# Patient Record
Sex: FEMALE | Race: BLACK OR AFRICAN AMERICAN | Employment: OTHER | URBAN - METROPOLITAN AREA
[De-identification: names, ages, dates, MRNs, and addresses within clinical notes are randomized per-mention and may not be internally consistent; named-entity substitution may affect disease eponyms.]

---

## 2017-01-10 ENCOUNTER — APPOINTMENT (OUTPATIENT)
Dept: GENERAL RADIOLOGY | Age: 47
End: 2017-01-10
Payer: COMMERCIAL

## 2017-01-10 ENCOUNTER — HOSPITAL ENCOUNTER (OUTPATIENT)
Age: 47
Setting detail: OBSERVATION
Discharge: HOME | End: 2017-01-11
Attending: INTERNAL MEDICINE | Admitting: INTERNAL MEDICINE
Payer: COMMERCIAL

## 2017-01-10 DIAGNOSIS — R79.89 ELEVATED D-DIMER: ICD-10-CM

## 2017-01-10 DIAGNOSIS — R07.9 CHEST PAIN, UNSPECIFIED TYPE: Primary | ICD-10-CM

## 2017-01-10 PROBLEM — C64.9 RENAL CANCER (HCC): Status: ACTIVE | Noted: 2017-01-10

## 2017-01-10 LAB
ALBUMIN SERPL-MCNC: 4.2 G/DL (ref 3.9–4.9)
ALP BLD-CCNC: 128 U/L (ref 40–130)
ALT SERPL-CCNC: 14 U/L (ref 0–33)
ANION GAP SERPL CALCULATED.3IONS-SCNC: 12 MEQ/L (ref 7–13)
APTT: 27.1 SEC (ref 21.6–35.4)
AST SERPL-CCNC: 19 U/L (ref 0–35)
BASOPHILS ABSOLUTE: 0.1 K/UL (ref 0–0.2)
BASOPHILS RELATIVE PERCENT: 1 %
BILIRUB SERPL-MCNC: 0.5 MG/DL (ref 0–1.2)
BUN BLDV-MCNC: 13 MG/DL (ref 6–20)
CALCIUM SERPL-MCNC: 9.2 MG/DL (ref 8.6–10.2)
CHLORIDE BLD-SCNC: 101 MEQ/L (ref 98–107)
CK MB: 2.6 NG/ML (ref 0–3.8)
CO2: 23 MEQ/L (ref 22–29)
CREAT SERPL-MCNC: 1.23 MG/DL (ref 0.5–0.9)
CREATINE KINASE-MB INDEX: 0.5 % (ref 0–3.5)
D DIMER: 0.56 MG/L FEU (ref 0–0.5)
EOSINOPHILS ABSOLUTE: 0.4 K/UL (ref 0–0.7)
EOSINOPHILS RELATIVE PERCENT: 4.3 %
GFR AFRICAN AMERICAN: 56.9
GFR NON-AFRICAN AMERICAN: 47
GLOBULIN: 2.7 G/DL (ref 2.3–3.5)
GLUCOSE BLD-MCNC: 98 MG/DL (ref 74–109)
HCT VFR BLD CALC: 44.8 % (ref 37–47)
HEMOGLOBIN: 15.3 G/DL (ref 12–16)
INR BLD: 1
LYMPHOCYTES ABSOLUTE: 2.1 K/UL (ref 1–4.8)
LYMPHOCYTES RELATIVE PERCENT: 24.4 %
MCH RBC QN AUTO: 32.3 PG (ref 27–31.3)
MCHC RBC AUTO-ENTMCNC: 34.2 % (ref 33–37)
MCV RBC AUTO: 94.5 FL (ref 82–100)
MONOCYTES ABSOLUTE: 0.5 K/UL (ref 0.2–0.8)
MONOCYTES RELATIVE PERCENT: 6 %
NEUTROPHILS ABSOLUTE: 5.6 K/UL (ref 1.4–6.5)
NEUTROPHILS RELATIVE PERCENT: 64.3 %
PDW BLD-RTO: 13.5 % (ref 11.5–14.5)
PLATELET # BLD: 274 K/UL (ref 130–400)
POTASSIUM SERPL-SCNC: 4.4 MEQ/L (ref 3.5–5.1)
PRO-BNP: 40 PG/ML
PROTHROMBIN TIME: 10.7 SEC (ref 8.1–13.7)
RBC # BLD: 4.74 M/UL (ref 4.2–5.4)
SODIUM BLD-SCNC: 136 MEQ/L (ref 132–144)
TOTAL CK: 556 U/L (ref 0–170)
TOTAL PROTEIN: 6.9 G/DL (ref 6.4–8.1)
TROPONIN: <0.01 NG/ML (ref 0–0.01)
WBC # BLD: 8.7 K/UL (ref 4.8–10.8)

## 2017-01-10 PROCEDURE — 82553 CREATINE MB FRACTION: CPT

## 2017-01-10 PROCEDURE — G0378 HOSPITAL OBSERVATION PER HR: HCPCS

## 2017-01-10 PROCEDURE — 6360000002 HC RX W HCPCS: Performed by: PHYSICIAN ASSISTANT

## 2017-01-10 PROCEDURE — 85730 THROMBOPLASTIN TIME PARTIAL: CPT

## 2017-01-10 PROCEDURE — 80053 COMPREHEN METABOLIC PANEL: CPT

## 2017-01-10 PROCEDURE — 36415 COLL VENOUS BLD VENIPUNCTURE: CPT

## 2017-01-10 PROCEDURE — 83880 ASSAY OF NATRIURETIC PEPTIDE: CPT

## 2017-01-10 PROCEDURE — 93005 ELECTROCARDIOGRAM TRACING: CPT

## 2017-01-10 PROCEDURE — 85610 PROTHROMBIN TIME: CPT

## 2017-01-10 PROCEDURE — 71010 XR CHEST PORTABLE: CPT

## 2017-01-10 PROCEDURE — 85379 FIBRIN DEGRADATION QUANT: CPT

## 2017-01-10 PROCEDURE — 82550 ASSAY OF CK (CPK): CPT

## 2017-01-10 PROCEDURE — 84484 ASSAY OF TROPONIN QUANT: CPT

## 2017-01-10 PROCEDURE — 85025 COMPLETE CBC W/AUTO DIFF WBC: CPT

## 2017-01-10 PROCEDURE — 6370000000 HC RX 637 (ALT 250 FOR IP): Performed by: PHYSICIAN ASSISTANT

## 2017-01-10 PROCEDURE — 96372 THER/PROPH/DIAG INJ SC/IM: CPT

## 2017-01-10 PROCEDURE — 99285 EMERGENCY DEPT VISIT HI MDM: CPT

## 2017-01-10 RX ORDER — ASPIRIN 325 MG
325 TABLET ORAL ONCE
Status: COMPLETED | OUTPATIENT
Start: 2017-01-10 | End: 2017-01-10

## 2017-01-10 RX ORDER — NITROGLYCERIN 0.4 MG/1
0.4 TABLET SUBLINGUAL ONCE
Status: COMPLETED | OUTPATIENT
Start: 2017-01-10 | End: 2017-01-10

## 2017-01-10 RX ADMIN — ENOXAPARIN SODIUM 90 MG: 100 INJECTION, SOLUTION INTRAVENOUS; SUBCUTANEOUS at 23:36

## 2017-01-10 RX ADMIN — ASPIRIN 325 MG ORAL TABLET 325 MG: 325 PILL ORAL at 20:59

## 2017-01-10 RX ADMIN — NITROGLYCERIN 0.4 MG: 0.4 TABLET SUBLINGUAL at 21:00

## 2017-01-10 ASSESSMENT — PAIN SCALES - GENERAL
PAINLEVEL_OUTOF10: 6
PAINLEVEL_OUTOF10: 5
PAINLEVEL_OUTOF10: 6
PAINLEVEL_OUTOF10: 9

## 2017-01-10 ASSESSMENT — PAIN DESCRIPTION - FREQUENCY: FREQUENCY: CONTINUOUS

## 2017-01-10 ASSESSMENT — PAIN DESCRIPTION - LOCATION
LOCATION: CHEST

## 2017-01-10 ASSESSMENT — PAIN DESCRIPTION - DESCRIPTORS
DESCRIPTORS: SHARP
DESCRIPTORS: SHARP
DESCRIPTORS: SHARP;SHOOTING
DESCRIPTORS: SHARP
DESCRIPTORS: SHARP

## 2017-01-10 ASSESSMENT — PAIN DESCRIPTION - PAIN TYPE
TYPE: ACUTE PAIN
TYPE: CHRONIC PAIN
TYPE: ACUTE PAIN
TYPE: ACUTE PAIN

## 2017-01-10 ASSESSMENT — ENCOUNTER SYMPTOMS
ANAL BLEEDING: 0
VOICE CHANGE: 0
BACK PAIN: 1
APNEA: 0
ABDOMINAL DISTENTION: 0
EYE DISCHARGE: 0
SHORTNESS OF BREATH: 0
COUGH: 0
PHOTOPHOBIA: 0

## 2017-01-10 ASSESSMENT — PAIN SCALES - PAIN ASSESSMENT IN ADVANCED DEMENTIA (PAINAD): BREATHING: 0

## 2017-01-10 ASSESSMENT — PAIN - FUNCTIONAL ASSESSMENT: PAIN_FUNCTIONAL_ASSESSMENT: ADVANCED DEMENTIA

## 2017-01-11 ENCOUNTER — APPOINTMENT (OUTPATIENT)
Dept: NUCLEAR MEDICINE | Age: 47
End: 2017-01-11
Payer: COMMERCIAL

## 2017-01-11 ENCOUNTER — PATIENT MESSAGE (OUTPATIENT)
Dept: FAMILY MEDICINE CLINIC | Age: 47
End: 2017-01-11

## 2017-01-11 VITALS
SYSTOLIC BLOOD PRESSURE: 124 MMHG | HEIGHT: 62 IN | TEMPERATURE: 98.3 F | HEART RATE: 67 BPM | RESPIRATION RATE: 20 BRPM | OXYGEN SATURATION: 98 % | BODY MASS INDEX: 36.8 KG/M2 | WEIGHT: 200 LBS | DIASTOLIC BLOOD PRESSURE: 74 MMHG

## 2017-01-11 LAB
CK MB: 1.6 NG/ML (ref 0–3.8)
CK MB: 1.8 NG/ML (ref 0–3.8)
CREATINE KINASE-MB INDEX: 0.4 % (ref 0–3.5)
CREATINE KINASE-MB INDEX: 0.4 % (ref 0–3.5)
MAGNESIUM: 2.5 MG/DL (ref 1.7–2.3)
TOTAL CK: 382 U/L (ref 0–170)
TOTAL CK: 420 U/L (ref 0–170)
TROPONIN: <0.01 NG/ML (ref 0–0.01)
TROPONIN: <0.01 NG/ML (ref 0–0.01)

## 2017-01-11 PROCEDURE — 94640 AIRWAY INHALATION TREATMENT: CPT

## 2017-01-11 PROCEDURE — 78582 LUNG VENTILAT&PERFUS IMAGING: CPT

## 2017-01-11 PROCEDURE — 83735 ASSAY OF MAGNESIUM: CPT

## 2017-01-11 PROCEDURE — 82553 CREATINE MB FRACTION: CPT

## 2017-01-11 PROCEDURE — 96372 THER/PROPH/DIAG INJ SC/IM: CPT

## 2017-01-11 PROCEDURE — 6370000000 HC RX 637 (ALT 250 FOR IP): Performed by: INTERNAL MEDICINE

## 2017-01-11 PROCEDURE — 2580000003 HC RX 258: Performed by: PHYSICIAN ASSISTANT

## 2017-01-11 PROCEDURE — 94664 DEMO&/EVAL PT USE INHALER: CPT

## 2017-01-11 PROCEDURE — 94760 N-INVAS EAR/PLS OXIMETRY 1: CPT

## 2017-01-11 PROCEDURE — 36415 COLL VENOUS BLD VENIPUNCTURE: CPT

## 2017-01-11 PROCEDURE — 93005 ELECTROCARDIOGRAM TRACING: CPT

## 2017-01-11 PROCEDURE — G0378 HOSPITAL OBSERVATION PER HR: HCPCS

## 2017-01-11 PROCEDURE — 3430000000 HC RX DIAGNOSTIC RADIOPHARMACEUTICAL: Performed by: PHYSICIAN ASSISTANT

## 2017-01-11 PROCEDURE — 2500000003 HC RX 250 WO HCPCS: Performed by: PHYSICIAN ASSISTANT

## 2017-01-11 PROCEDURE — 84484 ASSAY OF TROPONIN QUANT: CPT

## 2017-01-11 PROCEDURE — 6360000002 HC RX W HCPCS: Performed by: PHYSICIAN ASSISTANT

## 2017-01-11 PROCEDURE — 6370000000 HC RX 637 (ALT 250 FOR IP): Performed by: PHYSICIAN ASSISTANT

## 2017-01-11 PROCEDURE — S0028 INJECTION, FAMOTIDINE, 20 MG: HCPCS | Performed by: PHYSICIAN ASSISTANT

## 2017-01-11 PROCEDURE — A9539 TC99M PENTETATE: HCPCS | Performed by: PHYSICIAN ASSISTANT

## 2017-01-11 PROCEDURE — 6360000002 HC RX W HCPCS: Performed by: INTERNAL MEDICINE

## 2017-01-11 PROCEDURE — 99219 PR INITIAL OBSERVATION CARE/DAY 50 MINUTES: CPT | Performed by: INTERNAL MEDICINE

## 2017-01-11 PROCEDURE — 96374 THER/PROPH/DIAG INJ IV PUSH: CPT

## 2017-01-11 PROCEDURE — A9540 TC99M MAA: HCPCS | Performed by: PHYSICIAN ASSISTANT

## 2017-01-11 PROCEDURE — 82550 ASSAY OF CK (CPK): CPT

## 2017-01-11 RX ORDER — SODIUM CHLORIDE 0.9 % (FLUSH) 0.9 %
10 SYRINGE (ML) INJECTION EVERY 12 HOURS SCHEDULED
Status: DISCONTINUED | OUTPATIENT
Start: 2017-01-11 | End: 2017-01-11 | Stop reason: HOSPADM

## 2017-01-11 RX ORDER — ONDANSETRON 2 MG/ML
4 INJECTION INTRAMUSCULAR; INTRAVENOUS EVERY 6 HOURS PRN
Status: DISCONTINUED | OUTPATIENT
Start: 2017-01-11 | End: 2017-01-11 | Stop reason: HOSPADM

## 2017-01-11 RX ORDER — ATORVASTATIN CALCIUM 20 MG/1
20 TABLET, FILM COATED ORAL DAILY
Status: DISCONTINUED | OUTPATIENT
Start: 2017-01-11 | End: 2017-01-11 | Stop reason: HOSPADM

## 2017-01-11 RX ORDER — ACETAMINOPHEN 325 MG/1
650 TABLET ORAL EVERY 6 HOURS PRN
Status: DISCONTINUED | OUTPATIENT
Start: 2017-01-11 | End: 2017-01-11 | Stop reason: HOSPADM

## 2017-01-11 RX ORDER — KIT FOR THE PREPARATION OF TECHNETIUM TC 99M PENTETATE 20 MG/1
1 INJECTION, POWDER, LYOPHILIZED, FOR SOLUTION INTRAVENOUS; RESPIRATORY (INHALATION)
Status: COMPLETED | OUTPATIENT
Start: 2017-01-11 | End: 2017-01-11

## 2017-01-11 RX ORDER — SODIUM CHLORIDE 0.9 % (FLUSH) 0.9 %
10 SYRINGE (ML) INJECTION PRN
Status: DISCONTINUED | OUTPATIENT
Start: 2017-01-11 | End: 2017-01-11 | Stop reason: HOSPADM

## 2017-01-11 RX ORDER — ALBUTEROL SULFATE 2.5 MG/3ML
2.5 SOLUTION RESPIRATORY (INHALATION) 3 TIMES DAILY
Status: DISCONTINUED | OUTPATIENT
Start: 2017-01-11 | End: 2017-01-11 | Stop reason: HOSPADM

## 2017-01-11 RX ORDER — NITROGLYCERIN 0.4 MG/1
0.4 TABLET SUBLINGUAL EVERY 5 MIN PRN
Status: DISCONTINUED | OUTPATIENT
Start: 2017-01-11 | End: 2017-01-11 | Stop reason: HOSPADM

## 2017-01-11 RX ORDER — ALBUTEROL SULFATE 2.5 MG/3ML
2.5 SOLUTION RESPIRATORY (INHALATION) EVERY 6 HOURS PRN
Status: DISCONTINUED | OUTPATIENT
Start: 2017-01-11 | End: 2017-01-11 | Stop reason: HOSPADM

## 2017-01-11 RX ORDER — OXYCODONE HYDROCHLORIDE AND ACETAMINOPHEN 5; 325 MG/1; MG/1
1 TABLET ORAL 2 TIMES DAILY
Status: DISCONTINUED | OUTPATIENT
Start: 2017-01-11 | End: 2017-01-11 | Stop reason: HOSPADM

## 2017-01-11 RX ORDER — ASPIRIN 81 MG/1
81 TABLET, CHEWABLE ORAL DAILY
Status: DISCONTINUED | OUTPATIENT
Start: 2017-01-11 | End: 2017-01-11 | Stop reason: HOSPADM

## 2017-01-11 RX ORDER — SODIUM CHLORIDE 0.9 % (FLUSH) 0.9 %
10 SYRINGE (ML) INJECTION 2 TIMES DAILY
Status: DISCONTINUED | OUTPATIENT
Start: 2017-01-11 | End: 2017-01-11 | Stop reason: HOSPADM

## 2017-01-11 RX ORDER — ALBUTEROL SULFATE 90 UG/1
2 AEROSOL, METERED RESPIRATORY (INHALATION) EVERY 4 HOURS PRN
Status: DISCONTINUED | OUTPATIENT
Start: 2017-01-11 | End: 2017-01-11 | Stop reason: HOSPADM

## 2017-01-11 RX ORDER — DOCUSATE SODIUM 100 MG/1
100 CAPSULE, LIQUID FILLED ORAL 2 TIMES DAILY
Status: DISCONTINUED | OUTPATIENT
Start: 2017-01-11 | End: 2017-01-11 | Stop reason: HOSPADM

## 2017-01-11 RX ORDER — ALBUTEROL SULFATE 2.5 MG/3ML
2.5 SOLUTION RESPIRATORY (INHALATION)
Status: DISCONTINUED | OUTPATIENT
Start: 2017-01-11 | End: 2017-01-11 | Stop reason: HOSPADM

## 2017-01-11 RX ORDER — ALBUTEROL SULFATE 2.5 MG/3ML
2.5 SOLUTION RESPIRATORY (INHALATION) EVERY 6 HOURS PRN
Status: DISCONTINUED | OUTPATIENT
Start: 2017-01-11 | End: 2017-01-11

## 2017-01-11 RX ADMIN — METOPROLOL TARTRATE 12.5 MG: 25 TABLET, FILM COATED ORAL at 11:02

## 2017-01-11 RX ADMIN — FAMOTIDINE 20 MG: 10 INJECTION INTRAVENOUS at 10:47

## 2017-01-11 RX ADMIN — ACETAMINOPHEN 650 MG: 325 TABLET ORAL at 01:42

## 2017-01-11 RX ADMIN — OXYCODONE HYDROCHLORIDE AND ACETAMINOPHEN 1 TABLET: 5; 325 TABLET ORAL at 09:24

## 2017-01-11 RX ADMIN — ALBUTEROL SULFATE 2.5 MG: 2.5 SOLUTION RESPIRATORY (INHALATION) at 08:26

## 2017-01-11 RX ADMIN — ENOXAPARIN SODIUM 90 MG: 100 INJECTION, SOLUTION INTRAVENOUS; SUBCUTANEOUS at 09:07

## 2017-01-11 RX ADMIN — KIT FOR THE PREPARATION OF TECHNETIUM TC 99M PENTETATE 1 MILLICURIE: 20 INJECTION, POWDER, LYOPHILIZED, FOR SOLUTION INTRAVENOUS; RESPIRATORY (INHALATION) at 11:45

## 2017-01-11 RX ADMIN — ASPIRIN 81 MG: 81 TABLET, CHEWABLE ORAL at 09:08

## 2017-01-11 RX ADMIN — Medication 10 ML: at 12:10

## 2017-01-11 RX ADMIN — ATORVASTATIN CALCIUM 20 MG: 20 TABLET, FILM COATED ORAL at 11:02

## 2017-01-11 RX ADMIN — ALBUTEROL SULFATE 2.5 MG: 2.5 SOLUTION RESPIRATORY (INHALATION) at 14:09

## 2017-01-11 RX ADMIN — ALBUTEROL SULFATE 2.5 MG: 2.5 SOLUTION RESPIRATORY (INHALATION) at 00:32

## 2017-01-11 RX ADMIN — Medication 5.9 MILLICURIE: at 12:10

## 2017-01-11 RX ADMIN — SODIUM CHLORIDE, PRESERVATIVE FREE 10 ML: 5 INJECTION INTRAVENOUS at 09:08

## 2017-01-11 RX ADMIN — Medication 10 ML: at 13:04

## 2017-01-11 RX ADMIN — SODIUM CHLORIDE, PRESERVATIVE FREE 10 ML: 5 INJECTION INTRAVENOUS at 13:02

## 2017-01-11 ASSESSMENT — ENCOUNTER SYMPTOMS
GASTROINTESTINAL NEGATIVE: 1
EYES NEGATIVE: 1
COUGH: 0
CHEST TIGHTNESS: 0
ALLERGIC/IMMUNOLOGIC NEGATIVE: 1
WHEEZING: 0
STRIDOR: 0
APNEA: 0
SHORTNESS OF BREATH: 1

## 2017-01-11 ASSESSMENT — PAIN SCALES - GENERAL
PAINLEVEL_OUTOF10: 4
PAINLEVEL_OUTOF10: 2

## 2017-01-13 LAB
EKG ATRIAL RATE: 66 BPM
EKG ATRIAL RATE: 72 BPM
EKG P AXIS: 34 DEGREES
EKG P AXIS: 36 DEGREES
EKG P-R INTERVAL: 150 MS
EKG P-R INTERVAL: 154 MS
EKG Q-T INTERVAL: 404 MS
EKG Q-T INTERVAL: 430 MS
EKG QRS DURATION: 78 MS
EKG QRS DURATION: 92 MS
EKG QTC CALCULATION (BAZETT): 442 MS
EKG QTC CALCULATION (BAZETT): 450 MS
EKG R AXIS: 14 DEGREES
EKG R AXIS: 23 DEGREES
EKG T AXIS: 11 DEGREES
EKG T AXIS: 2 DEGREES
EKG VENTRICULAR RATE: 66 BPM
EKG VENTRICULAR RATE: 72 BPM

## 2017-02-04 ENCOUNTER — HOSPITAL ENCOUNTER (EMERGENCY)
Age: 47
Discharge: HOME | End: 2017-02-04
Payer: COMMERCIAL

## 2017-02-04 ENCOUNTER — APPOINTMENT (OUTPATIENT)
Dept: GENERAL RADIOLOGY | Age: 47
End: 2017-02-04
Payer: COMMERCIAL

## 2017-02-04 VITALS
HEIGHT: 63 IN | RESPIRATION RATE: 20 BRPM | DIASTOLIC BLOOD PRESSURE: 73 MMHG | TEMPERATURE: 98.1 F | HEART RATE: 80 BPM | BODY MASS INDEX: 35.44 KG/M2 | WEIGHT: 200 LBS | OXYGEN SATURATION: 98 % | SYSTOLIC BLOOD PRESSURE: 125 MMHG

## 2017-02-04 DIAGNOSIS — R07.9 CHEST PAIN, UNSPECIFIED TYPE: Primary | ICD-10-CM

## 2017-02-04 LAB
ALBUMIN SERPL-MCNC: 3.9 G/DL (ref 3.9–4.9)
ALP BLD-CCNC: 102 U/L (ref 40–130)
ALT SERPL-CCNC: 12 U/L (ref 0–33)
ANION GAP SERPL CALCULATED.3IONS-SCNC: 8 MEQ/L (ref 7–13)
AST SERPL-CCNC: 15 U/L (ref 0–35)
BASOPHILS ABSOLUTE: 0.1 K/UL (ref 0–0.2)
BASOPHILS RELATIVE PERCENT: 1.4 %
BILIRUB SERPL-MCNC: 0.2 MG/DL (ref 0–1.2)
BUN BLDV-MCNC: 17 MG/DL (ref 6–20)
CALCIUM SERPL-MCNC: 9.2 MG/DL (ref 8.6–10.2)
CHLORIDE BLD-SCNC: 105 MEQ/L (ref 98–107)
CK MB: 1.6 NG/ML (ref 0–3.8)
CO2: 25 MEQ/L (ref 22–29)
CREAT SERPL-MCNC: 1.4 MG/DL (ref 0.5–0.9)
CREATINE KINASE-MB INDEX: 0.5 % (ref 0–3.5)
EOSINOPHILS ABSOLUTE: 0.2 K/UL (ref 0–0.7)
EOSINOPHILS RELATIVE PERCENT: 3.3 %
GFR AFRICAN AMERICAN: 49
GFR NON-AFRICAN AMERICAN: 40.5
GLOBULIN: 2.8 G/DL (ref 2.3–3.5)
GLUCOSE BLD-MCNC: 107 MG/DL (ref 74–109)
HCT VFR BLD CALC: 44 % (ref 37–47)
HEMOGLOBIN: 14.8 G/DL (ref 12–16)
LYMPHOCYTES ABSOLUTE: 1.7 K/UL (ref 1–4.8)
LYMPHOCYTES RELATIVE PERCENT: 22.4 %
MCH RBC QN AUTO: 32.1 PG (ref 27–31.3)
MCHC RBC AUTO-ENTMCNC: 33.6 % (ref 33–37)
MCV RBC AUTO: 95.4 FL (ref 82–100)
MONOCYTES ABSOLUTE: 0.4 K/UL (ref 0.2–0.8)
MONOCYTES RELATIVE PERCENT: 4.8 %
NEUTROPHILS ABSOLUTE: 5.1 K/UL (ref 1.4–6.5)
NEUTROPHILS RELATIVE PERCENT: 68.1 %
PDW BLD-RTO: 13.7 % (ref 11.5–14.5)
PLATELET # BLD: 279 K/UL (ref 130–400)
POTASSIUM SERPL-SCNC: 4.2 MEQ/L (ref 3.5–5.1)
RBC # BLD: 4.61 M/UL (ref 4.2–5.4)
SODIUM BLD-SCNC: 138 MEQ/L (ref 132–144)
TOTAL CK: 322 U/L (ref 0–170)
TOTAL PROTEIN: 6.7 G/DL (ref 6.4–8.1)
TROPONIN: <0.01 NG/ML (ref 0–0.01)
WBC # BLD: 7.5 K/UL (ref 4.8–10.8)

## 2017-02-04 PROCEDURE — 84484 ASSAY OF TROPONIN QUANT: CPT

## 2017-02-04 PROCEDURE — 80053 COMPREHEN METABOLIC PANEL: CPT

## 2017-02-04 PROCEDURE — 6370000000 HC RX 637 (ALT 250 FOR IP): Performed by: PHYSICIAN ASSISTANT

## 2017-02-04 PROCEDURE — 82553 CREATINE MB FRACTION: CPT

## 2017-02-04 PROCEDURE — 71020 XR CHEST STANDARD TWO VW: CPT

## 2017-02-04 PROCEDURE — 93005 ELECTROCARDIOGRAM TRACING: CPT

## 2017-02-04 PROCEDURE — 82550 ASSAY OF CK (CPK): CPT

## 2017-02-04 PROCEDURE — 85025 COMPLETE CBC W/AUTO DIFF WBC: CPT

## 2017-02-04 PROCEDURE — 99285 EMERGENCY DEPT VISIT HI MDM: CPT

## 2017-02-04 PROCEDURE — 36415 COLL VENOUS BLD VENIPUNCTURE: CPT

## 2017-02-04 RX ORDER — NITROGLYCERIN 0.4 MG/1
0.4 TABLET SUBLINGUAL EVERY 5 MIN PRN
Status: DISCONTINUED | OUTPATIENT
Start: 2017-02-04 | End: 2017-02-04 | Stop reason: HOSPADM

## 2017-02-04 RX ORDER — CYCLOBENZAPRINE HCL 10 MG
10 TABLET ORAL 3 TIMES DAILY PRN
Qty: 30 TABLET | Refills: 0 | Status: SHIPPED | OUTPATIENT
Start: 2017-02-04 | End: 2017-02-14

## 2017-02-04 RX ADMIN — NITROGLYCERIN 0.4 MG: 0.4 TABLET SUBLINGUAL at 16:39

## 2017-02-04 ASSESSMENT — ENCOUNTER SYMPTOMS
NAUSEA: 0
COLOR CHANGE: 0
WHEEZING: 0
SORE THROAT: 0
CHEST TIGHTNESS: 0
CONSTIPATION: 0
RHINORRHEA: 0
SHORTNESS OF BREATH: 0
COUGH: 0
STRIDOR: 0
ABDOMINAL PAIN: 0
CHOKING: 0
ABDOMINAL DISTENTION: 0
VOMITING: 0
DIARRHEA: 0
EYE DISCHARGE: 0

## 2017-02-04 ASSESSMENT — PAIN DESCRIPTION - DESCRIPTORS: DESCRIPTORS: PRESSURE

## 2017-02-04 ASSESSMENT — PAIN SCALES - GENERAL: PAINLEVEL_OUTOF10: 10

## 2017-02-04 ASSESSMENT — PAIN DESCRIPTION - LOCATION: LOCATION: CHEST

## 2017-02-04 ASSESSMENT — PAIN DESCRIPTION - ORIENTATION: ORIENTATION: LEFT

## 2017-02-09 LAB
EKG ATRIAL RATE: 75 BPM
EKG P AXIS: 36 DEGREES
EKG P-R INTERVAL: 154 MS
EKG Q-T INTERVAL: 370 MS
EKG QRS DURATION: 78 MS
EKG QTC CALCULATION (BAZETT): 413 MS
EKG R AXIS: 23 DEGREES
EKG T AXIS: 7 DEGREES
EKG VENTRICULAR RATE: 75 BPM

## 2017-02-21 ENCOUNTER — OFFICE VISIT (OUTPATIENT)
Dept: UROLOGY | Age: 47
End: 2017-02-21

## 2017-02-21 VITALS
HEART RATE: 89 BPM | DIASTOLIC BLOOD PRESSURE: 74 MMHG | HEIGHT: 63 IN | SYSTOLIC BLOOD PRESSURE: 100 MMHG | WEIGHT: 200 LBS | BODY MASS INDEX: 35.44 KG/M2

## 2017-02-21 DIAGNOSIS — C64.1 RENAL CELL CARCINOMA, RIGHT (HCC): Primary | ICD-10-CM

## 2017-02-21 PROCEDURE — 99214 OFFICE O/P EST MOD 30 MIN: CPT | Performed by: UROLOGY

## 2017-02-21 RX ORDER — TRAMADOL HYDROCHLORIDE 50 MG/1
TABLET ORAL
Refills: 0 | COMMUNITY
Start: 2017-02-16 | End: 2017-02-28 | Stop reason: ALTCHOICE

## 2017-02-28 ENCOUNTER — OFFICE VISIT (OUTPATIENT)
Dept: FAMILY MEDICINE CLINIC | Age: 47
End: 2017-02-28

## 2017-02-28 VITALS
DIASTOLIC BLOOD PRESSURE: 80 MMHG | HEIGHT: 63 IN | SYSTOLIC BLOOD PRESSURE: 126 MMHG | RESPIRATION RATE: 18 BRPM | BODY MASS INDEX: 45.71 KG/M2 | TEMPERATURE: 98.4 F | HEART RATE: 84 BPM | WEIGHT: 258 LBS

## 2017-02-28 DIAGNOSIS — J45.30 MILD PERSISTENT ASTHMA WITHOUT COMPLICATION: ICD-10-CM

## 2017-02-28 DIAGNOSIS — J18.9 PNEUMONIA DUE TO ORGANISM: Primary | ICD-10-CM

## 2017-02-28 DIAGNOSIS — R05.9 COUGH: ICD-10-CM

## 2017-02-28 PROCEDURE — 99214 OFFICE O/P EST MOD 30 MIN: CPT | Performed by: FAMILY MEDICINE

## 2017-02-28 RX ORDER — PREDNISONE 20 MG/1
TABLET ORAL
Qty: 18 TABLET | Refills: 0 | Status: SHIPPED | OUTPATIENT
Start: 2017-02-28 | End: 2017-11-16 | Stop reason: SDUPTHER

## 2017-02-28 RX ORDER — AMOXICILLIN AND CLAVULANATE POTASSIUM 875; 125 MG/1; MG/1
1 TABLET, FILM COATED ORAL EVERY 12 HOURS
Qty: 20 TABLET | Refills: 0 | Status: SHIPPED | OUTPATIENT
Start: 2017-02-28 | End: 2017-03-10

## 2017-03-10 ENCOUNTER — HOSPITAL ENCOUNTER (EMERGENCY)
Age: 47
Discharge: HOME | End: 2017-03-10
Payer: COMMERCIAL

## 2017-03-10 VITALS
OXYGEN SATURATION: 96 % | DIASTOLIC BLOOD PRESSURE: 78 MMHG | RESPIRATION RATE: 18 BRPM | TEMPERATURE: 98.2 F | HEART RATE: 102 BPM | SYSTOLIC BLOOD PRESSURE: 144 MMHG

## 2017-03-10 DIAGNOSIS — M62.838 SPASM OF MUSCLE: ICD-10-CM

## 2017-03-10 DIAGNOSIS — G43.809 OTHER MIGRAINE WITHOUT STATUS MIGRAINOSUS, NOT INTRACTABLE: Primary | ICD-10-CM

## 2017-03-10 PROCEDURE — 99282 EMERGENCY DEPT VISIT SF MDM: CPT

## 2017-03-10 PROCEDURE — 6370000000 HC RX 637 (ALT 250 FOR IP): Performed by: PHYSICIAN ASSISTANT

## 2017-03-10 RX ORDER — CYCLOBENZAPRINE HCL 10 MG
10 TABLET ORAL ONCE
Status: COMPLETED | OUTPATIENT
Start: 2017-03-10 | End: 2017-03-10

## 2017-03-10 RX ORDER — CYCLOBENZAPRINE HCL 10 MG
10 TABLET ORAL 3 TIMES DAILY PRN
Qty: 12 TABLET | Refills: 0 | Status: SHIPPED | OUTPATIENT
Start: 2017-03-10 | End: 2017-03-20

## 2017-03-10 RX ORDER — LORAZEPAM 1 MG/1
2 TABLET ORAL ONCE
Status: COMPLETED | OUTPATIENT
Start: 2017-03-10 | End: 2017-03-10

## 2017-03-10 RX ORDER — OXYCODONE HYDROCHLORIDE AND ACETAMINOPHEN 5; 325 MG/1; MG/1
1 TABLET ORAL ONCE
Status: COMPLETED | OUTPATIENT
Start: 2017-03-10 | End: 2017-03-10

## 2017-03-10 RX ADMIN — CYCLOBENZAPRINE HYDROCHLORIDE 10 MG: 10 TABLET, FILM COATED ORAL at 01:29

## 2017-03-10 RX ADMIN — OXYCODONE HYDROCHLORIDE AND ACETAMINOPHEN 1 TABLET: 5; 325 TABLET ORAL at 01:28

## 2017-03-10 RX ADMIN — LORAZEPAM 2 MG: 1 TABLET ORAL at 01:29

## 2017-03-10 ASSESSMENT — ENCOUNTER SYMPTOMS
ABDOMINAL PAIN: 0
ANAL BLEEDING: 0
ABDOMINAL DISTENTION: 0
SHORTNESS OF BREATH: 0
VOICE CHANGE: 0
APNEA: 0
VOMITING: 0
EYE DISCHARGE: 0
EYE PAIN: 0
BACK PAIN: 1
NAUSEA: 0
PHOTOPHOBIA: 1

## 2017-03-10 ASSESSMENT — PAIN SCALES - GENERAL: PAINLEVEL_OUTOF10: 9

## 2017-03-10 ASSESSMENT — PAIN DESCRIPTION - PAIN TYPE: TYPE: ACUTE PAIN

## 2017-03-10 ASSESSMENT — PAIN DESCRIPTION - DESCRIPTORS: DESCRIPTORS: SHARP;STABBING

## 2017-03-10 ASSESSMENT — PAIN DESCRIPTION - LOCATION: LOCATION: HEAD

## 2017-03-20 DIAGNOSIS — B37.31 YEAST VAGINITIS: Primary | ICD-10-CM

## 2017-03-20 RX ORDER — FLUCONAZOLE 100 MG/1
100 TABLET ORAL DAILY
Qty: 3 TABLET | Refills: 1 | Status: SHIPPED | OUTPATIENT
Start: 2017-03-20 | End: 2017-03-23

## 2017-04-06 DIAGNOSIS — M47.816 SPONDYLOSIS OF LUMBAR REGION WITHOUT MYELOPATHY OR RADICULOPATHY: Primary | ICD-10-CM

## 2017-04-06 DIAGNOSIS — M47.812 CERVICAL SPONDYLOSIS WITHOUT MYELOPATHY: ICD-10-CM

## 2017-04-10 ENCOUNTER — TELEPHONE (OUTPATIENT)
Dept: FAMILY MEDICINE CLINIC | Age: 47
End: 2017-04-10

## 2017-04-19 DIAGNOSIS — J30.1 SEASONAL ALLERGIC RHINITIS DUE TO POLLEN: Primary | ICD-10-CM

## 2017-04-19 RX ORDER — CETIRIZINE HYDROCHLORIDE 10 MG/1
TABLET ORAL
Qty: 30 TABLET | Refills: 3 | Status: SHIPPED | OUTPATIENT
Start: 2017-04-19 | End: 2017-07-13 | Stop reason: SDUPTHER

## 2017-04-19 RX ORDER — METHYLPREDNISOLONE 4 MG/1
TABLET ORAL
Qty: 21 TABLET | Refills: 1 | Status: SHIPPED | OUTPATIENT
Start: 2017-04-19 | End: 2017-07-20 | Stop reason: ALTCHOICE

## 2017-05-18 ENCOUNTER — APPOINTMENT (OUTPATIENT)
Dept: GENERAL RADIOLOGY | Age: 47
End: 2017-05-18
Payer: COMMERCIAL

## 2017-05-18 ENCOUNTER — HOSPITAL ENCOUNTER (OUTPATIENT)
Age: 47
Setting detail: OBSERVATION
Discharge: HOME | End: 2017-05-19
Attending: INTERNAL MEDICINE | Admitting: INTERNAL MEDICINE
Payer: COMMERCIAL

## 2017-05-18 ENCOUNTER — APPOINTMENT (OUTPATIENT)
Dept: CT IMAGING | Age: 47
End: 2017-05-18
Payer: COMMERCIAL

## 2017-05-18 DIAGNOSIS — R07.9 CHEST PAIN, UNSPECIFIED TYPE: ICD-10-CM

## 2017-05-18 DIAGNOSIS — G45.9 TRANSIENT CEREBRAL ISCHEMIA, UNSPECIFIED TYPE: Primary | ICD-10-CM

## 2017-05-18 LAB
ALBUMIN SERPL-MCNC: 4 G/DL (ref 3.9–4.9)
ALP BLD-CCNC: 113 U/L (ref 40–130)
ALT SERPL-CCNC: 12 U/L (ref 0–33)
ANION GAP SERPL CALCULATED.3IONS-SCNC: 11 MEQ/L (ref 7–13)
APTT: 27.3 SEC (ref 21.6–35.4)
AST SERPL-CCNC: 15 U/L (ref 0–35)
BASOPHILS ABSOLUTE: 0 K/UL (ref 0–0.2)
BASOPHILS RELATIVE PERCENT: 0.4 %
BILIRUB SERPL-MCNC: 0.1 MG/DL (ref 0–1.2)
BUN BLDV-MCNC: 15 MG/DL (ref 6–20)
CALCIUM SERPL-MCNC: 8.8 MG/DL (ref 8.6–10.2)
CHLORIDE BLD-SCNC: 103 MEQ/L (ref 98–107)
CK MB: 2.3 NG/ML (ref 0–3.8)
CO2: 19 MEQ/L (ref 22–29)
CREAT SERPL-MCNC: 1.38 MG/DL (ref 0.5–0.9)
CREATINE KINASE-MB INDEX: 0.5 % (ref 0–3.5)
EOSINOPHILS ABSOLUTE: 0.4 K/UL (ref 0–0.7)
EOSINOPHILS RELATIVE PERCENT: 3.5 %
GFR AFRICAN AMERICAN: 49.8
GFR NON-AFRICAN AMERICAN: 41.1
GLOBULIN: 3 G/DL (ref 2.3–3.5)
GLUCOSE BLD-MCNC: 206 MG/DL (ref 74–109)
HCT VFR BLD CALC: 46.6 % (ref 37–47)
HEMOGLOBIN: 15.6 G/DL (ref 12–16)
INR BLD: 1
LYMPHOCYTES ABSOLUTE: 2.3 K/UL (ref 1–4.8)
LYMPHOCYTES RELATIVE PERCENT: 22.2 %
MCH RBC QN AUTO: 31.7 PG (ref 27–31.3)
MCHC RBC AUTO-ENTMCNC: 33.4 % (ref 33–37)
MCV RBC AUTO: 94.8 FL (ref 82–100)
MONOCYTES ABSOLUTE: 0.5 K/UL (ref 0.2–0.8)
MONOCYTES RELATIVE PERCENT: 5.1 %
NEUTROPHILS ABSOLUTE: 7 K/UL (ref 1.4–6.5)
NEUTROPHILS RELATIVE PERCENT: 68.8 %
PDW BLD-RTO: 13.9 % (ref 11.5–14.5)
PLATELET # BLD: 277 K/UL (ref 130–400)
POTASSIUM SERPL-SCNC: 4.7 MEQ/L (ref 3.5–5.1)
PROTHROMBIN TIME: 10.2 SEC (ref 8.1–13.7)
RBC # BLD: 4.92 M/UL (ref 4.2–5.4)
SODIUM BLD-SCNC: 133 MEQ/L (ref 132–144)
TOTAL CK: 483 U/L (ref 0–170)
TOTAL PROTEIN: 7 G/DL (ref 6.4–8.1)
TROPONIN: <0.01 NG/ML (ref 0–0.01)
WBC # BLD: 10.2 K/UL (ref 4.8–10.8)

## 2017-05-18 PROCEDURE — 84484 ASSAY OF TROPONIN QUANT: CPT

## 2017-05-18 PROCEDURE — 85025 COMPLETE CBC W/AUTO DIFF WBC: CPT

## 2017-05-18 PROCEDURE — 82550 ASSAY OF CK (CPK): CPT

## 2017-05-18 PROCEDURE — 6360000002 HC RX W HCPCS: Performed by: NURSE PRACTITIONER

## 2017-05-18 PROCEDURE — 71010 XR CHEST PORTABLE: CPT

## 2017-05-18 PROCEDURE — 96375 TX/PRO/DX INJ NEW DRUG ADDON: CPT

## 2017-05-18 PROCEDURE — G0378 HOSPITAL OBSERVATION PER HR: HCPCS

## 2017-05-18 PROCEDURE — 93005 ELECTROCARDIOGRAM TRACING: CPT

## 2017-05-18 PROCEDURE — 2580000003 HC RX 258: Performed by: NURSE PRACTITIONER

## 2017-05-18 PROCEDURE — 70450 CT HEAD/BRAIN W/O DYE: CPT

## 2017-05-18 PROCEDURE — 80053 COMPREHEN METABOLIC PANEL: CPT

## 2017-05-18 PROCEDURE — 96374 THER/PROPH/DIAG INJ IV PUSH: CPT

## 2017-05-18 PROCEDURE — 82553 CREATINE MB FRACTION: CPT

## 2017-05-18 PROCEDURE — 6370000000 HC RX 637 (ALT 250 FOR IP): Performed by: NURSE PRACTITIONER

## 2017-05-18 PROCEDURE — 6360000002 HC RX W HCPCS: Performed by: PHYSICIAN ASSISTANT

## 2017-05-18 PROCEDURE — 96376 TX/PRO/DX INJ SAME DRUG ADON: CPT

## 2017-05-18 PROCEDURE — 85730 THROMBOPLASTIN TIME PARTIAL: CPT

## 2017-05-18 PROCEDURE — 36415 COLL VENOUS BLD VENIPUNCTURE: CPT

## 2017-05-18 PROCEDURE — 99285 EMERGENCY DEPT VISIT HI MDM: CPT

## 2017-05-18 PROCEDURE — 85610 PROTHROMBIN TIME: CPT

## 2017-05-18 RX ORDER — OXYCODONE HYDROCHLORIDE 5 MG/1
5 TABLET ORAL EVERY 4 HOURS PRN
Status: DISCONTINUED | OUTPATIENT
Start: 2017-05-18 | End: 2017-05-19 | Stop reason: HOSPADM

## 2017-05-18 RX ORDER — ATORVASTATIN CALCIUM 20 MG/1
20 TABLET, FILM COATED ORAL DAILY
Status: DISCONTINUED | OUTPATIENT
Start: 2017-05-19 | End: 2017-05-19

## 2017-05-18 RX ORDER — OXYCODONE HYDROCHLORIDE 5 MG/1
10 TABLET ORAL EVERY 4 HOURS PRN
Status: DISCONTINUED | OUTPATIENT
Start: 2017-05-18 | End: 2017-05-19 | Stop reason: HOSPADM

## 2017-05-18 RX ORDER — MORPHINE SULFATE 4 MG/ML
4 INJECTION, SOLUTION INTRAMUSCULAR; INTRAVENOUS
Status: DISCONTINUED | OUTPATIENT
Start: 2017-05-18 | End: 2017-05-19 | Stop reason: HOSPADM

## 2017-05-18 RX ORDER — SODIUM CHLORIDE 0.9 % (FLUSH) 0.9 %
10 SYRINGE (ML) INJECTION EVERY 12 HOURS SCHEDULED
Status: DISCONTINUED | OUTPATIENT
Start: 2017-05-18 | End: 2017-05-19 | Stop reason: HOSPADM

## 2017-05-18 RX ORDER — CLOPIDOGREL BISULFATE 75 MG/1
75 TABLET ORAL DAILY
Status: DISCONTINUED | OUTPATIENT
Start: 2017-05-19 | End: 2017-05-19 | Stop reason: HOSPADM

## 2017-05-18 RX ORDER — SODIUM CHLORIDE 0.9 % (FLUSH) 0.9 %
10 SYRINGE (ML) INJECTION PRN
Status: DISCONTINUED | OUTPATIENT
Start: 2017-05-18 | End: 2017-05-19 | Stop reason: HOSPADM

## 2017-05-18 RX ORDER — MORPHINE SULFATE 4 MG/ML
4 INJECTION, SOLUTION INTRAMUSCULAR; INTRAVENOUS ONCE
Status: COMPLETED | OUTPATIENT
Start: 2017-05-18 | End: 2017-05-18

## 2017-05-18 RX ORDER — ONDANSETRON 2 MG/ML
4 INJECTION INTRAMUSCULAR; INTRAVENOUS EVERY 6 HOURS PRN
Status: DISCONTINUED | OUTPATIENT
Start: 2017-05-18 | End: 2017-05-19 | Stop reason: HOSPADM

## 2017-05-18 RX ORDER — LABETALOL HYDROCHLORIDE 5 MG/ML
10 INJECTION, SOLUTION INTRAVENOUS EVERY 10 MIN PRN
Status: DISCONTINUED | OUTPATIENT
Start: 2017-05-18 | End: 2017-05-19 | Stop reason: HOSPADM

## 2017-05-18 RX ORDER — ALBUTEROL SULFATE 90 UG/1
2 AEROSOL, METERED RESPIRATORY (INHALATION) EVERY 4 HOURS PRN
Status: DISCONTINUED | OUTPATIENT
Start: 2017-05-18 | End: 2017-05-19 | Stop reason: HOSPADM

## 2017-05-18 RX ORDER — ONDANSETRON 2 MG/ML
4 INJECTION INTRAMUSCULAR; INTRAVENOUS ONCE
Status: COMPLETED | OUTPATIENT
Start: 2017-05-18 | End: 2017-05-18

## 2017-05-18 RX ORDER — MORPHINE SULFATE 2 MG/ML
2 INJECTION, SOLUTION INTRAMUSCULAR; INTRAVENOUS
Status: DISCONTINUED | OUTPATIENT
Start: 2017-05-18 | End: 2017-05-19 | Stop reason: HOSPADM

## 2017-05-18 RX ORDER — SODIUM CHLORIDE 9 MG/ML
INJECTION, SOLUTION INTRAVENOUS CONTINUOUS
Status: DISCONTINUED | OUTPATIENT
Start: 2017-05-18 | End: 2017-05-19 | Stop reason: HOSPADM

## 2017-05-18 RX ADMIN — MORPHINE SULFATE 4 MG: 4 INJECTION, SOLUTION INTRAMUSCULAR; INTRAVENOUS at 23:47

## 2017-05-18 RX ADMIN — METOPROLOL TARTRATE 25 MG: 25 TABLET, FILM COATED ORAL at 23:48

## 2017-05-18 RX ADMIN — MORPHINE SULFATE 4 MG: 4 INJECTION, SOLUTION INTRAMUSCULAR; INTRAVENOUS at 20:12

## 2017-05-18 RX ADMIN — SODIUM CHLORIDE: 9 INJECTION, SOLUTION INTRAVENOUS at 23:47

## 2017-05-18 RX ADMIN — ONDANSETRON HYDROCHLORIDE 4 MG: 2 INJECTION, SOLUTION INTRAMUSCULAR; INTRAVENOUS at 20:12

## 2017-05-18 ASSESSMENT — ENCOUNTER SYMPTOMS
ALLERGIC/IMMUNOLOGIC NEGATIVE: 1
SHORTNESS OF BREATH: 0
ABDOMINAL PAIN: 0
EYE PAIN: 0
TROUBLE SWALLOWING: 0
COLOR CHANGE: 0
APNEA: 0

## 2017-05-18 ASSESSMENT — PAIN DESCRIPTION - ORIENTATION: ORIENTATION: LEFT

## 2017-05-18 ASSESSMENT — PAIN SCALES - GENERAL
PAINLEVEL_OUTOF10: 8

## 2017-05-18 ASSESSMENT — PAIN DESCRIPTION - LOCATION: LOCATION: CHEST;ARM

## 2017-05-19 ENCOUNTER — APPOINTMENT (OUTPATIENT)
Dept: ULTRASOUND IMAGING | Age: 47
End: 2017-05-19
Payer: COMMERCIAL

## 2017-05-19 ENCOUNTER — APPOINTMENT (OUTPATIENT)
Dept: GENERAL RADIOLOGY | Age: 47
End: 2017-05-19
Payer: COMMERCIAL

## 2017-05-19 VITALS
WEIGHT: 262.79 LBS | RESPIRATION RATE: 16 BRPM | BODY MASS INDEX: 46.56 KG/M2 | HEIGHT: 63 IN | TEMPERATURE: 98.1 F | SYSTOLIC BLOOD PRESSURE: 121 MMHG | DIASTOLIC BLOOD PRESSURE: 30 MMHG | OXYGEN SATURATION: 99 % | HEART RATE: 66 BPM

## 2017-05-19 PROBLEM — E66.01 MORBID OBESITY (HCC): Status: ACTIVE | Noted: 2017-05-19

## 2017-05-19 PROBLEM — E78.1 PURE HYPERGLYCERIDEMIA: Status: ACTIVE | Noted: 2017-05-19

## 2017-05-19 LAB
ANION GAP SERPL CALCULATED.3IONS-SCNC: 12 MEQ/L (ref 7–13)
BUN BLDV-MCNC: 13 MG/DL (ref 6–20)
CALCIUM SERPL-MCNC: 8.2 MG/DL (ref 8.6–10.2)
CHLORIDE BLD-SCNC: 104 MEQ/L (ref 98–107)
CHOLESTEROL, TOTAL: 217 MG/DL (ref 0–199)
CHOLESTEROL, TOTAL: 226 MG/DL (ref 0–199)
CK MB: 1.9 NG/ML (ref 0–3.8)
CK MB: 2 NG/ML (ref 0–3.8)
CO2: 20 MEQ/L (ref 22–29)
CREAT SERPL-MCNC: 1.32 MG/DL (ref 0.5–0.9)
CREATINE KINASE-MB INDEX: 0.5 % (ref 0–3.5)
CREATINE KINASE-MB INDEX: 0.5 % (ref 0–3.5)
GFR AFRICAN AMERICAN: 52.4
GFR NON-AFRICAN AMERICAN: 43.3
GLUCOSE BLD-MCNC: 110 MG/DL (ref 74–109)
HBA1C MFR BLD: 5.5 % (ref 4.8–5.9)
HCT VFR BLD CALC: 45.1 % (ref 37–47)
HDLC SERPL-MCNC: 34 MG/DL (ref 40–59)
HDLC SERPL-MCNC: 37 MG/DL (ref 40–59)
HEMOGLOBIN: 14.5 G/DL (ref 12–16)
LDL CHOLESTEROL CALCULATED: 125 MG/DL (ref 0–129)
LDL CHOLESTEROL CALCULATED: 135 MG/DL (ref 0–129)
LV EF: 60 %
LVEF MODALITY: NORMAL
MCH RBC QN AUTO: 31.3 PG (ref 27–31.3)
MCHC RBC AUTO-ENTMCNC: 32.1 % (ref 33–37)
MCV RBC AUTO: 97.5 FL (ref 82–100)
PDW BLD-RTO: 14.1 % (ref 11.5–14.5)
PLATELET # BLD: 274 K/UL (ref 130–400)
POTASSIUM SERPL-SCNC: 4.3 MEQ/L (ref 3.5–5.1)
RBC # BLD: 4.62 M/UL (ref 4.2–5.4)
SODIUM BLD-SCNC: 136 MEQ/L (ref 132–144)
T3 FREE: 2.7 PG/ML (ref 2–4.4)
T4 FREE: 0.85 NG/DL (ref 0.93–1.7)
TOTAL CK: 402 U/L (ref 0–170)
TOTAL CK: 415 U/L (ref 0–170)
TRIGL SERPL-MCNC: 227 MG/DL (ref 0–200)
TRIGL SERPL-MCNC: 335 MG/DL (ref 0–200)
TROPONIN: <0.01 NG/ML (ref 0–0.01)
TROPONIN: <0.01 NG/ML (ref 0–0.01)
TSH SERPL DL<=0.05 MIU/L-ACNC: 5 UIU/ML (ref 0.27–4.2)
WBC # BLD: 10 K/UL (ref 4.8–10.8)

## 2017-05-19 PROCEDURE — 83036 HEMOGLOBIN GLYCOSYLATED A1C: CPT

## 2017-05-19 PROCEDURE — 80061 LIPID PANEL: CPT

## 2017-05-19 PROCEDURE — G8988 SELF CARE GOAL STATUS: HCPCS

## 2017-05-19 PROCEDURE — 93306 TTE W/DOPPLER COMPLETE: CPT

## 2017-05-19 PROCEDURE — 93880 EXTRACRANIAL BILAT STUDY: CPT

## 2017-05-19 PROCEDURE — 97161 PT EVAL LOW COMPLEX 20 MIN: CPT

## 2017-05-19 PROCEDURE — 84484 ASSAY OF TROPONIN QUANT: CPT

## 2017-05-19 PROCEDURE — G8980 MOBILITY D/C STATUS: HCPCS

## 2017-05-19 PROCEDURE — 82553 CREATINE MB FRACTION: CPT

## 2017-05-19 PROCEDURE — 97165 OT EVAL LOW COMPLEX 30 MIN: CPT

## 2017-05-19 PROCEDURE — 85027 COMPLETE CBC AUTOMATED: CPT

## 2017-05-19 PROCEDURE — 84481 FREE ASSAY (FT-3): CPT

## 2017-05-19 PROCEDURE — G8989 SELF CARE D/C STATUS: HCPCS

## 2017-05-19 PROCEDURE — 6360000002 HC RX W HCPCS: Performed by: NURSE PRACTITIONER

## 2017-05-19 PROCEDURE — 99253 IP/OBS CNSLTJ NEW/EST LOW 45: CPT | Performed by: PHYSICIAN ASSISTANT

## 2017-05-19 PROCEDURE — 96376 TX/PRO/DX INJ SAME DRUG ADON: CPT

## 2017-05-19 PROCEDURE — 82550 ASSAY OF CK (CPK): CPT

## 2017-05-19 PROCEDURE — 84443 ASSAY THYROID STIM HORMONE: CPT

## 2017-05-19 PROCEDURE — 6370000000 HC RX 637 (ALT 250 FOR IP): Performed by: NURSE PRACTITIONER

## 2017-05-19 PROCEDURE — 71020 XR CHEST STANDARD TWO VW: CPT

## 2017-05-19 PROCEDURE — G0378 HOSPITAL OBSERVATION PER HR: HCPCS

## 2017-05-19 PROCEDURE — G8987 SELF CARE CURRENT STATUS: HCPCS

## 2017-05-19 PROCEDURE — 36415 COLL VENOUS BLD VENIPUNCTURE: CPT

## 2017-05-19 PROCEDURE — G8979 MOBILITY GOAL STATUS: HCPCS

## 2017-05-19 PROCEDURE — 2580000003 HC RX 258: Performed by: NURSE PRACTITIONER

## 2017-05-19 PROCEDURE — 80048 BASIC METABOLIC PNL TOTAL CA: CPT

## 2017-05-19 PROCEDURE — 84439 ASSAY OF FREE THYROXINE: CPT

## 2017-05-19 PROCEDURE — G8978 MOBILITY CURRENT STATUS: HCPCS

## 2017-05-19 RX ORDER — ATORVASTATIN CALCIUM 40 MG/1
40 TABLET, FILM COATED ORAL NIGHTLY
Status: DISCONTINUED | OUTPATIENT
Start: 2017-05-19 | End: 2017-05-19 | Stop reason: HOSPADM

## 2017-05-19 RX ADMIN — MORPHINE SULFATE 4 MG: 4 INJECTION, SOLUTION INTRAMUSCULAR; INTRAVENOUS at 03:43

## 2017-05-19 RX ADMIN — Medication 10 ML: at 02:38

## 2017-05-19 RX ADMIN — CLOPIDOGREL BISULFATE 75 MG: 75 TABLET ORAL at 10:12

## 2017-05-19 RX ADMIN — ONDANSETRON HYDROCHLORIDE 4 MG: 2 INJECTION, SOLUTION INTRAMUSCULAR; INTRAVENOUS at 03:43

## 2017-05-19 ASSESSMENT — PAIN SCALES - GENERAL
PAINLEVEL_OUTOF10: 9
PAINLEVEL_OUTOF10: 0

## 2017-05-19 ASSESSMENT — ENCOUNTER SYMPTOMS
ABDOMINAL DISTENTION: 0
VOMITING: 0
CHEST TIGHTNESS: 0
SHORTNESS OF BREATH: 0
NAUSEA: 0
DIARRHEA: 0
ABDOMINAL PAIN: 0
WHEEZING: 0
COUGH: 0
CONSTIPATION: 0
APNEA: 0

## 2017-05-22 ENCOUNTER — TELEPHONE (OUTPATIENT)
Dept: INTERNAL MEDICINE | Age: 47
End: 2017-05-22

## 2017-05-22 LAB
EKG ATRIAL RATE: 93 BPM
EKG P AXIS: 43 DEGREES
EKG P-R INTERVAL: 144 MS
EKG Q-T INTERVAL: 352 MS
EKG QRS DURATION: 76 MS
EKG QTC CALCULATION (BAZETT): 437 MS
EKG R AXIS: 25 DEGREES
EKG T AXIS: 8 DEGREES
EKG VENTRICULAR RATE: 93 BPM

## 2017-06-01 ENCOUNTER — APPOINTMENT (OUTPATIENT)
Dept: CT IMAGING | Age: 47
End: 2017-06-01
Payer: COMMERCIAL

## 2017-06-01 ENCOUNTER — HOSPITAL ENCOUNTER (EMERGENCY)
Age: 47
Discharge: HOME | End: 2017-06-01
Attending: STUDENT IN AN ORGANIZED HEALTH CARE EDUCATION/TRAINING PROGRAM
Payer: COMMERCIAL

## 2017-06-01 VITALS
WEIGHT: 200 LBS | TEMPERATURE: 98.5 F | BODY MASS INDEX: 35.44 KG/M2 | DIASTOLIC BLOOD PRESSURE: 79 MMHG | HEIGHT: 63 IN | SYSTOLIC BLOOD PRESSURE: 128 MMHG | HEART RATE: 65 BPM | RESPIRATION RATE: 16 BRPM | OXYGEN SATURATION: 100 %

## 2017-06-01 DIAGNOSIS — G89.29 OTHER CHRONIC PAIN: ICD-10-CM

## 2017-06-01 DIAGNOSIS — E86.0 MILD DEHYDRATION: ICD-10-CM

## 2017-06-01 DIAGNOSIS — R11.2 CANNABINOID HYPEREMESIS SYNDROME: Primary | ICD-10-CM

## 2017-06-01 DIAGNOSIS — R10.13 EPIGASTRIC ABDOMINAL PAIN: ICD-10-CM

## 2017-06-01 DIAGNOSIS — G43.A0 CYCLICAL VOMITING WITH NAUSEA, INTRACTABILITY OF VOMITING NOT SPECIFIED: ICD-10-CM

## 2017-06-01 DIAGNOSIS — F12.90 CANNABINOID HYPEREMESIS SYNDROME: Primary | ICD-10-CM

## 2017-06-01 LAB
ALBUMIN SERPL-MCNC: 3.9 G/DL (ref 3.9–4.9)
ALP BLD-CCNC: 108 U/L (ref 40–130)
ALT SERPL-CCNC: 14 U/L (ref 0–33)
AMPHETAMINE SCREEN, URINE: ABNORMAL
ANION GAP SERPL CALCULATED.3IONS-SCNC: 10 MEQ/L (ref 7–13)
APTT: 28.7 SEC (ref 21.6–35.4)
AST SERPL-CCNC: 28 U/L (ref 0–35)
BACTERIA: ABNORMAL /HPF
BARBITURATE SCREEN URINE: ABNORMAL
BASOPHILS ABSOLUTE: 0 K/UL (ref 0–0.2)
BASOPHILS RELATIVE PERCENT: 0.7 %
BENZODIAZEPINE SCREEN, URINE: ABNORMAL
BILIRUB SERPL-MCNC: 0.3 MG/DL (ref 0–1.2)
BILIRUBIN URINE: NEGATIVE
BLOOD, URINE: ABNORMAL
BUN BLDV-MCNC: 9 MG/DL (ref 6–20)
CALCIUM SERPL-MCNC: 8.9 MG/DL (ref 8.6–10.2)
CANNABINOID SCREEN URINE: POSITIVE
CHLORIDE BLD-SCNC: 104 MEQ/L (ref 98–107)
CLARITY: ABNORMAL
CO2: 21 MEQ/L (ref 22–29)
COCAINE METABOLITE SCREEN URINE: ABNORMAL
COLOR: YELLOW
CREAT SERPL-MCNC: 1.37 MG/DL (ref 0.5–0.9)
EOSINOPHILS ABSOLUTE: 0.3 K/UL (ref 0–0.7)
EOSINOPHILS RELATIVE PERCENT: 4.2 %
EPITHELIAL CELLS, UA: ABNORMAL /HPF
GFR AFRICAN AMERICAN: 50.2
GFR NON-AFRICAN AMERICAN: 41.5
GLOBULIN: 3.2 G/DL (ref 2.3–3.5)
GLUCOSE BLD-MCNC: 97 MG/DL (ref 74–109)
GLUCOSE URINE: NEGATIVE MG/DL
HCT VFR BLD CALC: 46.2 % (ref 37–47)
HEMOGLOBIN: 15.4 G/DL (ref 12–16)
INR BLD: 1
KETONES, URINE: NEGATIVE MG/DL
LACTIC ACID: 1.1 MMOL/L (ref 0.5–2.2)
LEUKOCYTE ESTERASE, URINE: ABNORMAL
LIPASE: 28 U/L (ref 13–60)
LYMPHOCYTES ABSOLUTE: 2.2 K/UL (ref 1–4.8)
LYMPHOCYTES RELATIVE PERCENT: 32.4 %
Lab: ABNORMAL
MCH RBC QN AUTO: 31.6 PG (ref 27–31.3)
MCHC RBC AUTO-ENTMCNC: 33.2 % (ref 33–37)
MCV RBC AUTO: 94.9 FL (ref 82–100)
MONOCYTES ABSOLUTE: 0.4 K/UL (ref 0.2–0.8)
MONOCYTES RELATIVE PERCENT: 5.3 %
NEUTROPHILS ABSOLUTE: 3.8 K/UL (ref 1.4–6.5)
NEUTROPHILS RELATIVE PERCENT: 57.4 %
NITRITE, URINE: NEGATIVE
OPIATE SCREEN URINE: ABNORMAL
PDW BLD-RTO: 13.2 % (ref 11.5–14.5)
PH UA: 5.5 (ref 5–9)
PHENCYCLIDINE SCREEN URINE: ABNORMAL
PLATELET # BLD: 271 K/UL (ref 130–400)
POTASSIUM SERPL-SCNC: 5.5 MEQ/L (ref 3.5–5.1)
PREGNANCY TEST URINE, POC: NEGATIVE
PROTEIN UA: NEGATIVE MG/DL
PROTHROMBIN TIME: 10.7 SEC (ref 8.1–13.7)
RBC # BLD: 4.87 M/UL (ref 4.2–5.4)
RBC UA: ABNORMAL /HPF (ref 0–2)
SODIUM BLD-SCNC: 135 MEQ/L (ref 132–144)
SPECIFIC GRAVITY UA: 1.02 (ref 1–1.03)
TOTAL PROTEIN: 7.1 G/DL (ref 6.4–8.1)
URINE REFLEX TO CULTURE: YES
UROBILINOGEN, URINE: 0.2 E.U./DL
WBC # BLD: 6.6 K/UL (ref 4.8–10.8)
WBC UA: ABNORMAL /HPF (ref 0–5)

## 2017-06-01 PROCEDURE — 80053 COMPREHEN METABOLIC PANEL: CPT

## 2017-06-01 PROCEDURE — 96374 THER/PROPH/DIAG INJ IV PUSH: CPT

## 2017-06-01 PROCEDURE — C9113 INJ PANTOPRAZOLE SODIUM, VIA: HCPCS | Performed by: STUDENT IN AN ORGANIZED HEALTH CARE EDUCATION/TRAINING PROGRAM

## 2017-06-01 PROCEDURE — 96375 TX/PRO/DX INJ NEW DRUG ADDON: CPT

## 2017-06-01 PROCEDURE — 85610 PROTHROMBIN TIME: CPT

## 2017-06-01 PROCEDURE — 6370000000 HC RX 637 (ALT 250 FOR IP): Performed by: STUDENT IN AN ORGANIZED HEALTH CARE EDUCATION/TRAINING PROGRAM

## 2017-06-01 PROCEDURE — 85025 COMPLETE CBC W/AUTO DIFF WBC: CPT

## 2017-06-01 PROCEDURE — 2580000003 HC RX 258: Performed by: STUDENT IN AN ORGANIZED HEALTH CARE EDUCATION/TRAINING PROGRAM

## 2017-06-01 PROCEDURE — 2500000003 HC RX 250 WO HCPCS: Performed by: STUDENT IN AN ORGANIZED HEALTH CARE EDUCATION/TRAINING PROGRAM

## 2017-06-01 PROCEDURE — 99284 EMERGENCY DEPT VISIT MOD MDM: CPT

## 2017-06-01 PROCEDURE — S0028 INJECTION, FAMOTIDINE, 20 MG: HCPCS | Performed by: STUDENT IN AN ORGANIZED HEALTH CARE EDUCATION/TRAINING PROGRAM

## 2017-06-01 PROCEDURE — 81001 URINALYSIS AUTO W/SCOPE: CPT

## 2017-06-01 PROCEDURE — 85730 THROMBOPLASTIN TIME PARTIAL: CPT

## 2017-06-01 PROCEDURE — 83605 ASSAY OF LACTIC ACID: CPT

## 2017-06-01 PROCEDURE — 36415 COLL VENOUS BLD VENIPUNCTURE: CPT

## 2017-06-01 PROCEDURE — 80307 DRUG TEST PRSMV CHEM ANLYZR: CPT

## 2017-06-01 PROCEDURE — 87086 URINE CULTURE/COLONY COUNT: CPT

## 2017-06-01 PROCEDURE — 74176 CT ABD & PELVIS W/O CONTRAST: CPT

## 2017-06-01 PROCEDURE — 6360000002 HC RX W HCPCS: Performed by: STUDENT IN AN ORGANIZED HEALTH CARE EDUCATION/TRAINING PROGRAM

## 2017-06-01 PROCEDURE — 83690 ASSAY OF LIPASE: CPT

## 2017-06-01 RX ORDER — PROMETHAZINE HYDROCHLORIDE 25 MG/1
25 TABLET ORAL EVERY 8 HOURS PRN
Qty: 12 TABLET | Refills: 0 | Status: SHIPPED | OUTPATIENT
Start: 2017-06-01 | End: 2017-06-08

## 2017-06-01 RX ORDER — 0.9 % SODIUM CHLORIDE 0.9 %
10 VIAL (ML) INJECTION DAILY
Status: DISCONTINUED | OUTPATIENT
Start: 2017-06-01 | End: 2017-06-01 | Stop reason: HOSPADM

## 2017-06-01 RX ORDER — DICYCLOMINE HYDROCHLORIDE 10 MG/1
10 CAPSULE ORAL EVERY 6 HOURS PRN
Qty: 20 CAPSULE | Refills: 0 | Status: SHIPPED | OUTPATIENT
Start: 2017-06-01 | End: 2017-09-27 | Stop reason: ALTCHOICE

## 2017-06-01 RX ORDER — SULFAMETHOXAZOLE AND TRIMETHOPRIM 800; 160 MG/1; MG/1
1 TABLET ORAL 2 TIMES DAILY
Qty: 14 TABLET | Refills: 0 | Status: SHIPPED | OUTPATIENT
Start: 2017-06-01 | End: 2017-06-08

## 2017-06-01 RX ORDER — FAMOTIDINE 20 MG/1
20 TABLET, FILM COATED ORAL 2 TIMES DAILY
Qty: 20 TABLET | Refills: 0 | Status: SHIPPED | OUTPATIENT
Start: 2017-06-01 | End: 2017-09-27 | Stop reason: ALTCHOICE

## 2017-06-01 RX ORDER — ONDANSETRON 2 MG/ML
4 INJECTION INTRAMUSCULAR; INTRAVENOUS ONCE
Status: COMPLETED | OUTPATIENT
Start: 2017-06-01 | End: 2017-06-01

## 2017-06-01 RX ORDER — PANTOPRAZOLE SODIUM 40 MG/10ML
40 INJECTION, POWDER, LYOPHILIZED, FOR SOLUTION INTRAVENOUS DAILY
Status: DISCONTINUED | OUTPATIENT
Start: 2017-06-01 | End: 2017-06-01 | Stop reason: HOSPADM

## 2017-06-01 RX ORDER — SULFAMETHOXAZOLE AND TRIMETHOPRIM 800; 160 MG/1; MG/1
1 TABLET ORAL ONCE
Status: DISCONTINUED | OUTPATIENT
Start: 2017-06-01 | End: 2017-06-01 | Stop reason: HOSPADM

## 2017-06-01 RX ORDER — 0.9 % SODIUM CHLORIDE 0.9 %
1000 INTRAVENOUS SOLUTION INTRAVENOUS ONCE
Status: COMPLETED | OUTPATIENT
Start: 2017-06-01 | End: 2017-06-01

## 2017-06-01 RX ADMIN — Medication 30 ML: at 16:09

## 2017-06-01 RX ADMIN — ONDANSETRON 4 MG: 2 INJECTION INTRAMUSCULAR; INTRAVENOUS at 16:04

## 2017-06-01 RX ADMIN — PANTOPRAZOLE SODIUM 40 MG: 40 INJECTION, POWDER, FOR SOLUTION INTRAVENOUS at 16:05

## 2017-06-01 RX ADMIN — SODIUM CHLORIDE 1000 ML: 9 INJECTION, SOLUTION INTRAVENOUS at 16:06

## 2017-06-01 RX ADMIN — FAMOTIDINE 20 MG: 10 INJECTION, SOLUTION INTRAVENOUS at 16:05

## 2017-06-01 ASSESSMENT — PAIN DESCRIPTION - LOCATION
LOCATION: ABDOMEN
LOCATION: ABDOMEN

## 2017-06-01 ASSESSMENT — PAIN DESCRIPTION - FREQUENCY
FREQUENCY: INTERMITTENT
FREQUENCY: CONTINUOUS

## 2017-06-01 ASSESSMENT — PAIN DESCRIPTION - ONSET: ONSET: PROGRESSIVE

## 2017-06-01 ASSESSMENT — PAIN DESCRIPTION - PAIN TYPE
TYPE: ACUTE PAIN
TYPE: ACUTE PAIN

## 2017-06-01 ASSESSMENT — PAIN DESCRIPTION - ORIENTATION: ORIENTATION: MID;UPPER

## 2017-06-01 ASSESSMENT — PAIN DESCRIPTION - PROGRESSION: CLINICAL_PROGRESSION: GRADUALLY WORSENING

## 2017-06-01 ASSESSMENT — PAIN DESCRIPTION - DESCRIPTORS
DESCRIPTORS: ACHING
DESCRIPTORS: BURNING

## 2017-06-01 ASSESSMENT — PAIN SCALES - GENERAL
PAINLEVEL_OUTOF10: 10
PAINLEVEL_OUTOF10: 8

## 2017-06-03 LAB — URINE CULTURE, ROUTINE: NORMAL

## 2017-06-18 ENCOUNTER — PATIENT MESSAGE (OUTPATIENT)
Dept: FAMILY MEDICINE CLINIC | Age: 47
End: 2017-06-18

## 2017-07-06 ASSESSMENT — ENCOUNTER SYMPTOMS
COUGH: 0
VOMITING: 1
NAUSEA: 1
DIARRHEA: 0
SINUS PRESSURE: 0
CHEST TIGHTNESS: 0
BACK PAIN: 0
SHORTNESS OF BREATH: 0
TROUBLE SWALLOWING: 0
ABDOMINAL PAIN: 1

## 2017-07-11 PROBLEM — Z91.199 PATIENT NON-COMPLIANT, REFUSED SERVICE: Status: ACTIVE | Noted: 2017-07-11

## 2017-07-11 PROBLEM — M54.42 CHRONIC BILATERAL LOW BACK PAIN WITH SCIATICA: Status: ACTIVE | Noted: 2017-07-11

## 2017-07-11 PROBLEM — M54.2 NECK PAIN: Status: ACTIVE | Noted: 2017-07-11

## 2017-07-11 PROBLEM — M54.40 CHRONIC BILATERAL LOW BACK PAIN WITH SCIATICA: Status: ACTIVE | Noted: 2017-07-11

## 2017-07-11 PROBLEM — M54.41 CHRONIC BILATERAL LOW BACK PAIN WITH SCIATICA: Status: ACTIVE | Noted: 2017-07-11

## 2017-07-11 PROBLEM — G89.29 CHRONIC BILATERAL LOW BACK PAIN WITH SCIATICA: Status: ACTIVE | Noted: 2017-07-11

## 2017-07-11 PROBLEM — F12.90 MARIJUANA USE: Status: ACTIVE | Noted: 2017-07-11

## 2017-07-13 DIAGNOSIS — J30.1 SEASONAL ALLERGIC RHINITIS DUE TO POLLEN: ICD-10-CM

## 2017-07-13 DIAGNOSIS — J45.31 MILD PERSISTENT ASTHMA WITH ACUTE EXACERBATION: Primary | ICD-10-CM

## 2017-07-13 RX ORDER — METHYLPREDNISOLONE 4 MG/1
TABLET ORAL
Qty: 21 TABLET | Refills: 1 | Status: SHIPPED | OUTPATIENT
Start: 2017-07-13 | End: 2017-09-27 | Stop reason: ALTCHOICE

## 2017-07-13 RX ORDER — CETIRIZINE HYDROCHLORIDE 10 MG/1
TABLET ORAL
Qty: 30 TABLET | Refills: 3 | Status: SHIPPED | OUTPATIENT
Start: 2017-07-13 | End: 2017-11-18 | Stop reason: ALTCHOICE

## 2017-07-20 ENCOUNTER — OFFICE VISIT (OUTPATIENT)
Dept: FAMILY MEDICINE CLINIC | Age: 47
End: 2017-07-20

## 2017-07-20 VITALS
HEART RATE: 84 BPM | SYSTOLIC BLOOD PRESSURE: 98 MMHG | TEMPERATURE: 98.2 F | RESPIRATION RATE: 12 BRPM | DIASTOLIC BLOOD PRESSURE: 62 MMHG

## 2017-07-20 DIAGNOSIS — F32.1 MODERATE SINGLE CURRENT EPISODE OF MAJOR DEPRESSIVE DISORDER (HCC): Primary | ICD-10-CM

## 2017-07-20 PROCEDURE — 99213 OFFICE O/P EST LOW 20 MIN: CPT | Performed by: FAMILY MEDICINE

## 2017-07-20 RX ORDER — DULOXETIN HYDROCHLORIDE 60 MG/1
60 CAPSULE, DELAYED RELEASE ORAL DAILY
Qty: 30 CAPSULE | Refills: 3 | Status: SHIPPED | OUTPATIENT
Start: 2017-07-20 | End: 2017-09-12 | Stop reason: ALTCHOICE

## 2017-08-03 ENCOUNTER — HOSPITAL ENCOUNTER (EMERGENCY)
Age: 47
Discharge: HOME | End: 2017-08-03
Payer: COMMERCIAL

## 2017-08-03 VITALS
TEMPERATURE: 97.7 F | OXYGEN SATURATION: 100 % | BODY MASS INDEX: 35.44 KG/M2 | DIASTOLIC BLOOD PRESSURE: 65 MMHG | SYSTOLIC BLOOD PRESSURE: 125 MMHG | HEIGHT: 63 IN | RESPIRATION RATE: 18 BRPM | HEART RATE: 71 BPM | WEIGHT: 200 LBS

## 2017-08-03 DIAGNOSIS — R51.9 ACUTE NONINTRACTABLE HEADACHE, UNSPECIFIED HEADACHE TYPE: Primary | ICD-10-CM

## 2017-08-03 PROCEDURE — 96375 TX/PRO/DX INJ NEW DRUG ADDON: CPT

## 2017-08-03 PROCEDURE — 96366 THER/PROPH/DIAG IV INF ADDON: CPT

## 2017-08-03 PROCEDURE — 2580000003 HC RX 258: Performed by: PERSONAL EMERGENCY RESPONSE ATTENDANT

## 2017-08-03 PROCEDURE — 99283 EMERGENCY DEPT VISIT LOW MDM: CPT

## 2017-08-03 PROCEDURE — 96365 THER/PROPH/DIAG IV INF INIT: CPT

## 2017-08-03 PROCEDURE — 6360000002 HC RX W HCPCS: Performed by: PERSONAL EMERGENCY RESPONSE ATTENDANT

## 2017-08-03 RX ORDER — DIPHENHYDRAMINE HYDROCHLORIDE 50 MG/ML
25 INJECTION INTRAMUSCULAR; INTRAVENOUS ONCE
Status: COMPLETED | OUTPATIENT
Start: 2017-08-03 | End: 2017-08-03

## 2017-08-03 RX ORDER — 0.9 % SODIUM CHLORIDE 0.9 %
1000 INTRAVENOUS SOLUTION INTRAVENOUS ONCE
Status: COMPLETED | OUTPATIENT
Start: 2017-08-03 | End: 2017-08-03

## 2017-08-03 RX ORDER — KETOROLAC TROMETHAMINE 30 MG/ML
30 INJECTION, SOLUTION INTRAMUSCULAR; INTRAVENOUS ONCE
Status: COMPLETED | OUTPATIENT
Start: 2017-08-03 | End: 2017-08-03

## 2017-08-03 RX ORDER — METOCLOPRAMIDE HYDROCHLORIDE 5 MG/ML
10 INJECTION INTRAMUSCULAR; INTRAVENOUS ONCE
Status: COMPLETED | OUTPATIENT
Start: 2017-08-03 | End: 2017-08-03

## 2017-08-03 RX ORDER — ONDANSETRON 4 MG/1
4 TABLET, ORALLY DISINTEGRATING ORAL EVERY 8 HOURS PRN
Qty: 20 TABLET | Refills: 0 | Status: SHIPPED | OUTPATIENT
Start: 2017-08-03 | End: 2017-09-27 | Stop reason: ALTCHOICE

## 2017-08-03 RX ORDER — MAGNESIUM SULFATE IN WATER 40 MG/ML
2 INJECTION, SOLUTION INTRAVENOUS ONCE
Status: COMPLETED | OUTPATIENT
Start: 2017-08-03 | End: 2017-08-03

## 2017-08-03 RX ADMIN — MAGNESIUM SULFATE HEPTAHYDRATE 2 G: 40 INJECTION, SOLUTION INTRAVENOUS at 00:43

## 2017-08-03 RX ADMIN — DIPHENHYDRAMINE HYDROCHLORIDE 25 MG: 50 INJECTION, SOLUTION INTRAMUSCULAR; INTRAVENOUS at 00:43

## 2017-08-03 RX ADMIN — METOCLOPRAMIDE 10 MG: 5 INJECTION, SOLUTION INTRAMUSCULAR; INTRAVENOUS at 00:43

## 2017-08-03 RX ADMIN — SODIUM CHLORIDE 1000 ML: 9 INJECTION, SOLUTION INTRAVENOUS at 00:42

## 2017-08-03 RX ADMIN — KETOROLAC TROMETHAMINE 30 MG: 30 INJECTION, SOLUTION INTRAMUSCULAR at 00:43

## 2017-08-03 ASSESSMENT — PAIN DESCRIPTION - LOCATION: LOCATION: HEAD

## 2017-08-03 ASSESSMENT — ENCOUNTER SYMPTOMS
NAUSEA: 1
SHORTNESS OF BREATH: 0
RHINORRHEA: 0
COUGH: 0
ABDOMINAL PAIN: 0
COLOR CHANGE: 0
VOMITING: 1
SORE THROAT: 0
BLOOD IN STOOL: 0
DIARRHEA: 0

## 2017-08-03 ASSESSMENT — PAIN DESCRIPTION - FREQUENCY: FREQUENCY: CONTINUOUS

## 2017-08-03 ASSESSMENT — PAIN SCALES - GENERAL
PAINLEVEL_OUTOF10: 10
PAINLEVEL_OUTOF10: 10

## 2017-08-03 ASSESSMENT — PAIN DESCRIPTION - DESCRIPTORS: DESCRIPTORS: THROBBING;SHARP

## 2017-08-23 DIAGNOSIS — J45.21 MILD INTERMITTENT ASTHMA WITH ACUTE EXACERBATION: Primary | ICD-10-CM

## 2017-08-23 RX ORDER — METHYLPREDNISOLONE 4 MG/1
TABLET ORAL
Qty: 21 TABLET | Refills: 1 | Status: SHIPPED | OUTPATIENT
Start: 2017-08-23 | End: 2017-09-27 | Stop reason: ALTCHOICE

## 2017-08-23 RX ORDER — AMOXICILLIN 500 MG/1
CAPSULE ORAL
Qty: 40 CAPSULE | Refills: 0 | Status: SHIPPED | OUTPATIENT
Start: 2017-08-23 | End: 2017-09-27 | Stop reason: ALTCHOICE

## 2017-09-12 ENCOUNTER — OFFICE VISIT (OUTPATIENT)
Dept: FAMILY MEDICINE CLINIC | Age: 47
End: 2017-09-12

## 2017-09-12 VITALS
HEART RATE: 78 BPM | DIASTOLIC BLOOD PRESSURE: 68 MMHG | RESPIRATION RATE: 18 BRPM | SYSTOLIC BLOOD PRESSURE: 106 MMHG | TEMPERATURE: 98.8 F

## 2017-09-12 DIAGNOSIS — I10 ESSENTIAL HYPERTENSION: ICD-10-CM

## 2017-09-12 DIAGNOSIS — F41.9 ANXIETY: Primary | ICD-10-CM

## 2017-09-12 PROCEDURE — 99214 OFFICE O/P EST MOD 30 MIN: CPT | Performed by: FAMILY MEDICINE

## 2017-09-12 RX ORDER — ESCITALOPRAM OXALATE 20 MG/1
20 TABLET ORAL DAILY
Qty: 30 TABLET | Refills: 3 | Status: SHIPPED | OUTPATIENT
Start: 2017-09-12 | End: 2017-09-27 | Stop reason: ALTCHOICE

## 2017-09-13 ENCOUNTER — TELEPHONE (OUTPATIENT)
Dept: FAMILY MEDICINE CLINIC | Age: 47
End: 2017-09-13

## 2017-09-13 NOTE — TELEPHONE ENCOUNTER
Patient was seen yesterday with Myla Alvarez and was given Lexapro and she states this medication made her very sick. She is having bad stomach cramps, the runs, nauseous, dizziness. Patient only took the medication yesterday. What should she do? Can another medication be sent to pharmacy? She uses [facility] [address] on [address] patient Myla Alvarez comes in later today and patient did send him an e-mail this morning. Please advise.

## 2017-09-20 PROBLEM — M54.2 NECK PAIN: Status: RESOLVED | Noted: 2017-07-11 | Resolved: 2017-09-20

## 2017-09-20 PROBLEM — M54.41 CHRONIC BILATERAL LOW BACK PAIN WITH SCIATICA: Status: RESOLVED | Noted: 2017-07-11 | Resolved: 2017-09-20

## 2017-09-20 PROBLEM — M54.42 CHRONIC BILATERAL LOW BACK PAIN WITH SCIATICA: Status: RESOLVED | Noted: 2017-07-11 | Resolved: 2017-09-20

## 2017-09-20 PROBLEM — M54.40 CHRONIC BILATERAL LOW BACK PAIN WITH SCIATICA: Status: RESOLVED | Noted: 2017-07-11 | Resolved: 2017-09-20

## 2017-09-20 PROBLEM — Z91.199 PATIENT NON-COMPLIANT, REFUSED SERVICE: Status: RESOLVED | Noted: 2017-07-11 | Resolved: 2017-09-20

## 2017-09-20 PROBLEM — G89.29 CHRONIC BILATERAL LOW BACK PAIN WITH SCIATICA: Status: RESOLVED | Noted: 2017-07-11 | Resolved: 2017-09-20

## 2017-09-27 ENCOUNTER — OFFICE VISIT (OUTPATIENT)
Dept: FAMILY MEDICINE CLINIC | Age: 47
End: 2017-09-27

## 2017-09-27 VITALS
BODY MASS INDEX: 44.83 KG/M2 | HEART RATE: 74 BPM | WEIGHT: 253 LBS | RESPIRATION RATE: 14 BRPM | TEMPERATURE: 98.1 F | SYSTOLIC BLOOD PRESSURE: 102 MMHG | HEIGHT: 63 IN | DIASTOLIC BLOOD PRESSURE: 72 MMHG

## 2017-09-27 DIAGNOSIS — J20.9 BRONCHOSPASM WITH BRONCHITIS, ACUTE: ICD-10-CM

## 2017-09-27 DIAGNOSIS — F43.23 ADJUSTMENT REACTION WITH ANXIETY AND DEPRESSION: ICD-10-CM

## 2017-09-27 DIAGNOSIS — R07.81 RIB PAIN ON LEFT SIDE: Primary | ICD-10-CM

## 2017-09-27 PROCEDURE — 99214 OFFICE O/P EST MOD 30 MIN: CPT | Performed by: FAMILY MEDICINE

## 2017-09-27 RX ORDER — ORPHENADRINE CITRATE 100 MG/1
TABLET, EXTENDED RELEASE ORAL
Qty: 30 TABLET | Refills: 2 | Status: SHIPPED | OUTPATIENT
Start: 2017-09-27 | End: 2017-11-18

## 2017-09-27 RX ORDER — ALBUTEROL SULFATE 90 UG/1
2 AEROSOL, METERED RESPIRATORY (INHALATION) EVERY 4 HOURS PRN
Qty: 1 INHALER | Refills: 5 | Status: SHIPPED | OUTPATIENT
Start: 2017-09-27 | End: 2017-11-15 | Stop reason: SDUPTHER

## 2017-09-27 RX ORDER — AZITHROMYCIN 250 MG/1
250 TABLET, FILM COATED ORAL DAILY
Qty: 10 TABLET | Refills: 0 | Status: SHIPPED | OUTPATIENT
Start: 2017-09-27 | End: 2017-10-07

## 2017-09-27 RX ORDER — BUSPIRONE HYDROCHLORIDE 10 MG/1
TABLET ORAL
Qty: 60 TABLET | Refills: 2 | Status: SHIPPED | OUTPATIENT
Start: 2017-09-27 | End: 2019-07-16 | Stop reason: SDUPTHER

## 2017-09-27 ASSESSMENT — PATIENT HEALTH QUESTIONNAIRE - PHQ9
1. LITTLE INTEREST OR PLEASURE IN DOING THINGS: 0
2. FEELING DOWN, DEPRESSED OR HOPELESS: 0
SUM OF ALL RESPONSES TO PHQ QUESTIONS 1-9: 0
SUM OF ALL RESPONSES TO PHQ9 QUESTIONS 1 & 2: 0

## 2017-09-27 NOTE — PROGRESS NOTES
Subjective  Kevin Anand, 56 y.o. female presents today with:  Chief Complaint   Patient presents with    Follow-Up from Hospital     f/u from [facility] ER for flank pain       HPI  Patient presents today for a follow up from [facility] ER for flank pain. Seen x 2 in ER for left flank pain w/ neg cxr/ct chest and abd--nodule on recent ct-chest was smaller and stable--no infiltrate/pe/mass--all rev w/ pt today; f/u RLL nodule, but ct= no abd masses and no left kidney masses-stones. .f/u Dariusz Rooney for right renal ca/nephrectomy. ..... No new gi-gu sxs after long review and left lateral chest pain stabbing nd comes/goes  No cp/palp, but more coryza and cough w/ hx of mild asthma  Hx of intolerance to SSRI--disc rx-options and agrees for recent anxiety, to start buspar  ;See above orders, as use and side effects reviewed   Review of Systems   Constitutional: Positive for fatigue. Negative for fever. HENT: Positive for congestion, rhinorrhea and sinus pressure. Respiratory: Positive for cough and wheezing. Negative for shortness of breath. Cardiovascular: Positive for chest pain. Negative for palpitations and leg swelling. Gastrointestinal: Negative for abdominal distention, abdominal pain, blood in stool, constipation, diarrhea, nausea and vomiting. Genitourinary: Positive for flank pain. Negative for difficulty urinating, dysuria, frequency, genital sores, hematuria, pelvic pain and urgency. Neurological: Positive for headaches. Negative for weakness, light-headedness and numbness. Psychiatric/Behavioral: Positive for dysphoric mood. The patient is nervous/anxious.         Allergies   Allergen Reactions    Shellfish-Derived Products     Ibuprofen     Propoxyphene N-Acetaminophen      Other reaction(s): Hives       Past Medical History:   Diagnosis Date    Anxiety     Asthma     Cancer ([facility] [address]     Chronic kidney disease     Depression     Hypertension     Mixed headache     Pure hyperglyceridemia 5/19/2017    Thyroid goiter      Past Surgical History:   Procedure Laterality Date    KIDNEY REMOVAL Right 08/2016    THYROID LOBECTOMY Right 6/13/14    URETER STENT PLACEMENT Left 08/2016     Social History     Social History    Marital status:      Spouse name: N/A    Number of children: N/A    Years of education: N/A     Occupational History    Not on file. Social History Main Topics    Smoking status: Former Smoker     Packs/day: 0.50     Years: 15.00     Types: Cigarettes     Start date: 8/20/2014    Smokeless tobacco: Former User     Quit date: 3/23/2016      Comment: tried to quit plenty times, but seem to struggle, would love help quitting    Alcohol use No    Drug use: No    Sexual activity: Not on file     Other Topics Concern    Not on file     Social History Narrative     Family History   Problem Relation Age of Onset    Cancer Father     Diabetes Father     High Blood Pressure Mother     Diabetes Mother           Current Outpatient Prescriptions on File Prior to Visit   Medication Sig Dispense Refill    cetirizine (ZYRTEC) 10 MG tablet Take 1 qhs for allergies 30 tablet 3    albuterol (PROVENTIL) (2.5 MG/3ML) 0.083% nebulizer solution Take 3 mLs by nebulization every 6 hours as needed for Wheezing 120 each 6     No current facility-administered medications on file prior to visit. Objective    Vitals:    09/27/17 0801   BP: 102/72   Pulse: 74   Resp: 14   Temp: 98.1 °F (36.7 °C)   TempSrc: Temporal   Weight: 253 lb (114.8 kg)   Height: 5' 3\" (1.6 m)     Physical Exam   Constitutional: She is oriented to person, place, and time and well-developed, well-nourished, and in no distress. No distress. HENT:   Right Ear: Tympanic membrane is retracted. Tympanic membrane is not injected. No decreased hearing is noted. Left Ear: Tympanic membrane is retracted. Tympanic membrane is not injected. No decreased hearing is noted. Nose: Mucosal edema and rhinorrhea present. Mouth/Throat: No oral lesions. Oropharyngeal exudate and posterior oropharyngeal erythema present. Eyes: No scleral icterus. Neck: Normal range of motion. Neck supple. Carotid bruit is not present. No rigidity. No thyroid mass and no thyromegaly present. Cardiovascular: Normal rate, regular rhythm, S1 normal, S2 normal and normal heart sounds. No extrasystoles are present. No murmur heard. Pulmonary/Chest: Effort normal. No respiratory distress. She has wheezes in the right upper field. She has rhonchi in the right upper field and the left upper field. She has no rales. Original Pain easily reproduced w/ palp of left lateral 11/12th ribs;no rashes and no flank pain  No lower lumbar pain and no vertebral tx/palp--all disc w/ her. .. Abdominal: Soft. Normal appearance and bowel sounds are normal. She exhibits no abdominal bruit and no mass. There is no hepatosplenomegaly. There is no tenderness. There is no rigidity, no rebound, no guarding and no CVA tenderness. Musculoskeletal: She exhibits no edema. Neurological: She is alert and oriented to person, place, and time. She has normal sensation, normal strength and intact cranial nerves. She has a normal Straight Leg Raise Test. Gait normal.   Skin: She is not diaphoretic. Psychiatric: Memory, affect and judgment normal. Her mood appears anxious. ;  Assessment & Plan   1. Rib pain on left side  orphenadrine (NORFLEX) 100 MG extended release tablet   2. Bronchospasm with bronchitis, acute  albuterol sulfate  (90 Base) MCG/ACT inhaler    azithromycin (ZITHROMAX) 250 MG tablet   3. Adjustment reaction with anxiety and depression  busPIRone (BUSPAR) 10 MG tablet   long disc re: xrays and f/u ct-chest--3mos and f/u Uro; add buspar/samuel and rx s-bronchitis]25 min]  No orders of the defined types were placed in this encounter.   ;See above orders, as use and side effects reviewed ;Continue same meds, as common side effects and use

## 2017-09-27 NOTE — MR AVS SNAPSHOT
After Visit Summary             Yogesh Sampson   2017 8:00 AM   Office Visit    Description:  Female : 1970   Provider:  Hector Kahn DO   Department:  [facility]              Your Follow-Up and Future Appointments         Below is a list of your follow-up and future appointments. This may not be a complete list as you may have made appointments directly with providers that we are not aware of or your providers may have made some for you. Please call your providers to confirm appointments. It is important to keep your appointments. Please bring your current insurance card, photo ID, co-pay, and all medication bottles to your appointment. If self-pay, payment is expected at the time of service. Your To-Do List     Future Appointments Provider Department Dept Phone    10/26/2017 1:45 PM DO [facility] [phone]    Please arrive 15 minutes prior to appointment, bring photo ID and insurance card. 2017 3:45 PM MD [facility] Montez [phone]    Please arrive 15 minutes prior to appointment, bring photo ID and insurance card. Follow-Up    Return in about 4 weeks (around 10/25/2017). Information from Your Visit        Department     Name Address Phone Fax    [facility] [address] [facility]. [place] [address] [phone]      You Were Seen for:         Comments    Rib pain on left side   [[id number]]         Vital Signs     Blood Pressure Pulse Temperature Respirations Height Weight    102/72 74 98.1 °F (36.7 °C) (Temporal) 14 5' 3\" (1.6 m) 253 lb (114.8 kg)    Body Mass Index Smoking Status                44.82 kg/m2 Former Smoker          Additional Information about your Body Mass Index (BMI)           Your BMI as listed above is considered obese (30 or more). BMI is an estimate of body fat, calculated from your height and weight.   The higher your BMI, the greater your risk of heart disease, high blood pressure, type 2 diabetes, stroke, gallstones, arthritis, sleep apnea, and certain cancers. BMI is not perfect. It may overestimate body fat in athletes and people who are more muscular. Even a small weight loss (between 5 and 10 percent of your current weight) by decreasing your calorie intake and becoming more physically active will help lower your risk of developing or worsening diseases associated with obesity. Learn more at: [employer]             Today's Medication Changes          These changes are accurate as of: 9/27/17  8:51 AM.  If you have any questions, ask your nurse or doctor. START taking these medications           azithromycin 250 MG tablet   Commonly known as:  ZITHROMAX   Instructions: Take 1 tablet by mouth daily for 10 days   Quantity:  10 tablet   Refills:  0   Started by:  Damian Mccauley DO       busPIRone 10 MG tablet   Commonly known as:  BUSPAR   Instructions: Take 1/2 tab bid wf x 1 week, then 1 tab bid   Quantity:  60 tablet   Refills:  2   Started by:  Damian Mccauley DO       orphenadrine 100 MG extended release tablet   Commonly known as:  NORFLEX   Instructions:   Take 1 bid if needed for side pain-tightness   Quantity:  30 tablet   Refills:  2   Started by:  Damian Mccauley DO         STOP taking these medications           amoxicillin 500 MG capsule   Commonly known as:  AMOXIL   Stopped by:  Damian Mccauley DO       atorvastatin 20 MG tablet   Commonly known as:  LIPITOR   Stopped by:  Damian Mccauley DO       dicyclomine 10 MG capsule   Commonly known as:  BENTYL   Stopped by:  Damian Mccauley DO       escitalopram 20 MG tablet   Commonly known as:  LEXAPRO   Stopped by:  Damian Mccauley DO       famotidine 20 MG tablet   Commonly known as:  PEPCID   Stopped by:  Damian Mccauley DO       methylPREDNISolone 4 MG tablet   Commonly known as:  Tori Coleman Stopped by:  Willie Monroy DO       metoprolol tartrate 25 MG tablet   Commonly known as:  LOPRESSOR   Stopped by:  Willie Monroy DO       ondansetron 4 MG disintegrating tablet   Commonly known as:  ZOFRAN ODT   Stopped by:  Willie Monroy DO            Where to Get Your Medications      These medications were sent to [address] [place] [address], [address] [phone]  [address] [place] [phone]    Hours:  24-hours Phone:  [phone]     albuterol sulfate  (90 Base) MCG/ACT inhaler    azithromycin 250 MG tablet    busPIRone 10 MG tablet    orphenadrine 100 MG extended release tablet               Your Current Medications Are              albuterol sulfate  (90 Base) MCG/ACT inhaler Inhale 2 puffs into the lungs every 4 hours as needed for Wheezing    busPIRone (BUSPAR) 10 MG tablet Take 1/2 tab bid wf x 1 week, then 1 tab bid    orphenadrine (NORFLEX) 100 MG extended release tablet Take 1 bid if needed for side pain-tightness    azithromycin (ZITHROMAX) 250 MG tablet Take 1 tablet by mouth daily for 10 days    cetirizine (ZYRTEC) 10 MG tablet Take 1 qhs for allergies    albuterol (PROVENTIL) (2.5 MG/3ML) 0.083% nebulizer solution Take 3 mLs by nebulization every 6 hours as needed for Wheezing      Allergies              Shellfish-derived Products     Ibuprofen     Propoxyphene N-acetaminophen     Other reaction(s): Hives         Additional Information        Basic Information     Date Of Birth Sex Race Ethnicity Preferred Language    1970 Female Black Non-/Non  English      Problem List as of 9/27/2017  Date Reviewed: 9/20/2017                Spondylosis of lumbar region without myelopathy or radiculopathy--OARRS PM&R 5/24/17    Cervical spondylosis without myelopathy    Pure hyperglyceridemia    Morbid obesity ([address]    Transient cerebral ischemia    Renal cancer (HCC)    Lumbar spondylosis    Thyroid goiter    Anxiety    Asthma

## 2017-10-05 ASSESSMENT — ENCOUNTER SYMPTOMS
BLOOD IN STOOL: 0
RHINORRHEA: 1
COUGH: 1
NAUSEA: 0
VOMITING: 0
WHEEZING: 1
DIARRHEA: 0
CONSTIPATION: 0
ABDOMINAL DISTENTION: 0
SHORTNESS OF BREATH: 0
ABDOMINAL PAIN: 0
SINUS PRESSURE: 1

## 2017-10-27 ENCOUNTER — PATIENT MESSAGE (OUTPATIENT)
Dept: FAMILY MEDICINE CLINIC | Age: 47
End: 2017-10-27

## 2017-11-15 ENCOUNTER — PATIENT MESSAGE (OUTPATIENT)
Dept: FAMILY MEDICINE CLINIC | Age: 47
End: 2017-11-15

## 2017-11-15 DIAGNOSIS — J20.9 BRONCHOSPASM WITH BRONCHITIS, ACUTE: ICD-10-CM

## 2017-11-15 RX ORDER — ALBUTEROL SULFATE 90 UG/1
2 AEROSOL, METERED RESPIRATORY (INHALATION) EVERY 4 HOURS PRN
Qty: 2 INHALER | Refills: 5 | Status: SHIPPED | OUTPATIENT
Start: 2017-11-15 | End: 2018-05-25 | Stop reason: SDUPTHER

## 2017-11-15 NOTE — TELEPHONE ENCOUNTER
PATIENT REQUESTING A REFILL. PATIENT LAST SEEN 9/27/17  LAST REFILL 9/27/17  PLEASE APPROVE OR DENY.        Future Appointments  Date Time Provider Valerie Cosby   11/18/2017 10:00 AM DO PAO Gatica PCP [facility] [place]   12/12/2017 3:45 PM Tal [address]

## 2017-11-15 NOTE — TELEPHONE ENCOUNTER
From: Cherry Xiong  Sent: 11/15/2017 12:49 PM EST  Subject: Medication Renewal Request    Cherry Xiong would like a refill of the following medications:  albuterol sulfate  (90 Base) MCG/ACT inhaler Romana Rakes, DO]    Preferred pharmacy: [address] [phone] - F [phone]    Comment:

## 2017-11-16 ENCOUNTER — TELEPHONE (OUTPATIENT)
Dept: FAMILY MEDICINE CLINIC | Age: 47
End: 2017-11-16

## 2017-11-16 DIAGNOSIS — J45.21 MILD INTERMITTENT ASTHMA WITH ACUTE EXACERBATION: ICD-10-CM

## 2017-11-16 DIAGNOSIS — R05.9 COUGH: ICD-10-CM

## 2017-11-16 RX ORDER — PREDNISONE 20 MG/1
TABLET ORAL
Qty: 18 TABLET | Refills: 0 | Status: SHIPPED | OUTPATIENT
Start: 2017-11-16 | End: 2017-11-26

## 2017-11-18 ENCOUNTER — OFFICE VISIT (OUTPATIENT)
Dept: FAMILY MEDICINE CLINIC | Age: 47
End: 2017-11-18

## 2017-11-18 VITALS
DIASTOLIC BLOOD PRESSURE: 74 MMHG | HEIGHT: 63 IN | HEART RATE: 74 BPM | SYSTOLIC BLOOD PRESSURE: 102 MMHG | BODY MASS INDEX: 44.65 KG/M2 | TEMPERATURE: 98.2 F | RESPIRATION RATE: 14 BRPM | WEIGHT: 252 LBS

## 2017-11-18 DIAGNOSIS — J40 SINOBRONCHITIS: Primary | ICD-10-CM

## 2017-11-18 DIAGNOSIS — F43.23 ADJUSTMENT REACTION WITH ANXIETY AND DEPRESSION: ICD-10-CM

## 2017-11-18 DIAGNOSIS — J45.21 MILD INTERMITTENT REACTIVE AIRWAY DISEASE WITH ACUTE EXACERBATION: ICD-10-CM

## 2017-11-18 DIAGNOSIS — J32.9 SINOBRONCHITIS: Primary | ICD-10-CM

## 2017-11-18 PROCEDURE — 99213 OFFICE O/P EST LOW 20 MIN: CPT | Performed by: FAMILY MEDICINE

## 2017-11-18 PROCEDURE — G8484 FLU IMMUNIZE NO ADMIN: HCPCS | Performed by: FAMILY MEDICINE

## 2017-11-18 PROCEDURE — G8427 DOCREV CUR MEDS BY ELIG CLIN: HCPCS | Performed by: FAMILY MEDICINE

## 2017-11-18 PROCEDURE — G8417 CALC BMI ABV UP PARAM F/U: HCPCS | Performed by: FAMILY MEDICINE

## 2017-11-18 PROCEDURE — 1036F TOBACCO NON-USER: CPT | Performed by: FAMILY MEDICINE

## 2017-11-18 RX ORDER — CYCLOBENZAPRINE HCL 10 MG
TABLET ORAL
Refills: 1 | COMMUNITY
Start: 2017-10-24 | End: 2018-05-25 | Stop reason: ALTCHOICE

## 2017-11-18 RX ORDER — TRAMADOL HYDROCHLORIDE 50 MG/1
TABLET ORAL
Refills: 0 | Status: ON HOLD | COMMUNITY
Start: 2017-10-24 | End: 2018-04-11 | Stop reason: ALTCHOICE

## 2017-11-18 RX ORDER — AZITHROMYCIN 250 MG/1
TABLET, FILM COATED ORAL
Qty: 1 PACKET | Refills: 0 | Status: SHIPPED | OUTPATIENT
Start: 2017-11-18 | End: 2017-11-28

## 2017-11-18 NOTE — PROGRESS NOTES
Subjective  Senaida Saint, 56 y.o. female presents today with:  Chief Complaint   Patient presents with    Anxiety     4 wk f/u     Depression    Back Pain       HPI  Patient presents today for a four week follow up for anxiety, depression, and back pain. F/u for lbp/HA w/ Gunnar--see note[on flexeril-ultram]  Rev/rxs; No abuse nor side effects on meds newer buspar -no s-e  Newer tightness and freq tight cough and occ prod w/ wheezing  Started 2 d ago w/ tapering steroids  No palp/solo/cp. .. Review of Systems   Constitutional: Positive for fatigue. Negative for fever. HENT: Positive for congestion, sinus pain and sore throat. Negative for tinnitus, trouble swallowing and voice change. Eyes: Negative for visual disturbance. Respiratory: Positive for cough and wheezing. Cardiovascular: Negative for chest pain, palpitations and leg swelling. Gastrointestinal: Negative for abdominal pain and blood in stool. Genitourinary: Positive for frequency. Negative for dysuria and flank pain. Musculoskeletal: Positive for arthralgias, back pain and myalgias. Neurological: Positive for headaches. Negative for seizures, syncope, weakness, light-headedness and numbness. Psychiatric/Behavioral: Positive for dysphoric mood.        Allergies   Allergen Reactions    Shellfish-Derived Products     Ibuprofen     Propoxyphene N-Acetaminophen      Other reaction(s): Hives       Past Medical History:   Diagnosis Date    Anxiety     Asthma     [facility] ([facility] [address]     Chronic kidney disease     Depression     Hypertension     Mixed headache     Pure hyperglyceridemia 5/19/2017    Thyroid goiter      Past Surgical History:   Procedure Laterality Date    KIDNEY REMOVAL Right 08/2016    THYROID LOBECTOMY Right 6/13/14    URETER STENT PLACEMENT Left 08/2016     Social History     Social History    Marital status:      Spouse name: N/A    Number of children: N/A    Years of education: N/A     Occupational History    Not on file. Social History Main Topics    Smoking status: Former Smoker     Packs/day: 0.50     Years: 15.00     Types: Cigarettes     Start date: 8/20/2014    Smokeless tobacco: Former User     Quit date: 3/23/2016      Comment: tried to quit plenty times, but seem to struggle, would love help quitting    Alcohol use No    Drug use: No    Sexual activity: Not on file     Other Topics Concern    Not on file     Social History Narrative    No narrative on file     Family History   Problem Relation Age of Onset    Cancer Father     Diabetes Father     High Blood Pressure Mother     Diabetes Mother           Current Outpatient Prescriptions on File Prior to Visit   Medication Sig Dispense Refill    predniSONE (DELTASONE) 20 MG tablet One po bid for 5 days then, One po daily for 5 days then, One half po daily for 6 days 18 tablet 0    albuterol sulfate  (90 Base) MCG/ACT inhaler Inhale 2 puffs into the lungs every 4 hours as needed for Wheezing 2 Inhaler 5    busPIRone (BUSPAR) 10 MG tablet Take 1/2 tab bid wf x 1 week, then 1 tab bid 60 tablet 2    albuterol (PROVENTIL) (2.5 MG/3ML) 0.083% nebulizer solution Take 3 mLs by nebulization every 6 hours as needed for Wheezing 120 each 6     No current facility-administered medications on file prior to visit. Objective    Vitals:    11/18/17 0954   BP: 102/74   Pulse: 74   Resp: 14   Temp: 98.2 °F (36.8 °C)   TempSrc: Temporal   Weight: 252 lb (114.3 kg)   Height: 5' 3\" (1.6 m)     Physical Exam   Constitutional: She is oriented to person, place, and time and well-developed, well-nourished, and in no distress. No distress. HENT:   Right Ear: Ear canal normal. No mastoid tenderness. Tympanic membrane is retracted. Tympanic membrane is not injected. No decreased hearing is noted. Left Ear: Ear canal normal. No mastoid tenderness. Tympanic membrane is retracted. Tympanic membrane is not injected. No decreased hearing is noted. Nose: Mucosal edema, rhinorrhea and septal deviation present. Right sinus exhibits maxillary sinus tenderness. Mouth/Throat: No oral lesions. No uvula swelling. Posterior oropharyngeal erythema present. No oropharyngeal exudate. Eyes: No scleral icterus. Neck: Normal range of motion. Neck supple. Carotid bruit is not present. No neck rigidity. No thyroid mass and no thyromegaly present. Cardiovascular: Normal rate, regular rhythm, S1 normal, S2 normal and normal heart sounds. No extrasystoles are present. No murmur heard. Pulmonary/Chest: Effort normal. No respiratory distress. She has wheezes in the right upper field and the left upper field. She has rhonchi in the right upper field and the left upper field. She has no rales. Musculoskeletal: She exhibits no edema. Lumbar back: She exhibits tenderness. She exhibits no bony tenderness. Back:    Neurological: She is alert and oriented to person, place, and time. She has normal sensation, normal strength and intact cranial nerves. Gait normal.   Skin: She is not diaphoretic. Psychiatric: Mood and affect normal.        ;  Assessment & Plan   1. Sinobronchitis  azithromycin (ZITHROMAX) 250 MG tablet   2. Mild intermittent reactive airway disease with acute exacerbation  azithromycin (ZITHROMAX) 250 MG tablet   3. Adjustment reaction with anxiety and depression     add zpk to tapering steroid;Continue same meds, as common side effects and use reviewed-call if ineffective or problems   ;See above orders, as use and side effects reviewed   ; Reviewed use of rescue inhalants [if applicable] -if persistent, significant SOB/weakness, to ER. Call/recheck appt. If fails to improve or worsening symptoms. ;;The patient is asked to make an attempt to improve diet and exercise patterns to aid in medical management of this problem.    Outpatient Encounter Prescriptions as of 11/18/2017   Medication Sig Dispense Refill    traMADol (ULTRAM) 50 MG

## 2017-11-21 DIAGNOSIS — G43.001 MIGRAINE WITHOUT AURA AND WITH STATUS MIGRAINOSUS, NOT INTRACTABLE: Primary | ICD-10-CM

## 2017-11-21 RX ORDER — RIZATRIPTAN BENZOATE 5 MG/1
5 TABLET ORAL
Qty: 6 TABLET | Refills: 5 | Status: SHIPPED | OUTPATIENT
Start: 2017-11-21 | End: 2018-01-15

## 2017-11-26 ENCOUNTER — HOSPITAL ENCOUNTER (EMERGENCY)
Age: 47
Discharge: LEFT BEFORE DISCHARGE | End: 2017-11-26
Attending: FAMILY MEDICINE
Payer: COMMERCIAL

## 2017-11-26 VITALS
OXYGEN SATURATION: 98 % | DIASTOLIC BLOOD PRESSURE: 78 MMHG | SYSTOLIC BLOOD PRESSURE: 123 MMHG | BODY MASS INDEX: 38.97 KG/M2 | HEART RATE: 104 BPM | RESPIRATION RATE: 20 BRPM | TEMPERATURE: 98.3 F | WEIGHT: 220 LBS

## 2017-11-26 DIAGNOSIS — M54.32 SCIATICA OF LEFT SIDE: Primary | ICD-10-CM

## 2017-11-26 PROCEDURE — 99283 EMERGENCY DEPT VISIT LOW MDM: CPT

## 2017-11-26 RX ORDER — TRIAMCINOLONE ACETONIDE 40 MG/ML
40 INJECTION, SUSPENSION INTRA-ARTICULAR; INTRAMUSCULAR ONCE
Status: DISCONTINUED | OUTPATIENT
Start: 2017-11-26 | End: 2017-11-26 | Stop reason: HOSPADM

## 2017-11-26 RX ORDER — ORPHENADRINE CITRATE 30 MG/ML
60 INJECTION INTRAMUSCULAR; INTRAVENOUS ONCE
Status: DISCONTINUED | OUTPATIENT
Start: 2017-11-26 | End: 2017-11-26 | Stop reason: HOSPADM

## 2017-11-26 RX ORDER — MORPHINE SULFATE 4 MG/ML
4 INJECTION, SOLUTION INTRAMUSCULAR; INTRAVENOUS ONCE
Status: DISCONTINUED | OUTPATIENT
Start: 2017-11-26 | End: 2017-11-26 | Stop reason: HOSPADM

## 2017-11-26 ASSESSMENT — ENCOUNTER SYMPTOMS
BACK PAIN: 1
BOWEL INCONTINENCE: 0
ABDOMINAL SWELLING: 0
VOICE CHANGE: 0
COUGH: 1
WHEEZING: 1
SORE THROAT: 1
SINUS PAIN: 1
ABDOMINAL PAIN: 0
TROUBLE SWALLOWING: 0
BLOOD IN STOOL: 0
BACK PAIN: 1
ABDOMINAL PAIN: 0

## 2017-11-26 ASSESSMENT — PAIN DESCRIPTION - ORIENTATION: ORIENTATION: LEFT

## 2017-11-26 ASSESSMENT — PAIN DESCRIPTION - FREQUENCY: FREQUENCY: INTERMITTENT

## 2017-11-26 ASSESSMENT — PAIN SCALES - GENERAL: PAINLEVEL_OUTOF10: 10

## 2017-11-26 ASSESSMENT — PAIN DESCRIPTION - LOCATION: LOCATION: BACK;HIP;LEG

## 2017-11-26 ASSESSMENT — PAIN DESCRIPTION - DESCRIPTORS: DESCRIPTORS: SHARP

## 2017-11-26 NOTE — ED PROVIDER NOTES
[address]  eMERGENCY dEPARTMENT eNCOUnter      Pt Name: Christian Kim  MRN: [id number]  [place] 1970  Date of evaluation: 11/26/2017  Provider: Mario Potter MD    [address] [place] [address]       Chief Complaint   Patient presents with    Back Pain     started at 0300-denies injury-radiates down left leg         HISTORY OF PRESENT ILLNESS   (Location/Symptom, Timing/Onset, Context/Setting, Quality, Duration, Modifying Factors, Severity)  Note limiting factors. Christian Kim is a 56 y.o. female who presents to the emergency department      The history is provided by the patient. Back Pain   Location:  Lumbar spine  Quality:  Aching  Radiates to:  L thigh  Pain severity:  Moderate  Onset quality:  Gradual  Duration:  2 hours  Timing:  Constant  Chronicity:  New  Context: not emotional stress, not falling, not jumping from heights, not lifting heavy objects, not MCA, not MVA, not occupational injury, not pedestrian accident, not physical stress, not recent illness, not recent injury and not twisting    Relieved by:  Nothing  Worsened by:  Nothing  Ineffective treatments:  None tried  Associated symptoms: leg pain    Associated symptoms: no abdominal pain, no abdominal swelling, no bladder incontinence, no bowel incontinence, no chest pain, no dysuria, no fever, no headaches, no numbness, no tingling, no weakness and no weight loss    Risk factors comment:  Hx OF CKD      Nursing Notes were reviewed. REVIEW OF SYSTEMS    (2-9 systems for level 4, 10 or more for level 5)     Review of Systems   Constitutional: Negative for fever and weight loss. Cardiovascular: Negative for chest pain. Gastrointestinal: Negative for abdominal pain and bowel incontinence. Genitourinary: Negative for bladder incontinence and dysuria. Musculoskeletal: Positive for back pain. Neurological: Negative for tingling, weakness, numbness and headaches.        Except as noted above the remainder of the review of Start date: 8/20/2014    Smokeless tobacco: Former User     Quit date: 3/23/2016      Comment: tried to quit plenty times, but seem to struggle, would love help quitting    Alcohol use No    Drug use: No    Sexual activity: Not on file     Other Topics Concern    Not on file     Social History Narrative    No narrative on file       SCREENINGS             PHYSICAL EXAM    (up to 7 for level 4, 8 or more for level 5)     ED Triage Vitals [11/26/17 1514]   BP Temp Temp src Pulse Resp SpO2 Height Weight   123/78 98.3 °F (36.8 °C) -- 104 20 98 % -- 220 lb (99.8 kg)       Physical Exam   Constitutional: She is oriented to person, place, and time. She appears well-developed and well-nourished. /78   Pulse 104   Temp 98.3 °F (36.8 °C)   Resp 20   Wt 220 lb (99.8 kg)   SpO2 98%   BMI 38.97 kg/m²      HENT:   Head: Normocephalic and atraumatic. Right Ear: External ear normal.   Left Ear: External ear normal.   Nose: Nose normal.   Mouth/Throat: Oropharynx is clear and moist.   Cardiovascular: Normal rate, regular rhythm, normal heart sounds and intact distal pulses. Pulmonary/Chest: Effort normal and breath sounds normal.   Abdominal: Soft. Bowel sounds are normal.   Musculoskeletal: She exhibits no edema, tenderness or deformity. Legs:  Pain in the lower back and left sciatica Distribution. No CVA tenderness on exam   Neurological: She is alert and oriented to person, place, and time. She has normal reflexes. Skin: Skin is warm and dry. Psychiatric: She has a normal mood and affect.  Her behavior is normal. Judgment and thought content normal.       DIAGNOSTIC RESULTS     EKG: All EKG's are interpreted by the Emergency Department Physician who either signs or Co-signs this chart in the absence of a cardiologist.         RADIOLOGY:   Non-plain film images such as CT, Ultrasound and MRI are read by the radiologist. Plain radiographic images are visualized and preliminarily interpreted by

## 2017-12-11 DIAGNOSIS — J01.90 ACUTE BACTERIAL SINUSITIS: Primary | ICD-10-CM

## 2017-12-11 DIAGNOSIS — B96.89 ACUTE BACTERIAL SINUSITIS: Primary | ICD-10-CM

## 2017-12-11 RX ORDER — AMOXICILLIN AND CLAVULANATE POTASSIUM 875; 125 MG/1; MG/1
1 TABLET, FILM COATED ORAL 2 TIMES DAILY
Qty: 20 TABLET | Refills: 0 | Status: SHIPPED | OUTPATIENT
Start: 2017-12-11 | End: 2017-12-21

## 2018-01-02 DIAGNOSIS — J30.1 ACUTE SEASONAL ALLERGIC RHINITIS DUE TO POLLEN: ICD-10-CM

## 2018-01-02 RX ORDER — CETIRIZINE HYDROCHLORIDE 10 MG/1
TABLET ORAL
Qty: 30 TABLET | Refills: 3 | Status: SHIPPED | OUTPATIENT
Start: 2018-01-02 | End: 2018-02-28

## 2018-01-05 DIAGNOSIS — J45.31 MILD PERSISTENT ASTHMA WITH ACUTE EXACERBATION: Primary | ICD-10-CM

## 2018-01-05 RX ORDER — PREDNISONE 20 MG/1
TABLET ORAL
Qty: 18 TABLET | Refills: 0 | Status: SHIPPED | OUTPATIENT
Start: 2018-01-05 | End: 2018-02-13 | Stop reason: ALTCHOICE

## 2018-01-15 ENCOUNTER — OFFICE VISIT (OUTPATIENT)
Dept: UROLOGY | Age: 48
End: 2018-01-15

## 2018-01-15 ENCOUNTER — TELEPHONE (OUTPATIENT)
Dept: UROLOGY | Age: 48
End: 2018-01-15

## 2018-01-15 VITALS
WEIGHT: 215 LBS | HEART RATE: 74 BPM | SYSTOLIC BLOOD PRESSURE: 108 MMHG | HEIGHT: 63 IN | BODY MASS INDEX: 38.09 KG/M2 | DIASTOLIC BLOOD PRESSURE: 60 MMHG

## 2018-01-15 DIAGNOSIS — C64.1 RENAL CELL CARCINOMA, RIGHT (HCC): Primary | ICD-10-CM

## 2018-01-15 PROCEDURE — 99213 OFFICE O/P EST LOW 20 MIN: CPT | Performed by: UROLOGY

## 2018-01-15 PROCEDURE — G8427 DOCREV CUR MEDS BY ELIG CLIN: HCPCS | Performed by: UROLOGY

## 2018-01-15 PROCEDURE — G8417 CALC BMI ABV UP PARAM F/U: HCPCS | Performed by: UROLOGY

## 2018-01-15 PROCEDURE — 1036F TOBACCO NON-USER: CPT | Performed by: UROLOGY

## 2018-01-15 PROCEDURE — G8484 FLU IMMUNIZE NO ADMIN: HCPCS | Performed by: UROLOGY

## 2018-01-15 NOTE — TELEPHONE ENCOUNTER
Pt called to ask if the \"clamps\" that were put in when she had her kidney removed are supposed to stay in forever or do they have to be removed at some point?  Please advise

## 2018-01-15 NOTE — PROGRESS NOTES
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 with negative margin  CT scan performed September 2017 shows no lymphadenopathy or recurrent disease  Patient's creatinine is 1.5  Continues have mild microhematuria and proteinuria most likely secondary to hyper filtration      Urologic Review of Systems/Symptoms  Denies hematuria  Denies dysuria  Denies incontinence  Denies flank pain  Other Urologic: Currently no flank pain on the right    Review of Systems  Head and neck: No issues/reviewed  Cardiac: No recent issues/reviewed  Pulmonary: No issues/reviewed  Gastrointestinal: No issues/reviewed  Neurologic: No recent issues/reviewed  Extremities: No issues/reviewed  Lymphatics: No lymphadenopathy no change  Genitourinary: See above  Skin: No issues/reviewed  Hospitalization: None recently  Medications reviewed  All 14 categories of Review of Systems otherwise reviewed no other findings reported.     Past Medical History:   Diagnosis Date    Anxiety     Asthma     Cancer ([facility] [address]     Chronic kidney disease     Depression     Hypertension     Mixed headache     Pure hyperglyceridemia 5/19/2017    Thyroid goiter      Past Surgical History:   Procedure Laterality Date    KIDNEY REMOVAL Right 08/2016    THYROID LOBECTOMY Right 6/13/14    URETER STENT PLACEMENT Left 08/2016     Social History     Social History    Marital status:      Spouse name: N/A    Number of children: N/A    Years of education: N/A     Social History Main Topics    Smoking status: Cardiovascular: Normal rate, BP reviewed. sinus rhythm  Pulmonary/Chest: Normal respiratory effort  no shortness of breath  Abdominal: Not distended. No hernias  Urologic Exam  CT reviewed from 8/30/2017, 2/21/2017, along with chest x-ray 8/30/17, 5/19/17, 2/24/17 no evidence of recurrent disease. Urinalysis shows mild microhematuria. Exam otherwise normal .  Musculoskeletal: Normal range of motion. Patient is ambulatory. Extremities: No edema   Neurological: Cranial nerves intact   Skin: Skin is warm and dry. No lesions. No rashes or ulcers   Psychiatric:  Alert and oriented ×3. Assessment  Status post right nephrectomy for renal cell carcinoma  No evidence of recurrent disease and final up CT ×2  Plan  Follow-up one year to arrange for scattered scan  Greater than 50% of 20 minutes spent consulting patient face-to-face  No orders of the defined types were placed in this encounter. No orders of the defined types were placed in this encounter. Mar Randolph MD       Please note this report has been partially produced using speech recognition software  And may cause contain errors related to that system including grammar, punctuation and spelling as well as words and phrases that may seem inappropriate. If there are questions or concerns please feel free to contact me to clarify.

## 2018-01-23 ENCOUNTER — TELEPHONE (OUTPATIENT)
Dept: UROLOGY | Age: 48
End: 2018-01-23

## 2018-01-23 ENCOUNTER — TELEPHONE (OUTPATIENT)
Dept: FAMILY MEDICINE CLINIC | Age: 48
End: 2018-01-23

## 2018-01-23 NOTE — TELEPHONE ENCOUNTER
Blanca Unger from the scheduling department phoned to request placing the patient in a same day slot on Friday to be seen for a HFU for the patient. The patient was seen at [facility] for chest pain and SOB on Sat 1/20/18 and released the same day with instruction to follow up with physician. The patient is still having discomfort due to chest pain. I gave Blanca Unger permission to schedule the patient for Friday in the same day slot and to advise the patient to return to the ER for current chest pain. If the doctor choose to double book for this patient today please advise. [phone] (home)   Thank you!

## 2018-01-24 ENCOUNTER — APPOINTMENT (OUTPATIENT)
Dept: GENERAL RADIOLOGY | Age: 48
End: 2018-01-24
Payer: MEDICAID

## 2018-01-24 ENCOUNTER — HOSPITAL ENCOUNTER (OUTPATIENT)
Age: 48
Setting detail: OBSERVATION
Discharge: HOME | End: 2018-01-24
Attending: INTERNAL MEDICINE | Admitting: INTERNAL MEDICINE
Payer: MEDICAID

## 2018-01-24 VITALS
DIASTOLIC BLOOD PRESSURE: 47 MMHG | SYSTOLIC BLOOD PRESSURE: 108 MMHG | BODY MASS INDEX: 37.21 KG/M2 | WEIGHT: 210 LBS | TEMPERATURE: 98.2 F | OXYGEN SATURATION: 97 % | RESPIRATION RATE: 18 BRPM | HEART RATE: 76 BPM | HEIGHT: 63 IN

## 2018-01-24 DIAGNOSIS — R07.9 CHEST PAIN, UNSPECIFIED TYPE: Primary | ICD-10-CM

## 2018-01-24 LAB
ALBUMIN SERPL-MCNC: 3.6 G/DL (ref 3.9–4.9)
ALP BLD-CCNC: 121 U/L (ref 40–130)
ALT SERPL-CCNC: 11 U/L (ref 0–33)
AMPHETAMINE SCREEN, URINE: ABNORMAL
ANION GAP SERPL CALCULATED.3IONS-SCNC: 13 MEQ/L (ref 7–13)
AST SERPL-CCNC: 13 U/L (ref 0–35)
BARBITURATE SCREEN URINE: ABNORMAL
BASOPHILS ABSOLUTE: 0.1 K/UL (ref 0–0.2)
BASOPHILS RELATIVE PERCENT: 0.6 %
BENZODIAZEPINE SCREEN, URINE: ABNORMAL
BILIRUB SERPL-MCNC: 0.2 MG/DL (ref 0–1.2)
BILIRUBIN URINE: NEGATIVE
BLOOD, URINE: NEGATIVE
BUN BLDV-MCNC: 18 MG/DL (ref 6–20)
CALCIUM SERPL-MCNC: 9 MG/DL (ref 8.6–10.2)
CANNABINOID SCREEN URINE: POSITIVE
CHLORIDE BLD-SCNC: 101 MEQ/L (ref 98–107)
CLARITY: CLEAR
CO2: 22 MEQ/L (ref 22–29)
COCAINE METABOLITE SCREEN URINE: ABNORMAL
COLOR: YELLOW
CREAT SERPL-MCNC: 1.25 MG/DL (ref 0.5–0.9)
D DIMER: <0.19 MG/L FEU (ref 0–0.5)
EKG ATRIAL RATE: 87 BPM
EKG P AXIS: 33 DEGREES
EKG P-R INTERVAL: 146 MS
EKG Q-T INTERVAL: 368 MS
EKG QRS DURATION: 78 MS
EKG QTC CALCULATION (BAZETT): 442 MS
EKG R AXIS: 13 DEGREES
EKG T AXIS: 1 DEGREES
EKG VENTRICULAR RATE: 87 BPM
EOSINOPHILS ABSOLUTE: 0.3 K/UL (ref 0–0.7)
EOSINOPHILS RELATIVE PERCENT: 2.7 %
GFR AFRICAN AMERICAN: 55.6
GFR NON-AFRICAN AMERICAN: 46
GLOBULIN: 2.7 G/DL (ref 2.3–3.5)
GLUCOSE BLD-MCNC: 199 MG/DL (ref 74–109)
GLUCOSE URINE: NEGATIVE MG/DL
HCT VFR BLD CALC: 43.1 % (ref 37–47)
HEMOGLOBIN: 14.4 G/DL (ref 12–16)
KETONES, URINE: NEGATIVE MG/DL
LEUKOCYTE ESTERASE, URINE: NEGATIVE
LIPASE: 56 U/L (ref 13–60)
LYMPHOCYTES ABSOLUTE: 2.5 K/UL (ref 1–4.8)
LYMPHOCYTES RELATIVE PERCENT: 22.1 %
Lab: ABNORMAL
MAGNESIUM: 2.2 MG/DL (ref 1.7–2.3)
MCH RBC QN AUTO: 32 PG (ref 27–31.3)
MCHC RBC AUTO-ENTMCNC: 33.4 % (ref 33–37)
MCV RBC AUTO: 95.6 FL (ref 82–100)
MONOCYTES ABSOLUTE: 0.4 K/UL (ref 0.2–0.8)
MONOCYTES RELATIVE PERCENT: 3.7 %
NEUTROPHILS ABSOLUTE: 7.9 K/UL (ref 1.4–6.5)
NEUTROPHILS RELATIVE PERCENT: 70.9 %
NITRITE, URINE: NEGATIVE
OPIATE SCREEN URINE: ABNORMAL
PDW BLD-RTO: 13.8 % (ref 11.5–14.5)
PH UA: 6 (ref 5–9)
PHENCYCLIDINE SCREEN URINE: ABNORMAL
PLATELET # BLD: 276 K/UL (ref 130–400)
POTASSIUM SERPL-SCNC: 4 MEQ/L (ref 3.5–5.1)
PRO-BNP: <5 PG/ML
PROTEIN UA: NEGATIVE MG/DL
RBC # BLD: 4.51 M/UL (ref 4.2–5.4)
SODIUM BLD-SCNC: 136 MEQ/L (ref 132–144)
SPECIFIC GRAVITY UA: 1.02 (ref 1–1.03)
TOTAL PROTEIN: 6.3 G/DL (ref 6.4–8.1)
TROPONIN: <0.01 NG/ML (ref 0–0.01)
TROPONIN: <0.01 NG/ML (ref 0–0.01)
URINE REFLEX TO CULTURE: NORMAL
UROBILINOGEN, URINE: 0.2 E.U./DL
WBC # BLD: 11.2 K/UL (ref 4.8–10.8)

## 2018-01-24 PROCEDURE — 6370000000 HC RX 637 (ALT 250 FOR IP): Performed by: INTERNAL MEDICINE

## 2018-01-24 PROCEDURE — 81003 URINALYSIS AUTO W/O SCOPE: CPT

## 2018-01-24 PROCEDURE — 94664 DEMO&/EVAL PT USE INHALER: CPT

## 2018-01-24 PROCEDURE — 96374 THER/PROPH/DIAG INJ IV PUSH: CPT

## 2018-01-24 PROCEDURE — 6360000002 HC RX W HCPCS: Performed by: INTERNAL MEDICINE

## 2018-01-24 PROCEDURE — 85025 COMPLETE CBC W/AUTO DIFF WBC: CPT

## 2018-01-24 PROCEDURE — 94760 N-INVAS EAR/PLS OXIMETRY 1: CPT

## 2018-01-24 PROCEDURE — 99219 PR INITIAL OBSERVATION CARE/DAY 50 MINUTES: CPT | Performed by: INTERNAL MEDICINE

## 2018-01-24 PROCEDURE — 85379 FIBRIN DEGRADATION QUANT: CPT

## 2018-01-24 PROCEDURE — 80053 COMPREHEN METABOLIC PANEL: CPT

## 2018-01-24 PROCEDURE — 84484 ASSAY OF TROPONIN QUANT: CPT

## 2018-01-24 PROCEDURE — 99285 EMERGENCY DEPT VISIT HI MDM: CPT

## 2018-01-24 PROCEDURE — 93005 ELECTROCARDIOGRAM TRACING: CPT

## 2018-01-24 PROCEDURE — 6370000000 HC RX 637 (ALT 250 FOR IP): Performed by: PHYSICIAN ASSISTANT

## 2018-01-24 PROCEDURE — 36415 COLL VENOUS BLD VENIPUNCTURE: CPT

## 2018-01-24 PROCEDURE — 83880 ASSAY OF NATRIURETIC PEPTIDE: CPT

## 2018-01-24 PROCEDURE — G0378 HOSPITAL OBSERVATION PER HR: HCPCS

## 2018-01-24 PROCEDURE — 71046 X-RAY EXAM CHEST 2 VIEWS: CPT

## 2018-01-24 PROCEDURE — 83735 ASSAY OF MAGNESIUM: CPT

## 2018-01-24 PROCEDURE — 2580000003 HC RX 258: Performed by: INTERNAL MEDICINE

## 2018-01-24 PROCEDURE — 83690 ASSAY OF LIPASE: CPT

## 2018-01-24 PROCEDURE — 93010 ELECTROCARDIOGRAM REPORT: CPT | Performed by: INTERNAL MEDICINE

## 2018-01-24 PROCEDURE — 80307 DRUG TEST PRSMV CHEM ANLYZR: CPT

## 2018-01-24 RX ORDER — NITROGLYCERIN 0.4 MG/1
0.4 TABLET SUBLINGUAL ONCE
Status: COMPLETED | OUTPATIENT
Start: 2018-01-24 | End: 2018-01-24

## 2018-01-24 RX ORDER — MORPHINE SULFATE 2 MG/ML
2 INJECTION, SOLUTION INTRAMUSCULAR; INTRAVENOUS
Status: DISCONTINUED | OUTPATIENT
Start: 2018-01-24 | End: 2018-01-24 | Stop reason: HOSPADM

## 2018-01-24 RX ORDER — ATORVASTATIN CALCIUM 40 MG/1
20 TABLET, FILM COATED ORAL NIGHTLY
Status: DISCONTINUED | OUTPATIENT
Start: 2018-01-24 | End: 2018-01-24 | Stop reason: HOSPADM

## 2018-01-24 RX ORDER — MORPHINE SULFATE 4 MG/ML
4 INJECTION, SOLUTION INTRAMUSCULAR; INTRAVENOUS
Status: DISCONTINUED | OUTPATIENT
Start: 2018-01-24 | End: 2018-01-24 | Stop reason: HOSPADM

## 2018-01-24 RX ORDER — ONDANSETRON 2 MG/ML
4 INJECTION INTRAMUSCULAR; INTRAVENOUS EVERY 6 HOURS PRN
Status: DISCONTINUED | OUTPATIENT
Start: 2018-01-24 | End: 2018-01-24 | Stop reason: HOSPADM

## 2018-01-24 RX ORDER — CYCLOBENZAPRINE HCL 10 MG
10 TABLET ORAL NIGHTLY
Status: DISCONTINUED | OUTPATIENT
Start: 2018-01-24 | End: 2018-01-24 | Stop reason: HOSPADM

## 2018-01-24 RX ORDER — ACETAMINOPHEN 500 MG
1000 TABLET ORAL ONCE
Status: COMPLETED | OUTPATIENT
Start: 2018-01-24 | End: 2018-01-24

## 2018-01-24 RX ORDER — SODIUM CHLORIDE 0.9 % (FLUSH) 0.9 %
10 SYRINGE (ML) INJECTION PRN
Status: DISCONTINUED | OUTPATIENT
Start: 2018-01-24 | End: 2018-01-24 | Stop reason: HOSPADM

## 2018-01-24 RX ORDER — SODIUM CHLORIDE 0.9 % (FLUSH) 0.9 %
10 SYRINGE (ML) INJECTION EVERY 12 HOURS SCHEDULED
Status: DISCONTINUED | OUTPATIENT
Start: 2018-01-24 | End: 2018-01-24 | Stop reason: HOSPADM

## 2018-01-24 RX ORDER — ALBUTEROL SULFATE 90 UG/1
2 AEROSOL, METERED RESPIRATORY (INHALATION) EVERY 4 HOURS PRN
Status: DISCONTINUED | OUTPATIENT
Start: 2018-01-24 | End: 2018-01-24 | Stop reason: HOSPADM

## 2018-01-24 RX ORDER — ASPIRIN 81 MG/1
81 TABLET, CHEWABLE ORAL DAILY
Status: DISCONTINUED | OUTPATIENT
Start: 2018-01-25 | End: 2018-01-24 | Stop reason: HOSPADM

## 2018-01-24 RX ORDER — ALBUTEROL SULFATE 2.5 MG/3ML
2.5 SOLUTION RESPIRATORY (INHALATION) EVERY 6 HOURS PRN
Status: DISCONTINUED | OUTPATIENT
Start: 2018-01-24 | End: 2018-01-24 | Stop reason: HOSPADM

## 2018-01-24 RX ORDER — TRAMADOL HYDROCHLORIDE 50 MG/1
50 TABLET ORAL ONCE
Status: COMPLETED | OUTPATIENT
Start: 2018-01-24 | End: 2018-01-24

## 2018-01-24 RX ORDER — NITROGLYCERIN 0.4 MG/1
0.4 TABLET SUBLINGUAL EVERY 5 MIN PRN
Status: DISCONTINUED | OUTPATIENT
Start: 2018-01-24 | End: 2018-01-24 | Stop reason: HOSPADM

## 2018-01-24 RX ORDER — CETIRIZINE HYDROCHLORIDE 10 MG/1
10 TABLET ORAL DAILY
Status: DISCONTINUED | OUTPATIENT
Start: 2018-01-24 | End: 2018-01-24 | Stop reason: HOSPADM

## 2018-01-24 RX ORDER — BUSPIRONE HYDROCHLORIDE 10 MG/1
10 TABLET ORAL 2 TIMES DAILY
Status: DISCONTINUED | OUTPATIENT
Start: 2018-01-24 | End: 2018-01-24 | Stop reason: HOSPADM

## 2018-01-24 RX ORDER — ACETAMINOPHEN 325 MG/1
650 TABLET ORAL EVERY 4 HOURS PRN
Status: DISCONTINUED | OUTPATIENT
Start: 2018-01-24 | End: 2018-01-24 | Stop reason: HOSPADM

## 2018-01-24 RX ORDER — METOPROLOL TARTRATE 50 MG/1
50 TABLET, FILM COATED ORAL ONCE
Status: COMPLETED | OUTPATIENT
Start: 2018-01-24 | End: 2018-01-24

## 2018-01-24 RX ADMIN — SODIUM CHLORIDE, PRESERVATIVE FREE 10 ML: 5 INJECTION INTRAVENOUS at 08:57

## 2018-01-24 RX ADMIN — BUSPIRONE HYDROCHLORIDE 10 MG: 10 TABLET ORAL at 08:57

## 2018-01-24 RX ADMIN — CETIRIZINE HYDROCHLORIDE 10 MG: 10 TABLET, FILM COATED ORAL at 08:57

## 2018-01-24 RX ADMIN — MORPHINE SULFATE 2 MG: 2 INJECTION, SOLUTION INTRAMUSCULAR; INTRAVENOUS at 03:53

## 2018-01-24 RX ADMIN — NITROGLYCERIN 0.4 MG: 0.4 TABLET SUBLINGUAL at 00:56

## 2018-01-24 RX ADMIN — NITROGLYCERIN 0.5 INCH: 20 OINTMENT TOPICAL at 03:10

## 2018-01-24 RX ADMIN — ACETAMINOPHEN 1000 MG: 500 TABLET, FILM COATED ORAL at 02:52

## 2018-01-24 RX ADMIN — NITROGLYCERIN 0.4 MG: 0.4 TABLET SUBLINGUAL at 02:44

## 2018-01-24 RX ADMIN — METOPROLOL TARTRATE 50 MG: 50 TABLET ORAL at 02:44

## 2018-01-24 RX ADMIN — TRAMADOL HYDROCHLORIDE 50 MG: 50 TABLET, FILM COATED ORAL at 08:57

## 2018-01-24 ASSESSMENT — HEART SCORE: ECG: 0

## 2018-01-24 ASSESSMENT — ENCOUNTER SYMPTOMS
WHEEZING: 0
BLOOD IN STOOL: 0
CHEST TIGHTNESS: 0
STRIDOR: 0
NAUSEA: 0
ABDOMINAL DISTENTION: 0
EYE DISCHARGE: 0
GASTROINTESTINAL NEGATIVE: 1
VOMITING: 0
PHOTOPHOBIA: 0
EYES NEGATIVE: 1
ANAL BLEEDING: 0
SHORTNESS OF BREATH: 1
VOICE CHANGE: 0
APNEA: 0
EYE PAIN: 0
BACK PAIN: 1
COUGH: 0

## 2018-01-24 ASSESSMENT — PAIN SCALES - GENERAL
PAINLEVEL_OUTOF10: 2
PAINLEVEL_OUTOF10: 2
PAINLEVEL_OUTOF10: 7
PAINLEVEL_OUTOF10: 1
PAINLEVEL_OUTOF10: 4
PAINLEVEL_OUTOF10: 4
PAINLEVEL_OUTOF10: 2
PAINLEVEL_OUTOF10: 9

## 2018-01-24 ASSESSMENT — PAIN DESCRIPTION - FREQUENCY
FREQUENCY: INTERMITTENT
FREQUENCY: INTERMITTENT

## 2018-01-24 ASSESSMENT — PAIN DESCRIPTION - ORIENTATION
ORIENTATION: MID
ORIENTATION: MID
ORIENTATION: LEFT
ORIENTATION_2: ANTERIOR

## 2018-01-24 ASSESSMENT — PAIN DESCRIPTION - PAIN TYPE
TYPE_2: ACUTE PAIN
TYPE: ACUTE PAIN

## 2018-01-24 ASSESSMENT — PAIN DESCRIPTION - LOCATION
LOCATION: CHEST
LOCATION_2: HEAD

## 2018-01-24 ASSESSMENT — PAIN DESCRIPTION - INTENSITY: RATING_2: 6

## 2018-01-24 ASSESSMENT — PAIN DESCRIPTION - DESCRIPTORS
DESCRIPTORS: SHARP
DESCRIPTORS: SHARP;HEAVINESS

## 2018-01-24 NOTE — ED NOTES
Report called to [facility] on [facility]      Lisa Rojas RN  01/24/18 0173

## 2018-01-24 NOTE — H&P
History and Physical  Patient: Sidney Walsh  Unit/Bed: S047/C654-85  YOB: 1970  MRN: [id number]  Acct: [de-identified]   Admitting Diagnosis: Chest pain [R07.9]  Admit Date:  1/24/2018  Hospital Day: 0      Chief Complaint: CP      History of Present Illness: CP for 4 months. Intermittent. At times when lying sitting or walking. No rhyme or reason. Last up to 10 min. 10/10 pain scale. Very sharp. Radiate to  Neck and shoulder sometimes. Sometimes associated with sob. Observed at [facility] last Friday for same and discharged. Initial ER trop again negative    Nonsmoker  occ etoh  No drugs  No FH    EKG:SR  PMHx:  Past Medical History:   Diagnosis Date    Anxiety     Asthma     Cancer ([facility] [address]     Chronic kidney disease     Depression     Hypertension     Mixed headache     Pure hyperglyceridemia 5/19/2017    Thyroid goiter        PSHx:  Past Surgical History:   Procedure Laterality Date    KIDNEY REMOVAL Right 08/2016    THYROID LOBECTOMY Right 6/13/14    URETER STENT PLACEMENT Left 08/2016       Social Hx:  Social History     Social History    Marital status:      Spouse name: N/A    Number of children: N/A    Years of education: N/A     Social History Main Topics    Smoking status: Former Smoker     Packs/day: 0.50     Years: 15.00     Types: Cigarettes     Start date: 8/20/2014     Quit date: 1/24/2015    Smokeless tobacco: Former User     Quit date: 3/23/2016    Alcohol use No    Drug use: No    Sexual activity: Not on file     Other Topics Concern    Not on file     Social History Narrative    No narrative on file       Family Hx:  Family History   Problem Relation Age of Onset   [address] [place] [address] Luis Cancer Father     Diabetes Father     High Blood Pressure Mother     Diabetes Mother        Review of Systems:   Review of Systems   Constitutional: Negative. Negative for diaphoresis and fatigue. HENT: Negative. Eyes: Negative. Respiratory: Positive for shortness of breath.  Negative for cough, chest tightness, wheezing and stridor. Cardiovascular: Positive for chest pain. Negative for palpitations and leg swelling. Gastrointestinal: Negative. Negative for blood in stool and nausea. Genitourinary: Negative. Musculoskeletal: Negative. Skin: Negative. Neurological: Negative. Negative for dizziness, syncope, weakness and light-headedness. Hematological: Negative. Psychiatric/Behavioral: Negative. Physical Examination:    BP (!) 108/47   Pulse 60   Temp 98.2 °F (36.8 °C) (Oral)   Resp 18   Ht 5' 3\" (1.6 m)   Wt 210 lb (95.3 kg)   LMP 01/01/2018   SpO2 96%   BMI 37.20 kg/m²    Physical Exam   Constitutional: She appears healthy. No distress. HENT:   Normal cephalic and Atraumatic   Eyes: Pupils are equal, round, and reactive to light. Neck: Normal range of motion and thyroid normal. Neck supple. No JVD present. No neck adenopathy. No thyromegaly present. Cardiovascular: Normal rate, regular rhythm, normal heart sounds, intact distal pulses and normal pulses. Pulmonary/Chest: Effort normal and breath sounds normal. She has no wheezes. She has no rales. She exhibits no tenderness. Abdominal: Soft. Bowel sounds are normal. There is no tenderness. Musculoskeletal: Normal range of motion. She exhibits no edema or tenderness. Neurological: She is alert and oriented to person, place, and time. Skin: Skin is warm. No cyanosis. Nails show no clubbing.          LABS:  CBC:   Lab Results   Component Value Date    WBC 11.2 01/24/2018    RBC 4.51 01/24/2018    HGB 14.4 01/24/2018    HCT 43.1 01/24/2018    MCV 95.6 01/24/2018    MCH 32.0 01/24/2018    MCHC 33.4 01/24/2018    RDW 13.8 01/24/2018     01/24/2018     CBC with Differential:    Lab Results   Component Value Date    WBC 11.2 01/24/2018    RBC 4.51 01/24/2018    HGB 14.4 01/24/2018    HCT 43.1 01/24/2018     01/24/2018    MCV 95.6 01/24/2018    MCH 32.0 01/24/2018    MCHC 33.4

## 2018-01-24 NOTE — DISCHARGE INSTR - DIET

## 2018-01-24 NOTE — ED PROVIDER NOTES
tenderness or deformity. Neurological: She is alert and oriented to person, place, and time. Skin: Skin is warm. No erythema. Psychiatric: She has a normal mood and affect. Nursing note and vitals reviewed. DIAGNOSTIC RESULTS     EKG: All EKG's are interpreted by the Emergency Department Physician who either signs or Co-signs this chart in the absence of a cardiologist.     EKG normal sinus rate 87 negative ST segment elevation. RADIOLOGY:   Non-plain film images such as CT, Ultrasound and MRI are read by the radiologist. Plain radiographic images are visualized and preliminarily interpreted by the emergency physician with the below findings:    See final radiology report    Interpretation per the Radiologist below, if available at the time of this note:    XR CHEST STANDARD (2 VW)    (Results Pending)         ED BEDSIDE ULTRASOUND:   Performed by ED Physician - none    LABS:  Labs Reviewed   COMPREHENSIVE METABOLIC PANEL - Abnormal; Notable for the following:        Result Value    Glucose 199 (*)     CREATININE 1.25 (*)     GFR Non- 46.0 (*)     GFR  55.6 (*)     Total Protein 6.3 (*)     Alb 3.6 (*)     All other components within normal limits   URINE DRUG SCREEN - Abnormal; Notable for the following:     Cannabinoid Scrn, Ur POSITIVE (*)     All other components within normal limits   CBC WITH AUTO DIFFERENTIAL - Abnormal; Notable for the following:     WBC 11.2 (*)     MCH 32.0 (*)     Neutrophils # 7.9 (*)     All other components within normal limits   LIPASE   MAGNESIUM   TROPONIN   URINE RT REFLEX TO CULTURE   D-DIMER, QUANTITATIVE   TROPONIN   TROPONIN   BRAIN NATRIURETIC PEPTIDE       All other labs were within normal range or not returned as of this dictation.     EMERGENCY DEPARTMENT COURSE and DIFFERENTIAL DIAGNOSIS/MDM:   Vitals:    Vitals:    01/24/18 0139 01/24/18 0235 01/24/18 0253 01/24/18 0306   BP: 111/65 132/87 109/69 111/73   Pulse: 93 90 97 88 Resp: 18 18 18 18   Temp:       TempSrc:       SpO2: 97% 99% 96% 97%   Weight:       Height:                MDM  Number of Diagnoses or Management Options  Chest pain, unspecified type:   Diagnosis management comments: Discovered patient was seen at [facility] by reviewing notes from family doctor. Patient said having these pains for a few days and  she signs a records release for  to send [facility] including negative troponins and  negative d-dimer. Patient has been evaluated twice for the same complaints at [facility] in past 4 days. Since pain improved with single nitroglycerin tablet drop to 4-10. Heart score elevated and patient has seen cardiology at [facility] in past. Pt took aspirin today prior to arrival ( 325 mg)  D/w dr Gatito Oswald ok admit. Amount and/or Complexity of Data Reviewed  Clinical lab tests: ordered and reviewed  Tests in the radiology section of CPT®: ordered and reviewed  Tests in the medicine section of CPT®: ordered and reviewed  Decide to obtain previous medical records or to obtain history from someone other than the patient: yes  Review and summarize past medical records: yes  Discuss the patient with other providers: yes            CONSULTS:  IP CONSULT TO CARDIOLOGY    PROCEDURES:  Unless otherwise noted below, none     Procedures    FINAL IMPRESSION      1.  Chest pain, unspecified type          DISPOSITION/PLAN   DISPOSITION Admitted 01/24/2018 02:45:54 AM      PATIENT REFERRED TO:  Charley Navarrete DO  [address] [place] [address]  [phone]            DISCHARGE MEDICATIONS:  New Prescriptions    No medications on file          (Please note that portions of this note were completed with a voice recognition program.  Efforts were made to edit the dictations but occasionally words are mis-transcribed.)    Dagoberto Escalera PA-C (electronically signed)  Attending Emergency Physician         Dagoberto Escalera PA-C  01/24/18 6551

## 2018-01-24 NOTE — PLAN OF CARE
Problem: PAIN  Goal: Patient's pain/discomfort is manageable  Outcome: Ongoing      Problem: KNOWLEDGE DEFICIT  Goal: Patient/S.O. demonstrates understanding of disease process, treatment plan, medications, and discharge instructions.   Outcome: Ongoing      Problem: DISCHARGE BARRIERS  Goal: Patient's continuum of care needs are met  Outcome: Ongoing

## 2018-01-25 ENCOUNTER — TELEPHONE (OUTPATIENT)
Dept: FAMILY MEDICINE CLINIC | Age: 48
End: 2018-01-25

## 2018-01-25 NOTE — TELEPHONE ENCOUNTER
ADL's or IADL's? No  Is the patient able to move around as expected? Yes  Needs Equipment?   No    Link to services in community?:  N/A   Which services:

## 2018-02-13 ENCOUNTER — OFFICE VISIT (OUTPATIENT)
Dept: FAMILY MEDICINE CLINIC | Age: 48
End: 2018-02-13
Payer: MEDICAID

## 2018-02-13 VITALS
WEIGHT: 213 LBS | DIASTOLIC BLOOD PRESSURE: 78 MMHG | BODY MASS INDEX: 37.74 KG/M2 | RESPIRATION RATE: 14 BRPM | TEMPERATURE: 98.5 F | HEART RATE: 84 BPM | SYSTOLIC BLOOD PRESSURE: 122 MMHG | HEIGHT: 63 IN

## 2018-02-13 DIAGNOSIS — M79.7 FIBROMYALGIA: Primary | ICD-10-CM

## 2018-02-13 PROCEDURE — G8427 DOCREV CUR MEDS BY ELIG CLIN: HCPCS | Performed by: NURSE PRACTITIONER

## 2018-02-13 PROCEDURE — G8417 CALC BMI ABV UP PARAM F/U: HCPCS | Performed by: NURSE PRACTITIONER

## 2018-02-13 PROCEDURE — 99213 OFFICE O/P EST LOW 20 MIN: CPT | Performed by: NURSE PRACTITIONER

## 2018-02-13 PROCEDURE — 1036F TOBACCO NON-USER: CPT | Performed by: NURSE PRACTITIONER

## 2018-02-13 PROCEDURE — G8484 FLU IMMUNIZE NO ADMIN: HCPCS | Performed by: NURSE PRACTITIONER

## 2018-02-13 RX ORDER — DULOXETIN HYDROCHLORIDE 30 MG/1
CAPSULE, DELAYED RELEASE ORAL
Qty: 60 CAPSULE | Refills: 0 | Status: SHIPPED | OUTPATIENT
Start: 2018-02-13 | End: 2018-02-28

## 2018-02-13 RX ORDER — PREGABALIN 75 MG/1
75 CAPSULE ORAL 2 TIMES DAILY
Qty: 60 CAPSULE | Refills: 0 | Status: SHIPPED | OUTPATIENT
Start: 2018-02-13 | End: 2018-02-28

## 2018-02-13 NOTE — PROGRESS NOTES
Fibromyalgia           Plan:      No orders of the defined types were placed in this encounter. Orders Placed This Encounter   Medications    pregabalin (LYRICA) 75 MG capsule     Sig: Take 1 capsule by mouth 2 times daily for 30 days. Dispense:  60 capsule     Refill:  0    DULoxetine (CYMBALTA) 30 MG extended release capsule     Sig: Take 1 capsule by mouth once daily for 1 week, then increase to 2 tablets once daily. Dispense:  60 capsule     Refill:  0     Discussed that with her symptoms and tenderness in certain areas, likely fibromyalgia. Discussed what this means and general treatment guidelines. Patient agreeable to this. Patient understands possibility of nausea and fatigue in first 2 weeks of treatment with cymbalta as well as potential suicial thoughts, etc. Patient advised that Priti Riling is a controlled substance and therefore subject to same rules as the tramadol, regular appts, drug screening, etc. Made aware this medication  her drowsy, especially with the tramadol, thus we are starting her on a pretty low dose, with the idea that there is room to increase the dose going forward. Patient to f/u in 4 weeks with Dr Dolly Osorio to see how she is doing on new treatment plan. Patient is agreeable to all this. Side effects, adverse effects of the medication prescribed today, as well as treatment plan and result expectations have been discussed with the patient who expresses understanding and desires to proceed.     Close follow up to evaluate treatment results and for coordination of care. I have reviewed the patient's medical history in detail and updated the computerized patient record. Return in about 4 weeks (around 3/13/2018) for f/u fibromyalgia treatment with Dr Dolly Osorio please.     Evelio Rush NP

## 2018-02-14 ENCOUNTER — TELEPHONE (OUTPATIENT)
Dept: FAMILY MEDICINE CLINIC | Age: 48
End: 2018-02-14

## 2018-02-14 DIAGNOSIS — M79.7 FIBROMYALGIA AFFECTING MULTIPLE SITES: Primary | ICD-10-CM

## 2018-02-14 DIAGNOSIS — M79.7 FIBROMYALGIA AFFECTING MULTIPLE SITES: ICD-10-CM

## 2018-02-14 RX ORDER — GABAPENTIN 300 MG/1
CAPSULE ORAL
Qty: 90 CAPSULE | Refills: 1 | Status: SHIPPED | OUTPATIENT
Start: 2018-02-14 | End: 2018-03-29

## 2018-02-14 ASSESSMENT — ENCOUNTER SYMPTOMS
EYES NEGATIVE: 1
RESPIRATORY NEGATIVE: 1
BACK PAIN: 1

## 2018-02-16 RX ORDER — GABAPENTIN 300 MG/1
600 CAPSULE ORAL 3 TIMES DAILY
Qty: 180 CAPSULE | Refills: 0 | Status: CANCELLED | OUTPATIENT
Start: 2018-02-16 | End: 2018-03-18

## 2018-02-21 DIAGNOSIS — J45.41 MODERATE PERSISTENT ASTHMA WITH ACUTE EXACERBATION: Primary | ICD-10-CM

## 2018-02-21 RX ORDER — PREDNISONE 20 MG/1
TABLET ORAL
Qty: 18 TABLET | Refills: 0 | Status: SHIPPED | OUTPATIENT
Start: 2018-02-21 | End: 2018-02-28

## 2018-02-28 ENCOUNTER — HOSPITAL ENCOUNTER (EMERGENCY)
Age: 48
Discharge: HOME | End: 2018-02-28
Payer: MEDICAID

## 2018-02-28 ENCOUNTER — APPOINTMENT (OUTPATIENT)
Dept: GENERAL RADIOLOGY | Age: 48
End: 2018-02-28
Payer: MEDICAID

## 2018-02-28 VITALS
SYSTOLIC BLOOD PRESSURE: 115 MMHG | RESPIRATION RATE: 16 BRPM | DIASTOLIC BLOOD PRESSURE: 64 MMHG | WEIGHT: 200 LBS | BODY MASS INDEX: 35.44 KG/M2 | HEIGHT: 63 IN | TEMPERATURE: 98.1 F | HEART RATE: 90 BPM | OXYGEN SATURATION: 98 %

## 2018-02-28 DIAGNOSIS — M17.11 OSTEOARTHRITIS OF RIGHT KNEE, UNSPECIFIED OSTEOARTHRITIS TYPE: ICD-10-CM

## 2018-02-28 DIAGNOSIS — M70.51 BURSITIS OF RIGHT KNEE, UNSPECIFIED BURSA: Primary | ICD-10-CM

## 2018-02-28 LAB
CHP ED QC CHECK: YES
PREGNANCY TEST URINE, POC: NEGATIVE

## 2018-02-28 PROCEDURE — 99283 EMERGENCY DEPT VISIT LOW MDM: CPT

## 2018-02-28 PROCEDURE — 73562 X-RAY EXAM OF KNEE 3: CPT

## 2018-02-28 RX ORDER — METHYLPREDNISOLONE 4 MG/1
TABLET ORAL
Qty: 1 KIT | Refills: 0 | Status: SHIPPED | OUTPATIENT
Start: 2018-02-28 | End: 2018-03-06

## 2018-02-28 RX ORDER — DULOXETIN HYDROCHLORIDE 60 MG/1
60 CAPSULE, DELAYED RELEASE ORAL DAILY
COMMUNITY
End: 2018-03-29

## 2018-02-28 ASSESSMENT — ENCOUNTER SYMPTOMS
SHORTNESS OF BREATH: 0
VOMITING: 0
NAUSEA: 0
ANAL BLEEDING: 0
BACK PAIN: 0
VOICE CHANGE: 0
PHOTOPHOBIA: 0
STRIDOR: 0
APNEA: 0
COLOR CHANGE: 0
EYE DISCHARGE: 0
ABDOMINAL DISTENTION: 0

## 2018-02-28 ASSESSMENT — PAIN SCALES - GENERAL: PAINLEVEL_OUTOF10: 9

## 2018-02-28 ASSESSMENT — PAIN DESCRIPTION - ORIENTATION: ORIENTATION: RIGHT

## 2018-02-28 ASSESSMENT — PAIN DESCRIPTION - FREQUENCY: FREQUENCY: CONTINUOUS

## 2018-02-28 ASSESSMENT — PAIN DESCRIPTION - DESCRIPTORS: DESCRIPTORS: ACHING;CONSTANT

## 2018-02-28 ASSESSMENT — PAIN DESCRIPTION - LOCATION: LOCATION: KNEE

## 2018-03-01 NOTE — ED PROVIDER NOTES
reviewed and are negative. Except as noted above the remainder of the review of systems was reviewed and negative. PAST MEDICAL HISTORY     Past Medical History:   Diagnosis Date    Anxiety     Asthma     Cancer ([facility] [address]     Chronic kidney disease     Depression     Hypertension     Mixed headache     Pure hyperglyceridemia 5/19/2017    Thyroid goiter          SURGICAL HISTORY       Past Surgical History:   Procedure Laterality Date    KIDNEY REMOVAL Right 08/2016    THYROID LOBECTOMY Right 6/13/14    URETER STENT PLACEMENT Left 08/2016         CURRENT MEDICATIONS       Previous Medications    ALBUTEROL (PROVENTIL) (2.5 MG/3ML) 0.083% NEBULIZER SOLUTION    Take 3 mLs by nebulization every 6 hours as needed for Wheezing    ALBUTEROL SULFATE  (90 BASE) MCG/ACT INHALER    Inhale 2 puffs into the lungs every 4 hours as needed for Wheezing    BUSPIRONE (BUSPAR) 10 MG TABLET    Take 1/2 tab bid wf x 1 week, then 1 tab bid    CYCLOBENZAPRINE (FLEXERIL) 10 MG TABLET    TK 1 T PO TID AS TOLERATED    DULOXETINE (CYMBALTA) 60 MG EXTENDED RELEASE CAPSULE    Take 60 mg by mouth daily    GABAPENTIN (NEURONTIN) 300 MG CAPSULE    Take 1 daily x 5 days, then 1 bid x 5 days, then 1 tid. TRAMADOL (ULTRAM) 50 MG TABLET    TK 1 T PO QID PRN       ALLERGIES     Shellfish-derived products;  Ibuprofen; and Propoxyphene n-acetaminophen    FAMILY HISTORY       Family History   Problem Relation Age of Onset    Cancer Father     Diabetes Father     High Blood Pressure Mother     Diabetes Mother           SOCIAL HISTORY       Social History     Social History    Marital status:      Spouse name: N/A    Number of children: N/A    Years of education: N/A     Social History Main Topics    Smoking status: Former Smoker     Packs/day: 0.50     Years: 15.00     Types: Cigarettes     Start date: 8/20/2014     Quit date: 1/24/2015    Smokeless tobacco: Former User     Quit date: 3/23/2016    Alcohol use No  Drug use: No    Sexual activity: Not Asked     Other Topics Concern    None     Social History Narrative    None       SCREENINGS             PHYSICAL EXAM    (up to 7 for level 4, 8 or more for level 5)     ED Triage Vitals [02/28/18 2024]   BP Temp Temp Source Pulse Resp SpO2 Height Weight   115/64 98.1 °F (36.7 °C) Oral 90 16 98 % 5' 3\" (1.6 m) 200 lb (90.7 kg)       Physical Exam    DIAGNOSTIC RESULTS     EKG: All EKG's are interpreted by the Emergency Department Physician who either signs or Co-signs this chart in the absence of a cardiologist.         RADIOLOGY:   Non-plain film images such as CT, Ultrasound and MRI are read by the radiologist. Plain radiographic images are visualized and preliminarily interpreted by the emergency physician with the below findings:    DJD negative fracture    Interpretation per the Radiologist below, if available at the time of this note:    XR KNEE RIGHT (3 VIEWS)    (Results Pending)         ED BEDSIDE ULTRASOUND:   Performed by ED Physician - none    LABS:  Labs Reviewed   POC PREGNANCY UR-QUAL - Normal       All other labs were within normal range or not returned as of this dictation. EMERGENCY DEPARTMENT COURSE and DIFFERENTIAL DIAGNOSIS/MDM:   Vitals:    Vitals:    02/28/18 2024   BP: 115/64   Pulse: 90   Resp: 16   Temp: 98.1 °F (36.7 °C)   TempSrc: Oral   SpO2: 98%   Weight: 200 lb (90.7 kg)   Height: 5' 3\" (1.6 m)            MDM  Number of Diagnoses or Management Options  Bursitis of right knee, unspecified bursa:   Osteoarthritis of right knee, unspecified osteoarthritis type:   Diagnosis management comments: aorrs reviewed recently given tramadol by Dr. Jackie Coronel. CONSULTS:  None    PROCEDURES:  Unless otherwise noted below, none     Procedures    FINAL IMPRESSION      1. Bursitis of right knee, unspecified bursa    2.  Osteoarthritis of right knee, unspecified osteoarthritis type          DISPOSITION/PLAN   DISPOSITION Decision To Discharge

## 2018-03-29 ENCOUNTER — OFFICE VISIT (OUTPATIENT)
Dept: FAMILY MEDICINE CLINIC | Age: 48
End: 2018-03-29
Payer: MEDICAID

## 2018-03-29 VITALS
HEART RATE: 84 BPM | WEIGHT: 261 LBS | RESPIRATION RATE: 12 BRPM | BODY MASS INDEX: 46.23 KG/M2 | SYSTOLIC BLOOD PRESSURE: 108 MMHG | DIASTOLIC BLOOD PRESSURE: 72 MMHG | TEMPERATURE: 97.9 F

## 2018-03-29 DIAGNOSIS — M79.7 FIBROMYALGIA AFFECTING MULTIPLE SITES: Primary | ICD-10-CM

## 2018-03-29 DIAGNOSIS — J45.20 MILD INTERMITTENT ASTHMA WITHOUT COMPLICATION: ICD-10-CM

## 2018-03-29 DIAGNOSIS — J30.1 ACUTE SEASONAL ALLERGIC RHINITIS DUE TO POLLEN: ICD-10-CM

## 2018-03-29 PROCEDURE — 99213 OFFICE O/P EST LOW 20 MIN: CPT | Performed by: NURSE PRACTITIONER

## 2018-03-29 PROCEDURE — G8427 DOCREV CUR MEDS BY ELIG CLIN: HCPCS | Performed by: NURSE PRACTITIONER

## 2018-03-29 PROCEDURE — 1036F TOBACCO NON-USER: CPT | Performed by: NURSE PRACTITIONER

## 2018-03-29 PROCEDURE — G8484 FLU IMMUNIZE NO ADMIN: HCPCS | Performed by: NURSE PRACTITIONER

## 2018-03-29 PROCEDURE — G8417 CALC BMI ABV UP PARAM F/U: HCPCS | Performed by: NURSE PRACTITIONER

## 2018-03-29 RX ORDER — GABAPENTIN 400 MG/1
400 CAPSULE ORAL 3 TIMES DAILY
Qty: 90 CAPSULE | Refills: 2 | Status: SHIPPED | OUTPATIENT
Start: 2018-03-29 | End: 2018-09-12 | Stop reason: SDUPTHER

## 2018-03-29 RX ORDER — CETIRIZINE HYDROCHLORIDE 10 MG/1
TABLET ORAL
Qty: 30 TABLET | Refills: 2 | Status: SHIPPED | OUTPATIENT
Start: 2018-03-29 | End: 2018-06-29 | Stop reason: SDUPTHER

## 2018-03-29 RX ORDER — DULOXETIN HYDROCHLORIDE 60 MG/1
60 CAPSULE, DELAYED RELEASE ORAL DAILY
Qty: 30 CAPSULE | Refills: 0
Start: 2018-03-29 | End: 2018-06-12 | Stop reason: SDUPTHER

## 2018-03-29 RX ORDER — GABAPENTIN 300 MG/1
CAPSULE ORAL
Qty: 90 CAPSULE | Refills: 1 | Status: CANCELLED | OUTPATIENT
Start: 2018-03-29 | End: 2018-04-26

## 2018-03-29 RX ORDER — DULOXETIN HYDROCHLORIDE 60 MG/1
60 CAPSULE, DELAYED RELEASE ORAL DAILY
Qty: 30 CAPSULE | Status: CANCELLED | OUTPATIENT
Start: 2018-03-29

## 2018-03-29 ASSESSMENT — ENCOUNTER SYMPTOMS
RESPIRATORY NEGATIVE: 1
GASTROINTESTINAL NEGATIVE: 1
EYES NEGATIVE: 1

## 2018-03-29 NOTE — PROGRESS NOTES
(FLEXERIL) 10 MG tablet TK 1 T PO TID AS TOLERATED  1    albuterol sulfate  (90 Base) MCG/ACT inhaler Inhale 2 puffs into the lungs every 4 hours as needed for Wheezing 2 Inhaler 5    busPIRone (BUSPAR) 10 MG tablet Take 1/2 tab bid wf x 1 week, then 1 tab bid 60 tablet 2    albuterol (PROVENTIL) (2.5 MG/3ML) 0.083% nebulizer solution Take 3 mLs by nebulization every 6 hours as needed for Wheezing 120 each 6     No current facility-administered medications on file prior to visit. Allergies:  Shellfish-derived products; Ibuprofen; and Propoxyphene n-acetaminophen    Review of Systems   Constitutional: Negative. HENT: Negative. Eyes: Negative. Respiratory: Negative. Cardiovascular: Negative. Gastrointestinal: Negative. Musculoskeletal: Positive for arthralgias and myalgias. Neurological: Negative. Objective:   /72   Pulse 84   Temp 97.9 °F (36.6 °C) (Temporal)   Resp 12   Wt 261 lb (118.4 kg)   LMP 02/10/2018 (Approximate)   BMI 46.23 kg/m²     Physical Exam   Constitutional: She is oriented to person, place, and time. She appears well-developed and well-nourished. HENT:   Right Ear: Tympanic membrane normal.   Left Ear: Tympanic membrane normal.   Nose: Mucosal edema present. Mouth/Throat: Uvula is midline, oropharynx is clear and moist and mucous membranes are normal.   Eyes: Conjunctivae are normal.   Cardiovascular: Normal rate, regular rhythm and normal heart sounds. Pulmonary/Chest: Effort normal and breath sounds normal. She has no wheezes. She has no rales. Lymphadenopathy:     She has no cervical adenopathy. Neurological: She is alert and oriented to person, place, and time. Skin: Skin is warm and dry. Assessment:     1. Fibromyalgia affecting multiple sites     2. Mild intermittent asthma without complication     3.  Acute seasonal allergic rhinitis due to pollen  cetirizine (ZYRTEC) 10 MG tablet         Plan:      No orders of the

## 2018-04-09 ENCOUNTER — APPOINTMENT (OUTPATIENT)
Dept: GENERAL RADIOLOGY | Age: 48
DRG: 723 | End: 2018-04-09
Payer: MEDICAID

## 2018-04-09 ENCOUNTER — HOSPITAL ENCOUNTER (EMERGENCY)
Age: 48
Discharge: HOME | DRG: 723 | End: 2018-04-09
Payer: MEDICAID

## 2018-04-09 VITALS
HEIGHT: 63 IN | WEIGHT: 261 LBS | SYSTOLIC BLOOD PRESSURE: 142 MMHG | TEMPERATURE: 98.9 F | RESPIRATION RATE: 15 BRPM | DIASTOLIC BLOOD PRESSURE: 88 MMHG | HEART RATE: 98 BPM | OXYGEN SATURATION: 96 % | BODY MASS INDEX: 46.25 KG/M2

## 2018-04-09 DIAGNOSIS — G44.209 TENSION HEADACHE: Primary | ICD-10-CM

## 2018-04-09 LAB
ANION GAP SERPL CALCULATED.3IONS-SCNC: 14 MEQ/L (ref 7–13)
BACTERIA: NORMAL /HPF
BANDED NEUTROPHILS RELATIVE PERCENT: 1 % (ref 5–11)
BASOPHILS ABSOLUTE: 0 K/UL (ref 0–0.2)
BASOPHILS ABSOLUTE: 0.1 K/UL (ref 0–0.2)
BASOPHILS RELATIVE PERCENT: 0.4 %
BASOPHILS RELATIVE PERCENT: 0.5 %
BILIRUBIN URINE: NEGATIVE
BLOOD, URINE: NEGATIVE
BUN BLDV-MCNC: 17 MG/DL (ref 6–20)
CALCIUM SERPL-MCNC: 9.6 MG/DL (ref 8.6–10.2)
CHLORIDE BLD-SCNC: 98 MEQ/L (ref 98–107)
CHP ED QC CHECK: YES
CLARITY: ABNORMAL
CO2: 23 MEQ/L (ref 22–29)
COLOR: YELLOW
CREAT SERPL-MCNC: 1.26 MG/DL (ref 0.5–0.9)
EOSINOPHILS ABSOLUTE: 0 K/UL (ref 0–0.7)
EOSINOPHILS ABSOLUTE: 0 K/UL (ref 0–0.7)
EOSINOPHILS RELATIVE PERCENT: 0 %
EOSINOPHILS RELATIVE PERCENT: 0 %
EPITHELIAL CELLS, UA: NORMAL /HPF
GFR AFRICAN AMERICAN: 55.1
GFR NON-AFRICAN AMERICAN: 45.5
GLUCOSE BLD-MCNC: 206 MG/DL (ref 74–109)
GLUCOSE URINE: 500 MG/DL
HCT VFR BLD CALC: 43.5 % (ref 37–47)
HCT VFR BLD CALC: 46.1 % (ref 37–47)
HEMOGLOBIN: 14.6 G/DL (ref 12–16)
HEMOGLOBIN: 15.4 G/DL (ref 12–16)
KETONES, URINE: NEGATIVE MG/DL
LEUKOCYTE ESTERASE, URINE: ABNORMAL
LYMPHOCYTES ABSOLUTE: 1.4 K/UL (ref 1–4.8)
LYMPHOCYTES ABSOLUTE: 1.8 K/UL (ref 1–4.8)
LYMPHOCYTES RELATIVE PERCENT: 6 %
LYMPHOCYTES RELATIVE PERCENT: 7.9 %
MCH RBC QN AUTO: 32.1 PG (ref 27–31.3)
MCH RBC QN AUTO: 32.3 PG (ref 27–31.3)
MCHC RBC AUTO-ENTMCNC: 33.5 % (ref 33–37)
MCHC RBC AUTO-ENTMCNC: 33.5 % (ref 33–37)
MCV RBC AUTO: 96 FL (ref 82–100)
MCV RBC AUTO: 96.4 FL (ref 82–100)
MONOCYTES ABSOLUTE: 1.2 K/UL (ref 0.2–0.8)
MONOCYTES ABSOLUTE: 1.2 K/UL (ref 0.2–0.8)
MONOCYTES RELATIVE PERCENT: 4.5 %
MONOCYTES RELATIVE PERCENT: 5.4 %
NEUTROPHILS ABSOLUTE: 19.3 K/UL (ref 1.4–6.5)
NEUTROPHILS ABSOLUTE: 21.1 K/UL (ref 1.4–6.5)
NEUTROPHILS RELATIVE PERCENT: 86.2 %
NEUTROPHILS RELATIVE PERCENT: 89 %
NITRITE, URINE: NEGATIVE
PDW BLD-RTO: 13.7 % (ref 11.5–14.5)
PDW BLD-RTO: 13.9 % (ref 11.5–14.5)
PH UA: 6 (ref 5–9)
PLATELET # BLD: 279 K/UL (ref 130–400)
PLATELET # BLD: 296 K/UL (ref 130–400)
PLATELET SLIDE REVIEW: ADEQUATE
POTASSIUM SERPL-SCNC: 4.7 MEQ/L (ref 3.5–5.1)
PREGNANCY TEST URINE, POC: NEGATIVE
PROTEIN UA: NEGATIVE MG/DL
RAPID INFLUENZA  B AGN: NEGATIVE
RAPID INFLUENZA A AGN: NEGATIVE
RBC # BLD: 4.53 M/UL (ref 4.2–5.4)
RBC # BLD: 4.78 M/UL (ref 4.2–5.4)
RBC # BLD: NORMAL 10*6/UL
RBC UA: NORMAL /HPF (ref 0–2)
SMUDGE CELLS: 0.9
SODIUM BLD-SCNC: 135 MEQ/L (ref 132–144)
SPECIFIC GRAVITY UA: 1.03 (ref 1–1.03)
URINE REFLEX TO CULTURE: YES
UROBILINOGEN, URINE: 0.2 E.U./DL
VACUOLATED NEUTROPHILS: PRESENT
WBC # BLD: 22.4 K/UL (ref 4.8–10.8)
WBC # BLD: 23.4 K/UL (ref 4.8–10.8)
WBC UA: NORMAL /HPF (ref 0–5)

## 2018-04-09 PROCEDURE — 86403 PARTICLE AGGLUT ANTBDY SCRN: CPT

## 2018-04-09 PROCEDURE — 96365 THER/PROPH/DIAG IV INF INIT: CPT

## 2018-04-09 PROCEDURE — 36415 COLL VENOUS BLD VENIPUNCTURE: CPT

## 2018-04-09 PROCEDURE — 71045 X-RAY EXAM CHEST 1 VIEW: CPT

## 2018-04-09 PROCEDURE — 2580000003 HC RX 258: Performed by: PERSONAL EMERGENCY RESPONSE ATTENDANT

## 2018-04-09 PROCEDURE — 87086 URINE CULTURE/COLONY COUNT: CPT

## 2018-04-09 PROCEDURE — 85025 COMPLETE CBC W/AUTO DIFF WBC: CPT

## 2018-04-09 PROCEDURE — 6360000002 HC RX W HCPCS: Performed by: PERSONAL EMERGENCY RESPONSE ATTENDANT

## 2018-04-09 PROCEDURE — 99284 EMERGENCY DEPT VISIT MOD MDM: CPT

## 2018-04-09 PROCEDURE — 81001 URINALYSIS AUTO W/SCOPE: CPT

## 2018-04-09 PROCEDURE — 80048 BASIC METABOLIC PNL TOTAL CA: CPT

## 2018-04-09 PROCEDURE — 96375 TX/PRO/DX INJ NEW DRUG ADDON: CPT

## 2018-04-09 RX ORDER — MAGNESIUM SULFATE IN WATER 40 MG/ML
2 INJECTION, SOLUTION INTRAVENOUS ONCE
Status: COMPLETED | OUTPATIENT
Start: 2018-04-09 | End: 2018-04-09

## 2018-04-09 RX ORDER — DEXAMETHASONE SODIUM PHOSPHATE 10 MG/ML
10 INJECTION INTRAMUSCULAR; INTRAVENOUS ONCE
Status: COMPLETED | OUTPATIENT
Start: 2018-04-09 | End: 2018-04-09

## 2018-04-09 RX ORDER — 0.9 % SODIUM CHLORIDE 0.9 %
500 INTRAVENOUS SOLUTION INTRAVENOUS ONCE
Status: COMPLETED | OUTPATIENT
Start: 2018-04-09 | End: 2018-04-09

## 2018-04-09 RX ORDER — ONDANSETRON 2 MG/ML
4 INJECTION INTRAMUSCULAR; INTRAVENOUS ONCE
Status: COMPLETED | OUTPATIENT
Start: 2018-04-09 | End: 2018-04-09

## 2018-04-09 RX ORDER — MORPHINE SULFATE 4 MG/ML
4 INJECTION, SOLUTION INTRAMUSCULAR; INTRAVENOUS ONCE
Status: COMPLETED | OUTPATIENT
Start: 2018-04-09 | End: 2018-04-09

## 2018-04-09 RX ADMIN — MAGNESIUM SULFATE HEPTAHYDRATE 2 G: 40 INJECTION, SOLUTION INTRAVENOUS at 19:05

## 2018-04-09 RX ADMIN — SODIUM CHLORIDE 500 ML: 9 INJECTION, SOLUTION INTRAVENOUS at 19:15

## 2018-04-09 RX ADMIN — Medication 4 MG: at 19:04

## 2018-04-09 RX ADMIN — ONDANSETRON 4 MG: 2 INJECTION INTRAMUSCULAR; INTRAVENOUS at 19:04

## 2018-04-09 RX ADMIN — DEXAMETHASONE SODIUM PHOSPHATE 10 MG: 10 INJECTION INTRAMUSCULAR; INTRAVENOUS at 19:04

## 2018-04-09 ASSESSMENT — PAIN SCALES - GENERAL
PAINLEVEL_OUTOF10: 10
PAINLEVEL_OUTOF10: 4
PAINLEVEL_OUTOF10: 7

## 2018-04-09 ASSESSMENT — PAIN DESCRIPTION - DESCRIPTORS
DESCRIPTORS: THROBBING
DESCRIPTORS: THROBBING

## 2018-04-09 ASSESSMENT — PAIN DESCRIPTION - LOCATION
LOCATION: HEAD;NECK
LOCATION: HEAD;NECK

## 2018-04-09 ASSESSMENT — PAIN DESCRIPTION - PAIN TYPE
TYPE: ACUTE PAIN
TYPE: ACUTE PAIN

## 2018-04-09 ASSESSMENT — ENCOUNTER SYMPTOMS
SHORTNESS OF BREATH: 0
NAUSEA: 1
DIARRHEA: 0
COUGH: 0
BLOOD IN STOOL: 0
COLOR CHANGE: 0
VOMITING: 1
RHINORRHEA: 0
ABDOMINAL PAIN: 0

## 2018-04-09 ASSESSMENT — PAIN DESCRIPTION - FREQUENCY
FREQUENCY: CONTINUOUS
FREQUENCY: CONTINUOUS

## 2018-04-11 ENCOUNTER — APPOINTMENT (OUTPATIENT)
Dept: MRI IMAGING | Age: 48
DRG: 723 | End: 2018-04-11
Payer: MEDICAID

## 2018-04-11 ENCOUNTER — APPOINTMENT (OUTPATIENT)
Dept: GENERAL RADIOLOGY | Age: 48
DRG: 723 | End: 2018-04-11
Payer: MEDICAID

## 2018-04-11 ENCOUNTER — HOSPITAL ENCOUNTER (INPATIENT)
Age: 48
LOS: 3 days | Discharge: HOME | DRG: 723 | End: 2018-04-14
Attending: EMERGENCY MEDICINE | Admitting: INTERNAL MEDICINE
Payer: MEDICAID

## 2018-04-11 ENCOUNTER — APPOINTMENT (OUTPATIENT)
Dept: CT IMAGING | Age: 48
DRG: 723 | End: 2018-04-11
Payer: MEDICAID

## 2018-04-11 DIAGNOSIS — G03.9 MENINGITIS: ICD-10-CM

## 2018-04-11 DIAGNOSIS — R05.9 COUGH: ICD-10-CM

## 2018-04-11 DIAGNOSIS — M47.812 CERVICAL SPONDYLOSIS WITHOUT MYELOPATHY: ICD-10-CM

## 2018-04-11 DIAGNOSIS — M47.816 SPONDYLOSIS OF LUMBAR REGION WITHOUT MYELOPATHY OR RADICULOPATHY: ICD-10-CM

## 2018-04-11 DIAGNOSIS — R51.9 INTRACTABLE HEADACHE, UNSPECIFIED CHRONICITY PATTERN, UNSPECIFIED HEADACHE TYPE: Primary | ICD-10-CM

## 2018-04-11 LAB
ALBUMIN SERPL-MCNC: 4.1 G/DL (ref 3.9–4.9)
ALP BLD-CCNC: 117 U/L (ref 40–130)
ALT SERPL-CCNC: 11 U/L (ref 0–33)
ANION GAP SERPL CALCULATED.3IONS-SCNC: 15 MEQ/L (ref 7–13)
AST SERPL-CCNC: 12 U/L (ref 0–35)
BACTERIA: ABNORMAL /HPF
BASOPHILS ABSOLUTE: 0.1 K/UL (ref 0–0.2)
BASOPHILS RELATIVE PERCENT: 0.6 %
BILIRUB SERPL-MCNC: 0.3 MG/DL (ref 0–1.2)
BILIRUBIN URINE: NEGATIVE
BLOOD, URINE: NEGATIVE
BUN BLDV-MCNC: 17 MG/DL (ref 6–20)
C-REACTIVE PROTEIN, HIGH SENSITIVITY: 19.4 MG/L (ref 0–5)
CALCIUM SERPL-MCNC: 8.8 MG/DL (ref 8.6–10.2)
CHLORIDE BLD-SCNC: 99 MEQ/L (ref 98–107)
CLARITY: ABNORMAL
CO2: 24 MEQ/L (ref 22–29)
COLOR: YELLOW
CREAT SERPL-MCNC: 1.28 MG/DL (ref 0.5–0.9)
EKG ATRIAL RATE: 104 BPM
EKG P AXIS: 34 DEGREES
EKG P-R INTERVAL: 136 MS
EKG Q-T INTERVAL: 312 MS
EKG QRS DURATION: 76 MS
EKG QTC CALCULATION (BAZETT): 410 MS
EKG R AXIS: 7 DEGREES
EKG T AXIS: -5 DEGREES
EKG VENTRICULAR RATE: 104 BPM
EOSINOPHILS ABSOLUTE: 0.1 K/UL (ref 0–0.7)
EOSINOPHILS RELATIVE PERCENT: 1 %
EPITHELIAL CELLS, UA: ABNORMAL /HPF
GFR AFRICAN AMERICAN: 54.1
GFR NON-AFRICAN AMERICAN: 44.7
GLOBULIN: 2.8 G/DL (ref 2.3–3.5)
GLUCOSE BLD-MCNC: 112 MG/DL (ref 74–109)
GLUCOSE URINE: NEGATIVE MG/DL
HCT VFR BLD CALC: 45.6 % (ref 37–47)
HEMOGLOBIN: 15.4 G/DL (ref 12–16)
KETONES, URINE: NEGATIVE MG/DL
LACTIC ACID, SEPSIS: 2.8 MMOL/L (ref 0.5–1.9)
LACTIC ACID: 2 MMOL/L (ref 0.5–2.2)
LEUKOCYTE ESTERASE, URINE: ABNORMAL
LYMPHOCYTES ABSOLUTE: 1 K/UL (ref 1–4.8)
LYMPHOCYTES RELATIVE PERCENT: 8.6 %
MCH RBC QN AUTO: 32.4 PG (ref 27–31.3)
MCHC RBC AUTO-ENTMCNC: 33.7 % (ref 33–37)
MCV RBC AUTO: 95.9 FL (ref 82–100)
MONOCYTES ABSOLUTE: 0.8 K/UL (ref 0.2–0.8)
MONOCYTES RELATIVE PERCENT: 6.8 %
MUCUS: PRESENT
NEUTROPHILS ABSOLUTE: 9.4 K/UL (ref 1.4–6.5)
NEUTROPHILS RELATIVE PERCENT: 83 %
NITRITE, URINE: NEGATIVE
PDW BLD-RTO: 13.8 % (ref 11.5–14.5)
PH UA: 5.5 (ref 5–9)
PLATELET # BLD: 249 K/UL (ref 130–400)
POTASSIUM SERPL-SCNC: 4.2 MEQ/L (ref 3.5–5.1)
PROTEIN UA: NEGATIVE MG/DL
RBC # BLD: 4.75 M/UL (ref 4.2–5.4)
RBC UA: ABNORMAL /HPF (ref 0–2)
SEDIMENTATION RATE, ERYTHROCYTE: 9 MM (ref 0–20)
SODIUM BLD-SCNC: 138 MEQ/L (ref 132–144)
SPECIFIC GRAVITY UA: 1.02 (ref 1–1.03)
TOTAL CK: 152 U/L (ref 0–170)
TOTAL PROTEIN: 6.9 G/DL (ref 6.4–8.1)
TROPONIN: <0.01 NG/ML (ref 0–0.01)
URINE CULTURE, ROUTINE: NORMAL
URINE REFLEX TO CULTURE: YES
UROBILINOGEN, URINE: 0.2 E.U./DL
WBC # BLD: 11.3 K/UL (ref 4.8–10.8)
WBC UA: ABNORMAL /HPF (ref 0–5)

## 2018-04-11 PROCEDURE — 87086 URINE CULTURE/COLONY COUNT: CPT

## 2018-04-11 PROCEDURE — 80053 COMPREHEN METABOLIC PANEL: CPT

## 2018-04-11 PROCEDURE — 36415 COLL VENOUS BLD VENIPUNCTURE: CPT

## 2018-04-11 PROCEDURE — 72141 MRI NECK SPINE W/O DYE: CPT

## 2018-04-11 PROCEDURE — 1210000000 HC MED SURG R&B

## 2018-04-11 PROCEDURE — 6370000000 HC RX 637 (ALT 250 FOR IP): Performed by: INTERNAL MEDICINE

## 2018-04-11 PROCEDURE — 81001 URINALYSIS AUTO W/SCOPE: CPT

## 2018-04-11 PROCEDURE — 2580000003 HC RX 258: Performed by: PHYSICIAN ASSISTANT

## 2018-04-11 PROCEDURE — 85652 RBC SED RATE AUTOMATED: CPT

## 2018-04-11 PROCEDURE — 99285 EMERGENCY DEPT VISIT HI MDM: CPT

## 2018-04-11 PROCEDURE — 70450 CT HEAD/BRAIN W/O DYE: CPT

## 2018-04-11 PROCEDURE — 96375 TX/PRO/DX INJ NEW DRUG ADDON: CPT

## 2018-04-11 PROCEDURE — 93005 ELECTROCARDIOGRAM TRACING: CPT

## 2018-04-11 PROCEDURE — 84145 PROCALCITONIN (PCT): CPT

## 2018-04-11 PROCEDURE — 82550 ASSAY OF CK (CPK): CPT

## 2018-04-11 PROCEDURE — 2580000003 HC RX 258: Performed by: EMERGENCY MEDICINE

## 2018-04-11 PROCEDURE — 6370000000 HC RX 637 (ALT 250 FOR IP): Performed by: PHYSICIAN ASSISTANT

## 2018-04-11 PROCEDURE — 71045 X-RAY EXAM CHEST 1 VIEW: CPT

## 2018-04-11 PROCEDURE — 87040 BLOOD CULTURE FOR BACTERIA: CPT

## 2018-04-11 PROCEDURE — 84484 ASSAY OF TROPONIN QUANT: CPT

## 2018-04-11 PROCEDURE — 86141 C-REACTIVE PROTEIN HS: CPT

## 2018-04-11 PROCEDURE — 6360000002 HC RX W HCPCS: Performed by: INTERNAL MEDICINE

## 2018-04-11 PROCEDURE — 83605 ASSAY OF LACTIC ACID: CPT

## 2018-04-11 PROCEDURE — 6370000000 HC RX 637 (ALT 250 FOR IP): Performed by: EMERGENCY MEDICINE

## 2018-04-11 PROCEDURE — 96365 THER/PROPH/DIAG IV INF INIT: CPT

## 2018-04-11 PROCEDURE — 85025 COMPLETE CBC W/AUTO DIFF WBC: CPT

## 2018-04-11 PROCEDURE — 70551 MRI BRAIN STEM W/O DYE: CPT

## 2018-04-11 PROCEDURE — 6360000002 HC RX W HCPCS: Performed by: PHYSICIAN ASSISTANT

## 2018-04-11 PROCEDURE — 6360000002 HC RX W HCPCS: Performed by: EMERGENCY MEDICINE

## 2018-04-11 PROCEDURE — 96367 TX/PROPH/DG ADDL SEQ IV INF: CPT

## 2018-04-11 RX ORDER — VANCOMYCIN HYDROCHLORIDE 1 G/200ML
1000 INJECTION, SOLUTION INTRAVENOUS ONCE
Status: COMPLETED | OUTPATIENT
Start: 2018-04-11 | End: 2018-04-11

## 2018-04-11 RX ORDER — SODIUM CHLORIDE 0.9 % (FLUSH) 0.9 %
10 SYRINGE (ML) INJECTION PRN
Status: DISCONTINUED | OUTPATIENT
Start: 2018-04-11 | End: 2018-04-12 | Stop reason: SDUPTHER

## 2018-04-11 RX ORDER — ONDANSETRON 2 MG/ML
4 INJECTION INTRAMUSCULAR; INTRAVENOUS EVERY 6 HOURS PRN
Status: DISCONTINUED | OUTPATIENT
Start: 2018-04-11 | End: 2018-04-14 | Stop reason: HOSPADM

## 2018-04-11 RX ORDER — DEXAMETHASONE SODIUM PHOSPHATE 10 MG/ML
10 INJECTION INTRAMUSCULAR; INTRAVENOUS ONCE
Status: COMPLETED | OUTPATIENT
Start: 2018-04-11 | End: 2018-04-11

## 2018-04-11 RX ORDER — ACETAMINOPHEN 325 MG/1
650 TABLET ORAL EVERY 4 HOURS PRN
Status: DISCONTINUED | OUTPATIENT
Start: 2018-04-11 | End: 2018-04-14 | Stop reason: HOSPADM

## 2018-04-11 RX ORDER — LORAZEPAM 2 MG/ML
0.5 INJECTION INTRAMUSCULAR PRN
Status: COMPLETED | OUTPATIENT
Start: 2018-04-11 | End: 2018-04-11

## 2018-04-11 RX ORDER — FAMOTIDINE 20 MG/1
20 TABLET, FILM COATED ORAL 2 TIMES DAILY
Status: DISCONTINUED | OUTPATIENT
Start: 2018-04-11 | End: 2018-04-14 | Stop reason: HOSPADM

## 2018-04-11 RX ORDER — 0.9 % SODIUM CHLORIDE 0.9 %
30 INTRAVENOUS SOLUTION INTRAVENOUS ONCE
Status: COMPLETED | OUTPATIENT
Start: 2018-04-11 | End: 2018-04-11

## 2018-04-11 RX ORDER — ACETAMINOPHEN 500 MG
1000 TABLET ORAL ONCE
Status: COMPLETED | OUTPATIENT
Start: 2018-04-11 | End: 2018-04-11

## 2018-04-11 RX ORDER — ONDANSETRON 2 MG/ML
4 INJECTION INTRAMUSCULAR; INTRAVENOUS ONCE
Status: COMPLETED | OUTPATIENT
Start: 2018-04-11 | End: 2018-04-11

## 2018-04-11 RX ORDER — SODIUM CHLORIDE 9 MG/ML
INJECTION, SOLUTION INTRAVENOUS
Status: DISPENSED
Start: 2018-04-11 | End: 2018-04-12

## 2018-04-11 RX ORDER — ACETAMINOPHEN 80 MG
TABLET,CHEWABLE ORAL ONCE
Status: DISCONTINUED | OUTPATIENT
Start: 2018-04-11 | End: 2018-04-14 | Stop reason: HOSPADM

## 2018-04-11 RX ORDER — OXYCODONE HYDROCHLORIDE AND ACETAMINOPHEN 5; 325 MG/1; MG/1
1 TABLET ORAL EVERY 4 HOURS PRN
Status: DISCONTINUED | OUTPATIENT
Start: 2018-04-11 | End: 2018-04-14 | Stop reason: HOSPADM

## 2018-04-11 RX ORDER — SODIUM CHLORIDE 0.9 % (FLUSH) 0.9 %
10 SYRINGE (ML) INJECTION EVERY 12 HOURS SCHEDULED
Status: DISCONTINUED | OUTPATIENT
Start: 2018-04-11 | End: 2018-04-12 | Stop reason: SDUPTHER

## 2018-04-11 RX ORDER — METAXALONE 800 MG/1
400 TABLET ORAL 3 TIMES DAILY PRN
Status: DISCONTINUED | OUTPATIENT
Start: 2018-04-11 | End: 2018-04-12

## 2018-04-11 RX ADMIN — ACYCLOVIR SODIUM 1000 MG: 50 INJECTION, SOLUTION INTRAVENOUS at 23:13

## 2018-04-11 RX ADMIN — ACETAMINOPHEN 1000 MG: 500 TABLET, FILM COATED ORAL at 12:55

## 2018-04-11 RX ADMIN — SODIUM CHLORIDE 1000 ML: 9 INJECTION, SOLUTION INTRAVENOUS at 12:55

## 2018-04-11 RX ADMIN — CEFTRIAXONE SODIUM 2 G: 2 INJECTION, POWDER, FOR SOLUTION INTRAMUSCULAR; INTRAVENOUS at 15:30

## 2018-04-11 RX ADMIN — ONDANSETRON 4 MG: 2 INJECTION INTRAMUSCULAR; INTRAVENOUS at 12:55

## 2018-04-11 RX ADMIN — OXYCODONE HYDROCHLORIDE AND ACETAMINOPHEN 1 TABLET: 5; 325 TABLET ORAL at 17:41

## 2018-04-11 RX ADMIN — DEXAMETHASONE SODIUM PHOSPHATE 10 MG: 10 INJECTION INTRAMUSCULAR; INTRAVENOUS at 13:48

## 2018-04-11 RX ADMIN — FAMOTIDINE 20 MG: 20 TABLET ORAL at 22:38

## 2018-04-11 RX ADMIN — OXYCODONE HYDROCHLORIDE AND ACETAMINOPHEN 1 TABLET: 5; 325 TABLET ORAL at 23:49

## 2018-04-11 RX ADMIN — Medication 1 MG: at 12:55

## 2018-04-11 RX ADMIN — VANCOMYCIN HYDROCHLORIDE 1000 MG: 1 INJECTION, SOLUTION INTRAVENOUS at 13:48

## 2018-04-11 RX ADMIN — ENOXAPARIN SODIUM 40 MG: 40 INJECTION SUBCUTANEOUS at 22:38

## 2018-04-11 RX ADMIN — SODIUM CHLORIDE 3267 ML: 9 INJECTION, SOLUTION INTRAVENOUS at 18:41

## 2018-04-11 RX ADMIN — ACYCLOVIR SODIUM 1090 MG: 50 INJECTION, SOLUTION INTRAVENOUS at 14:22

## 2018-04-11 RX ADMIN — LORAZEPAM 0.5 MG: 2 INJECTION INTRAMUSCULAR; INTRAVENOUS at 20:20

## 2018-04-11 ASSESSMENT — PAIN SCALES - GENERAL
PAINLEVEL_OUTOF10: 7
PAINLEVEL_OUTOF10: 10
PAINLEVEL_OUTOF10: 4
PAINLEVEL_OUTOF10: 6
PAINLEVEL_OUTOF10: 8

## 2018-04-11 ASSESSMENT — PAIN DESCRIPTION - PAIN TYPE
TYPE: ACUTE PAIN

## 2018-04-11 ASSESSMENT — PAIN DESCRIPTION - ONSET
ONSET: ON-GOING

## 2018-04-11 ASSESSMENT — PAIN DESCRIPTION - LOCATION
LOCATION: HEAD;NECK
LOCATION: HEAD
LOCATION: GENERALIZED

## 2018-04-11 ASSESSMENT — PAIN DESCRIPTION - DESCRIPTORS
DESCRIPTORS: ACHING
DESCRIPTORS: POUNDING
DESCRIPTORS: ACHING

## 2018-04-11 ASSESSMENT — ENCOUNTER SYMPTOMS
ALLERGIC/IMMUNOLOGIC NEGATIVE: 1
SHORTNESS OF BREATH: 0
ABDOMINAL PAIN: 0
TROUBLE SWALLOWING: 0
RHINORRHEA: 0
NAUSEA: 0
WHEEZING: 0
EYES NEGATIVE: 1
VOMITING: 0

## 2018-04-11 ASSESSMENT — PAIN DESCRIPTION - PROGRESSION: CLINICAL_PROGRESSION: GRADUALLY WORSENING

## 2018-04-11 ASSESSMENT — PAIN DESCRIPTION - FREQUENCY
FREQUENCY: INTERMITTENT
FREQUENCY: INTERMITTENT
FREQUENCY: CONTINUOUS

## 2018-04-12 LAB
ANION GAP SERPL CALCULATED.3IONS-SCNC: 15 MEQ/L (ref 7–13)
APTT: 27.8 SEC (ref 21.6–35.4)
BASOPHILS ABSOLUTE: 0.1 K/UL (ref 0–0.2)
BASOPHILS RELATIVE PERCENT: 0.6 %
BUN BLDV-MCNC: 17 MG/DL (ref 6–20)
C-REACTIVE PROTEIN, HIGH SENSITIVITY: 34.9 MG/L (ref 0–5)
CALCIUM SERPL-MCNC: 7.9 MG/DL (ref 8.6–10.2)
CHLORIDE BLD-SCNC: 101 MEQ/L (ref 98–107)
CO2: 22 MEQ/L (ref 22–29)
CREAT SERPL-MCNC: 1.11 MG/DL (ref 0.5–0.9)
EOSINOPHILS ABSOLUTE: 0 K/UL (ref 0–0.7)
EOSINOPHILS RELATIVE PERCENT: 0 %
GFR AFRICAN AMERICAN: >60
GFR NON-AFRICAN AMERICAN: 52.7
GLUCOSE BLD-MCNC: 138 MG/DL (ref 74–109)
HCT VFR BLD CALC: 40.5 % (ref 37–47)
HEMOGLOBIN: 13.6 G/DL (ref 12–16)
INR BLD: 1.1
LACTIC ACID, SEPSIS: 1.4 MMOL/L (ref 0.5–1.9)
LACTIC ACID, SEPSIS: 2 MMOL/L (ref 0.5–1.9)
LYMPHOCYTES ABSOLUTE: 0.5 K/UL (ref 1–4.8)
LYMPHOCYTES RELATIVE PERCENT: 4.9 %
MCH RBC QN AUTO: 32.7 PG (ref 27–31.3)
MCHC RBC AUTO-ENTMCNC: 33.6 % (ref 33–37)
MCV RBC AUTO: 97.3 FL (ref 82–100)
MONOCYTES ABSOLUTE: 0.6 K/UL (ref 0.2–0.8)
MONOCYTES RELATIVE PERCENT: 5.9 %
NEUTROPHILS ABSOLUTE: 8.3 K/UL (ref 1.4–6.5)
NEUTROPHILS RELATIVE PERCENT: 88.6 %
PDW BLD-RTO: 13.5 % (ref 11.5–14.5)
PLATELET # BLD: 242 K/UL (ref 130–400)
POTASSIUM REFLEX MAGNESIUM: 4.1 MEQ/L (ref 3.5–5.1)
PROTHROMBIN TIME: 11.4 SEC (ref 9.6–12.3)
RBC # BLD: 4.17 M/UL (ref 4.2–5.4)
SODIUM BLD-SCNC: 138 MEQ/L (ref 132–144)
WBC # BLD: 9.3 K/UL (ref 4.8–10.8)

## 2018-04-12 PROCEDURE — 80048 BASIC METABOLIC PNL TOTAL CA: CPT

## 2018-04-12 PROCEDURE — 87529 HSV DNA AMP PROBE: CPT

## 2018-04-12 PROCEDURE — 6360000002 HC RX W HCPCS: Performed by: INTERNAL MEDICINE

## 2018-04-12 PROCEDURE — 6360000002 HC RX W HCPCS: Performed by: PHYSICIAN ASSISTANT

## 2018-04-12 PROCEDURE — 83605 ASSAY OF LACTIC ACID: CPT

## 2018-04-12 PROCEDURE — 36415 COLL VENOUS BLD VENIPUNCTURE: CPT

## 2018-04-12 PROCEDURE — 99254 IP/OBS CNSLTJ NEW/EST MOD 60: CPT | Performed by: INTERNAL MEDICINE

## 2018-04-12 PROCEDURE — 85610 PROTHROMBIN TIME: CPT

## 2018-04-12 PROCEDURE — 85652 RBC SED RATE AUTOMATED: CPT

## 2018-04-12 PROCEDURE — 1210000000 HC MED SURG R&B

## 2018-04-12 PROCEDURE — 87040 BLOOD CULTURE FOR BACTERIA: CPT

## 2018-04-12 PROCEDURE — 2580000003 HC RX 258: Performed by: PSYCHIATRY & NEUROLOGY

## 2018-04-12 PROCEDURE — 2580000003 HC RX 258: Performed by: PHYSICIAN ASSISTANT

## 2018-04-12 PROCEDURE — 86141 C-REACTIVE PROTEIN HS: CPT

## 2018-04-12 PROCEDURE — 6370000000 HC RX 637 (ALT 250 FOR IP): Performed by: INTERNAL MEDICINE

## 2018-04-12 PROCEDURE — 6370000000 HC RX 637 (ALT 250 FOR IP): Performed by: PHYSICIAN ASSISTANT

## 2018-04-12 PROCEDURE — 94760 N-INVAS EAR/PLS OXIMETRY 1: CPT

## 2018-04-12 PROCEDURE — 2580000003 HC RX 258: Performed by: INTERNAL MEDICINE

## 2018-04-12 PROCEDURE — 94664 DEMO&/EVAL PT USE INHALER: CPT

## 2018-04-12 PROCEDURE — 009U3ZX DRAINAGE OF SPINAL CANAL, PERCUTANEOUS APPROACH, DIAGNOSTIC: ICD-10-PCS | Performed by: PSYCHIATRY & NEUROLOGY

## 2018-04-12 PROCEDURE — 94640 AIRWAY INHALATION TREATMENT: CPT

## 2018-04-12 PROCEDURE — 85025 COMPLETE CBC W/AUTO DIFF WBC: CPT

## 2018-04-12 PROCEDURE — 85730 THROMBOPLASTIN TIME PARTIAL: CPT

## 2018-04-12 RX ORDER — SODIUM CHLORIDE 0.9 % (FLUSH) 0.9 %
10 SYRINGE (ML) INJECTION PRN
Status: DISCONTINUED | OUTPATIENT
Start: 2018-04-12 | End: 2018-04-14 | Stop reason: HOSPADM

## 2018-04-12 RX ORDER — GABAPENTIN 400 MG/1
400 CAPSULE ORAL 3 TIMES DAILY
Status: DISCONTINUED | OUTPATIENT
Start: 2018-04-12 | End: 2018-04-14 | Stop reason: HOSPADM

## 2018-04-12 RX ORDER — MORPHINE SULFATE 2 MG/ML
2 INJECTION, SOLUTION INTRAMUSCULAR; INTRAVENOUS
Status: DISCONTINUED | OUTPATIENT
Start: 2018-04-12 | End: 2018-04-14 | Stop reason: HOSPADM

## 2018-04-12 RX ORDER — IPRATROPIUM BROMIDE AND ALBUTEROL SULFATE 2.5; .5 MG/3ML; MG/3ML
1 SOLUTION RESPIRATORY (INHALATION) 4 TIMES DAILY
Status: DISCONTINUED | OUTPATIENT
Start: 2018-04-12 | End: 2018-04-12

## 2018-04-12 RX ORDER — 0.9 % SODIUM CHLORIDE 0.9 %
30 INTRAVENOUS SOLUTION INTRAVENOUS ONCE
Status: DISCONTINUED | OUTPATIENT
Start: 2018-04-12 | End: 2018-04-12 | Stop reason: ALTCHOICE

## 2018-04-12 RX ORDER — VANCOMYCIN HYDROCHLORIDE 1 G/200ML
1000 INJECTION, SOLUTION INTRAVENOUS EVERY 12 HOURS
Status: DISCONTINUED | OUTPATIENT
Start: 2018-04-12 | End: 2018-04-13

## 2018-04-12 RX ORDER — ALBUTEROL SULFATE 2.5 MG/3ML
2.5 SOLUTION RESPIRATORY (INHALATION) EVERY 6 HOURS PRN
Status: DISCONTINUED | OUTPATIENT
Start: 2018-04-12 | End: 2018-04-12

## 2018-04-12 RX ORDER — BENZONATATE 100 MG/1
100 CAPSULE ORAL 3 TIMES DAILY PRN
Status: DISCONTINUED | OUTPATIENT
Start: 2018-04-12 | End: 2018-04-14 | Stop reason: HOSPADM

## 2018-04-12 RX ORDER — BUSPIRONE HYDROCHLORIDE 10 MG/1
10 TABLET ORAL 2 TIMES DAILY
Status: DISCONTINUED | OUTPATIENT
Start: 2018-04-12 | End: 2018-04-14 | Stop reason: HOSPADM

## 2018-04-12 RX ORDER — GUAIFENESIN 600 MG/1
600 TABLET, EXTENDED RELEASE ORAL 2 TIMES DAILY
Status: DISCONTINUED | OUTPATIENT
Start: 2018-04-12 | End: 2018-04-14 | Stop reason: HOSPADM

## 2018-04-12 RX ORDER — SODIUM CHLORIDE 0.9 % (FLUSH) 0.9 %
10 SYRINGE (ML) INJECTION EVERY 12 HOURS SCHEDULED
Status: DISCONTINUED | OUTPATIENT
Start: 2018-04-12 | End: 2018-04-14 | Stop reason: HOSPADM

## 2018-04-12 RX ORDER — ALBUTEROL SULFATE 90 UG/1
2 AEROSOL, METERED RESPIRATORY (INHALATION) EVERY 4 HOURS PRN
Status: DISCONTINUED | OUTPATIENT
Start: 2018-04-12 | End: 2018-04-14 | Stop reason: HOSPADM

## 2018-04-12 RX ORDER — IPRATROPIUM BROMIDE AND ALBUTEROL SULFATE 2.5; .5 MG/3ML; MG/3ML
1 SOLUTION RESPIRATORY (INHALATION) 3 TIMES DAILY
Status: DISCONTINUED | OUTPATIENT
Start: 2018-04-12 | End: 2018-04-14 | Stop reason: HOSPADM

## 2018-04-12 RX ORDER — MORPHINE SULFATE 4 MG/ML
4 INJECTION, SOLUTION INTRAMUSCULAR; INTRAVENOUS
Status: DISCONTINUED | OUTPATIENT
Start: 2018-04-12 | End: 2018-04-14 | Stop reason: HOSPADM

## 2018-04-12 RX ORDER — DEXAMETHASONE SODIUM PHOSPHATE 4 MG/ML
3 INJECTION, SOLUTION INTRA-ARTICULAR; INTRALESIONAL; INTRAMUSCULAR; INTRAVENOUS; SOFT TISSUE EVERY 8 HOURS
Status: DISCONTINUED | OUTPATIENT
Start: 2018-04-12 | End: 2018-04-14 | Stop reason: HOSPADM

## 2018-04-12 RX ORDER — ALBUTEROL SULFATE 2.5 MG/3ML
2.5 SOLUTION RESPIRATORY (INHALATION)
Status: DISCONTINUED | OUTPATIENT
Start: 2018-04-12 | End: 2018-04-14 | Stop reason: HOSPADM

## 2018-04-12 RX ORDER — IPRATROPIUM BROMIDE AND ALBUTEROL SULFATE 2.5; .5 MG/3ML; MG/3ML
1 SOLUTION RESPIRATORY (INHALATION) 3 TIMES DAILY
Status: DISCONTINUED | OUTPATIENT
Start: 2018-04-13 | End: 2018-04-12

## 2018-04-12 RX ORDER — METOCLOPRAMIDE HYDROCHLORIDE 5 MG/ML
10 INJECTION INTRAMUSCULAR; INTRAVENOUS 4 TIMES DAILY PRN
Status: DISCONTINUED | OUTPATIENT
Start: 2018-04-12 | End: 2018-04-14 | Stop reason: HOSPADM

## 2018-04-12 RX ORDER — CETIRIZINE HYDROCHLORIDE 10 MG/1
10 TABLET ORAL NIGHTLY
Status: DISCONTINUED | OUTPATIENT
Start: 2018-04-12 | End: 2018-04-14 | Stop reason: HOSPADM

## 2018-04-12 RX ORDER — CYCLOBENZAPRINE HCL 10 MG
10 TABLET ORAL 3 TIMES DAILY PRN
Status: DISCONTINUED | OUTPATIENT
Start: 2018-04-12 | End: 2018-04-14 | Stop reason: HOSPADM

## 2018-04-12 RX ORDER — DULOXETIN HYDROCHLORIDE 30 MG/1
60 CAPSULE, DELAYED RELEASE ORAL DAILY
Status: DISCONTINUED | OUTPATIENT
Start: 2018-04-12 | End: 2018-04-14 | Stop reason: HOSPADM

## 2018-04-12 RX ORDER — ACETAMINOPHEN 325 MG/1
650 TABLET ORAL EVERY 4 HOURS PRN
Status: DISCONTINUED | OUTPATIENT
Start: 2018-04-12 | End: 2018-04-12 | Stop reason: SDUPTHER

## 2018-04-12 RX ADMIN — ACYCLOVIR SODIUM 1000 MG: 50 INJECTION, SOLUTION INTRAVENOUS at 06:26

## 2018-04-12 RX ADMIN — ACETAMINOPHEN 650 MG: 325 TABLET ORAL at 15:35

## 2018-04-12 RX ADMIN — DEXAMETHASONE SODIUM PHOSPHATE 3 MG: 4 INJECTION, SOLUTION INTRAMUSCULAR; INTRAVENOUS at 18:11

## 2018-04-12 RX ADMIN — DULOXETINE HYDROCHLORIDE 60 MG: 30 CAPSULE, DELAYED RELEASE ORAL at 16:24

## 2018-04-12 RX ADMIN — VANCOMYCIN HYDROCHLORIDE 1000 MG: 1 INJECTION, SOLUTION INTRAVENOUS at 18:30

## 2018-04-12 RX ADMIN — GUAIFENESIN 600 MG: 600 TABLET, EXTENDED RELEASE ORAL at 20:44

## 2018-04-12 RX ADMIN — FAMOTIDINE 20 MG: 20 TABLET ORAL at 08:48

## 2018-04-12 RX ADMIN — BUSPIRONE HYDROCHLORIDE 10 MG: 10 TABLET ORAL at 20:44

## 2018-04-12 RX ADMIN — GABAPENTIN 400 MG: 400 CAPSULE ORAL at 16:24

## 2018-04-12 RX ADMIN — IPRATROPIUM BROMIDE AND ALBUTEROL SULFATE 1 AMPULE: 2.5; .5 SOLUTION RESPIRATORY (INHALATION) at 18:14

## 2018-04-12 RX ADMIN — Medication 0.5 MG: at 04:13

## 2018-04-12 RX ADMIN — Medication 10 ML: at 08:49

## 2018-04-12 RX ADMIN — Medication 10 ML: at 20:47

## 2018-04-12 RX ADMIN — FAMOTIDINE 20 MG: 20 TABLET ORAL at 20:44

## 2018-04-12 RX ADMIN — Medication 0.5 MG: at 18:48

## 2018-04-12 RX ADMIN — ACETAMINOPHEN 650 MG: 325 TABLET ORAL at 21:08

## 2018-04-12 RX ADMIN — CEFTRIAXONE SODIUM 2 G: 2 INJECTION, POWDER, FOR SOLUTION INTRAMUSCULAR; INTRAVENOUS at 20:44

## 2018-04-12 RX ADMIN — ENOXAPARIN SODIUM 40 MG: 40 INJECTION SUBCUTANEOUS at 08:48

## 2018-04-12 RX ADMIN — GABAPENTIN 400 MG: 400 CAPSULE ORAL at 20:44

## 2018-04-12 RX ADMIN — CETIRIZINE HYDROCHLORIDE 10 MG: 10 TABLET, FILM COATED ORAL at 20:44

## 2018-04-12 RX ADMIN — Medication 0.5 MG: at 09:01

## 2018-04-12 RX ADMIN — Medication 0.5 MG: at 23:21

## 2018-04-12 RX ADMIN — BENZOCAINE AND MENTHOL 1 LOZENGE: 15; 3.6 LOZENGE ORAL at 05:05

## 2018-04-12 RX ADMIN — AZITHROMYCIN MONOHYDRATE 500 MG: 500 INJECTION, POWDER, LYOPHILIZED, FOR SOLUTION INTRAVENOUS at 08:48

## 2018-04-12 RX ADMIN — ACYCLOVIR SODIUM 1000 MG: 50 INJECTION, SOLUTION INTRAVENOUS at 16:24

## 2018-04-12 RX ADMIN — Medication 0.5 MG: at 14:18

## 2018-04-12 ASSESSMENT — PAIN DESCRIPTION - DESCRIPTORS
DESCRIPTORS: THROBBING
DESCRIPTORS: THROBBING

## 2018-04-12 ASSESSMENT — PAIN DESCRIPTION - ONSET
ONSET: ON-GOING
ONSET: ON-GOING

## 2018-04-12 ASSESSMENT — PAIN DESCRIPTION - FREQUENCY
FREQUENCY: CONTINUOUS
FREQUENCY: CONTINUOUS

## 2018-04-12 ASSESSMENT — PAIN SCALES - GENERAL
PAINLEVEL_OUTOF10: 2
PAINLEVEL_OUTOF10: 10
PAINLEVEL_OUTOF10: 9
PAINLEVEL_OUTOF10: 10
PAINLEVEL_OUTOF10: 9
PAINLEVEL_OUTOF10: 8
PAINLEVEL_OUTOF10: 7
PAINLEVEL_OUTOF10: 3
PAINLEVEL_OUTOF10: 3

## 2018-04-12 ASSESSMENT — PAIN DESCRIPTION - LOCATION
LOCATION: HEAD
LOCATION: HEAD

## 2018-04-12 ASSESSMENT — PAIN DESCRIPTION - PAIN TYPE
TYPE: ACUTE PAIN
TYPE: ACUTE PAIN

## 2018-04-13 ENCOUNTER — APPOINTMENT (OUTPATIENT)
Dept: INTERVENTIONAL RADIOLOGY/VASCULAR | Age: 48
DRG: 723 | End: 2018-04-13
Payer: MEDICAID

## 2018-04-13 LAB
APPEARANCE CSF: CLEAR
APPEARANCE CSF: CLEAR
CLOT EVALUATION CSF: NORMAL
CLOT EVALUATION CSF: NORMAL
COLOR CSF: COLORLESS
COLOR CSF: NORMAL
GLUCOSE, CSF: 82 MG/DL (ref 50–70)
GRAM STAIN RESULT: NORMAL
NO DIFFERENTIAL CSF: NORMAL
PROTEIN CSF: 34 MG/DL (ref 15–45)
RBC CSF: 1438 K/UL
RBC CSF: 258 K/UL
SEDIMENTATION RATE, ERYTHROCYTE: 9 MM (ref 0–20)
TUBE NUMBER CSF: NORMAL
TUBE NUMBER CSF: NORMAL
URINE CULTURE, ROUTINE: NORMAL
VOLUME CSF: 9 ML
WBC CSF: 2 K/UL (ref 0–8)

## 2018-04-13 PROCEDURE — 6360000002 HC RX W HCPCS: Performed by: PHYSICIAN ASSISTANT

## 2018-04-13 PROCEDURE — 62270 DX LMBR SPI PNXR: CPT | Performed by: RADIOLOGY

## 2018-04-13 PROCEDURE — 87070 CULTURE OTHR SPECIMN AEROBIC: CPT

## 2018-04-13 PROCEDURE — 87205 SMEAR GRAM STAIN: CPT

## 2018-04-13 PROCEDURE — 6360000002 HC RX W HCPCS: Performed by: INTERNAL MEDICINE

## 2018-04-13 PROCEDURE — 77003 FLUOROGUIDE FOR SPINE INJECT: CPT | Performed by: RADIOLOGY

## 2018-04-13 PROCEDURE — 84157 ASSAY OF PROTEIN OTHER: CPT

## 2018-04-13 PROCEDURE — 82945 GLUCOSE OTHER FLUID: CPT

## 2018-04-13 PROCEDURE — 2580000003 HC RX 258: Performed by: INTERNAL MEDICINE

## 2018-04-13 PROCEDURE — 89050 BODY FLUID CELL COUNT: CPT

## 2018-04-13 PROCEDURE — 2580000003 HC RX 258: Performed by: PHYSICIAN ASSISTANT

## 2018-04-13 PROCEDURE — 6370000000 HC RX 637 (ALT 250 FOR IP): Performed by: PHYSICIAN ASSISTANT

## 2018-04-13 PROCEDURE — 99232 SBSQ HOSP IP/OBS MODERATE 35: CPT | Performed by: INTERNAL MEDICINE

## 2018-04-13 PROCEDURE — 94640 AIRWAY INHALATION TREATMENT: CPT

## 2018-04-13 PROCEDURE — 6370000000 HC RX 637 (ALT 250 FOR IP): Performed by: INTERNAL MEDICINE

## 2018-04-13 PROCEDURE — 2500000003 HC RX 250 WO HCPCS: Performed by: NURSE PRACTITIONER

## 2018-04-13 PROCEDURE — 1210000000 HC MED SURG R&B

## 2018-04-13 PROCEDURE — 99254 IP/OBS CNSLTJ NEW/EST MOD 60: CPT | Performed by: RADIOLOGY

## 2018-04-13 RX ORDER — SODIUM CHLORIDE 9 MG/ML
INJECTION, SOLUTION INTRAVENOUS CONTINUOUS
Status: ACTIVE | OUTPATIENT
Start: 2018-04-13 | End: 2018-04-13

## 2018-04-13 RX ORDER — BUTALBITAL, ACETAMINOPHEN AND CAFFEINE 300; 40; 50 MG/1; MG/1; MG/1
1 CAPSULE ORAL EVERY 4 HOURS PRN
Status: DISCONTINUED | OUTPATIENT
Start: 2018-04-13 | End: 2018-04-14 | Stop reason: HOSPADM

## 2018-04-13 RX ORDER — LIDOCAINE HYDROCHLORIDE 20 MG/ML
5 INJECTION, SOLUTION INFILTRATION; PERINEURAL ONCE
Status: COMPLETED | OUTPATIENT
Start: 2018-04-13 | End: 2018-04-13

## 2018-04-13 RX ORDER — LEVOFLOXACIN 500 MG/1
500 TABLET, FILM COATED ORAL DAILY
Status: DISCONTINUED | OUTPATIENT
Start: 2018-04-13 | End: 2018-04-14 | Stop reason: HOSPADM

## 2018-04-13 RX ADMIN — IPRATROPIUM BROMIDE AND ALBUTEROL SULFATE 1 AMPULE: .5; 3 SOLUTION RESPIRATORY (INHALATION) at 19:42

## 2018-04-13 RX ADMIN — FAMOTIDINE 20 MG: 20 TABLET ORAL at 21:40

## 2018-04-13 RX ADMIN — GABAPENTIN 400 MG: 400 CAPSULE ORAL at 21:40

## 2018-04-13 RX ADMIN — ACYCLOVIR SODIUM 1000 MG: 50 INJECTION, SOLUTION INTRAVENOUS at 16:32

## 2018-04-13 RX ADMIN — OXYCODONE HYDROCHLORIDE AND ACETAMINOPHEN 1 TABLET: 5; 325 TABLET ORAL at 15:20

## 2018-04-13 RX ADMIN — VANCOMYCIN HYDROCHLORIDE 1000 MG: 1 INJECTION, SOLUTION INTRAVENOUS at 04:30

## 2018-04-13 RX ADMIN — SODIUM CHLORIDE: 9 INJECTION, SOLUTION INTRAVENOUS at 16:36

## 2018-04-13 RX ADMIN — CEFTRIAXONE SODIUM 2 G: 2 INJECTION, POWDER, FOR SOLUTION INTRAMUSCULAR; INTRAVENOUS at 20:09

## 2018-04-13 RX ADMIN — DEXAMETHASONE SODIUM PHOSPHATE 3 MG: 4 INJECTION, SOLUTION INTRAMUSCULAR; INTRAVENOUS at 19:29

## 2018-04-13 RX ADMIN — CETIRIZINE HYDROCHLORIDE 10 MG: 10 TABLET, FILM COATED ORAL at 21:40

## 2018-04-13 RX ADMIN — Medication 0.5 MG: at 11:04

## 2018-04-13 RX ADMIN — ACYCLOVIR SODIUM 1000 MG: 50 INJECTION, SOLUTION INTRAVENOUS at 00:56

## 2018-04-13 RX ADMIN — Medication 10 ML: at 00:57

## 2018-04-13 RX ADMIN — DEXAMETHASONE SODIUM PHOSPHATE 3 MG: 4 INJECTION, SOLUTION INTRAMUSCULAR; INTRAVENOUS at 02:30

## 2018-04-13 RX ADMIN — IPRATROPIUM BROMIDE AND ALBUTEROL SULFATE 1 AMPULE: .5; 3 SOLUTION RESPIRATORY (INHALATION) at 07:14

## 2018-04-13 RX ADMIN — BUSPIRONE HYDROCHLORIDE 10 MG: 10 TABLET ORAL at 13:28

## 2018-04-13 RX ADMIN — MORPHINE SULFATE 2 MG: 2 INJECTION, SOLUTION INTRAMUSCULAR; INTRAVENOUS at 21:40

## 2018-04-13 RX ADMIN — DEXAMETHASONE SODIUM PHOSPHATE 3 MG: 4 INJECTION, SOLUTION INTRAMUSCULAR; INTRAVENOUS at 11:05

## 2018-04-13 RX ADMIN — BUSPIRONE HYDROCHLORIDE 10 MG: 10 TABLET ORAL at 21:43

## 2018-04-13 RX ADMIN — DULOXETINE HYDROCHLORIDE 60 MG: 30 CAPSULE, DELAYED RELEASE ORAL at 13:28

## 2018-04-13 RX ADMIN — LIDOCAINE HYDROCHLORIDE 5 ML: 20 INJECTION, SOLUTION INFILTRATION; PERINEURAL at 10:37

## 2018-04-13 RX ADMIN — GUAIFENESIN 600 MG: 600 TABLET, EXTENDED RELEASE ORAL at 21:40

## 2018-04-13 RX ADMIN — GUAIFENESIN 600 MG: 600 TABLET, EXTENDED RELEASE ORAL at 13:28

## 2018-04-13 RX ADMIN — LEVOFLOXACIN 500 MG: 500 TABLET, FILM COATED ORAL at 21:40

## 2018-04-13 RX ADMIN — GABAPENTIN 400 MG: 400 CAPSULE ORAL at 13:28

## 2018-04-13 RX ADMIN — IPRATROPIUM BROMIDE AND ALBUTEROL SULFATE 1 AMPULE: .5; 3 SOLUTION RESPIRATORY (INHALATION) at 12:44

## 2018-04-13 ASSESSMENT — ENCOUNTER SYMPTOMS
COUGH: 0
CONSTIPATION: 0
VOMITING: 0
PHOTOPHOBIA: 0
BACK PAIN: 0
DOUBLE VISION: 0
SPUTUM PRODUCTION: 0
HEARTBURN: 0
EYES NEGATIVE: 1
ABDOMINAL PAIN: 0
GASTROINTESTINAL NEGATIVE: 1
DIARRHEA: 0
WHEEZING: 0
HEMOPTYSIS: 0
BLURRED VISION: 0
RESPIRATORY NEGATIVE: 1
SHORTNESS OF BREATH: 0
NAUSEA: 0

## 2018-04-13 ASSESSMENT — PAIN SCALES - GENERAL
PAINLEVEL_OUTOF10: 0
PAINLEVEL_OUTOF10: 9
PAINLEVEL_OUTOF10: 9
PAINLEVEL_OUTOF10: 6

## 2018-04-14 VITALS
OXYGEN SATURATION: 97 % | BODY MASS INDEX: 40.97 KG/M2 | DIASTOLIC BLOOD PRESSURE: 58 MMHG | WEIGHT: 240 LBS | RESPIRATION RATE: 20 BRPM | SYSTOLIC BLOOD PRESSURE: 125 MMHG | HEART RATE: 71 BPM | TEMPERATURE: 98.1 F | HEIGHT: 64 IN

## 2018-04-14 LAB — PROCALCITONIN: 0.07 NG/ML

## 2018-04-14 PROCEDURE — 6360000002 HC RX W HCPCS: Performed by: INTERNAL MEDICINE

## 2018-04-14 PROCEDURE — 94760 N-INVAS EAR/PLS OXIMETRY 1: CPT

## 2018-04-14 PROCEDURE — 6370000000 HC RX 637 (ALT 250 FOR IP): Performed by: INTERNAL MEDICINE

## 2018-04-14 PROCEDURE — 2580000003 HC RX 258: Performed by: PSYCHIATRY & NEUROLOGY

## 2018-04-14 PROCEDURE — 94640 AIRWAY INHALATION TREATMENT: CPT

## 2018-04-14 PROCEDURE — 6370000000 HC RX 637 (ALT 250 FOR IP): Performed by: PHYSICIAN ASSISTANT

## 2018-04-14 RX ORDER — LEVOFLOXACIN 500 MG/1
500 TABLET, FILM COATED ORAL DAILY
Qty: 7 TABLET | Refills: 0 | Status: SHIPPED | OUTPATIENT
Start: 2018-04-15 | End: 2018-04-22

## 2018-04-14 RX ORDER — ALBUTEROL SULFATE 2.5 MG/3ML
2.5 SOLUTION RESPIRATORY (INHALATION)
Qty: 120 EACH | Refills: 3 | Status: SHIPPED | OUTPATIENT
Start: 2018-04-14 | End: 2019-11-05 | Stop reason: SDUPTHER

## 2018-04-14 RX ORDER — PREDNISONE 10 MG/1
TABLET ORAL
Qty: 30 TABLET | Refills: 0 | Status: SHIPPED | OUTPATIENT
Start: 2018-04-14 | End: 2018-05-25 | Stop reason: ALTCHOICE

## 2018-04-14 RX ORDER — OXYCODONE HYDROCHLORIDE AND ACETAMINOPHEN 5; 325 MG/1; MG/1
1 TABLET ORAL 2 TIMES DAILY PRN
Qty: 14 TABLET | Refills: 0 | Status: SHIPPED | OUTPATIENT
Start: 2018-04-14 | End: 2018-04-21

## 2018-04-14 RX ORDER — ALBUTEROL SULFATE 90 UG/1
2 AEROSOL, METERED RESPIRATORY (INHALATION) EVERY 4 HOURS PRN
Qty: 1 INHALER | Refills: 3 | Status: SHIPPED | OUTPATIENT
Start: 2018-04-14 | End: 2019-11-05 | Stop reason: DRUGHIGH

## 2018-04-14 RX ADMIN — BENZONATATE 100 MG: 100 CAPSULE ORAL at 10:37

## 2018-04-14 RX ADMIN — DEXAMETHASONE SODIUM PHOSPHATE 3 MG: 4 INJECTION, SOLUTION INTRAMUSCULAR; INTRAVENOUS at 10:37

## 2018-04-14 RX ADMIN — DEXAMETHASONE SODIUM PHOSPHATE 3 MG: 4 INJECTION, SOLUTION INTRAMUSCULAR; INTRAVENOUS at 02:18

## 2018-04-14 RX ADMIN — BUSPIRONE HYDROCHLORIDE 10 MG: 10 TABLET ORAL at 10:38

## 2018-04-14 RX ADMIN — GABAPENTIN 400 MG: 400 CAPSULE ORAL at 10:38

## 2018-04-14 RX ADMIN — Medication 10 ML: at 10:39

## 2018-04-14 RX ADMIN — IPRATROPIUM BROMIDE AND ALBUTEROL SULFATE 1 AMPULE: .5; 3 SOLUTION RESPIRATORY (INHALATION) at 07:00

## 2018-04-14 RX ADMIN — OXYCODONE HYDROCHLORIDE AND ACETAMINOPHEN 1 TABLET: 5; 325 TABLET ORAL at 10:38

## 2018-04-14 RX ADMIN — GUAIFENESIN 600 MG: 600 TABLET, EXTENDED RELEASE ORAL at 10:37

## 2018-04-14 RX ADMIN — FAMOTIDINE 20 MG: 20 TABLET ORAL at 10:38

## 2018-04-14 RX ADMIN — Medication 10 ML: at 10:37

## 2018-04-14 RX ADMIN — DULOXETINE HYDROCHLORIDE 60 MG: 30 CAPSULE, DELAYED RELEASE ORAL at 10:38

## 2018-04-14 RX ADMIN — LEVOFLOXACIN 500 MG: 500 TABLET, FILM COATED ORAL at 10:38

## 2018-04-14 RX ADMIN — OXYCODONE HYDROCHLORIDE AND ACETAMINOPHEN 1 TABLET: 5; 325 TABLET ORAL at 05:06

## 2018-04-14 ASSESSMENT — PAIN SCALES - GENERAL
PAINLEVEL_OUTOF10: 0
PAINLEVEL_OUTOF10: 6
PAINLEVEL_OUTOF10: 9

## 2018-04-15 ENCOUNTER — HOSPITAL ENCOUNTER (EMERGENCY)
Age: 48
Discharge: HOME | End: 2018-04-15
Payer: MEDICAID

## 2018-04-15 ENCOUNTER — APPOINTMENT (OUTPATIENT)
Dept: CT IMAGING | Age: 48
End: 2018-04-15
Payer: MEDICAID

## 2018-04-15 VITALS
BODY MASS INDEX: 34.15 KG/M2 | RESPIRATION RATE: 22 BRPM | HEIGHT: 64 IN | TEMPERATURE: 101 F | OXYGEN SATURATION: 95 % | HEART RATE: 120 BPM | DIASTOLIC BLOOD PRESSURE: 62 MMHG | WEIGHT: 200 LBS | SYSTOLIC BLOOD PRESSURE: 117 MMHG

## 2018-04-15 DIAGNOSIS — J18.9 PNEUMONIA DUE TO ORGANISM: Primary | ICD-10-CM

## 2018-04-15 LAB
ALBUMIN SERPL-MCNC: 3.6 G/DL (ref 3.9–4.9)
ALP BLD-CCNC: 113 U/L (ref 40–130)
ALT SERPL-CCNC: 11 U/L (ref 0–33)
ANION GAP SERPL CALCULATED.3IONS-SCNC: 16 MEQ/L (ref 7–13)
AST SERPL-CCNC: 11 U/L (ref 0–35)
BASOPHILS ABSOLUTE: 0 K/UL (ref 0–0.2)
BASOPHILS RELATIVE PERCENT: 0.4 %
BILIRUB SERPL-MCNC: 0.5 MG/DL (ref 0–1.2)
BUN BLDV-MCNC: 14 MG/DL (ref 6–20)
CALCIUM SERPL-MCNC: 7.9 MG/DL (ref 8.6–10.2)
CHLORIDE BLD-SCNC: 98 MEQ/L (ref 98–107)
CO2: 20 MEQ/L (ref 22–29)
CREAT SERPL-MCNC: 1.32 MG/DL (ref 0.5–0.9)
EOSINOPHILS ABSOLUTE: 0 K/UL (ref 0–0.7)
EOSINOPHILS RELATIVE PERCENT: 0.2 %
GFR AFRICAN AMERICAN: 52.2
GFR NON-AFRICAN AMERICAN: 43.1
GLOBULIN: 2.8 G/DL (ref 2.3–3.5)
GLUCOSE BLD-MCNC: 134 MG/DL (ref 74–109)
HCT VFR BLD CALC: 44.8 % (ref 37–47)
HEMOGLOBIN: 15.1 G/DL (ref 12–16)
LYMPHOCYTES ABSOLUTE: 0.9 K/UL (ref 1–4.8)
LYMPHOCYTES RELATIVE PERCENT: 7.4 %
MCH RBC QN AUTO: 32.5 PG (ref 27–31.3)
MCHC RBC AUTO-ENTMCNC: 33.7 % (ref 33–37)
MCV RBC AUTO: 96.4 FL (ref 82–100)
MONOCYTES ABSOLUTE: 0.6 K/UL (ref 0.2–0.8)
MONOCYTES RELATIVE PERCENT: 5.2 %
NEUTROPHILS ABSOLUTE: 10 K/UL (ref 1.4–6.5)
NEUTROPHILS RELATIVE PERCENT: 86.8 %
PDW BLD-RTO: 13.8 % (ref 11.5–14.5)
PLATELET # BLD: 243 K/UL (ref 130–400)
POTASSIUM SERPL-SCNC: 3.7 MEQ/L (ref 3.5–5.1)
RAPID INFLUENZA  B AGN: NEGATIVE
RAPID INFLUENZA A AGN: NEGATIVE
RBC # BLD: 4.64 M/UL (ref 4.2–5.4)
SODIUM BLD-SCNC: 134 MEQ/L (ref 132–144)
TOTAL PROTEIN: 6.4 G/DL (ref 6.4–8.1)
WBC # BLD: 11.5 K/UL (ref 4.8–10.8)

## 2018-04-15 PROCEDURE — 6370000000 HC RX 637 (ALT 250 FOR IP): Performed by: NURSE PRACTITIONER

## 2018-04-15 PROCEDURE — 36415 COLL VENOUS BLD VENIPUNCTURE: CPT

## 2018-04-15 PROCEDURE — 94760 N-INVAS EAR/PLS OXIMETRY 1: CPT

## 2018-04-15 PROCEDURE — 80053 COMPREHEN METABOLIC PANEL: CPT

## 2018-04-15 PROCEDURE — 99284 EMERGENCY DEPT VISIT MOD MDM: CPT

## 2018-04-15 PROCEDURE — 86403 PARTICLE AGGLUT ANTBDY SCRN: CPT

## 2018-04-15 PROCEDURE — 94640 AIRWAY INHALATION TREATMENT: CPT

## 2018-04-15 PROCEDURE — 74176 CT ABD & PELVIS W/O CONTRAST: CPT

## 2018-04-15 PROCEDURE — 85025 COMPLETE CBC W/AUTO DIFF WBC: CPT

## 2018-04-15 PROCEDURE — 87040 BLOOD CULTURE FOR BACTERIA: CPT

## 2018-04-15 RX ORDER — ACETAMINOPHEN 500 MG
1000 TABLET ORAL ONCE
Status: COMPLETED | OUTPATIENT
Start: 2018-04-15 | End: 2018-04-15

## 2018-04-15 RX ORDER — 0.9 % SODIUM CHLORIDE 0.9 %
1000 INTRAVENOUS SOLUTION INTRAVENOUS ONCE
Status: DISCONTINUED | OUTPATIENT
Start: 2018-04-15 | End: 2018-04-15 | Stop reason: HOSPADM

## 2018-04-15 RX ORDER — ALBUTEROL SULFATE 2.5 MG/3ML
2.5 SOLUTION RESPIRATORY (INHALATION) EVERY 10 MIN PRN
Status: DISCONTINUED | OUTPATIENT
Start: 2018-04-15 | End: 2018-04-15 | Stop reason: HOSPADM

## 2018-04-15 RX ORDER — IPRATROPIUM BROMIDE AND ALBUTEROL SULFATE 2.5; .5 MG/3ML; MG/3ML
1 SOLUTION RESPIRATORY (INHALATION) ONCE
Status: COMPLETED | OUTPATIENT
Start: 2018-04-15 | End: 2018-04-15

## 2018-04-15 RX ADMIN — IPRATROPIUM BROMIDE AND ALBUTEROL SULFATE 1 AMPULE: .5; 3 SOLUTION RESPIRATORY (INHALATION) at 12:42

## 2018-04-15 RX ADMIN — ACETAMINOPHEN 1000 MG: 500 TABLET, FILM COATED ORAL at 12:31

## 2018-04-15 ASSESSMENT — PAIN SCALES - GENERAL
PAINLEVEL_OUTOF10: 10
PAINLEVEL_OUTOF10: 10
PAINLEVEL_OUTOF10: 9

## 2018-04-15 ASSESSMENT — ENCOUNTER SYMPTOMS
VOMITING: 0
COUGH: 1
ABDOMINAL PAIN: 0
NAUSEA: 0
DIARRHEA: 0
SHORTNESS OF BREATH: 0

## 2018-04-15 ASSESSMENT — PAIN DESCRIPTION - PAIN TYPE: TYPE: ACUTE PAIN

## 2018-04-15 ASSESSMENT — PAIN DESCRIPTION - LOCATION
LOCATION: BACK
LOCATION: GENERALIZED;FLANK

## 2018-04-15 ASSESSMENT — PAIN DESCRIPTION - ONSET: ONSET: ON-GOING

## 2018-04-15 ASSESSMENT — PAIN DESCRIPTION - DESCRIPTORS: DESCRIPTORS: ACHING

## 2018-04-15 ASSESSMENT — PAIN DESCRIPTION - FREQUENCY: FREQUENCY: CONTINUOUS

## 2018-04-15 ASSESSMENT — PAIN DESCRIPTION - ORIENTATION
ORIENTATION: LEFT
ORIENTATION: LOWER;MID;UPPER

## 2018-04-15 ASSESSMENT — PAIN DESCRIPTION - PROGRESSION: CLINICAL_PROGRESSION: NOT CHANGED

## 2018-04-16 LAB
BLOOD CULTURE, ROUTINE: NORMAL
BLOOD CULTURE, ROUTINE: NORMAL
CSF CULTURE: NORMAL
CULTURE, BLOOD 2: NORMAL
CULTURE, BLOOD 2: NORMAL
HERPES SIMPLEX VIRUS BY PCR: NOT DETECTED
HSV SOURCE: NORMAL

## 2018-04-17 LAB
BLOOD CULTURE, ROUTINE: NORMAL
CULTURE, BLOOD 2: NORMAL

## 2018-04-21 LAB — BLOOD CULTURE, ROUTINE: NORMAL

## 2018-04-23 ENCOUNTER — APPOINTMENT (OUTPATIENT)
Dept: ULTRASOUND IMAGING | Age: 48
End: 2018-04-23
Payer: MEDICAID

## 2018-04-23 ENCOUNTER — HOSPITAL ENCOUNTER (EMERGENCY)
Age: 48
Discharge: HOME | End: 2018-04-23
Attending: INTERNAL MEDICINE
Payer: MEDICAID

## 2018-04-23 VITALS
DIASTOLIC BLOOD PRESSURE: 62 MMHG | HEIGHT: 63 IN | TEMPERATURE: 99.2 F | OXYGEN SATURATION: 96 % | WEIGHT: 200 LBS | BODY MASS INDEX: 35.44 KG/M2 | HEART RATE: 90 BPM | RESPIRATION RATE: 18 BRPM | SYSTOLIC BLOOD PRESSURE: 122 MMHG

## 2018-04-23 DIAGNOSIS — I82.612 ACUTE BASILIC VEIN THROMBOSIS, LEFT: Primary | ICD-10-CM

## 2018-04-23 PROCEDURE — 99283 EMERGENCY DEPT VISIT LOW MDM: CPT

## 2018-04-23 PROCEDURE — 6370000000 HC RX 637 (ALT 250 FOR IP): Performed by: INTERNAL MEDICINE

## 2018-04-23 PROCEDURE — 93971 EXTREMITY STUDY: CPT

## 2018-04-23 RX ORDER — ACETAMINOPHEN 325 MG/1
650 TABLET ORAL ONCE
Status: COMPLETED | OUTPATIENT
Start: 2018-04-23 | End: 2018-04-23

## 2018-04-23 RX ADMIN — ACETAMINOPHEN 650 MG: 325 TABLET, FILM COATED ORAL at 21:15

## 2018-04-23 ASSESSMENT — PAIN SCALES - GENERAL
PAINLEVEL_OUTOF10: 5
PAINLEVEL_OUTOF10: 6
PAINLEVEL_OUTOF10: 0

## 2018-04-23 ASSESSMENT — PAIN DESCRIPTION - FREQUENCY: FREQUENCY: CONTINUOUS

## 2018-04-23 ASSESSMENT — PAIN DESCRIPTION - PAIN TYPE: TYPE: ACUTE PAIN

## 2018-04-23 ASSESSMENT — PAIN DESCRIPTION - ORIENTATION: ORIENTATION: LEFT

## 2018-04-23 ASSESSMENT — PAIN DESCRIPTION - DESCRIPTORS: DESCRIPTORS: ACHING

## 2018-04-23 ASSESSMENT — PAIN DESCRIPTION - LOCATION: LOCATION: ARM

## 2018-04-25 ENCOUNTER — OFFICE VISIT (OUTPATIENT)
Dept: FAMILY MEDICINE CLINIC | Age: 48
End: 2018-04-25
Payer: MEDICAID

## 2018-04-25 VITALS
BODY MASS INDEX: 44.89 KG/M2 | HEART RATE: 92 BPM | SYSTOLIC BLOOD PRESSURE: 126 MMHG | RESPIRATION RATE: 18 BRPM | TEMPERATURE: 98.4 F | WEIGHT: 253.4 LBS | DIASTOLIC BLOOD PRESSURE: 72 MMHG

## 2018-04-25 DIAGNOSIS — R20.2 NUMBNESS AND TINGLING OF RIGHT LEG: ICD-10-CM

## 2018-04-25 DIAGNOSIS — M54.31 SCIATIC NERVE PAIN, RIGHT: ICD-10-CM

## 2018-04-25 DIAGNOSIS — I82.612 ACUTE THROMBOSIS OF LEFT BASILIC VEIN: Primary | ICD-10-CM

## 2018-04-25 DIAGNOSIS — R20.0 NUMBNESS AND TINGLING OF RIGHT LEG: ICD-10-CM

## 2018-04-25 PROCEDURE — G8417 CALC BMI ABV UP PARAM F/U: HCPCS | Performed by: NURSE PRACTITIONER

## 2018-04-25 PROCEDURE — 99213 OFFICE O/P EST LOW 20 MIN: CPT | Performed by: NURSE PRACTITIONER

## 2018-04-25 PROCEDURE — 1111F DSCHRG MED/CURRENT MED MERGE: CPT | Performed by: NURSE PRACTITIONER

## 2018-04-25 PROCEDURE — 1036F TOBACCO NON-USER: CPT | Performed by: NURSE PRACTITIONER

## 2018-04-25 PROCEDURE — G8427 DOCREV CUR MEDS BY ELIG CLIN: HCPCS | Performed by: NURSE PRACTITIONER

## 2018-04-25 ASSESSMENT — ENCOUNTER SYMPTOMS
BACK PAIN: 0
GASTROINTESTINAL NEGATIVE: 1
COUGH: 1
EYES NEGATIVE: 1

## 2018-05-15 ENCOUNTER — HOSPITAL ENCOUNTER (EMERGENCY)
Age: 48
Discharge: HOME | End: 2018-05-15
Attending: EMERGENCY MEDICINE
Payer: MEDICAID

## 2018-05-15 VITALS
HEIGHT: 63 IN | DIASTOLIC BLOOD PRESSURE: 99 MMHG | HEART RATE: 95 BPM | SYSTOLIC BLOOD PRESSURE: 172 MMHG | RESPIRATION RATE: 18 BRPM | BODY MASS INDEX: 38.98 KG/M2 | OXYGEN SATURATION: 96 % | WEIGHT: 220 LBS | TEMPERATURE: 99 F

## 2018-05-15 DIAGNOSIS — K08.89 PAIN, DENTAL: Primary | ICD-10-CM

## 2018-05-15 PROCEDURE — 96372 THER/PROPH/DIAG INJ SC/IM: CPT

## 2018-05-15 PROCEDURE — 6360000002 HC RX W HCPCS: Performed by: EMERGENCY MEDICINE

## 2018-05-15 PROCEDURE — 99283 EMERGENCY DEPT VISIT LOW MDM: CPT

## 2018-05-15 PROCEDURE — 6370000000 HC RX 637 (ALT 250 FOR IP): Performed by: EMERGENCY MEDICINE

## 2018-05-15 RX ORDER — PENICILLIN V POTASSIUM 250 MG/1
500 TABLET ORAL ONCE
Status: COMPLETED | OUTPATIENT
Start: 2018-05-15 | End: 2018-05-15

## 2018-05-15 RX ORDER — TRAMADOL HYDROCHLORIDE 50 MG/1
50 TABLET ORAL EVERY 6 HOURS PRN
Qty: 8 TABLET | Refills: 0 | Status: SHIPPED | OUTPATIENT
Start: 2018-05-15 | End: 2018-05-18

## 2018-05-15 RX ORDER — PENICILLIN V POTASSIUM 500 MG/1
500 TABLET ORAL 4 TIMES DAILY
Qty: 28 TABLET | Refills: 0 | Status: SHIPPED | OUTPATIENT
Start: 2018-05-15 | End: 2018-05-22

## 2018-05-15 RX ORDER — OXYCODONE HYDROCHLORIDE AND ACETAMINOPHEN 5; 325 MG/1; MG/1
1 TABLET ORAL ONCE
Status: DISCONTINUED | OUTPATIENT
Start: 2018-05-15 | End: 2018-05-15

## 2018-05-15 RX ORDER — ONDANSETRON 2 MG/ML
4 INJECTION INTRAMUSCULAR; INTRAVENOUS ONCE
Status: COMPLETED | OUTPATIENT
Start: 2018-05-15 | End: 2018-05-15

## 2018-05-15 RX ADMIN — PENICILLIN V POTASSIUM 500 MG: 250 TABLET, FILM COATED ORAL at 02:08

## 2018-05-15 RX ADMIN — ONDANSETRON 4 MG: 2 INJECTION INTRAMUSCULAR; INTRAVENOUS at 02:08

## 2018-05-15 RX ADMIN — Medication 1 MG: at 02:09

## 2018-05-15 ASSESSMENT — ENCOUNTER SYMPTOMS
COUGH: 0
VOMITING: 0
ABDOMINAL PAIN: 0
PHOTOPHOBIA: 0
WHEEZING: 0
SHORTNESS OF BREATH: 0
EYE DISCHARGE: 0
SORE THROAT: 0
ABDOMINAL DISTENTION: 0
CHEST TIGHTNESS: 0
NAUSEA: 1

## 2018-05-15 ASSESSMENT — PAIN DESCRIPTION - LOCATION: LOCATION: TEETH;HEAD

## 2018-05-15 ASSESSMENT — PAIN DESCRIPTION - ORIENTATION: ORIENTATION: LEFT

## 2018-05-15 ASSESSMENT — PAIN DESCRIPTION - PAIN TYPE: TYPE: ACUTE PAIN

## 2018-05-15 ASSESSMENT — PAIN SCALES - GENERAL: PAINLEVEL_OUTOF10: 10

## 2018-05-25 ENCOUNTER — OFFICE VISIT (OUTPATIENT)
Dept: FAMILY MEDICINE CLINIC | Age: 48
End: 2018-05-25
Payer: MEDICAID

## 2018-05-25 VITALS
BODY MASS INDEX: 46.6 KG/M2 | TEMPERATURE: 97.7 F | SYSTOLIC BLOOD PRESSURE: 114 MMHG | DIASTOLIC BLOOD PRESSURE: 82 MMHG | HEIGHT: 63 IN | HEART RATE: 71 BPM | WEIGHT: 263 LBS | RESPIRATION RATE: 14 BRPM

## 2018-05-25 DIAGNOSIS — M47.812 CERVICAL SPONDYLOSIS WITHOUT MYELOPATHY: ICD-10-CM

## 2018-05-25 DIAGNOSIS — M79.7 FIBROMYALGIA AFFECTING MULTIPLE SITES: ICD-10-CM

## 2018-05-25 DIAGNOSIS — E04.9 THYROID GOITER: ICD-10-CM

## 2018-05-25 DIAGNOSIS — R53.82 CHRONIC FATIGUE: ICD-10-CM

## 2018-05-25 DIAGNOSIS — J30.1 CHRONIC SEASONAL ALLERGIC RHINITIS DUE TO POLLEN: ICD-10-CM

## 2018-05-25 DIAGNOSIS — G43.011 INTRACTABLE MIGRAINE WITHOUT AURA AND WITH STATUS MIGRAINOSUS: ICD-10-CM

## 2018-05-25 DIAGNOSIS — M79.7 FIBROMYALGIA AFFECTING MULTIPLE SITES: Primary | ICD-10-CM

## 2018-05-25 DIAGNOSIS — H65.01 RIGHT ACUTE SEROUS OTITIS MEDIA, RECURRENCE NOT SPECIFIED: ICD-10-CM

## 2018-05-25 LAB
T4 FREE: 0.98 NG/DL (ref 0.93–1.7)
TSH SERPL DL<=0.05 MIU/L-ACNC: 2.57 UIU/ML (ref 0.27–4.2)
VITAMIN B-12: 547 PG/ML (ref 232–1245)
VITAMIN D 25-HYDROXY: 8.6 NG/ML (ref 30–100)

## 2018-05-25 PROCEDURE — G8427 DOCREV CUR MEDS BY ELIG CLIN: HCPCS | Performed by: FAMILY MEDICINE

## 2018-05-25 PROCEDURE — 1036F TOBACCO NON-USER: CPT | Performed by: FAMILY MEDICINE

## 2018-05-25 PROCEDURE — G8417 CALC BMI ABV UP PARAM F/U: HCPCS | Performed by: FAMILY MEDICINE

## 2018-05-25 PROCEDURE — 99213 OFFICE O/P EST LOW 20 MIN: CPT | Performed by: FAMILY MEDICINE

## 2018-05-25 RX ORDER — METHYLPREDNISOLONE 4 MG/1
TABLET ORAL
Qty: 21 TABLET | Refills: 1 | Status: ON HOLD | OUTPATIENT
Start: 2018-05-25 | End: 2018-06-23

## 2018-06-10 ASSESSMENT — ENCOUNTER SYMPTOMS
COUGH: 1
SINUS PAIN: 1
ABDOMINAL PAIN: 0
SINUS PRESSURE: 1
WHEEZING: 0
NAUSEA: 0

## 2018-06-12 RX ORDER — DULOXETIN HYDROCHLORIDE 60 MG/1
60 CAPSULE, DELAYED RELEASE ORAL DAILY
Qty: 30 CAPSULE | Refills: 2 | Status: SHIPPED | OUTPATIENT
Start: 2018-06-12 | End: 2018-09-30 | Stop reason: SDUPTHER

## 2018-06-13 ENCOUNTER — TELEPHONE (OUTPATIENT)
Dept: FAMILY MEDICINE CLINIC | Age: 48
End: 2018-06-13

## 2018-06-20 DIAGNOSIS — J45.41 MODERATE PERSISTENT ASTHMA WITH ACUTE EXACERBATION: ICD-10-CM

## 2018-06-20 DIAGNOSIS — J30.1 CHRONIC SEASONAL ALLERGIC RHINITIS DUE TO POLLEN: Primary | ICD-10-CM

## 2018-06-20 RX ORDER — MONTELUKAST SODIUM 10 MG/1
TABLET ORAL
Qty: 30 TABLET | Refills: 5 | Status: SHIPPED | OUTPATIENT
Start: 2018-06-20 | End: 2018-06-28 | Stop reason: SDUPTHER

## 2018-06-23 ENCOUNTER — APPOINTMENT (OUTPATIENT)
Dept: CT IMAGING | Age: 48
End: 2018-06-23
Payer: MEDICAID

## 2018-06-23 ENCOUNTER — APPOINTMENT (OUTPATIENT)
Dept: GENERAL RADIOLOGY | Age: 48
End: 2018-06-23
Payer: MEDICAID

## 2018-06-23 ENCOUNTER — HOSPITAL ENCOUNTER (OUTPATIENT)
Age: 48
Setting detail: OBSERVATION
Discharge: HOME | End: 2018-06-24
Attending: STUDENT IN AN ORGANIZED HEALTH CARE EDUCATION/TRAINING PROGRAM | Admitting: INTERNAL MEDICINE
Payer: MEDICAID

## 2018-06-23 DIAGNOSIS — F17.200 TOBACCO DEPENDENCE: ICD-10-CM

## 2018-06-23 DIAGNOSIS — G45.9 TRANSIENT CEREBRAL ISCHEMIA, UNSPECIFIED TYPE: Primary | ICD-10-CM

## 2018-06-23 DIAGNOSIS — R29.898 RIGHT LEG WEAKNESS: ICD-10-CM

## 2018-06-23 DIAGNOSIS — F12.10 MARIJUANA ABUSE: ICD-10-CM

## 2018-06-23 PROBLEM — R51.9 HEADACHE: Status: ACTIVE | Noted: 2018-06-23

## 2018-06-23 LAB
ABO/RH: NORMAL
ALBUMIN SERPL-MCNC: 3.8 G/DL (ref 3.9–4.9)
ALP BLD-CCNC: 122 U/L (ref 40–130)
ALT SERPL-CCNC: 14 U/L (ref 0–33)
AMPHETAMINE SCREEN, URINE: ABNORMAL
ANION GAP SERPL CALCULATED.3IONS-SCNC: 12 MEQ/L (ref 7–13)
ANTIBODY SCREEN: NORMAL
APTT: 26.8 SEC (ref 21.6–35.4)
AST SERPL-CCNC: 19 U/L (ref 0–35)
BARBITURATE SCREEN URINE: ABNORMAL
BASOPHILS ABSOLUTE: 0 K/UL (ref 0–0.2)
BASOPHILS RELATIVE PERCENT: 0.7 %
BENZODIAZEPINE SCREEN, URINE: ABNORMAL
BILIRUB SERPL-MCNC: <0.2 MG/DL (ref 0–1.2)
BILIRUBIN URINE: NEGATIVE
BLOOD, URINE: NEGATIVE
BUN BLDV-MCNC: 12 MG/DL (ref 6–20)
CALCIUM SERPL-MCNC: 9.1 MG/DL (ref 8.6–10.2)
CANNABINOID SCREEN URINE: POSITIVE
CHLORIDE BLD-SCNC: 104 MEQ/L (ref 98–107)
CHP ED QC CHECK: NORMAL
CK MB: 2.2 NG/ML (ref 0–3.8)
CLARITY: CLEAR
CO2: 22 MEQ/L (ref 22–29)
COCAINE METABOLITE SCREEN URINE: ABNORMAL
COLOR: YELLOW
CREAT SERPL-MCNC: 1.05 MG/DL (ref 0.5–0.9)
CREATINE KINASE-MB INDEX: 0.5 % (ref 0–3.5)
EKG ATRIAL RATE: 71 BPM
EKG P AXIS: 29 DEGREES
EKG P-R INTERVAL: 138 MS
EKG Q-T INTERVAL: 384 MS
EKG QRS DURATION: 78 MS
EKG QTC CALCULATION (BAZETT): 417 MS
EKG R AXIS: 14 DEGREES
EKG T AXIS: 1 DEGREES
EKG VENTRICULAR RATE: 71 BPM
EOSINOPHILS ABSOLUTE: 0.2 K/UL (ref 0–0.7)
EOSINOPHILS RELATIVE PERCENT: 3.8 %
ETHANOL PERCENT: NORMAL G/DL
ETHANOL: <10 MG/DL (ref 0–0.08)
GFR AFRICAN AMERICAN: >60
GFR NON-AFRICAN AMERICAN: 56.1
GLOBULIN: 3.3 G/DL (ref 2.3–3.5)
GLUCOSE BLD-MCNC: 132 MG/DL
GLUCOSE BLD-MCNC: 132 MG/DL (ref 60–115)
GLUCOSE BLD-MCNC: 139 MG/DL (ref 74–109)
GLUCOSE URINE: 100 MG/DL
HCT VFR BLD CALC: 44 % (ref 37–47)
HEMOGLOBIN: 15 G/DL (ref 12–16)
INR BLD: 1
KETONES, URINE: NEGATIVE MG/DL
LEUKOCYTE ESTERASE, URINE: NEGATIVE
LYMPHOCYTES ABSOLUTE: 1.6 K/UL (ref 1–4.8)
LYMPHOCYTES RELATIVE PERCENT: 25.9 %
Lab: ABNORMAL
MCH RBC QN AUTO: 32.2 PG (ref 27–31.3)
MCHC RBC AUTO-ENTMCNC: 34.1 % (ref 33–37)
MCV RBC AUTO: 94.4 FL (ref 82–100)
MONOCYTES ABSOLUTE: 0.4 K/UL (ref 0.2–0.8)
MONOCYTES RELATIVE PERCENT: 6.4 %
NEUTROPHILS ABSOLUTE: 3.9 K/UL (ref 1.4–6.5)
NEUTROPHILS RELATIVE PERCENT: 63.2 %
NITRITE, URINE: NEGATIVE
OPIATE SCREEN URINE: ABNORMAL
PDW BLD-RTO: 13.2 % (ref 11.5–14.5)
PERFORMED ON: ABNORMAL
PH UA: 6 (ref 5–9)
PHENCYCLIDINE SCREEN URINE: ABNORMAL
PLATELET # BLD: 267 K/UL (ref 130–400)
POTASSIUM SERPL-SCNC: 4.6 MEQ/L (ref 3.5–5.1)
PROTEIN UA: NEGATIVE MG/DL
PROTHROMBIN TIME: 10.6 SEC (ref 9.6–12.3)
RBC # BLD: 4.67 M/UL (ref 4.2–5.4)
SODIUM BLD-SCNC: 138 MEQ/L (ref 132–144)
SPECIFIC GRAVITY UA: 1.03 (ref 1–1.03)
TOTAL CK: 424 U/L (ref 0–170)
TOTAL PROTEIN: 7.1 G/DL (ref 6.4–8.1)
TROPONIN: <0.01 NG/ML (ref 0–0.01)
URINE REFLEX TO CULTURE: ABNORMAL
UROBILINOGEN, URINE: 0.2 E.U./DL
WBC # BLD: 6.2 K/UL (ref 4.8–10.8)

## 2018-06-23 PROCEDURE — 86900 BLOOD TYPING SEROLOGIC ABO: CPT

## 2018-06-23 PROCEDURE — 85025 COMPLETE CBC W/AUTO DIFF WBC: CPT

## 2018-06-23 PROCEDURE — 81003 URINALYSIS AUTO W/O SCOPE: CPT

## 2018-06-23 PROCEDURE — 36415 COLL VENOUS BLD VENIPUNCTURE: CPT

## 2018-06-23 PROCEDURE — 80307 DRUG TEST PRSMV CHEM ANLYZR: CPT

## 2018-06-23 PROCEDURE — 86901 BLOOD TYPING SEROLOGIC RH(D): CPT

## 2018-06-23 PROCEDURE — G0378 HOSPITAL OBSERVATION PER HR: HCPCS

## 2018-06-23 PROCEDURE — 82550 ASSAY OF CK (CPK): CPT

## 2018-06-23 PROCEDURE — 85610 PROTHROMBIN TIME: CPT

## 2018-06-23 PROCEDURE — 70450 CT HEAD/BRAIN W/O DYE: CPT

## 2018-06-23 PROCEDURE — 6360000002 HC RX W HCPCS: Performed by: INTERNAL MEDICINE

## 2018-06-23 PROCEDURE — 2500000003 HC RX 250 WO HCPCS: Performed by: PHYSICIAN ASSISTANT

## 2018-06-23 PROCEDURE — 94664 DEMO&/EVAL PT USE INHALER: CPT

## 2018-06-23 PROCEDURE — 6370000000 HC RX 637 (ALT 250 FOR IP): Performed by: INTERNAL MEDICINE

## 2018-06-23 PROCEDURE — 96374 THER/PROPH/DIAG INJ IV PUSH: CPT

## 2018-06-23 PROCEDURE — 6370000000 HC RX 637 (ALT 250 FOR IP): Performed by: PHYSICIAN ASSISTANT

## 2018-06-23 PROCEDURE — 82553 CREATINE MB FRACTION: CPT

## 2018-06-23 PROCEDURE — 71045 X-RAY EXAM CHEST 1 VIEW: CPT

## 2018-06-23 PROCEDURE — 80053 COMPREHEN METABOLIC PANEL: CPT

## 2018-06-23 PROCEDURE — 85730 THROMBOPLASTIN TIME PARTIAL: CPT

## 2018-06-23 PROCEDURE — 99285 EMERGENCY DEPT VISIT HI MDM: CPT

## 2018-06-23 PROCEDURE — 84484 ASSAY OF TROPONIN QUANT: CPT

## 2018-06-23 PROCEDURE — 96372 THER/PROPH/DIAG INJ SC/IM: CPT

## 2018-06-23 PROCEDURE — 93005 ELECTROCARDIOGRAM TRACING: CPT

## 2018-06-23 PROCEDURE — 6360000002 HC RX W HCPCS: Performed by: PHYSICIAN ASSISTANT

## 2018-06-23 PROCEDURE — 96375 TX/PRO/DX INJ NEW DRUG ADDON: CPT

## 2018-06-23 PROCEDURE — G0480 DRUG TEST DEF 1-7 CLASSES: HCPCS

## 2018-06-23 PROCEDURE — 86850 RBC ANTIBODY SCREEN: CPT

## 2018-06-23 RX ORDER — GABAPENTIN 400 MG/1
400 CAPSULE ORAL 3 TIMES DAILY
Status: DISCONTINUED | OUTPATIENT
Start: 2018-06-23 | End: 2018-06-24 | Stop reason: HOSPADM

## 2018-06-23 RX ORDER — SODIUM CHLORIDE 0.9 % (FLUSH) 0.9 %
10 SYRINGE (ML) INJECTION PRN
Status: DISCONTINUED | OUTPATIENT
Start: 2018-06-23 | End: 2018-06-24 | Stop reason: HOSPADM

## 2018-06-23 RX ORDER — SODIUM CHLORIDE 0.9 % (FLUSH) 0.9 %
10 SYRINGE (ML) INJECTION EVERY 12 HOURS SCHEDULED
Status: DISCONTINUED | OUTPATIENT
Start: 2018-06-23 | End: 2018-06-24 | Stop reason: HOSPADM

## 2018-06-23 RX ORDER — ATORVASTATIN CALCIUM 40 MG/1
40 TABLET, FILM COATED ORAL NIGHTLY
Status: DISCONTINUED | OUTPATIENT
Start: 2018-06-23 | End: 2018-06-24 | Stop reason: HOSPADM

## 2018-06-23 RX ORDER — MONTELUKAST SODIUM 10 MG/1
10 TABLET ORAL NIGHTLY
Status: DISCONTINUED | OUTPATIENT
Start: 2018-06-23 | End: 2018-06-24 | Stop reason: HOSPADM

## 2018-06-23 RX ORDER — TOPIRAMATE 25 MG/1
25 TABLET ORAL 2 TIMES DAILY
Status: DISCONTINUED | OUTPATIENT
Start: 2018-06-23 | End: 2018-06-24 | Stop reason: HOSPADM

## 2018-06-23 RX ORDER — CLOPIDOGREL BISULFATE 75 MG/1
75 TABLET ORAL ONCE
Status: COMPLETED | OUTPATIENT
Start: 2018-06-23 | End: 2018-06-23

## 2018-06-23 RX ORDER — ALBUTEROL SULFATE 2.5 MG/3ML
2.5 SOLUTION RESPIRATORY (INHALATION)
Status: DISCONTINUED | OUTPATIENT
Start: 2018-06-23 | End: 2018-06-24 | Stop reason: HOSPADM

## 2018-06-23 RX ORDER — KETAMINE HYDROCHLORIDE 50 MG/ML
10 INJECTION, SOLUTION, CONCENTRATE INTRAMUSCULAR; INTRAVENOUS ONCE
Status: COMPLETED | OUTPATIENT
Start: 2018-06-23 | End: 2018-06-23

## 2018-06-23 RX ORDER — DULOXETIN HYDROCHLORIDE 60 MG/1
60 CAPSULE, DELAYED RELEASE ORAL DAILY
Status: DISCONTINUED | OUTPATIENT
Start: 2018-06-23 | End: 2018-06-24 | Stop reason: HOSPADM

## 2018-06-23 RX ORDER — KETOROLAC TROMETHAMINE 30 MG/ML
30 INJECTION, SOLUTION INTRAMUSCULAR; INTRAVENOUS EVERY 6 HOURS PRN
Status: DISCONTINUED | OUTPATIENT
Start: 2018-06-23 | End: 2018-06-24 | Stop reason: HOSPADM

## 2018-06-23 RX ORDER — LORAZEPAM 2 MG/ML
1 INJECTION INTRAMUSCULAR ONCE
Status: COMPLETED | OUTPATIENT
Start: 2018-06-23 | End: 2018-06-24

## 2018-06-23 RX ORDER — ACETAMINOPHEN 500 MG
1000 TABLET ORAL EVERY 6 HOURS PRN
Status: DISCONTINUED | OUTPATIENT
Start: 2018-06-23 | End: 2018-06-24 | Stop reason: HOSPADM

## 2018-06-23 RX ORDER — CETIRIZINE HYDROCHLORIDE 10 MG/1
10 TABLET ORAL DAILY PRN
Status: DISCONTINUED | OUTPATIENT
Start: 2018-06-23 | End: 2018-06-24 | Stop reason: HOSPADM

## 2018-06-23 RX ORDER — ASPIRIN 81 MG/1
81 TABLET ORAL DAILY
Status: DISCONTINUED | OUTPATIENT
Start: 2018-06-24 | End: 2018-06-24 | Stop reason: HOSPADM

## 2018-06-23 RX ORDER — BUSPIRONE HYDROCHLORIDE 10 MG/1
10 TABLET ORAL 2 TIMES DAILY
Status: DISCONTINUED | OUTPATIENT
Start: 2018-06-23 | End: 2018-06-24 | Stop reason: HOSPADM

## 2018-06-23 RX ORDER — METOCLOPRAMIDE HYDROCHLORIDE 5 MG/ML
10 INJECTION INTRAMUSCULAR; INTRAVENOUS ONCE
Status: COMPLETED | OUTPATIENT
Start: 2018-06-23 | End: 2018-06-23

## 2018-06-23 RX ORDER — ONDANSETRON 2 MG/ML
4 INJECTION INTRAMUSCULAR; INTRAVENOUS EVERY 6 HOURS PRN
Status: DISCONTINUED | OUTPATIENT
Start: 2018-06-23 | End: 2018-06-24 | Stop reason: HOSPADM

## 2018-06-23 RX ORDER — ALBUTEROL SULFATE 90 UG/1
2 AEROSOL, METERED RESPIRATORY (INHALATION) EVERY 4 HOURS PRN
Status: DISCONTINUED | OUTPATIENT
Start: 2018-06-23 | End: 2018-06-24 | Stop reason: HOSPADM

## 2018-06-23 RX ADMIN — KETAMINE HYDROCHLORIDE 10 MG: 50 INJECTION, SOLUTION INTRAMUSCULAR; INTRAVENOUS at 13:55

## 2018-06-23 RX ADMIN — MONTELUKAST SODIUM 10 MG: 10 TABLET, FILM COATED ORAL at 20:20

## 2018-06-23 RX ADMIN — ACETAMINOPHEN 1000 MG: 500 TABLET, FILM COATED ORAL at 20:20

## 2018-06-23 RX ADMIN — ENOXAPARIN SODIUM 40 MG: 40 INJECTION SUBCUTANEOUS at 20:20

## 2018-06-23 RX ADMIN — KETOROLAC TROMETHAMINE 30 MG: 30 INJECTION, SOLUTION INTRAMUSCULAR at 18:04

## 2018-06-23 RX ADMIN — DULOXETINE HYDROCHLORIDE 60 MG: 60 CAPSULE, DELAYED RELEASE ORAL at 20:20

## 2018-06-23 RX ADMIN — CLOPIDOGREL BISULFATE 75 MG: 75 TABLET ORAL at 15:27

## 2018-06-23 RX ADMIN — ATORVASTATIN CALCIUM 40 MG: 40 TABLET, FILM COATED ORAL at 20:20

## 2018-06-23 RX ADMIN — GABAPENTIN 400 MG: 400 CAPSULE ORAL at 20:20

## 2018-06-23 RX ADMIN — METOCLOPRAMIDE 10 MG: 5 INJECTION, SOLUTION INTRAMUSCULAR; INTRAVENOUS at 13:54

## 2018-06-23 RX ADMIN — BUSPIRONE HYDROCHLORIDE 10 MG: 10 TABLET ORAL at 20:20

## 2018-06-23 ASSESSMENT — PAIN DESCRIPTION - ORIENTATION: ORIENTATION: RIGHT

## 2018-06-23 ASSESSMENT — PAIN DESCRIPTION - PAIN TYPE: TYPE: ACUTE PAIN

## 2018-06-23 ASSESSMENT — ENCOUNTER SYMPTOMS
ABDOMINAL DISTENTION: 0
CONSTIPATION: 0
SHORTNESS OF BREATH: 0
EYE DISCHARGE: 0
SORE THROAT: 0
RHINORRHEA: 0
ABDOMINAL PAIN: 0
COLOR CHANGE: 0

## 2018-06-23 ASSESSMENT — PAIN SCALES - GENERAL
PAINLEVEL_OUTOF10: 10
PAINLEVEL_OUTOF10: 8
PAINLEVEL_OUTOF10: 10
PAINLEVEL_OUTOF10: 7

## 2018-06-23 ASSESSMENT — PAIN DESCRIPTION - LOCATION: LOCATION: EYE

## 2018-06-23 ASSESSMENT — PAIN DESCRIPTION - DESCRIPTORS: DESCRIPTORS: SHARP

## 2018-06-24 ENCOUNTER — APPOINTMENT (OUTPATIENT)
Dept: MRI IMAGING | Age: 48
End: 2018-06-24
Payer: MEDICAID

## 2018-06-24 VITALS
HEIGHT: 63 IN | TEMPERATURE: 97.9 F | RESPIRATION RATE: 16 BRPM | BODY MASS INDEX: 38.98 KG/M2 | WEIGHT: 220 LBS | HEART RATE: 87 BPM | DIASTOLIC BLOOD PRESSURE: 78 MMHG | OXYGEN SATURATION: 98 % | SYSTOLIC BLOOD PRESSURE: 144 MMHG

## 2018-06-24 LAB
ANION GAP SERPL CALCULATED.3IONS-SCNC: 15 MEQ/L (ref 7–13)
BASOPHILS ABSOLUTE: 0 K/UL (ref 0–0.2)
BASOPHILS RELATIVE PERCENT: 0.9 %
BUN BLDV-MCNC: 14 MG/DL (ref 6–20)
CALCIUM SERPL-MCNC: 8.7 MG/DL (ref 8.6–10.2)
CHLORIDE BLD-SCNC: 104 MEQ/L (ref 98–107)
CHOLESTEROL, TOTAL: 230 MG/DL (ref 0–199)
CO2: 22 MEQ/L (ref 22–29)
CREAT SERPL-MCNC: 1.23 MG/DL (ref 0.5–0.9)
EOSINOPHILS ABSOLUTE: 0.3 K/UL (ref 0–0.7)
EOSINOPHILS RELATIVE PERCENT: 5 %
FOLATE: 5.9 NG/ML (ref 7.3–26.1)
GFR AFRICAN AMERICAN: 56.6
GFR NON-AFRICAN AMERICAN: 46.7
GLUCOSE BLD-MCNC: 200 MG/DL (ref 74–109)
HCT VFR BLD CALC: 41.7 % (ref 37–47)
HDLC SERPL-MCNC: 34 MG/DL (ref 40–59)
HEMOGLOBIN: 14.1 G/DL (ref 12–16)
LDL CHOLESTEROL CALCULATED: 154 MG/DL (ref 0–129)
LYMPHOCYTES ABSOLUTE: 1.8 K/UL (ref 1–4.8)
LYMPHOCYTES RELATIVE PERCENT: 33.2 %
MCH RBC QN AUTO: 32.7 PG (ref 27–31.3)
MCHC RBC AUTO-ENTMCNC: 33.9 % (ref 33–37)
MCV RBC AUTO: 96.5 FL (ref 82–100)
MONOCYTES ABSOLUTE: 0.3 K/UL (ref 0.2–0.8)
MONOCYTES RELATIVE PERCENT: 5.5 %
NEUTROPHILS ABSOLUTE: 3 K/UL (ref 1.4–6.5)
NEUTROPHILS RELATIVE PERCENT: 55.4 %
PDW BLD-RTO: 13.4 % (ref 11.5–14.5)
PLATELET # BLD: 251 K/UL (ref 130–400)
POTASSIUM REFLEX MAGNESIUM: 4.2 MEQ/L (ref 3.5–5.1)
RBC # BLD: 4.32 M/UL (ref 4.2–5.4)
SEDIMENTATION RATE, ERYTHROCYTE: 11 MM (ref 0–20)
SODIUM BLD-SCNC: 141 MEQ/L (ref 132–144)
TRIGL SERPL-MCNC: 212 MG/DL (ref 0–200)
TSH SERPL DL<=0.05 MIU/L-ACNC: 1.94 UIU/ML (ref 0.27–4.2)
VITAMIN D 25-HYDROXY: 7.2 NG/ML (ref 30–100)
WBC # BLD: 5.4 K/UL (ref 4.8–10.8)

## 2018-06-24 PROCEDURE — 6360000002 HC RX W HCPCS: Performed by: NURSE PRACTITIONER

## 2018-06-24 PROCEDURE — 6370000000 HC RX 637 (ALT 250 FOR IP): Performed by: INTERNAL MEDICINE

## 2018-06-24 PROCEDURE — 36415 COLL VENOUS BLD VENIPUNCTURE: CPT

## 2018-06-24 PROCEDURE — 6360000002 HC RX W HCPCS: Performed by: INTERNAL MEDICINE

## 2018-06-24 PROCEDURE — G0378 HOSPITAL OBSERVATION PER HR: HCPCS

## 2018-06-24 PROCEDURE — 96375 TX/PRO/DX INJ NEW DRUG ADDON: CPT

## 2018-06-24 PROCEDURE — 84443 ASSAY THYROID STIM HORMONE: CPT

## 2018-06-24 PROCEDURE — 70551 MRI BRAIN STEM W/O DYE: CPT

## 2018-06-24 PROCEDURE — 82746 ASSAY OF FOLIC ACID SERUM: CPT

## 2018-06-24 PROCEDURE — 80048 BASIC METABOLIC PNL TOTAL CA: CPT

## 2018-06-24 PROCEDURE — 96376 TX/PRO/DX INJ SAME DRUG ADON: CPT

## 2018-06-24 PROCEDURE — 85652 RBC SED RATE AUTOMATED: CPT

## 2018-06-24 PROCEDURE — 2580000003 HC RX 258: Performed by: INTERNAL MEDICINE

## 2018-06-24 PROCEDURE — 85025 COMPLETE CBC W/AUTO DIFF WBC: CPT

## 2018-06-24 PROCEDURE — 82306 VITAMIN D 25 HYDROXY: CPT

## 2018-06-24 PROCEDURE — 70544 MR ANGIOGRAPHY HEAD W/O DYE: CPT

## 2018-06-24 PROCEDURE — 80061 LIPID PANEL: CPT

## 2018-06-24 RX ORDER — FOLIC ACID 1 MG/1
5 TABLET ORAL DAILY
Status: DISCONTINUED | OUTPATIENT
Start: 2018-06-24 | End: 2018-06-24 | Stop reason: HOSPADM

## 2018-06-24 RX ORDER — FOLIC ACID 1 MG/1
1 TABLET ORAL DAILY
Qty: 30 TABLET | Refills: 3 | Status: SHIPPED | OUTPATIENT
Start: 2018-06-25 | End: 2019-08-22

## 2018-06-24 RX ORDER — ERGOCALCIFEROL (VITAMIN D2) 1250 MCG
50000 CAPSULE ORAL WEEKLY
Qty: 8 CAPSULE | Refills: 0 | Status: SHIPPED | OUTPATIENT
Start: 2018-07-01 | End: 2018-11-13 | Stop reason: ALTCHOICE

## 2018-06-24 RX ORDER — ERGOCALCIFEROL 1.25 MG/1
50000 CAPSULE ORAL WEEKLY
Status: DISCONTINUED | OUTPATIENT
Start: 2018-06-24 | End: 2018-06-24 | Stop reason: HOSPADM

## 2018-06-24 RX ADMIN — KETOROLAC TROMETHAMINE 30 MG: 30 INJECTION, SOLUTION INTRAMUSCULAR at 11:00

## 2018-06-24 RX ADMIN — ACETAMINOPHEN 1000 MG: 500 TABLET, FILM COATED ORAL at 15:24

## 2018-06-24 RX ADMIN — Medication 10 ML: at 09:08

## 2018-06-24 RX ADMIN — GABAPENTIN 400 MG: 400 CAPSULE ORAL at 13:36

## 2018-06-24 RX ADMIN — GABAPENTIN 400 MG: 400 CAPSULE ORAL at 09:08

## 2018-06-24 RX ADMIN — DULOXETINE HYDROCHLORIDE 60 MG: 60 CAPSULE, DELAYED RELEASE ORAL at 09:08

## 2018-06-24 RX ADMIN — ERGOCALCIFEROL 50000 UNITS: 1.25 CAPSULE ORAL at 17:01

## 2018-06-24 RX ADMIN — KETOROLAC TROMETHAMINE 30 MG: 30 INJECTION, SOLUTION INTRAMUSCULAR at 00:04

## 2018-06-24 RX ADMIN — LORAZEPAM 1 MG: 2 INJECTION INTRAMUSCULAR; INTRAVENOUS at 09:47

## 2018-06-24 RX ADMIN — BUSPIRONE HYDROCHLORIDE 10 MG: 10 TABLET ORAL at 09:08

## 2018-06-24 RX ADMIN — FOLIC ACID 5 MG: 1 TABLET ORAL at 17:01

## 2018-06-24 RX ADMIN — ASPIRIN 81 MG: 81 TABLET, COATED ORAL at 09:08

## 2018-06-24 ASSESSMENT — PAIN SCALES - GENERAL
PAINLEVEL_OUTOF10: 5
PAINLEVEL_OUTOF10: 7
PAINLEVEL_OUTOF10: 7

## 2018-06-25 ENCOUNTER — TELEPHONE (OUTPATIENT)
Dept: PRIMARY CARE CLINIC | Age: 48
End: 2018-06-25

## 2018-06-25 ENCOUNTER — CARE COORDINATION (OUTPATIENT)
Dept: CASE MANAGEMENT | Age: 48
End: 2018-06-25

## 2018-06-25 DIAGNOSIS — R51.9 NONINTRACTABLE HEADACHE, UNSPECIFIED CHRONICITY PATTERN, UNSPECIFIED HEADACHE TYPE: Primary | ICD-10-CM

## 2018-06-25 PROCEDURE — 1111F DSCHRG MED/CURRENT MED MERGE: CPT | Performed by: FAMILY MEDICINE

## 2018-06-25 PROCEDURE — 93010 ELECTROCARDIOGRAM REPORT: CPT | Performed by: INTERNAL MEDICINE

## 2018-06-28 ENCOUNTER — OFFICE VISIT (OUTPATIENT)
Dept: FAMILY MEDICINE CLINIC | Age: 48
End: 2018-06-28
Payer: MEDICAID

## 2018-06-28 VITALS
HEART RATE: 80 BPM | WEIGHT: 262 LBS | TEMPERATURE: 96.9 F | SYSTOLIC BLOOD PRESSURE: 112 MMHG | BODY MASS INDEX: 46.42 KG/M2 | RESPIRATION RATE: 14 BRPM | DIASTOLIC BLOOD PRESSURE: 74 MMHG | HEIGHT: 63 IN

## 2018-06-28 DIAGNOSIS — G43.101 MIGRAINE WITH AURA AND WITH STATUS MIGRAINOSUS, NOT INTRACTABLE: Primary | ICD-10-CM

## 2018-06-28 DIAGNOSIS — J30.1 CHRONIC SEASONAL ALLERGIC RHINITIS DUE TO POLLEN: ICD-10-CM

## 2018-06-28 DIAGNOSIS — J45.41 MODERATE PERSISTENT ASTHMA WITH ACUTE EXACERBATION: ICD-10-CM

## 2018-06-28 PROCEDURE — 99495 TRANSJ CARE MGMT MOD F2F 14D: CPT | Performed by: FAMILY MEDICINE

## 2018-06-28 PROCEDURE — 1111F DSCHRG MED/CURRENT MED MERGE: CPT | Performed by: FAMILY MEDICINE

## 2018-06-28 RX ORDER — BUTALBITAL, ACETAMINOPHEN AND CAFFEINE 50; 325; 40 MG/1; MG/1; MG/1
1 TABLET ORAL 3 TIMES DAILY PRN
Qty: 21 TABLET | Refills: 1 | Status: SHIPPED | OUTPATIENT
Start: 2018-06-28 | End: 2018-12-31 | Stop reason: SDUPTHER

## 2018-06-28 RX ORDER — MONTELUKAST SODIUM 10 MG/1
TABLET ORAL
Qty: 30 TABLET | Refills: 5 | Status: SHIPPED | OUTPATIENT
Start: 2018-06-28 | End: 2021-02-09 | Stop reason: SDUPTHER

## 2018-06-28 NOTE — PROGRESS NOTES
Subjective  Aletha Sample, 53 y.o. female presents today with:  Chief Complaint   Patient presents with    Follow-Up from [facility]     f/u [facility] discharged on 06/24/2018 for headaches       HPI  Patient presents today for a follow up from [facility]. Patient was seen for headaches and was discharged on 06/24/2018. Adm w/ aura and no deficyts,except visual distortion--no arm-leg weakness  Ct/ekg/cxr in er--neg  F/u mri/mra next day was neg for cva  D/c rxs rev and has post ha now without visual change-no neuro-red flags now  ; No cardio-pulmonary probs;compliant with diet/rxs;no new gi-gu complaints   Rev/rxs; No abuse nor side effects on meds   Rev/hosp stay    Review of Systems   Constitutional: Positive for fatigue. HENT: Positive for congestion. Negative for rhinorrhea and sinus pain. Eyes: Positive for photophobia (resolved) and visual disturbance (resolved w/ migraine). Negative for pain, redness and itching. Respiratory: Negative for cough and shortness of breath. Cardiovascular: Negative for chest pain, palpitations and leg swelling. Gastrointestinal: Negative for abdominal pain and diarrhea. Genitourinary: Negative for enuresis and flank pain. Musculoskeletal: Positive for arthralgias, back pain and myalgias. Skin: Negative for rash. Neurological: Positive for headaches. Negative for tremors, seizures, weakness, light-headedness and numbness. Psychiatric/Behavioral: Positive for dysphoric mood.        Allergies   Allergen Reactions    Shellfish-Derived Products     Ibuprofen     Propoxyphene N-Acetaminophen      Other reaction(s): Hives       Past Medical History:   Diagnosis Date    Anxiety     Arthritis     Asthma     Bronchopneumonia     Cancer ([facility] [address]     Cerebral artery occlusion with cerebral infarction (HCC)     Chronic kidney disease     Depression     Hypertension     Mixed headache     Pure hyperglyceridemia 5/19/2017    Thyroid goiter      Past Surgical History:   Procedure Laterality Date    KIDNEY REMOVAL Right 08/2016    THYROID LOBECTOMY Right 6/13/14    URETER STENT PLACEMENT Left 08/2016     Social History     Social History    Marital status: Legally      Spouse name: N/A    Number of children: N/A    Years of education: N/A     Occupational History    Not on file.      Social History Main Topics    Smoking status: Former Smoker     Packs/day: 0.50     Years: 15.00     Types: Cigarettes     Start date: 8/20/2014     Quit date: 1/24/2015    Smokeless tobacco: Former User     Quit date: 3/23/2016    Alcohol use 0.0 oz/week      Comment: occasionally    Drug use: Yes     Types: Marijuana      Comment: one month ago    Sexual activity: Not on file     Other Topics Concern    Not on file     Social History Narrative    No narrative on file     Family History   Problem Relation Age of Onset    Cancer Father     Diabetes Father     High Blood Pressure Mother     Diabetes Mother           Current Outpatient Prescriptions on File Prior to Visit   Medication Sig Dispense Refill    folic acid (FOLVITE) 1 MG tablet Take 1 tablet by mouth daily 30 tablet 3    vitamin D (ERGOCALCIFEROL) 77428 units capsule Take 1 capsule by mouth once a week 8 capsule 0    DULoxetine (CYMBALTA) 60 MG extended release capsule Take 1 capsule by mouth daily (Patient taking differently: Take 60 mg by mouth 2 times daily ) 30 capsule 2    albuterol (PROVENTIL) (2.5 MG/3ML) 0.083% nebulizer solution Take 3 mLs by nebulization every 2 hours as needed for Wheezing 120 each 3    albuterol sulfate  (90 Base) MCG/ACT inhaler Inhale 2 puffs into the lungs every 4 hours as needed for Wheezing 1 Inhaler 3    busPIRone (BUSPAR) 10 MG tablet Take 1/2 tab bid wf x 1 week, then 1 tab bid (Patient taking differently: Take 10 mg by mouth 2 times daily Take 1/2 tab bid wf x 1 week, then 1 tab bid) 60 tablet 2    gabapentin (NEURONTIN) 400 MG capsule Take 1 placed in this encounter. Orders Placed This Encounter   Medications    montelukast (SINGULAIR) 10 MG tablet     Sig: Take 1 tab nightly at supper for breathing/allergies     Dispense:  30 tablet     Refill:  5    butalbital-acetaminophen-caffeine (FIORICET, ESGIC) -40 MG per tablet     Sig: Take 1 tablet by mouth 3 times daily as needed for Headaches     Dispense:  21 tablet     Refill:  1     Medications Discontinued During This Encounter   Medication Reason    montelukast (SINGULAIR) 10 MG tablet REORDER   to er if worse/agres   lower salt diet  Disc migraine diet    Return in about 2 months (around 8/28/2018) for ha/mood. Call or return to clinic prn if these symptoms worsen or fail to improve as anticipated.       Yessi Morales,          Post-Discharge Transitional Care Management Services      Jayant Ranjanraghavendra   YOB: 1970    Date of Office Visit:  6/28/2018  Date of Hospital Admission: 6/23/18  Date of Hospital Discharge: 6/24/18  [facility] Risk Score [risk of hospital readmission >=10  medium risk (chance of readmission ~ 12%) >14  high risk (chance of readmission ~18%)]:Readmission Risk Score: 24    Care management risk score Rising risk (score 2-5) and Complex Care (Scores >=6): 9       Patient Active Problem List   Diagnosis    Anxiety    Asthma    HTN (hypertension)    Thyroid goiter    Lumbar spondylosis    Cervical spondylosis without myelopathy    Spondylosis of lumbar region without myelopathy or radiculopathy--OARRS PM&R 5/24/17    Renal cancer ([address]    Pure hyperglyceridemia    Morbid obesity ([address]    Transient cerebral ischemia    Marijuana use    Chest pain    Meningitis    SOB (shortness of breath)    Exposure to potential infection    Bronchopneumonia    Headache       Allergies   Allergen Reactions    Shellfish-Derived Products     Ibuprofen     Propoxyphene N-Acetaminophen      Other reaction(s): Hives       Medications listed as ordered at the

## 2018-06-29 DIAGNOSIS — J30.1 ACUTE SEASONAL ALLERGIC RHINITIS DUE TO POLLEN: ICD-10-CM

## 2018-06-29 RX ORDER — CETIRIZINE HYDROCHLORIDE 10 MG/1
TABLET ORAL
Qty: 30 TABLET | Refills: 3 | Status: SHIPPED | OUTPATIENT
Start: 2018-06-29 | End: 2018-11-27 | Stop reason: SDUPTHER

## 2018-06-29 NOTE — TELEPHONE ENCOUNTER
PATIENT LAST SEEN 6/28/18  PLEASE APPROVE OR DENY.      Future Appointments  Date Time Provider Valerie Cosby   8/27/2018 11:00 AM Felecia Canada DO [address]

## 2018-07-11 ASSESSMENT — ENCOUNTER SYMPTOMS
EYE ITCHING: 0
ABDOMINAL PAIN: 0
RHINORRHEA: 0
EYE PAIN: 0
DIARRHEA: 0
SHORTNESS OF BREATH: 0
EYE REDNESS: 0
BACK PAIN: 1
COUGH: 0
PHOTOPHOBIA: 1
SINUS PAIN: 0

## 2018-07-18 ENCOUNTER — OFFICE VISIT (OUTPATIENT)
Dept: FAMILY MEDICINE CLINIC | Age: 48
End: 2018-07-18
Payer: MEDICAID

## 2018-07-18 VITALS
HEART RATE: 80 BPM | HEIGHT: 63 IN | SYSTOLIC BLOOD PRESSURE: 116 MMHG | TEMPERATURE: 99.3 F | WEIGHT: 265.6 LBS | DIASTOLIC BLOOD PRESSURE: 72 MMHG | RESPIRATION RATE: 18 BRPM | BODY MASS INDEX: 47.06 KG/M2

## 2018-07-18 DIAGNOSIS — L98.9 SKIN LESION OF LEFT ARM: Primary | ICD-10-CM

## 2018-07-18 DIAGNOSIS — R21 RASH AND NONSPECIFIC SKIN ERUPTION: ICD-10-CM

## 2018-07-18 PROCEDURE — 1036F TOBACCO NON-USER: CPT | Performed by: NURSE PRACTITIONER

## 2018-07-18 PROCEDURE — G8427 DOCREV CUR MEDS BY ELIG CLIN: HCPCS | Performed by: NURSE PRACTITIONER

## 2018-07-18 PROCEDURE — G8417 CALC BMI ABV UP PARAM F/U: HCPCS | Performed by: NURSE PRACTITIONER

## 2018-07-18 PROCEDURE — 99213 OFFICE O/P EST LOW 20 MIN: CPT | Performed by: NURSE PRACTITIONER

## 2018-07-18 RX ORDER — TRIAMCINOLONE ACETONIDE 0.25 MG/G
CREAM TOPICAL
Qty: 30 G | Refills: 0 | Status: SHIPPED | OUTPATIENT
Start: 2018-07-18 | End: 2018-08-20

## 2018-07-18 NOTE — PROGRESS NOTES
rear shoulder area. Outer part of lesion is flat, maroon in color, central area is longer than wide, slightly raised and dark brown. Erythematous, scaly rash to mid upper chest area. Skin: Skin is warm and dry. Assessment:      Diagnosis Orders   1. Skin lesion of left arm  Amb External Referral To Dermatology   2. Rash and nonspecific skin eruption           Plan:      Orders Placed This Encounter   Procedures    Amb External Referral To Dermatology     Referral Priority:   Routine     Referral Type:   Consult for Advice and Opinion     Referred to Provider:   Vargas Bro MD     Requested Specialty:   Dermatology     Number of Visits Requested:   1     Orders Placed This Encounter   Medications    triamcinolone (KENALOG) 0.025 % cream     Sig: Apply topically 2 times daily. Do not apply for more than 10 days at a time. Dispense:  30 g     Refill:  0     Refer to derm for shoulder lesion for further eval and possible removal. Kenalog crem for chest rash. F/u if not improving or worsening. Side effects, adverse effects of the medication prescribed today, as well as treatment plan and result expectations have been discussed with the patient who expresses understanding and desires to proceed.     Close follow up to evaluate treatment results and for coordination of care. I have reviewed the patient's medical history in detail and updated the computerized patient record. Return if symptoms worsen or fail to improve.     Fatoumata Becker, SUDEEP - CNP

## 2018-08-01 LAB
GLUCOSE BLD-MCNC: 153 MG/DL (ref 70–100)
GLUCOSE BLD-MCNC: 201 MG/DL (ref 70–100)
GLUCOSE BLD-MCNC: 214 MG/DL (ref 70–100)
GLUCOSE BLD-MCNC: 219 MG/DL (ref 70–100)

## 2018-08-02 LAB
GLUCOSE BLD-MCNC: 126 MG/DL (ref 70–100)
GLUCOSE BLD-MCNC: 129 MG/DL (ref 70–100)
GLUCOSE BLD-MCNC: 132 MG/DL (ref 70–100)
GLUCOSE BLD-MCNC: 146 MG/DL (ref 70–100)

## 2018-08-03 ENCOUNTER — TELEPHONE (OUTPATIENT)
Dept: FAMILY MEDICINE CLINIC | Age: 48
End: 2018-08-03

## 2018-08-03 LAB
GLUCOSE BLD-MCNC: 117 MG/DL (ref 70–100)
GLUCOSE BLD-MCNC: 120 MG/DL (ref 70–100)

## 2018-08-06 ENCOUNTER — TELEPHONE (OUTPATIENT)
Dept: FAMILY MEDICINE CLINIC | Age: 48
End: 2018-08-06

## 2018-08-06 ENCOUNTER — CARE COORDINATION (OUTPATIENT)
Dept: CASE MANAGEMENT | Age: 48
End: 2018-08-06

## 2018-08-06 ENCOUNTER — CARE COORDINATION (OUTPATIENT)
Dept: CARE COORDINATION | Age: 48
End: 2018-08-06

## 2018-08-06 DIAGNOSIS — E11.9 TYPE 2 DIABETES MELLITUS WITHOUT COMPLICATION, UNSPECIFIED WHETHER LONG TERM INSULIN USE (HCC): Primary | ICD-10-CM

## 2018-08-06 PROCEDURE — 1111F DSCHRG MED/CURRENT MED MERGE: CPT | Performed by: FAMILY MEDICINE

## 2018-08-06 NOTE — TELEPHONE ENCOUNTER
[address] ON THE 08/03/18-AND WAS PRESCRIBED NOVALOG, PT HAS NEVER BEEN ON THIS BEFORE, AND IT IS NOT COVERED BY THE INSURANCE-- TRIED TO CONTACT HOSPITALIST WAS TOLD TO REFER WITH PT'S PCP -- NOVALOG NEEDS TO BE  SWITCHED -- COVERED MED IS ADMELOG COMES IN PEN       PLEASE ADVISE       PHARMA [facility]: [phone]

## 2018-08-06 NOTE — LETTER
Modesta Botello  [address]  [place] [address]          Dear Dena Worrell,    I hope this letter finds you doing well! I am sending you a few patient education materials that I feel would be useful to you. Please look them over and let me know if you have any questions and/or would like to go over the materials over the phone. You can contact me at any of the numbers provided below.        Sincerely,    Adelaide Yost, [address] [facility]  O: [phone]  C: [phone]

## 2018-08-06 NOTE — TELEPHONE ENCOUNTER
[address] ON THE 08/03/18-AND WAS PRESCRIBED NOVALOG, PT HAS NEVER BEEN ON THIS BEFORE, AND IT IS NOT COVERED BY THE INSURANCE-- TRIED TO CONTACT HOSPITALIST WAS TOLD TO REFER WITH PT'S PCP -- NOVALOG NEEDS TO BE  SWITCHED -- COVERED MED IS ADMELOG COMES IN PEN      PLEASE ADVISE      PHARMA [address]:

## 2018-08-13 ENCOUNTER — PATIENT MESSAGE (OUTPATIENT)
Dept: FAMILY MEDICINE CLINIC | Age: 48
End: 2018-08-13

## 2018-08-13 NOTE — TELEPHONE ENCOUNTER
From: Tello Blakely  To: Katina Zambrano DO  Sent: 8/13/2018 6:58 AM EDT  Subject: Prescription Question    I need a prescription for test strips. .I'm new to this so I'm not to sure what you need to know. Darron Bean I'll just add what kind and the numbers on the box. .. Accu- Chek . ..  Lot number [id number]  [facility] [id number]  Cat No. [id number]  If you need anything else please call thanks in advance

## 2018-08-14 ENCOUNTER — CARE COORDINATION (OUTPATIENT)
Dept: CARE COORDINATION | Age: 48
End: 2018-08-14

## 2018-08-14 ENCOUNTER — OFFICE VISIT (OUTPATIENT)
Dept: FAMILY MEDICINE CLINIC | Age: 48
End: 2018-08-14
Payer: MEDICAID

## 2018-08-14 VITALS
SYSTOLIC BLOOD PRESSURE: 114 MMHG | RESPIRATION RATE: 14 BRPM | DIASTOLIC BLOOD PRESSURE: 68 MMHG | WEIGHT: 261 LBS | BODY MASS INDEX: 46.25 KG/M2 | HEART RATE: 67 BPM | HEIGHT: 63 IN | TEMPERATURE: 98.5 F

## 2018-08-14 DIAGNOSIS — I10 ESSENTIAL HYPERTENSION: ICD-10-CM

## 2018-08-14 DIAGNOSIS — N30.90 CYSTITIS: ICD-10-CM

## 2018-08-14 DIAGNOSIS — K52.9 ACUTE GASTROENTERITIS: Primary | ICD-10-CM

## 2018-08-14 DIAGNOSIS — Z23 NEED FOR PROPHYLACTIC VACCINATION AGAINST STREPTOCOCCUS PNEUMONIAE (PNEUMOCOCCUS): ICD-10-CM

## 2018-08-14 DIAGNOSIS — F41.9 ANXIETY: ICD-10-CM

## 2018-08-14 PROCEDURE — 1111F DSCHRG MED/CURRENT MED MERGE: CPT | Performed by: FAMILY MEDICINE

## 2018-08-14 PROCEDURE — 90732 PPSV23 VACC 2 YRS+ SUBQ/IM: CPT | Performed by: FAMILY MEDICINE

## 2018-08-14 PROCEDURE — 99495 TRANSJ CARE MGMT MOD F2F 14D: CPT | Performed by: FAMILY MEDICINE

## 2018-08-14 PROCEDURE — 90471 IMMUNIZATION ADMIN: CPT | Performed by: FAMILY MEDICINE

## 2018-08-14 ASSESSMENT — PATIENT HEALTH QUESTIONNAIRE - PHQ9
SUM OF ALL RESPONSES TO PHQ QUESTIONS 1-9: 1
SUM OF ALL RESPONSES TO PHQ QUESTIONS 1-9: 1

## 2018-08-14 NOTE — PROGRESS NOTES
Post-Discharge Transitional Care Management Services or Hospital Follow Up      Estrada Arambula   YOB: 1970    Date of Office Visit:  8/14/2018  Date of Hospital Admission: 8/3/18  Date of Hospital Discharge: 8/5/18    Care management risk score Rising risk (score 2-5) and Complex Care (Scores >=6): 10     Non face to face  following discharge, date last encounter closed (first attempt may have been earlier): *No documented post hospital discharge outreach found in the last 14 days     Call initiated 2 business days of discharge: *No response recorded in the last 14 days    Patient Active Problem List   Diagnosis    Anxiety    Asthma    HTN (hypertension)    Thyroid goiter    Lumbar spondylosis    Cervical spondylosis without myelopathy    Spondylosis of lumbar region without myelopathy or radiculopathy--OARRS PM&R 5/24/17    Renal cancer ([facility] [address]    Pure hyperglyceridemia    Morbid obesity ([facility] [address]    Transient cerebral ischemia    Marijuana use    Chest pain    Meningitis    SOB (shortness of breath)    Exposure to potential infection    Bronchopneumonia    Headache    Insulin dependent type 2 diabetes mellitus, uncontrolled (HCC)       Allergies   Allergen Reactions    Shellfish-Derived Products     Ibuprofen     Propoxyphene N-Acetaminophen      Other reaction(s): Hives       Medications listed as ordered at the time of discharge from hospital  All rev/pt    Medications marked \"taking\" at this time  Outpatient Prescriptions Marked as Taking for the 8/14/18 encounter (Office Visit) with Ryan Rosado DO   Medication Sig Dispense Refill    blood glucose test strips (EXACTECH TEST) strip 1 each by In Vitro route 3 times daily (Accu-check test strips) As needed.  DX:E11.65 300 strip 5    insulin glargine (LANTUS SOLOSTAR) 100 UNIT/ML injection pen Inject 25 Units into the skin nightly      insulin aspart (NOVOLOG PENFILL) 100 UNIT/ML injection cartridge Inject into the skin 3 times daily (before meals)      Insulin Pen Needle (B-D ULTRAFINE III SHORT PEN) 31G X 8 MM MISC Inject 1 each into the skin daily 30 each 1    [DISCONTINUED] triamcinolone (KENALOG) 0.025 % cream Apply topically 2 times daily. Do not apply for more than 10 days at a time. 30 g 0    cetirizine (ZYRTEC) 10 MG tablet Take 1 qhs for allergies 30 tablet 3    montelukast (SINGULAIR) 10 MG tablet Take 1 tab nightly at supper for breathing/allergies 30 tablet 5    butalbital-acetaminophen-caffeine (FIORICET, ESGIC) -40 MG per tablet Take 1 tablet by mouth 3 times daily as needed for Headaches 21 tablet 1    folic acid (FOLVITE) 1 MG tablet Take 1 tablet by mouth daily 30 tablet 3    vitamin D (ERGOCALCIFEROL) 88356 units capsule Take 1 capsule by mouth once a week 8 capsule 0    DULoxetine (CYMBALTA) 60 MG extended release capsule Take 1 capsule by mouth daily (Patient taking differently: Take 60 mg by mouth 2 times daily ) 30 capsule 2    albuterol (PROVENTIL) (2.5 MG/3ML) 0.083% nebulizer solution Take 3 mLs by nebulization every 2 hours as needed for Wheezing 120 each 3    albuterol sulfate  (90 Base) MCG/ACT inhaler Inhale 2 puffs into the lungs every 4 hours as needed for Wheezing 1 Inhaler 3    gabapentin (NEURONTIN) 400 MG capsule Take 1 capsule by mouth 3 times daily for 90 days. (Patient taking differently: Take 400 mg by mouth 2 times daily. Sotero Inch ) 90 capsule 2    busPIRone (BUSPAR) 10 MG tablet Take 1/2 tab bid wf x 1 week, then 1 tab bid (Patient taking differently: Take 10 mg by mouth 2 times daily Take 1/2 tab bid wf x 1 week, then 1 tab bid) 60 tablet 2        Medications patient taking as of now reconciled against medications ordered at time of hospital discharge: Yes    Chief Complaint   Patient presents with    Follow-Up from [address] [place] [address] in [address] from 07/31/2018-08/03/2018 for new onset diabetes.         HPI    Inpatient course: Discharge summary reviewed- see chart.;as ua pos and ct neg  All cardiac labs were ok  Had dehydration from poorly controlled dm  Started sl scale insulin/novolg w/ qhs lantus  Lesser n/v now  ; No cardio-pulmonary probs;compliant with diet/rxs;no new gi-gu complaints   Rev/rxs; No abuse nor side effects on meds   Rev sl scale/ada diet in detail    Interval history/Current status: see above, as am fg[110-189]    Vitals:    08/14/18 1108   BP: 114/68   Site: Right Arm   Position: Sitting   Cuff Size: Large Adult   Pulse: 67   Resp: 14   Temp: 98.5 °F (36.9 °C)   TempSrc: Temporal   Weight: 261 lb (118.4 kg)   Height: 5' 3\" (1.6 m)     Body mass index is 46.23 kg/m². Wt Readings from Last 3 Encounters:   08/20/18 270 lb (122.5 kg)   08/14/18 261 lb (118.4 kg)   07/18/18 265 lb 9.6 oz (120.5 kg)     BP Readings from Last 3 Encounters:   08/21/18 108/62   08/14/18 114/68   07/18/18 116/72       Physical Exam   Constitutional: She is oriented to person, place, and time. She appears well-developed and well-nourished. No distress. HENT:   Head: Normocephalic. Eyes: Pupils are equal, round, and reactive to light. Conjunctivae and EOM are normal.   Neck: Normal range of motion and full passive range of motion without pain. Neck supple. Carotid bruit is not present. No neck rigidity. No thyroid mass and no thyromegaly present. Cardiovascular: Normal rate, regular rhythm, normal heart sounds and intact distal pulses. No extrasystoles are present. Exam reveals no gallop. No murmur heard. Pulmonary/Chest: Breath sounds normal. No respiratory distress. Abdominal: Soft. Normal appearance. She exhibits no abdominal bruit and no mass. There is no hepatosplenomegaly. There is tenderness (tr/4 tx) in the epigastric area. There is no rigidity, no rebound, no guarding, no CVA tenderness, no tenderness at McBurney's point and negative Yo's sign. Musculoskeletal: She exhibits no edema. Neurological: She is alert and oriented to person, place, and time.  She has normal strength. No cranial nerve deficit or sensory deficit. Psychiatric: She has a normal mood and affect. Cognition and memory are normal.   ;Normal sensory/vascular foot exam and no lesions bilaterally   ; Assessment & Plan    Diagnosis Orders   1. Acute gastroenteritis,resolved     2. Need for prophylactic vaccination against Streptococcus pneumoniae (pneumococcus)  Pneumococcal polysaccharide vaccine 23-valent PPSV23   3. Insulin dependent type 2 diabetes mellitus, uncontrolled (HCC)  Microalbumin / creatinine urine ratio    Basic Metabolic Panel    NM DISCHARGE MEDS RECONCILED W/ CURRENT OUTPATIENT MED LIST   4. Anxiety     5. Essential hypertension  Basic Metabolic Panel    NM DISCHARGE MEDS RECONCILED W/ CURRENT OUTPATIENT MED LIST   6. Cystitis,resolved     long disc re: diet/sl scale  F/u labs  Call if fg >200  ; See above orders, as use and side effects reviewed   ;;  Quality & Risk Score Accuracy    Visit Dx:  E11.65, Z79.4 - Insulin dependent type 2 diabetes mellitus, uncontrolled (HCC)  Assessment and plan:  Improving based upon symptoms and exam. Continue current treatment plan and follow up at least yearly. Improved w/ insulin/diet. .. Last edited 08/26/18 16:45 EDT by Katina Zambrano DO             ; The patient is asked to make an attempt to improve diet and exercise patterns to aid in medical management of this problem. Orders Placed This Encounter   Procedures    Pneumococcal polysaccharide vaccine 23-valent PPSV23    Microalbumin / creatinine urine ratio     Standing Status:   Future     Standing Expiration Date:   8/14/2019    Basic Metabolic Panel     Standing Status:   Future     Standing Expiration Date:   8/14/2019    NM DISCHARGE MEDS RECONCILED W/ CURRENT OUTPATIENT MED LIST     No orders of the defined types were placed in this encounter. There are no discontinued medications. Return in 2 months (on 10/14/2018) for dm.     Call or return to clinic prn if these symptoms worsen or fail (CYMBALTA) 60 MG extended release capsule  Take 1 capsule by mouth daily             folic acid (FOLVITE) 1 MG tablet  Take 1 tablet by mouth daily             gabapentin (NEURONTIN) 400 MG capsule  Take 1 capsule by mouth 3 times daily for 90 days. insulin aspart (NOVOLOG PENFILL) 100 UNIT/ML injection cartridge  Inject into the skin 3 times daily (before meals)             insulin glargine (LANTUS SOLOSTAR) 100 UNIT/ML injection pen  Inject 25 Units into the skin nightly             Insulin Pen Needle (B-D ULTRAFINE III SHORT PEN) 31G X 8 MM MISC  Inject 1 each into the skin daily             montelukast (SINGULAIR) 10 MG tablet  Take 1 tab nightly at supper for breathing/allergies             ONE TOUCH ULTRASOFT LANCETS MISC  TEST 3 TIMES DAILY AS NEEDED             vitamin D (ERGOCALCIFEROL) 15639 units capsule  Take 1 capsule by mouth once a week                   Medications marked \"taking\" at this time  Outpatient Prescriptions Marked as Taking for the 8/14/18 encounter (Office Visit) with Moriah Mckeon, DO   Medication Sig Dispense Refill    blood glucose test strips (EXACTECH TEST) strip 1 each by In Vitro route 3 times daily (Accu-check test strips) As needed. DX:E11.65 300 strip 5    insulin glargine (LANTUS SOLOSTAR) 100 UNIT/ML injection pen Inject 25 Units into the skin nightly      insulin aspart (NOVOLOG PENFILL) 100 UNIT/ML injection cartridge Inject into the skin 3 times daily (before meals)      Insulin Pen Needle (B-D ULTRAFINE III SHORT PEN) 31G X 8 MM MISC Inject 1 each into the skin daily 30 each 1    [DISCONTINUED] triamcinolone (KENALOG) 0.025 % cream Apply topically 2 times daily. Do not apply for more than 10 days at a time.  30 g 0    cetirizine (ZYRTEC) 10 MG tablet Take 1 qhs for allergies 30 tablet 3    montelukast (SINGULAIR) 10 MG tablet Take 1 tab nightly at supper for breathing/allergies 30 tablet 5    butalbital-acetaminophen-caffeine (FIORICET, ESGIC) -66 Making: moderate complexity

## 2018-08-15 ENCOUNTER — TELEPHONE (OUTPATIENT)
Dept: FAMILY MEDICINE CLINIC | Age: 48
End: 2018-08-15

## 2018-08-15 NOTE — CARE COORDINATION
night?:  No  Do you use rails/bars?:  No  Do you have a non-slip tub mat?:  Yes     Are you experiencing loss of meaning?:  No  Are you experiencing loss of hope and peace?:  No     Thinking about your patient's physical health needs, are there any symptoms or problems (risk indicators) you are unsure about that require further investigation?:  Moderate to severe symptoms or problems that impact on daily life   Are the patients physical health problems impacting on their mental well-being?:  Mild impact on mental well-being e.g. \"\"feeling fed-up\"\", \"\"reduced enjoyment\"\"   Are there any problems with your patients lifestyle behaviors (alcohol, drugs, diet, exercise) that are impacting on physical or mental well-being?:  Some mild concern of potential negative impact on well-being   Do you have any other concerns about your patients mental well-being? How would you rate their severity and impact on the patient?:  Mild problems - don't interfere with function   How would you rate their home environment in terms of safety and stability (including domestic violence, insecure housing, neighbor harassment)?:  Consistently safe, supportive, stable, no identified problems   How do daily activities impact on the patient's well-being? (include current or anticipated unemployment, work, caregiving, access to transportation or other):   Contributes to low mood or stress at times   How would you rate their social network (family, work, friends)?:  Good participation with social networks   How would you rate their financial resources (including ability to afford all required medical care)?:  Financially secure, some resource challenges   How wells does the patient now understand their health and well-being (symptoms, signs or risk factors) and what they need to do to manage their health?:  Little understanding which impacts on their ability to undertake better management   How well do you think your patient can engage in as needed for Headaches 6/28/18   Marcelina Every, DO   folic acid (FOLVITE) 1 MG tablet Take 1 tablet by mouth daily 6/25/18   Agustin Diop MD   vitamin D (ERGOCALCIFEROL) 77222 units capsule Take 1 capsule by mouth once a week 7/1/18   Agustin Diop MD   DULoxetine (CYMBALTA) 60 MG extended release capsule Take 1 capsule by mouth daily  Patient taking differently: Take 60 mg by mouth 2 times daily  6/12/18   Marcelina Every, DO   albuterol (PROVENTIL) (2.5 MG/3ML) 0.083% nebulizer solution Take 3 mLs by nebulization every 2 hours as needed for Wheezing 4/14/18   Kailey Collier MD   albuterol sulfate  (90 Base) MCG/ACT inhaler Inhale 2 puffs into the lungs every 4 hours as needed for Wheezing 4/14/18   Kailey Collier MD   gabapentin (NEURONTIN) 400 MG capsule Take 1 capsule by mouth 3 times daily for 90 days. Patient taking differently: Take 400 mg by mouth 2 times daily.  . 3/29/18   Fatoumata Becker APRN - CNP   busPIRone (BUSPAR) 10 MG tablet Take 1/2 tab bid wf x 1 week, then 1 tab bid  Patient taking differently: Take 10 mg by mouth 2 times daily Take 1/2 tab bid wf x 1 week, then 1 tab bid 9/27/17   Marcelina Every, DO       Future Appointments  Date Time Provider Valerie Cosby   8/27/2018 11:00 AM Marcelina Every, DO [address]   9/14/2018 1:30 PM Marcelina Every, DO [facility]

## 2018-08-16 RX ORDER — BLOOD-GLUCOSE METER
EACH MISCELLANEOUS
Qty: 1 KIT | Refills: 0 | Status: SHIPPED | OUTPATIENT
Start: 2018-08-16

## 2018-08-16 RX ORDER — LANCETS
EACH MISCELLANEOUS
Qty: 300 EACH | Refills: 3 | Status: SHIPPED | OUTPATIENT
Start: 2018-08-16 | End: 2019-12-02 | Stop reason: SDUPTHER

## 2018-08-20 ENCOUNTER — APPOINTMENT (OUTPATIENT)
Dept: CT IMAGING | Age: 48
End: 2018-08-20
Payer: MEDICAID

## 2018-08-20 ENCOUNTER — HOSPITAL ENCOUNTER (EMERGENCY)
Age: 48
Discharge: HOME | End: 2018-08-21
Attending: STUDENT IN AN ORGANIZED HEALTH CARE EDUCATION/TRAINING PROGRAM
Payer: MEDICAID

## 2018-08-20 DIAGNOSIS — R10.9 ABDOMINAL PAIN, UNSPECIFIED ABDOMINAL LOCATION: Primary | ICD-10-CM

## 2018-08-20 LAB
ALBUMIN SERPL-MCNC: 4.1 G/DL (ref 3.9–4.9)
ALP BLD-CCNC: 109 U/L (ref 40–130)
ALT SERPL-CCNC: 31 U/L (ref 0–33)
AMYLASE: 63 U/L (ref 28–100)
ANION GAP SERPL CALCULATED.3IONS-SCNC: 11 MEQ/L (ref 7–13)
AST SERPL-CCNC: 35 U/L (ref 0–35)
BASOPHILS ABSOLUTE: 0.1 K/UL (ref 0–0.2)
BASOPHILS RELATIVE PERCENT: 1.1 %
BILIRUB SERPL-MCNC: <0.2 MG/DL (ref 0–1.2)
BILIRUBIN URINE: NEGATIVE
BLOOD, URINE: ABNORMAL
BUN BLDV-MCNC: 11 MG/DL (ref 6–20)
CALCIUM SERPL-MCNC: 9.3 MG/DL (ref 8.6–10.2)
CHLORIDE BLD-SCNC: 104 MEQ/L (ref 98–107)
CHP ED QC CHECK: NORMAL
CHP ED QC CHECK: YES
CLARITY: CLEAR
CO2: 25 MEQ/L (ref 22–29)
COLOR: YELLOW
CREAT SERPL-MCNC: 1.43 MG/DL (ref 0.5–0.9)
EOSINOPHILS ABSOLUTE: 0.3 K/UL (ref 0–0.7)
EOSINOPHILS RELATIVE PERCENT: 7 %
EPITHELIAL CELLS, UA: NORMAL /HPF
GFR AFRICAN AMERICAN: 47.5
GFR NON-AFRICAN AMERICAN: 39.3
GLOBULIN: 3.2 G/DL (ref 2.3–3.5)
GLUCOSE BLD-MCNC: 117 MG/DL (ref 74–109)
GLUCOSE URINE: NEGATIVE MG/DL
HCT VFR BLD CALC: 45.2 % (ref 37–47)
HEMOGLOBIN: 15.3 G/DL (ref 12–16)
KETONES, URINE: NEGATIVE MG/DL
LEUKOCYTE ESTERASE, URINE: NEGATIVE
LIPASE: 35 U/L (ref 13–60)
LYMPHOCYTES ABSOLUTE: 1.4 K/UL (ref 1–4.8)
LYMPHOCYTES RELATIVE PERCENT: 28.9 %
MCH RBC QN AUTO: 32.4 PG (ref 27–31.3)
MCHC RBC AUTO-ENTMCNC: 33.7 % (ref 33–37)
MCV RBC AUTO: 96.2 FL (ref 82–100)
MONOCYTES ABSOLUTE: 0.4 K/UL (ref 0.2–0.8)
MONOCYTES RELATIVE PERCENT: 7.8 %
NEUTROPHILS ABSOLUTE: 2.7 K/UL (ref 1.4–6.5)
NEUTROPHILS RELATIVE PERCENT: 55.2 %
NITRITE, URINE: NEGATIVE
PDW BLD-RTO: 13.6 % (ref 11.5–14.5)
PH UA: 5.5 (ref 5–9)
PLATELET # BLD: 282 K/UL (ref 130–400)
POTASSIUM SERPL-SCNC: 4.1 MEQ/L (ref 3.5–5.1)
PREGNANCY TEST URINE, POC: NEGATIVE
PREGNANCY TEST URINE, POC: NORMAL
PROTEIN UA: NEGATIVE MG/DL
RBC # BLD: 4.7 M/UL (ref 4.2–5.4)
RBC UA: NORMAL /HPF (ref 0–2)
SODIUM BLD-SCNC: 140 MEQ/L (ref 132–144)
SPECIFIC GRAVITY UA: 1.02 (ref 1–1.03)
TOTAL PROTEIN: 7.3 G/DL (ref 6.4–8.1)
URINE REFLEX TO CULTURE: YES
UROBILINOGEN, URINE: 1 E.U./DL
WBC # BLD: 4.8 K/UL (ref 4.8–10.8)
WBC UA: NORMAL /HPF (ref 0–5)

## 2018-08-20 PROCEDURE — 74176 CT ABD & PELVIS W/O CONTRAST: CPT

## 2018-08-20 PROCEDURE — 96372 THER/PROPH/DIAG INJ SC/IM: CPT

## 2018-08-20 PROCEDURE — 96374 THER/PROPH/DIAG INJ IV PUSH: CPT

## 2018-08-20 PROCEDURE — 87086 URINE CULTURE/COLONY COUNT: CPT

## 2018-08-20 PROCEDURE — 80053 COMPREHEN METABOLIC PANEL: CPT

## 2018-08-20 PROCEDURE — 99284 EMERGENCY DEPT VISIT MOD MDM: CPT

## 2018-08-20 PROCEDURE — 85025 COMPLETE CBC W/AUTO DIFF WBC: CPT

## 2018-08-20 PROCEDURE — 81001 URINALYSIS AUTO W/SCOPE: CPT

## 2018-08-20 PROCEDURE — 83690 ASSAY OF LIPASE: CPT

## 2018-08-20 PROCEDURE — 82150 ASSAY OF AMYLASE: CPT

## 2018-08-20 PROCEDURE — 6360000002 HC RX W HCPCS: Performed by: STUDENT IN AN ORGANIZED HEALTH CARE EDUCATION/TRAINING PROGRAM

## 2018-08-20 PROCEDURE — 36415 COLL VENOUS BLD VENIPUNCTURE: CPT

## 2018-08-20 RX ORDER — KETOROLAC TROMETHAMINE 30 MG/ML
30 INJECTION, SOLUTION INTRAMUSCULAR; INTRAVENOUS ONCE
Status: COMPLETED | OUTPATIENT
Start: 2018-08-20 | End: 2018-08-20

## 2018-08-20 RX ORDER — DICYCLOMINE HYDROCHLORIDE 10 MG/ML
20 INJECTION INTRAMUSCULAR ONCE
Status: COMPLETED | OUTPATIENT
Start: 2018-08-20 | End: 2018-08-20

## 2018-08-20 RX ADMIN — KETOROLAC TROMETHAMINE 30 MG: 30 INJECTION, SOLUTION INTRAMUSCULAR at 22:06

## 2018-08-20 RX ADMIN — DICYCLOMINE HYDROCHLORIDE 20 MG: 20 INJECTION, SOLUTION INTRAMUSCULAR at 22:06

## 2018-08-20 ASSESSMENT — ENCOUNTER SYMPTOMS
BACK PAIN: 0
ABDOMINAL PAIN: 1
VOMITING: 0
DIARRHEA: 0
NAUSEA: 0
SINUS PRESSURE: 0
COUGH: 0
SHORTNESS OF BREATH: 0
CHEST TIGHTNESS: 0
CONSTIPATION: 0
TROUBLE SWALLOWING: 0
BLOOD IN STOOL: 0

## 2018-08-20 ASSESSMENT — PAIN SCALES - GENERAL
PAINLEVEL_OUTOF10: 6
PAINLEVEL_OUTOF10: 6

## 2018-08-20 ASSESSMENT — PAIN DESCRIPTION - DESCRIPTORS: DESCRIPTORS: ACHING;SPASM

## 2018-08-20 ASSESSMENT — PAIN DESCRIPTION - FREQUENCY: FREQUENCY: INTERMITTENT

## 2018-08-20 ASSESSMENT — PAIN DESCRIPTION - PAIN TYPE: TYPE: ACUTE PAIN

## 2018-08-20 ASSESSMENT — PAIN DESCRIPTION - ONSET: ONSET: ON-GOING

## 2018-08-20 ASSESSMENT — PAIN DESCRIPTION - LOCATION: LOCATION: ABDOMEN

## 2018-08-20 ASSESSMENT — PAIN DESCRIPTION - PROGRESSION: CLINICAL_PROGRESSION: GRADUALLY WORSENING

## 2018-08-20 ASSESSMENT — PAIN DESCRIPTION - ORIENTATION: ORIENTATION: RIGHT;LEFT

## 2018-08-21 VITALS
HEIGHT: 63 IN | WEIGHT: 270 LBS | HEART RATE: 60 BPM | DIASTOLIC BLOOD PRESSURE: 62 MMHG | RESPIRATION RATE: 18 BRPM | OXYGEN SATURATION: 98 % | BODY MASS INDEX: 47.84 KG/M2 | SYSTOLIC BLOOD PRESSURE: 108 MMHG | TEMPERATURE: 98.4 F

## 2018-08-21 PROCEDURE — 6370000000 HC RX 637 (ALT 250 FOR IP): Performed by: NURSE PRACTITIONER

## 2018-08-21 RX ORDER — TRAMADOL HYDROCHLORIDE 50 MG/1
50 TABLET ORAL ONCE
Status: COMPLETED | OUTPATIENT
Start: 2018-08-21 | End: 2018-08-21

## 2018-08-21 RX ORDER — DICYCLOMINE HYDROCHLORIDE 10 MG/1
20 CAPSULE ORAL EVERY 6 HOURS PRN
Qty: 20 CAPSULE | Refills: 0 | Status: ON HOLD | OUTPATIENT
Start: 2018-08-21 | End: 2019-04-11

## 2018-08-21 RX ADMIN — TRAMADOL HYDROCHLORIDE 50 MG: 50 TABLET, FILM COATED ORAL at 00:05

## 2018-08-21 ASSESSMENT — PAIN SCALES - GENERAL: PAINLEVEL_OUTOF10: 7

## 2018-08-21 NOTE — ED NOTES
Pt medicated per orders, jay. Well, 0 c/o, 0 sob, 0 distress, calm, resting in bed, msp's intact, will monitor.      Tracey Alvarez RN  08/21/18 0005

## 2018-08-21 NOTE — ED PROVIDER NOTES
Starting Sat 8/4/2018, Until Sun 8/4/2019, 365 doses, Disp-30 each, R-1, Normal      cetirizine (ZYRTEC) 10 MG tablet Take 1 qhs for allergies, Disp-30 tablet, R-3Normal      montelukast (SINGULAIR) 10 MG tablet Take 1 tab nightly at supper for breathing/allergies, Disp-30 tablet, R-5Normal      butalbital-acetaminophen-caffeine (FIORICET, ESGIC) -40 MG per tablet Take 1 tablet by mouth 3 times daily as needed for Headaches, Disp-21 tablet, [id number]      folic acid (FOLVITE) 1 MG tablet Take 1 tablet by mouth daily, Disp-30 tablet, R-3Normal      vitamin D (ERGOCALCIFEROL) 26465 units capsule Take 1 capsule by mouth once a week, Disp-8 capsule, R-0Normal      DULoxetine (CYMBALTA) 60 MG extended release capsule Take 1 capsule by mouth daily, Disp-30 capsule, R-2Normal      albuterol (PROVENTIL) (2.5 MG/3ML) 0.083% nebulizer solution Take 3 mLs by nebulization every 2 hours as needed for Wheezing, Disp-120 each, R-3Print      albuterol sulfate  (90 Base) MCG/ACT inhaler Inhale 2 puffs into the lungs every 4 hours as needed for Wheezing, Disp-1 Inhaler, R-3Print      gabapentin (NEURONTIN) 400 MG capsule Take 1 capsule by mouth 3 times daily for 90 days. , Disp-90 capsule, R-2Normal      busPIRone (BUSPAR) 10 MG tablet Take 1/2 tab bid wf x 1 week, then 1 tab bid, Disp-60 tablet, R-2Normal       !! - Potential duplicate medications found. Please discuss with provider. ALLERGIES     Shellfish-derived products;  Ibuprofen; and Propoxyphene n-acetaminophen    FAMILY HISTORY       Family History   Problem Relation Age of Onset    Cancer Father     Diabetes Father     High Blood Pressure Mother     Diabetes Mother           SOCIAL HISTORY       Social History     Social History    Marital status: Legally      Spouse name: N/A    Number of children: N/A    Years of education: N/A     Social History Main Topics    Smoking status: Former Smoker     Packs/day: 0.50     Years: 15.00 hepatosplenomegaly. There is tenderness in the right lower quadrant, suprapubic area and left lower quadrant. There is no rigidity, no rebound, no guarding, no CVA tenderness, no tenderness at McBurney's point and negative Yo's sign. Musculoskeletal: Normal range of motion. She exhibits no edema or tenderness. Lymphadenopathy:        Head (right side): No submental adenopathy present. Head (left side): No submental adenopathy present. She has no cervical adenopathy. Neurological: She is alert and oriented to person, place, and time. She has normal reflexes. No cranial nerve deficit. She exhibits normal muscle tone. Coordination normal.   Skin: Skin is warm and dry. No rash noted. She is not diaphoretic. No erythema. No pallor. Psychiatric: She has a normal mood and affect. Her behavior is normal. Judgment and thought content normal.   Nursing note and vitals reviewed. DIAGNOSTIC RESULTS     EKG: All EKG's are interpreted by the Emergency Department Physician who either signs or Co-signs this chart in the absence of a cardiologist.        RADIOLOGY:   Non-plain film images such as CT, Ultrasound and MRI are read by the radiologist. Plain radiographic images are visualized and preliminarily interpreted by the emergency physician with the below findings:      Interpretation per the Radiologist below, if available at the time of this note:    CT ABDOMEN PELVIS WO CONTRAST Additional Contrast? None   Final Result   UNREMARKABLE CT SCAN OF THE ABDOMEN AND PELVIS AS DESCRIBED ABOVE         All CT scans at this facility use dose modulation, iterative reconstruction, and/or weight based dosing when appropriate to reduce radiation dose to as low as reasonably achievable.             ED BEDSIDE ULTRASOUND:   Performed by ED Physician - none    LABS:  Labs Reviewed   URINE RT REFLEX TO CULTURE - Abnormal; Notable for the following:        Result Value    Blood, Urine MODERATE (*)     All other intervention. CONSULTS:  None    PROCEDURES:  Unless otherwise noted below, none     Procedures    FINAL IMPRESSION      1.  Abdominal pain, unspecified abdominal location          DISPOSITION/PLAN   DISPOSITION Decision To Discharge 08/21/2018 12:16:38 AM      PATIENT REFERRED TO:  Isabella Nj   [address] [address] [phone]    Schedule an appointment as soon as possible for a visit in 1 day      Aletha Neil CamillaBlayne Luisito Palomares [facility]  [phone] [facility]  [phone]    Schedule an appointment as soon as possible for a visit in 1 day        DISCHARGE MEDICATIONS:  Discharge Medication List as of 8/21/2018 12:17 AM      START taking these medications    Details   dicyclomine (BENTYL) 10 MG capsule Take 2 capsules by mouth every 6 hours as needed (cramps), Disp-20 capsule, R-0Print                (Please note that portions of this note were completed with a voice recognition program.  Efforts were made to edit the dictations but occasionally words are mis-transcribed.)    SUDEEP Bucknre CNP (electronically signed)  Attending Emergency Physician          SUDEEP Buckner CNP  08/21/18 0155

## 2018-08-21 NOTE — ED NOTES
Pt brought to room 21. Care assumed. Pt c/o lower abd pain x 3 days. Denies n/v/d. Urine sample already collected, results in computer. Pt states pain feels like a spasm and is intermittent. Pt states she was recently started on insulin 3 weeks ago but her blood sugars have been good at home. States blood sugars have been around 100-130 and often she does not even need to use sliding scale insulin bc sugar is within normal range.   Pt states she is vigilant about monitoring her blood sugar and they have not been high     Matt Christy RN  08/20/18 8050

## 2018-08-21 NOTE — ED NOTES
Patient sent back out to lobby with urine cup for urine sample.       Devika Mukherjee RN  08/20/18 2001

## 2018-08-22 LAB — URINE CULTURE, ROUTINE: NORMAL

## 2018-08-23 ENCOUNTER — CARE COORDINATION (OUTPATIENT)
Dept: CARE COORDINATION | Age: 48
End: 2018-08-23

## 2018-08-23 NOTE — CARE COORDINATION
Ambulatory Care Coordination ED Follow up Call       Reason for ED Visit:  Abdominal Pain  Care Management Risk Score: CMRS 10  How are you feeling? :     Improved:   Patient Reports the following:  Pt c/o of intermittent bilateral lower abdominal pain/cramping. Pt reports pain is worse at HS and first thing in the am. Pt states pain is relieved by PRN Bentyl. Pt denies n/v/d, fever, chills. Contact [facility] regarding any worsening symptoms from above. Did you call your PCP prior to going to the ED? Yes          Post Discharge Status:  What health concerns since you left the Emergency Room? None    Do you have wounds that you are caring for at home? No    Do you have a follow up appt scheduled? yes    Review of Instructions:                                 Do you have any questions regarding your discharge instructions?:  No  Medications:    What questions do you have about your medications? N/A  Are you taking your medications as directed? If not - why? Yes   Can you afford your medications? yes  ADLS:  Do you need assistance of any kind at home? No   What assistance is needed? FU appts/Provider:    Future Appointments  Date Time Provider Valerie Cosby   8/27/2018 11:00 AM Yessi Morales DO [facility]   9/14/2018 1:30 PM Yessi Morales DO [facility] Maintenance Due   Topic Date Due    Diabetic foot exam  03/22/1981    Diabetic microalbuminuria test  03/22/1989     Patient advised to contact PCP office to have HM items/records faxed to PCP Office directly?   yes

## 2018-08-26 ASSESSMENT — ENCOUNTER SYMPTOMS
ABDOMINAL PAIN: 1
SHORTNESS OF BREATH: 0
VOMITING: 0
NAUSEA: 1

## 2018-09-12 DIAGNOSIS — M47.816 SPONDYLOSIS OF LUMBAR REGION WITHOUT MYELOPATHY OR RADICULOPATHY: Primary | ICD-10-CM

## 2018-09-12 RX ORDER — GABAPENTIN 400 MG/1
400 CAPSULE ORAL 3 TIMES DAILY
Qty: 90 CAPSULE | Refills: 2 | Status: ON HOLD | OUTPATIENT
Start: 2018-09-12 | End: 2019-05-19

## 2018-09-12 NOTE — TELEPHONE ENCOUNTER
From: Jon Kehr  Sent: 9/12/2018 3:20 PM EDT  Subject: Medication Renewal Request    Jon Kehr would like a refill of the following medications:     gabapentin (NEURONTIN) 400 MG capsule [Beatrice Olden Fleischer, SUDEEP - CNP]    Preferred pharmacy: [facility] [address] [place] [id number] - F [phone]    Comment:

## 2018-09-12 NOTE — TELEPHONE ENCOUNTER
Pt requests refill on medication.  Please approve or deny this request.    LOV 8/14/2018    Last refill 3/29/18    Future Appointments  Date Time Provider Valerie Cosby   9/14/2018 1:30 PM [address]

## 2018-09-17 DIAGNOSIS — E01.0 THYROMEGALY: Primary | ICD-10-CM

## 2018-09-17 DIAGNOSIS — R13.10 DYSPHAGIA, UNSPECIFIED TYPE: ICD-10-CM

## 2018-10-04 ENCOUNTER — TELEPHONE (OUTPATIENT)
Dept: UROLOGY | Age: 48
End: 2018-10-04

## 2018-10-05 ENCOUNTER — CARE COORDINATION (OUTPATIENT)
Dept: CARE COORDINATION | Age: 48
End: 2018-10-05

## 2018-10-10 DIAGNOSIS — F43.22 ADJUSTMENT REACTION WITH ANXIETY: Primary | ICD-10-CM

## 2018-10-10 RX ORDER — BUSPIRONE HYDROCHLORIDE 5 MG/1
TABLET ORAL
Qty: 30 TABLET | Refills: 2 | Status: SHIPPED | OUTPATIENT
Start: 2018-10-10 | End: 2018-10-19 | Stop reason: DRUGHIGH

## 2018-10-11 ENCOUNTER — TELEPHONE (OUTPATIENT)
Dept: FAMILY MEDICINE CLINIC | Age: 48
End: 2018-10-11

## 2018-10-12 ENCOUNTER — OFFICE VISIT (OUTPATIENT)
Dept: UROLOGY | Age: 48
End: 2018-10-12
Payer: MEDICAID

## 2018-10-12 VITALS
BODY MASS INDEX: 38.98 KG/M2 | WEIGHT: 220 LBS | SYSTOLIC BLOOD PRESSURE: 102 MMHG | HEIGHT: 63 IN | DIASTOLIC BLOOD PRESSURE: 68 MMHG | HEART RATE: 76 BPM

## 2018-10-12 DIAGNOSIS — N23 KIDNEY PAIN: Primary | ICD-10-CM

## 2018-10-12 LAB
BILIRUBIN, POC: NORMAL
BLOOD URINE, POC: NORMAL
CLARITY, POC: CLEAR
COLOR, POC: YELLOW
GLUCOSE URINE, POC: NORMAL
KETONES, POC: NORMAL
LEUKOCYTE EST, POC: NORMAL
NITRITE, POC: NORMAL
PH, POC: 5.5
POST VOID RESIDUAL (PVR): NORMAL ML
PROTEIN, POC: NORMAL
SPECIFIC GRAVITY, POC: 1.02
UROBILINOGEN, POC: 0.2

## 2018-10-12 PROCEDURE — G8427 DOCREV CUR MEDS BY ELIG CLIN: HCPCS | Performed by: UROLOGY

## 2018-10-12 PROCEDURE — 99213 OFFICE O/P EST LOW 20 MIN: CPT | Performed by: UROLOGY

## 2018-10-12 PROCEDURE — 51798 US URINE CAPACITY MEASURE: CPT | Performed by: UROLOGY

## 2018-10-12 PROCEDURE — 1036F TOBACCO NON-USER: CPT | Performed by: UROLOGY

## 2018-10-12 PROCEDURE — G8484 FLU IMMUNIZE NO ADMIN: HCPCS | Performed by: UROLOGY

## 2018-10-12 PROCEDURE — 81003 URINALYSIS AUTO W/O SCOPE: CPT | Performed by: UROLOGY

## 2018-10-12 PROCEDURE — G8417 CALC BMI ABV UP PARAM F/U: HCPCS | Performed by: UROLOGY

## 2018-10-19 ENCOUNTER — CARE COORDINATION (OUTPATIENT)
Dept: CARE COORDINATION | Age: 48
End: 2018-10-19

## 2018-10-19 NOTE — CARE COORDINATION
8 MM MISC Inject 1 each into the skin daily 8/4/18 8/4/19 Yes Alexis Albright MD   cetirizine (ZYRTEC) 10 MG tablet Take 1 qhs for allergies 6/29/18  Yes Armaan Lauren DO   montelukast (SINGULAIR) 10 MG tablet Take 1 tab nightly at supper for breathing/allergies 6/28/18  Yes Armaan Lauren DO   butalbital-acetaminophen-caffeine (FIORICET, ESGIC) -46 MG per tablet Take 1 tablet by mouth 3 times daily as needed for Headaches 6/28/18  Yes Armaan Lauren DO   folic acid (FOLVITE) 1 MG tablet Take 1 tablet by mouth daily 6/25/18  Yes Deepa Flores MD   albuterol (PROVENTIL) (2.5 MG/3ML) 0.083% nebulizer solution Take 3 mLs by nebulization every 2 hours as needed for Wheezing 4/14/18  Yes Cleopatra Story MD   albuterol sulfate  (90 Base) MCG/ACT inhaler Inhale 2 puffs into the lungs every 4 hours as needed for Wheezing 4/14/18  Yes Cleopatra Story MD   busPIRone (BUSPAR) 10 MG tablet Take 1/2 tab bid wf x 1 week, then 1 tab bid  Patient taking differently: Take 10 mg by mouth 2 times daily Take 1/2 tab bid wf x 1 week, then 1 tab bid 9/27/17  Yes Armaan Lauren DO   insulin aspart (NOVOLOG PENFILL) 100 UNIT/ML injection cartridge Inject into the skin 3 times daily (before meals)    Historical Provider, MD   vitamin D (ERGOCALCIFEROL) 89514 units capsule Take 1 capsule by mouth once a week 7/1/18   Deepa Flores MD       Future Appointments  Date Time Provider Valerie Cosby   11/13/2018 11:30 AM Armaan Lauren DO [address]

## 2018-10-30 ENCOUNTER — CARE COORDINATION (OUTPATIENT)
Dept: CARE COORDINATION | Age: 48
End: 2018-10-30

## 2018-11-12 ENCOUNTER — APPOINTMENT (OUTPATIENT)
Dept: GENERAL RADIOLOGY | Age: 48
End: 2018-11-12
Payer: MEDICAID

## 2018-11-12 ENCOUNTER — HOSPITAL ENCOUNTER (EMERGENCY)
Age: 48
Discharge: HOME | End: 2018-11-13
Attending: EMERGENCY MEDICINE
Payer: MEDICAID

## 2018-11-12 ENCOUNTER — APPOINTMENT (OUTPATIENT)
Dept: NUCLEAR MEDICINE | Age: 48
End: 2018-11-12
Payer: MEDICAID

## 2018-11-12 DIAGNOSIS — R09.1 PLEURISY: Primary | ICD-10-CM

## 2018-11-12 LAB
ALBUMIN SERPL-MCNC: 3.5 G/DL (ref 3.9–4.9)
ALP BLD-CCNC: 139 U/L (ref 40–130)
ALT SERPL-CCNC: 26 U/L (ref 0–33)
ANION GAP SERPL CALCULATED.3IONS-SCNC: 11 MEQ/L (ref 7–13)
AST SERPL-CCNC: 28 U/L (ref 0–35)
BASOPHILS ABSOLUTE: 0.1 K/UL (ref 0–0.2)
BASOPHILS RELATIVE PERCENT: 1.3 %
BILIRUB SERPL-MCNC: <0.2 MG/DL (ref 0–1.2)
BUN BLDV-MCNC: 14 MG/DL (ref 6–20)
CALCIUM SERPL-MCNC: 9.1 MG/DL (ref 8.6–10.2)
CHLORIDE BLD-SCNC: 104 MEQ/L (ref 98–107)
CO2: 24 MEQ/L (ref 22–29)
CREAT SERPL-MCNC: 1.39 MG/DL (ref 0.5–0.9)
D DIMER: 2.24 MG/L FEU (ref 0–0.5)
EOSINOPHILS ABSOLUTE: 0.4 K/UL (ref 0–0.7)
EOSINOPHILS RELATIVE PERCENT: 8.8 %
GFR AFRICAN AMERICAN: 49
GFR NON-AFRICAN AMERICAN: 40.5
GLOBULIN: 3.8 G/DL (ref 2.3–3.5)
GLUCOSE BLD-MCNC: 144 MG/DL (ref 74–109)
HCT VFR BLD CALC: 40.2 % (ref 37–47)
HEMOGLOBIN: 13.8 G/DL (ref 12–16)
LYMPHOCYTES ABSOLUTE: 1.3 K/UL (ref 1–4.8)
LYMPHOCYTES RELATIVE PERCENT: 26.4 %
MCH RBC QN AUTO: 31.6 PG (ref 27–31.3)
MCHC RBC AUTO-ENTMCNC: 34.2 % (ref 33–37)
MCV RBC AUTO: 92.3 FL (ref 82–100)
MONOCYTES ABSOLUTE: 0.4 K/UL (ref 0.2–0.8)
MONOCYTES RELATIVE PERCENT: 8.5 %
NEUTROPHILS ABSOLUTE: 2.7 K/UL (ref 1.4–6.5)
NEUTROPHILS RELATIVE PERCENT: 55 %
PDW BLD-RTO: 12.9 % (ref 11.5–14.5)
PLATELET # BLD: 277 K/UL (ref 130–400)
POTASSIUM SERPL-SCNC: 4.3 MEQ/L (ref 3.5–5.1)
RBC # BLD: 4.35 M/UL (ref 4.2–5.4)
SODIUM BLD-SCNC: 139 MEQ/L (ref 132–144)
TOTAL PROTEIN: 7.3 G/DL (ref 6.4–8.1)
TROPONIN: <0.01 NG/ML (ref 0–0.01)
WBC # BLD: 4.9 K/UL (ref 4.8–10.8)

## 2018-11-12 PROCEDURE — 99284 EMERGENCY DEPT VISIT MOD MDM: CPT

## 2018-11-12 PROCEDURE — 71046 X-RAY EXAM CHEST 2 VIEWS: CPT

## 2018-11-12 PROCEDURE — 96374 THER/PROPH/DIAG INJ IV PUSH: CPT

## 2018-11-12 PROCEDURE — 96375 TX/PRO/DX INJ NEW DRUG ADDON: CPT

## 2018-11-12 PROCEDURE — 78582 LUNG VENTILAT&PERFUS IMAGING: CPT

## 2018-11-12 PROCEDURE — 85379 FIBRIN DEGRADATION QUANT: CPT

## 2018-11-12 PROCEDURE — 2580000003 HC RX 258: Performed by: EMERGENCY MEDICINE

## 2018-11-12 PROCEDURE — 36415 COLL VENOUS BLD VENIPUNCTURE: CPT

## 2018-11-12 PROCEDURE — 96376 TX/PRO/DX INJ SAME DRUG ADON: CPT

## 2018-11-12 PROCEDURE — 6360000002 HC RX W HCPCS: Performed by: EMERGENCY MEDICINE

## 2018-11-12 PROCEDURE — 85025 COMPLETE CBC W/AUTO DIFF WBC: CPT

## 2018-11-12 PROCEDURE — 84484 ASSAY OF TROPONIN QUANT: CPT

## 2018-11-12 PROCEDURE — 80053 COMPREHEN METABOLIC PANEL: CPT

## 2018-11-12 RX ORDER — SODIUM CHLORIDE 0.9 % (FLUSH) 0.9 %
10 SYRINGE (ML) INJECTION PRN
Status: DISCONTINUED | OUTPATIENT
Start: 2018-11-12 | End: 2018-11-13 | Stop reason: HOSPADM

## 2018-11-12 RX ORDER — ONDANSETRON 2 MG/ML
4 INJECTION INTRAMUSCULAR; INTRAVENOUS ONCE
Status: COMPLETED | OUTPATIENT
Start: 2018-11-12 | End: 2018-11-12

## 2018-11-12 RX ORDER — 0.9 % SODIUM CHLORIDE 0.9 %
1000 INTRAVENOUS SOLUTION INTRAVENOUS ONCE
Status: COMPLETED | OUTPATIENT
Start: 2018-11-12 | End: 2018-11-12

## 2018-11-12 RX ORDER — KIT FOR THE PREPARATION OF TECHNETIUM TC 99M PENTETATE 20 MG/1
1 INJECTION, POWDER, LYOPHILIZED, FOR SOLUTION INTRAVENOUS; RESPIRATORY (INHALATION)
Status: COMPLETED | OUTPATIENT
Start: 2018-11-12 | End: 2018-11-13

## 2018-11-12 RX ORDER — METHYLPREDNISOLONE SODIUM SUCCINATE 125 MG/2ML
125 INJECTION, POWDER, LYOPHILIZED, FOR SOLUTION INTRAMUSCULAR; INTRAVENOUS ONCE
Status: COMPLETED | OUTPATIENT
Start: 2018-11-12 | End: 2018-11-12

## 2018-11-12 RX ADMIN — ONDANSETRON 4 MG: 2 INJECTION INTRAMUSCULAR; INTRAVENOUS at 20:45

## 2018-11-12 RX ADMIN — HYDROMORPHONE HYDROCHLORIDE 1 MG: 1 INJECTION, SOLUTION INTRAMUSCULAR; INTRAVENOUS; SUBCUTANEOUS at 19:42

## 2018-11-12 RX ADMIN — METHYLPREDNISOLONE SODIUM SUCCINATE 125 MG: 125 INJECTION, POWDER, FOR SOLUTION INTRAMUSCULAR; INTRAVENOUS at 20:36

## 2018-11-12 RX ADMIN — ONDANSETRON 4 MG: 2 INJECTION INTRAMUSCULAR; INTRAVENOUS at 19:42

## 2018-11-12 RX ADMIN — SODIUM CHLORIDE 1000 ML: 9 INJECTION, SOLUTION INTRAVENOUS at 20:45

## 2018-11-12 RX ADMIN — HYDROMORPHONE HYDROCHLORIDE 1 MG: 1 INJECTION, SOLUTION INTRAMUSCULAR; INTRAVENOUS; SUBCUTANEOUS at 22:15

## 2018-11-12 ASSESSMENT — ENCOUNTER SYMPTOMS
ABDOMINAL PAIN: 0
SHORTNESS OF BREATH: 0
WHEEZING: 0
ABDOMINAL DISTENTION: 0
CHEST TIGHTNESS: 0
PHOTOPHOBIA: 0
SORE THROAT: 0
COUGH: 0
VOMITING: 0
EYE DISCHARGE: 0

## 2018-11-12 ASSESSMENT — PAIN DESCRIPTION - LOCATION
LOCATION: CHEST;RIB CAGE

## 2018-11-12 ASSESSMENT — PAIN DESCRIPTION - ONSET: ONSET: ON-GOING

## 2018-11-12 ASSESSMENT — PAIN DESCRIPTION - PAIN TYPE
TYPE: ACUTE PAIN

## 2018-11-12 ASSESSMENT — PAIN DESCRIPTION - FREQUENCY: FREQUENCY: INTERMITTENT

## 2018-11-12 ASSESSMENT — PAIN SCALES - GENERAL
PAINLEVEL_OUTOF10: 10
PAINLEVEL_OUTOF10: 0
PAINLEVEL_OUTOF10: 3
PAINLEVEL_OUTOF10: 10

## 2018-11-12 ASSESSMENT — PAIN DESCRIPTION - DESCRIPTORS
DESCRIPTORS: ACHING
DESCRIPTORS: ACHING

## 2018-11-12 ASSESSMENT — PAIN DESCRIPTION - ORIENTATION
ORIENTATION: RIGHT
ORIENTATION: RIGHT

## 2018-11-12 ASSESSMENT — PAIN DESCRIPTION - PROGRESSION: CLINICAL_PROGRESSION: GRADUALLY WORSENING

## 2018-11-13 ENCOUNTER — TELEPHONE (OUTPATIENT)
Dept: FAMILY MEDICINE CLINIC | Age: 48
End: 2018-11-13

## 2018-11-13 ENCOUNTER — OFFICE VISIT (OUTPATIENT)
Dept: FAMILY MEDICINE CLINIC | Age: 48
End: 2018-11-13
Payer: MEDICAID

## 2018-11-13 VITALS
RESPIRATION RATE: 18 BRPM | TEMPERATURE: 98 F | WEIGHT: 200 LBS | BODY MASS INDEX: 35.44 KG/M2 | HEART RATE: 74 BPM | OXYGEN SATURATION: 98 % | DIASTOLIC BLOOD PRESSURE: 68 MMHG | HEIGHT: 63 IN | SYSTOLIC BLOOD PRESSURE: 136 MMHG

## 2018-11-13 VITALS
WEIGHT: 236 LBS | TEMPERATURE: 97.1 F | HEART RATE: 92 BPM | BODY MASS INDEX: 41.82 KG/M2 | SYSTOLIC BLOOD PRESSURE: 126 MMHG | RESPIRATION RATE: 14 BRPM | DIASTOLIC BLOOD PRESSURE: 78 MMHG | HEIGHT: 63 IN

## 2018-11-13 DIAGNOSIS — J45.31 MILD PERSISTENT ASTHMA WITH ACUTE EXACERBATION: ICD-10-CM

## 2018-11-13 DIAGNOSIS — D86.2 SARCOIDOSIS OF LUNG WITH SARCOIDOSIS OF LYMPH NODES (HCC): ICD-10-CM

## 2018-11-13 DIAGNOSIS — R09.1 PLEURISY: Primary | ICD-10-CM

## 2018-11-13 PROCEDURE — 2580000003 HC RX 258: Performed by: EMERGENCY MEDICINE

## 2018-11-13 PROCEDURE — 99213 OFFICE O/P EST LOW 20 MIN: CPT | Performed by: FAMILY MEDICINE

## 2018-11-13 PROCEDURE — G8484 FLU IMMUNIZE NO ADMIN: HCPCS | Performed by: FAMILY MEDICINE

## 2018-11-13 PROCEDURE — G8417 CALC BMI ABV UP PARAM F/U: HCPCS | Performed by: FAMILY MEDICINE

## 2018-11-13 PROCEDURE — 1036F TOBACCO NON-USER: CPT | Performed by: FAMILY MEDICINE

## 2018-11-13 PROCEDURE — A9539 TC99M PENTETATE: HCPCS | Performed by: EMERGENCY MEDICINE

## 2018-11-13 PROCEDURE — 3044F HG A1C LEVEL LT 7.0%: CPT | Performed by: FAMILY MEDICINE

## 2018-11-13 PROCEDURE — A9540 TC99M MAA: HCPCS | Performed by: EMERGENCY MEDICINE

## 2018-11-13 PROCEDURE — 2022F DILAT RTA XM EVC RTNOPTHY: CPT | Performed by: FAMILY MEDICINE

## 2018-11-13 PROCEDURE — 3430000000 HC RX DIAGNOSTIC RADIOPHARMACEUTICAL: Performed by: EMERGENCY MEDICINE

## 2018-11-13 PROCEDURE — G8427 DOCREV CUR MEDS BY ELIG CLIN: HCPCS | Performed by: FAMILY MEDICINE

## 2018-11-13 RX ORDER — TRAMADOL HYDROCHLORIDE 50 MG/1
50 TABLET ORAL EVERY 6 HOURS PRN
Qty: 20 TABLET | Refills: 0 | Status: SHIPPED | OUTPATIENT
Start: 2018-11-13 | End: 2018-11-23

## 2018-11-13 RX ORDER — PREDNISONE 10 MG/1
TABLET ORAL
Qty: 30 TABLET | Refills: 0 | Status: SHIPPED | OUTPATIENT
Start: 2018-11-13 | End: 2018-11-23

## 2018-11-13 RX ADMIN — KIT FOR THE PREPARATION OF TECHNETIUM TC 99M PENTETATE 1 MILLICURIE: 20 INJECTION, POWDER, LYOPHILIZED, FOR SOLUTION INTRAVENOUS; RESPIRATORY (INHALATION) at 00:01

## 2018-11-13 RX ADMIN — Medication 10 ML: at 00:18

## 2018-11-13 RX ADMIN — Medication 5.8 MILLICURIE: at 00:17

## 2018-11-13 ASSESSMENT — PAIN SCALES - GENERAL: PAINLEVEL_OUTOF10: 0

## 2018-11-13 NOTE — ED NOTES
Patient verbalized \"feeling nauseated and wanting something to drink\"  Asked Dr Carter Sheikh received orders for medications and yes she can have some water     Roberth Silverman RN  11/12/18 2042

## 2018-11-13 NOTE — ED NOTES
Patient resting on ER cot at this time, states \"feeling a little better\"  Call light in reach,  at bedside, will continue to monitor     Amy Glover RN  11/12/18 2112

## 2018-11-13 NOTE — PROGRESS NOTES
10/26/2018    DR. STEARNS    KIDNEY REMOVAL Right 08/2016    THYROID LOBECTOMY Right 6/13/14    URETER STENT PLACEMENT Left 08/2016     Social History     Social History    Marital status: Legally      Spouse name: N/A    Number of children: N/A    Years of education: N/A     Occupational History    Not on file. Social History Main Topics    Smoking status: Former Smoker     Packs/day: 0.50     Years: 15.00     Types: Cigarettes     Start date: 8/20/2014     Quit date: 1/24/2015    Smokeless tobacco: Former User     Quit date: 3/23/2016    Alcohol use 0.0 oz/week      Comment: occasionally    Drug use: Yes     Types: Marijuana      Comment: one month ago    Sexual activity: Not on file     Other Topics Concern    Not on file     Social History Narrative    No narrative on file     Family History   Problem Relation Age of Onset    Cancer Father     Diabetes Father     High Blood Pressure Mother     Diabetes Mother           Current Outpatient Prescriptions on File Prior to Visit   Medication Sig Dispense Refill    traMADol (ULTRAM) 50 MG tablet Take 1 tablet by mouth every 6 hours as needed for Pain for up to 10 days. . 20 tablet 0    DULoxetine (CYMBALTA) 60 MG extended release capsule TAKE 1 CAPSULE BY MOUTH DAILY 30 capsule 2    gabapentin (NEURONTIN) 400 MG capsule Take 1 capsule by mouth 3 times daily for 90 days. . 90 capsule 2    dicyclomine (BENTYL) 10 MG capsule Take 2 capsules by mouth every 6 hours as needed (cramps) 20 capsule 0    blood glucose test strips (ONETOUCH VERIO) strip TEST 3 TIMES DAILY AS NEEDED 300 each 3    Blood Glucose Monitoring Suppl (ONETOUCH VERIO) w/Device KIT TEST 3 TIMES DAILY AS NEEDED 1 kit 0    ONE TOUCH ULTRASOFT LANCETS MISC TEST 3 TIMES DAILY AS NEEDED 300 each 3    blood glucose test strips (EXACTECH TEST) strip 1 each by In Vitro route 3 times daily (Accu-check test strips) As needed.  DX:E11.65 300 strip 5    insulin glargine (LANTUS

## 2018-11-13 NOTE — ED PROVIDER NOTES
DICYCLOMINE (BENTYL) 10 MG CAPSULE    Take 2 capsules by mouth every 6 hours as needed (cramps)    DULOXETINE (CYMBALTA) 60 MG EXTENDED RELEASE CAPSULE    TAKE 1 CAPSULE BY MOUTH DAILY    FOLIC ACID (FOLVITE) 1 MG TABLET    Take 1 tablet by mouth daily    GABAPENTIN (NEURONTIN) 400 MG CAPSULE    Take 1 capsule by mouth 3 times daily for 90 days. .    INSULIN ASPART (NOVOLOG PENFILL) 100 UNIT/ML INJECTION CARTRIDGE    Inject into the skin 3 times daily (before meals)    INSULIN GLARGINE (LANTUS SOLOSTAR) 100 UNIT/ML INJECTION PEN    Inject 25 Units into the skin nightly    INSULIN PEN NEEDLE (B-D ULTRAFINE III SHORT PEN) 31G X 8 MM MISC    Inject 1 each into the skin daily    MONTELUKAST (SINGULAIR) 10 MG TABLET    Take 1 tab nightly at supper for breathing/allergies    ONE TOUCH ULTRASOFT LANCETS MISC    TEST 3 TIMES DAILY AS NEEDED    VITAMIN D (ERGOCALCIFEROL) 17986 UNITS CAPSULE    Take 1 capsule by mouth once a week       ALLERGIES     Shellfish-derived products;  Ibuprofen; and Propoxyphene n-acetaminophen    FAMILY HISTORY       Family History   Problem Relation Age of Onset    Cancer Father     Diabetes Father     High Blood Pressure Mother     Diabetes Mother           SOCIAL HISTORY       Social History     Social History    Marital status: Legally      Spouse name: N/A    Number of children: N/A    Years of education: N/A     Social History Main Topics    Smoking status: Former Smoker     Packs/day: 0.50     Years: 15.00     Types: Cigarettes     Start date: 8/20/2014     Quit date: 1/24/2015    Smokeless tobacco: Former User     Quit date: 3/23/2016    Alcohol use 0.0 oz/week      Comment: occasionally    Drug use: Yes     Types: Marijuana      Comment: one month ago    Sexual activity: Not Asked     Other Topics Concern    None     Social History Narrative    None       SCREENINGS      @FLOW([id number])@      PHYSICAL EXAM    (up to 7 for level 4, 8 or more for level 5)     ED Triage Phosphatase 139 (*)     Globulin 3.8 (*)     All other components within normal limits   CBC WITH AUTO DIFFERENTIAL - Abnormal; Notable for the following:     MCH 31.6 (*)     All other components within normal limits   D-DIMER, QUANTITATIVE - Abnormal; Notable for the following:     D-Dimer, Quant 2.24 (*)     All other components within normal limits    Narrative:     CALL  [place]  [facility] tel. [phone],  D-dimer called to and read back by Dr Barrington Hernández, 11/12/2018 20:26, by  Celestina PRUITT       All other labs were within normal range or not returned as of this dictation. EMERGENCY DEPARTMENT COURSE and DIFFERENTIAL DIAGNOSIS/MDM:   Vitals:    Vitals:    11/12/18 1910 11/12/18 2039 11/12/18 2130 11/12/18 2215   BP: 98/71 128/75 (!) 122/2 129/74   Pulse: 85 78 70 72   Resp: 20 20 18 18   Temp: 98 °F (36.7 °C)      TempSrc: Oral      SpO2: 97% 99% 99% 98%   Weight: 200 lb (90.7 kg)      Height: 5' 3\" (1.6 m)               MDM patient had a VQ scan of the chest because of having one kidney. This was low probability. Her symptoms appear to be consistent with ongoing pleurisy from her chest tube. I wouldn't put her on a steroid taper along with limited pain medication with the idea that she should return for worsening symptoms. Return for any fever. OARRS report was reviewed    CONSULTS:  None    PROCEDURES:  Unless otherwise noted below, none     Procedures    FINAL IMPRESSION      1. Pleurisy          DISPOSITION/PLAN   DISPOSITION        PATIENT REFERRED TO:  Robert Joyce DO  [facility]  [facility] [address]  [phone]    In 2 days        DISCHARGE MEDICATIONS:  New Prescriptions    PREDNISONE (DELTASONE) 10 MG TABLET    5 tabs po qam for 2 days then 4,3,2,1 tabs qam for 2 days each total of 10 days    TRAMADOL (ULTRAM) 50 MG TABLET    Take 1 tablet by mouth every 6 hours as needed for Pain for up to 10 days. .          (Please note that portions of this note were completed with a voice recognition

## 2018-11-15 ENCOUNTER — TELEPHONE (OUTPATIENT)
Dept: FAMILY MEDICINE CLINIC | Age: 48
End: 2018-11-15

## 2018-11-18 PROBLEM — D86.2 SARCOIDOSIS OF LUNG WITH SARCOIDOSIS OF LYMPH NODES (HCC): Status: ACTIVE | Noted: 2018-11-18

## 2018-11-18 ASSESSMENT — ENCOUNTER SYMPTOMS
COUGH: 1
SHORTNESS OF BREATH: 1
CHEST TIGHTNESS: 1
SINUS PRESSURE: 1
ABDOMINAL PAIN: 0
RHINORRHEA: 1

## 2018-11-27 DIAGNOSIS — J30.1 ACUTE SEASONAL ALLERGIC RHINITIS DUE TO POLLEN: ICD-10-CM

## 2018-11-27 RX ORDER — CETIRIZINE HYDROCHLORIDE 10 MG/1
TABLET ORAL
Qty: 30 TABLET | Refills: 3 | Status: SHIPPED | OUTPATIENT
Start: 2018-11-27 | End: 2019-10-28 | Stop reason: SDUPTHER

## 2018-11-27 NOTE — TELEPHONE ENCOUNTER
From: Leopold Reno  Sent: 11/26/2018 8:30 PM EST  Subject: Medication Renewal Request    Lucy Humphrey would like a refill of the following medications:     cetirizine (ZYRTEC) 10 MG tablet MAYRA Kumar    Preferred pharmacy: [address] [phone] - F [phone]    Comment:

## 2018-12-04 ENCOUNTER — TELEPHONE (OUTPATIENT)
Dept: FAMILY MEDICINE CLINIC | Age: 48
End: 2018-12-04

## 2018-12-04 DIAGNOSIS — D86.2 SARCOIDOSIS OF LUNG WITH SARCOIDOSIS OF LYMPH NODES (HCC): Primary | ICD-10-CM

## 2018-12-04 DIAGNOSIS — J45.31 MILD PERSISTENT ASTHMA WITH ACUTE EXACERBATION: ICD-10-CM

## 2018-12-25 LAB
GLUCOSE BLD-MCNC: 133 MG/DL (ref 70–100)
GLUCOSE BLD-MCNC: 184 MG/DL (ref 70–100)
GLUCOSE BLD-MCNC: 188 MG/DL (ref 70–100)

## 2018-12-27 ENCOUNTER — CARE COORDINATION (OUTPATIENT)
Dept: CARE COORDINATION | Age: 48
End: 2018-12-27

## 2018-12-31 DIAGNOSIS — G43.101 MIGRAINE WITH AURA AND WITH STATUS MIGRAINOSUS, NOT INTRACTABLE: ICD-10-CM

## 2018-12-31 RX ORDER — BUTALBITAL, ACETAMINOPHEN AND CAFFEINE 50; 325; 40 MG/1; MG/1; MG/1
1 TABLET ORAL 3 TIMES DAILY PRN
Qty: 21 TABLET | Refills: 0 | Status: SHIPPED | OUTPATIENT
Start: 2018-12-31 | End: 2019-03-07 | Stop reason: SDUPTHER

## 2019-01-08 ENCOUNTER — TELEPHONE (OUTPATIENT)
Dept: FAMILY MEDICINE CLINIC | Age: 49
End: 2019-01-08

## 2019-01-14 DIAGNOSIS — G89.29 CHRONIC RIGHT-SIDED THORACIC BACK PAIN: Primary | ICD-10-CM

## 2019-01-14 DIAGNOSIS — M54.6 CHRONIC RIGHT-SIDED THORACIC BACK PAIN: Primary | ICD-10-CM

## 2019-03-01 ENCOUNTER — TELEPHONE (OUTPATIENT)
Dept: FAMILY MEDICINE CLINIC | Age: 49
End: 2019-03-01

## 2019-03-07 DIAGNOSIS — G43.101 MIGRAINE WITH AURA AND WITH STATUS MIGRAINOSUS, NOT INTRACTABLE: ICD-10-CM

## 2019-03-08 RX ORDER — BUTALBITAL, ACETAMINOPHEN AND CAFFEINE 50; 325; 40 MG/1; MG/1; MG/1
TABLET ORAL
Qty: 21 TABLET | Refills: 0 | Status: SHIPPED | OUTPATIENT
Start: 2019-03-08 | End: 2020-01-27

## 2019-03-19 ENCOUNTER — APPOINTMENT (OUTPATIENT)
Dept: GENERAL RADIOLOGY | Age: 49
End: 2019-03-19
Payer: MEDICAID

## 2019-03-19 ENCOUNTER — HOSPITAL ENCOUNTER (EMERGENCY)
Age: 49
Discharge: HOME | End: 2019-03-20
Attending: EMERGENCY MEDICINE
Payer: MEDICAID

## 2019-03-19 DIAGNOSIS — R07.9 CHEST PAIN, UNSPECIFIED TYPE: Primary | ICD-10-CM

## 2019-03-19 LAB
ALBUMIN SERPL-MCNC: 3.6 G/DL (ref 3.5–4.6)
ALP BLD-CCNC: 158 U/L (ref 40–130)
ALT SERPL-CCNC: 59 U/L (ref 0–33)
ANION GAP SERPL CALCULATED.3IONS-SCNC: 15 MEQ/L (ref 9–15)
AST SERPL-CCNC: 67 U/L (ref 0–35)
BASOPHILS ABSOLUTE: 0.1 K/UL (ref 0–0.2)
BASOPHILS RELATIVE PERCENT: 1 %
BILIRUB SERPL-MCNC: 0.3 MG/DL (ref 0.2–0.7)
BUN BLDV-MCNC: 15 MG/DL (ref 6–20)
CALCIUM SERPL-MCNC: 8.7 MG/DL (ref 8.5–9.9)
CHLORIDE BLD-SCNC: 99 MEQ/L (ref 95–107)
CO2: 20 MEQ/L (ref 20–31)
CREAT SERPL-MCNC: 1.68 MG/DL (ref 0.5–0.9)
EKG ATRIAL RATE: 95 BPM
EKG P AXIS: 55 DEGREES
EKG P-R INTERVAL: 154 MS
EKG Q-T INTERVAL: 342 MS
EKG QRS DURATION: 72 MS
EKG QTC CALCULATION (BAZETT): 429 MS
EKG R AXIS: 19 DEGREES
EKG T AXIS: 19 DEGREES
EKG VENTRICULAR RATE: 95 BPM
EOSINOPHILS ABSOLUTE: 0.3 K/UL (ref 0–0.7)
EOSINOPHILS RELATIVE PERCENT: 3.2 %
GFR AFRICAN AMERICAN: 39.3
GFR NON-AFRICAN AMERICAN: 32.5
GLOBULIN: 3.8 G/DL (ref 2.3–3.5)
GLUCOSE BLD-MCNC: 174 MG/DL (ref 70–99)
HCT VFR BLD CALC: 43.9 % (ref 37–47)
HEMOGLOBIN: 14.6 G/DL (ref 12–16)
INR BLD: 1
LYMPHOCYTES ABSOLUTE: 1.2 K/UL (ref 1–4.8)
LYMPHOCYTES RELATIVE PERCENT: 12.1 %
MCH RBC QN AUTO: 31.6 PG (ref 27–31.3)
MCHC RBC AUTO-ENTMCNC: 33.3 % (ref 33–37)
MCV RBC AUTO: 95 FL (ref 82–100)
MONOCYTES ABSOLUTE: 0.7 K/UL (ref 0.2–0.8)
MONOCYTES RELATIVE PERCENT: 6.7 %
NEUTROPHILS ABSOLUTE: 7.8 K/UL (ref 1.4–6.5)
NEUTROPHILS RELATIVE PERCENT: 77 %
PDW BLD-RTO: 14.6 % (ref 11.5–14.5)
PLATELET # BLD: 248 K/UL (ref 130–400)
POTASSIUM SERPL-SCNC: 5.8 MEQ/L (ref 3.4–4.9)
PROTHROMBIN TIME: 10.5 SEC (ref 9–11.5)
RBC # BLD: 4.62 M/UL (ref 4.2–5.4)
SODIUM BLD-SCNC: 134 MEQ/L (ref 135–144)
TOTAL PROTEIN: 7.4 G/DL (ref 6.3–8)
TROPONIN: <0.01 NG/ML (ref 0–0.01)
TROPONIN: <0.01 NG/ML (ref 0–0.01)
WBC # BLD: 10.2 K/UL (ref 4.8–10.8)

## 2019-03-19 PROCEDURE — 71046 X-RAY EXAM CHEST 2 VIEWS: CPT

## 2019-03-19 PROCEDURE — 96374 THER/PROPH/DIAG INJ IV PUSH: CPT

## 2019-03-19 PROCEDURE — 85025 COMPLETE CBC W/AUTO DIFF WBC: CPT

## 2019-03-19 PROCEDURE — 80053 COMPREHEN METABOLIC PANEL: CPT

## 2019-03-19 PROCEDURE — 36415 COLL VENOUS BLD VENIPUNCTURE: CPT

## 2019-03-19 PROCEDURE — 84484 ASSAY OF TROPONIN QUANT: CPT

## 2019-03-19 PROCEDURE — 96375 TX/PRO/DX INJ NEW DRUG ADDON: CPT

## 2019-03-19 PROCEDURE — 99285 EMERGENCY DEPT VISIT HI MDM: CPT

## 2019-03-19 PROCEDURE — 6360000002 HC RX W HCPCS: Performed by: EMERGENCY MEDICINE

## 2019-03-19 PROCEDURE — 93005 ELECTROCARDIOGRAM TRACING: CPT

## 2019-03-19 PROCEDURE — 85610 PROTHROMBIN TIME: CPT

## 2019-03-19 RX ORDER — ONDANSETRON 2 MG/ML
4 INJECTION INTRAMUSCULAR; INTRAVENOUS ONCE
Status: COMPLETED | OUTPATIENT
Start: 2019-03-19 | End: 2019-03-19

## 2019-03-19 RX ORDER — MORPHINE SULFATE 2 MG/ML
4 INJECTION, SOLUTION INTRAMUSCULAR; INTRAVENOUS ONCE
Status: COMPLETED | OUTPATIENT
Start: 2019-03-19 | End: 2019-03-19

## 2019-03-19 RX ADMIN — HYDROMORPHONE HYDROCHLORIDE 1 MG: 1 INJECTION, SOLUTION INTRAMUSCULAR; INTRAVENOUS; SUBCUTANEOUS at 22:29

## 2019-03-19 RX ADMIN — ONDANSETRON 4 MG: 2 INJECTION INTRAMUSCULAR; INTRAVENOUS at 21:22

## 2019-03-19 RX ADMIN — MORPHINE SULFATE 4 MG: 2 INJECTION, SOLUTION INTRAMUSCULAR; INTRAVENOUS at 21:22

## 2019-03-19 ASSESSMENT — ENCOUNTER SYMPTOMS
CHEST TIGHTNESS: 0
ABDOMINAL DISTENTION: 0
SORE THROAT: 0
PHOTOPHOBIA: 0
WHEEZING: 0
ABDOMINAL PAIN: 0
COUGH: 0
SHORTNESS OF BREATH: 0
EYE DISCHARGE: 0
VOMITING: 0

## 2019-03-19 ASSESSMENT — PAIN DESCRIPTION - FREQUENCY: FREQUENCY: CONTINUOUS

## 2019-03-19 ASSESSMENT — PAIN SCALES - GENERAL
PAINLEVEL_OUTOF10: 7
PAINLEVEL_OUTOF10: 10

## 2019-03-19 ASSESSMENT — PAIN DESCRIPTION - ORIENTATION: ORIENTATION: MID;LEFT

## 2019-03-19 ASSESSMENT — PAIN DESCRIPTION - PAIN TYPE: TYPE: ACUTE PAIN

## 2019-03-19 ASSESSMENT — PAIN DESCRIPTION - DESCRIPTORS: DESCRIPTORS: SHARP;PRESSURE;CONSTANT

## 2019-03-19 ASSESSMENT — PAIN DESCRIPTION - LOCATION: LOCATION: CHEST

## 2019-03-20 VITALS
WEIGHT: 205 LBS | RESPIRATION RATE: 18 BRPM | SYSTOLIC BLOOD PRESSURE: 109 MMHG | DIASTOLIC BLOOD PRESSURE: 52 MMHG | BODY MASS INDEX: 36.31 KG/M2 | OXYGEN SATURATION: 99 % | HEART RATE: 82 BPM | TEMPERATURE: 98.4 F

## 2019-03-20 PROCEDURE — 93010 ELECTROCARDIOGRAM REPORT: CPT | Performed by: INTERNAL MEDICINE

## 2019-03-20 RX ORDER — PREDNISONE 10 MG/1
TABLET ORAL
Qty: 30 TABLET | Refills: 0 | Status: SHIPPED | OUTPATIENT
Start: 2019-03-20 | End: 2019-03-20

## 2019-03-20 RX ORDER — HYDROCODONE BITARTRATE AND ACETAMINOPHEN 5; 325 MG/1; MG/1
1 TABLET ORAL EVERY 6 HOURS PRN
Qty: 16 TABLET | Refills: 0 | Status: SHIPPED | OUTPATIENT
Start: 2019-03-20 | End: 2019-03-23

## 2019-03-20 RX ORDER — PREDNISONE 10 MG/1
TABLET ORAL
Qty: 30 TABLET | Refills: 0 | Status: SHIPPED | OUTPATIENT
Start: 2019-03-20 | End: 2019-03-30

## 2019-03-30 ENCOUNTER — HOSPITAL ENCOUNTER (EMERGENCY)
Age: 49
Discharge: HOME | End: 2019-03-30
Attending: EMERGENCY MEDICINE
Payer: MEDICAID

## 2019-03-30 ENCOUNTER — APPOINTMENT (OUTPATIENT)
Dept: GENERAL RADIOLOGY | Age: 49
End: 2019-03-30
Payer: MEDICAID

## 2019-03-30 VITALS
TEMPERATURE: 98.6 F | HEART RATE: 94 BPM | BODY MASS INDEX: 36.32 KG/M2 | RESPIRATION RATE: 19 BRPM | SYSTOLIC BLOOD PRESSURE: 124 MMHG | OXYGEN SATURATION: 98 % | DIASTOLIC BLOOD PRESSURE: 87 MMHG | HEIGHT: 63 IN | WEIGHT: 205 LBS

## 2019-03-30 DIAGNOSIS — D86.9 SARCOIDOSIS: ICD-10-CM

## 2019-03-30 DIAGNOSIS — E11.65 TYPE 2 DIABETES MELLITUS WITH HYPERGLYCEMIA, UNSPECIFIED WHETHER LONG TERM INSULIN USE (HCC): ICD-10-CM

## 2019-03-30 DIAGNOSIS — R07.9 CHEST PAIN, UNSPECIFIED TYPE: Primary | ICD-10-CM

## 2019-03-30 LAB
ALBUMIN SERPL-MCNC: 3.6 G/DL (ref 3.5–4.6)
ALP BLD-CCNC: 174 U/L (ref 40–130)
ALT SERPL-CCNC: 36 U/L (ref 0–33)
AMPHETAMINE SCREEN, URINE: ABNORMAL
ANION GAP SERPL CALCULATED.3IONS-SCNC: 10 MEQ/L (ref 9–15)
AST SERPL-CCNC: 31 U/L (ref 0–35)
BACTERIA: ABNORMAL /HPF
BARBITURATE SCREEN URINE: POSITIVE
BASOPHILS ABSOLUTE: 0.1 K/UL (ref 0–0.2)
BASOPHILS RELATIVE PERCENT: 0.7 %
BENZODIAZEPINE SCREEN, URINE: ABNORMAL
BILIRUB SERPL-MCNC: <0.2 MG/DL (ref 0.2–0.7)
BILIRUBIN URINE: NEGATIVE
BLOOD, URINE: NEGATIVE
BUN BLDV-MCNC: 14 MG/DL (ref 6–20)
CALCIUM SERPL-MCNC: 9.1 MG/DL (ref 8.5–9.9)
CANNABINOID SCREEN URINE: POSITIVE
CHLORIDE BLD-SCNC: 100 MEQ/L (ref 95–107)
CLARITY: CLEAR
CO2: 22 MEQ/L (ref 20–31)
COCAINE METABOLITE SCREEN URINE: ABNORMAL
COLOR: YELLOW
CREAT SERPL-MCNC: 1.25 MG/DL (ref 0.5–0.9)
EKG ATRIAL RATE: 98 BPM
EKG P AXIS: 53 DEGREES
EKG P-R INTERVAL: 150 MS
EKG Q-T INTERVAL: 354 MS
EKG QRS DURATION: 78 MS
EKG QTC CALCULATION (BAZETT): 451 MS
EKG R AXIS: 12 DEGREES
EKG T AXIS: 27 DEGREES
EKG VENTRICULAR RATE: 98 BPM
EOSINOPHILS ABSOLUTE: 0 K/UL (ref 0–0.7)
EOSINOPHILS RELATIVE PERCENT: 0.2 %
EPITHELIAL CELLS, UA: ABNORMAL /HPF (ref 0–5)
GFR AFRICAN AMERICAN: 55.3
GFR NON-AFRICAN AMERICAN: 45.7
GLOBULIN: 3.9 G/DL (ref 2.3–3.5)
GLUCOSE BLD-MCNC: 245 MG/DL (ref 70–99)
GLUCOSE URINE: >=1000 MG/DL
HCT VFR BLD CALC: 45.3 % (ref 37–47)
HEMOGLOBIN: 15.5 G/DL (ref 12–16)
HYALINE CASTS: ABNORMAL /HPF (ref 0–5)
KETONES, URINE: ABNORMAL MG/DL
LEUKOCYTE ESTERASE, URINE: ABNORMAL
LYMPHOCYTES ABSOLUTE: 0.5 K/UL (ref 1–4.8)
LYMPHOCYTES RELATIVE PERCENT: 6.7 %
Lab: ABNORMAL
MAGNESIUM: 1.9 MG/DL (ref 1.7–2.4)
MCH RBC QN AUTO: 32.5 PG (ref 27–31.3)
MCHC RBC AUTO-ENTMCNC: 34.1 % (ref 33–37)
MCV RBC AUTO: 95.3 FL (ref 82–100)
MONOCYTES ABSOLUTE: 0.1 K/UL (ref 0.2–0.8)
MONOCYTES RELATIVE PERCENT: 1.8 %
NEUTROPHILS ABSOLUTE: 7 K/UL (ref 1.4–6.5)
NEUTROPHILS RELATIVE PERCENT: 90.6 %
NITRITE, URINE: NEGATIVE
OPIATE SCREEN URINE: ABNORMAL
PDW BLD-RTO: 14.9 % (ref 11.5–14.5)
PH UA: 5 (ref 5–9)
PHENCYCLIDINE SCREEN URINE: ABNORMAL
PLATELET # BLD: 250 K/UL (ref 130–400)
POTASSIUM SERPL-SCNC: 4.8 MEQ/L (ref 3.4–4.9)
PROTEIN UA: NEGATIVE MG/DL
RBC # BLD: 4.76 M/UL (ref 4.2–5.4)
RBC UA: ABNORMAL /HPF (ref 0–5)
SODIUM BLD-SCNC: 132 MEQ/L (ref 135–144)
SPECIFIC GRAVITY UA: 1.02 (ref 1–1.03)
TOTAL CK: 170 U/L (ref 0–170)
TOTAL PROTEIN: 7.5 G/DL (ref 6.3–8)
TROPONIN: <0.01 NG/ML (ref 0–0.01)
TSH SERPL DL<=0.05 MIU/L-ACNC: 11.91 UIU/ML (ref 0.44–3.86)
URINE REFLEX TO CULTURE: YES
UROBILINOGEN, URINE: 0.2 E.U./DL
WBC # BLD: 7.7 K/UL (ref 4.8–10.8)
WBC UA: ABNORMAL /HPF (ref 0–5)

## 2019-03-30 PROCEDURE — 87086 URINE CULTURE/COLONY COUNT: CPT

## 2019-03-30 PROCEDURE — 81001 URINALYSIS AUTO W/SCOPE: CPT

## 2019-03-30 PROCEDURE — 84443 ASSAY THYROID STIM HORMONE: CPT

## 2019-03-30 PROCEDURE — 96375 TX/PRO/DX INJ NEW DRUG ADDON: CPT

## 2019-03-30 PROCEDURE — 6360000002 HC RX W HCPCS: Performed by: PHYSICIAN ASSISTANT

## 2019-03-30 PROCEDURE — 96374 THER/PROPH/DIAG INJ IV PUSH: CPT

## 2019-03-30 PROCEDURE — 83735 ASSAY OF MAGNESIUM: CPT

## 2019-03-30 PROCEDURE — 71046 X-RAY EXAM CHEST 2 VIEWS: CPT

## 2019-03-30 PROCEDURE — 80307 DRUG TEST PRSMV CHEM ANLYZR: CPT

## 2019-03-30 PROCEDURE — 96376 TX/PRO/DX INJ SAME DRUG ADON: CPT

## 2019-03-30 PROCEDURE — 99285 EMERGENCY DEPT VISIT HI MDM: CPT

## 2019-03-30 PROCEDURE — 80053 COMPREHEN METABOLIC PANEL: CPT

## 2019-03-30 PROCEDURE — 36415 COLL VENOUS BLD VENIPUNCTURE: CPT

## 2019-03-30 PROCEDURE — 93005 ELECTROCARDIOGRAM TRACING: CPT

## 2019-03-30 PROCEDURE — 82550 ASSAY OF CK (CPK): CPT

## 2019-03-30 PROCEDURE — 84484 ASSAY OF TROPONIN QUANT: CPT

## 2019-03-30 PROCEDURE — 85025 COMPLETE CBC W/AUTO DIFF WBC: CPT

## 2019-03-30 RX ORDER — OXYCODONE HYDROCHLORIDE AND ACETAMINOPHEN 5; 325 MG/1; MG/1
1 TABLET ORAL ONCE
Status: DISCONTINUED | OUTPATIENT
Start: 2019-03-30 | End: 2019-03-31 | Stop reason: HOSPADM

## 2019-03-30 RX ORDER — MORPHINE SULFATE 2 MG/ML
4 INJECTION, SOLUTION INTRAMUSCULAR; INTRAVENOUS ONCE
Status: COMPLETED | OUTPATIENT
Start: 2019-03-30 | End: 2019-03-30

## 2019-03-30 RX ORDER — METHYLPREDNISOLONE SODIUM SUCCINATE 125 MG/2ML
125 INJECTION, POWDER, LYOPHILIZED, FOR SOLUTION INTRAMUSCULAR; INTRAVENOUS ONCE
Status: COMPLETED | OUTPATIENT
Start: 2019-03-30 | End: 2019-03-30

## 2019-03-30 RX ORDER — ONDANSETRON 2 MG/ML
4 INJECTION INTRAMUSCULAR; INTRAVENOUS ONCE
Status: COMPLETED | OUTPATIENT
Start: 2019-03-30 | End: 2019-03-30

## 2019-03-30 RX ORDER — SODIUM CHLORIDE 0.9 % (FLUSH) 0.9 %
3 SYRINGE (ML) INJECTION EVERY 8 HOURS
Status: DISCONTINUED | OUTPATIENT
Start: 2019-03-30 | End: 2019-03-31 | Stop reason: HOSPADM

## 2019-03-30 RX ORDER — METHOCARBAMOL 750 MG/1
750 TABLET, FILM COATED ORAL 4 TIMES DAILY
Qty: 40 TABLET | Refills: 0 | Status: SHIPPED | OUTPATIENT
Start: 2019-03-30 | End: 2019-04-09

## 2019-03-30 RX ORDER — METHOCARBAMOL 750 MG/1
750 TABLET, FILM COATED ORAL 4 TIMES DAILY
Qty: 40 TABLET | Refills: 0 | Status: SHIPPED | OUTPATIENT
Start: 2019-03-30 | End: 2019-03-30 | Stop reason: SDUPTHER

## 2019-03-30 RX ADMIN — MORPHINE SULFATE 4 MG: 2 INJECTION, SOLUTION INTRAMUSCULAR; INTRAVENOUS at 19:43

## 2019-03-30 RX ADMIN — MORPHINE SULFATE 4 MG: 2 INJECTION, SOLUTION INTRAMUSCULAR; INTRAVENOUS at 21:24

## 2019-03-30 RX ADMIN — ONDANSETRON 4 MG: 2 INJECTION INTRAMUSCULAR; INTRAVENOUS at 19:43

## 2019-03-30 RX ADMIN — METHYLPREDNISOLONE SODIUM SUCCINATE 125 MG: 125 INJECTION, POWDER, FOR SOLUTION INTRAMUSCULAR; INTRAVENOUS at 21:24

## 2019-03-30 ASSESSMENT — ENCOUNTER SYMPTOMS
SHORTNESS OF BREATH: 0
VOICE CHANGE: 0
COUGH: 1
PHOTOPHOBIA: 0
EYE DISCHARGE: 0
ABDOMINAL DISTENTION: 0
ANAL BLEEDING: 0
VOMITING: 0
APNEA: 0
NAUSEA: 0

## 2019-03-30 ASSESSMENT — PAIN SCALES - GENERAL
PAINLEVEL_OUTOF10: 10

## 2019-03-30 ASSESSMENT — PAIN DESCRIPTION - ORIENTATION: ORIENTATION: MID

## 2019-03-30 ASSESSMENT — PAIN DESCRIPTION - LOCATION: LOCATION: CHEST

## 2019-03-31 NOTE — ED PROVIDER NOTES
[facility] ED  eMERGENCY dEPARTMENT eNCOUnter      Pt Name: Chandana Moseley  MRN: [id number]  [place] 1970  Date of evaluation: 3/30/2019  Provider: Robel Scott [facility]la       Chief Complaint   Patient presents with    Chest Pain     started around noon today         HISTORY OF PRESENT ILLNESS   (Location/Symptom, Timing/Onset,Context/Setting, Quality, Duration, Modifying Factors, Severity)  Note limiting factors. Chandana Moseley is a 51 y.o. female who presents to the emergency department patient presents with chest pain which began at 12:00 today. She notes she's been seen for this before. She has sarcoidosis and states she has tumors in her chest.  She has appointment with her sarcoid specialist on 8 April. She denies nausea vomiting fever chills. She does have slight cough. She had recent lab work CT scan in past 2 weeks. She denies any cardiac history. Symptoms are moderate in severity nothing improves or worsens symptoms. Discussed with Dr. Titus Patel at length. HPI    NursingNotes were reviewed. REVIEW OF SYSTEMS    (2-9 systems for level 4, 10 or more for level 5)     Review of Systems   Constitutional: Negative for activity change, appetite change, fever and unexpected weight change. HENT: Negative for ear discharge, nosebleeds and voice change. Eyes: Negative for photophobia and discharge. Respiratory: Positive for cough. Negative for apnea and shortness of breath. Cardiovascular: Positive for chest pain. Gastrointestinal: Negative for abdominal distention, anal bleeding, nausea and vomiting. Endocrine: Negative for cold intolerance, heat intolerance and polyphagia. Genitourinary: Negative for genital sores. Musculoskeletal: Negative for joint swelling. Skin: Negative for pallor. Allergic/Immunologic: Negative for immunocompromised state. Neurological: Negative for seizures and facial asymmetry.    Hematological: Does not Packs/day: 0.50     Years: 15.00     Pack years: 7.50     Types: Cigarettes     Start date: 2014     Last attempt to quit: 2015     Years since quittin.1    Smokeless tobacco: Former User     Quit date: 3/23/2016   Substance and Sexual Activity    Alcohol use: Yes     Alcohol/week: 0.0 oz     Comment: occasionally    Drug use: Yes     Types: Marijuana     Comment: one month ago    Sexual activity: Not on file   Lifestyle    Physical activity:     Days per week: Not on file     Minutes per session: Not on file    Stress: Not on file   Relationships    Social connections:     Talks on phone: Not on file     Gets together: Not on file     Attends [employer] service: Not on file     Active member of club or organization: Not on file     Attends meetings of clubs or organizations: Not on file     Relationship status: Not on file    Intimate partner violence:     Fear of current or ex partner: Not on file     Emotionally abused: Not on file     Physically abused: Not on file     Forced sexual activity: Not on file   Other Topics Concern    Not on file   Social History Narrative    Not on file       SCREENINGS      @[id number])@      PHYSICAL EXAM    (up to 7 for level 4, 8 or more for level 5)     ED Triage Vitals [19 1842]   BP Temp Temp Source Pulse Resp SpO2 Height Weight   111/74 98.6 °F (37 °C) Oral 99 20 95 % 5' 3\" (1.6 m) 205 lb (93 kg)       Physical Exam   Constitutional: She is oriented to person, place, and time. She appears well-developed and well-nourished. No distress. HENT:   Head: Normocephalic and atraumatic. Mouth/Throat: No oropharyngeal exudate. Eyes: Pupils are equal, round, and reactive to light. Conjunctivae and EOM are normal. Right eye exhibits no discharge. Left eye exhibits no discharge. Neck: Normal range of motion. Neck supple. Cardiovascular: Normal rate, regular rhythm, normal heart sounds and intact distal pulses.    Pulmonary/Chest: Effort normal and breath sounds normal. No stridor. No respiratory distress. She has no wheezes. She has no rales. Abdominal: Soft. Bowel sounds are normal. She exhibits no distension. There is no tenderness. Musculoskeletal: Normal range of motion. Neurological: She is alert and oriented to person, place, and time. She exhibits normal muscle tone. Skin: Skin is warm. No erythema. Psychiatric: She has a normal mood and affect. Nursing note and vitals reviewed. DIAGNOSTIC RESULTS     EKG: All EKG's are interpreted by the Emergency Department Physician who either signs or Co-signsthis chart in the absence of a cardiologist.    Sinus rhythm rate 98 negative ST segment elevation.     RADIOLOGY:   Non-plain filmimages such as CT, Ultrasound and MRI are read by the radiologist. Plain radiographic images are visualized and preliminarily interpreted by the emergency physician with the below findings:    See rad repot    Interpretation per the Radiologist below, if available at the time ofthis note:    XR CHEST STANDARD (2 VW)    (Results Pending)         ED BEDSIDE ULTRASOUND:   Performed by ED Physician - none    LABS:  Labs Reviewed   COMPREHENSIVE METABOLIC PANEL - Abnormal; Notable for the following components:       Result Value    Sodium 132 (*)     Glucose 245 (*)     CREATININE 1.25 (*)     GFR Non- 45.7 (*)     GFR  55.3 (*)     Alkaline Phosphatase 174 (*)     ALT 36 (*)     Globulin 3.9 (*)     All other components within normal limits   URINE DRUG SCREEN - Abnormal; Notable for the following components:    Barbiturate Screen, Ur POSITIVE (*)     Cannabinoid Scrn, Ur POSITIVE (*)     All other components within normal limits   CBC WITH AUTO DIFFERENTIAL - Abnormal; Notable for the following components:    MCH 32.5 (*)     RDW 14.9 (*)     Neutrophils # 7.0 (*)     Lymphocytes # 0.5 (*)     Monocytes # 0.1 (*)     All other components within normal limits   URINE RT REFLEX TO CULTURE - Abnormal; Notable for the following components:    Glucose, Ur >=1000 (*)     Ketones, Urine TRACE (*)     Leukocyte Esterase, Urine TRACE (*)     All other components within normal limits   TSH WITHOUT REFLEX - Abnormal; Notable for the following components:    TSH 11.910 (*)     All other components within normal limits   MICROSCOPIC URINALYSIS - Abnormal; Notable for the following components:    Bacteria, UA FEW (*)     WBC, UA 10-20 (*)     RBC, UA 3-5 (*)     All other components within normal limits   URINE CULTURE   MAGNESIUM   TROPONIN   CK       All other labs were within normal range or not returned as of this dictation. EMERGENCY DEPARTMENT COURSE and DIFFERENTIAL DIAGNOSIS/MDM:   Vitals:    Vitals:    03/30/19 1945 03/30/19 2000 03/30/19 2045 03/30/19 2124   BP:  124/68 111/67 124/87   Pulse: 88 84 88 94   Resp: 19      Temp:       TempSrc:       SpO2:   96% 98%   Weight:       Height:                MDM  Number of Diagnoses or Management Options  Chest pain, unspecified type:   Sarcoidosis:   Diagnosis management comments: Initially feeling better with medication she is on 20 mg of prednisone portion of her taper discussed with Dr. Melisa Das 1125 a slight Medrol and Robaxin for home patient to follow-up with pain management as well        Amount and/or Complexity of Data Reviewed  Clinical lab tests: reviewed and ordered  Tests in the radiology section of CPT®: reviewed and ordered  Discuss the patient with other providers: yes        CRITICAL CARE TIME       CONSULTS:  None    PROCEDURES:  Unless otherwise noted below, none     Procedures    FINAL IMPRESSION      1. Chest pain, unspecified type    2. Sarcoidosis    3.  Type 2 diabetes mellitus with hyperglycemia, unspecified whether long term insulin use [facility])          DISPOSITION/PLAN   DISPOSITION Decision To Discharge 03/30/2019 09:17:43 PM      PATIENT REFERRED TO:  Ruth Iverson DO  [address] [place] [id number]  [phone]    Schedule an appointment as soon as possible for a visit in 2 days      [facility]) ED  [address] [facility]  [facility] [phone]  Schedule an appointment as soon as possible for a visit in 2 days      Your Sarcoidosis Specialist    Call in 2 days      Pain Managment/ [address] [facility]    Call in 2 days      Cardiology    Schedule an appointment as soon as possible for a visit in 2 days        DISCHARGE MEDICATIONS:  New Prescriptions    METHOCARBAMOL (ROBAXIN-750) 750 MG TABLET    Take 1 tablet by mouth 4 times daily for 10 days Use as needed for muscle pain or soreness. Attestation: The Prescription Monitoring Report for this patient was reviewed today.  Yina Morel PA-C)    (Please note that portions of this note were completed with a voice recognition program.  Efforts were made to edit the dictations but occasionally words are mis-transcribed.)    Ynia Morel PA-C (electronically signed)  Attending Emergency Physician         Yina Morel PA-C  03/30/19 [id number]

## 2019-04-01 LAB — URINE CULTURE, ROUTINE: NORMAL

## 2019-04-01 PROCEDURE — 93010 ELECTROCARDIOGRAM REPORT: CPT | Performed by: INTERNAL MEDICINE

## 2019-04-11 ENCOUNTER — HOSPITAL ENCOUNTER (OUTPATIENT)
Age: 49
Setting detail: OBSERVATION
Discharge: HOME | End: 2019-04-14
Attending: EMERGENCY MEDICINE | Admitting: INTERNAL MEDICINE
Payer: MEDICAID

## 2019-04-11 ENCOUNTER — APPOINTMENT (OUTPATIENT)
Dept: GENERAL RADIOLOGY | Age: 49
End: 2019-04-11
Payer: MEDICAID

## 2019-04-11 DIAGNOSIS — R07.9 CHEST PAIN, UNSPECIFIED TYPE: Primary | ICD-10-CM

## 2019-04-11 LAB
ALBUMIN SERPL-MCNC: 3.7 G/DL (ref 3.5–4.6)
ALP BLD-CCNC: 160 U/L (ref 40–130)
ALT SERPL-CCNC: 35 U/L (ref 0–33)
ANION GAP SERPL CALCULATED.3IONS-SCNC: 12 MEQ/L (ref 9–15)
AST SERPL-CCNC: 26 U/L (ref 0–35)
BASOPHILS ABSOLUTE: 0 K/UL (ref 0–0.2)
BASOPHILS RELATIVE PERCENT: 0.5 %
BILIRUB SERPL-MCNC: <0.2 MG/DL (ref 0.2–0.7)
BUN BLDV-MCNC: 11 MG/DL (ref 6–20)
CALCIUM SERPL-MCNC: 8.9 MG/DL (ref 8.5–9.9)
CHLORIDE BLD-SCNC: 100 MEQ/L (ref 95–107)
CO2: 23 MEQ/L (ref 20–31)
CREAT SERPL-MCNC: 1.34 MG/DL (ref 0.5–0.9)
EOSINOPHILS ABSOLUTE: 0.1 K/UL (ref 0–0.7)
EOSINOPHILS RELATIVE PERCENT: 0.9 %
GFR AFRICAN AMERICAN: 51.1
GFR NON-AFRICAN AMERICAN: 42.2
GLOBULIN: 3.3 G/DL (ref 2.3–3.5)
GLUCOSE BLD-MCNC: 187 MG/DL (ref 60–115)
GLUCOSE BLD-MCNC: 241 MG/DL (ref 70–99)
HCT VFR BLD CALC: 43 % (ref 37–47)
HEMOGLOBIN: 14.3 G/DL (ref 12–16)
LYMPHOCYTES ABSOLUTE: 0.5 K/UL (ref 1–4.8)
LYMPHOCYTES RELATIVE PERCENT: 7.7 %
MCH RBC QN AUTO: 32.5 PG (ref 27–31.3)
MCHC RBC AUTO-ENTMCNC: 33.4 % (ref 33–37)
MCV RBC AUTO: 97.5 FL (ref 82–100)
MONOCYTES ABSOLUTE: 0.1 K/UL (ref 0.2–0.8)
MONOCYTES RELATIVE PERCENT: 1.5 %
NEUTROPHILS ABSOLUTE: 5.4 K/UL (ref 1.4–6.5)
NEUTROPHILS RELATIVE PERCENT: 89.4 %
PDW BLD-RTO: 15.1 % (ref 11.5–14.5)
PERFORMED ON: ABNORMAL
PLATELET # BLD: 268 K/UL (ref 130–400)
POTASSIUM SERPL-SCNC: 4.6 MEQ/L (ref 3.4–4.9)
RBC # BLD: 4.41 M/UL (ref 4.2–5.4)
SODIUM BLD-SCNC: 135 MEQ/L (ref 135–144)
TOTAL PROTEIN: 7 G/DL (ref 6.3–8)
TROPONIN: <0.01 NG/ML (ref 0–0.01)
WBC # BLD: 6 K/UL (ref 4.8–10.8)

## 2019-04-11 PROCEDURE — 94640 AIRWAY INHALATION TREATMENT: CPT

## 2019-04-11 PROCEDURE — 99285 EMERGENCY DEPT VISIT HI MDM: CPT

## 2019-04-11 PROCEDURE — 36415 COLL VENOUS BLD VENIPUNCTURE: CPT

## 2019-04-11 PROCEDURE — 80053 COMPREHEN METABOLIC PANEL: CPT

## 2019-04-11 PROCEDURE — G0378 HOSPITAL OBSERVATION PER HR: HCPCS

## 2019-04-11 PROCEDURE — 71046 X-RAY EXAM CHEST 2 VIEWS: CPT

## 2019-04-11 PROCEDURE — 85025 COMPLETE CBC W/AUTO DIFF WBC: CPT

## 2019-04-11 PROCEDURE — 93005 ELECTROCARDIOGRAM TRACING: CPT

## 2019-04-11 PROCEDURE — 6360000002 HC RX W HCPCS: Performed by: EMERGENCY MEDICINE

## 2019-04-11 PROCEDURE — 6370000000 HC RX 637 (ALT 250 FOR IP): Performed by: EMERGENCY MEDICINE

## 2019-04-11 PROCEDURE — 84484 ASSAY OF TROPONIN QUANT: CPT

## 2019-04-11 PROCEDURE — 96375 TX/PRO/DX INJ NEW DRUG ADDON: CPT

## 2019-04-11 PROCEDURE — 96374 THER/PROPH/DIAG INJ IV PUSH: CPT

## 2019-04-11 RX ORDER — OXYCODONE HYDROCHLORIDE AND ACETAMINOPHEN 5; 325 MG/1; MG/1
1 TABLET ORAL DAILY PRN
COMMUNITY
End: 2019-04-19 | Stop reason: ALTCHOICE

## 2019-04-11 RX ORDER — PREDNISONE 10 MG/1
15 TABLET ORAL DAILY
Status: ON HOLD | COMMUNITY
End: 2019-04-14 | Stop reason: HOSPADM

## 2019-04-11 RX ORDER — MORPHINE SULFATE 2 MG/ML
4 INJECTION, SOLUTION INTRAMUSCULAR; INTRAVENOUS ONCE
Status: COMPLETED | OUTPATIENT
Start: 2019-04-11 | End: 2019-04-11

## 2019-04-11 RX ORDER — ONDANSETRON 2 MG/ML
4 INJECTION INTRAMUSCULAR; INTRAVENOUS ONCE
Status: COMPLETED | OUTPATIENT
Start: 2019-04-11 | End: 2019-04-11

## 2019-04-11 RX ORDER — LORAZEPAM 2 MG/ML
1 INJECTION INTRAMUSCULAR ONCE
Status: COMPLETED | OUTPATIENT
Start: 2019-04-11 | End: 2019-04-11

## 2019-04-11 RX ORDER — IPRATROPIUM BROMIDE AND ALBUTEROL SULFATE 2.5; .5 MG/3ML; MG/3ML
SOLUTION RESPIRATORY (INHALATION)
Status: DISPENSED
Start: 2019-04-11 | End: 2019-04-12

## 2019-04-11 RX ORDER — IPRATROPIUM BROMIDE AND ALBUTEROL SULFATE 2.5; .5 MG/3ML; MG/3ML
1 SOLUTION RESPIRATORY (INHALATION)
Status: DISCONTINUED | OUTPATIENT
Start: 2019-04-12 | End: 2019-04-12

## 2019-04-11 RX ADMIN — IPRATROPIUM BROMIDE AND ALBUTEROL SULFATE 1 AMPULE: .5; 3 SOLUTION RESPIRATORY (INHALATION) at 21:15

## 2019-04-11 RX ADMIN — MORPHINE SULFATE 4 MG: 2 INJECTION, SOLUTION INTRAMUSCULAR; INTRAVENOUS at 21:50

## 2019-04-11 RX ADMIN — LORAZEPAM 1 MG: 2 INJECTION, SOLUTION INTRAMUSCULAR; INTRAVENOUS at 21:50

## 2019-04-11 RX ADMIN — ONDANSETRON 4 MG: 2 INJECTION INTRAMUSCULAR; INTRAVENOUS at 21:50

## 2019-04-11 ASSESSMENT — ENCOUNTER SYMPTOMS
ABDOMINAL DISTENTION: 0
COUGH: 0
NAUSEA: 0
PHOTOPHOBIA: 0
VOMITING: 0
EYE DISCHARGE: 0
ABDOMINAL PAIN: 0
WHEEZING: 0
SHORTNESS OF BREATH: 1
CHEST TIGHTNESS: 0
SORE THROAT: 0

## 2019-04-11 ASSESSMENT — PAIN DESCRIPTION - LOCATION: LOCATION: CHEST

## 2019-04-11 ASSESSMENT — PAIN SCALES - GENERAL
PAINLEVEL_OUTOF10: 7
PAINLEVEL_OUTOF10: 7
PAINLEVEL_OUTOF10: 8

## 2019-04-11 ASSESSMENT — PAIN DESCRIPTION - ORIENTATION
ORIENTATION: MID
ORIENTATION: MID

## 2019-04-11 ASSESSMENT — PAIN DESCRIPTION - ONSET
ONSET: ON-GOING
ONSET: ON-GOING

## 2019-04-11 ASSESSMENT — PAIN DESCRIPTION - PAIN TYPE
TYPE: ACUTE PAIN
TYPE: ACUTE PAIN

## 2019-04-11 ASSESSMENT — PAIN DESCRIPTION - PROGRESSION
CLINICAL_PROGRESSION: NOT CHANGED
CLINICAL_PROGRESSION: NOT CHANGED

## 2019-04-11 ASSESSMENT — PAIN DESCRIPTION - DESCRIPTORS
DESCRIPTORS: CRUSHING;ACHING
DESCRIPTORS: TIGHTNESS

## 2019-04-11 ASSESSMENT — PAIN DESCRIPTION - FREQUENCY
FREQUENCY: INTERMITTENT
FREQUENCY: CONTINUOUS

## 2019-04-11 ASSESSMENT — PULMONARY FUNCTION TESTS: PEFR_L/MIN: 300

## 2019-04-12 LAB
AMPHETAMINE SCREEN, URINE: ABNORMAL
ANION GAP SERPL CALCULATED.3IONS-SCNC: 12 MEQ/L (ref 9–15)
BARBITURATE SCREEN URINE: POSITIVE
BENZODIAZEPINE SCREEN, URINE: ABNORMAL
BUN BLDV-MCNC: 13 MG/DL (ref 6–20)
CALCIUM SERPL-MCNC: 9 MG/DL (ref 8.5–9.9)
CANNABINOID SCREEN URINE: POSITIVE
CHLORIDE BLD-SCNC: 100 MEQ/L (ref 95–107)
CO2: 23 MEQ/L (ref 20–31)
COCAINE METABOLITE SCREEN URINE: ABNORMAL
CREAT SERPL-MCNC: 1.17 MG/DL (ref 0.5–0.9)
EKG ATRIAL RATE: 75 BPM
EKG ATRIAL RATE: 94 BPM
EKG P AXIS: 34 DEGREES
EKG P AXIS: 38 DEGREES
EKG P-R INTERVAL: 138 MS
EKG P-R INTERVAL: 144 MS
EKG Q-T INTERVAL: 354 MS
EKG Q-T INTERVAL: 362 MS
EKG QRS DURATION: 74 MS
EKG QRS DURATION: 80 MS
EKG QTC CALCULATION (BAZETT): 404 MS
EKG QTC CALCULATION (BAZETT): 442 MS
EKG R AXIS: 15 DEGREES
EKG R AXIS: 5 DEGREES
EKG T AXIS: -15 DEGREES
EKG T AXIS: 24 DEGREES
EKG VENTRICULAR RATE: 75 BPM
EKG VENTRICULAR RATE: 94 BPM
GFR AFRICAN AMERICAN: 59.7
GFR NON-AFRICAN AMERICAN: 49.4
GLUCOSE BLD-MCNC: 182 MG/DL (ref 70–99)
GLUCOSE BLD-MCNC: 193 MG/DL (ref 60–115)
GLUCOSE BLD-MCNC: 206 MG/DL (ref 60–115)
GLUCOSE BLD-MCNC: 230 MG/DL (ref 60–115)
GLUCOSE BLD-MCNC: 247 MG/DL (ref 60–115)
HCT VFR BLD CALC: 44.2 % (ref 37–47)
HEMOGLOBIN: 14.4 G/DL (ref 12–16)
LV EF: 65 %
LVEF MODALITY: NORMAL
Lab: ABNORMAL
MCH RBC QN AUTO: 31.2 PG (ref 27–31.3)
MCHC RBC AUTO-ENTMCNC: 32.5 % (ref 33–37)
MCV RBC AUTO: 96 FL (ref 82–100)
OPIATE SCREEN URINE: POSITIVE
PDW BLD-RTO: 15 % (ref 11.5–14.5)
PERFORMED ON: ABNORMAL
PHENCYCLIDINE SCREEN URINE: ABNORMAL
PLATELET # BLD: 293 K/UL (ref 130–400)
POTASSIUM REFLEX MAGNESIUM: 4.8 MEQ/L (ref 3.4–4.9)
RBC # BLD: 4.6 M/UL (ref 4.2–5.4)
SODIUM BLD-SCNC: 135 MEQ/L (ref 135–144)
TROPONIN: <0.01 NG/ML (ref 0–0.01)
WBC # BLD: 7.2 K/UL (ref 4.8–10.8)

## 2019-04-12 PROCEDURE — 6370000000 HC RX 637 (ALT 250 FOR IP): Performed by: INTERNAL MEDICINE

## 2019-04-12 PROCEDURE — 6370000000 HC RX 637 (ALT 250 FOR IP): Performed by: HOSPITALIST

## 2019-04-12 PROCEDURE — G0378 HOSPITAL OBSERVATION PER HR: HCPCS

## 2019-04-12 PROCEDURE — 96372 THER/PROPH/DIAG INJ SC/IM: CPT

## 2019-04-12 PROCEDURE — 2700000000 HC OXYGEN THERAPY PER DAY

## 2019-04-12 PROCEDURE — 93010 ELECTROCARDIOGRAM REPORT: CPT | Performed by: INTERNAL MEDICINE

## 2019-04-12 PROCEDURE — 6360000002 HC RX W HCPCS: Performed by: INTERNAL MEDICINE

## 2019-04-12 PROCEDURE — 94640 AIRWAY INHALATION TREATMENT: CPT

## 2019-04-12 PROCEDURE — 80048 BASIC METABOLIC PNL TOTAL CA: CPT

## 2019-04-12 PROCEDURE — 85027 COMPLETE CBC AUTOMATED: CPT

## 2019-04-12 PROCEDURE — 93005 ELECTROCARDIOGRAM TRACING: CPT

## 2019-04-12 PROCEDURE — 2580000003 HC RX 258: Performed by: HOSPITALIST

## 2019-04-12 PROCEDURE — 80307 DRUG TEST PRSMV CHEM ANLYZR: CPT

## 2019-04-12 PROCEDURE — 99222 1ST HOSP IP/OBS MODERATE 55: CPT | Performed by: INTERNAL MEDICINE

## 2019-04-12 PROCEDURE — 6360000002 HC RX W HCPCS: Performed by: HOSPITALIST

## 2019-04-12 PROCEDURE — 36415 COLL VENOUS BLD VENIPUNCTURE: CPT

## 2019-04-12 PROCEDURE — 93306 TTE W/DOPPLER COMPLETE: CPT

## 2019-04-12 PROCEDURE — 96376 TX/PRO/DX INJ SAME DRUG ADON: CPT

## 2019-04-12 PROCEDURE — 94664 DEMO&/EVAL PT USE INHALER: CPT

## 2019-04-12 PROCEDURE — 84484 ASSAY OF TROPONIN QUANT: CPT

## 2019-04-12 RX ORDER — OXYCODONE HYDROCHLORIDE AND ACETAMINOPHEN 5; 325 MG/1; MG/1
1 TABLET ORAL DAILY PRN
Status: DISCONTINUED | OUTPATIENT
Start: 2019-04-12 | End: 2019-04-14 | Stop reason: HOSPADM

## 2019-04-12 RX ORDER — ACETAMINOPHEN 325 MG/1
650 TABLET ORAL EVERY 4 HOURS PRN
Status: DISCONTINUED | OUTPATIENT
Start: 2019-04-12 | End: 2019-04-14 | Stop reason: HOSPADM

## 2019-04-12 RX ORDER — IPRATROPIUM BROMIDE AND ALBUTEROL SULFATE 2.5; .5 MG/3ML; MG/3ML
1 SOLUTION RESPIRATORY (INHALATION) 4 TIMES DAILY
Status: DISPENSED | OUTPATIENT
Start: 2019-04-12 | End: 2019-04-14

## 2019-04-12 RX ORDER — DEXTROSE MONOHYDRATE 50 MG/ML
100 INJECTION, SOLUTION INTRAVENOUS PRN
Status: DISCONTINUED | OUTPATIENT
Start: 2019-04-12 | End: 2019-04-14 | Stop reason: HOSPADM

## 2019-04-12 RX ORDER — METHYLPREDNISOLONE SODIUM SUCCINATE 40 MG/ML
40 INJECTION, POWDER, LYOPHILIZED, FOR SOLUTION INTRAMUSCULAR; INTRAVENOUS ONCE
Status: COMPLETED | OUTPATIENT
Start: 2019-04-12 | End: 2019-04-12

## 2019-04-12 RX ORDER — DULOXETIN HYDROCHLORIDE 60 MG/1
60 CAPSULE, DELAYED RELEASE ORAL DAILY
Status: DISCONTINUED | OUTPATIENT
Start: 2019-04-12 | End: 2019-04-14 | Stop reason: HOSPADM

## 2019-04-12 RX ORDER — DEXTROSE MONOHYDRATE 25 G/50ML
12.5 INJECTION, SOLUTION INTRAVENOUS PRN
Status: DISCONTINUED | OUTPATIENT
Start: 2019-04-12 | End: 2019-04-14 | Stop reason: HOSPADM

## 2019-04-12 RX ORDER — METHYLPREDNISOLONE SODIUM SUCCINATE 40 MG/ML
40 INJECTION, POWDER, LYOPHILIZED, FOR SOLUTION INTRAMUSCULAR; INTRAVENOUS EVERY 12 HOURS
Status: DISCONTINUED | OUTPATIENT
Start: 2019-04-12 | End: 2019-04-13

## 2019-04-12 RX ORDER — ONDANSETRON 2 MG/ML
4 INJECTION INTRAMUSCULAR; INTRAVENOUS EVERY 6 HOURS PRN
Status: DISCONTINUED | OUTPATIENT
Start: 2019-04-11 | End: 2019-04-14 | Stop reason: HOSPADM

## 2019-04-12 RX ORDER — FAMOTIDINE 20 MG/1
20 TABLET, FILM COATED ORAL 2 TIMES DAILY
Status: DISCONTINUED | OUTPATIENT
Start: 2019-04-12 | End: 2019-04-14 | Stop reason: HOSPADM

## 2019-04-12 RX ORDER — INSULIN GLARGINE 100 [IU]/ML
25 INJECTION, SOLUTION SUBCUTANEOUS NIGHTLY
Status: DISCONTINUED | OUTPATIENT
Start: 2019-04-12 | End: 2019-04-14 | Stop reason: HOSPADM

## 2019-04-12 RX ORDER — ALBUTEROL SULFATE 2.5 MG/3ML
2.5 SOLUTION RESPIRATORY (INHALATION)
Status: DISCONTINUED | OUTPATIENT
Start: 2019-04-12 | End: 2019-04-14 | Stop reason: HOSPADM

## 2019-04-12 RX ORDER — MONTELUKAST SODIUM 10 MG/1
10 TABLET ORAL NIGHTLY
Status: DISCONTINUED | OUTPATIENT
Start: 2019-04-12 | End: 2019-04-14 | Stop reason: HOSPADM

## 2019-04-12 RX ORDER — FOLIC ACID 1 MG/1
1 TABLET ORAL DAILY
Status: DISCONTINUED | OUTPATIENT
Start: 2019-04-12 | End: 2019-04-14 | Stop reason: HOSPADM

## 2019-04-12 RX ORDER — BUSPIRONE HYDROCHLORIDE 5 MG/1
10 TABLET ORAL DAILY
Status: DISCONTINUED | OUTPATIENT
Start: 2019-04-12 | End: 2019-04-14 | Stop reason: HOSPADM

## 2019-04-12 RX ORDER — IPRATROPIUM BROMIDE AND ALBUTEROL SULFATE 2.5; .5 MG/3ML; MG/3ML
1 SOLUTION RESPIRATORY (INHALATION) EVERY 4 HOURS PRN
Status: DISCONTINUED | OUTPATIENT
Start: 2019-04-12 | End: 2019-04-14 | Stop reason: HOSPADM

## 2019-04-12 RX ORDER — CETIRIZINE HYDROCHLORIDE 10 MG/1
10 TABLET ORAL DAILY
Status: DISCONTINUED | OUTPATIENT
Start: 2019-04-12 | End: 2019-04-14 | Stop reason: HOSPADM

## 2019-04-12 RX ORDER — SODIUM CHLORIDE 0.9 % (FLUSH) 0.9 %
10 SYRINGE (ML) INJECTION PRN
Status: DISCONTINUED | OUTPATIENT
Start: 2019-04-11 | End: 2019-04-14 | Stop reason: HOSPADM

## 2019-04-12 RX ORDER — LIDOCAINE 4 G/G
2 PATCH TOPICAL DAILY
Status: DISCONTINUED | OUTPATIENT
Start: 2019-04-12 | End: 2019-04-14 | Stop reason: HOSPADM

## 2019-04-12 RX ORDER — NICOTINE POLACRILEX 4 MG
15 LOZENGE BUCCAL PRN
Status: DISCONTINUED | OUTPATIENT
Start: 2019-04-12 | End: 2019-04-14 | Stop reason: HOSPADM

## 2019-04-12 RX ORDER — SODIUM CHLORIDE 0.9 % (FLUSH) 0.9 %
10 SYRINGE (ML) INJECTION EVERY 12 HOURS SCHEDULED
Status: DISCONTINUED | OUTPATIENT
Start: 2019-04-12 | End: 2019-04-14 | Stop reason: HOSPADM

## 2019-04-12 RX ADMIN — ENOXAPARIN SODIUM 40 MG: 40 INJECTION SUBCUTANEOUS at 08:57

## 2019-04-12 RX ADMIN — DULOXETINE HYDROCHLORIDE 60 MG: 60 CAPSULE, DELAYED RELEASE ORAL at 08:56

## 2019-04-12 RX ADMIN — CETIRIZINE HYDROCHLORIDE 10 MG: 10 TABLET, FILM COATED ORAL at 08:56

## 2019-04-12 RX ADMIN — INSULIN LISPRO 4 UNITS: 100 INJECTION, SOLUTION INTRAVENOUS; SUBCUTANEOUS at 12:19

## 2019-04-12 RX ADMIN — IPRATROPIUM BROMIDE AND ALBUTEROL SULFATE 1 AMPULE: .5; 3 SOLUTION RESPIRATORY (INHALATION) at 20:55

## 2019-04-12 RX ADMIN — INSULIN LISPRO 4 UNITS: 100 INJECTION, SOLUTION INTRAVENOUS; SUBCUTANEOUS at 16:26

## 2019-04-12 RX ADMIN — INSULIN GLARGINE 25 UNITS: 100 INJECTION, SOLUTION SUBCUTANEOUS at 21:27

## 2019-04-12 RX ADMIN — IPRATROPIUM BROMIDE AND ALBUTEROL SULFATE 1 AMPULE: .5; 3 SOLUTION RESPIRATORY (INHALATION) at 16:19

## 2019-04-12 RX ADMIN — FAMOTIDINE 20 MG: 20 TABLET ORAL at 21:28

## 2019-04-12 RX ADMIN — PREDNISONE 15 MG: 2.5 TABLET ORAL at 08:57

## 2019-04-12 RX ADMIN — FAMOTIDINE 20 MG: 20 TABLET ORAL at 08:57

## 2019-04-12 RX ADMIN — METHYLPREDNISOLONE SODIUM SUCCINATE 40 MG: 40 INJECTION, POWDER, FOR SOLUTION INTRAMUSCULAR; INTRAVENOUS at 13:35

## 2019-04-12 RX ADMIN — MONTELUKAST 10 MG: 10 TABLET, FILM COATED ORAL at 21:28

## 2019-04-12 RX ADMIN — FOLIC ACID 1 MG: 1 TABLET ORAL at 08:57

## 2019-04-12 RX ADMIN — OXYCODONE AND ACETAMINOPHEN 1 TABLET: 5; 325 TABLET ORAL at 02:45

## 2019-04-12 RX ADMIN — METHYLPREDNISOLONE SODIUM SUCCINATE 40 MG: 40 INJECTION, POWDER, FOR SOLUTION INTRAMUSCULAR; INTRAVENOUS at 02:45

## 2019-04-12 RX ADMIN — Medication 10 ML: at 08:58

## 2019-04-12 RX ADMIN — ACETAMINOPHEN 650 MG: 325 TABLET ORAL at 16:50

## 2019-04-12 RX ADMIN — ACETAMINOPHEN 650 MG: 325 TABLET ORAL at 08:56

## 2019-04-12 RX ADMIN — IPRATROPIUM BROMIDE AND ALBUTEROL SULFATE 1 AMPULE: .5; 3 SOLUTION RESPIRATORY (INHALATION) at 09:22

## 2019-04-12 RX ADMIN — BUSPIRONE HYDROCHLORIDE 10 MG: 5 TABLET ORAL at 08:56

## 2019-04-12 ASSESSMENT — PAIN SCALES - GENERAL
PAINLEVEL_OUTOF10: 7
PAINLEVEL_OUTOF10: 8
PAINLEVEL_OUTOF10: 0
PAINLEVEL_OUTOF10: 8
PAINLEVEL_OUTOF10: 8

## 2019-04-12 ASSESSMENT — PAIN DESCRIPTION - LOCATION
LOCATION: CHEST
LOCATION: CHEST

## 2019-04-12 ASSESSMENT — PAIN DESCRIPTION - ORIENTATION: ORIENTATION: MID

## 2019-04-12 ASSESSMENT — PAIN DESCRIPTION - PAIN TYPE
TYPE: ACUTE PAIN
TYPE: ACUTE PAIN

## 2019-04-12 NOTE — PROGRESS NOTES
[facility] EMERGENCY [facility] AT [place] Respiratory Therapy Evaluation   Current Order:  Duoneb Q4 PRN     Home Regimen: PRN       Ordering Physician: Isaiah  Re-evaluation Date:  ---     Diagnosis: Chest Pain/SOB      Patient Status: Stable / Unstable + Physician notified    The following MDI Criteria must be met in order to convert aerosol to MDI with spacer. If unable to meet, MDI will be converted to aerosol:  []  Patient able to demonstrate the ability to use MDI effectively  []  Patient alert and cooperative  []  Patient able to take deep breath with 5-10 second hold  []  Medication(s) available in this delivery method   []  Peak flow greater than or equal to 200 ml/min            Current Order Substituted To  (same drug, same frequency)   Aerosol to MDI [] Albuterol Sulfate 0.083% unit dose by aerosol Albuterol Sulfate MDI 2 puffs by inhalation with spacer    [] Levalbuterol 1.25 mg unit dose by aerosol Levalbuterol MDI 2 puffs by inhalation with spacer    [] Levalbuterol 0.63 mg unit dose by aerosol Levalbuterol MDI 2 puffs by inhalation with spacer    [] Ipratropium Bromide 0.02% unit dose by aerosol Ipratropium Bromide MDI 2 puffs by inhalation with spacer    [] Duoneb (Ipratropium + Albuterol) unit dose by aerosol Ipratropium MDI + Albuterol MDI 2 puffs by inhalation w/spacer   MDI to Aerosol [] Albuterol Sulfate MDI Albuterol Sulfate 0.083% unit dose by aerosol    [] Levalbuterol MDI 2 puffs by inhalation Levalbuterol 1.25 mg unit dose by aerosol    [] Ipratropium Bromide MDI by inhalation Ipratropium Bromide 0.02% unit dose by aerosol    [] Combivent (Ipratropium + Albuterol) MDI by inhalation Duoneb (Ipratropium + Albuterol) unit dose by aerosol   Treatment Assessment [Frequency/Schedule]:  Change frequency to: ____________No Changes______________________________________per Protocol, P&T, MEC      Points 0 1 2 3 4   Pulmonary Status  Non-Smoker  []   Smoking history   < 20 pack years  []   Smoking history  ?  20 pack years  [] Pulmonary Disorder  (acute or chronic)  [x]   Severe or Chronic w/ Exacerbation  []     Surgical Status No [x]   Surgeries     General []   Surgery Lower []   Abdominal Thoracic or []   Upper Abdominal Thoracic with  PulmonaryDisorder  []     Chest X-ray Clear/Not  Ordered     [x]  Chronic Changes  Results Pending  []  Infiltrates, atelectasis, pleural effusion, or edema  []  Infiltrates in more than one lobe []  Infiltrate + Atelectasis, &/or pleural effusion  []    Respiratory Pattern Regular,  RR = 12-20 [x]  Increased,  RR = 21-25 []  HINES, irregular,  or RR = 26-30 []  Decreased FEV1  or RR = 31-35 []  Severe SOB, use  of accessory muscles, or RR ? 35  []    Mental Status Alert, oriented,  Cooperative [x]  Confused but Follows commands []  Lethargic or unable to follow commands []  Obtunded  []  Comatose  []    Breath Sounds Clear to  auscultation  [x]  Decreased unilaterally or  in bases only []  Decreased  bilaterally  []  Crackles or intermittent wheezes []  Wheezes []    Cough Strong, Spontan., & nonproductive [x]  Strong,  spontaneous, &  productive []  Weak,  Nonproductive []  Weak, productive or  with wheezes []  No spontaneous  cough or may require suctioning []    Level of Activity Ambulatory [x]  Ambulatory w/ Assist  []  Non-ambulatory []  Paraplegic []  Quadriplegic []    Total    Score:___3____     Triage Score:____5____      Tri       Triage:     1. (>20) Freq: Q3    2. (16-20) Freq: Q4   3. (11-15) Freq: QID & Albuterol Q2 PRN    4. (6-10) Freq: TID & Albuterol Q2 PRN    5. (0-5) Freq Q4prn

## 2019-04-12 NOTE — ED PROVIDER NOTES
[address] [facility]  eMERGENCY dEPARTMENT eNCOUnter      Pt Name: Jasmyn Bobo  MRN: [id number]  [place] 1970  Date of evaluation: 4/11/2019  Provider: Katelin Mares MD    CHIEF COMPLAINT       Chief Complaint   Patient presents with    Chest Pain    Shortness of Breath         HISTORY OF PRESENT ILLNESS   (Location/Symptom, Timing/Onset,Context/Setting, Quality, Duration, Modifying Factors, Severity)  Note limiting factors. Jasmyn Bobo is a 51 y.o. female who presents to the emergency department for evaluation of chest pain and shortness of breath. Patient's primarily complaining of shortness of breath that began last night and worsened today. His pain is more of a constriction-like feeling. She feels like she cannot take a deep breath. She has a history of sarcoidosis and saw her sarcoid doctor earlier in the week who put her on her on a new inhaler like powdered version inhaler. She's not sure she's reacting poorly to that but feels more short of breath in the last 36 hours. No related fever or chills. No related abdominal pain. He has had no significant cough. There has been no leg edema or leg trauma. HPI    NursingNotes were reviewed. REVIEW OF SYSTEMS    (2-9 systems for level 4, 10 or more for level 5)     Review of Systems   Constitutional: Negative for chills and diaphoresis. HENT: Negative for congestion, ear pain, mouth sores and sore throat. Eyes: Negative for photophobia and discharge. Respiratory: Positive for shortness of breath. Negative for cough, chest tightness and wheezing. Cardiovascular: Positive for chest pain. Negative for palpitations. Gastrointestinal: Negative for abdominal distention, abdominal pain, nausea and vomiting. Endocrine: Negative for cold intolerance. Genitourinary: Negative for difficulty urinating, dysuria and frequency. Musculoskeletal: Negative for arthralgias and joint swelling. Skin: Negative for pallor and rash. Allergic/Immunologic: Negative for immunocompromised state. Neurological: Negative for dizziness and syncope. Hematological: Negative for adenopathy. Psychiatric/Behavioral: Negative for agitation and hallucinations. All other systems reviewed and are negative. Except as noted above the remainder of the review of systems was reviewed and negative. PAST MEDICAL HISTORY     Past Medical History:   Diagnosis Date    Anxiety     Arthritis     Asthma     Bronchopneumonia     [address] [address]     renal    Cerebral artery occlusion with cerebral infarction ([facility] [address]     Chronic bilateral low back pain with sciatica     Chronic kidney disease     Depression     Hypertension     Insulin dependent type 2 diabetes mellitus, uncontrolled ([facility] [address] 8/3/2018    Mixed headache     Pure hyperglyceridemia 5/19/2017    Sleep apnea     does not wear cpap    Thyroid goiter          SURGICALHISTORY       Past Surgical History:   Procedure Laterality Date    BRONCHOSCOPY  10/26/2018    DR. STEARNS    KIDNEY REMOVAL Right 08/2016    THYROID LOBECTOMY Right 6/13/14    THYROIDECTOMY  02/21/2019    DR. MAGDALENO    URETER STENT PLACEMENT Left 08/2016         CURRENT MEDICATIONS       Discharge Medication List as of 4/14/2019  4:12 PM      CONTINUE these medications which have NOT CHANGED    Details   oxyCODONE-acetaminophen (PERCOCET) 5-325 MG per tablet Take 1 tablet by mouth daily as needed for Pain. Historical Med      butalbital-acetaminophen-caffeine (FIORICET, ESGIC) -40 MG per tablet TAKE 1 TABLET BY MOUTH THREE TIMES DAILY AS NEEDED FOR HEADACHES, Disp-21 tablet, R-0Normal      cetirizine (ZYRTEC) 10 MG tablet Take 1 qhs for allergies, Disp-30 tablet, R-3Normal      insulin lispro (ADMELOG) 100 UNIT/ML injection vial Inject 4 Units into the skin 3 times daily as needed for High Blood Sugar, Disp-1 vial, [id number];replaces novologNormal      DULoxetine (CYMBALTA) 60 MG extended release capsule TAKE 1 CAPSULE BY MOUTH DAILY, Disp-30 capsule, R-2Normal      gabapentin (NEURONTIN) 400 MG capsule Take 1 capsule by mouth 3 times daily for 90 days. ., Disp-90 capsule, R-2Normal      insulin glargine (LANTUS SOLOSTAR) 100 UNIT/ML injection pen Inject 25 Units into the skin nightlyHistorical Med      montelukast (SINGULAIR) 10 MG tablet Take 1 tab nightly at supper for breathing/allergies, Disp-30 tablet, [id number]      folic acid (FOLVITE) 1 MG tablet Take 1 tablet by mouth daily, Disp-30 tablet, R-3Normal      albuterol (PROVENTIL) (2.5 MG/3ML) 0.083% nebulizer solution Take 3 mLs by nebulization every 2 hours as needed for Wheezing, Disp-120 each, R-3Print      albuterol sulfate  (90 Base) MCG/ACT inhaler Inhale 2 puffs into the lungs every 4 hours as needed for Wheezing, Disp-1 Inhaler, R-3Print      busPIRone (BUSPAR) 10 MG tablet Take 1/2 tab bid wf x 1 week, then 1 tab bid, Disp-60 tablet, R-2Normal      levothyroxine (SYNTHROID) 75 MCG tablet Take 75 mcg by mouth DailyHistorical Med      Insulin Pen Needle (B-D ULTRAFINE III SHORT PEN) 31G X 8 MM MISC 3 TIMES DAILY Starting Sat 1/5/2019, Until Mon 2/4/2019, For 30 days, Disp-100 each, R-5, Normal      Insulin Syringe-Needle U-100 30G X 1/2\" 1 ML MISC DAILY Starting Fri 11/16/2018, Disp-100 each, R-3, Normal      !! blood glucose test strips (ONETOUCH VERIO) strip Disp-300 each, R-3, NormalTEST 3 TIMES DAILY AS NEEDED      Blood Glucose Monitoring Suppl (ONETOUCH VERIO) w/Device KIT TEST 3 TIMES DAILY AS NEEDED, Disp-1 kit, R-0Normal      ONE TOUCH ULTRASOFT LANCETS MISC Disp-300 each, R-3, NormalTEST 3 TIMES DAILY AS NEEDED      !! blood glucose test strips (EXACTECH TEST) strip 3 TIMES DAILY Starting Mon 8/13/2018, Disp-300 strip, R-5, Normal(Accu-check test strips) As needed. DX:E11.65       !! - Potential duplicate medications found. Please discuss with provider. ALLERGIES     Shellfish-derived products;  Ibuprofen; and Propoxyphene n-acetaminophen    FAMILY HISTORY       Family History   Problem Relation Age of Onset    Cancer Father     Diabetes Father     High Blood Pressure Mother     Diabetes Mother           SOCIAL HISTORY       Social History     Socioeconomic History    Marital status: Legally      Spouse name: None    Number of children: None    Years of education: None    Highest education level: None   Occupational History    None   Social Needs    Financial resource strain: None    Food insecurity:     Worry: None     Inability: None    Transportation needs:     Medical: None     Non-medical: None   Tobacco Use    Smoking status: Former Smoker     Packs/day: 0.50     Years: 15.00     Pack years: 7.50     Types: Cigarettes     Start date: 2014     Last attempt to quit: 2015     Years since quittin.2    Smokeless tobacco: Former User     Quit date: 3/23/2016   Substance and Sexual Activity    Alcohol use:  Yes     Alcohol/week: 0.0 oz     Comment: occasionally    Drug use: Yes     Types: Marijuana     Comment: one month ago    Sexual activity: None   Lifestyle    Physical activity:     Days per week: None     Minutes per session: None    Stress: None   Relationships    Social connections:     Talks on phone: None     Gets together: None     Attends Restoration service: None     Active member of club or organization: None     Attends meetings of clubs or organizations: None     Relationship status: None    Intimate partner violence:     Fear of current or ex partner: None     Emotionally abused: None     Physically abused: None     Forced sexual activity: None   Other Topics Concern    None   Social History Narrative    None       SCREENINGS    [place] Coma Scale  Eye Opening: Spontaneous  Best Verbal Response: Oriented  Best Motor Response: Obeys commands  [place]ia Coma Scale Score: 15 @FLOW([id number])@      PHYSICAL EXAM    (up to 7 for level 4, 8 or more for level 5)     ED Triage Vitals   BP Temp Temp src Pulse Resp SpO2 Height Weight   -- -- -- -- -- -- -- --       Physical Exam   Constitutional: She is oriented to person, place, and time. She appears well-developed. HENT:   Head: Normocephalic. Nose: Nose normal.   Eyes: Pupils are equal, round, and reactive to light. Conjunctivae are normal.   Neck: Normal range of motion. Neck supple. Cardiovascular: Normal rate, regular rhythm, normal heart sounds and intact distal pulses. Pulmonary/Chest: Breath sounds normal. No stridor. She is in respiratory distress. She has no wheezes. She has no rales. Abdominal: Soft. Bowel sounds are normal. There is no tenderness. There is no guarding. Musculoskeletal: Normal range of motion. Neurological: She is alert and oriented to person, place, and time. Skin: Skin is warm and dry. Capillary refill takes less than 2 seconds. Psychiatric: She has a normal mood and affect. Thought content normal.   Nursing note and vitals reviewed. DIAGNOSTIC RESULTS     EKG: All EKG's are interpreted by the Emergency Department Physician who either signs or Co-signsthis chart in the absence of a cardiologist.    EKG shows a normal sinus rhythm rate of 94. No acute ST or T-wave changes. Normal axis. Normal EKG. RADIOLOGY:   Non-plain filmimages such as CT, Ultrasound and MRI are read by the radiologist. Plain radiographic images are visualized and preliminarily interpreted by the emergency physician with the below findings:        Interpretation per the Radiologist below, if available at the time ofthis note:    XR CHEST STANDARD (2 VW)   Final Result   NEGATIVE CHEST RADIOGRAPHS        Chest x-ray shows chronic changes. Lateral.  No acute disease pattern.     ED BEDSIDE ULTRASOUND:   Performed by ED Physician - none    LABS:  Labs Reviewed   COMPREHENSIVE METABOLIC PANEL - Abnormal; Notable for the following components:       Result Value    Glucose 241 (*)     CREATININE 1.34 (*)     GFR Non- American 42.2 (*)     GFR  51.1 (*)     Alkaline Phosphatase 160 (*)     ALT 35 (*)     All other components within normal limits   CBC WITH AUTO DIFFERENTIAL - Abnormal; Notable for the following components:    MCH 32.5 (*)     RDW 15.1 (*)     Lymphocytes # 0.5 (*)     Monocytes # 0.1 (*)     All other components within normal limits   URINE DRUG SCREEN - Abnormal; Notable for the following components:    Barbiturate Screen, Ur POSITIVE (*)     Cannabinoid Scrn, Ur POSITIVE (*)     Opiate Scrn, Ur POSITIVE (*)     All other components within normal limits   BASIC METABOLIC PANEL W/ REFLEX TO MG FOR LOW K - Abnormal; Notable for the following components:    Glucose 182 (*)     CREATININE 1.17 (*)     GFR Non- 49.4 (*)     GFR  59.7 (*)     All other components within normal limits   CBC - Abnormal; Notable for the following components:    MCHC 32.5 (*)     RDW 15.0 (*)     All other components within normal limits   BASIC METABOLIC PANEL W/ REFLEX TO MG FOR LOW K - Abnormal; Notable for the following components:    Sodium 134 (*)     CO2 19 (*)     Glucose 220 (*)     CREATININE 1.22 (*)     GFR Non- 47.0 (*)     GFR  56.9 (*)     All other components within normal limits   CBC - Abnormal; Notable for the following components:    WBC 16.4 (*)     MCH 31.9 (*)     RDW 15.0 (*)     All other components within normal limits   BASIC METABOLIC PANEL W/ REFLEX TO MG FOR LOW K - Abnormal; Notable for the following components:    Glucose 108 (*)     CREATININE 1.19 (*)     GFR Non- 48.4 (*)     GFR  58.6 (*)     All other components within normal limits   CBC - Abnormal; Notable for the following components:    WBC 14.0 (*)     MCH 32.3 (*)     RDW 14.9 (*)     All other components within normal limits   POCT GLUCOSE - Abnormal; Notable for the following components:    POC Glucose 187 (*)     All other components within normal limits   POCT GLUCOSE - Abnormal; Notable for the following components:    POC Glucose 193 (*)     All other components within normal limits   POCT GLUCOSE - Abnormal; Notable for the following components:    POC Glucose 247 (*)     All other components within normal limits   POCT GLUCOSE - Abnormal; Notable for the following components:    POC Glucose 206 (*)     All other components within normal limits   POCT GLUCOSE - Abnormal; Notable for the following components:    POC Glucose 230 (*)     All other components within normal limits   POCT GLUCOSE - Abnormal; Notable for the following components:    POC Glucose 179 (*)     All other components within normal limits   POCT GLUCOSE - Abnormal; Notable for the following components:    POC Glucose 199 (*)     All other components within normal limits   POCT GLUCOSE - Abnormal; Notable for the following components:    POC Glucose 229 (*)     All other components within normal limits   POCT GLUCOSE - Abnormal; Notable for the following components:    POC Glucose 148 (*)     All other components within normal limits   POCT GLUCOSE - Abnormal; Notable for the following components:    POC Glucose 212 (*)     All other components within normal limits   POCT GLUCOSE - Abnormal; Notable for the following components:    POC Glucose 202 (*)     All other components within normal limits   TROPONIN   TROPONIN   POCT GLUCOSE   POCT GLUCOSE   POCT GLUCOSE   POCT GLUCOSE   POCT GLUCOSE   POCT GLUCOSE   POCT GLUCOSE   POCT GLUCOSE   POCT GLUCOSE   POCT GLUCOSE   POCT GLUCOSE   POCT GLUCOSE       All other labs were within normal range or not returned as of this dictation.     EMERGENCY DEPARTMENT COURSE and DIFFERENTIAL DIAGNOSIS/MDM:   Vitals:    Vitals:    04/14/19 0733 04/14/19 0800 04/14/19 0829 04/14/19 1432   BP: 131/63  131/63 (!) 147/59   Pulse: 63 56 60 62   Resp:   18 18   Temp: 97.9 °F (36.6 °C)  97.8 °F (36.6 °C) 98.1 °F (36.7 °C)   TempSrc:    Oral   SpO2: 100%  99% 99%   Weight:       Height:                MDM patient continues to feel short of breath. She had no relief from the aerosol treatment. Her vital signs and chest x-ray are unremarkable. I'm concerned about her recurring chest pain along with short of breath feeling. This may be progression of her sarcoid. I discussed the case with her cardiologist to recommend she be admitted for further treatment and cardiac evaluation. However based on her underlying medical problems including sarcoidosis with active disease they're recommending hospitalist admission with consultation to cardiology. CONSULTS:  IP CONSULT TO SPIRITUAL SERVICES  IP CONSULT TO PULMONOLOGY    PROCEDURES:  Unless otherwise noted below, none     Procedures    FINAL IMPRESSION      1.  Chest pain, unspecified type          DISPOSITION/PLAN   DISPOSITION Admitted 04/11/2019 10:44:42 PM      PATIENT REFERRED TO:  Darcia Nageotte, DO  [address] [facility] [address]  Arlin Rodriguez 171, DO            DISCHARGE MEDICATIONS:  Discharge Medication List as of 4/14/2019  4:12 PM             (Please note that portions of this note were completed with a voice recognition program.  Efforts were made to edit the dictations but occasionally words are mis-transcribed.)    Keary Goldberg, MD (electronically signed)  Attending Emergency Physician          Keary Goldberg, MD  04/11/19 MD Gabbi  04/18/19 100 Sonu Arriola MD  04/18/19 32 Arlin Ibanez MD  04/18/19 [id number]

## 2019-04-12 NOTE — ED TRIAGE NOTES
Patient brought in by private car. Patient been having chest pain and SOB since a few hours ago. Patient unsure if it is new inhaler which she started.

## 2019-04-12 NOTE — CARE COORDINATION
MET WITH PATIENT, FROM HOME WITH SPOUSE, INDEPENDENT. PT IS UNABLE TO DRIVE D/T TO HER VISION. WHEN HER SPOUSE IS UNABLE TO TAKE HER TO APPTS, HER MOTHER WILL OR SHE CALLS HER INSURANCE. PT USES [employer]. PT CURRENTLY HAS INHALERS AND NEBULIZERS. PT DENIES ANY HOME NEEDS AT THIS TIME. PLAN HOME AT DISCHARGE.

## 2019-04-12 NOTE — FLOWSHEET NOTE
Pt to room 184 at approx 2320. Vitals stable. Pt reports SOB. 98% on RA. 2L NC applied for comfort. Alert and oriented.  at bedside. Reports 8 of 10 chest pain. Midsternal. \"Described it as tight/pressure. \" Reports that it has been occurring on and off since November. Team to complete admission.  Electronically signed by Flor Odom RN on 4/11/2019 at 11:32 PM

## 2019-04-12 NOTE — PROGRESS NOTES
Hospitalist Progress Note      Date of Admission: 4/11/2019  Chief Complaint:    Chief Complaint   Patient presents with    Chest Pain    Shortness of Breath     Subjective:  No new complaints. No nausea, vomiting, chest pain, or headache      Medications:    Infusion Medications    dextrose       Scheduled Medications    sodium chloride flush  10 mL Intravenous 2 times per day    enoxaparin  40 mg Subcutaneous Daily    famotidine  20 mg Oral BID    busPIRone  10 mg Oral Daily    cetirizine  10 mg Oral Daily    DULoxetine  60 mg Oral Daily    folic acid  1 mg Oral Daily    insulin glargine  25 Units Subcutaneous Nightly    montelukast  10 mg Oral Nightly    [Held by provider] predniSONE  15 mg Oral Daily    lidocaine  2 patch Transdermal Daily    methylPREDNISolone  40 mg Intravenous Q12H    ipratropium-albuterol  1 ampule Inhalation 4x daily    insulin lispro  0-12 Units Subcutaneous TID WC    insulin lispro  0-6 Units Subcutaneous Nightly     PRN Meds: sodium chloride flush, magnesium hydroxide, ondansetron, acetaminophen, glucose, dextrose, glucagon (rDNA), dextrose, oxyCODONE-acetaminophen, insulin lispro, ipratropium-albuterol, albuterol  No intake or output data in the 24 hours ending 04/12/19 1756  Exam:  /85   Pulse 75   Temp 97.9 °F (36.6 °C) (Oral)   Resp 18   Ht 5' 3\" (1.6 m)   Wt 265 lb 6.9 oz (120.4 kg)   LMP 02/19/2019   SpO2 96%   BMI 47.02 kg/m²   Head: Normocephalic, atraumatic  Sclera clear  Neck supple, nontender  Lungs: scattered crackles    Labs:   Recent Labs     04/11/19 2134 04/12/19  0553   WBC 6.0 7.2   HGB 14.3 14.4   HCT 43.0 44.2    293     Recent Labs     04/11/19 2134 04/12/19  0553    135   K 4.6 4.8    100   CO2 23 23   BUN 11 13   CREATININE 1.34* 1.17*   CALCIUM 8.9 9.0   AST 26  --    ALT 35*  --    BILITOT <0.2  --    ALKPHOS 160*  --      No results for input(s): INR in the last 72 hours.   Recent Labs     04/11/19 2134 04/12/19  0553   TROPONINI <0.010 <0.010     Radiology:  XR CHEST STANDARD (2 VW)   Final Result   NEGATIVE CHEST RADIOGRAPHS        Assessment/Plan:    Sarcoidosis flare: Following with pulm    Chest pain: ?pleuritic. cw above flare.      35 minutes total care time, >1/2 in unit/floor time and care coordination     Electronically signed by Kyle Toscano MD on 4/12/2019 at 5:56 PM

## 2019-04-12 NOTE — CONSULTS
Consult to Pulmonology  Consult performed by: Desirae Gamboa MD  Consult ordered by: SUDEEP Vargas - CNP        Pulmonary Medicine  Consult Note      Reason for consultation: Sarcoidosis and chest pain          HISTORY OF PRESENT ILLNESS:    This is a 71-year-old -American female with known diagnosis of pulmonary sarcoidosis since October of last year who was on prednisone for 6 months and just recently was started on methotrexate with folic acid. She sees a pulmonologist at the [facility]. Was just evaluated a few days ago for follow-up and referred to her chronic pain specialist for complaints of chest pain which were felt to be related to sarcoidosis. Patient developed worsening pain and dyspnea for which she came to the emergency room and admitted. Patient has history of significant hilar and mediastinal adenopathy as well as mild interstitial lung disease. Recent spirometry showed moderate obstructive pattern with significant reversibility. She remains on prednisone 15 mg daily, Advair twice daily, in addition to methotrexate which was just started          Past Medical History:        Diagnosis Date    Anxiety     Arthritis     Asthma     Bronchopneumonia     Cancer ([address]     renal    Cerebral artery occlusion with cerebral infarction ([address]     Chronic bilateral low back pain with sciatica     Chronic kidney disease     Depression     Hypertension     Insulin dependent type 2 diabetes mellitus, uncontrolled ([address] 8/3/2018    Mixed headache     Pure hyperglyceridemia 5/19/2017    Sleep apnea     does not wear cpap    Thyroid goiter        Past Surgical History:        Procedure Laterality Date    BRONCHOSCOPY  10/26/2018    DR. STEARNS    KIDNEY REMOVAL Right 08/2016    THYROID LOBECTOMY Right 6/13/14    THYROIDECTOMY  02/21/2019    DR. MAGDALENO    URETER STENT PLACEMENT Left 08/2016       Social History:     reports that she quit smoking about 4 years ago.  Her smoking discharge   ENT: No nasal  discharge. Pharynx clear. Neck:  Trachea midline. No thyromegaly, no JVD, No cervical adenopathy. Chest : Adequate spontaneous effort, symmetric bilateral excursions  Lung : Diminished breath sounds bilaterally, clear otherwise  Heart[de-identified] Regular rhythm and rate. No mumur ,  Rub or gallop  ABD: Benign. Non-tender. Non-distended. No masses or organmegaly. Normal bowel sounds. EXT: 1+ Pitting edema both lower extremities , No Cyanosis No clubbing  Neuro: no focal weakness  Skin: Warm and dry. No erythema or rash on exposed extremities. .      Data Review  Recent Labs     04/11/19 2134 04/12/19  0553   WBC 6.0 7.2   HGB 14.3 14.4   HCT 43.0 44.2    293      Recent Labs     04/11/19 2134 04/12/19  0553    135   K 4.6 4.8    100   CO2 23 23   BUN 11 13   CREATININE 1.34* 1.17*   GLUCOSE 241* 182*       MV Settings: ABGs: No results for input(s): PHART, GAY5RAF, PO2ART, HRU1TNY, BEART, A9LMMDLF, AZP7TQY in the last 72 hours. O2 Device: Nasal cannula  O2 Flow Rate (L/min): 2 L/min  Lab Results   Component Value Date    LACTA 1.30 04/04/2019    LACTA 0.50 12/24/2018    LACTA 0.90 11/04/2018       Radiology  Xr Chest Standard (2 Vw)    Result Date: 4/12/2019  EXAMINATION: XR CHEST (2 VW) CLINICAL HISTORY:  SOB COMPARISONS: 3/30/2019 FINDINGS: Cardiac size and pulmonary vascularity are normal. The lungs are clear. There is no evidence of adenopathy. The bones are unremarkable. NEGATIVE CHEST RADIOGRAPHS    Xr Chest Standard (2 Vw)    Result Date: 3/31/2019  EXAMINATION: XR CHEST (2 VW) CLINICAL HISTORY: CHEST PAIN COMPARISONS: MARCH 19, 2019 FINDINGS: Osseous structures intact. Cardiopericardial silhouette normal. Pulmonary vasculature normal. Lungs clear. NO ACUTE CARDIOPULMONARY DISEASE.     Xr Chest Standard (2 Vw)    Result Date: 3/20/2019  EXAMINATION: XR CHEST (2 VW)  CLINICAL HISTORY: Midsternal chest pain COMPARISONS: November 12, 2016  FINDINGS: Two visualized chest wall soft tissues are unremarkable. CONCLUSION:   IMPRESSION: 1. No evidence of pulmonary emboli. No consolidation or pleural effusion. 2. Redemonstration of mediastinal and hilar adenopathy, may be secondary to sarcoidosis. If there is clinical concern for malignancy, PET/CT can be obtained for further evaluation. Xr Chest Pa Or Ap    Result Date: 4/4/2019  DATE OF EXAM:      Apr 4 2019  9:51PM CLINICAL HISTORY/   MRN: [id number] Patient Name: [facility]: CHEST SINGLE VIEW PORT PA OR AP; 4/4/2019 9:51 pm INDICATION: Chest Pain. COMPARISON: 02/17/2019 ACCESSION NUMBER(S): [id number] ORDERING CLINICIAN: Ryan LOVE FINDINGS: CARDIOMEDIASTINAL SILHOUETTE: Cardiomediastinal silhouette is normal in size and configuration. LUNGS: Lungs are clear. There is no confluent airspace disease or effusion. ABDOMEN: No remarkable upper abdominal findings. BONES: No acute osseous changes. CONCLUSION:   IMPRESSION: 1. No evidence of acute cardiopulmonary process. Assessment, plan:   1. Pulmonary sarcoidosis with acute exacerbation or active symptoms of worsening dyspnea and chest pains  2. Atypical chest pain, frequently encountered with active sarcoidosis  3. Reversible obstructive lung disease likely associated with airway sarcoidosis but associated asthma cannot be excluded. Patient is adequately treated with inhaled as well as systemic steroids and bronchodilators  Would initiate high-dose systemic steroids for now then gradually weaned down to her baseline dose of 15 mg prednisone. Adequate bronchodilator therapy was added as well.   We'll continue to follow        Electronically signed by Oscar Gutierrez MD, FCCP on 4/12/2019 at 1:02 PM

## 2019-04-12 NOTE — PROGRESS NOTES
Nutrition Notes:  Pt states that she does not have any chewing or swallowing her food. Reports stable weight and good po intake. No other malnutrition triggers identified. Will follow per protocol.      Electronically signed by Asher Barkley RD, LD on 4/12/2019 at 3:05 PM

## 2019-04-12 NOTE — H&P
[place] [address] MEDICINE    HISTORY AND PHYSICAL EXAM    PATIENT NAME:  Kj Sanchez    MRN:  [id number]  SERVICE DATE:  4/12/2019   SERVICE TIME:  12:25 AM    Primary Care Physician: Ruth Iverson DO         SUBJECTIVE  CHIEF COMPLAINT:  Chest pain, SOB    HPI:  This is a 51 y.o. female who presents with significant PMH of sarcoidosis diagnosed in October 2018, CKD status post right nephrectomy due to renal carcinoma, diabetes, asthma, anxiety, recent goiter excision; patient presented to the ER tonight with complaints of chest pain and shortness of breath. Patient has had recurrent admissions to the ER with chest pain that is not cardiac related. Patient describes the chest pain as tight, pressure, nonradiating, rating it 7 out of 10. Patient states the shortness of breath and chest pain started roughly last night and has worsened today. Patient states she recently started taking Advair about 2 or 3 days ago that was prescribed by her physician managing her sarcoidosis. Patient denies cough and wheezing. Patient states she is also been having a choking feeling roughly 2 or 3 times a day and it also wakes her at night, patient recently had a goiter removed roughly 3 weeks ago and patient states it's around the time that it started and it has increased in frequency. Patient states she also has sleep apnea but does not use a CPAP because she does not have one because she has not had time to follow up with anybody about it. Patient is on chronic steroid use for the sarcoidosis just diagnosed in October 2018. Patient denies recent infections, fever, chills, palpitations, abdominal pain, nausea, vomiting, diarrhea, dysuria, abnormal swelling, numbness or tingling, vision changes.     PAST MEDICAL HISTORY:    Past Medical History:   Diagnosis Date    Anxiety     Arthritis     Asthma     Bronchopneumonia     Cancer ([facility] [address]     renal    Cerebral artery occlusion with cerebral infarction ([facility] [address]     Attends Bahai service: Not on file     Active member of club or organization: Not on file     Attends meetings of clubs or organizations: Not on file     Relationship status: Not on file    Intimate partner violence:     Fear of current or ex partner: Not on file     Emotionally abused: Not on file     Physically abused: Not on file     Forced sexual activity: Not on file   Other Topics Concern    Not on file   Social History Narrative    Not on file     MEDICATIONS:   Prior to Admission medications    Medication Sig Start Date End Date Taking? Authorizing Provider   oxyCODONE-acetaminophen (PERCOCET) 5-325 MG per tablet Take 1 tablet by mouth daily as needed for Pain. Yes Historical Provider, MD   predniSONE (DELTASONE) 10 MG tablet Take 15 mg by mouth daily   Yes Historical Provider, MD   butalbital-acetaminophen-caffeine (FIORICET, ESGIC) -40 MG per tablet TAKE 1 TABLET BY MOUTH THREE TIMES DAILY AS NEEDED FOR HEADACHES 3/8/19  Yes Shekhar Garcia DO   cetirizine (ZYRTEC) 10 MG tablet Take 1 qhs for allergies 11/27/18  Yes Shekhar Garcia DO   insulin lispro (ADMELOG) 100 UNIT/ML injection vial Inject 4 Units into the skin 3 times daily as needed for High Blood Sugar 11/13/18  Yes Shekhar Garcia DO   DULoxetine (CYMBALTA) 60 MG extended release capsule TAKE 1 CAPSULE BY MOUTH DAILY 9/30/18  Yes Shekhar Garcia DO   gabapentin (NEURONTIN) 400 MG capsule Take 1 capsule by mouth 3 times daily for 90 days. .  Patient taking differently: Take 400 mg by mouth nightly.   9/12/18 4/11/19 Yes Shekhar Garcia DO   insulin glargine (LANTUS SOLOSTAR) 100 UNIT/ML injection pen Inject 25 Units into the skin nightly   Yes Historical Provider, MD   montelukast (SINGULAIR) 10 MG tablet Take 1 tab nightly at supper for breathing/allergies 6/28/18  Yes Shekhar Garcia DO   folic acid (FOLVITE) 1 MG tablet Take 1 tablet by mouth daily 6/25/18  Yes Phoenix Padron MD   albuterol (PROVENTIL) (2.5 MG/3ML) 0.083% nebulizer solution Take 3 mLs by nebulization every 2 hours as needed for Wheezing 4/14/18  Yes Gary Hansen MD   albuterol sulfate  (90 Base) MCG/ACT inhaler Inhale 2 puffs into the lungs every 4 hours as needed for Wheezing 4/14/18  Yes Gary Hansen MD   busPIRone (BUSPAR) 10 MG tablet Take 1/2 tab bid wf x 1 week, then 1 tab bid  Patient taking differently: Take 10 mg by mouth daily Take 1/2 tab bid wf x 1 week, then 1 tab bid 9/27/17  Yes Jeanine Pinna, DO   Insulin Pen Needle (B-D ULTRAFINE III SHORT PEN) 31G X 8 MM MISC Inject 1 each into the skin 3 times daily 1/5/19 2/4/19  Jeanine Pinna, DO   Insulin Syringe-Needle U-100 30G X 1/2\" 1 ML MISC 1 each by Does not apply route daily 11/16/18   Jeanine Pinna, DO   blood glucose test strips (ONETOUCH VERIO) strip TEST 3 TIMES DAILY AS NEEDED 8/16/18   Jeanine Pinna, DO   Blood Glucose Monitoring Suppl (ONETOUCH VERIO) w/Device KIT TEST 3 TIMES DAILY AS NEEDED 8/16/18   Jeanine Pinna, DO   ONE TOUCH ULTRASOFT LANCETS MISC TEST 3 TIMES DAILY AS NEEDED 8/16/18   Jeanine Pinna, DO   blood glucose test strips (EXACTECH TEST) strip 1 each by In Vitro route 3 times daily (Accu-check test strips) As needed. DX:E11.65 8/13/18   Jeanine Pinna, DO       ALLERGIES: Shellfish-derived products; Ibuprofen; and Propoxyphene n-acetaminophen    REVIEW OF SYSTEM:   ROS as noted in HPI, 12 point ROS reviewed and otherwise negative.     OBJECTIVE  PHYSICAL EXAM: BP (!) 140/75   Pulse 90   Temp 98.1 °F (36.7 °C) (Oral)   Resp 22   Ht 5' 3\" (1.6 m)   Wt 265 lb 6.9 oz (120.4 kg)   LMP 02/19/2019   SpO2 98%   BMI 47.02 kg/m²     CONSTITUTIONAL:  awake, alert, cooperative and morbidly obese  ENT:  good dentition, oral pharynx with moist mucus membranes  LUNGS:  no increased work of breathing, good air exchange, clear to auscultation, exaggerated inspiratory effort, no wheezing or sob noted  CARDIOVASCULAR:  normal apical pulses, regular rate and rhythm, normal S1 and S2 and no edema, chest wall pain reproducible  ABDOMEN:  No scars, normal bowel sounds, soft, non-distended, non-tender  MUSCULOSKELETAL:  there is no redness, warmth, or swelling of the joints  motor strength is 5 out of 5 all extremities bilaterally  tone is normal  NEUROLOGIC:  Awake, alert, oriented to name, place and time. Cranial nerves II-XII are grossly intact. SKIN:  no rashes and no lesions    DATA:     Diagnostic tests reviewed for today's visit:    Most recent labs and imaging results reviewed.      LABS:    Recent Results (from the past 24 hour(s))   EKG 12 Lead    Collection Time: 04/11/19  8:51 PM   Result Value Ref Range    Ventricular Rate 94 BPM    Atrial Rate 94 BPM    P-R Interval 144 ms    QRS Duration 74 ms    Q-T Interval 354 ms    QTc Calculation (Bazett) 442 ms    P Axis 34 degrees    R Axis 5 degrees    T Axis 24 degrees   Comprehensive Metabolic Panel    Collection Time: 04/11/19  9:34 PM   Result Value Ref Range    Sodium 135 135 - 144 mEq/L    Potassium 4.6 3.4 - 4.9 mEq/L    Chloride 100 95 - 107 mEq/L    CO2 23 20 - 31 mEq/L    Anion Gap 12 9 - 15 mEq/L    Glucose 241 (H) 70 - 99 mg/dL    BUN 11 6 - 20 mg/dL    CREATININE 1.34 (H) 0.50 - 0.90 mg/dL    GFR Non-African American 42.2 (L) >60    GFR  51.1 (L) >60    Calcium 8.9 8.5 - 9.9 mg/dL    Total Protein 7.0 6.3 - 8.0 g/dL    Alb 3.7 3.5 - 4.6 g/dL    Total Bilirubin <0.2 0.2 - 0.7 mg/dL    Alkaline Phosphatase 160 (H) 40 - 130 U/L    ALT 35 (H) 0 - 33 U/L    AST 26 0 - 35 U/L    Globulin 3.3 2.3 - 3.5 g/dL   CBC Auto Differential    Collection Time: 04/11/19  9:34 PM   Result Value Ref Range    WBC 6.0 4.8 - 10.8 K/uL    RBC 4.41 4.20 - 5.40 M/uL    Hemoglobin 14.3 12.0 - 16.0 g/dL    Hematocrit 43.0 37.0 - 47.0 %    MCV 97.5 82.0 - 100.0 fL    MCH 32.5 (H) 27.0 - 31.3 pg    MCHC 33.4 33.0 - 37.0 %    RDW 15.1 (H) 11.5 - 14.5 %    Platelets 048 175 - 639 K/uL    Neutrophils % 89.4 %    Lymphocytes % 7.7 %    Monocytes % 1.5 %    Eosinophils % 0.9 renal carcinoma, creatinine 1.34 which is at baseline     Plan:   Monitor renal function  Avoid nephrotoxic drugs           Plan of care discussed with: patient    SIGNATURE: SUDEEP Simeon CNP  DATE: April 12, 2019  TIME: 12:25 AM     This patient was seen and examined on 4/11 at 11:45 PM    Electronically signed by SUDEEP Simeon CNP on 4/12/2019 at 12:44 AM     Dr. Klaudia Morfin MD - supervising physician    Attending's Note:  Pt was seen and evaluated and discussed care with NP. I agree with the above assessment and plan. Patient presented to the ED with chest pain. She has history of sarcoidosis and she was recently started on Advair. Her pain started after. It is associated with taking deep breaths. She mentioned that the pain is usually happen with her sarcoidosis flare up. She also mentioned worsening of her SOB with exertion. Pain seems pleuritic and likely not cardiac. Will give a dose of solumedrol and continue if pain and SOB improves. Pulm consult. Unlikely cardiac involvement from presenting symptoms but will do echo and check for restrictive cardiomyopathy related to sarcoidosis.      Gale Holder, [address] [facility]

## 2019-04-13 LAB
ANION GAP SERPL CALCULATED.3IONS-SCNC: 13 MEQ/L (ref 9–15)
BUN BLDV-MCNC: 17 MG/DL (ref 6–20)
CALCIUM SERPL-MCNC: 9 MG/DL (ref 8.5–9.9)
CHLORIDE BLD-SCNC: 102 MEQ/L (ref 95–107)
CO2: 19 MEQ/L (ref 20–31)
CREAT SERPL-MCNC: 1.22 MG/DL (ref 0.5–0.9)
GFR AFRICAN AMERICAN: 56.9
GFR NON-AFRICAN AMERICAN: 47
GLUCOSE BLD-MCNC: 148 MG/DL (ref 60–115)
GLUCOSE BLD-MCNC: 179 MG/DL (ref 60–115)
GLUCOSE BLD-MCNC: 199 MG/DL (ref 60–115)
GLUCOSE BLD-MCNC: 212 MG/DL (ref 60–115)
GLUCOSE BLD-MCNC: 220 MG/DL (ref 70–99)
GLUCOSE BLD-MCNC: 229 MG/DL (ref 60–115)
HCT VFR BLD CALC: 42.9 % (ref 37–47)
HEMOGLOBIN: 14.2 G/DL (ref 12–16)
MCH RBC QN AUTO: 31.9 PG (ref 27–31.3)
MCHC RBC AUTO-ENTMCNC: 33.2 % (ref 33–37)
MCV RBC AUTO: 96.1 FL (ref 82–100)
PDW BLD-RTO: 15 % (ref 11.5–14.5)
PERFORMED ON: ABNORMAL
PLATELET # BLD: 303 K/UL (ref 130–400)
POTASSIUM REFLEX MAGNESIUM: 4.6 MEQ/L (ref 3.4–4.9)
RBC # BLD: 4.46 M/UL (ref 4.2–5.4)
SODIUM BLD-SCNC: 134 MEQ/L (ref 135–144)
WBC # BLD: 16.4 K/UL (ref 4.8–10.8)

## 2019-04-13 PROCEDURE — 6370000000 HC RX 637 (ALT 250 FOR IP): Performed by: INTERNAL MEDICINE

## 2019-04-13 PROCEDURE — 80048 BASIC METABOLIC PNL TOTAL CA: CPT

## 2019-04-13 PROCEDURE — 85027 COMPLETE CBC AUTOMATED: CPT

## 2019-04-13 PROCEDURE — 99232 SBSQ HOSP IP/OBS MODERATE 35: CPT | Performed by: INTERNAL MEDICINE

## 2019-04-13 PROCEDURE — 96372 THER/PROPH/DIAG INJ SC/IM: CPT

## 2019-04-13 PROCEDURE — 2580000003 HC RX 258: Performed by: HOSPITALIST

## 2019-04-13 PROCEDURE — 6370000000 HC RX 637 (ALT 250 FOR IP): Performed by: PHYSICIAN ASSISTANT

## 2019-04-13 PROCEDURE — 6360000002 HC RX W HCPCS: Performed by: HOSPITALIST

## 2019-04-13 PROCEDURE — 6360000002 HC RX W HCPCS: Performed by: INTERNAL MEDICINE

## 2019-04-13 PROCEDURE — 96376 TX/PRO/DX INJ SAME DRUG ADON: CPT

## 2019-04-13 PROCEDURE — 6370000000 HC RX 637 (ALT 250 FOR IP): Performed by: HOSPITALIST

## 2019-04-13 PROCEDURE — G0378 HOSPITAL OBSERVATION PER HR: HCPCS

## 2019-04-13 PROCEDURE — 94640 AIRWAY INHALATION TREATMENT: CPT

## 2019-04-13 PROCEDURE — 36415 COLL VENOUS BLD VENIPUNCTURE: CPT

## 2019-04-13 RX ORDER — BUTALBITAL, ACETAMINOPHEN AND CAFFEINE 300; 40; 50 MG/1; MG/1; MG/1
1 CAPSULE ORAL EVERY 4 HOURS PRN
Status: DISCONTINUED | OUTPATIENT
Start: 2019-04-13 | End: 2019-04-13

## 2019-04-13 RX ORDER — OXYCODONE HYDROCHLORIDE AND ACETAMINOPHEN 5; 325 MG/1; MG/1
1 TABLET ORAL ONCE
Status: COMPLETED | OUTPATIENT
Start: 2019-04-13 | End: 2019-04-13

## 2019-04-13 RX ORDER — LEVOTHYROXINE SODIUM 0.07 MG/1
75 TABLET ORAL DAILY
Status: DISCONTINUED | OUTPATIENT
Start: 2019-04-13 | End: 2019-04-14 | Stop reason: HOSPADM

## 2019-04-13 RX ORDER — LEVOTHYROXINE SODIUM 0.07 MG/1
75 TABLET ORAL DAILY
COMMUNITY
End: 2019-10-17 | Stop reason: DRUGHIGH

## 2019-04-13 RX ORDER — BUTALBITAL, ACETAMINOPHEN AND CAFFEINE 300; 40; 50 MG/1; MG/1; MG/1
1 CAPSULE ORAL ONCE
Status: COMPLETED | OUTPATIENT
Start: 2019-04-13 | End: 2019-04-13

## 2019-04-13 RX ORDER — METHYLPREDNISOLONE SODIUM SUCCINATE 40 MG/ML
40 INJECTION, POWDER, LYOPHILIZED, FOR SOLUTION INTRAMUSCULAR; INTRAVENOUS DAILY
Status: DISCONTINUED | OUTPATIENT
Start: 2019-04-14 | End: 2019-04-14 | Stop reason: HOSPADM

## 2019-04-13 RX ADMIN — IPRATROPIUM BROMIDE AND ALBUTEROL SULFATE 1 AMPULE: .5; 3 SOLUTION RESPIRATORY (INHALATION) at 07:38

## 2019-04-13 RX ADMIN — MONTELUKAST 10 MG: 10 TABLET, FILM COATED ORAL at 21:02

## 2019-04-13 RX ADMIN — LEVOTHYROXINE SODIUM 75 MCG: 75 TABLET ORAL at 11:06

## 2019-04-13 RX ADMIN — ACETAMINOPHEN 650 MG: 325 TABLET ORAL at 21:02

## 2019-04-13 RX ADMIN — INSULIN LISPRO 4 UNITS: 100 INJECTION, SOLUTION INTRAVENOUS; SUBCUTANEOUS at 08:31

## 2019-04-13 RX ADMIN — FOLIC ACID 1 MG: 1 TABLET ORAL at 08:30

## 2019-04-13 RX ADMIN — INSULIN LISPRO 4 UNITS: 100 INJECTION, SOLUTION INTRAVENOUS; SUBCUTANEOUS at 11:45

## 2019-04-13 RX ADMIN — FAMOTIDINE 20 MG: 20 TABLET ORAL at 21:02

## 2019-04-13 RX ADMIN — Medication 10 ML: at 21:02

## 2019-04-13 RX ADMIN — BUSPIRONE HYDROCHLORIDE 10 MG: 5 TABLET ORAL at 08:31

## 2019-04-13 RX ADMIN — OXYCODONE AND ACETAMINOPHEN 1 TABLET: 5; 325 TABLET ORAL at 08:43

## 2019-04-13 RX ADMIN — BUTALBITAL, ACETAMINOPHEN AND CAFFEINE 1 CAPSULE: 300; 40; 50 CAPSULE ORAL at 11:11

## 2019-04-13 RX ADMIN — INSULIN LISPRO 2 UNITS: 100 INJECTION, SOLUTION INTRAVENOUS; SUBCUTANEOUS at 16:34

## 2019-04-13 RX ADMIN — ENOXAPARIN SODIUM 40 MG: 40 INJECTION SUBCUTANEOUS at 08:31

## 2019-04-13 RX ADMIN — IPRATROPIUM BROMIDE AND ALBUTEROL SULFATE 1 AMPULE: .5; 3 SOLUTION RESPIRATORY (INHALATION) at 10:35

## 2019-04-13 RX ADMIN — INSULIN GLARGINE 25 UNITS: 100 INJECTION, SOLUTION SUBCUTANEOUS at 21:04

## 2019-04-13 RX ADMIN — OXYCODONE HYDROCHLORIDE AND ACETAMINOPHEN 1 TABLET: 5; 325 TABLET ORAL at 23:43

## 2019-04-13 RX ADMIN — FAMOTIDINE 20 MG: 20 TABLET ORAL at 08:30

## 2019-04-13 RX ADMIN — METHYLPREDNISOLONE SODIUM SUCCINATE 40 MG: 40 INJECTION, POWDER, FOR SOLUTION INTRAMUSCULAR; INTRAVENOUS at 13:23

## 2019-04-13 RX ADMIN — OXYCODONE AND ACETAMINOPHEN 1 TABLET: 5; 325 TABLET ORAL at 04:17

## 2019-04-13 RX ADMIN — METHYLPREDNISOLONE SODIUM SUCCINATE 40 MG: 40 INJECTION, POWDER, FOR SOLUTION INTRAMUSCULAR; INTRAVENOUS at 01:24

## 2019-04-13 RX ADMIN — CETIRIZINE HYDROCHLORIDE 10 MG: 10 TABLET, FILM COATED ORAL at 08:31

## 2019-04-13 RX ADMIN — Medication 10 ML: at 11:06

## 2019-04-13 RX ADMIN — BUTALBITAL, ACETAMINOPHEN, AND CAFFEINE 1 CAPSULE: 50; 300; 40 CAPSULE ORAL at 11:06

## 2019-04-13 RX ADMIN — DULOXETINE HYDROCHLORIDE 60 MG: 60 CAPSULE, DELAYED RELEASE ORAL at 08:30

## 2019-04-13 ASSESSMENT — PAIN DESCRIPTION - DESCRIPTORS
DESCRIPTORS: HEADACHE
DESCRIPTORS: ACHING

## 2019-04-13 ASSESSMENT — PAIN DESCRIPTION - LOCATION
LOCATION: HEAD;CHEST
LOCATION: CHEST;HEAD
LOCATION: HEAD
LOCATION: CHEST;HEAD
LOCATION: HEAD

## 2019-04-13 ASSESSMENT — PAIN DESCRIPTION - PROGRESSION
CLINICAL_PROGRESSION: NOT CHANGED
CLINICAL_PROGRESSION: GRADUALLY IMPROVING
CLINICAL_PROGRESSION: GRADUALLY WORSENING

## 2019-04-13 ASSESSMENT — PAIN SCALES - GENERAL
PAINLEVEL_OUTOF10: 7
PAINLEVEL_OUTOF10: 0
PAINLEVEL_OUTOF10: 3
PAINLEVEL_OUTOF10: 10
PAINLEVEL_OUTOF10: 0
PAINLEVEL_OUTOF10: 5
PAINLEVEL_OUTOF10: 7
PAINLEVEL_OUTOF10: 9
PAINLEVEL_OUTOF10: 7
PAINLEVEL_OUTOF10: 0
PAINLEVEL_OUTOF10: 0

## 2019-04-13 ASSESSMENT — PAIN DESCRIPTION - ONSET: ONSET: AWAKENED FROM SLEEP

## 2019-04-13 ASSESSMENT — PAIN DESCRIPTION - FREQUENCY
FREQUENCY: INTERMITTENT
FREQUENCY: INTERMITTENT

## 2019-04-13 ASSESSMENT — PAIN DESCRIPTION - PAIN TYPE: TYPE: ACUTE PAIN

## 2019-04-13 ASSESSMENT — PAIN DESCRIPTION - ORIENTATION: ORIENTATION: MID;POSTERIOR

## 2019-04-13 NOTE — FLOWSHEET NOTE
Patient complain of a headache rated 10/10  But she expressed that the tylenol does not do anything for her headache. I have returned the two tylenol and gave her percocet. she denies nausea or dizziness,her respirations were effortless and no chest pains.

## 2019-04-13 NOTE — FLOWSHEET NOTE
Took over care of patient.  at bedside. C/O headache, medication given, patient lying down and resting.

## 2019-04-13 NOTE — PROGRESS NOTES
Hospitalist Progress Note      Date of Admission: 4/11/2019  Chief Complaint:    Chief Complaint   Patient presents with    Chest Pain    Shortness of Breath     Subjective:  No new complaints.   No nausea, vomiting, chest pain, or headache      Medications:    Infusion Medications    dextrose       Scheduled Medications    levothyroxine  75 mcg Oral Daily    [START ON 4/14/2019] methylPREDNISolone  40 mg Intravenous Daily    sodium chloride flush  10 mL Intravenous 2 times per day    enoxaparin  40 mg Subcutaneous Daily    famotidine  20 mg Oral BID    busPIRone  10 mg Oral Daily    cetirizine  10 mg Oral Daily    DULoxetine  60 mg Oral Daily    folic acid  1 mg Oral Daily    insulin glargine  25 Units Subcutaneous Nightly    montelukast  10 mg Oral Nightly    [Held by provider] predniSONE  15 mg Oral Daily    lidocaine  2 patch Transdermal Daily    ipratropium-albuterol  1 ampule Inhalation 4x daily    insulin lispro  0-12 Units Subcutaneous TID WC    insulin lispro  0-6 Units Subcutaneous Nightly     PRN Meds: sodium chloride flush, magnesium hydroxide, ondansetron, acetaminophen, glucose, dextrose, glucagon (rDNA), dextrose, oxyCODONE-acetaminophen, insulin lispro, ipratropium-albuterol, albuterol    Intake/Output Summary (Last 24 hours) at 4/13/2019 1717  Last data filed at 4/13/2019 0166  Gross per 24 hour   Intake 250 ml   Output --   Net 250 ml     Exam:  BP (!) 141/63   Pulse 75   Temp 97.9 °F (36.6 °C) (Oral)   Resp 16   Ht 5' 3\" (1.6 m)   Wt 265 lb 6.9 oz (120.4 kg)   LMP 02/19/2019   SpO2 100%   BMI 47.02 kg/m²   Head: Normocephalic, atraumatic  Sclera clear  Neck supple, nontender  Lungs: scattered crackles    Labs:   Recent Labs     04/11/19 2134 04/12/19  0553 04/13/19  0621   WBC 6.0 7.2 16.4*   HGB 14.3 14.4 14.2   HCT 43.0 44.2 42.9    293 303     Recent Labs     04/11/19 2134 04/12/19  0553 04/13/19  0621    135 134*   K 4.6 4.8 4.6    100 102   CO2 23 23 19*   BUN 11 13 17   CREATININE 1.34* 1.17* 1.22*   CALCIUM 8.9 9.0 9.0   AST 26  --   --    ALT 35*  --   --    BILITOT <0.2  --   --    ALKPHOS 160*  --   --      No results for input(s): INR in the last 72 hours. Recent Labs     04/11/19  2134 04/12/19  0553   TROPONINI <0.010 <0.010     Radiology:  XR CHEST STANDARD (2 VW)   Final Result   NEGATIVE CHEST RADIOGRAPHS        Assessment/Plan:    Sarcoidosis flare: Following with pulm    Chest pain: ?pleuritic. cw above flare.      35 minutes total care time, >1/2 in unit/floor time and care coordination     Electronically signed by Tenzin Bliss MD on 4/13/2019 at 5:17 PM

## 2019-04-13 NOTE — PROGRESS NOTES
INPATIENT PROGRESS NOTES    PATIENT NAME: Jasmyn Bobo  MRN: [id number]  SERVICE DATE:  April 13, 2019   SERVICE TIME:  2:34 PM      PRIMARY SERVICE: Pulmonary Disease    CHIEF COMPLAINTS: Chest pain and shortness of breath    INTERVAL HPI: Patient seen and examined at bedside, Interval Notes, orders reviewed. Nursing notes noted  Patient reports feeling much better since yesterday. Her breathing is improved, cough is better, chest pain is also improving. Blood sugars increased significantly with high-dose steroids otherwise she is doing well      OBJECTIVE    Body mass index is 47.02 kg/m². PHYSICAL EXAM:  Vitals:  BP (!) 141/63   Pulse 75   Temp 97.9 °F (36.6 °C) (Oral)   Resp 16   Ht 5' 3\" (1.6 m)   Wt 265 lb 6.9 oz (120.4 kg)   LMP 02/19/2019   SpO2 100%   BMI 47.02 kg/m²   General: Patient is  Alert, awake . comfortable in bed, No distress. Head: Atraumatic , Normocephalic   Eyes: PERRL. No icteric sclera. No conjunctival injection. No discharge   ENT: No nasal  discharge. Pharynx clear. Neck:  Trachea midline. No thyromegaly, no JVD, No cervical adenopathy. Chest : Adequate spontaneous breathing effort, symmetric bilateral excursions  Lung : Mildly diminished breath sounds bilaterally, clear otherwise  Heart[de-identified] Regular rhythm and rate. No mumur ,  Rub or gallop  ABD: Benign. Non-tender. Non-distended. No masses or organmegaly. Normal bowel sounds. EXT: Mild Pitting edema both lower extremities , No Cyanosis No clubbing  Neuro: no focal weakness  Skin: Warm and dry. No erythema or rash on exposed extremities.       DATA:   Recent Labs     04/12/19  0553 04/13/19  0621   WBC 7.2 16.4*   HGB 14.4 14.2   HCT 44.2 42.9   MCV 96.0 96.1    303     Recent Labs     04/11/19  2134 04/12/19  0553 04/13/19  0621    135 134*   K 4.6 4.8 4.6    100 102   CO2 23 23 19*   BUN 11 13 17   CREATININE 1.34* 1.17* 1.22*   GLUCOSE 241* 182* 220*   CALCIUM 8.9 9.0 9.0   PROT 7.0  --   -- LABALBU 3.7  --   --    BILITOT <0.2  --   --    ALKPHOS 160*  --   --    AST 26  --   --    ALT 35*  --   --    LABGLOM 42.2* 49.4* 47.0*   GFRAA 51.1* 59.7* 56.9*   GLOB 3.3  --   --        No results for input(s): PHART, TID9JDU, PO2ART, BUN6NNH, BEART, Q5SOEJYM in the last 72 hours. O2 Device: None (Room air)  O2 Flow Rate (L/min): 2 L/min    DIET CARB CONTROL; Carb Control: 4 carb choices (60 gms)/meal     MEDICATIONS during current hospitalization:    Continuous Infusions:   dextrose         Scheduled Meds:   levothyroxine  75 mcg Oral Daily    [START ON 4/14/2019] methylPREDNISolone  40 mg Intravenous Daily    sodium chloride flush  10 mL Intravenous 2 times per day    enoxaparin  40 mg Subcutaneous Daily    famotidine  20 mg Oral BID    busPIRone  10 mg Oral Daily    cetirizine  10 mg Oral Daily    DULoxetine  60 mg Oral Daily    folic acid  1 mg Oral Daily    insulin glargine  25 Units Subcutaneous Nightly    montelukast  10 mg Oral Nightly    [Held by provider] predniSONE  15 mg Oral Daily    lidocaine  2 patch Transdermal Daily    ipratropium-albuterol  1 ampule Inhalation 4x daily    insulin lispro  0-12 Units Subcutaneous TID WC    insulin lispro  0-6 Units Subcutaneous Nightly       PRN Meds:sodium chloride flush, magnesium hydroxide, ondansetron, acetaminophen, glucose, dextrose, glucagon (rDNA), dextrose, oxyCODONE-acetaminophen, insulin lispro, ipratropium-albuterol, albuterol    Radiology  Xr Chest Standard (2 Vw)    Result Date: 4/12/2019  EXAMINATION: XR CHEST (2 VW) CLINICAL HISTORY:  SOB COMPARISONS: 3/30/2019 FINDINGS: Cardiac size and pulmonary vascularity are normal. The lungs are clear. There is no evidence of adenopathy. The bones are unremarkable. NEGATIVE CHEST RADIOGRAPHS    Xr Chest Standard (2 Vw)    Result Date: 3/31/2019  EXAMINATION: XR CHEST (2 VW) CLINICAL HISTORY: CHEST PAIN COMPARISONS: MARCH 19, 2019 FINDINGS: Osseous structures intact. Cardiopericardial silhouette normal. Pulmonary vasculature normal. Lungs clear. NO ACUTE CARDIOPULMONARY DISEASE. Xr Chest Standard (2 Vw)    Result Date: 3/20/2019  EXAMINATION: XR CHEST (2 VW)  CLINICAL HISTORY: Midsternal chest pain COMPARISONS: November 12, 2016  FINDINGS: Two views of the chest are submitted. The cardiac silhouette is of normal size configuration. The mediastinum is unremarkable. Pulmonary vascular unremarkable. Right sided trachea. No focal infiltrates. No effusions. No Pneumothoraces. NO ACUTE ACTIVE CARDIOPULMONARY PROCESS    Ct Chest For Pe With Runoff    Result Date: 3/15/2019  DATE OF EXAM:      Mar 15 2019  2:17AM CLINICAL HISTORY/   MRN: [id number] Patient Name: Zac Nims: CTA PE CHEST; 3/15/2019 2:17 am INDICATION: Mid sternal chest pain x1 hour. Elevated D-dimer. History of sarcoidosis and renal cancer status post nephrectomy. COMPARISON: CT angiogram chest 12/24/2018. ACCESSION NUMBER(S): [id number] ORDERING CLINICIAN: BHASKAR REYES TECHNIQUE: Helical data acquisition of the chest was obtained following the administration of 80 cc Isovue 370 intravenous contrast. Images were reformatted in axial, coronal, and sagittal planes. MIP images were created and reviewed. FINDINGS: POTENTIAL LIMITATIONS OF THE STUDY:Motion artifact. HEART AND VESSELS: No discrete filling defects within the main pulmonary artery or its branches. No thoracic aortic aneurysm. The heart is upper normal limit in size. No evidence of pericardial effusion. MEDIASTINUM AND RO, LOWER NECK AND AXILLA: The thyroid gland is not well visualized on this exam. Enlarged lymph nodes are seen at the mediastinum and bilateral hilar regions. Subcarinal lymph node is measuring 2.5 cm in short axis. Right hilar lymph node is measuring 2.1 cm in short axis. Left hilar lymph node is measuring 1.4 cm in short axis.  LUNGS AND

## 2019-04-14 VITALS
TEMPERATURE: 98.1 F | HEART RATE: 62 BPM | WEIGHT: 265.43 LBS | HEIGHT: 63 IN | RESPIRATION RATE: 18 BRPM | OXYGEN SATURATION: 99 % | DIASTOLIC BLOOD PRESSURE: 59 MMHG | BODY MASS INDEX: 47.03 KG/M2 | SYSTOLIC BLOOD PRESSURE: 147 MMHG

## 2019-04-14 LAB
ANION GAP SERPL CALCULATED.3IONS-SCNC: 12 MEQ/L (ref 9–15)
BUN BLDV-MCNC: 19 MG/DL (ref 6–20)
CALCIUM SERPL-MCNC: 8.8 MG/DL (ref 8.5–9.9)
CHLORIDE BLD-SCNC: 101 MEQ/L (ref 95–107)
CO2: 23 MEQ/L (ref 20–31)
CREAT SERPL-MCNC: 1.19 MG/DL (ref 0.5–0.9)
GFR AFRICAN AMERICAN: 58.6
GFR NON-AFRICAN AMERICAN: 48.4
GLUCOSE BLD-MCNC: 104 MG/DL (ref 60–115)
GLUCOSE BLD-MCNC: 108 MG/DL (ref 70–99)
GLUCOSE BLD-MCNC: 202 MG/DL (ref 60–115)
HCT VFR BLD CALC: 43.3 % (ref 37–47)
HEMOGLOBIN: 14.4 G/DL (ref 12–16)
MCH RBC QN AUTO: 32.3 PG (ref 27–31.3)
MCHC RBC AUTO-ENTMCNC: 33.2 % (ref 33–37)
MCV RBC AUTO: 97.2 FL (ref 82–100)
PDW BLD-RTO: 14.9 % (ref 11.5–14.5)
PERFORMED ON: ABNORMAL
PERFORMED ON: NORMAL
PLATELET # BLD: 301 K/UL (ref 130–400)
POTASSIUM REFLEX MAGNESIUM: 4.2 MEQ/L (ref 3.4–4.9)
RBC # BLD: 4.45 M/UL (ref 4.2–5.4)
SODIUM BLD-SCNC: 136 MEQ/L (ref 135–144)
WBC # BLD: 14 K/UL (ref 4.8–10.8)

## 2019-04-14 PROCEDURE — 80048 BASIC METABOLIC PNL TOTAL CA: CPT

## 2019-04-14 PROCEDURE — 85027 COMPLETE CBC AUTOMATED: CPT

## 2019-04-14 PROCEDURE — 2580000003 HC RX 258: Performed by: HOSPITALIST

## 2019-04-14 PROCEDURE — 6370000000 HC RX 637 (ALT 250 FOR IP): Performed by: PHYSICIAN ASSISTANT

## 2019-04-14 PROCEDURE — G0378 HOSPITAL OBSERVATION PER HR: HCPCS

## 2019-04-14 PROCEDURE — 6370000000 HC RX 637 (ALT 250 FOR IP): Performed by: HOSPITALIST

## 2019-04-14 PROCEDURE — 6360000002 HC RX W HCPCS: Performed by: INTERNAL MEDICINE

## 2019-04-14 PROCEDURE — 6360000002 HC RX W HCPCS: Performed by: HOSPITALIST

## 2019-04-14 PROCEDURE — 96376 TX/PRO/DX INJ SAME DRUG ADON: CPT

## 2019-04-14 PROCEDURE — 36415 COLL VENOUS BLD VENIPUNCTURE: CPT

## 2019-04-14 RX ORDER — PREDNISONE 10 MG/1
TABLET ORAL
Qty: 50 TABLET | Refills: 0 | Status: ON HOLD | OUTPATIENT
Start: 2019-04-14 | End: 2019-05-20 | Stop reason: HOSPADM

## 2019-04-14 RX ADMIN — ONDANSETRON 4 MG: 2 INJECTION INTRAMUSCULAR; INTRAVENOUS at 11:34

## 2019-04-14 RX ADMIN — LEVOTHYROXINE SODIUM 75 MCG: 75 TABLET ORAL at 06:27

## 2019-04-14 RX ADMIN — BUSPIRONE HYDROCHLORIDE 10 MG: 5 TABLET ORAL at 09:27

## 2019-04-14 RX ADMIN — DULOXETINE HYDROCHLORIDE 60 MG: 60 CAPSULE, DELAYED RELEASE ORAL at 09:27

## 2019-04-14 RX ADMIN — Medication 10 ML: at 09:34

## 2019-04-14 RX ADMIN — METHYLPREDNISOLONE SODIUM SUCCINATE 40 MG: 40 INJECTION, POWDER, FOR SOLUTION INTRAMUSCULAR; INTRAVENOUS at 09:27

## 2019-04-14 RX ADMIN — FAMOTIDINE 20 MG: 20 TABLET ORAL at 09:27

## 2019-04-14 RX ADMIN — FOLIC ACID 1 MG: 1 TABLET ORAL at 09:27

## 2019-04-14 RX ADMIN — OXYCODONE AND ACETAMINOPHEN 1 TABLET: 5; 325 TABLET ORAL at 09:26

## 2019-04-14 RX ADMIN — CETIRIZINE HYDROCHLORIDE 10 MG: 10 TABLET, FILM COATED ORAL at 09:27

## 2019-04-14 ASSESSMENT — PAIN SCALES - GENERAL
PAINLEVEL_OUTOF10: 0
PAINLEVEL_OUTOF10: 6
PAINLEVEL_OUTOF10: 0

## 2019-04-14 NOTE — FLOWSHEET NOTE
Got permission from Dr. Lucy Murphy that the patient can be off from the telemonitor for a few minutes so she can take a shower. 22:20 :patient had her shower but her Iv is non functional, inserted a 22 gauge angiocatheter into her left wrist but unable to advance the iv catheter even it has an excellent blood return,so the iv catheter was withdrawn but a few minutes pass by ,the patient was complaining of left side neck pain,she described as a shooting sharp pain that originated from the iv site. rating the pain 10/10,Karla Harper NP came to check on the patient and Slava Jade nurse supervisor was in the room The patient is advised to apply ice or cold packs intermittently as needed to relieve pain. icePack applied to her left neck side with little relief. 22:30: the one time order of percocet that was ordered by [facility] nurse practitioner will be given soon,she is resting in bed and she had given me permission to go ahead and put an intravenous line. located into her right accessory vein ,the no. 20 gauge agiocatheter has excellent blood return,flushed easily,and she tolerated the procedure well,she said\"that did not hurt. \"        23:45  :patient is in her right side,her respirations were effortless,her pain is rated 7/10 from the pain scale whereby ten is the worst  Pain,her vital signs were stable,percocet was given. 0200:she and her  are both asleep,patient breathing is unlabored.

## 2019-04-14 NOTE — DISCHARGE SUMMARY
Cardiac size and pulmonary vascularity are normal. The lungs are clear. There is no evidence of adenopathy. The bones are unremarkable. NEGATIVE CHEST RADIOGRAPHS      Discharge Medications:       Hector Lyn   Home Medication Instructions XENIA:[id number]    Printed on:04/14/19 4039   Medication Information                      albuterol (PROVENTIL) (2.5 MG/3ML) 0.083% nebulizer solution  Take 3 mLs by nebulization every 2 hours as needed for Wheezing             albuterol sulfate  (90 Base) MCG/ACT inhaler  Inhale 2 puffs into the lungs every 4 hours as needed for Wheezing             Blood Glucose Monitoring Suppl (ONETOUCH VERIO) w/Device KIT  TEST 3 TIMES DAILY AS NEEDED             blood glucose test strips (EXACTECH TEST) strip  1 each by In Vitro route 3 times daily (Accu-check test strips) As needed. DX:E11.65             blood glucose test strips (ONETOUCH VERIO) strip  TEST 3 TIMES DAILY AS NEEDED             busPIRone (BUSPAR) 10 MG tablet  Take 1/2 tab bid wf x 1 week, then 1 tab bid             butalbital-acetaminophen-caffeine (FIORICET, ESGIC) -40 MG per tablet  TAKE 1 TABLET BY MOUTH THREE TIMES DAILY AS NEEDED FOR HEADACHES             cetirizine (ZYRTEC) 10 MG tablet  Take 1 qhs for allergies             DULoxetine (CYMBALTA) 60 MG extended release capsule  TAKE 1 CAPSULE BY MOUTH DAILY             folic acid (FOLVITE) 1 MG tablet  Take 1 tablet by mouth daily             gabapentin (NEURONTIN) 400 MG capsule  Take 1 capsule by mouth 3 times daily for 90 days. .             insulin glargine (LANTUS SOLOSTAR) 100 UNIT/ML injection pen  Inject 25 Units into the skin nightly             insulin lispro (ADMELOG) 100 UNIT/ML injection vial  Inject 4 Units into the skin 3 times daily as needed for High Blood Sugar             Insulin Pen Needle (B-D ULTRAFINE III SHORT PEN) 31G X 8 MM MISC  Inject 1 each into the skin 3 times daily             Insulin Syringe-Needle U-100 30G X

## 2019-04-16 PROCEDURE — 93010 ELECTROCARDIOGRAM REPORT: CPT | Performed by: INTERNAL MEDICINE

## 2019-04-18 ENCOUNTER — OFFICE VISIT (OUTPATIENT)
Dept: FAMILY MEDICINE CLINIC | Age: 49
End: 2019-04-18
Payer: MEDICAID

## 2019-04-18 VITALS
TEMPERATURE: 97.8 F | BODY MASS INDEX: 45.89 KG/M2 | RESPIRATION RATE: 14 BRPM | SYSTOLIC BLOOD PRESSURE: 118 MMHG | HEART RATE: 65 BPM | DIASTOLIC BLOOD PRESSURE: 62 MMHG | OXYGEN SATURATION: 95 % | WEIGHT: 259 LBS | HEIGHT: 63 IN

## 2019-04-18 DIAGNOSIS — M25.50 ARTHRALGIA, UNSPECIFIED JOINT: ICD-10-CM

## 2019-04-18 DIAGNOSIS — M72.2 PLANTAR FASCIITIS: ICD-10-CM

## 2019-04-18 DIAGNOSIS — Z79.4 TYPE 2 DIABETES MELLITUS WITHOUT COMPLICATION, WITH LONG-TERM CURRENT USE OF INSULIN (HCC): Primary | ICD-10-CM

## 2019-04-18 DIAGNOSIS — M25.562 ACUTE PAIN OF BOTH KNEES: ICD-10-CM

## 2019-04-18 DIAGNOSIS — E11.9 TYPE 2 DIABETES MELLITUS WITHOUT COMPLICATION, WITH LONG-TERM CURRENT USE OF INSULIN (HCC): Primary | ICD-10-CM

## 2019-04-18 DIAGNOSIS — D86.2 SARCOIDOSIS OF LUNG WITH SARCOIDOSIS OF LYMPH NODES (HCC): ICD-10-CM

## 2019-04-18 DIAGNOSIS — M25.561 ACUTE PAIN OF BOTH KNEES: ICD-10-CM

## 2019-04-18 DIAGNOSIS — R07.89 OTHER CHEST PAIN: ICD-10-CM

## 2019-04-18 DIAGNOSIS — J45.31 MILD PERSISTENT ASTHMA WITH ACUTE EXACERBATION: ICD-10-CM

## 2019-04-18 LAB
HBA1C MFR BLD: 5.5 %
RHEUMATOID FACTOR: <10 IU/ML (ref 0–14)
SEDIMENTATION RATE, ERYTHROCYTE: 21 MM (ref 0–20)

## 2019-04-18 PROCEDURE — 1036F TOBACCO NON-USER: CPT | Performed by: NURSE PRACTITIONER

## 2019-04-18 PROCEDURE — 3044F HG A1C LEVEL LT 7.0%: CPT | Performed by: NURSE PRACTITIONER

## 2019-04-18 PROCEDURE — 83036 HEMOGLOBIN GLYCOSYLATED A1C: CPT | Performed by: NURSE PRACTITIONER

## 2019-04-18 PROCEDURE — G8427 DOCREV CUR MEDS BY ELIG CLIN: HCPCS | Performed by: NURSE PRACTITIONER

## 2019-04-18 PROCEDURE — 96160 PT-FOCUSED HLTH RISK ASSMT: CPT | Performed by: NURSE PRACTITIONER

## 2019-04-18 PROCEDURE — 99214 OFFICE O/P EST MOD 30 MIN: CPT | Performed by: NURSE PRACTITIONER

## 2019-04-18 PROCEDURE — 2022F DILAT RTA XM EVC RTNOPTHY: CPT | Performed by: NURSE PRACTITIONER

## 2019-04-18 PROCEDURE — G8417 CALC BMI ABV UP PARAM F/U: HCPCS | Performed by: NURSE PRACTITIONER

## 2019-04-18 ASSESSMENT — ENCOUNTER SYMPTOMS
SWOLLEN GLANDS: 0
SORE THROAT: 0
SINUS PAIN: 0
SHORTNESS OF BREATH: 0
VISUAL CHANGE: 1
BLURRED VISION: 0
COLOR CHANGE: 0
RHINORRHEA: 0
NAUSEA: 0
WHEEZING: 0
SINUS PRESSURE: 0
COUGH: 0
CHANGE IN BOWEL HABIT: 0

## 2019-04-18 ASSESSMENT — PATIENT HEALTH QUESTIONNAIRE - PHQ9
1. LITTLE INTEREST OR PLEASURE IN DOING THINGS: 3
SUM OF ALL RESPONSES TO PHQ9 QUESTIONS 1 & 2: 6
5. POOR APPETITE OR OVEREATING: 2
2. FEELING DOWN, DEPRESSED OR HOPELESS: 3
7. TROUBLE CONCENTRATING ON THINGS, SUCH AS READING THE NEWSPAPER OR WATCHING TELEVISION: 3
4. FEELING TIRED OR HAVING LITTLE ENERGY: 3
3. TROUBLE FALLING OR STAYING ASLEEP: 3
8. MOVING OR SPEAKING SO SLOWLY THAT OTHER PEOPLE COULD HAVE NOTICED. OR THE OPPOSITE, BEING SO FIGETY OR RESTLESS THAT YOU HAVE BEEN MOVING AROUND A LOT MORE THAN USUAL: 3
6. FEELING BAD ABOUT YOURSELF - OR THAT YOU ARE A FAILURE OR HAVE LET YOURSELF OR YOUR FAMILY DOWN: 3
SUM OF ALL RESPONSES TO PHQ QUESTIONS 1-9: 24
9. THOUGHTS THAT YOU WOULD BE BETTER OFF DEAD, OR OF HURTING YOURSELF: 1
10. IF YOU CHECKED OFF ANY PROBLEMS, HOW DIFFICULT HAVE THESE PROBLEMS MADE IT FOR YOU TO DO YOUR WORK, TAKE CARE OF THINGS AT HOME, OR GET ALONG WITH OTHER PEOPLE: 2
SUM OF ALL RESPONSES TO PHQ QUESTIONS 1-9: 24

## 2019-04-18 NOTE — PATIENT INSTRUCTIONS
Patient Education        Plantar Fasciitis: Exercises  Your Care Instructions  Here are some examples of typical rehabilitation exercises for your condition. Start each exercise slowly. Ease off the exercise if you start to have pain. Your doctor or physical therapist will tell you when you can start these exercises and which ones will work best for you. How to do the exercises  Towel stretch    1. Sit with your legs extended and knees straight. 2. Place a towel around your foot just under the toes. 3. Hold each end of the towel in each hand, with your hands above your knees. 4. Pull back with the towel so that your foot stretches toward you. 5. Hold the position for at least 15 to 30 seconds. 6. Repeat 2 to 4 times a session, up to 5 sessions a day. Calf stretch    1. Stand facing a wall with your hands on the wall at about eye level. Put the leg you want to stretch about a step behind your other leg. 2. Keeping your back heel on the floor, bend your front knee until you feel a stretch in the back leg. 3. Hold the stretch for 15 to 30 seconds. Repeat 2 to 4 times. Plantar fascia and calf stretch    1. Stand on a step as shown above. Be sure to hold on to the banister. 2. Slowly let your heels down over the edge of the step as you relax your calf muscles. You should feel a gentle stretch across the bottom of your foot and up the back of your leg to your knee. 3. Hold the stretch about 15 to 30 seconds, and then tighten your calf muscle a little to bring your heel back up to the level of the step. Repeat 2 to 4 times. Towel curls    1. While sitting, place your foot on a towel on the floor and scrunch the towel toward you with your toes. 2. Then, also using your toes, push the towel away from you. [place] pickups    1. Put marbles on the floor next to a cup.  2. Using your toes, try to lift the marbles up from the floor and put them in the cup.     Follow-up care is a key part of your

## 2019-04-18 NOTE — PROGRESS NOTES
Subjective:      Patient ID: Estephania Buck is a 51 y.o. female who presents today for:     Chief Complaint   Patient presents with   [facility] pt to establish care. Pt is a type 2 diabetic. See positive depression screening    Follow-Up from Hospital     follow up from [facility] for chest pain sarcoidosis.  Foot Pain     Bottom foot pain bilateral x2 months. Right foot is worse     Joint Pain     joint pain x3 months. Knees, elbows and wrist pain getting worse. Foot Pain   This is a new problem. The current episode started more than 1 month ago. The problem occurs intermittently. The problem has been unchanged. Associated symptoms include arthralgias, chest pain and a visual change. Pertinent negatives include no change in bowel habit, chills, congestion, coughing, fatigue, headaches, joint swelling, myalgias, nausea, neck pain, rash, sore throat, swollen glands, urinary symptoms or weakness. She has tried nothing for the symptoms. Diabetes   She presents for her follow-up diabetic visit. She has type 2 diabetes mellitus. Her disease course has been fluctuating. Hypoglycemia symptoms include dizziness. Pertinent negatives for hypoglycemia include no confusion, headaches, hunger, mood changes, nervousness/anxiousness, pallor, seizures, sleepiness, speech difficulty, sweats or tremors. Associated symptoms include chest pain, foot paresthesias, polydipsia, polyuria and visual change. Pertinent negatives for diabetes include no blurred vision, no fatigue, no foot ulcerations, no polyphagia, no weakness and no weight loss. There are no hypoglycemic complications. Symptoms are stable. Risk factors for coronary artery disease include diabetes mellitus and obesity. Current diabetic treatment includes insulin injections. She is compliant with treatment all of the time. She does not see a podiatrist.Eye exam is not current (missed appt while she was in the hospital).     Pt in today to follow up. She was at [facility] in [facility] last week with CP from her Sarcoidosis. This is a new diagnosis since November. She sees a specialist in [facility] [employer] who follows it. She also sees a pain management doctor for her sarcodisis chest pain. She reports that her plantar surface of her feet. It hurts when she walks only. It hurts worst in the morning, but does hurt all day. All her joints (wrist, knees, elbows, etc.) have been painful as well. It is intermittent. It is very achey. Her knees feel the worst. 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 Recent thyroidectomy with Dr. Jennifer Murcia. Has had TSH checked recently. Hx right nephrectomy 5 years ago due to cancer. She follows with Dr. Sidra Peraza. Past Medical History:   Diagnosis Date    Anxiety     Arthritis     Asthma     Bronchopneumonia     Cancer ([address]     renal    Cerebral artery occlusion with cerebral infarction (HCC)     Chronic bilateral low back pain with sciatica     Chronic kidney disease     Depression     Hypertension     Insulin dependent type 2 diabetes mellitus, uncontrolled ([address] 8/3/2018    Mixed headache     Pure hyperglyceridemia 5/19/2017    Sleep apnea     does not wear cpap    Thyroid goiter      Past Surgical History:   Procedure Laterality Date    BRONCHOSCOPY  10/26/2018    DR. STEARNS    KIDNEY REMOVAL Right 08/2016    THYROID LOBECTOMY Right 6/13/14    THYROIDECTOMY  02/21/2019    DR. MAGDALENO    URETER STENT PLACEMENT Left 08/2016     Family History   Problem Relation Age of Onset    Cancer Father     Diabetes Father     High Blood Pressure Mother     Diabetes Mother      Social History     Socioeconomic History    Marital status: Legally      Spouse name: Not on file    Number of children: Not on file    Years of education: Not on file    Highest education level: Not on file   Occupational History    Not on file   Social Needs    Financial resource strain: Not on file    Food insecurity:     Worry: Not on file     Inability: Not on file    Transportation needs:     Medical: Not on file     Non-medical: Not on file   Tobacco Use    Smoking status: Former Smoker     Packs/day: 0.50     Years: 15.00     Pack years: 7.50     Types: Cigarettes     Start date: 2014     Last attempt to quit: 2015     Years since quittin.2    Smokeless tobacco: Former User     Quit date: 3/23/2016   Substance and Sexual Activity    Alcohol use: Yes     Alcohol/week: 0.0 oz     Comment: occasionally    Drug use: Yes     Types: Marijuana     Comment: one month ago    Sexual activity: Not on file   Lifestyle    Physical activity:     Days per week: Not on file     Minutes per session: Not on file    Stress: Not on file   Relationships    Social connections:     Talks on phone: Not on file     Gets together: Not on file     Attends Mosque service: Not on file     Active member of club or organization: Not on file     Attends meetings of clubs or organizations: Not on file     Relationship status: Not on file    Intimate partner violence:     Fear of current or ex partner: Not on file     Emotionally abused: Not on file     Physically abused: Not on file     Forced sexual activity: Not on file   Other Topics Concern    Not on file   Social History Narrative    Not on file     Current Outpatient Medications on File Prior to Visit   Medication Sig Dispense Refill    predniSONE (DELTASONE) 10 MG tablet 4 daily for 4 days, 3 daily for 4 days, 2 daily for 4 days, then one and a half (15 mg) daily until follow-up 50 tablet 0    levothyroxine (SYNTHROID) 75 MCG tablet Take 75 mcg by mouth Daily      oxyCODONE-acetaminophen (PERCOCET) 5-325 MG per tablet Take 1 tablet by mouth daily as needed for Pain.       butalbital-acetaminophen-caffeine (FIORICET, ESGIC) -40 MG per tablet TAKE 1 TABLET BY MOUTH THREE TIMES DAILY AS NEEDED FOR HEADACHES 21 tablet 0    cetirizine (ZYRTEC) 10 MG tablet Take 1 qhs for allergies 30 tablet 3    Insulin Syringe-Needle U-100 30G X 1/2\" 1 ML MISC 1 each by Does not apply route daily 100 each 3    insulin lispro (ADMELOG) 100 UNIT/ML injection vial Inject 4 Units into the skin 3 times daily as needed for High Blood Sugar 1 vial 5    DULoxetine (CYMBALTA) 60 MG extended release capsule TAKE 1 CAPSULE BY MOUTH DAILY 30 capsule 2    gabapentin (NEURONTIN) 400 MG capsule Take 1 capsule by mouth 3 times daily for 90 days. . (Patient taking differently: Take 400 mg by mouth nightly. ) 90 capsule 2    blood glucose test strips (ONETOUCH VERIO) strip TEST 3 TIMES DAILY AS NEEDED 300 each 3    Blood Glucose Monitoring Suppl (ONETOUCH VERIO) w/Device KIT TEST 3 TIMES DAILY AS NEEDED 1 kit 0    ONE TOUCH ULTRASOFT LANCETS MISC TEST 3 TIMES DAILY AS NEEDED 300 each 3    blood glucose test strips (EXACTECH TEST) strip 1 each by In Vitro route 3 times daily (Accu-check test strips) As needed.  DX:E11.65 300 strip 5    insulin glargine (LANTUS SOLOSTAR) 100 UNIT/ML injection pen Inject 25 Units into the skin nightly      montelukast (SINGULAIR) 10 MG tablet Take 1 tab nightly at supper for breathing/allergies 30 tablet 5    folic acid (FOLVITE) 1 MG tablet Take 1 tablet by mouth daily 30 tablet 3    albuterol (PROVENTIL) (2.5 MG/3ML) 0.083% nebulizer solution Take 3 mLs by nebulization every 2 hours as needed for Wheezing 120 each 3    albuterol sulfate  (90 Base) MCG/ACT inhaler Inhale 2 puffs into the lungs every 4 hours as needed for Wheezing 1 Inhaler 3    busPIRone (BUSPAR) 10 MG tablet Take 1/2 tab bid wf x 1 week, then 1 tab bid (Patient taking differently: Take 10 mg by mouth daily Take 1/2 tab bid wf x 1 week, then 1 tab bid) 60 tablet 2    Insulin Pen Needle (B-D ULTRAFINE III SHORT PEN) 31G X 8 MM MISC Inject 1 each into the skin 3 times daily 100 each 5     No current facility-administered medications on file prior to visit. Allergies:  Shellfish-derived products; Ibuprofen; and Propoxyphene n-acetaminophen    Review of Systems   Constitutional: Negative for chills, fatigue and weight loss. HENT: Negative for congestion, ear pain, rhinorrhea, sinus pressure, sinus pain and sore throat. Eyes: Negative for blurred vision. Respiratory: Negative for cough, shortness of breath and wheezing. Cardiovascular: Positive for chest pain. Negative for palpitations and leg swelling. Gastrointestinal: Negative for change in bowel habit and nausea. Endocrine: Positive for polydipsia and polyuria. Negative for polyphagia. Musculoskeletal: Positive for arthralgias, back pain and gait problem. Negative for joint swelling, myalgias, neck pain and neck stiffness. Skin: Negative for color change, pallor, rash and wound. Neurological: Positive for dizziness. Negative for tremors, seizures, speech difficulty, weakness and headaches. Psychiatric/Behavioral: Negative for confusion. The patient is not nervous/anxious. Objective:   /62 (Site: Right Upper Arm, Position: Sitting, Cuff Size: Large Adult)   Pulse 65   Temp 97.8 °F (36.6 °C)   Resp 14   Ht 5' 3\" (1.6 m)   Wt 259 lb (117.5 kg)   LMP 02/19/2019   SpO2 95%   BMI 45.88 kg/m²     Physical Exam   Constitutional: She is oriented to person, place, and time. She appears well-developed and well-nourished. HENT:   Head: Normocephalic. Right Ear: Tympanic membrane, external ear and ear canal normal.   Left Ear: Tympanic membrane, external ear and ear canal normal.   Nose: Nose normal.   Mouth/Throat: Uvula is midline, oropharynx is clear and moist and mucous membranes are normal.   Eyes: Conjunctivae are normal. Right eye exhibits no discharge. Left eye exhibits no discharge. Neck: Normal range of motion. Cardiovascular: Normal rate, regular rhythm and normal heart sounds.    Pulmonary/Chest: Effort normal and breath sounds normal. No

## 2019-04-19 ENCOUNTER — HOSPITAL ENCOUNTER (EMERGENCY)
Age: 49
Discharge: HOME | End: 2019-04-19
Payer: MEDICAID

## 2019-04-19 ENCOUNTER — APPOINTMENT (OUTPATIENT)
Dept: GENERAL RADIOLOGY | Age: 49
End: 2019-04-19
Payer: MEDICAID

## 2019-04-19 VITALS
TEMPERATURE: 98.9 F | DIASTOLIC BLOOD PRESSURE: 60 MMHG | OXYGEN SATURATION: 95 % | HEIGHT: 63 IN | RESPIRATION RATE: 19 BRPM | BODY MASS INDEX: 35.44 KG/M2 | SYSTOLIC BLOOD PRESSURE: 107 MMHG | HEART RATE: 73 BPM | WEIGHT: 200 LBS

## 2019-04-19 DIAGNOSIS — R07.9 CHEST PAIN, UNSPECIFIED TYPE: Primary | ICD-10-CM

## 2019-04-19 DIAGNOSIS — N18.9 CHRONIC KIDNEY DISEASE, UNSPECIFIED CKD STAGE: ICD-10-CM

## 2019-04-19 DIAGNOSIS — D86.9 SARCOIDOSIS: ICD-10-CM

## 2019-04-19 DIAGNOSIS — E11.9 TYPE 2 DIABETES MELLITUS WITHOUT COMPLICATION, UNSPECIFIED WHETHER LONG TERM INSULIN USE (HCC): ICD-10-CM

## 2019-04-19 LAB
ALBUMIN SERPL-MCNC: 3.3 G/DL (ref 3.5–4.6)
ALP BLD-CCNC: 115 U/L (ref 40–130)
ALT SERPL-CCNC: 18 U/L (ref 0–33)
ANION GAP SERPL CALCULATED.3IONS-SCNC: 12 MEQ/L (ref 9–15)
AST SERPL-CCNC: 27 U/L (ref 0–35)
BASOPHILS ABSOLUTE: 0.1 K/UL (ref 0–0.2)
BASOPHILS RELATIVE PERCENT: 1.1 %
BILIRUB SERPL-MCNC: <0.2 MG/DL (ref 0.2–0.7)
BUN BLDV-MCNC: 13 MG/DL (ref 6–20)
CALCIUM SERPL-MCNC: 8.6 MG/DL (ref 8.5–9.9)
CHLORIDE BLD-SCNC: 102 MEQ/L (ref 95–107)
CO2: 24 MEQ/L (ref 20–31)
CREAT SERPL-MCNC: 1.18 MG/DL (ref 0.5–0.9)
EKG ATRIAL RATE: 72 BPM
EKG P AXIS: 32 DEGREES
EKG P-R INTERVAL: 142 MS
EKG Q-T INTERVAL: 384 MS
EKG QRS DURATION: 80 MS
EKG QTC CALCULATION (BAZETT): 420 MS
EKG R AXIS: 11 DEGREES
EKG T AXIS: 18 DEGREES
EKG VENTRICULAR RATE: 72 BPM
EOSINOPHILS ABSOLUTE: 0.4 K/UL (ref 0–0.7)
EOSINOPHILS RELATIVE PERCENT: 5.3 %
GFR AFRICAN AMERICAN: 59.1
GFR NON-AFRICAN AMERICAN: 48.9
GLOBULIN: 3.5 G/DL (ref 2.3–3.5)
GLUCOSE BLD-MCNC: 94 MG/DL (ref 70–99)
HCG, URINE, POC: NEGATIVE
HCT VFR BLD CALC: 42.1 % (ref 37–47)
HEMOGLOBIN: 14.3 G/DL (ref 12–16)
LYMPHOCYTES ABSOLUTE: 1.3 K/UL (ref 1–4.8)
LYMPHOCYTES RELATIVE PERCENT: 19.4 %
Lab: NORMAL
MCH RBC QN AUTO: 32.7 PG (ref 27–31.3)
MCHC RBC AUTO-ENTMCNC: 34 % (ref 33–37)
MCV RBC AUTO: 96 FL (ref 82–100)
MONOCYTES ABSOLUTE: 0.4 K/UL (ref 0.2–0.8)
MONOCYTES RELATIVE PERCENT: 5.9 %
NEGATIVE QC PASS/FAIL: NORMAL
NEUTROPHILS ABSOLUTE: 4.6 K/UL (ref 1.4–6.5)
NEUTROPHILS RELATIVE PERCENT: 68.3 %
PDW BLD-RTO: 14.9 % (ref 11.5–14.5)
PLATELET # BLD: 268 K/UL (ref 130–400)
POSITIVE QC PASS/FAIL: NORMAL
POTASSIUM SERPL-SCNC: 5.2 MEQ/L (ref 3.4–4.9)
RBC # BLD: 4.38 M/UL (ref 4.2–5.4)
SODIUM BLD-SCNC: 138 MEQ/L (ref 135–144)
TOTAL CK: 146 U/L (ref 0–170)
TOTAL PROTEIN: 6.8 G/DL (ref 6.3–8)
TROPONIN: <0.01 NG/ML (ref 0–0.01)
WBC # BLD: 6.7 K/UL (ref 4.8–10.8)

## 2019-04-19 PROCEDURE — 6360000002 HC RX W HCPCS: Performed by: PHYSICIAN ASSISTANT

## 2019-04-19 PROCEDURE — 82550 ASSAY OF CK (CPK): CPT

## 2019-04-19 PROCEDURE — 96374 THER/PROPH/DIAG INJ IV PUSH: CPT

## 2019-04-19 PROCEDURE — 96375 TX/PRO/DX INJ NEW DRUG ADDON: CPT

## 2019-04-19 PROCEDURE — 71046 X-RAY EXAM CHEST 2 VIEWS: CPT

## 2019-04-19 PROCEDURE — 84484 ASSAY OF TROPONIN QUANT: CPT

## 2019-04-19 PROCEDURE — 93005 ELECTROCARDIOGRAM TRACING: CPT

## 2019-04-19 PROCEDURE — 85025 COMPLETE CBC W/AUTO DIFF WBC: CPT

## 2019-04-19 PROCEDURE — 36415 COLL VENOUS BLD VENIPUNCTURE: CPT

## 2019-04-19 PROCEDURE — 99285 EMERGENCY DEPT VISIT HI MDM: CPT

## 2019-04-19 PROCEDURE — 80053 COMPREHEN METABOLIC PANEL: CPT

## 2019-04-19 RX ORDER — METHYLPREDNISOLONE SODIUM SUCCINATE 125 MG/2ML
80 INJECTION, POWDER, LYOPHILIZED, FOR SOLUTION INTRAMUSCULAR; INTRAVENOUS ONCE
Status: COMPLETED | OUTPATIENT
Start: 2019-04-19 | End: 2019-04-19

## 2019-04-19 RX ORDER — FENTANYL CITRATE 50 UG/ML
100 INJECTION, SOLUTION INTRAMUSCULAR; INTRAVENOUS ONCE
Status: COMPLETED | OUTPATIENT
Start: 2019-04-19 | End: 2019-04-19

## 2019-04-19 RX ORDER — SODIUM CHLORIDE 0.9 % (FLUSH) 0.9 %
3 SYRINGE (ML) INJECTION EVERY 8 HOURS
Status: DISCONTINUED | OUTPATIENT
Start: 2019-04-19 | End: 2019-04-19 | Stop reason: HOSPADM

## 2019-04-19 RX ORDER — OXYCODONE HYDROCHLORIDE AND ACETAMINOPHEN 5; 325 MG/1; MG/1
1 TABLET ORAL EVERY 8 HOURS PRN
Qty: 9 TABLET | Refills: 0 | Status: SHIPPED | OUTPATIENT
Start: 2019-04-19 | End: 2019-04-22

## 2019-04-19 RX ADMIN — HYDROMORPHONE HYDROCHLORIDE 1 MG: 1 INJECTION, SOLUTION INTRAMUSCULAR; INTRAVENOUS; SUBCUTANEOUS at 20:19

## 2019-04-19 RX ADMIN — FENTANYL CITRATE 100 MCG: 50 INJECTION, SOLUTION INTRAMUSCULAR; INTRAVENOUS at 19:00

## 2019-04-19 RX ADMIN — METHYLPREDNISOLONE SODIUM SUCCINATE 80 MG: 125 INJECTION, POWDER, FOR SOLUTION INTRAMUSCULAR; INTRAVENOUS at 19:00

## 2019-04-19 ASSESSMENT — PAIN DESCRIPTION - FREQUENCY: FREQUENCY: CONTINUOUS

## 2019-04-19 ASSESSMENT — PAIN SCALES - GENERAL
PAINLEVEL_OUTOF10: 8
PAINLEVEL_OUTOF10: 9
PAINLEVEL_OUTOF10: 9

## 2019-04-19 ASSESSMENT — ENCOUNTER SYMPTOMS
BACK PAIN: 1
VOICE CHANGE: 0
SHORTNESS OF BREATH: 0
ABDOMINAL DISTENTION: 0
VOMITING: 0
NAUSEA: 0
EYE DISCHARGE: 0
BACK PAIN: 1
ANAL BLEEDING: 0
PHOTOPHOBIA: 0
APNEA: 0

## 2019-04-19 ASSESSMENT — PAIN DESCRIPTION - ORIENTATION: ORIENTATION: MID

## 2019-04-19 ASSESSMENT — PAIN DESCRIPTION - DESCRIPTORS: DESCRIPTORS: SHARP

## 2019-04-19 ASSESSMENT — PAIN DESCRIPTION - LOCATION: LOCATION: CHEST

## 2019-04-19 ASSESSMENT — PAIN DESCRIPTION - ONSET: ONSET: ON-GOING

## 2019-04-19 ASSESSMENT — PAIN DESCRIPTION - PAIN TYPE: TYPE: ACUTE PAIN

## 2019-04-19 NOTE — ED TRIAGE NOTES
Patient came to the ED for chest pain x1 day. Patient states she was admitted last week for the same problem and they found that it was due to her sarcoidosis. Pt states the pain has flaired up and will not go away. Denies HA, diplopia, blurry vision, SOB, ABD pain, fevers, chills, nausea, vomiting, diarrhea, dysuria. A&Ox4 Respirations even and unlabored.

## 2019-04-20 NOTE — ED PROVIDER NOTES
[address] [facility] ED  eMERGENCY dEPARTMENT eNCOUnter      Pt Name: Tammy Sánchez  MRN: [id number]  [place] 1970  Date of evaluation: 4/19/2019  Provider: Michael Roy Dr       Chief Complaint   Patient presents with    Chest Pain     that began again last night. Pt states she was just discharged last week for her sarcoidsis         HISTORY OF PRESENT ILLNESS   (Location/Symptom, Timing/Onset,Context/Setting, Quality, Duration, Modifying Factors, Severity)  Note limiting factors. Tammy Sánchez is a 51 y.o. female who presents to the emergency department p[t presents with chest pain and generalized aches and pains. She notes this is continuing and worsened yesterday. She has sarcoidosis and has had flares recently. She was recently discharged and follow up with her pulmonary specialist and he adjusted medications and added MTX and pt sts it is not helping yet. She has an appointment Monday with Dr Marsh [place] her pain management. She denies n,v,d,sob. Symptoms moderate in severity nothing improves symptoms. HPI    NursingNotes were reviewed. REVIEW OF SYSTEMS    (2-9 systems for level 4, 10 or more for level 5)     Review of Systems   Constitutional: Negative for activity change, appetite change, fever and unexpected weight change. HENT: Negative for ear discharge, nosebleeds and voice change. Eyes: Negative for photophobia and discharge. Respiratory: Negative for apnea and shortness of breath. Cardiovascular: Positive for chest pain. Gastrointestinal: Negative for abdominal distention, anal bleeding, nausea and vomiting. Endocrine: Negative for cold intolerance, heat intolerance and polyphagia. Genitourinary: Negative for genital sores. Musculoskeletal: Positive for back pain. Negative for joint swelling. Skin: Negative for pallor. Allergic/Immunologic: Negative for immunocompromised state.    Neurological: Negative for seizures and facial asymmetry. Hematological: Does not bruise/bleed easily. Psychiatric/Behavioral: Negative for behavioral problems, self-injury and sleep disturbance. All other systems reviewed and are negative. Except as noted above the remainder of the review of systems was reviewed and negative. PAST MEDICAL HISTORY     Past Medical History:   Diagnosis Date    Anxiety     Arthritis     Asthma     Bronchopneumonia     Cancer [address]     renal    Cerebral artery occlusion with cerebral infarction ([facility]     Chronic bilateral low back pain with sciatica     Chronic kidney disease     Depression     Hypertension     Insulin dependent type 2 diabetes mellitus, uncontrolled ([facility] 8/3/2018    Mixed headache     Pure hyperglyceridemia 5/19/2017    Sarcoidosis     Sleep apnea     does not wear cpap    Thyroid goiter          SURGICALHISTORY       Past Surgical History:   Procedure Laterality Date    BRONCHOSCOPY  10/26/2018    DR. STEARNS    KIDNEY REMOVAL Right 08/2016    THYROID LOBECTOMY Right 6/13/14    THYROIDECTOMY  02/21/2019    DR. MAGDALENO    URETER STENT PLACEMENT Left 08/2016         CURRENT MEDICATIONS       Previous Medications    ALBUTEROL (PROVENTIL) (2.5 MG/3ML) 0.083% NEBULIZER SOLUTION    Take 3 mLs by nebulization every 2 hours as needed for Wheezing    ALBUTEROL SULFATE  (90 BASE) MCG/ACT INHALER    Inhale 2 puffs into the lungs every 4 hours as needed for Wheezing    BLOOD GLUCOSE MONITORING SUPPL (ONETOUCH VERIO) W/DEVICE KIT    TEST 3 TIMES DAILY AS NEEDED    BLOOD GLUCOSE TEST STRIPS (EXACTECH TEST) STRIP    1 each by In Vitro route 3 times daily (Accu-check test strips) As needed.  DX:E11.65    BLOOD GLUCOSE TEST STRIPS (ONETOUCH VERIO) STRIP    TEST 3 TIMES DAILY AS NEEDED    BUSPIRONE (BUSPAR) 10 MG TABLET    Take 1/2 tab bid wf x 1 week, then 1 tab bid    BUTALBITAL-ACETAMINOPHEN-CAFFEINE (FIORICET, ESGIC) -40 MG PER TABLET    TAKE 1 TABLET BY MOUTH THREE TIMES DAILY AS NEEDED FOR HEADACHES    CETIRIZINE (ZYRTEC) 10 MG TABLET    Take 1 qhs for allergies    DULOXETINE (CYMBALTA) 60 MG EXTENDED RELEASE CAPSULE    TAKE 1 CAPSULE BY MOUTH DAILY    FOLIC ACID (FOLVITE) 1 MG TABLET    Take 1 tablet by mouth daily    GABAPENTIN (NEURONTIN) 400 MG CAPSULE    Take 1 capsule by mouth 3 times daily for 90 days. .    INSULIN GLARGINE (LANTUS SOLOSTAR) 100 UNIT/ML INJECTION PEN    Inject 25 Units into the skin nightly    INSULIN LISPRO (ADMELOG) 100 UNIT/ML INJECTION VIAL    Inject 4 Units into the skin 3 times daily as needed for High Blood Sugar    INSULIN PEN NEEDLE (B-D ULTRAFINE III SHORT PEN) 31G X 8 MM MISC    Inject 1 each into the skin 3 times daily    INSULIN SYRINGE-NEEDLE U-100 30G X 1/2\" 1 ML MISC    1 each by Does not apply route daily    LEVOTHYROXINE (SYNTHROID) 75 MCG TABLET    Take 75 mcg by mouth Daily    MONTELUKAST (SINGULAIR) 10 MG TABLET    Take 1 tab nightly at supper for breathing/allergies    ONE TOUCH ULTRASOFT LANCETS MISC    TEST 3 TIMES DAILY AS NEEDED    PREDNISONE (DELTASONE) 10 MG TABLET    4 daily for 4 days, 3 daily for 4 days, 2 daily for 4 days, then one and a half (15 mg) daily until follow-up       ALLERGIES     Shellfish-derived products;  Ibuprofen; and Propoxyphene n-acetaminophen    FAMILY HISTORY       Family History   Problem Relation Age of Onset    Cancer Father     Diabetes Father     High Blood Pressure Mother     Diabetes Mother           SOCIAL HISTORY       Social History     Socioeconomic History    Marital status: Legally      Spouse name: Not on file    Number of children: Not on file    Years of education: Not on file    Highest education level: Not on file   Occupational History    Not on file   Social Needs    Financial resource strain: Not on file    Food insecurity:     Worry: Not on file     Inability: Not on file    Transportation needs:     Medical: Not on file     Non-medical: Not on file   Tobacco Use  Smoking status: Former Smoker     Packs/day: 0.50     Years: 15.00     Pack years: 7.50     Types: Cigarettes     Start date: 2014     Last attempt to quit: 2015     Years since quittin.2    Smokeless tobacco: Former User     Quit date: 3/23/2016   Substance and Sexual Activity    Alcohol use: Yes     Alcohol/week: 0.0 oz     Comment: occasionally    Drug use: Yes     Types: Marijuana     Comment: one month ago    Sexual activity: Not on file   Lifestyle    Physical activity:     Days per week: Not on file     Minutes per session: Not on file    Stress: Not on file   Relationships    Social connections:     Talks on phone: Not on file     Gets together: Not on file     Attends Alevism service: Not on file     Active member of club or organization: Not on file     Attends meetings of clubs or organizations: Not on file     Relationship status: Not on file    Intimate partner violence:     Fear of current or ex partner: Not on file     Emotionally abused: Not on file     Physically abused: Not on file     Forced sexual activity: Not on file   Other Topics Concern    Not on file   Social History Narrative    Not on file       SCREENINGS    [place] Coma Scale  Eye Opening: Spontaneous  Best Verbal Response: Oriented  Best Motor Response: Obeys commands  [place] Coma Scale Score: 15 @FLOW([id number])@      PHYSICAL EXAM    (up to 7 for level 4, 8 or more for level 5)     ED Triage Vitals [19 1755]   BP Temp Temp Source Pulse Resp SpO2 Height Weight   (!) 122/91 98.9 °F (37.2 °C) Oral 74 22 98 % 5' 3\" (1.6 m) 200 lb (90.7 kg)       Physical Exam   Constitutional: She is oriented to person, place, and time. She appears well-developed and well-nourished. No distress. HENT:   Head: Normocephalic and atraumatic. Eyes: Pupils are equal, round, and reactive to light. Right eye exhibits no discharge. Left eye exhibits no discharge. Neck: Normal range of motion. Neck supple. Cardiovascular: Normal rate, regular rhythm, normal heart sounds and intact distal pulses. Pulmonary/Chest: Effort normal and breath sounds normal. No stridor. No respiratory distress. She exhibits tenderness. Abdominal: Soft. Bowel sounds are normal. She exhibits no distension. Musculoskeletal: Normal range of motion. She exhibits tenderness. Diffuse chest wall tenderness   Neurological: She is alert and oriented to person, place, and time. She exhibits normal muscle tone. Skin: Skin is warm. No erythema. Psychiatric: She has a normal mood and affect. Nursing note and vitals reviewed. DIAGNOSTIC RESULTS     EKG: All EKG's are interpreted by the Emergency Department Physician who either signs or Co-signsthis chart in the absence of a cardiologist.    ekg nsr rate 72 neg st change. RADIOLOGY:   Non-plain filmimages such as CT, Ultrasound and MRI are read by the radiologist. Plain radiographic images are visualized and preliminarily interpreted by the emergency physician with the below findings:    nad    Interpretation per the Radiologist below, if available at the time ofthis note:    XR CHEST STANDARD (2 VW)    (Results Pending)         ED BEDSIDE ULTRASOUND:   Performed by ED Physician - none    LABS:  Labs Reviewed   COMPREHENSIVE METABOLIC PANEL - Abnormal; Notable for the following components:       Result Value    Potassium 5.2 (*)     CREATININE 1.18 (*)     GFR Non- 48.9 (*)     GFR  59.1 (*)     Alb 3.3 (*)     All other components within normal limits   CBC WITH AUTO DIFFERENTIAL - Abnormal; Notable for the following components:    MCH 32.7 (*)     RDW 14.9 (*)     All other components within normal limits   TROPONIN   CK   POC PREGNANCY UR-QUAL       All other labs were within normal range or not returned as of this dictation.     EMERGENCY DEPARTMENT COURSE and DIFFERENTIAL DIAGNOSIS/MDM:   Vitals:    Vitals:    04/19/19 1755 04/19/19 1942 04/19/19 Prescriptions    OXYCODONE-ACETAMINOPHEN (PERCOCET) 5-325 MG PER TABLET    Take 1 tablet by mouth every 8 hours as needed for Pain for up to 3 days. WARNING:  May cause drowsiness. May impair ability to operate vehicles or machinery. Do not use in combination with alcohol. Attestation: The Prescription Monitoring Report for this patient was reviewed today.  Jo Cotton PA-C)    (Please note that portions of this note were completed with a voice recognition program.  Efforts were made to edit the dictations but occasionally words are mis-transcribed.)    Jo Cotton PA-C (electronically signed)  Attending Emergency Physician       Jo Cotton PA-C  04/19/19 9854

## 2019-04-20 NOTE — DISCHARGE INSTR - COC
120/86   Pulse 71   Temp 98.9 °F (37.2 °C) (Oral)   Resp 20   Ht 5' 3\" (1.6 m)   Wt 200 lb (90.7 kg)   LMP 2019   SpO2 95%   BMI 35.43 kg/m²     Last documented pain score (0-10 scale): Pain Level: 8  Last Weight:   Wt Readings from Last 1 Encounters:   19 200 lb (90.7 kg)     Mental Status:  {IP PT MENTAL STATUS:00709}    IV Access:  { SALOMÓN IV ACCESS:197290077}    Nursing Mobility/ADLs:  Walking   {CHP DME OGCO:785385285}  Transfer  {CHP DME HUQQ:525498575}  Bathing  {CHP DME QAQT:917483035}  Dressing  {CHP DME WCEK:511061891}  Toileting  {CHP DME LDQA:326986669}  Feeding  {CHP DME TPCT:721074529}  Med Admin  {P DME LMIX:929133225}  Med Delivery   { SALOMÓN MED Delivery:229709284}    Wound Care Documentation and Therapy:        Elimination:  Continence:   · Bowel: {YES / GT:98190}  · Bladder: {YES / HH:62212}  Urinary Catheter: {Urinary Catheter:268793172}   Colostomy/Ileostomy/Ileal Conduit: {YES / PT:02383}       Date of Last BM: ***  No intake or output data in the 24 hours ending 19 2114  No intake/output data recorded.     Safety Concerns:     [address] Safety Concerns:242130339}    Impairments/Disabilities:      [address] Impairments/Disabilities:953662500}    Nutrition Therapy:  Current Nutrition Therapy:   [address] Diet List:842741684}    Routes of Feeding: {CHP DME Other Feedings:043888216}  Liquids: {Slp liquid thickness:97142}  Daily Fluid Restriction: {CHP DME Yes amt example:693768207}  Last Modified Barium Swallow with Video (Video Swallowing Test): {Done Not Done MBMY:473304200}    Treatments at the Time of Hospital Discharge:   Respiratory Treatments: ***  Oxygen Therapy:  {Therapy; copd oxygen:17633}  Ventilator:    { CC Vent LRZT:348038766}    Rehab Therapies: {THERAPEUTIC INTERVENTION:7251843250}  Weight Bearing Status/Restrictions: [address] ROBERTSON Weight Bearin}  Other Medical Equipment (for information only, NOT a DME order):  {EQUIPMENT:109603195}  Other Treatments: ***    Patient's personal belongings (please select all that are sent with patient):  {CHP DME Belongings:414982127}    RN SIGNATURE:  {Esignature:974914037}    CASE MANAGEMENT/SOCIAL WORK SECTION    Inpatient Status Date: ***    Readmission Risk Assessment Score:  Readmission Risk              Risk of Unplanned Readmission:        0           Discharging to Facility/ Agency   · Name:   · Address:  · Phone:  · Fax:    Dialysis Facility (if applicable)   · Name:  · Address:  · Dialysis Schedule:  · Phone:  · Fax:    / signature: {Esignature:919661631}    PHYSICIAN SECTION    Prognosis: {Prognosis:6770130181}    Condition at Discharge: [address] [place] [address] Patient Condition:366351878}    Rehab Potential (if transferring to Rehab): {Prognosis:0614507574}    Recommended Labs or Other Treatments After Discharge: ***    Physician Certification: I certify the above information and transfer of Yoandy Cardinal  is necessary for the continuing treatment of the diagnosis listed and that she requires {Admit to Appropriate Level of Care:75071} for {GREATER/LESS:510514100} 30 days.      Update Admission H&P: {CHP DME Changes in DELJ:750374529}    PHYSICIAN SIGNATURE:  {Esignature:845990259}

## 2019-04-21 LAB
ANA PATTERN: ABNORMAL
ANA TITER: ABNORMAL
ANTINUCLEAR AB INTERPRETIVE COMMENT: ABNORMAL
ANTINUCLEAR ANTIBODY, HEP-2, IGG: DETECTED

## 2019-04-23 DIAGNOSIS — R76.8 ELEVATED ANTINUCLEAR ANTIBODY (ANA) LEVEL: Primary | ICD-10-CM

## 2019-04-23 PROCEDURE — 93010 ELECTROCARDIOGRAM REPORT: CPT | Performed by: INTERNAL MEDICINE

## 2019-05-06 DIAGNOSIS — R76.8 ELEVATED ANTINUCLEAR ANTIBODY (ANA) LEVEL: Primary | ICD-10-CM

## 2019-05-07 ENCOUNTER — APPOINTMENT (OUTPATIENT)
Dept: ULTRASOUND IMAGING | Age: 49
End: 2019-05-07
Payer: MEDICAID

## 2019-05-07 ENCOUNTER — APPOINTMENT (OUTPATIENT)
Dept: GENERAL RADIOLOGY | Age: 49
End: 2019-05-07
Payer: MEDICAID

## 2019-05-07 ENCOUNTER — HOSPITAL ENCOUNTER (EMERGENCY)
Age: 49
Discharge: HOME | End: 2019-05-07
Payer: MEDICAID

## 2019-05-07 VITALS
RESPIRATION RATE: 18 BRPM | HEART RATE: 88 BPM | BODY MASS INDEX: 37.21 KG/M2 | HEIGHT: 63 IN | WEIGHT: 210 LBS | OXYGEN SATURATION: 98 % | DIASTOLIC BLOOD PRESSURE: 57 MMHG | SYSTOLIC BLOOD PRESSURE: 119 MMHG | TEMPERATURE: 98.3 F

## 2019-05-07 DIAGNOSIS — R07.9 CHEST PAIN, UNSPECIFIED TYPE: Primary | ICD-10-CM

## 2019-05-07 DIAGNOSIS — Z86.2 HX OF SARCOIDOSIS: ICD-10-CM

## 2019-05-07 DIAGNOSIS — G89.29 OTHER CHRONIC PAIN: ICD-10-CM

## 2019-05-07 LAB
ALBUMIN SERPL-MCNC: 3.5 G/DL (ref 3.5–4.6)
ALP BLD-CCNC: 99 U/L (ref 40–130)
ALT SERPL-CCNC: 15 U/L (ref 0–33)
AMPHETAMINE SCREEN, URINE: ABNORMAL
ANION GAP SERPL CALCULATED.3IONS-SCNC: 12 MEQ/L (ref 9–15)
AST SERPL-CCNC: 17 U/L (ref 0–35)
BACTERIA: ABNORMAL /HPF
BARBITURATE SCREEN URINE: ABNORMAL
BASOPHILS ABSOLUTE: 0.1 K/UL (ref 0–0.2)
BASOPHILS RELATIVE PERCENT: 0.9 %
BENZODIAZEPINE SCREEN, URINE: ABNORMAL
BILIRUB SERPL-MCNC: <0.2 MG/DL (ref 0.2–0.7)
BILIRUBIN URINE: NEGATIVE
BLOOD, URINE: NEGATIVE
BUN BLDV-MCNC: 11 MG/DL (ref 6–20)
CALCIUM SERPL-MCNC: 8.5 MG/DL (ref 8.5–9.9)
CANNABINOID SCREEN URINE: POSITIVE
CHLORIDE BLD-SCNC: 105 MEQ/L (ref 95–107)
CHP ED QC CHECK: YES
CK MB: 1.6 NG/ML (ref 0–3.8)
CLARITY: CLEAR
CO2: 23 MEQ/L (ref 20–31)
COCAINE METABOLITE SCREEN URINE: ABNORMAL
COLOR: YELLOW
CREAT SERPL-MCNC: 1.35 MG/DL (ref 0.5–0.9)
CREATINE KINASE-MB INDEX: 0.8 % (ref 0–3.5)
EKG ATRIAL RATE: 75 BPM
EKG P AXIS: 35 DEGREES
EKG P-R INTERVAL: 146 MS
EKG Q-T INTERVAL: 390 MS
EKG QRS DURATION: 78 MS
EKG QTC CALCULATION (BAZETT): 435 MS
EKG R AXIS: 11 DEGREES
EKG T AXIS: 4 DEGREES
EKG VENTRICULAR RATE: 75 BPM
EOSINOPHILS ABSOLUTE: 0.3 K/UL (ref 0–0.7)
EOSINOPHILS RELATIVE PERCENT: 4.2 %
EPITHELIAL CELLS, UA: ABNORMAL /HPF (ref 0–5)
GFR AFRICAN AMERICAN: 50.6
GFR NON-AFRICAN AMERICAN: 41.8
GLOBULIN: 3.2 G/DL (ref 2.3–3.5)
GLUCOSE BLD-MCNC: 94 MG/DL (ref 70–99)
GLUCOSE URINE: NEGATIVE MG/DL
HCT VFR BLD CALC: 40.6 % (ref 37–47)
HEMOGLOBIN: 14 G/DL (ref 12–16)
HYALINE CASTS: ABNORMAL /HPF (ref 0–5)
KETONES, URINE: NEGATIVE MG/DL
LEUKOCYTE ESTERASE, URINE: ABNORMAL
LYMPHOCYTES ABSOLUTE: 1.3 K/UL (ref 1–4.8)
LYMPHOCYTES RELATIVE PERCENT: 21.4 %
Lab: ABNORMAL
MAGNESIUM: 2 MG/DL (ref 1.7–2.4)
MCH RBC QN AUTO: 33.3 PG (ref 27–31.3)
MCHC RBC AUTO-ENTMCNC: 34.3 % (ref 33–37)
MCV RBC AUTO: 96.9 FL (ref 82–100)
MONOCYTES ABSOLUTE: 0.5 K/UL (ref 0.2–0.8)
MONOCYTES RELATIVE PERCENT: 8 %
NEUTROPHILS ABSOLUTE: 4.1 K/UL (ref 1.4–6.5)
NEUTROPHILS RELATIVE PERCENT: 65.5 %
NITRITE, URINE: NEGATIVE
OPIATE SCREEN URINE: ABNORMAL
PDW BLD-RTO: 14.6 % (ref 11.5–14.5)
PH UA: 5.5 (ref 5–9)
PHENCYCLIDINE SCREEN URINE: ABNORMAL
PLATELET # BLD: 210 K/UL (ref 130–400)
POTASSIUM SERPL-SCNC: 4.2 MEQ/L (ref 3.4–4.9)
PREGNANCY TEST URINE, POC: NEGATIVE
PROTEIN UA: NEGATIVE MG/DL
RBC # BLD: 4.19 M/UL (ref 4.2–5.4)
RBC UA: ABNORMAL /HPF (ref 0–5)
SODIUM BLD-SCNC: 140 MEQ/L (ref 135–144)
SPECIFIC GRAVITY UA: 1.02 (ref 1–1.03)
TOTAL CK: 211 U/L (ref 0–170)
TOTAL PROTEIN: 6.7 G/DL (ref 6.3–8)
TROPONIN: <0.01 NG/ML (ref 0–0.01)
URINE REFLEX TO CULTURE: YES
UROBILINOGEN, URINE: 0.2 E.U./DL
WBC # BLD: 6.3 K/UL (ref 4.8–10.8)
WBC UA: ABNORMAL /HPF (ref 0–5)

## 2019-05-07 PROCEDURE — 81001 URINALYSIS AUTO W/SCOPE: CPT

## 2019-05-07 PROCEDURE — 6370000000 HC RX 637 (ALT 250 FOR IP): Performed by: PHYSICIAN ASSISTANT

## 2019-05-07 PROCEDURE — 83735 ASSAY OF MAGNESIUM: CPT

## 2019-05-07 PROCEDURE — 36415 COLL VENOUS BLD VENIPUNCTURE: CPT

## 2019-05-07 PROCEDURE — 6360000002 HC RX W HCPCS: Performed by: PHYSICIAN ASSISTANT

## 2019-05-07 PROCEDURE — 96375 TX/PRO/DX INJ NEW DRUG ADDON: CPT

## 2019-05-07 PROCEDURE — 94640 AIRWAY INHALATION TREATMENT: CPT

## 2019-05-07 PROCEDURE — 80053 COMPREHEN METABOLIC PANEL: CPT

## 2019-05-07 PROCEDURE — 87086 URINE CULTURE/COLONY COUNT: CPT

## 2019-05-07 PROCEDURE — 85025 COMPLETE CBC W/AUTO DIFF WBC: CPT

## 2019-05-07 PROCEDURE — 93005 ELECTROCARDIOGRAM TRACING: CPT

## 2019-05-07 PROCEDURE — 99285 EMERGENCY DEPT VISIT HI MDM: CPT

## 2019-05-07 PROCEDURE — 82553 CREATINE MB FRACTION: CPT

## 2019-05-07 PROCEDURE — 73562 X-RAY EXAM OF KNEE 3: CPT

## 2019-05-07 PROCEDURE — 93971 EXTREMITY STUDY: CPT

## 2019-05-07 PROCEDURE — 82550 ASSAY OF CK (CPK): CPT

## 2019-05-07 PROCEDURE — 80307 DRUG TEST PRSMV CHEM ANLYZR: CPT

## 2019-05-07 PROCEDURE — 71046 X-RAY EXAM CHEST 2 VIEWS: CPT

## 2019-05-07 PROCEDURE — 84484 ASSAY OF TROPONIN QUANT: CPT

## 2019-05-07 PROCEDURE — 96374 THER/PROPH/DIAG INJ IV PUSH: CPT

## 2019-05-07 RX ORDER — METHOTREXATE SODIUM 1 G/1
25 INJECTION, POWDER, LYOPHILIZED, FOR SOLUTION INTRA-ARTERIAL; INTRAMUSCULAR; INTRATHECAL; INTRAVENOUS ONCE
COMMUNITY
End: 2019-08-22

## 2019-05-07 RX ORDER — METHOCARBAMOL 750 MG/1
750 TABLET, FILM COATED ORAL 4 TIMES DAILY
COMMUNITY
End: 2020-09-28 | Stop reason: ALTCHOICE

## 2019-05-07 RX ORDER — SODIUM CHLORIDE 0.9 % (FLUSH) 0.9 %
3 SYRINGE (ML) INJECTION EVERY 8 HOURS
Status: DISCONTINUED | OUTPATIENT
Start: 2019-05-07 | End: 2019-05-07 | Stop reason: HOSPADM

## 2019-05-07 RX ORDER — ORPHENADRINE CITRATE 30 MG/ML
60 INJECTION INTRAMUSCULAR; INTRAVENOUS ONCE
Status: DISCONTINUED | OUTPATIENT
Start: 2019-05-07 | End: 2019-05-07

## 2019-05-07 RX ORDER — CYCLOBENZAPRINE HCL 10 MG
10 TABLET ORAL ONCE
Status: COMPLETED | OUTPATIENT
Start: 2019-05-07 | End: 2019-05-07

## 2019-05-07 RX ORDER — OXYCODONE HYDROCHLORIDE AND ACETAMINOPHEN 5; 325 MG/1; MG/1
1 TABLET ORAL EVERY 4 HOURS PRN
Status: ON HOLD | COMMUNITY
End: 2019-05-19

## 2019-05-07 RX ORDER — MORPHINE SULFATE 2 MG/ML
4 INJECTION, SOLUTION INTRAMUSCULAR; INTRAVENOUS ONCE
Status: COMPLETED | OUTPATIENT
Start: 2019-05-07 | End: 2019-05-07

## 2019-05-07 RX ORDER — FENTANYL CITRATE 50 UG/ML
100 INJECTION, SOLUTION INTRAMUSCULAR; INTRAVENOUS ONCE
Status: COMPLETED | OUTPATIENT
Start: 2019-05-07 | End: 2019-05-07

## 2019-05-07 RX ORDER — METHYLPREDNISOLONE SODIUM SUCCINATE 125 MG/2ML
125 INJECTION, POWDER, LYOPHILIZED, FOR SOLUTION INTRAMUSCULAR; INTRAVENOUS ONCE
Status: COMPLETED | OUTPATIENT
Start: 2019-05-07 | End: 2019-05-07

## 2019-05-07 RX ORDER — CYCLOBENZAPRINE HCL 10 MG
10 TABLET ORAL 3 TIMES DAILY PRN
Qty: 30 TABLET | Refills: 0 | Status: SHIPPED | OUTPATIENT
Start: 2019-05-07 | End: 2019-05-17

## 2019-05-07 RX ORDER — IPRATROPIUM BROMIDE AND ALBUTEROL SULFATE 2.5; .5 MG/3ML; MG/3ML
1 SOLUTION RESPIRATORY (INHALATION) CONTINUOUS PRN
Status: DISCONTINUED | OUTPATIENT
Start: 2019-05-07 | End: 2019-05-07 | Stop reason: HOSPADM

## 2019-05-07 RX ADMIN — MORPHINE SULFATE 4 MG: 2 INJECTION, SOLUTION INTRAMUSCULAR; INTRAVENOUS at 06:50

## 2019-05-07 RX ADMIN — IPRATROPIUM BROMIDE AND ALBUTEROL SULFATE 1 AMPULE: .5; 3 SOLUTION RESPIRATORY (INHALATION) at 05:33

## 2019-05-07 RX ADMIN — METHYLPREDNISOLONE SODIUM SUCCINATE 125 MG: 125 INJECTION, POWDER, FOR SOLUTION INTRAMUSCULAR; INTRAVENOUS at 05:57

## 2019-05-07 RX ADMIN — FENTANYL CITRATE 100 MCG: 50 INJECTION, SOLUTION INTRAMUSCULAR; INTRAVENOUS at 05:57

## 2019-05-07 RX ADMIN — CYCLOBENZAPRINE HYDROCHLORIDE 10 MG: 10 TABLET, FILM COATED ORAL at 08:46

## 2019-05-07 ASSESSMENT — ENCOUNTER SYMPTOMS
ABDOMINAL DISTENTION: 0
EYE DISCHARGE: 0
VOICE CHANGE: 0
VOMITING: 0
SHORTNESS OF BREATH: 1
ANAL BLEEDING: 0
APNEA: 0
PHOTOPHOBIA: 0
COUGH: 1
NAUSEA: 0

## 2019-05-07 ASSESSMENT — PAIN DESCRIPTION - ORIENTATION: ORIENTATION: MID

## 2019-05-07 ASSESSMENT — PAIN SCALES - GENERAL
PAINLEVEL_OUTOF10: 9
PAINLEVEL_OUTOF10: 8
PAINLEVEL_OUTOF10: 6
PAINLEVEL_OUTOF10: 10

## 2019-05-07 ASSESSMENT — PAIN DESCRIPTION - PAIN TYPE: TYPE: ACUTE PAIN

## 2019-05-07 ASSESSMENT — PAIN DESCRIPTION - LOCATION: LOCATION: CHEST

## 2019-05-07 NOTE — ED PROVIDER NOTES
[address] [facility] ED  eMERGENCY dEPARTMENT eNCOUnter      Pt Name: Val Burgos  MRN: [id number]  [place] 1970  Date of evaluation: 5/7/2019  Provider: Vidal Puente PA-C    CHIEF COMPLAINT       Chief Complaint   Patient presents with    Chest Pain     x 30 minutes         HISTORY OF PRESENT ILLNESS   (Location/Symptom, Timing/Onset,Context/Setting, Quality, Duration, Modifying Factors, Severity)  Note limiting factors. Val Burgos is a 51 y.o. female who presents to the emergency department   She presents with chest pain shortness of breath on inspiration. Pt states this is the same pain that she has all the time related to her sarcoidosis, just not resolving. She also complained of Rt leg pain and swelling, x 2 days with no acute injury. Pt sts she has been doing well till this morning. She has sarcoidosis. She denies fever/chills/n/v/d/rectal bleeding/ history of blood clot/history of pe/ history of stent. Symptoms moderate in severity, nothing improves her symptoms inspiration and deep breath worsen symptoms. HPI    NursingNotes were reviewed. REVIEW OF SYSTEMS    (2-9 systems for level 4, 10 or more for level 5)     Review of Systems   Constitutional: Negative for activity change, appetite change, fever and unexpected weight change. HENT: Negative for ear discharge, nosebleeds and voice change. Eyes: Negative for photophobia and discharge. Respiratory: Positive for cough and shortness of breath. Negative for apnea. Cardiovascular: Positive for chest pain and leg swelling. Gastrointestinal: Negative for abdominal distention, anal bleeding, nausea and vomiting. Endocrine: Negative for cold intolerance, heat intolerance and polyphagia. Genitourinary: Negative for hematuria. Musculoskeletal: Negative for joint swelling. Skin: Negative for pallor. Allergic/Immunologic: Negative for immunocompromised state.    Neurological: Negative for seizures and facial asymmetry. Hematological: Does not bruise/bleed easily. Psychiatric/Behavioral: Negative for behavioral problems, self-injury and sleep disturbance. All other systems reviewed and are negative. Except as noted above the remainder of the review of systems was reviewed and negative. PAST MEDICAL HISTORY     Past Medical History:   Diagnosis Date    Anxiety     Arthritis     Asthma     Bronchopneumonia     Cancer ([address]     renal    Cerebral artery occlusion with cerebral infarction ([address]     Chronic bilateral low back pain with sciatica     Chronic kidney disease     Depression     Hypertension     Insulin dependent type 2 diabetes mellitus, uncontrolled ([address] 8/3/2018    Mixed headache     Pure hyperglyceridemia 5/19/2017    Sarcoidosis     Sleep apnea     does not wear cpap    Thyroid goiter          SURGICALHISTORY       Past Surgical History:   Procedure Laterality Date    BRONCHOSCOPY  10/26/2018    DR. STEARNS    KIDNEY REMOVAL Right 08/2016    THYROID LOBECTOMY Right 6/13/14    THYROIDECTOMY  02/21/2019    DR. MAGDALENO    URETER STENT PLACEMENT Left 08/2016         CURRENT MEDICATIONS       Discharge Medication List as of 5/7/2019  8:33 AM      CONTINUE these medications which have NOT CHANGED    Details   oxyCODONE-acetaminophen (PERCOCET) 5-325 MG per tablet Take 1 tablet by mouth every 4 hours as needed for Pain. Historical Med      fluticasone-salmeterol (ADVAIR) 250-50 MCG/DOSE AEPB Inhale 1 puff into the lungs 2 times dailyHistorical Med      methocarbamol (ROBAXIN) 750 MG tablet Take 750 mg by mouth 4 times dailyHistorical Med      methotrexate (RHEUMATREX) 1 g chemo injection Infuse 25 mg intravenously once Indications: 0.4 ml SQ every MondayHistorical Med      predniSONE (DELTASONE) 10 MG tablet 4 daily for 4 days, 3 daily for 4 days, 2 daily for 4 days, then one and a half (15 mg) daily until follow-up, Disp-50 tablet, R-0Normal      cetirizine (ZYRTEC) 10 MG tablet Take 1 qhs for allergies, Disp-30 tablet, R-3Normal      DULoxetine (CYMBALTA) 60 MG extended release capsule TAKE 1 CAPSULE BY MOUTH DAILY, Disp-30 capsule, R-2Normal      insulin glargine (LANTUS SOLOSTAR) 100 UNIT/ML injection pen Inject 25 Units into the skin nightlyHistorical Med      folic acid (FOLVITE) 1 MG tablet Take 1 tablet by mouth daily, Disp-30 tablet, R-3Normal      busPIRone (BUSPAR) 10 MG tablet Take 1/2 tab bid wf x 1 week, then 1 tab bid, Disp-60 tablet, R-2Normal      levothyroxine (SYNTHROID) 75 MCG tablet Take 75 mcg by mouth DailyHistorical Med      butalbital-acetaminophen-caffeine (FIORICET, ESGIC) -40 MG per tablet TAKE 1 TABLET BY MOUTH THREE TIMES DAILY AS NEEDED FOR HEADACHES, Disp-21 tablet, R-0Normal      Insulin Pen Needle (B-D ULTRAFINE III SHORT PEN) 31G X 8 MM MISC 3 TIMES DAILY Starting Sat 1/5/2019, Until Mon 2/4/2019, For 30 days, Disp-100 each, R-5, Normal      Insulin Syringe-Needle U-100 30G X 1/2\" 1 ML MISC DAILY Starting Fri 11/16/2018, Disp-100 each, R-3, Normal      insulin lispro (ADMELOG) 100 UNIT/ML injection vial Inject 4 Units into the skin 3 times daily as needed for High Blood Sugar, Disp-1 vial, [id number];replaces novologNormal      gabapentin (NEURONTIN) 400 MG capsule Take 1 capsule by mouth 3 times daily for 90 days. ., Disp-90 capsule, R-2Normal      !! blood glucose test strips (ONETOUCH VERIO) strip Disp-300 each, R-3, NormalTEST 3 TIMES DAILY AS NEEDED      Blood Glucose Monitoring Suppl (ONETOUCH VERIO) w/Device KIT TEST 3 TIMES DAILY AS NEEDED, Disp-1 kit, R-0Normal      ONE TOUCH ULTRASOFT LANCETS MISC Disp-300 each, R-3, NormalTEST 3 TIMES DAILY AS NEEDED      !! blood glucose test strips (EXACTECH TEST) strip 3 TIMES DAILY Starting Mon 8/13/2018, Disp-300 strip, R-5, Normal(Accu-check test strips) As needed.  DX:E11.65      montelukast (SINGULAIR) 10 MG tablet Take 1 tab nightly at supper for breathing/allergies, Disp-30 tablet, violence:     Fear of current or ex partner: None     Emotionally abused: None     Physically abused: None     Forced sexual activity: None   Other Topics Concern    None   Social History Narrative    None       SCREENINGS      @FLOW([id number])@      PHYSICAL EXAM    (up to 7 for level 4, 8 or more for level 5)     ED Triage Vitals   BP Temp Temp Source Pulse Resp SpO2 Height Weight   05/07/19 0434 05/07/19 0432 05/07/19 0432 05/07/19 0432 05/07/19 0432 05/07/19 0432 05/07/19 0432 05/07/19 0432   132/60 98.3 °F (36.8 °C) Oral 76 18 97 % 5' 3\" (1.6 m) 210 lb (95.3 kg)       Physical Exam   Constitutional: She is oriented to person, place, and time. She appears well-developed and well-nourished. No distress. HENT:   Head: Normocephalic and atraumatic. Mouth/Throat: No oropharyngeal exudate. Eyes: Pupils are equal, round, and reactive to light. Conjunctivae and EOM are normal. Right eye exhibits no discharge. Left eye exhibits no discharge. Neck: Normal range of motion. Neck supple. Cardiovascular: Normal rate, regular rhythm, normal heart sounds and intact distal pulses. Pulmonary/Chest: Effort normal. No stridor. She exhibits tenderness. dminished breath sounds bilaterally  + anterior chest wall tender       Abdominal: Soft. Bowel sounds are normal. She exhibits no distension. There is no tenderness. Musculoskeletal: Normal range of motion. She exhibits tenderness. Rt posterior calf tender   Neurological: She is alert and oriented to person, place, and time. Skin: Skin is warm. No erythema. Psychiatric: She has a normal mood and affect. Nursing note and vitals reviewed.       DIAGNOSTIC RESULTS     EKG: All EKG's are interpreted by the Emergency Department Physician who either signs or Co-signsthis chart in the absence of a cardiologist.    ekg nsr rate 75 negative st elevation    RADIOLOGY:   Non-plain filmimages such as CT, Ultrasound and MRI are read by the radiologist. Plain radiographic images are visualized and preliminarily interpreted by the emergency physician with the below findings:    cxr nad    X-ray of the right knee shows chronic degenerative changes but no acute fracture or subluxation    Ultrasound of right lower extremity is negative for DVT. Interpretation per the Radiologist below, if available at the time ofthis note:    RADIOLOGY REPORT   Final Result      US DUP LOWER EXTREMITY RIGHT CESAR   Final Result      No sonographic evidence deep vein thrombosis right lower extremity, common femoral vein through popliteal vein. XR KNEE RIGHT (3 VIEWS)   Final Result      NO ACUTE RADIOGRAPHIC ABNORMALITY OF THE RIGHT KNEE.         XR CHEST STANDARD (2 VW)   Final Result   NO ACTIVE LUNG DISEASE. ED BEDSIDE ULTRASOUND:   Performed by ED Physician - none    LABS:  Labs Reviewed   COMPREHENSIVE METABOLIC PANEL - Abnormal; Notable for the following components:       Result Value    CREATININE 1.35 (*)     GFR Non- 41.8 (*)     GFR  50.6 (*)     All other components within normal limits   CBC WITH AUTO DIFFERENTIAL - Abnormal; Notable for the following components:    RBC 4.19 (*)     MCH 33.3 (*)     RDW 14.6 (*)     All other components within normal limits   URINE DRUG SCREEN - Abnormal; Notable for the following components:    Cannabinoid Scrn, Ur POSITIVE (*)     All other components within normal limits   URINE RT REFLEX TO CULTURE - Abnormal; Notable for the following components:    Leukocyte Esterase, Urine MODERATE (*)     All other components within normal limits   CK - Abnormal; Notable for the following components:     Total  (*)     All other components within normal limits   MICROSCOPIC URINALYSIS - Abnormal; Notable for the following components:    Bacteria, UA RARE (*)     All other components within normal limits   POCT URINE PREGNANCY - Normal   URINE CULTURE   TROPONIN   MAGNESIUM   CKMB & RELATIVE PERCENT All other labs were within normal range or not returned as of this dictation. EMERGENCY DEPARTMENT COURSE and DIFFERENTIAL DIAGNOSIS/MDM:   Vitals:    Vitals:    05/07/19 0535 05/07/19 0652 05/07/19 0817 05/07/19 0840   BP:  (!) 151/78 (!) 142/76 (!) 119/57   Pulse:  78 74 88   Resp: 20 17 16 18   Temp:       TempSrc:       SpO2: 98% 96% 96% 98%   Weight:       Height:                MDM  Number of Diagnoses or Management Options  Chest pain, unspecified type:   Hx of sarcoidosis:   Other chronic pain:   Diagnosis management comments: US/LAB pending. Pt sts she can have iv contrast Provided her renal function is adequate. Pt has history of nephrectomy. Will endorse care to Donal at 0600. At 6:40 AM I did reevaluate the patient she states that she had midsternal chest pain which she states she chronically has secondary to her sarcoidosis she states this is the same pain that she always has there is no change other than she cannot control at home. No cough no shortness of breath no nausea vomiting or dizziness. Patient also complained of right knee pain which she states started approximately 2 days ago with no acute injury. She did have an ultrasound ordered prior to my arrival and after evaluation of patient will and on x-ray as well. sHe states at this time she is currently active in pain management with Dr. Diana Murray, she does use Percocet for her pain control at home. Amount and/or Complexity of Data Reviewed  Clinical lab tests: reviewed and ordered  Tests in the radiology section of CPT®: ordered  Discuss the patient with other providers: yes        CRITICAL CARE TIME   Total Critical Care time was 0 minutes, excluding separately reportableprocedures. There was a high probability of clinicallysignificant/life threatening deterioration in the patient's condition which required my urgent intervention.      CONSULTS:  None    PROCEDURES:  Unless otherwise noted below, none

## 2019-05-07 NOTE — ED NOTES
Patient advised that urine sample was needed  Patient stated understanding      Cruz Malave RN  05/07/19 [id number]

## 2019-05-07 NOTE — ED TRIAGE NOTES
Pt states woken by chest pain with SOB and dizzinesd dizziness aprox 30 minutes PTA. Pt states no pain medication taken PTA.

## 2019-05-07 NOTE — ED NOTES
Pt states she does not feel her pain medication working. Pt states her saline lock was \"leaking\". Pt states \"I should feel that medicine working by now, you need to tell the doctor\".   Rodney ALVAREZ notified     Rain Jack RN  05/07/19 6521

## 2019-05-07 NOTE — ED NOTES
Pt advised that urine sample is needed. Pt and significant other at bedside very rude to staff, stating \"i'm in so much pain. What's up with my pain meds? Where's my pain meds at?\".   Pt to bathroom via wheelchair with significant other     Rene Wolf RN  05/07/19 [id number]

## 2019-05-07 NOTE — ED NOTES
Patient back from radiology update on plan of care family at bedside.       Liseth Price RN  05/07/19 2217

## 2019-05-08 LAB — URINE CULTURE, ROUTINE: NORMAL

## 2019-05-09 PROCEDURE — 93010 ELECTROCARDIOGRAM REPORT: CPT | Performed by: INTERNAL MEDICINE

## 2019-05-14 DIAGNOSIS — R13.10 DYSPHAGIA, UNSPECIFIED TYPE: Primary | ICD-10-CM

## 2019-05-15 LAB — DIABETIC RETINOPATHY: NEGATIVE

## 2019-05-18 ENCOUNTER — HOSPITAL ENCOUNTER (OUTPATIENT)
Age: 49
Setting detail: OBSERVATION
Discharge: HOME | End: 2019-05-20
Attending: EMERGENCY MEDICINE | Admitting: INTERNAL MEDICINE
Payer: MEDICAID

## 2019-05-18 DIAGNOSIS — G58.8 INTERCOSTAL NEURALGIA: ICD-10-CM

## 2019-05-18 DIAGNOSIS — N18.9 CHRONIC KIDNEY DISEASE, UNSPECIFIED CKD STAGE: ICD-10-CM

## 2019-05-18 DIAGNOSIS — R07.89 CHEST PAIN, ATYPICAL: ICD-10-CM

## 2019-05-18 DIAGNOSIS — M94.0 COSTOCHONDRITIS: ICD-10-CM

## 2019-05-18 DIAGNOSIS — D86.9 SARCOIDOSIS: ICD-10-CM

## 2019-05-18 DIAGNOSIS — R07.9 CHEST PAIN, UNSPECIFIED TYPE: Primary | ICD-10-CM

## 2019-05-18 DIAGNOSIS — D86.2 SARCOIDOSIS OF LUNG WITH SARCOIDOSIS OF LYMPH NODES (HCC): ICD-10-CM

## 2019-05-18 LAB
BASOPHILS ABSOLUTE: 0.1 K/UL (ref 0–0.2)
BASOPHILS RELATIVE PERCENT: 1.1 %
EKG ATRIAL RATE: 86 BPM
EKG P AXIS: 45 DEGREES
EKG P-R INTERVAL: 158 MS
EKG Q-T INTERVAL: 358 MS
EKG QRS DURATION: 76 MS
EKG QTC CALCULATION (BAZETT): 428 MS
EKG R AXIS: 12 DEGREES
EKG T AXIS: 6 DEGREES
EKG VENTRICULAR RATE: 86 BPM
EOSINOPHILS ABSOLUTE: 0.2 K/UL (ref 0–0.7)
EOSINOPHILS RELATIVE PERCENT: 2.1 %
HCT VFR BLD CALC: 41.9 % (ref 37–47)
HEMOGLOBIN: 14.3 G/DL (ref 12–16)
LYMPHOCYTES ABSOLUTE: 1.7 K/UL (ref 1–4.8)
LYMPHOCYTES RELATIVE PERCENT: 20 %
MCH RBC QN AUTO: 34 PG (ref 27–31.3)
MCHC RBC AUTO-ENTMCNC: 34.2 % (ref 33–37)
MCV RBC AUTO: 99.6 FL (ref 82–100)
MONOCYTES ABSOLUTE: 0.5 K/UL (ref 0.2–0.8)
MONOCYTES RELATIVE PERCENT: 6 %
NEUTROPHILS ABSOLUTE: 6.1 K/UL (ref 1.4–6.5)
NEUTROPHILS RELATIVE PERCENT: 70.8 %
PDW BLD-RTO: 14.4 % (ref 11.5–14.5)
PLATELET # BLD: 274 K/UL (ref 130–400)
RBC # BLD: 4.21 M/UL (ref 4.2–5.4)
WBC # BLD: 8.7 K/UL (ref 4.8–10.8)

## 2019-05-18 PROCEDURE — 85025 COMPLETE CBC W/AUTO DIFF WBC: CPT

## 2019-05-18 PROCEDURE — 6360000002 HC RX W HCPCS: Performed by: EMERGENCY MEDICINE

## 2019-05-18 PROCEDURE — 93005 ELECTROCARDIOGRAM TRACING: CPT

## 2019-05-18 PROCEDURE — 83880 ASSAY OF NATRIURETIC PEPTIDE: CPT

## 2019-05-18 PROCEDURE — 99285 EMERGENCY DEPT VISIT HI MDM: CPT

## 2019-05-18 PROCEDURE — 80053 COMPREHEN METABOLIC PANEL: CPT

## 2019-05-18 PROCEDURE — 84484 ASSAY OF TROPONIN QUANT: CPT

## 2019-05-18 PROCEDURE — 6370000000 HC RX 637 (ALT 250 FOR IP): Performed by: EMERGENCY MEDICINE

## 2019-05-18 PROCEDURE — 36415 COLL VENOUS BLD VENIPUNCTURE: CPT

## 2019-05-18 PROCEDURE — 96374 THER/PROPH/DIAG INJ IV PUSH: CPT

## 2019-05-18 PROCEDURE — 85379 FIBRIN DEGRADATION QUANT: CPT

## 2019-05-18 RX ORDER — ASPIRIN 81 MG/1
324 TABLET, CHEWABLE ORAL ONCE
Status: COMPLETED | OUTPATIENT
Start: 2019-05-18 | End: 2019-05-18

## 2019-05-18 RX ORDER — MORPHINE SULFATE 2 MG/ML
2 INJECTION, SOLUTION INTRAMUSCULAR; INTRAVENOUS ONCE
Status: COMPLETED | OUTPATIENT
Start: 2019-05-18 | End: 2019-05-18

## 2019-05-18 RX ADMIN — ASPIRIN 81 MG 324 MG: 81 TABLET ORAL at 23:47

## 2019-05-18 RX ADMIN — MORPHINE SULFATE 2 MG: 2 INJECTION, SOLUTION INTRAMUSCULAR; INTRAVENOUS at 23:47

## 2019-05-18 ASSESSMENT — ENCOUNTER SYMPTOMS
ABDOMINAL PAIN: 0
SHORTNESS OF BREATH: 1
COUGH: 1
BACK PAIN: 0

## 2019-05-18 ASSESSMENT — PAIN DESCRIPTION - LOCATION: LOCATION: CHEST

## 2019-05-18 ASSESSMENT — PAIN SCALES - GENERAL
PAINLEVEL_OUTOF10: 9
PAINLEVEL_OUTOF10: 9

## 2019-05-18 ASSESSMENT — PAIN DESCRIPTION - DESCRIPTORS: DESCRIPTORS: PRESSURE

## 2019-05-18 ASSESSMENT — PAIN DESCRIPTION - PAIN TYPE: TYPE: ACUTE PAIN

## 2019-05-18 ASSESSMENT — PAIN DESCRIPTION - ORIENTATION: ORIENTATION: MID

## 2019-05-19 ENCOUNTER — APPOINTMENT (OUTPATIENT)
Dept: GENERAL RADIOLOGY | Age: 49
End: 2019-05-19
Payer: MEDICAID

## 2019-05-19 PROBLEM — M94.0 COSTOCHONDRITIS: Status: ACTIVE | Noted: 2019-05-19

## 2019-05-19 PROBLEM — R07.89 CHEST PAIN, ATYPICAL: Status: ACTIVE | Noted: 2019-05-19

## 2019-05-19 PROBLEM — G58.8 INTERCOSTAL NEURALGIA: Status: ACTIVE | Noted: 2019-05-19

## 2019-05-19 PROBLEM — Z85.528 HISTORY OF RENAL CARCINOMA: Status: ACTIVE | Noted: 2019-05-19

## 2019-05-19 LAB
ALBUMIN SERPL-MCNC: 3.7 G/DL (ref 3.5–4.6)
ALP BLD-CCNC: 104 U/L (ref 40–130)
ALT SERPL-CCNC: 14 U/L (ref 0–33)
ANION GAP SERPL CALCULATED.3IONS-SCNC: 12 MEQ/L (ref 9–15)
ANION GAP SERPL CALCULATED.3IONS-SCNC: 9 MEQ/L (ref 9–15)
AST SERPL-CCNC: 14 U/L (ref 0–35)
BASOPHILS ABSOLUTE: 0.1 K/UL (ref 0–0.2)
BASOPHILS RELATIVE PERCENT: 1.5 %
BILIRUB SERPL-MCNC: <0.2 MG/DL (ref 0.2–0.7)
BUN BLDV-MCNC: 13 MG/DL (ref 6–20)
BUN BLDV-MCNC: 13 MG/DL (ref 6–20)
CALCIUM SERPL-MCNC: 8.5 MG/DL (ref 8.5–9.9)
CALCIUM SERPL-MCNC: 8.9 MG/DL (ref 8.5–9.9)
CHLORIDE BLD-SCNC: 104 MEQ/L (ref 95–107)
CHLORIDE BLD-SCNC: 108 MEQ/L (ref 95–107)
CK MB: 1.7 NG/ML (ref 0–3.8)
CK MB: 2.3 NG/ML (ref 0–3.8)
CO2: 22 MEQ/L (ref 20–31)
CO2: 24 MEQ/L (ref 20–31)
CREAT SERPL-MCNC: 1.35 MG/DL (ref 0.5–0.9)
CREAT SERPL-MCNC: 1.45 MG/DL (ref 0.5–0.9)
CREATINE KINASE-MB INDEX: 0.7 % (ref 0–3.5)
CREATINE KINASE-MB INDEX: 0.9 % (ref 0–3.5)
D DIMER: 0.52 MG/L FEU (ref 0–0.5)
EOSINOPHILS ABSOLUTE: 0.2 K/UL (ref 0–0.7)
EOSINOPHILS RELATIVE PERCENT: 2.7 %
GFR AFRICAN AMERICAN: 46.6
GFR AFRICAN AMERICAN: 50.6
GFR NON-AFRICAN AMERICAN: 38.5
GFR NON-AFRICAN AMERICAN: 41.8
GLOBULIN: 3 G/DL (ref 2.3–3.5)
GLUCOSE BLD-MCNC: 137 MG/DL (ref 60–115)
GLUCOSE BLD-MCNC: 137 MG/DL (ref 70–99)
GLUCOSE BLD-MCNC: 138 MG/DL (ref 70–99)
GLUCOSE BLD-MCNC: 158 MG/DL (ref 60–115)
GLUCOSE BLD-MCNC: 161 MG/DL (ref 60–115)
GLUCOSE BLD-MCNC: 183 MG/DL (ref 60–115)
GLUCOSE BLD-MCNC: 189 MG/DL (ref 60–115)
HBA1C MFR BLD: 6.3 % (ref 4.8–5.9)
HCT VFR BLD CALC: 40.5 % (ref 37–47)
HEMOGLOBIN: 13.6 G/DL (ref 12–16)
LYMPHOCYTES ABSOLUTE: 1.8 K/UL (ref 1–4.8)
LYMPHOCYTES RELATIVE PERCENT: 21 %
MCH RBC QN AUTO: 33.4 PG (ref 27–31.3)
MCHC RBC AUTO-ENTMCNC: 33.6 % (ref 33–37)
MCV RBC AUTO: 99.6 FL (ref 82–100)
MONOCYTES ABSOLUTE: 0.7 K/UL (ref 0.2–0.8)
MONOCYTES RELATIVE PERCENT: 7.6 %
NEUTROPHILS ABSOLUTE: 5.7 K/UL (ref 1.4–6.5)
NEUTROPHILS RELATIVE PERCENT: 67.2 %
PDW BLD-RTO: 14.7 % (ref 11.5–14.5)
PERFORMED ON: ABNORMAL
PLATELET # BLD: 290 K/UL (ref 130–400)
POTASSIUM REFLEX MAGNESIUM: 3.6 MEQ/L (ref 3.4–4.9)
POTASSIUM SERPL-SCNC: 3.7 MEQ/L (ref 3.4–4.9)
PRO-BNP: 9 PG/ML
RBC # BLD: 4.07 M/UL (ref 4.2–5.4)
SODIUM BLD-SCNC: 139 MEQ/L (ref 135–144)
SODIUM BLD-SCNC: 140 MEQ/L (ref 135–144)
TOTAL CK: 232 U/L (ref 0–170)
TOTAL CK: 266 U/L (ref 0–170)
TOTAL PROTEIN: 6.7 G/DL (ref 6.3–8)
TROPONIN: <0.01 NG/ML (ref 0–0.01)
WBC # BLD: 8.5 K/UL (ref 4.8–10.8)

## 2019-05-19 PROCEDURE — 94760 N-INVAS EAR/PLS OXIMETRY 1: CPT

## 2019-05-19 PROCEDURE — 6370000000 HC RX 637 (ALT 250 FOR IP): Performed by: INTERNAL MEDICINE

## 2019-05-19 PROCEDURE — 83036 HEMOGLOBIN GLYCOSYLATED A1C: CPT

## 2019-05-19 PROCEDURE — 6370000000 HC RX 637 (ALT 250 FOR IP): Performed by: ANESTHESIOLOGY

## 2019-05-19 PROCEDURE — 94667 MNPJ CHEST WALL 1ST: CPT

## 2019-05-19 PROCEDURE — 71046 X-RAY EXAM CHEST 2 VIEWS: CPT

## 2019-05-19 PROCEDURE — 82550 ASSAY OF CK (CPK): CPT

## 2019-05-19 PROCEDURE — 96372 THER/PROPH/DIAG INJ SC/IM: CPT

## 2019-05-19 PROCEDURE — 94150 VITAL CAPACITY TEST: CPT

## 2019-05-19 PROCEDURE — 99223 1ST HOSP IP/OBS HIGH 75: CPT | Performed by: INTERNAL MEDICINE

## 2019-05-19 PROCEDURE — 80048 BASIC METABOLIC PNL TOTAL CA: CPT

## 2019-05-19 PROCEDURE — 82553 CREATINE MB FRACTION: CPT

## 2019-05-19 PROCEDURE — 85025 COMPLETE CBC W/AUTO DIFF WBC: CPT

## 2019-05-19 PROCEDURE — G0378 HOSPITAL OBSERVATION PER HR: HCPCS

## 2019-05-19 PROCEDURE — 84484 ASSAY OF TROPONIN QUANT: CPT

## 2019-05-19 PROCEDURE — 6360000002 HC RX W HCPCS: Performed by: INTERNAL MEDICINE

## 2019-05-19 PROCEDURE — 94640 AIRWAY INHALATION TREATMENT: CPT

## 2019-05-19 PROCEDURE — 94664 DEMO&/EVAL PT USE INHALER: CPT

## 2019-05-19 PROCEDURE — 99219 PR INITIAL OBSERVATION CARE/DAY 50 MINUTES: CPT | Performed by: INTERNAL MEDICINE

## 2019-05-19 PROCEDURE — 36415 COLL VENOUS BLD VENIPUNCTURE: CPT

## 2019-05-19 RX ORDER — LIDOCAINE 4 G/G
3 PATCH TOPICAL DAILY
Status: DISCONTINUED | OUTPATIENT
Start: 2019-05-19 | End: 2019-05-20 | Stop reason: HOSPADM

## 2019-05-19 RX ORDER — NICOTINE POLACRILEX 4 MG
15 LOZENGE BUCCAL PRN
Status: DISCONTINUED | OUTPATIENT
Start: 2019-05-19 | End: 2019-05-20 | Stop reason: HOSPADM

## 2019-05-19 RX ORDER — NITROGLYCERIN 0.4 MG/1
0.4 TABLET SUBLINGUAL EVERY 5 MIN PRN
Status: DISCONTINUED | OUTPATIENT
Start: 2019-05-19 | End: 2019-05-20 | Stop reason: HOSPADM

## 2019-05-19 RX ORDER — MONTELUKAST SODIUM 10 MG/1
10 TABLET ORAL NIGHTLY
Status: DISCONTINUED | OUTPATIENT
Start: 2019-05-19 | End: 2019-05-20 | Stop reason: HOSPADM

## 2019-05-19 RX ORDER — SULFAMETHOXAZOLE AND TRIMETHOPRIM 800; 160 MG/1; MG/1
1 TABLET ORAL EVERY 12 HOURS SCHEDULED
Status: DISCONTINUED | OUTPATIENT
Start: 2019-05-19 | End: 2019-05-20 | Stop reason: HOSPADM

## 2019-05-19 RX ORDER — BUTALBITAL, ACETAMINOPHEN AND CAFFEINE 300; 40; 50 MG/1; MG/1; MG/1
1 CAPSULE ORAL EVERY 6 HOURS PRN
Status: DISCONTINUED | OUTPATIENT
Start: 2019-05-19 | End: 2019-05-20 | Stop reason: HOSPADM

## 2019-05-19 RX ORDER — DEXTROSE MONOHYDRATE 50 MG/ML
100 INJECTION, SOLUTION INTRAVENOUS PRN
Status: DISCONTINUED | OUTPATIENT
Start: 2019-05-19 | End: 2019-05-20 | Stop reason: HOSPADM

## 2019-05-19 RX ORDER — GUAIFENESIN 600 MG/1
600 TABLET, EXTENDED RELEASE ORAL 2 TIMES DAILY
Status: DISCONTINUED | OUTPATIENT
Start: 2019-05-19 | End: 2019-05-20 | Stop reason: HOSPADM

## 2019-05-19 RX ORDER — DULOXETIN HYDROCHLORIDE 30 MG/1
30 CAPSULE, DELAYED RELEASE ORAL DAILY
Status: DISCONTINUED | OUTPATIENT
Start: 2019-05-19 | End: 2019-05-20 | Stop reason: HOSPADM

## 2019-05-19 RX ORDER — BUSPIRONE HYDROCHLORIDE 10 MG/1
10 TABLET ORAL DAILY
Status: DISCONTINUED | OUTPATIENT
Start: 2019-05-19 | End: 2019-05-20 | Stop reason: HOSPADM

## 2019-05-19 RX ORDER — LIDOCAINE 4 G/G
3 PATCH TOPICAL DAILY
Qty: 90 PATCH | Refills: 5 | Status: SHIPPED | OUTPATIENT
Start: 2019-05-19 | End: 2019-07-26

## 2019-05-19 RX ORDER — OXYCODONE HYDROCHLORIDE AND ACETAMINOPHEN 5; 325 MG/1; MG/1
1 TABLET ORAL EVERY 8 HOURS PRN
Status: DISCONTINUED | OUTPATIENT
Start: 2019-05-19 | End: 2019-05-19

## 2019-05-19 RX ORDER — PREDNISONE 20 MG/1
40 TABLET ORAL DAILY
Status: DISCONTINUED | OUTPATIENT
Start: 2019-05-19 | End: 2019-05-20 | Stop reason: HOSPADM

## 2019-05-19 RX ORDER — IPRATROPIUM BROMIDE AND ALBUTEROL SULFATE 2.5; .5 MG/3ML; MG/3ML
1 SOLUTION RESPIRATORY (INHALATION) EVERY 8 HOURS
Status: DISCONTINUED | OUTPATIENT
Start: 2019-05-19 | End: 2019-05-19

## 2019-05-19 RX ORDER — IPRATROPIUM BROMIDE AND ALBUTEROL SULFATE 2.5; .5 MG/3ML; MG/3ML
1 SOLUTION RESPIRATORY (INHALATION) EVERY 4 HOURS PRN
Status: DISCONTINUED | OUTPATIENT
Start: 2019-05-19 | End: 2019-05-20 | Stop reason: HOSPADM

## 2019-05-19 RX ORDER — FOLIC ACID 1 MG/1
1 TABLET ORAL DAILY
Status: DISCONTINUED | OUTPATIENT
Start: 2019-05-19 | End: 2019-05-20 | Stop reason: HOSPADM

## 2019-05-19 RX ORDER — CYCLOBENZAPRINE HCL 10 MG
10 TABLET ORAL 3 TIMES DAILY PRN
Status: DISCONTINUED | OUTPATIENT
Start: 2019-05-19 | End: 2019-05-20 | Stop reason: HOSPADM

## 2019-05-19 RX ORDER — ACETAMINOPHEN 325 MG/1
650 TABLET ORAL EVERY 6 HOURS PRN
Status: DISCONTINUED | OUTPATIENT
Start: 2019-05-19 | End: 2019-05-20 | Stop reason: HOSPADM

## 2019-05-19 RX ORDER — DEXTROSE MONOHYDRATE 25 G/50ML
12.5 INJECTION, SOLUTION INTRAVENOUS PRN
Status: DISCONTINUED | OUTPATIENT
Start: 2019-05-19 | End: 2019-05-20 | Stop reason: HOSPADM

## 2019-05-19 RX ORDER — BUDESONIDE 0.5 MG/2ML
0.5 INHALANT ORAL 2 TIMES DAILY
Status: DISCONTINUED | OUTPATIENT
Start: 2019-05-19 | End: 2019-05-20 | Stop reason: HOSPADM

## 2019-05-19 RX ORDER — TRAMADOL HYDROCHLORIDE 50 MG/1
50 TABLET ORAL EVERY 6 HOURS PRN
Qty: 20 TABLET | Refills: 0 | Status: SHIPPED | OUTPATIENT
Start: 2019-05-19 | End: 2019-05-24

## 2019-05-19 RX ORDER — INSULIN GLARGINE 100 [IU]/ML
25 INJECTION, SOLUTION SUBCUTANEOUS NIGHTLY
Status: DISCONTINUED | OUTPATIENT
Start: 2019-05-19 | End: 2019-05-20 | Stop reason: HOSPADM

## 2019-05-19 RX ORDER — ACETAMINOPHEN 325 MG/1
650 TABLET ORAL EVERY 6 HOURS PRN
Qty: 120 TABLET | Refills: 3 | Status: SHIPPED | OUTPATIENT
Start: 2019-05-19 | End: 2020-02-08

## 2019-05-19 RX ORDER — LEVOTHYROXINE SODIUM 0.07 MG/1
75 TABLET ORAL DAILY
Status: DISCONTINUED | OUTPATIENT
Start: 2019-05-19 | End: 2019-05-20 | Stop reason: HOSPADM

## 2019-05-19 RX ORDER — KETOROLAC TROMETHAMINE 15 MG/ML
15 INJECTION, SOLUTION INTRAMUSCULAR; INTRAVENOUS ONCE
Status: DISCONTINUED | OUTPATIENT
Start: 2019-05-19 | End: 2019-05-19

## 2019-05-19 RX ORDER — OXYCODONE HYDROCHLORIDE AND ACETAMINOPHEN 5; 325 MG/1; MG/1
1 TABLET ORAL EVERY 6 HOURS PRN
Status: DISCONTINUED | OUTPATIENT
Start: 2019-05-19 | End: 2019-05-20 | Stop reason: HOSPADM

## 2019-05-19 RX ORDER — METHOCARBAMOL 750 MG/1
750 TABLET, FILM COATED ORAL 4 TIMES DAILY
Status: DISCONTINUED | OUTPATIENT
Start: 2019-05-19 | End: 2019-05-20 | Stop reason: HOSPADM

## 2019-05-19 RX ADMIN — CYCLOBENZAPRINE HYDROCHLORIDE 10 MG: 10 TABLET, FILM COATED ORAL at 17:59

## 2019-05-19 RX ADMIN — LEVOTHYROXINE SODIUM 75 MCG: 75 TABLET ORAL at 06:19

## 2019-05-19 RX ADMIN — MONTELUKAST 10 MG: 10 TABLET, FILM COATED ORAL at 20:25

## 2019-05-19 RX ADMIN — ACETAMINOPHEN 650 MG: 325 TABLET ORAL at 08:44

## 2019-05-19 RX ADMIN — BUTALBITAL, ACETAMINOPHEN AND CAFFEINE 1 CAPSULE: 300; 40; 50 CAPSULE ORAL at 20:55

## 2019-05-19 RX ADMIN — OXYCODONE HYDROCHLORIDE AND ACETAMINOPHEN 1 TABLET: 5; 325 TABLET ORAL at 17:59

## 2019-05-19 RX ADMIN — METHOCARBAMOL TABLETS 750 MG: 750 TABLET, COATED ORAL at 20:25

## 2019-05-19 RX ADMIN — METHOCARBAMOL TABLETS 750 MG: 750 TABLET, COATED ORAL at 12:14

## 2019-05-19 RX ADMIN — ACETAMINOPHEN 650 MG: 325 TABLET ORAL at 02:42

## 2019-05-19 RX ADMIN — PREDNISONE 40 MG: 20 TABLET ORAL at 08:44

## 2019-05-19 RX ADMIN — BUDESONIDE 500 MCG: 0.5 SUSPENSION RESPIRATORY (INHALATION) at 19:22

## 2019-05-19 RX ADMIN — ACETAMINOPHEN 650 MG: 325 TABLET ORAL at 14:20

## 2019-05-19 RX ADMIN — FOLIC ACID 1 MG: 1 TABLET ORAL at 08:44

## 2019-05-19 RX ADMIN — BUDESONIDE 500 MCG: 0.5 SUSPENSION RESPIRATORY (INHALATION) at 08:01

## 2019-05-19 RX ADMIN — INSULIN GLARGINE 25 UNITS: 100 INJECTION, SOLUTION SUBCUTANEOUS at 20:27

## 2019-05-19 RX ADMIN — DULOXETINE HYDROCHLORIDE 30 MG: 30 CAPSULE, DELAYED RELEASE ORAL at 08:44

## 2019-05-19 RX ADMIN — METHOCARBAMOL TABLETS 750 MG: 750 TABLET, COATED ORAL at 17:24

## 2019-05-19 RX ADMIN — OXYCODONE HYDROCHLORIDE AND ACETAMINOPHEN 1 TABLET: 5; 325 TABLET ORAL at 09:45

## 2019-05-19 RX ADMIN — ENOXAPARIN SODIUM 40 MG: 40 INJECTION SUBCUTANEOUS at 08:45

## 2019-05-19 RX ADMIN — INSULIN LISPRO 1 UNITS: 100 INJECTION, SOLUTION INTRAVENOUS; SUBCUTANEOUS at 20:28

## 2019-05-19 RX ADMIN — SULFAMETHOXAZOLE AND TRIMETHOPRIM 1 TABLET: 800; 160 TABLET ORAL at 20:31

## 2019-05-19 RX ADMIN — BUSPIRONE HYDROCHLORIDE 10 MG: 10 TABLET ORAL at 08:44

## 2019-05-19 RX ADMIN — IPRATROPIUM BROMIDE AND ALBUTEROL SULFATE 1 AMPULE: .5; 3 SOLUTION RESPIRATORY (INHALATION) at 08:01

## 2019-05-19 RX ADMIN — SULFAMETHOXAZOLE AND TRIMETHOPRIM 1 TABLET: 800; 160 TABLET ORAL at 08:44

## 2019-05-19 ASSESSMENT — PAIN SCALES - GENERAL
PAINLEVEL_OUTOF10: 5
PAINLEVEL_OUTOF10: 5
PAINLEVEL_OUTOF10: 8
PAINLEVEL_OUTOF10: 5
PAINLEVEL_OUTOF10: 8
PAINLEVEL_OUTOF10: 5
PAINLEVEL_OUTOF10: 6

## 2019-05-19 ASSESSMENT — PAIN DESCRIPTION - PROGRESSION: CLINICAL_PROGRESSION: GRADUALLY WORSENING

## 2019-05-19 ASSESSMENT — PAIN DESCRIPTION - DESCRIPTORS
DESCRIPTORS: HEAVINESS
DESCRIPTORS: DULL

## 2019-05-19 ASSESSMENT — PAIN DESCRIPTION - ORIENTATION: ORIENTATION: MID;UPPER

## 2019-05-19 ASSESSMENT — ENCOUNTER SYMPTOMS
NAUSEA: 0
WHEEZING: 1
BACK PAIN: 1
ALLERGIC/IMMUNOLOGIC NEGATIVE: 1
VOMITING: 0
EYES NEGATIVE: 1
ABDOMINAL PAIN: 0
GASTROINTESTINAL NEGATIVE: 1
COUGH: 1
CHOKING: 0
APNEA: 0
WHEEZING: 0
STRIDOR: 0
SHORTNESS OF BREATH: 1
CHEST TIGHTNESS: 1

## 2019-05-19 ASSESSMENT — PAIN DESCRIPTION - LOCATION
LOCATION: CHEST
LOCATION: CHEST

## 2019-05-19 ASSESSMENT — PAIN DESCRIPTION - ONSET: ONSET: ON-GOING

## 2019-05-19 ASSESSMENT — PAIN - FUNCTIONAL ASSESSMENT: PAIN_FUNCTIONAL_ASSESSMENT: ACTIVITIES ARE NOT PREVENTED

## 2019-05-19 ASSESSMENT — PAIN DESCRIPTION - FREQUENCY: FREQUENCY: CONTINUOUS

## 2019-05-19 ASSESSMENT — PAIN DESCRIPTION - PAIN TYPE
TYPE: ACUTE PAIN
TYPE: ACUTE PAIN

## 2019-05-19 NOTE — ED NOTES
Lab called this RN w/ critical D Dimer value of 0.52; Dr Hamilton Mukherjee was notified.      Salina Marin RN  05/19/19 0030

## 2019-05-19 NOTE — FLOWSHEET NOTE
Medicated with 1 tab PO percocet 5/325mg for continued complaint of the mid-sternal, non-radiating, non-reproducible chest pain that worsens with a deep breath in. Rated 5/10. No signs of any acute distress noted. Call light remains in reach.

## 2019-05-19 NOTE — CONSULTS
Chief Complaint   Patient presents with    Chest Pain     shortness of breath        Patient is a 51 y.o. female who presents with a chief complaint of CP. Patient is followed on a regular basis by Dr. Batsheva Hill, APRN - CNP. Has atypical CP that started yesterday at rest. Has had recurrent admission for atypical CP. Has hx of sarcoidosis. States her CP is worse with deep inspiration. Trops are negative. EKG is benign. No hx of MI, CHF or arrhythmia. No hx of LHC. Pt has stage III CKD. Pt is s/p right nephrectomy 2/2 renal carcinoma  Has hx of pain seeking behavior and OARS check is very abnormal.       Past Medical History:   Diagnosis Date    Anxiety     Arthritis     Asthma     Bronchopneumonia     Cancer ([address]     renal    Cerebral artery occlusion with cerebral infarction ([address]     Chronic bilateral low back pain with sciatica     Chronic kidney disease     Depression     Hypertension     Insulin dependent type 2 diabetes mellitus, uncontrolled ([address] 8/3/2018    Mixed headache     Pure hyperglyceridemia 5/19/2017    Sarcoidosis     Sleep apnea     does not wear cpap    Thyroid goiter       Patient Active Problem List   Diagnosis    Anxiety    Asthma    HTN (hypertension)    Thyroid goiter    Lumbar spondylosis    Cervical spondylosis without myelopathy    Spondylosis of lumbar region without myelopathy or radiculopathy--OARRS PM&R 5/24/17    Renal cancer ([address]    Pure hyperglyceridemia    Morbid obesity ([address]    Transient cerebral ischemia    Marijuana use    Chest pain    Meningitis    SOB (shortness of breath)    Exposure to potential infection    Bronchopneumonia    Headache    Insulin dependent type 2 diabetes mellitus, uncontrolled (HCC)    Sarcoidosis of lung with sarcoidosis of lymph nodes ([address]    Chronic kidney disease       Past Surgical History:   Procedure Laterality Date    BRONCHOSCOPY  10/26/2018    DR. STEARNS    KIDNEY REMOVAL Right 08/2016    THYROID LOBECTOMY Right 14    THYROIDECTOMY  2019    DR. MAGDALENO    URETER STENT PLACEMENT Left 2016       Social History     Socioeconomic History    Marital status: Legally      Spouse name: None    Number of children: None    Years of education: None    Highest education level: None   Occupational History    None   Social Needs    Financial resource strain: None    Food insecurity:     Worry: None     Inability: None    Transportation needs:     Medical: None     Non-medical: None   Tobacco Use    Smoking status: Former Smoker     Packs/day: 0.50     Years: 15.00     Pack years: 7.50     Types: Cigarettes     Start date: 2014     Last attempt to quit: 2015     Years since quittin.3    Smokeless tobacco: Former User     Quit date: 3/23/2016   Substance and Sexual Activity    Alcohol use:  Yes     Alcohol/week: 0.0 oz     Comment: occasionally    Drug use: Yes     Types: Marijuana     Comment: one month ago    Sexual activity: None   Lifestyle    Physical activity:     Days per week: None     Minutes per session: None    Stress: None   Relationships    Social connections:     Talks on phone: None     Gets together: None     Attends Anabaptist service: None     Active member of club or organization: None     Attends meetings of clubs or organizations: None     Relationship status: None    Intimate partner violence:     Fear of current or ex partner: None     Emotionally abused: None     Physically abused: None     Forced sexual activity: None   Other Topics Concern    None   Social History Narrative    None       Family History   Problem Relation Age of Onset    Cancer Father     Diabetes Father     High Blood Pressure Mother     Diabetes Mother        Current Facility-Administered Medications   Medication Dose Route Frequency Provider Last Rate Last Dose    ketorolac (TORADOL) injection 15 mg  15 mg Intravenous Once Dwana Osler, MD   Stopped at 05/19/19 0218    enoxaparin (LOVENOX) injection 40 mg  40 mg Subcutaneous Daily [facility]   40 mg at 05/19/19 0845    sulfamethoxazole-trimethoprim (BACTRIM DS;SEPTRA DS) 800-160 MG per tablet 1 tablet  1 tablet Oral 2 times per day Luz Pettibone Sedar, DO   1 tablet at 05/19/19 0844    predniSONE (DELTASONE) tablet 40 mg  40 mg Oral Daily [facility] Sedar, DO   40 mg at 05/19/19 0844    guaiFENesin (MUCINEX) extended release tablet 600 mg  600 mg Oral BID Luz Pettibone Sedar, DO   Stopped at 05/19/19 0219    budesonide (PULMICORT) nebulizer suspension 500 mcg  0.5 mg Nebulization BID Luz Pettibone Sedar, DO   500 mcg at 05/19/19 0801    nitroGLYCERIN (NITROSTAT) SL tablet 0.4 mg  0.4 mg Sublingual Q5 Min PRN Luz Pettibone Sedar, DO        busPIRone (BUSPAR) tablet 10 mg  10 mg Oral Daily [facility] Sedar, DO   10 mg at 05/19/19 0844    butalbital-APAP-caffeine -40 MG per capsule 1 capsule  1 capsule Oral Q6H PRN Luz Pettibone Sedar, DO        DULoxetine (CYMBALTA) extended release capsule 30 mg  30 mg Oral Daily [facility] Sedar, DO   30 mg at 10/42/03 5313    folic acid (FOLVITE) tablet 1 mg  1 mg Oral Daily [facility]   1 mg at 05/19/19 0844    insulin glargine (LANTUS) injection vial 25 Units  25 Units Subcutaneous Nightly Luz Pettibone Sedar, DO        insulin lispro (HUMALOG) injection vial 4 Units  4 Units Subcutaneous TID PRN Luz Pettibone Sedar, DO        levothyroxine (SYNTHROID) tablet 75 mcg  75 mcg Oral Daily Luz Pettibone Sedar, DO   75 mcg at 05/19/19 2307    montelukast (SINGULAIR) tablet 10 mg  10 mg Oral Nightly Luz Pettibone Sedar, DO        acetaminophen (TYLENOL) tablet 650 mg  650 mg Oral Q6H PRN Luz Pettibone Sedar, DO   650 mg at 05/19/19 0844    glucose (GLUTOSE) 40 % oral gel 15 g  15 g Oral PRN Luz Pettibone Sedar, DO        dextrose 50 % solution 12.5 g  12.5 g Intravenous PRN Lzu Pettibone Sedar, DO        glucagon (rDNA) injection 1 mg  1 mg Intramuscular PRN Luz Pettibone Sedar, DO        dextrose 5 % solution  100 mL/hr Intravenous PRN Luz Pettibone Sedar, DO        insulin lispro (HUMALOG) injection vial 0-6 Units  0-6 Units Subcutaneous TID WC Antoine D Sedar, DO   1 Units at 05/19/19 0850    insulin lispro (HUMALOG) injection vial 0-3 Units  0-3 Units Subcutaneous Nightly Ivar Franklin Sedar, DO        ipratropium-albuterol (DUONEB) nebulizer solution 1 ampule  1 ampule Inhalation Q4H PRN Flori Ken MD   1 ampule at 05/19/19 0801    methocarbamol (ROBAXIN) tablet 750 mg  750 mg Oral 4x Daily Jamilah Myrick MD   750 mg at 05/19/19 1214    oxyCODONE-acetaminophen (PERCOCET) 5-325 MG per tablet 1 tablet  1 tablet Oral Q8H PRN Jamilah Myrick MD   1 tablet at 05/19/19 0945       ALLERGIES: Shellfish-derived products; Ibuprofen; Propoxyphene n-acetaminophen; and Toradol [ketorolac tromethamine]    Review of Systems   Constitutional: Negative. Negative for chills and fever. HENT: Negative. Eyes: Negative. Respiratory: Positive for shortness of breath. Negative for wheezing. Cardiovascular: Positive for chest pain. Negative for palpitations and leg swelling. Gastrointestinal: Negative. Negative for abdominal pain, nausea and vomiting. Endocrine: Negative. Genitourinary: Negative. Musculoskeletal: Negative. Skin: Negative. Negative for rash. Allergic/Immunologic: Negative. Neurological: Negative for dizziness, weakness and headaches. Hematological: Negative. VITALS:  Blood pressure 123/65, pulse 80, temperature 97.3 °F (36.3 °C), temperature source Oral, resp. rate 18, height 5' 3\" (1.6 m), weight (!) 302 lb 8 oz (137.2 kg), last menstrual period 05/04/2019, SpO2 95 %, not currently breastfeeding. Body mass index is 53.59 kg/m². Physical Exam   Constitutional: She is oriented to person, place, and time. She appears well-developed and well-nourished. HENT:   Head: Normocephalic and atraumatic. Eyes: Pupils are equal, round, and reactive to light. Neck: Normal range of motion. Neck supple. No JVD present.  No tracheal deviation present. No thyromegaly present. Cardiovascular: Normal rate, regular rhythm, normal heart sounds and intact distal pulses. PMI is not displaced. Exam reveals no gallop, no S3, no distant heart sounds and no friction rub. No murmur heard. Pulmonary/Chest: No respiratory distress. She has no wheezes. She has no rales. She exhibits no tenderness. Abdominal: Soft. Bowel sounds are normal. She exhibits no distension and no mass. There is no tenderness. There is no rebound and no guarding. Musculoskeletal: She exhibits no edema. Neurological: She is alert and oriented to person, place, and time. No cranial nerve deficit. Skin: Skin is warm and dry. No rash noted. She is not diaphoretic. No erythema. No pallor. Psychiatric: She has a normal mood and affect.  Her behavior is normal. Judgment and thought content normal.       LABS:  Recent Results (from the past 24 hour(s))   EKG 12 Lead    Collection Time: 05/18/19 11:03 PM   Result Value Ref Range    Ventricular Rate 86 BPM    Atrial Rate 86 BPM    P-R Interval 158 ms    QRS Duration 76 ms    Q-T Interval 358 ms    QTc Calculation (Bazett) 428 ms    P Axis 45 degrees    R Axis 12 degrees    T Axis 6 degrees   Comprehensive Metabolic Panel    Collection Time: 05/18/19 11:30 PM   Result Value Ref Range    Sodium 140 135 - 144 mEq/L    Potassium 3.7 3.4 - 4.9 mEq/L    Chloride 104 95 - 107 mEq/L    CO2 24 20 - 31 mEq/L    Anion Gap 12 9 - 15 mEq/L    Glucose 137 (H) 70 - 99 mg/dL    BUN 13 6 - 20 mg/dL    CREATININE 1.45 (H) 0.50 - 0.90 mg/dL    GFR Non-African American 38.5 (L) >60    GFR  46.6 (L) >60    Calcium 8.9 8.5 - 9.9 mg/dL    Total Protein 6.7 6.3 - 8.0 g/dL    Alb 3.7 3.5 - 4.6 g/dL    Total Bilirubin <0.2 0.2 - 0.7 mg/dL    Alkaline Phosphatase 104 40 - 130 U/L    ALT 14 0 - 33 U/L    AST 14 0 - 35 U/L    Globulin 3.0 2.3 - 3.5 g/dL   CBC Auto Differential    Collection Time: 05/18/19 11:30 PM   Result Value Ref Range WBC 8.7 4.8 - 10.8 K/uL    RBC 4.21 4.20 - 5.40 M/uL    Hemoglobin 14.3 12.0 - 16.0 g/dL    Hematocrit 41.9 37.0 - 47.0 %    MCV 99.6 82.0 - 100.0 fL    MCH 34.0 (H) 27.0 - 31.3 pg    MCHC 34.2 33.0 - 37.0 %    RDW 14.4 11.5 - 14.5 %    Platelets 290 482 - 947 K/uL    Neutrophils % 70.8 %    Lymphocytes % 20.0 %    Monocytes % 6.0 %    Eosinophils % 2.1 %    Basophils % 1.1 %    Neutrophils # 6.1 1.4 - 6.5 K/uL    Lymphocytes # 1.7 1.0 - 4.8 K/uL    Monocytes # 0.5 0.2 - 0.8 K/uL    Eosinophils # 0.2 0.0 - 0.7 K/uL    Basophils # 0.1 0.0 - 0.2 K/uL   Troponin    Collection Time: 05/18/19 11:30 PM   Result Value Ref Range    Troponin <0.010 0.000 - 0.010 ng/mL   D-Dimer, Quantitative    Collection Time: 05/18/19 11:30 PM   Result Value Ref Range    D-Dimer, Quant 0.52 (HH) 0.00 - 0.50 mg/L FEU   Brain Natriuretic Peptide    Collection Time: 05/18/19 11:30 PM   Result Value Ref Range    Pro-BNP 9 pg/mL   POCT Glucose    Collection Time: 05/19/19  1:32 AM   Result Value Ref Range    POC Glucose 137 (H) 60 - 115 mg/dl    Performed on ACCU-CHEK    POCT Glucose    Collection Time: 05/19/19  6:12 AM   Result Value Ref Range    POC Glucose 161 (H) 60 - 115 mg/dl    Performed on ACCU-CHEK    CBC auto differential    Collection Time: 05/19/19  6:55 AM   Result Value Ref Range    WBC 8.5 4.8 - 10.8 K/uL    RBC 4.07 (L) 4.20 - 5.40 M/uL    Hemoglobin 13.6 12.0 - 16.0 g/dL    Hematocrit 40.5 37.0 - 47.0 %    MCV 99.6 82.0 - 100.0 fL    MCH 33.4 (H) 27.0 - 31.3 pg    MCHC 33.6 33.0 - 37.0 %    RDW 14.7 (H) 11.5 - 14.5 %    Platelets 635 132 - 333 K/uL    Neutrophils % 67.2 %    Lymphocytes % 21.0 %    Monocytes % 7.6 %    Eosinophils % 2.7 %    Basophils % 1.5 %    Neutrophils # 5.7 1.4 - 6.5 K/uL    Lymphocytes # 1.8 1.0 - 4.8 K/uL    Monocytes # 0.7 0.2 - 0.8 K/uL    Eosinophils # 0.2 0.0 - 0.7 K/uL    Basophils # 0.1 0.0 - 0.2 K/uL   Troponin    Collection Time: 05/19/19  6:55 AM   Result Value Ref Range    Troponin <0.010

## 2019-05-19 NOTE — PROGRESS NOTES
52 yo female with history of DM, sarcoidosis, asthma, ELEN noncompliant with cpap, prior CVA, renal carcinoma s/p nephrectomy, anxiety who presented with chest pain and dyspnea. She still has chest pain but improving. She comes frequently to ED with similar symptoms. CXR was clear. D-dimers was slightly elevated. She had LE doppler 2 weeks ago which was negative. She had 3 CTA chest and 3 V/Q scans since November 2018 which were all negative for PE. Continue steroids.  Pulmonary and cardiology following    Lonell Schlatter

## 2019-05-19 NOTE — CONSULTS
Pulmonary and Critical Care Medicine  Consult Note  Encounter Date: 2019 11:59 AM    Ms. Candi hCavez is a 51 y.o. female  : 1970  Requesting Provider: Thompson Castrejon MD    Reason for request: Sarcoidosis             HISTORY OF PRESENT ILLNESS:    Patient is 51 y.o. presents with SOB started yesterday late evening, mild cough, no phlegm, chest is tight, had temp 101 the day before but none yesterday, skin rash on her back, and joints stiff and hurting but this is chronic, no lower ext swelling, no heart racing,   Chest pain feels as something heavy , stabbing, now faint, lasted until she received treatment in ED, this is similar to her prior attacks of sarcoidosis           Past Medical History:        Diagnosis Date    Anxiety     Arthritis     Asthma     Bronchopneumonia     Cancer ([address]     renal    Cerebral artery occlusion with cerebral infarction ([address]     Chronic bilateral low back pain with sciatica     Chronic kidney disease     Depression     Hypertension     Insulin dependent type 2 diabetes mellitus, uncontrolled ([address] 8/3/2018    Mixed headache     Pure hyperglyceridemia 2017    Sarcoidosis     Sleep apnea     does not wear cpap    Thyroid goiter        Past Surgical History:        Procedure Laterality Date    BRONCHOSCOPY  10/26/2018    DR. STEARNS    KIDNEY REMOVAL Right 2016    THYROID LOBECTOMY Right 14    THYROIDECTOMY  2019    DR. MAGDALENO    URETER STENT PLACEMENT Left 2016       Social History:     reports that she quit smoking about 4 years ago. Her smoking use included cigarettes. She started smoking about 4 years ago. She has a 7.50 pack-year smoking history. She quit smokeless tobacco use about 3 years ago. She reports that she drinks alcohol. She reports that she has current or past drug history. Drug: Marijuana.   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[place] [address] History:       Problem Relation Age of Onset    Cancer Father     Diabetes Father     High Blood Pressure Mother     Diabetes Mother        Allergies:  Shellfish-derived products; Ibuprofen; Propoxyphene n-acetaminophen; and Toradol [ketorolac tromethamine]        MEDICATIONS during current hospitalization:    Continuous Infusions:   dextrose         Scheduled Meds:   ketorolac  15 mg Intravenous Once    enoxaparin  40 mg Subcutaneous Daily    sulfamethoxazole-trimethoprim  1 tablet Oral 2 times per day    predniSONE  40 mg Oral Daily    guaiFENesin  600 mg Oral BID    budesonide  0.5 mg Nebulization BID    busPIRone  10 mg Oral Daily    DULoxetine  30 mg Oral Daily    folic acid  1 mg Oral Daily    insulin glargine  25 Units Subcutaneous Nightly    levothyroxine  75 mcg Oral Daily    montelukast  10 mg Oral Nightly    insulin lispro  0-6 Units Subcutaneous TID WC    insulin lispro  0-3 Units Subcutaneous Nightly    methocarbamol  750 mg Oral 4x Daily       PRN Meds:nitroGLYCERIN, butalbital-APAP-caffeine, insulin lispro, acetaminophen, glucose, dextrose, glucagon (rDNA), dextrose, ipratropium-albuterol, oxyCODONE-acetaminophen        REVIEW OF SYSTEMS:  ROS: 10 organs review of system is done including general, psychological, ENT, hematological, endocrine, respiratory, cardiovascular, gastrointestinal, musculoskeletal, neurological,  allergy and Immunology is done and is otherwise negative. PHYSICAL EXAM:    Vitals:  /65   Pulse 80   Temp 97.3 °F (36.3 °C) (Oral)   Resp 18   Ht 5' 3\" (1.6 m)   Wt (!) 302 lb 8 oz (137.2 kg)   LMP 05/04/2019   SpO2 95%   BMI 53.59 kg/m²     General: alert, cooperative, no distress  Head: normocephalic, atraumatic  Eyes:No gross abnormalities. , PERRL and Sclera nonicteric  ENT:  MMM no lesions  Neck:  supple and no masses  Chest : clear to auscultation bilaterally- no wheezes, rales or rhonchi, normal air movement, no respiratory distress  Heart[de-identified] Heart sounds are normal.  Regular rate and rhythm without murmur, gallop or rub. ABD:  symmetric, liver span normal to percussion, soft, non-tender, spleen non-palpable  Musculoskeletal : no cyanosis, no clubbing and no edema  Neuro:  Grossly normal  Skin: No rashes or nodules noted. Lymph node:  no cervical nodes  Urology: No Villagran   Psychiatric: appropriate        Data Review  Recent Labs     05/18/19  2330 05/19/19  0655   WBC 8.7 8.5   HGB 14.3 13.6   HCT 41.9 40.5    290      Recent Labs     05/18/19  2330 05/19/19  0655    139   K 3.7 3.6    108*   CO2 24 22   BUN 13 13   CREATININE 1.45* 1.35*   GLUCOSE 137* 138*       MV Settings: ABGs: No results for input(s): PHART, FGA0BMA, PO2ART, WZN5ZQR, BEART, J6FUITGH, FSY3MVW in the last 72 hours. O2 Device: None (Room air)  Lab Results   Component Value Date    LACTA 1.30 04/04/2019    LACTA 0.50 12/24/2018    LACTA 0.90 11/04/2018       Radiology  I personally reviewed imaging studies and is clear         Assessment, plan:   Patient is at risk due to    · Chest pain, atypical, could be related to costochondritis versus underlying sarcoidosis, chest x-ray does not show any signs of interstitial lung disease and examined consistent with obstructive airway disease exacerbation.   · History of sarcoidosis on methotrexate and prednisone  · History of obstructive sleep apnea she does not use CPAP and declined using it during this admission  · Morbid obesity  · History of asthma at this point clinically no signs of exacerbation    Recommendations  · Continue prednisone 40 daily  · Continue budesonide nebs  · DuoNeb every 6 hours while awake  · Patient declined CPAP  · Pain control per primary team           Thank you for consultation    Electronically signed by Queenie Castro MD, [facility],  on 5/19/2019 at 11:59 AM

## 2019-05-19 NOTE — CONSULTS
week.    Patient cannot receive any anti-inflammatory due to her kidney condition, she has had biopsy from the lung diagnosed by sarcoidosis, she containing having a lot of pain on the right chest wall, she has a nerve block which seemed to be intercostal nerve block done by  , however did not provide her any pain relief, she's been managed with Percocet as an outpatient, she was tried on lidocaine patches however insurance did not approve it, she also tried tramadol in the past which was not managing her pain appropriately. Patient stated she has to come to hospitalist pain because severe at that time, she has multiple CT and chest wall imaging done especially on admission as well as on previous admission    Patient seen by cardiology and cleared for any cardiac event at this time, also been seen by pulmonology who adjusted the dose of steroid for her sarcoidosis as well as started other treatment for sarcoidosis    PAIN  ASSESSMENT:    constant, excruciating    aching, sharp, shooting, stabbing, throbbing and tight band    pain is perceived as severe (6-8 pain scale), pain is excruciating , incapacitating and unbearable (9-10 pain scale), increase with any deep breathing especially on the right chest wall as well as behind the sternum    PAST MEDICAL HISTORY:    Past Medical History:   Diagnosis Date    Anxiety     Arthritis     Asthma     Bronchopneumonia     Cancer ([address]     renal    Cerebral artery occlusion with cerebral infarction ([address]     Chronic bilateral low back pain with sciatica     Chronic kidney disease     Depression     Hypertension     Insulin dependent type 2 diabetes mellitus, uncontrolled ([address] 8/3/2018    Mixed headache     Pure hyperglyceridemia 5/19/2017    Sarcoidosis     Sleep apnea     does not wear cpap    Thyroid goiter      PAST SURGICAL HISTORY:    Past Surgical History:   Procedure Laterality Date    BRONCHOSCOPY  10/26/2018    DR. STEARNS    KIDNEY REMOVAL Right 2016    THYROID LOBECTOMY Right 14    THYROIDECTOMY  2019    DR. MAGDALENO    URETER STENT PLACEMENT Left 2016     FAMILY HISTORY:    Family History   Problem Relation Age of Onset    Cancer Father     Diabetes Father     High Blood Pressure Mother     Diabetes Mother      SOCIALHISTORY:    Social History     Socioeconomic History    Marital status: Legally      Spouse name: Not on file    Number of children: Not on file    Years of education: Not on file    Highest education level: Not on file   Occupational History    Not on file   Social Needs    Financial resource strain: Not on file    Food insecurity:     Worry: Not on file     Inability: Not on file    Transportation needs:     Medical: Not on file     Non-medical: Not on file   Tobacco Use    Smoking status: Former Smoker     Packs/day: 0.50     Years: 15.00     Pack years: 7.50     Types: Cigarettes     Start date: 2014     Last attempt to quit: 2015     Years since quittin.3    Smokeless tobacco: Former User     Quit date: 3/23/2016   Substance and Sexual Activity    Alcohol use:  Yes     Alcohol/week: 0.0 oz     Comment: occasionally    Drug use: Yes     Types: Marijuana     Comment: one month ago    Sexual activity: Not on file   Lifestyle    Physical activity:     Days per week: Not on file     Minutes per session: Not on file    Stress: Not on file   Relationships    Social connections:     Talks on phone: Not on file     Gets together: Not on file     Attends [employer] service: Not on file     Active member of club or organization: Not on file     Attends meetings of clubs or organizations: Not on file     Relationship status: Not on file    Intimate partner violence:     Fear of current or ex partner: Not on file     Emotionally abused: Not on file     Physically abused: Not on file     Forced sexual activity: Not on file   Other Topics Concern    Not on file   Social History Narrative    Not on file     PSYCHOLOGICAL HISTORY: Generalized depression, being on Cymbalta and BuSpar  Patient denies any history of alcohol abuse drug abuse or any prescription drug abuse    MEDICATIONS:  Medications Prior to Admission: fluticasone-salmeterol (ADVAIR) 250-50 MCG/DOSE AEPB, Inhale 1 puff into the lungs 2 times daily  methocarbamol (ROBAXIN) 750 MG tablet, Take 750 mg by mouth 4 times daily  methotrexate (RHEUMATREX) 1 g chemo injection, Infuse 25 mg intravenously once Indications: 0.4 ml SQ every Monday  predniSONE (DELTASONE) 10 MG tablet, 4 daily for 4 days, 3 daily for 4 days, 2 daily for 4 days, then one and a half (15 mg) daily until follow-up  levothyroxine (SYNTHROID) 75 MCG tablet, Take 75 mcg by mouth Daily  butalbital-acetaminophen-caffeine (FIORICET, ESGIC) -40 MG per tablet, TAKE 1 TABLET BY MOUTH THREE TIMES DAILY AS NEEDED FOR HEADACHES  cetirizine (ZYRTEC) 10 MG tablet, Take 1 qhs for allergies  insulin lispro (ADMELOG) 100 UNIT/ML injection vial, Inject 4 Units into the skin 3 times daily as needed for High Blood Sugar  DULoxetine (CYMBALTA) 60 MG extended release capsule, TAKE 1 CAPSULE BY MOUTH DAILY (Patient taking differently: Take 30 mg by mouth daily )  insulin glargine (LANTUS SOLOSTAR) 100 UNIT/ML injection pen, Inject 25 Units into the skin nightly  montelukast (SINGULAIR) 10 MG tablet, Take 1 tab nightly at supper for breathing/allergies  folic acid (FOLVITE) 1 MG tablet, Take 1 tablet by mouth daily  albuterol (PROVENTIL) (2.5 MG/3ML) 0.083% nebulizer solution, Take 3 mLs by nebulization every 2 hours as needed for Wheezing  albuterol sulfate  (90 Base) MCG/ACT inhaler, Inhale 2 puffs into the lungs every 4 hours as needed for Wheezing  busPIRone (BUSPAR) 10 MG tablet, Take 1/2 tab bid wf x 1 week, then 1 tab bid (Patient taking differently: Take 10 mg by mouth daily Take 1/2 tab bid wf x 1 week, then 1 tab bid)  [DISCONTINUED] oxyCODONE-acetaminophen (PERCOCET) 5-325 MG per tablet, Take 1 tablet by mouth every 4 hours as needed for Pain. Insulin Pen Needle (B-D ULTRAFINE III SHORT PEN) 31G X 8 MM MISC, Inject 1 each into the skin 3 times daily  Insulin Syringe-Needle U-100 30G X 1/2\" 1 ML MISC, 1 each by Does not apply route daily  [DISCONTINUED] gabapentin (NEURONTIN) 400 MG capsule, Take 1 capsule by mouth 3 times daily for 90 days. .  blood glucose test strips (ONETOUCH VERIO) strip, TEST 3 TIMES DAILY AS NEEDED  Blood Glucose Monitoring Suppl (ONETOUCH VERIO) w/Device KIT, TEST 3 TIMES DAILY AS NEEDED  ONE TOUCH ULTRASOFT LANCETS MISC, TEST 3 TIMES DAILY AS NEEDED  blood glucose test strips (EXACTECH TEST) strip, 1 each by In Vitro route 3 times daily (Accu-check test strips) As needed. DX:E11.65  [unfilled]    ALLERGIES:  Shellfish-derived products; Ibuprofen; Propoxyphene n-acetaminophen; and Toradol [ketorolac tromethamine]    COMPLETE REVIEW OF SYSTEMS:  As noted in HPI, 12 point ROS reviewed and otherwise negative. Review of Systems   Constitutional: Positive for activity change, appetite change and fatigue. Negative for chills, diaphoresis, fever and unexpected weight change. HENT: Negative. Eyes: Negative. Respiratory: Positive for cough, chest tightness, shortness of breath and wheezing. Negative for apnea, choking and stridor. Cardiovascular: Positive for chest pain and leg swelling. Negative for palpitations. Gastrointestinal: Negative. Endocrine: Negative. Genitourinary: Negative. Patient has one kidney due to prior history of renal cell cancer    Musculoskeletal: Positive for back pain, myalgias and neck stiffness. Negative for arthralgias, gait problem, joint swelling and neck pain. Skin: Negative. Allergic/Immunologic: Negative. Neurological: Negative. Hematological: Negative. Psychiatric/Behavioral: Positive for sleep disturbance.  Negative for agitation, behavioral problems, confusion, decreased concentration, dysphoric mood, hallucinations, self-injury and suicidal ideas. The patient is nervous/anxious. The patient is not hyperactive. OBJECTIVE  PHYSICAL EXAM:  BP (!) 162/88   Pulse 76   Temp 97.6 °F (36.4 °C) (Oral)   Resp 18   Ht 5' 3\" (1.6 m)   Wt (!) 302 lb 8 oz (137.2 kg)   LMP 05/04/2019   SpO2 96%   BMI 53.59 kg/m²   Body mass index is 53.59 kg/m².   CONSTITUTIONAL:  Morbidly obese, awake, alert, cooperative, appears to be mild to moderate distress due to severe difficulty was deep breathing and coughing and clearing her chest  EYES:  vision intact  ENT:  Mild tenderness on the upper thoracic and lower cervical region was range of motion, no spinous process tenderness, otherwise is normocepalic, without obvious abnormality, atraumatic  NECK:  supple, symmetrical, trachea midline, skin normal and no stridor  BACK:  Limited thoracolumbar range of motion due to morbid obesity and body habitus, symmetric and no curvature  LUNGS:  tachypneic, Moderate respiratory distress, moderate air exchange, Moderate retraction and dullness to percussion diffuse  CARDIOVASCULAR:  tachycardic with regular rhythm  ABDOMEN:  Soft nontender nondistended  MUSCULOSKELETAL: \She has positive tenderness on palpation of the sternal area, positive tenderness across the costochondral junction bilateral anteriorly, tenderness on the right lateral chest wall, healed scar of previous possible bronchoscopy on the site   Generalized diffuse muscular tenderness  there is no redness, warmth, or swelling of the joints  full range of motion noted  motor strength is 5 out of 5 all extremities bilaterally  tone is normal  NEUROLOGIC:  Mental Status Exam:  Level of Alertness:   awake  Orientation:   person, place, time  Memory:   normal  Fund of Knowledge:  normal  Cranial Nerves:  cranial nerves II-XII are grossly intact  Motor Exam:  Motor exam is symmetrical 5 out of 5 all extremities bilaterally  Sensory:  Sensory trachea. Areas atelectasis both bases No focal infiltrates. No effusions. No Pneumothoraces. NO ACUTE ACTIVE CARDIOPULMONARY PROCESS    Xr Knee Right (3 Views)    Result Date: 5/7/2019  EXAMINATION: FOUR VIEWS OF THE RIGHT KNEE: DATE AND TIME: 5/7/2019 at 6:45 AM. CLINICAL HISTORY: PAIN WITH NO ACUTE INJURY. COMPARISON: February 28, 2018. FINDINGS: No acute fracture or dislocation. Spurlike projection off the inferior patella again seen, possibly related to old trauma. Mild irregular ossification at the insertion of the patellar tendon. This is unchanged. Mild tricompartmental degenerative changes. No joint effusion. NO ACUTE RADIOGRAPHIC ABNORMALITY OF THE RIGHT KNEE. Us Dup Lower Extremity Right Bill    Result Date: 5/7/2019  Color flow Doppler sonography right lower extremity. HISTORY: Pain and swelling. FINDINGS: Compression, augmentation, and color flow right identified at the level the right common femoral vein, proximal, mid, and distal superficial femoral vein, popliteal vein. Color flow the infrapopliteal venous vasculature identified. No sonographic evidence deep vein thrombosis right lower extremity, common femoral vein through popliteal vein. Xr Chest Pa Or Ap    Result Date: 5/14/2019  MRN: [id number] Patient Name: Ana Oswald: CHEST 2 VIEW PA AND LAT; 5/14/2019 10:29 pm  INDICATION: Chest pain and /or palpitations. COMPARISON: 4/4/2019  ACCESSION NUMBER(S): [id number]  ORDERING CLINICIAN: IRA CHAU  FINDINGS: The cardiac silhouette is normal in size. There is perihilar peribronchial thickening. No focal airspace consolidation or pleural effusion. No pneumothorax. IMPRESSION: Perihilar peribronchial thickening which may be secondary to bronchitis. No focal airspace consolidation.     Electronically signed by: Scotty Cota, [address] [place] [address]

## 2019-05-19 NOTE — FLOWSHEET NOTE
AM assessment completed. Patient resting in bed at this time with family at bedside. Complaints of persistent mid-sternal, non-radiating, non-reproducible chest pressure that worsens with a deep breath in. No signs of any acute distress noted. Remains NSR on the monitor. No edema noted. Pedal pulses palpable. Complains of shortness of breath with exertion. Lungs diminished bilaterally. SATS 95% on RA. She is A/Ox3 and up independently in the room. Denies any pain with elimination. Voids clear yellow urine. Skin remains warm, dry and intact. #22g SL to right AC, flushed and patent. Vital signs stable and AM medications provided. Also medicated with 650mg PO tylenol for the above complaint of chest pain. Stated that I would message Dr Milton Solis to see if he would reorder her home pain medications. Call light remains in reach.

## 2019-05-19 NOTE — ED PROVIDER NOTES
[address] [facility]  eMERGENCY dEPARTMENT eNCOUnter    Pt Name: Jasmyn Bobo  MRN: [id number]  [place] 1970  Date of evaluation: 5/18/19  CHIEF COMPLAINT       Chief Complaint   Patient presents with    Chest Pain     shortness of breath     HISTORY OF PRESENT ILLNESS   HPI  51 y.o. female with history of sarcoidosis, renal cancer, CKD, hypertension, diabetes, presents with shortness of breath and chest pain. Patient states he developed shortness of breath a few hours ago while at home sitting, without exertion. She also endorses pain in her chest that is sharp, constant, bilateral, with some radiation to her back, nonexertional.  She does endorse a pleuritic component to her chest pain. She states she's had similar symptoms in the past and at times has been attributed to her sarcoidosis. She denies any recent fevers, cough, congestion, leg pain or swelling. REVIEW OF SYSTEMS     Review of Systems   Constitutional: Negative for fever. HENT: Negative for congestion. Respiratory: Positive for cough and shortness of breath. Cardiovascular: Positive for chest pain. Negative for leg swelling. Gastrointestinal: Negative for abdominal pain. Musculoskeletal: Negative for back pain. Skin: Negative for rash. Allergic/Immunologic: Positive for immunocompromised state. Neurological: Negative for light-headedness. Psychiatric/Behavioral: Negative for confusion.      PASTMEDICAL HISTORY     Past Medical History:   Diagnosis Date    Anxiety     Arthritis     Asthma     Bronchopneumonia     Cancer ([address]     renal    Cerebral artery occlusion with cerebral infarction (HCC)     Chronic bilateral low back pain with sciatica     Chronic kidney disease     Depression     Hypertension     Insulin dependent type 2 diabetes mellitus, uncontrolled ([address] 8/3/2018    Mixed headache     Pure hyperglyceridemia 5/19/2017    Sarcoidosis     Sleep apnea     does not wear cpap    Thyroid goiter supper for breathing/allergies  Qty: 30 tablet, Refills: 5    Associated Diagnoses: Chronic seasonal allergic rhinitis due to pollen; Moderate persistent asthma with acute exacerbation      folic acid (FOLVITE) 1 MG tablet Take 1 tablet by mouth daily  Qty: 30 tablet, Refills: 3      albuterol (PROVENTIL) (2.5 MG/3ML) 0.083% nebulizer solution Take 3 mLs by nebulization every 2 hours as needed for Wheezing  Qty: 120 each, Refills: 3      albuterol sulfate  (90 Base) MCG/ACT inhaler Inhale 2 puffs into the lungs every 4 hours as needed for Wheezing  Qty: 1 Inhaler, Refills: 3      busPIRone (BUSPAR) 10 MG tablet Take 1/2 tab bid wf x 1 week, then 1 tab bid  Qty: 60 tablet, Refills: 2    Associated Diagnoses: Adjustment reaction with anxiety and depression      Insulin Pen Needle (B-D ULTRAFINE III SHORT PEN) 31G X 8 MM MISC Inject 1 each into the skin 3 times daily  Qty: 100 each, Refills: 5      Insulin Syringe-Needle U-100 30G X 1/2\" 1 ML MISC 1 each by Does not apply route daily  Qty: 100 each, Refills: 3    Associated Diagnoses: Insulin dependent type 2 diabetes mellitus, uncontrolled ([address]      ! ! blood glucose test strips (ONETOUCH VERIO) strip TEST 3 TIMES DAILY AS NEEDED  Qty: 300 each, Refills: 3    Associated Diagnoses: Insulin dependent type 2 diabetes mellitus, uncontrolled (HCC)      Blood Glucose Monitoring Suppl (ONETOUCH VERIO) w/Device KIT TEST 3 TIMES DAILY AS NEEDED  Qty: 1 kit, Refills: 0    Associated Diagnoses: Insulin dependent type 2 diabetes mellitus, uncontrolled (HCC)      ONE TOUCH ULTRASOFT LANCETS MISC TEST 3 TIMES DAILY AS NEEDED  Qty: 300 each, Refills: 3    Associated Diagnoses: Insulin dependent type 2 diabetes mellitus, uncontrolled ([address]      ! ! blood glucose test strips (EXACTECH TEST) strip 1 each by In Vitro route 3 times daily (Accu-check test strips) As needed. DX:E11.65  Qty: 300 strip, Refills: 5       !! - Potential duplicate medications found.  Please discuss with provider. ALLERGIES     is allergic to shellfish-derived products; ibuprofen; propoxyphene n-acetaminophen; and toradol [ketorolac tromethamine]. FAMILY HISTORY     indicated that the status of her mother is unknown. She indicated that her father is . SOCIAL HISTORY       Social History     Tobacco Use    Smoking status: Former Smoker     Packs/day: 0.50     Years: 15.00     Pack years: 7.50     Types: Cigarettes     Start date: 2014     Last attempt to quit: 2015     Years since quittin.3    Smokeless tobacco: Former User     Quit date: 3/23/2016   Substance Use Topics    Alcohol use: Yes     Alcohol/week: 0.0 oz     Comment: occasionally    Drug use: Yes     Types: Marijuana     Comment: one month ago     PHYSICAL EXAM     INITIAL VITALS: /66   Pulse 87   Temp 98 °F (36.7 °C) (Oral)   Resp 18   Ht 5' 3\" (1.6 m)   Wt (!) 302 lb 8 oz (137.2 kg)   LMP 2019   SpO2 96%   BMI 53.59 kg/m²    Physical Exam   Constitutional: She appears well-developed and well-nourished. No distress. HENT:   Head: Normocephalic and atraumatic. Neck: Normal range of motion. Neck supple. Cardiovascular: Normal rate. Pulmonary/Chest: Effort normal. No respiratory distress. Poor effort, decreased breath sounds bilaterally, otherwise no acute pulmonary findings on auscultation   Abdominal: She exhibits no distension. Musculoskeletal: She exhibits no edema or tenderness. Neurological: She is alert. Skin: Skin is warm and dry. She is not diaphoretic. Nursing note and vitals reviewed. MEDICAL DECISION MAKING:     Patient with multiple comorbidities presents for evaluation of pleuritic chest pain and dyspnea. On exam she is in no distress, breathing comfortably, with unremarkable vital signs. She will get ruled out for ACS, pneumonia, PE, fluid overload.   While there may indeed be a component of sarcoidosis contributing to this if her workup is negative, she is high risk for coronary disease, so she will likely warrant an overnight admission for chest pain rule out, pain control. Workup was unremarkable ED including negative troponin, unremarkable labs, creatinine within patient's previous baseline. D-dimer minimally elevated, but looking back to previous studies, her d-dimer has been extremely elevated and she's been evaluated for PE at the time and it was negative. My suspicion for PE right now is very low. I discussed the findings with the hospitalist, who agreed to admit the patient given persistent chest pain, for ACS rule out, symptom control. Procedures    DIAGNOSTIC RESULTS   EKG:All EKG's are interpreted by the Emergency Department Physician who either signs or Co-signs this chart in the absence of a cardiologist.  Normal sinus rhythm at 86 bpm, normal axis, normal intervals, no acute ST segment abnormalities    RADIOLOGY:All plain film, CT, MRI, and formal ultrasound images (except ED bedside ultrasound) are read by the radiologist, see reports below, unless otherwisenoted in MDM or here. XR CHEST STANDARD (2 VW)    (Results Pending)     LABS: All lab results were reviewed by myself, and all abnormals are listed below.   Labs Reviewed   COMPREHENSIVE METABOLIC PANEL - Abnormal; Notable for the following components:       Result Value    Glucose 137 (*)     CREATININE 1.45 (*)     GFR Non- 38.5 (*)     GFR  46.6 (*)     All other components within normal limits   CBC WITH AUTO DIFFERENTIAL - Abnormal; Notable for the following components:    MCH 34.0 (*)     All other components within normal limits   D-DIMER, QUANTITATIVE - Abnormal; Notable for the following components:    D-Dimer, Quant 0.52 (*)     All other components within normal limits    Narrative:     Tapan Renner tel. [phone],  Dimer results called to and read back by Millicent Jorge RN, 05/19/2019 00:30,  by Jonny Johnson   POCT GLUCOSE - Abnormal; Notable for the following components:    POC Glucose 137 (*)     All other components within normal limits   TROPONIN   BRAIN NATRIURETIC PEPTIDE   CBC WITH AUTO DIFFERENTIAL   TROPONIN   TROPONIN   CK-MB INDEX   CK-MB INDEX   BASIC METABOLIC PANEL W/ REFLEX TO MG FOR LOW K   HEMOGLOBIN A1C   POCT GLUCOSE     EMERGENCY DEPARTMENTCOURSE:   Vitals:    Vitals:    05/18/19 2345 05/19/19 0000 05/19/19 0015 05/19/19 0137   BP:  130/73  129/66   Pulse: 84 82 85 87   Resp: 25 21 15 18   Temp:    98 °F (36.7 °C)   TempSrc:    Oral   SpO2: 95% 95% 97% 96%   Weight:    (!) 302 lb 8 oz (137.2 kg)   Height:    5' 3\" (1.6 m)       The patient was given the following medications while in the emergency department:  Orders Placed This Encounter   Medications    aspirin chewable tablet 324 mg    morphine (PF) injection 2 mg    ketorolac (TORADOL) injection 15 mg    enoxaparin (LOVENOX) injection 40 mg    sulfamethoxazole-trimethoprim (BACTRIM DS;SEPTRA DS) 800-160 MG per tablet 1 tablet    predniSONE (DELTASONE) tablet 40 mg    guaiFENesin (MUCINEX) extended release tablet 600 mg    budesonide (PULMICORT) nebulizer suspension 500 mcg    DISCONTD: ipratropium-albuterol (DUONEB) nebulizer solution 1 ampule    nitroGLYCERIN (NITROSTAT) SL tablet 0.4 mg    busPIRone (BUSPAR) tablet 10 mg    butalbital-APAP-caffeine -40 MG per capsule 1 capsule    DULoxetine (CYMBALTA) extended release capsule 30 mg    folic acid (FOLVITE) tablet 1 mg    insulin glargine (LANTUS) injection vial 25 Units    insulin lispro (HUMALOG) injection vial 4 Units    levothyroxine (SYNTHROID) tablet 75 mcg    montelukast (SINGULAIR) tablet 10 mg    acetaminophen (TYLENOL) tablet 650 mg    glucose (GLUTOSE) 40 % oral gel 15 g    dextrose 50 % solution 12.5 g    glucagon (rDNA) injection 1 mg    dextrose 5 % solution    insulin lispro (HUMALOG) injection vial 0-6 Units    insulin lispro (HUMALOG) injection vial 0-3 Units   

## 2019-05-19 NOTE — PROGRESS NOTES
[facility] EMERGENCY [facility] AT [place] Respiratory Therapy Evaluation   Current Order:  DUONEB Q6 CHEST VEST DAILY   Home Regimen: PRN       Ordering Physician: EUSEBIA   Re-evaluation Date:       Diagnosis: CHEST PAIN       Patient Status: Stable / Unstable + Physician notified    The following MDI Criteria must be met in order to convert aerosol to MDI with spacer.  If unable to meet, MDI will be converted to aerosol:  []  Patient able to demonstrate the ability to use MDI effectively  []  Patient alert and cooperative  []  Patient able to take deep breath with 5-10 second hold  []  Medication(s) available in this delivery method   []  Peak flow greater than or equal to 200 ml/min            Current Order Substituted To  (same drug, same frequency)   Aerosol to MDI [] Albuterol Sulfate 0.083% unit dose by aerosol Albuterol Sulfate MDI 2 puffs by inhalation with spacer    [] Levalbuterol 1.25 mg unit dose by aerosol Levalbuterol MDI 2 puffs by inhalation with spacer    [] Levalbuterol 0.63 mg unit dose by aerosol Levalbuterol MDI 2 puffs by inhalation with spacer    [] Ipratropium Bromide 0.02% unit dose by aerosol Ipratropium Bromide MDI 2 puffs by inhalation with spacer    [] Duoneb (Ipratropium + Albuterol) unit dose by aerosol Ipratropium MDI + Albuterol MDI 2 puffs by inhalation w/spacer   MDI to Aerosol [] Albuterol Sulfate MDI Albuterol Sulfate 0.083% unit dose by aerosol    [] Levalbuterol MDI 2 puffs by inhalation Levalbuterol 1.25 mg unit dose by aerosol    [] Ipratropium Bromide MDI by inhalation Ipratropium Bromide 0.02% unit dose by aerosol    [] Combivent (Ipratropium + Albuterol) MDI by inhalation Duoneb (Ipratropium + Albuterol) unit dose by aerosol   Treatment Assessment [Frequency/Schedule]:  Change frequency to: _______DUONEB Q4 PRN CHEST VEST PRN  ___________________________________________per Protocol, P&T, MEC      Points 0 1 2 3 4   Pulmonary Status  Non-Smoker  []   Smoking history   < 20 pack years  []   Smoking

## 2019-05-19 NOTE — FLOWSHEET NOTE
Medicated with 10mg PO flexeril and 1 PO tab Percocet 5/325mg for complaints of continued chest pain. Dr Marichuy Suazo was in to see patient and medication changes made. Call light remains in reach.

## 2019-05-20 VITALS
DIASTOLIC BLOOD PRESSURE: 91 MMHG | HEART RATE: 72 BPM | WEIGHT: 293 LBS | SYSTOLIC BLOOD PRESSURE: 146 MMHG | BODY MASS INDEX: 51.91 KG/M2 | TEMPERATURE: 98.5 F | HEIGHT: 63 IN | OXYGEN SATURATION: 97 % | RESPIRATION RATE: 18 BRPM

## 2019-05-20 LAB
ANION GAP SERPL CALCULATED.3IONS-SCNC: 12 MEQ/L (ref 9–15)
BASOPHILS ABSOLUTE: 0.1 K/UL (ref 0–0.2)
BASOPHILS RELATIVE PERCENT: 0.9 %
BUN BLDV-MCNC: 11 MG/DL (ref 6–20)
CALCIUM SERPL-MCNC: 8.8 MG/DL (ref 8.5–9.9)
CHLORIDE BLD-SCNC: 106 MEQ/L (ref 95–107)
CO2: 20 MEQ/L (ref 20–31)
CREAT SERPL-MCNC: 1.23 MG/DL (ref 0.5–0.9)
EOSINOPHILS ABSOLUTE: 0.1 K/UL (ref 0–0.7)
EOSINOPHILS RELATIVE PERCENT: 0.9 %
GFR AFRICAN AMERICAN: 56.3
GFR NON-AFRICAN AMERICAN: 46.6
GLUCOSE BLD-MCNC: 102 MG/DL (ref 60–115)
GLUCOSE BLD-MCNC: 109 MG/DL (ref 70–99)
GLUCOSE BLD-MCNC: 186 MG/DL (ref 60–115)
GLUCOSE BLD-MCNC: 99 MG/DL (ref 60–115)
HCT VFR BLD CALC: 42.9 % (ref 37–47)
HEMOGLOBIN: 14 G/DL (ref 12–16)
LYMPHOCYTES ABSOLUTE: 2.1 K/UL (ref 1–4.8)
LYMPHOCYTES RELATIVE PERCENT: 16.8 %
MCH RBC QN AUTO: 32.7 PG (ref 27–31.3)
MCHC RBC AUTO-ENTMCNC: 32.7 % (ref 33–37)
MCV RBC AUTO: 99.9 FL (ref 82–100)
MONOCYTES ABSOLUTE: 0.7 K/UL (ref 0.2–0.8)
MONOCYTES RELATIVE PERCENT: 5.5 %
NEUTROPHILS ABSOLUTE: 9.3 K/UL (ref 1.4–6.5)
NEUTROPHILS RELATIVE PERCENT: 75.9 %
PDW BLD-RTO: 14.4 % (ref 11.5–14.5)
PERFORMED ON: ABNORMAL
PERFORMED ON: NORMAL
PERFORMED ON: NORMAL
PLATELET # BLD: 309 K/UL (ref 130–400)
POTASSIUM REFLEX MAGNESIUM: 3.9 MEQ/L (ref 3.4–4.9)
RBC # BLD: 4.29 M/UL (ref 4.2–5.4)
SODIUM BLD-SCNC: 138 MEQ/L (ref 135–144)
WBC # BLD: 12.3 K/UL (ref 4.8–10.8)

## 2019-05-20 PROCEDURE — 6370000000 HC RX 637 (ALT 250 FOR IP): Performed by: ANESTHESIOLOGY

## 2019-05-20 PROCEDURE — 36415 COLL VENOUS BLD VENIPUNCTURE: CPT

## 2019-05-20 PROCEDURE — 99232 SBSQ HOSP IP/OBS MODERATE 35: CPT | Performed by: INTERNAL MEDICINE

## 2019-05-20 PROCEDURE — 80048 BASIC METABOLIC PNL TOTAL CA: CPT

## 2019-05-20 PROCEDURE — APPNB15 APP NON BILLABLE TIME 0-15 MINS: Performed by: NURSE PRACTITIONER

## 2019-05-20 PROCEDURE — G0378 HOSPITAL OBSERVATION PER HR: HCPCS

## 2019-05-20 PROCEDURE — 6370000000 HC RX 637 (ALT 250 FOR IP): Performed by: INTERNAL MEDICINE

## 2019-05-20 PROCEDURE — 85025 COMPLETE CBC W/AUTO DIFF WBC: CPT

## 2019-05-20 RX ORDER — PREDNISONE 10 MG/1
TABLET ORAL
Qty: 35 TABLET | Refills: 0 | Status: SHIPPED | OUTPATIENT
Start: 2019-05-20 | End: 2019-06-29

## 2019-05-20 RX ORDER — CALCIUM CARBONATE 200(500)MG
1000 TABLET,CHEWABLE ORAL 3 TIMES DAILY PRN
Status: DISCONTINUED | OUTPATIENT
Start: 2019-05-20 | End: 2019-05-20 | Stop reason: HOSPADM

## 2019-05-20 RX ADMIN — SULFAMETHOXAZOLE AND TRIMETHOPRIM 1 TABLET: 800; 160 TABLET ORAL at 10:36

## 2019-05-20 RX ADMIN — PREDNISONE 40 MG: 20 TABLET ORAL at 10:36

## 2019-05-20 RX ADMIN — DULOXETINE HYDROCHLORIDE 30 MG: 30 CAPSULE, DELAYED RELEASE ORAL at 10:36

## 2019-05-20 RX ADMIN — LEVOTHYROXINE SODIUM 75 MCG: 75 TABLET ORAL at 06:05

## 2019-05-20 RX ADMIN — FOLIC ACID 1 MG: 1 TABLET ORAL at 10:36

## 2019-05-20 RX ADMIN — ANTACID TABLETS 1000 MG: 500 TABLET, CHEWABLE ORAL at 06:05

## 2019-05-20 RX ADMIN — BUSPIRONE HYDROCHLORIDE 10 MG: 10 TABLET ORAL at 10:36

## 2019-05-20 ASSESSMENT — ENCOUNTER SYMPTOMS
WHEEZING: 0
CHEST TIGHTNESS: 0
COUGH: 0
APNEA: 0
EYES NEGATIVE: 1
STRIDOR: 0
ALLERGIC/IMMUNOLOGIC NEGATIVE: 1
SHORTNESS OF BREATH: 0
GASTROINTESTINAL NEGATIVE: 1

## 2019-05-20 ASSESSMENT — PAIN SCALES - GENERAL: PAINLEVEL_OUTOF10: 0

## 2019-05-20 NOTE — PROGRESS NOTES
INPATIENT PROGRESS NOTES    PATIENT NAME: Bhakti Haro  MRN: [id number]  SERVICE DATE:  May 20, 2019   SERVICE TIME:  12:23 PM      PRIMARY SERVICE: Pulmonary Disease    CHIEF COMPLAINTS: Chest pain    INTERVAL HPI: Patient seen and examined at bedside, Interval Notes, orders reviewed. Nursing notes noted  Patient reports feeling much better today. Her chest pain is significantly improved, denies significant cough or shortness breath. Both seem to improve since admission. She is afebrile. OBJECTIVE    Body mass index is 53.67 kg/m². PHYSICAL EXAM:  Vitals:  BP (!) 157/62   Pulse 68   Temp 97.5 °F (36.4 °C) (Oral)   Resp 18   Ht 5' 3\" (1.6 m)   Wt (!) 303 lb (137.4 kg)   LMP 05/04/2019   SpO2 96%   BMI 53.67 kg/m²   General: Patient is  Alert, awake . comfortable in bed, No distress. Head: Atraumatic , Normocephalic   Eyes: PERRL. No icteric sclera. No conjunctival injection. No discharge   ENT: No nasal  discharge. Pharynx clear. Neck:  Trachea midline. No thyromegaly, no JVD, No cervical adenopathy. Chest : Adequate spontaneous breathing effort, symmetric bilateral excursions  Lung : Adequate breath sounds bilaterally, clear otherwise  Heart[de-identified] Regular rhythm and rate. No mumur ,  Rub or gallop  ABD: Benign. Non-tender. Non-distended. No masses or organmegaly. Normal bowel sounds. EXT: Mild Pitting edema both lower extremities , No Cyanosis No clubbing  Neuro: no focal weakness  Skin: Warm and dry. No erythema or rash on exposed extremities.       DATA:   Recent Labs     05/19/19  0655 05/20/19  0556   WBC 8.5 12.3*   HGB 13.6 14.0   HCT 40.5 42.9   MCV 99.6 99.9    309     Recent Labs     05/18/19  2330 05/19/19  0655 05/20/19  0556    139 138   K 3.7 3.6 3.9    108* 106   CO2 24 22 20   BUN 13 13 11   CREATININE 1.45* 1.35* 1.23*   GLUCOSE 137* 138* 109*   CALCIUM 8.9 8.5 8.8   PROT 6.7  --   --    LABALBU 3.7  --   --    BILITOT <0.2  --   --    ALKPHOS 104  --   -- AST 14  --   --    ALT 14  --   --    LABGLOM 38.5* 41.8* 46.6*   GFRAA 46.6* 50.6* 56.3*   GLOB 3.0  --   --        No results for input(s): PHART, BVH2FJC, PO2ART, WQQ6SMN, BEART, M2DNSXMT in the last 72 hours. O2 Device: None (Room air)    DIET GENERAL; Carb Control: 4 carb choices (60 gms)/meal; No Added Salt (3-4 GM)     MEDICATIONS during current hospitalization:    Continuous Infusions:   dextrose         Scheduled Meds:   enoxaparin  40 mg Subcutaneous Daily    sulfamethoxazole-trimethoprim  1 tablet Oral 2 times per day    predniSONE  40 mg Oral Daily    guaiFENesin  600 mg Oral BID    budesonide  0.5 mg Nebulization BID    busPIRone  10 mg Oral Daily    DULoxetine  30 mg Oral Daily    folic acid  1 mg Oral Daily    insulin glargine  25 Units Subcutaneous Nightly    levothyroxine  75 mcg Oral Daily    montelukast  10 mg Oral Nightly    insulin lispro  0-6 Units Subcutaneous TID WC    insulin lispro  0-3 Units Subcutaneous Nightly    methocarbamol  750 mg Oral 4x Daily    lidocaine  3 patch Transdermal Daily       PRN Meds:calcium carbonate, nitroGLYCERIN, butalbital-APAP-caffeine, insulin lispro, acetaminophen, glucose, dextrose, glucagon (rDNA), dextrose, ipratropium-albuterol, oxyCODONE-acetaminophen, cyclobenzaprine    Radiology  Xr Chest Standard (2 Vw)    Result Date: 5/19/2019  EXAMINATION:  CHEST RADIOGRAPH (2 VIEW FRONTAL & LATERAL) Exam Date/Time:  5/18/2019 11:30 PM Clinical History:   sob, cp M:  XC2_3 Comparison:  5/7/2019. RESULT: Lines, tubes, and devices:  None. Lungs and pleura:  No consolidation. No pneumothorax. No pleural effusion. Cardiomediastinal silhouette:  Normal. Other:  No acute osseous findings. No acute radiographic abnormality. Xr Chest Standard (2 Vw)    Result Date: 5/7/2019  EXAMINATION: XR CHEST (2 VIEWS): DATE AND TIME: 5/7/2019 at 5:00 AM. CLINICAL HISTORY: SHORTNESS OF BREATH. CHEST PAIN. COMPARISONS: April 19, 2019.  FINDINGS: The heart, mediastinum and pulmonary vasculature are within normal limits. Visualized lung fields are clear. Bones unremarkable. NO ACTIVE LUNG DISEASE. Xr Knee Right (3 Views)    Result Date: 5/7/2019  EXAMINATION: FOUR VIEWS OF THE RIGHT KNEE: DATE AND TIME: 5/7/2019 at 6:45 AM. CLINICAL HISTORY: PAIN WITH NO ACUTE INJURY. COMPARISON: February 28, 2018. FINDINGS: No acute fracture or dislocation. Spurlike projection off the inferior patella again seen, possibly related to old trauma. Mild irregular ossification at the insertion of the patellar tendon. This is unchanged. Mild tricompartmental degenerative changes. No joint effusion. NO ACUTE RADIOGRAPHIC ABNORMALITY OF THE RIGHT KNEE. Us Dup Lower Extremity Right Bill    Result Date: 5/7/2019  Color flow Doppler sonography right lower extremity. HISTORY: Pain and swelling. FINDINGS: Compression, augmentation, and color flow right identified at the level the right common femoral vein, proximal, mid, and distal superficial femoral vein, popliteal vein. Color flow the infrapopliteal venous vasculature identified. No sonographic evidence deep vein thrombosis right lower extremity, common femoral vein through popliteal vein. Xr Chest Pa Or Ap    Result Date: 5/14/2019  MRN: [id number] Patient Name: Stu Mcclaining: CHEST 2 VIEW PA AND LAT; 5/14/2019 10:29 pm  INDICATION: Chest pain and /or palpitations. COMPARISON: 4/4/2019  ACCESSION NUMBER(S): [id number]  ORDERING CLINICIAN: IRA CHAU  FINDINGS: The cardiac silhouette is normal in size. There is perihilar peribronchial thickening. No focal airspace consolidation or pleural effusion. No pneumothorax. IMPRESSION: Perihilar peribronchial thickening which may be secondary to bronchitis. No focal airspace consolidation. Electronically signed by: Abiel Cerda MD            IMPRESSION AND SUGGESTION:  1. Chest pain likely related to pulmonary sarcoidosis flareup  2.  Pulmonary sarcoidosis, she is followed at the sarcoidosis clinic at the [facility], methotrexate was recently added to systemic steroids  Patient is improving, she can be discharged on tapering doses of prednisone down to her baseline dose of 20 mg a day and she will follow-up at the sarcoidosis clinic          Electronically signed by Gregg Simental MD, FCCP on 5/20/2019 at 12:23 PM

## 2019-05-20 NOTE — PROGRESS NOTES
Progress Note  Patient: Lana Hannon  Unit/Bed:  G012/I900-90  YOB: 1970  MRN: [id number]  Acct: [de-identified]   Admitting Diagnosis: Chest pain [R07.9]  Admit Date:  5/18/2019  Hospital Day: 0    Chief Complaint:  Chest pain    Subjective  Female presented complaining of chest pain very atypical nature. We will consult pain management chest pain again is very atypical in nature at times can be reproduced. Enzyme negative EKG is normal sinus pain the patient is asymptomatic cardiac testing    Review of Systems:   Review of Systems   Constitutional: Negative for appetite change, chills, diaphoresis, fatigue and fever. HENT: Negative. Eyes: Negative. Respiratory: Negative for apnea, cough, chest tightness, shortness of breath, wheezing and stridor. Cardiovascular: Positive for chest pain. Negative for palpitations and leg swelling. Gastrointestinal: Negative. Endocrine: Negative. Genitourinary: Negative. Musculoskeletal: Negative. Allergic/Immunologic: Negative. Neurological: Negative. Hematological: Negative. Psychiatric/Behavioral: Negative. Physical Examination:    BP (!) 157/62   Pulse 68   Temp 97.5 °F (36.4 °C) (Oral)   Resp 18   Ht 5' 3\" (1.6 m)   Wt (!) 303 lb (137.4 kg)   LMP 05/04/2019   SpO2 96%   BMI 53.67 kg/m²    Physical Exam   Constitutional: She is oriented to person, place, and time. She appears well-developed and well-nourished. HENT:   Head: Normocephalic. Eyes: Pupils are equal, round, and reactive to light. Neck: No JVD present. No tracheal deviation present. No thyromegaly present. Cardiovascular: Normal rate, regular rhythm, normal heart sounds and intact distal pulses. Exam reveals no gallop and no friction rub. No murmur heard. Pulmonary/Chest: Effort normal. No stridor. No respiratory distress. She has no wheezes. She has no rales. She exhibits tenderness. Abdominal: Soft.  Bowel sounds are normal. Musculoskeletal: Normal range of motion. She exhibits no edema or tenderness. Lymphadenopathy:     She has no cervical adenopathy. Neurological: She is alert and oriented to person, place, and time. Skin: Skin is warm and dry. Psychiatric: She has a normal mood and affect.        LABS:  CBC:   Lab Results   Component Value Date    WBC 12.3 05/20/2019    RBC 4.29 05/20/2019    RBC 4.61 04/04/2019    HGB 14.0 05/20/2019    HCT 42.9 05/20/2019    MCV 99.9 05/20/2019    MCH 32.7 05/20/2019    MCHC 32.7 05/20/2019    RDW 14.4 05/20/2019     05/20/2019    MPV 10.8 04/04/2019     CBC with Differential:   Lab Results   Component Value Date    WBC 12.3 05/20/2019    RBC 4.29 05/20/2019    RBC 4.61 04/04/2019    HGB 14.0 05/20/2019    HCT 42.9 05/20/2019     05/20/2019    MCV 99.9 05/20/2019    MCH 32.7 05/20/2019    MCHC 32.7 05/20/2019    RDW 14.4 05/20/2019    NRBC 0.0 04/04/2019    BANDSPCT 1 04/09/2018    LYMPHOPCT 16.8 05/20/2019    MONOPCT 5.5 05/20/2019    BASOPCT 0.9 05/20/2019    MONOSABS 0.7 05/20/2019    LYMPHSABS 2.1 05/20/2019    EOSABS 0.1 05/20/2019    BASOSABS 0.1 05/20/2019     CMP:    Lab Results   Component Value Date     05/20/2019    K 3.9 05/20/2019     05/20/2019    CO2 20 05/20/2019    BUN 11 05/20/2019    CREATININE 1.23 05/20/2019    GFRAA 56.3 05/20/2019    AGRATIO 1.0 04/04/2019    LABGLOM 46.6 05/20/2019    GLUCOSE 109 05/20/2019    GLUCOSE 153 05/14/2019    PROT 6.7 05/18/2019    LABALBU 3.7 05/18/2019    LABALBU 3.5 05/14/2019    CALCIUM 8.8 05/20/2019    BILITOT <0.2 05/18/2019    ALKPHOS 104 05/18/2019    AST 14 05/18/2019    ALT 14 05/18/2019     BMP:    Lab Results   Component Value Date     05/20/2019    K 3.9 05/20/2019     05/20/2019    CO2 20 05/20/2019    BUN 11 05/20/2019    LABALBU 3.7 05/18/2019    LABALBU 3.5 05/14/2019    CREATININE 1.23 05/20/2019    CALCIUM 8.8 05/20/2019    GFRAA 56.3 05/20/2019    LABGLOM 46.6 05/20/2019 GLUCOSE 109 05/20/2019    GLUCOSE 153 05/14/2019     Magnesium:    Lab Results   Component Value Date    MG 2.0 05/07/2019     Troponin:    Lab Results   Component Value Date    TROPONINI <0.010 05/19/2019       EKG: normal EKG, normal sinus rhythm      Assessment:    Active Hospital Problems    Diagnosis Date Noted    Spondylosis of lumbar region without myelopathy or radiculopathy--OARRS PM&R 5/24/17 [M47.816] 11/30/2015     Priority: High    Cervical spondylosis without myelopathy [M47.812] 11/30/2015     Priority: Medium    Costochondritis [M94.0] 05/19/2019    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 Okay to discharge home from cardiology standpoint  2.  Will follow as outpatient  Electronically signed by SUDEEP Claire CNP on 5/20/2019 at 8:08 AM

## 2019-05-20 NOTE — DISCHARGE INSTR - DIET

## 2019-05-20 NOTE — DISCHARGE SUMMARY
Physician Discharge Summary     Patient ID:  Kj Sanchez  [id number]  89 y.o.  1970    Admit date: 5/18/2019    Discharge date and time: 5/20/19 3:22pm    Admitting Physician: Re Romero DO     Discharge Physician: Nilay Guzman MD    Admission Diagnoses: Chest pain [R07.9]    Discharge Diagnoses: Acute sarcodiosis exacerbation     Admission Condition: fair    Discharged Condition: good    Hospital Course:   Acute sarcoidosis exac - increased steroid and taper at dc, resp status improved. Taper to home dose of prednisone 20mg daily. Cont mtx. Consults: cardiology and pulmonary/intensive care    Significant Diagnostic Studies: negative troponins    Treatments: steroids: prednisone    Discharge Exam:  BP (!) 146/91   Pulse 72   Temp 98.5 °F (36.9 °C) (Oral)   Resp 18   Ht 5' 3\" (1.6 m)   Wt (!) 303 lb (137.4 kg)   LMP 05/04/2019   SpO2 97%   BMI 53.67 kg/m²   General appearance: alert, appears stated age and cooperative  Lungs: clear to auscultation bilaterally  Heart: regular rate and rhythm, S1, S2 normal, no murmur, click, rub or gallop  Abdomen: soft, non-tender; bowel sounds normal; no masses,  no organomegaly  Extremities: extremities normal, atraumatic, no cyanosis or edema  Skin: Skin color, texture, turgor normal. No rashes or lesions    Labs:   Recent Labs     05/18/19 2330 05/19/19  0655 05/20/19  0556   WBC 8.7 8.5 12.3*   HGB 14.3 13.6 14.0   HCT 41.9 40.5 42.9    290 309     Recent Labs     05/18/19 2330 05/19/19  0655 05/20/19  0556    139 138   K 3.7 3.6 3.9    108* 106   CO2 24 22 20   BUN 13 13 11   CREATININE 1.45* 1.35* 1.23*   CALCIUM 8.9 8.5 8.8     Recent Labs     05/18/19 2330   AST 14   ALT 14   BILITOT <0.2   ALKPHOS 104     No results for input(s): INR in the last 72 hours.   Recent Labs     05/18/19 2330 05/19/19  0655 05/19/19  1429   CKTOTAL  --  232* 266*   TROPONINI <0.010 <0.010 <0.010       Urinalysis:   Lab Results   Component Value Date    NITRU Negative 05/07/2019    WBCUA 3-5 05/07/2019    WBCUA 3 12/24/2018    BACTERIA RARE 05/07/2019    RBCUA 0-2 05/07/2019    RBCUA 4 12/24/2018    BLOODU Negative 05/07/2019    SPECGRAV 1.021 05/07/2019    GLUCOSEU Negative 05/07/2019       Radiology:   Most recent    Chest CT      WITH CONTRAST:No results found for this or any previous visit. WITHOUT CONTRAST:   Results for orders placed in visit on 10/03/18   CT Chest WO Contrast    Narrative DATE OF EXAM:      Nov 5 2018  4:19AM    CLINICAL HISTORY/   MRN: [id number]  Patient Name: Tasia Moore:  CT THORAX WITHOUT CONTRAST; 11/5/2018 4:19 am    INDICATION:  Trauma. COMPARISON:  10/12/2018. ACCESSION NUMBER(S):  [id number]    ORDERING CLINICIAN:  CJ PEREIRA    TECHNIQUE:  Helical data acquisition of the chest was obtained without IV contrast   material.  Images were reformatted in axial, coronal, and sagittal planes. FINDINGS:  Limited evaluation due to lack of IV contrast for lymphadenopathy. LUNGS AND AIRWAYS:  The trachea and central airways are patent. No endobronchial lesion. No focal consolidation. Mild septal thickening. Ovoid is    MEDIASTINUM AND RO, LOWER NECK AND AXILLA:  Redemonstration of enlarged mediastinal lymph node, d the left periaortic   lymph node, measuring 2.3 x 1.8 cm (previously it was 1.8 x 1.4 cm. Similarly a right paratracheal lymph node are also enlarged in the   interval, on today's exam measuring 1.7 cm, previously it was 1.5 cm   additionally bilateral hilar lymph node enlargement. The left hilar   lobulated appearance of the lymphadenopathy is also interval increase in   size and it is hard to measure. Status post surgical removal of the right thyroid lobe. The left thyroid   gland is enlarged and there is contour abnormality, suggestive underlying   nodule measures approximately 2.2 cm. There is retro sternal extension. Esophagus appears within normal limits as seen.     HEART Impression NO ACUTE ACTIVE CARDIOPULMONARY PROCESS       Echo No results found for this or any previous visit. Disposition: home    In process/preliminary results:  Outstanding Order Results     Date and Time Order Name Status Description    5/18/2019 2303 EKG 12 Lead Preliminary     4/19/2019 0000 POC PREGNANCY UR-QUAL In process           Patient Instructions:   Current Discharge Medication List      START taking these medications    Details   acetaminophen (TYLENOL) 325 MG tablet Take 2 tablets by mouth every 6 hours as needed for Pain  Qty: 120 tablet, Refills: 3    Associated Diagnoses: Chest pain, unspecified type      lidocaine 4 % external patch Place 3 patches onto the skin daily  Qty: 90 patch, Refills: 5      traMADol (ULTRAM) 50 MG tablet Take 1 tablet by mouth every 6 hours as needed for Pain for up to 5 days. Intended supply: 5 days. Take with Tylenol 650 mg for severe pain  Take lowest dose possible to manage pain  Qty: 20 tablet, Refills: 0    Associated Diagnoses: Chest pain, unspecified type; Sarcoidosis of lung with sarcoidosis of lymph nodes ([facility] [address] Intercostal neuralgia; Costochondritis; Chest pain, atypical         CONTINUE these medications which have CHANGED    Details   predniSONE (DELTASONE) 10 MG tablet Take 4 tablets by mouth daily for 5 days, THEN 3 tablets daily for 5 days, THEN 2 tablets daily.   Qty: 35 tablet, Refills: 0         CONTINUE these medications which have NOT CHANGED    Details   fluticasone-salmeterol (ADVAIR) 250-50 MCG/DOSE AEPB Inhale 1 puff into the lungs 2 times daily      methocarbamol (ROBAXIN) 750 MG tablet Take 750 mg by mouth 4 times daily      methotrexate (RHEUMATREX) 1 g chemo injection Infuse 25 mg intravenously once Indications: 0.4 ml SQ every Monday      levothyroxine (SYNTHROID) 75 MCG tablet Take 75 mcg by mouth Daily      butalbital-acetaminophen-caffeine (FIORICET, ESGIC) -40 MG per tablet TAKE 1 TABLET BY MOUTH THREE TIMES DAILY AS NEEDED FOR HEADACHES  Qty: 21 tablet, Refills: 0    Associated Diagnoses: Migraine with aura and with status migrainosus, not intractable      cetirizine (ZYRTEC) 10 MG tablet Take 1 qhs for allergies  Qty: 30 tablet, Refills: 3    Associated Diagnoses: Acute seasonal allergic rhinitis due to pollen      insulin lispro (ADMELOG) 100 UNIT/ML injection vial Inject 4 Units into the skin 3 times daily as needed for High Blood Sugar  Qty: 1 vial, Refills: 5    Comments: Generic-ok;replaces novolog  Associated Diagnoses: Insulin dependent type 2 diabetes mellitus, uncontrolled (HCC)      DULoxetine (CYMBALTA) 60 MG extended release capsule TAKE 1 CAPSULE BY MOUTH DAILY  Qty: 30 capsule, Refills: 2      insulin glargine (LANTUS SOLOSTAR) 100 UNIT/ML injection pen Inject 25 Units into the skin nightly      montelukast (SINGULAIR) 10 MG tablet Take 1 tab nightly at supper for breathing/allergies  Qty: 30 tablet, Refills: 5    Associated Diagnoses: Chronic seasonal allergic rhinitis due to pollen;  Moderate persistent asthma with acute exacerbation      folic acid (FOLVITE) 1 MG tablet Take 1 tablet by mouth daily  Qty: 30 tablet, Refills: 3      albuterol (PROVENTIL) (2.5 MG/3ML) 0.083% nebulizer solution Take 3 mLs by nebulization every 2 hours as needed for Wheezing  Qty: 120 each, Refills: 3      albuterol sulfate  (90 Base) MCG/ACT inhaler Inhale 2 puffs into the lungs every 4 hours as needed for Wheezing  Qty: 1 Inhaler, Refills: 3      busPIRone (BUSPAR) 10 MG tablet Take 1/2 tab bid wf x 1 week, then 1 tab bid  Qty: 60 tablet, Refills: 2    Associated Diagnoses: Adjustment reaction with anxiety and depression      Insulin Pen Needle (B-D ULTRAFINE III SHORT PEN) 31G X 8 MM MISC Inject 1 each into the skin 3 times daily  Qty: 100 each, Refills: 5      Insulin Syringe-Needle U-100 30G X 1/2\" 1 ML MISC 1 each by Does not apply route daily  Qty: 100 each, Refills: 3    Associated

## 2019-05-24 PROCEDURE — 93010 ELECTROCARDIOGRAM REPORT: CPT | Performed by: INTERNAL MEDICINE

## 2019-05-25 ENCOUNTER — HOSPITAL ENCOUNTER (EMERGENCY)
Age: 49
Discharge: HOME | End: 2019-05-25
Payer: MEDICAID

## 2019-05-25 ENCOUNTER — APPOINTMENT (OUTPATIENT)
Dept: GENERAL RADIOLOGY | Age: 49
End: 2019-05-25
Payer: MEDICAID

## 2019-05-25 VITALS
HEART RATE: 87 BPM | DIASTOLIC BLOOD PRESSURE: 83 MMHG | SYSTOLIC BLOOD PRESSURE: 133 MMHG | WEIGHT: 200 LBS | RESPIRATION RATE: 18 BRPM | BODY MASS INDEX: 35.44 KG/M2 | TEMPERATURE: 98 F | HEIGHT: 63 IN | OXYGEN SATURATION: 98 %

## 2019-05-25 DIAGNOSIS — S93.402A SPRAIN OF LEFT ANKLE, UNSPECIFIED LIGAMENT, INITIAL ENCOUNTER: Primary | ICD-10-CM

## 2019-05-25 DIAGNOSIS — S86.012A STRAIN OF LEFT ACHILLES TENDON, INITIAL ENCOUNTER: ICD-10-CM

## 2019-05-25 PROCEDURE — 73610 X-RAY EXAM OF ANKLE: CPT

## 2019-05-25 PROCEDURE — 99283 EMERGENCY DEPT VISIT LOW MDM: CPT

## 2019-05-25 PROCEDURE — 6370000000 HC RX 637 (ALT 250 FOR IP): Performed by: PHYSICIAN ASSISTANT

## 2019-05-25 RX ORDER — HYDROCODONE BITARTRATE AND ACETAMINOPHEN 5; 325 MG/1; MG/1
1 TABLET ORAL ONCE
Status: COMPLETED | OUTPATIENT
Start: 2019-05-25 | End: 2019-05-25

## 2019-05-25 RX ADMIN — HYDROCODONE BITARTRATE AND ACETAMINOPHEN 1 TABLET: 5; 325 TABLET ORAL at 20:08

## 2019-05-25 ASSESSMENT — PAIN DESCRIPTION - LOCATION: LOCATION: ANKLE

## 2019-05-25 ASSESSMENT — PAIN DESCRIPTION - ORIENTATION: ORIENTATION: LEFT

## 2019-05-25 ASSESSMENT — PAIN SCALES - GENERAL
PAINLEVEL_OUTOF10: 4
PAINLEVEL_OUTOF10: 7

## 2019-05-25 NOTE — ED PROVIDER NOTES
every 4 hours as needed for Wheezing    BLOOD GLUCOSE MONITORING SUPPL (ONETOUCH VERIO) W/DEVICE KIT    TEST 3 TIMES DAILY AS NEEDED    BLOOD GLUCOSE TEST STRIPS (EXACTECH TEST) STRIP    1 each by In Vitro route 3 times daily (Accu-check test strips) As needed.  DX:E11.65    BLOOD GLUCOSE TEST STRIPS (ONETOUCH VERIO) STRIP    TEST 3 TIMES DAILY AS NEEDED    BUSPIRONE (BUSPAR) 10 MG TABLET    Take 1/2 tab bid wf x 1 week, then 1 tab bid    BUTALBITAL-ACETAMINOPHEN-CAFFEINE (FIORICET, ESGIC) -40 MG PER TABLET    TAKE 1 TABLET BY MOUTH THREE TIMES DAILY AS NEEDED FOR HEADACHES    CETIRIZINE (ZYRTEC) 10 MG TABLET    Take 1 qhs for allergies    DULOXETINE (CYMBALTA) 60 MG EXTENDED RELEASE CAPSULE    TAKE 1 CAPSULE BY MOUTH DAILY    FLUTICASONE-SALMETEROL (ADVAIR) 250-50 MCG/DOSE AEPB    Inhale 1 puff into the lungs 2 times daily    FOLIC ACID (FOLVITE) 1 MG TABLET    Take 1 tablet by mouth daily    INSULIN GLARGINE (LANTUS SOLOSTAR) 100 UNIT/ML INJECTION PEN    Inject 25 Units into the skin nightly    INSULIN LISPRO (ADMELOG) 100 UNIT/ML INJECTION VIAL    Inject 4 Units into the skin 3 times daily as needed for High Blood Sugar    INSULIN PEN NEEDLE (B-D ULTRAFINE III SHORT PEN) 31G X 8 MM MISC    Inject 1 each into the skin 3 times daily    INSULIN SYRINGE-NEEDLE U-100 30G X 1/2\" 1 ML MISC    1 each by Does not apply route daily    LEVOTHYROXINE (SYNTHROID) 75 MCG TABLET    Take 75 mcg by mouth Daily    LIDOCAINE 4 % EXTERNAL PATCH    Place 3 patches onto the skin daily    METHOCARBAMOL (ROBAXIN) 750 MG TABLET    Take 750 mg by mouth 4 times daily    METHOTREXATE (RHEUMATREX) 1 G CHEMO INJECTION    Infuse 25 mg intravenously once Indications: 0.4 ml SQ every Monday    MONTELUKAST (SINGULAIR) 10 MG TABLET    Take 1 tab nightly at supper for breathing/allergies    ONE TOUCH ULTRASOFT LANCETS MISC    TEST 3 TIMES DAILY AS NEEDED    PREDNISONE (DELTASONE) 10 MG TABLET    Take 4 tablets by mouth daily for 5 days, THEN 3 [05/25/19 1916]   BP Temp Temp Source Pulse Resp SpO2 Height Weight   133/83 98 °F (36.7 °C) Oral 87 18 98 % 5' 3\" (1.6 m) 200 lb (90.7 kg)       Physical Exam   Constitutional: She is oriented to person, place, and time. She appears well-developed and well-nourished. She is active. No distress. HENT:   Head: Normocephalic and atraumatic. Mouth/Throat: Mucous membranes are normal.   Eyes: Conjunctivae and lids are normal.   Neck: Normal range of motion. Neck supple. Cardiovascular: Normal rate, regular rhythm, normal heart sounds, intact distal pulses and normal pulses. Pulmonary/Chest: Effort normal and breath sounds normal.   Abdominal: Soft. Normal appearance and bowel sounds are normal. There is no tenderness. Musculoskeletal:        Left ankle: She exhibits normal range of motion, no swelling, no ecchymosis, no deformity, no laceration and normal pulse. Tenderness. Medial malleolus tenderness found. No proximal fibula tenderness found. Achilles tendon exhibits pain. Achilles tendon exhibits no defect. Feet:    N/v intact. Lymphadenopathy:     She has no cervical adenopathy. Neurological: She is alert and oriented to person, place, and time. She has normal strength. Skin: Skin is warm, dry and intact. Capillary refill takes less than 2 seconds. No rash noted. She is not diaphoretic. Psychiatric: Judgment and thought content normal.   Nursing note and vitals reviewed. All other labs were within normal range or not returned as of this dictation. EMERGENCY DEPARTMENT COURSE and DIFFERENTIALDIAGNOSIS/MDM:   Vitals:    Vitals:    05/25/19 1916   BP: 133/83   Pulse: 87   Resp: 18   Temp: 98 °F (36.7 °C)   TempSrc: Oral   SpO2: 98%   Weight: 200 lb (90.7 kg)   Height: 5' 3\" (1.6 m)            Xray negative. Medicated in ed for pain. Will treat as sprain with aso brace. F/u with pcp in 2 days and return to ed for new or worsening symptoms.  Offered crutches and declined bc she has some at home. Advised to follow RICE. Return to ed for new or worsening symptoms. Stable and ready fo9r d/c       PROCEDURES:  Unless otherwise noted below, none     Procedures      FINAL IMPRESSION      1. Sprain of left ankle, unspecified ligament, initial encounter    2.  Strain of left Achilles tendon, initial encounter          DISPOSITION/PLAN   DISPOSITION Decision To Discharge 05/25/2019 08:03:05 PM          Cristian Myrick PA-C (electronically signed)  Attending Emergency Physician       Cristian Myrick PA-C  05/25/19 2003

## 2019-05-25 NOTE — ED TRIAGE NOTES
Pt to ER with c/o left ankle pain x couple days, pt states she stepped down into the garage and rolled her ankle, pt states pain is not getting any better, pain 7/10, pt states she is able to bear weight on it, no deformity noted, pt a&ox4

## 2019-05-28 ENCOUNTER — OFFICE VISIT (OUTPATIENT)
Dept: FAMILY MEDICINE CLINIC | Age: 49
End: 2019-05-28
Payer: MEDICAID

## 2019-05-28 VITALS
SYSTOLIC BLOOD PRESSURE: 138 MMHG | OXYGEN SATURATION: 95 % | TEMPERATURE: 98.3 F | BODY MASS INDEX: 47.52 KG/M2 | DIASTOLIC BLOOD PRESSURE: 76 MMHG | HEART RATE: 85 BPM | WEIGHT: 268.2 LBS | HEIGHT: 63 IN

## 2019-05-28 DIAGNOSIS — D86.2 SARCOIDOSIS OF LUNG WITH SARCOIDOSIS OF LYMPH NODES (HCC): ICD-10-CM

## 2019-05-28 DIAGNOSIS — D86.9 SARCOIDOSIS: Primary | ICD-10-CM

## 2019-05-28 DIAGNOSIS — R07.89 CHEST PAIN, ATYPICAL: ICD-10-CM

## 2019-05-28 DIAGNOSIS — K29.00 ACUTE GASTRITIS WITHOUT HEMORRHAGE, UNSPECIFIED GASTRITIS TYPE: ICD-10-CM

## 2019-05-28 DIAGNOSIS — M47.816 LUMBAR SPONDYLOSIS: ICD-10-CM

## 2019-05-28 PROCEDURE — G8427 DOCREV CUR MEDS BY ELIG CLIN: HCPCS | Performed by: NURSE PRACTITIONER

## 2019-05-28 PROCEDURE — 1111F DSCHRG MED/CURRENT MED MERGE: CPT | Performed by: NURSE PRACTITIONER

## 2019-05-28 PROCEDURE — G8417 CALC BMI ABV UP PARAM F/U: HCPCS | Performed by: NURSE PRACTITIONER

## 2019-05-28 PROCEDURE — 99214 OFFICE O/P EST MOD 30 MIN: CPT | Performed by: NURSE PRACTITIONER

## 2019-05-28 PROCEDURE — 1036F TOBACCO NON-USER: CPT | Performed by: NURSE PRACTITIONER

## 2019-05-28 RX ORDER — TRAMADOL HYDROCHLORIDE 50 MG/1
50 TABLET ORAL EVERY 6 HOURS PRN
COMMUNITY
End: 2021-05-17

## 2019-05-28 RX ORDER — OMEPRAZOLE 40 MG/1
40 CAPSULE, DELAYED RELEASE ORAL DAILY
Qty: 30 CAPSULE | Refills: 3 | Status: SHIPPED | OUTPATIENT
Start: 2019-05-28 | End: 2019-08-22

## 2019-05-28 ASSESSMENT — ENCOUNTER SYMPTOMS
SHORTNESS OF BREATH: 0
COUGH: 0
WHEEZING: 0

## 2019-05-28 NOTE — PATIENT INSTRUCTIONS
drink. Your doctor or speech therapist will tell you what kind of thickener to use and how thick to make the liquids. ? Nectar-thick liquids leave a thin coating when they are poured from a spoon. ? Honey-thick liquids drip slowly when they are poured from a spoon. ? Pudding-thick liquids do not pour from a spoon. Your speech therapist will help you learn exercises to train your muscles to work together so you can swallow. You may also need to learn how to position your body or how to put food in your mouth to be able to swallow better. Some people have severe trouble swallowing and can't get enough food and liquids. In those cases, the doctor can insert a feeding tube so the person gets enough nutrients. Follow-up care is a key part of your treatment and safety. Be sure to make and go to all appointments, and call your doctor if you are having problems. It's also a good idea to know your test results and keep a list of the medicines you take. Where can you learn more? Go to [email or web address] org and sign in to your [employer] account. Enter N539 in the [employer] box to learn more about \"Learning About the Diet for Swallowing Problems. \"     If you do not have an account, please click on the \"Sign Up Now\" link. Current as of: September 23, 2018  Content Version: 12.0  © 7153-1934 Healthwise, Incorporated. Care instructions adapted under license by [facility] [place] ([facility]). If you have questions about a medical condition or this instruction, always ask your healthcare professional. [place] [address] any warranty or liability for your use of this information.

## 2019-05-28 NOTE — PROGRESS NOTES
Post-Discharge Transitional Care Management Services or Hospital Follow Up      Maribell Max   YOB: 1970    Date of Office Visit:  5/28/2019  Date of Hospital Admission: 5/25/19  Date of Hospital Discharge: 5/25/19  Readmission Risk Score(high >=14%. Medium >=10%):Readmission Risk Score: 0      Care management risk score Rising risk (score 2-5) and Complex Care (Scores >=6): 10     Non face to face  following discharge, date last encounter closed (first attempt may have been earlier): *No documented post hospital discharge outreach found in the last 14 days *No documented post hospital discharge outreach found in the last 14 days    Call initiated 2 business days of discharge: *No response recorded in the last 14 days     Patient Active Problem List   Diagnosis    Anxiety    Asthma    HTN (hypertension)    Thyroid goiter    Lumbar spondylosis    Cervical spondylosis without myelopathy    Spondylosis of lumbar region without myelopathy or radiculopathy--OARRS PM&R 5/24/17    Renal cancer ([address]    Pure hyperglyceridemia    Morbid obesity ([address]    Transient cerebral ischemia    Marijuana use    Chest pain    Meningitis    SOB (shortness of breath)    Exposure to potential infection    Bronchopneumonia    Headache    Insulin dependent type 2 diabetes mellitus, uncontrolled ([address]    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        Medications listed as ordered at the time of discharge from hospital   [address], [address] [facility] Medication Instructions HILDA:    Printed on:05/28/19 1214   Medication Information                      acetaminophen (TYLENOL) 325 MG tablet  Take 2 tablets by mouth every 6 hours as needed for Pain             albuterol (PROVENTIL) (2.5 MG/3ML) 0.083% nebulizer solution  Take 3 mLs by nebulization every 2 hours as needed for Wheezing             albuterol sulfate  (90 Base) MCG/ACT inhaler  Inhale 2 puffs into the lungs every 4 hours as needed for Wheezing             Blood Glucose Monitoring Suppl (ONETOUCH VERIO) w/Device KIT  TEST 3 TIMES DAILY AS NEEDED             blood glucose test strips (EXACTECH TEST) strip  1 each by In Vitro route 3 times daily (Accu-check test strips) As needed.  DX:E11.65             blood glucose test strips (ONETOUCH VERIO) strip  TEST 3 TIMES DAILY AS NEEDED             busPIRone (BUSPAR) 10 MG tablet  Take 1/2 tab bid wf x 1 week, then 1 tab bid             butalbital-acetaminophen-caffeine (FIORICET, ESGIC) -40 MG per tablet  TAKE 1 TABLET BY MOUTH THREE TIMES DAILY AS NEEDED FOR HEADACHES             cetirizine (ZYRTEC) 10 MG tablet  Take 1 qhs for allergies             DULoxetine (CYMBALTA) 60 MG extended release capsule  TAKE 1 CAPSULE BY MOUTH DAILY             fluticasone-salmeterol (ADVAIR) 250-50 MCG/DOSE AEPB  Inhale 1 puff into the lungs 2 times daily             folic acid (FOLVITE) 1 MG tablet  Take 1 tablet by mouth daily             insulin glargine (LANTUS SOLOSTAR) 100 UNIT/ML injection pen  Inject 25 Units into the skin nightly             insulin lispro (ADMELOG) 100 UNIT/ML injection vial  Inject 4 Units into the skin 3 times daily as needed for High Blood Sugar             Insulin Pen Needle (B-D ULTRAFINE III SHORT PEN) 31G X 8 MM MISC  Inject 1 each into the skin 3 times daily             Insulin Syringe-Needle U-100 30G X 1/2\" 1 ML MISC  1 each by Does not apply route daily             levothyroxine (SYNTHROID) 75 MCG tablet  Take 75 mcg by mouth Daily             lidocaine 4 % external patch  Place 3 patches onto the skin daily             methocarbamol (ROBAXIN) 750 MG tablet  Take 750 mg by mouth 4 times daily             methotrexate (RHEUMATREX) 1 g chemo injection  Infuse 25 mg intravenously once Indications: 0.4 ml SQ every Monday             montelukast (SINGULAIR) 10 MG tablet  Take 1 tab nightly at supper for breathing/allergies             omeprazole (PRILOSEC) 40 MG delayed release capsule  Take 1 capsule by mouth daily             ONE TOUCH ULTRASOFT LANCETS MISC  TEST 3 TIMES DAILY AS NEEDED             predniSONE (DELTASONE) 10 MG tablet  Take 4 tablets by mouth daily for 5 days, THEN 3 tablets daily for 5 days, THEN 2 tablets daily. traMADol (ULTRAM) 50 MG tablet  Take 50 mg by mouth every 6 hours as needed for Pain. Medications marked \"taking\" at this time  Outpatient Medications Marked as Taking for the 5/28/19 encounter (Office Visit) with SUDEEP Holbrook - CNP   Medication Sig Dispense Refill    traMADol (ULTRAM) 50 MG tablet Take 50 mg by mouth every 6 hours as needed for Pain.  omeprazole (PRILOSEC) 40 MG delayed release capsule Take 1 capsule by mouth daily 30 capsule 3    predniSONE (DELTASONE) 10 MG tablet Take 4 tablets by mouth daily for 5 days, THEN 3 tablets daily for 5 days, THEN 2 tablets daily.  35 tablet 0    fluticasone-salmeterol (ADVAIR) 250-50 MCG/DOSE AEPB Inhale 1 puff into the lungs 2 times daily      methocarbamol (ROBAXIN) 750 MG tablet Take 750 mg by mouth 4 times daily      methotrexate (RHEUMATREX) 1 g chemo injection Infuse 25 mg intravenously once Indications: 0.4 ml SQ every Monday      levothyroxine (SYNTHROID) 75 MCG tablet Take 75 mcg by mouth Daily      butalbital-acetaminophen-caffeine (FIORICET, ESGIC) -40 MG per tablet TAKE 1 TABLET BY MOUTH THREE TIMES DAILY AS NEEDED FOR HEADACHES 21 tablet 0    cetirizine (ZYRTEC) 10 MG tablet Take 1 qhs for allergies 30 tablet 3    Insulin Syringe-Needle U-100 30G X 1/2\" 1 ML MISC 1 each by Does not apply route daily 100 each 3    insulin lispro (ADMELOG) 100 UNIT/ML injection vial Inject 4 Units into the skin 3 times daily as needed for High Blood Sugar 1 vial 5    DULoxetine (CYMBALTA) 60 MG extended release capsule TAKE 1 CAPSULE BY MOUTH DAILY (Patient taking differently: Take 30 mg by mouth daily ) 30 capsule 2    blood glucose test strips (ONETOUCH VERIO) strip TEST 3 TIMES DAILY AS NEEDED 300 each 3    Blood Glucose Monitoring Suppl (ONETOUCH VERIO) w/Device KIT TEST 3 TIMES DAILY AS NEEDED 1 kit 0    ONE TOUCH ULTRASOFT LANCETS MISC TEST 3 TIMES DAILY AS NEEDED 300 each 3    blood glucose test strips (EXACTECH TEST) strip 1 each by In Vitro route 3 times daily (Accu-check test strips) As needed. DX:E11.65 300 strip 5    insulin glargine (LANTUS SOLOSTAR) 100 UNIT/ML injection pen Inject 25 Units into the skin nightly      montelukast (SINGULAIR) 10 MG tablet Take 1 tab nightly at supper for breathing/allergies 30 tablet 5    folic acid (FOLVITE) 1 MG tablet Take 1 tablet by mouth daily 30 tablet 3    albuterol (PROVENTIL) (2.5 MG/3ML) 0.083% nebulizer solution Take 3 mLs by nebulization every 2 hours as needed for Wheezing 120 each 3    albuterol sulfate  (90 Base) MCG/ACT inhaler Inhale 2 puffs into the lungs every 4 hours as needed for Wheezing 1 Inhaler 3    busPIRone (BUSPAR) 10 MG tablet Take 1/2 tab bid wf x 1 week, then 1 tab bid (Patient taking differently: Take 10 mg by mouth daily Take 1/2 tab bid wf x 1 week, then 1 tab bid) 60 tablet 2        Medications patient taking as of now reconciled against medications ordered at time of hospital discharge: Yes    Chief Complaint   Patient presents with    Follow-Up from Hospital     Pt presents today to follow up from hospital. Pt was in [facility] 05/18-05/20/2019. Pt was admitted for spondylosis of the lumbar region. HPI Pt sees Dr. Poon pulmonology.  She messaged him about recent admission and hasn't heard back yet as far as new appointment. Next appt June 6th. Sees pain management a week ago, but was unable to get in. She has another appt scheduled for this Friday the 31st.     Pt also sprained ankle a couple of days ago. She stepped out in to the garage and stepped down on ankle. She went to ER and had an XR that showed no fracture. They gave her a walking boot. Pt reports that she also has fibromyalgia. She reports that she has taken gabapentin for it and duloxetine. She reports she stopped the gabapentin wasn't working. It was only making her tired. Pt reports that she will be trying to eat food and feels the food gets stuck for about the last 7 months since she had a throat surgery. She denies any acid reflux, nausea, or vomiting. Inpatient course: Discharge summary reviewed- see chart. Interval history/Current status: Stable    Review of Systems   Constitutional: Negative for chills, fatigue and fever. Respiratory: Negative for cough, shortness of breath and wheezing. Cardiovascular: Positive for chest pain. Negative for palpitations. Vitals:    05/28/19 1134   BP: 138/76   Site: Right Upper Arm   Position: Sitting   Cuff Size: Large Adult   Pulse: 85   Temp: 98.3 °F (36.8 °C)   TempSrc: Temporal   SpO2: 95%   Weight: 268 lb 3.2 oz (121.7 kg)   Height: 5' 3\" (1.6 m)     Body mass index is 47.51 kg/m². Wt Readings from Last 3 Encounters:   05/28/19 268 lb 3.2 oz (121.7 kg)   05/25/19 200 lb (90.7 kg)   05/20/19 (!) 303 lb (137.4 kg)     BP Readings from Last 3 Encounters:   05/28/19 138/76   05/25/19 133/83   05/20/19 (!) 146/91       Physical Exam   Constitutional: She is oriented to person, place, and time. She appears well-developed and well-nourished. HENT:   Head: Normocephalic.    Right Ear: Tympanic membrane, external ear and ear canal normal.   Left Ear: Tympanic membrane, external ear and ear canal normal. Nose: Nose normal.   Mouth/Throat: Uvula is midline, oropharynx is clear and moist and mucous membranes are normal.   Eyes: Conjunctivae are normal. Right eye exhibits no discharge. Left eye exhibits no discharge. Neck: Normal range of motion. Cardiovascular: Normal rate, regular rhythm and normal heart sounds. Pulmonary/Chest: Effort normal and breath sounds normal. No respiratory distress. Lymphadenopathy:     She has no cervical adenopathy. Neurological: She is alert and oriented to person, place, and time. Skin: Skin is warm and dry. Psychiatric: She has a normal mood and affect. Her behavior is normal.           Assessment/Plan:  1. Sarcoidosis  Continue prednisone. Keep f/u with pulmonology and pain management. - PA DISCHARGE MEDS RECONCILED W/ CURRENT OUTPATIENT MED LIST    2. 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 Keep f/u with pulm and pain management. - PA DISCHARGE MEDS RECONCILED W/ CURRENT OUTPATIENT MED LIST    3. Lumbar spondylosis  F/u with pain management. - PA DISCHARGE MEDS RECONCILED W/ CURRENT OUTPATIENT MED LIST    4. Chest pain, atypical  Continue prednisone. F/u with pain management. - PA DISCHARGE MEDS RECONCILED W/ CURRENT OUTPATIENT MED LIST    5. Acute gastritis without hemorrhage, unspecified gastritis type  Medication as prescribed. - omeprazole (PRILOSEC) 40 MG delayed release capsule; Take 1 capsule by mouth daily  Dispense: 30 capsule;  Refill: 3        Medical Decision Making: moderate complexity

## 2019-05-29 ENCOUNTER — HOSPITAL ENCOUNTER (EMERGENCY)
Age: 49
Discharge: HOME | End: 2019-05-29
Payer: MEDICAID

## 2019-05-29 ENCOUNTER — APPOINTMENT (OUTPATIENT)
Dept: GENERAL RADIOLOGY | Age: 49
End: 2019-05-29
Payer: MEDICAID

## 2019-05-29 VITALS
RESPIRATION RATE: 17 BRPM | DIASTOLIC BLOOD PRESSURE: 88 MMHG | SYSTOLIC BLOOD PRESSURE: 147 MMHG | BODY MASS INDEX: 47.47 KG/M2 | WEIGHT: 268 LBS | TEMPERATURE: 97.3 F | OXYGEN SATURATION: 94 % | HEART RATE: 69 BPM

## 2019-05-29 DIAGNOSIS — R07.9 RECURRENT CHEST PAIN: ICD-10-CM

## 2019-05-29 DIAGNOSIS — D86.9 SARCOIDOSIS: Primary | ICD-10-CM

## 2019-05-29 LAB
ALBUMIN SERPL-MCNC: 3.6 G/DL (ref 3.5–4.6)
ALP BLD-CCNC: 94 U/L (ref 40–130)
ALT SERPL-CCNC: 12 U/L (ref 0–33)
ANION GAP SERPL CALCULATED.3IONS-SCNC: 10 MEQ/L (ref 9–15)
AST SERPL-CCNC: 15 U/L (ref 0–35)
BASOPHILS ABSOLUTE: 0.1 K/UL (ref 0–0.2)
BASOPHILS RELATIVE PERCENT: 0.8 %
BILIRUB SERPL-MCNC: <0.2 MG/DL (ref 0.2–0.7)
BUN BLDV-MCNC: 9 MG/DL (ref 6–20)
CALCIUM SERPL-MCNC: 8.5 MG/DL (ref 8.5–9.9)
CHLORIDE BLD-SCNC: 108 MEQ/L (ref 95–107)
CO2: 22 MEQ/L (ref 20–31)
CREAT SERPL-MCNC: 1.22 MG/DL (ref 0.5–0.9)
EKG ATRIAL RATE: 74 BPM
EKG P AXIS: 42 DEGREES
EKG P-R INTERVAL: 146 MS
EKG Q-T INTERVAL: 374 MS
EKG QRS DURATION: 76 MS
EKG QTC CALCULATION (BAZETT): 415 MS
EKG R AXIS: 14 DEGREES
EKG T AXIS: 5 DEGREES
EKG VENTRICULAR RATE: 74 BPM
EOSINOPHILS ABSOLUTE: 0.3 K/UL (ref 0–0.7)
EOSINOPHILS RELATIVE PERCENT: 2.8 %
GFR AFRICAN AMERICAN: 56.9
GFR NON-AFRICAN AMERICAN: 47
GLOBULIN: 3.3 G/DL (ref 2.3–3.5)
GLUCOSE BLD-MCNC: 91 MG/DL (ref 70–99)
HCT VFR BLD CALC: 44.4 % (ref 37–47)
HEMOGLOBIN: 15.4 G/DL (ref 12–16)
LYMPHOCYTES ABSOLUTE: 1.8 K/UL (ref 1–4.8)
LYMPHOCYTES RELATIVE PERCENT: 19.2 %
MAGNESIUM: 2.2 MG/DL (ref 1.7–2.4)
MCH RBC QN AUTO: 33.9 PG (ref 27–31.3)
MCHC RBC AUTO-ENTMCNC: 34.7 % (ref 33–37)
MCV RBC AUTO: 97.7 FL (ref 82–100)
MONOCYTES ABSOLUTE: 0.6 K/UL (ref 0.2–0.8)
MONOCYTES RELATIVE PERCENT: 6.3 %
NEUTROPHILS ABSOLUTE: 6.5 K/UL (ref 1.4–6.5)
NEUTROPHILS RELATIVE PERCENT: 70.9 %
PDW BLD-RTO: 14.3 % (ref 11.5–14.5)
PLATELET # BLD: 235 K/UL (ref 130–400)
POTASSIUM SERPL-SCNC: 4.2 MEQ/L (ref 3.4–4.9)
RBC # BLD: 4.54 M/UL (ref 4.2–5.4)
REASON FOR REJECTION: NORMAL
REASON FOR REJECTION: NORMAL
REJECTED TEST: NORMAL
REJECTED TEST: NORMAL
SODIUM BLD-SCNC: 140 MEQ/L (ref 135–144)
TOTAL CK: 157 U/L (ref 0–170)
TOTAL PROTEIN: 6.9 G/DL (ref 6.3–8)
TROPONIN: <0.01 NG/ML (ref 0–0.01)
TROPONIN: <0.01 NG/ML (ref 0–0.01)
WBC # BLD: 9.2 K/UL (ref 4.8–10.8)

## 2019-05-29 PROCEDURE — 96376 TX/PRO/DX INJ SAME DRUG ADON: CPT

## 2019-05-29 PROCEDURE — 96375 TX/PRO/DX INJ NEW DRUG ADDON: CPT

## 2019-05-29 PROCEDURE — 93010 ELECTROCARDIOGRAM REPORT: CPT | Performed by: INTERNAL MEDICINE

## 2019-05-29 PROCEDURE — 71045 X-RAY EXAM CHEST 1 VIEW: CPT

## 2019-05-29 PROCEDURE — 82550 ASSAY OF CK (CPK): CPT

## 2019-05-29 PROCEDURE — 6360000002 HC RX W HCPCS: Performed by: NURSE PRACTITIONER

## 2019-05-29 PROCEDURE — 96374 THER/PROPH/DIAG INJ IV PUSH: CPT

## 2019-05-29 PROCEDURE — 83735 ASSAY OF MAGNESIUM: CPT

## 2019-05-29 PROCEDURE — 85025 COMPLETE CBC W/AUTO DIFF WBC: CPT

## 2019-05-29 PROCEDURE — 80053 COMPREHEN METABOLIC PANEL: CPT

## 2019-05-29 PROCEDURE — 84484 ASSAY OF TROPONIN QUANT: CPT

## 2019-05-29 PROCEDURE — 93005 ELECTROCARDIOGRAM TRACING: CPT | Performed by: EMERGENCY MEDICINE

## 2019-05-29 PROCEDURE — 36415 COLL VENOUS BLD VENIPUNCTURE: CPT

## 2019-05-29 PROCEDURE — 99285 EMERGENCY DEPT VISIT HI MDM: CPT

## 2019-05-29 RX ORDER — ONDANSETRON 2 MG/ML
4 INJECTION INTRAMUSCULAR; INTRAVENOUS EVERY 10 MIN PRN
Status: DISCONTINUED | OUTPATIENT
Start: 2019-05-29 | End: 2019-05-29 | Stop reason: HOSPADM

## 2019-05-29 RX ORDER — MORPHINE SULFATE 2 MG/ML
4 INJECTION, SOLUTION INTRAMUSCULAR; INTRAVENOUS EVERY 30 MIN PRN
Status: DISCONTINUED | OUTPATIENT
Start: 2019-05-29 | End: 2019-05-29 | Stop reason: HOSPADM

## 2019-05-29 RX ADMIN — MORPHINE SULFATE 4 MG: 2 INJECTION, SOLUTION INTRAMUSCULAR; INTRAVENOUS at 13:42

## 2019-05-29 RX ADMIN — ONDANSETRON 4 MG: 2 INJECTION INTRAMUSCULAR; INTRAVENOUS at 13:42

## 2019-05-29 RX ADMIN — MORPHINE SULFATE 4 MG: 2 INJECTION, SOLUTION INTRAMUSCULAR; INTRAVENOUS at 15:32

## 2019-05-29 ASSESSMENT — PAIN SCALES - GENERAL
PAINLEVEL_OUTOF10: 5
PAINLEVEL_OUTOF10: 8

## 2019-05-29 ASSESSMENT — PAIN DESCRIPTION - LOCATION
LOCATION: CHEST
LOCATION: CHEST

## 2019-05-29 ASSESSMENT — PAIN DESCRIPTION - DESCRIPTORS: DESCRIPTORS: PRESSURE

## 2019-05-29 ASSESSMENT — HEART SCORE: ECG: 0

## 2019-05-29 NOTE — ED PROVIDER NOTES
[address] [facility] ED  eMERGENCY dEPARTMENT eNCOUnter   Independent Attestation     Pt Name: Arias Hernandez  MRN: [id number]  [place] 1970  Date of evaluation: 5/28/19     Arias Hernandez is a 51 y.o. female with CC: Ankle Pain (left , pt thinks she sprained it)      Based on the medical record the care appears appropriate. I was personally available for consultation in the Emergency Department.     Ovidio Hendricks MD  Attending Emergency Physician                    Ovidio Hendricks MD  05/28/19 3141

## 2019-05-29 NOTE — ED PROVIDER NOTES
14    THYROIDECTOMY  2019    DR. MAGDALENO    URETER STENT PLACEMENT Left 2016     Social History     Socioeconomic History    Marital status: Legally      Spouse name: None    Number of children: None    Years of education: None    Highest education level: None   Occupational History    None   Social Needs    Financial resource strain: None    Food insecurity:     Worry: None     Inability: None    Transportation needs:     Medical: None     Non-medical: None   Tobacco Use    Smoking status: Former Smoker     Packs/day: 0.50     Years: 15.00     Pack years: 7.50     Types: Cigarettes     Start date: 2014     Last attempt to quit: 2015     Years since quittin.3    Smokeless tobacco: Former User     Quit date: 3/23/2016   Substance and Sexual Activity    Alcohol use: Yes     Alcohol/week: 0.0 oz     Comment: occasionally    Drug use: Yes     Types: Marijuana     Comment: one month ago    Sexual activity: None   Lifestyle    Physical activity:     Days per week: None     Minutes per session: None    Stress: None   Relationships    Social connections:     Talks on phone: None     Gets together: None     Attends [employer]ian service: None     Active member of club or organization: None     Attends meetings of clubs or organizations: None     Relationship status: None    Intimate partner violence:     Fear of current or ex partner: None     Emotionally abused: None     Physically abused: None     Forced sexual activity: None   Other Topics Concern    None   Social History Narrative    None       SCREENINGS      @FLOW([id number])@      PHYSICAL EXAM    (up to 7 for level 4, 8 or more for level 5)     ED Triage Vitals [19 1212]   BP Temp Temp Source Pulse Resp SpO2 Height Weight   (!) 147/76 97.3 °F (36.3 °C) Temporal 83 18 96 % -- 268 lb (121.6 kg)       Physical Exam   Constitutional: She is oriented to person, place, and time.  She appears well-developed and well-nourished. She is active. No distress. HENT:   Head: Normocephalic and atraumatic. Mouth/Throat: Mucous membranes are normal.   Eyes: Conjunctivae and lids are normal.   Neck: Normal range of motion. Neck supple. Cardiovascular: Normal rate, regular rhythm, normal heart sounds, intact distal pulses and normal pulses. Exam reveals no gallop and no friction rub. No murmur heard. Pulmonary/Chest: Effort normal and breath sounds normal.   No respiratory distress. No conversational dyspnea. No stridor or grunting or accessory muscle usage. Lungs sounds are clear with good air exchange. No rhonchi rales or wheezes. Abdominal: Soft. Normal appearance and bowel sounds are normal. There is no tenderness. Lymphadenopathy:     She has no cervical adenopathy. Neurological: She is alert and oriented to person, place, and time. She has normal strength. Skin: Skin is warm, dry and intact. Capillary refill takes less than 2 seconds. No rash noted. She is not diaphoretic. Psychiatric: Judgment and thought content normal.   Nursing note and vitals reviewed. DIAGNOSTIC RESULTS     EKG: All EKG's are interpreted by the Emergency Department Physician who either signs or Co-signsthis chart in the absence of a cardiologist.    nsr at 74, no acute st segment change    RADIOLOGY:   Non-plain filmimages such as CT, Ultrasound and MRI are read by the radiologist. Plain radiographic images are visualized and preliminarily interpreted by the emergency physician with the below findings:    Chest X-Ray demonstrates no acute focal infiltrate, effusion or pneumothorax. Interpretation per the Radiologist below, if available at the time ofthis note:    XR CHEST PORTABLE   Final Result   NO EVIDENCE OF ACTIVE DISEASE IN THE CHEST.                      ED BEDSIDE ULTRASOUND:   Performed by ED Physician - none    LABS:  Labs Reviewed   CBC WITH AUTO DIFFERENTIAL - Abnormal; Notable for the following components: Result Value    MCH 33.9 (*)     All other components within normal limits   COMPREHENSIVE METABOLIC PANEL - Abnormal; Notable for the following components:    Chloride 108 (*)     CREATININE 1.22 (*)     GFR Non- 47.0 (*)     GFR  56.9 (*)     All other components within normal limits   TROPONIN   SPECIMEN REJECTION   CK   MAGNESIUM   SPECIMEN REJECTION   TROPONIN       All other labs were within normal range or not returned as of this dictation. EMERGENCY DEPARTMENT COURSE and DIFFERENTIAL DIAGNOSIS/MDM:   Vitals:    Vitals:    05/29/19 1212 05/29/19 1339   BP: (!) 147/76 (!) 153/93   Pulse: 83 68   Resp: 18 18   Temp: 97.3 °F (36.3 °C)    TempSrc: Temporal    SpO2: 96% 97%   Weight: 268 lb (121.6 kg)             MDM on exam she is nontoxic and in no distress. She is able to converse easily without conversational dyspnea. W/u in ed, negative. Heart score 3. I do not believe this to be cardiac pain. This is a recurrent pain and stated pain from sarcoidosis. Her troponin was again negative with onset of pain last night. No changes in EKG. This is likely the recurent pain from sarcoidosis. She improved with morphine in ED. Extended dc instructions provided including s/sx and red flags to return. She expressed good understanding. Standard anticipatory guidance given to patient upon discharge. Have given them a specific time frame in which to follow-up and who to follow-up with. I have also advised them that they should return to the emergency department if they get worse, or not getting better or develop any new or concerning symptoms. Patient demonstrates understanding and all questions were answered. CRITICAL CARE TIME       CONSULTS:  None    PROCEDURES:  Unless otherwise noted below, none     Procedures    FINAL IMPRESSION      1. Sarcoidosis    2.  Recurrent chest pain          DISPOSITION/PLAN   DISPOSITION Decision To Discharge 05/29/2019 03:51:23 PM      PATIENT REFERRED TO:  SUDEEP Hayes CNP  [address] [facility]  [place] [phone]    In 1 day  For continued evaluation and management    Leon Blue MD  [address] [place] [address] [phone]    Call in 1 day  For continued evaluation and management      DISCHARGE MEDICATIONS:  New Prescriptions    No medications on file     Attestation: The Prescription Monitoring Report for this patient was reviewed today.  SUDEEP Valenzuela CNP)    (Please notethat portions of this note were completed with a voice recognition program.  Efforts were made to edit the dictations but occasionally words are mis-transcribed.)    SUDEEP Valenzuela CNP (electronically signed)  Attending Emergency Physician          SUDEEP Valenzuela CNP  05/29/19 [id number]

## 2019-05-29 NOTE — ED TRIAGE NOTES
Pt a/o x 3 skin pink w/d resp non labored. Pt report cp mid sternal heaviness non radiating wth sbo. Lungs clear but very diminished. Pt states chest hurts to take a deep breath and that it burns in chest.neg edema noted.

## 2019-05-29 NOTE — ED NOTES
Labs obtained rt upper forearm but unable to obtain iv access. Xray at bedside. Hamilton Ortega Pacer  05/29/19 9903

## 2019-06-04 DIAGNOSIS — Z85.528 HISTORY OF RENAL CARCINOMA: ICD-10-CM

## 2019-06-04 DIAGNOSIS — N18.9 CHRONIC KIDNEY DISEASE, UNSPECIFIED CKD STAGE: Chronic | ICD-10-CM

## 2019-06-04 DIAGNOSIS — J30.9 ALLERGIC RHINITIS, UNSPECIFIED SEASONALITY, UNSPECIFIED TRIGGER: ICD-10-CM

## 2019-06-04 DIAGNOSIS — D86.2 SARCOIDOSIS OF LUNG WITH SARCOIDOSIS OF LYMPH NODES (HCC): Primary | ICD-10-CM

## 2019-06-04 RX ORDER — FEXOFENADINE HCL 180 MG/1
180 TABLET ORAL DAILY
Qty: 30 TABLET | Refills: 3 | Status: SHIPPED | OUTPATIENT
Start: 2019-06-04 | End: 2019-07-04

## 2019-06-04 RX ORDER — FLUTICASONE PROPIONATE 50 MCG
1 SPRAY, SUSPENSION (ML) NASAL DAILY
Qty: 1 BOTTLE | Refills: 3 | Status: SHIPPED | OUTPATIENT
Start: 2019-06-04 | End: 2021-04-12 | Stop reason: SDUPTHER

## 2019-06-11 ENCOUNTER — HOSPITAL ENCOUNTER (EMERGENCY)
Age: 49
Discharge: HOME | End: 2019-06-11
Attending: EMERGENCY MEDICINE
Payer: MEDICAID

## 2019-06-11 VITALS
HEIGHT: 63 IN | HEART RATE: 76 BPM | RESPIRATION RATE: 18 BRPM | OXYGEN SATURATION: 97 % | SYSTOLIC BLOOD PRESSURE: 134 MMHG | WEIGHT: 260 LBS | BODY MASS INDEX: 46.07 KG/M2 | TEMPERATURE: 98.1 F | DIASTOLIC BLOOD PRESSURE: 83 MMHG

## 2019-06-11 DIAGNOSIS — H10.30 ACUTE BACTERIAL CONJUNCTIVITIS, UNSPECIFIED LATERALITY: Primary | ICD-10-CM

## 2019-06-11 PROCEDURE — 99282 EMERGENCY DEPT VISIT SF MDM: CPT

## 2019-06-11 RX ORDER — FEXOFENADINE HCL 180 MG/1
180 TABLET ORAL DAILY
Qty: 10 TABLET | Refills: 0 | Status: SHIPPED | OUTPATIENT
Start: 2019-06-11 | End: 2019-10-28

## 2019-06-11 RX ORDER — TOBRAMYCIN 3 MG/ML
1 SOLUTION/ DROPS OPHTHALMIC EVERY 4 HOURS
Qty: 5 ML | Refills: 0 | Status: SHIPPED | OUTPATIENT
Start: 2019-06-11 | End: 2019-06-21

## 2019-06-11 ASSESSMENT — PAIN DESCRIPTION - LOCATION: LOCATION: EYE

## 2019-06-11 ASSESSMENT — PAIN DESCRIPTION - ORIENTATION: ORIENTATION: LEFT;RIGHT

## 2019-06-11 ASSESSMENT — PAIN SCALES - GENERAL: PAINLEVEL_OUTOF10: 6

## 2019-06-11 ASSESSMENT — PAIN DESCRIPTION - FREQUENCY: FREQUENCY: CONTINUOUS

## 2019-06-11 ASSESSMENT — PAIN DESCRIPTION - DESCRIPTORS: DESCRIPTORS: ITCHING;CONSTANT

## 2019-06-11 ASSESSMENT — PAIN DESCRIPTION - PAIN TYPE: TYPE: ACUTE PAIN

## 2019-06-11 NOTE — ED PROVIDER NOTES
[address] [facility] ED  eMERGENCY dEPARTMENT eNCOUnter      Pt Name: Candi Chavez  MRN: [id number]  [place] 1970  Date of evaluation: 6/11/2019  Provider: Celestino Steiner MD    [address] [place] [address]       Chief Complaint   Patient presents with    Eye Problem     pt c/o bilat eye irritation and drainage for the past 2 days         HISTORY OF PRESENT ILLNESS   (Location/Symptom, Timing/Onset,Context/Setting, Quality, Duration, Modifying Factors, Severity)  Note limiting factors. Candi hCavez is a 51 y.o. female who presents to the emergency department bilateral itchy eyes. This is been a 2-day duration. Vision is not affected. There is no trauma or foreign body. She is not diabetic and does not smoke    HPI    NursingNotes were reviewed. REVIEW OF SYSTEMS    (2-9 systems for level 4, 10 or more for level 5)     Review of Systems  Constitutional symptoms:  no Fatigue, no fever, no chills. Skin symptoms:  Negative except as documented in HPI. ENMT symptoms:  Negative except as documented in HPI. Itchy eyesas above though with discharge  Respiratory symptoms:  Negative except as documented in HPI. Cardiovascular symptoms:  Negative except as documented in HPI. Gastrointestinal symptoms: Negative except for documented as above in the HPI   Genitourinary symptoms:  Negative except as documented in HPI. Musculoskeletal symptoms:  Negative except as documented in HPI. Neurologic symptoms:  Negative except as documented in HPI. Remainder of 10 systems, all negative except for mentioned above    Except as noted above the remainder of the review of systems was reviewed and negative.        PAST MEDICAL HISTORY     Past Medical History:   Diagnosis Date    Anxiety     Arthritis     Asthma     Bronchopneumonia     Cancer ([address]     renal    Cerebral artery occlusion with cerebral infarction ([address]     Chronic bilateral low back pain with sciatica     Chronic kidney disease     Depression     Hypertension     Insulin dependent type 2 diabetes mellitus, uncontrolled ([facility] 8/3/2018    Mixed headache     Pure hyperglyceridemia 5/19/2017    Sarcoidosis     Sleep apnea     does not wear cpap    Thyroid goiter          SURGICALHISTORY       Past Surgical History:   Procedure Laterality Date    BRONCHOSCOPY  10/26/2018    DR. STEARNS    KIDNEY REMOVAL Right 08/2016    THYROID LOBECTOMY Right 6/13/14    THYROIDECTOMY  02/21/2019    DR. MAGDALENO    URETER STENT PLACEMENT Left 08/2016         CURRENT MEDICATIONS       Previous Medications    ACETAMINOPHEN (TYLENOL) 325 MG TABLET    Take 2 tablets by mouth every 6 hours as needed for Pain    ALBUTEROL (PROVENTIL) (2.5 MG/3ML) 0.083% NEBULIZER SOLUTION    Take 3 mLs by nebulization every 2 hours as needed for Wheezing    ALBUTEROL SULFATE  (90 BASE) MCG/ACT INHALER    Inhale 2 puffs into the lungs every 4 hours as needed for Wheezing    BLOOD GLUCOSE MONITORING SUPPL (ONETOUCH VERIO) W/DEVICE KIT    TEST 3 TIMES DAILY AS NEEDED    BLOOD GLUCOSE TEST STRIPS (EXACTECH TEST) STRIP    1 each by In Vitro route 3 times daily (Accu-check test strips) As needed.  DX:E11.65    BLOOD GLUCOSE TEST STRIPS (ONETOUCH VERIO) STRIP    TEST 3 TIMES DAILY AS NEEDED    BUSPIRONE (BUSPAR) 10 MG TABLET    Take 1/2 tab bid wf x 1 week, then 1 tab bid    BUTALBITAL-ACETAMINOPHEN-CAFFEINE (FIORICET, ESGIC) -40 MG PER TABLET    TAKE 1 TABLET BY MOUTH THREE TIMES DAILY AS NEEDED FOR HEADACHES    CETIRIZINE (ZYRTEC) 10 MG TABLET    Take 1 qhs for allergies    DULOXETINE (CYMBALTA) 60 MG EXTENDED RELEASE CAPSULE    TAKE 1 CAPSULE BY MOUTH DAILY    FEXOFENADINE (ALLEGRA) 180 MG TABLET    Take 1 tablet by mouth daily    FLUTICASONE (FLONASE) 50 MCG/ACT NASAL SPRAY    1 spray by Nasal route daily    FLUTICASONE-SALMETEROL (ADVAIR) 250-50 MCG/DOSE AEPB    Inhale 1 puff into the lungs 2 times daily    FOLIC ACID (FOLVITE) 1 MG TABLET    Take 1 tablet by mouth needs: Medical: None     Non-medical: None   Tobacco Use    Smoking status: Former Smoker     Packs/day: 0.50     Years: 15.00     Pack years: 7.50     Types: Cigarettes     Start date: 2014     Last attempt to quit: 2015     Years since quittin.3    Smokeless tobacco: Former User     Quit date: 3/23/2016   Substance and Sexual Activity    Alcohol use: Yes     Alcohol/week: 0.0 oz     Comment: occasionally    Drug use: Yes     Types: Marijuana     Comment: one month ago    Sexual activity: None   Lifestyle    Physical activity:     Days per week: None     Minutes per session: None    Stress: None   Relationships    Social connections:     Talks on phone: None     Gets together: None     Attends Orthodox service: None     Active member of club or organization: None     Attends meetings of clubs or organizations: None     Relationship status: None    Intimate partner violence:     Fear of current or ex partner: None     Emotionally abused: None     Physically abused: None     Forced sexual activity: None   Other Topics Concern    None   Social History Narrative    None       SCREENINGS      @FLOW([id number])@      PHYSICAL EXAM    (up to 7 for level 4, 8 or more for level 5)     ED Triage Vitals [19 1902]   BP Temp Temp Source Pulse Resp SpO2 Height Weight   134/83 98.1 °F (36.7 °C) Oral 76 18 97 % 5' 3\" (1.6 m) 260 lb (117.9 kg)       Physical Exam     CONST: -Well-developed well-nourished ;                -In no acute distress. -Vitals reviewed. EYES: -EOM intact, ANA:              -Sclera normal and conjunctiva: clear bilaterally. Bilateral discharge from the eyes, bilateral conjunctival hyperemia  ENT: - Normal pharynx pink and moist.    NECK: -Supple (chin-to-chest).     CARD: -Rate and rhythm: Regular              -Murmurs: No  RESP: -Respiratory effort and chest excursion with respirations: Normal             -Breath sounds equal bilaterally: Clear urgent intervention. CONSULTS:  None    PROCEDURES:  Unless otherwise noted below, none     Procedures    FINAL IMPRESSION      1.  Acute bacterial conjunctivitis, unspecified laterality          DISPOSITION/PLAN   DISPOSITION Decision To Discharge 06/11/2019 07:27:39 PM      PATIENT REFERRED TO:  [facility]  [address]  In 1 week  As needed      DISCHARGE MEDICATIONS:  New Prescriptions    FEXOFENADINE (ALLEGRA ALLERGY) 180 MG TABLET    Take 1 tablet by mouth daily    TOBRAMYCIN (TOBREX) 0.3 % OPHTHALMIC SOLUTION    Place 1 drop into both eyes every 4 hours for 10 days          (Please note that portions of this note were completed with a voice recognition program.  Efforts were made to edit the dictations but occasionally words are mis-transcribed.)    Chyna Srinivasan MD (electronically signed)  Attending Emergency Physician         Chyna Srinivasan MD  06/11/19 5917

## 2019-06-11 NOTE — ED TRIAGE NOTES
Pt c/o bilat eye irritation and drainage for the past 2 days, no redness or drainage noted at this time, Pt is A&OX3, calm, ambulatory, afebrile, breathes are equal and unlabored.

## 2019-06-12 ENCOUNTER — HOSPITAL ENCOUNTER (EMERGENCY)
Age: 49
Discharge: HOME | End: 2019-06-13
Payer: MEDICAID

## 2019-06-12 ENCOUNTER — APPOINTMENT (OUTPATIENT)
Dept: CT IMAGING | Age: 49
End: 2019-06-12
Payer: MEDICAID

## 2019-06-12 DIAGNOSIS — R51.9 ACUTE NONINTRACTABLE HEADACHE, UNSPECIFIED HEADACHE TYPE: Primary | ICD-10-CM

## 2019-06-12 DIAGNOSIS — J01.90 ACUTE NON-RECURRENT SINUSITIS, UNSPECIFIED LOCATION: ICD-10-CM

## 2019-06-12 LAB
ALBUMIN SERPL-MCNC: 4.3 G/DL (ref 3.5–4.6)
ALP BLD-CCNC: 120 U/L (ref 40–130)
ALT SERPL-CCNC: 17 U/L (ref 0–33)
ANION GAP SERPL CALCULATED.3IONS-SCNC: 14 MEQ/L (ref 9–15)
AST SERPL-CCNC: 19 U/L (ref 0–35)
BASOPHILS ABSOLUTE: 0.1 K/UL (ref 0–0.2)
BASOPHILS RELATIVE PERCENT: 1 %
BILIRUB SERPL-MCNC: <0.2 MG/DL (ref 0.2–0.7)
BUN BLDV-MCNC: 9 MG/DL (ref 6–20)
CALCIUM SERPL-MCNC: 9.2 MG/DL (ref 8.5–9.9)
CHLORIDE BLD-SCNC: 102 MEQ/L (ref 95–107)
CO2: 24 MEQ/L (ref 20–31)
CREAT SERPL-MCNC: 1.25 MG/DL (ref 0.5–0.9)
EOSINOPHILS ABSOLUTE: 0.2 K/UL (ref 0–0.7)
EOSINOPHILS RELATIVE PERCENT: 3.6 %
GFR AFRICAN AMERICAN: 55.3
GFR NON-AFRICAN AMERICAN: 45.7
GLOBULIN: 3.7 G/DL (ref 2.3–3.5)
GLUCOSE BLD-MCNC: 129 MG/DL (ref 70–99)
HCT VFR BLD CALC: 44.3 % (ref 37–47)
HEMOGLOBIN: 15.5 G/DL (ref 12–16)
LYMPHOCYTES ABSOLUTE: 1.6 K/UL (ref 1–4.8)
LYMPHOCYTES RELATIVE PERCENT: 23.8 %
MCH RBC QN AUTO: 34.9 PG (ref 27–31.3)
MCHC RBC AUTO-ENTMCNC: 35 % (ref 33–37)
MCV RBC AUTO: 99.6 FL (ref 82–100)
MONOCYTES ABSOLUTE: 0.4 K/UL (ref 0.2–0.8)
MONOCYTES RELATIVE PERCENT: 5.8 %
NEUTROPHILS ABSOLUTE: 4.4 K/UL (ref 1.4–6.5)
NEUTROPHILS RELATIVE PERCENT: 65.8 %
PDW BLD-RTO: 14.4 % (ref 11.5–14.5)
PLATELET # BLD: 261 K/UL (ref 130–400)
POTASSIUM SERPL-SCNC: 3.9 MEQ/L (ref 3.4–4.9)
RBC # BLD: 4.45 M/UL (ref 4.2–5.4)
SODIUM BLD-SCNC: 140 MEQ/L (ref 135–144)
TOTAL PROTEIN: 8 G/DL (ref 6.3–8)
WBC # BLD: 6.6 K/UL (ref 4.8–10.8)

## 2019-06-12 PROCEDURE — 2580000003 HC RX 258: Performed by: PHYSICIAN ASSISTANT

## 2019-06-12 PROCEDURE — 85025 COMPLETE CBC W/AUTO DIFF WBC: CPT

## 2019-06-12 PROCEDURE — 96372 THER/PROPH/DIAG INJ SC/IM: CPT

## 2019-06-12 PROCEDURE — 99283 EMERGENCY DEPT VISIT LOW MDM: CPT

## 2019-06-12 PROCEDURE — 80053 COMPREHEN METABOLIC PANEL: CPT

## 2019-06-12 PROCEDURE — 6370000000 HC RX 637 (ALT 250 FOR IP): Performed by: PHYSICIAN ASSISTANT

## 2019-06-12 PROCEDURE — 6360000002 HC RX W HCPCS: Performed by: PHYSICIAN ASSISTANT

## 2019-06-12 PROCEDURE — 70450 CT HEAD/BRAIN W/O DYE: CPT

## 2019-06-12 PROCEDURE — 36415 COLL VENOUS BLD VENIPUNCTURE: CPT

## 2019-06-12 RX ORDER — AMOXICILLIN AND CLAVULANATE POTASSIUM 875; 125 MG/1; MG/1
1 TABLET, FILM COATED ORAL ONCE
Status: COMPLETED | OUTPATIENT
Start: 2019-06-12 | End: 2019-06-13

## 2019-06-12 RX ORDER — PROCHLORPERAZINE EDISYLATE 5 MG/ML
10 INJECTION INTRAMUSCULAR; INTRAVENOUS ONCE
Status: COMPLETED | OUTPATIENT
Start: 2019-06-12 | End: 2019-06-12

## 2019-06-12 RX ORDER — MORPHINE SULFATE 2 MG/ML
4 INJECTION, SOLUTION INTRAMUSCULAR; INTRAVENOUS ONCE
Status: COMPLETED | OUTPATIENT
Start: 2019-06-12 | End: 2019-06-12

## 2019-06-12 RX ORDER — DIPHENHYDRAMINE HYDROCHLORIDE 50 MG/ML
50 INJECTION INTRAMUSCULAR; INTRAVENOUS ONCE
Status: COMPLETED | OUTPATIENT
Start: 2019-06-12 | End: 2019-06-12

## 2019-06-12 RX ORDER — METOCLOPRAMIDE HYDROCHLORIDE 5 MG/ML
10 INJECTION INTRAMUSCULAR; INTRAVENOUS ONCE
Status: DISCONTINUED | OUTPATIENT
Start: 2019-06-12 | End: 2019-06-13 | Stop reason: HOSPADM

## 2019-06-12 RX ORDER — AMOXICILLIN AND CLAVULANATE POTASSIUM 875; 125 MG/1; MG/1
1 TABLET, FILM COATED ORAL 2 TIMES DAILY
Qty: 20 TABLET | Refills: 0 | Status: SHIPPED | OUTPATIENT
Start: 2019-06-12 | End: 2019-06-22

## 2019-06-12 RX ORDER — METOCLOPRAMIDE 10 MG/1
10 TABLET ORAL 4 TIMES DAILY
Qty: 20 TABLET | Refills: 0 | Status: SHIPPED | OUTPATIENT
Start: 2019-06-12 | End: 2019-10-27 | Stop reason: ALTCHOICE

## 2019-06-12 RX ORDER — DIPHENHYDRAMINE HYDROCHLORIDE 50 MG/ML
50 INJECTION INTRAMUSCULAR; INTRAVENOUS ONCE
Status: DISCONTINUED | OUTPATIENT
Start: 2019-06-12 | End: 2019-06-13 | Stop reason: HOSPADM

## 2019-06-12 RX ORDER — MORPHINE SULFATE 2 MG/ML
2 INJECTION, SOLUTION INTRAMUSCULAR; INTRAVENOUS ONCE
Status: DISCONTINUED | OUTPATIENT
Start: 2019-06-12 | End: 2019-06-13 | Stop reason: HOSPADM

## 2019-06-12 RX ORDER — ONDANSETRON 4 MG/1
4 TABLET, ORALLY DISINTEGRATING ORAL ONCE
Status: COMPLETED | OUTPATIENT
Start: 2019-06-12 | End: 2019-06-12

## 2019-06-12 RX ORDER — 0.9 % SODIUM CHLORIDE 0.9 %
1000 INTRAVENOUS SOLUTION INTRAVENOUS ONCE
Status: DISCONTINUED | OUTPATIENT
Start: 2019-06-12 | End: 2019-06-13 | Stop reason: HOSPADM

## 2019-06-12 RX ADMIN — MORPHINE SULFATE 4 MG: 2 INJECTION, SOLUTION INTRAMUSCULAR; INTRAVENOUS at 22:37

## 2019-06-12 RX ADMIN — DIPHENHYDRAMINE HYDROCHLORIDE 50 MG: 50 INJECTION, SOLUTION INTRAMUSCULAR; INTRAVENOUS at 22:37

## 2019-06-12 RX ADMIN — MORPHINE SULFATE 4 MG: 2 INJECTION, SOLUTION INTRAMUSCULAR; INTRAVENOUS at 23:18

## 2019-06-12 RX ADMIN — PROCHLORPERAZINE EDISYLATE 10 MG: 5 INJECTION INTRAMUSCULAR; INTRAVENOUS at 22:44

## 2019-06-12 RX ADMIN — ONDANSETRON 4 MG: 4 TABLET, ORALLY DISINTEGRATING ORAL at 22:37

## 2019-06-12 ASSESSMENT — PAIN SCALES - GENERAL
PAINLEVEL_OUTOF10: 10
PAINLEVEL_OUTOF10: 9

## 2019-06-12 ASSESSMENT — PAIN DESCRIPTION - DIRECTION: RADIATING_TOWARDS: NECK

## 2019-06-12 ASSESSMENT — PAIN DESCRIPTION - LOCATION: LOCATION: HEAD

## 2019-06-13 VITALS
HEIGHT: 63 IN | WEIGHT: 205 LBS | DIASTOLIC BLOOD PRESSURE: 82 MMHG | RESPIRATION RATE: 16 BRPM | HEART RATE: 79 BPM | SYSTOLIC BLOOD PRESSURE: 140 MMHG | OXYGEN SATURATION: 95 % | BODY MASS INDEX: 36.32 KG/M2 | TEMPERATURE: 98.5 F

## 2019-06-13 PROCEDURE — 6370000000 HC RX 637 (ALT 250 FOR IP): Performed by: PHYSICIAN ASSISTANT

## 2019-06-13 RX ADMIN — AMOXICILLIN AND CLAVULANATE POTASSIUM 1 TABLET: 875; 125 TABLET, FILM COATED ORAL at 00:01

## 2019-06-13 ASSESSMENT — ENCOUNTER SYMPTOMS
COLOR CHANGE: 0
TROUBLE SWALLOWING: 0
ALLERGIC/IMMUNOLOGIC NEGATIVE: 1
APNEA: 0
EYE PAIN: 0
SHORTNESS OF BREATH: 0
ABDOMINAL PAIN: 0

## 2019-06-13 NOTE — ED PROVIDER NOTES
[address] [facility] ED  eMERGENCYdEPARTMENT eNCOUnter      Pt Name: Livier Monroe  MRN: [id number]  [place] 1970  Date of evaluation: 6/12/2019  Provider:Efrain Ortiz PA-C    CHIEF COMPLAINT       Chief Complaint   Patient presents with    Migraine         HISTORY OF PRESENT ILLNESS  (Location/Symptom, Timing/Onset, Context/Setting, Quality, Duration, Modifying Factors, Severity.)   Livier Monroe is a 51 y.o. female who presents to the emergency department with complaints of migraine headache, ongoing all day today. Patient states that it is a global headache and is throbbing and severe but denies it being the worst headache of her life. Patient also states that she has had photophobia, phonophobia and nausea and vomiting. Patient was seen in the emergency department recently and started on eyedrops for pinkeye and states that these headaches began after starting the drops. No neck pain or meningismus. Patient is very tearful and anxious    HPI    Nursing Notes were reviewed and I agree. REVIEW OF SYSTEMS    (2-9 systems for level 4, 10 or more for level 5)     Review of Systems   Constitutional: Negative for diaphoresis and fever. HENT: Negative for hearing loss and trouble swallowing. Eyes: Negative for pain. Respiratory: Negative for apnea and shortness of breath. Cardiovascular: Negative for chest pain. Gastrointestinal: Negative for abdominal pain. Endocrine: Negative. Genitourinary: Negative for hematuria. Musculoskeletal: Negative for neck pain and neck stiffness. Skin: Negative for color change. Allergic/Immunologic: Negative. Neurological: Negative for dizziness and numbness. Hematological: Negative. Psychiatric/Behavioral: The patient is nervous/anxious. All other systems reviewed and are negative. Except as noted above the remainder of the review of systems was reviewed and negative.        PAST MEDICAL HISTORY     Past Medical History: Transdermal, DAILY Starting Sun 5/19/2019, Disp-90 patch, R-5, Normal      fluticasone-salmeterol (ADVAIR) 250-50 MCG/DOSE AEPB Inhale 1 puff into the lungs 2 times dailyHistorical Med      methocarbamol (ROBAXIN) 750 MG tablet Take 750 mg by mouth 4 times dailyHistorical Med      methotrexate (RHEUMATREX) 1 g chemo injection Infuse 25 mg intravenously once Indications: 0.4 ml SQ every MondayHistorical Med      levothyroxine (SYNTHROID) 75 MCG tablet Take 75 mcg by mouth DailyHistorical Med      butalbital-acetaminophen-caffeine (FIORICET, ESGIC) -40 MG per tablet TAKE 1 TABLET BY MOUTH THREE TIMES DAILY AS NEEDED FOR HEADACHES, Disp-21 tablet, R-0Normal      Insulin Pen Needle (B-D ULTRAFINE III SHORT PEN) 31G X 8 MM MISC 3 TIMES DAILY Starting Sat 1/5/2019, Until Mon 2/4/2019, For 30 days, Disp-100 each, R-5, Normal      cetirizine (ZYRTEC) 10 MG tablet Take 1 qhs for allergies, Disp-30 tablet, R-3Normal      Insulin Syringe-Needle U-100 30G X 1/2\" 1 ML MISC DAILY Starting Fri 11/16/2018, Disp-100 each, R-3, Normal      insulin lispro (ADMELOG) 100 UNIT/ML injection vial Inject 4 Units into the skin 3 times daily as needed for High Blood Sugar, Disp-1 vial, [id number];replaces novologNormal      DULoxetine (CYMBALTA) 60 MG extended release capsule TAKE 1 CAPSULE BY MOUTH DAILY, Disp-30 capsule, R-2Normal      !! blood glucose test strips (ONETOUCH VERIO) strip Disp-300 each, R-3, NormalTEST 3 TIMES DAILY AS NEEDED      Blood Glucose Monitoring Suppl (ONETOUCH VERIO) w/Device KIT TEST 3 TIMES DAILY AS NEEDED, Disp-1 kit, R-0Normal      ONE TOUCH ULTRASOFT LANCETS MISC Disp-300 each, R-3, NormalTEST 3 TIMES DAILY AS NEEDED      !! blood glucose test strips (EXACTECH TEST) strip 3 TIMES DAILY Starting Mon 8/13/2018, Disp-300 strip, R-5, Normal(Accu-check test strips) As needed.  DX:E11.65      insulin glargine (LANTUS SOLOSTAR) 100 UNIT/ML injection pen Inject 25 Units into the skin nightlyHistorical Med montelukast (SINGULAIR) 10 MG tablet Take 1 tab nightly at supper for breathing/allergies, Disp-30 tablet, [id number]      folic acid (FOLVITE) 1 MG tablet Take 1 tablet by mouth daily, Disp-30 tablet, R-3Normal      albuterol (PROVENTIL) (2.5 MG/3ML) 0.083% nebulizer solution Take 3 mLs by nebulization every 2 hours as needed for Wheezing, Disp-120 each, R-3Print      albuterol sulfate  (90 Base) MCG/ACT inhaler Inhale 2 puffs into the lungs every 4 hours as needed for Wheezing, Disp-1 Inhaler, R-3Print      busPIRone (BUSPAR) 10 MG tablet Take 1/2 tab bid wf x 1 week, then 1 tab bid, Disp-60 tablet, R-2Normal       !! - Potential duplicate medications found. Please discuss with provider. ALLERGIES     Shellfish-derived products; Ibuprofen; Propoxyphene n-acetaminophen; and Toradol [ketorolac tromethamine]    FAMILY HISTORY       Family History   Problem Relation Age of Onset    Cancer Father     Diabetes Father     High Blood Pressure Mother     Diabetes Mother           SOCIAL HISTORY       Social History     Socioeconomic History    Marital status: Legally      Spouse name: None    Number of children: None    Years of education: None    Highest education level: None   Occupational History    None   Social Needs    Financial resource strain: None    Food insecurity:     Worry: None     Inability: None    Transportation needs:     Medical: None     Non-medical: None   Tobacco Use    Smoking status: Former Smoker     Packs/day: 0.50     Years: 15.00     Pack years: 7.50     Types: Cigarettes     Start date: 2014     Last attempt to quit: 2015     Years since quittin.3    Smokeless tobacco: Former User     Quit date: 3/23/2016   Substance and Sexual Activity    Alcohol use:  Yes     Alcohol/week: 0.0 oz     Comment: occasionally    Drug use: Yes     Types: Marijuana     Comment: one month ago    Sexual activity: None   Lifestyle    Physical activity:     Days per week: None     Minutes per session: None    Stress: None   Relationships    Social connections:     Talks on phone: None     Gets together: None     Attends Sabianist service: None     Active member of club or organization: None     Attends meetings of clubs or organizations: None     Relationship status: None    Intimate partner violence:     Fear of current or ex partner: None     Emotionally abused: None     Physically abused: None     Forced sexual activity: None   Other Topics Concern    None   Social History Narrative    None       SCREENINGS           PHYSICAL EXAM    (up to 7 forlevel 4, 8 or more for level 5)     ED Triage Vitals [06/12/19 2003]   BP Temp Temp src Pulse Resp SpO2 Height Weight   (!) 152/88 98.5 °F (36.9 °C) -- 88 20 99 % 5' 3\" (1.6 m) 205 lb (93 kg)       Physical Exam   Constitutional: She is oriented to person, place, and time. She appears well-developed and well-nourished. No distress. HENT:   Head: Normocephalic and atraumatic. Mouth/Throat: No oropharyngeal exudate. Eyes: Pupils are equal, round, and reactive to light. Conjunctivae and EOM are normal. No scleral icterus. Neck: Normal range of motion. Neck supple. No tracheal deviation present. Cardiovascular: Normal rate, normal heart sounds and intact distal pulses. Pulmonary/Chest: Effort normal and breath sounds normal. No respiratory distress. Abdominal: Soft. Bowel sounds are normal. She exhibits no distension. Musculoskeletal: Normal range of motion. Neurological: She is alert and oriented to person, place, and time. No cranial nerve deficit. She exhibits normal muscle tone. Skin: Skin is warm and dry. No rash noted. She is not diaphoretic. No erythema. Psychiatric: She has a normal mood and affect. Her behavior is normal. Judgment and thought content normal.   Nursing note and vitals reviewed.         DIAGNOSTIC RESULTS     RADIOLOGY:   Non-plain film images such as CT, Ultrasound and MRI are read by the radiologist. Salvatore Hernandez radiographic images are visualized and preliminarilyinterpreted by Jose Juan Castellanos PA-C with the below findings:      Interpretation per the Radiologist below, if available at the time of this note:    CT HEAD WO CONTRAST    (Results Pending)       LABS:  Labs Reviewed   COMPREHENSIVE METABOLIC PANEL - Abnormal; Notable for the following components:       Result Value    Glucose 129 (*)     CREATININE 1.25 (*)     GFR Non- 45.7 (*)     GFR  55.3 (*)     Globulin 3.7 (*)     All other components within normal limits   CBC WITH AUTO DIFFERENTIAL - Abnormal; Notable for the following components:    MCH 34.9 (*)     All other components within normal limits   URINE RT REFLEX TO CULTURE       All other labs were within normal range or not returnedas of this dictation. EMERGENCYDEPARTMENT COURSE and DIFFERENTIAL DIAGNOSIS/MDM:   Vitals:    Vitals:    06/12/19 2003 06/12/19 2248 06/13/19 0001   BP: (!) 152/88 137/89 (!) 140/82   Pulse: 88 84 79   Resp: 20 18 16   Temp: 98.5 °F (36.9 °C)     SpO2: 99% 96% 95%   Weight: 205 lb (93 kg)     Height: 5' 3\" (1.6 m)         REASSESSMENT        Patient had improving symptoms after receiving IM pain and nausea meds. Patient clinically appears to have a sinusitis and will be treated with oral antibiotics and referred to PCP for follow-up    MDM    PROCEDURES:    Procedures      FINAL IMPRESSION      1. Acute nonintractable headache, unspecified headache type    2.  Acute non-recurrent sinusitis, unspecified location          DISPOSITION/PLAN   DISPOSITION Decision To Discharge 06/12/2019 11:43:29 PM      PATIENT REFERRED TO:  Shae Stephenson[address] [address]  [phone]    In 2 days        DISCHARGE MEDICATIONS:  Discharge Medication List as of 6/12/2019 11:49 PM      START taking these medications    Details   amoxicillin-clavulanate (AUGMENTIN) 875-125 MG per tablet Take 1 tablet by mouth 2 times daily for 10 days, Disp-20 tablet, R-0Print      metoclopramide (REGLAN) 10 MG tablet Take 1 tablet by mouth 4 times daily, Disp-20 tablet, R-0Print             (Please note that portions of this note were completed with a voice recognition program.  Efforts were made to edit the dictations but occasionally words are mis-transcribed.)    ASTON Morales PA-C  06/13/19 0206

## 2019-06-13 NOTE — ED TRIAGE NOTES
Patient was seen in ED yesterday and prescribed eye drops, c/o migraine starting today.   Patient states she has not had a migraine in a long time and thinks this may be related to eye drops

## 2019-06-23 ENCOUNTER — HOSPITAL ENCOUNTER (EMERGENCY)
Age: 49
Discharge: HOME | End: 2019-06-23
Attending: EMERGENCY MEDICINE
Payer: MEDICAID

## 2019-06-23 ENCOUNTER — APPOINTMENT (OUTPATIENT)
Dept: GENERAL RADIOLOGY | Age: 49
End: 2019-06-23
Payer: MEDICAID

## 2019-06-23 VITALS
HEIGHT: 63 IN | SYSTOLIC BLOOD PRESSURE: 122 MMHG | WEIGHT: 200 LBS | DIASTOLIC BLOOD PRESSURE: 72 MMHG | HEART RATE: 78 BPM | OXYGEN SATURATION: 99 % | RESPIRATION RATE: 20 BRPM | BODY MASS INDEX: 35.44 KG/M2 | TEMPERATURE: 98.5 F

## 2019-06-23 DIAGNOSIS — R07.89 CHEST WALL PAIN: ICD-10-CM

## 2019-06-23 DIAGNOSIS — D86.0 PULMONARY SARCOIDOSIS (HCC): Primary | ICD-10-CM

## 2019-06-23 LAB
ALBUMIN SERPL-MCNC: 3.5 G/DL (ref 3.5–4.6)
ALP BLD-CCNC: 107 U/L (ref 40–130)
ALT SERPL-CCNC: 17 U/L (ref 0–33)
ANION GAP SERPL CALCULATED.3IONS-SCNC: 11 MEQ/L (ref 9–15)
AST SERPL-CCNC: 21 U/L (ref 0–35)
BASOPHILS ABSOLUTE: 0.1 K/UL (ref 0–0.2)
BASOPHILS RELATIVE PERCENT: 1.4 %
BILIRUB SERPL-MCNC: <0.2 MG/DL (ref 0.2–0.7)
BUN BLDV-MCNC: 9 MG/DL (ref 6–20)
CALCIUM SERPL-MCNC: 9.2 MG/DL (ref 8.5–9.9)
CHLORIDE BLD-SCNC: 105 MEQ/L (ref 95–107)
CO2: 20 MEQ/L (ref 20–31)
CREAT SERPL-MCNC: 1.23 MG/DL (ref 0.5–0.9)
EKG ATRIAL RATE: 88 BPM
EKG P AXIS: 44 DEGREES
EKG P-R INTERVAL: 144 MS
EKG Q-T INTERVAL: 362 MS
EKG QRS DURATION: 76 MS
EKG QTC CALCULATION (BAZETT): 438 MS
EKG R AXIS: 14 DEGREES
EKG T AXIS: 16 DEGREES
EKG VENTRICULAR RATE: 88 BPM
EOSINOPHILS ABSOLUTE: 0.4 K/UL (ref 0–0.7)
EOSINOPHILS RELATIVE PERCENT: 5.5 %
GFR AFRICAN AMERICAN: 56.3
GFR NON-AFRICAN AMERICAN: 46.6
GLOBULIN: 2.9 G/DL (ref 2.3–3.5)
GLUCOSE BLD-MCNC: 173 MG/DL (ref 70–99)
HCT VFR BLD CALC: 41.6 % (ref 37–47)
HEMOGLOBIN: 14.3 G/DL (ref 12–16)
LYMPHOCYTES ABSOLUTE: 1.6 K/UL (ref 1–4.8)
LYMPHOCYTES RELATIVE PERCENT: 21.7 %
MCH RBC QN AUTO: 33.8 PG (ref 27–31.3)
MCHC RBC AUTO-ENTMCNC: 34.3 % (ref 33–37)
MCV RBC AUTO: 98.5 FL (ref 82–100)
MONOCYTES ABSOLUTE: 0.4 K/UL (ref 0.2–0.8)
MONOCYTES RELATIVE PERCENT: 5.8 %
NEUTROPHILS ABSOLUTE: 4.9 K/UL (ref 1.4–6.5)
NEUTROPHILS RELATIVE PERCENT: 65.6 %
PDW BLD-RTO: 13.9 % (ref 11.5–14.5)
PLATELET # BLD: 288 K/UL (ref 130–400)
POTASSIUM SERPL-SCNC: 3.9 MEQ/L (ref 3.4–4.9)
RBC # BLD: 4.22 M/UL (ref 4.2–5.4)
SODIUM BLD-SCNC: 136 MEQ/L (ref 135–144)
TOTAL PROTEIN: 6.4 G/DL (ref 6.3–8)
TROPONIN: <0.01 NG/ML (ref 0–0.01)
WBC # BLD: 7.5 K/UL (ref 4.8–10.8)

## 2019-06-23 PROCEDURE — 6360000002 HC RX W HCPCS: Performed by: EMERGENCY MEDICINE

## 2019-06-23 PROCEDURE — 85025 COMPLETE CBC W/AUTO DIFF WBC: CPT

## 2019-06-23 PROCEDURE — 94760 N-INVAS EAR/PLS OXIMETRY 1: CPT

## 2019-06-23 PROCEDURE — 80053 COMPREHEN METABOLIC PANEL: CPT

## 2019-06-23 PROCEDURE — 71045 X-RAY EXAM CHEST 1 VIEW: CPT

## 2019-06-23 PROCEDURE — 96375 TX/PRO/DX INJ NEW DRUG ADDON: CPT

## 2019-06-23 PROCEDURE — 84484 ASSAY OF TROPONIN QUANT: CPT

## 2019-06-23 PROCEDURE — 96374 THER/PROPH/DIAG INJ IV PUSH: CPT

## 2019-06-23 PROCEDURE — 96376 TX/PRO/DX INJ SAME DRUG ADON: CPT

## 2019-06-23 PROCEDURE — 99285 EMERGENCY DEPT VISIT HI MDM: CPT

## 2019-06-23 PROCEDURE — 93005 ELECTROCARDIOGRAM TRACING: CPT | Performed by: EMERGENCY MEDICINE

## 2019-06-23 PROCEDURE — 36415 COLL VENOUS BLD VENIPUNCTURE: CPT

## 2019-06-23 PROCEDURE — 94640 AIRWAY INHALATION TREATMENT: CPT

## 2019-06-23 RX ORDER — METHYLPREDNISOLONE 4 MG/1
TABLET ORAL
Qty: 1 KIT | Refills: 0 | Status: SHIPPED | OUTPATIENT
Start: 2019-06-23 | End: 2019-07-26

## 2019-06-23 RX ORDER — MORPHINE SULFATE 2 MG/ML
4 INJECTION, SOLUTION INTRAMUSCULAR; INTRAVENOUS ONCE
Status: COMPLETED | OUTPATIENT
Start: 2019-06-23 | End: 2019-06-23

## 2019-06-23 RX ORDER — SODIUM CHLORIDE 0.9 % (FLUSH) 0.9 %
3 SYRINGE (ML) INJECTION EVERY 8 HOURS
Status: DISCONTINUED | OUTPATIENT
Start: 2019-06-23 | End: 2019-06-23 | Stop reason: HOSPADM

## 2019-06-23 RX ORDER — HYDROCODONE BITARTRATE AND ACETAMINOPHEN 5; 325 MG/1; MG/1
1 TABLET ORAL EVERY 6 HOURS PRN
Qty: 8 TABLET | Refills: 0 | Status: SHIPPED | OUTPATIENT
Start: 2019-06-23 | End: 2019-06-26

## 2019-06-23 RX ORDER — METHYLPREDNISOLONE SODIUM SUCCINATE 125 MG/2ML
125 INJECTION, POWDER, LYOPHILIZED, FOR SOLUTION INTRAMUSCULAR; INTRAVENOUS ONCE
Status: COMPLETED | OUTPATIENT
Start: 2019-06-23 | End: 2019-06-23

## 2019-06-23 RX ORDER — AZITHROMYCIN 250 MG/1
TABLET, FILM COATED ORAL
Qty: 1 PACKET | Refills: 0 | Status: SHIPPED | OUTPATIENT
Start: 2019-06-23 | End: 2019-07-03

## 2019-06-23 RX ADMIN — ALBUTEROL SULFATE 5 MG: 2.5 SOLUTION RESPIRATORY (INHALATION) at 21:14

## 2019-06-23 RX ADMIN — MORPHINE SULFATE 4 MG: 2 INJECTION, SOLUTION INTRAMUSCULAR; INTRAVENOUS at 19:37

## 2019-06-23 RX ADMIN — METHYLPREDNISOLONE SODIUM SUCCINATE 125 MG: 125 INJECTION, POWDER, FOR SOLUTION INTRAMUSCULAR; INTRAVENOUS at 19:37

## 2019-06-23 RX ADMIN — MORPHINE SULFATE 4 MG: 2 INJECTION, SOLUTION INTRAMUSCULAR; INTRAVENOUS at 20:59

## 2019-06-23 ASSESSMENT — PAIN DESCRIPTION - FREQUENCY
FREQUENCY: INTERMITTENT
FREQUENCY: INTERMITTENT

## 2019-06-23 ASSESSMENT — PAIN DESCRIPTION - DESCRIPTORS
DESCRIPTORS: SHARP;STABBING
DESCRIPTORS: STABBING;SHARP

## 2019-06-23 ASSESSMENT — PAIN DESCRIPTION - PAIN TYPE
TYPE: ACUTE PAIN
TYPE: ACUTE PAIN

## 2019-06-23 ASSESSMENT — PAIN SCALES - GENERAL: PAINLEVEL_OUTOF10: 9

## 2019-06-23 ASSESSMENT — PAIN DESCRIPTION - ORIENTATION
ORIENTATION: MID;LEFT
ORIENTATION: MID;LEFT

## 2019-06-23 ASSESSMENT — PAIN DESCRIPTION - ONSET
ONSET: ON-GOING
ONSET: ON-GOING

## 2019-06-23 ASSESSMENT — PAIN DESCRIPTION - DIRECTION
RADIATING_TOWARDS: BACK
RADIATING_TOWARDS: BACK

## 2019-06-23 ASSESSMENT — PAIN - FUNCTIONAL ASSESSMENT: PAIN_FUNCTIONAL_ASSESSMENT: 0-10

## 2019-06-23 ASSESSMENT — PAIN DESCRIPTION - LOCATION
LOCATION: CHEST
LOCATION: CHEST

## 2019-06-23 NOTE — ED PROVIDER NOTES
[facility] ED  eMERGENCY dEPARTMENT eNCOUnter      Pt Name: Sergio South  MRN: [id number]  [place] 1970  Date of evaluation: 6/23/2019  Provider: Naren Leary MD    CHIEF COMPLAINT       Chief Complaint   Patient presents with    Chest Pain     for  2 days mid to left chest and stabbing and jolting         HISTORY OF PRESENT ILLNESS   (Location/Symptom, Timing/Onset,Context/Setting, Quality, Duration, Modifying Factors, Severity)  Note limiting factors. Sergio South is a 51 y.o. female who presents to the emergency department with 2-day history of chest pain which is substernally located. She has sarcoidosis of the lung which is the primary cause of her chest pain over the years. For that, she takes increased steroids and increased pain medications. She is never had a myocardial infarction nor stents. She has multiple other medical problems including asthma which has flared up recently to some degree. Her sugars are running fairly well about 200, she is a diabetic. She is had a transient ischemic attack in the past but no long-lasting neurologic symptoms. Fever, cough are not present. The asthma flareup was yesterday. She currently does not smoke. HPI    NursingNotes were reviewed. REVIEW OF SYSTEMS    (2-9 systems for level 4, 10 or more for level 5)     Review of Systems     Constitutional symptoms:  no Fatigue, no fever, no chills. Skin symptoms:  Negative except as documented in HPI. ENMT symptoms:  Negative except as documented in HPI. Respiratory symptoms:  Negative except as documented in HPI. Asthma flareup  Cardiovascular symptoms:  Negative except as documented in HPI. Chest pain as above  Gastrointestinal symptoms: Negative except for documented as above in the HPI   Genitourinary symptoms:  Negative except as documented in HPI. Musculoskeletal symptoms:  Negative except as documented in HPI.    Neurologic symptoms:  Negative except as documented in HPI. Remainder of 10 systems, all negative except for mentioned above      Except as noted above the remainder of the review of systems was reviewed and negative. PAST MEDICAL HISTORY     Past Medical History:   Diagnosis Date    Anxiety     Arthritis     Asthma     Bronchopneumonia     Cancer ([address]     renal    Cerebral artery occlusion with cerebral infarction ([address]     Chronic bilateral low back pain with sciatica     Chronic kidney disease     Depression     Hypertension     Insulin dependent type 2 diabetes mellitus, uncontrolled ([address] 8/3/2018    Mixed headache     Pure hyperglyceridemia 5/19/2017    Sarcoidosis     Sleep apnea     does not wear cpap    Thyroid goiter          SURGICALHISTORY       Past Surgical History:   Procedure Laterality Date    BRONCHOSCOPY  10/26/2018    DR. STEARNS    KIDNEY REMOVAL Right 08/2016    THYROID LOBECTOMY Right 6/13/14    THYROIDECTOMY  02/21/2019    DR. MAGDALENO    URETER STENT PLACEMENT Left 08/2016         CURRENT MEDICATIONS       Previous Medications    ACETAMINOPHEN (TYLENOL) 325 MG TABLET    Take 2 tablets by mouth every 6 hours as needed for Pain    ALBUTEROL (PROVENTIL) (2.5 MG/3ML) 0.083% NEBULIZER SOLUTION    Take 3 mLs by nebulization every 2 hours as needed for Wheezing    ALBUTEROL SULFATE  (90 BASE) MCG/ACT INHALER    Inhale 2 puffs into the lungs every 4 hours as needed for Wheezing    BLOOD GLUCOSE MONITORING SUPPL (ONETOUCH VERIO) W/DEVICE KIT    TEST 3 TIMES DAILY AS NEEDED    BLOOD GLUCOSE TEST STRIPS (EXACTECH TEST) STRIP    1 each by In Vitro route 3 times daily (Accu-check test strips) As needed.  DX:E11.65    BLOOD GLUCOSE TEST STRIPS (ONETOUCH VERIO) STRIP    TEST 3 TIMES DAILY AS NEEDED    BUSPIRONE (BUSPAR) 10 MG TABLET    Take 1/2 tab bid wf x 1 week, then 1 tab bid    BUTALBITAL-ACETAMINOPHEN-CAFFEINE (FIORICET, ESGIC) -40 MG PER TABLET    TAKE 1 TABLET BY MOUTH THREE TIMES DAILY AS NEEDED FOR HEADACHES CETIRIZINE (ZYRTEC) 10 MG TABLET    Take 1 qhs for allergies    DULOXETINE (CYMBALTA) 60 MG EXTENDED RELEASE CAPSULE    TAKE 1 CAPSULE BY MOUTH DAILY    FEXOFENADINE (ALLEGRA ALLERGY) 180 MG TABLET    Take 1 tablet by mouth daily    FEXOFENADINE (ALLEGRA) 180 MG TABLET    Take 1 tablet by mouth daily    FLUTICASONE (FLONASE) 50 MCG/ACT NASAL SPRAY    1 spray by Nasal route daily    FLUTICASONE-SALMETEROL (ADVAIR) 250-50 MCG/DOSE AEPB    Inhale 1 puff into the lungs 2 times daily    FOLIC ACID (FOLVITE) 1 MG TABLET    Take 1 tablet by mouth daily    INSULIN GLARGINE (LANTUS SOLOSTAR) 100 UNIT/ML INJECTION PEN    Inject 25 Units into the skin nightly    INSULIN LISPRO (ADMELOG) 100 UNIT/ML INJECTION VIAL    Inject 4 Units into the skin 3 times daily as needed for High Blood Sugar    INSULIN PEN NEEDLE (B-D ULTRAFINE III SHORT PEN) 31G X 8 MM MISC    Inject 1 each into the skin 3 times daily    INSULIN SYRINGE-NEEDLE U-100 30G X 1/2\" 1 ML MISC    1 each by Does not apply route daily    LEVOTHYROXINE (SYNTHROID) 75 MCG TABLET    Take 75 mcg by mouth Daily    LIDOCAINE 4 % EXTERNAL PATCH    Place 3 patches onto the skin daily    METHOCARBAMOL (ROBAXIN) 750 MG TABLET    Take 750 mg by mouth 4 times daily    METHOTREXATE (RHEUMATREX) 1 G CHEMO INJECTION    Infuse 25 mg intravenously once Indications: 0.4 ml SQ every Monday    METOCLOPRAMIDE (REGLAN) 10 MG TABLET    Take 1 tablet by mouth 4 times daily    MONTELUKAST (SINGULAIR) 10 MG TABLET    Take 1 tab nightly at supper for breathing/allergies    OMEPRAZOLE (PRILOSEC) 40 MG DELAYED RELEASE CAPSULE    Take 1 capsule by mouth daily    ONE TOUCH ULTRASOFT LANCETS MISC    TEST 3 TIMES DAILY AS NEEDED    PREDNISONE (DELTASONE) 10 MG TABLET    Take 4 tablets by mouth daily for 5 days, THEN 3 tablets daily for 5 days, THEN 2 tablets daily. TRAMADOL (ULTRAM) 50 MG TABLET    Take 50 mg by mouth every 6 hours as needed for Pain.        ALLERGIES     Shellfish-derived products; Ibuprofen; Propoxyphene n-acetaminophen; and Toradol [ketorolac tromethamine]    FAMILY HISTORY       Family History   Problem Relation Age of Onset    Cancer Father     Diabetes Father     High Blood Pressure Mother     Diabetes Mother           SOCIAL HISTORY       Social History     Socioeconomic History    Marital status: Legally      Spouse name: Not on file    Number of children: Not on file    Years of education: Not on file    Highest education level: Not on file   Occupational History    Not on file   Social Needs    Financial resource strain: Not on file    Food insecurity:     Worry: Not on file     Inability: Not on file    Transportation needs:     Medical: Not on file     Non-medical: Not on file   Tobacco Use    Smoking status: Former Smoker     Packs/day: 0.50     Years: 15.00     Pack years: 7.50     Types: Cigarettes     Start date: 2014     Last attempt to quit: 2015     Years since quittin.4    Smokeless tobacco: Former User     Quit date: 3/23/2016   Substance and Sexual Activity    Alcohol use:  Yes     Alcohol/week: 0.0 oz     Comment: occasionally    Drug use: Yes     Types: Marijuana     Comment: one month ago    Sexual activity: Not on file   Lifestyle    Physical activity:     Days per week: Not on file     Minutes per session: Not on file    Stress: Not on file   Relationships    Social connections:     Talks on phone: Not on file     Gets together: Not on file     Attends Religion service: Not on file     Active member of club or organization: Not on file     Attends meetings of clubs or organizations: Not on file     Relationship status: Not on file    Intimate partner violence:     Fear of current or ex partner: Not on file     Emotionally abused: Not on file     Physically abused: Not on file     Forced sexual activity: Not on file   Other Topics Concern    Not on file   Social History Narrative    Not on file       SCREENINGS emergency physician with the below findings:      Patient feels better. We will augment pain relief at this juncture, add steroids on top of her baseline steroids and see pulmonologist soon as possible, she will return for worsening or weakening, she does not need a work excuse at this juncture. She will return for any type of shortness of breath. Shortness of breath is much diminished. We discussed the fact that she is no longer smoking, we discussed weight loss and dietary habits, exercise habits to be acquired. This was over approximately 5 minutes  Interpretation per the Radiologist below, if available at the time ofthis note:    XR CHEST PORTABLE    (Results Pending)         ED BEDSIDE ULTRASOUND:   Performed by ED Physician - none    LABS:  Labs Reviewed   COMPREHENSIVE METABOLIC PANEL - Abnormal; Notable for the following components:       Result Value    Glucose 173 (*)     CREATININE 1.23 (*)     GFR Non- 46.6 (*)     GFR  56.3 (*)     All other components within normal limits   CBC WITH AUTO DIFFERENTIAL - Abnormal; Notable for the following components:    MCH 33.8 (*)     All other components within normal limits   TROPONIN       All other labs were within normal range or not returned as of this dictation. EMERGENCY DEPARTMENT COURSE and DIFFERENTIAL DIAGNOSIS/MDM:   Vitals:    Vitals:    06/23/19 1916 06/23/19 1930   BP: 127/85 139/78   Pulse: 82 86   Resp: 16 16   Temp: 98.5 °F (36.9 °C)    TempSrc: Oral    SpO2: 97% 98%   Weight: 200 lb (90.7 kg)    Height: 5' 3\" (1.6 m)            MDM    CRITICAL CARE TIME   Total Critical Care time was  minutes, excluding separately reportableprocedures. There was a high probability of clinicallysignificant/life threatening deterioration in the patient's condition which required my urgent intervention. CONSULTS:  None    PROCEDURES:  Unless otherwise noted below, none     Procedures    FINAL IMPRESSION      1.  Pulmonary sarcoidosis ([facility]    2. Chest wall pain          DISPOSITION/PLAN   DISPOSITION Decision To Discharge 06/23/2019 08:45:14 PM      PATIENT REFERRED TO:  No follow-up provider specified. DISCHARGE MEDICATIONS:  New Prescriptions    AZITHROMYCIN (ZITHROMAX) 250 MG TABLET    Take 2 tablets (500 mg) on Day 1, followed by 1 tablet (250 mg) once daily on Days 2 through 5. HYDROCODONE-ACETAMINOPHEN (NORCO) 5-325 MG PER TABLET    Take 1 tablet by mouth every 6 hours as needed for Pain for up to 3 days.  You may take only one and only with association of 0 or 1 tramadol tabs every 4 hours    METHYLPREDNISOLONE (MEDROL, MANUEL,) 4 MG TABLET    Take by mouth in addition to the steroids that you are already on          (Please note that portions of this note were completed with a voice recognition program.  Efforts were made to edit the dictations but occasionally words are mis-transcribed.)    Chyna Srinivasan MD (electronically signed)  Attending Emergency Physician          Chyna Srinivasan MD  06/23/19 9529

## 2019-06-23 NOTE — ED NOTES
Monitor placed NSR. Dr Lorin Montesinos in to see pt. EKG to Dr Lorin Montesinos.   Lab here to draw blood     Radha Boss RN  06/23/19 6410

## 2019-06-25 PROCEDURE — 93010 ELECTROCARDIOGRAM REPORT: CPT | Performed by: INTERNAL MEDICINE

## 2019-07-04 ENCOUNTER — APPOINTMENT (OUTPATIENT)
Dept: GENERAL RADIOLOGY | Age: 49
End: 2019-07-04
Payer: MEDICAID

## 2019-07-04 ENCOUNTER — HOSPITAL ENCOUNTER (EMERGENCY)
Age: 49
Discharge: HOME | End: 2019-07-04
Attending: EMERGENCY MEDICINE
Payer: MEDICAID

## 2019-07-04 VITALS
WEIGHT: 200 LBS | HEART RATE: 75 BPM | TEMPERATURE: 98.6 F | HEIGHT: 63 IN | SYSTOLIC BLOOD PRESSURE: 139 MMHG | OXYGEN SATURATION: 98 % | DIASTOLIC BLOOD PRESSURE: 70 MMHG | BODY MASS INDEX: 35.44 KG/M2 | RESPIRATION RATE: 18 BRPM

## 2019-07-04 DIAGNOSIS — R07.89 CHEST WALL PAIN: Primary | ICD-10-CM

## 2019-07-04 LAB
EKG ATRIAL RATE: 80 BPM
EKG P AXIS: 29 DEGREES
EKG P-R INTERVAL: 138 MS
EKG Q-T INTERVAL: 376 MS
EKG QRS DURATION: 78 MS
EKG QTC CALCULATION (BAZETT): 433 MS
EKG R AXIS: 9 DEGREES
EKG T AXIS: 5 DEGREES
EKG VENTRICULAR RATE: 80 BPM

## 2019-07-04 PROCEDURE — 71046 X-RAY EXAM CHEST 2 VIEWS: CPT

## 2019-07-04 PROCEDURE — 93005 ELECTROCARDIOGRAM TRACING: CPT | Performed by: EMERGENCY MEDICINE

## 2019-07-04 PROCEDURE — 99285 EMERGENCY DEPT VISIT HI MDM: CPT

## 2019-07-04 PROCEDURE — 6370000000 HC RX 637 (ALT 250 FOR IP): Performed by: EMERGENCY MEDICINE

## 2019-07-04 RX ORDER — TRAMADOL HYDROCHLORIDE 50 MG/1
50 TABLET ORAL ONCE
Status: COMPLETED | OUTPATIENT
Start: 2019-07-04 | End: 2019-07-04

## 2019-07-04 RX ORDER — ONDANSETRON 4 MG/1
4 TABLET, ORALLY DISINTEGRATING ORAL ONCE
Status: COMPLETED | OUTPATIENT
Start: 2019-07-04 | End: 2019-07-04

## 2019-07-04 RX ADMIN — ONDANSETRON 4 MG: 4 TABLET, ORALLY DISINTEGRATING ORAL at 15:31

## 2019-07-04 RX ADMIN — TRAMADOL HYDROCHLORIDE 50 MG: 50 TABLET, FILM COATED ORAL at 15:31

## 2019-07-04 ASSESSMENT — ENCOUNTER SYMPTOMS
ABDOMINAL PAIN: 0
NAUSEA: 0
CHEST TIGHTNESS: 0
VOMITING: 0
COUGH: 1
SHORTNESS OF BREATH: 0
EYE PAIN: 0
SORE THROAT: 0

## 2019-07-04 ASSESSMENT — PAIN DESCRIPTION - FREQUENCY: FREQUENCY: CONTINUOUS

## 2019-07-04 ASSESSMENT — PAIN DESCRIPTION - LOCATION: LOCATION: CHEST

## 2019-07-04 ASSESSMENT — PAIN DESCRIPTION - DESCRIPTORS: DESCRIPTORS: SHARP;STABBING

## 2019-07-04 ASSESSMENT — PAIN SCALES - GENERAL
PAINLEVEL_OUTOF10: 8
PAINLEVEL_OUTOF10: 8

## 2019-07-04 ASSESSMENT — PAIN DESCRIPTION - PAIN TYPE: TYPE: ACUTE PAIN;CHRONIC PAIN

## 2019-07-04 ASSESSMENT — PAIN DESCRIPTION - ORIENTATION: ORIENTATION: RIGHT

## 2019-07-04 NOTE — ED NOTES
Pt up to bathroom to void with nurse stand by. Gait steady. Pt c/o dizziness. States nausea is resolved but pain remains the same. Dr. Norberta Oppenheim updated.      Comfort Reid RN  07/04/19 1104

## 2019-07-04 NOTE — ED PROVIDER NOTES
[address] [facility] ED  eMERGENCY dEPARTMENTeNCOUnter      Pt Name: Monik Gonzalez  MRN: [id number]  [place] 1970  Date ofevaluation: 7/4/2019  Provider: Raymond Church DO    CHIEF COMPLAINT       Chief Complaint   Patient presents with    Chest Pain     right side chest pain increases with deep respiration pt states she has previous nerve damage on same side from lung biopsy         HISTORY OF PRESENT ILLNESS   (Location/Symptom, Timing/Onset,Context/Setting, Quality, Duration, Modifying Factors, Severity)  Note limiting factors. Monik Gonzalez is a 51 y.o. female who presents to the emergency department . She comes in with midsternal chest pain and pain on the right side of her chest.  She has pain since October when she had a lung biopsy. She was diagnosed with sarcoidosis. She states that sometimes her sarcoidosis is her midsternal chest pain. Patient states that she did not take her tramadol today because she was very nauseated. Occasional dry cough. No shortness of breath. No fevers or chills    HPI    NursingNotes were reviewed. REVIEW OF SYSTEMS    (2-9 systems for level 4, 10 or more for level 5)     Review of Systems   Constitutional: Negative for activity change, appetite change, fatigue and fever. HENT: Negative for congestion and sore throat. Eyes: Negative for pain and visual disturbance. Respiratory: Positive for cough. Negative for chest tightness and shortness of breath. Cardiovascular: Positive for chest pain. Gastrointestinal: Negative for abdominal pain, nausea and vomiting. Endocrine: Negative for polydipsia. Genitourinary: Negative for flank pain and urgency. Musculoskeletal: Negative for gait problem and neck stiffness. Skin: Negative for rash. Neurological: Negative for weakness, light-headedness and headaches. Psychiatric/Behavioral: Negative for confusion and sleep disturbance.        Except as noted above the remainder of the review of  Cancer Father     Diabetes Father     High Blood Pressure Mother     Diabetes Mother           SOCIAL HISTORY       Social History     Socioeconomic History    Marital status: Legally      Spouse name: None    Number of children: None    Years of education: None    Highest education level: None   Occupational History    None   Social Needs    Financial resource strain: None    Food insecurity:     Worry: None     Inability: None    Transportation needs:     Medical: None     Non-medical: None   Tobacco Use    Smoking status: Former Smoker     Packs/day: 0.50     Years: 15.00     Pack years: 7.50     Types: Cigarettes     Start date: 2014     Last attempt to quit: 2015     Years since quittin.4    Smokeless tobacco: Former User     Quit date: 3/23/2016   Substance and Sexual Activity    Alcohol use: Yes     Alcohol/week: 0.0 oz     Comment: occasionally    Drug use: Yes     Types: Marijuana     Comment: one month ago    Sexual activity: None   Lifestyle    Physical activity:     Days per week: None     Minutes per session: None    Stress: None   Relationships    Social connections:     Talks on phone: None     Gets together: None     Attends Faith service: None     Active member of club or organization: None     Attends meetings of clubs or organizations: None     Relationship status: None    Intimate partner violence:     Fear of current or ex partner: None     Emotionally abused: None     Physically abused: None     Forced sexual activity: None   Other Topics Concern    None   Social History Narrative    None       SCREENINGS              PHYSICAL EXAM    (up to 7 for level 4, 8 or more for level 5)     ED Triage Vitals [19 1436]   BP Temp Temp Source Pulse Resp SpO2 Height Weight   121/66 98.6 °F (37 °C) Oral 85 18 98 % 5' 3\" (1.6 m) 200 lb (90.7 kg)       Physical Exam   Constitutional: She is oriented to person, place, and time.  She appears

## 2019-07-05 PROCEDURE — 93010 ELECTROCARDIOGRAM REPORT: CPT | Performed by: INTERNAL MEDICINE

## 2019-07-14 ENCOUNTER — APPOINTMENT (OUTPATIENT)
Dept: GENERAL RADIOLOGY | Age: 49
End: 2019-07-14
Payer: MEDICAID

## 2019-07-14 ENCOUNTER — HOSPITAL ENCOUNTER (EMERGENCY)
Age: 49
Discharge: HOME | End: 2019-07-14
Attending: EMERGENCY MEDICINE
Payer: MEDICAID

## 2019-07-14 VITALS
HEIGHT: 63 IN | BODY MASS INDEX: 35.44 KG/M2 | TEMPERATURE: 97.9 F | DIASTOLIC BLOOD PRESSURE: 72 MMHG | SYSTOLIC BLOOD PRESSURE: 122 MMHG | WEIGHT: 200 LBS | OXYGEN SATURATION: 98 % | RESPIRATION RATE: 20 BRPM | HEART RATE: 78 BPM

## 2019-07-14 DIAGNOSIS — R07.89 OTHER CHEST PAIN: Primary | ICD-10-CM

## 2019-07-14 DIAGNOSIS — D86.9 SARCOIDOSIS: ICD-10-CM

## 2019-07-14 LAB
ALBUMIN SERPL-MCNC: 3.8 G/DL (ref 3.5–4.6)
ALP BLD-CCNC: 113 U/L (ref 40–130)
ALT SERPL-CCNC: 12 U/L (ref 0–33)
ANION GAP SERPL CALCULATED.3IONS-SCNC: 8 MEQ/L (ref 9–15)
AST SERPL-CCNC: 15 U/L (ref 0–35)
BILIRUB SERPL-MCNC: <0.2 MG/DL (ref 0.2–0.7)
BUN BLDV-MCNC: 13 MG/DL (ref 6–20)
CALCIUM SERPL-MCNC: 8.9 MG/DL (ref 8.5–9.9)
CHLORIDE BLD-SCNC: 105 MEQ/L (ref 95–107)
CO2: 23 MEQ/L (ref 20–31)
CREAT SERPL-MCNC: 1.31 MG/DL (ref 0.5–0.9)
D DIMER: 0.56 MG/L FEU (ref 0–0.5)
EKG ATRIAL RATE: 84 BPM
EKG P AXIS: 47 DEGREES
EKG P-R INTERVAL: 170 MS
EKG Q-T INTERVAL: 358 MS
EKG QRS DURATION: 76 MS
EKG QTC CALCULATION (BAZETT): 423 MS
EKG R AXIS: 16 DEGREES
EKG T AXIS: 5 DEGREES
EKG VENTRICULAR RATE: 84 BPM
GFR AFRICAN AMERICAN: 52.4
GFR NON-AFRICAN AMERICAN: 43.3
GLOBULIN: 3.4 G/DL (ref 2.3–3.5)
GLUCOSE BLD-MCNC: 199 MG/DL (ref 70–99)
HCT VFR BLD CALC: 40.1 % (ref 37–47)
HEMOGLOBIN: 14.2 G/DL (ref 12–16)
MCH RBC QN AUTO: 34.3 PG (ref 27–31.3)
MCHC RBC AUTO-ENTMCNC: 35.4 % (ref 33–37)
MCV RBC AUTO: 96.9 FL (ref 82–100)
PDW BLD-RTO: 13.5 % (ref 11.5–14.5)
PLATELET # BLD: 233 K/UL (ref 130–400)
POTASSIUM SERPL-SCNC: 3.5 MEQ/L (ref 3.4–4.9)
RBC # BLD: 4.14 M/UL (ref 4.2–5.4)
SODIUM BLD-SCNC: 136 MEQ/L (ref 135–144)
TOTAL PROTEIN: 7.2 G/DL (ref 6.3–8)
TROPONIN: <0.01 NG/ML (ref 0–0.01)
WBC # BLD: 6.9 K/UL (ref 4.8–10.8)

## 2019-07-14 PROCEDURE — 71046 X-RAY EXAM CHEST 2 VIEWS: CPT

## 2019-07-14 PROCEDURE — 6360000002 HC RX W HCPCS: Performed by: EMERGENCY MEDICINE

## 2019-07-14 PROCEDURE — 84484 ASSAY OF TROPONIN QUANT: CPT

## 2019-07-14 PROCEDURE — 99285 EMERGENCY DEPT VISIT HI MDM: CPT

## 2019-07-14 PROCEDURE — 6370000000 HC RX 637 (ALT 250 FOR IP): Performed by: EMERGENCY MEDICINE

## 2019-07-14 PROCEDURE — 85379 FIBRIN DEGRADATION QUANT: CPT

## 2019-07-14 PROCEDURE — 36415 COLL VENOUS BLD VENIPUNCTURE: CPT

## 2019-07-14 PROCEDURE — 85027 COMPLETE CBC AUTOMATED: CPT

## 2019-07-14 PROCEDURE — 93005 ELECTROCARDIOGRAM TRACING: CPT | Performed by: EMERGENCY MEDICINE

## 2019-07-14 PROCEDURE — 96374 THER/PROPH/DIAG INJ IV PUSH: CPT

## 2019-07-14 PROCEDURE — 80053 COMPREHEN METABOLIC PANEL: CPT

## 2019-07-14 RX ORDER — ASPIRIN 81 MG/1
324 TABLET, CHEWABLE ORAL ONCE
Status: COMPLETED | OUTPATIENT
Start: 2019-07-14 | End: 2019-07-14

## 2019-07-14 RX ORDER — PREDNISONE 20 MG/1
40 TABLET ORAL ONCE
Status: COMPLETED | OUTPATIENT
Start: 2019-07-14 | End: 2019-07-14

## 2019-07-14 RX ORDER — PREDNISONE 20 MG/1
40 TABLET ORAL DAILY
Qty: 8 TABLET | Refills: 0 | Status: SHIPPED | OUTPATIENT
Start: 2019-07-15 | End: 2019-07-19

## 2019-07-14 RX ORDER — MORPHINE SULFATE 2 MG/ML
4 INJECTION, SOLUTION INTRAMUSCULAR; INTRAVENOUS ONCE
Status: COMPLETED | OUTPATIENT
Start: 2019-07-14 | End: 2019-07-14

## 2019-07-14 RX ADMIN — ASPIRIN 81 MG 324 MG: 81 TABLET ORAL at 06:10

## 2019-07-14 RX ADMIN — PREDNISONE 40 MG: 20 TABLET ORAL at 07:10

## 2019-07-14 RX ADMIN — MORPHINE SULFATE 4 MG: 2 INJECTION, SOLUTION INTRAMUSCULAR; INTRAVENOUS at 06:10

## 2019-07-14 ASSESSMENT — PAIN DESCRIPTION - ORIENTATION
ORIENTATION: MID
ORIENTATION: MID

## 2019-07-14 ASSESSMENT — PAIN DESCRIPTION - PAIN TYPE
TYPE: ACUTE PAIN
TYPE: ACUTE PAIN

## 2019-07-14 ASSESSMENT — ENCOUNTER SYMPTOMS
ABDOMINAL PAIN: 0
CHEST TIGHTNESS: 1
SHORTNESS OF BREATH: 1
BACK PAIN: 0

## 2019-07-14 ASSESSMENT — PAIN DESCRIPTION - LOCATION
LOCATION: CHEST
LOCATION: CHEST

## 2019-07-14 ASSESSMENT — PAIN DESCRIPTION - DESCRIPTORS: DESCRIPTORS: SHARP

## 2019-07-14 ASSESSMENT — PAIN SCALES - GENERAL
PAINLEVEL_OUTOF10: 10
PAINLEVEL_OUTOF10: 6

## 2019-07-14 ASSESSMENT — PAIN DESCRIPTION - FREQUENCY: FREQUENCY: CONTINUOUS

## 2019-07-16 ENCOUNTER — OFFICE VISIT (OUTPATIENT)
Dept: FAMILY MEDICINE CLINIC | Age: 49
End: 2019-07-16
Payer: MEDICAID

## 2019-07-16 VITALS
DIASTOLIC BLOOD PRESSURE: 72 MMHG | OXYGEN SATURATION: 97 % | BODY MASS INDEX: 47.84 KG/M2 | HEIGHT: 63 IN | TEMPERATURE: 97.4 F | WEIGHT: 270 LBS | SYSTOLIC BLOOD PRESSURE: 130 MMHG | HEART RATE: 65 BPM

## 2019-07-16 DIAGNOSIS — F43.23 ADJUSTMENT REACTION WITH ANXIETY AND DEPRESSION: ICD-10-CM

## 2019-07-16 DIAGNOSIS — R07.89 OTHER CHEST PAIN: ICD-10-CM

## 2019-07-16 DIAGNOSIS — D86.2 SARCOIDOSIS OF LUNG WITH SARCOIDOSIS OF LYMPH NODES (HCC): Primary | ICD-10-CM

## 2019-07-16 DIAGNOSIS — J45.31 MILD PERSISTENT ASTHMA WITH ACUTE EXACERBATION: ICD-10-CM

## 2019-07-16 DIAGNOSIS — G89.29 OTHER CHRONIC PAIN: ICD-10-CM

## 2019-07-16 PROCEDURE — 1036F TOBACCO NON-USER: CPT | Performed by: NURSE PRACTITIONER

## 2019-07-16 PROCEDURE — G8427 DOCREV CUR MEDS BY ELIG CLIN: HCPCS | Performed by: NURSE PRACTITIONER

## 2019-07-16 PROCEDURE — 93010 ELECTROCARDIOGRAM REPORT: CPT | Performed by: INTERNAL MEDICINE

## 2019-07-16 PROCEDURE — 99214 OFFICE O/P EST MOD 30 MIN: CPT | Performed by: NURSE PRACTITIONER

## 2019-07-16 PROCEDURE — 2022F DILAT RTA XM EVC RTNOPTHY: CPT | Performed by: NURSE PRACTITIONER

## 2019-07-16 PROCEDURE — 3044F HG A1C LEVEL LT 7.0%: CPT | Performed by: NURSE PRACTITIONER

## 2019-07-16 PROCEDURE — G8417 CALC BMI ABV UP PARAM F/U: HCPCS | Performed by: NURSE PRACTITIONER

## 2019-07-16 RX ORDER — AMITRIPTYLINE HYDROCHLORIDE 25 MG/1
25 TABLET, FILM COATED ORAL NIGHTLY
Qty: 30 TABLET | Refills: 3 | Status: SHIPPED | OUTPATIENT
Start: 2019-07-16 | End: 2019-12-07 | Stop reason: SDUPTHER

## 2019-07-16 RX ORDER — BUSPIRONE HYDROCHLORIDE 10 MG/1
10 TABLET ORAL 2 TIMES DAILY PRN
Qty: 60 TABLET | Refills: 2 | Status: SHIPPED | OUTPATIENT
Start: 2019-07-16 | End: 2019-09-04 | Stop reason: SDUPTHER

## 2019-07-16 RX ORDER — PREGABALIN 75 MG/1
75 CAPSULE ORAL 2 TIMES DAILY
Qty: 60 CAPSULE | Refills: 0 | Status: ON HOLD | OUTPATIENT
Start: 2019-07-16 | End: 2019-09-11

## 2019-07-16 ASSESSMENT — ENCOUNTER SYMPTOMS
COUGH: 0
BACK PAIN: 1
WHEEZING: 0
SHORTNESS OF BREATH: 0
COLOR CHANGE: 0

## 2019-07-16 NOTE — PATIENT INSTRUCTIONS
Patient Education        Sarcoidosis: Care Instructions  Your Care Instructions    Sarcoidosis (say \"mnn-abd-SYD-harmony\") is a rare disease that causes tiny lumps of cells throughout the body called granulomas. These lumps are too small to see or feel. They can form anywhere on the inside or outside of the body and can cause permanent scar tissue. They often form in the lungs, lymph nodes, liver, skin, or eyes. Sarcoidosis may affect how an organ works. For instance, if it is in the lungs, you may be short of breath. For most people, sarcoidosis is a long-term disease that lasts several years or a lifetime. But some cases go away in a few months. Experts have no way of knowing how it will affect you. For some people, the disease may cause no symptoms at all. For others, symptoms may include fever, body aches, swollen lymph glands, shortness of breath, painful joints, and numbness. It may lead to lung or heart problems. Sometimes sarcoidosis can cause high calcium levels in the blood. Sarcoidosis occurs most often in young and middle-aged adults. Although the cause is not known, the disease does not spread from person to person. Different types of sarcoidosis have different treatments. Sarcoidosis may require long-term treatment (lasting months to years) with corticosteroids and other medicines, especially if it causes symptoms. You may also need regular tests. Follow-up care is a key part of your treatment and safety. Be sure to make and go to all appointments, and call your doctor if you are having problems. It's also a good idea to know your test results and keep a list of the medicines you take. How can you care for yourself at home? · Take your medicines exactly as prescribed. Call your doctor if you think you are having a problem with your medicine. · Do not smoke. Smoking can make sarcoidosis worse. If you need help quitting, talk to your doctor about stop-smoking programs and medicines.  These can

## 2019-07-16 NOTE — PROGRESS NOTES
Subjective:      Patient ID: Chino Modi is a 51 y.o. female who presents today for:     Chief Complaint   Patient presents with    Follow-Up from Hospital     Pt presents today for follow from [facility] [place] ED on 07/14/2019. Pt was seen for chest pain. Pt states the the pain is currently 6/10. Pt states she has Sarcoidosis. HPI Pt was in ER multiple times for sarcoidosis flare up. She is on steroid daily to help with pain. Pt does see pain management. She has another appointment with them on August the 2nd. She was not able to meet up with palliative care yet as she was moving. Pain is currently a 6 out of 10. She does feel that part of it is her fibromyalgia flaring up as well as she feels it in her shoulders, back and hips a lot. She reports she has been taking the cymbalta for that, but does not feel it is effective anymore. Pt also reports she typically takes buspar for anxiety. She was previously given it by her psych doctor. She had some extra so she has been taking it without a prescription, but she just ran out and does not a refill. She denies any adverse side effects of medication. Past Medical History:   Diagnosis Date    Anxiety     Arthritis     Asthma     Bronchopneumonia     Cancer ([address]     renal    Cerebral artery occlusion with cerebral infarction (HCC)     Chronic bilateral low back pain with sciatica     Chronic kidney disease     Depression     Hypertension     Insulin dependent type 2 diabetes mellitus, uncontrolled ([address] 8/3/2018    Mixed headache     Pure hyperglyceridemia 5/19/2017    Sarcoidosis     Sleep apnea     does not wear cpap    Thyroid goiter      Past Surgical History:   Procedure Laterality Date    BRONCHOSCOPY  10/26/2018    DR. STEARNS    KIDNEY REMOVAL Right 08/2016    THYROID LOBECTOMY Right 6/13/14    THYROIDECTOMY  02/21/2019    DR. MAGDALENO    URETER STENT PLACEMENT Left 08/2016     Family History   Problem Relation Age of Onset    Cancer Father     Diabetes Father     High Blood Pressure Mother     Diabetes Mother      Social History     Socioeconomic History    Marital status: Legally      Spouse name: Not on file    Number of children: Not on file    Years of education: Not on file    Highest education level: Not on file   Occupational History    Not on file   Social Needs    Financial resource strain: Not on file    Food insecurity:     Worry: Not on file     Inability: Not on file    Transportation needs:     Medical: Not on file     Non-medical: Not on file   Tobacco Use    Smoking status: Former Smoker     Packs/day: 0.50     Years: 15.00     Pack years: 7.50     Types: Cigarettes     Start date: 2014     Last attempt to quit: 2015     Years since quittin.4    Smokeless tobacco: Former User     Quit date: 3/23/2016   Substance and Sexual Activity    Alcohol use:  Yes     Alcohol/week: 0.0 standard drinks     Comment: occasionally    Drug use: Yes     Types: Marijuana     Comment: one month ago    Sexual activity: Not on file   Lifestyle    Physical activity:     Days per week: Not on file     Minutes per session: Not on file    Stress: Not on file   Relationships    Social connections:     Talks on phone: Not on file     Gets together: Not on file     Attends Cheondoism service: Not on file     Active member of club or organization: Not on file     Attends meetings of clubs or organizations: Not on file     Relationship status: Not on file    Intimate partner violence:     Fear of current or ex partner: Not on file     Emotionally abused: Not on file     Physically abused: Not on file     Forced sexual activity: Not on file   Other Topics Concern    Not on file   Social History Narrative    Not on file     Current Outpatient Medications on File Prior to Visit   Medication Sig Dispense Refill    methylPREDNISolone (MEDROL, MANUEL,) 4 MG tablet Take by mouth in addition to the steroids that you are breathing/allergies 30 tablet 5    folic acid (FOLVITE) 1 MG tablet Take 1 tablet by mouth daily 30 tablet 3    albuterol (PROVENTIL) (2.5 MG/3ML) 0.083% nebulizer solution Take 3 mLs by nebulization every 2 hours as needed for Wheezing 120 each 3    albuterol sulfate  (90 Base) MCG/ACT inhaler Inhale 2 puffs into the lungs every 4 hours as needed for Wheezing 1 Inhaler 3    predniSONE (DELTASONE) 20 MG tablet Take 2 tablets by mouth daily for 4 days (Patient not taking: Reported on 7/16/2019) 8 tablet 0    omeprazole (PRILOSEC) 40 MG delayed release capsule Take 1 capsule by mouth daily (Patient not taking: Reported on 7/16/2019) 30 capsule 3    lidocaine 4 % external patch Place 3 patches onto the skin daily (Patient not taking: Reported on 7/16/2019) 90 patch 5    Insulin Pen Needle (B-D ULTRAFINE III SHORT PEN) 31G X 8 MM MISC Inject 1 each into the skin 3 times daily 100 each 5     No current facility-administered medications on file prior to visit. Allergies:  Shellfish-derived products; Ibuprofen; Propoxyphene n-acetaminophen; and Toradol [ketorolac tromethamine]    Review of Systems   Constitutional: Positive for fatigue. Negative for chills and fever. Respiratory: Negative for cough, shortness of breath and wheezing. Cardiovascular: Positive for chest pain. Negative for palpitations and leg swelling. Musculoskeletal: Positive for arthralgias, back pain, gait problem and myalgias. Negative for joint swelling, neck pain and neck stiffness. Skin: Negative for color change, pallor, rash and wound. Psychiatric/Behavioral: Negative for self-injury and suicidal ideas. The patient is nervous/anxious. Objective:   /72 (Site: Right Upper Arm, Position: Sitting, Cuff Size: Large Adult)   Pulse 65   Temp 97.4 °F (36.3 °C) (Temporal)   Ht 5' 3\" (1.6 m)   Wt 270 lb (122.5 kg)   LMP 07/09/2019   SpO2 97%   Breastfeeding?  No   BMI 47.83 kg/m²     Physical Exam Sarcoidosis- Continue current treatment with steroid and f/u with pulmonology and pain management. An appointment was made with palliative care as well prior to patient leaving. Reviewed with the patient: current clinicalstatus, medications, activities and diet. Side effects, adverse effects of the medication prescribedtoday, as well as treatment plan/ rationale and result expectations have been discussedwith the patient who expresses understanding and desires to proceed. Close follow upto evaluate treatment results and for coordination of care. I have reviewedthe patient's medical history in detail and updated the computerized patient record.     Tony Gardiner, APRN - CNP

## 2019-07-19 ENCOUNTER — HOSPITAL ENCOUNTER (EMERGENCY)
Age: 49
Discharge: HOME | End: 2019-07-19
Payer: MEDICAID

## 2019-07-19 ENCOUNTER — APPOINTMENT (OUTPATIENT)
Dept: GENERAL RADIOLOGY | Age: 49
End: 2019-07-19
Payer: MEDICAID

## 2019-07-19 VITALS
WEIGHT: 269 LBS | TEMPERATURE: 98 F | BODY MASS INDEX: 47.66 KG/M2 | SYSTOLIC BLOOD PRESSURE: 129 MMHG | HEART RATE: 99 BPM | HEIGHT: 63 IN | OXYGEN SATURATION: 99 % | RESPIRATION RATE: 18 BRPM | DIASTOLIC BLOOD PRESSURE: 71 MMHG

## 2019-07-19 DIAGNOSIS — R07.9 CHEST PAIN, UNSPECIFIED TYPE: Primary | ICD-10-CM

## 2019-07-19 DIAGNOSIS — G89.29 OTHER CHRONIC PAIN: ICD-10-CM

## 2019-07-19 LAB
EKG ATRIAL RATE: 77 BPM
EKG P AXIS: 25 DEGREES
EKG P-R INTERVAL: 144 MS
EKG Q-T INTERVAL: 374 MS
EKG QRS DURATION: 82 MS
EKG QTC CALCULATION (BAZETT): 423 MS
EKG R AXIS: 4 DEGREES
EKG T AXIS: -1 DEGREES
EKG VENTRICULAR RATE: 77 BPM

## 2019-07-19 PROCEDURE — 6370000000 HC RX 637 (ALT 250 FOR IP): Performed by: PHYSICIAN ASSISTANT

## 2019-07-19 PROCEDURE — 96374 THER/PROPH/DIAG INJ IV PUSH: CPT

## 2019-07-19 PROCEDURE — 94761 N-INVAS EAR/PLS OXIMETRY MLT: CPT

## 2019-07-19 PROCEDURE — 99284 EMERGENCY DEPT VISIT MOD MDM: CPT

## 2019-07-19 PROCEDURE — 93005 ELECTROCARDIOGRAM TRACING: CPT | Performed by: PHYSICIAN ASSISTANT

## 2019-07-19 PROCEDURE — 6360000002 HC RX W HCPCS: Performed by: PHYSICIAN ASSISTANT

## 2019-07-19 PROCEDURE — 71046 X-RAY EXAM CHEST 2 VIEWS: CPT

## 2019-07-19 PROCEDURE — 94640 AIRWAY INHALATION TREATMENT: CPT

## 2019-07-19 RX ORDER — IPRATROPIUM BROMIDE AND ALBUTEROL SULFATE 2.5; .5 MG/3ML; MG/3ML
1 SOLUTION RESPIRATORY (INHALATION)
Status: DISCONTINUED | OUTPATIENT
Start: 2019-07-19 | End: 2019-07-19 | Stop reason: HOSPADM

## 2019-07-19 RX ORDER — ACETAMINOPHEN 500 MG
1000 TABLET ORAL ONCE
Status: COMPLETED | OUTPATIENT
Start: 2019-07-19 | End: 2019-07-19

## 2019-07-19 RX ORDER — METHYLPREDNISOLONE SODIUM SUCCINATE 125 MG/2ML
125 INJECTION, POWDER, LYOPHILIZED, FOR SOLUTION INTRAMUSCULAR; INTRAVENOUS ONCE
Status: COMPLETED | OUTPATIENT
Start: 2019-07-19 | End: 2019-07-19

## 2019-07-19 RX ADMIN — METHYLPREDNISOLONE SODIUM SUCCINATE 125 MG: 125 INJECTION, POWDER, FOR SOLUTION INTRAMUSCULAR; INTRAVENOUS at 15:45

## 2019-07-19 RX ADMIN — ACETAMINOPHEN 1000 MG: 500 TABLET ORAL at 16:42

## 2019-07-19 RX ADMIN — IPRATROPIUM BROMIDE AND ALBUTEROL SULFATE 1 AMPULE: .5; 3 SOLUTION RESPIRATORY (INHALATION) at 15:24

## 2019-07-19 ASSESSMENT — PAIN DESCRIPTION - FREQUENCY: FREQUENCY: CONTINUOUS

## 2019-07-19 ASSESSMENT — PAIN - FUNCTIONAL ASSESSMENT: PAIN_FUNCTIONAL_ASSESSMENT: 0-10

## 2019-07-19 ASSESSMENT — ENCOUNTER SYMPTOMS
VOMITING: 0
BACK PAIN: 0
NAUSEA: 0
CONSTIPATION: 0
RHINORRHEA: 0
ABDOMINAL DISTENTION: 0
COLOR CHANGE: 0
SORE THROAT: 0
DIARRHEA: 0
SHORTNESS OF BREATH: 0
ABDOMINAL PAIN: 0
EYE DISCHARGE: 0

## 2019-07-19 ASSESSMENT — PAIN DESCRIPTION - LOCATION: LOCATION: CHEST

## 2019-07-19 ASSESSMENT — PAIN SCALES - GENERAL
PAINLEVEL_OUTOF10: 9
PAINLEVEL_OUTOF10: 9
PAINLEVEL_OUTOF10: 8

## 2019-07-19 ASSESSMENT — PAIN DESCRIPTION - DESCRIPTORS: DESCRIPTORS: SHARP

## 2019-07-19 ASSESSMENT — PAIN DESCRIPTION - PAIN TYPE
TYPE: ACUTE PAIN
TYPE: CHRONIC PAIN

## 2019-07-19 ASSESSMENT — PAIN DESCRIPTION - ONSET: ONSET: ON-GOING

## 2019-07-19 ASSESSMENT — PAIN DESCRIPTION - ORIENTATION: ORIENTATION: MID;UPPER

## 2019-07-19 ASSESSMENT — PULMONARY FUNCTION TESTS: PEFR_L/MIN: 260

## 2019-07-19 NOTE — ED PROVIDER NOTES
[address] [facility] ED  eMERGENCY dEPARTMENT eNCOUnter      Pt Name: Edwina Blair  MRN: [id number]  [place] 1970  Date of evaluation: 7/19/2019  Provider: Poncho Tran PA-C    CHIEF COMPLAINT       Chief Complaint   Patient presents with    Chest Pain     started earlier today. mid upper chest.  constant pain. sharp         HISTORY OF PRESENT ILLNESS   (Location/Symptom, Timing/Onset,Context/Setting, Quality, Duration, Modifying Factors, Severity)  Note limiting factors. Edwina Blair is a 51 y.o. female who presents to the emergency department plaint of chest pain which patient states started earlier today. She was recently diagnosed with sarcoidosis she states she had multiple visits to the emerge department for the same chest pain and has been evaluated multiple times, with association to her sarcoidosis. She is currently involved in pain management and takes Ultram and prednisone at home for her pain. States she has taken this today without any significant relief. This is the same pain that she has all the time related to her sarcoidosis there is no difference other than the fact is not resolving. There is no nausea vomiting no dizziness no fevers no cough. States she had contacted her family doctor who is in [facility] [employer] and they advised her to come to the emergency department. HPI    NursingNotes were reviewed. REVIEW OF SYSTEMS    (2-9 systems for level 4, 10 or more for level 5)     Review of Systems   Constitutional: Negative for activity change and appetite change. HENT: Negative for congestion, ear discharge, ear pain, nosebleeds, rhinorrhea and sore throat. Eyes: Negative for discharge. Respiratory: Negative for shortness of breath. Cardiovascular: Positive for chest pain. Negative for palpitations and leg swelling. Gastrointestinal: Negative for abdominal distention, abdominal pain, constipation, diarrhea, nausea and vomiting.    Genitourinary: Negative metoclopramide (REGLAN) 10 MG tablet Take 1 tablet by mouth 4 times daily, Disp-20 tablet, R-0Print      fexofenadine (ALLEGRA ALLERGY) 180 MG tablet Take 1 tablet by mouth daily, Disp-10 tablet, R-0Print      fluticasone (FLONASE) 50 MCG/ACT nasal spray 1 spray by Nasal route daily, Disp-1 Bottle, R-3Normal      traMADol (ULTRAM) 50 MG tablet Take 50 mg by mouth every 6 hours as needed for Pain. Historical Med      omeprazole (PRILOSEC) 40 MG delayed release capsule Take 1 capsule by mouth daily, Disp-30 capsule, R-3Normal      acetaminophen (TYLENOL) 325 MG tablet Take 2 tablets by mouth every 6 hours as needed for Pain, Disp-120 tablet, R-3Normal      lidocaine 4 % external patch Place 3 patches onto the skin daily, Transdermal, DAILY Starting Sun 5/19/2019, Disp-90 patch, R-5, Normal      fluticasone-salmeterol (ADVAIR) 250-50 MCG/DOSE AEPB Inhale 1 puff into the lungs 2 times dailyHistorical Med      methocarbamol (ROBAXIN) 750 MG tablet Take 750 mg by mouth 4 times dailyHistorical Med      methotrexate (RHEUMATREX) 1 g chemo injection Infuse 25 mg intravenously once Indications: 0.4 ml SQ every MondayHistorical Med      levothyroxine (SYNTHROID) 75 MCG tablet Take 75 mcg by mouth DailyHistorical Med      butalbital-acetaminophen-caffeine (FIORICET, ESGIC) -40 MG per tablet TAKE 1 TABLET BY MOUTH THREE TIMES DAILY AS NEEDED FOR HEADACHES, Disp-21 tablet, R-0Normal      Insulin Pen Needle (B-D ULTRAFINE III SHORT PEN) 31G X 8 MM MISC 3 TIMES DAILY Starting Sat 1/5/2019, Until Mon 2/4/2019, For 30 days, Disp-100 each, R-5, Normal      cetirizine (ZYRTEC) 10 MG tablet Take 1 qhs for allergies, Disp-30 tablet, R-3Normal      Insulin Syringe-Needle U-100 30G X 1/2\" 1 ML MISC DAILY Starting Fri 11/16/2018, Disp-100 each, R-3, Normal      insulin lispro (ADMELOG) 100 UNIT/ML injection vial Inject 4 Units into the skin 3 times daily as needed for High Blood Sugar, Disp-1 vial, [id number];replaces Nel Giang (electronically signed)  Attending Emergency Physician         Jojo Omalley PA-C  07/23/19 [id number]

## 2019-07-22 PROCEDURE — 93010 ELECTROCARDIOGRAM REPORT: CPT | Performed by: INTERNAL MEDICINE

## 2019-07-26 ENCOUNTER — OFFICE VISIT (OUTPATIENT)
Dept: PALLATIVE CARE | Age: 49
End: 2019-07-26
Payer: MEDICAID

## 2019-07-26 ENCOUNTER — TELEPHONE (OUTPATIENT)
Dept: FAMILY MEDICINE CLINIC | Age: 49
End: 2019-07-26

## 2019-07-26 VITALS
RESPIRATION RATE: 18 BRPM | OXYGEN SATURATION: 99 % | TEMPERATURE: 97.6 F | HEART RATE: 71 BPM | DIASTOLIC BLOOD PRESSURE: 78 MMHG | HEIGHT: 63 IN | BODY MASS INDEX: 48.2 KG/M2 | SYSTOLIC BLOOD PRESSURE: 106 MMHG | WEIGHT: 272 LBS

## 2019-07-26 DIAGNOSIS — R52 PAIN: ICD-10-CM

## 2019-07-26 DIAGNOSIS — Z71.89 OTHER SPECIFIED COUNSELING: ICD-10-CM

## 2019-07-26 DIAGNOSIS — M79.7 FIBROMYALGIA: ICD-10-CM

## 2019-07-26 DIAGNOSIS — H10.9 CONJUNCTIVITIS OF BOTH EYES, UNSPECIFIED CONJUNCTIVITIS TYPE: Primary | ICD-10-CM

## 2019-07-26 DIAGNOSIS — D86.9 SARCOIDOSIS: ICD-10-CM

## 2019-07-26 DIAGNOSIS — R06.02 SOB (SHORTNESS OF BREATH): ICD-10-CM

## 2019-07-26 PROBLEM — J44.9 CHRONIC OBSTRUCTIVE LUNG DISEASE (HCC): Status: ACTIVE | Noted: 2019-07-26

## 2019-07-26 PROBLEM — Z72.0 TOBACCO USER: Status: ACTIVE | Noted: 2019-07-26

## 2019-07-26 PROCEDURE — 1036F TOBACCO NON-USER: CPT | Performed by: NURSE PRACTITIONER

## 2019-07-26 PROCEDURE — G8427 DOCREV CUR MEDS BY ELIG CLIN: HCPCS | Performed by: NURSE PRACTITIONER

## 2019-07-26 PROCEDURE — G8417 CALC BMI ABV UP PARAM F/U: HCPCS | Performed by: NURSE PRACTITIONER

## 2019-07-26 PROCEDURE — 99203 OFFICE O/P NEW LOW 30 MIN: CPT | Performed by: NURSE PRACTITIONER

## 2019-07-26 RX ORDER — PREDNISONE 10 MG/1
TABLET ORAL
Qty: 30 TABLET | Refills: 0 | Status: SHIPPED | OUTPATIENT
Start: 2019-07-26 | End: 2019-10-17

## 2019-07-26 RX ORDER — OLOPATADINE HYDROCHLORIDE 1 MG/ML
1 SOLUTION/ DROPS OPHTHALMIC 2 TIMES DAILY
Qty: 1 BOTTLE | Refills: 3 | Status: SHIPPED | OUTPATIENT
Start: 2019-07-26 | End: 2019-08-22

## 2019-07-26 RX ORDER — DOXYCYCLINE HYCLATE 100 MG/1
100 CAPSULE ORAL 2 TIMES DAILY
Qty: 20 CAPSULE | Refills: 0 | Status: SHIPPED | OUTPATIENT
Start: 2019-07-26 | End: 2019-08-05

## 2019-07-26 ASSESSMENT — ENCOUNTER SYMPTOMS
COLOR CHANGE: 0
APNEA: 0
CONSTIPATION: 0
ABDOMINAL DISTENTION: 0
EYE DISCHARGE: 0
CHOKING: 0
COUGH: 0
RECTAL PAIN: 0
SORE THROAT: 0
CHEST TIGHTNESS: 0
ANAL BLEEDING: 0
VOICE CHANGE: 0
TROUBLE SWALLOWING: 0
STRIDOR: 0
SHORTNESS OF BREATH: 1
WHEEZING: 0
DIARRHEA: 0
NAUSEA: 0
ABDOMINAL PAIN: 0
BACK PAIN: 0
VOMITING: 0
BLOOD IN STOOL: 0

## 2019-07-26 NOTE — PROGRESS NOTES
Subjective:      Patient Id:  Stevo Rizvi is a 51 y.o. female who presents today with   Chief Complaint   Patient presents with    Shortness of Breath    Chest Pain          HPI Seen at [address] [place] [address] for symptom management follow up rt COPD and Sarcoidodsis demeanor calm and relaxed, NAD, no sedation. Positive  bilateral eye running of clear fluid, congestion, for a month. She is taking prednisone, flonase, allegra, and multiple OTC meds without relief. Dyspnea at rest, worse with exertion, she has albuterol nebulized, uses a few time a month. Not much relief with current dose of Advair. Positive pain in rib and chest area, she has had multiple cardiac workups all WNL. Working with pain management. Negative headaches, dizziness, or vision changes. No dysphagia, dysgeusia, or mouth sores; weight stable, Appetite is good. No GI or  concerns. No chintan blood in stool or urine. SOB and edema at baseline. Negative excessive fatigue, fever, chills, myalgias, increased sputum production or cough, nausea, vomiting, chest pain, or orthopnea. Negative significant Sleep disturbance, depression, anxiety, or agitation episodes; denies suicidal or homicidal ideation or signs suggesting existential grief or spiritual pain. Past Medical History:   Diagnosis Date    Anxiety     Arthritis     Asthma     Bronchopneumonia     Cancer ([address]     renal    Cerebral artery occlusion with cerebral infarction (HCC)     Chronic bilateral low back pain with sciatica     Chronic kidney disease     Chronic obstructive lung disease ([address] 7/26/2019    Depression     Hypertension     Insulin dependent type 2 diabetes mellitus, uncontrolled ([address] 8/3/2018    Mixed headache     Pure hyperglyceridemia 5/19/2017    Sarcoidosis     Sleep apnea     does not wear cpap    Thyroid goiter      Past Surgical History:   Procedure Laterality Date    BRONCHOSCOPY  10/26/2018    DR. STEARNS    KIDNEY REMOVAL Right 08/2016    KIDNEY REMOVAL Right 2016    LUNG BIOPSY Right 10/2018    THYROID LOBECTOMY Right 14    THYROIDECTOMY  2019    DR. MAGDALENO    THYROIDECTOMY  2018    URETER STENT PLACEMENT Left 2016     Social History     Socioeconomic History    Marital status: Legally      Spouse name: Not on file    Number of children: Not on file    Years of education: Not on file    Highest education level: Not on file   Occupational History    Not on file   Social Needs    Financial resource strain: Not on file    Food insecurity:     Worry: Not on file     Inability: Not on file    Transportation needs:     Medical: Not on file     Non-medical: Not on file   Tobacco Use    Smoking status: Former Smoker     Packs/day: 0.50     Years: 15.00     Pack years: 7.50     Types: Cigarettes     Start date: 2014     Last attempt to quit: 2015     Years since quittin.5    Smokeless tobacco: Former User     Quit date: 3/23/2016   Substance and Sexual Activity    Alcohol use:  Yes     Alcohol/week: 0.0 standard drinks     Comment: occasionally    Drug use: Yes     Types: Marijuana     Comment: one month ago    Sexual activity: Not on file   Lifestyle    Physical activity:     Days per week: Not on file     Minutes per session: Not on file    Stress: Not on file   Relationships    Social connections:     Talks on phone: Not on file     Gets together: Not on file     Attends Synagogue service: Not on file     Active member of club or organization: Not on file     Attends meetings of clubs or organizations: Not on file     Relationship status: Not on file    Intimate partner violence:     Fear of current or ex partner: Not on file     Emotionally abused: Not on file     Physically abused: Not on file     Forced sexual activity: Not on file   Other Topics Concern    Not on file   Social History Narrative    Not on file     Family History   Problem Relation Age of Onset    Cancer Father     Diabetes Father

## 2019-07-29 ENCOUNTER — TELEPHONE (OUTPATIENT)
Dept: FAMILY MEDICINE CLINIC | Age: 49
End: 2019-07-29

## 2019-07-29 DIAGNOSIS — Z51.5 PALLIATIVE CARE ENCOUNTER: ICD-10-CM

## 2019-07-29 DIAGNOSIS — R06.2 WHEEZING: ICD-10-CM

## 2019-07-29 DIAGNOSIS — D86.0 SARCOIDOSIS OF LUNG (HCC): ICD-10-CM

## 2019-07-29 DIAGNOSIS — J44.9 CHRONIC OBSTRUCTIVE PULMONARY DISEASE, UNSPECIFIED COPD TYPE (HCC): Primary | ICD-10-CM

## 2019-07-29 DIAGNOSIS — R06.02 SOB (SHORTNESS OF BREATH): ICD-10-CM

## 2019-07-29 RX ORDER — IPRATROPIUM BROMIDE AND ALBUTEROL SULFATE 2.5; .5 MG/3ML; MG/3ML
1 SOLUTION RESPIRATORY (INHALATION) EVERY 6 HOURS PRN
Qty: 540 ML | Refills: 5 | Status: SHIPPED | OUTPATIENT
Start: 2019-07-29 | End: 2020-06-30

## 2019-08-10 ENCOUNTER — APPOINTMENT (OUTPATIENT)
Dept: GENERAL RADIOLOGY | Age: 49
End: 2019-08-10
Payer: MEDICAID

## 2019-08-10 ENCOUNTER — HOSPITAL ENCOUNTER (EMERGENCY)
Age: 49
Discharge: HOME | End: 2019-08-10
Attending: EMERGENCY MEDICINE
Payer: MEDICAID

## 2019-08-10 VITALS
SYSTOLIC BLOOD PRESSURE: 140 MMHG | HEIGHT: 63 IN | DIASTOLIC BLOOD PRESSURE: 76 MMHG | RESPIRATION RATE: 18 BRPM | HEART RATE: 81 BPM | BODY MASS INDEX: 48.2 KG/M2 | WEIGHT: 272 LBS | OXYGEN SATURATION: 98 %

## 2019-08-10 DIAGNOSIS — D86.9 SARCOIDOSIS: ICD-10-CM

## 2019-08-10 DIAGNOSIS — R07.9 CHEST PAIN, UNSPECIFIED TYPE: Primary | ICD-10-CM

## 2019-08-10 LAB
ANION GAP SERPL CALCULATED.3IONS-SCNC: 9 MEQ/L (ref 9–15)
BASOPHILS ABSOLUTE: 0.1 K/UL (ref 0–0.2)
BASOPHILS RELATIVE PERCENT: 1.2 %
BUN BLDV-MCNC: 10 MG/DL (ref 6–20)
CALCIUM SERPL-MCNC: 9.1 MG/DL (ref 8.5–9.9)
CHLORIDE BLD-SCNC: 103 MEQ/L (ref 95–107)
CO2: 25 MEQ/L (ref 20–31)
CREAT SERPL-MCNC: 1.22 MG/DL (ref 0.5–0.9)
EKG ATRIAL RATE: 75 BPM
EKG P AXIS: 32 DEGREES
EKG P-R INTERVAL: 150 MS
EKG Q-T INTERVAL: 392 MS
EKG QRS DURATION: 80 MS
EKG QTC CALCULATION (BAZETT): 437 MS
EKG R AXIS: 8 DEGREES
EKG T AXIS: 6 DEGREES
EKG VENTRICULAR RATE: 75 BPM
EOSINOPHILS ABSOLUTE: 0.3 K/UL (ref 0–0.7)
EOSINOPHILS RELATIVE PERCENT: 6.2 %
GFR AFRICAN AMERICAN: 56.8
GFR NON-AFRICAN AMERICAN: 47
GLUCOSE BLD-MCNC: 139 MG/DL (ref 70–99)
HCT VFR BLD CALC: 41.2 % (ref 37–47)
HEMOGLOBIN: 14.4 G/DL (ref 12–16)
LYMPHOCYTES ABSOLUTE: 1.3 K/UL (ref 1–4.8)
LYMPHOCYTES RELATIVE PERCENT: 24.6 %
MCH RBC QN AUTO: 33.9 PG (ref 27–31.3)
MCHC RBC AUTO-ENTMCNC: 34.9 % (ref 33–37)
MCV RBC AUTO: 97.1 FL (ref 82–100)
MONOCYTES ABSOLUTE: 0.3 K/UL (ref 0.2–0.8)
MONOCYTES RELATIVE PERCENT: 6 %
NEUTROPHILS ABSOLUTE: 3.3 K/UL (ref 1.4–6.5)
NEUTROPHILS RELATIVE PERCENT: 62 %
PDW BLD-RTO: 13.1 % (ref 11.5–14.5)
PLATELET # BLD: 235 K/UL (ref 130–400)
POTASSIUM SERPL-SCNC: 4 MEQ/L (ref 3.4–4.9)
RBC # BLD: 4.25 M/UL (ref 4.2–5.4)
SODIUM BLD-SCNC: 137 MEQ/L (ref 135–144)
TROPONIN: <0.01 NG/ML (ref 0–0.01)
WBC # BLD: 5.4 K/UL (ref 4.8–10.8)

## 2019-08-10 PROCEDURE — 71045 X-RAY EXAM CHEST 1 VIEW: CPT

## 2019-08-10 PROCEDURE — 36415 COLL VENOUS BLD VENIPUNCTURE: CPT

## 2019-08-10 PROCEDURE — 96376 TX/PRO/DX INJ SAME DRUG ADON: CPT

## 2019-08-10 PROCEDURE — 84484 ASSAY OF TROPONIN QUANT: CPT

## 2019-08-10 PROCEDURE — 6360000002 HC RX W HCPCS: Performed by: EMERGENCY MEDICINE

## 2019-08-10 PROCEDURE — 80048 BASIC METABOLIC PNL TOTAL CA: CPT

## 2019-08-10 PROCEDURE — 99285 EMERGENCY DEPT VISIT HI MDM: CPT

## 2019-08-10 PROCEDURE — 96374 THER/PROPH/DIAG INJ IV PUSH: CPT

## 2019-08-10 PROCEDURE — 85025 COMPLETE CBC W/AUTO DIFF WBC: CPT

## 2019-08-10 PROCEDURE — 93005 ELECTROCARDIOGRAM TRACING: CPT | Performed by: EMERGENCY MEDICINE

## 2019-08-10 PROCEDURE — 6370000000 HC RX 637 (ALT 250 FOR IP): Performed by: EMERGENCY MEDICINE

## 2019-08-10 RX ORDER — ONDANSETRON 2 MG/ML
4 INJECTION INTRAMUSCULAR; INTRAVENOUS ONCE
Status: COMPLETED | OUTPATIENT
Start: 2019-08-10 | End: 2019-08-10

## 2019-08-10 RX ORDER — MORPHINE SULFATE 2 MG/ML
4 INJECTION, SOLUTION INTRAMUSCULAR; INTRAVENOUS
Status: DISCONTINUED | OUTPATIENT
Start: 2019-08-10 | End: 2019-08-10 | Stop reason: HOSPADM

## 2019-08-10 RX ORDER — HYDROCODONE BITARTRATE AND ACETAMINOPHEN 5; 325 MG/1; MG/1
1 TABLET ORAL ONCE
Status: COMPLETED | OUTPATIENT
Start: 2019-08-10 | End: 2019-08-10

## 2019-08-10 RX ORDER — OXYCODONE HYDROCHLORIDE AND ACETAMINOPHEN 5; 325 MG/1; MG/1
1 TABLET ORAL EVERY 6 HOURS PRN
Qty: 12 TABLET | Refills: 0 | Status: SHIPPED | OUTPATIENT
Start: 2019-08-10 | End: 2019-08-13

## 2019-08-10 RX ADMIN — ONDANSETRON 4 MG: 2 INJECTION INTRAMUSCULAR; INTRAVENOUS at 07:38

## 2019-08-10 RX ADMIN — HYDROMORPHONE HYDROCHLORIDE 1 MG: 1 INJECTION, SOLUTION INTRAMUSCULAR; INTRAVENOUS; SUBCUTANEOUS at 08:42

## 2019-08-10 RX ADMIN — MORPHINE SULFATE 4 MG: 2 INJECTION, SOLUTION INTRAMUSCULAR; INTRAVENOUS at 07:38

## 2019-08-10 RX ADMIN — HYDROCODONE BITARTRATE AND ACETAMINOPHEN 1 TABLET: 5; 325 TABLET ORAL at 06:52

## 2019-08-10 RX ADMIN — ONDANSETRON 4 MG: 2 INJECTION INTRAMUSCULAR; INTRAVENOUS at 08:42

## 2019-08-10 ASSESSMENT — PAIN DESCRIPTION - LOCATION
LOCATION: CHEST
LOCATION: CHEST

## 2019-08-10 ASSESSMENT — PAIN DESCRIPTION - ORIENTATION: ORIENTATION: MID

## 2019-08-10 ASSESSMENT — ENCOUNTER SYMPTOMS
COUGH: 0
EYE DISCHARGE: 0
CHEST TIGHTNESS: 1
VOMITING: 0
BACK PAIN: 0
ABDOMINAL PAIN: 0
SHORTNESS OF BREATH: 1
EYE PAIN: 0
BLOOD IN STOOL: 0
ABDOMINAL DISTENTION: 0
SORE THROAT: 0
DIARRHEA: 0
EYES NEGATIVE: 1
NAUSEA: 0
WHEEZING: 1
GASTROINTESTINAL NEGATIVE: 1
SINUS PAIN: 0

## 2019-08-10 ASSESSMENT — PAIN DESCRIPTION - PAIN TYPE
TYPE: ACUTE PAIN
TYPE: ACUTE PAIN

## 2019-08-10 ASSESSMENT — PAIN DESCRIPTION - FREQUENCY: FREQUENCY: CONTINUOUS

## 2019-08-10 ASSESSMENT — PAIN SCALES - GENERAL
PAINLEVEL_OUTOF10: 9
PAINLEVEL_OUTOF10: 5
PAINLEVEL_OUTOF10: 9
PAINLEVEL_OUTOF10: 5
PAINLEVEL_OUTOF10: 9

## 2019-08-10 ASSESSMENT — PAIN DESCRIPTION - DIRECTION: RADIATING_TOWARDS: INTO BACK

## 2019-08-10 NOTE — ED PROVIDER NOTES
lungs every 12 hours, Disp-60 each, R-3Normal      busPIRone (BUSPAR) 10 MG tablet Take 1 tablet by mouth 2 times daily as needed (anxiety), Disp-60 tablet, R-2Normal      amitriptyline (ELAVIL) 25 MG tablet Take 1 tablet by mouth nightly, Disp-30 tablet, R-3Normal      pregabalin (LYRICA) 75 MG capsule Take 1 capsule by mouth 2 times daily for 30 days. , Disp-60 capsule, R-0Normal      metoclopramide (REGLAN) 10 MG tablet Take 1 tablet by mouth 4 times daily, Disp-20 tablet, R-0Print      fexofenadine (ALLEGRA ALLERGY) 180 MG tablet Take 1 tablet by mouth daily, Disp-10 tablet, R-0Print      fluticasone (FLONASE) 50 MCG/ACT nasal spray 1 spray by Nasal route daily, Disp-1 Bottle, R-3Normal      traMADol (ULTRAM) 50 MG tablet Take 50 mg by mouth every 6 hours as needed for Pain. Historical Med      omeprazole (PRILOSEC) 40 MG delayed release capsule Take 1 capsule by mouth daily, Disp-30 capsule, R-3Normal      acetaminophen (TYLENOL) 325 MG tablet Take 2 tablets by mouth every 6 hours as needed for Pain, Disp-120 tablet, R-3Normal      methocarbamol (ROBAXIN) 750 MG tablet Take 750 mg by mouth 4 times dailyHistorical Med      methotrexate (RHEUMATREX) 1 g chemo injection Infuse 25 mg intravenously once Indications: 0.4 ml SQ every MondayHistorical Med      levothyroxine (SYNTHROID) 75 MCG tablet Take 75 mcg by mouth DailyHistorical Med      butalbital-acetaminophen-caffeine (FIORICET, ESGIC) -40 MG per tablet TAKE 1 TABLET BY MOUTH THREE TIMES DAILY AS NEEDED FOR HEADACHES, Disp-21 tablet, R-0Normal      Insulin Pen Needle (B-D ULTRAFINE III SHORT PEN) 31G X 8 MM MISC 3 TIMES DAILY Starting Sat 1/5/2019, Until Mon 2/4/2019, For 30 days, Disp-100 each, R-5, Normal      cetirizine (ZYRTEC) 10 MG tablet Take 1 qhs for allergies, Disp-30 tablet, R-3Normal      Insulin Syringe-Needle U-100 30G X 1/2\" 1 ML MISC DAILY Starting Fri 11/16/2018, Disp-100 each, R-3, Normal      insulin lispro (ADMELOG) 100 UNIT/ML  Food insecurity:     Worry: None     Inability: None    Transportation needs:     Medical: None     Non-medical: None   Tobacco Use    Smoking status: Former Smoker     Packs/day: 0.50     Years: 15.00     Pack years: 7.50     Types: Cigarettes     Start date: 2014     Last attempt to quit: 2015     Years since quittin.5    Smokeless tobacco: Former User     Quit date: 3/23/2016   Substance and Sexual Activity    Alcohol use: Yes     Alcohol/week: 0.0 standard drinks     Comment: occasionally    Drug use: Yes     Types: Marijuana     Comment: one month ago    Sexual activity: None   Lifestyle    Physical activity:     Days per week: None     Minutes per session: None    Stress: None   Relationships    Social connections:     Talks on phone: None     Gets together: None     Attends [employer] service: None     Active member of club or organization: None     Attends meetings of clubs or organizations: None     Relationship status: None    Intimate partner violence:     Fear of current or ex partner: None     Emotionally abused: None     Physically abused: None     Forced sexual activity: None   Other Topics Concern    None   Social History Narrative    None       SCREENINGS      @FLOW([id number])@      PHYSICAL EXAM    (up to 7 for level 4, 8 or more for level 5)     ED Triage Vitals [08/10/19 0538]   BP Temp Temp Source Pulse Resp SpO2 Height Weight   120/68 -- Oral 73 30 98 % 5' 3\" (1.6 m) 272 lb (123.4 kg)       Physical Exam   Constitutional: She is oriented to person, place, and time. She appears well-developed and well-nourished. HENT:   Head: Normocephalic and atraumatic. Right Ear: External ear normal.   Left Ear: External ear normal.   Eyes: Pupils are equal, round, and reactive to light. Conjunctivae and EOM are normal. Right eye exhibits no discharge. Left eye exhibits no discharge. No scleral icterus. Neck: Normal range of motion. Neck supple. No JVD present.  No tracheal

## 2019-08-13 PROCEDURE — 93010 ELECTROCARDIOGRAM REPORT: CPT | Performed by: INTERNAL MEDICINE

## 2019-08-22 ENCOUNTER — OFFICE VISIT (OUTPATIENT)
Dept: FAMILY MEDICINE CLINIC | Age: 49
End: 2019-08-22
Payer: MEDICAID

## 2019-08-22 VITALS
SYSTOLIC BLOOD PRESSURE: 138 MMHG | OXYGEN SATURATION: 98 % | HEART RATE: 66 BPM | HEIGHT: 63 IN | TEMPERATURE: 98.1 F | BODY MASS INDEX: 48.27 KG/M2 | DIASTOLIC BLOOD PRESSURE: 78 MMHG | WEIGHT: 272.4 LBS | RESPIRATION RATE: 16 BRPM

## 2019-08-22 DIAGNOSIS — R10.9 RIGHT FLANK PAIN: ICD-10-CM

## 2019-08-22 DIAGNOSIS — I10 ESSENTIAL HYPERTENSION: ICD-10-CM

## 2019-08-22 DIAGNOSIS — E78.49 OTHER HYPERLIPIDEMIA: ICD-10-CM

## 2019-08-22 DIAGNOSIS — D86.9 SARCOIDOSIS: ICD-10-CM

## 2019-08-22 DIAGNOSIS — M79.7 FIBROMYALGIA: Primary | ICD-10-CM

## 2019-08-22 PROCEDURE — 99213 OFFICE O/P EST LOW 20 MIN: CPT | Performed by: FAMILY MEDICINE

## 2019-08-22 RX ORDER — DULOXETIN HYDROCHLORIDE 60 MG/1
CAPSULE, DELAYED RELEASE ORAL
Refills: 4 | COMMUNITY
Start: 2019-07-30 | End: 2021-01-05 | Stop reason: SDUPTHER

## 2019-08-22 RX ORDER — PREDNISONE 10 MG/1
TABLET ORAL
COMMUNITY
Start: 2019-08-20 | End: 2019-08-22

## 2019-08-22 ASSESSMENT — ENCOUNTER SYMPTOMS
CONSTIPATION: 0
NAUSEA: 0
ABDOMINAL PAIN: 0
CHEST TIGHTNESS: 0
SHORTNESS OF BREATH: 0
BACK PAIN: 1
VOMITING: 0
TROUBLE SWALLOWING: 0
DIARRHEA: 0

## 2019-08-22 NOTE — PROGRESS NOTES
History     Socioeconomic History    Marital status: Legally      Spouse name: Not on file    Number of children: Not on file    Years of education: Not on file    Highest education level: Not on file   Occupational History    Not on file   Social Needs    Financial resource strain: Not on file    Food insecurity:     Worry: Not on file     Inability: Not on file    Transportation needs:     Medical: Not on file     Non-medical: Not on file   Tobacco Use    Smoking status: Former Smoker     Packs/day: 0.50     Years: 15.00     Pack years: 7.50     Types: Cigarettes     Start date: 2014     Last attempt to quit: 2015     Years since quittin.5    Smokeless tobacco: Former User     Quit date: 3/23/2016   Substance and Sexual Activity    Alcohol use: Not Currently     Alcohol/week: 0.0 standard drinks     Comment: occasionally    Drug use: Yes     Frequency: 3.0 times per week     Types: Marijuana    Sexual activity: Yes     Partners: Male   Lifestyle    Physical activity:     Days per week: Not on file     Minutes per session: Not on file    Stress: Not on file   Relationships    Social connections:     Talks on phone: Not on file     Gets together: Not on file     Attends Restoration service: Not on file     Active member of club or organization: Not on file     Attends meetings of clubs or organizations: Not on file     Relationship status: Not on file    Intimate partner violence:     Fear of current or ex partner: Not on file     Emotionally abused: Not on file     Physically abused: Not on file     Forced sexual activity: Not on file   Other Topics Concern    Not on file   Social History Narrative    Not on file     Family History   Problem Relation Age of Onset    Cancer Father     Diabetes Father     Allergy (Severe) Father     Heart Attack Father     Prostate Cancer Father     High Blood Pressure Mother     Diabetes Mother     Arthritis Mother     High facility-administered medications on file prior to visit. Review of Systems   Constitutional: Negative for chills, fever and unexpected weight change. HENT: Negative for tinnitus and trouble swallowing. Eyes: Negative for visual disturbance. Respiratory: Negative for chest tightness and shortness of breath. Cardiovascular: Negative for chest pain. Gastrointestinal: Negative for abdominal pain, constipation, diarrhea, nausea and vomiting. Musculoskeletal: Positive for arthralgias, back pain, joint swelling and myalgias. Negative for gait problem, neck pain and neck stiffness. Skin: Negative for rash. Neurological: Negative for weakness and headaches. Psychiatric/Behavioral: Negative for suicidal ideas. Objective:   /78 (Site: Right Upper Arm, Position: Sitting, Cuff Size: Large Adult)   Pulse 66   Temp 98.1 °F (36.7 °C) (Temporal)   Resp 16   Ht 5' 3\" (1.6 m)   Wt 272 lb 6.4 oz (123.6 kg)   LMP 08/10/2019   SpO2 98%   BMI 48.25 kg/m²     Physical Exam   Constitutional: She is oriented to person, place, and time. She appears well-developed and well-nourished. HENT:   Head: Normocephalic and atraumatic. Eyes: Pupils are equal, round, and reactive to light. EOM are normal.   Neck: Normal range of motion. Neck supple. Cardiovascular: Normal rate, regular rhythm and normal heart sounds. Pulmonary/Chest: Effort normal and breath sounds normal.   Abdominal: Soft. Bowel sounds are normal.   Musculoskeletal: She exhibits tenderness (with light touch over right flank area). Neurological: She is alert and oriented to person, place, and time. Skin: Skin is warm and dry. Psychiatric: She has a normal mood and affect. Her behavior is normal. Judgment and thought content normal.   Nursing note and vitals reviewed. No results found for this visit on 08/22/19. Assessment:       Diagnosis Orders   1. Fibromyalgia  Amb External Referral To Rheumatology   2.  Right flank pain  XR LUMBAR SPINE (2-3 VIEWS)   3. Insulin dependent type 2 diabetes mellitus, uncontrolled (HCC)  Hemoglobin A1C    Comprehensive Metabolic Panel   4. Essential hypertension     5. Other hyperlipidemia  Lipid Panel   6. Sarcoidosis           Plan:      Orders Placed This Encounter   Procedures    XR LUMBAR SPINE (2-3 VIEWS)     Standing Status:   Future     Standing Expiration Date:   8/22/2020     Order Specific Question:   Reason for exam:     Answer:   pain    Hemoglobin A1C     Standing Status:   Future     Standing Expiration Date:   8/21/2020    Lipid Panel     Standing Status:   Future     Standing Expiration Date:   8/21/2020     Order Specific Question:   Is Patient Fasting?/# of Hours     Answer:   10    Comprehensive Metabolic Panel     Standing Status:   Future     Standing Expiration Date:   8/22/2020    Amb External Referral To Rheumatology     Referral Priority:   Routine     Referral Type:   Eval and Treat     Referral Reason:   Specialty Services Required     Referred to Provider:   Sara Moore MD     Requested Specialty:   Rheumatology     Number of Visits Requested:   1     No orders of the defined types were placed in this encounter. Return in about 3 months (around 11/22/2019). UpToDate was referenced for current practice guidelines and the current standard of care pertaining specifically to this patient.     Maria M Ni DO

## 2019-09-01 ENCOUNTER — APPOINTMENT (OUTPATIENT)
Dept: GENERAL RADIOLOGY | Age: 49
End: 2019-09-01
Payer: MEDICAID

## 2019-09-01 ENCOUNTER — HOSPITAL ENCOUNTER (EMERGENCY)
Age: 49
Discharge: HOME | End: 2019-09-02
Payer: MEDICAID

## 2019-09-01 ENCOUNTER — HOSPITAL ENCOUNTER (EMERGENCY)
Age: 49
Discharge: HOME | End: 2019-09-01
Payer: MEDICAID

## 2019-09-01 VITALS
RESPIRATION RATE: 19 BRPM | HEIGHT: 63 IN | SYSTOLIC BLOOD PRESSURE: 134 MMHG | DIASTOLIC BLOOD PRESSURE: 67 MMHG | WEIGHT: 240 LBS | BODY MASS INDEX: 42.52 KG/M2 | TEMPERATURE: 98.3 F | HEART RATE: 80 BPM | OXYGEN SATURATION: 98 %

## 2019-09-01 DIAGNOSIS — D86.9 SARCOIDOSIS: ICD-10-CM

## 2019-09-01 DIAGNOSIS — R73.9 STEROID-INDUCED HYPERGLYCEMIA: ICD-10-CM

## 2019-09-01 DIAGNOSIS — E86.0 MILD DEHYDRATION: ICD-10-CM

## 2019-09-01 DIAGNOSIS — G89.29 OTHER CHRONIC PAIN: Primary | ICD-10-CM

## 2019-09-01 DIAGNOSIS — E11.65 TYPE 2 DIABETES MELLITUS WITH HYPERGLYCEMIA, WITH LONG-TERM CURRENT USE OF INSULIN (HCC): Primary | ICD-10-CM

## 2019-09-01 DIAGNOSIS — T38.0X5A STEROID-INDUCED HYPERGLYCEMIA: ICD-10-CM

## 2019-09-01 DIAGNOSIS — Z79.4 TYPE 2 DIABETES MELLITUS WITH HYPERGLYCEMIA, WITH LONG-TERM CURRENT USE OF INSULIN (HCC): Primary | ICD-10-CM

## 2019-09-01 DIAGNOSIS — R42 DIZZINESS: ICD-10-CM

## 2019-09-01 LAB
CHP ED QC CHECK: YES
EKG ATRIAL RATE: 74 BPM
EKG P AXIS: 28 DEGREES
EKG P-R INTERVAL: 148 MS
EKG Q-T INTERVAL: 378 MS
EKG QRS DURATION: 86 MS
EKG QTC CALCULATION (BAZETT): 419 MS
EKG R AXIS: 12 DEGREES
EKG T AXIS: 8 DEGREES
EKG VENTRICULAR RATE: 74 BPM
GLUCOSE BLD-MCNC: 305 MG/DL

## 2019-09-01 PROCEDURE — 93005 ELECTROCARDIOGRAM TRACING: CPT | Performed by: EMERGENCY MEDICINE

## 2019-09-01 PROCEDURE — 99284 EMERGENCY DEPT VISIT MOD MDM: CPT

## 2019-09-01 PROCEDURE — 96372 THER/PROPH/DIAG INJ SC/IM: CPT

## 2019-09-01 PROCEDURE — 71046 X-RAY EXAM CHEST 2 VIEWS: CPT

## 2019-09-01 PROCEDURE — 6370000000 HC RX 637 (ALT 250 FOR IP): Performed by: PHYSICIAN ASSISTANT

## 2019-09-01 PROCEDURE — 6360000002 HC RX W HCPCS: Performed by: PHYSICIAN ASSISTANT

## 2019-09-01 RX ORDER — OXYCODONE HYDROCHLORIDE AND ACETAMINOPHEN 5; 325 MG/1; MG/1
1 TABLET ORAL ONCE
Status: COMPLETED | OUTPATIENT
Start: 2019-09-01 | End: 2019-09-01

## 2019-09-01 RX ORDER — METHYLPREDNISOLONE SODIUM SUCCINATE 125 MG/2ML
125 INJECTION, POWDER, LYOPHILIZED, FOR SOLUTION INTRAMUSCULAR; INTRAVENOUS ONCE
Status: COMPLETED | OUTPATIENT
Start: 2019-09-01 | End: 2019-09-01

## 2019-09-01 RX ADMIN — METHYLPREDNISOLONE SODIUM SUCCINATE 125 MG: 125 INJECTION, POWDER, FOR SOLUTION INTRAMUSCULAR; INTRAVENOUS at 12:48

## 2019-09-01 RX ADMIN — OXYCODONE HYDROCHLORIDE AND ACETAMINOPHEN 1 TABLET: 5; 325 TABLET ORAL at 12:48

## 2019-09-01 ASSESSMENT — PAIN DESCRIPTION - PAIN TYPE
TYPE: ACUTE PAIN
TYPE: ACUTE PAIN

## 2019-09-01 ASSESSMENT — PAIN SCALES - GENERAL
PAINLEVEL_OUTOF10: 6
PAINLEVEL_OUTOF10: 8
PAINLEVEL_OUTOF10: 9
PAINLEVEL_OUTOF10: 8

## 2019-09-01 ASSESSMENT — ENCOUNTER SYMPTOMS
EYE DISCHARGE: 0
SORE THROAT: 0
ABDOMINAL PAIN: 0
RHINORRHEA: 0
CONSTIPATION: 0
ABDOMINAL DISTENTION: 0
COLOR CHANGE: 0
COUGH: 1
SHORTNESS OF BREATH: 1

## 2019-09-01 ASSESSMENT — PAIN DESCRIPTION - DESCRIPTORS: DESCRIPTORS: THROBBING;PRESSURE;SHARP

## 2019-09-01 ASSESSMENT — PAIN DESCRIPTION - ORIENTATION: ORIENTATION: ANTERIOR

## 2019-09-01 ASSESSMENT — PAIN DESCRIPTION - LOCATION
LOCATION: CHEST
LOCATION: HEAD

## 2019-09-01 NOTE — ED PROVIDER NOTES
[address] [facility] ED  eMERGENCY dEPARTMENT eNCOUnter      Pt Name: Cindy Evans  MRN: [id number]  [place] 1970  Date of evaluation: 9/1/2019  Provider: Jojo Omalley PA-C    CHIEF COMPLAINT       Chief Complaint   Patient presents with    Chest Pain     since last night, with SOB, hx sarcoidosis         HISTORY OF PRESENT ILLNESS   (Location/Symptom, Timing/Onset,Context/Setting, Quality, Duration, Modifying Factors, Severity)  Note limiting factors. Cindy Evans is a 51 y.o. female who presents to the emergency department with a complaint of chronic pain secondary to her sarcoidosis as well as mild shortness of breath. She states that she does have a past history of sarcoidosis which causes her shortness of breath she states she has a cough which is nonproductive no fever she states her shortness of breath is no worse than what it generally is, she states that she has chronic pain secondary to her sarcoidosis and that the medication Ultram which is given by her pain management doctor Roseline Harley @ [facility]) is not working. She states she has taken this this morning. I advised her that without contact of her pain management specialist I cannot give her additional prescriptions for pain medications at home. She is asking for a stronger pain medication than the Ultram she is currently taking. HPI    NursingNotes were reviewed. REVIEW OF SYSTEMS    (2-9 systems for level 4, 10 or more for level 5)     Review of Systems   Constitutional: Negative for activity change and appetite change. HENT: Negative for congestion, ear discharge, ear pain, nosebleeds, rhinorrhea and sore throat. Eyes: Negative for discharge. Respiratory: Positive for cough and shortness of breath. Cardiovascular: Positive for chest pain. Negative for palpitations and leg swelling. Gastrointestinal: Negative for abdominal distention, abdominal pain and constipation.    Genitourinary: Negative for

## 2019-09-02 VITALS
HEART RATE: 97 BPM | SYSTOLIC BLOOD PRESSURE: 133 MMHG | OXYGEN SATURATION: 97 % | WEIGHT: 230 LBS | RESPIRATION RATE: 23 BRPM | HEIGHT: 63 IN | TEMPERATURE: 98.7 F | BODY MASS INDEX: 40.75 KG/M2 | DIASTOLIC BLOOD PRESSURE: 53 MMHG

## 2019-09-02 LAB
ANION GAP SERPL CALCULATED.3IONS-SCNC: 13 MEQ/L (ref 9–15)
BACTERIA: NEGATIVE /HPF
BASOPHILS ABSOLUTE: 0 K/UL (ref 0–0.2)
BASOPHILS RELATIVE PERCENT: 0.6 %
BILIRUBIN URINE: NEGATIVE
BLOOD, URINE: ABNORMAL
BUN BLDV-MCNC: 12 MG/DL (ref 6–20)
CALCIUM SERPL-MCNC: 9.6 MG/DL (ref 8.5–9.9)
CHLORIDE BLD-SCNC: 101 MEQ/L (ref 95–107)
CHP ED QC CHECK: NORMAL
CHP ED QC CHECK: NORMAL
CLARITY: CLEAR
CO2: 21 MEQ/L (ref 20–31)
COLOR: YELLOW
CREAT SERPL-MCNC: 1.36 MG/DL (ref 0.5–0.9)
EOSINOPHILS ABSOLUTE: 0 K/UL (ref 0–0.7)
EOSINOPHILS RELATIVE PERCENT: 0.1 %
EPITHELIAL CELLS, UA: NORMAL /HPF (ref 0–5)
GFR AFRICAN AMERICAN: 50.1
GFR NON-AFRICAN AMERICAN: 41.4
GLUCOSE BLD-MCNC: 241 MG/DL
GLUCOSE BLD-MCNC: 241 MG/DL (ref 60–115)
GLUCOSE BLD-MCNC: 280 MG/DL
GLUCOSE BLD-MCNC: 280 MG/DL (ref 60–115)
GLUCOSE BLD-MCNC: 305 MG/DL (ref 60–115)
GLUCOSE BLD-MCNC: 361 MG/DL (ref 70–99)
GLUCOSE URINE: >=1000 MG/DL
HCT VFR BLD CALC: 46.6 % (ref 37–47)
HEMOGLOBIN: 15.4 G/DL (ref 12–16)
HYALINE CASTS: NORMAL /HPF (ref 0–5)
KETONES, URINE: ABNORMAL MG/DL
LEUKOCYTE ESTERASE, URINE: NEGATIVE
LYMPHOCYTES ABSOLUTE: 0.5 K/UL (ref 1–4.8)
LYMPHOCYTES RELATIVE PERCENT: 5.9 %
MCH RBC QN AUTO: 32.2 PG (ref 27–31.3)
MCHC RBC AUTO-ENTMCNC: 32.9 % (ref 33–37)
MCV RBC AUTO: 97.7 FL (ref 82–100)
MONOCYTES ABSOLUTE: 0.1 K/UL (ref 0.2–0.8)
MONOCYTES RELATIVE PERCENT: 0.7 %
NEUTROPHILS ABSOLUTE: 7.2 K/UL (ref 1.4–6.5)
NEUTROPHILS RELATIVE PERCENT: 92.7 %
NITRITE, URINE: NEGATIVE
PDW BLD-RTO: 13.4 % (ref 11.5–14.5)
PERFORMED ON: ABNORMAL
PH UA: 7 (ref 5–9)
PLATELET # BLD: 302 K/UL (ref 130–400)
POTASSIUM SERPL-SCNC: 4.6 MEQ/L (ref 3.4–4.9)
PROTEIN UA: NEGATIVE MG/DL
RBC # BLD: 4.77 M/UL (ref 4.2–5.4)
RBC UA: NORMAL /HPF (ref 0–5)
SODIUM BLD-SCNC: 135 MEQ/L (ref 135–144)
SPECIFIC GRAVITY UA: 1.02 (ref 1–1.03)
URINE REFLEX TO CULTURE: YES
UROBILINOGEN, URINE: 0.2 E.U./DL
WBC # BLD: 7.8 K/UL (ref 4.8–10.8)
WBC UA: NORMAL /HPF (ref 0–5)

## 2019-09-02 PROCEDURE — 87086 URINE CULTURE/COLONY COUNT: CPT

## 2019-09-02 PROCEDURE — 36415 COLL VENOUS BLD VENIPUNCTURE: CPT

## 2019-09-02 PROCEDURE — 81001 URINALYSIS AUTO W/SCOPE: CPT

## 2019-09-02 PROCEDURE — 96360 HYDRATION IV INFUSION INIT: CPT

## 2019-09-02 PROCEDURE — 80048 BASIC METABOLIC PNL TOTAL CA: CPT

## 2019-09-02 PROCEDURE — 2580000003 HC RX 258: Performed by: NURSE PRACTITIONER

## 2019-09-02 PROCEDURE — 85025 COMPLETE CBC W/AUTO DIFF WBC: CPT

## 2019-09-02 RX ORDER — 0.9 % SODIUM CHLORIDE 0.9 %
1000 INTRAVENOUS SOLUTION INTRAVENOUS ONCE
Status: COMPLETED | OUTPATIENT
Start: 2019-09-02 | End: 2019-09-02

## 2019-09-02 RX ADMIN — SODIUM CHLORIDE 1000 ML: 9 INJECTION, SOLUTION INTRAVENOUS at 00:20

## 2019-09-02 RX ADMIN — SODIUM CHLORIDE 1000 ML: 9 INJECTION, SOLUTION INTRAVENOUS at 01:35

## 2019-09-02 ASSESSMENT — ENCOUNTER SYMPTOMS
CONSTIPATION: 0
NAUSEA: 0
COUGH: 0
ABDOMINAL DISTENTION: 0
BACK PAIN: 0
RHINORRHEA: 0
SHORTNESS OF BREATH: 0
CHEST TIGHTNESS: 0
VOMITING: 0
SORE THROAT: 0
ABDOMINAL PAIN: 0
WHEEZING: 0
COLOR CHANGE: 0
TROUBLE SWALLOWING: 0
DIARRHEA: 0

## 2019-09-02 NOTE — ED NOTES
PA & this RN at bedside to eval/assess. Pt reporting s/sx of Hyperglycemia, reports current prednisone Tx so PA thinks this is causing elevated BG. Pt recently took extra Lantus PTA to try and help bring BG down but states not successful yet.  Pt given gown and instructed to change and I would be back in a few mins to get Tx orders started      Damaris Downey RN  09/02/19 0005

## 2019-09-02 NOTE — ED NOTES
This RN to bedside, reassessed BG and it's under 250 & Pt reporting improvement of symptoms; [facility]. Pt educated on hyperglycemia r/t steroid use and dehydration.  Pt verbalized instructions and no further questions     Martell Guthrie RN  09/02/19 3800

## 2019-09-03 ENCOUNTER — PATIENT MESSAGE (OUTPATIENT)
Dept: FAMILY MEDICINE CLINIC | Age: 49
End: 2019-09-03

## 2019-09-03 DIAGNOSIS — F43.23 ADJUSTMENT REACTION WITH ANXIETY AND DEPRESSION: ICD-10-CM

## 2019-09-03 LAB — URINE CULTURE, ROUTINE: NORMAL

## 2019-09-03 PROCEDURE — 93010 ELECTROCARDIOGRAM REPORT: CPT | Performed by: INTERNAL MEDICINE

## 2019-09-04 ENCOUNTER — TELEPHONE (OUTPATIENT)
Dept: FAMILY MEDICINE CLINIC | Age: 49
End: 2019-09-04

## 2019-09-04 RX ORDER — BUSPIRONE HYDROCHLORIDE 10 MG/1
10 TABLET ORAL 2 TIMES DAILY PRN
Qty: 60 TABLET | Refills: 2 | Status: SHIPPED | OUTPATIENT
Start: 2019-09-04 | End: 2020-01-10 | Stop reason: DRUGHIGH

## 2019-09-04 NOTE — TELEPHONE ENCOUNTER
Prior Authorization: Approved     Prior authorization approved Case ID: [id number]      Payer:  [employer] Payer    [phone]    Request Reference Number: [id number]. INSULIN LISP /ML is approved through 09/04/2020. For further questions, call [employer] at [phone].    Electronic appeal:  Not supported   View History   Medication Being Authorized      insulin lispro (ADMELOG) 100 UNIT/ML injection vial     Inject 4 Units into the skin 3 times daily as needed for High Blood Sugar     Dispense: 1 vial Refills: 5     Start: 9/4/2019      Class: Normal Diagnoses: Insulin dependent type 2 diabetes mellitus, uncontrolled ([facility]

## 2019-09-10 ENCOUNTER — APPOINTMENT (OUTPATIENT)
Dept: GENERAL RADIOLOGY | Age: 49
End: 2019-09-10
Payer: MEDICAID

## 2019-09-10 ENCOUNTER — HOSPITAL ENCOUNTER (OUTPATIENT)
Age: 49
Setting detail: OBSERVATION
Discharge: HOME | End: 2019-09-12
Attending: INTERNAL MEDICINE | Admitting: INTERNAL MEDICINE
Payer: MEDICAID

## 2019-09-10 DIAGNOSIS — D86.9 SARCOIDOSIS: ICD-10-CM

## 2019-09-10 DIAGNOSIS — N18.9 CHRONIC KIDNEY DISEASE, UNSPECIFIED CKD STAGE: Chronic | ICD-10-CM

## 2019-09-10 DIAGNOSIS — R07.89 ATYPICAL CHEST PAIN: Primary | ICD-10-CM

## 2019-09-10 DIAGNOSIS — R06.02 SHORTNESS OF BREATH: ICD-10-CM

## 2019-09-10 LAB
ALBUMIN SERPL-MCNC: 3.7 G/DL (ref 3.5–4.6)
ALP BLD-CCNC: 118 U/L (ref 40–130)
ALT SERPL-CCNC: 12 U/L (ref 0–33)
ANION GAP SERPL CALCULATED.3IONS-SCNC: 10 MEQ/L (ref 9–15)
AST SERPL-CCNC: 16 U/L (ref 0–35)
BASOPHILS ABSOLUTE: 0.1 K/UL (ref 0–0.2)
BASOPHILS RELATIVE PERCENT: 0.9 %
BETA-HYDROXYBUTYRATE: 0.9 MG/DL (ref 0.2–2.8)
BILIRUB SERPL-MCNC: <0.2 MG/DL (ref 0.2–0.7)
BUN BLDV-MCNC: 14 MG/DL (ref 6–20)
CALCIUM SERPL-MCNC: 9.1 MG/DL (ref 8.5–9.9)
CHLORIDE BLD-SCNC: 102 MEQ/L (ref 95–107)
CK MB: 2.3 NG/ML (ref 0–3.8)
CO2: 24 MEQ/L (ref 20–31)
CREAT SERPL-MCNC: 1.28 MG/DL (ref 0.5–0.9)
CREATINE KINASE-MB INDEX: 0.6 % (ref 0–3.5)
EOSINOPHILS ABSOLUTE: 0.4 K/UL (ref 0–0.7)
EOSINOPHILS RELATIVE PERCENT: 4.5 %
GFR AFRICAN AMERICAN: 53.7
GFR NON-AFRICAN AMERICAN: 44.4
GLOBULIN: 3.6 G/DL (ref 2.3–3.5)
GLUCOSE BLD-MCNC: 204 MG/DL (ref 70–99)
HCT VFR BLD CALC: 43.7 % (ref 37–47)
HEMOGLOBIN: 14.5 G/DL (ref 12–16)
INR BLD: 0.9
LYMPHOCYTES ABSOLUTE: 1.8 K/UL (ref 1–4.8)
LYMPHOCYTES RELATIVE PERCENT: 21.4 %
MAGNESIUM: 1.9 MG/DL (ref 1.7–2.4)
MCH RBC QN AUTO: 32.4 PG (ref 27–31.3)
MCHC RBC AUTO-ENTMCNC: 33.2 % (ref 33–37)
MCV RBC AUTO: 97.4 FL (ref 82–100)
MONOCYTES ABSOLUTE: 0.5 K/UL (ref 0.2–0.8)
MONOCYTES RELATIVE PERCENT: 5.8 %
NEUTROPHILS ABSOLUTE: 5.7 K/UL (ref 1.4–6.5)
NEUTROPHILS RELATIVE PERCENT: 67.4 %
PDW BLD-RTO: 13.4 % (ref 11.5–14.5)
PLATELET # BLD: 269 K/UL (ref 130–400)
POTASSIUM SERPL-SCNC: 4 MEQ/L (ref 3.4–4.9)
PROTHROMBIN TIME: 12.8 SEC (ref 12.3–14.9)
RBC # BLD: 4.49 M/UL (ref 4.2–5.4)
SODIUM BLD-SCNC: 136 MEQ/L (ref 135–144)
TOTAL CK: 389 U/L (ref 0–170)
TOTAL PROTEIN: 7.3 G/DL (ref 6.3–8)
TROPONIN: <0.01 NG/ML (ref 0–0.01)
WBC # BLD: 8.4 K/UL (ref 4.8–10.8)

## 2019-09-10 PROCEDURE — 85025 COMPLETE CBC W/AUTO DIFF WBC: CPT

## 2019-09-10 PROCEDURE — 94640 AIRWAY INHALATION TREATMENT: CPT

## 2019-09-10 PROCEDURE — 6370000000 HC RX 637 (ALT 250 FOR IP): Performed by: NURSE PRACTITIONER

## 2019-09-10 PROCEDURE — 36415 COLL VENOUS BLD VENIPUNCTURE: CPT

## 2019-09-10 PROCEDURE — 2580000003 HC RX 258: Performed by: NURSE PRACTITIONER

## 2019-09-10 PROCEDURE — 82553 CREATINE MB FRACTION: CPT

## 2019-09-10 PROCEDURE — 93005 ELECTROCARDIOGRAM TRACING: CPT | Performed by: EMERGENCY MEDICINE

## 2019-09-10 PROCEDURE — 82550 ASSAY OF CK (CPK): CPT

## 2019-09-10 PROCEDURE — 99285 EMERGENCY DEPT VISIT HI MDM: CPT

## 2019-09-10 PROCEDURE — 96376 TX/PRO/DX INJ SAME DRUG ADON: CPT

## 2019-09-10 PROCEDURE — 96375 TX/PRO/DX INJ NEW DRUG ADDON: CPT

## 2019-09-10 PROCEDURE — 83735 ASSAY OF MAGNESIUM: CPT

## 2019-09-10 PROCEDURE — 96374 THER/PROPH/DIAG INJ IV PUSH: CPT

## 2019-09-10 PROCEDURE — 6360000002 HC RX W HCPCS: Performed by: NURSE PRACTITIONER

## 2019-09-10 PROCEDURE — 71046 X-RAY EXAM CHEST 2 VIEWS: CPT

## 2019-09-10 PROCEDURE — 84484 ASSAY OF TROPONIN QUANT: CPT

## 2019-09-10 PROCEDURE — 82010 KETONE BODYS QUAN: CPT

## 2019-09-10 PROCEDURE — 85610 PROTHROMBIN TIME: CPT

## 2019-09-10 PROCEDURE — 80053 COMPREHEN METABOLIC PANEL: CPT

## 2019-09-10 RX ORDER — 0.9 % SODIUM CHLORIDE 0.9 %
1000 INTRAVENOUS SOLUTION INTRAVENOUS ONCE
Status: COMPLETED | OUTPATIENT
Start: 2019-09-10 | End: 2019-09-10

## 2019-09-10 RX ORDER — MORPHINE SULFATE 2 MG/ML
4 INJECTION, SOLUTION INTRAMUSCULAR; INTRAVENOUS
Status: COMPLETED | OUTPATIENT
Start: 2019-09-10 | End: 2019-09-10

## 2019-09-10 RX ORDER — IPRATROPIUM BROMIDE AND ALBUTEROL SULFATE 2.5; .5 MG/3ML; MG/3ML
1 SOLUTION RESPIRATORY (INHALATION) EVERY 10 MIN PRN
Status: DISCONTINUED | OUTPATIENT
Start: 2019-09-10 | End: 2019-09-11 | Stop reason: HOSPADM

## 2019-09-10 RX ORDER — ONDANSETRON 2 MG/ML
4 INJECTION INTRAMUSCULAR; INTRAVENOUS ONCE
Status: COMPLETED | OUTPATIENT
Start: 2019-09-10 | End: 2019-09-10

## 2019-09-10 RX ORDER — METHYLPREDNISOLONE SODIUM SUCCINATE 125 MG/2ML
125 INJECTION, POWDER, LYOPHILIZED, FOR SOLUTION INTRAMUSCULAR; INTRAVENOUS ONCE
Status: COMPLETED | OUTPATIENT
Start: 2019-09-10 | End: 2019-09-10

## 2019-09-10 RX ADMIN — MORPHINE SULFATE 4 MG: 2 INJECTION, SOLUTION INTRAMUSCULAR; INTRAVENOUS at 20:52

## 2019-09-10 RX ADMIN — MORPHINE SULFATE 4 MG: 2 INJECTION, SOLUTION INTRAMUSCULAR; INTRAVENOUS at 22:12

## 2019-09-10 RX ADMIN — METHYLPREDNISOLONE SODIUM SUCCINATE 125 MG: 125 INJECTION, POWDER, FOR SOLUTION INTRAMUSCULAR; INTRAVENOUS at 20:52

## 2019-09-10 RX ADMIN — ONDANSETRON 4 MG: 2 INJECTION INTRAMUSCULAR; INTRAVENOUS at 20:52

## 2019-09-10 RX ADMIN — IPRATROPIUM BROMIDE AND ALBUTEROL SULFATE 1 AMPULE: .5; 3 SOLUTION RESPIRATORY (INHALATION) at 22:22

## 2019-09-10 RX ADMIN — SODIUM CHLORIDE 1000 ML: 9 INJECTION, SOLUTION INTRAVENOUS at 20:52

## 2019-09-10 RX ADMIN — NITROGLYCERIN 0.5 INCH: 20 OINTMENT TOPICAL at 22:53

## 2019-09-10 ASSESSMENT — PAIN DESCRIPTION - PAIN TYPE
TYPE: ACUTE PAIN
TYPE: ACUTE PAIN

## 2019-09-10 ASSESSMENT — PAIN DESCRIPTION - FREQUENCY
FREQUENCY: CONTINUOUS
FREQUENCY: INTERMITTENT

## 2019-09-10 ASSESSMENT — ENCOUNTER SYMPTOMS
VOMITING: 0
ABDOMINAL PAIN: 0
RHINORRHEA: 0
CHEST TIGHTNESS: 1
SHORTNESS OF BREATH: 1
BACK PAIN: 0
COLOR CHANGE: 0
ABDOMINAL DISTENTION: 0
WHEEZING: 0
TROUBLE SWALLOWING: 0
SORE THROAT: 0
NAUSEA: 0
DIARRHEA: 0
CONSTIPATION: 0
COUGH: 0

## 2019-09-10 ASSESSMENT — PAIN DESCRIPTION - ORIENTATION
ORIENTATION: MID;LEFT
ORIENTATION: MID;LEFT

## 2019-09-10 ASSESSMENT — PAIN SCALES - GENERAL
PAINLEVEL_OUTOF10: 7
PAINLEVEL_OUTOF10: 9

## 2019-09-10 ASSESSMENT — PAIN DESCRIPTION - DESCRIPTORS
DESCRIPTORS: SHARP;PRESSURE
DESCRIPTORS: PRESSURE;SHARP

## 2019-09-10 ASSESSMENT — PAIN DESCRIPTION - LOCATION
LOCATION: CHEST
LOCATION: CHEST

## 2019-09-10 ASSESSMENT — PULMONARY FUNCTION TESTS: PEFR_L/MIN: 150

## 2019-09-11 LAB
CHOLESTEROL, TOTAL: 244 MG/DL (ref 0–199)
GLUCOSE BLD-MCNC: 195 MG/DL (ref 60–115)
GLUCOSE BLD-MCNC: 211 MG/DL (ref 60–115)
GLUCOSE BLD-MCNC: 222 MG/DL (ref 60–115)
GLUCOSE BLD-MCNC: 234 MG/DL (ref 60–115)
GLUCOSE BLD-MCNC: 253 MG/DL (ref 60–115)
HDLC SERPL-MCNC: 41 MG/DL (ref 40–59)
LDL CHOLESTEROL CALCULATED: 166 MG/DL (ref 0–129)
PERFORMED ON: ABNORMAL
TRIGL SERPL-MCNC: 184 MG/DL (ref 0–150)
TROPONIN: <0.01 NG/ML (ref 0–0.01)
TROPONIN: <0.01 NG/ML (ref 0–0.01)

## 2019-09-11 PROCEDURE — APPNB45 APP NON BILLABLE 31-45 MINUTES: Performed by: PHYSICIAN ASSISTANT

## 2019-09-11 PROCEDURE — 6370000000 HC RX 637 (ALT 250 FOR IP): Performed by: INTERNAL MEDICINE

## 2019-09-11 PROCEDURE — 6360000002 HC RX W HCPCS: Performed by: INTERNAL MEDICINE

## 2019-09-11 PROCEDURE — 36415 COLL VENOUS BLD VENIPUNCTURE: CPT

## 2019-09-11 PROCEDURE — 2580000003 HC RX 258: Performed by: INTERNAL MEDICINE

## 2019-09-11 PROCEDURE — 84484 ASSAY OF TROPONIN QUANT: CPT

## 2019-09-11 PROCEDURE — 93010 ELECTROCARDIOGRAM REPORT: CPT | Performed by: INTERNAL MEDICINE

## 2019-09-11 PROCEDURE — 80061 LIPID PANEL: CPT

## 2019-09-11 PROCEDURE — G0378 HOSPITAL OBSERVATION PER HR: HCPCS

## 2019-09-11 PROCEDURE — 94640 AIRWAY INHALATION TREATMENT: CPT

## 2019-09-11 PROCEDURE — 94664 DEMO&/EVAL PT USE INHALER: CPT

## 2019-09-11 RX ORDER — SODIUM CHLORIDE 0.9 % (FLUSH) 0.9 %
10 SYRINGE (ML) INJECTION PRN
Status: DISCONTINUED | OUTPATIENT
Start: 2019-09-11 | End: 2019-09-12 | Stop reason: HOSPADM

## 2019-09-11 RX ORDER — DEXTROSE MONOHYDRATE 50 MG/ML
100 INJECTION, SOLUTION INTRAVENOUS PRN
Status: DISCONTINUED | OUTPATIENT
Start: 2019-09-11 | End: 2019-09-12 | Stop reason: HOSPADM

## 2019-09-11 RX ORDER — ONDANSETRON 2 MG/ML
4 INJECTION INTRAMUSCULAR; INTRAVENOUS EVERY 6 HOURS PRN
Status: DISCONTINUED | OUTPATIENT
Start: 2019-09-11 | End: 2019-09-12 | Stop reason: HOSPADM

## 2019-09-11 RX ORDER — ALBUTEROL SULFATE 2.5 MG/3ML
2.5 SOLUTION RESPIRATORY (INHALATION) EVERY 6 HOURS PRN
Status: DISCONTINUED | OUTPATIENT
Start: 2019-09-11 | End: 2019-09-12 | Stop reason: HOSPADM

## 2019-09-11 RX ORDER — ASPIRIN 81 MG/1
81 TABLET, CHEWABLE ORAL DAILY
Qty: 30 TABLET | Refills: 3 | Status: SHIPPED | OUTPATIENT
Start: 2019-09-12 | End: 2019-11-04 | Stop reason: ALTCHOICE

## 2019-09-11 RX ORDER — DEXTROSE MONOHYDRATE 25 G/50ML
12.5 INJECTION, SOLUTION INTRAVENOUS PRN
Status: DISCONTINUED | OUTPATIENT
Start: 2019-09-11 | End: 2019-09-12 | Stop reason: HOSPADM

## 2019-09-11 RX ORDER — METHOCARBAMOL 750 MG/1
750 TABLET, FILM COATED ORAL 4 TIMES DAILY
Status: DISCONTINUED | OUTPATIENT
Start: 2019-09-11 | End: 2019-09-12 | Stop reason: HOSPADM

## 2019-09-11 RX ORDER — PANTOPRAZOLE SODIUM 40 MG/1
40 TABLET, DELAYED RELEASE ORAL
Qty: 30 TABLET | Refills: 3 | Status: SHIPPED | OUTPATIENT
Start: 2019-09-12 | End: 2019-10-17

## 2019-09-11 RX ORDER — BUSPIRONE HYDROCHLORIDE 10 MG/1
10 TABLET ORAL 2 TIMES DAILY PRN
Status: DISCONTINUED | OUTPATIENT
Start: 2019-09-11 | End: 2019-09-12 | Stop reason: HOSPADM

## 2019-09-11 RX ORDER — MORPHINE SULFATE 2 MG/ML
1 INJECTION, SOLUTION INTRAMUSCULAR; INTRAVENOUS EVERY 4 HOURS PRN
Status: DISCONTINUED | OUTPATIENT
Start: 2019-09-11 | End: 2019-09-12 | Stop reason: HOSPADM

## 2019-09-11 RX ORDER — SODIUM CHLORIDE 0.9 % (FLUSH) 0.9 %
10 SYRINGE (ML) INJECTION EVERY 12 HOURS SCHEDULED
Status: DISCONTINUED | OUTPATIENT
Start: 2019-09-11 | End: 2019-09-12 | Stop reason: HOSPADM

## 2019-09-11 RX ORDER — ATORVASTATIN CALCIUM 40 MG/1
40 TABLET, FILM COATED ORAL NIGHTLY
Status: DISCONTINUED | OUTPATIENT
Start: 2019-09-11 | End: 2019-09-12 | Stop reason: HOSPADM

## 2019-09-11 RX ORDER — PANTOPRAZOLE SODIUM 40 MG/1
40 TABLET, DELAYED RELEASE ORAL
Status: DISCONTINUED | OUTPATIENT
Start: 2019-09-11 | End: 2019-09-12 | Stop reason: HOSPADM

## 2019-09-11 RX ORDER — ASPIRIN 81 MG/1
81 TABLET, CHEWABLE ORAL DAILY
Status: DISCONTINUED | OUTPATIENT
Start: 2019-09-12 | End: 2019-09-12 | Stop reason: HOSPADM

## 2019-09-11 RX ORDER — DULOXETIN HYDROCHLORIDE 60 MG/1
60 CAPSULE, DELAYED RELEASE ORAL DAILY
Status: DISCONTINUED | OUTPATIENT
Start: 2019-09-11 | End: 2019-09-12 | Stop reason: HOSPADM

## 2019-09-11 RX ORDER — TRAMADOL HYDROCHLORIDE 50 MG/1
50 TABLET ORAL EVERY 6 HOURS PRN
Status: DISCONTINUED | OUTPATIENT
Start: 2019-09-11 | End: 2019-09-12 | Stop reason: HOSPADM

## 2019-09-11 RX ORDER — INSULIN GLARGINE 100 [IU]/ML
12 INJECTION, SOLUTION SUBCUTANEOUS NIGHTLY
Status: DISCONTINUED | OUTPATIENT
Start: 2019-09-11 | End: 2019-09-12 | Stop reason: HOSPADM

## 2019-09-11 RX ORDER — HEPARIN SODIUM 5000 [USP'U]/ML
5000 INJECTION, SOLUTION INTRAVENOUS; SUBCUTANEOUS EVERY 8 HOURS SCHEDULED
Status: DISCONTINUED | OUTPATIENT
Start: 2019-09-11 | End: 2019-09-12 | Stop reason: HOSPADM

## 2019-09-11 RX ORDER — ATORVASTATIN CALCIUM 40 MG/1
40 TABLET, FILM COATED ORAL NIGHTLY
Qty: 30 TABLET | Refills: 3 | Status: SHIPPED | OUTPATIENT
Start: 2019-09-11

## 2019-09-11 RX ORDER — MONTELUKAST SODIUM 10 MG/1
10 TABLET ORAL NIGHTLY
Status: DISCONTINUED | OUTPATIENT
Start: 2019-09-11 | End: 2019-09-12 | Stop reason: HOSPADM

## 2019-09-11 RX ORDER — PREDNISONE 20 MG/1
40 TABLET ORAL DAILY
Status: DISCONTINUED | OUTPATIENT
Start: 2019-09-11 | End: 2019-09-12 | Stop reason: HOSPADM

## 2019-09-11 RX ORDER — NICOTINE POLACRILEX 4 MG
15 LOZENGE BUCCAL PRN
Status: DISCONTINUED | OUTPATIENT
Start: 2019-09-11 | End: 2019-09-12 | Stop reason: HOSPADM

## 2019-09-11 RX ORDER — AMITRIPTYLINE HYDROCHLORIDE 25 MG/1
25 TABLET, FILM COATED ORAL NIGHTLY
Status: DISCONTINUED | OUTPATIENT
Start: 2019-09-11 | End: 2019-09-12 | Stop reason: HOSPADM

## 2019-09-11 RX ORDER — LEVOTHYROXINE SODIUM 0.07 MG/1
75 TABLET ORAL DAILY
Status: DISCONTINUED | OUTPATIENT
Start: 2019-09-11 | End: 2019-09-12 | Stop reason: HOSPADM

## 2019-09-11 RX ADMIN — ATORVASTATIN CALCIUM 40 MG: 40 TABLET, FILM COATED ORAL at 02:09

## 2019-09-11 RX ADMIN — Medication 10 ML: at 20:53

## 2019-09-11 RX ADMIN — INSULIN LISPRO 6 UNITS: 100 INJECTION, SOLUTION INTRAVENOUS; SUBCUTANEOUS at 12:25

## 2019-09-11 RX ADMIN — MONTELUKAST 10 MG: 10 TABLET, FILM COATED ORAL at 20:46

## 2019-09-11 RX ADMIN — MORPHINE SULFATE 1 MG: 2 INJECTION, SOLUTION INTRAMUSCULAR; INTRAVENOUS at 07:37

## 2019-09-11 RX ADMIN — MORPHINE SULFATE 1 MG: 2 INJECTION, SOLUTION INTRAMUSCULAR; INTRAVENOUS at 13:42

## 2019-09-11 RX ADMIN — ATORVASTATIN CALCIUM 40 MG: 40 TABLET, FILM COATED ORAL at 20:46

## 2019-09-11 RX ADMIN — DULOXETINE HYDROCHLORIDE 60 MG: 60 CAPSULE, DELAYED RELEASE ORAL at 17:25

## 2019-09-11 RX ADMIN — AMITRIPTYLINE HYDROCHLORIDE 25 MG: 25 TABLET, FILM COATED ORAL at 20:46

## 2019-09-11 RX ADMIN — PANTOPRAZOLE SODIUM 40 MG: 40 TABLET, DELAYED RELEASE ORAL at 12:30

## 2019-09-11 RX ADMIN — INSULIN LISPRO 4 UNITS: 100 INJECTION, SOLUTION INTRAVENOUS; SUBCUTANEOUS at 17:28

## 2019-09-11 RX ADMIN — MORPHINE SULFATE 1 MG: 2 INJECTION, SOLUTION INTRAMUSCULAR; INTRAVENOUS at 02:08

## 2019-09-11 RX ADMIN — TRAMADOL HYDROCHLORIDE 50 MG: 50 TABLET, FILM COATED ORAL at 20:46

## 2019-09-11 RX ADMIN — INSULIN GLARGINE 12 UNITS: 100 INJECTION, SOLUTION SUBCUTANEOUS at 02:09

## 2019-09-11 RX ADMIN — INSULIN GLARGINE 12 UNITS: 100 INJECTION, SOLUTION SUBCUTANEOUS at 20:45

## 2019-09-11 RX ADMIN — METHOCARBAMOL TABLETS 750 MG: 750 TABLET, COATED ORAL at 20:46

## 2019-09-11 RX ADMIN — INSULIN LISPRO 4 UNITS: 100 INJECTION, SOLUTION INTRAVENOUS; SUBCUTANEOUS at 09:48

## 2019-09-11 RX ADMIN — HEPARIN SODIUM 5000 UNITS: 5000 INJECTION INTRAVENOUS; SUBCUTANEOUS at 06:37

## 2019-09-11 RX ADMIN — Medication 10 ML: at 09:45

## 2019-09-11 RX ADMIN — METHOCARBAMOL TABLETS 750 MG: 750 TABLET, COATED ORAL at 17:25

## 2019-09-11 RX ADMIN — ALBUTEROL SULFATE 2.5 MG: 2.5 SOLUTION RESPIRATORY (INHALATION) at 15:19

## 2019-09-11 RX ADMIN — BUSPIRONE HYDROCHLORIDE 10 MG: 10 TABLET ORAL at 17:25

## 2019-09-11 ASSESSMENT — PAIN SCALES - GENERAL
PAINLEVEL_OUTOF10: 8
PAINLEVEL_OUTOF10: 7
PAINLEVEL_OUTOF10: 8
PAINLEVEL_OUTOF10: 5
PAINLEVEL_OUTOF10: 7

## 2019-09-11 ASSESSMENT — ENCOUNTER SYMPTOMS
DIARRHEA: 0
BLOOD IN STOOL: 0
APNEA: 0
COUGH: 0
NAUSEA: 0
COLOR CHANGE: 0
CONSTIPATION: 0
VOMITING: 0
ALLERGIC/IMMUNOLOGIC NEGATIVE: 1
FACIAL SWELLING: 0
RHINORRHEA: 0
WHEEZING: 0
CHEST TIGHTNESS: 0
EYE REDNESS: 0
EYE DISCHARGE: 0
SHORTNESS OF BREATH: 1
ABDOMINAL PAIN: 0
SHORTNESS OF BREATH: 0
SINUS PAIN: 0
CHEST TIGHTNESS: 1

## 2019-09-11 NOTE — CONSULTS
ADDITION H&P/CONSULT    Chief Complaint:  Chest pain    History of Present Illness:  Admitted with chest pain. Has a history of sarcoidosis which was proven by biopsy by Melo Small. Supposed to see a pulmonologist at the [address] [place] [address]. Chest pain appears to be atypical and more reproducible by palpation    Review of Systems:   Review of Systems   Constitutional: Negative for appetite change, diaphoresis and fatigue. Respiratory: Negative for apnea, chest tightness and shortness of breath. Cardiovascular: Negative for chest pain, palpitations and leg swelling. Skin: Negative for color change, pallor, rash and wound. Neurological: Negative for dizziness, syncope, weakness, light-headedness and headaches. Psychiatric/Behavioral: Negative for agitation, behavioral problems and confusion. The patient is not nervous/anxious and is not hyperactive. Physical Examination:     BP (!) 150/71   Pulse 98   Temp 98.2 °F (36.8 °C) (Oral)   Resp 18   Ht 5' 3\" (1.6 m)   Wt 273 lb 1.6 oz (123.9 kg)   LMP 08/25/2019   SpO2 98%   BMI 48.38 kg/m²    Physical Exam   Constitutional: She is oriented to person, place, and time. She appears well-developed and well-nourished. Cardiovascular: Normal rate, regular rhythm, normal heart sounds and intact distal pulses. Pulmonary/Chest: Effort normal and breath sounds normal.   Musculoskeletal: Normal range of motion. Neurological: She is alert and oriented to person, place, and time. She has normal reflexes. Skin: Skin is warm and dry. Psychiatric: She has a normal mood and affect. Her behavior is normal. Judgment and thought content normal.       Problem List:  Active Hospital Problems    Diagnosis Date Noted    Chest pain [R07.9] 01/24/2018       Plan:  Discharge home on low-dose beta-blocker statin PPI.   Will see me in the office and was set up an appointment for her to see Radu Montemayor

## 2019-09-11 NOTE — CARE COORDINATION
SPOKE WITH PATIENT. FROM HOME WITH SPOUSE. PT USES CANE , IS INDEPENDENT AND DRIVES. HAS NEBS AND STATES SHE MAY NEED A NEW MACHINE. INSTRUCTED PT THAT IF IT IS OLDER THAN 5 YEARS SHE SHOULD BE ABLE TO GET A NEW ONE. SHE WILL F/U WITH HER PULMONOLOGIST. PT DENIES NEEDS FOR DISCHARGE.

## 2019-09-11 NOTE — FLOWSHEET NOTE
NP notified that patients home meds have been verified and are ready for reorder. NP also notified that pt still complains of 8/10 CP and is requesting pain medication.  Electronically signed by Mark Hill RN on 9/11/2019 at 1:11 AM

## 2019-09-11 NOTE — CONSULTS
Consult Note  Patient: Dioni Galeana  Unit/Bed: F697/K310-78  YOB: 1970  MRN: [id number]  Acct: [de-identified]   Admitting Diagnosis: Chest pain [R07.9]  Date:  9/10/2019  Hospital Day: 0      Chief Complaint:  Chest pain    History of Present Illness: This is a very pleasant 52-year-old -American female with past medical history significant for chronic pain for which she follows with pain management through [facility], sarcoidosis, fibromyalgia, diabetes, chronic kidney disease, history of renal cell carcinoma status post right nephrectomy, hypothyroidism, morbid obesity and multiple medical problems who presented to the emergency room yesterday with complaints of chest pain. For the past week or so, patient has been experiencing constant midsternal chest pain which she describes as occasionally sharp stabbing pain but also pressure and heaviness. She rates his pain on a 10 out of 10 on a 1-10 pain scale at its most severe. She denies any specific alleviating factors but states that the pain is exacerbated with deep breath. Additionally, she complains of shortness of breath and dyspnea on exertion. She has been taking nebulizer treatments and inhalers at home without any improvement in her symptoms. She states that this pain is similar to the pain she has had in the past secondary to her sarcoidosis however it is persisting for longer. Additionally, she complains of palpitations and racing heartbeat. She had mild dizziness yesterday. She denies any nausea, vomiting, orthopnea, paroxysmal nocturnal dyspnea, syncope, fever, chills or recent upper respiratory infection. On presentation to the emergency room, blood pressure 141/87, heart rate 95, respiratory rate 20, pulse ox 97%, temperature 36.8 °C.  Initial troponin negative at less than 0.010. Sodium 136, potassium 4.0, chloride 102, total CO2 of 24, BUN 14, creatinine 1.28, GFR 53.7, glucose 204. Potassium 1.9.   WBC 8.4, hemoglobin 14.5, hematocrit 43.7, platelets 618. EKG revealed sinus rhythm at 89 bpm with no acute ischemic changes. Due to her persistent pain unrelieved by morphine she was admitted for observation. At time of evaluation today, patient is seen on 1 W. resting comfortably and in no acute distress. She complains of persistent midsternal chest pain which is somewhat reproducible with palpation. She is also complaining of intermittent left-sided flank pain this morning. Serial troponins have been negative x3 at less than 0.010. On telemetry she is sinus rhythm to sinus tachycardia with heart rates 80s to 100s but was noted earlier this morning to be sinus tachycardia with heart rate as high as 120s. She has no prior cardiac history including no history of myocardial infarction, congestive heart failure or arrhythmia. She denies any history of CVA but reports a history of TIA. Her last stress test was in 2016 and negative at that time. She has had no prior cardiac catheterization. Echocardiogram in April 2019 revealed normal LV systolic function with ejection fraction of 65% and no significant valvular abnormalities. Allergies   Allergen Reactions    Shellfish-Derived Products     Ibuprofen     Propoxyphene N-Acetaminophen      Other reaction(s): Hives    Toradol [Ketorolac Tromethamine]      Pt states she cannot take Toradol because she has only 1 kidney.        Current Facility-Administered Medications   Medication Dose Route Frequency Provider Last Rate Last Dose    sodium chloride flush 0.9 % injection 10 mL  10 mL Intravenous 2 times per day Mayo Counter, DO   10 mL at 09/11/19 0945    sodium chloride flush 0.9 % injection 10 mL  10 mL Intravenous PRN Mayo Counter, DO        magnesium hydroxide (MILK OF MAGNESIA) 400 MG/5ML suspension 30 mL  30 mL Oral Daily PRN Mayo Counter, DO        ondansetron [facility] injection 4 mg  4 mg Intravenous Q6H PRN Mayo Counter, DO        [START ON 9/12/2019] aspirin chewable tablet 81 mg  81 mg Oral Daily Alexander Carrie, DO        atorvastatin (LIPITOR) tablet 40 mg  40 mg Oral Nightly Alexander Carrie, DO   40 mg at 09/11/19 0209    morphine (PF) injection 1 mg  1 mg Intravenous Q4H PRN Alexander Carrie, DO   1 mg at 09/11/19 0737    heparin (porcine) injection 5,000 Units  5,000 Units Subcutaneous 3 times per day Alexander Carrie, DO   5,000 Units at 09/11/19 0637    predniSONE (DELTASONE) tablet 40 mg  40 mg Oral Daily Alexander Carrie, DO        glucose (GLUTOSE) 40 % oral gel 15 g  15 g Oral PRN Alexander Carrie, DO        dextrose 50 % IV solution  12.5 g Intravenous PRN Alexander Carrie, DO        glucagon (rDNA) injection 1 mg  1 mg Intramuscular PRN Alexander Carrie, DO        dextrose 5 % solution  100 mL/hr Intravenous PRN Alexander Carrie, DO        insulin lispro (HUMALOG) injection vial 0-12 Units  0-12 Units Subcutaneous TID WC Alexander Carrie, DO   4 Units at 09/11/19 0948    insulin lispro (HUMALOG) injection vial 0-6 Units  0-6 Units Subcutaneous Nightly Alexander Carrie, DO   2 Units at 09/11/19 0209    insulin glargine (LANTUS) injection vial 12 Units  12 Units Subcutaneous Nightly Alexander Carrie, DO   12 Units at 09/11/19 0209       PMHx:  Past Medical History:   Diagnosis Date    Anxiety     Arthritis     Asthma     Bronchopneumonia     Cancer ([address]     renal    Cerebral artery occlusion with cerebral infarction ([address]     Chronic bilateral low back pain with sciatica     Chronic kidney disease     Chronic obstructive lung disease ([address] 7/26/2019    Depression     Fibromyalgia     Hypertension     Insulin dependent type 2 diabetes mellitus, uncontrolled ([address] 8/3/2018    Mixed headache     Pure hyperglyceridemia 5/19/2017    Sarcoidosis     Sleep apnea     does not wear cpap    Thyroid goiter        PSHx:  Past Surgical History:   Procedure Laterality Date    BRONCHOSCOPY  10/26/2018    DR. STEARNS    KIDNEY REMOVAL Right 08/2016    KIDNEY REMOVAL Right 2016    Arthritis Mother     High Cholesterol Mother     Vision Loss Mother     Alcohol Abuse Neg Hx     Anemia Neg Hx     Arrhythmia Neg Hx     Asthma Neg Hx     Atrial Fibrillation Neg Hx     Birth Defects Neg Hx     Breast Cancer Neg Hx     Coronary Art Dis Neg Hx     Colon Cancer Neg Hx     Depression Neg Hx     Early Death Neg Hx     Hearing Loss Neg Hx     Heart Disease Neg Hx     Learning Disabilities Neg Hx     Kidney Disease Neg Hx     Mental Illness Neg Hx     Mental Retardation Neg Hx     Miscarriages / Stillbirths Neg Hx     Obesity Neg Hx     Osteoporosis Neg Hx     Stroke Neg Hx     Substance Abuse Neg Hx        Review of Systems:   Review of Systems   Constitutional: Negative for activity change, appetite change, chills, diaphoresis, fatigue and fever. HENT: Negative for congestion. Respiratory: Positive for chest tightness and shortness of breath. Negative for cough and wheezing. Cardiovascular: Positive for chest pain and palpitations. Negative for leg swelling. Gastrointestinal: Negative for abdominal pain, nausea and vomiting. Genitourinary: Negative for dysuria. Musculoskeletal: Positive for myalgias. Negative for arthralgias. Skin: Negative for color change, pallor and rash. Neurological: Positive for dizziness (mild dizziness yesterday). Negative for syncope and light-headedness. Psychiatric/Behavioral: Negative for agitation and behavioral problems. Physical Examination:    BP (!) 150/71   Pulse 87   Temp 98.2 °F (36.8 °C) (Oral)   Resp 18   Ht 5' 3\" (1.6 m)   Wt 273 lb 1.6 oz (123.9 kg)   LMP 08/25/2019   SpO2 98%   BMI 48.38 kg/m²    Physical Exam   Constitutional: She is oriented to person, place, and time. She appears well-developed. Morbidly obese   HENT:   Head: Normocephalic and atraumatic. Neck: Normal range of motion. Neck supple. No JVD present. Carotid bruit is not present. Cardiovascular: Normal rate and regular rhythm.    No

## 2019-09-11 NOTE — FLOWSHEET NOTE
Spoke to NP about pt suicide precaution score. NP evaluated patient personally, nursing communication order that states no need for 1:1 received. Electronically signed by Chung Gar RN on 9/11/2019 at 2:06 AM    Home medications have yet to have been reordered, perfect serve to hospitalist to notify.  Electronically signed by Chung Gar RN on 9/11/2019 at 7:05 AM

## 2019-09-11 NOTE — ED PROVIDER NOTES
History    Marital status: Legally      Spouse name: None    Number of children: None    Years of education: None    Highest education level: None   Occupational History    None   Social Needs    Financial resource strain: None    Food insecurity:     Worry: None     Inability: None    Transportation needs:     Medical: None     Non-medical: None   Tobacco Use    Smoking status: Former Smoker     Packs/day: 0.50     Years: 15.00     Pack years: 7.50     Types: Cigarettes     Start date: 2014     Last attempt to quit: 2015     Years since quittin.6    Smokeless tobacco: Former User     Quit date: 3/23/2016   Substance and Sexual Activity    Alcohol use: Not Currently     Alcohol/week: 0.0 standard drinks     Comment: occasionally    Drug use: Yes     Frequency: 3.0 times per week     Types: Marijuana    Sexual activity: Yes     Partners: Male   Lifestyle    Physical activity:     Days per week: None     Minutes per session: None    Stress: None   Relationships    Social connections:     Talks on phone: None     Gets together: None     Attends Orthodoxy service: None     Active member of club or organization: None     Attends meetings of clubs or organizations: None     Relationship status: None    Intimate partner violence:     Fear of current or ex partner: None     Emotionally abused: None     Physically abused: None     Forced sexual activity: None   Other Topics Concern    None   Social History Narrative    None       SCREENINGS             PHYSICAL EXAM    (up to 7 for level 4, 8 or more for level 5)     ED Triage Vitals [09/10/19 2019]   BP Temp Temp Source Pulse Resp SpO2 Height Weight   (!) 141/87 98.2 °F (36.8 °C) Oral 95 20 97 % 5' 3\" (1.6 m) 225 lb (102.1 kg)       Physical Exam   Constitutional: She is oriented to person, place, and time. She appears well-developed and well-nourished. No distress. HENT:   Head: Normocephalic and atraumatic.    Right Ear: External ear normal.   Left Ear: External ear normal.   Nose: Nose normal.   Mouth/Throat: Oropharynx is clear and moist. No oropharyngeal exudate. Eyes: Conjunctivae are normal. Right eye exhibits no discharge. Left eye exhibits no discharge. Neck: Normal range of motion. Neck supple. Cardiovascular: Normal rate, regular rhythm, normal heart sounds and intact distal pulses. Pulmonary/Chest: Effort normal. She has decreased breath sounds in the right middle field, the right lower field, the left middle field and the left lower field. She has no wheezes. She has no rhonchi. She has no rales. Abdominal: Soft. She exhibits no distension. There is no tenderness. Musculoskeletal: Normal range of motion. She exhibits no tenderness. Neurological: She is alert and oriented to person, place, and time. Skin: Skin is warm and dry. Capillary refill takes less than 2 seconds. She is not diaphoretic. RESULTS     EKG: All EKG's are interpreted by the Emergency Department Physician who either signs or Co-signsthis chart in the absence of a cardiologist.    Sinus rhythm 89 bpm no acute ST elevation or deviation no ectopy good R wave progression  ms.   Waveform appearance is similar to that of 9/1/2019 no acute change    RADIOLOGY:   Non-plain filmimages such as CT, Ultrasound and MRI are read by the radiologist. Plain radiographic images are visualized and preliminarily interpreted by the emergency physician with the below findings:    2 view chest x-ray is negative for acute process no focal infiltrates or effusions    Interpretation per the Radiologist below, if available at the time ofthis note:    XR CHEST STANDARD (2 VW)    (Results Pending)         ED BEDSIDE ULTRASOUND:   Performed by ED Physician - none    LABS:  Labs Reviewed   CBC WITH AUTO DIFFERENTIAL - Abnormal; Notable for the following components:       Result Value    MCH 32.4 (*)     All other components within normal limits   COMPREHENSIVE METABOLIC PANEL - Abnormal; Notable for the following components:    Glucose 204 (*)     CREATININE 1.28 (*)     GFR Non- 44.4 (*)     GFR  53.7 (*)     Globulin 3.6 (*)     All other components within normal limits   CK - Abnormal; Notable for the following components: Total  (*)     All other components within normal limits   TROPONIN   PROTIME-INR   MAGNESIUM   BETA-HYDROXYBUTYRATE   CKMB & RELATIVE PERCENT       All other labs were within normal range or not returned as of this dictation. EMERGENCY DEPARTMENT COURSE and DIFFERENTIAL DIAGNOSIS/MDM:   Vitals:    Vitals:    09/10/19 2019 09/10/19 2129 09/10/19 2213 09/10/19 2222   BP: (!) 141/87 135/86 124/76    Pulse: 95 68 89    Resp: 20 18  16   Temp: 98.2 °F (36.8 °C)      TempSrc: Oral      SpO2: 97% 98% 98% 97%   Weight: 225 lb (102.1 kg)      Height: 5' 3\" (1.6 m)               MDM patient presents is afebrile nontoxic appearance no acute distress hemodynamically stable. Patient is explained that she has a midsternal chest pain appears to be different and worse than her typical flareups of shortness of breath and sarcoidosis reaction. Patient was given her typical previous treatment as she has requested of IV Solu-Medrol breathing treatment and IV morphine for the pain and discomfort. She states that she cannot take aspirin and was concerned about the use of nitro causing a potential complication with her blood pressure due to her history of kidney disease. Patient typically gets significant relief from this but states that she has had no relief and continues to feel very uncomfortable. She sitting in upright position and holding her chest complaining that she continues to have a midsternal chest pain that was only minor relief from the IV morphine. Patient's lab work shows no apparent infectious process EKG shows no acute changes troponin is negative chest x-ray is negative for acute changes.   Remainder

## 2019-09-11 NOTE — FLOWSHEET NOTE
Patient medicated with 1mg IV morphine for left aching chest pain with occasional stabbing pain. Call light remains in reach.  Electronically signed by Maynor Corrales RN on 9/11/2019 at 2:41 AM

## 2019-09-12 VITALS
OXYGEN SATURATION: 97 % | TEMPERATURE: 97.7 F | HEIGHT: 63 IN | WEIGHT: 270.1 LBS | BODY MASS INDEX: 47.86 KG/M2 | RESPIRATION RATE: 20 BRPM | HEART RATE: 62 BPM | DIASTOLIC BLOOD PRESSURE: 55 MMHG | SYSTOLIC BLOOD PRESSURE: 115 MMHG

## 2019-09-12 LAB
EKG ATRIAL RATE: 79 BPM
EKG ATRIAL RATE: 89 BPM
EKG P AXIS: 37 DEGREES
EKG P AXIS: 42 DEGREES
EKG P-R INTERVAL: 150 MS
EKG P-R INTERVAL: 154 MS
EKG Q-T INTERVAL: 370 MS
EKG Q-T INTERVAL: 390 MS
EKG QRS DURATION: 74 MS
EKG QRS DURATION: 84 MS
EKG QTC CALCULATION (BAZETT): 447 MS
EKG QTC CALCULATION (BAZETT): 450 MS
EKG R AXIS: 14 DEGREES
EKG R AXIS: 18 DEGREES
EKG T AXIS: 1 DEGREES
EKG T AXIS: 16 DEGREES
EKG VENTRICULAR RATE: 79 BPM
EKG VENTRICULAR RATE: 89 BPM
GLUCOSE BLD-MCNC: 134 MG/DL (ref 60–115)
GLUCOSE BLD-MCNC: 141 MG/DL (ref 60–115)
HCT VFR BLD CALC: 42.2 % (ref 37–47)
HEMOGLOBIN: 13.8 G/DL (ref 12–16)
MCH RBC QN AUTO: 32.5 PG (ref 27–31.3)
MCHC RBC AUTO-ENTMCNC: 32.8 % (ref 33–37)
MCV RBC AUTO: 99.1 FL (ref 82–100)
PDW BLD-RTO: 13.4 % (ref 11.5–14.5)
PERFORMED ON: ABNORMAL
PERFORMED ON: ABNORMAL
PLATELET # BLD: 263 K/UL (ref 130–400)
RBC # BLD: 4.26 M/UL (ref 4.2–5.4)
SLIDE REVIEW: ABNORMAL
WBC # BLD: 14.1 K/UL (ref 4.8–10.8)

## 2019-09-12 PROCEDURE — 6370000000 HC RX 637 (ALT 250 FOR IP): Performed by: INTERNAL MEDICINE

## 2019-09-12 PROCEDURE — 85027 COMPLETE CBC AUTOMATED: CPT

## 2019-09-12 PROCEDURE — G0378 HOSPITAL OBSERVATION PER HR: HCPCS

## 2019-09-12 PROCEDURE — 99204 OFFICE O/P NEW MOD 45 MIN: CPT | Performed by: PSYCHIATRY & NEUROLOGY

## 2019-09-12 PROCEDURE — 36415 COLL VENOUS BLD VENIPUNCTURE: CPT

## 2019-09-12 RX ADMIN — PREDNISONE 40 MG: 20 TABLET ORAL at 09:20

## 2019-09-12 RX ADMIN — LEVOTHYROXINE SODIUM 75 MCG: 75 TABLET ORAL at 05:45

## 2019-09-12 RX ADMIN — ASPIRIN 81 MG 81 MG: 81 TABLET ORAL at 09:20

## 2019-09-12 RX ADMIN — PANTOPRAZOLE SODIUM 40 MG: 40 TABLET, DELAYED RELEASE ORAL at 09:25

## 2019-09-12 RX ADMIN — METHOCARBAMOL TABLETS 750 MG: 750 TABLET, COATED ORAL at 09:20

## 2019-09-12 RX ADMIN — DULOXETINE HYDROCHLORIDE 60 MG: 60 CAPSULE, DELAYED RELEASE ORAL at 09:20

## 2019-09-12 NOTE — CONSULTS
[]Palpitations []Edema  []PND  [] Other:  GI:  []Abdominal pain   []Nausea  []Vomiting  []Diarrhea  [] Other:  :  [] Dysuria   []Frequency  []Hematuria  []Discharge  [] Other:  Possible Pregnancy: []Yes   []No   LMP:   Musculoskeletal:  []Back pain  []Neck pain  []Recent Injury   Skin:  []Rash  [] Itching  [] Other:  Neurologic:  [] Headache  [] Focal weakness  [] Sensory changes []Other:  Endocrine:  [] Polyuria  [] Polydipsia  [] Hair Loss  [] Other:  Lymphatic:   [] Swollen glands   Psychiatric:  As per HPI      All other systems negative except as marked or mentioned/indicated in the HPI. Eloy Serrano      PHYSICAL EXAM:  Vitals:  BP (!) 115/55   Pulse 62   Temp 97.7 °F (36.5 °C) (Axillary)   Resp 20   Ht 5' 3\" (1.6 m)   Wt 270 lb 1.6 oz (122.5 kg)   LMP 08/25/2019   SpO2 97%   BMI 47.85 kg/m²      Neuro Exam:   Muscle Strength & Tone: full ROM  Gait: normal gait   Involuntary Movements: No    Mental Status Examination:    Level of consciousness:  within normal limits   Appearance:  well-appearing  Behavior/Motor:  no abnormalities noted  Attitude toward examiner:  cooperative  Speech:  normal rate   Mood: anxious  Affect:  mood congruent  Thought processes:  goal directed   Thought content:  Suicidal Ideation:  denies suicidal ideation  Cognition:  oriented to person, place, and time   Concentration intact  Memory intact  Mini Mental Status 30/30  Insight fair   Judgement fair   Fund of Knowledge adequate      DIAGNOSIS:     H/O MDD - stable   LIBORIO      RISK ASSESSMENT:        LABS: REVIEWED TODAY:  Recent Labs     09/10/19  2045 09/12/19  0615   WBC 8.4 14.1*   HGB 14.5 13.8    263     Recent Labs     09/10/19  2045      K 4.0      CO2 24   BUN 14   CREATININE 1.28*   GLUCOSE 204*     Recent Labs     09/10/19  2045   BILITOT <0.2   ALKPHOS 118   AST 16   ALT 12     Lab Results   Component Value Date    LABAMPH Neg 05/07/2019    BARBSCNU Neg 05/07/2019    LABBENZ Neg 05/07/2019

## 2019-09-12 NOTE — FLOWSHEET NOTE
Patient resting in bed encouraged to eat breakfast. Patient calm and cooperative with care except that the patient refused to answer Suicidal ideation scale questions. \"Patient stated,\"I'm sorry but that is what caused me problems before was answering those questions so I am just going to wait for the doctor to come in to see me so that I can go home. \"

## 2019-09-12 NOTE — DISCHARGE INSTR - COC
Continuity of Care Form    Patient Name: Tayo Gama   :  1970  MRN:  [id number]    Admit date:  9/10/2019  Discharge date:  ***    Code Status Order: Full Code   Advance Directives:   Advance Care Flowsheet Documentation     Date/Time Healthcare Directive Type of Healthcare Directive Copy in [address] Agent's Name Healthcare Agent's Phone Number    [phone]  Yes, patient has an advance directive for healthcare treatment  Other (Comment) patient has one at home but has \"never turned the papers torri  Other (Comment)  --  --  --          Admitting Physician:  Yara Smalls DO  PCP: Anshul Bobo APRN - CNP    Discharging Nurse: [facility] Unit/Room#: Y132/M646-60  Discharging Unit Phone Number: ***    Emergency Contact:   Extended Emergency Contact Information  Primary Emergency Contact: Lizet [address] Phone: [phone]  Relation: Spouse    Past Surgical History:  Past Surgical History:   Procedure Laterality Date    BRONCHOSCOPY  10/26/2018    DR. STEARNS    KIDNEY REMOVAL Right 2016    KIDNEY REMOVAL Right 2016    LUNG BIOPSY Right 10/2018    THYROID LOBECTOMY Right 14    THYROIDECTOMY  2019    DR. MAGDALENO    THYROIDECTOMY  2018    URETER STENT PLACEMENT Left 2016       Immunization History:   Immunization History   Administered Date(s) Administered    Pneumococcal Polysaccharide ([id number]) 2018       Active Problems:  Patient Active Problem List   Diagnosis Code    Anxiety F41.9    Asthma J45.909    HTN (hypertension) I10    Thyroid goiter E04.9    Lumbar spondylosis M47.816    Cervical spondylosis without myelopathy M47.812    Spondylosis of lumbar region without myelopathy or radiculopathy--OARRS PM&R 17 M47.816    Renal cancer ([facility] C64.9    Pure hyperglyceridemia E78.1    Morbid obesity ([facility] E66.01    Transient cerebral ischemia G45.9    Marijuana use F12.90   

## 2019-09-12 NOTE — FLOWSHEET NOTE
Pt medicated with ultram earlier for sarcodosis related pain in her L chest. Pain was 8/10, pain now down to 5/10 per pt.  Electronically signed by Lea Collazo RN on 9/11/2019 at 10:17 PM

## 2019-09-16 ENCOUNTER — HOSPITAL ENCOUNTER (EMERGENCY)
Age: 49
Discharge: HOME | End: 2019-09-16
Attending: EMERGENCY MEDICINE
Payer: MEDICAID

## 2019-09-16 VITALS
BODY MASS INDEX: 40.75 KG/M2 | RESPIRATION RATE: 19 BRPM | OXYGEN SATURATION: 100 % | WEIGHT: 230 LBS | DIASTOLIC BLOOD PRESSURE: 74 MMHG | HEIGHT: 63 IN | TEMPERATURE: 98.7 F | SYSTOLIC BLOOD PRESSURE: 159 MMHG | HEART RATE: 90 BPM

## 2019-09-16 DIAGNOSIS — D86.9 SARCOIDOSIS: Primary | ICD-10-CM

## 2019-09-16 DIAGNOSIS — R07.9 CHEST PAIN, UNSPECIFIED TYPE: ICD-10-CM

## 2019-09-16 LAB
EKG ATRIAL RATE: 89 BPM
EKG P AXIS: 46 DEGREES
EKG P-R INTERVAL: 142 MS
EKG Q-T INTERVAL: 352 MS
EKG QRS DURATION: 74 MS
EKG QTC CALCULATION (BAZETT): 428 MS
EKG R AXIS: 20 DEGREES
EKG T AXIS: 29 DEGREES
EKG VENTRICULAR RATE: 89 BPM

## 2019-09-16 PROCEDURE — 6370000000 HC RX 637 (ALT 250 FOR IP): Performed by: EMERGENCY MEDICINE

## 2019-09-16 PROCEDURE — 93005 ELECTROCARDIOGRAM TRACING: CPT | Performed by: EMERGENCY MEDICINE

## 2019-09-16 PROCEDURE — 99284 EMERGENCY DEPT VISIT MOD MDM: CPT

## 2019-09-16 RX ORDER — OXYCODONE HYDROCHLORIDE AND ACETAMINOPHEN 5; 325 MG/1; MG/1
1 TABLET ORAL ONCE
Status: COMPLETED | OUTPATIENT
Start: 2019-09-16 | End: 2019-09-16

## 2019-09-16 RX ADMIN — OXYCODONE HYDROCHLORIDE AND ACETAMINOPHEN 1 TABLET: 5; 325 TABLET ORAL at 21:55

## 2019-09-16 ASSESSMENT — PAIN DESCRIPTION - FREQUENCY: FREQUENCY: CONTINUOUS

## 2019-09-16 ASSESSMENT — PAIN SCALES - GENERAL
PAINLEVEL_OUTOF10: 8
PAINLEVEL_OUTOF10: 8

## 2019-09-16 ASSESSMENT — PAIN DESCRIPTION - PAIN TYPE: TYPE: ACUTE PAIN

## 2019-09-16 ASSESSMENT — PAIN DESCRIPTION - LOCATION: LOCATION: CHEST

## 2019-09-16 ASSESSMENT — PAIN DESCRIPTION - ORIENTATION: ORIENTATION: LEFT

## 2019-09-16 ASSESSMENT — PAIN DESCRIPTION - DESCRIPTORS: DESCRIPTORS: SHARP;HEAVINESS

## 2019-09-17 NOTE — ED PROVIDER NOTES
4;10+ for level 5)   Review of Systems   ALL other systems reviewed as pertinent and otherwise acutely negative except as in the [address] PAST MEDICAL HISTORY     Past Medical History:   Diagnosis Date    Anxiety     Arthritis     Asthma     Bronchopneumonia     Cancer ([facility]     renal    Cerebral artery occlusion with cerebral infarction (HCC)     Chronic bilateral low back pain with sciatica     Chronic kidney disease     Chronic obstructive lung disease ([facility] 7/26/2019    Depression     Fibromyalgia     Hypertension     Insulin dependent type 2 diabetes mellitus, uncontrolled ([facility] 8/3/2018    Mixed headache     Pure hyperglyceridemia 5/19/2017    Sarcoidosis     Sleep apnea     does not wear cpap    Thyroid goiter        SURGICAL HISTORY       Past Surgical History:   Procedure Laterality Date    BRONCHOSCOPY  10/26/2018    DR. STEARNS    KIDNEY REMOVAL Right 08/2016    KIDNEY REMOVAL Right 2016    LUNG BIOPSY Right 10/2018    THYROID LOBECTOMY Right 6/13/14    THYROIDECTOMY  02/21/2019    DR. MAGDALENO    THYROIDECTOMY  2018    URETER STENT PLACEMENT Left 08/2016       CURRENT MEDICATIONS       Previous Medications    ACETAMINOPHEN (TYLENOL) 325 MG TABLET    Take 2 tablets by mouth every 6 hours as needed for Pain    ALBUTEROL (PROVENTIL) (2.5 MG/3ML) 0.083% NEBULIZER SOLUTION    Take 3 mLs by nebulization every 2 hours as needed for Wheezing    ALBUTEROL SULFATE  (90 BASE) MCG/ACT INHALER    Inhale 2 puffs into the lungs every 4 hours as needed for Wheezing    AMITRIPTYLINE (ELAVIL) 25 MG TABLET    Take 1 tablet by mouth nightly    ASPIRIN 81 MG CHEWABLE TABLET    Take 1 tablet by mouth daily    ATORVASTATIN (LIPITOR) 40 MG TABLET    Take 1 tablet by mouth nightly    BLOOD GLUCOSE MONITORING SUPPL (ONETOUCH VERIO) W/DEVICE KIT    TEST 3 TIMES DAILY AS NEEDED    BLOOD GLUCOSE TEST STRIPS (EXACTECH TEST) STRIP    1 each by In Vitro route 3 times daily (Accu-check test strips) As necessary within 72 hours of   today. The patient has voiced understanding of this information. REVAL:     Patient was counseled to follow up with primary care doctor in one to two days or return to emergency Department if patient has new or worsening symptoms or other concerns. I was able to address the patient's questions, pain and other concerns to their satisfaction. The patient and/or family   -had the results of all tests and diagnosis explained to them   -were given both verbal and written discharge instructions   -were instructed of the importance of close follow-up   -were told that an ED diagnosis is often a preliminary diagnosis   -that definitive care is often not able to be given in the ED   -were told that close follow-up is essential for good health and good outcomes   -Additional diagnostic exploration / testing and therapeutic intervention options were discussed at length with the patient and/or their representatives and were declined . Capacity to make this decision was determined to be present    CRITICAL CARE TIME       CONSULTS:  None    PROCEDURES:  Unless otherwise noted below, none     Procedures    ATTESTATIONS  Vital signs and nursing notes reviewed. If included as part of this work-up, I interpreted the ECG andreviewed all diagnostic imaging as well as provided preliminary interpretation of plain film imaging as noted. As warranted and when indicated,  I reviewed the PDMP as  applicable. FINAL IMPRESSION      1. Sarcoidosis    2.  Chest pain, unspecified type          DISPOSITION/PLAN   DISPOSITION        PATIENT REFERREDTO:  SUDEEP Valadez - VOLODYMYR  [address] [address] 70 [phone] [id number]    Call in 1 day  For reevaluation of today's complaint      DISCHARGE MEDICATIONS:  New Prescriptions    No medications on file                Summation      Patient Course: See HPI and MDM       ED Medications administered this visit:    Medications oxyCODONE-acetaminophen (PERCOCET) 5-325 MG per tablet 1 tablet (has no administration in time range)       New Prescriptions from this visit:  New Prescriptions    No medications on file       Follow-up:  Vinita Weeks, [address]  [phone]    Call in 1 day  For reevaluation of today's complaint        Final Impression:   1. Sarcoidosis    2. Chest pain, unspecified type                 (Please note:  Portions of this note were completed with a voice recognition program.  Efforts were made to edit the dictations but occasionally words and phrases are mis-transcribed.)  Form v2016. [id number]    DO Sandro Fuentes (electronically signed)  Emergency Medicine Provider          Graciela Cartagena DO  09/16/19 7718

## 2019-09-18 PROCEDURE — 93010 ELECTROCARDIOGRAM REPORT: CPT | Performed by: INTERNAL MEDICINE

## 2019-10-13 ENCOUNTER — APPOINTMENT (OUTPATIENT)
Dept: GENERAL RADIOLOGY | Age: 49
End: 2019-10-13
Payer: MEDICAID

## 2019-10-13 ENCOUNTER — HOSPITAL ENCOUNTER (EMERGENCY)
Age: 49
Discharge: HOME | End: 2019-10-13
Attending: EMERGENCY MEDICINE
Payer: MEDICAID

## 2019-10-13 VITALS
SYSTOLIC BLOOD PRESSURE: 116 MMHG | DIASTOLIC BLOOD PRESSURE: 66 MMHG | RESPIRATION RATE: 18 BRPM | HEIGHT: 63 IN | HEART RATE: 81 BPM | OXYGEN SATURATION: 99 % | BODY MASS INDEX: 40.75 KG/M2 | WEIGHT: 230 LBS | TEMPERATURE: 98.4 F

## 2019-10-13 DIAGNOSIS — R07.9 CHEST PAIN, UNSPECIFIED TYPE: Primary | ICD-10-CM

## 2019-10-13 LAB
ALBUMIN SERPL-MCNC: 3.6 G/DL (ref 3.5–4.6)
ALP BLD-CCNC: 117 U/L (ref 40–130)
ALT SERPL-CCNC: 18 U/L (ref 0–33)
ANION GAP SERPL CALCULATED.3IONS-SCNC: 14 MEQ/L (ref 9–15)
AST SERPL-CCNC: 24 U/L (ref 0–35)
BASOPHILS ABSOLUTE: 0.1 K/UL (ref 0–0.2)
BASOPHILS RELATIVE PERCENT: 1 %
BILIRUB SERPL-MCNC: <0.2 MG/DL (ref 0.2–0.7)
BUN BLDV-MCNC: 12 MG/DL (ref 6–20)
CALCIUM SERPL-MCNC: 8.1 MG/DL (ref 8.5–9.9)
CHLORIDE BLD-SCNC: 104 MEQ/L (ref 95–107)
CO2: 20 MEQ/L (ref 20–31)
CREAT SERPL-MCNC: 1.49 MG/DL (ref 0.5–0.9)
EKG ATRIAL RATE: 86 BPM
EKG P AXIS: 47 DEGREES
EKG P-R INTERVAL: 162 MS
EKG Q-T INTERVAL: 362 MS
EKG QRS DURATION: 80 MS
EKG QTC CALCULATION (BAZETT): 433 MS
EKG R AXIS: 20 DEGREES
EKG T AXIS: 11 DEGREES
EKG VENTRICULAR RATE: 86 BPM
EOSINOPHILS ABSOLUTE: 0.3 K/UL (ref 0–0.7)
EOSINOPHILS RELATIVE PERCENT: 3.5 %
GFR AFRICAN AMERICAN: 45.1
GFR NON-AFRICAN AMERICAN: 37.3
GLOBULIN: 3.1 G/DL (ref 2.3–3.5)
GLUCOSE BLD-MCNC: 205 MG/DL (ref 70–99)
HCG QUALITATIVE: NEGATIVE
HCT VFR BLD CALC: 42.7 % (ref 37–47)
HEMOGLOBIN: 14.1 G/DL (ref 12–16)
LIPASE: 41 U/L (ref 12–95)
LYMPHOCYTES ABSOLUTE: 1.9 K/UL (ref 1–4.8)
LYMPHOCYTES RELATIVE PERCENT: 20.6 %
MCH RBC QN AUTO: 32 PG (ref 27–31.3)
MCHC RBC AUTO-ENTMCNC: 33 % (ref 33–37)
MCV RBC AUTO: 97.2 FL (ref 82–100)
MONOCYTES ABSOLUTE: 0.6 K/UL (ref 0.2–0.8)
MONOCYTES RELATIVE PERCENT: 6.4 %
NEUTROPHILS ABSOLUTE: 6.3 K/UL (ref 1.4–6.5)
NEUTROPHILS RELATIVE PERCENT: 68.5 %
PDW BLD-RTO: 13.4 % (ref 11.5–14.5)
PLATELET # BLD: 286 K/UL (ref 130–400)
POTASSIUM SERPL-SCNC: 4.1 MEQ/L (ref 3.4–4.9)
RBC # BLD: 4.4 M/UL (ref 4.2–5.4)
SODIUM BLD-SCNC: 138 MEQ/L (ref 135–144)
TOTAL PROTEIN: 6.7 G/DL (ref 6.3–8)
TROPONIN: <0.01 NG/ML (ref 0–0.01)
WBC # BLD: 9.2 K/UL (ref 4.8–10.8)

## 2019-10-13 PROCEDURE — 36415 COLL VENOUS BLD VENIPUNCTURE: CPT

## 2019-10-13 PROCEDURE — 84484 ASSAY OF TROPONIN QUANT: CPT

## 2019-10-13 PROCEDURE — 83690 ASSAY OF LIPASE: CPT

## 2019-10-13 PROCEDURE — 71045 X-RAY EXAM CHEST 1 VIEW: CPT

## 2019-10-13 PROCEDURE — 99285 EMERGENCY DEPT VISIT HI MDM: CPT

## 2019-10-13 PROCEDURE — 80053 COMPREHEN METABOLIC PANEL: CPT

## 2019-10-13 PROCEDURE — 93005 ELECTROCARDIOGRAM TRACING: CPT | Performed by: EMERGENCY MEDICINE

## 2019-10-13 PROCEDURE — 84703 CHORIONIC GONADOTROPIN ASSAY: CPT

## 2019-10-13 PROCEDURE — 85025 COMPLETE CBC W/AUTO DIFF WBC: CPT

## 2019-10-13 PROCEDURE — 6370000000 HC RX 637 (ALT 250 FOR IP): Performed by: PERSONAL EMERGENCY RESPONSE ATTENDANT

## 2019-10-13 RX ORDER — ACETAMINOPHEN 500 MG
1000 TABLET ORAL ONCE
Status: COMPLETED | OUTPATIENT
Start: 2019-10-13 | End: 2019-10-13

## 2019-10-13 RX ORDER — MORPHINE SULFATE 2 MG/ML
4 INJECTION, SOLUTION INTRAMUSCULAR; INTRAVENOUS ONCE
Status: DISCONTINUED | OUTPATIENT
Start: 2019-10-13 | End: 2019-10-13

## 2019-10-13 RX ORDER — ONDANSETRON 2 MG/ML
4 INJECTION INTRAMUSCULAR; INTRAVENOUS ONCE
Status: DISCONTINUED | OUTPATIENT
Start: 2019-10-13 | End: 2019-10-13

## 2019-10-13 RX ADMIN — ACETAMINOPHEN 1000 MG: 500 TABLET ORAL at 02:43

## 2019-10-13 ASSESSMENT — PAIN SCALES - GENERAL
PAINLEVEL_OUTOF10: 10
PAINLEVEL_OUTOF10: 10
PAINLEVEL_OUTOF10: 8

## 2019-10-13 ASSESSMENT — PAIN DESCRIPTION - PAIN TYPE
TYPE: ACUTE PAIN

## 2019-10-13 ASSESSMENT — ENCOUNTER SYMPTOMS
NAUSEA: 0
RHINORRHEA: 0
ABDOMINAL PAIN: 0
BLOOD IN STOOL: 0
VOMITING: 0
COLOR CHANGE: 0
DIARRHEA: 0
SHORTNESS OF BREATH: 1
COUGH: 0
SORE THROAT: 0

## 2019-10-13 ASSESSMENT — PAIN DESCRIPTION - LOCATION
LOCATION: CHEST;BACK
LOCATION: CHEST
LOCATION: CHEST

## 2019-10-13 ASSESSMENT — PAIN DESCRIPTION - ORIENTATION: ORIENTATION: UPPER;MID

## 2019-10-14 PROCEDURE — 93010 ELECTROCARDIOGRAM REPORT: CPT | Performed by: INTERNAL MEDICINE

## 2019-10-17 ENCOUNTER — OFFICE VISIT (OUTPATIENT)
Dept: FAMILY MEDICINE CLINIC | Age: 49
End: 2019-10-17
Payer: MEDICAID

## 2019-10-17 VITALS
HEART RATE: 74 BPM | SYSTOLIC BLOOD PRESSURE: 124 MMHG | BODY MASS INDEX: 48.12 KG/M2 | OXYGEN SATURATION: 98 % | HEIGHT: 63 IN | RESPIRATION RATE: 18 BRPM | DIASTOLIC BLOOD PRESSURE: 70 MMHG | TEMPERATURE: 98.4 F | WEIGHT: 271.6 LBS

## 2019-10-17 DIAGNOSIS — Z79.4 TYPE 2 DIABETES MELLITUS WITH HYPERGLYCEMIA, WITH LONG-TERM CURRENT USE OF INSULIN (HCC): ICD-10-CM

## 2019-10-17 DIAGNOSIS — E03.9 HYPOTHYROIDISM, UNSPECIFIED TYPE: Primary | ICD-10-CM

## 2019-10-17 DIAGNOSIS — G89.4 CHRONIC PAIN SYNDROME: ICD-10-CM

## 2019-10-17 DIAGNOSIS — E11.65 TYPE 2 DIABETES MELLITUS WITH HYPERGLYCEMIA, WITH LONG-TERM CURRENT USE OF INSULIN (HCC): ICD-10-CM

## 2019-10-17 PROBLEM — Z90.5 ACQUIRED ABSENCE OF KIDNEY: Status: ACTIVE | Noted: 2019-03-15

## 2019-10-17 PROBLEM — N28.9 DISORDER OF KIDNEY AND URETER, UNSPECIFIED: Status: ACTIVE | Noted: 2018-12-25

## 2019-10-17 PROBLEM — Z85.850 PERSONAL HISTORY OF MALIGNANT NEOPLASM OF THYROID: Status: ACTIVE | Noted: 2018-10-26

## 2019-10-17 PROBLEM — E89.0 POSTPROCEDURAL HYPOTHYROIDISM: Status: ACTIVE | Noted: 2018-12-25

## 2019-10-17 PROBLEM — R09.89 OTHER SPECIFIED SYMPTOMS AND SIGNS INVOLVING THE CIRCULATORY AND RESPIRATORY SYSTEMS: Status: ACTIVE | Noted: 2019-01-14

## 2019-10-17 PROBLEM — Z86.39 PERSONAL HISTORY OF OTHER ENDOCRINE, NUTRITIONAL AND METABOLIC DISEASE: Status: ACTIVE | Noted: 2018-04-08

## 2019-10-17 PROBLEM — D72.829 ELEVATED WHITE BLOOD CELL COUNT, UNSPECIFIED: Status: ACTIVE | Noted: 2018-07-31

## 2019-10-17 PROBLEM — M62.838 OTHER MUSCLE SPASM: Status: ACTIVE | Noted: 2018-04-08

## 2019-10-17 PROBLEM — Z87.891 PERSONAL HISTORY OF NICOTINE DEPENDENCE: Status: ACTIVE | Noted: 2018-10-26

## 2019-10-17 PROBLEM — E89.89: Status: ACTIVE | Noted: 2018-07-31

## 2019-10-17 PROBLEM — Z79.899 OTHER LONG TERM (CURRENT) DRUG THERAPY: Status: ACTIVE | Noted: 2019-03-15

## 2019-10-17 PROBLEM — Z86.73 PRSNL HX OF TIA (TIA), AND CEREB INFRC W/O RESID DEFICITS: Status: ACTIVE | Noted: 2018-12-17

## 2019-10-17 PROBLEM — Z82.49 FAMILY HISTORY OF ISCHEMIC HEART DISEASE AND OTHER DISEASES OF THE CIRCULATORY SYSTEM: Status: ACTIVE | Noted: 2019-03-15

## 2019-10-17 PROBLEM — Z87.442 PERSONAL HISTORY OF URINARY CALCULI: Status: ACTIVE | Noted: 2018-11-05

## 2019-10-17 PROBLEM — Z01.818 ENCOUNTER FOR OTHER PREPROCEDURAL EXAMINATION: Status: ACTIVE | Noted: 2018-10-23

## 2019-10-17 PROBLEM — C79.89 SECONDARY MALIGNANT NEOPLASM OF OTHER SPECIFIED SITES (HCC): Status: ACTIVE | Noted: 2018-09-25

## 2019-10-17 PROBLEM — K08.409 PARTIAL LOSS OF TEETH, UNSPECIFIED CAUSE, UNSPECIFIED CLASS: Status: ACTIVE | Noted: 2018-05-16

## 2019-10-17 PROBLEM — R59.0 LOCALIZED ENLARGED LYMPH NODES: Status: ACTIVE | Noted: 2018-10-26

## 2019-10-17 PROBLEM — G47.33 OBSTRUCTIVE SLEEP APNEA SYNDROME: Status: ACTIVE | Noted: 2018-10-26

## 2019-10-17 PROBLEM — H92.02 OTALGIA, LEFT EAR: Status: ACTIVE | Noted: 2018-09-25

## 2019-10-17 PROBLEM — C85.99: Status: ACTIVE | Noted: 2018-09-25

## 2019-10-17 PROBLEM — R13.10 DYSPHAGIA, UNSPECIFIED: Status: ACTIVE | Noted: 2018-09-17

## 2019-10-17 PROBLEM — H53.149 VISUAL DISCOMFORT, UNSPECIFIED: Status: ACTIVE | Noted: 2018-05-16

## 2019-10-17 PROBLEM — Z88.6 ALLERGY STATUS TO ANALGESIC AGENT STATUS: Status: ACTIVE | Noted: 2018-09-25

## 2019-10-17 PROBLEM — R94.4 ABNORMAL RESULTS OF KIDNEY FUNCTION STUDIES: Status: ACTIVE | Noted: 2018-09-17

## 2019-10-17 PROBLEM — F32.9 MAJOR DEPRESSIVE DISORDER, SINGLE EPISODE, UNSPECIFIED: Status: ACTIVE | Noted: 2018-10-26

## 2019-10-17 PROBLEM — N17.9 ACUTE KIDNEY FAILURE (HCC): Status: ACTIVE | Noted: 2018-09-25

## 2019-10-17 PROBLEM — Z90.89 ACQUIRED ABSENCE OF OTHER ORGANS: Status: ACTIVE | Noted: 2018-11-21

## 2019-10-17 PROBLEM — F19.90 SUBSTANCE USE DISORDER: Status: ACTIVE | Noted: 2017-07-11

## 2019-10-17 PROBLEM — R53.81 OTHER MALAISE: Status: ACTIVE | Noted: 2019-02-26

## 2019-10-17 PROBLEM — G89.18 OTHER ACUTE POSTPROCEDURAL PAIN: Status: ACTIVE | Noted: 2018-11-05

## 2019-10-17 PROCEDURE — G8427 DOCREV CUR MEDS BY ELIG CLIN: HCPCS | Performed by: FAMILY MEDICINE

## 2019-10-17 PROCEDURE — G8484 FLU IMMUNIZE NO ADMIN: HCPCS | Performed by: FAMILY MEDICINE

## 2019-10-17 PROCEDURE — 1036F TOBACCO NON-USER: CPT | Performed by: FAMILY MEDICINE

## 2019-10-17 PROCEDURE — G8417 CALC BMI ABV UP PARAM F/U: HCPCS | Performed by: FAMILY MEDICINE

## 2019-10-17 PROCEDURE — 3044F HG A1C LEVEL LT 7.0%: CPT | Performed by: FAMILY MEDICINE

## 2019-10-17 PROCEDURE — 2022F DILAT RTA XM EVC RTNOPTHY: CPT | Performed by: FAMILY MEDICINE

## 2019-10-17 PROCEDURE — 99213 OFFICE O/P EST LOW 20 MIN: CPT | Performed by: FAMILY MEDICINE

## 2019-10-17 RX ORDER — OMEPRAZOLE 40 MG/1
CAPSULE, DELAYED RELEASE ORAL
Status: ON HOLD | COMMUNITY
Start: 2019-05-28 | End: 2020-09-21

## 2019-10-17 RX ORDER — CELECOXIB 100 MG/1
100 CAPSULE ORAL
COMMUNITY
Start: 2019-09-25 | End: 2019-11-04 | Stop reason: ALTCHOICE

## 2019-10-17 RX ORDER — LEVOTHYROXINE SODIUM 88 UG/1
88 TABLET ORAL DAILY
Qty: 30 TABLET | Refills: 1 | Status: SHIPPED | OUTPATIENT
Start: 2019-10-17 | End: 2019-12-20

## 2019-10-18 PROBLEM — R11.0 NAUSEA: Status: ACTIVE | Noted: 2018-12-02

## 2019-10-18 PROBLEM — E86.0 DEHYDRATION: Status: ACTIVE | Noted: 2018-07-31

## 2019-10-18 PROBLEM — M94.0 COSTAL CHONDRITIS: Status: ACTIVE | Noted: 2018-11-21

## 2019-10-18 PROBLEM — Z79.4 TYPE 2 DIABETES MELLITUS TREATED WITH INSULIN (HCC): Status: ACTIVE | Noted: 2018-08-03

## 2019-10-18 PROBLEM — J44.9 CHRONIC OBSTRUCTIVE PULMONARY DISEASE, UNSPECIFIED (HCC): Status: ACTIVE | Noted: 2018-12-25

## 2019-10-18 PROBLEM — E11.9 TYPE 2 DIABETES MELLITUS TREATED WITH INSULIN (HCC): Status: ACTIVE | Noted: 2018-08-03

## 2019-10-18 PROBLEM — R53.1 ASTHENIA: Status: ACTIVE | Noted: 2018-04-16

## 2019-10-18 PROBLEM — G89.29 CHRONIC PAIN: Status: ACTIVE | Noted: 2018-12-17

## 2019-10-18 PROBLEM — R22.1 LOCALIZED SWELLING, MASS AND LUMP, NECK: Status: ACTIVE | Noted: 2017-07-11

## 2019-10-18 PROBLEM — R42 DIZZINESS AND GIDDINESS: Status: ACTIVE | Noted: 2018-10-03

## 2019-10-18 PROBLEM — M79.7 PRIMARY FIBROMYALGIA SYNDROME: Status: ACTIVE | Noted: 2019-03-15

## 2019-10-18 PROBLEM — Z85.528 PERSONAL HISTORY OF OTHER MALIGNANT NEOPLASM OF KIDNEY: Status: ACTIVE | Noted: 2017-01-10

## 2019-10-18 PROBLEM — E04.9 NONTOXIC GOITER, UNSPECIFIED: Status: ACTIVE | Noted: 2019-02-17

## 2019-10-18 PROBLEM — N18.9 CHRONIC KIDNEY DISEASE, UNSPECIFIED: Status: ACTIVE | Noted: 2018-10-26

## 2019-10-18 PROBLEM — R06.02 SHORTNESS OF BREATH: Status: ACTIVE | Noted: 2019-02-17

## 2019-10-21 ASSESSMENT — ENCOUNTER SYMPTOMS
VOMITING: 0
ABDOMINAL PAIN: 0
NAUSEA: 0
SHORTNESS OF BREATH: 0
TROUBLE SWALLOWING: 0
DIARRHEA: 0
CHEST TIGHTNESS: 0
CONSTIPATION: 0

## 2019-10-24 DIAGNOSIS — G89.4 CHRONIC PAIN SYNDROME: Primary | ICD-10-CM

## 2019-10-26 ENCOUNTER — PATIENT MESSAGE (OUTPATIENT)
Dept: FAMILY MEDICINE CLINIC | Age: 49
End: 2019-10-26

## 2019-10-26 DIAGNOSIS — G43.101 MIGRAINE WITH AURA AND WITH STATUS MIGRAINOSUS, NOT INTRACTABLE: ICD-10-CM

## 2019-10-26 DIAGNOSIS — J30.1 ACUTE SEASONAL ALLERGIC RHINITIS DUE TO POLLEN: ICD-10-CM

## 2019-10-27 ENCOUNTER — HOSPITAL ENCOUNTER (EMERGENCY)
Age: 49
Discharge: HOME | End: 2019-10-27
Payer: MEDICAID

## 2019-10-27 VITALS
WEIGHT: 230 LBS | DIASTOLIC BLOOD PRESSURE: 57 MMHG | TEMPERATURE: 98 F | HEART RATE: 75 BPM | OXYGEN SATURATION: 96 % | HEIGHT: 63 IN | RESPIRATION RATE: 16 BRPM | BODY MASS INDEX: 40.75 KG/M2 | SYSTOLIC BLOOD PRESSURE: 131 MMHG

## 2019-10-27 DIAGNOSIS — R51.9 ACUTE NONINTRACTABLE HEADACHE, UNSPECIFIED HEADACHE TYPE: Primary | ICD-10-CM

## 2019-10-27 PROCEDURE — 2580000003 HC RX 258: Performed by: NURSE PRACTITIONER

## 2019-10-27 PROCEDURE — 6360000002 HC RX W HCPCS: Performed by: NURSE PRACTITIONER

## 2019-10-27 PROCEDURE — 96374 THER/PROPH/DIAG INJ IV PUSH: CPT

## 2019-10-27 PROCEDURE — 99283 EMERGENCY DEPT VISIT LOW MDM: CPT

## 2019-10-27 PROCEDURE — 96375 TX/PRO/DX INJ NEW DRUG ADDON: CPT

## 2019-10-27 RX ORDER — DIPHENHYDRAMINE HYDROCHLORIDE 50 MG/ML
50 INJECTION INTRAMUSCULAR; INTRAVENOUS ONCE
Status: COMPLETED | OUTPATIENT
Start: 2019-10-27 | End: 2019-10-27

## 2019-10-27 RX ORDER — 0.9 % SODIUM CHLORIDE 0.9 %
1000 INTRAVENOUS SOLUTION INTRAVENOUS ONCE
Status: COMPLETED | OUTPATIENT
Start: 2019-10-27 | End: 2019-10-27

## 2019-10-27 RX ORDER — ONDANSETRON 2 MG/ML
4 INJECTION INTRAMUSCULAR; INTRAVENOUS ONCE
Status: COMPLETED | OUTPATIENT
Start: 2019-10-27 | End: 2019-10-27

## 2019-10-27 RX ORDER — DIPHENHYDRAMINE HYDROCHLORIDE 50 MG/ML
25 INJECTION INTRAMUSCULAR; INTRAVENOUS ONCE
Status: DISCONTINUED | OUTPATIENT
Start: 2019-10-27 | End: 2019-10-27

## 2019-10-27 RX ORDER — METOCLOPRAMIDE 10 MG/1
10 TABLET ORAL 4 TIMES DAILY
Qty: 120 TABLET | Refills: 0 | Status: SHIPPED | OUTPATIENT
Start: 2019-10-27 | End: 2021-03-21

## 2019-10-27 RX ORDER — METHYLPREDNISOLONE ACETATE 80 MG/ML
80 INJECTION, SUSPENSION INTRA-ARTICULAR; INTRALESIONAL; INTRAMUSCULAR; SOFT TISSUE ONCE
Status: DISCONTINUED | OUTPATIENT
Start: 2019-10-27 | End: 2019-10-27

## 2019-10-27 RX ORDER — PREDNISONE 20 MG/1
40 TABLET ORAL DAILY
Qty: 20 TABLET | Refills: 0 | Status: SHIPPED | OUTPATIENT
Start: 2019-10-27 | End: 2019-10-27 | Stop reason: SINTOL

## 2019-10-27 RX ORDER — METOCLOPRAMIDE HYDROCHLORIDE 5 MG/ML
10 INJECTION INTRAMUSCULAR; INTRAVENOUS ONCE
Status: COMPLETED | OUTPATIENT
Start: 2019-10-27 | End: 2019-10-27

## 2019-10-27 RX ADMIN — ONDANSETRON 4 MG: 2 INJECTION INTRAMUSCULAR; INTRAVENOUS at 04:18

## 2019-10-27 RX ADMIN — DIPHENHYDRAMINE HYDROCHLORIDE 50 MG: 50 INJECTION, SOLUTION INTRAMUSCULAR; INTRAVENOUS at 04:18

## 2019-10-27 RX ADMIN — METOCLOPRAMIDE 10 MG: 5 INJECTION, SOLUTION INTRAMUSCULAR; INTRAVENOUS at 04:17

## 2019-10-27 RX ADMIN — SODIUM CHLORIDE 1000 ML: 9 INJECTION, SOLUTION INTRAVENOUS at 03:51

## 2019-10-27 ASSESSMENT — ENCOUNTER SYMPTOMS
SHORTNESS OF BREATH: 0
SORE THROAT: 0
BLOOD IN STOOL: 0
EYE REDNESS: 0
NAUSEA: 1
COUGH: 0
WHEEZING: 0
CONSTIPATION: 0
RHINORRHEA: 0
DIARRHEA: 0
VOMITING: 1
COLOR CHANGE: 0
BACK PAIN: 0
EYE PAIN: 0
TROUBLE SWALLOWING: 0
ABDOMINAL PAIN: 0
EYE DISCHARGE: 0

## 2019-10-27 ASSESSMENT — PAIN DESCRIPTION - PAIN TYPE: TYPE: ACUTE PAIN

## 2019-10-27 ASSESSMENT — PAIN SCALES - GENERAL: PAINLEVEL_OUTOF10: 10

## 2019-10-27 ASSESSMENT — PAIN DESCRIPTION - DESCRIPTORS: DESCRIPTORS: THROBBING

## 2019-10-27 ASSESSMENT — PAIN DESCRIPTION - LOCATION: LOCATION: HEAD

## 2019-10-27 ASSESSMENT — PAIN DESCRIPTION - ORIENTATION: ORIENTATION: RIGHT;LEFT;ANTERIOR;POSTERIOR

## 2019-10-28 DIAGNOSIS — G43.909 MIGRAINE WITHOUT STATUS MIGRAINOSUS, NOT INTRACTABLE, UNSPECIFIED MIGRAINE TYPE: Primary | ICD-10-CM

## 2019-10-28 RX ORDER — BUTALBITAL, ACETAMINOPHEN AND CAFFEINE 50; 325; 40 MG/1; MG/1; MG/1
TABLET ORAL
Qty: 21 TABLET | Refills: 0 | OUTPATIENT
Start: 2019-10-28

## 2019-10-28 RX ORDER — CETIRIZINE HYDROCHLORIDE 10 MG/1
TABLET ORAL
Qty: 30 TABLET | Refills: 5 | Status: SHIPPED | OUTPATIENT
Start: 2019-10-28 | End: 2019-12-15 | Stop reason: SDUPTHER

## 2019-10-28 RX ORDER — SUMATRIPTAN 25 MG/1
25 TABLET, FILM COATED ORAL
Qty: 9 TABLET | Refills: 0 | Status: SHIPPED | OUTPATIENT
Start: 2019-10-28 | End: 2019-11-15

## 2019-10-30 ENCOUNTER — TELEPHONE (OUTPATIENT)
Dept: FAMILY MEDICINE CLINIC | Age: 49
End: 2019-10-30

## 2019-10-31 DIAGNOSIS — Z79.4 TYPE 2 DIABETES MELLITUS WITH HYPERGLYCEMIA, WITH LONG-TERM CURRENT USE OF INSULIN (HCC): ICD-10-CM

## 2019-10-31 DIAGNOSIS — E11.65 TYPE 2 DIABETES MELLITUS WITH HYPERGLYCEMIA, WITH LONG-TERM CURRENT USE OF INSULIN (HCC): ICD-10-CM

## 2019-11-04 ENCOUNTER — OFFICE VISIT (OUTPATIENT)
Dept: UROLOGY | Age: 49
End: 2019-11-04
Payer: MEDICAID

## 2019-11-04 VITALS
DIASTOLIC BLOOD PRESSURE: 80 MMHG | BODY MASS INDEX: 44.3 KG/M2 | HEIGHT: 63 IN | SYSTOLIC BLOOD PRESSURE: 132 MMHG | HEART RATE: 80 BPM | WEIGHT: 250 LBS

## 2019-11-04 DIAGNOSIS — N20.0 KIDNEY STONE: Primary | ICD-10-CM

## 2019-11-04 LAB
BILIRUBIN, POC: NORMAL
BLOOD URINE, POC: NORMAL
CLARITY, POC: CLEAR
COLOR, POC: YELLOW
GLUCOSE URINE, POC: NORMAL
KETONES, POC: NORMAL
LEUKOCYTE EST, POC: NORMAL
NITRITE, POC: NORMAL
PH, POC: 6
PROTEIN, POC: NORMAL
SPECIFIC GRAVITY, POC: 1.02
UROBILINOGEN, POC: 0.2

## 2019-11-04 PROCEDURE — G8417 CALC BMI ABV UP PARAM F/U: HCPCS | Performed by: UROLOGY

## 2019-11-04 PROCEDURE — 99213 OFFICE O/P EST LOW 20 MIN: CPT | Performed by: UROLOGY

## 2019-11-04 PROCEDURE — 81003 URINALYSIS AUTO W/O SCOPE: CPT | Performed by: UROLOGY

## 2019-11-04 PROCEDURE — G8427 DOCREV CUR MEDS BY ELIG CLIN: HCPCS | Performed by: UROLOGY

## 2019-11-04 PROCEDURE — 1036F TOBACCO NON-USER: CPT | Performed by: UROLOGY

## 2019-11-04 PROCEDURE — G8484 FLU IMMUNIZE NO ADMIN: HCPCS | Performed by: UROLOGY

## 2019-11-05 RX ORDER — ALBUTEROL SULFATE 90 UG/1
2 AEROSOL, METERED RESPIRATORY (INHALATION) EVERY 4 HOURS PRN
Qty: 1 INHALER | Refills: 3 | Status: SHIPPED | OUTPATIENT
Start: 2019-11-05 | End: 2020-03-08 | Stop reason: SDUPTHER

## 2019-11-05 RX ORDER — ALBUTEROL SULFATE 2.5 MG/3ML
2.5 SOLUTION RESPIRATORY (INHALATION) EVERY 4 HOURS PRN
Qty: 120 EACH | Refills: 3 | Status: SHIPPED | OUTPATIENT
Start: 2019-11-05

## 2019-11-07 DIAGNOSIS — E11.65 TYPE 2 DIABETES MELLITUS WITH HYPERGLYCEMIA, WITH LONG-TERM CURRENT USE OF INSULIN (HCC): ICD-10-CM

## 2019-11-07 DIAGNOSIS — Z79.4 TYPE 2 DIABETES MELLITUS WITH HYPERGLYCEMIA, WITH LONG-TERM CURRENT USE OF INSULIN (HCC): ICD-10-CM

## 2019-11-09 ENCOUNTER — HOSPITAL ENCOUNTER (EMERGENCY)
Age: 49
Discharge: HOME | End: 2019-11-10
Payer: MEDICAID

## 2019-11-09 ENCOUNTER — APPOINTMENT (OUTPATIENT)
Dept: GENERAL RADIOLOGY | Age: 49
End: 2019-11-09
Payer: MEDICAID

## 2019-11-09 DIAGNOSIS — R07.9 RECURRENT CHEST PAIN: Primary | ICD-10-CM

## 2019-11-09 DIAGNOSIS — D86.9 SARCOIDOSIS: ICD-10-CM

## 2019-11-09 LAB
ALBUMIN SERPL-MCNC: 3.8 G/DL (ref 3.5–4.6)
ALP BLD-CCNC: 123 U/L (ref 40–130)
ALT SERPL-CCNC: 14 U/L (ref 0–33)
AMPHETAMINE SCREEN, URINE: ABNORMAL
ANION GAP SERPL CALCULATED.3IONS-SCNC: 13 MEQ/L (ref 9–15)
AST SERPL-CCNC: 17 U/L (ref 0–35)
BARBITURATE SCREEN URINE: POSITIVE
BASOPHILS ABSOLUTE: 0.1 K/UL (ref 0–0.2)
BASOPHILS RELATIVE PERCENT: 0.7 %
BENZODIAZEPINE SCREEN, URINE: ABNORMAL
BILIRUB SERPL-MCNC: <0.2 MG/DL (ref 0.2–0.7)
BUN BLDV-MCNC: 13 MG/DL (ref 6–20)
CALCIUM SERPL-MCNC: 9.3 MG/DL (ref 8.5–9.9)
CANNABINOID SCREEN URINE: POSITIVE
CHLORIDE BLD-SCNC: 104 MEQ/L (ref 95–107)
CO2: 25 MEQ/L (ref 20–31)
COCAINE METABOLITE SCREEN URINE: ABNORMAL
CREAT SERPL-MCNC: 1.05 MG/DL (ref 0.5–0.9)
EKG ATRIAL RATE: 89 BPM
EKG P AXIS: 52 DEGREES
EKG P-R INTERVAL: 154 MS
EKG Q-T INTERVAL: 358 MS
EKG QRS DURATION: 76 MS
EKG QTC CALCULATION (BAZETT): 435 MS
EKG R AXIS: 5 DEGREES
EKG T AXIS: 52 DEGREES
EKG VENTRICULAR RATE: 89 BPM
EOSINOPHILS ABSOLUTE: 0.4 K/UL (ref 0–0.7)
EOSINOPHILS RELATIVE PERCENT: 4.5 %
GFR AFRICAN AMERICAN: >60
GFR NON-AFRICAN AMERICAN: 55.8
GLOBULIN: 3.3 G/DL (ref 2.3–3.5)
GLUCOSE BLD-MCNC: 93 MG/DL (ref 70–99)
HCT VFR BLD CALC: 42.4 % (ref 37–47)
HEMOGLOBIN: 14.4 G/DL (ref 12–16)
LYMPHOCYTES ABSOLUTE: 2.1 K/UL (ref 1–4.8)
LYMPHOCYTES RELATIVE PERCENT: 24.5 %
Lab: ABNORMAL
MCH RBC QN AUTO: 33 PG (ref 27–31.3)
MCHC RBC AUTO-ENTMCNC: 33.9 % (ref 33–37)
MCV RBC AUTO: 97.5 FL (ref 82–100)
METHADONE SCREEN, URINE: ABNORMAL
MONOCYTES ABSOLUTE: 0.6 K/UL (ref 0.2–0.8)
MONOCYTES RELATIVE PERCENT: 6.6 %
NEUTROPHILS ABSOLUTE: 5.6 K/UL (ref 1.4–6.5)
NEUTROPHILS RELATIVE PERCENT: 63.7 %
OPIATE SCREEN URINE: ABNORMAL
OXYCODONE URINE: ABNORMAL
PDW BLD-RTO: 13.1 % (ref 11.5–14.5)
PHENCYCLIDINE SCREEN URINE: ABNORMAL
PLATELET # BLD: 281 K/UL (ref 130–400)
POTASSIUM SERPL-SCNC: 4.1 MEQ/L (ref 3.4–4.9)
PROPOXYPHENE SCREEN: ABNORMAL
RBC # BLD: 4.35 M/UL (ref 4.2–5.4)
SODIUM BLD-SCNC: 142 MEQ/L (ref 135–144)
TOTAL CK: 462 U/L (ref 0–170)
TOTAL PROTEIN: 7.1 G/DL (ref 6.3–8)
TROPONIN: <0.01 NG/ML (ref 0–0.01)
WBC # BLD: 8.8 K/UL (ref 4.8–10.8)

## 2019-11-09 PROCEDURE — 93005 ELECTROCARDIOGRAM TRACING: CPT | Performed by: PHYSICIAN ASSISTANT

## 2019-11-09 PROCEDURE — 84484 ASSAY OF TROPONIN QUANT: CPT

## 2019-11-09 PROCEDURE — 82553 CREATINE MB FRACTION: CPT

## 2019-11-09 PROCEDURE — 85025 COMPLETE CBC W/AUTO DIFF WBC: CPT

## 2019-11-09 PROCEDURE — 80053 COMPREHEN METABOLIC PANEL: CPT

## 2019-11-09 PROCEDURE — 99285 EMERGENCY DEPT VISIT HI MDM: CPT

## 2019-11-09 PROCEDURE — 71045 X-RAY EXAM CHEST 1 VIEW: CPT

## 2019-11-09 PROCEDURE — 82550 ASSAY OF CK (CPK): CPT

## 2019-11-09 PROCEDURE — 6370000000 HC RX 637 (ALT 250 FOR IP): Performed by: PHYSICIAN ASSISTANT

## 2019-11-09 PROCEDURE — 36415 COLL VENOUS BLD VENIPUNCTURE: CPT

## 2019-11-09 PROCEDURE — 80307 DRUG TEST PRSMV CHEM ANLYZR: CPT

## 2019-11-09 RX ORDER — OXYCODONE HYDROCHLORIDE AND ACETAMINOPHEN 5; 325 MG/1; MG/1
1 TABLET ORAL ONCE
Status: COMPLETED | OUTPATIENT
Start: 2019-11-09 | End: 2019-11-09

## 2019-11-09 RX ADMIN — OXYCODONE HYDROCHLORIDE AND ACETAMINOPHEN 1 TABLET: 5; 325 TABLET ORAL at 22:51

## 2019-11-09 RX ADMIN — OXYCODONE HYDROCHLORIDE AND ACETAMINOPHEN 1 TABLET: 5; 325 TABLET ORAL at 23:46

## 2019-11-09 ASSESSMENT — PAIN SCALES - GENERAL
PAINLEVEL_OUTOF10: 7
PAINLEVEL_OUTOF10: 10
PAINLEVEL_OUTOF10: 10

## 2019-11-09 ASSESSMENT — PAIN DESCRIPTION - LOCATION: LOCATION: CHEST

## 2019-11-09 ASSESSMENT — PAIN DESCRIPTION - PAIN TYPE: TYPE: ACUTE PAIN

## 2019-11-10 VITALS
HEART RATE: 81 BPM | DIASTOLIC BLOOD PRESSURE: 80 MMHG | BODY MASS INDEX: 40.75 KG/M2 | OXYGEN SATURATION: 96 % | SYSTOLIC BLOOD PRESSURE: 147 MMHG | WEIGHT: 230 LBS | TEMPERATURE: 97.8 F | HEIGHT: 63 IN | RESPIRATION RATE: 21 BRPM

## 2019-11-10 LAB
CK MB: 2.5 NG/ML (ref 0–3.8)
CREATINE KINASE-MB INDEX: 0.5 % (ref 0–3.5)

## 2019-11-10 ASSESSMENT — ENCOUNTER SYMPTOMS
ALLERGIC/IMMUNOLOGIC NEGATIVE: 1
COLOR CHANGE: 0
APNEA: 0
ABDOMINAL PAIN: 0
EYE PAIN: 0
SHORTNESS OF BREATH: 1
TROUBLE SWALLOWING: 0

## 2019-11-10 ASSESSMENT — HEART SCORE: ECG: 0

## 2019-11-12 PROCEDURE — 93010 ELECTROCARDIOGRAM REPORT: CPT | Performed by: INTERNAL MEDICINE

## 2019-11-15 ENCOUNTER — OFFICE VISIT (OUTPATIENT)
Dept: PALLATIVE CARE | Age: 49
End: 2019-11-15
Payer: MEDICAID

## 2019-11-15 VITALS
OXYGEN SATURATION: 97 % | SYSTOLIC BLOOD PRESSURE: 148 MMHG | RESPIRATION RATE: 20 BRPM | DIASTOLIC BLOOD PRESSURE: 69 MMHG | BODY MASS INDEX: 48.25 KG/M2 | TEMPERATURE: 97.5 F | HEART RATE: 66 BPM | WEIGHT: 272.4 LBS

## 2019-11-15 DIAGNOSIS — R06.02 SOB (SHORTNESS OF BREATH): ICD-10-CM

## 2019-11-15 DIAGNOSIS — G89.4 CHRONIC PAIN SYNDROME: ICD-10-CM

## 2019-11-15 DIAGNOSIS — D86.9 SARCOIDOSIS: Primary | ICD-10-CM

## 2019-11-15 DIAGNOSIS — Z51.5 PALLIATIVE CARE PATIENT: ICD-10-CM

## 2019-11-15 PROCEDURE — 99214 OFFICE O/P EST MOD 30 MIN: CPT | Performed by: NURSE PRACTITIONER

## 2019-11-15 PROCEDURE — G8427 DOCREV CUR MEDS BY ELIG CLIN: HCPCS | Performed by: NURSE PRACTITIONER

## 2019-11-15 PROCEDURE — G8417 CALC BMI ABV UP PARAM F/U: HCPCS | Performed by: NURSE PRACTITIONER

## 2019-11-15 PROCEDURE — G8484 FLU IMMUNIZE NO ADMIN: HCPCS | Performed by: NURSE PRACTITIONER

## 2019-11-15 PROCEDURE — 1036F TOBACCO NON-USER: CPT | Performed by: NURSE PRACTITIONER

## 2019-11-15 RX ORDER — DOXYCYCLINE HYCLATE 100 MG
100 TABLET ORAL 2 TIMES DAILY
Qty: 20 TABLET | Refills: 3 | Status: SHIPPED | OUTPATIENT
Start: 2019-11-15 | End: 2019-12-20

## 2019-11-15 ASSESSMENT — ENCOUNTER SYMPTOMS
SHORTNESS OF BREATH: 1
ABDOMINAL PAIN: 0
VOMITING: 0
NAUSEA: 0
DIARRHEA: 0
BLOOD IN STOOL: 0
CONSTIPATION: 0
APNEA: 0
TROUBLE SWALLOWING: 0
COUGH: 0
ABDOMINAL DISTENTION: 0
BACK PAIN: 1
WHEEZING: 0
EYE DISCHARGE: 0
COLOR CHANGE: 0
CHOKING: 0
CHEST TIGHTNESS: 0
VOICE CHANGE: 0
ANAL BLEEDING: 0
RECTAL PAIN: 0
SORE THROAT: 0
STRIDOR: 0

## 2019-11-16 PROBLEM — Z01.818 ENCOUNTER FOR OTHER PREPROCEDURAL EXAMINATION: Status: RESOLVED | Noted: 2018-10-23 | Resolved: 2019-11-16

## 2019-11-17 PROBLEM — E86.0 DEHYDRATION: Status: RESOLVED | Noted: 2018-07-31 | Resolved: 2019-11-17

## 2019-11-20 ENCOUNTER — OFFICE VISIT (OUTPATIENT)
Dept: FAMILY MEDICINE CLINIC | Age: 49
End: 2019-11-20
Payer: MEDICAID

## 2019-11-20 VITALS
TEMPERATURE: 97.1 F | WEIGHT: 272.4 LBS | OXYGEN SATURATION: 97 % | HEIGHT: 63 IN | DIASTOLIC BLOOD PRESSURE: 62 MMHG | BODY MASS INDEX: 48.27 KG/M2 | SYSTOLIC BLOOD PRESSURE: 124 MMHG | HEART RATE: 81 BPM

## 2019-11-20 DIAGNOSIS — Z79.4 TYPE 2 DIABETES MELLITUS TREATED WITH INSULIN (HCC): ICD-10-CM

## 2019-11-20 DIAGNOSIS — M47.816 SPONDYLOSIS OF LUMBAR REGION WITHOUT MYELOPATHY OR RADICULOPATHY: ICD-10-CM

## 2019-11-20 DIAGNOSIS — M79.7 FIBROMYALGIA: Primary | ICD-10-CM

## 2019-11-20 DIAGNOSIS — J44.9 CHRONIC OBSTRUCTIVE PULMONARY DISEASE, UNSPECIFIED COPD TYPE (HCC): ICD-10-CM

## 2019-11-20 DIAGNOSIS — E11.9 TYPE 2 DIABETES MELLITUS TREATED WITH INSULIN (HCC): ICD-10-CM

## 2019-11-20 DIAGNOSIS — D86.2 SARCOIDOSIS OF LUNG WITH SARCOIDOSIS OF LYMPH NODES (HCC): ICD-10-CM

## 2019-11-20 PROCEDURE — G8484 FLU IMMUNIZE NO ADMIN: HCPCS | Performed by: NURSE PRACTITIONER

## 2019-11-20 PROCEDURE — 3044F HG A1C LEVEL LT 7.0%: CPT | Performed by: NURSE PRACTITIONER

## 2019-11-20 PROCEDURE — 99213 OFFICE O/P EST LOW 20 MIN: CPT | Performed by: NURSE PRACTITIONER

## 2019-11-20 PROCEDURE — 1036F TOBACCO NON-USER: CPT | Performed by: NURSE PRACTITIONER

## 2019-11-20 PROCEDURE — 3023F SPIROM DOC REV: CPT | Performed by: NURSE PRACTITIONER

## 2019-11-20 PROCEDURE — G8427 DOCREV CUR MEDS BY ELIG CLIN: HCPCS | Performed by: NURSE PRACTITIONER

## 2019-11-20 PROCEDURE — 2022F DILAT RTA XM EVC RTNOPTHY: CPT | Performed by: NURSE PRACTITIONER

## 2019-11-20 PROCEDURE — G8926 SPIRO NO PERF OR DOC: HCPCS | Performed by: NURSE PRACTITIONER

## 2019-11-20 PROCEDURE — G8417 CALC BMI ABV UP PARAM F/U: HCPCS | Performed by: NURSE PRACTITIONER

## 2019-11-20 RX ORDER — GABAPENTIN 300 MG/1
CAPSULE ORAL
Qty: 90 CAPSULE | Refills: 1 | Status: CANCELLED | OUTPATIENT
Start: 2019-11-20 | End: 2019-12-19

## 2019-11-20 RX ORDER — PREGABALIN 75 MG/1
75 CAPSULE ORAL 3 TIMES DAILY
Qty: 90 CAPSULE | Refills: 2 | Status: SHIPPED | OUTPATIENT
Start: 2019-11-20 | End: 2020-06-30

## 2019-11-20 ASSESSMENT — ENCOUNTER SYMPTOMS
COUGH: 0
BACK PAIN: 1
SHORTNESS OF BREATH: 0
WHEEZING: 0

## 2019-12-01 ENCOUNTER — PATIENT MESSAGE (OUTPATIENT)
Dept: FAMILY MEDICINE CLINIC | Age: 49
End: 2019-12-01

## 2019-12-01 DIAGNOSIS — M79.7 FIBROMYALGIA: Primary | ICD-10-CM

## 2019-12-02 RX ORDER — LANCETS
EACH MISCELLANEOUS
Qty: 300 EACH | Refills: 3 | Status: SHIPPED | OUTPATIENT
Start: 2019-12-02

## 2019-12-05 ENCOUNTER — PATIENT MESSAGE (OUTPATIENT)
Dept: FAMILY MEDICINE CLINIC | Age: 49
End: 2019-12-05

## 2019-12-05 DIAGNOSIS — M79.7 FIBROMYALGIA: Primary | ICD-10-CM

## 2019-12-07 DIAGNOSIS — G89.29 OTHER CHRONIC PAIN: ICD-10-CM

## 2019-12-07 RX ORDER — AMITRIPTYLINE HYDROCHLORIDE 25 MG/1
TABLET, FILM COATED ORAL
Qty: 30 TABLET | Refills: 0 | Status: SHIPPED | OUTPATIENT
Start: 2019-12-07 | End: 2020-01-07

## 2019-12-15 DIAGNOSIS — J30.1 ACUTE SEASONAL ALLERGIC RHINITIS DUE TO POLLEN: ICD-10-CM

## 2019-12-16 RX ORDER — CETIRIZINE HYDROCHLORIDE 10 MG/1
TABLET ORAL
Qty: 30 TABLET | Refills: 5 | Status: SHIPPED | OUTPATIENT
Start: 2019-12-16 | End: 2020-03-08 | Stop reason: SDUPTHER

## 2019-12-18 ENCOUNTER — APPOINTMENT (OUTPATIENT)
Dept: GENERAL RADIOLOGY | Age: 49
End: 2019-12-18
Payer: MEDICAID

## 2019-12-18 ENCOUNTER — HOSPITAL ENCOUNTER (EMERGENCY)
Age: 49
Discharge: HOME | End: 2019-12-18
Attending: EMERGENCY MEDICINE
Payer: MEDICAID

## 2019-12-18 VITALS
WEIGHT: 243 LBS | TEMPERATURE: 98.3 F | SYSTOLIC BLOOD PRESSURE: 124 MMHG | BODY MASS INDEX: 43.05 KG/M2 | OXYGEN SATURATION: 96 % | DIASTOLIC BLOOD PRESSURE: 58 MMHG | RESPIRATION RATE: 18 BRPM | HEART RATE: 63 BPM | HEIGHT: 63 IN

## 2019-12-18 DIAGNOSIS — R07.9 CHEST PAIN, UNSPECIFIED TYPE: Primary | ICD-10-CM

## 2019-12-18 LAB
ALBUMIN SERPL-MCNC: 3.8 G/DL (ref 3.5–4.6)
ALP BLD-CCNC: 146 U/L (ref 40–130)
ALT SERPL-CCNC: 13 U/L (ref 0–33)
ANION GAP SERPL CALCULATED.3IONS-SCNC: 10 MEQ/L (ref 9–15)
APTT: 28.5 SEC (ref 24.4–36.8)
AST SERPL-CCNC: 16 U/L (ref 0–35)
BASOPHILS ABSOLUTE: 0.1 K/UL (ref 0–0.2)
BASOPHILS RELATIVE PERCENT: 1.2 %
BILIRUB SERPL-MCNC: 0.3 MG/DL (ref 0.2–0.7)
BUN BLDV-MCNC: 10 MG/DL (ref 6–20)
CALCIUM SERPL-MCNC: 8.7 MG/DL (ref 8.5–9.9)
CHLORIDE BLD-SCNC: 102 MEQ/L (ref 95–107)
CK MB: 2.5 NG/ML (ref 0–3.8)
CO2: 24 MEQ/L (ref 20–31)
CREAT SERPL-MCNC: 1.28 MG/DL (ref 0.5–0.9)
CREATINE KINASE-MB INDEX: 0.6 % (ref 0–3.5)
EKG ATRIAL RATE: 80 BPM
EKG P AXIS: 43 DEGREES
EKG P-R INTERVAL: 158 MS
EKG Q-T INTERVAL: 392 MS
EKG QRS DURATION: 82 MS
EKG QTC CALCULATION (BAZETT): 452 MS
EKG R AXIS: 12 DEGREES
EKG T AXIS: 3 DEGREES
EKG VENTRICULAR RATE: 80 BPM
EOSINOPHILS ABSOLUTE: 0.3 K/UL (ref 0–0.7)
EOSINOPHILS RELATIVE PERCENT: 4.2 %
GFR AFRICAN AMERICAN: 53.7
GFR NON-AFRICAN AMERICAN: 44.4
GLOBULIN: 3.5 G/DL (ref 2.3–3.5)
GLUCOSE BLD-MCNC: 136 MG/DL (ref 70–99)
HCT VFR BLD CALC: 42.9 % (ref 37–47)
HEMOGLOBIN: 14.7 G/DL (ref 12–16)
INR BLD: 1
LYMPHOCYTES ABSOLUTE: 1.7 K/UL (ref 1–4.8)
LYMPHOCYTES RELATIVE PERCENT: 23.8 %
MCH RBC QN AUTO: 32.9 PG (ref 27–31.3)
MCHC RBC AUTO-ENTMCNC: 34.2 % (ref 33–37)
MCV RBC AUTO: 96.1 FL (ref 82–100)
MONOCYTES ABSOLUTE: 0.4 K/UL (ref 0.2–0.8)
MONOCYTES RELATIVE PERCENT: 6.2 %
NEUTROPHILS ABSOLUTE: 4.6 K/UL (ref 1.4–6.5)
NEUTROPHILS RELATIVE PERCENT: 64.6 %
PDW BLD-RTO: 13.2 % (ref 11.5–14.5)
PLATELET # BLD: 270 K/UL (ref 130–400)
POTASSIUM SERPL-SCNC: 4.2 MEQ/L (ref 3.4–4.9)
PROTHROMBIN TIME: 13.4 SEC (ref 12.3–14.9)
RBC # BLD: 4.46 M/UL (ref 4.2–5.4)
SODIUM BLD-SCNC: 136 MEQ/L (ref 135–144)
TOTAL CK: 443 U/L (ref 0–170)
TOTAL PROTEIN: 7.3 G/DL (ref 6.3–8)
TROPONIN: <0.01 NG/ML (ref 0–0.01)
TROPONIN: <0.01 NG/ML (ref 0–0.01)
WBC # BLD: 7 K/UL (ref 4.8–10.8)

## 2019-12-18 PROCEDURE — 80053 COMPREHEN METABOLIC PANEL: CPT

## 2019-12-18 PROCEDURE — 71046 X-RAY EXAM CHEST 2 VIEWS: CPT

## 2019-12-18 PROCEDURE — 85025 COMPLETE CBC W/AUTO DIFF WBC: CPT

## 2019-12-18 PROCEDURE — 96375 TX/PRO/DX INJ NEW DRUG ADDON: CPT

## 2019-12-18 PROCEDURE — 96374 THER/PROPH/DIAG INJ IV PUSH: CPT

## 2019-12-18 PROCEDURE — 36415 COLL VENOUS BLD VENIPUNCTURE: CPT

## 2019-12-18 PROCEDURE — 82550 ASSAY OF CK (CPK): CPT

## 2019-12-18 PROCEDURE — 93005 ELECTROCARDIOGRAM TRACING: CPT | Performed by: EMERGENCY MEDICINE

## 2019-12-18 PROCEDURE — 6360000002 HC RX W HCPCS: Performed by: EMERGENCY MEDICINE

## 2019-12-18 PROCEDURE — 99285 EMERGENCY DEPT VISIT HI MDM: CPT

## 2019-12-18 PROCEDURE — 85610 PROTHROMBIN TIME: CPT

## 2019-12-18 PROCEDURE — 84484 ASSAY OF TROPONIN QUANT: CPT

## 2019-12-18 PROCEDURE — 82553 CREATINE MB FRACTION: CPT

## 2019-12-18 PROCEDURE — 6370000000 HC RX 637 (ALT 250 FOR IP): Performed by: EMERGENCY MEDICINE

## 2019-12-18 PROCEDURE — 85730 THROMBOPLASTIN TIME PARTIAL: CPT

## 2019-12-18 RX ORDER — ONDANSETRON 2 MG/ML
4 INJECTION INTRAMUSCULAR; INTRAVENOUS ONCE
Status: COMPLETED | OUTPATIENT
Start: 2019-12-18 | End: 2019-12-18

## 2019-12-18 RX ORDER — OXYCODONE HYDROCHLORIDE AND ACETAMINOPHEN 5; 325 MG/1; MG/1
1 TABLET ORAL ONCE
Status: COMPLETED | OUTPATIENT
Start: 2019-12-18 | End: 2019-12-18

## 2019-12-18 RX ORDER — MORPHINE SULFATE 2 MG/ML
4 INJECTION, SOLUTION INTRAMUSCULAR; INTRAVENOUS ONCE
Status: COMPLETED | OUTPATIENT
Start: 2019-12-18 | End: 2019-12-18

## 2019-12-18 RX ADMIN — OXYCODONE HYDROCHLORIDE AND ACETAMINOPHEN 1 TABLET: 5; 325 TABLET ORAL at 22:23

## 2019-12-18 RX ADMIN — MORPHINE SULFATE 4 MG: 2 INJECTION, SOLUTION INTRAMUSCULAR; INTRAVENOUS at 20:08

## 2019-12-18 RX ADMIN — ONDANSETRON 4 MG: 2 INJECTION INTRAMUSCULAR; INTRAVENOUS at 20:08

## 2019-12-18 ASSESSMENT — PAIN DESCRIPTION - ONSET: ONSET: SUDDEN

## 2019-12-18 ASSESSMENT — PAIN DESCRIPTION - LOCATION: LOCATION: CHEST

## 2019-12-18 ASSESSMENT — ENCOUNTER SYMPTOMS
SORE THROAT: 0
ABDOMINAL PAIN: 0
ABDOMINAL DISTENTION: 0
PHOTOPHOBIA: 0
COUGH: 0
SHORTNESS OF BREATH: 0
VOMITING: 0
WHEEZING: 0
CHEST TIGHTNESS: 0
EYE DISCHARGE: 0

## 2019-12-18 ASSESSMENT — PAIN SCALES - GENERAL
PAINLEVEL_OUTOF10: 9
PAINLEVEL_OUTOF10: 8
PAINLEVEL_OUTOF10: 8
PAINLEVEL_OUTOF10: 9

## 2019-12-18 ASSESSMENT — PAIN DESCRIPTION - FREQUENCY: FREQUENCY: CONTINUOUS

## 2019-12-18 ASSESSMENT — PAIN DESCRIPTION - PAIN TYPE: TYPE: ACUTE PAIN

## 2019-12-18 ASSESSMENT — PAIN DESCRIPTION - DESCRIPTORS: DESCRIPTORS: ACHING

## 2019-12-20 ENCOUNTER — OFFICE VISIT (OUTPATIENT)
Dept: PALLATIVE CARE | Age: 49
End: 2019-12-20
Payer: MEDICAID

## 2019-12-20 VITALS
WEIGHT: 269.8 LBS | TEMPERATURE: 97.7 F | RESPIRATION RATE: 18 BRPM | BODY MASS INDEX: 47.79 KG/M2 | DIASTOLIC BLOOD PRESSURE: 66 MMHG | SYSTOLIC BLOOD PRESSURE: 172 MMHG | OXYGEN SATURATION: 96 % | HEART RATE: 76 BPM

## 2019-12-20 DIAGNOSIS — Z71.89 OTHER SPECIFIED COUNSELING: ICD-10-CM

## 2019-12-20 DIAGNOSIS — R52 PAIN: Primary | ICD-10-CM

## 2019-12-20 DIAGNOSIS — Z51.5 PALLIATIVE CARE ENCOUNTER: ICD-10-CM

## 2019-12-20 PROCEDURE — 1036F TOBACCO NON-USER: CPT | Performed by: NURSE PRACTITIONER

## 2019-12-20 PROCEDURE — 93010 ELECTROCARDIOGRAM REPORT: CPT | Performed by: INTERNAL MEDICINE

## 2019-12-20 PROCEDURE — 99213 OFFICE O/P EST LOW 20 MIN: CPT | Performed by: NURSE PRACTITIONER

## 2019-12-20 PROCEDURE — G8484 FLU IMMUNIZE NO ADMIN: HCPCS | Performed by: NURSE PRACTITIONER

## 2019-12-20 PROCEDURE — G8427 DOCREV CUR MEDS BY ELIG CLIN: HCPCS | Performed by: NURSE PRACTITIONER

## 2019-12-20 PROCEDURE — G8417 CALC BMI ABV UP PARAM F/U: HCPCS | Performed by: NURSE PRACTITIONER

## 2019-12-20 ASSESSMENT — ENCOUNTER SYMPTOMS
CHOKING: 0
ANAL BLEEDING: 0
COLOR CHANGE: 0
RECTAL PAIN: 0
CONSTIPATION: 0
VOICE CHANGE: 0
DIARRHEA: 0
WHEEZING: 0
ABDOMINAL PAIN: 0
SORE THROAT: 0
NAUSEA: 0
STRIDOR: 0
APNEA: 0
BLOOD IN STOOL: 0
EYE DISCHARGE: 0
CHEST TIGHTNESS: 0
BACK PAIN: 0
SHORTNESS OF BREATH: 0
TROUBLE SWALLOWING: 0
COUGH: 0
ABDOMINAL DISTENTION: 0
VOMITING: 0

## 2019-12-27 ENCOUNTER — PATIENT MESSAGE (OUTPATIENT)
Dept: FAMILY MEDICINE CLINIC | Age: 49
End: 2019-12-27

## 2019-12-27 ENCOUNTER — HOSPITAL ENCOUNTER (EMERGENCY)
Age: 49
Discharge: HOME | End: 2019-12-27
Attending: EMERGENCY MEDICINE
Payer: MEDICAID

## 2019-12-27 ENCOUNTER — APPOINTMENT (OUTPATIENT)
Dept: GENERAL RADIOLOGY | Age: 49
End: 2019-12-27
Payer: MEDICAID

## 2019-12-27 VITALS
OXYGEN SATURATION: 96 % | TEMPERATURE: 98.2 F | HEART RATE: 80 BPM | DIASTOLIC BLOOD PRESSURE: 68 MMHG | RESPIRATION RATE: 18 BRPM | SYSTOLIC BLOOD PRESSURE: 118 MMHG

## 2019-12-27 DIAGNOSIS — R07.9 CHEST PAIN, UNSPECIFIED TYPE: Primary | ICD-10-CM

## 2019-12-27 LAB
ALBUMIN SERPL-MCNC: 3.9 G/DL (ref 3.5–4.6)
ALP BLD-CCNC: 149 U/L (ref 40–130)
ALT SERPL-CCNC: 13 U/L (ref 0–33)
ANION GAP SERPL CALCULATED.3IONS-SCNC: 13 MEQ/L (ref 9–15)
AST SERPL-CCNC: 19 U/L (ref 0–35)
BASOPHILS ABSOLUTE: 0.1 K/UL (ref 0–0.2)
BASOPHILS RELATIVE PERCENT: 1 %
BILIRUB SERPL-MCNC: <0.2 MG/DL (ref 0.2–0.7)
BUN BLDV-MCNC: 11 MG/DL (ref 6–20)
CALCIUM SERPL-MCNC: 8.9 MG/DL (ref 8.5–9.9)
CHLORIDE BLD-SCNC: 99 MEQ/L (ref 95–107)
CO2: 22 MEQ/L (ref 20–31)
CREAT SERPL-MCNC: 1.3 MG/DL (ref 0.5–0.9)
EKG ATRIAL RATE: 76 BPM
EKG P AXIS: 35 DEGREES
EKG P-R INTERVAL: 158 MS
EKG Q-T INTERVAL: 390 MS
EKG QRS DURATION: 82 MS
EKG QTC CALCULATION (BAZETT): 438 MS
EKG R AXIS: 9 DEGREES
EKG T AXIS: 20 DEGREES
EKG VENTRICULAR RATE: 76 BPM
EOSINOPHILS ABSOLUTE: 0.3 K/UL (ref 0–0.7)
EOSINOPHILS RELATIVE PERCENT: 3.2 %
GFR AFRICAN AMERICAN: 52.7
GFR NON-AFRICAN AMERICAN: 43.6
GLOBULIN: 3.3 G/DL (ref 2.3–3.5)
GLUCOSE BLD-MCNC: 170 MG/DL (ref 70–99)
HCT VFR BLD CALC: 42.8 % (ref 37–47)
HEMOGLOBIN: 14.3 G/DL (ref 12–16)
LYMPHOCYTES ABSOLUTE: 2.3 K/UL (ref 1–4.8)
LYMPHOCYTES RELATIVE PERCENT: 24.6 %
MCH RBC QN AUTO: 32.2 PG (ref 27–31.3)
MCHC RBC AUTO-ENTMCNC: 33.5 % (ref 33–37)
MCV RBC AUTO: 96.3 FL (ref 82–100)
MONOCYTES ABSOLUTE: 0.5 K/UL (ref 0.2–0.8)
MONOCYTES RELATIVE PERCENT: 5.7 %
NEUTROPHILS ABSOLUTE: 6.1 K/UL (ref 1.4–6.5)
NEUTROPHILS RELATIVE PERCENT: 65.5 %
PDW BLD-RTO: 13.1 % (ref 11.5–14.5)
PLATELET # BLD: 285 K/UL (ref 130–400)
POTASSIUM SERPL-SCNC: 4 MEQ/L (ref 3.4–4.9)
RBC # BLD: 4.44 M/UL (ref 4.2–5.4)
SODIUM BLD-SCNC: 134 MEQ/L (ref 135–144)
TOTAL PROTEIN: 7.2 G/DL (ref 6.3–8)
TROPONIN: <0.01 NG/ML (ref 0–0.01)
WBC # BLD: 9.3 K/UL (ref 4.8–10.8)

## 2019-12-27 PROCEDURE — 80053 COMPREHEN METABOLIC PANEL: CPT

## 2019-12-27 PROCEDURE — 6360000002 HC RX W HCPCS: Performed by: EMERGENCY MEDICINE

## 2019-12-27 PROCEDURE — 71045 X-RAY EXAM CHEST 1 VIEW: CPT

## 2019-12-27 PROCEDURE — 99284 EMERGENCY DEPT VISIT MOD MDM: CPT

## 2019-12-27 PROCEDURE — 84484 ASSAY OF TROPONIN QUANT: CPT

## 2019-12-27 PROCEDURE — 85025 COMPLETE CBC W/AUTO DIFF WBC: CPT

## 2019-12-27 PROCEDURE — 93005 ELECTROCARDIOGRAM TRACING: CPT

## 2019-12-27 PROCEDURE — 36415 COLL VENOUS BLD VENIPUNCTURE: CPT

## 2019-12-27 PROCEDURE — 96374 THER/PROPH/DIAG INJ IV PUSH: CPT

## 2019-12-27 PROCEDURE — 96375 TX/PRO/DX INJ NEW DRUG ADDON: CPT

## 2019-12-27 RX ORDER — MORPHINE SULFATE 2 MG/ML
4 INJECTION, SOLUTION INTRAMUSCULAR; INTRAVENOUS ONCE
Status: COMPLETED | OUTPATIENT
Start: 2019-12-27 | End: 2019-12-27

## 2019-12-27 RX ORDER — ONDANSETRON 2 MG/ML
4 INJECTION INTRAMUSCULAR; INTRAVENOUS ONCE
Status: COMPLETED | OUTPATIENT
Start: 2019-12-27 | End: 2019-12-27

## 2019-12-27 RX ORDER — LORAZEPAM 2 MG/ML
2 INJECTION INTRAMUSCULAR ONCE
Status: COMPLETED | OUTPATIENT
Start: 2019-12-27 | End: 2019-12-27

## 2019-12-27 RX ADMIN — LORAZEPAM 2 MG: 2 INJECTION INTRAMUSCULAR; INTRAVENOUS at 01:53

## 2019-12-27 RX ADMIN — ONDANSETRON 4 MG: 2 INJECTION INTRAMUSCULAR; INTRAVENOUS at 01:53

## 2019-12-27 RX ADMIN — MORPHINE SULFATE 4 MG: 2 INJECTION, SOLUTION INTRAMUSCULAR; INTRAVENOUS at 01:53

## 2019-12-27 ASSESSMENT — ENCOUNTER SYMPTOMS
ABDOMINAL PAIN: 0
EYE DISCHARGE: 0
DIARRHEA: 0
BACK PAIN: 0
COUGH: 0
PHOTOPHOBIA: 0
NAUSEA: 0
WHEEZING: 0
SORE THROAT: 0
CHEST TIGHTNESS: 0
SHORTNESS OF BREATH: 0
BLOOD IN STOOL: 0
ABDOMINAL DISTENTION: 0
VOMITING: 0

## 2019-12-27 ASSESSMENT — PAIN SCALES - GENERAL
PAINLEVEL_OUTOF10: 2
PAINLEVEL_OUTOF10: 9

## 2020-01-01 ENCOUNTER — HOSPITAL ENCOUNTER (EMERGENCY)
Age: 50
Discharge: HOME | End: 2020-01-01
Payer: MEDICAID

## 2020-01-01 ENCOUNTER — APPOINTMENT (OUTPATIENT)
Dept: GENERAL RADIOLOGY | Age: 50
End: 2020-01-01
Payer: MEDICAID

## 2020-01-01 VITALS
WEIGHT: 250 LBS | BODY MASS INDEX: 44.3 KG/M2 | HEIGHT: 63 IN | RESPIRATION RATE: 16 BRPM | DIASTOLIC BLOOD PRESSURE: 69 MMHG | TEMPERATURE: 98 F | OXYGEN SATURATION: 93 % | SYSTOLIC BLOOD PRESSURE: 99 MMHG | HEART RATE: 87 BPM

## 2020-01-01 LAB
ALBUMIN SERPL-MCNC: 4.1 G/DL (ref 3.5–4.6)
ALP BLD-CCNC: 150 U/L (ref 40–130)
ALT SERPL-CCNC: 15 U/L (ref 0–33)
ANION GAP SERPL CALCULATED.3IONS-SCNC: 11 MEQ/L (ref 9–15)
AST SERPL-CCNC: 30 U/L (ref 0–35)
BASOPHILS ABSOLUTE: 0.1 K/UL (ref 0–0.2)
BASOPHILS RELATIVE PERCENT: 1.2 %
BILIRUB SERPL-MCNC: <0.2 MG/DL (ref 0.2–0.7)
BUN BLDV-MCNC: 11 MG/DL (ref 6–20)
CALCIUM SERPL-MCNC: 9.5 MG/DL (ref 8.5–9.9)
CHLORIDE BLD-SCNC: 101 MEQ/L (ref 95–107)
CK MB: 2.7 NG/ML (ref 0–3.8)
CO2: 23 MEQ/L (ref 20–31)
CREAT SERPL-MCNC: 1.29 MG/DL (ref 0.5–0.9)
CREATINE KINASE-MB INDEX: 0.4 % (ref 0–3.5)
EKG ATRIAL RATE: 75 BPM
EKG P AXIS: 31 DEGREES
EKG P-R INTERVAL: 160 MS
EKG Q-T INTERVAL: 370 MS
EKG QRS DURATION: 74 MS
EKG QTC CALCULATION (BAZETT): 413 MS
EKG R AXIS: 8 DEGREES
EKG T AXIS: -8 DEGREES
EKG VENTRICULAR RATE: 75 BPM
EOSINOPHILS ABSOLUTE: 0.4 K/UL (ref 0–0.7)
EOSINOPHILS RELATIVE PERCENT: 4.1 %
GFR AFRICAN AMERICAN: 53.2
GFR NON-AFRICAN AMERICAN: 44
GLOBULIN: 3.9 G/DL (ref 2.3–3.5)
GLUCOSE BLD-MCNC: 127 MG/DL (ref 70–99)
HCT VFR BLD CALC: 45.8 % (ref 37–47)
HEMOGLOBIN: 15.4 G/DL (ref 12–16)
LYMPHOCYTES ABSOLUTE: 2 K/UL (ref 1–4.8)
LYMPHOCYTES RELATIVE PERCENT: 22.7 %
MCH RBC QN AUTO: 32.3 PG (ref 27–31.3)
MCHC RBC AUTO-ENTMCNC: 33.7 % (ref 33–37)
MCV RBC AUTO: 95.8 FL (ref 82–100)
MONOCYTES ABSOLUTE: 0.4 K/UL (ref 0.2–0.8)
MONOCYTES RELATIVE PERCENT: 5 %
NEUTROPHILS ABSOLUTE: 5.8 K/UL (ref 1.4–6.5)
NEUTROPHILS RELATIVE PERCENT: 67 %
PDW BLD-RTO: 13.1 % (ref 11.5–14.5)
PLATELET # BLD: 297 K/UL (ref 130–400)
POTASSIUM SERPL-SCNC: 5.4 MEQ/L (ref 3.4–4.9)
RBC # BLD: 4.77 M/UL (ref 4.2–5.4)
SODIUM BLD-SCNC: 135 MEQ/L (ref 135–144)
TOTAL CK: 697 U/L (ref 0–170)
TOTAL PROTEIN: 8 G/DL (ref 6.3–8)
TROPONIN: <0.01 NG/ML (ref 0–0.01)
WBC # BLD: 8.6 K/UL (ref 4.8–10.8)

## 2020-01-01 PROCEDURE — 99285 EMERGENCY DEPT VISIT HI MDM: CPT

## 2020-01-01 PROCEDURE — 6360000002 HC RX W HCPCS: Performed by: NURSE PRACTITIONER

## 2020-01-01 PROCEDURE — 2580000003 HC RX 258: Performed by: PHYSICIAN ASSISTANT

## 2020-01-01 PROCEDURE — 80053 COMPREHEN METABOLIC PANEL: CPT

## 2020-01-01 PROCEDURE — 93005 ELECTROCARDIOGRAM TRACING: CPT | Performed by: NEUROMUSCULOSKELETAL MEDICINE, SPORTS MEDICINE

## 2020-01-01 PROCEDURE — 6370000000 HC RX 637 (ALT 250 FOR IP): Performed by: PHYSICIAN ASSISTANT

## 2020-01-01 PROCEDURE — 82553 CREATINE MB FRACTION: CPT

## 2020-01-01 PROCEDURE — 96375 TX/PRO/DX INJ NEW DRUG ADDON: CPT

## 2020-01-01 PROCEDURE — 71045 X-RAY EXAM CHEST 1 VIEW: CPT

## 2020-01-01 PROCEDURE — 84484 ASSAY OF TROPONIN QUANT: CPT

## 2020-01-01 PROCEDURE — 96374 THER/PROPH/DIAG INJ IV PUSH: CPT

## 2020-01-01 PROCEDURE — 82550 ASSAY OF CK (CPK): CPT

## 2020-01-01 PROCEDURE — 85025 COMPLETE CBC W/AUTO DIFF WBC: CPT

## 2020-01-01 PROCEDURE — 96361 HYDRATE IV INFUSION ADD-ON: CPT

## 2020-01-01 PROCEDURE — 96376 TX/PRO/DX INJ SAME DRUG ADON: CPT

## 2020-01-01 PROCEDURE — 6360000002 HC RX W HCPCS: Performed by: PHYSICIAN ASSISTANT

## 2020-01-01 PROCEDURE — 36415 COLL VENOUS BLD VENIPUNCTURE: CPT

## 2020-01-01 RX ORDER — 0.9 % SODIUM CHLORIDE 0.9 %
1000 INTRAVENOUS SOLUTION INTRAVENOUS ONCE
Status: COMPLETED | OUTPATIENT
Start: 2020-01-01 | End: 2020-01-01

## 2020-01-01 RX ORDER — MORPHINE SULFATE 2 MG/ML
4 INJECTION, SOLUTION INTRAMUSCULAR; INTRAVENOUS ONCE
Status: COMPLETED | OUTPATIENT
Start: 2020-01-01 | End: 2020-01-01

## 2020-01-01 RX ORDER — FENTANYL CITRATE 50 UG/ML
50 INJECTION, SOLUTION INTRAMUSCULAR; INTRAVENOUS ONCE
Status: COMPLETED | OUTPATIENT
Start: 2020-01-01 | End: 2020-01-01

## 2020-01-01 RX ORDER — OXYCODONE HYDROCHLORIDE AND ACETAMINOPHEN 5; 325 MG/1; MG/1
1 TABLET ORAL EVERY 6 HOURS PRN
Qty: 8 TABLET | Refills: 0 | Status: SHIPPED | OUTPATIENT
Start: 2020-01-01 | End: 2020-01-03

## 2020-01-01 RX ORDER — ONDANSETRON 2 MG/ML
4 INJECTION INTRAMUSCULAR; INTRAVENOUS ONCE
Status: COMPLETED | OUTPATIENT
Start: 2020-01-01 | End: 2020-01-01

## 2020-01-01 RX ORDER — SODIUM POLYSTYRENE SULFONATE 15 G/60ML
15 SUSPENSION ORAL; RECTAL ONCE
Status: COMPLETED | OUTPATIENT
Start: 2020-01-01 | End: 2020-01-01

## 2020-01-01 RX ADMIN — SODIUM CHLORIDE 1000 ML: 9 INJECTION, SOLUTION INTRAVENOUS at 01:00

## 2020-01-01 RX ADMIN — MORPHINE SULFATE 4 MG: 2 INJECTION, SOLUTION INTRAMUSCULAR; INTRAVENOUS at 01:00

## 2020-01-01 RX ADMIN — FENTANYL CITRATE 50 MCG: 50 INJECTION, SOLUTION INTRAMUSCULAR; INTRAVENOUS at 03:32

## 2020-01-01 RX ADMIN — SODIUM POLYSTYRENE SULFONATE 15 G: 15 SUSPENSION ORAL; RECTAL at 03:45

## 2020-01-01 RX ADMIN — ONDANSETRON 4 MG: 2 INJECTION INTRAMUSCULAR; INTRAVENOUS at 01:00

## 2020-01-01 RX ADMIN — MORPHINE SULFATE 4 MG: 2 INJECTION, SOLUTION INTRAMUSCULAR; INTRAVENOUS at 02:17

## 2020-01-01 ASSESSMENT — PAIN SCALES - GENERAL
PAINLEVEL_OUTOF10: 8
PAINLEVEL_OUTOF10: 9

## 2020-01-01 ASSESSMENT — ENCOUNTER SYMPTOMS
SHORTNESS OF BREATH: 0
EYE PAIN: 0
TROUBLE SWALLOWING: 0
COLOR CHANGE: 0
ALLERGIC/IMMUNOLOGIC NEGATIVE: 1
ABDOMINAL PAIN: 0
APNEA: 0

## 2020-01-01 ASSESSMENT — PAIN DESCRIPTION - LOCATION: LOCATION: CHEST

## 2020-01-01 ASSESSMENT — PAIN DESCRIPTION - PAIN TYPE: TYPE: ACUTE PAIN

## 2020-01-01 NOTE — ED NOTES
RN to bedside. Pt is resting peacefully. Resps are even and unlabored, skin p/w/d, and no acute distress at this time. Will continue to monitor. Pt still reporting pain at 6/10. JOHN Child notified.        Darrin Ellis RN  01/01/20 0723

## 2020-01-01 NOTE — ED NOTES
Portable xray at bedside     Mile Rodriguez, [facility]  01/01/20 6314

## 2020-01-01 NOTE — ED PROVIDER NOTES
reviewed and negative. PAST MEDICAL HISTORY     Past Medical History:   Diagnosis Date    Anxiety     Arthritis     Asthma     Bronchopneumonia     Cancer ([facility] [address]     renal    Cerebral artery occlusion with cerebral infarction (HCC)     Chronic bilateral low back pain with sciatica     Chronic kidney disease     Chronic obstructive lung disease ([facility] [address] 7/26/2019    Depression     Fibromyalgia     Hypertension     Insulin dependent type 2 diabetes mellitus, uncontrolled ([facility] [address] 8/3/2018    Localized enlarged lymph nodes 10/26/2018    Mixed headache     Pure hyperglyceridemia 5/19/2017    Sarcoidosis     Sleep apnea     does not wear cpap    Thyroid goiter          SURGICALHISTORY       Past Surgical History:   Procedure Laterality Date    BRONCHOSCOPY  10/26/2018    DR. STEARNS    KIDNEY REMOVAL Right 08/2016    KIDNEY REMOVAL Right 2016    LUNG BIOPSY Right 10/2018    THYROID LOBECTOMY Right 6/13/14    THYROIDECTOMY  02/21/2019    DR. MAGDALENO    THYROIDECTOMY  2018    URETER STENT PLACEMENT Left 08/2016         CURRENT MEDICATIONS       Previous Medications    ACETAMINOPHEN (TYLENOL) 325 MG TABLET    Take 2 tablets by mouth every 6 hours as needed for Pain    ALBUTEROL (PROVENTIL) (2.5 MG/3ML) 0.083% NEBULIZER SOLUTION    Take 3 mLs by nebulization every 4 hours as needed for Wheezing    ALBUTEROL SULFATE  (90 BASE) MCG/ACT INHALER    Inhale 2 puffs into the lungs every 4 hours as needed for Wheezing    AMITRIPTYLINE (ELAVIL) 25 MG TABLET    TAKE 1 TABLET BY MOUTH EVERY NIGHT    ATORVASTATIN (LIPITOR) 40 MG TABLET    Take 1 tablet by mouth nightly    BLOOD GLUCOSE MONITORING SUPPL (ONETOUCH VERIO) W/DEVICE KIT    TEST 3 TIMES DAILY AS NEEDED    BLOOD GLUCOSE TEST STRIPS (EXACTECH TEST) STRIP    1 each by In Vitro route 3 times daily (Accu-check test strips) As needed.  DX:E11.65    BLOOD GLUCOSE TEST STRIPS (ONETOUCH VERIO) STRIP    TEST 3 TIMES DAILY AS NEEDED    BUSPIRONE (BUSPAR) 10 History   Problem Relation Age of Onset    Cancer Father     Diabetes Father     Allergy (Severe) Father     Heart Attack Father     Prostate Cancer Father     High Blood Pressure Mother     Diabetes Mother    Tyrese Macario Arthritis Mother     High Cholesterol Mother     Vision Loss Mother     Alcohol Abuse Neg Hx     Anemia Neg Hx     Arrhythmia Neg Hx     Asthma Neg Hx     Atrial Fibrillation Neg Hx     Birth Defects Neg Hx     Breast Cancer Neg Hx     Coronary Art Dis Neg Hx     Colon Cancer Neg Hx     Depression Neg Hx     Early Death Neg Hx     Hearing Loss Neg Hx     Heart Disease Neg Hx     Learning Disabilities Neg Hx     Kidney Disease Neg Hx     Mental Illness Neg Hx     Mental Retardation Neg Hx     Miscarriages / Stillbirths Neg Hx     Obesity Neg Hx     Osteoporosis Neg Hx     Stroke Neg Hx     Substance Abuse Neg Hx           SOCIAL HISTORY       Social History     Socioeconomic History    Marital status: Legally      Spouse name: None    Number of children: None    Years of education: None    Highest education level: None   Occupational History    None   Social Needs    Financial resource strain: None    Food insecurity:     Worry: None     Inability: None    Transportation needs:     Medical: None     Non-medical: None   Tobacco Use    Smoking status: Former Smoker     Packs/day: 0.50     Years: 15.00     Pack years: 7.50     Types: Cigarettes     Start date: 2014     Last attempt to quit: 2015     Years since quittin.9    Smokeless tobacco: Former User     Quit date: 3/23/2016   Substance and Sexual Activity    Alcohol use: Not Currently     Alcohol/week: 0.0 standard drinks     Comment: occasionally    Drug use: Yes     Frequency: 3.0 times per week     Types: Marijuana    Sexual activity: Yes     Partners: Male   Lifestyle    Physical activity:     Days per week: None     Minutes per session: None    Stress: None   Relationships    Social connections:     Talks on phone: None     Gets together: None     Attends Protestant service: None     Active member of club or organization: None     Attends meetings of clubs or organizations: None     Relationship status: None    Intimate partner violence:     Fear of current or ex partner: None     Emotionally abused: None     Physically abused: None     Forced sexual activity: None   Other Topics Concern    None   Social History Narrative    None       SCREENINGS              PHYSICAL EXAM    (up to 7 for level 4, 8 or more for level 5)     ED Triage Vitals [01/01/20 0026]   BP Temp Temp Source Pulse Resp SpO2 Height Weight   127/77 98 °F (36.7 °C) Oral 83 18 94 % 5' 3\" (1.6 m) 250 lb (113.4 kg)       Physical Exam  Vitals signs and nursing note reviewed. Constitutional:       General: She is not in acute distress. Appearance: She is well-developed. She is not diaphoretic. HENT:      Head: Normocephalic and atraumatic. Mouth/Throat:      Pharynx: No oropharyngeal exudate. Eyes:      General: No scleral icterus. Conjunctiva/sclera: Conjunctivae normal.      Pupils: Pupils are equal, round, and reactive to light. Neck:      Musculoskeletal: Normal range of motion and neck supple. Trachea: No tracheal deviation. Cardiovascular:      Rate and Rhythm: Normal rate. Heart sounds: Normal heart sounds. Pulmonary:      Effort: Pulmonary effort is normal. No respiratory distress. Breath sounds: Normal breath sounds. Abdominal:      General: Bowel sounds are normal. There is no distension. Palpations: Abdomen is soft. Musculoskeletal: Normal range of motion. Skin:     General: Skin is warm and dry. Findings: No erythema or rash. Neurological:      Mental Status: She is alert and oriented to person, place, and time. Cranial Nerves: No cranial nerve deficit. Motor: No abnormal muscle tone. Psychiatric:         Mood and Affect: Mood is anxious. Behavior: Behavior normal.         Thought Content: Thought content normal.         Judgment: Judgment normal.         DIAGNOSTIC RESULTS     EKG: All EKG's are interpreted by the Emergency Department Physician who either signs or Co-signsthis chart in the absence of a cardiologist.    NSR @ 75,No acute ST elevation or ischemic changes    RADIOLOGY:   Non-plain filmimages such as CT, Ultrasound and MRI are read by the radiologist. Plain radiographic images are visualized and preliminarily interpreted by the emergency physician with the below findings:    NAD    Interpretation per the Radiologist below, if available at the time ofthis note:    XR CHEST PORTABLE    (Results Pending)         ED BEDSIDE ULTRASOUND:   Performed by ED Physician - none    LABS:  Labs Reviewed   COMPREHENSIVE METABOLIC PANEL - Abnormal; Notable for the following components:       Result Value    Potassium 5.4 (*)     Glucose 127 (*)     CREATININE 1.29 (*)     GFR Non- 44.0 (*)     GFR  53.2 (*)     Alkaline Phosphatase 150 (*)     Globulin 3.9 (*)     All other components within normal limits   CBC WITH AUTO DIFFERENTIAL - Abnormal; Notable for the following components:    MCH 32.3 (*)     All other components within normal limits   CK - Abnormal; Notable for the following components: Total  (*)     All other components within normal limits   TROPONIN   CKMB & RELATIVE PERCENT       All other labs were within normal range or not returned as of this dictation. EMERGENCY DEPARTMENT COURSE and DIFFERENTIAL DIAGNOSIS/MDM:   Vitals:    Vitals:    01/01/20 0026 01/01/20 0130   BP: 127/77 (!) 128/57   Pulse: 83 81   Resp: 18 21   Temp: 98 °F (36.7 °C)    TempSrc: Oral    SpO2: 94%    Weight: 250 lb (113.4 kg)    Height: 5' 3\" (1.6 m)            MDM      REASSESSMENT      Patient presented the emergency department with recurrent chest pain second to her sarcoidosis.   Patient has multiple emergency department visits for the same complaints. She has a normal EKG and cardiac enzymes. Patient did have minimally elevated potassium and was given IV fluids and Kayexalate for this. Patient had no EKG changes correlating with her hyperkalemia. Patient will be discharged home and advised to follow-up with her PCP tomorrow    CONSULTS:  None    PROCEDURES:  Unless otherwise noted below, none     Procedures    FINAL IMPRESSION      1. Recurrent chest pain    2. Hyperkalemia          DISPOSITION/PLAN   DISPOSITION Discharge - Pending Orders Complete 01/01/2020 01:51:47 AM      PATIENT REFERREDTO:  SUDEEP Godinez - CNP  [address] [facility] [address] [facility]  [phone]    Call in 1 day        DISCHARGEMEDICATIONS:  New Prescriptions    No medications on file     Controlled Substances Monitoring:     RX Monitoring 11/20/2019   Attestation -   Periodic Controlled Substance Monitoring No signs of potential drug abuse or diversion identified. ;Possible medication side effects, risk of tolerance/dependence & alternative treatments discussed.    Chronic Pain > 80 MEDD -       (Please note that portions of this note were completed with a voice recognition program.  Efforts were made to edit the dictations but occasionally words are mis-transcribed.)    Yaquelin Mendez PA-C (electronically signed)  Attending Emergency Physician          Yaquelin Mendez PA-C  01/01/20 0153

## 2020-01-03 ENCOUNTER — TELEPHONE (OUTPATIENT)
Dept: UROLOGY | Age: 50
End: 2020-01-03

## 2020-01-03 PROCEDURE — 93010 ELECTROCARDIOGRAM REPORT: CPT | Performed by: INTERNAL MEDICINE

## 2020-01-03 NOTE — TELEPHONE ENCOUNTER
Contact: [phone]  Pt seen at [facility] ER for chest pain. She was given potassium and states she is now having severe flank pain, pain with urination and it takes a while to empty her bladder. Her pharmacy is [address]  Please advise

## 2020-01-07 ENCOUNTER — HOSPITAL ENCOUNTER (EMERGENCY)
Age: 50
Discharge: HOME | End: 2020-01-07
Attending: NEUROMUSCULOSKELETAL MEDICINE, SPORTS MEDICINE
Payer: MEDICAID

## 2020-01-07 VITALS
DIASTOLIC BLOOD PRESSURE: 81 MMHG | BODY MASS INDEX: 42.52 KG/M2 | OXYGEN SATURATION: 97 % | SYSTOLIC BLOOD PRESSURE: 135 MMHG | RESPIRATION RATE: 20 BRPM | TEMPERATURE: 98.5 F | HEIGHT: 63 IN | WEIGHT: 240 LBS | HEART RATE: 84 BPM

## 2020-01-07 LAB
EKG ATRIAL RATE: 74 BPM
EKG P AXIS: 35 DEGREES
EKG P-R INTERVAL: 158 MS
EKG Q-T INTERVAL: 376 MS
EKG QRS DURATION: 78 MS
EKG QTC CALCULATION (BAZETT): 417 MS
EKG R AXIS: 9 DEGREES
EKG T AXIS: 5 DEGREES
EKG VENTRICULAR RATE: 74 BPM

## 2020-01-07 PROCEDURE — 99283 EMERGENCY DEPT VISIT LOW MDM: CPT

## 2020-01-07 PROCEDURE — 6370000000 HC RX 637 (ALT 250 FOR IP): Performed by: NEUROMUSCULOSKELETAL MEDICINE, SPORTS MEDICINE

## 2020-01-07 PROCEDURE — 93005 ELECTROCARDIOGRAM TRACING: CPT | Performed by: NEUROMUSCULOSKELETAL MEDICINE, SPORTS MEDICINE

## 2020-01-07 RX ORDER — HYDROCODONE BITARTRATE AND ACETAMINOPHEN 5; 325 MG/1; MG/1
1 TABLET ORAL ONCE
Status: DISCONTINUED | OUTPATIENT
Start: 2020-01-07 | End: 2020-01-08 | Stop reason: HOSPADM

## 2020-01-07 RX ORDER — ACETAMINOPHEN 500 MG
1000 TABLET ORAL ONCE
Status: COMPLETED | OUTPATIENT
Start: 2020-01-07 | End: 2020-01-07

## 2020-01-07 RX ADMIN — ACETAMINOPHEN 1000 MG: 500 TABLET ORAL at 21:31

## 2020-01-07 ASSESSMENT — PAIN SCALES - GENERAL
PAINLEVEL_OUTOF10: 8
PAINLEVEL_OUTOF10: 8

## 2020-01-07 ASSESSMENT — PAIN DESCRIPTION - PAIN TYPE: TYPE: ACUTE PAIN

## 2020-01-07 ASSESSMENT — PAIN DESCRIPTION - FREQUENCY: FREQUENCY: CONTINUOUS

## 2020-01-07 ASSESSMENT — PAIN DESCRIPTION - DESCRIPTORS: DESCRIPTORS: HEADACHE

## 2020-01-07 ASSESSMENT — PAIN DESCRIPTION - LOCATION: LOCATION: HEAD

## 2020-01-08 ENCOUNTER — HOSPITAL ENCOUNTER (EMERGENCY)
Age: 50
Discharge: HOME | End: 2020-01-08
Attending: NEUROMUSCULOSKELETAL MEDICINE, SPORTS MEDICINE
Payer: MEDICAID

## 2020-01-08 VITALS
OXYGEN SATURATION: 97 % | RESPIRATION RATE: 18 BRPM | WEIGHT: 240 LBS | HEIGHT: 63 IN | BODY MASS INDEX: 42.52 KG/M2 | HEART RATE: 87 BPM | SYSTOLIC BLOOD PRESSURE: 139 MMHG | TEMPERATURE: 98.3 F | DIASTOLIC BLOOD PRESSURE: 66 MMHG

## 2020-01-08 PROCEDURE — 6360000002 HC RX W HCPCS: Performed by: NEUROMUSCULOSKELETAL MEDICINE, SPORTS MEDICINE

## 2020-01-08 PROCEDURE — 99284 EMERGENCY DEPT VISIT MOD MDM: CPT

## 2020-01-08 PROCEDURE — 96372 THER/PROPH/DIAG INJ SC/IM: CPT

## 2020-01-08 RX ORDER — PROMETHAZINE HYDROCHLORIDE 25 MG/ML
25 INJECTION, SOLUTION INTRAMUSCULAR; INTRAVENOUS ONCE
Status: COMPLETED | OUTPATIENT
Start: 2020-01-08 | End: 2020-01-08

## 2020-01-08 RX ADMIN — PROMETHAZINE HYDROCHLORIDE 25 MG: 25 INJECTION, SOLUTION INTRAMUSCULAR; INTRAVENOUS at 01:51

## 2020-01-08 RX ADMIN — HYDROMORPHONE HYDROCHLORIDE 1 MG: 1 INJECTION, SOLUTION INTRAMUSCULAR; INTRAVENOUS; SUBCUTANEOUS at 01:50

## 2020-01-08 ASSESSMENT — PAIN DESCRIPTION - LOCATION: LOCATION: CHEST

## 2020-01-08 ASSESSMENT — PAIN SCALES - GENERAL
PAINLEVEL_OUTOF10: 9
PAINLEVEL_OUTOF10: 9

## 2020-01-08 ASSESSMENT — PAIN DESCRIPTION - PAIN TYPE: TYPE: ACUTE PAIN

## 2020-01-08 NOTE — ED NOTES
Patient stated he is complaining of chest pain   Dr Emanuel Bear made aware      Milton Torrez, RN  01/07/20 5758

## 2020-01-08 NOTE — ED PROVIDER NOTES
[address] [facility] ED  eMERGENCYdEPARTMENT eNCOUnter      Pt Name: Marc Burch  MRN: [id number]  [place] 1970  Date of evaluation: 1/7/2020  Provider:JAEL BARROW MD    CHIEF COMPLAINT           HPI  Marc Burch is a 51 y.o. female per chart review has a h/o has a stove at home may not have been vented properly and for that reason developed a headache and increased levels of carbon oxide today. No one else in the home was present. She complains of a bit of a headache. She was given Tylenol for headache and oxygen nonrebreather for about an hour and her symptoms improved. ROS  Review of Systems    Head:  No headache, injury or eye pain  Eyes; No altered vision, discharge, photophobia, itching, swelling or pain  Nose:  No discharge, epistaxis, allergy, or trauma  Throat:  No sore throat, dysphagia, or hoarseness  Neck:  No pain, stiffness, or swelling  Neurological:  No syncope, dizziness, vertigo, weakness, numbness, dysarthria, somnolence, or tremors  Cardiac:  No chest pain, diaphoresis, dyspnea on exertion, orthopnea, edema or palpitations  Respiratory:  No cough, dyspnea, hoarseness, wheezes, hemoptysis, shortness of breath, or stridor  Gastrointestinal:  No abdominal pain, melena, hematochezia, hematemesis, diarrhea, appetite disturbance, nausea or vomiting. Musculoskeletal:  No significant back pain, joint pain, joint swelling, stiffness or limited range of motion  Except as noted above the remainder of the review of systems was reviewed and negative.        PAST MEDICAL HISTORY     Past Medical History:   Diagnosis Date    Anxiety     Arthritis     Asthma     Bronchopneumonia     Cancer ([address]     renal    Cerebral artery occlusion with cerebral infarction ([address]     Chronic bilateral low back pain with sciatica     Chronic kidney disease     Chronic obstructive lung disease ([address] 7/26/2019    Depression     Fibromyalgia     Hypertension     Insulin dependent type 2 diabetes mellitus, uncontrolled ([facility] [address] 8/3/2018    Localized enlarged lymph nodes 10/26/2018    Mixed headache     Pure hyperglyceridemia 5/19/2017    Sarcoidosis     Sleep apnea     does not wear cpap    Thyroid goiter          SURGICAL HISTORY       Past Surgical History:   Procedure Laterality Date    BRONCHOSCOPY  10/26/2018    DR. STEARNS    KIDNEY REMOVAL Right 08/2016    KIDNEY REMOVAL Right 2016    LUNG BIOPSY Right 10/2018    THYROID LOBECTOMY Right 6/13/14    THYROIDECTOMY  02/21/2019    DR. MAGDALENO    THYROIDECTOMY  2018    URETER STENT PLACEMENT Left 08/2016         CURRENTMEDICATIONS       Previous Medications    ACETAMINOPHEN (TYLENOL) 325 MG TABLET    Take 2 tablets by mouth every 6 hours as needed for Pain    ALBUTEROL (PROVENTIL) (2.5 MG/3ML) 0.083% NEBULIZER SOLUTION    Take 3 mLs by nebulization every 4 hours as needed for Wheezing    ALBUTEROL SULFATE  (90 BASE) MCG/ACT INHALER    Inhale 2 puffs into the lungs every 4 hours as needed for Wheezing    AMITRIPTYLINE (ELAVIL) 25 MG TABLET    TAKE 1 TABLET BY MOUTH EVERY NIGHT    ATORVASTATIN (LIPITOR) 40 MG TABLET    Take 1 tablet by mouth nightly    BLOOD GLUCOSE MONITORING SUPPL (ONETOUCH VERIO) W/DEVICE KIT    TEST 3 TIMES DAILY AS NEEDED    BLOOD GLUCOSE TEST STRIPS (EXACTECH TEST) STRIP    1 each by In Vitro route 3 times daily (Accu-check test strips) As needed.  DX:E11.65    BLOOD GLUCOSE TEST STRIPS (ONETOUCH VERIO) STRIP    TEST 3 TIMES DAILY AS NEEDED    BUSPIRONE (BUSPAR) 10 MG TABLET    Take 1 tablet by mouth 2 times daily as needed (anxiety)    BUTALBITAL-ACETAMINOPHEN-CAFFEINE (FIORICET, ESGIC) -40 MG PER TABLET    TAKE 1 TABLET BY MOUTH THREE TIMES DAILY AS NEEDED FOR HEADACHES    CETIRIZINE (ZYRTEC) 10 MG TABLET    Take 1 tab po qhs prn allergies    DULOXETINE (CYMBALTA) 60 MG EXTENDED RELEASE CAPSULE    TK 1 C PO QD    FLUTICASONE (FLONASE) 50 MCG/ACT NASAL SPRAY    1 spray by Nasal route daily    INSULIN DEGLUDEC (TRESIBA FLEXTOUCH) 100 UNIT/ML SOPN    Inject 30 Units into the skin nightly    INSULIN LISPRO (ADMELOG) 100 UNIT/ML INJECTION VIAL    Inject 4 Units into the skin 3 times daily as needed for High Blood Sugar    INSULIN PEN NEEDLE (B-D ULTRAFINE III SHORT PEN) 31G X 8 MM MISC    Inject 1 each into the skin 3 times daily    INSULIN SYRINGE-NEEDLE U-100 30G X 1/2\" 1 ML MISC    1 each by Does not apply route daily    IPRATROPIUM-ALBUTEROL (DUONEB) 0.5-2.5 (3) MG/3ML SOLN NEBULIZER SOLUTION    Inhale 3 mLs into the lungs every 6 hours as needed for Shortness of Breath    LEVOTHYROXINE (SYNTHROID) 88 MCG TABLET    TAKE 1 TABLET BY MOUTH DAILY    METHOCARBAMOL (ROBAXIN) 750 MG TABLET    Take 750 mg by mouth 4 times daily    METOCLOPRAMIDE (REGLAN) 10 MG TABLET    Take 1 tablet by mouth 4 times daily    METOPROLOL TARTRATE (LOPRESSOR) 25 MG TABLET    Take 1 tablet by mouth 2 times daily    MONTELUKAST (SINGULAIR) 10 MG TABLET    Take 1 tab nightly at supper for breathing/allergies    OMEPRAZOLE (PRILOSEC) 40 MG DELAYED RELEASE CAPSULE    Take by mouth    ONE TOUCH ULTRASOFT LANCETS MISC    TEST 3 TIMES DAILY AS NEEDED    PREGABALIN (LYRICA) 75 MG CAPSULE    Take 1 capsule by mouth 3 times daily for 90 days. TRAMADOL (ULTRAM) 50 MG TABLET    Take 50 mg by mouth every 6 hours as needed for Pain. ALLERGIES     Shellfish-derived products; Ibuprofen; Ketorolac;  Other; Propoxyphene n-acetaminophen; and Toradol [ketorolac tromethamine]    FAMILY HISTORY       Family History   Problem Relation Age of Onset    Cancer Father     Diabetes Father     Allergy (Severe) Father     Heart Attack Father     Prostate Cancer Father     High Blood Pressure Mother     Diabetes Mother     Arthritis Mother     High Cholesterol Mother     Vision Loss Mother     Alcohol Abuse Neg Hx     Anemia Neg Hx     Arrhythmia Neg Hx     Asthma Neg Hx     Atrial Fibrillation Neg Hx     Birth Defects Neg Hx     Breast Cancer Neg Hx     Coronary Art Dis Neg Hx     Colon Cancer Neg Hx     Depression Neg Hx     Early Death Neg Hx     Hearing Loss Neg Hx     Heart Disease Neg Hx     Learning Disabilities Neg Hx     Kidney Disease Neg Hx     Mental Illness Neg Hx     Mental Retardation Neg Hx     Miscarriages / Stillbirths Neg Hx     Obesity Neg Hx     Osteoporosis Neg Hx     Stroke Neg Hx     Substance Abuse Neg Hx           SOCIAL HISTORY       Social History     Socioeconomic History    Marital status: Legally      Spouse name: None    Number of children: None    Years of education: None    Highest education level: None   Occupational History    None   Social Needs    Financial resource strain: None    Food insecurity:     Worry: None     Inability: None    Transportation needs:     Medical: None     Non-medical: None   Tobacco Use    Smoking status: Former Smoker     Packs/day: 0.50     Years: 15.00     Pack years: 7.50     Types: Cigarettes     Start date: 2014     Last attempt to quit: 2015     Years since quittin.9    Smokeless tobacco: Former User     Quit date: 3/23/2016   Substance and Sexual Activity    Alcohol use: Not Currently     Alcohol/week: 0.0 standard drinks     Comment: occasionally    Drug use: Yes     Frequency: 3.0 times per week     Types: Marijuana    Sexual activity: Yes     Partners: Male   Lifestyle    Physical activity:     Days per week: None     Minutes per session: None    Stress: None   Relationships    Social connections:     Talks on phone: None     Gets together: None     Attends Worship service: None     Active member of club or organization: None     Attends meetings of clubs or organizations: None     Relationship status: None    Intimate partner violence:     Fear of current or ex partner: None     Emotionally abused: None     Physically abused: None     Forced sexual activity: None   Other Topics Concern    None   Social History Narrative    None         PHYSICAL EXAM       ED Triage Vitals [01/07/20 2114]   BP Temp Temp Source Pulse Resp SpO2 Height Weight   135/81 98.5 °F (36.9 °C) Oral 84 20 97 % 5' 3\" (1.6 m) 240 lb (108.9 kg)       Physical Exam    Vitals signs and nursing note reviewed. Constitutional:       Appearance: Normal appearance. He is well-developed and normal weight. HENT:      Head: Normocephalic and atraumatic. Right Ear: Tympanic membrane, ear canal and external ear normal.      Left Ear: Tympanic membrane, ear canal and external ear normal.      Nose: Nose normal.      Mouth/Throat:      Mouth: Mucous membranes are moist.      Pharynx: Oropharynx is clear. Eyes:      Extraocular Movements: Extraocular movements intact. Conjunctiva/sclera: Conjunctivae normal.      Pupils: Pupils are equal, round, and reactive to light. Neck:      Musculoskeletal: Normal range of motion and neck supple. Cardiovascular:      Rate and Rhythm: Normal rate and regular rhythm. Pulses: Normal pulses. Heart sounds: Normal heart sounds. Pulmonary:      Effort: Pulmonary effort is normal.      Breath sounds: Normal breath sounds. Abdominal:      General: Abdomen is flat. Bowel sounds are normal.      Palpations: Abdomen is soft. Musculoskeletal: Normal range of motion. Skin:     General: Skin is warm and dry. Capillary Refill: Capillary refill takes less than 2 seconds. Neurological:      General: No focal deficit present. Mental Status: He is alert and oriented for age. Psychiatric:         Mood and Affect: Mood normal.         Behavior: Behavior normal.         Thought Content: Thought content normal.         Judgment: Judgment normal.         MDM  Tylenol 1000 mg p.o. Non Rebreather oxygen for 1 hour          FINAL IMPRESSION      1.  Carbon monoxide exposure          DISPOSITION/PLAN   DISPOSITION          DISCHARGE MEDICATIONS:  Fadi Kingsley MD(electronically signed)  Attending Emergency Physician            Carole Jay MD  01/08/20 4200

## 2020-01-08 NOTE — ED NOTES
Patient sitting up on cart. \" Aint't no body been in here to see me yet. Just need to know when someone is coming to see me. \"  This RN informed patient that Dr Jaz Velazco will be in [address] [place] [address]  01/08/20 2095

## 2020-01-08 NOTE — ED PROVIDER NOTES
cerebral infarction [facility])     Chronic bilateral low back pain with sciatica     Chronic kidney disease     Chronic obstructive lung disease ([facility] [address] 7/26/2019    Depression     Fibromyalgia     Hypertension     Insulin dependent type 2 diabetes mellitus, uncontrolled ([facility] [address] 8/3/2018    Localized enlarged lymph nodes 10/26/2018    Mixed headache     Pure hyperglyceridemia 5/19/2017    Sarcoidosis     Sleep apnea     does not wear cpap    Thyroid goiter          SURGICAL HISTORY       Past Surgical History:   Procedure Laterality Date    BRONCHOSCOPY  10/26/2018    DR. STEARNS    KIDNEY REMOVAL Right 08/2016    KIDNEY REMOVAL Right 2016    LUNG BIOPSY Right 10/2018    THYROID LOBECTOMY Right 6/13/14    THYROIDECTOMY  02/21/2019    DR. MAGDALENO    THYROIDECTOMY  2018    URETER STENT PLACEMENT Left 08/2016         CURRENTMEDICATIONS       Previous Medications    ACETAMINOPHEN (TYLENOL) 325 MG TABLET    Take 2 tablets by mouth every 6 hours as needed for Pain    ALBUTEROL (PROVENTIL) (2.5 MG/3ML) 0.083% NEBULIZER SOLUTION    Take 3 mLs by nebulization every 4 hours as needed for Wheezing    ALBUTEROL SULFATE  (90 BASE) MCG/ACT INHALER    Inhale 2 puffs into the lungs every 4 hours as needed for Wheezing    AMITRIPTYLINE (ELAVIL) 25 MG TABLET    TAKE 1 TABLET BY MOUTH EVERY NIGHT    ATORVASTATIN (LIPITOR) 40 MG TABLET    Take 1 tablet by mouth nightly    BLOOD GLUCOSE MONITORING SUPPL (ONETOUCH VERIO) W/DEVICE KIT    TEST 3 TIMES DAILY AS NEEDED    BLOOD GLUCOSE TEST STRIPS (EXACTECH TEST) STRIP    1 each by In Vitro route 3 times daily (Accu-check test strips) As needed.  DX:E11.65    BLOOD GLUCOSE TEST STRIPS (ONETOUCH VERIO) STRIP    TEST 3 TIMES DAILY AS NEEDED    BUSPIRONE (BUSPAR) 10 MG TABLET    Take 1 tablet by mouth 2 times daily as needed (anxiety)    BUTALBITAL-ACETAMINOPHEN-CAFFEINE (FIORICET, ESGIC) -40 MG PER TABLET    TAKE 1 TABLET BY MOUTH THREE TIMES DAILY AS NEEDED FOR HEADACHES Arthritis Mother     High Cholesterol Mother     Vision Loss Mother     Alcohol Abuse Neg Hx     Anemia Neg Hx     Arrhythmia Neg Hx     Asthma Neg Hx     Atrial Fibrillation Neg Hx     Birth Defects Neg Hx     Breast Cancer Neg Hx     Coronary Art Dis Neg Hx     Colon Cancer Neg Hx     Depression Neg Hx     Early Death Neg Hx     Hearing Loss Neg Hx     Heart Disease Neg Hx     Learning Disabilities Neg Hx     Kidney Disease Neg Hx     Mental Illness Neg Hx     Mental Retardation Neg Hx     Miscarriages / Stillbirths Neg Hx     Obesity Neg Hx     Osteoporosis Neg Hx     Stroke Neg Hx     Substance Abuse Neg Hx           SOCIAL HISTORY       Social History     Socioeconomic History    Marital status: Legally      Spouse name: None    Number of children: None    Years of education: None    Highest education level: None   Occupational History    None   Social Needs    Financial resource strain: None    Food insecurity:     Worry: None     Inability: None    Transportation needs:     Medical: None     Non-medical: None   Tobacco Use    Smoking status: Former Smoker     Packs/day: 0.50     Years: 15.00     Pack years: 7.50     Types: Cigarettes     Start date: 2014     Last attempt to quit: 2015     Years since quittin.9    Smokeless tobacco: Former User     Quit date: 3/23/2016   Substance and Sexual Activity    Alcohol use: Not Currently     Alcohol/week: 0.0 standard drinks     Comment: occasionally    Drug use: Yes     Frequency: 3.0 times per week     Types: Marijuana    Sexual activity: Yes     Partners: Male   Lifestyle    Physical activity:     Days per week: None     Minutes per session: None    Stress: None   Relationships    Social connections:     Talks on phone: None     Gets together: None     Attends [employer]ology service: None     Active member of club or organization: None     Attends meetings of clubs or organizations: None     Relationship

## 2020-01-08 NOTE — ED TRIAGE NOTES
Patient was D/C about 10 minutes ago. She is rechecking back in because she states that she still has SOB and chest pain. She is alert and orientated x 4. Pink, warm and dry. Abc's are intact. She is in a wheel chair. VSS.

## 2020-01-10 ENCOUNTER — TELEPHONE (OUTPATIENT)
Dept: FAMILY MEDICINE CLINIC | Age: 50
End: 2020-01-10

## 2020-01-10 ENCOUNTER — OFFICE VISIT (OUTPATIENT)
Dept: FAMILY MEDICINE CLINIC | Age: 50
End: 2020-01-10
Payer: MEDICAID

## 2020-01-10 VITALS
BODY MASS INDEX: 48.2 KG/M2 | DIASTOLIC BLOOD PRESSURE: 58 MMHG | WEIGHT: 272 LBS | HEIGHT: 63 IN | OXYGEN SATURATION: 97 % | TEMPERATURE: 97.3 F | HEART RATE: 74 BPM | SYSTOLIC BLOOD PRESSURE: 124 MMHG

## 2020-01-10 DIAGNOSIS — E11.9 TYPE 2 DIABETES MELLITUS TREATED WITH INSULIN (HCC): ICD-10-CM

## 2020-01-10 DIAGNOSIS — E87.5 HYPERKALEMIA: ICD-10-CM

## 2020-01-10 DIAGNOSIS — E03.9 HYPOTHYROIDISM, UNSPECIFIED TYPE: ICD-10-CM

## 2020-01-10 DIAGNOSIS — Z79.4 TYPE 2 DIABETES MELLITUS TREATED WITH INSULIN (HCC): ICD-10-CM

## 2020-01-10 LAB
ALBUMIN SERPL-MCNC: 4.3 G/DL (ref 3.5–4.6)
ALP BLD-CCNC: 134 U/L (ref 40–130)
ALT SERPL-CCNC: 13 U/L (ref 0–33)
ANION GAP SERPL CALCULATED.3IONS-SCNC: 14 MEQ/L (ref 9–15)
AST SERPL-CCNC: 25 U/L (ref 0–35)
BILIRUB SERPL-MCNC: <0.2 MG/DL (ref 0.2–0.7)
BUN BLDV-MCNC: 12 MG/DL (ref 6–20)
CALCIUM SERPL-MCNC: 9.4 MG/DL (ref 8.5–9.9)
CHLORIDE BLD-SCNC: 103 MEQ/L (ref 95–107)
CO2: 22 MEQ/L (ref 20–31)
CREAT SERPL-MCNC: 1.27 MG/DL (ref 0.5–0.9)
GFR AFRICAN AMERICAN: 54.2
GFR NON-AFRICAN AMERICAN: 44.8
GLOBULIN: 3.6 G/DL (ref 2.3–3.5)
GLUCOSE BLD-MCNC: 114 MG/DL (ref 70–99)
HBA1C MFR BLD: 6.4 % (ref 4.8–5.9)
POTASSIUM SERPL-SCNC: 5 MEQ/L (ref 3.4–4.9)
SODIUM BLD-SCNC: 139 MEQ/L (ref 135–144)
TOTAL PROTEIN: 7.9 G/DL (ref 6.3–8)
TSH SERPL DL<=0.05 MIU/L-ACNC: 20.11 UIU/ML (ref 0.44–3.86)

## 2020-01-10 PROCEDURE — G8484 FLU IMMUNIZE NO ADMIN: HCPCS | Performed by: NURSE PRACTITIONER

## 2020-01-10 PROCEDURE — 1036F TOBACCO NON-USER: CPT | Performed by: NURSE PRACTITIONER

## 2020-01-10 PROCEDURE — G8427 DOCREV CUR MEDS BY ELIG CLIN: HCPCS | Performed by: NURSE PRACTITIONER

## 2020-01-10 PROCEDURE — 2022F DILAT RTA XM EVC RTNOPTHY: CPT | Performed by: NURSE PRACTITIONER

## 2020-01-10 PROCEDURE — 3046F HEMOGLOBIN A1C LEVEL >9.0%: CPT | Performed by: NURSE PRACTITIONER

## 2020-01-10 PROCEDURE — 93010 ELECTROCARDIOGRAM REPORT: CPT | Performed by: INTERNAL MEDICINE

## 2020-01-10 PROCEDURE — G8417 CALC BMI ABV UP PARAM F/U: HCPCS | Performed by: NURSE PRACTITIONER

## 2020-01-10 PROCEDURE — 99214 OFFICE O/P EST MOD 30 MIN: CPT | Performed by: NURSE PRACTITIONER

## 2020-01-10 RX ORDER — BUSPIRONE HYDROCHLORIDE 15 MG/1
15 TABLET ORAL 3 TIMES DAILY
Qty: 90 TABLET | Refills: 2 | Status: SHIPPED | OUTPATIENT
Start: 2020-01-10 | End: 2020-04-01

## 2020-01-10 RX ORDER — AMITRIPTYLINE HYDROCHLORIDE 50 MG/1
50 TABLET, FILM COATED ORAL NIGHTLY
Qty: 30 TABLET | Refills: 3 | Status: SHIPPED | OUTPATIENT
Start: 2020-01-10 | End: 2020-03-23

## 2020-01-10 ASSESSMENT — ENCOUNTER SYMPTOMS
WHEEZING: 0
COUGH: 0
SHORTNESS OF BREATH: 1

## 2020-01-10 NOTE — PROGRESS NOTES
Subjective:      Patient ID: Regi Clayton is a 51 y.o. female who presents today for:     Chief Complaint   Patient presents with    Follow-Up from Hospital     Patient presents today to follow up on from [facility] ED for sarcoidosis. Patient states she is feeling better.  Anxiety     Patient c/o anxiety. HPI  Pt in today for f/u on ER visit to sarcoidosis. It has been flaring with the fluctuation in weather. It is good today. She is mildly sob. She will be following with pulmonology and has some more testing to do. She will be seeing a rheumatologist when she gets released from pain management. She has had increase in anxiety and depression especially with all the recent flares of sarcoidosis pain. She reports that she takes the buspar 10mg twice daily and it does not help at all. She is taking the amitriptyline with minimal relief. She would like to talk to somebody along with change of medication. She also reports that she hasn't been able to sleep partially due to the pain and then she is just tired all day. She can fall asleep but within an hour or two she is awake. She is interested in trying something for sleep. Hypothyroidism: Recent symptoms: none. She denies weight loss, cold intolerance, heat intolerance, dry skin, constipation and diarrhea. Patient is  taking her medication consistently on an empty stomach.     No results found for: [facility]  Lab Results   Component Value Date    TSH 11.910 (H) 03/30/2019    TSH 0.97 01/14/2019    TSH 4.61 (H) 09/24/2018       Past Medical History:   Diagnosis Date    Anxiety     Arthritis     Asthma     Bronchopneumonia     Cancer ([address]     renal    Cerebral artery occlusion with cerebral infarction ([address]     Chronic bilateral low back pain with sciatica     Chronic kidney disease     Chronic obstructive lung disease ([address] 7/26/2019    Depression     Fibromyalgia     Hypertension     Insulin dependent type 2 diabetes mellitus, uncontrolled ([facility] 8/3/2018    Localized enlarged lymph nodes 10/26/2018    Mixed headache     Pure hyperglyceridemia 5/19/2017    Sarcoidosis     Sleep apnea     does not wear cpap    Thyroid goiter      Past Surgical History:   Procedure Laterality Date    BRONCHOSCOPY  10/26/2018    DR. STEARNS    KIDNEY REMOVAL Right 08/2016    KIDNEY REMOVAL Right 2016    LUNG BIOPSY Right 10/2018    THYROID LOBECTOMY Right 6/13/14    THYROIDECTOMY  02/21/2019    DR. MAGDALENO    THYROIDECTOMY  2018    URETER STENT PLACEMENT Left 08/2016     Family History   Problem Relation Age of Onset    Cancer Father     Diabetes Father     Allergy (Severe) Father     Heart Attack Father     Prostate Cancer Father     High Blood Pressure Mother     Diabetes Mother     Arthritis Mother     High Cholesterol Mother     Vision Loss Mother     Alcohol Abuse Neg Hx     Anemia Neg Hx     Arrhythmia Neg Hx     Asthma Neg Hx     Atrial Fibrillation Neg Hx     Birth Defects Neg Hx     Breast Cancer Neg Hx     Coronary Art Dis Neg Hx     Colon Cancer Neg Hx     Depression Neg Hx     Early Death Neg Hx     Hearing Loss Neg Hx     Heart Disease Neg Hx     Learning Disabilities Neg Hx     Kidney Disease Neg Hx     Mental Illness Neg Hx     Mental Retardation Neg Hx     Miscarriages / Stillbirths Neg Hx     Obesity Neg Hx     Osteoporosis Neg Hx     Stroke Neg Hx     Substance Abuse Neg Hx      Social History     Socioeconomic History    Marital status: Legally      Spouse name: Not on file    Number of children: Not on file    Years of education: Not on file    Highest education level: Not on file   Occupational History    Not on file   Social Needs    Financial resource strain: Not on file    Food insecurity:     Worry: Not on file     Inability: Not on file    Transportation needs:     Medical: Not on file     Non-medical: Not on file   Tobacco Use    Smoking status: Former Smoker Packs/day: 0.50     Years: 15.00     Pack years: 7.50     Types: Cigarettes     Start date: 2014     Last attempt to quit: 2015     Years since quittin.9    Smokeless tobacco: Former User     Quit date: 3/23/2016   Substance and Sexual Activity    Alcohol use: Not Currently     Alcohol/week: 0.0 standard drinks     Comment: occasionally    Drug use: Yes     Frequency: 3.0 times per week     Types: Marijuana    Sexual activity: Yes     Partners: Male   Lifestyle    Physical activity:     Days per week: Not on file     Minutes per session: Not on file    Stress: Not on file   Relationships    Social connections:     Talks on phone: Not on file     Gets together: Not on file     Attends [employer] service: Not on file     Active member of club or organization: Not on file     Attends meetings of clubs or organizations: Not on file     Relationship status: Not on file    Intimate partner violence:     Fear of current or ex partner: Not on file     Emotionally abused: Not on file     Physically abused: Not on file     Forced sexual activity: Not on file   Other Topics Concern    Not on file   Social History Narrative    Not on file     Current Outpatient Medications on File Prior to Visit   Medication Sig Dispense Refill    levothyroxine (SYNTHROID) 88 MCG tablet TAKE 1 TABLET BY MOUTH DAILY 30 tablet 0    cetirizine (ZYRTEC) 10 MG tablet Take 1 tab po qhs prn allergies 30 tablet 5    blood glucose test strips (EXACTECH TEST) strip 1 each by In Vitro route 3 times daily (Accu-check test strips) As needed. DX:E11.65 300 strip 5    ONE TOUCH ULTRASOFT LANCETS MISC TEST 3 TIMES DAILY AS NEEDED 300 each 3    pregabalin (LYRICA) 75 MG capsule Take 1 capsule by mouth 3 times daily for 90 days.  90 capsule 2    Insulin Degludec (TRESIBA FLEXTOUCH) 100 UNIT/ML SOPN Inject 30 Units into the skin nightly 5 pen 1    albuterol sulfate  (90 Base) MCG/ACT inhaler Inhale 2 puffs into the lungs every 4 hours as needed for Wheezing 1 Inhaler 3    albuterol (PROVENTIL) (2.5 MG/3ML) 0.083% nebulizer solution Take 3 mLs by nebulization every 4 hours as needed for Wheezing 120 each 3    insulin lispro (ADMELOG) 100 UNIT/ML injection vial Inject 4 Units into the skin 3 times daily as needed for High Blood Sugar 1 vial 1    metoclopramide (REGLAN) 10 MG tablet Take 1 tablet by mouth 4 times daily 120 tablet 0    atorvastatin (LIPITOR) 40 MG tablet Take 1 tablet by mouth nightly 30 tablet 3    metoprolol tartrate (LOPRESSOR) 25 MG tablet Take 1 tablet by mouth 2 times daily 60 tablet 0    DULoxetine (CYMBALTA) 60 MG extended release capsule TK 1 C PO QD  4    fluticasone (FLONASE) 50 MCG/ACT nasal spray 1 spray by Nasal route daily 1 Bottle 3    traMADol (ULTRAM) 50 MG tablet Take 50 mg by mouth every 6 hours as needed for Pain.       acetaminophen (TYLENOL) 325 MG tablet Take 2 tablets by mouth every 6 hours as needed for Pain 120 tablet 3    methocarbamol (ROBAXIN) 750 MG tablet Take 750 mg by mouth 4 times daily      butalbital-acetaminophen-caffeine (FIORICET, ESGIC) -40 MG per tablet TAKE 1 TABLET BY MOUTH THREE TIMES DAILY AS NEEDED FOR HEADACHES 21 tablet 0    Insulin Syringe-Needle U-100 30G X 1/2\" 1 ML MISC 1 each by Does not apply route daily 100 each 3    blood glucose test strips (ONETOUCH VERIO) strip TEST 3 TIMES DAILY AS NEEDED 300 each 3    Blood Glucose Monitoring Suppl (ONETOUCH VERIO) w/Device KIT TEST 3 TIMES DAILY AS NEEDED 1 kit 0    montelukast (SINGULAIR) 10 MG tablet Take 1 tab nightly at supper for breathing/allergies 30 tablet 5    omeprazole (PRILOSEC) 40 MG delayed release capsule Take by mouth      ipratropium-albuterol (DUONEB) 0.5-2.5 (3) MG/3ML SOLN nebulizer solution Inhale 3 mLs into the lungs every 6 hours as needed for Shortness of Breath 540 mL 5    Insulin Pen Needle (B-D ULTRAFINE III SHORT PEN) 31G X 8 MM MISC Inject 1 each into the skin 3 times daily 100 each 5     No current facility-administered medications on file prior to visit. Allergies:  Shellfish-derived products; Ibuprofen; Ketorolac; Other; Propoxyphene n-acetaminophen; and Toradol [ketorolac tromethamine]    Review of Systems   Constitutional: Negative for chills, fatigue and fever. HENT: Negative for congestion and ear pain. Respiratory: Positive for shortness of breath (chronic mild). Negative for cough and wheezing. Cardiovascular: Positive for chest pain (with sarcoidosis). Negative for palpitations and leg swelling. Musculoskeletal: Positive for myalgias. Psychiatric/Behavioral: Positive for dysphoric mood and sleep disturbance. Negative for self-injury and suicidal ideas. The patient is nervous/anxious. Objective:   BP (!) 124/58 (Site: Left Upper Arm, Position: Sitting, Cuff Size: Large Adult)   Pulse 74   Temp 97.3 °F (36.3 °C) (Temporal)   Ht 5' 3\" (1.6 m)   Wt 272 lb (123.4 kg)   LMP 12/14/2019   SpO2 97%   Breastfeeding? No   BMI 48.18 kg/m²     Physical Exam  Constitutional:       Appearance: She is well-developed. HENT:      Head: Normocephalic. Right Ear: Tympanic membrane, ear canal and external ear normal.      Left Ear: Tympanic membrane, ear canal and external ear normal.      Nose: Nose normal.      Mouth/Throat:      Mouth: Mucous membranes are moist.      Pharynx: Oropharynx is clear. Uvula midline. Eyes:      General:         Right eye: No discharge. Left eye: No discharge. Conjunctiva/sclera: Conjunctivae normal.   Neck:      Musculoskeletal: Normal range of motion. Cardiovascular:      Rate and Rhythm: Normal rate and regular rhythm. Heart sounds: Normal heart sounds. Pulmonary:      Effort: Pulmonary effort is normal. No respiratory distress. Breath sounds: Normal breath sounds. Musculoskeletal: Normal range of motion. Lymphadenopathy:      Cervical: No cervical adenopathy.    Skin:     General: Skin is warm and dry. Neurological:      Mental Status: She is alert and oriented to person, place, and time. Psychiatric:         Mood and Affect: Mood is anxious and depressed. Behavior: Behavior normal.         Assessment:          Diagnosis Orders   1. Insomnia, unspecified type  Suvorexant (BELSOMRA) 10 MG TABS   2. Anxiety  busPIRone (BUSPAR) 15 MG tablet    amitriptyline (ELAVIL) 50 MG tablet    Ambulatory referral to Psychology   3. Depression, unspecified depression type  amitriptyline (ELAVIL) 50 MG tablet    Ambulatory referral to Psychology   4. Hyperkalemia  Comprehensive Metabolic Panel   5. Hypothyroidism, unspecified type  TSH without Reflex   6. Type 2 diabetes mellitus treated with insulin (HCC)  Hemoglobin A1C   7. Sarcoidosis         Plan:      Orders Placed This Encounter   Procedures    TSH without Reflex     Standing Status:   Future     Number of Occurrences:   1     Standing Expiration Date:   1/9/2021    Comprehensive Metabolic Panel     Standing Status:   Future     Number of Occurrences:   1     Standing Expiration Date:   1/9/2021    Hemoglobin A1C     Standing Status:   Future     Number of Occurrences:   1     Standing Expiration Date:   1/9/2021    Ambulatory referral to Psychology     Referral Priority:   Routine     Referral Type:   Psychiatric     Referral Reason:   Specialty Services Required     Referred to Provider:   Gwyn Du PSYD     Requested Specialty:   Psychology     Number of Visits Requested:   1          Orders Placed This Encounter   Medications    busPIRone (BUSPAR) 15 MG tablet     Sig: Take 15 mg by mouth 3 times daily     Dispense:  90 tablet     Refill:  2    Suvorexant (BELSOMRA) 10 MG TABS     Sig: Take 10 mg by mouth nightly as needed (sleep) for up to 30 days.      Dispense:  30 tablet     Refill:  0    amitriptyline (ELAVIL) 50 MG tablet     Sig: Take 1 tablet by mouth nightly     Dispense:  30 tablet     Refill:  3       Return in about 1 month

## 2020-01-10 NOTE — PATIENT INSTRUCTIONS
12.3  © 2252-5280 [employer]. Care instructions adapted under license by [address] If you have questions about a medical condition or this instruction, always ask your healthcare professional. [address] any warranty or liability for your use of this information.

## 2020-01-11 RX ORDER — LEVOTHYROXINE SODIUM 112 UG/1
112 TABLET ORAL DAILY
Qty: 30 TABLET | Refills: 3 | Status: SHIPPED | OUTPATIENT
Start: 2020-01-11 | End: 2020-08-24 | Stop reason: ALTCHOICE

## 2020-01-13 ENCOUNTER — TELEPHONE (OUTPATIENT)
Dept: FAMILY MEDICINE CLINIC | Age: 50
End: 2020-01-13

## 2020-01-15 ENCOUNTER — TELEPHONE (OUTPATIENT)
Dept: FAMILY MEDICINE CLINIC | Age: 50
End: 2020-01-15

## 2020-01-16 ENCOUNTER — TELEPHONE (OUTPATIENT)
Dept: FAMILY MEDICINE CLINIC | Age: 50
End: 2020-01-16

## 2020-01-18 ENCOUNTER — HOSPITAL ENCOUNTER (EMERGENCY)
Age: 50
Discharge: HOME | End: 2020-01-18
Payer: MEDICAID

## 2020-01-18 VITALS
OXYGEN SATURATION: 96 % | HEART RATE: 70 BPM | DIASTOLIC BLOOD PRESSURE: 65 MMHG | HEIGHT: 63 IN | WEIGHT: 250 LBS | TEMPERATURE: 98.1 F | BODY MASS INDEX: 44.3 KG/M2 | RESPIRATION RATE: 16 BRPM | SYSTOLIC BLOOD PRESSURE: 128 MMHG

## 2020-01-18 PROCEDURE — 99283 EMERGENCY DEPT VISIT LOW MDM: CPT

## 2020-01-18 PROCEDURE — 2580000003 HC RX 258: Performed by: PHYSICIAN ASSISTANT

## 2020-01-18 PROCEDURE — 96374 THER/PROPH/DIAG INJ IV PUSH: CPT

## 2020-01-18 PROCEDURE — 6360000002 HC RX W HCPCS: Performed by: PHYSICIAN ASSISTANT

## 2020-01-18 PROCEDURE — 96375 TX/PRO/DX INJ NEW DRUG ADDON: CPT

## 2020-01-18 RX ORDER — PROMETHAZINE HYDROCHLORIDE 25 MG/1
TABLET ORAL
Qty: 20 TABLET | Refills: 0 | Status: SHIPPED | OUTPATIENT
Start: 2020-01-18 | End: 2021-05-17

## 2020-01-18 RX ORDER — DIPHENHYDRAMINE HYDROCHLORIDE 50 MG/ML
25 INJECTION INTRAMUSCULAR; INTRAVENOUS ONCE
Status: COMPLETED | OUTPATIENT
Start: 2020-01-18 | End: 2020-01-18

## 2020-01-18 RX ORDER — METHYLPREDNISOLONE SODIUM SUCCINATE 125 MG/2ML
125 INJECTION, POWDER, LYOPHILIZED, FOR SOLUTION INTRAMUSCULAR; INTRAVENOUS ONCE
Status: COMPLETED | OUTPATIENT
Start: 2020-01-18 | End: 2020-01-18

## 2020-01-18 RX ORDER — METOCLOPRAMIDE HYDROCHLORIDE 5 MG/ML
10 INJECTION INTRAMUSCULAR; INTRAVENOUS ONCE
Status: COMPLETED | OUTPATIENT
Start: 2020-01-18 | End: 2020-01-18

## 2020-01-18 RX ORDER — 0.9 % SODIUM CHLORIDE 0.9 %
1000 INTRAVENOUS SOLUTION INTRAVENOUS ONCE
Status: COMPLETED | OUTPATIENT
Start: 2020-01-18 | End: 2020-01-18

## 2020-01-18 RX ADMIN — SODIUM CHLORIDE 1000 ML: 9 INJECTION, SOLUTION INTRAVENOUS at 06:28

## 2020-01-18 RX ADMIN — DIPHENHYDRAMINE HYDROCHLORIDE 25 MG: 50 INJECTION, SOLUTION INTRAMUSCULAR; INTRAVENOUS at 06:28

## 2020-01-18 RX ADMIN — METHYLPREDNISOLONE SODIUM SUCCINATE 125 MG: 125 INJECTION, POWDER, FOR SOLUTION INTRAMUSCULAR; INTRAVENOUS at 06:28

## 2020-01-18 RX ADMIN — METOCLOPRAMIDE 10 MG: 5 INJECTION, SOLUTION INTRAMUSCULAR; INTRAVENOUS at 06:28

## 2020-01-18 ASSESSMENT — PAIN SCALES - GENERAL
PAINLEVEL_OUTOF10: 9

## 2020-01-18 ASSESSMENT — ENCOUNTER SYMPTOMS
ABDOMINAL PAIN: 0
COLOR CHANGE: 0
NAUSEA: 0
CONSTIPATION: 0
SHORTNESS OF BREATH: 0
RHINORRHEA: 0
VOMITING: 0
EYE DISCHARGE: 0
ABDOMINAL DISTENTION: 0
DIARRHEA: 0
SORE THROAT: 0

## 2020-01-18 ASSESSMENT — PAIN DESCRIPTION - FREQUENCY
FREQUENCY: CONTINUOUS
FREQUENCY: INTERMITTENT

## 2020-01-18 ASSESSMENT — PAIN DESCRIPTION - DESCRIPTORS
DESCRIPTORS: SHARP
DESCRIPTORS: THROBBING;SHOOTING

## 2020-01-18 ASSESSMENT — PAIN DESCRIPTION - LOCATION
LOCATION: HEAD

## 2020-01-18 ASSESSMENT — PAIN DESCRIPTION - PAIN TYPE: TYPE: ACUTE PAIN

## 2020-01-18 ASSESSMENT — PAIN DESCRIPTION - ONSET
ONSET: PROGRESSIVE
ONSET: AWAKENED FROM SLEEP

## 2020-01-18 ASSESSMENT — PAIN DESCRIPTION - ORIENTATION
ORIENTATION: RIGHT
ORIENTATION: RIGHT

## 2020-01-18 NOTE — ED NOTES
Pt sleeping, sitting up on the cot.  sleeping in the chair at bedside. Fluids still running.      Jamar Sanches RN  01/18/20 1173

## 2020-01-18 NOTE — ED PROVIDER NOTES
[facility] ED  eMERGENCY dEPARTMENT eNCOUnter      Pt Name: Regi Clayton  MRN: [id number]  [place] 1970  Date of evaluation: 1/18/2020  Provider: Emilia Moise PA-C    CHIEF COMPLAINT       Chief Complaint   Patient presents with    Headache         HISTORY OF PRESENT ILLNESS   (Location/Symptom, Timing/Onset,Context/Setting, Quality, Duration, Modifying Factors, Severity)  Note limiting factors. Regi Clayton is a 51 y.o. female who presents to the emergency department with a complaint of right-sided headache which patient states started about midnight last night. She denies any dizziness no blurred vision no photophobia no phonophobia no weakness no ataxia no numbness or tingling. States she took one Ultram last evening which did nothing to alleviate her pain, she rates her current pain at this time as a 9.5 she states she does have a history of chronic headaches, but has not seen a neurologist in the last 2 to 3 years, she states her most recent CT scan of her head was approximately 6 months ago. States this is typical headache for her, other than is not resolving. HPI    NursingNotes were reviewed. REVIEW OF SYSTEMS    (2-9 systems for level 4, 10 or more for level 5)     Review of Systems   Constitutional: Negative for activity change, appetite change, fatigue and fever. HENT: Negative for congestion, ear discharge, ear pain, nosebleeds, rhinorrhea and sore throat. Eyes: Negative for discharge. Respiratory: Negative for shortness of breath. Cardiovascular: Negative for chest pain, palpitations and leg swelling. Gastrointestinal: Negative for abdominal distention, abdominal pain, constipation, diarrhea, nausea and vomiting. Genitourinary: Negative for difficulty urinating and dysuria. Musculoskeletal: Negative for arthralgias. Skin: Negative for color change, pallor, rash and wound. Neurological: Positive for headaches.  Negative for dizziness, methocarbamol (ROBAXIN) 750 MG tablet Take 750 mg by mouth 4 times dailyHistorical Med      butalbital-acetaminophen-caffeine (FIORICET, ESGIC) -40 MG per tablet TAKE 1 TABLET BY MOUTH THREE TIMES DAILY AS NEEDED FOR HEADACHES, Disp-21 tablet, R-0Normal      Insulin Pen Needle (B-D ULTRAFINE III SHORT PEN) 31G X 8 MM MISC 3 TIMES DAILY Starting Sat 1/5/2019, Until Mon 2/4/2019, For 30 days, Disp-100 each, R-5, Normal      Insulin Syringe-Needle U-100 30G X 1/2\" 1 ML MISC DAILY Starting Fri 11/16/2018, Disp-100 each, R-3, Normal      !! blood glucose test strips (ONETOUCH VERIO) strip Disp-300 each, R-3, NormalTEST 3 TIMES DAILY AS NEEDED      Blood Glucose Monitoring Suppl (ONETOUCH VERIO) w/Device KIT TEST 3 TIMES DAILY AS NEEDED, Disp-1 kit, R-0Normal      montelukast (SINGULAIR) 10 MG tablet Take 1 tab nightly at supper for breathing/allergies, Disp-30 tablet, R-5Normal       !! - Potential duplicate medications found. Please discuss with provider. ALLERGIES     Shellfish-derived products; Ibuprofen; Ketorolac;  Other; Propoxyphene n-acetaminophen; and Toradol [ketorolac tromethamine]    FAMILY HISTORY       Family History   Problem Relation Age of Onset    Cancer Father     Diabetes Father     Allergy (Severe) Father     Heart Attack Father     Prostate Cancer Father     High Blood Pressure Mother     Diabetes Mother     Arthritis Mother     High Cholesterol Mother     Vision Loss Mother     Alcohol Abuse Neg Hx     Anemia Neg Hx     Arrhythmia Neg Hx     Asthma Neg Hx     Atrial Fibrillation Neg Hx     Birth Defects Neg Hx     Breast Cancer Neg Hx     Coronary Art Dis Neg Hx     Colon Cancer Neg Hx     Depression Neg Hx     Early Death Neg Hx     Hearing Loss Neg Hx     Heart Disease Neg Hx     Learning Disabilities Neg Hx     Kidney Disease Neg Hx     Mental Illness Neg Hx     Mental Retardation Neg Hx     Miscarriages / Stillbirths Neg Hx     Obesity Neg Hx     Osteoporosis Neg Hx     Stroke Neg Hx     Substance Abuse Neg Hx           SOCIAL HISTORY       Social History     Socioeconomic History    Marital status: Legally      Spouse name: None    Number of children: None    Years of education: None    Highest education level: None   Occupational History    None   Social Needs    Financial resource strain: None    Food insecurity:     Worry: None     Inability: None    Transportation needs:     Medical: None     Non-medical: None   Tobacco Use    Smoking status: Former Smoker     Packs/day: 0.50     Years: 15.00     Pack years: 7.50     Types: Cigarettes     Start date: 2014     Last attempt to quit: 2015     Years since quittin.0    Smokeless tobacco: Former User     Quit date: 3/23/2016   Substance and Sexual Activity    Alcohol use: Not Currently     Alcohol/week: 0.0 standard drinks     Comment: occasionally    Drug use: Yes     Frequency: 3.0 times per week     Types: Marijuana    Sexual activity: Yes     Partners: Male   Lifestyle    Physical activity:     Days per week: None     Minutes per session: None    Stress: None   Relationships    Social connections:     Talks on phone: None     Gets together: None     Attends Druze service: None     Active member of club or organization: None     Attends meetings of clubs or organizations: None     Relationship status: None    Intimate partner violence:     Fear of current or ex partner: None     Emotionally abused: None     Physically abused: None     Forced sexual activity: None   Other Topics Concern    None   Social History Narrative    None       SCREENINGS    [place] Coma Scale  Eye Opening: Spontaneous  Best Verbal Response: Oriented  Best Motor Response: Obeys commands  [address] Coma Scale Score: 15 @FLOW([id number])@      PHYSICAL EXAM    (up to 7 for level 4, 8 or more for level 5)     ED Triage Vitals [20 0530]   BP Temp Temp Source Pulse Resp SpO2 Height Weight (!) 152/84 98.1 °F (36.7 °C) Oral 85 18 98 % 5' 3\" (1.6 m) 250 lb (113.4 kg)       Physical Exam  Constitutional:       General: She is not in acute distress. Appearance: She is well-developed. She is not ill-appearing, toxic-appearing or diaphoretic. HENT:      Head: Normocephalic and atraumatic. Right Ear: Tympanic membrane normal. There is no impacted cerumen. Left Ear: Tympanic membrane normal. There is no impacted cerumen. Nose: Nose normal.      Mouth/Throat:      Mouth: Mucous membranes are moist.   Eyes:      Extraocular Movements: Extraocular movements intact. Pupils: Pupils are equal, round, and reactive to light. Neck:      Musculoskeletal: Neck supple. Vascular: No JVD. Trachea: No tracheal deviation. Cardiovascular:      Rate and Rhythm: Normal rate. Pulmonary:      Effort: Pulmonary effort is normal. No respiratory distress. Breath sounds: No wheezing or rales. Chest:      Chest wall: No tenderness. Abdominal:      General: There is no distension. Palpations: There is no mass. Tenderness: There is no tenderness. There is no right CVA tenderness, left CVA tenderness, guarding or rebound. Musculoskeletal: Normal range of motion. General: No deformity. Neurological:      Mental Status: She is oriented to person, place, and time.       Coordination: Coordination normal.   Psychiatric:         Mood and Affect: Mood normal.         DIAGNOSTIC RESULTS     EKG: All EKG's are interpreted by the Emergency Department Physician who either signs or Co-signsthis chart in the absence of a cardiologist.        RADIOLOGY:   Jayashree Rubin such as CT, Ultrasound and MRI are read by the radiologist. Plain radiographic images are visualized and preliminarily interpreted by the emergency physician with the below findings:        Interpretation per the Radiologist below, if available at the time ofthis note:    No orders to display         ED BEDSIDE ULTRASOUND:   Performed by ED Physician - none    LABS:  Labs Reviewed - No data to display    All other labs were within normal range or not returned as of this dictation. EMERGENCY DEPARTMENT COURSE and DIFFERENTIAL DIAGNOSIS/MDM:   Vitals:    Vitals:    01/18/20 0530 01/18/20 0547 01/18/20 0639   BP: (!) 152/84 (!) 143/82 128/65   Pulse: 85 72 70   Resp: 18 20 16   Temp: 98.1 °F (36.7 °C)     TempSrc: Oral     SpO2: 98% 97% 96%   Weight: 250 lb (113.4 kg)     Height: 5' 3\" (1.6 m)            MDM  Number of Diagnoses or Management Options  Acute nonintractable headache, unspecified headache type:   Diagnosis management comments: Presents to the emerge part with complaint of headache which started last evening for her, she states she did take an Ultram last evening but did not resolve her headache. She was given Reglan Benadryl Solu-Medrol and IV fluids while in the ER, she states that this almost totally resolved her headache, she was discharged home with a prescription for Phenergan, which she was advised to take in conjunction with her Ultram if she should have recurrence of her headache. Was advised to follow-up with her regular family physician, she was also given referral to Dr. Alicia Murray of neurology should she have any recurrence or ongoing headaches. He was advised if she any worsening or changes symptoms return to the emergency department for reevaluation. CRITICAL CARE TIME   Total Critical Care time was 0 minutes, excluding separately reportableprocedures. There was a high probability of clinicallysignificant/life threatening deterioration in the patient's condition which required my urgent intervention. CONSULTS:  None    PROCEDURES:  Unless otherwise noted below, none     Procedures    FINAL IMPRESSION      1.  Acute nonintractable headache, unspecified headache type          DISPOSITION/PLAN   DISPOSITION Decision To Discharge 01/18/2020 07:14:17 AM      PATIENT REFERRED

## 2020-01-18 NOTE — ED NOTES
Pt states that she has a pain in the right side of her that feels sharp and is a 9/10 pain. She states that it doesn't feel like any headache she has ever had before. Respirations are even and unlabored. Pupils are equal and reactive. She is A&Ox4. Pt has  at bedside. Pt was placed into gown and placed on monitor.       Tray Cummins RN  01/18/20 [id number]

## 2020-01-26 ENCOUNTER — HOSPITAL ENCOUNTER (EMERGENCY)
Age: 50
Discharge: HOME | End: 2020-01-27
Payer: MEDICAID

## 2020-01-26 LAB
EKG ATRIAL RATE: 88 BPM
EKG P AXIS: 39 DEGREES
EKG P-R INTERVAL: 164 MS
EKG Q-T INTERVAL: 356 MS
EKG QRS DURATION: 86 MS
EKG QTC CALCULATION (BAZETT): 430 MS
EKG R AXIS: 5 DEGREES
EKG T AXIS: 29 DEGREES
EKG VENTRICULAR RATE: 88 BPM

## 2020-01-26 PROCEDURE — 93005 ELECTROCARDIOGRAM TRACING: CPT | Performed by: PHYSICIAN ASSISTANT

## 2020-01-26 PROCEDURE — 99284 EMERGENCY DEPT VISIT MOD MDM: CPT

## 2020-01-26 PROCEDURE — 80053 COMPREHEN METABOLIC PANEL: CPT

## 2020-01-26 PROCEDURE — 87086 URINE CULTURE/COLONY COUNT: CPT

## 2020-01-26 PROCEDURE — 36415 COLL VENOUS BLD VENIPUNCTURE: CPT

## 2020-01-26 PROCEDURE — 83735 ASSAY OF MAGNESIUM: CPT

## 2020-01-26 PROCEDURE — 81001 URINALYSIS AUTO W/SCOPE: CPT

## 2020-01-26 PROCEDURE — 85025 COMPLETE CBC W/AUTO DIFF WBC: CPT

## 2020-01-26 RX ORDER — LEVOTHYROXINE SODIUM 88 UG/1
88 TABLET ORAL DAILY
Qty: 30 TABLET | Refills: 0 | OUTPATIENT
Start: 2020-01-26

## 2020-01-26 ASSESSMENT — PAIN DESCRIPTION - PAIN TYPE: TYPE: ACUTE PAIN

## 2020-01-26 ASSESSMENT — PAIN DESCRIPTION - LOCATION: LOCATION: HEAD

## 2020-01-26 ASSESSMENT — PAIN DESCRIPTION - ORIENTATION: ORIENTATION: RIGHT;ANTERIOR

## 2020-01-26 ASSESSMENT — PAIN SCALES - GENERAL: PAINLEVEL_OUTOF10: 9

## 2020-01-27 ENCOUNTER — APPOINTMENT (OUTPATIENT)
Dept: CT IMAGING | Age: 50
End: 2020-01-27
Payer: MEDICAID

## 2020-01-27 ENCOUNTER — CARE COORDINATION (OUTPATIENT)
Dept: CARE COORDINATION | Age: 50
End: 2020-01-27

## 2020-01-27 VITALS
WEIGHT: 250 LBS | TEMPERATURE: 97.9 F | OXYGEN SATURATION: 99 % | HEART RATE: 76 BPM | BODY MASS INDEX: 44.3 KG/M2 | SYSTOLIC BLOOD PRESSURE: 137 MMHG | HEIGHT: 63 IN | DIASTOLIC BLOOD PRESSURE: 76 MMHG | RESPIRATION RATE: 18 BRPM

## 2020-01-27 LAB
ALBUMIN SERPL-MCNC: 3.7 G/DL (ref 3.5–4.6)
ALP BLD-CCNC: 142 U/L (ref 40–130)
ALT SERPL-CCNC: 9 U/L (ref 0–33)
ANION GAP SERPL CALCULATED.3IONS-SCNC: 11 MEQ/L (ref 9–15)
AST SERPL-CCNC: 15 U/L (ref 0–35)
BACTERIA: ABNORMAL /HPF
BASOPHILS ABSOLUTE: 0.1 K/UL (ref 0–0.2)
BASOPHILS RELATIVE PERCENT: 1.3 %
BILIRUB SERPL-MCNC: 0.3 MG/DL (ref 0.2–0.7)
BILIRUBIN URINE: NEGATIVE
BLOOD, URINE: ABNORMAL
BUN BLDV-MCNC: 11 MG/DL (ref 6–20)
CALCIUM SERPL-MCNC: 9.3 MG/DL (ref 8.5–9.9)
CHLORIDE BLD-SCNC: 101 MEQ/L (ref 95–107)
CLARITY: ABNORMAL
CO2: 23 MEQ/L (ref 20–31)
COLOR: YELLOW
CREAT SERPL-MCNC: 1.25 MG/DL (ref 0.5–0.9)
EOSINOPHILS ABSOLUTE: 0.2 K/UL (ref 0–0.7)
EOSINOPHILS RELATIVE PERCENT: 3.1 %
EPITHELIAL CELLS, UA: ABNORMAL /HPF (ref 0–5)
GFR AFRICAN AMERICAN: 55.2
GFR NON-AFRICAN AMERICAN: 45.6
GLOBULIN: 3.3 G/DL (ref 2.3–3.5)
GLUCOSE BLD-MCNC: 200 MG/DL (ref 70–99)
GLUCOSE URINE: NEGATIVE MG/DL
HCT VFR BLD CALC: 43.2 % (ref 37–47)
HEMOGLOBIN: 14.4 G/DL (ref 12–16)
HYALINE CASTS: ABNORMAL /HPF (ref 0–5)
KETONES, URINE: NEGATIVE MG/DL
LEUKOCYTE ESTERASE, URINE: ABNORMAL
LYMPHOCYTES ABSOLUTE: 2 K/UL (ref 1–4.8)
LYMPHOCYTES RELATIVE PERCENT: 24.2 %
MAGNESIUM: 2.1 MG/DL (ref 1.7–2.4)
MCH RBC QN AUTO: 32.4 PG (ref 27–31.3)
MCHC RBC AUTO-ENTMCNC: 33.4 % (ref 33–37)
MCV RBC AUTO: 97 FL (ref 82–100)
MONOCYTES ABSOLUTE: 0.4 K/UL (ref 0.2–0.8)
MONOCYTES RELATIVE PERCENT: 5.2 %
NEUTROPHILS ABSOLUTE: 5.4 K/UL (ref 1.4–6.5)
NEUTROPHILS RELATIVE PERCENT: 66.2 %
NITRITE, URINE: NEGATIVE
PDW BLD-RTO: 13.1 % (ref 11.5–14.5)
PH UA: 6 (ref 5–9)
PLATELET # BLD: 304 K/UL (ref 130–400)
POTASSIUM SERPL-SCNC: 3.9 MEQ/L (ref 3.4–4.9)
PROTEIN UA: NEGATIVE MG/DL
RBC # BLD: 4.45 M/UL (ref 4.2–5.4)
RBC UA: ABNORMAL /HPF (ref 0–5)
SODIUM BLD-SCNC: 135 MEQ/L (ref 135–144)
SPECIFIC GRAVITY UA: 1.02 (ref 1–1.03)
TOTAL PROTEIN: 7 G/DL (ref 6.3–8)
URINE REFLEX TO CULTURE: YES
UROBILINOGEN, URINE: 0.2 E.U./DL
WBC # BLD: 8.1 K/UL (ref 4.8–10.8)
WBC UA: ABNORMAL /HPF (ref 0–5)

## 2020-01-27 PROCEDURE — 96375 TX/PRO/DX INJ NEW DRUG ADDON: CPT

## 2020-01-27 PROCEDURE — 6360000002 HC RX W HCPCS: Performed by: PHYSICIAN ASSISTANT

## 2020-01-27 PROCEDURE — 2580000003 HC RX 258: Performed by: PHYSICIAN ASSISTANT

## 2020-01-27 PROCEDURE — 96374 THER/PROPH/DIAG INJ IV PUSH: CPT

## 2020-01-27 PROCEDURE — 70450 CT HEAD/BRAIN W/O DYE: CPT

## 2020-01-27 RX ORDER — 0.9 % SODIUM CHLORIDE 0.9 %
500 INTRAVENOUS SOLUTION INTRAVENOUS ONCE
Status: COMPLETED | OUTPATIENT
Start: 2020-01-27 | End: 2020-01-27

## 2020-01-27 RX ORDER — DEXAMETHASONE SODIUM PHOSPHATE 4 MG/ML
4 INJECTION, SOLUTION INTRA-ARTICULAR; INTRALESIONAL; INTRAMUSCULAR; INTRAVENOUS; SOFT TISSUE ONCE
Status: COMPLETED | OUTPATIENT
Start: 2020-01-27 | End: 2020-01-27

## 2020-01-27 RX ORDER — MORPHINE SULFATE 2 MG/ML
4 INJECTION, SOLUTION INTRAMUSCULAR; INTRAVENOUS ONCE
Status: COMPLETED | OUTPATIENT
Start: 2020-01-27 | End: 2020-01-27

## 2020-01-27 RX ORDER — DIPHENHYDRAMINE HYDROCHLORIDE 50 MG/ML
25 INJECTION INTRAMUSCULAR; INTRAVENOUS ONCE
Status: COMPLETED | OUTPATIENT
Start: 2020-01-27 | End: 2020-01-27

## 2020-01-27 RX ORDER — BUTALBITAL, ACETAMINOPHEN AND CAFFEINE 50; 325; 40 MG/1; MG/1; MG/1
1 TABLET ORAL EVERY 6 HOURS PRN
Qty: 30 TABLET | Refills: 0 | Status: SHIPPED | OUTPATIENT
Start: 2020-01-27 | End: 2021-01-04

## 2020-01-27 RX ORDER — NITROFURANTOIN 25; 75 MG/1; MG/1
100 CAPSULE ORAL 2 TIMES DAILY
Qty: 20 CAPSULE | Refills: 0 | Status: SHIPPED | OUTPATIENT
Start: 2020-01-27 | End: 2020-02-06

## 2020-01-27 RX ORDER — METOCLOPRAMIDE HYDROCHLORIDE 5 MG/ML
10 INJECTION INTRAMUSCULAR; INTRAVENOUS ONCE
Status: COMPLETED | OUTPATIENT
Start: 2020-01-27 | End: 2020-01-27

## 2020-01-27 RX ADMIN — MORPHINE SULFATE 4 MG: 2 INJECTION, SOLUTION INTRAMUSCULAR; INTRAVENOUS at 01:12

## 2020-01-27 RX ADMIN — SODIUM CHLORIDE 500 ML: 9 INJECTION, SOLUTION INTRAVENOUS at 00:26

## 2020-01-27 RX ADMIN — DEXAMETHASONE SODIUM PHOSPHATE 4 MG: 4 INJECTION, SOLUTION INTRAMUSCULAR; INTRAVENOUS at 00:26

## 2020-01-27 RX ADMIN — METOCLOPRAMIDE 10 MG: 5 INJECTION, SOLUTION INTRAMUSCULAR; INTRAVENOUS at 00:26

## 2020-01-27 RX ADMIN — DIPHENHYDRAMINE HYDROCHLORIDE 25 MG: 50 INJECTION, SOLUTION INTRAMUSCULAR; INTRAVENOUS at 00:26

## 2020-01-27 ASSESSMENT — ENCOUNTER SYMPTOMS
SHORTNESS OF BREATH: 0
EYE PAIN: 0
ABDOMINAL PAIN: 0
PHOTOPHOBIA: 1
TROUBLE SWALLOWING: 0
COLOR CHANGE: 0
ALLERGIC/IMMUNOLOGIC NEGATIVE: 1
APNEA: 0

## 2020-01-27 ASSESSMENT — PAIN SCALES - GENERAL: PAINLEVEL_OUTOF10: 9

## 2020-01-27 NOTE — ED NOTES
Pt states she is having a headache and chest pain. Chitra ALVAREZ notified.       Lefty Arguelles RN  01/26/20 2042

## 2020-01-27 NOTE — ED NOTES
Yudith Plan at bedside drawing second set of blood work     [facility]  01/27/20 [facility]  01/27/20 [id number]

## 2020-01-27 NOTE — ED TRIAGE NOTES
Patient c/o headache and neck pain since yesterday, she last took tramadol 8 hours ago, rates it 9/10, denies any head trauma/injury.

## 2020-01-27 NOTE — ED NOTES
To bathroom via wheelchair with the assist of her       Susannah Ohs, [address] [facility]  01/27/20 6319

## 2020-01-27 NOTE — ED PROVIDER NOTES
User     Quit date: 3/23/2016   Substance and Sexual Activity    Alcohol use: Not Currently     Alcohol/week: 0.0 standard drinks     Comment: occasionally    Drug use: Yes     Frequency: 3.0 times per week     Types: Marijuana    Sexual activity: Yes     Partners: Male   Lifestyle    Physical activity:     Days per week: None     Minutes per session: None    Stress: None   Relationships    Social connections:     Talks on phone: None     Gets together: None     Attends Restorationism service: None     Active member of club or organization: None     Attends meetings of clubs or organizations: None     Relationship status: None    Intimate partner violence:     Fear of current or ex partner: None     Emotionally abused: None     Physically abused: None     Forced sexual activity: None   Other Topics Concern    None   Social History Narrative    None       SCREENINGS              PHYSICAL EXAM    (up to 7 for level 4, 8 or more for level 5)     ED Triage Vitals [01/26/20 2209]   BP Temp Temp Source Pulse Resp SpO2 Height Weight   (!) 150/81 97.9 °F (36.6 °C) Oral 89 18 98 % 5' 3\" (1.6 m) 250 lb (113.4 kg)       Physical Exam  Vitals signs and nursing note reviewed. Constitutional:       General: She is not in acute distress. Appearance: She is well-developed. She is not diaphoretic. HENT:      Head: Normocephalic and atraumatic. Mouth/Throat:      Pharynx: No oropharyngeal exudate. Eyes:      General: No scleral icterus. Conjunctiva/sclera: Conjunctivae normal.      Pupils: Pupils are equal, round, and reactive to light. Neck:      Musculoskeletal: Normal range of motion and neck supple. Trachea: No tracheal deviation. Cardiovascular:      Rate and Rhythm: Normal rate. Heart sounds: Normal heart sounds. Pulmonary:      Effort: Pulmonary effort is normal. No respiratory distress. Breath sounds: Normal breath sounds.    Abdominal:      General: Bowel sounds are normal. There is (*)     All other components within normal limits   URINE CULTURE   MAGNESIUM       All other labs were within normal range or not returned as of this dictation. EMERGENCY DEPARTMENT COURSE and DIFFERENTIAL DIAGNOSIS/MDM:   Vitals:    Vitals:    01/26/20 2209 01/27/20 0104   BP: (!) 150/81 137/76   Pulse: 89 76   Resp: 18 18   Temp: 97.9 °F (36.6 °C)    TempSrc: Oral    SpO2: 98% 99%   Weight: 250 lb (113.4 kg)    Height: 5' 3\" (1.6 m)          MDM      REASSESSMENT      Patient presented emergency department with a 9 out of 10 right-sided headache that is seemingly recurrent in nature with a similar episode 1 week ago. Given the fact that the patient states that she had a syncopal episode this time a CT scan of the head was performed that is negative. EKG shows no acute change from her prior EKGs, and this chest pain is a chronic condition secondary to sarcoidosis. Patient was medicated for pain in the emergency department with improved symptoms. She will be discharged home with PCP follow-up    CONSULTS:  None    PROCEDURES:  Unless otherwise noted below, none     Procedures    FINAL IMPRESSION      1. Acute nonintractable headache, unspecified headache type    2. Acute cystitis without hematuria          DISPOSITION/PLAN   DISPOSITION Decision To Discharge 01/27/2020 01:33:32 AM      PATIENT REFERREDTO:  SUDEEP Simon - [address]  [phone]    Call in 2 days      Yanni Ang MD  [address]    Call in 2 days        DISCHARGEMEDICATIONS:  New Prescriptions    BUTALBITAL-ACETAMINOPHEN-CAFFEINE (FIORICET, ESGIC) -40 MG PER TABLET    Take 1 tablet by mouth every 6 hours as needed for Headaches    NITROFURANTOIN, MACROCRYSTAL-MONOHYDRATE, (MACROBID) 100 MG CAPSULE    Take 1 capsule by mouth 2 times daily for 10 days     Controlled Substances Monitoring:     RX Monitoring 11/20/2019   Attestation -   Periodic Controlled Substance Monitoring No signs of potential drug abuse or diversion identified. ;Possible medication side effects, risk of tolerance/dependence & alternative treatments discussed.    Chronic Pain > 80 MEDD -       (Please note that portions of this note were completed with a voice recognition program.  Efforts were made to edit the dictations but occasionally words are mis-transcribed.)    Natalie Dyson PA-C (electronically signed)  Attending Emergency Physician          Natalie Dyson PA-C  01/27/20 2631

## 2020-01-27 NOTE — ED NOTES
IV attempts x 2 unsuccessful.  is going to collect blood work.       Stevo Ferguson RN  01/27/20 0003

## 2020-01-27 NOTE — LETTER
1/27/2020    [address] [facility] [address] [facility]      Dear Zaria Nails,    My name is Clare Atwood and I am a registered nurse who partners with SUDEEP Mcintyre CNP to improve patients' health. SUDEEP Mcintyre CNP believes you would benefit from working with me. As a member of your health care team, I would work with other providers involved in your care, offer education for your specific health conditions, and connect you with additional resources as needed. I will collaborate with SUDEEP Mcintyre CNP to support you in following your treatment plan. The additional support I provide is no additional cost to you. My primary focus is to help you achieve specific goals and improve your health. We are committed to walk with you on this journey and look forward to working with you. Please call me to further discuss your healthcare needs. I am available by phone or for appointments at the office. You can reach me at [phone].     In good health,     Clare Atwood RN

## 2020-01-28 LAB — URINE CULTURE, ROUTINE: NORMAL

## 2020-01-28 PROCEDURE — 93010 ELECTROCARDIOGRAM REPORT: CPT | Performed by: INTERNAL MEDICINE

## 2020-02-08 ENCOUNTER — APPOINTMENT (OUTPATIENT)
Dept: ULTRASOUND IMAGING | Age: 50
End: 2020-02-08
Payer: MEDICAID

## 2020-02-08 ENCOUNTER — APPOINTMENT (OUTPATIENT)
Dept: GENERAL RADIOLOGY | Age: 50
End: 2020-02-08
Payer: MEDICAID

## 2020-02-08 ENCOUNTER — HOSPITAL ENCOUNTER (EMERGENCY)
Age: 50
Discharge: HOME | End: 2020-02-08
Payer: MEDICAID

## 2020-02-08 VITALS
HEART RATE: 99 BPM | HEIGHT: 63 IN | DIASTOLIC BLOOD PRESSURE: 69 MMHG | TEMPERATURE: 98.3 F | RESPIRATION RATE: 17 BRPM | WEIGHT: 240 LBS | SYSTOLIC BLOOD PRESSURE: 116 MMHG | OXYGEN SATURATION: 97 % | BODY MASS INDEX: 42.52 KG/M2

## 2020-02-08 PROCEDURE — 73080 X-RAY EXAM OF ELBOW: CPT

## 2020-02-08 PROCEDURE — 93971 EXTREMITY STUDY: CPT

## 2020-02-08 PROCEDURE — 99283 EMERGENCY DEPT VISIT LOW MDM: CPT

## 2020-02-08 RX ORDER — ACETAMINOPHEN 325 MG/1
650 TABLET ORAL EVERY 6 HOURS PRN
Qty: 20 TABLET | Refills: 0 | Status: SHIPPED | OUTPATIENT
Start: 2020-02-08 | End: 2020-08-24

## 2020-02-08 ASSESSMENT — PAIN SCALES - GENERAL
PAINLEVEL_OUTOF10: 6
PAINLEVEL_OUTOF10: 0

## 2020-02-08 ASSESSMENT — ENCOUNTER SYMPTOMS
RESPIRATORY NEGATIVE: 1
EYES NEGATIVE: 1
GASTROINTESTINAL NEGATIVE: 1

## 2020-02-08 ASSESSMENT — PAIN DESCRIPTION - LOCATION
LOCATION: ELBOW
LOCATION: ELBOW

## 2020-02-08 ASSESSMENT — PAIN DESCRIPTION - PAIN TYPE
TYPE: ACUTE PAIN
TYPE: ACUTE PAIN

## 2020-02-08 ASSESSMENT — PAIN DESCRIPTION - ORIENTATION
ORIENTATION: LEFT
ORIENTATION: LEFT

## 2020-02-08 NOTE — ED PROVIDER NOTES
[facility] ED  eMERGENCY dEPARTMENT eNCOUnter      Pt Name: Caterina Watson  MRN: [id number]  [place] 1970  Date of evaluation: 2/8/2020  Provider: Adolfo Ramirez PA-C      HISTORY OF PRESENT ILLNESS    Caterina Watson is a 51 y.o. female who presents to the Emergency Department with chief complaint of left arm pain. Patient states she had an IV placed a few weeks ago and has had pain in her left arm since. She admits to pain with moving elbow. She denies smoking or history of blood clots. Patient has not been taking any medication for pain and has no other concerns at this time. REVIEW OF SYSTEMS       Review of Systems   Constitutional: Negative. HENT: Negative. Eyes: Negative. Respiratory: Negative. Cardiovascular: Negative. Gastrointestinal: Negative. Endocrine: Negative. Genitourinary: Negative. Musculoskeletal: Positive for arthralgias and myalgias. Left arm    Left elbow     Skin: Negative. Neurological: Negative. Psychiatric/Behavioral: Negative. PAST MEDICAL HISTORY     Past Medical History:   Diagnosis Date    Anxiety     Arthritis     Asthma     Bronchopneumonia     Cancer ([address]     renal    Cerebral artery occlusion with cerebral infarction (HCC)     Chronic bilateral low back pain with sciatica     Chronic kidney disease     Chronic obstructive lung disease ([address] 7/26/2019    Depression     Fibromyalgia     Hypertension     Insulin dependent type 2 diabetes mellitus, uncontrolled ([address] 8/3/2018    Localized enlarged lymph nodes 10/26/2018    Mixed headache     Pure hyperglyceridemia 5/19/2017    Sarcoidosis     Sleep apnea     does not wear cpap    Thyroid goiter          SURGICAL HISTORY       Past Surgical History:   Procedure Laterality Date    BRONCHOSCOPY  10/26/2018    DR. STEARNS    KIDNEY REMOVAL Right 08/2016    KIDNEY REMOVAL Right 2016    LUNG BIOPSY Right 10/2018    THYROID LOBECTOMY Right Minutes per session: None    Stress: None   Relationships    Social connections:     Talks on phone: None     Gets together: None     Attends Yazdanism service: None     Active member of club or organization: None     Attends meetings of clubs or organizations: None     Relationship status: None    Intimate partner violence:     Fear of current or ex partner: None     Emotionally abused: None     Physically abused: None     Forced sexual activity: None   Other Topics Concern    None   Social History Narrative    None       SCREENINGS      @FLOW([id number])@      PHYSICAL EXAM    (up to 7 for level 4, 8 or more for level 5)     ED Triage Vitals [02/08/20 1729]   BP Temp Temp Source Pulse Resp SpO2 Height Weight   116/69 98.3 °F (36.8 °C) Oral 99 17 97 % 5' 3\" (1.6 m) 240 lb (108.9 kg)       Physical Exam  Constitutional:       General: She is not in acute distress. Appearance: She is well-developed. HENT:      Head: Normocephalic and atraumatic. Eyes:      Conjunctiva/sclera: Conjunctivae normal.      Pupils: Pupils are equal, round, and reactive to light. Neck:      Musculoskeletal: Normal range of motion and neck supple. Cardiovascular:      Rate and Rhythm: Normal rate and regular rhythm. Heart sounds: No murmur. Pulmonary:      Effort: No respiratory distress. Breath sounds: Normal breath sounds. No wheezing or rales. Abdominal:      General: There is no distension. Palpations: Abdomen is soft. Tenderness: There is no abdominal tenderness. Musculoskeletal: Normal range of motion. Left elbow: Tenderness found. Radial head and medial epicondyle tenderness noted. Arms:    Skin:     General: Skin is warm and dry. Findings: No erythema or rash. Neurological:      Mental Status: She is alert and oriented to person, place, and time. Cranial Nerves: No cranial nerve deficit.    Psychiatric:         Judgment: Judgment normal.           All other labs were within normal range or not returned as of this dictation. EMERGENCY DEPARTMENT COURSE and DIFFERENTIALDIAGNOSIS/MDM:   Vitals:    Vitals:    02/08/20 1729   BP: 116/69   Pulse: 99   Resp: 17   Temp: 98.3 °F (36.8 °C)   TempSrc: Oral   SpO2: 97%   Weight: 240 lb (108.9 kg)   Height: 5' 3\" (1.6 m)          Ultrasound of left upper extremity is negative for acute abnormality. X-ray of left elbow is also negative for acute abnormality. Patient placed on Tylenol for pain. Ace wrap applied for stabilization and comfort. Capillary refill less than 3 seconds after wrap application. Follow-up with orthopedics for re-evaluation and treatment. Limited medication options due to patient's allergy to NSAIDs and also being a diabetic and unable to tolerate steroids. Return here if symptoms worsen or if new concerning symptoms arise. Patient verbalized understanding of plan at discharge is no further questions. PROCEDURES:  Unless otherwise noted below, none     Procedures      FINAL IMPRESSION      1. Left elbow tendonitis    2.  Left arm pain          DISPOSITION/PLAN   DISPOSITION            Justin Lopez PA-C (electronically signed)  Attending Emergency Physician  [facility]  02/08/20 9638

## 2020-02-11 ENCOUNTER — CARE COORDINATION (OUTPATIENT)
Dept: CARE COORDINATION | Age: 50
End: 2020-02-11

## 2020-02-16 NOTE — TELEPHONE ENCOUNTER
pharmacy requesting medication refill.  Please approve or deny this request.    Rx requested:  Requested Prescriptions     Pending Prescriptions Disp Refills    ADMELOG 100 UNIT/ML injection vial [Pharmacy Med Name: ADMELOG 100U/ML INJ, 10ML] 10 mL      Sig: INJECT 4 UNITS INTO THE SKIN THREE TIMES DAILY AS NEEDED FOR HIGH BLOOD SUGAR         Last Office Visit:   1/10/2020      Next Visit Date:  Future Appointments   Date Time Provider [facility]   2/20/2020 10:30 AM Holly Sharma, [facility] [address]   11/5/2020 10:00 AM Nell Castle MD [facility]

## 2020-02-18 ENCOUNTER — PATIENT MESSAGE (OUTPATIENT)
Dept: FAMILY MEDICINE CLINIC | Age: 50
End: 2020-02-18

## 2020-02-19 ENCOUNTER — PATIENT MESSAGE (OUTPATIENT)
Dept: FAMILY MEDICINE CLINIC | Age: 50
End: 2020-02-19

## 2020-02-24 ENCOUNTER — CARE COORDINATION (OUTPATIENT)
Dept: CARE COORDINATION | Age: 50
End: 2020-02-24

## 2020-02-24 ENCOUNTER — HOSPITAL ENCOUNTER (EMERGENCY)
Age: 50
Discharge: HOME | End: 2020-02-25
Payer: MEDICAID

## 2020-02-24 ENCOUNTER — OFFICE VISIT (OUTPATIENT)
Dept: FAMILY MEDICINE CLINIC | Age: 50
End: 2020-02-24
Payer: MEDICAID

## 2020-02-24 VITALS
DIASTOLIC BLOOD PRESSURE: 76 MMHG | HEART RATE: 87 BPM | HEIGHT: 63 IN | BODY MASS INDEX: 49.19 KG/M2 | TEMPERATURE: 98 F | WEIGHT: 277.6 LBS | SYSTOLIC BLOOD PRESSURE: 128 MMHG | OXYGEN SATURATION: 97 %

## 2020-02-24 LAB
EKG ATRIAL RATE: 81 BPM
EKG P AXIS: 42 DEGREES
EKG P-R INTERVAL: 152 MS
EKG Q-T INTERVAL: 368 MS
EKG QRS DURATION: 78 MS
EKG QTC CALCULATION (BAZETT): 427 MS
EKG R AXIS: 12 DEGREES
EKG T AXIS: 17 DEGREES
EKG VENTRICULAR RATE: 81 BPM

## 2020-02-24 PROCEDURE — G8427 DOCREV CUR MEDS BY ELIG CLIN: HCPCS | Performed by: NURSE PRACTITIONER

## 2020-02-24 PROCEDURE — 1036F TOBACCO NON-USER: CPT | Performed by: NURSE PRACTITIONER

## 2020-02-24 PROCEDURE — 93005 ELECTROCARDIOGRAM TRACING: CPT | Performed by: PERSONAL EMERGENCY RESPONSE ATTENDANT

## 2020-02-24 PROCEDURE — G8484 FLU IMMUNIZE NO ADMIN: HCPCS | Performed by: NURSE PRACTITIONER

## 2020-02-24 PROCEDURE — 99284 EMERGENCY DEPT VISIT MOD MDM: CPT

## 2020-02-24 PROCEDURE — G8417 CALC BMI ABV UP PARAM F/U: HCPCS | Performed by: NURSE PRACTITIONER

## 2020-02-24 PROCEDURE — 99213 OFFICE O/P EST LOW 20 MIN: CPT | Performed by: NURSE PRACTITIONER

## 2020-02-24 ASSESSMENT — PAIN DESCRIPTION - PROGRESSION: CLINICAL_PROGRESSION: GRADUALLY WORSENING

## 2020-02-24 ASSESSMENT — PAIN DESCRIPTION - ONSET: ONSET: ON-GOING

## 2020-02-24 ASSESSMENT — PAIN DESCRIPTION - FREQUENCY: FREQUENCY: CONTINUOUS

## 2020-02-24 ASSESSMENT — PAIN DESCRIPTION - LOCATION: LOCATION: CHEST;STERNUM

## 2020-02-24 ASSESSMENT — PAIN DESCRIPTION - ORIENTATION: ORIENTATION: ANTERIOR;MID

## 2020-02-24 ASSESSMENT — PAIN DESCRIPTION - PAIN TYPE: TYPE: ACUTE PAIN

## 2020-02-24 ASSESSMENT — PAIN SCALES - GENERAL: PAINLEVEL_OUTOF10: 10

## 2020-02-24 NOTE — LETTER
2/24/2020    [address] [facility]    Dear Alexi Shaikh,    I enjoyed speaking with you and wanted to send some additional information. SUDEEP Lord CNP and I will work together to ensure your needs are met and help you achieve your health goals. We are committed to walk with you on this journey and look forward to working with you. Please feel free to contact me with any questions or concerns. I am available by phone or for appointments at the office. You can reach me at [phone].       In good health,     José Antonio Kaur RN

## 2020-02-24 NOTE — PATIENT INSTRUCTIONS
with your legs slightly spread and your left hand on your left thigh. 2. Place your right elbow on your right thigh, and hold the weight with your forearm horizontal.  3. Slowly curl the weight up and toward your chest.  4. Repeat this motion 8 to 12 times. 5. Switch arms, and do steps 1 through 4. Follow-up care is a key part of your treatment and safety. Be sure to make and go to all appointments, and call your doctor if you are having problems. It's also a good idea to know your test results and keep a list of the medicines you take. Where can you learn more? Go to https://e(ye)BRAINpepiceweb.[employer]. org and sign in to your [email or web address] account. Enter M279 in the [employer] box to learn more about \"Elbow: Exercises. \"     If you do not have an account, please click on the \"Sign Up Now\" link. Current as of: June 26, 2019  Content Version: 12.3  © 2664-8470 [employer]. Care instructions adapted under license by [facility] ([facility]). If you have questions about a medical condition or this instruction, always ask your healthcare professional. [place] [address] any warranty or liability for your use of this information. Patient Education        Triceps Tendinitis: Exercises  Introduction  Here are some examples of exercises for you to try. The exercises may be suggested for a condition or for rehabilitation. Start each exercise slowly. Ease off the exercises if you start to have pain. You will be told when to start these exercises and which ones will work best for you. How to do the exercises  Elbow flexion and extension   1. Stand with your arms relaxed at your sides. 2. With your affected arm, gently bend your elbow up toward you as far as possible. Your palm should face up. 3. Then straighten your arm as much as you can. 4. Repeat 2 to 4 times. Triceps stretch   1. Stand with your back straight and your feet shoulder-width apart.  You can do this stretch times.    Neck stretches   6. Look straight ahead, and tip your right ear to your right shoulder. Do not let your left shoulder rise as you tip your head to the right. 7. Hold for 15 to 30 seconds. 8. Tilt your head to the left. Do not let your right shoulder rise as you tip your head to the left. 9. Hold for 15 to 30 seconds. 10. Repeat 2 to 4 times to each side. Elbow flexion and extension   7. Stand with your arms relaxed at your sides. 8. With your affected arm, gently bend your elbow up toward you as far as possible. 9. Then straighten your arm as much as you can. 10. Repeat 2 to 4 times. Wrist flexor stretch   7. Extend your affected arm in front of you with your palm facing away from your body. 8. Bend back your wrist on your affected arm, pointing your hand up toward the ceiling. 9. With your other hand, gently bend your wrist farther until you feel a mild to moderate stretch in your forearm. 10. Hold for at least 15 to 30 seconds. 11. Repeat 2 to 4 times. 12. Repeat steps 1 through 5, but this time extend your affected arm in front of you with your palm facing up. Then bend back your wrist, pointing your hand toward the floor. Wrist flexion and extension   1. Place your forearm on a table, with your affected hand and wrist extended beyond the table, palm down. 2. Slowly bend your wrist to move your hand upward and allow your hand to close into a fist. Hold for about 6 seconds. 3. Then lower your hand and allow your fingers to relax. Hold this position for about 6 seconds. You should feel a gentle stretch. 4. Repeat 8 to 12 times. Follow-up care is a key part of your treatment and safety. Be sure to make and go to all appointments, and call your doctor if you are having problems. It's also a good idea to know your test results and keep a list of the medicines you take. Where can you learn more? Go to https://emily.[employer]. org and sign in to your flatev account.

## 2020-02-24 NOTE — CARE COORDINATION
Ambulatory Care Coordination Note  2/25/2020  CM Risk Score: 11  Charlson 10 Year Mortality Risk Score: 100%     ACC: Darlene Mcginnis, LISHA    Summary Note:  Met with patient in coordination with scheduled office visit with SUDEEP Hand CNP. Medications reconciled with provider visit. Introduced myself and the Care Coordination program. Patient enrolled. Discussed availability of same day, next day, and Walk-in Care. Discussed referral to Clinical Rx. Patient verbalizes agreement. Discussed CC follow-up by phone in 2-3 days to complete initial assessment. Patient verbalizes agreement. Provided patient with a Care Coordination folder with my contact information and educational materials. Diabetes educational handouts: Diabetes Zones, Type 2 Diabetes Changes: How and Why,  Building a Balance Meal, Reading a Nutrition Facts label, Checking Your Blood Sugar, Low Blood Sugar, High Blood Sugar,  Know Your Numbers, and Planning Healthy Meals. COPD patient handouts provided: COPD Zones, How to Recognize COPD Flare-ups, My COPD Goals, Breathing Easier with COPD, Pursed-Lip Breathing, and Calm Your Stress: Relaxation Techniques. Patient requesting education information on Sarcoidosis. Discussed providing education materials by mail. A Care Coordination Welcome letter mailed with patient education handouts Provided Sarcoidosis educational handouts: Sarcoidosis: Care Instructions,  [address] Understanding Your Lung Health: Sarcoidosis, and Chest Foundation: Sarcoidosis. Lab Results   Component Value Date    LABA1C 6.4 (H) 01/10/2020    LABA1C 6.3 (H) 05/19/2019    LABA1C 5.5 04/18/2019       Diabetes Assessment    Medic Alert ID:  No  Meal Planning:  None   How often do you test your blood sugar?:  Daily, Bedtime   Do you have barriers with adherence to non-pharmacologic self-management interventions?  (Nutrition/Exercise/Self-Monitoring):  Yes   Have you ever had to go to the ED for symptoms of low blood sugar?:  No Zone Management Tools:  Completed                  Prior to Admission medications    Medication Sig Start Date End Date Taking? Authorizing Provider   diclofenac sodium (VOLTAREN) 1 % GEL Apply 2 g topically 4 times daily as needed for Pain 2/24/20 Maya Rolando, APRN - CNP   diclofenac (PENNSAID) 2 % SOLN Place 1 applicator onto the skin 2 times daily as needed (pain) Lot [id number] EXP 11/20 2/24/20   SUDEEP Wylie CNP   Insulin Pen Needle (B-D ULTRAFINE III SHORT PEN) 31G X 8 MM MISC Inject 1 each into the skin 3 times daily 2/19/20 3/20/20  Tata Rolando, SUDEEP Tristan CNP   ADMELOG 100 UNIT/ML injection vial INJECT 4 UNITS INTO THE SKIN THREE TIMES DAILY AS NEEDED FOR HIGH BLOOD SUGAR 2/16/20   SUDEEP Wylie CNP   acetaminophen (AMINOFEN) 325 MG tablet Take 2 tablets by mouth every 6 hours as needed for Pain  Patient not taking: Reported on 2/24/2020 2/8/20   Ben Leone PA-C   butalbital-acetaminophen-caffeine (FIORICET, ESGIC) -95 MG per tablet Take 1 tablet by mouth every 6 hours as needed for Headaches 1/27/20   Viviana Kaur PA-C   promethazine (PHENERGAN) 25 MG tablet WARNING:  May cause drowsiness. May impair ability to operate vehicles or machinery. Do not use in combination with alcohol.     ! Tablet every 6 hours as needed in conjunction with Ultram for headaches  Patient not taking: Reported on 2/24/2020 1/18/20   Osman Lu PA-C   levothyroxine (SYNTHROID) 112 MCG tablet Take 1 tablet by mouth Daily 1/11/20 Maya Poster, APRN - CNP   busPIRone (BUSPAR) 15 MG tablet Take 15 mg by mouth 3 times daily 1/10/20   Tata Poster, SUDEEP Tristan CNP   amitriptyline (ELAVIL) 50 MG tablet Take 1 tablet by mouth nightly 1/10/20   Tata Rolando, APRN - CNP   cetirizine (ZYRTEC) 10 MG tablet Take 1 tab po qhs prn allergies 12/16/19   SUDEEP Wylie CNP   blood glucose test strips (EXACTECH TEST) strip 1 each by In Vitro route 3 times daily

## 2020-02-24 NOTE — PROGRESS NOTES
file    Stress: Not on file   Relationships    Social connections:     Talks on phone: Not on file     Gets together: Not on file     Attends Anabaptist service: Not on file     Active member of club or organization: Not on file     Attends meetings of clubs or organizations: Not on file     Relationship status: Not on file    Intimate partner violence:     Fear of current or ex partner: Not on file     Emotionally abused: Not on file     Physically abused: Not on file     Forced sexual activity: Not on file   Other Topics Concern    Not on file   Social History Narrative    Not on file     Current Outpatient Medications on File Prior to Visit   Medication Sig Dispense Refill    Insulin Pen Needle (B-D ULTRAFINE III SHORT PEN) 31G X 8 MM MISC Inject 1 each into the skin 3 times daily 100 each 5    ADMELOG 100 UNIT/ML injection vial INJECT 4 UNITS INTO THE SKIN THREE TIMES DAILY AS NEEDED FOR HIGH BLOOD SUGAR 10 mL 2    butalbital-acetaminophen-caffeine (FIORICET, ESGIC) -40 MG per tablet Take 1 tablet by mouth every 6 hours as needed for Headaches 30 tablet 0    levothyroxine (SYNTHROID) 112 MCG tablet Take 1 tablet by mouth Daily 30 tablet 3    busPIRone (BUSPAR) 15 MG tablet Take 15 mg by mouth 3 times daily 90 tablet 2    amitriptyline (ELAVIL) 50 MG tablet Take 1 tablet by mouth nightly 30 tablet 3    cetirizine (ZYRTEC) 10 MG tablet Take 1 tab po qhs prn allergies 30 tablet 5    blood glucose test strips (EXACTECH TEST) strip 1 each by In Vitro route 3 times daily (Accu-check test strips) As needed.  DX:E11.65 300 strip 5    ONE TOUCH ULTRASOFT LANCETS MISC TEST 3 TIMES DAILY AS NEEDED 300 each 3    albuterol sulfate  (90 Base) MCG/ACT inhaler Inhale 2 puffs into the lungs every 4 hours as needed for Wheezing 1 Inhaler 3    albuterol (PROVENTIL) (2.5 MG/3ML) 0.083% nebulizer solution Take 3 mLs by nebulization every 4 hours as needed for Wheezing 120 each 3    metoclopramide (REGLAN) 10 MG tablet Take 1 tablet by mouth 4 times daily 120 tablet 0    atorvastatin (LIPITOR) 40 MG tablet Take 1 tablet by mouth nightly 30 tablet 3    metoprolol tartrate (LOPRESSOR) 25 MG tablet Take 1 tablet by mouth 2 times daily 60 tablet 0    DULoxetine (CYMBALTA) 60 MG extended release capsule TK 1 C PO QD  4    fluticasone (FLONASE) 50 MCG/ACT nasal spray 1 spray by Nasal route daily 1 Bottle 3    traMADol (ULTRAM) 50 MG tablet Take 50 mg by mouth every 6 hours as needed for Pain.  methocarbamol (ROBAXIN) 750 MG tablet Take 750 mg by mouth 4 times daily      Insulin Syringe-Needle U-100 30G X 1/2\" 1 ML MISC 1 each by Does not apply route daily 100 each 3    blood glucose test strips (ONETOUCH VERIO) strip TEST 3 TIMES DAILY AS NEEDED 300 each 3    Blood Glucose Monitoring Suppl (ONETOUCH VERIO) w/Device KIT TEST 3 TIMES DAILY AS NEEDED 1 kit 0    montelukast (SINGULAIR) 10 MG tablet Take 1 tab nightly at supper for breathing/allergies 30 tablet 5    acetaminophen (AMINOFEN) 325 MG tablet Take 2 tablets by mouth every 6 hours as needed for Pain (Patient not taking: Reported on 2/24/2020) 20 tablet 0    promethazine (PHENERGAN) 25 MG tablet WARNING:  May cause drowsiness. May impair ability to operate vehicles or machinery. Do not use in combination with alcohol. ! Tablet every 6 hours as needed in conjunction with Ultram for headaches (Patient not taking: Reported on 2/24/2020) 20 tablet 0    pregabalin (LYRICA) 75 MG capsule Take 1 capsule by mouth 3 times daily for 90 days. 90 capsule 2    omeprazole (PRILOSEC) 40 MG delayed release capsule Take by mouth      ipratropium-albuterol (DUONEB) 0.5-2.5 (3) MG/3ML SOLN nebulizer solution Inhale 3 mLs into the lungs every 6 hours as needed for Shortness of Breath 540 mL 5     No current facility-administered medications on file prior to visit. Allergies:  Shellfish-derived products; Ibuprofen; Ketorolac;  Other; Propoxyphene GEL    [facility]    EMG    diclofenac (PENNSAID) 2 % SOLN   2. Tendonitis  diclofenac sodium (VOLTAREN) 1 % GEL    [facility]    diclofenac (PENNSAID) 2 % SOLN   3. Numbness and tingling in left hand  [facility]    EMG       Plan:      Orders Placed This Encounter   Procedures   [facility]     Referral Priority:   Routine     Referral Type:   Eval and Treat     Referral Reason:   Specialty Services Required     Requested Specialty:   Physical Therapy     Number of Visits Requested:   1    EMG     Standing Status:   Future     Standing Expiration Date:   4/24/2020     Order Specific Question:   Which body part? Answer:   left upper extremity          Orders Placed This Encounter   Medications    diclofenac sodium (VOLTAREN) 1 % GEL     Sig: Apply 2 g topically 4 times daily as needed for Pain     Dispense:  1 Tube     Refill:  3    diclofenac (PENNSAID) 2 % SOLN     Sig: Place 1 applicator onto the skin 2 times daily as needed (pain) Lot [id number] EXP 11/20     Dispense:  1 g     Refill:  0       Return in about 1 month (around 3/24/2020), or if symptoms worsen or fail to improve. Pt is unable to take oral NSAIDs due to having one kidney and decreased function. Pt not to take steroids currently per pulmonology due to needing further testing that she can't take it. Okay for topical voltaren to help decrease inflammation and pain. Okay for regular pain medication (tylenol and tramadol) encouraged PT. EMG to evaluate for possible nerve entrapment. Consider ortho referral next. Reviewed with the patient: current clinicalstatus, medications, activities and diet. Side effects, adverse effects of the medication prescribedtoday, as well as treatment plan/ rationale and result expectations have been discussedwith the patient who expresses understanding and desires to proceed.     Close follow upto evaluate treatment

## 2020-02-25 ENCOUNTER — TELEPHONE (OUTPATIENT)
Dept: PHARMACY | Facility: CLINIC | Age: 50
End: 2020-02-25

## 2020-02-25 VITALS
SYSTOLIC BLOOD PRESSURE: 123 MMHG | OXYGEN SATURATION: 92 % | HEART RATE: 77 BPM | HEIGHT: 63 IN | WEIGHT: 250 LBS | TEMPERATURE: 98.1 F | RESPIRATION RATE: 20 BRPM | BODY MASS INDEX: 44.3 KG/M2 | DIASTOLIC BLOOD PRESSURE: 74 MMHG

## 2020-02-25 LAB — TROPONIN: <0.01 NG/ML (ref 0–0.01)

## 2020-02-25 PROCEDURE — 6360000002 HC RX W HCPCS: Performed by: PERSONAL EMERGENCY RESPONSE ATTENDANT

## 2020-02-25 PROCEDURE — 96374 THER/PROPH/DIAG INJ IV PUSH: CPT

## 2020-02-25 PROCEDURE — 36415 COLL VENOUS BLD VENIPUNCTURE: CPT

## 2020-02-25 PROCEDURE — 84484 ASSAY OF TROPONIN QUANT: CPT

## 2020-02-25 RX ADMIN — HYDROMORPHONE HYDROCHLORIDE 1 MG: 1 INJECTION, SOLUTION INTRAMUSCULAR; INTRAVENOUS; SUBCUTANEOUS at 00:25

## 2020-02-25 ASSESSMENT — ENCOUNTER SYMPTOMS
SORE THROAT: 0
VOMITING: 0
NAUSEA: 0
DIARRHEA: 0
SHORTNESS OF BREATH: 1
ABDOMINAL PAIN: 0
RHINORRHEA: 0
COUGH: 0
BLOOD IN STOOL: 0
COLOR CHANGE: 0

## 2020-02-25 ASSESSMENT — PAIN DESCRIPTION - PAIN TYPE: TYPE: ACUTE PAIN

## 2020-02-25 ASSESSMENT — PAIN SCALES - GENERAL
PAINLEVEL_OUTOF10: 10
PAINLEVEL_OUTOF10: 4

## 2020-02-25 ASSESSMENT — PAIN DESCRIPTION - LOCATION: LOCATION: CHEST

## 2020-02-25 NOTE — ED PROVIDER NOTES
[facility] ED  eMERGENCY dEPARTMENT eNCOUnter      Pt Name: Keena Ryan  MRN: [id number]  [place] 1970  Date of evaluation: 2/24/2020  Provider: ANTONIO Palmer      HISTORY OF PRESENT ILLNESS    Keena Ryan is a 51 y.o. female with PMHx of COPD, DM2, anxiety, arthritis, asthma, cancer, CVA, CKD, depression, fibromyalgia, hypertension, sarcoidosis, thyroid goiter with thyroidectomy presents to the emergency department with chest pain. Pt states this is similar to her previous sarcoidosis chest pain. She states it started 2 hours ago, occurs randomly, not worse with deep breath, or exertion. Minimal shortness of breath. She denies fevers or coughing. She took tramadol at home with minimal relief. No ill contacts. HPI    Nursing Notes were reviewed. REVIEW OF SYSTEMS       Review of Systems   Constitutional: Negative for appetite change, chills and fever. HENT: Negative for congestion, rhinorrhea and sore throat. Respiratory: Positive for shortness of breath. Negative for cough. Cardiovascular: Positive for chest pain. Gastrointestinal: Negative for abdominal pain, blood in stool, diarrhea, nausea and vomiting. Genitourinary: Negative for difficulty urinating. Musculoskeletal: Negative for neck stiffness. Skin: Negative for color change and rash. Neurological: Negative for dizziness, syncope, weakness, light-headedness, numbness and headaches. All other systems reviewed and are negative.             PAST MEDICAL HISTORY     Past Medical History:   Diagnosis Date    Anxiety     Arthritis     Asthma     Bronchopneumonia     Cancer ([address]     renal    Cerebral artery occlusion with cerebral infarction ([address]     Chronic bilateral low back pain with sciatica     Chronic kidney disease     Chronic obstructive lung disease ([address] 7/26/2019    Depression     Fibromyalgia     Hypertension     Insulin dependent type 2 diabetes mellitus, uncontrolled ([address] 11/20    DICLOFENAC SODIUM (VOLTAREN) 1 % GEL    Apply 2 g topically 4 times daily as needed for Pain    DULOXETINE (CYMBALTA) 60 MG EXTENDED RELEASE CAPSULE    TK 1 C PO QD    FLUTICASONE (FLONASE) 50 MCG/ACT NASAL SPRAY    1 spray by Nasal route daily    INSULIN PEN NEEDLE (B-D ULTRAFINE III SHORT PEN) 31G X 8 MM MISC    Inject 1 each into the skin 3 times daily    INSULIN SYRINGE-NEEDLE U-100 30G X 1/2\" 1 ML MISC    1 each by Does not apply route daily    IPRATROPIUM-ALBUTEROL (DUONEB) 0.5-2.5 (3) MG/3ML SOLN NEBULIZER SOLUTION    Inhale 3 mLs into the lungs every 6 hours as needed for Shortness of Breath    LEVOTHYROXINE (SYNTHROID) 112 MCG TABLET    Take 1 tablet by mouth Daily    METHOCARBAMOL (ROBAXIN) 750 MG TABLET    Take 750 mg by mouth 4 times daily    METOCLOPRAMIDE (REGLAN) 10 MG TABLET    Take 1 tablet by mouth 4 times daily    METOPROLOL TARTRATE (LOPRESSOR) 25 MG TABLET    Take 1 tablet by mouth 2 times daily    MONTELUKAST (SINGULAIR) 10 MG TABLET    Take 1 tab nightly at supper for breathing/allergies    OMEPRAZOLE (PRILOSEC) 40 MG DELAYED RELEASE CAPSULE    Take by mouth    ONE TOUCH ULTRASOFT LANCETS MISC    TEST 3 TIMES DAILY AS NEEDED    PREGABALIN (LYRICA) 75 MG CAPSULE    Take 1 capsule by mouth 3 times daily for 90 days. PROMETHAZINE (PHENERGAN) 25 MG TABLET    WARNING:  May cause drowsiness. May impair ability to operate vehicles or machinery. Do not use in combination with alcohol. ! Tablet every 6 hours as needed in conjunction with Ultram for headaches    TRAMADOL (ULTRAM) 50 MG TABLET    Take 50 mg by mouth every 6 hours as needed for Pain. ALLERGIES     Shellfish-derived products; Ibuprofen; Ketorolac;  Other; Propoxyphene n-acetaminophen; and Toradol [ketorolac tromethamine]    FAMILY HISTORY       Family History   Problem Relation Age of Onset    Cancer Father     Diabetes Father     Allergy (Severe) Father     Heart Attack Father     Prostate Cancer Father    Elizabet Fofana High Blood Pressure Mother     Diabetes Mother    on August Arthritis Mother     High Cholesterol Mother     Vision Loss Mother     Alcohol Abuse Neg Hx     Anemia Neg Hx     Arrhythmia Neg Hx     Asthma Neg Hx     Atrial Fibrillation Neg Hx     Birth Defects Neg Hx     Breast Cancer Neg Hx     Coronary Art Dis Neg Hx     Colon Cancer Neg Hx     Depression Neg Hx     Early Death Neg Hx     Hearing Loss Neg Hx     Heart Disease Neg Hx     Learning Disabilities Neg Hx     Kidney Disease Neg Hx     Mental Illness Neg Hx     Mental Retardation Neg Hx     Miscarriages / Stillbirths Neg Hx     Obesity Neg Hx     Osteoporosis Neg Hx     Stroke Neg Hx     Substance Abuse Neg Hx           SOCIAL HISTORY       Social History     Socioeconomic History    Marital status: Legally      Spouse name: None    Number of children: None    Years of education: None    Highest education level: None   Occupational History    None   Social Needs    Financial resource strain: None    Food insecurity:     Worry: None     Inability: None    Transportation needs:     Medical: None     Non-medical: None   Tobacco Use    Smoking status: Former Smoker     Packs/day: 0.50     Years: 15.00     Pack years: 7.50     Types: Cigarettes     Start date: 2014     Last attempt to quit: 2015     Years since quittin.0    Smokeless tobacco: Former User     Quit date: 3/23/2016   Substance and Sexual Activity    Alcohol use: Not Currently     Alcohol/week: 0.0 standard drinks     Comment: occasionally    Drug use: Yes     Frequency: 3.0 times per week     Types: Marijuana    Sexual activity: Yes     Partners: Male   Lifestyle    Physical activity:     Days per week: None     Minutes per session: None    Stress: None   Relationships    Social connections:     Talks on phone: None     Gets together: None     Attends Buddhist service: None     Active member of club or organization: None     Attends meetings of clubs or organizations: None     Relationship status: None    Intimate partner violence:     Fear of current or ex partner: None     Emotionally abused: None     Physically abused: None     Forced sexual activity: None   Other Topics Concern    None   Social History Narrative    None         PHYSICAL EXAM         ED Triage Vitals [02/24/20 2354]   BP Temp Temp src Pulse Resp SpO2 Height Weight   138/71 -- -- 77 20 97 % 5' 3\" (1.6 m) 250 lb (113.4 kg)       Physical Exam  Constitutional:       Appearance: She is well-developed. HENT:      Head: Normocephalic and atraumatic. Eyes:      Conjunctiva/sclera: Conjunctivae normal.      Pupils: Pupils are equal, round, and reactive to light. Neck:      Musculoskeletal: Normal range of motion and neck supple. Trachea: No tracheal deviation. Cardiovascular:      Heart sounds: Normal heart sounds. Pulmonary:      Effort: Pulmonary effort is normal. No respiratory distress. Breath sounds: Normal breath sounds. No stridor. No wheezing. Chest:      Chest wall: No tenderness. Abdominal:      General: Bowel sounds are normal. There is no distension. Palpations: Abdomen is soft. There is no mass. Tenderness: There is no abdominal tenderness. There is no guarding or rebound. Musculoskeletal: Normal range of motion. Skin:     General: Skin is warm and dry. Capillary Refill: Capillary refill takes less than 2 seconds. Findings: No rash. Neurological:      Mental Status: She is alert and oriented to person, place, and time. Deep Tendon Reflexes: Reflexes are normal and symmetric. Psychiatric:         Behavior: Behavior normal.         Thought Content:  Thought content normal.         Judgment: Judgment normal.         DIAGNOSTIC RESULTS     EKG:All EKG's are interpreted by the Emergency Department Physician who either signs or Co-signs this chart in the absence of a cardiologist.    EKG shows normal sinus rhythm, rate 81,

## 2020-02-25 NOTE — ED TRIAGE NOTES
Pt to ED from home with c/o CP and SOB. She reports the pain began 1 hour ago and has gradually worsened. PT locates pain mid sternal and radiating into the back. Denies N/V, denies radiating pain to neck, arms or jaw. PT is A/O x 4, resps even and unlabored. Pt reports taking tramadol for pain at approx 2230. Pt rates pain a 10/10.  Pt reports she was lying in bed at onset of pain

## 2020-02-25 NOTE — ED NOTES
Pt medicated per order for pain. Pt reports pain remains a 10/10 prior to administration. Pt VS updated, pt given warm blanket and call light within reach. Pt with no other questions or concerns at this time.       Berna Fulton RN  02/25/20 9438

## 2020-02-26 ENCOUNTER — SCHEDULED TELEPHONE ENCOUNTER (OUTPATIENT)
Dept: PHARMACY | Facility: CLINIC | Age: 50
End: 2020-02-26

## 2020-02-26 ENCOUNTER — TELEPHONE (OUTPATIENT)
Dept: FAMILY MEDICINE CLINIC | Age: 50
End: 2020-02-26

## 2020-02-26 NOTE — TELEPHONE ENCOUNTER
Pt was in ER 2/24 and 2/25 for chest pain from pulmonary sarcoidosis, headache, and high BP. Pt still not feeling better. Pt wanted a call back with advice on what her next steps should be. Told pt I could try to get her in today for OV, and she said she didn't have a ride. Told pt she should go to the ER again if her symptoms are still not getting better/are getting worse.     Please call pt to advise:  [phone]  Or   [phone]    TY

## 2020-02-27 ENCOUNTER — CARE COORDINATION (OUTPATIENT)
Dept: CARE COORDINATION | Age: 50
End: 2020-02-27

## 2020-02-27 PROCEDURE — 93010 ELECTROCARDIOGRAM REPORT: CPT | Performed by: INTERNAL MEDICINE

## 2020-02-27 ASSESSMENT — ENCOUNTER SYMPTOMS
BACK PAIN: 0
COLOR CHANGE: 0

## 2020-03-07 ENCOUNTER — PATIENT MESSAGE (OUTPATIENT)
Dept: FAMILY MEDICINE CLINIC | Age: 50
End: 2020-03-07

## 2020-03-09 RX ORDER — ALBUTEROL SULFATE 90 UG/1
2 AEROSOL, METERED RESPIRATORY (INHALATION) EVERY 4 HOURS PRN
Qty: 1 INHALER | Refills: 3 | Status: ON HOLD | OUTPATIENT
Start: 2020-03-09 | End: 2020-09-21

## 2020-03-09 RX ORDER — INSULIN DEGLUDEC INJECTION 100 U/ML
30 INJECTION, SOLUTION SUBCUTANEOUS NIGHTLY
Qty: 5 PEN | Refills: 1 | Status: SHIPPED | OUTPATIENT
Start: 2020-03-09 | End: 2020-06-01

## 2020-03-09 NOTE — TELEPHONE ENCOUNTER
Patient requesting medication refill.  Please approve or deny this request.    Rx requested:  Requested Prescriptions     Pending Prescriptions Disp Refills    albuterol sulfate  (90 Base) MCG/ACT inhaler 1 Inhaler 3     Sig: Inhale 2 puffs into the lungs every 4 hours as needed for Wheezing         Last Office Visit:   10/17/2019      Next Visit Date:  Future Appointments   Date Time Provider Valerie Vizcarrai   3/11/2020  3:30 PM Prashant Cuenca MD [facility] [address] [place] [address]   3/23/2020 10:30 AM SUDEEP Dickinson - CNP [facility]   5/4/2020 11:00 AM Theresa Reed DO [address] [place] [address]   11/5/2020 10:00 AM Merlin Reins, MD [facility]

## 2020-03-10 ENCOUNTER — PATIENT MESSAGE (OUTPATIENT)
Dept: FAMILY MEDICINE CLINIC | Age: 50
End: 2020-03-10

## 2020-03-10 RX ORDER — CETIRIZINE HYDROCHLORIDE 10 MG/1
TABLET ORAL
Qty: 30 TABLET | Refills: 5 | Status: SHIPPED | OUTPATIENT
Start: 2020-03-10 | End: 2020-08-27

## 2020-03-10 NOTE — TELEPHONE ENCOUNTER
Pharmacy  requesting medication refill.  Please approve or deny this request.    Rx requested:  Requested Prescriptions     Pending Prescriptions Disp Refills    cetirizine (ZYRTEC) 10 MG tablet 30 tablet 5     Sig: Take 1 tab po qhs prn allergies         Last Office Visit:   2/24/2020      Next Visit Date:  Future Appointments   Date Time Provider Valerie Vizcarrai   3/23/2020 10:30 AM Alexey Vale APRN - CNP [facility]   4/1/2020  4:30 PM Chloé Aldana MD [facility]   5/4/2020 11:00 AM Mik Tavares DO [facility]   11/5/2020 10:00 AM Max Pennington MD [facility]

## 2020-03-11 RX ORDER — ALBUTEROL SULFATE 90 MCG
2 HFA AEROSOL WITH ADAPTER (GRAM) INHALATION EVERY 6 HOURS PRN
Qty: 1 INHALER | Refills: 3 | Status: SHIPPED | OUTPATIENT
Start: 2020-03-11 | End: 2020-09-08 | Stop reason: SDUPTHER

## 2020-03-11 NOTE — TELEPHONE ENCOUNTER
From: Lawerence Janneth  To: Emily Ashley APRN - CNP  Sent: 3/10/2020 2:48 PM EDT  Subject: Prescription Question    Emily Ashlye, I spoke to my pharmacy, and they told me the switch was through my insurance, they requested me to use the generic form. But I can't use it because of how bad it made me feel, the pharmacist told me the only way I can get it switched to the brand I use to take would have to come from my doctor (you) doing paperwork stating why I can't take it. Could you please see what you can do.  Thank you

## 2020-03-20 ENCOUNTER — CARE COORDINATION (OUTPATIENT)
Dept: CARE COORDINATION | Age: 50
End: 2020-03-20

## 2020-03-20 ASSESSMENT — ENCOUNTER SYMPTOMS: DYSPNEA ASSOCIATED WITH: MINIMAL EXERTION

## 2020-03-20 NOTE — CARE COORDINATION
Ambulatory Care Coordination Note  3/20/2020  CM Risk Score: 11  Charlson 10 Year Mortality Risk Score: 100%     ACC: Yann Montgomery RN    Summary Note: Patient reports increase SOB and increased fatigue. Patient denies cough, phlegm, fever, chills, body aches, nausea, vomiting, or diarrhea. Discussed contacting provider office by phone or my chart to report symptoms. Reviewed S/S of COPD exacerbation. Instructed patient on S/S of flu and Covid-19 to report to provider. Informed patient that telephone/MyChart visits are available to avoid increase risk of exposure to Covid-19 virus. Discussed Covid-19 prevention precautions including handwashing, cough/sneeze technique, cleaning frequently touched/used surfaces, and social distancing. Discussed availability of [facility] Clinic and provided [facility] number [phone]. Patient verbalizes understanding. Lab Results   Component Value Date    LABA1C 6.4 (H) 01/10/2020    LABA1C 6.3 (H) 05/19/2019    LABA1C 5.5 04/18/2019       Diabetes Assessment    Medic Alert ID:  No  Meal Planning:  None   How often do you test your blood sugar?:  Daily, Bedtime   Do you have barriers with adherence to non-pharmacologic self-management interventions?  (Nutrition/Exercise/Self-Monitoring):  Yes   Have you ever had to go to the ED for symptoms of low blood sugar?:  No       No patient-reported symptoms   Do you have hyperglycemia symptoms?:  No   Do you have hypoglycemia symptoms?:  No   Last Blood Sugar Value:  160   Blood Sugar Monitoring Regimen:  Once a Day   Blood Sugar Trends:  No Change       and   COPD Assessment       Shortness of breath (worse than baseline)         Symptoms:   COPD associated increased fatigue: Pos      Symptom course:  worsening  Breathlessness:  minimal exertion (Comment: Increase SOB)  Change in chronic cough?:  No/At Baseline  Change in sputum?:  No/At Baseline  Sputum characteristics:   (Comment: Denies)  Self Monitoring - SaO2:  No  Have you (ULTRAM) 50 MG tablet Take 50 mg by mouth every 6 hours as needed for Pain.     Historical Provider, MD   methocarbamol (ROBAXIN) 750 MG tablet Take 750 mg by mouth 4 times daily    Historical Provider, MD   Insulin Syringe-Needle U-100 30G X 1/2\" 1 ML MISC 1 each by Does not apply route daily 11/16/18   Arik Kohli, DO   blood glucose test strips (ONETOUCH VERIO) strip TEST 3 TIMES DAILY AS NEEDED 8/16/18   Arik Kohli, DO   Blood Glucose Monitoring Suppl (ONETOUCH VERIO) w/Device KIT TEST 3 TIMES DAILY AS NEEDED 8/16/18   Arik Kohli, DO   montelukast (SINGULAIR) 10 MG tablet Take 1 tab nightly at supper for breathing/allergies 6/28/18   Arik Kohli, DO       Future Appointments   Date Time Provider Valerie Cosby   4/1/2020  4:30 PM Peri Alves MD [address]   11/5/2020 10:00 AM Ines Farah MD [facility]

## 2020-03-23 ENCOUNTER — VIRTUAL VISIT (OUTPATIENT)
Dept: FAMILY MEDICINE CLINIC | Age: 50
End: 2020-03-23
Payer: MEDICAID

## 2020-03-23 PROCEDURE — 99442 PR PHYS/QHP TELEPHONE EVALUATION 11-20 MIN: CPT | Performed by: NURSE PRACTITIONER

## 2020-03-23 RX ORDER — ALPRAZOLAM 0.25 MG/1
0.25 TABLET ORAL 3 TIMES DAILY PRN
Qty: 42 TABLET | Refills: 0 | Status: SHIPPED | OUTPATIENT
Start: 2020-03-23 | End: 2020-04-29 | Stop reason: SDUPTHER

## 2020-03-23 NOTE — PROGRESS NOTES
Subjective:      Patient ID: Paulino Estevez is a 53 y.o. female who presents today for:     Chief Complaint   Patient presents with    Anxiety       HPI Pt reports that her breathing has been bad. She reports that she has been using breathing tx and albuterol which hasn't seemed to help. She reports that she has also noticed some palpitations which makes her think that it may be be related to her anxiety. She reports some of her usual Cp is there, but no more severe than normal.    Takes cymbalta twice daily and buspar 3x daily. She reports she has taken seroquel in the past and got sick with it. Had xanax for an MRI a couple of weeks ago which worked well. Past Medical History:   Diagnosis Date    Anxiety     Arthritis     Asthma     Bronchopneumonia     Cancer ([address]     renal    Cerebral artery occlusion with cerebral infarction (HCC)     Chronic bilateral low back pain with sciatica     Chronic kidney disease     Chronic obstructive lung disease ([address] 7/26/2019    Depression     Fibromyalgia     Hypertension     Insulin dependent type 2 diabetes mellitus, uncontrolled ([address] 8/3/2018    Localized enlarged lymph nodes 10/26/2018    Mixed headache     Pure hyperglyceridemia 5/19/2017    Sarcoidosis     Sleep apnea     does not wear cpap    Thyroid goiter      Past Surgical History:   Procedure Laterality Date    BRONCHOSCOPY  10/26/2018    DR. STEARNS    KIDNEY REMOVAL Right 08/2016    KIDNEY REMOVAL Right 2016    LUNG BIOPSY Right 10/2018    THYROID LOBECTOMY Right 6/13/14    THYROIDECTOMY  02/21/2019    DR. MAGDALENO    THYROIDECTOMY  2018    URETER STENT PLACEMENT Left 08/2016     Family History   Problem Relation Age of Onset    Cancer Father     Diabetes Father     Allergy (Severe) Father     Heart Attack Father     Prostate Cancer Father     High Blood Pressure Mother     Diabetes Mother     Arthritis Mother     High Cholesterol Mother     Vision Loss Mother     Alcohol Abuse Neg Hx     Anemia Neg Hx     Arrhythmia Neg Hx     Asthma Neg Hx     Atrial Fibrillation Neg Hx     Birth Defects Neg Hx     Breast Cancer Neg Hx     Coronary Art Dis Neg Hx     Colon Cancer Neg Hx     Depression Neg Hx     Early Death Neg Hx     Hearing Loss Neg Hx     Heart Disease Neg Hx     Learning Disabilities Neg Hx     Kidney Disease Neg Hx     Mental Illness Neg Hx     Mental Retardation Neg Hx     Miscarriages / Stillbirths Neg Hx     Obesity Neg Hx     Osteoporosis Neg Hx     Stroke Neg Hx     Substance Abuse Neg Hx      Social History     Socioeconomic History    Marital status: Legally      Spouse name: Not on file    Number of children: Not on file    Years of education: Not on file    Highest education level: Not on file   Occupational History    Not on file   Social Needs    Financial resource strain: Not on file    Food insecurity     Worry: Not on file     Inability: Not on file    Transportation needs     Medical: Not on file     Non-medical: Not on file   Tobacco Use    Smoking status: Former Smoker     Packs/day: 0.50     Years: 15.00     Pack years: 7.50     Types: Cigarettes     Start date: 2014     Last attempt to quit: 2015     Years since quittin.1    Smokeless tobacco: Former User     Quit date: 3/23/2016   Substance and Sexual Activity    Alcohol use: Not Currently     Alcohol/week: 0.0 standard drinks     Comment: occasionally    Drug use: Yes     Frequency: 3.0 times per week     Types: Marijuana    Sexual activity: Yes     Partners: Male   Lifestyle    Physical activity     Days per week: Not on file     Minutes per session: Not on file    Stress: Not on file   Relationships    Social connections     Talks on phone: Not on file     Gets together: Not on file     Attends Gnosticism service: Not on file     Active member of club or organization: Not on file     Attends meetings of clubs or organizations: Not on file Relationship status: Not on file    Intimate partner violence     Fear of current or ex partner: Not on file     Emotionally abused: Not on file     Physically abused: Not on file     Forced sexual activity: Not on file   Other Topics Concern    Not on file   Social History Narrative    Not on file     Current Outpatient Medications on File Prior to Visit   Medication Sig Dispense Refill    PROVENTIL  (90 Base) MCG/ACT inhaler Inhale 2 puffs into the lungs every 6 hours as needed for Wheezing 1 Inhaler 3    cetirizine (ZYRTEC) 10 MG tablet Take 1 tab po qhs prn allergies 30 tablet 5    albuterol sulfate  (90 Base) MCG/ACT inhaler Inhale 2 puffs into the lungs every 4 hours as needed for Wheezing 1 Inhaler 3    Insulin Degludec (TRESIBA FLEXTOUCH) 100 UNIT/ML SOPN Inject 30 Units into the skin nightly 5 pen 1    diclofenac sodium (VOLTAREN) 1 % GEL Apply 2 g topically 4 times daily as needed for Pain 1 Tube 3    diclofenac (PENNSAID) 2 % SOLN Place 1 applicator onto the skin 2 times daily as needed (pain) Lot [id number] EXP 11/20 1 g 0    Insulin Pen Needle (B-D ULTRAFINE III SHORT PEN) 31G X 8 MM MISC Inject 1 each into the skin 3 times daily 100 each 5    ADMELOG 100 UNIT/ML injection vial INJECT 4 UNITS INTO THE SKIN THREE TIMES DAILY AS NEEDED FOR HIGH BLOOD SUGAR 10 mL 2    acetaminophen (AMINOFEN) 325 MG tablet Take 2 tablets by mouth every 6 hours as needed for Pain (Patient not taking: Reported on 2/24/2020) 20 tablet 0    butalbital-acetaminophen-caffeine (FIORICET, ESGIC) -40 MG per tablet Take 1 tablet by mouth every 6 hours as needed for Headaches 30 tablet 0    promethazine (PHENERGAN) 25 MG tablet WARNING:  May cause drowsiness. May impair ability to operate vehicles or machinery. Do not use in combination with alcohol.     ! Tablet every 6 hours as needed in conjunction with Ultram for headaches (Patient not taking: Reported on 2/24/2020) 20 tablet 0   

## 2020-03-26 ENCOUNTER — CARE COORDINATION (OUTPATIENT)
Dept: CASE MANAGEMENT | Age: 50
End: 2020-03-26

## 2020-03-26 NOTE — CARE COORDINATION
Gloriaraheelsarah Ryan  March 26, 2020    Initial Referral Reason: Diabetes     Patient Care Team:  SUDEEP Mcdonald CNP as PCP - General (Nurse Practitioner)  SUDEEP Mcdonald CNP as PCP - [facility]  Beth Clark MD (Urology)  Vidal Dangelo, LISHA as Ambulatory Care Manager  Noe Martinez, MS, RD, LD as Dietitian    Past Medical History:    Current Outpatient Medications   Medication Sig Dispense Refill    ALPRAZolam (XANAX) 0.25 MG tablet Take 1 tablet by mouth 3 times daily as needed for Anxiety for up to 14 days. 42 tablet 0    PROVENTIL  (90 Base) MCG/ACT inhaler Inhale 2 puffs into the lungs every 6 hours as needed for Wheezing 1 Inhaler 3    cetirizine (ZYRTEC) 10 MG tablet Take 1 tab po qhs prn allergies 30 tablet 5    albuterol sulfate  (90 Base) MCG/ACT inhaler Inhale 2 puffs into the lungs every 4 hours as needed for Wheezing 1 Inhaler 3    Insulin Degludec (TRESIBA FLEXTOUCH) 100 UNIT/ML SOPN Inject 30 Units into the skin nightly 5 pen 1    diclofenac sodium (VOLTAREN) 1 % GEL Apply 2 g topically 4 times daily as needed for Pain 1 Tube 3    diclofenac (PENNSAID) 2 % SOLN Place 1 applicator onto the skin 2 times daily as needed (pain) Lot [id number] EXP 11/20 1 g 0    Insulin Pen Needle (B-D ULTRAFINE III SHORT PEN) 31G X 8 MM MISC Inject 1 each into the skin 3 times daily 100 each 5    ADMELOG 100 UNIT/ML injection vial INJECT 4 UNITS INTO THE SKIN THREE TIMES DAILY AS NEEDED FOR HIGH BLOOD SUGAR 10 mL 2    acetaminophen (AMINOFEN) 325 MG tablet Take 2 tablets by mouth every 6 hours as needed for Pain (Patient not taking: Reported on 2/24/2020) 20 tablet 0    butalbital-acetaminophen-caffeine (FIORICET, ESGIC) -40 MG per tablet Take 1 tablet by mouth every 6 hours as needed for Headaches 30 tablet 0    promethazine (PHENERGAN) 25 MG tablet WARNING:  May cause drowsiness. May impair ability to operate vehicles or machinery.   Do not use in combination with alcohol. ! Tablet every 6 hours as needed in conjunction with Ultram for headaches (Patient not taking: Reported on 2/24/2020) 20 tablet 0    levothyroxine (SYNTHROID) 112 MCG tablet Take 1 tablet by mouth Daily 30 tablet 3    busPIRone (BUSPAR) 15 MG tablet Take 15 mg by mouth 3 times daily 90 tablet 2    blood glucose test strips (EXACTECH TEST) strip 1 each by In Vitro route 3 times daily (Accu-check test strips) As needed. DX:E11.65 300 strip 5    ONE TOUCH ULTRASOFT LANCETS MISC TEST 3 TIMES DAILY AS NEEDED 300 each 3    pregabalin (LYRICA) 75 MG capsule Take 1 capsule by mouth 3 times daily for 90 days. 90 capsule 2    albuterol (PROVENTIL) (2.5 MG/3ML) 0.083% nebulizer solution Take 3 mLs by nebulization every 4 hours as needed for Wheezing 120 each 3    metoclopramide (REGLAN) 10 MG tablet Take 1 tablet by mouth 4 times daily 120 tablet 0    omeprazole (PRILOSEC) 40 MG delayed release capsule Take by mouth      atorvastatin (LIPITOR) 40 MG tablet Take 1 tablet by mouth nightly 30 tablet 3    metoprolol tartrate (LOPRESSOR) 25 MG tablet Take 1 tablet by mouth 2 times daily 60 tablet 0    DULoxetine (CYMBALTA) 60 MG extended release capsule TK 1 C PO QD  4    ipratropium-albuterol (DUONEB) 0.5-2.5 (3) MG/3ML SOLN nebulizer solution Inhale 3 mLs into the lungs every 6 hours as needed for Shortness of Breath 540 mL 5    fluticasone (FLONASE) 50 MCG/ACT nasal spray 1 spray by Nasal route daily 1 Bottle 3    traMADol (ULTRAM) 50 MG tablet Take 50 mg by mouth every 6 hours as needed for Pain.       methocarbamol (ROBAXIN) 750 MG tablet Take 750 mg by mouth 4 times daily      Insulin Syringe-Needle U-100 30G X 1/2\" 1 ML MISC 1 each by Does not apply route daily 100 each 3    blood glucose test strips (ONETOUCH VERIO) strip TEST 3 TIMES DAILY AS NEEDED 300 each 3    Blood Glucose Monitoring Suppl (ONETOUCH VERIO) w/Device KIT TEST 3 TIMES DAILY AS NEEDED 1 kit 0    montelukast (SINGULAIR) 10 MG tablet Take 1 tab nightly at supper for breathing/allergies 30 tablet 5     No current facility-administered medications for this visit. Biochemical Data, Medical Tests and Procedures:    Lab Results   Component Value Date    LABA1C 6.4 (H) 01/10/2020       Anthropometric Measurements:    Height: 63 inches (160 cm)  Weight: 250 lbs (113.64 kg)  BMI: 44.28 (obese class III)  IBW: 115 lbs (52.27 kg)  %IBW: 217.39%  AdBW: 169 lbs (76.8 kg)     Physical Exam Findings:  Deferred    Nutrition Interview: Pt states her appetite has been fair. She typically eats 2 meals/day with little to no snacks throughout the day (see food recall below). Pt states she tries to snack when not having a meal but finds it hard. Reviewed the importance of having a consistent carbohydrate intake throughout the day to help keep blood sugars steady. Explained a balanced meal should consist of a protein, healthy fat and fiber (carbohydrate). Went over which foods contain carbohydrates and which foods do not. Reviewed some simple snack ideas that the pt could incorporate throughout her day (peanut butter and crackers, cheese stick and apple). Discussed with patient importance of eating more regular meals and explained benefit of eating something small when waking up and how this can help her energy levels throughout the day. Will mail pt a list of healthy snack choices. Noted RN provided pt with Diabetes and COPD hand outs on 2/25/2020. Pt states she does not have any nutrition related questions regarding those hand outs at this time. 24-Hour Diet Recall  Breakfast  Consumed: Pt does not eat breakfast     Lunch  Consumed: Salad     Dinner  Consumed: [place] ([facility]) with potatoes and cabbage    Beverages: Water    Frequency of meals away from home: Not often     Exercise: No because per pt she has no energy. Discussed eating more balanced meals and snacks throughout the day will help with her energy levels. Blood sugar checks:  AM when pt

## 2020-03-27 ASSESSMENT — ENCOUNTER SYMPTOMS
CHEST TIGHTNESS: 1
SHORTNESS OF BREATH: 1
RHINORRHEA: 0
SORE THROAT: 0
WHEEZING: 0

## 2020-04-01 RX ORDER — SUMATRIPTAN 25 MG/1
TABLET, FILM COATED ORAL
Qty: 9 TABLET | Refills: 0 | Status: SHIPPED | OUTPATIENT
Start: 2020-04-01 | End: 2020-05-01

## 2020-04-01 RX ORDER — BUSPIRONE HYDROCHLORIDE 15 MG/1
TABLET ORAL
Qty: 90 TABLET | Refills: 2 | Status: SHIPPED | OUTPATIENT
Start: 2020-04-01 | End: 2020-07-01

## 2020-04-09 ENCOUNTER — CARE COORDINATION (OUTPATIENT)
Dept: CASE MANAGEMENT | Age: 50
End: 2020-04-09

## 2020-04-16 ENCOUNTER — CARE COORDINATION (OUTPATIENT)
Dept: CASE MANAGEMENT | Age: 50
End: 2020-04-16

## 2020-04-16 NOTE — CARE COORDINATION
Contacted Tapan Lewis and left voicemail regarding Dietitian follow up. JOSE ANGEL completed initial nutrition assessment on 3/26 and outreached for follow up on 4/9 and today 4/16. JOSE ANGEL left call back number and will continue to follow/assist with patient return call.      [address] [facility], [address] [place] [address]

## 2020-04-20 ENCOUNTER — E-VISIT (OUTPATIENT)
Dept: FAMILY MEDICINE CLINIC | Age: 50
End: 2020-04-20
Payer: MEDICAID

## 2020-04-20 ENCOUNTER — PATIENT MESSAGE (OUTPATIENT)
Dept: FAMILY MEDICINE CLINIC | Age: 50
End: 2020-04-20

## 2020-04-20 PROCEDURE — 99421 OL DIG E/M SVC 5-10 MIN: CPT | Performed by: NURSE PRACTITIONER

## 2020-04-20 RX ORDER — TOBRAMYCIN 3 MG/ML
1 SOLUTION/ DROPS OPHTHALMIC EVERY 4 HOURS
Qty: 1 BOTTLE | Refills: 0 | Status: SHIPPED | OUTPATIENT
Start: 2020-04-20 | End: 2020-06-30

## 2020-04-28 ENCOUNTER — E-VISIT (OUTPATIENT)
Dept: FAMILY MEDICINE CLINIC | Age: 50
End: 2020-04-28

## 2020-04-29 ENCOUNTER — TELEPHONE (OUTPATIENT)
Dept: FAMILY MEDICINE CLINIC | Age: 50
End: 2020-04-29

## 2020-04-29 ENCOUNTER — TELEMEDICINE (OUTPATIENT)
Dept: FAMILY MEDICINE CLINIC | Age: 50
End: 2020-04-29
Payer: MEDICAID

## 2020-04-29 PROBLEM — F12.90 CANNABINOID HYPEREMESIS SYNDROME: Status: ACTIVE | Noted: 2020-04-29

## 2020-04-29 PROBLEM — F32.1 MODERATE SINGLE CURRENT EPISODE OF MAJOR DEPRESSIVE DISORDER (HCC): Status: ACTIVE | Noted: 2018-10-26

## 2020-04-29 PROBLEM — R11.2 CANNABINOID HYPEREMESIS SYNDROME: Status: ACTIVE | Noted: 2020-04-29

## 2020-04-29 PROBLEM — C85.99: Status: RESOLVED | Noted: 2018-09-25 | Resolved: 2020-04-29

## 2020-04-29 PROCEDURE — 99214 OFFICE O/P EST MOD 30 MIN: CPT | Performed by: NURSE PRACTITIONER

## 2020-04-29 PROCEDURE — G8926 SPIRO NO PERF OR DOC: HCPCS | Performed by: NURSE PRACTITIONER

## 2020-04-29 PROCEDURE — 2022F DILAT RTA XM EVC RTNOPTHY: CPT | Performed by: NURSE PRACTITIONER

## 2020-04-29 PROCEDURE — G8427 DOCREV CUR MEDS BY ELIG CLIN: HCPCS | Performed by: NURSE PRACTITIONER

## 2020-04-29 PROCEDURE — 1036F TOBACCO NON-USER: CPT | Performed by: NURSE PRACTITIONER

## 2020-04-29 PROCEDURE — G8417 CALC BMI ABV UP PARAM F/U: HCPCS | Performed by: NURSE PRACTITIONER

## 2020-04-29 PROCEDURE — 3023F SPIROM DOC REV: CPT | Performed by: NURSE PRACTITIONER

## 2020-04-29 PROCEDURE — 3044F HG A1C LEVEL LT 7.0%: CPT | Performed by: NURSE PRACTITIONER

## 2020-04-29 RX ORDER — LIDOCAINE HYDROCHLORIDE 20 MG/ML
5 SOLUTION OROPHARYNGEAL PRN
Qty: 100 ML | Refills: 0 | Status: SHIPPED | OUTPATIENT
Start: 2020-04-29 | End: 2020-06-30

## 2020-04-29 RX ORDER — ALPRAZOLAM 0.25 MG/1
0.25 TABLET ORAL 3 TIMES DAILY PRN
Qty: 42 TABLET | Refills: 0 | Status: SHIPPED | OUTPATIENT
Start: 2020-04-29 | End: 2020-05-13

## 2020-04-29 RX ORDER — PREGABALIN 150 MG/1
150 CAPSULE ORAL 2 TIMES DAILY
Qty: 60 CAPSULE | Refills: 2 | Status: SHIPPED | OUTPATIENT
Start: 2020-04-29 | End: 2020-06-30 | Stop reason: DRUGHIGH

## 2020-04-29 RX ORDER — AMOXICILLIN 875 MG/1
875 TABLET, COATED ORAL 2 TIMES DAILY
Qty: 20 TABLET | Refills: 0 | Status: SHIPPED | OUTPATIENT
Start: 2020-04-29 | End: 2020-05-09

## 2020-04-29 ASSESSMENT — ENCOUNTER SYMPTOMS
TROUBLE SWALLOWING: 0
WHEEZING: 0
COLOR CHANGE: 0
SORE THROAT: 0
SHORTNESS OF BREATH: 0
SINUS PRESSURE: 0
BACK PAIN: 1
COUGH: 0
RHINORRHEA: 0

## 2020-04-29 NOTE — PROGRESS NOTES
current use of insulin ([facility]     11. Morbid obesity ([facility]     12. Mild persistent asthma with acute exacerbation         Plan:      No orders of the defined types were placed in this encounter. Orders Placed This Encounter   Medications    pregabalin (LYRICA) 150 MG capsule     Sig: Take 1 capsule by mouth 2 times daily for 90 days. Dispense:  60 capsule     Refill:  2    lidocaine viscous hcl (XYLOCAINE) 2 % SOLN solution     Sig: Take 5 mLs by mouth as needed for Dental Pain Up to once per hours     Dispense:  100 mL     Refill:  0    amoxicillin (AMOXIL) 875 MG tablet     Sig: Take 1 tablet by mouth 2 times daily for 10 days     Dispense:  20 tablet     Refill:  0    ALPRAZolam (XANAX) 0.25 MG tablet     Sig: Take 1 tablet by mouth 3 times daily as needed for Anxiety for up to 14 days. Dispense:  42 tablet     Refill:  0       Return in about 2 months (around 6/29/2020), or if symptoms worsen or fail to improve. Fibromyalgia- increase lyrica. Discussed that this could increase side effect and cause her to swell again. If it was minimal and not bothersome okay to continue as it did this previously and resolved. Anxiety/panic attacks- okay for another short dose of xanax, but will need to look in to adjustment of controller medications if not improving. Also discussed talk therapy. Dental pain/infection- Start atb. Okay for lidocaine prn. Encouraged putting it on cotton ball and placing directly on affected area. Take out to eat and drink. Encouraged calling dentist to see if any are opening back up. Also discussed that from my understanding Dr. Amara Parmar is doing emergency visits if deemed necessary and [facility] should try to call there if her regular dentist is not opening. All other conditions chronic, stable.  Pt should continue regular follow up with specialist. Will need blood work when able to come back in the office as some of the more in depth labs are needed such as lipid profile,

## 2020-05-20 ENCOUNTER — HOSPITAL ENCOUNTER (EMERGENCY)
Age: 50
Discharge: HOME | End: 2020-05-20
Attending: EMERGENCY MEDICINE
Payer: MEDICAID

## 2020-05-20 VITALS
BODY MASS INDEX: 44.29 KG/M2 | RESPIRATION RATE: 20 BRPM | HEART RATE: 102 BPM | DIASTOLIC BLOOD PRESSURE: 81 MMHG | WEIGHT: 250 LBS | OXYGEN SATURATION: 96 % | TEMPERATURE: 98.1 F | SYSTOLIC BLOOD PRESSURE: 157 MMHG

## 2020-05-20 PROCEDURE — 99283 EMERGENCY DEPT VISIT LOW MDM: CPT

## 2020-05-20 PROCEDURE — 6370000000 HC RX 637 (ALT 250 FOR IP): Performed by: EMERGENCY MEDICINE

## 2020-05-20 RX ORDER — CLINDAMYCIN HYDROCHLORIDE 300 MG/1
300 CAPSULE ORAL 4 TIMES DAILY
Qty: 40 CAPSULE | Refills: 0 | Status: SHIPPED | OUTPATIENT
Start: 2020-05-20 | End: 2020-05-30

## 2020-05-20 RX ORDER — HYDROCODONE BITARTRATE AND ACETAMINOPHEN 5; 325 MG/1; MG/1
2 TABLET ORAL ONCE
Status: COMPLETED | OUTPATIENT
Start: 2020-05-20 | End: 2020-05-20

## 2020-05-20 RX ORDER — CLINDAMYCIN HYDROCHLORIDE 150 MG/1
300 CAPSULE ORAL ONCE
Status: COMPLETED | OUTPATIENT
Start: 2020-05-20 | End: 2020-05-20

## 2020-05-20 RX ADMIN — CLINDAMYCIN HYDROCHLORIDE 300 MG: 150 CAPSULE ORAL at 19:51

## 2020-05-20 RX ADMIN — HYDROCODONE BITARTRATE AND ACETAMINOPHEN 2 TABLET: 5; 325 TABLET ORAL at 19:51

## 2020-05-20 ASSESSMENT — ENCOUNTER SYMPTOMS
SHORTNESS OF BREATH: 0
EYE DISCHARGE: 0
WHEEZING: 0
VOMITING: 0
CHEST TIGHTNESS: 0
PHOTOPHOBIA: 0
ABDOMINAL PAIN: 0
ABDOMINAL DISTENTION: 0
COUGH: 0
SORE THROAT: 0

## 2020-05-20 ASSESSMENT — PAIN DESCRIPTION - DESCRIPTORS: DESCRIPTORS: ACHING;THROBBING;SHARP;SHOOTING

## 2020-05-20 ASSESSMENT — PAIN DESCRIPTION - ORIENTATION: ORIENTATION: LEFT;LOWER

## 2020-05-20 ASSESSMENT — PAIN DESCRIPTION - LOCATION: LOCATION: MOUTH

## 2020-05-20 ASSESSMENT — PAIN DESCRIPTION - PAIN TYPE: TYPE: ACUTE PAIN;CHRONIC PAIN

## 2020-05-20 ASSESSMENT — PAIN SCALES - GENERAL
PAINLEVEL_OUTOF10: 9
PAINLEVEL_OUTOF10: 9

## 2020-05-20 ASSESSMENT — PAIN DESCRIPTION - FREQUENCY: FREQUENCY: CONTINUOUS

## 2020-05-20 NOTE — ED PROVIDER NOTES
review of systems was reviewed and negative. PAST MEDICAL HISTORY     Past Medical History:   Diagnosis Date    Anxiety     Arthritis     Asthma     Bronchopneumonia     Cancer ([facility]     renal    Cerebral artery occlusion with cerebral infarction (HCC)     Chronic bilateral low back pain with sciatica     Chronic kidney disease     Chronic obstructive lung disease ([facility] 7/26/2019    Depression     Fibromyalgia     Hypertension     Insulin dependent type 2 diabetes mellitus, uncontrolled ([facility] 8/3/2018    Localized enlarged lymph nodes 10/26/2018    Mixed headache     Pure hyperglyceridemia 5/19/2017    Sarcoidosis     Sleep apnea     does not wear cpap    Thyroid goiter          SURGICALHISTORY       Past Surgical History:   Procedure Laterality Date    BRONCHOSCOPY  10/26/2018    DR. STEARNS    KIDNEY REMOVAL Right 08/2016    KIDNEY REMOVAL Right 2016    LUNG BIOPSY Right 10/2018    THYROID LOBECTOMY Right 6/13/14    THYROIDECTOMY  02/21/2019    DR. MAGDALENO    THYROIDECTOMY  2018    URETER STENT PLACEMENT Left 08/2016         CURRENT MEDICATIONS       Previous Medications    ACETAMINOPHEN (AMINOFEN) 325 MG TABLET    Take 2 tablets by mouth every 6 hours as needed for Pain    ADMELOG 100 UNIT/ML INJECTION VIAL    INJECT 4 UNITS INTO THE SKIN THREE TIMES DAILY AS NEEDED FOR HIGH BLOOD SUGAR    ALBUTEROL (PROVENTIL) (2.5 MG/3ML) 0.083% NEBULIZER SOLUTION    Take 3 mLs by nebulization every 4 hours as needed for Wheezing    ALBUTEROL SULFATE  (90 BASE) MCG/ACT INHALER    Inhale 2 puffs into the lungs every 4 hours as needed for Wheezing    ATORVASTATIN (LIPITOR) 40 MG TABLET    Take 1 tablet by mouth nightly    BLOOD GLUCOSE MONITORING SUPPL (ONETOUCH VERIO) W/DEVICE KIT    TEST 3 TIMES DAILY AS NEEDED    BLOOD GLUCOSE TEST STRIPS (EXACTECH TEST) STRIP    1 each by In Vitro route 3 times daily (Accu-check test strips) As needed.  DX:E11.65    BLOOD GLUCOSE TEST STRIPS ([facility] moist.      Comments: Patient has decay of the third molar left lower jaw with no associated abscess. The tooth is extremely tender. There is no drainage. Eyes:      Conjunctiva/sclera: Conjunctivae normal.      Pupils: Pupils are equal, round, and reactive to light. Neck:      Musculoskeletal: Normal range of motion and neck supple. Cardiovascular:      Rate and Rhythm: Normal rate and regular rhythm. Heart sounds: Normal heart sounds. Pulmonary:      Effort: Pulmonary effort is normal.      Breath sounds: Normal breath sounds. Abdominal:      General: Bowel sounds are normal.      Palpations: Abdomen is soft. Tenderness: There is no abdominal tenderness. There is no guarding. Musculoskeletal: Normal range of motion. Skin:     General: Skin is warm and dry. Capillary Refill: Capillary refill takes less than 2 seconds. Neurological:      Mental Status: She is alert and oriented to person, place, and time. Psychiatric:         Mood and Affect: Mood normal.         DIAGNOSTIC RESULTS     EKG: All EKG's are interpreted by the Emergency Department Physician who either signs or Co-signsthis chart in the absence of a cardiologist.      RADIOLOGY:   Veola Mins such as CT, Ultrasound and MRI are read by the radiologist. Plain radiographic images are visualized and preliminarily interpreted by the emergency physician with the below findings:      Interpretation per the Radiologist below, if available at the time ofthis note:    No orders to display         ED BEDSIDE ULTRASOUND:   Performed by ED Physician - none    LABS:  Labs Reviewed - No data to display    All other labs were within normal range or not returned as of this dictation.     EMERGENCY DEPARTMENT COURSE and DIFFERENTIAL DIAGNOSIS/MDM:   Vitals:    Vitals:    05/20/20 1939 05/20/20 1941   BP:  (!) 157/81   Pulse:  102   Resp:  20   Temp:  98.1 °F (36.7 °C)   TempSrc:  Oral   SpO2:  96%   Weight: 250 lb (113.4 kg) MDM patient with dental pain but no apparent abscess. She received prescription for narcotics monthly. She is given a prescription today for for antibiotics only. CONSULTS:  None    PROCEDURES:  Unless otherwise noted below, none     Procedures    FINAL IMPRESSION      1.  Pain, dental          DISPOSITION/PLAN   DISPOSITION Decision To Discharge 05/20/2020 08:03:58 PM      PATIENT REFERRED TO:  dentist    Schedule an appointment as soon as possible for a visit         DISCHARGE MEDICATIONS:  New Prescriptions    CLINDAMYCIN (CLEOCIN) 300 MG CAPSULE    Take 1 capsule by mouth 4 times daily for 10 days          (Please note that portions of this note were completed with a voice recognition program.  Efforts were made to edit the dictations but occasionally words are mis-transcribed.)    Fallon Martin MD (electronically signed)  Attending Emergency Physician         Fallon Martin MD  05/20/20 2005

## 2020-05-21 ENCOUNTER — CARE COORDINATION (OUTPATIENT)
Dept: CARE COORDINATION | Age: 50
End: 2020-05-21

## 2020-05-21 ENCOUNTER — TELEMEDICINE (OUTPATIENT)
Dept: FAMILY MEDICINE CLINIC | Age: 50
End: 2020-05-21
Payer: MEDICAID

## 2020-05-21 PROCEDURE — 99213 OFFICE O/P EST LOW 20 MIN: CPT | Performed by: INTERNAL MEDICINE

## 2020-05-21 PROCEDURE — G8428 CUR MEDS NOT DOCUMENT: HCPCS | Performed by: INTERNAL MEDICINE

## 2020-05-21 ASSESSMENT — ENCOUNTER SYMPTOMS
EYE ITCHING: 0
SINUS PAIN: 0
BLOOD IN STOOL: 0
VOICE CHANGE: 0
NAUSEA: 0
EYE PAIN: 0
BACK PAIN: 0
EYE REDNESS: 0
CONSTIPATION: 0
CHEST TIGHTNESS: 0
WHEEZING: 0
COUGH: 0
SINUS PRESSURE: 0
ABDOMINAL DISTENTION: 0
FACIAL SWELLING: 0
SORE THROAT: 0
RHINORRHEA: 0
ABDOMINAL PAIN: 0
SHORTNESS OF BREATH: 0
DIARRHEA: 0
TROUBLE SWALLOWING: 0
VOMITING: 0
PHOTOPHOBIA: 0
APNEA: 0
COLOR CHANGE: 0
RECTAL PAIN: 0
EYE DISCHARGE: 0

## 2020-05-21 NOTE — ED NOTES
Patient given discharge instructions and prescription and verbalized understanding. Vital signs stable. Resp even and unlabored. Skin warm, dry and intact. Patient is alert and oriented. Patient doesn't have any questions at this time.         Monica Funk RN  05/20/20 2018

## 2020-05-21 NOTE — PROGRESS NOTES
2020    Francia Cyr (:  1970) is a 53 y.o. female, here for evaluation of the following medical concerns:    HPI      50-year-old female recently diagnosed with dental pain presents with tooth pain localized to approximately the left third molar. She completed a course of Augmentin with some improvement in symptoms however symptoms worsened prompting her to report to the emergency department yesterday. She was placed on clindamycin and subsequently began experiencing difficulty voiding. She questions as to whether her symptoms are tolerable to initiation of clindamycin. In addition she reports that the swelling at the site of pain. She has an appointment scheduled with the dentist.  She denies fevers chills night sweats. She denies difficulty breathing or controlling her saliva. At present he denies polyuria,  Polydipsia, constitutional, sinus, visual, cardiopulmonary, urologic, gastrointestinal, immunologic/hematologic, additional musculoskeletal, neurologic,dermatologic, or psychiatric complaints. Review of Systems   Constitutional: Negative for chills, diaphoresis, fatigue and fever. HENT: Positive for dental problem. Negative for congestion, drooling, ear discharge, ear pain, facial swelling, hearing loss, mouth sores, nosebleeds, postnasal drip, rhinorrhea, sinus pressure, sinus pain, sneezing, sore throat, tinnitus, trouble swallowing and voice change. Left-sided tooth pain. Eyes: Negative for photophobia, pain, discharge, redness, itching and visual disturbance. Respiratory: Negative for apnea, cough, chest tightness, shortness of breath and wheezing. Cardiovascular: Negative for chest pain, palpitations and leg swelling. Gastrointestinal: Negative for abdominal distention, abdominal pain, blood in stool, constipation, diarrhea, nausea, rectal pain and vomiting.    Endocrine: Negative for cold intolerance, heat intolerance, polydipsia, polyphagia and nebulizer solution Take 3 mLs by nebulization every 4 hours as needed for Wheezing  Maria M Servetas, DO   metoclopramide (REGLAN) 10 MG tablet Take 1 tablet by mouth 4 times daily  SUDEEP Villanueva CNP   omeprazole (PRILOSEC) 40 MG delayed release capsule Take by mouth  Historical Provider, MD   atorvastatin (LIPITOR) 40 MG tablet Take 1 tablet by mouth nightly  [facility] DO   metoprolol tartrate (LOPRESSOR) 25 MG tablet Take 1 tablet by mouth 2 times daily  [facility] DO   DULoxetine (CYMBALTA) 60 MG extended release capsule TK 1 C PO QD  Historical Provider, MD   ipratropium-albuterol (DUONEB) 0.5-2.5 (3) MG/3ML SOLN nebulizer solution Inhale 3 mLs into the lungs every 6 hours as needed for Shortness of Breath  SUDEEP Melendez CNP   fluticasone (FLONASE) 50 MCG/ACT nasal spray 1 spray by Nasal route daily  SUDEEP Frankel CNP   traMADol (ULTRAM) 50 MG tablet Take 50 mg by mouth every 6 hours as needed for Pain. Historical Provider, MD   methocarbamol (ROBAXIN) 750 MG tablet Take 750 mg by mouth 4 times daily  Historical Provider, MD   Insulin Syringe-Needle U-100 30G X 1/2\" 1 ML MISC 1 each by Does not apply route daily  Amy Carey, DO   blood glucose test strips (ONETOUCH VERIO) strip TEST 3 TIMES DAILY AS NEEDED  Amy Carey DO   Blood Glucose Monitoring Suppl (ONETOUCH VERIO) w/Device KIT TEST 3 TIMES DAILY AS NEEDED  Amy Carey DO   montelukast (SINGULAIR) 10 MG tablet Take 1 tab nightly at supper for breathing/allergies  Amy Carey, DO        Allergies   Allergen Reactions    Shellfish-Derived Products     Ibuprofen     Ketorolac     Other     Propoxyphene N-Acetaminophen      Other reaction(s): Hives    Toradol [Ketorolac Tromethamine]      Pt states she cannot take Toradol because she has only 1 kidney.        Past Medical History:   Diagnosis Date    Anxiety     Arthritis     Asthma     Bronchopneumonia     Cancer ([facility]     renal    Cerebral

## 2020-05-21 NOTE — ED NOTES
Patient resting in bed with call light in reach. Breathes are even and unlabored. Skin is warm and dry. Vital signs are stable. Patient denies any needs at this time.         Monica Funk RN  05/20/20 2022

## 2020-05-22 ENCOUNTER — HOSPITAL ENCOUNTER (EMERGENCY)
Age: 50
Discharge: HOME | End: 2020-05-22
Payer: MEDICAID

## 2020-05-22 ENCOUNTER — HOSPITAL ENCOUNTER (EMERGENCY)
Age: 50
Discharge: HOME | End: 2020-05-23
Attending: EMERGENCY MEDICINE
Payer: MEDICAID

## 2020-05-22 VITALS
SYSTOLIC BLOOD PRESSURE: 165 MMHG | WEIGHT: 250 LBS | RESPIRATION RATE: 18 BRPM | TEMPERATURE: 98.4 F | BODY MASS INDEX: 44.3 KG/M2 | OXYGEN SATURATION: 96 % | DIASTOLIC BLOOD PRESSURE: 82 MMHG | HEIGHT: 63 IN | HEART RATE: 92 BPM

## 2020-05-22 VITALS
HEART RATE: 84 BPM | WEIGHT: 250 LBS | OXYGEN SATURATION: 100 % | SYSTOLIC BLOOD PRESSURE: 101 MMHG | BODY MASS INDEX: 44.3 KG/M2 | RESPIRATION RATE: 18 BRPM | HEIGHT: 63 IN | TEMPERATURE: 98.5 F | DIASTOLIC BLOOD PRESSURE: 74 MMHG

## 2020-05-22 PROCEDURE — 99282 EMERGENCY DEPT VISIT SF MDM: CPT

## 2020-05-22 PROCEDURE — 6370000000 HC RX 637 (ALT 250 FOR IP): Performed by: EMERGENCY MEDICINE

## 2020-05-22 PROCEDURE — 6370000000 HC RX 637 (ALT 250 FOR IP): Performed by: PHYSICIAN ASSISTANT

## 2020-05-22 RX ORDER — AMOXICILLIN AND CLAVULANATE POTASSIUM 875; 125 MG/1; MG/1
1 TABLET, FILM COATED ORAL 2 TIMES DAILY
Qty: 14 TABLET | Refills: 0 | Status: SHIPPED | OUTPATIENT
Start: 2020-05-22 | End: 2020-05-29

## 2020-05-22 RX ORDER — HYDROCODONE BITARTRATE AND ACETAMINOPHEN 5; 325 MG/1; MG/1
1 TABLET ORAL EVERY 6 HOURS PRN
Qty: 10 TABLET | Refills: 0 | Status: SHIPPED | OUTPATIENT
Start: 2020-05-22 | End: 2020-05-25

## 2020-05-22 RX ORDER — HYDROCODONE BITARTRATE AND ACETAMINOPHEN 5; 325 MG/1; MG/1
2 TABLET ORAL ONCE
Status: COMPLETED | OUTPATIENT
Start: 2020-05-22 | End: 2020-05-22

## 2020-05-22 RX ORDER — LIDOCAINE HYDROCHLORIDE 20 MG/ML
15 SOLUTION OROPHARYNGEAL ONCE
Status: COMPLETED | OUTPATIENT
Start: 2020-05-22 | End: 2020-05-22

## 2020-05-22 RX ADMIN — HYDROCODONE BITARTRATE AND ACETAMINOPHEN 2 TABLET: 5; 325 TABLET ORAL at 10:56

## 2020-05-22 RX ADMIN — HYDROCODONE BITARTRATE AND ACETAMINOPHEN 2 TABLET: 5; 325 TABLET ORAL at 23:53

## 2020-05-22 RX ADMIN — LIDOCAINE HYDROCHLORIDE 15 ML: 20 SOLUTION ORAL; TOPICAL at 10:56

## 2020-05-22 ASSESSMENT — ENCOUNTER SYMPTOMS
SORE THROAT: 0
EYE DISCHARGE: 0
EYE PAIN: 0
COUGH: 0
ABDOMINAL PAIN: 0
PHOTOPHOBIA: 0
DIARRHEA: 0
COUGH: 0
WHEEZING: 0
SHORTNESS OF BREATH: 0
RHINORRHEA: 0
BACK PAIN: 0
ABDOMINAL DISTENTION: 0
NAUSEA: 0
VOMITING: 0
SORE THROAT: 0
PHOTOPHOBIA: 0
ABDOMINAL PAIN: 0
SHORTNESS OF BREATH: 0
VOMITING: 0
CHEST TIGHTNESS: 0

## 2020-05-22 ASSESSMENT — PAIN SCALES - GENERAL
PAINLEVEL_OUTOF10: 9
PAINLEVEL_OUTOF10: 10
PAINLEVEL_OUTOF10: 10
PAINLEVEL_OUTOF10: 9

## 2020-05-22 ASSESSMENT — PAIN DESCRIPTION - ORIENTATION: ORIENTATION: LEFT;LOWER

## 2020-05-22 ASSESSMENT — PAIN DESCRIPTION - LOCATION
LOCATION: MOUTH
LOCATION: TEETH

## 2020-05-22 ASSESSMENT — PAIN DESCRIPTION - PAIN TYPE
TYPE: ACUTE PAIN
TYPE: ACUTE PAIN

## 2020-05-22 ASSESSMENT — PAIN DESCRIPTION - ONSET: ONSET: PROGRESSIVE

## 2020-05-22 ASSESSMENT — PAIN DESCRIPTION - FREQUENCY: FREQUENCY: CONTINUOUS

## 2020-05-22 NOTE — ED TRIAGE NOTES
Was taking clindamycin for dental issue, had to have stopped due to reaction to med. Went to dentist today, tooth worse, unable to get into dentist until thurs needs something for pain and tooth until able to see dentist. Pt is a+o x4 msps intact. Pt took tylenol around 1a today.

## 2020-05-22 NOTE — ED PROVIDER NOTES
(CYMBALTA) 60 MG EXTENDED RELEASE CAPSULE    TK 1 C PO QD    FLUTICASONE (FLONASE) 50 MCG/ACT NASAL SPRAY    1 spray by Nasal route daily    INSULIN DEGLUDEC (TRESIBA FLEXTOUCH) 100 UNIT/ML SOPN    Inject 30 Units into the skin nightly    INSULIN PEN NEEDLE (B-D ULTRAFINE III SHORT PEN) 31G X 8 MM MISC    Inject 1 each into the skin 3 times daily    INSULIN SYRINGE-NEEDLE U-100 30G X 1/2\" 1 ML MISC    1 each by Does not apply route daily    IPRATROPIUM-ALBUTEROL (DUONEB) 0.5-2.5 (3) MG/3ML SOLN NEBULIZER SOLUTION    Inhale 3 mLs into the lungs every 6 hours as needed for Shortness of Breath    LEVOTHYROXINE (SYNTHROID) 112 MCG TABLET    Take 1 tablet by mouth Daily    LIDOCAINE VISCOUS HCL (XYLOCAINE) 2 % SOLN SOLUTION    Take 5 mLs by mouth as needed for Dental Pain Up to once per hours    METHOCARBAMOL (ROBAXIN) 750 MG TABLET    Take 750 mg by mouth 4 times daily    METOCLOPRAMIDE (REGLAN) 10 MG TABLET    Take 1 tablet by mouth 4 times daily    METOPROLOL TARTRATE (LOPRESSOR) 25 MG TABLET    Take 1 tablet by mouth 2 times daily    MONTELUKAST (SINGULAIR) 10 MG TABLET    Take 1 tab nightly at supper for breathing/allergies    OMEPRAZOLE (PRILOSEC) 40 MG DELAYED RELEASE CAPSULE    Take by mouth    ONE TOUCH ULTRASOFT LANCETS MISC    TEST 3 TIMES DAILY AS NEEDED    PREGABALIN (LYRICA) 150 MG CAPSULE    Take 1 capsule by mouth 2 times daily for 90 days. PREGABALIN (LYRICA) 75 MG CAPSULE    Take 1 capsule by mouth 3 times daily for 90 days. PROMETHAZINE (PHENERGAN) 25 MG TABLET    WARNING:  May cause drowsiness. May impair ability to operate vehicles or machinery. Do not use in combination with alcohol.     ! Tablet every 6 hours as needed in conjunction with Ultram for headaches    PROVENTIL  (90 BASE) MCG/ACT INHALER    Inhale 2 puffs into the lungs every 6 hours as needed for Wheezing    SUMATRIPTAN (IMITREX) 25 MG TABLET    TAKE 1 TABLET BY MOUTH 1 TIME AS NEEDED FOR MIGRAINE    TOBRAMYCIN (TOBREX)

## 2020-05-23 NOTE — ED PROVIDER NOTES
Take 1 tablet by mouth 2 times daily for 7 days    ATORVASTATIN (LIPITOR) 40 MG TABLET    Take 1 tablet by mouth nightly    BLOOD GLUCOSE MONITORING SUPPL (ONETOUCH VERIO) W/DEVICE KIT    TEST 3 TIMES DAILY AS NEEDED    BLOOD GLUCOSE TEST STRIPS (EXACTECH TEST) STRIP    1 each by In Vitro route 3 times daily (Accu-check test strips) As needed.  DX:E11.65    BLOOD GLUCOSE TEST STRIPS (ONETOUCH VERIO) STRIP    TEST 3 TIMES DAILY AS NEEDED    BUSPIRONE (BUSPAR) 15 MG TABLET    TAKE 1 TABLET BY MOUTH THREE TIMES DAILY    BUTALBITAL-ACETAMINOPHEN-CAFFEINE (FIORICET, ESGIC) -40 MG PER TABLET    Take 1 tablet by mouth every 6 hours as needed for Headaches    CETIRIZINE (ZYRTEC) 10 MG TABLET    Take 1 tab po qhs prn allergies    CLINDAMYCIN (CLEOCIN) 300 MG CAPSULE    Take 1 capsule by mouth 4 times daily for 10 days    DICLOFENAC (PENNSAID) 2 % SOLN    Place 1 applicator onto the skin 2 times daily as needed (pain) Lot [id number] EXP 11/20    DICLOFENAC SODIUM (VOLTAREN) 1 % GEL    Apply 2 g topically 4 times daily as needed for Pain    DULOXETINE (CYMBALTA) 60 MG EXTENDED RELEASE CAPSULE    TK 1 C PO QD    FLUTICASONE (FLONASE) 50 MCG/ACT NASAL SPRAY    1 spray by Nasal route daily    INSULIN DEGLUDEC (TRESIBA FLEXTOUCH) 100 UNIT/ML SOPN    Inject 30 Units into the skin nightly    INSULIN PEN NEEDLE (B-D ULTRAFINE III SHORT PEN) 31G X 8 MM MISC    Inject 1 each into the skin 3 times daily    INSULIN SYRINGE-NEEDLE U-100 30G X 1/2\" 1 ML MISC    1 each by Does not apply route daily    IPRATROPIUM-ALBUTEROL (DUONEB) 0.5-2.5 (3) MG/3ML SOLN NEBULIZER SOLUTION    Inhale 3 mLs into the lungs every 6 hours as needed for Shortness of Breath    LEVOTHYROXINE (SYNTHROID) 112 MCG TABLET    Take 1 tablet by mouth Daily    LIDOCAINE VISCOUS HCL (XYLOCAINE) 2 % SOLN SOLUTION    Take 5 mLs by mouth as needed for Dental Pain Up to once per hours    METHOCARBAMOL (ROBAXIN) 750 MG TABLET    Take 750 mg by mouth 4 times daily METOCLOPRAMIDE (REGLAN) 10 MG TABLET    Take 1 tablet by mouth 4 times daily    METOPROLOL TARTRATE (LOPRESSOR) 25 MG TABLET    Take 1 tablet by mouth 2 times daily    MONTELUKAST (SINGULAIR) 10 MG TABLET    Take 1 tab nightly at supper for breathing/allergies    OMEPRAZOLE (PRILOSEC) 40 MG DELAYED RELEASE CAPSULE    Take by mouth    ONE TOUCH ULTRASOFT LANCETS MISC    TEST 3 TIMES DAILY AS NEEDED    PREGABALIN (LYRICA) 150 MG CAPSULE    Take 1 capsule by mouth 2 times daily for 90 days. PREGABALIN (LYRICA) 75 MG CAPSULE    Take 1 capsule by mouth 3 times daily for 90 days. PROMETHAZINE (PHENERGAN) 25 MG TABLET    WARNING:  May cause drowsiness. May impair ability to operate vehicles or machinery. Do not use in combination with alcohol. ! Tablet every 6 hours as needed in conjunction with Ultram for headaches    PROVENTIL  (90 BASE) MCG/ACT INHALER    Inhale 2 puffs into the lungs every 6 hours as needed for Wheezing    SUMATRIPTAN (IMITREX) 25 MG TABLET    TAKE 1 TABLET BY MOUTH 1 TIME AS NEEDED FOR MIGRAINE    TOBRAMYCIN (TOBREX) 0.3 % OPHTHALMIC SOLUTION    Place 1 drop into the right eye every 4 hours    TRAMADOL (ULTRAM) 50 MG TABLET    Take 50 mg by mouth every 6 hours as needed for Pain. ALLERGIES     Shellfish-derived products; Ibuprofen; Ketorolac;  Other; Propoxyphene n-acetaminophen; and Toradol [ketorolac tromethamine]    FAMILY HISTORY       Family History   Problem Relation Age of Onset    Cancer Father     Diabetes Father     Allergy (Severe) Father     Heart Attack Father     Prostate Cancer Father     High Blood Pressure Mother     Diabetes Mother     Arthritis Mother     High Cholesterol Mother     Vision Loss Mother     Alcohol Abuse Neg Hx     Anemia Neg Hx     Arrhythmia Neg Hx     Asthma Neg Hx     Atrial Fibrillation Neg Hx     Birth Defects Neg Hx     Breast Cancer Neg Hx     Coronary Art Dis Neg Hx     Colon Cancer Neg Hx     Depression Neg Hx     DIAGNOSIS/MDM:   Vitals:    Vitals:    05/22/20 2318   BP: (!) 146/84   Pulse: 85   Resp: 18   Temp: 98.5 °F (36.9 °C)   TempSrc: Oral   Weight: 250 lb (113.4 kg)   Height: 5' 3\" (1.6 m)          MDM patient with dental pain that is uncontrolled. Her last visit with me I told her I cannot prescribe any further narcotic medication because she receives Ultram through her primary care physician for chronic pain. Based on the [place] crisis and her ability to get into dentist and see dental surgeon that is limited her treatment. I am going to write a limited prescription for Norco today. I have explained to the patient that she will not be receiving any further narcotics from the department for this problem and that she needs to have this resolved and make the medication last until the follow-up dental visit. CONSULTS:  None    PROCEDURES:  Unless otherwise noted below, none     Procedures    FINAL IMPRESSION      1. Pain, dental          DISPOSITION/PLAN   DISPOSITION Decision To Discharge 05/22/2020 11:45:57 PM      PATIENT REFERRED TO:  DENTIST            DISCHARGE MEDICATIONS:  New Prescriptions    HYDROCODONE-ACETAMINOPHEN (NORCO) 5-325 MG PER TABLET    Take 1 tablet by mouth every 6 hours as needed for Pain for up to 3 days.           (Please note that portions of this note were completed with a voice recognition program.  Efforts were made to edit the dictations but occasionally words are mis-transcribed.)    Adam Alfaro MD (electronically signed)  Attending Emergency Physician          Adam Alfaro MD  05/22/20 5256       Adam Alfaro MD  05/22/20 7807

## 2020-05-26 ENCOUNTER — PATIENT MESSAGE (OUTPATIENT)
Dept: FAMILY MEDICINE CLINIC | Age: 50
End: 2020-05-26

## 2020-05-27 RX ORDER — MAGNESIUM OXIDE 400 MG/1
200 TABLET ORAL DAILY
Qty: 30 TABLET | Refills: 1 | Status: SHIPPED | OUTPATIENT
Start: 2020-05-27 | End: 2020-11-02

## 2020-05-27 NOTE — TELEPHONE ENCOUNTER
From: Angela Poster  To: Davin Mauricio, APRN - CNP  Sent: 5/26/2020 9:35 PM EDT  Subject: Non-Urgent Medical Question    I'm writing in to report all these body spasms I keep having, they went away for awhile and haven't bothered me for some time now, but they are coming more and more and I'm starting to have them about two or three times a day. 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 And they seem to have become even more painful then before. . now they are starting to restrict my breathing. I don't know what I need to do, I have tried taking potassium, consuming more water thinking it may be from dehydration, and even try to do more stretching. But I see that is not working. Please give me in site on what I need to do, or can do. Plus the bottom of my feet are bothering me alot again lately. Jamal Rodriguez gets really hard for me to walk, I believe I have an appointment set to see you soon, so we can discuss that then, or you can recommend something I can try that may help.  Thanks

## 2020-05-29 ENCOUNTER — CARE COORDINATION (OUTPATIENT)
Dept: CARE COORDINATION | Age: 50
End: 2020-05-29

## 2020-05-29 NOTE — CARE COORDINATION
factors. Patient reports improvement in pain. Patient states the pain is a tooth issue. Patient reports she is currently on her way to the dentist to have the tooth pulled.

## 2020-06-04 ENCOUNTER — HOSPITAL ENCOUNTER (EMERGENCY)
Age: 50
Discharge: HOME | End: 2020-06-04
Payer: MEDICAID

## 2020-06-04 ENCOUNTER — APPOINTMENT (OUTPATIENT)
Dept: GENERAL RADIOLOGY | Age: 50
End: 2020-06-04
Payer: MEDICAID

## 2020-06-04 VITALS
RESPIRATION RATE: 19 BRPM | DIASTOLIC BLOOD PRESSURE: 69 MMHG | TEMPERATURE: 98.1 F | SYSTOLIC BLOOD PRESSURE: 123 MMHG | OXYGEN SATURATION: 100 % | HEART RATE: 89 BPM

## 2020-06-04 LAB
ALBUMIN SERPL-MCNC: 3.6 G/DL (ref 3.5–4.6)
ALP BLD-CCNC: 130 U/L (ref 40–130)
ALT SERPL-CCNC: 20 U/L (ref 0–33)
AMPHETAMINE SCREEN, URINE: ABNORMAL
ANION GAP SERPL CALCULATED.3IONS-SCNC: 11 MEQ/L (ref 9–15)
AST SERPL-CCNC: 23 U/L (ref 0–35)
BACTERIA: ABNORMAL /HPF
BARBITURATE SCREEN URINE: POSITIVE
BASOPHILS ABSOLUTE: 0.1 K/UL (ref 0–0.2)
BASOPHILS RELATIVE PERCENT: 1.2 %
BENZODIAZEPINE SCREEN, URINE: ABNORMAL
BILIRUB SERPL-MCNC: <0.2 MG/DL (ref 0.2–0.7)
BILIRUBIN URINE: NEGATIVE
BLOOD, URINE: NEGATIVE
BUN BLDV-MCNC: 10 MG/DL (ref 6–20)
CALCIUM SERPL-MCNC: 8.8 MG/DL (ref 8.5–9.9)
CANNABINOID SCREEN URINE: ABNORMAL
CHLORIDE BLD-SCNC: 101 MEQ/L (ref 95–107)
CLARITY: CLEAR
CO2: 22 MEQ/L (ref 20–31)
COCAINE METABOLITE SCREEN URINE: ABNORMAL
COLOR: YELLOW
CREAT SERPL-MCNC: 1.33 MG/DL (ref 0.5–0.9)
D DIMER: 0.37 MG/L FEU (ref 0–0.5)
EKG ATRIAL RATE: 92 BPM
EKG P AXIS: 50 DEGREES
EKG P-R INTERVAL: 168 MS
EKG Q-T INTERVAL: 346 MS
EKG QRS DURATION: 82 MS
EKG QTC CALCULATION (BAZETT): 427 MS
EKG R AXIS: 16 DEGREES
EKG T AXIS: 37 DEGREES
EKG VENTRICULAR RATE: 92 BPM
EOSINOPHILS ABSOLUTE: 0.3 K/UL (ref 0–0.7)
EOSINOPHILS RELATIVE PERCENT: 4 %
EPITHELIAL CELLS, UA: ABNORMAL /HPF (ref 0–5)
ETHANOL PERCENT: NORMAL G/DL
ETHANOL: <10 MG/DL (ref 0–0.08)
GFR AFRICAN AMERICAN: 51.3
GFR NON-AFRICAN AMERICAN: 42.4
GLOBULIN: 3 G/DL (ref 2.3–3.5)
GLUCOSE BLD-MCNC: 211 MG/DL (ref 70–99)
GLUCOSE URINE: NEGATIVE MG/DL
HCG, URINE, POC: NEGATIVE
HCT VFR BLD CALC: 42.6 % (ref 37–47)
HEMOGLOBIN: 14.3 G/DL (ref 12–16)
HYALINE CASTS: ABNORMAL /HPF (ref 0–5)
KETONES, URINE: ABNORMAL MG/DL
LACTIC ACID: 1.8 MMOL/L (ref 0.5–2.2)
LEUKOCYTE ESTERASE, URINE: ABNORMAL
LYMPHOCYTES ABSOLUTE: 2 K/UL (ref 1–4.8)
LYMPHOCYTES RELATIVE PERCENT: 25.6 %
Lab: ABNORMAL
Lab: NORMAL
MAGNESIUM: 1.9 MG/DL (ref 1.7–2.4)
MCH RBC QN AUTO: 31.6 PG (ref 27–31.3)
MCHC RBC AUTO-ENTMCNC: 33.5 % (ref 33–37)
MCV RBC AUTO: 94.1 FL (ref 82–100)
METHADONE SCREEN, URINE: ABNORMAL
MONOCYTES ABSOLUTE: 0.3 K/UL (ref 0.2–0.8)
MONOCYTES RELATIVE PERCENT: 4.2 %
NEGATIVE QC PASS/FAIL: NORMAL
NEUTROPHILS ABSOLUTE: 5 K/UL (ref 1.4–6.5)
NEUTROPHILS RELATIVE PERCENT: 65 %
NITRITE, URINE: NEGATIVE
OPIATE SCREEN URINE: POSITIVE
OXYCODONE URINE: ABNORMAL
PDW BLD-RTO: 13.2 % (ref 11.5–14.5)
PH UA: 6.5 (ref 5–9)
PHENCYCLIDINE SCREEN URINE: ABNORMAL
PLATELET # BLD: 275 K/UL (ref 130–400)
POSITIVE QC PASS/FAIL: NORMAL
POTASSIUM SERPL-SCNC: 4.3 MEQ/L (ref 3.4–4.9)
PRO-BNP: 42 PG/ML
PROPOXYPHENE SCREEN: ABNORMAL
PROTEIN UA: NEGATIVE MG/DL
RBC # BLD: 4.52 M/UL (ref 4.2–5.4)
RBC UA: ABNORMAL /HPF (ref 0–5)
SODIUM BLD-SCNC: 134 MEQ/L (ref 135–144)
SPECIFIC GRAVITY UA: 1.02 (ref 1–1.03)
TOTAL PROTEIN: 6.6 G/DL (ref 6.3–8)
TROPONIN: <0.01 NG/ML (ref 0–0.01)
URINE REFLEX TO CULTURE: ABNORMAL
UROBILINOGEN, URINE: 0.2 E.U./DL
WBC # BLD: 7.7 K/UL (ref 4.8–10.8)
WBC UA: ABNORMAL /HPF (ref 0–5)

## 2020-06-04 PROCEDURE — 85379 FIBRIN DEGRADATION QUANT: CPT

## 2020-06-04 PROCEDURE — G0480 DRUG TEST DEF 1-7 CLASSES: HCPCS

## 2020-06-04 PROCEDURE — 85025 COMPLETE CBC W/AUTO DIFF WBC: CPT

## 2020-06-04 PROCEDURE — 36415 COLL VENOUS BLD VENIPUNCTURE: CPT

## 2020-06-04 PROCEDURE — 6360000002 HC RX W HCPCS: Performed by: STUDENT IN AN ORGANIZED HEALTH CARE EDUCATION/TRAINING PROGRAM

## 2020-06-04 PROCEDURE — 84484 ASSAY OF TROPONIN QUANT: CPT

## 2020-06-04 PROCEDURE — 83880 ASSAY OF NATRIURETIC PEPTIDE: CPT

## 2020-06-04 PROCEDURE — 96375 TX/PRO/DX INJ NEW DRUG ADDON: CPT

## 2020-06-04 PROCEDURE — 83735 ASSAY OF MAGNESIUM: CPT

## 2020-06-04 PROCEDURE — 93005 ELECTROCARDIOGRAM TRACING: CPT | Performed by: STUDENT IN AN ORGANIZED HEALTH CARE EDUCATION/TRAINING PROGRAM

## 2020-06-04 PROCEDURE — 71045 X-RAY EXAM CHEST 1 VIEW: CPT

## 2020-06-04 PROCEDURE — 81001 URINALYSIS AUTO W/SCOPE: CPT

## 2020-06-04 PROCEDURE — 96374 THER/PROPH/DIAG INJ IV PUSH: CPT

## 2020-06-04 PROCEDURE — 83605 ASSAY OF LACTIC ACID: CPT

## 2020-06-04 PROCEDURE — 99285 EMERGENCY DEPT VISIT HI MDM: CPT

## 2020-06-04 PROCEDURE — 2580000003 HC RX 258: Performed by: STUDENT IN AN ORGANIZED HEALTH CARE EDUCATION/TRAINING PROGRAM

## 2020-06-04 PROCEDURE — 96376 TX/PRO/DX INJ SAME DRUG ADON: CPT

## 2020-06-04 PROCEDURE — 87040 BLOOD CULTURE FOR BACTERIA: CPT

## 2020-06-04 PROCEDURE — 80053 COMPREHEN METABOLIC PANEL: CPT

## 2020-06-04 PROCEDURE — 80307 DRUG TEST PRSMV CHEM ANLYZR: CPT

## 2020-06-04 RX ORDER — 0.9 % SODIUM CHLORIDE 0.9 %
1000 INTRAVENOUS SOLUTION INTRAVENOUS ONCE
Status: COMPLETED | OUTPATIENT
Start: 2020-06-04 | End: 2020-06-04

## 2020-06-04 RX ORDER — PREDNISONE 50 MG/1
50 TABLET ORAL DAILY
Qty: 5 TABLET | Refills: 0 | Status: SHIPPED | OUTPATIENT
Start: 2020-06-04 | End: 2020-06-09

## 2020-06-04 RX ORDER — ONDANSETRON 2 MG/ML
4 INJECTION INTRAMUSCULAR; INTRAVENOUS ONCE
Status: COMPLETED | OUTPATIENT
Start: 2020-06-04 | End: 2020-06-04

## 2020-06-04 RX ORDER — MORPHINE SULFATE 2 MG/ML
4 INJECTION, SOLUTION INTRAMUSCULAR; INTRAVENOUS
Status: COMPLETED | OUTPATIENT
Start: 2020-06-04 | End: 2020-06-04

## 2020-06-04 RX ORDER — METHYLPREDNISOLONE SODIUM SUCCINATE 125 MG/2ML
125 INJECTION, POWDER, LYOPHILIZED, FOR SOLUTION INTRAMUSCULAR; INTRAVENOUS ONCE
Status: COMPLETED | OUTPATIENT
Start: 2020-06-04 | End: 2020-06-04

## 2020-06-04 RX ADMIN — ONDANSETRON 4 MG: 2 INJECTION INTRAMUSCULAR; INTRAVENOUS at 19:10

## 2020-06-04 RX ADMIN — METHYLPREDNISOLONE SODIUM SUCCINATE 125 MG: 125 INJECTION, POWDER, FOR SOLUTION INTRAMUSCULAR; INTRAVENOUS at 19:10

## 2020-06-04 RX ADMIN — MORPHINE SULFATE 4 MG: 2 INJECTION, SOLUTION INTRAMUSCULAR; INTRAVENOUS at 19:10

## 2020-06-04 RX ADMIN — SODIUM CHLORIDE 1000 ML: 9 INJECTION, SOLUTION INTRAVENOUS at 19:12

## 2020-06-04 RX ADMIN — MORPHINE SULFATE 4 MG: 2 INJECTION, SOLUTION INTRAMUSCULAR; INTRAVENOUS at 20:28

## 2020-06-04 ASSESSMENT — PAIN SCALES - GENERAL
PAINLEVEL_OUTOF10: 8
PAINLEVEL_OUTOF10: 10
PAINLEVEL_OUTOF10: 10

## 2020-06-04 ASSESSMENT — PAIN DESCRIPTION - LOCATION: LOCATION: CHEST

## 2020-06-04 ASSESSMENT — PAIN DESCRIPTION - PAIN TYPE: TYPE: ACUTE PAIN

## 2020-06-05 ENCOUNTER — CARE COORDINATION (OUTPATIENT)
Dept: CARE COORDINATION | Age: 50
End: 2020-06-05

## 2020-06-05 PROCEDURE — 93010 ELECTROCARDIOGRAM REPORT: CPT | Performed by: INTERNAL MEDICINE

## 2020-06-05 ASSESSMENT — ENCOUNTER SYMPTOMS
ABDOMINAL PAIN: 0
PHOTOPHOBIA: 0
VOMITING: 0
BLOOD IN STOOL: 0
COUGH: 0
WHEEZING: 0
DIARRHEA: 0
NAUSEA: 0
CONSTIPATION: 0
SHORTNESS OF BREATH: 0

## 2020-06-05 NOTE — ED PROVIDER NOTES
[address] [facility] ED  EMERGENCY DEPARTMENT ENCOUNTER      Pt Name: Agnela Marshall  MRN: [id number]  [place] 1970  Date of evaluation: 6/4/2020  Provider: Michael Cohn Dr       Chief Complaint   Patient presents with    Chest Pain     on and off for several hours         HISTORY OF PRESENT ILLNESS   (Location/Symptom, Timing/Onset, Context/Setting, Quality, Duration, Modifying Factors, Severity)  Note limiting factors. Angela Marshall is a 53 y.o. female who per chart review the past medical history of COPD type 2 diabetes sarcoidosis anxiety asthma depression CKD fibromyalgia hypertension presents to the emergency department with gradual onset constant moderate 8 out of 10 substernal chest pain that is described as sharp and worse with deep inspiration. It is non radiating. Not exertion, associated with nausea vomiting or diaphoresis. Patient states that the chest pain began a few hours hours prior to arrival.  She has not tried anything for her symptoms. She has no significant cardiac history. She states she does have sarcoidosis and that her current symptoms feels similar to when her sarcoidosis flares up. She reports mild shortness of breath. Denies swelling palpitations fever chills nausea vomiting diarrhea dizziness headache abdominal pain cough urinary or bowel changes. HPI    Nursing Notes were reviewed. REVIEW OF SYSTEMS    (2-9 systems for level 4, 10 or more for level 5)     Review of Systems   Constitutional: Negative for chills and fever. HENT: Negative for congestion. Eyes: Negative for photophobia. Respiratory: Negative for cough, shortness of breath and wheezing. Cardiovascular: Positive for chest pain. Negative for palpitations. Gastrointestinal: Negative for abdominal pain, blood in stool, constipation, diarrhea, nausea and vomiting. Genitourinary: Negative for dysuria, frequency and hematuria.    Musculoskeletal: Negative for Tablet every 6 hours as needed in conjunction with Ultram for headaches, Disp-20 tablet, R-0Print       !! - Potential duplicate medications found. Please discuss with provider. ALLERGIES     Shellfish-derived products; Ibuprofen; Ketorolac;  Other; Propoxyphene n-acetaminophen; and Toradol [ketorolac tromethamine]    FAMILY HISTORY       Family History   Problem Relation Age of Onset    Cancer Father     Diabetes Father     Allergy (Severe) Father     Heart Attack Father     Prostate Cancer Father     High Blood Pressure Mother     Diabetes Mother     Arthritis Mother     High Cholesterol Mother     Vision Loss Mother     Alcohol Abuse Neg Hx     Anemia Neg Hx     Arrhythmia Neg Hx     Asthma Neg Hx     Atrial Fibrillation Neg Hx     Birth Defects Neg Hx     Breast Cancer Neg Hx     Coronary Art Dis Neg Hx     Colon Cancer Neg Hx     Depression Neg Hx     Early Death Neg Hx     Hearing Loss Neg Hx     Heart Disease Neg Hx     Learning Disabilities Neg Hx     Kidney Disease Neg Hx     Mental Illness Neg Hx     Mental Retardation Neg Hx     Miscarriages / Stillbirths Neg Hx     Obesity Neg Hx     Osteoporosis Neg Hx     Stroke Neg Hx     Substance Abuse Neg Hx           SOCIAL HISTORY       Social History     Socioeconomic History    Marital status: Legally      Spouse name: Not on file    Number of children: Not on file    Years of education: Not on file    Highest education level: Not on file   Occupational History    Not on file   Social Needs    Financial resource strain: Not on file    Food insecurity     Worry: Not on file     Inability: Not on file    Transportation needs     Medical: Not on file     Non-medical: Not on file   Tobacco Use    Smoking status: Former Smoker     Packs/day: 0.50     Years: 15.00     Pack years: 7.50     Types: Cigarettes     Start date: 2014     Last attempt to quit: 2015     Years since quittin.3    Smokeless tobacco: Former User     Quit date: 3/23/2016   Substance and Sexual Activity    Alcohol use: Not Currently     Alcohol/week: 0.0 standard drinks     Comment: occasionally    Drug use: Yes     Frequency: 3.0 times per week     Types: Marijuana    Sexual activity: Yes     Partners: Male   Lifestyle    Physical activity     Days per week: Not on file     Minutes per session: Not on file    Stress: Not on file   Relationships    Social connections     Talks on phone: Not on file     Gets together: Not on file     Attends Zoroastrian service: Not on file     Active member of club or organization: Not on file     Attends meetings of clubs or organizations: Not on file     Relationship status: Not on file    Intimate partner violence     Fear of current or ex partner: Not on file     Emotionally abused: Not on file     Physically abused: Not on file     Forced sexual activity: Not on file   Other Topics Concern    Not on file   Social History Narrative    Not on file       SCREENINGS                PHYSICAL EXAM    (up to 7 for level 4, 8 or more for level 5)     ED Triage Vitals [06/04/20 1836]   BP Temp Temp src Pulse Resp SpO2 Height Weight   (!) 141/112 98.1 °F (36.7 °C) -- 90 22 98 % -- --       Physical Exam  Constitutional:       General: She is not in acute distress. Appearance: She is well-developed. She is not toxic-appearing. HENT:      Head: Normocephalic and atraumatic. Nose: Nose normal.      Mouth/Throat:      Mouth: Mucous membranes are moist.   Eyes:      Pupils: Pupils are equal, round, and reactive to light. Neck:      Musculoskeletal: Normal range of motion. Cardiovascular:      Rate and Rhythm: Normal rate and regular rhythm. Pulses: Normal pulses. Heart sounds: No murmur. No friction rub. No gallop. Pulmonary:      Effort: Pulmonary effort is normal. No respiratory distress. Breath sounds: Normal breath sounds. No stridor. No wheezing, rhonchi or rales. Chest:      Chest wall: No tenderness. Abdominal:      General: Bowel sounds are normal. There is no distension. Palpations: Abdomen is soft. There is no mass. Tenderness: There is no abdominal tenderness. There is no right CVA tenderness, left CVA tenderness, guarding or rebound. Hernia: No hernia is present. Musculoskeletal:         General: No swelling. Skin:     General: Skin is warm and dry. Capillary Refill: Capillary refill takes less than 2 seconds. Neurological:      General: No focal deficit present. Mental Status: She is alert and oriented to person, place, and time. DIAGNOSTIC RESULTS     EKG: All EKG's are interpreted by the Emergency Department Physician who either signs or Co-signs this chart in the absence of a cardiologist.    EKG shows NSR with HR 89, normal axis, normal intervals, no ST changes. No change compared to 2/24/10.          RADIOLOGY:   Non-plain film images such as CT, Ultrasound and MRI are read by the radiologist. Plain radiographic images are visualized and preliminarily interpreted by the emergency physician with the below findings:    Chest x-ray is negative for acute abnormality    Interpretation per the Radiologist below, if available at the time of this note:    XR CHEST PORTABLE    (Results Pending)         ED BEDSIDE ULTRASOUND:   Performed by ED Physician - none    LABS:  Labs Reviewed   COMPREHENSIVE METABOLIC PANEL - Abnormal; Notable for the following components:       Result Value    Sodium 134 (*)     Glucose 211 (*)     CREATININE 1.33 (*)     GFR Non- 42.4 (*)     GFR  51.3 (*)     All other components within normal limits   CBC WITH AUTO DIFFERENTIAL - Abnormal; Notable for the following components:    MCH 31.6 (*)     All other components within normal limits   URINE RT REFLEX TO CULTURE - Abnormal; Notable for the following components:    Ketones, Urine TRACE (*)     Leukocyte Esterase, Urine ASTON  06/05/20 0401

## 2020-06-09 LAB
BLOOD CULTURE, ROUTINE: NORMAL
CULTURE, BLOOD 2: NORMAL

## 2020-06-12 ENCOUNTER — CARE COORDINATION (OUTPATIENT)
Dept: CARE COORDINATION | Age: 50
End: 2020-06-12

## 2020-06-13 ENCOUNTER — HOSPITAL ENCOUNTER (EMERGENCY)
Age: 50
Discharge: HOME | End: 2020-06-13
Payer: MEDICAID

## 2020-06-13 ENCOUNTER — APPOINTMENT (OUTPATIENT)
Dept: GENERAL RADIOLOGY | Age: 50
End: 2020-06-13
Payer: MEDICAID

## 2020-06-13 VITALS
SYSTOLIC BLOOD PRESSURE: 118 MMHG | BODY MASS INDEX: 38.98 KG/M2 | RESPIRATION RATE: 18 BRPM | OXYGEN SATURATION: 98 % | TEMPERATURE: 98.2 F | HEIGHT: 63 IN | WEIGHT: 220 LBS | DIASTOLIC BLOOD PRESSURE: 75 MMHG | HEART RATE: 85 BPM

## 2020-06-13 LAB — URIC ACID, SERUM: 6.2 MG/DL (ref 2.4–5.7)

## 2020-06-13 PROCEDURE — 99284 EMERGENCY DEPT VISIT MOD MDM: CPT

## 2020-06-13 PROCEDURE — 36415 COLL VENOUS BLD VENIPUNCTURE: CPT

## 2020-06-13 PROCEDURE — 29505 APPLICATION LONG LEG SPLINT: CPT

## 2020-06-13 PROCEDURE — 73562 X-RAY EXAM OF KNEE 3: CPT

## 2020-06-13 PROCEDURE — 84550 ASSAY OF BLOOD/URIC ACID: CPT

## 2020-06-13 PROCEDURE — 6370000000 HC RX 637 (ALT 250 FOR IP): Performed by: PHYSICIAN ASSISTANT

## 2020-06-13 RX ORDER — COLCHICINE 0.6 MG/1
1.2 TABLET ORAL ONCE
Status: COMPLETED | OUTPATIENT
Start: 2020-06-13 | End: 2020-06-13

## 2020-06-13 RX ORDER — PREDNISONE 20 MG/1
60 TABLET ORAL ONCE
Status: COMPLETED | OUTPATIENT
Start: 2020-06-13 | End: 2020-06-13

## 2020-06-13 RX ORDER — HYDROCODONE BITARTRATE AND ACETAMINOPHEN 5; 325 MG/1; MG/1
2 TABLET ORAL ONCE
Status: COMPLETED | OUTPATIENT
Start: 2020-06-13 | End: 2020-06-13

## 2020-06-13 RX ORDER — COLCHICINE 0.6 MG/1
0.6 TABLET ORAL 2 TIMES DAILY
Qty: 14 TABLET | Refills: 0 | Status: SHIPPED | OUTPATIENT
Start: 2020-06-13 | End: 2020-06-30

## 2020-06-13 RX ORDER — METHYLPREDNISOLONE 4 MG/1
TABLET ORAL
Qty: 1 KIT | Refills: 0 | Status: SHIPPED | OUTPATIENT
Start: 2020-06-13 | End: 2020-06-19

## 2020-06-13 RX ADMIN — HYDROCODONE BITARTRATE AND ACETAMINOPHEN 2 TABLET: 5; 325 TABLET ORAL at 04:15

## 2020-06-13 RX ADMIN — COLCHICINE 1.2 MG: 0.6 TABLET, FILM COATED ORAL at 05:11

## 2020-06-13 RX ADMIN — PREDNISONE 60 MG: 20 TABLET ORAL at 05:11

## 2020-06-13 ASSESSMENT — PAIN DESCRIPTION - PAIN TYPE
TYPE: ACUTE PAIN
TYPE: ACUTE PAIN

## 2020-06-13 ASSESSMENT — PAIN DESCRIPTION - PROGRESSION: CLINICAL_PROGRESSION: GRADUALLY IMPROVING

## 2020-06-13 ASSESSMENT — ENCOUNTER SYMPTOMS
BACK PAIN: 0
COUGH: 0
SORE THROAT: 0
EYE PAIN: 0
RHINORRHEA: 0
SHORTNESS OF BREATH: 0
NAUSEA: 0
DIARRHEA: 0
PHOTOPHOBIA: 0
ABDOMINAL PAIN: 0
VOMITING: 0

## 2020-06-13 ASSESSMENT — PAIN SCALES - GENERAL
PAINLEVEL_OUTOF10: 6
PAINLEVEL_OUTOF10: 8

## 2020-06-13 ASSESSMENT — PAIN DESCRIPTION - DESCRIPTORS: DESCRIPTORS: ACHING

## 2020-06-13 ASSESSMENT — PAIN DESCRIPTION - ONSET: ONSET: PROGRESSIVE

## 2020-06-13 ASSESSMENT — PAIN DESCRIPTION - FREQUENCY: FREQUENCY: CONTINUOUS

## 2020-06-13 ASSESSMENT — PAIN DESCRIPTION - ORIENTATION: ORIENTATION: RIGHT

## 2020-06-13 ASSESSMENT — PAIN DESCRIPTION - LOCATION: LOCATION: KNEE

## 2020-06-13 NOTE — ED NOTES
Patient back in room. Patient resting in bed, A & O x4, skin warm, dry and pink, pulses palpable, 0 distress, resp even and unlabored. Call light in reach. Patient denies any needs at this time.         Kristy Dallas RN  06/13/20 7350

## 2020-06-13 NOTE — ED PROVIDER NOTES
INSULIN PEN NEEDLE (B-D ULTRAFINE III SHORT PEN) 31G X 8 MM MISC    Inject 1 each into the skin 3 times daily    INSULIN SYRINGE-NEEDLE U-100 30G X 1/2\" 1 ML MISC    1 each by Does not apply route daily    IPRATROPIUM-ALBUTEROL (DUONEB) 0.5-2.5 (3) MG/3ML SOLN NEBULIZER SOLUTION    Inhale 3 mLs into the lungs every 6 hours as needed for Shortness of Breath    LEVOTHYROXINE (SYNTHROID) 112 MCG TABLET    Take 1 tablet by mouth Daily    LIDOCAINE VISCOUS HCL (XYLOCAINE) 2 % SOLN SOLUTION    Take 5 mLs by mouth as needed for Dental Pain Up to once per hours    MAGNESIUM OXIDE (MAG-OX) 400 MG TABLET    Take 0.5 tablets by mouth daily    METHOCARBAMOL (ROBAXIN) 750 MG TABLET    Take 750 mg by mouth 4 times daily    METOCLOPRAMIDE (REGLAN) 10 MG TABLET    Take 1 tablet by mouth 4 times daily    METOPROLOL TARTRATE (LOPRESSOR) 25 MG TABLET    Take 1 tablet by mouth 2 times daily    MONTELUKAST (SINGULAIR) 10 MG TABLET    Take 1 tab nightly at supper for breathing/allergies    OMEPRAZOLE (PRILOSEC) 40 MG DELAYED RELEASE CAPSULE    Take by mouth    ONE TOUCH ULTRASOFT LANCETS MISC    TEST 3 TIMES DAILY AS NEEDED    PREGABALIN (LYRICA) 150 MG CAPSULE    Take 1 capsule by mouth 2 times daily for 90 days. PREGABALIN (LYRICA) 75 MG CAPSULE    Take 1 capsule by mouth 3 times daily for 90 days. PROMETHAZINE (PHENERGAN) 25 MG TABLET    WARNING:  May cause drowsiness. May impair ability to operate vehicles or machinery. Do not use in combination with alcohol.     ! Tablet every 6 hours as needed in conjunction with Ultram for headaches    PROVENTIL  (90 BASE) MCG/ACT INHALER    Inhale 2 puffs into the lungs every 6 hours as needed for Wheezing    SUMATRIPTAN (IMITREX) 25 MG TABLET    TAKE 1 TABLET BY MOUTH 1 TIME AS NEEDED FOR MIGRAINE    TOBRAMYCIN (TOBREX) 0.3 % OPHTHALMIC SOLUTION    Place 1 drop into the right eye every 4 hours    TRAMADOL (ULTRAM) 50 MG TABLET    Take 50 mg by mouth every 6 hours as needed for occasionally    Drug use: Yes     Frequency: 3.0 times per week     Types: Marijuana    Sexual activity: Yes     Partners: Male   Lifestyle    Physical activity     Days per week: None     Minutes per session: None    Stress: None   Relationships    Social connections     Talks on phone: None     Gets together: None     Attends Druze service: None     Active member of club or organization: None     Attends meetings of clubs or organizations: None     Relationship status: None    Intimate partner violence     Fear of current or ex partner: None     Emotionally abused: None     Physically abused: None     Forced sexual activity: None   Other Topics Concern    None   Social History Narrative    None       SCREENINGS             PHYSICAL EXAM    (up to 7 for level 4, 8 or more for level 5)     ED Triage Vitals [06/13/20 0323]   BP Temp Temp Source Pulse Resp SpO2 Height Weight   131/84 98.2 °F (36.8 °C) Oral 87 19 100 % 5' 3\" (1.6 m) 220 lb (99.8 kg)       Physical Exam  Vitals signs and nursing note reviewed. Constitutional:       General: She is not in acute distress. Appearance: Normal appearance. She is well-developed. She is not diaphoretic. HENT:      Head: Normocephalic and atraumatic. Eyes:      General: Lids are normal.      Conjunctiva/sclera: Conjunctivae normal.   Neck:      Musculoskeletal: Normal range of motion and neck supple. Cardiovascular:      Rate and Rhythm: Normal rate and regular rhythm. Pulses: Normal pulses. Heart sounds: Normal heart sounds. Pulmonary:      Effort: Pulmonary effort is normal.      Breath sounds: Normal breath sounds. Abdominal:      General: Bowel sounds are normal.      Palpations: Abdomen is soft. Tenderness: There is no abdominal tenderness. Musculoskeletal:      Right knee: Tenderness found. Comments: Diffuse tenderness to anterior knee. No signs of injury. No calf tenderness, swelling or redness.     Lymphadenopathy:

## 2020-06-16 ENCOUNTER — HOSPITAL ENCOUNTER (EMERGENCY)
Age: 50
Discharge: HOME | End: 2020-06-16
Payer: MEDICAID

## 2020-06-16 VITALS
TEMPERATURE: 98.6 F | RESPIRATION RATE: 20 BRPM | BODY MASS INDEX: 38.98 KG/M2 | OXYGEN SATURATION: 98 % | HEIGHT: 63 IN | HEART RATE: 75 BPM | WEIGHT: 220 LBS | SYSTOLIC BLOOD PRESSURE: 128 MMHG | DIASTOLIC BLOOD PRESSURE: 63 MMHG

## 2020-06-16 LAB
ANION GAP SERPL CALCULATED.3IONS-SCNC: 10 MEQ/L (ref 9–15)
BASOPHILS ABSOLUTE: 0.1 K/UL (ref 0–0.2)
BASOPHILS RELATIVE PERCENT: 0.9 %
BUN BLDV-MCNC: 10 MG/DL (ref 6–20)
CALCIUM SERPL-MCNC: 8.8 MG/DL (ref 8.5–9.9)
CHLORIDE BLD-SCNC: 102 MEQ/L (ref 95–107)
CO2: 24 MEQ/L (ref 20–31)
CREAT SERPL-MCNC: 1.42 MG/DL (ref 0.5–0.9)
EKG ATRIAL RATE: 80 BPM
EKG P AXIS: 42 DEGREES
EKG P-R INTERVAL: 154 MS
EKG Q-T INTERVAL: 360 MS
EKG QRS DURATION: 82 MS
EKG QTC CALCULATION (BAZETT): 415 MS
EKG R AXIS: 3 DEGREES
EKG T AXIS: 18 DEGREES
EKG VENTRICULAR RATE: 80 BPM
EOSINOPHILS ABSOLUTE: 0.2 K/UL (ref 0–0.7)
EOSINOPHILS RELATIVE PERCENT: 2.7 %
GFR AFRICAN AMERICAN: 47.5
GFR NON-AFRICAN AMERICAN: 39.3
GLUCOSE BLD-MCNC: 165 MG/DL (ref 70–99)
HCT VFR BLD CALC: 45.4 % (ref 37–47)
HEMOGLOBIN: 15 G/DL (ref 12–16)
LYMPHOCYTES ABSOLUTE: 1.8 K/UL (ref 1–4.8)
LYMPHOCYTES RELATIVE PERCENT: 21.6 %
MCH RBC QN AUTO: 31.5 PG (ref 27–31.3)
MCHC RBC AUTO-ENTMCNC: 33 % (ref 33–37)
MCV RBC AUTO: 95.3 FL (ref 82–100)
MONOCYTES ABSOLUTE: 0.4 K/UL (ref 0.2–0.8)
MONOCYTES RELATIVE PERCENT: 5.4 %
NEUTROPHILS ABSOLUTE: 5.8 K/UL (ref 1.4–6.5)
NEUTROPHILS RELATIVE PERCENT: 69.4 %
PDW BLD-RTO: 13.4 % (ref 11.5–14.5)
PLATELET # BLD: 256 K/UL (ref 130–400)
POTASSIUM SERPL-SCNC: 4.1 MEQ/L (ref 3.4–4.9)
RBC # BLD: 4.77 M/UL (ref 4.2–5.4)
SODIUM BLD-SCNC: 136 MEQ/L (ref 135–144)
TROPONIN: <0.01 NG/ML (ref 0–0.01)
WBC # BLD: 8.4 K/UL (ref 4.8–10.8)

## 2020-06-16 PROCEDURE — 84484 ASSAY OF TROPONIN QUANT: CPT

## 2020-06-16 PROCEDURE — 85025 COMPLETE CBC W/AUTO DIFF WBC: CPT

## 2020-06-16 PROCEDURE — 36415 COLL VENOUS BLD VENIPUNCTURE: CPT

## 2020-06-16 PROCEDURE — 96375 TX/PRO/DX INJ NEW DRUG ADDON: CPT

## 2020-06-16 PROCEDURE — 96376 TX/PRO/DX INJ SAME DRUG ADON: CPT

## 2020-06-16 PROCEDURE — 6360000002 HC RX W HCPCS: Performed by: PERSONAL EMERGENCY RESPONSE ATTENDANT

## 2020-06-16 PROCEDURE — 99284 EMERGENCY DEPT VISIT MOD MDM: CPT

## 2020-06-16 PROCEDURE — 80048 BASIC METABOLIC PNL TOTAL CA: CPT

## 2020-06-16 PROCEDURE — 96374 THER/PROPH/DIAG INJ IV PUSH: CPT

## 2020-06-16 PROCEDURE — 93005 ELECTROCARDIOGRAM TRACING: CPT | Performed by: PERSONAL EMERGENCY RESPONSE ATTENDANT

## 2020-06-16 RX ORDER — ONDANSETRON 2 MG/ML
4 INJECTION INTRAMUSCULAR; INTRAVENOUS ONCE
Status: COMPLETED | OUTPATIENT
Start: 2020-06-16 | End: 2020-06-16

## 2020-06-16 RX ORDER — MORPHINE SULFATE 2 MG/ML
4 INJECTION, SOLUTION INTRAMUSCULAR; INTRAVENOUS ONCE
Status: COMPLETED | OUTPATIENT
Start: 2020-06-16 | End: 2020-06-16

## 2020-06-16 RX ADMIN — MORPHINE SULFATE 4 MG: 2 INJECTION, SOLUTION INTRAMUSCULAR; INTRAVENOUS at 20:30

## 2020-06-16 RX ADMIN — ONDANSETRON 4 MG: 2 INJECTION INTRAMUSCULAR; INTRAVENOUS at 20:30

## 2020-06-16 RX ADMIN — MORPHINE SULFATE 4 MG: 2 INJECTION, SOLUTION INTRAMUSCULAR; INTRAVENOUS at 21:44

## 2020-06-16 ASSESSMENT — PAIN DESCRIPTION - PAIN TYPE
TYPE: ACUTE PAIN

## 2020-06-16 ASSESSMENT — ENCOUNTER SYMPTOMS
SHORTNESS OF BREATH: 1
COUGH: 0
NAUSEA: 0
BLOOD IN STOOL: 0
COLOR CHANGE: 0
ABDOMINAL PAIN: 0
DIARRHEA: 0
RHINORRHEA: 0
VOMITING: 0
SORE THROAT: 0

## 2020-06-16 ASSESSMENT — PAIN SCALES - GENERAL
PAINLEVEL_OUTOF10: 6
PAINLEVEL_OUTOF10: 6
PAINLEVEL_OUTOF10: 9
PAINLEVEL_OUTOF10: 10
PAINLEVEL_OUTOF10: 10

## 2020-06-16 ASSESSMENT — PAIN DESCRIPTION - ORIENTATION: ORIENTATION: MID

## 2020-06-16 ASSESSMENT — PAIN DESCRIPTION - PROGRESSION
CLINICAL_PROGRESSION: GRADUALLY IMPROVING
CLINICAL_PROGRESSION: GRADUALLY IMPROVING

## 2020-06-16 ASSESSMENT — PAIN DESCRIPTION - FREQUENCY: FREQUENCY: INTERMITTENT

## 2020-06-16 ASSESSMENT — PAIN DESCRIPTION - LOCATION: LOCATION: CHEST

## 2020-06-16 ASSESSMENT — PAIN DESCRIPTION - DESCRIPTORS: DESCRIPTORS: SHARP

## 2020-06-17 ENCOUNTER — CARE COORDINATION (OUTPATIENT)
Dept: CARE COORDINATION | Age: 50
End: 2020-06-17

## 2020-06-17 NOTE — ED PROVIDER NOTES
[facility] ED  eMERGENCY dEPARTMENT eNCOUnter      Pt Name: Donnie Sharp  MRN: [id number]  [place] 1970  Date of evaluation: 6/16/2020  Provider: ANTONIO Peace      HISTORY OF PRESENT ILLNESS    Donnie Sharp is a 53 y.o. female with PMHx of COPD, DM2, anxiety, arthritis, asthma, renal cancer, CVA,'s CKD, depression, fibromyalgia, hypertension, sarcoidosis, thyroid goiter, thyroidectomy presents to the emergency department with chest pain. Patient states 1 hour ago while at rest she started with midsternal chest pain rating underneath left breast, sharp and stabbing in nature. Pain is constant without aggravating or relieving factors. She states pain is typical with her sarcoidosis. She is short of breath, no wheezing at home. She did not take any breathing treatments at home. There are no fevers, cough. She is currently on prednisone for gout. HPI    Nursing Notes were reviewed. REVIEW OF SYSTEMS       Review of Systems   Constitutional: Negative for appetite change, chills and fever. HENT: Negative for congestion, rhinorrhea and sore throat. Respiratory: Positive for shortness of breath. Negative for cough. Cardiovascular: Positive for chest pain. Gastrointestinal: Negative for abdominal pain, blood in stool, diarrhea, nausea and vomiting. Genitourinary: Negative for difficulty urinating. Musculoskeletal: Negative for neck stiffness. Skin: Negative for color change and rash. Neurological: Negative for dizziness, syncope, weakness, light-headedness, numbness and headaches. All other systems reviewed and are negative.             PAST MEDICAL HISTORY     Past Medical History:   Diagnosis Date    Anxiety     Arthritis     Asthma     Bronchopneumonia     Cancer ([facility] [address]     renal    Cerebral artery occlusion with cerebral infarction ([facility] [address]     Chronic bilateral low back pain with sciatica     Chronic kidney disease     Chronic obstructive lung disease ([facility] 7/26/2019    Depression     Fibromyalgia     Hypertension     Insulin dependent type 2 diabetes mellitus, uncontrolled ([facility] 8/3/2018    Localized enlarged lymph nodes 10/26/2018    Mixed headache     Pure hyperglyceridemia 5/19/2017    Sarcoidosis     Sleep apnea     does not wear cpap    Thyroid goiter          SURGICAL HISTORY       Past Surgical History:   Procedure Laterality Date    BRONCHOSCOPY  10/26/2018    DR. STEARNS    KIDNEY REMOVAL Right 08/2016    KIDNEY REMOVAL Right 2016    LUNG BIOPSY Right 10/2018    THYROID LOBECTOMY Right 6/13/14    THYROIDECTOMY  02/21/2019    DR. MAGDALENO    THYROIDECTOMY  2018    URETER STENT PLACEMENT Left 08/2016         CURRENT MEDICATIONS       Previous Medications    ACETAMINOPHEN (AMINOFEN) 325 MG TABLET    Take 2 tablets by mouth every 6 hours as needed for Pain    ADMELOG 100 UNIT/ML INJECTION VIAL    INJECT 4 UNITS INTO THE SKIN THREE TIMES DAILY AS NEEDED FOR HIGH BLOOD SUGAR    ALBUTEROL (PROVENTIL) (2.5 MG/3ML) 0.083% NEBULIZER SOLUTION    Take 3 mLs by nebulization every 4 hours as needed for Wheezing    ALBUTEROL SULFATE  (90 BASE) MCG/ACT INHALER    Inhale 2 puffs into the lungs every 4 hours as needed for Wheezing    ATORVASTATIN (LIPITOR) 40 MG TABLET    Take 1 tablet by mouth nightly    BLOOD GLUCOSE MONITORING SUPPL (ONETOUCH VERIO) W/DEVICE KIT    TEST 3 TIMES DAILY AS NEEDED    BLOOD GLUCOSE TEST STRIPS (EXACTECH TEST) STRIP    1 each by In Vitro route 3 times daily (Accu-check test strips) As needed.  DX:E11.65    BLOOD GLUCOSE TEST STRIPS (ONETOUCH VERIO) STRIP    TEST 3 TIMES DAILY AS NEEDED    BUSPIRONE (BUSPAR) 15 MG TABLET    TAKE 1 TABLET BY MOUTH THREE TIMES DAILY    BUTALBITAL-ACETAMINOPHEN-CAFFEINE (FIORICET, ESGIC) -40 MG PER TABLET    Take 1 tablet by mouth every 6 hours as needed for Headaches    CETIRIZINE (ZYRTEC) 10 MG TABLET    Take 1 tab po qhs prn allergies    COLCHICINE (COLCRYS) 0.6 MG TABLET

## 2020-06-17 NOTE — CARE COORDINATION
and has slight \"burning \" in the chest. She has placed call to pulmonologist and is waiting for return call. Discussed follow up with cardiology. She has not contacted yet . She does have message into pcp regarding knee pain.

## 2020-06-18 PROCEDURE — 85260 CLOT FACTOR X STUART-POWER: CPT | Performed by: INTERNAL MEDICINE

## 2020-06-23 ENCOUNTER — PATIENT MESSAGE (OUTPATIENT)
Dept: FAMILY MEDICINE CLINIC | Age: 50
End: 2020-06-23

## 2020-06-23 ENCOUNTER — CARE COORDINATION (OUTPATIENT)
Dept: CARE COORDINATION | Age: 50
End: 2020-06-23

## 2020-06-23 NOTE — CARE COORDINATION
Ambulatory Care Coordination Note  6/23/2020  CM Risk Score: 11  Charlson 10 Year Mortality Risk Score: 100%     ACC: Dafne Lee RN    Summary Note: Discussed availablity for referral to pharmacy and dietician at no cost to patient. Patient declined referral to Clinical Rx and Dietician. Discussed scheduled follow-up with SUDEEP Cloud CNP. Discussed discharge from Care Coordination program. Patient informed she may re-enroll should she have future needs. Lab Results   Component Value Date    LABA1C 6.4 (H) 01/10/2020    LABA1C 6.3 (H) 05/19/2019    LABA1C 5.5 04/18/2019        Diabetes Assessment    Medic Alert ID:  No  Meal Planning:  None   How often do you test your blood sugar?:  Daily, Bedtime   Do you have barriers with adherence to non-pharmacologic self-management interventions?  (Nutrition/Exercise/Self-Monitoring):  Yes   Have you ever had to go to the ED for symptoms of low blood sugar?:  No       No patient-reported symptoms       and   COPD Assessment    Does the patient understand envrionmental exposure?:  Yes  Is the patient able to verbalize Rescue vs. Long Acting medications?:  Yes  Does the patient have a nebulizer?:  Yes  Does the patient use a space with inhaled medications?:  No     No patient-reported symptoms         Symptoms:   COPD associated chest pain: Pos      Symptom course:  stable  Change in chronic cough?:  No/At Baseline  Change in sputum?:  No/At Baseline  Have you had a recent diagnosis of pneumonia either by PCP or at a hospital?:  No           Care Coordination Interventions    Program Enrollment:  Complex Care  Referral from Primary Care Provider:  No  Suggested Interventions and Community Resources  Palliative Care:  Declined (Comment: 2/24/20 Provided information.)  Pharmacist:  Completed (Comment: 2/24/20 Clinical Rx/ 6/23/2020 Declined)  Registered Dietician:  Declined (Comment: 6/23/2020 declined)  Zone Management Tools:  Completed (Comment: 2/24/2020 COPD and Diabetes Zones.)  Other Services or Interventions:  2/24/20 Instructed on same day, next day, and Walk-In Care. Goals Addressed                 This Visit's Progress     COMPLETED: Conditions and Symptoms        I will schedule office visits, as directed by my provider. I will keep my appointment or reschedule if I have to cancel. I will notify my provider of any barriers to my plan of care. I will follow my Zone Management tool to seek urgent or emergent care. I will notify my provider of any symptoms that indicate a worsening of my condition. Diabetes  COPD  Sarcoidosis    Barriers: lack of education  Plan for overcoming my barriers: Care Coordination  Confidence: 9/10  Anticipated Goal Completion Date: 6/24/2020              Prior to Admission medications    Medication Sig Start Date End Date Taking? Authorizing Provider   colchicine (COLCRYS) 0.6 MG tablet Take 1 tablet by mouth 2 times daily for 7 days 6/13/20 6/20/20  Valeria Lora PA-C   TRESIBA FLEXTOUCH 100 UNIT/ML SOPN INJECT 30 UNITS UNDER THE SKIN NIGHTLY 6/1/20   SUDEEP Whipple CNP   magnesium oxide (MAG-OX) 400 MG tablet Take 0.5 tablets by mouth daily 5/27/20   SUDEEP Hollis CNP   SUMAtriptan (IMITREX) 25 MG tablet TAKE 1 TABLET BY MOUTH 1 TIME AS NEEDED FOR MIGRAINE 5/1/20   SUDEEP Whipple CNP   pregabalin (LYRICA) 150 MG capsule Take 1 capsule by mouth 2 times daily for 90 days.  4/29/20 7/28/20  SUDEEP Whipple CNP   lidocaine viscous hcl (XYLOCAINE) 2 % SOLN solution Take 5 mLs by mouth as needed for Dental Pain Up to once per hours 4/29/20   SUDEEP Hollis CNP   tobramycin (TOBREX) 0.3 % ophthalmic solution Place 1 drop into the right eye every 4 hours 4/20/20   SUDEEP Hollis CNP   busPIRone (BUSPAR) 15 MG tablet TAKE 1 TABLET BY MOUTH THREE TIMES DAILY 4/1/20   SUDEEP Whipple CNP   PROVENTIL  (90 Base) MCG/ACT inhaler Inhale 2 puffs into the SUDEEP - CNP   pregabalin (LYRICA) 75 MG capsule Take 1 capsule by mouth 3 times daily for 90 days. 11/20/19 6/4/20  SUDEEP Frankel CNP   albuterol (PROVENTIL) (2.5 MG/3ML) 0.083% nebulizer solution Take 3 mLs by nebulization every 4 hours as needed for Wheezing 11/5/19   Maria M Servetas, DO   metoclopramide (REGLAN) 10 MG tablet Take 1 tablet by mouth 4 times daily 10/27/19   SUDEEP Villanueva CNP   omeprazole (PRILOSEC) 40 MG delayed release capsule Take by mouth 5/28/19   Historical Provider, MD   atorvastatin (LIPITOR) 40 MG tablet Take 1 tablet by mouth nightly 9/11/19   [facility] DO   metoprolol tartrate (LOPRESSOR) 25 MG tablet Take 1 tablet by mouth 2 times daily 9/11/19   [facility] DO   DULoxetine (CYMBALTA) 60 MG extended release capsule TK 1 C PO QD 7/30/19   Historical Provider, MD   ipratropium-albuterol (DUONEB) 0.5-2.5 (3) MG/3ML SOLN nebulizer solution Inhale 3 mLs into the lungs every 6 hours as needed for Shortness of Breath 7/29/19 6/4/20  Jinger Mattgardenia SUDEEP Jensen CNP   fluticasone (FLONASE) 50 MCG/ACT nasal spray 1 spray by Nasal route daily 6/4/19   SUDEEP Davila - CNP   traMADol (ULTRAM) 50 MG tablet Take 50 mg by mouth every 6 hours as needed for Pain.     Historical Provider, MD   methocarbamol (ROBAXIN) 750 MG tablet Take 750 mg by mouth 4 times daily    Historical Provider, MD   Insulin Syringe-Needle U-100 30G X 1/2\" 1 ML MISC 1 each by Does not apply route daily 11/16/18   Amy Carey, DO   blood glucose test strips (ONETOUCH VERIO) strip TEST 3 TIMES DAILY AS NEEDED 8/16/18   Amy Carey, DO   Blood Glucose Monitoring Suppl (ONETOUCH VERIO) w/Device KIT TEST 3 TIMES DAILY AS NEEDED 8/16/18   Amy Carey, DO   montelukast (SINGULAIR) 10 MG tablet Take 1 tab nightly at supper for breathing/allergies 6/28/18   Amy Carey DO       Future Appointments   Date Time Provider Valerie Meloisti   6/30/2020  9:30 AM Aleida Tao, [address] [place] [address] [place]

## 2020-06-23 NOTE — CARE COORDINATION
You Patient resolved from the Care Transitions episode on 6/23/2020  Discussed COVID-19 related testing which was not done at this time. Test results were not done. Patient informed of results, if available? No    Patient/family has been provided the following resources and education related to COVID-19:                         Signs, symptoms and red flags related to COVID-19            CDC exposure and quarantine guidelines            [employer] exposure contact - [phone]            Contact for their local Department of Health                 Patient currently reports that the following symptoms have improved:  chest pain and no new/worsening symptoms     No further outreach scheduled with this CTN/ACM. Episode of Care resolved. Patient has this CTN/ACM contact information if future needs arise.

## 2020-06-24 ENCOUNTER — HOSPITAL ENCOUNTER (EMERGENCY)
Age: 50
Discharge: HOME | End: 2020-06-24
Attending: EMERGENCY MEDICINE
Payer: MEDICAID

## 2020-06-24 ENCOUNTER — APPOINTMENT (OUTPATIENT)
Dept: GENERAL RADIOLOGY | Age: 50
End: 2020-06-24
Payer: MEDICAID

## 2020-06-24 ENCOUNTER — PATIENT MESSAGE (OUTPATIENT)
Dept: FAMILY MEDICINE CLINIC | Age: 50
End: 2020-06-24

## 2020-06-24 VITALS
SYSTOLIC BLOOD PRESSURE: 141 MMHG | OXYGEN SATURATION: 100 % | HEART RATE: 86 BPM | WEIGHT: 220 LBS | DIASTOLIC BLOOD PRESSURE: 81 MMHG | TEMPERATURE: 97.7 F | BODY MASS INDEX: 38.98 KG/M2 | HEIGHT: 63 IN | RESPIRATION RATE: 18 BRPM

## 2020-06-24 PROCEDURE — 73562 X-RAY EXAM OF KNEE 3: CPT

## 2020-06-24 PROCEDURE — 6370000000 HC RX 637 (ALT 250 FOR IP): Performed by: EMERGENCY MEDICINE

## 2020-06-24 PROCEDURE — 99283 EMERGENCY DEPT VISIT LOW MDM: CPT

## 2020-06-24 RX ORDER — HYDROCODONE BITARTRATE AND ACETAMINOPHEN 5; 325 MG/1; MG/1
2 TABLET ORAL ONCE
Status: COMPLETED | OUTPATIENT
Start: 2020-06-24 | End: 2020-06-24

## 2020-06-24 RX ORDER — PREDNISONE 10 MG/1
30 TABLET ORAL DAILY
Qty: 15 TABLET | Refills: 0 | Status: SHIPPED | OUTPATIENT
Start: 2020-06-24 | End: 2020-06-29

## 2020-06-24 RX ADMIN — HYDROCODONE BITARTRATE AND ACETAMINOPHEN 2 TABLET: 5; 325 TABLET ORAL at 20:58

## 2020-06-24 ASSESSMENT — ENCOUNTER SYMPTOMS
SORE THROAT: 0
ABDOMINAL DISTENTION: 0
CHEST TIGHTNESS: 0
COUGH: 0
ABDOMINAL PAIN: 0
PHOTOPHOBIA: 0
EYE DISCHARGE: 0
SHORTNESS OF BREATH: 0
VOMITING: 0
WHEEZING: 0

## 2020-06-24 ASSESSMENT — PAIN DESCRIPTION - FREQUENCY: FREQUENCY: CONTINUOUS

## 2020-06-24 ASSESSMENT — PAIN DESCRIPTION - PAIN TYPE: TYPE: ACUTE PAIN

## 2020-06-24 ASSESSMENT — PAIN DESCRIPTION - DESCRIPTORS: DESCRIPTORS: TINGLING;SHOOTING

## 2020-06-24 ASSESSMENT — PAIN SCALES - GENERAL
PAINLEVEL_OUTOF10: 7
PAINLEVEL_OUTOF10: 7
PAINLEVEL_OUTOF10: 0

## 2020-06-24 ASSESSMENT — PAIN DESCRIPTION - LOCATION: LOCATION: KNEE

## 2020-06-24 ASSESSMENT — PAIN DESCRIPTION - ORIENTATION: ORIENTATION: RIGHT

## 2020-06-24 ASSESSMENT — PAIN DESCRIPTION - PROGRESSION: CLINICAL_PROGRESSION: RESOLVED

## 2020-06-25 ENCOUNTER — CARE COORDINATION (OUTPATIENT)
Dept: CARE COORDINATION | Age: 50
End: 2020-06-25

## 2020-06-25 NOTE — ED NOTES
Patient to ED with C/O right knee pain. Patient denies injury or trauma to extremity. No deformity, swelling or discoloration noted. Patient states she thinks it could be gout, because she had gout in that foot 3 weeks ago.      Arianne Rae RN  06/24/20 0242

## 2020-06-25 NOTE — CARE COORDINATION
Patient/family/caregiver given information for [employer] and agrees to enroll no  Patient's preferred e-mail:   Patient's preferred phone number:   Based on Loop alert triggers, patient will be contacted by nurse care manager for worsening symptoms. ACM to follow up in 14 days  based on severity of symptoms and risk factors. Keesha Thorpe tells me that her knee is about the same. She does have a follow up appointment with her PCP next Tuesday. I spoke with her about orthopedics and she says that she needs to have a 2 notice with her insurance company to get a ride. I have reviewed the symptoms related to COVID along with preventative protocols. She says that she does have the phone numbers for [address] for Flu Screening. I discussed the [employer] Symptom Tracker but she declines this service.

## 2020-06-25 NOTE — ED PROVIDER NOTES
Negative for adenopathy. Psychiatric/Behavioral: Negative for agitation and hallucinations. All other systems reviewed and are negative. Except as noted above the remainder of the review of systems was reviewed and negative. PAST MEDICAL HISTORY     Past Medical History:   Diagnosis Date    Anxiety     Arthritis     Asthma     Bronchopneumonia     Cancer ([facility] [address]     renal    Cerebral artery occlusion with cerebral infarction (HCC)     Chronic bilateral low back pain with sciatica     Chronic kidney disease     Chronic obstructive lung disease ([facility] [address] 7/26/2019    Depression     Fibromyalgia     Hypertension     Insulin dependent type 2 diabetes mellitus, uncontrolled ([facility] [address] 8/3/2018    Localized enlarged lymph nodes 10/26/2018    Mixed headache     Pure hyperglyceridemia 5/19/2017    Sarcoidosis     Sleep apnea     does not wear cpap    Thyroid goiter          SURGICALHISTORY       Past Surgical History:   Procedure Laterality Date    BRONCHOSCOPY  10/26/2018    DR. STEARNS    KIDNEY REMOVAL Right 08/2016    KIDNEY REMOVAL Right 2016    LUNG BIOPSY Right 10/2018    THYROID LOBECTOMY Right 6/13/14    THYROIDECTOMY  02/21/2019    DR. MAGDALENO    THYROIDECTOMY  2018    URETER STENT PLACEMENT Left 08/2016         CURRENT MEDICATIONS       Previous Medications    ACETAMINOPHEN (AMINOFEN) 325 MG TABLET    Take 2 tablets by mouth every 6 hours as needed for Pain    ADMELOG 100 UNIT/ML INJECTION VIAL    INJECT 4 UNITS INTO THE SKIN THREE TIMES DAILY AS NEEDED FOR HIGH BLOOD SUGAR    ALBUTEROL (PROVENTIL) (2.5 MG/3ML) 0.083% NEBULIZER SOLUTION    Take 3 mLs by nebulization every 4 hours as needed for Wheezing    ALBUTEROL SULFATE  (90 BASE) MCG/ACT INHALER    Inhale 2 puffs into the lungs every 4 hours as needed for Wheezing    ATORVASTATIN (LIPITOR) 40 MG TABLET    Take 1 tablet by mouth nightly    BLOOD GLUCOSE MONITORING SUPPL (ONETOUCH VERIO) W/DEVICE KIT    TEST 3 TIMES DAILY AS NEEDED    BLOOD GLUCOSE TEST STRIPS (EXACTECH TEST) STRIP    1 each by In Vitro route 3 times daily (Accu-check test strips) As needed.  DX:E11.65    BLOOD GLUCOSE TEST STRIPS (ONETOUCH VERIO) STRIP    TEST 3 TIMES DAILY AS NEEDED    BUSPIRONE (BUSPAR) 15 MG TABLET    TAKE 1 TABLET BY MOUTH THREE TIMES DAILY    BUTALBITAL-ACETAMINOPHEN-CAFFEINE (FIORICET, ESGIC) -40 MG PER TABLET    Take 1 tablet by mouth every 6 hours as needed for Headaches    CETIRIZINE (ZYRTEC) 10 MG TABLET    Take 1 tab po qhs prn allergies    COLCHICINE (COLCRYS) 0.6 MG TABLET    Take 1 tablet by mouth 2 times daily for 7 days    DICLOFENAC (PENNSAID) 2 % SOLN    Place 1 applicator onto the skin 2 times daily as needed (pain) Lot [id number] EXP 11/20    DICLOFENAC SODIUM (VOLTAREN) 1 % GEL    Apply 2 g topically 4 times daily as needed for Pain    DULOXETINE (CYMBALTA) 60 MG EXTENDED RELEASE CAPSULE    TK 1 C PO QD    FLUTICASONE (FLONASE) 50 MCG/ACT NASAL SPRAY    1 spray by Nasal route daily    INSULIN PEN NEEDLE (B-D ULTRAFINE III SHORT PEN) 31G X 8 MM MISC    Inject 1 each into the skin 3 times daily    INSULIN SYRINGE-NEEDLE U-100 30G X 1/2\" 1 ML MISC    1 each by Does not apply route daily    IPRATROPIUM-ALBUTEROL (DUONEB) 0.5-2.5 (3) MG/3ML SOLN NEBULIZER SOLUTION    Inhale 3 mLs into the lungs every 6 hours as needed for Shortness of Breath    LEVOTHYROXINE (SYNTHROID) 112 MCG TABLET    Take 1 tablet by mouth Daily    LIDOCAINE VISCOUS HCL (XYLOCAINE) 2 % SOLN SOLUTION    Take 5 mLs by mouth as needed for Dental Pain Up to once per hours    MAGNESIUM OXIDE (MAG-OX) 400 MG TABLET    Take 0.5 tablets by mouth daily    METHOCARBAMOL (ROBAXIN) 750 MG TABLET    Take 750 mg by mouth 4 times daily    METOCLOPRAMIDE (REGLAN) 10 MG TABLET    Take 1 tablet by mouth 4 times daily    METOPROLOL TARTRATE (LOPRESSOR) 25 MG TABLET    Take 1 tablet by mouth 2 times daily    MONTELUKAST (SINGULAIR) 10 MG TABLET    Take 1 tab nightly at supper for Learning Disabilities Neg Hx     Kidney Disease Neg Hx     Mental Illness Neg Hx     Mental Retardation Neg Hx     Miscarriages / Stillbirths Neg Hx     Obesity Neg Hx     Osteoporosis Neg Hx     Stroke Neg Hx     Substance Abuse Neg Hx           SOCIAL HISTORY       Social History     Socioeconomic History    Marital status: Legally      Spouse name: None    Number of children: None    Years of education: None    Highest education level: None   Occupational History    None   Social Needs    Financial resource strain: None    Food insecurity     Worry: None     Inability: None    Transportation needs     Medical: None     Non-medical: None   Tobacco Use    Smoking status: Former Smoker     Packs/day: 0.50     Years: 15.00     Pack years: 7.50     Types: Cigarettes     Start date: 2014     Last attempt to quit: 2015     Years since quittin.4    Smokeless tobacco: Former User     Quit date: 3/23/2016   Substance and Sexual Activity    Alcohol use: Not Currently     Alcohol/week: 0.0 standard drinks     Comment: occasionally    Drug use: Yes     Frequency: 3.0 times per week     Types: Marijuana    Sexual activity: Yes     Partners: Male   Lifestyle    Physical activity     Days per week: None     Minutes per session: None    Stress: None   Relationships    Social connections     Talks on phone: None     Gets together: None     Attends Religion service: None     Active member of club or organization: None     Attends meetings of clubs or organizations: None     Relationship status: None    Intimate partner violence     Fear of current or ex partner: None     Emotionally abused: None     Physically abused: None     Forced sexual activity: None   Other Topics Concern    None   Social History Narrative    None       SCREENINGS      @FLOW([id number])@      PHYSICAL EXAM    (up to 7 for level 4, 8 or more for level 5)     ED Triage Vitals [20]   BP Temp Temp Source

## 2020-06-30 ENCOUNTER — OFFICE VISIT (OUTPATIENT)
Dept: FAMILY MEDICINE CLINIC | Age: 50
End: 2020-06-30
Payer: MEDICAID

## 2020-06-30 VITALS
BODY MASS INDEX: 49.26 KG/M2 | HEART RATE: 72 BPM | DIASTOLIC BLOOD PRESSURE: 72 MMHG | HEIGHT: 63 IN | SYSTOLIC BLOOD PRESSURE: 124 MMHG | TEMPERATURE: 97.9 F | OXYGEN SATURATION: 97 % | WEIGHT: 278 LBS

## 2020-06-30 PROCEDURE — G8427 DOCREV CUR MEDS BY ELIG CLIN: HCPCS | Performed by: NURSE PRACTITIONER

## 2020-06-30 PROCEDURE — 1036F TOBACCO NON-USER: CPT | Performed by: NURSE PRACTITIONER

## 2020-06-30 PROCEDURE — G8417 CALC BMI ABV UP PARAM F/U: HCPCS | Performed by: NURSE PRACTITIONER

## 2020-06-30 PROCEDURE — 99214 OFFICE O/P EST MOD 30 MIN: CPT | Performed by: NURSE PRACTITIONER

## 2020-06-30 RX ORDER — HYDROXYZINE HYDROCHLORIDE 25 MG/1
25 TABLET, FILM COATED ORAL 3 TIMES DAILY PRN
Qty: 90 TABLET | Refills: 0 | Status: SHIPPED | OUTPATIENT
Start: 2020-06-30 | End: 2020-07-30

## 2020-06-30 RX ORDER — PREGABALIN 150 MG/1
150 CAPSULE ORAL 3 TIMES DAILY
Qty: 90 CAPSULE | Refills: 2 | Status: SHIPPED | OUTPATIENT
Start: 2020-06-30 | End: 2020-12-14 | Stop reason: SDUPTHER

## 2020-06-30 NOTE — PROGRESS NOTES
DR. Baca Coma    THYROIDECTOMY  2018    URETER STENT PLACEMENT Left 2016     Family History   Problem Relation Age of Onset    Cancer Father     Diabetes Father     Allergy (Severe) Father     Heart Attack Father     Prostate Cancer Father     High Blood Pressure Mother     Diabetes Mother     Arthritis Mother     High Cholesterol Mother     Vision Loss Mother     Alcohol Abuse Neg Hx     Anemia Neg Hx     Arrhythmia Neg Hx     Asthma Neg Hx     Atrial Fibrillation Neg Hx     Birth Defects Neg Hx     Breast Cancer Neg Hx     Coronary Art Dis Neg Hx     Colon Cancer Neg Hx     Depression Neg Hx     Early Death Neg Hx     Hearing Loss Neg Hx     Heart Disease Neg Hx     Learning Disabilities Neg Hx     Kidney Disease Neg Hx     Mental Illness Neg Hx     Mental Retardation Neg Hx     Miscarriages / Stillbirths Neg Hx     Obesity Neg Hx     Osteoporosis Neg Hx     Stroke Neg Hx     Substance Abuse Neg Hx      Social History     Socioeconomic History    Marital status: Legally      Spouse name: Not on file    Number of children: Not on file    Years of education: Not on file    Highest education level: Not on file   Occupational History    Not on file   Social Needs    Financial resource strain: Not on file    Food insecurity     Worry: Not on file     Inability: Not on file    Transportation needs     Medical: Not on file     Non-medical: Not on file   Tobacco Use    Smoking status: Former Smoker     Packs/day: 0.50     Years: 15.00     Pack years: 7.50     Types: Cigarettes     Start date: 2014     Last attempt to quit: 2015     Years since quittin.4    Smokeless tobacco: Former User     Quit date: 3/23/2016   Substance and Sexual Activity    Alcohol use: Not Currently     Alcohol/week: 0.0 standard drinks     Comment: occasionally    Drug use: Yes     Frequency: 3.0 times per week     Types: Marijuana    Sexual activity: Yes     Partners: Male Lifestyle    Physical activity     Days per week: Not on file     Minutes per session: Not on file    Stress: Not on file   Relationships    Social connections     Talks on phone: Not on file     Gets together: Not on file     Attends Bahai service: Not on file     Active member of club or organization: Not on file     Attends meetings of clubs or organizations: Not on file     Relationship status: Not on file    Intimate partner violence     Fear of current or ex partner: Not on file     Emotionally abused: Not on file     Physically abused: Not on file     Forced sexual activity: Not on file   Other Topics Concern    Not on file   Social History Narrative    Not on file     Current Outpatient Medications on File Prior to Visit   Medication Sig Dispense Refill    TRESIBA FLEXTOUCH 100 UNIT/ML SOPN INJECT 30 UNITS UNDER THE SKIN NIGHTLY 15 mL 3    magnesium oxide (MAG-OX) 400 MG tablet Take 0.5 tablets by mouth daily 30 tablet 1    SUMAtriptan (IMITREX) 25 MG tablet TAKE 1 TABLET BY MOUTH 1 TIME AS NEEDED FOR MIGRAINE 9 tablet 1    PROVENTIL  (90 Base) MCG/ACT inhaler Inhale 2 puffs into the lungs every 6 hours as needed for Wheezing 1 Inhaler 3    cetirizine (ZYRTEC) 10 MG tablet Take 1 tab po qhs prn allergies 30 tablet 5    albuterol sulfate  (90 Base) MCG/ACT inhaler Inhale 2 puffs into the lungs every 4 hours as needed for Wheezing 1 Inhaler 3    diclofenac sodium (VOLTAREN) 1 % GEL Apply 2 g topically 4 times daily as needed for Pain 1 Tube 3    ADMELOG 100 UNIT/ML injection vial INJECT 4 UNITS INTO THE SKIN THREE TIMES DAILY AS NEEDED FOR HIGH BLOOD SUGAR 10 mL 2    acetaminophen (AMINOFEN) 325 MG tablet Take 2 tablets by mouth every 6 hours as needed for Pain 20 tablet 0    butalbital-acetaminophen-caffeine (FIORICET, ESGIC) -40 MG per tablet Take 1 tablet by mouth every 6 hours as needed for Headaches 30 tablet 0    promethazine (PHENERGAN) 25 MG tablet WARNING: May cause drowsiness. May impair ability to operate vehicles or machinery. Do not use in combination with alcohol. ! Tablet every 6 hours as needed in conjunction with Ultram for headaches 20 tablet 0    levothyroxine (SYNTHROID) 112 MCG tablet Take 1 tablet by mouth Daily 30 tablet 3    blood glucose test strips (EXACTECH TEST) strip 1 each by In Vitro route 3 times daily (Accu-check test strips) As needed. DX:E11.65 300 strip 5    ONE TOUCH ULTRASOFT LANCETS MISC TEST 3 TIMES DAILY AS NEEDED 300 each 3    albuterol (PROVENTIL) (2.5 MG/3ML) 0.083% nebulizer solution Take 3 mLs by nebulization every 4 hours as needed for Wheezing 120 each 3    metoclopramide (REGLAN) 10 MG tablet Take 1 tablet by mouth 4 times daily 120 tablet 0    omeprazole (PRILOSEC) 40 MG delayed release capsule Take by mouth      atorvastatin (LIPITOR) 40 MG tablet Take 1 tablet by mouth nightly 30 tablet 3    metoprolol tartrate (LOPRESSOR) 25 MG tablet Take 1 tablet by mouth 2 times daily 60 tablet 0    DULoxetine (CYMBALTA) 60 MG extended release capsule TK 1 C PO QD  4    fluticasone (FLONASE) 50 MCG/ACT nasal spray 1 spray by Nasal route daily 1 Bottle 3    traMADol (ULTRAM) 50 MG tablet Take 50 mg by mouth every 6 hours as needed for Pain.       methocarbamol (ROBAXIN) 750 MG tablet Take 750 mg by mouth 4 times daily      Insulin Syringe-Needle U-100 30G X 1/2\" 1 ML MISC 1 each by Does not apply route daily 100 each 3    blood glucose test strips (ONETOUCH VERIO) strip TEST 3 TIMES DAILY AS NEEDED 300 each 3    Blood Glucose Monitoring Suppl (ONETOUCH VERIO) w/Device KIT TEST 3 TIMES DAILY AS NEEDED 1 kit 0    montelukast (SINGULAIR) 10 MG tablet Take 1 tab nightly at supper for breathing/allergies 30 tablet 5    busPIRone (BUSPAR) 15 MG tablet TAKE 1 TABLET BY MOUTH THREE TIMES DAILY 90 tablet 2    Insulin Pen Needle (B-D ULTRAFINE III SHORT PEN) 31G X 8 MM MISC Inject 1 each into the skin 3 times daily 100 each 5 No current facility-administered medications on file prior to visit. Allergies:  Shellfish-derived products; Ibuprofen; Ketorolac; Other; Propoxyphene n-acetaminophen; and Toradol [ketorolac tromethamine]    Review of Systems   Constitutional: Positive for fatigue. Negative for chills and fever. HENT: Negative for congestion, ear pain and sore throat. Respiratory: Negative for cough, shortness of breath and wheezing. Cardiovascular: Positive for chest pain (intermittent chronic). Negative for palpitations and leg swelling. Gastrointestinal: Negative for constipation and diarrhea. Musculoskeletal: Positive for arthralgias, gait problem, joint swelling and myalgias. Negative for back pain, neck pain and neck stiffness. Skin: Negative for color change, pallor, rash and wound. Neurological: Negative for dizziness, syncope and headaches. Psychiatric/Behavioral: Positive for sleep disturbance. Negative for self-injury and suicidal ideas. The patient is nervous/anxious. Objective:   /72 (Site: Left Upper Arm, Position: Sitting, Cuff Size: Large Adult)   Pulse 72   Temp 97.9 °F (36.6 °C) (Temporal)   Ht 5' 3\" (1.6 m)   Wt 278 lb (126.1 kg)   SpO2 97%   Breastfeeding No   BMI 49.25 kg/m²     Physical Exam  Constitutional:       Appearance: She is well-developed. HENT:      Head: Normocephalic. Right Ear: Tympanic membrane, ear canal and external ear normal.      Left Ear: Tympanic membrane, ear canal and external ear normal.      Nose: Nose normal.      Mouth/Throat:      Mouth: Mucous membranes are moist.      Pharynx: Oropharynx is clear. Uvula midline. Eyes:      General:         Right eye: No discharge. Left eye: No discharge. Conjunctiva/sclera: Conjunctivae normal.   Neck:      Musculoskeletal: Normal range of motion. Cardiovascular:      Rate and Rhythm: Normal rate and regular rhythm. Heart sounds: Normal heart sounds.    Pulmonary: Effort: Pulmonary effort is normal. No respiratory distress. Breath sounds: Normal breath sounds. Musculoskeletal:      Right knee: She exhibits decreased range of motion and bony tenderness. She exhibits no swelling, no effusion, no ecchymosis, no deformity, no laceration and no erythema. Tenderness found. Lymphadenopathy:      Cervical: No cervical adenopathy. Skin:     General: Skin is warm and dry. Neurological:      Mental Status: She is alert and oriented to person, place, and time. Psychiatric:         Mood and Affect: Mood is anxious. Behavior: Behavior normal.         Assessment:          Diagnosis Orders   1. Snoring  Home Sleep Study   2. Fatigue, unspecified type  Home Sleep Study   3. Anxiety  hydrOXYzine (ATARAX) 25 MG tablet   4. Insomnia, unspecified type  hydrOXYzine (ATARAX) 25 MG tablet   5. Fibromyalgia  pregabalin (LYRICA) 150 MG capsule   6. Acute pain of right knee         Plan:      Orders Placed This Encounter   Procedures    Home Sleep Study     Patients with abnormal results will be assessed and referred to the sleep  as needed. Standing Status:   Future     Standing Expiration Date:   6/30/2021     Scheduling Instructions:      Scheduling and Pre-Certification: [phone]      The following must be completed and FAXED to [phone]      1) Sleep Evaluation Orders Form      2) Office note justifying study      3) Demographic info / insurance card     Order Specific Question:   Location For Sleep Study     Answer:   [facility]     Order Specific Question:   Select Sleep Lab Location     Answer:   [facility]     Comments:   [place]          Orders Placed This Encounter   Medications    hydrOXYzine (ATARAX) 25 MG tablet     Sig: Take 1 tablet by mouth 3 times daily as needed for Anxiety     Dispense:  90 tablet     Refill:  0    pregabalin (LYRICA) 150 MG capsule     Sig: Take 1 capsule by mouth 3 times daily for 90 days.

## 2020-06-30 NOTE — PATIENT INSTRUCTIONS
depression. · Learn about fibromyalgia. This makes coping easier. Then, take an active role in your treatment. · Think about joining a support group with others who have fibromyalgia to learn more and get support. When should you call for help? Watch closely for changes in your health, and be sure to contact your doctor if:  · You feel sad, helpless, or hopeless; lose interest in things you used to enjoy; or have other symptoms of depression. · Your fibromyalgia symptoms get worse. Where can you learn more? Go to [email or web address] and sign in to your "[employer]" account. Enter V003 in the Vyatta box to learn more about \"Fibromyalgia: Care Instructions. \"     If you do not have an account, please click on the \"Sign Up Now\" link. Current as of: November 20, 2019               Content Version: 12.5  © 1336-5559 [employer], Inc[employer]. Care instructions adapted under license by [facility] ([facility]). If you have questions about a medical condition or this instruction, always ask your healthcare professional. [address] any warranty or liability for your use of this information.

## 2020-06-30 NOTE — TELEPHONE ENCOUNTER
Requesting medication refill.  Please approve or deny this request.    Rx requested:  Requested Prescriptions     Pending Prescriptions Disp Refills    busPIRone (BUSPAR) 15 MG tablet [Pharmacy Med Name: BUSPIRONE 15MG TABLETS] 90 tablet 2     Sig: TAKE 1 TABLET BY MOUTH THREE TIMES DAILY       Last Office Visit:   06/30/20    Last Filled:      Last Labs:      Next Visit Date:  Future Appointments   Date Time Provider [facility]   7/6/2020  4:00 PM Juan Wylie MD [address]   10/1/2020  9:30 AM SUDEEP Muir - CNP [facility]   11/5/2020 10:00 AM Nell Aponte MD [facility]

## 2020-07-01 RX ORDER — BUSPIRONE HYDROCHLORIDE 15 MG/1
TABLET ORAL
Qty: 90 TABLET | Refills: 2 | Status: SHIPPED | OUTPATIENT
Start: 2020-07-01 | End: 2020-09-28

## 2020-07-02 ENCOUNTER — HOSPITAL ENCOUNTER (EMERGENCY)
Age: 50
Discharge: HOME | End: 2020-07-02
Payer: MEDICAID

## 2020-07-02 VITALS
TEMPERATURE: 98.1 F | SYSTOLIC BLOOD PRESSURE: 141 MMHG | BODY MASS INDEX: 40.75 KG/M2 | HEIGHT: 63 IN | RESPIRATION RATE: 18 BRPM | HEART RATE: 82 BPM | WEIGHT: 230 LBS | OXYGEN SATURATION: 96 % | DIASTOLIC BLOOD PRESSURE: 89 MMHG

## 2020-07-02 LAB
EKG ATRIAL RATE: 84 BPM
EKG P AXIS: 60 DEGREES
EKG P-R INTERVAL: 168 MS
EKG Q-T INTERVAL: 362 MS
EKG QRS DURATION: 76 MS
EKG QTC CALCULATION (BAZETT): 427 MS
EKG R AXIS: 16 DEGREES
EKG T AXIS: 34 DEGREES
EKG VENTRICULAR RATE: 84 BPM
TROPONIN: <0.01 NG/ML (ref 0–0.01)

## 2020-07-02 PROCEDURE — 96375 TX/PRO/DX INJ NEW DRUG ADDON: CPT

## 2020-07-02 PROCEDURE — 96374 THER/PROPH/DIAG INJ IV PUSH: CPT

## 2020-07-02 PROCEDURE — 6360000002 HC RX W HCPCS: Performed by: PERSONAL EMERGENCY RESPONSE ATTENDANT

## 2020-07-02 PROCEDURE — 93005 ELECTROCARDIOGRAM TRACING: CPT

## 2020-07-02 PROCEDURE — 84484 ASSAY OF TROPONIN QUANT: CPT

## 2020-07-02 PROCEDURE — 36415 COLL VENOUS BLD VENIPUNCTURE: CPT

## 2020-07-02 PROCEDURE — 99285 EMERGENCY DEPT VISIT HI MDM: CPT

## 2020-07-02 RX ORDER — ONDANSETRON 2 MG/ML
4 INJECTION INTRAMUSCULAR; INTRAVENOUS ONCE
Status: COMPLETED | OUTPATIENT
Start: 2020-07-02 | End: 2020-07-02

## 2020-07-02 RX ADMIN — ONDANSETRON 4 MG: 2 INJECTION INTRAMUSCULAR; INTRAVENOUS at 22:24

## 2020-07-02 RX ADMIN — HYDROMORPHONE HYDROCHLORIDE 1 MG: 1 INJECTION, SOLUTION INTRAMUSCULAR; INTRAVENOUS; SUBCUTANEOUS at 22:24

## 2020-07-02 ASSESSMENT — ENCOUNTER SYMPTOMS
BACK PAIN: 0
VOMITING: 0
NAUSEA: 0
BLOOD IN STOOL: 0
DIARRHEA: 0
ABDOMINAL PAIN: 0
SORE THROAT: 0
SHORTNESS OF BREATH: 0
COUGH: 0
SORE THROAT: 0
DIARRHEA: 0
CONSTIPATION: 0
COLOR CHANGE: 0
COLOR CHANGE: 0
WHEEZING: 0
RHINORRHEA: 0
SHORTNESS OF BREATH: 0
COUGH: 0

## 2020-07-02 ASSESSMENT — PAIN DESCRIPTION - LOCATION: LOCATION: CHEST

## 2020-07-02 ASSESSMENT — PAIN DESCRIPTION - ORIENTATION: ORIENTATION: MID

## 2020-07-02 ASSESSMENT — PAIN SCALES - GENERAL
PAINLEVEL_OUTOF10: 7
PAINLEVEL_OUTOF10: 10
PAINLEVEL_OUTOF10: 10

## 2020-07-02 ASSESSMENT — PAIN DESCRIPTION - PAIN TYPE: TYPE: ACUTE PAIN

## 2020-07-03 ENCOUNTER — HOSPITAL ENCOUNTER (EMERGENCY)
Age: 50
Discharge: HOME | End: 2020-07-03
Attending: FAMILY MEDICINE
Payer: MEDICAID

## 2020-07-03 ENCOUNTER — APPOINTMENT (OUTPATIENT)
Dept: GENERAL RADIOLOGY | Age: 50
End: 2020-07-03
Payer: MEDICAID

## 2020-07-03 VITALS
DIASTOLIC BLOOD PRESSURE: 69 MMHG | WEIGHT: 230 LBS | TEMPERATURE: 98.1 F | BODY MASS INDEX: 40.75 KG/M2 | RESPIRATION RATE: 14 BRPM | OXYGEN SATURATION: 98 % | HEART RATE: 69 BPM | SYSTOLIC BLOOD PRESSURE: 117 MMHG | HEIGHT: 63 IN

## 2020-07-03 LAB
ALBUMIN SERPL-MCNC: 3.4 G/DL (ref 3.5–4.6)
ALP BLD-CCNC: 128 U/L (ref 40–130)
ALT SERPL-CCNC: 16 U/L (ref 0–33)
AMYLASE: 47 U/L (ref 22–93)
ANION GAP SERPL CALCULATED.3IONS-SCNC: 12 MEQ/L (ref 9–15)
AST SERPL-CCNC: 20 U/L (ref 0–35)
BASOPHILS ABSOLUTE: 0.1 K/UL (ref 0–0.2)
BASOPHILS RELATIVE PERCENT: 0.9 %
BILIRUB SERPL-MCNC: <0.2 MG/DL (ref 0.2–0.7)
BUN BLDV-MCNC: 13 MG/DL (ref 6–20)
CALCIUM SERPL-MCNC: 8.9 MG/DL (ref 8.5–9.9)
CHLORIDE BLD-SCNC: 103 MEQ/L (ref 95–107)
CO2: 22 MEQ/L (ref 20–31)
CREAT SERPL-MCNC: 1.18 MG/DL (ref 0.5–0.9)
EKG ATRIAL RATE: 70 BPM
EKG P AXIS: 52 DEGREES
EKG P-R INTERVAL: 160 MS
EKG Q-T INTERVAL: 378 MS
EKG QRS DURATION: 80 MS
EKG QTC CALCULATION (BAZETT): 408 MS
EKG R AXIS: 19 DEGREES
EKG T AXIS: 12 DEGREES
EKG VENTRICULAR RATE: 70 BPM
EOSINOPHILS ABSOLUTE: 0.3 K/UL (ref 0–0.7)
EOSINOPHILS RELATIVE PERCENT: 4.8 %
GFR AFRICAN AMERICAN: 58.8
GFR NON-AFRICAN AMERICAN: 48.6
GLOBULIN: 2.9 G/DL (ref 2.3–3.5)
GLUCOSE BLD-MCNC: 187 MG/DL (ref 70–99)
HCT VFR BLD CALC: 42.4 % (ref 37–47)
HEMOGLOBIN: 14.5 G/DL (ref 12–16)
INR BLD: 1
LIPASE: 60 U/L (ref 12–95)
LYMPHOCYTES ABSOLUTE: 1.8 K/UL (ref 1–4.8)
LYMPHOCYTES RELATIVE PERCENT: 31.7 %
MCH RBC QN AUTO: 32.2 PG (ref 27–31.3)
MCHC RBC AUTO-ENTMCNC: 34.1 % (ref 33–37)
MCV RBC AUTO: 94.4 FL (ref 82–100)
MONOCYTES ABSOLUTE: 0.4 K/UL (ref 0.2–0.8)
MONOCYTES RELATIVE PERCENT: 7.5 %
NEUTROPHILS ABSOLUTE: 3.1 K/UL (ref 1.4–6.5)
NEUTROPHILS RELATIVE PERCENT: 55.1 %
PDW BLD-RTO: 13.5 % (ref 11.5–14.5)
PLATELET # BLD: 247 K/UL (ref 130–400)
POTASSIUM REFLEX MAGNESIUM: 4 MEQ/L (ref 3.4–4.9)
PRO-BNP: 17 PG/ML
PROTHROMBIN TIME: 13 SEC (ref 12.3–14.9)
RBC # BLD: 4.49 M/UL (ref 4.2–5.4)
SODIUM BLD-SCNC: 137 MEQ/L (ref 135–144)
TOTAL PROTEIN: 6.3 G/DL (ref 6.3–8)
TROPONIN: <0.01 NG/ML (ref 0–0.01)
WBC # BLD: 5.6 K/UL (ref 4.8–10.8)

## 2020-07-03 PROCEDURE — 71045 X-RAY EXAM CHEST 1 VIEW: CPT

## 2020-07-03 PROCEDURE — 6360000002 HC RX W HCPCS: Performed by: FAMILY MEDICINE

## 2020-07-03 PROCEDURE — 85025 COMPLETE CBC W/AUTO DIFF WBC: CPT

## 2020-07-03 PROCEDURE — 83880 ASSAY OF NATRIURETIC PEPTIDE: CPT

## 2020-07-03 PROCEDURE — 84484 ASSAY OF TROPONIN QUANT: CPT

## 2020-07-03 PROCEDURE — 85610 PROTHROMBIN TIME: CPT

## 2020-07-03 PROCEDURE — 93005 ELECTROCARDIOGRAM TRACING: CPT | Performed by: FAMILY MEDICINE

## 2020-07-03 PROCEDURE — 83690 ASSAY OF LIPASE: CPT

## 2020-07-03 PROCEDURE — 99285 EMERGENCY DEPT VISIT HI MDM: CPT

## 2020-07-03 PROCEDURE — 80053 COMPREHEN METABOLIC PANEL: CPT

## 2020-07-03 PROCEDURE — 82150 ASSAY OF AMYLASE: CPT

## 2020-07-03 PROCEDURE — 6360000002 HC RX W HCPCS

## 2020-07-03 PROCEDURE — 36415 COLL VENOUS BLD VENIPUNCTURE: CPT

## 2020-07-03 PROCEDURE — 96374 THER/PROPH/DIAG INJ IV PUSH: CPT

## 2020-07-03 RX ORDER — MORPHINE SULFATE 2 MG/ML
4 INJECTION, SOLUTION INTRAMUSCULAR; INTRAVENOUS
Status: DISCONTINUED | OUTPATIENT
Start: 2020-07-03 | End: 2020-07-03 | Stop reason: HOSPADM

## 2020-07-03 RX ORDER — ONDANSETRON 2 MG/ML
INJECTION INTRAMUSCULAR; INTRAVENOUS
Status: COMPLETED
Start: 2020-07-03 | End: 2020-07-03

## 2020-07-03 RX ADMIN — MORPHINE SULFATE 4 MG: 2 INJECTION, SOLUTION INTRAMUSCULAR; INTRAVENOUS at 11:20

## 2020-07-03 RX ADMIN — ONDANSETRON 4 MG: 2 INJECTION INTRAMUSCULAR; INTRAVENOUS at 11:25

## 2020-07-03 ASSESSMENT — PAIN SCALES - GENERAL
PAINLEVEL_OUTOF10: 10
PAINLEVEL_OUTOF10: 10

## 2020-07-03 ASSESSMENT — PAIN DESCRIPTION - DESCRIPTORS: DESCRIPTORS: SHARP;PRESSURE

## 2020-07-03 ASSESSMENT — PAIN DESCRIPTION - ORIENTATION: ORIENTATION: MID

## 2020-07-03 ASSESSMENT — PAIN DESCRIPTION - LOCATION: LOCATION: CHEST

## 2020-07-03 ASSESSMENT — PAIN DESCRIPTION - FREQUENCY: FREQUENCY: CONTINUOUS

## 2020-07-03 ASSESSMENT — PAIN DESCRIPTION - PAIN TYPE: TYPE: ACUTE PAIN

## 2020-07-03 NOTE — ED NOTES
Discharge instructions given to patient. Patient verbalizes understanding of follow up care. Patient denies questions. Patient ambulates out of ER with steady gait.       Temo Valladares RN  07/02/20 8720

## 2020-07-03 NOTE — ED TRIAGE NOTES
Pt states mid sternal chest pain starting 3 hours PTA with SOB. Pt med with Tramadol with no relief.

## 2020-07-03 NOTE — ED PROVIDER NOTES
[facility] ED  eMERGENCY dEPARTMENT eNCOUnter      Pt Name: Austin Odom  MRN: [id number]  [place] 1970  Date of evaluation: 7/2/2020  Provider: ANTONIO Cruz      HISTORY OF PRESENT ILLNESS    Austin Odom is a 53 y.o. female with PMHx of COPD, DM2, anxiety, arthritis, asthma, renal cancer, CVA, chronic low back pain, CKD, depression, fibromyalgia, hypertension, sarcoidosis, thyroidectomy presents to the emergency department with chest pain. Patient states she thinks stress triggered her chest pain today. She states it is her typical sarcoidosis pain and stress seems to cause a flareup. It is midsternally, and worse with movement. She tried a breathing treatment at home without relief. She denies any wheezing at home however. She denies fevers, coughing, shortness of breath. HPI    Nursing Notes were reviewed. REVIEW OF SYSTEMS       Review of Systems   Constitutional: Negative for appetite change, chills and fever. HENT: Negative for congestion, rhinorrhea and sore throat. Respiratory: Negative for cough and shortness of breath. Cardiovascular: Positive for chest pain. Gastrointestinal: Negative for abdominal pain, blood in stool, diarrhea, nausea and vomiting. Genitourinary: Negative for difficulty urinating. Musculoskeletal: Negative for neck stiffness. Skin: Negative for color change and rash. Neurological: Negative for dizziness, syncope, weakness, light-headedness, numbness and headaches. All other systems reviewed and are negative.             PAST MEDICAL HISTORY     Past Medical History:   Diagnosis Date    Anxiety     Arthritis     Asthma     Bronchopneumonia     Cancer ([facility] [address]     renal    Cerebral artery occlusion with cerebral infarction ([address]     Chronic bilateral low back pain with sciatica     Chronic kidney disease     Chronic obstructive lung disease ([facility] [address] 7/26/2019    Depression     Fibromyalgia     Hypertension     Insulin dependent type 2 diabetes mellitus, uncontrolled ([facility] [address] 8/3/2018    Localized enlarged lymph nodes 10/26/2018    Mixed headache     Pure hyperglyceridemia 5/19/2017    Sarcoidosis     Sleep apnea     does not wear cpap    Thyroid goiter          SURGICAL HISTORY       Past Surgical History:   Procedure Laterality Date    BRONCHOSCOPY  10/26/2018    DR. STEARNS    KIDNEY REMOVAL Right 08/2016    KIDNEY REMOVAL Right 2016    LUNG BIOPSY Right 10/2018    THYROID LOBECTOMY Right 6/13/14    THYROIDECTOMY  02/21/2019    DR. MAGDALENO    THYROIDECTOMY  2018    URETER STENT PLACEMENT Left 08/2016         CURRENT MEDICATIONS       Previous Medications    ACETAMINOPHEN (AMINOFEN) 325 MG TABLET    Take 2 tablets by mouth every 6 hours as needed for Pain    ADMELOG 100 UNIT/ML INJECTION VIAL    INJECT 4 UNITS INTO THE SKIN THREE TIMES DAILY AS NEEDED FOR HIGH BLOOD SUGAR    ALBUTEROL (PROVENTIL) (2.5 MG/3ML) 0.083% NEBULIZER SOLUTION    Take 3 mLs by nebulization every 4 hours as needed for Wheezing    ALBUTEROL SULFATE  (90 BASE) MCG/ACT INHALER    Inhale 2 puffs into the lungs every 4 hours as needed for Wheezing    ATORVASTATIN (LIPITOR) 40 MG TABLET    Take 1 tablet by mouth nightly    BLOOD GLUCOSE MONITORING SUPPL (ONETOUCH VERIO) W/DEVICE KIT    TEST 3 TIMES DAILY AS NEEDED    BLOOD GLUCOSE TEST STRIPS (EXACTECH TEST) STRIP    1 each by In Vitro route 3 times daily (Accu-check test strips) As needed.  DX:E11.65    BLOOD GLUCOSE TEST STRIPS (ONETOUCH VERIO) STRIP    TEST 3 TIMES DAILY AS NEEDED    BUSPIRONE (BUSPAR) 15 MG TABLET    TAKE 1 TABLET BY MOUTH THREE TIMES DAILY    BUTALBITAL-ACETAMINOPHEN-CAFFEINE (FIORICET, ESGIC) -40 MG PER TABLET    Take 1 tablet by mouth every 6 hours as needed for Headaches    CETIRIZINE (ZYRTEC) 10 MG TABLET    Take 1 tab po qhs prn allergies    DICLOFENAC SODIUM (VOLTAREN) 1 % GEL    Apply 2 g topically 4 times daily as needed for Pain    DULOXETINE tromethamine]    FAMILY HISTORY       Family History   Problem Relation Age of Onset    Cancer Father     Diabetes Father     Allergy (Severe) Father     Heart Attack Father     Prostate Cancer Father     High Blood Pressure Mother     Diabetes Mother     Arthritis Mother     High Cholesterol Mother     Vision Loss Mother     Alcohol Abuse Neg Hx     Anemia Neg Hx     Arrhythmia Neg Hx     Asthma Neg Hx     Atrial Fibrillation Neg Hx     Birth Defects Neg Hx     Breast Cancer Neg Hx     Coronary Art Dis Neg Hx     Colon Cancer Neg Hx     Depression Neg Hx     Early Death Neg Hx     Hearing Loss Neg Hx     Heart Disease Neg Hx     Learning Disabilities Neg Hx     Kidney Disease Neg Hx     Mental Illness Neg Hx     Mental Retardation Neg Hx     Miscarriages / Stillbirths Neg Hx     Obesity Neg Hx     Osteoporosis Neg Hx     Stroke Neg Hx     Substance Abuse Neg Hx           SOCIAL HISTORY       Social History     Socioeconomic History    Marital status: Legally      Spouse name: Not on file    Number of children: Not on file    Years of education: Not on file    Highest education level: Not on file   Occupational History    Not on file   Social Needs    Financial resource strain: Not on file    Food insecurity     Worry: Not on file     Inability: Not on file    Transportation needs     Medical: Not on file     Non-medical: Not on file   Tobacco Use    Smoking status: Former Smoker     Packs/day: 0.50     Years: 15.00     Pack years: 7.50     Types: Cigarettes     Start date: 2014     Last attempt to quit: 2015     Years since quittin.4    Smokeless tobacco: Former User     Quit date: 3/23/2016   Substance and Sexual Activity    Alcohol use: Not Currently     Alcohol/week: 0.0 standard drinks     Comment: occasionally    Drug use: Yes     Frequency: 3.0 times per week     Types: Marijuana    Sexual activity: Yes     Partners: Male   Lifestyle    Physical activity     Days per week: Not on file     Minutes per session: Not on file    Stress: Not on file   Relationships    Social connections     Talks on phone: Not on file     Gets together: Not on file     Attends Yazidism service: Not on file     Active member of club or organization: Not on file     Attends meetings of clubs or organizations: Not on file     Relationship status: Not on file    Intimate partner violence     Fear of current or ex partner: Not on file     Emotionally abused: Not on file     Physically abused: Not on file     Forced sexual activity: Not on file   Other Topics Concern    Not on file   Social History Narrative    Not on file         PHYSICAL EXAM         ED Triage Vitals [07/02/20 2207]   BP Temp Temp Source Pulse Resp SpO2 Height Weight   (!) 151/78 98.1 °F (36.7 °C) Oral 88 18 98 % 5' 3\" (1.6 m) 230 lb (104.3 kg)       Physical Exam  Constitutional:       Appearance: She is well-developed. HENT:      Head: Normocephalic and atraumatic. Eyes:      Conjunctiva/sclera: Conjunctivae normal.      Pupils: Pupils are equal, round, and reactive to light. Neck:      Musculoskeletal: Normal range of motion and neck supple. Trachea: No tracheal deviation. Cardiovascular:      Heart sounds: Normal heart sounds. Pulmonary:      Effort: Pulmonary effort is normal. No respiratory distress. Breath sounds: Normal breath sounds. No stridor. Abdominal:      General: Bowel sounds are normal. There is no distension. Palpations: Abdomen is soft. There is no mass. Tenderness: There is no abdominal tenderness. There is no guarding or rebound. Musculoskeletal: Normal range of motion. Skin:     General: Skin is warm and dry. Capillary Refill: Capillary refill takes less than 2 seconds. Findings: No rash. Neurological:      Mental Status: She is alert and oriented to person, place, and time.       Deep Tendon Reflexes: Reflexes are normal and symmetric. Psychiatric:         Behavior: Behavior normal.         Thought Content: Thought content normal.         Judgment: Judgment normal.         DIAGNOSTIC RESULTS     EKG:All EKG's are interpreted by the Emergency Department Physician who either signs or Co-signs this chart in the absence of a cardiologist.    EKG shows normal sinus rhythm, rate 84, normal intervals, normal axis, no ST segment changes    RADIOLOGY:   Non-plain film images such as CT, Ultrasound and MRI are read by theradiologist. Plain radiographic images are visualized and preliminarily interpreted by the emergency physician with the below findings:    Interpretation per theRadiologist below, if available at the time of this note:    No orders to display           LABS:  Labs Reviewed   TROPONIN       All other labs were within normal range or not returned as of this dictation. EMERGENCY DEPARTMENT COURSE and DIFFERENTIAL DIAGNOSIS/MDM:   Vitals:    Vitals:    07/02/20 2207   BP: (!) 151/78   Pulse: 88   Resp: 18   Temp: 98.1 °F (36.7 °C)   TempSrc: Oral   SpO2: 98%   Weight: 230 lb (104.3 kg)   Height: 5' 3\" (1.6 m)         MDM    EKG and troponin are negative, no acute EKG changes. Patient is having her typical sarcoidosis pain. She was given Dilaudid for pain which has provided pain relief. Standard anticipatory guidance given to patient upon discharge. Have given them a specific time frame in which to follow-up and who to follow-up with. I have also advised them that they should return to the emergency department if they get worse, or not getting better or develop any new or concerning symptoms. Patient demonstrates understanding. CRITICAL CARE TIME   Total Critical Caretime was 0 minutes, excluding separately reportable procedures. There was a high probability of clinically significant/life threatening deterioration in the patient's condition which required my urgent intervention.          Procedures    FINAL IMPRESSION 1. Chest wall pain          DISPOSITION/PLAN   DISPOSITION Decision To Discharge 07/02/2020 11:07:00 PM      PATIENT REFERRED TO:  Francesca Wilson, SUDEEP - CNP  [id number] [place] [phone]      As needed      DISCHARGE MEDICATIONS:  New Prescriptions    No medications on file          (Please notethat portions of this note were completed with a voice recognition program.  Efforts were made to edit the dictations but occasionally words are mis-transcribed. )    ANTONIO Thao (electronically signed)  Emergency Physician Assistant         Paul Machado, [address]  07/02/20 1622

## 2020-07-03 NOTE — ED PROVIDER NOTES
[facility] ED  eMERGENCY dEPARTMENT eNCOUnter      Pt Name: Shikha Crocker  MRN: [id number]  [place] 1970  Date of evaluation: 7/3/2020  Provider: Joyce Mckeon MD    CHIEF COMPLAINT       Chief Complaint   Patient presents with    Chest Pain     since this morning         HISTORY OF PRESENT ILLNESS   (Location/Symptom, Timing/Onset,Context/Setting, Quality, Duration, Modifying Factors, Severity)  Note limiting factors. Shikha Crocker is a 53 y.o. female who presents to the emergency department   53years old patient with known history of sarcoidosis chronic intermittent chest pain presented to the ER today with bilateral chest pain status typical for her pain from sarcoidosis denies any shortness of breath any other     The history is provided by the patient. Chest Pain   Pain location:  R lateral chest and L lateral chest  Pain radiates to:  Does not radiate  Pain severity:  Moderate  Onset quality:  Gradual  Progression:  Worsening  Chronicity:  Recurrent  Context: breathing    Context: not drug use, not eating, not intercourse, not lifting, not movement, not raising an arm, not at rest, not stress and not trauma    Relieved by:  Nothing  Worsened by:  Nothing  Associated symptoms: no abdominal pain, no AICD problem, no altered mental status, no anorexia, no anxiety, no back pain, no claudication, no cough, no diaphoresis, no dizziness, no dysphagia, no fatigue, no fever, no headache, no heartburn, no lower extremity edema, no nausea, no near-syncope, no numbness, no orthopnea, no palpitations, no PND, no shortness of breath, no syncope, no vomiting and no weakness        NursingNotes were reviewed. REVIEW OF SYSTEMS    (2-9 systems for level 4, 10 or more for level 5)     Review of Systems   Constitutional: Negative for diaphoresis, fatigue and fever. HENT: Negative for trouble swallowing. Respiratory: Negative for cough and shortness of breath.     Cardiovascular: Positive for chest pain. Negative for palpitations, orthopnea, claudication, syncope, PND and near-syncope. Gastrointestinal: Negative for abdominal pain, anorexia, heartburn, nausea and vomiting. Musculoskeletal: Negative for back pain. Neurological: Negative for dizziness, weakness, numbness and headaches. All other systems reviewed and are negative. Except as noted above the remainder of the review of systems was reviewed and negative. PAST MEDICAL HISTORY     Past Medical History:   Diagnosis Date    Anxiety     Arthritis     Asthma     Bronchopneumonia     Cancer ([facility] [address]     renal    Cerebral artery occlusion with cerebral infarction (HCC)     Chronic bilateral low back pain with sciatica     Chronic kidney disease     Chronic obstructive lung disease ([facility] [address] 7/26/2019    Depression     Fibromyalgia     Hypertension     Insulin dependent type 2 diabetes mellitus, uncontrolled ([facility] [address] 8/3/2018    Localized enlarged lymph nodes 10/26/2018    Mixed headache     Pure hyperglyceridemia 5/19/2017    Sarcoidosis     Sleep apnea     does not wear cpap    Thyroid goiter          SURGICALHISTORY       Past Surgical History:   Procedure Laterality Date    BRONCHOSCOPY  10/26/2018    DR. STEARNS    KIDNEY REMOVAL Right 08/2016    KIDNEY REMOVAL Right 2016    LUNG BIOPSY Right 10/2018    THYROID LOBECTOMY Right 6/13/14    THYROIDECTOMY  02/21/2019    DR. MAGDALENO    THYROIDECTOMY  2018    URETER STENT PLACEMENT Left 08/2016         CURRENT MEDICATIONS       Discharge Medication List as of 7/3/2020  1:29 PM      CONTINUE these medications which have NOT CHANGED    Details   busPIRone (BUSPAR) 15 MG tablet TAKE 1 TABLET BY MOUTH THREE TIMES DAILY, Disp-90 tablet, R-2Normal      hydrOXYzine (ATARAX) 25 MG tablet Take 1 tablet by mouth 3 times daily as needed for Anxiety, Disp-90 tablet, R-0Normal      pregabalin (LYRICA) 150 MG capsule Take 1 capsule by mouth 3 times daily for 90 days. , TEST) strip 3 TIMES DAILY Starting Mon 12/2/2019, Disp-300 strip, R-5, Normal(Accu-check test strips) As needed. DX:E11.65      ONE TOUCH ULTRASOFT LANCETS MISC Disp-300 each, R-3, NormalTEST 3 TIMES DAILY AS NEEDED      albuterol (PROVENTIL) (2.5 MG/3ML) 0.083% nebulizer solution Take 3 mLs by nebulization every 4 hours as needed for Wheezing, Disp-120 each, R-3Normal      metoclopramide (REGLAN) 10 MG tablet Take 1 tablet by mouth 4 times daily, Disp-120 tablet, R-0Print      omeprazole (PRILOSEC) 40 MG delayed release capsule Take by mouthHistorical Med      atorvastatin (LIPITOR) 40 MG tablet Take 1 tablet by mouth nightly, Disp-30 tablet, R-3Normal      metoprolol tartrate (LOPRESSOR) 25 MG tablet Take 1 tablet by mouth 2 times daily, Disp-60 tablet, R-0Normal      DULoxetine (CYMBALTA) 60 MG extended release capsule TK 1 C PO QD, R-4Historical Med      fluticasone (FLONASE) 50 MCG/ACT nasal spray 1 spray by Nasal route daily, Disp-1 Bottle, R-3Normal      traMADol (ULTRAM) 50 MG tablet Take 50 mg by mouth every 6 hours as needed for Pain. Historical Med      methocarbamol (ROBAXIN) 750 MG tablet Take 750 mg by mouth 4 times dailyHistorical Med      Insulin Syringe-Needle U-100 30G X 1/2\" 1 ML MISC DAILY Starting Fri 11/16/2018, Disp-100 each, R-3, Normal      !! blood glucose test strips (ONETOUCH VERIO) strip Disp-300 each, R-3, NormalTEST 3 TIMES DAILY AS NEEDED      Blood Glucose Monitoring Suppl (ONETOUCH VERIO) w/Device KIT TEST 3 TIMES DAILY AS NEEDED, Disp-1 kit, R-0Normal      montelukast (SINGULAIR) 10 MG tablet Take 1 tab nightly at supper for breathing/allergies, Disp-30 tablet, R-5Normal       !! - Potential duplicate medications found. Please discuss with provider. ALLERGIES     Shellfish-derived products; Ibuprofen; Ketorolac;  Other; Propoxyphene n-acetaminophen; and Toradol [ketorolac tromethamine]    FAMILY HISTORY       Family History   Problem Relation Age of Onset    Cancer Father  Diabetes Father     Allergy (Severe) Father     Heart Attack Father     Prostate Cancer Father     High Blood Pressure Mother     Diabetes Mother    Aida Pruitt Arthritis Mother     High Cholesterol Mother     Vision Loss Mother     Alcohol Abuse Neg Hx     Anemia Neg Hx     Arrhythmia Neg Hx     Asthma Neg Hx     Atrial Fibrillation Neg Hx     Birth Defects Neg Hx     Breast Cancer Neg Hx     Coronary Art Dis Neg Hx     Colon Cancer Neg Hx     Depression Neg Hx     Early Death Neg Hx     Hearing Loss Neg Hx     Heart Disease Neg Hx     Learning Disabilities Neg Hx     Kidney Disease Neg Hx     Mental Illness Neg Hx     Mental Retardation Neg Hx     Miscarriages / Stillbirths Neg Hx     Obesity Neg Hx     Osteoporosis Neg Hx     Stroke Neg Hx     Substance Abuse Neg Hx           SOCIAL HISTORY       Social History     Socioeconomic History    Marital status: Legally      Spouse name: None    Number of children: None    Years of education: None    Highest education level: None   Occupational History    None   Social Needs    Financial resource strain: None    Food insecurity     Worry: None     Inability: None    Transportation needs     Medical: None     Non-medical: None   Tobacco Use    Smoking status: Former Smoker     Packs/day: 0.50     Years: 15.00     Pack years: 7.50     Types: Cigarettes     Start date: 2014     Last attempt to quit: 2015     Years since quittin.4    Smokeless tobacco: Former User     Quit date: 3/23/2016   Substance and Sexual Activity    Alcohol use: Not Currently     Alcohol/week: 0.0 standard drinks     Comment: occasionally    Drug use: Yes     Frequency: 3.0 times per week     Types: Marijuana    Sexual activity: Yes     Partners: Male   Lifestyle    Physical activity     Days per week: None     Minutes per session: None    Stress: None   Relationships    Social connections     Talks on phone: None     Gets together: None Attends Religion service: None     Active member of club or organization: None     Attends meetings of clubs or organizations: None     Relationship status: None    Intimate partner violence     Fear of current or ex partner: None     Emotionally abused: None     Physically abused: None     Forced sexual activity: None   Other Topics Concern    None   Social History Narrative    None       SCREENINGS    [place] Coma Scale  Eye Opening: Spontaneous  Best Verbal Response: Oriented  Best Motor Response: Obeys commands  Obdulia Coma Scale Score: 15 @FLOW([id number])@      PHYSICAL EXAM    (up to 7 for level 4, 8 or more for level 5)     ED Triage Vitals   BP Temp Temp src Pulse Resp SpO2 Height Weight   -- -- -- -- -- -- -- --       Physical Exam  Vitals signs and nursing note reviewed. Constitutional:       Appearance: She is well-developed. HENT:      Head: Normocephalic and atraumatic. Right Ear: External ear normal.      Left Ear: External ear normal.      Nose: Nose normal.   Eyes:      Extraocular Movements: Extraocular movements intact. Pupils: Pupils are equal, round, and reactive to light. Neck:      Musculoskeletal: Normal range of motion and neck supple. Cardiovascular:      Rate and Rhythm: Normal rate and regular rhythm. Pulses: Normal pulses. Heart sounds: Normal heart sounds. No murmur. No friction rub. No gallop. Pulmonary:      Effort: Pulmonary effort is normal. No respiratory distress. Breath sounds: Normal breath sounds. No stridor. No wheezing, rhonchi or rales. Chest:      Chest wall: No tenderness. Abdominal:      General: Abdomen is flat. Bowel sounds are normal.      Palpations: Abdomen is soft. Musculoskeletal: Normal range of motion. Skin:     General: Skin is warm and dry. Neurological:      Mental Status: She is alert and oriented to person, place, and time. Cranial Nerves: No cranial nerve deficit. Sensory: No sensory deficit. Motor: No abnormal muscle tone. Coordination: Coordination normal.      Deep Tendon Reflexes: Reflexes normal.   Psychiatric:         Behavior: Behavior normal.         Thought Content: Thought content normal.         Judgment: Judgment normal.         DIAGNOSTIC RESULTS     EKG: All EKG's are interpreted by the Emergency Department Physician who either signs or Co-signsthis chart in the absence of a cardiologist.    EKG: normal EKG, normal sinus rhythm, rate 70 no acute ST or T wave change. RADIOLOGY:   Non-plain filmimages such as CT, Ultrasound and MRI are read by the radiologist. Plain radiographic images are visualized and preliminarily interpreted by the emergency physician with the below findings:         Interpretation per the Radiologist below, if available at the time ofthis note:    XR CHEST PORTABLE   Final Result      No radiographic evidence of acute intrathoracic process. ED BEDSIDE ULTRASOUND:   Performed by ED Physician - none    LABS:  Labs Reviewed   COMPREHENSIVE METABOLIC PANEL W/ REFLEX TO MG FOR LOW K - Abnormal; Notable for the following components:       Result Value    Glucose 187 (*)     CREATININE 1.18 (*)     GFR Non- 48.6 (*)     GFR  58.8 (*)     Alb 3.4 (*)     All other components within normal limits   CBC WITH AUTO DIFFERENTIAL - Abnormal; Notable for the following components:    MCH 32.2 (*)     All other components within normal limits   LIPASE   AMYLASE   TROPONIN   BRAIN NATRIURETIC PEPTIDE   PROTIME-INR       All other labs were within normal range or not returned as of this dictation.     EMERGENCY DEPARTMENT COURSE and DIFFERENTIAL DIAGNOSIS/MDM:   Vitals:    Vitals:    07/03/20 1049 07/03/20 1120 07/03/20 1200 07/03/20 1300   BP: (!) 108/54 110/72  117/69   Pulse: 69 69 59 69   Resp: 18 16 18 14   Temp: 98.1 °F (36.7 °C)      TempSrc: Oral      SpO2: 97% 96% 96% 98%   Weight: 230 lb (104.3 kg)      Height: 5' 3\" (1.6 m)                    MDM  Number of Diagnoses or Management Options  Chest wall pain:   Sarcoidosis:   Diagnosis management comments: 6years old presented to the ER with a concern of bilateral chest pain but states she have chronic chest pain from known sarcoidosis received morphine her pain resolved EKG, chest x-ray and lab work negative patient was discharged home after her symptoms significantly improved         CONSULTS:  None    PROCEDURES:  Unless otherwise noted below, none     Procedures    FINAL IMPRESSION      1. Chest wall pain    2. Sarcoidosis          DISPOSITION/PLAN   DISPOSITION Decision To Discharge 07/03/2020 01:29:16 PM      PATIENT REFERRED TO:  No follow-up provider specified.     DISCHARGE MEDICATIONS:  Discharge Medication List as of 7/3/2020  1:29 PM             (Please note thatportions of this note were completed with a voice recognition program.  Efforts were made to edit the dictations but occasionally words are mis-transcribed.)    Aracelis Kendall MD (electronically signed)  Attending Emergency Physician          Jayden Roman MD  07/05/20 21

## 2020-07-05 ASSESSMENT — ENCOUNTER SYMPTOMS
HEARTBURN: 0
SHORTNESS OF BREATH: 0
NAUSEA: 0
BACK PAIN: 0
ABDOMINAL PAIN: 0
VOMITING: 0
ORTHOPNEA: 0
TROUBLE SWALLOWING: 0
COUGH: 0

## 2020-07-06 ENCOUNTER — OFFICE VISIT (OUTPATIENT)
Dept: ORTHOPEDIC SURGERY | Age: 50
End: 2020-07-06
Payer: MEDICAID

## 2020-07-06 VITALS
HEIGHT: 63 IN | HEART RATE: 100 BPM | TEMPERATURE: 97 F | OXYGEN SATURATION: 98 % | WEIGHT: 230 LBS | BODY MASS INDEX: 40.75 KG/M2

## 2020-07-06 PROBLEM — M25.561 CHRONIC PAIN OF RIGHT KNEE: Status: ACTIVE | Noted: 2020-07-06

## 2020-07-06 PROBLEM — G89.29 CHRONIC PAIN OF RIGHT KNEE: Status: ACTIVE | Noted: 2020-07-06

## 2020-07-06 PROBLEM — M17.11 ARTHRITIS OF RIGHT KNEE: Status: ACTIVE | Noted: 2020-07-06

## 2020-07-06 PROCEDURE — 99203 OFFICE O/P NEW LOW 30 MIN: CPT | Performed by: ORTHOPAEDIC SURGERY

## 2020-07-06 PROCEDURE — G8417 CALC BMI ABV UP PARAM F/U: HCPCS | Performed by: ORTHOPAEDIC SURGERY

## 2020-07-06 PROCEDURE — G8427 DOCREV CUR MEDS BY ELIG CLIN: HCPCS | Performed by: ORTHOPAEDIC SURGERY

## 2020-07-06 PROCEDURE — 93010 ELECTROCARDIOGRAM REPORT: CPT | Performed by: INTERNAL MEDICINE

## 2020-07-06 PROCEDURE — 1036F TOBACCO NON-USER: CPT | Performed by: ORTHOPAEDIC SURGERY

## 2020-07-06 RX ORDER — LEVOTHYROXINE SODIUM 0.15 MG/1
150 TABLET ORAL DAILY
COMMUNITY
Start: 2020-06-28 | End: 2021-04-21 | Stop reason: SDUPTHER

## 2020-07-06 ASSESSMENT — ENCOUNTER SYMPTOMS
SHORTNESS OF BREATH: 0
ABDOMINAL PAIN: 0
SORE THROAT: 0

## 2020-07-06 NOTE — PROGRESS NOTES
Subjective:      Patient ID: Matilde Harry is a 53 y.o. female who presents today for:  Chief Complaint   Patient presents with    Knee Pain     chronic right knee pain; xrays 06/24. Pain scale a 9/10 when walking. HPI  The in the right knee that flared up when she had a gouty episode. She was taking colchicine for that. At this point feels like her knee is unsteady for her. She is walking with a cane. No history of any recent trauma. Past Medical History:   Diagnosis Date    Anxiety     Arthritis     Asthma     Bronchopneumonia     Cancer ([facility] [address]     renal    Cerebral artery occlusion with cerebral infarction (HCC)     Chronic bilateral low back pain with sciatica     Chronic kidney disease     Chronic obstructive lung disease ([facility] [address] 7/26/2019    Depression     Fibromyalgia     Hypertension     Insulin dependent type 2 diabetes mellitus, uncontrolled ([facility] [address] 8/3/2018    Localized enlarged lymph nodes 10/26/2018    Mixed headache     Pure hyperglyceridemia 5/19/2017    Sarcoidosis     Sleep apnea     does not wear cpap    Thyroid goiter       Past Surgical History:   Procedure Laterality Date    BRONCHOSCOPY  10/26/2018    DR. STEARNS    KIDNEY REMOVAL Right 08/2016    KIDNEY REMOVAL Right 2016    LUNG BIOPSY Right 10/2018    THYROID LOBECTOMY Right 6/13/14    THYROIDECTOMY  02/21/2019    DR. MAGDALENO    THYROIDECTOMY  2018    URETER STENT PLACEMENT Left 08/2016     Social History     Socioeconomic History    Marital status: Legally      Spouse name: Not on file    Number of children: Not on file    Years of education: Not on file    Highest education level: Not on file   Occupational History    Not on file   Social Needs    Financial resource strain: Not on file    Food insecurity     Worry: Not on file     Inability: Not on file    Transportation needs     Medical: Not on file     Non-medical: Not on file   Tobacco Use    Smoking status: Former Smoker Packs/day: 0.50     Years: 15.00     Pack years: 7.50     Types: Cigarettes     Start date: 2014     Last attempt to quit: 2015     Years since quittin.4    Smokeless tobacco: Former User     Quit date: 3/23/2016   Substance and Sexual Activity    Alcohol use: Not Currently     Alcohol/week: 0.0 standard drinks     Comment: occasionally    Drug use: Yes     Frequency: 3.0 times per week     Types: Marijuana    Sexual activity: Yes     Partners: Male   Lifestyle    Physical activity     Days per week: Not on file     Minutes per session: Not on file    Stress: Not on file   Relationships    Social connections     Talks on phone: Not on file     Gets together: Not on file     Attends Jehovah's witness service: Not on file     Active member of club or organization: Not on file     Attends meetings of clubs or organizations: Not on file     Relationship status: Not on file    Intimate partner violence     Fear of current or ex partner: Not on file     Emotionally abused: Not on file     Physically abused: Not on file     Forced sexual activity: Not on file   Other Topics Concern    Not on file   Social History Narrative    Not on file     Family History   Problem Relation Age of Onset    Cancer Father     Diabetes Father     Allergy (Severe) Father     Heart Attack Father     Prostate Cancer Father     High Blood Pressure Mother     Diabetes Mother     Arthritis Mother     High Cholesterol Mother     Vision Loss Mother     Alcohol Abuse Neg Hx     Anemia Neg Hx     Arrhythmia Neg Hx     Asthma Neg Hx     Atrial Fibrillation Neg Hx     Birth Defects Neg Hx     Breast Cancer Neg Hx     Coronary Art Dis Neg Hx     Colon Cancer Neg Hx     Depression Neg Hx     Early Death Neg Hx     Hearing Loss Neg Hx     Heart Disease Neg Hx     Learning Disabilities Neg Hx     Kidney Disease Neg Hx     Mental Illness Neg Hx     Mental Retardation Neg Hx     Miscarriages / Stillbirths Neg Hx  Obesity Neg Hx     Osteoporosis Neg Hx     Stroke Neg Hx     Substance Abuse Neg Hx      Allergies   Allergen Reactions    Shellfish-Derived Products     Ibuprofen     Ketorolac     Other     Propoxyphene N-Acetaminophen      Other reaction(s): Hives    Toradol [Ketorolac Tromethamine]      Pt states she cannot take Toradol because she has only 1 kidney. Current Outpatient Medications on File Prior to Visit   Medication Sig Dispense Refill    levothyroxine (SYNTHROID) 150 MCG tablet TK 1 T PO ONCE D      busPIRone (BUSPAR) 15 MG tablet TAKE 1 TABLET BY MOUTH THREE TIMES DAILY 90 tablet 2    hydrOXYzine (ATARAX) 25 MG tablet Take 1 tablet by mouth 3 times daily as needed for Anxiety 90 tablet 0    pregabalin (LYRICA) 150 MG capsule Take 1 capsule by mouth 3 times daily for 90 days.  90 capsule 2    TRESIBA FLEXTOUCH 100 UNIT/ML SOPN INJECT 30 UNITS UNDER THE SKIN NIGHTLY 15 mL 3    magnesium oxide (MAG-OX) 400 MG tablet Take 0.5 tablets by mouth daily 30 tablet 1    SUMAtriptan (IMITREX) 25 MG tablet TAKE 1 TABLET BY MOUTH 1 TIME AS NEEDED FOR MIGRAINE 9 tablet 1    PROVENTIL  (90 Base) MCG/ACT inhaler Inhale 2 puffs into the lungs every 6 hours as needed for Wheezing 1 Inhaler 3    cetirizine (ZYRTEC) 10 MG tablet Take 1 tab po qhs prn allergies 30 tablet 5    albuterol sulfate  (90 Base) MCG/ACT inhaler Inhale 2 puffs into the lungs every 4 hours as needed for Wheezing 1 Inhaler 3    diclofenac sodium (VOLTAREN) 1 % GEL Apply 2 g topically 4 times daily as needed for Pain 1 Tube 3    ADMELOG 100 UNIT/ML injection vial INJECT 4 UNITS INTO THE SKIN THREE TIMES DAILY AS NEEDED FOR HIGH BLOOD SUGAR 10 mL 2    acetaminophen (AMINOFEN) 325 MG tablet Take 2 tablets by mouth every 6 hours as needed for Pain 20 tablet 0    butalbital-acetaminophen-caffeine (FIORICET, ESGIC) -40 MG per tablet Take 1 tablet by mouth every 6 hours as needed for Headaches 30 tablet 0    No current facility-administered medications on file prior to visit. Review of Systems   Constitutional: Negative for fever. HENT: Negative for sore throat. Eyes: Negative for visual disturbance. Respiratory: Negative for shortness of breath. Cardiovascular: Negative for chest pain. Gastrointestinal: Negative for abdominal pain. Genitourinary: Negative for difficulty urinating. Skin: Negative for rash. Neurological: Negative for headaches. Hematological: Does not bruise/bleed easily. History of  gout  Objective:   Pulse 100   Temp 97 °F (36.1 °C) (Temporal)   Ht 5' 3\" (1.6 m)   Wt 230 lb (104.3 kg)   LMP 06/28/2020   SpO2 98%   BMI 40.74 kg/m²     Ortho Exam  Pleasant oriented female ambulating with a cane. Right knee shows no effusion no erythema no ecchymosis. Range of motion from 0 degrees to 110 degrees of flexion. No ligamentous instability. No calf tenderness. No palpable cords posteriorly. No pain along the anterior aspect of the knee specifically no pain no patella. Minimal tenderness on the medial joint line no tenderness lateral joint line neurological vas status is intact. Negative roll test of the right hip. Radiographs and Laboratory Studies:     Diagnostic Imaging Studies:    Evaluation of the right knee that were performed in June of this mo year along with prior films from February of this year some degenerative changes in all 3 compartments. A nonacute fracture of the inferior pole the patella is noted versus osteophyte formation. This is seen back in February's films also. X-rays are compatible with DJD of the right knee  Laboratory Studies:   Lab Results   Component Value Date    WBC 5.6 07/03/2020    HGB 14.5 07/03/2020    HCT 42.4 07/03/2020    MCV 94.4 07/03/2020     07/03/2020     Lab Results   Component Value Date    SEDRATE 21 (H) 04/18/2019     Lab Results   Component Value Date    CRP 5.1 (H) 10/03/2018       Assessment: Diagnosis Orders   1. Arthritis of knee  Ambulatory referral to Physical Therapy   2. Chronic pain of right knee     3. Arthritis of right knee            Plan: At this time discussed upon the patient we will send her to physical therapy for strengthening of her right knee. Continue to use a cane in the meantime. Continue the gout treatment by her primary care doctor. We will see her back in apparent basis. Orders Placed This Encounter   Procedures    Ambulatory referral to Physical Therapy     Referral Priority:   Routine     Referral Type:   Eval and Treat     Referral Reason:   Specialty Services Required     Requested Specialty:   Physical Therapy     Number of Visits Requested:   1     No orders of the defined types were placed in this encounter. No follow-ups on file.       Audrey Chou MD

## 2020-07-08 ENCOUNTER — HOSPITAL ENCOUNTER (EMERGENCY)
Age: 50
Discharge: HOME | End: 2020-07-08
Payer: MEDICAID

## 2020-07-08 VITALS
DIASTOLIC BLOOD PRESSURE: 90 MMHG | SYSTOLIC BLOOD PRESSURE: 135 MMHG | BODY MASS INDEX: 38.98 KG/M2 | HEART RATE: 75 BPM | RESPIRATION RATE: 18 BRPM | HEIGHT: 63 IN | OXYGEN SATURATION: 98 % | WEIGHT: 220 LBS | TEMPERATURE: 97.1 F

## 2020-07-08 LAB
ALBUMIN SERPL-MCNC: 2.8 G/DL (ref 3.5–4.6)
ALP BLD-CCNC: 132 U/L (ref 40–130)
ALT SERPL-CCNC: 12 U/L (ref 0–33)
ANION GAP SERPL CALCULATED.3IONS-SCNC: 11 MEQ/L (ref 9–15)
AST SERPL-CCNC: 28 U/L (ref 0–35)
BASOPHILS ABSOLUTE: 0.1 K/UL (ref 0–0.2)
BASOPHILS RELATIVE PERCENT: 1 %
BILIRUB SERPL-MCNC: 0.4 MG/DL (ref 0.2–0.7)
BUN BLDV-MCNC: 9 MG/DL (ref 6–20)
CALCIUM SERPL-MCNC: 8.8 MG/DL (ref 8.5–9.9)
CHLORIDE BLD-SCNC: 104 MEQ/L (ref 95–107)
CO2: 15 MEQ/L (ref 20–31)
CREAT SERPL-MCNC: 1.28 MG/DL (ref 0.5–0.9)
EOSINOPHILS ABSOLUTE: 0.3 K/UL (ref 0–0.7)
EOSINOPHILS RELATIVE PERCENT: 3.8 %
GFR AFRICAN AMERICAN: 53.6
GFR NON-AFRICAN AMERICAN: 44.3
GLOBULIN: 4.1 G/DL (ref 2.3–3.5)
GLUCOSE BLD-MCNC: 105 MG/DL (ref 70–99)
HCT VFR BLD CALC: 46.1 % (ref 37–47)
HEMOGLOBIN: 15.4 G/DL (ref 12–16)
LYMPHOCYTES ABSOLUTE: 1.8 K/UL (ref 1–4.8)
LYMPHOCYTES RELATIVE PERCENT: 26.9 %
MCH RBC QN AUTO: 31.6 PG (ref 27–31.3)
MCHC RBC AUTO-ENTMCNC: 33.5 % (ref 33–37)
MCV RBC AUTO: 94.5 FL (ref 82–100)
MONOCYTES ABSOLUTE: 0.4 K/UL (ref 0.2–0.8)
MONOCYTES RELATIVE PERCENT: 5.8 %
NEUTROPHILS ABSOLUTE: 4.2 K/UL (ref 1.4–6.5)
NEUTROPHILS RELATIVE PERCENT: 62.5 %
PDW BLD-RTO: 13.7 % (ref 11.5–14.5)
PLATELET # BLD: 272 K/UL (ref 130–400)
POTASSIUM SERPL-SCNC: 5.1 MEQ/L (ref 3.4–4.9)
RBC # BLD: 4.87 M/UL (ref 4.2–5.4)
SODIUM BLD-SCNC: 130 MEQ/L (ref 135–144)
TOTAL PROTEIN: 6.9 G/DL (ref 6.3–8)
WBC # BLD: 6.7 K/UL (ref 4.8–10.8)

## 2020-07-08 PROCEDURE — 99283 EMERGENCY DEPT VISIT LOW MDM: CPT

## 2020-07-08 PROCEDURE — 85025 COMPLETE CBC W/AUTO DIFF WBC: CPT

## 2020-07-08 PROCEDURE — 6360000002 HC RX W HCPCS: Performed by: NURSE PRACTITIONER

## 2020-07-08 PROCEDURE — 96375 TX/PRO/DX INJ NEW DRUG ADDON: CPT

## 2020-07-08 PROCEDURE — 2580000003 HC RX 258: Performed by: NURSE PRACTITIONER

## 2020-07-08 PROCEDURE — 96374 THER/PROPH/DIAG INJ IV PUSH: CPT

## 2020-07-08 PROCEDURE — 80053 COMPREHEN METABOLIC PANEL: CPT

## 2020-07-08 PROCEDURE — 36415 COLL VENOUS BLD VENIPUNCTURE: CPT

## 2020-07-08 RX ORDER — METOCLOPRAMIDE 10 MG/1
10 TABLET ORAL 4 TIMES DAILY
Qty: 120 TABLET | Refills: 0 | Status: ON HOLD | OUTPATIENT
Start: 2020-07-08 | End: 2020-09-21

## 2020-07-08 RX ORDER — DIPHENHYDRAMINE HYDROCHLORIDE 50 MG/ML
25 INJECTION INTRAMUSCULAR; INTRAVENOUS ONCE
Status: COMPLETED | OUTPATIENT
Start: 2020-07-08 | End: 2020-07-08

## 2020-07-08 RX ORDER — METOCLOPRAMIDE HYDROCHLORIDE 5 MG/ML
10 INJECTION INTRAMUSCULAR; INTRAVENOUS ONCE
Status: COMPLETED | OUTPATIENT
Start: 2020-07-08 | End: 2020-07-08

## 2020-07-08 RX ORDER — 0.9 % SODIUM CHLORIDE 0.9 %
1000 INTRAVENOUS SOLUTION INTRAVENOUS ONCE
Status: COMPLETED | OUTPATIENT
Start: 2020-07-08 | End: 2020-07-08

## 2020-07-08 RX ADMIN — METOCLOPRAMIDE HYDROCHLORIDE 10 MG: 5 INJECTION INTRAMUSCULAR; INTRAVENOUS at 22:03

## 2020-07-08 RX ADMIN — SODIUM CHLORIDE 1000 ML: 9 INJECTION, SOLUTION INTRAVENOUS at 22:02

## 2020-07-08 RX ADMIN — DIPHENHYDRAMINE HYDROCHLORIDE 25 MG: 50 INJECTION, SOLUTION INTRAMUSCULAR; INTRAVENOUS at 22:03

## 2020-07-08 ASSESSMENT — PAIN SCALES - GENERAL
PAINLEVEL_OUTOF10: 4
PAINLEVEL_OUTOF10: 10
PAINLEVEL_OUTOF10: 10

## 2020-07-08 ASSESSMENT — PAIN DESCRIPTION - FREQUENCY: FREQUENCY: CONTINUOUS

## 2020-07-08 ASSESSMENT — PAIN DESCRIPTION - PAIN TYPE
TYPE: ACUTE PAIN
TYPE: ACUTE PAIN

## 2020-07-08 ASSESSMENT — ENCOUNTER SYMPTOMS
SORE THROAT: 0
COUGH: 0
DIARRHEA: 0
SHORTNESS OF BREATH: 0
PHOTOPHOBIA: 0
EYE PAIN: 0
ABDOMINAL PAIN: 0
RHINORRHEA: 0
BACK PAIN: 0
VOMITING: 0
NAUSEA: 0

## 2020-07-08 ASSESSMENT — PAIN DESCRIPTION - DESCRIPTORS: DESCRIPTORS: ACHING;THROBBING

## 2020-07-08 ASSESSMENT — PAIN DESCRIPTION - LOCATION
LOCATION: HEAD

## 2020-07-09 ENCOUNTER — CARE COORDINATION (OUTPATIENT)
Dept: CASE MANAGEMENT | Age: 50
End: 2020-07-09

## 2020-07-09 NOTE — CARE COORDINATION
Patient contacted regarding COVID-19 risk and screening. Discussed COVID-19 related testing which was not done at this time. Test results were not done. Patient informed of results, if available? Not Done. Care Transition Nurse/ Ambulatory Care Manager contacted the patient by telephone to perform follow-up assessment. Verified name and  with patient as identifiers. Patient has following risk factors of: COPD, asthma and diabetes. Symptoms reviewed with patient who verbalized the following symptoms: No Covid symptoms. Patient was in ER yesterday for migraine headache. Will call PCP for appointment. Right knee is about the same - is going to therapy for this. Due to no new or worsening symptoms encounter was not routed to provider for escalation. Education provided regarding infection prevention, and signs and symptoms of COVID-19 and when to seek medical attention with patient who verbalized understanding. Discussed exposure protocols and quarantine from [address] you at higher risk for severe illness  and given an opportunity for questions and concerns. The patient agrees to contact the COVID-19 hotline [phone] or PCP office for questions related to their healthcare. CTN/ACM provided contact information for future reference. From CDC: Are you at higher risk for severe illness?  Wash your hands often.  Avoid close contact (6 feet, which is about two arm lengths) with people who are sick.  Put distance between yourself and other people if COVID-19 is spreading in your community.  Clean and disinfect frequently touched surfaces.  Avoid all cruise travel and non-essential air travel.  Call your healthcare professional if you have concerns about COVID-19 and your underlying condition or if you are sick.     For more information on steps you can take to protect yourself, see CDC's How to Compa for follow-up call in 7-14 days based on severity of symptoms and risk factors.     Petrona Nguyen, LPN     [address] [facility] / [facility]

## 2020-07-09 NOTE — ED TRIAGE NOTES
Pt presents to ED from home with c/o headache since this am.   Pt states that she took tramadol w/o relief. Pt also admits to dizziness. Upon assessment, pt is A/Ox4, skin appropriate for ethnicity/w/d, resp even and unlabored, msp's intact.

## 2020-07-09 NOTE — ED NOTES
Pt states she \"feels a little better\". Pt verbalized understanding of d/c instructions.       Katey Poole RN  07/08/20 [id number]

## 2020-07-09 NOTE — ED NOTES
Pt given warm blankets and ice water. Ice pack given for headache. No other requests at this time. Lights out. Call light in reach.      Miki Trivedi RN  59/83/34 2321

## 2020-07-09 NOTE — ED PROVIDER NOTES
[address] [facility] ED  EMERGENCY DEPARTMENT ENCOUNTER      Pt Name: Dimple Terrazas  MRN: [id number]  [place] 1970  Date of evaluation: 7/8/2020  Provider: SUDEEP Truong CNP    CHIEF COMPLAINT       Chief Complaint   Patient presents with    Headache     since this am; pt states she took tramadol w/o relief; hx of migraines         HISTORY OF PRESENT ILLNESS   (Location/Symptom, Timing/Onset,Context/Setting, Quality, Duration, Modifying Factors, Severity)  Note limiting factors. Dimple Terrazas is a 53 y.o. female who presents to the emergency department with complaints of headache that began this morning. She describes this headache as a generalized \"migraine. \"  Headache has not had 1 specific area. This headache was gradual in onset in nature. Not abrupt or thunderclap. Not the worst headache of her life. She denies any neurological deficits. She did not relief with attempted rest and tramadol. No fevers neck pain or stiffness. No change in vision. No change in appetite or oral intake. Location/Symptom -headache  Onset -this morning  Context/Setting - as above  Quality -\"pounding\"  Duration -constant  Modifying Factors -none no relief with tramadol  Severity -moderate to severe        Nursing Notes were reviewed. REVIEW OF SYSTEMS    (2-9 systems for level 4, 10 or more for level 5)     Review of Systems   Constitutional: Negative for chills, diaphoresis, fatigue and fever. HENT: Negative for congestion, rhinorrhea and sore throat. Eyes: Negative for photophobia and pain. Respiratory: Negative for cough and shortness of breath. Cardiovascular: Negative for chest pain and palpitations. Gastrointestinal: Negative for abdominal pain, diarrhea, nausea and vomiting. Genitourinary: Negative for dysuria and flank pain. Musculoskeletal: Negative for back pain. Skin: Negative for rash. Neurological: Positive for headaches.  Negative for dizziness and light-headedness. Psychiatric/Behavioral: Negative. All other systems reviewed and are negative. Except as noted above the remainder of the review of systems was reviewed and negative. PAST MEDICAL HISTORY     Past Medical History:   Diagnosis Date    Anxiety     Arthritis     Asthma     Bronchopneumonia     Cancer ([facility] [address]     renal    Cerebral artery occlusion with cerebral infarction (HCC)     Chronic bilateral low back pain with sciatica     Chronic kidney disease     Chronic obstructive lung disease ([facility] [address] 2019    Depression     Fibromyalgia     Hypertension     Insulin dependent type 2 diabetes mellitus, uncontrolled ([facility] [address] 8/3/2018    Localized enlarged lymph nodes 10/26/2018    Mixed headache     Pure hyperglyceridemia 2017    Sarcoidosis     Sleep apnea     does not wear cpap    Thyroid goiter      Past Surgical History:   Procedure Laterality Date    BRONCHOSCOPY  10/26/2018    DR. STEARNS    KIDNEY REMOVAL Right 2016    KIDNEY REMOVAL Right 2016    LUNG BIOPSY Right 10/2018    THYROID LOBECTOMY Right 14    THYROIDECTOMY  2019    DR. MAGDALENO    THYROIDECTOMY  2018    URETER STENT PLACEMENT Left 2016     Social History     Socioeconomic History    Marital status: Legally      Spouse name: None    Number of children: None    Years of education: None    Highest education level: None   Occupational History    None   Social Needs    Financial resource strain: None    Food insecurity     Worry: None     Inability: None    Transportation needs     Medical: None     Non-medical: None   Tobacco Use    Smoking status: Former Smoker     Packs/day: 0.50     Years: 15.00     Pack years: 7.50     Types: Cigarettes     Start date: 2014     Last attempt to quit: 2015     Years since quittin.4    Smokeless tobacco: Former User     Quit date: 3/23/2016   Substance and Sexual Activity    Alcohol use: Not Currently Alcohol/week: 0.0 standard drinks     Comment: occasionally    Drug use: Yes     Frequency: 3.0 times per week     Types: Marijuana    Sexual activity: Yes     Partners: Male   Lifestyle    Physical activity     Days per week: None     Minutes per session: None    Stress: None   Relationships    Social connections     Talks on phone: None     Gets together: None     Attends Oriental orthodox service: None     Active member of club or organization: None     Attends meetings of clubs or organizations: None     Relationship status: None    Intimate partner violence     Fear of current or ex partner: None     Emotionally abused: None     Physically abused: None     Forced sexual activity: None   Other Topics Concern    None   Social History Narrative    None       SCREENINGS             PHYSICAL EXAM    (up to 7 for level 4, 8 or more for level 5)     ED Triage Vitals   BP Temp Temp Source Pulse Resp SpO2 Height Weight   07/08/20 2055 07/08/20 2054 07/08/20 2054 07/08/20 2054 07/08/20 2054 07/08/20 2054 07/08/20 2054 07/08/20 2054   (!) 143/81 97.1 °F (36.2 °C) Temporal 78 18 98 % 5' 3\" (1.6 m) 220 lb (99.8 kg)       Physical Exam  Vitals signs and nursing note reviewed. Constitutional:       General: She is not in acute distress. Appearance: Normal appearance. She is well-developed. She is not diaphoretic. HENT:      Head: Normocephalic and atraumatic. Eyes:      General: Lids are normal.      Conjunctiva/sclera: Conjunctivae normal.   Neck:      Musculoskeletal: Normal range of motion and neck supple. Cardiovascular:      Rate and Rhythm: Normal rate and regular rhythm. Pulses: Normal pulses. Heart sounds: Normal heart sounds. Pulmonary:      Effort: Pulmonary effort is normal.      Breath sounds: Normal breath sounds. Abdominal:      General: Bowel sounds are normal.      Palpations: Abdomen is soft. Tenderness: There is no abdominal tenderness.    Lymphadenopathy:      Cervical: No cervical adenopathy. Skin:     General: Skin is warm and dry. Capillary Refill: Capillary refill takes less than 2 seconds. Findings: No rash. Neurological:      Mental Status: She is alert and oriented to person, place, and time. Comments: Answering questions appropriately. No gaze deficit. No gait abnormality. Moving all extremities. Psychiatric:         Thought Content: Thought content normal.         Judgment: Judgment normal.         RESULTS     EKG: All EKG's are interpreted by the Emergency Department Physician who either signs or Co-signsthis chart in the absence of a cardiologist.        RADIOLOGY:   Gaile Aguilar such as CT, Ultrasound and MRI are read by the radiologist. Plain radiographic images are visualized and preliminarily interpreted by the emergency physician with the below findings:        Interpretation per the Radiologist below, if available at the time ofthis note:    No orders to display         ED BEDSIDE ULTRASOUND:   Performed by ED Physician - none    LABS:  Labs Reviewed   COMPREHENSIVE METABOLIC PANEL   CBC WITH AUTO DIFFERENTIAL       All other labs were within normal range or not returned as of this dictation. EMERGENCY DEPARTMENT COURSE and DIFFERENTIAL DIAGNOSIS/MDM:   Vitals:    Vitals:    07/08/20 2054 07/08/20 2055   BP:  (!) 143/81   Pulse: 78    Resp: 18    Temp: 97.1 °F (36.2 °C)    TempSrc: Temporal    SpO2: 98%    Weight: 220 lb (99.8 kg)    Height: 5' 3\" (1.6 m)             MDM patient is nontoxic in no distress. Neuro exam is negative. She will be treated with IV fluid Reglan and Benadryl. She will be reevaluated. On reassessment patient symptoms are resolved. Patient will be discharged with close follow-up with her PCP and a prescription for Reglan. Patient understands and is agreeable this plan.     CRITICAL CARE TIME       CONSULTS:  None    PROCEDURES:  Unless otherwise noted below, none     Procedures    FINAL IMPRESSION    No diagnosis found. DISPOSITION/PLAN   DISPOSITION        PATIENT REFERRED TO:  No follow-up provider specified.     DISCHARGE MEDICATIONS:  New Prescriptions    No medications on file          (Please notethat portions of this note were completed with a voice recognition program.  Efforts were made to edit the dictations but occasionally words are mis-transcribed.)    SUDEEP Torres CNP (electronically signed)  Attending Emergency Physician         Kayy Kendall PA-C  07/09/20 0908

## 2020-07-14 ENCOUNTER — HOSPITAL ENCOUNTER (EMERGENCY)
Age: 50
Discharge: HOME | End: 2020-07-15
Payer: MEDICAID

## 2020-07-14 ENCOUNTER — APPOINTMENT (OUTPATIENT)
Dept: GENERAL RADIOLOGY | Age: 50
End: 2020-07-14
Payer: MEDICAID

## 2020-07-14 LAB
ALBUMIN SERPL-MCNC: 3.4 G/DL (ref 3.5–4.6)
ALP BLD-CCNC: 127 U/L (ref 40–130)
ALT SERPL-CCNC: 14 U/L (ref 0–33)
ANION GAP SERPL CALCULATED.3IONS-SCNC: 11 MEQ/L (ref 9–15)
AST SERPL-CCNC: 16 U/L (ref 0–35)
BASOPHILS ABSOLUTE: 0 K/UL (ref 0–0.2)
BASOPHILS RELATIVE PERCENT: 0.7 %
BILIRUB SERPL-MCNC: <0.2 MG/DL (ref 0.2–0.7)
BUN BLDV-MCNC: 10 MG/DL (ref 6–20)
CALCIUM SERPL-MCNC: 8.7 MG/DL (ref 8.5–9.9)
CHLORIDE BLD-SCNC: 101 MEQ/L (ref 95–107)
CK MB: 3 NG/ML (ref 0–3.8)
CO2: 23 MEQ/L (ref 20–31)
CREAT SERPL-MCNC: 1.14 MG/DL (ref 0.5–0.9)
CREATINE KINASE-MB INDEX: 0.6 % (ref 0–3.5)
EKG ATRIAL RATE: 80 BPM
EKG P AXIS: 41 DEGREES
EKG P-R INTERVAL: 168 MS
EKG Q-T INTERVAL: 372 MS
EKG QRS DURATION: 78 MS
EKG QTC CALCULATION (BAZETT): 429 MS
EKG R AXIS: 12 DEGREES
EKG T AXIS: 18 DEGREES
EKG VENTRICULAR RATE: 80 BPM
EOSINOPHILS ABSOLUTE: 0.3 K/UL (ref 0–0.7)
EOSINOPHILS RELATIVE PERCENT: 4.9 %
GFR AFRICAN AMERICAN: >60
GFR NON-AFRICAN AMERICAN: 50.6
GLOBULIN: 2.9 G/DL (ref 2.3–3.5)
GLUCOSE BLD-MCNC: 220 MG/DL (ref 70–99)
HCT VFR BLD CALC: 41.7 % (ref 37–47)
HEMOGLOBIN: 13.8 G/DL (ref 12–16)
INR BLD: 1.1
LYMPHOCYTES ABSOLUTE: 1.6 K/UL (ref 1–4.8)
LYMPHOCYTES RELATIVE PERCENT: 30.6 %
MAGNESIUM: 1.8 MG/DL (ref 1.7–2.4)
MCH RBC QN AUTO: 31.6 PG (ref 27–31.3)
MCHC RBC AUTO-ENTMCNC: 33.2 % (ref 33–37)
MCV RBC AUTO: 95 FL (ref 82–100)
MONOCYTES ABSOLUTE: 0.3 K/UL (ref 0.2–0.8)
MONOCYTES RELATIVE PERCENT: 4.7 %
NEUTROPHILS ABSOLUTE: 3.2 K/UL (ref 1.4–6.5)
NEUTROPHILS RELATIVE PERCENT: 59.1 %
PDW BLD-RTO: 13.5 % (ref 11.5–14.5)
PLATELET # BLD: 280 K/UL (ref 130–400)
POTASSIUM SERPL-SCNC: 4 MEQ/L (ref 3.4–4.9)
PRO-BNP: 23 PG/ML
PROTHROMBIN TIME: 13.8 SEC (ref 12.3–14.9)
RBC # BLD: 4.39 M/UL (ref 4.2–5.4)
SODIUM BLD-SCNC: 135 MEQ/L (ref 135–144)
TOTAL CK: 497 U/L (ref 0–170)
TOTAL PROTEIN: 6.3 G/DL (ref 6.3–8)
TROPONIN: <0.01 NG/ML (ref 0–0.01)
WBC # BLD: 5.4 K/UL (ref 4.8–10.8)

## 2020-07-14 PROCEDURE — 83735 ASSAY OF MAGNESIUM: CPT

## 2020-07-14 PROCEDURE — 99285 EMERGENCY DEPT VISIT HI MDM: CPT

## 2020-07-14 PROCEDURE — 36415 COLL VENOUS BLD VENIPUNCTURE: CPT

## 2020-07-14 PROCEDURE — 6360000002 HC RX W HCPCS: Performed by: NURSE PRACTITIONER

## 2020-07-14 PROCEDURE — 83880 ASSAY OF NATRIURETIC PEPTIDE: CPT

## 2020-07-14 PROCEDURE — 84484 ASSAY OF TROPONIN QUANT: CPT

## 2020-07-14 PROCEDURE — 85025 COMPLETE CBC W/AUTO DIFF WBC: CPT

## 2020-07-14 PROCEDURE — 80053 COMPREHEN METABOLIC PANEL: CPT

## 2020-07-14 PROCEDURE — 82550 ASSAY OF CK (CPK): CPT

## 2020-07-14 PROCEDURE — 82553 CREATINE MB FRACTION: CPT

## 2020-07-14 PROCEDURE — 71045 X-RAY EXAM CHEST 1 VIEW: CPT

## 2020-07-14 PROCEDURE — 93005 ELECTROCARDIOGRAM TRACING: CPT | Performed by: EMERGENCY MEDICINE

## 2020-07-14 PROCEDURE — 6370000000 HC RX 637 (ALT 250 FOR IP): Performed by: NURSE PRACTITIONER

## 2020-07-14 PROCEDURE — 85610 PROTHROMBIN TIME: CPT

## 2020-07-14 RX ORDER — MORPHINE SULFATE 2 MG/ML
4 INJECTION, SOLUTION INTRAMUSCULAR; INTRAVENOUS
Status: DISCONTINUED | OUTPATIENT
Start: 2020-07-14 | End: 2020-07-14

## 2020-07-14 RX ORDER — 0.9 % SODIUM CHLORIDE 0.9 %
500 INTRAVENOUS SOLUTION INTRAVENOUS ONCE
Status: DISCONTINUED | OUTPATIENT
Start: 2020-07-14 | End: 2020-07-14

## 2020-07-14 RX ORDER — ONDANSETRON 2 MG/ML
4 INJECTION INTRAMUSCULAR; INTRAVENOUS ONCE
Status: DISCONTINUED | OUTPATIENT
Start: 2020-07-14 | End: 2020-07-14

## 2020-07-14 RX ORDER — MORPHINE SULFATE 2 MG/ML
4 INJECTION, SOLUTION INTRAMUSCULAR; INTRAVENOUS ONCE
Status: COMPLETED | OUTPATIENT
Start: 2020-07-14 | End: 2020-07-15

## 2020-07-14 RX ORDER — ASPIRIN 81 MG/1
324 TABLET, CHEWABLE ORAL ONCE
Status: COMPLETED | OUTPATIENT
Start: 2020-07-14 | End: 2020-07-14

## 2020-07-14 RX ORDER — ONDANSETRON 4 MG/1
4 TABLET, ORALLY DISINTEGRATING ORAL ONCE
Status: COMPLETED | OUTPATIENT
Start: 2020-07-14 | End: 2020-07-14

## 2020-07-14 RX ORDER — NITROGLYCERIN 0.4 MG/1
0.4 TABLET SUBLINGUAL EVERY 5 MIN PRN
Status: DISCONTINUED | OUTPATIENT
Start: 2020-07-14 | End: 2020-07-14

## 2020-07-14 RX ADMIN — MORPHINE SULFATE 4 MG: 2 INJECTION, SOLUTION INTRAMUSCULAR; INTRAVENOUS at 21:55

## 2020-07-14 RX ADMIN — NITROGLYCERIN 0.4 MG: 0.4 TABLET SUBLINGUAL at 21:20

## 2020-07-14 RX ADMIN — ONDANSETRON 4 MG: 4 TABLET, ORALLY DISINTEGRATING ORAL at 21:55

## 2020-07-14 RX ADMIN — ASPIRIN 324 MG: 81 TABLET, CHEWABLE ORAL at 21:20

## 2020-07-14 ASSESSMENT — PAIN SCALES - GENERAL
PAINLEVEL_OUTOF10: 10
PAINLEVEL_OUTOF10: 8
PAINLEVEL_OUTOF10: 10
PAINLEVEL_OUTOF10: 10

## 2020-07-14 ASSESSMENT — PAIN DESCRIPTION - LOCATION: LOCATION: CHEST

## 2020-07-14 ASSESSMENT — PAIN DESCRIPTION - ONSET: ONSET: ON-GOING

## 2020-07-14 ASSESSMENT — PAIN DESCRIPTION - PAIN TYPE
TYPE: ACUTE PAIN

## 2020-07-14 ASSESSMENT — PAIN DESCRIPTION - PROGRESSION
CLINICAL_PROGRESSION: GRADUALLY IMPROVING
CLINICAL_PROGRESSION: GRADUALLY IMPROVING

## 2020-07-14 ASSESSMENT — PAIN DESCRIPTION - ORIENTATION: ORIENTATION: MID

## 2020-07-14 ASSESSMENT — PAIN DESCRIPTION - FREQUENCY: FREQUENCY: CONTINUOUS

## 2020-07-15 VITALS
WEIGHT: 220 LBS | HEART RATE: 74 BPM | SYSTOLIC BLOOD PRESSURE: 121 MMHG | HEIGHT: 63 IN | BODY MASS INDEX: 38.98 KG/M2 | DIASTOLIC BLOOD PRESSURE: 72 MMHG | OXYGEN SATURATION: 99 % | TEMPERATURE: 98.4 F | RESPIRATION RATE: 18 BRPM

## 2020-07-15 PROCEDURE — 6360000002 HC RX W HCPCS: Performed by: NURSE PRACTITIONER

## 2020-07-15 PROCEDURE — 96374 THER/PROPH/DIAG INJ IV PUSH: CPT

## 2020-07-15 RX ADMIN — MORPHINE SULFATE 4 MG: 2 INJECTION, SOLUTION INTRAMUSCULAR; INTRAVENOUS at 00:05

## 2020-07-15 ASSESSMENT — ENCOUNTER SYMPTOMS
BACK PAIN: 0
SORE THROAT: 0
DIARRHEA: 0
CHEST TIGHTNESS: 0
SHORTNESS OF BREATH: 0
TROUBLE SWALLOWING: 0
ABDOMINAL DISTENTION: 0
CONSTIPATION: 0
COUGH: 0
NAUSEA: 0
VOICE CHANGE: 0
RHINORRHEA: 0
PHOTOPHOBIA: 0
COLOR CHANGE: 0
ABDOMINAL PAIN: 0
WHEEZING: 0
VOMITING: 0

## 2020-07-15 ASSESSMENT — PAIN DESCRIPTION - PROGRESSION: CLINICAL_PROGRESSION: GRADUALLY IMPROVING

## 2020-07-15 ASSESSMENT — PAIN SCALES - GENERAL
PAINLEVEL_OUTOF10: 4
PAINLEVEL_OUTOF10: 8

## 2020-07-15 NOTE — ED PROVIDER NOTES
[address] [facility] ED  EMERGENCY DEPARTMENT ENCOUNTER      Pt Name: Jazz Solis  MRN: [id number]  [place] 1970  Date of evaluation: 7/14/2020  Provider: Lilia Duarte       Chief Complaint   Patient presents with    Chest Pain     since yesterday         HISTORY OF PRESENT ILLNESS   (Location/Symptom, Timing/Onset,Context/Setting, Quality, Duration, Modifying Factors, Severity)  Note limiting factors. Jazz Solis is a 53 y.o. female who presents to the emergency department for complaint of recurrent chest pain sarcoidosis discomfort. Patient states that the symptoms she is currently experiencing are similar to previous episodes with complications from her sarcoidosis and midsternal chest pains. States that the pain started yesterday and is steadily increased to the date of current 8 out of 10 pressure heaviness midsternal.  She states she feels short of breath at times but this is not consistent. She states that she has not taken anything for the pain or discomfort today. She is following up and establishing with neurology pain management specialist for this this is been an ongoing issue. She states he takes tramadol at times but this is no longer helping. She denies any upper respiratory symptoms denies any fevers chills sweats denies abdominal pain nausea vomiting diarrhea. She states that the patient has no apparent modifying factors or there is been consistent and steadily increasing. Nursing Notes were reviewed. REVIEW OF SYSTEMS    (2-9 systems for level 4, 10 or more for level 5)     Review of Systems   Constitutional: Negative for activity change, appetite change, chills, diaphoresis, fatigue and fever. HENT: Negative for congestion, ear pain, postnasal drip, rhinorrhea, sore throat, trouble swallowing and voice change. Eyes: Negative for photophobia and visual disturbance.    Respiratory: Negative for cough, chest tightness, shortness of breath and wheezing. Cardiovascular: Positive for chest pain. Negative for palpitations. Gastrointestinal: Negative for abdominal distention, abdominal pain, constipation, diarrhea, nausea and vomiting. Genitourinary: Negative for difficulty urinating, dysuria, flank pain, frequency and urgency. Musculoskeletal: Negative for arthralgias, back pain, gait problem, joint swelling, myalgias, neck pain and neck stiffness. Skin: Negative for color change and rash. Neurological: Negative for dizziness, tremors, seizures, syncope, speech difficulty, weakness, light-headedness, numbness and headaches. Except as noted above the remainder of the review of systems was reviewed and negative. PAST MEDICAL HISTORY     Past Medical History:   Diagnosis Date    Anxiety     Arthritis     Asthma     Bronchopneumonia     Cancer ([facility] [address]     renal    Cerebral artery occlusion with cerebral infarction (HCC)     Chronic bilateral low back pain with sciatica     Chronic kidney disease     Chronic obstructive lung disease ([facility] [address] 7/26/2019    Depression     Fibromyalgia     Hypertension     Insulin dependent type 2 diabetes mellitus, uncontrolled ([facility] [address] 8/3/2018    Localized enlarged lymph nodes 10/26/2018    Mixed headache     Pure hyperglyceridemia 5/19/2017    Sarcoidosis     Sleep apnea     does not wear cpap    Thyroid goiter      Past Surgical History:   Procedure Laterality Date    BRONCHOSCOPY  10/26/2018    DR. STEARNS    KIDNEY REMOVAL Right 08/2016    KIDNEY REMOVAL Right 2016    LUNG BIOPSY Right 10/2018    THYROID LOBECTOMY Right 6/13/14    THYROIDECTOMY  02/21/2019    DR. MAGDALENO    THYROIDECTOMY  2018    URETER STENT PLACEMENT Left 08/2016     Social History     Socioeconomic History    Marital status: Legally      Spouse name: None    Number of children: None    Years of education: None    Highest education level: None   Occupational History    None Social Needs    Financial resource strain: None    Food insecurity     Worry: None     Inability: None    Transportation needs     Medical: None     Non-medical: None   Tobacco Use    Smoking status: Former Smoker     Packs/day: 0.50     Years: 15.00     Pack years: 7.50     Types: Cigarettes     Start date: 2014     Last attempt to quit: 2015     Years since quittin.4    Smokeless tobacco: Former User     Quit date: 3/23/2016   Substance and Sexual Activity    Alcohol use: Not Currently     Alcohol/week: 0.0 standard drinks     Comment: occasionally    Drug use: Yes     Frequency: 3.0 times per week     Types: Marijuana    Sexual activity: Yes     Partners: Male   Lifestyle    Physical activity     Days per week: None     Minutes per session: None    Stress: None   Relationships    Social connections     Talks on phone: None     Gets together: None     Attends Roman Catholic service: None     Active member of club or organization: None     Attends meetings of clubs or organizations: None     Relationship status: None    Intimate partner violence     Fear of current or ex partner: None     Emotionally abused: None     Physically abused: None     Forced sexual activity: None   Other Topics Concern    None   Social History Narrative    None       SCREENINGS             PHYSICAL EXAM    (up to 7 for level 4, 8 or more for level 5)     ED Triage Vitals [20 2100]   BP Temp Temp Source Pulse Resp SpO2 Height Weight   (!) 140/74 98.4 °F (36.9 °C) Oral 64 17 96 % 5' 3\" (1.6 m) 220 lb (99.8 kg)       Physical Exam  Constitutional:       General: She is not in acute distress. Appearance: Normal appearance. She is obese. She is not ill-appearing, toxic-appearing or diaphoretic. HENT:      Head: Normocephalic and atraumatic.       Right Ear: External ear normal.      Left Ear: External ear normal.      Nose: Nose normal.      Mouth/Throat:      Mouth: Mucous membranes are moist.      Pharynx: AUTO DIFFERENTIAL - Abnormal; Notable for the following components:       Result Value    MCH 31.6 (*)     All other components within normal limits   COMPREHENSIVE METABOLIC PANEL - Abnormal; Notable for the following components:    Glucose 220 (*)     CREATININE 1.14 (*)     GFR Non- 50.6 (*)     Alb 3.4 (*)     All other components within normal limits   CK - Abnormal; Notable for the following components: Total  (*)     All other components within normal limits   PROTIME-INR   MAGNESIUM   BRAIN NATRIURETIC PEPTIDE   CKMB & RELATIVE PERCENT   TROPONIN       All other labs were within normal range or not returned as of this dictation. EMERGENCY DEPARTMENT COURSE and DIFFERENTIAL DIAGNOSIS/MDM:   Vitals:    Vitals:    07/14/20 2128 07/14/20 2230 07/14/20 2330 07/15/20 0030   BP: 111/69 125/68 124/86 121/72   Pulse: 95 80 77 74   Resp: 18 18 18 18   Temp:       TempSrc:       SpO2: 99% 99% 99% 99%   Weight:       Height:                MDM patient presents afebrile nontoxic no acute distress hemodynamically stable. Patient is had multiple visits to the ER for chest pain and sarcoidosis complications. For safety labs ordered for evaluation including EKG. EKG shows no acute changes chest x-ray shows no acute changes. Troponin is negative. Patient's lab is grossly unremarkable, physical examination grossly remarkable. Patient was medicated for pain and discomfort requiring multiple doses for pain relief. Patient appears stable otherwise for discharge home this does not appear to be cardiac in nature and it is similar to previous episodes and hospital visits in the ER. Patient does have follow-up as planned with her primary provider as well as pain management specialist.  She is stable for discharge home directed to follow-up as planned. Return immediately to the ER for any onset of new concerning symptoms worsening condition.   Patient verbalized understandable given instruction education. CRITICAL CARE TIME       CONSULTS:  None    PROCEDURES:  Unless otherwise noted below, none     Procedures    FINAL IMPRESSION      1. Sarcoidosis    2.  Other chronic pain          DISPOSITION/PLAN   DISPOSITION        PATIENT REFERRED TO:  Codey Madsen  [address] [place] [address] [facility],[address]  [phone]    Call in 1 day      [address] [place] [address]  [phone]    Call in 1 day        DISCHARGE MEDICATIONS:  Discharge Medication List as of 7/14/2020 11:38 PM             (Please notethat portions of this note were completed with a voice recognition program.  Efforts were made to edit the dictations but occasionally words are mis-transcribed.)    SUDEEP Gonsalez CNP (electronically signed)  Attending Emergency Physician         SUDEEP Gonsalez CNP  07/15/20 0494

## 2020-07-15 NOTE — ED NOTES
Patient given nitro without relief of chest pain. Last 2 nitros help per [facility], NP due to low BP.       Alexis Mattson RN  07/14/20 5891

## 2020-07-15 NOTE — ED NOTES
Patient given discharge instructions and verbalized understanding. Vital signs stable. Resp even and unlabored. Skin warm, dry and intact. Patient is alert and oriented. IV removed. Patient doesn't have any questions at this time.         Montserrat Gayle RN  07/15/20 7113

## 2020-07-15 NOTE — ED TRIAGE NOTES
Patient came to the ED for midsternal, intermittent chest pain. Patient states the chest pain began yesterday. Patient describes the pain as heavy and sharp. Respirations even and unlabored.

## 2020-07-15 NOTE — ED NOTES
Patient resting in bed with call light in reach. Breathes are even and unlabored. Skin is warm and dry. Vital signs are stable. Patient denies any needs at this time.       Madeleine Reina RN  07/15/20 0008

## 2020-07-16 ENCOUNTER — CARE COORDINATION (OUTPATIENT)
Dept: CARE COORDINATION | Age: 50
End: 2020-07-16

## 2020-07-16 PROCEDURE — 93010 ELECTROCARDIOGRAM REPORT: CPT | Performed by: INTERNAL MEDICINE

## 2020-07-18 ENCOUNTER — CARE COORDINATION (OUTPATIENT)
Dept: CARE COORDINATION | Age: 50
End: 2020-07-18

## 2020-07-18 NOTE — CARE COORDINATION
Attempted to contact patient for 3 day follow up post ED COVID-19 Monitoring. Unable to reach patient by phone. Message left regarding the purpose of this call. Number provided and call back requested.

## 2020-07-19 ENCOUNTER — APPOINTMENT (OUTPATIENT)
Dept: GENERAL RADIOLOGY | Age: 50
End: 2020-07-19
Payer: MEDICAID

## 2020-07-19 ENCOUNTER — HOSPITAL ENCOUNTER (EMERGENCY)
Age: 50
Discharge: HOME | End: 2020-07-19
Attending: FAMILY MEDICINE
Payer: MEDICAID

## 2020-07-19 VITALS
SYSTOLIC BLOOD PRESSURE: 120 MMHG | DIASTOLIC BLOOD PRESSURE: 66 MMHG | HEIGHT: 63 IN | HEART RATE: 93 BPM | OXYGEN SATURATION: 99 % | BODY MASS INDEX: 38.98 KG/M2 | TEMPERATURE: 92 F | WEIGHT: 220 LBS | RESPIRATION RATE: 20 BRPM

## 2020-07-19 LAB
EKG ATRIAL RATE: 81 BPM
EKG P AXIS: 56 DEGREES
EKG P-R INTERVAL: 180 MS
EKG Q-T INTERVAL: 384 MS
EKG QRS DURATION: 78 MS
EKG QTC CALCULATION (BAZETT): 446 MS
EKG R AXIS: 6 DEGREES
EKG T AXIS: 40 DEGREES
EKG VENTRICULAR RATE: 81 BPM
TROPONIN: <0.01 NG/ML (ref 0–0.01)

## 2020-07-19 PROCEDURE — 71045 X-RAY EXAM CHEST 1 VIEW: CPT

## 2020-07-19 PROCEDURE — 6360000002 HC RX W HCPCS: Performed by: FAMILY MEDICINE

## 2020-07-19 PROCEDURE — 99285 EMERGENCY DEPT VISIT HI MDM: CPT

## 2020-07-19 PROCEDURE — 96375 TX/PRO/DX INJ NEW DRUG ADDON: CPT

## 2020-07-19 PROCEDURE — 36415 COLL VENOUS BLD VENIPUNCTURE: CPT

## 2020-07-19 PROCEDURE — 93005 ELECTROCARDIOGRAM TRACING: CPT | Performed by: FAMILY MEDICINE

## 2020-07-19 PROCEDURE — 84484 ASSAY OF TROPONIN QUANT: CPT

## 2020-07-19 PROCEDURE — 96374 THER/PROPH/DIAG INJ IV PUSH: CPT

## 2020-07-19 PROCEDURE — 96376 TX/PRO/DX INJ SAME DRUG ADON: CPT

## 2020-07-19 RX ORDER — MORPHINE SULFATE 2 MG/ML
4 INJECTION, SOLUTION INTRAMUSCULAR; INTRAVENOUS
Status: COMPLETED | OUTPATIENT
Start: 2020-07-19 | End: 2020-07-19

## 2020-07-19 RX ORDER — ONDANSETRON 2 MG/ML
4 INJECTION INTRAMUSCULAR; INTRAVENOUS ONCE
Status: COMPLETED | OUTPATIENT
Start: 2020-07-19 | End: 2020-07-19

## 2020-07-19 RX ADMIN — MORPHINE SULFATE 4 MG: 2 INJECTION, SOLUTION INTRAMUSCULAR; INTRAVENOUS at 21:32

## 2020-07-19 RX ADMIN — MORPHINE SULFATE 4 MG: 2 INJECTION, SOLUTION INTRAMUSCULAR; INTRAVENOUS at 20:29

## 2020-07-19 RX ADMIN — ONDANSETRON 4 MG: 2 INJECTION INTRAMUSCULAR; INTRAVENOUS at 20:33

## 2020-07-19 ASSESSMENT — PAIN DESCRIPTION - DESCRIPTORS
DESCRIPTORS: SHARP
DESCRIPTORS: SHOOTING;SHARP

## 2020-07-19 ASSESSMENT — ENCOUNTER SYMPTOMS
EYE DISCHARGE: 0
STRIDOR: 0
GASTROINTESTINAL NEGATIVE: 1
ALLERGIC/IMMUNOLOGIC NEGATIVE: 1
WHEEZING: 0
APNEA: 0
CHEST TIGHTNESS: 1
EYES NEGATIVE: 1
CHOKING: 0

## 2020-07-19 ASSESSMENT — PAIN DESCRIPTION - PAIN TYPE
TYPE: ACUTE PAIN
TYPE: ACUTE PAIN

## 2020-07-19 ASSESSMENT — PAIN DESCRIPTION - LOCATION
LOCATION: CHEST
LOCATION: CHEST

## 2020-07-19 ASSESSMENT — PAIN SCALES - GENERAL
PAINLEVEL_OUTOF10: 9
PAINLEVEL_OUTOF10: 9
PAINLEVEL_OUTOF10: 8
PAINLEVEL_OUTOF10: 8

## 2020-07-19 ASSESSMENT — PAIN DESCRIPTION - ORIENTATION
ORIENTATION: ANTERIOR;MID
ORIENTATION: MID

## 2020-07-19 ASSESSMENT — PAIN DESCRIPTION - FREQUENCY: FREQUENCY: INTERMITTENT

## 2020-07-19 NOTE — ED TRIAGE NOTES
Patient states that she has had chest pain since yesterday. Rates it a 9/10. This RN asked her to describe her pain and she said she couldn't and didn't know how to describe it. VSS. Afebrile. Alert and orientated x 4. Respirations are equal and unlabored. While sitting in triage she was talking to her  and looked comfortable. When brought back into triage she started to moan and say that she has pain and going \"ouch\". Patient moved to room 17.

## 2020-07-20 ENCOUNTER — CARE COORDINATION (OUTPATIENT)
Dept: CARE COORDINATION | Age: 50
End: 2020-07-20

## 2020-07-20 NOTE — CARE COORDINATION
chest pain mid sternal slightly to the left as in ER. She tells me the \"pain is lighter than in the ER. She adds that she does have back pain with deep inspiration as well. She denies radiation to neck jaw, ears, or arms. She also denies any nausea, vomiting, diaphoresis, or palpitations. She does have her gallbladder. I have asked her to call to schedule an appointment with her PCP (she wants to do this based on other appointments and transportation).

## 2020-07-20 NOTE — ED PROVIDER NOTES
[address] [facility] ED  eMERGENCY dEPARTMENT eNCOUnter      Pt Name: Francisco Chance  MRN: [id number]  [place] 1970  Date of evaluation: 7/19/2020  Provider: Saul Williamson MD    CHIEF COMPLAINT       Chief Complaint   Patient presents with    Chest Pain     since yesterday         HISTORY OF PRESENT ILLNESS   (Location/Symptom, Timing/Onset,Context/Setting, Quality, Duration, Modifying Factors, Severity)  Note limiting factors. Francisco Chance is a 53 y.o. female who presents to the emergency department chest pain      53years old with known history of sarcoidosis chronic atypical chest pain presented again to the ER today for bilateral chest pain to have started a half an hour ago denies shortness of breath fever chills or body ache denies any other complaint or concern    The history is provided by the patient. NursingNotes were reviewed. REVIEW OF SYSTEMS    (2-9 systems for level 4, 10 or more for level 5)     Review of Systems   Constitutional: Negative. HENT: Negative. Eyes: Negative. Negative for discharge. Respiratory: Positive for chest tightness. Negative for apnea, choking, wheezing and stridor. Cardiovascular: Positive for chest pain. Gastrointestinal: Negative. Endocrine: Negative. Genitourinary: Negative. Musculoskeletal: Negative. Skin: Negative. Allergic/Immunologic: Negative. Neurological: Positive for dizziness. Hematological: Negative. Psychiatric/Behavioral: Negative. All other systems reviewed and are negative. Except as noted above the remainder of the review of systems was reviewed and negative.        PAST MEDICAL HISTORY     Past Medical History:   Diagnosis Date    Anxiety     Arthritis     Asthma     Bronchopneumonia     Cancer ([facility] [address]     renal    Cerebral artery occlusion with cerebral infarction ([address]     Chronic bilateral low back pain with sciatica     Chronic kidney disease     Chronic obstructive lung disease ([facility] 7/26/2019    Depression     Fibromyalgia     Hypertension     Insulin dependent type 2 diabetes mellitus, uncontrolled ([facility] 8/3/2018    Localized enlarged lymph nodes 10/26/2018    Mixed headache     Pure hyperglyceridemia 5/19/2017    Sarcoidosis     Sleep apnea     does not wear cpap    Thyroid goiter          SURGICALHISTORY       Past Surgical History:   Procedure Laterality Date    BRONCHOSCOPY  10/26/2018    DR. STEARNS    KIDNEY REMOVAL Right 08/2016    KIDNEY REMOVAL Right 2016    LUNG BIOPSY Right 10/2018    THYROID LOBECTOMY Right 6/13/14    THYROIDECTOMY  02/21/2019    DR. MAGDALENO    THYROIDECTOMY  2018    URETER STENT PLACEMENT Left 08/2016         CURRENT MEDICATIONS       Previous Medications    ACETAMINOPHEN (AMINOFEN) 325 MG TABLET    Take 2 tablets by mouth every 6 hours as needed for Pain    ALBUTEROL (PROVENTIL) (2.5 MG/3ML) 0.083% NEBULIZER SOLUTION    Take 3 mLs by nebulization every 4 hours as needed for Wheezing    ALBUTEROL SULFATE  (90 BASE) MCG/ACT INHALER    Inhale 2 puffs into the lungs every 4 hours as needed for Wheezing    ATORVASTATIN (LIPITOR) 40 MG TABLET    Take 1 tablet by mouth nightly    BLOOD GLUCOSE MONITORING SUPPL (ONETOUCH VERIO) W/DEVICE KIT    TEST 3 TIMES DAILY AS NEEDED    BLOOD GLUCOSE TEST STRIPS (EXACTECH TEST) STRIP    1 each by In Vitro route 3 times daily (Accu-check test strips) As needed.  DX:E11.65    BLOOD GLUCOSE TEST STRIPS (ONETOUCH VERIO) STRIP    TEST 3 TIMES DAILY AS NEEDED    BUSPIRONE (BUSPAR) 15 MG TABLET    TAKE 1 TABLET BY MOUTH THREE TIMES DAILY    BUTALBITAL-ACETAMINOPHEN-CAFFEINE (FIORICET, ESGIC) -40 MG PER TABLET    Take 1 tablet by mouth every 6 hours as needed for Headaches    CETIRIZINE (ZYRTEC) 10 MG TABLET    Take 1 tab po qhs prn allergies    DICLOFENAC SODIUM (VOLTAREN) 1 % GEL    Apply 2 g topically 4 times daily as needed for Pain    DULOXETINE (CYMBALTA) 60 MG EXTENDED RELEASE CAPSULE    TK 1 C PO QD    FLUTICASONE (FLONASE) 50 MCG/ACT NASAL SPRAY    1 spray by Nasal route daily    HYDROXYZINE (ATARAX) 25 MG TABLET    Take 1 tablet by mouth 3 times daily as needed for Anxiety    INSULIN LISPRO (HUMALOG) 100 UNIT/ML INJECTION VIAL    INJECT 4 UNITS UNDER THE SKIN THREE TIMES DAILY AS NEEDED FOR HIGH BLOOD SUGAR    INSULIN PEN NEEDLE (B-D ULTRAFINE III SHORT PEN) 31G X 8 MM MISC    Inject 1 each into the skin 3 times daily    INSULIN SYRINGE-NEEDLE U-100 30G X 1/2\" 1 ML MISC    1 each by Does not apply route daily    LEVOTHYROXINE (SYNTHROID) 112 MCG TABLET    Take 1 tablet by mouth Daily    LEVOTHYROXINE (SYNTHROID) 150 MCG TABLET    TK 1 T PO ONCE D    MAGNESIUM OXIDE (MAG-OX) 400 MG TABLET    Take 0.5 tablets by mouth daily    METHOCARBAMOL (ROBAXIN) 750 MG TABLET    Take 750 mg by mouth 4 times daily    METOCLOPRAMIDE (REGLAN) 10 MG TABLET    Take 1 tablet by mouth 4 times daily    METOCLOPRAMIDE (REGLAN) 10 MG TABLET    Take 1 tablet by mouth 4 times daily    METOPROLOL TARTRATE (LOPRESSOR) 25 MG TABLET    Take 1 tablet by mouth 2 times daily    MONTELUKAST (SINGULAIR) 10 MG TABLET    Take 1 tab nightly at supper for breathing/allergies    OMEPRAZOLE (PRILOSEC) 40 MG DELAYED RELEASE CAPSULE    Take by mouth    ONE TOUCH ULTRASOFT LANCETS MISC    TEST 3 TIMES DAILY AS NEEDED    PREGABALIN (LYRICA) 150 MG CAPSULE    Take 1 capsule by mouth 3 times daily for 90 days. PROMETHAZINE (PHENERGAN) 25 MG TABLET    WARNING:  May cause drowsiness. May impair ability to operate vehicles or machinery. Do not use in combination with alcohol.     ! Tablet every 6 hours as needed in conjunction with Ultram for headaches    PROVENTIL  (90 BASE) MCG/ACT INHALER    Inhale 2 puffs into the lungs every 6 hours as needed for Wheezing    SUMATRIPTAN (IMITREX) 25 MG TABLET    TAKE 1 TABLET BY MOUTH 1 TIME AS NEEDED FOR MIGRAINE    TRAMADOL (ULTRAM) 50 MG TABLET    Take 50 mg by mouth every 6 hours as needed for Pain.    TRESIBA FLEXTOUCH 100 UNIT/ML SOPN    INJECT 30 UNITS UNDER THE SKIN NIGHTLY       ALLERGIES     Shellfish-derived products; Ibuprofen; Ketorolac;  Other; Propoxyphene n-acetaminophen; and Toradol [ketorolac tromethamine]    FAMILY HISTORY       Family History   Problem Relation Age of Onset    Cancer Father     Diabetes Father     Allergy (Severe) Father     Heart Attack Father     Prostate Cancer Father     High Blood Pressure Mother     Diabetes Mother     Arthritis Mother     High Cholesterol Mother     Vision Loss Mother     Alcohol Abuse Neg Hx     Anemia Neg Hx     Arrhythmia Neg Hx     Asthma Neg Hx     Atrial Fibrillation Neg Hx     Birth Defects Neg Hx     Breast Cancer Neg Hx     Coronary Art Dis Neg Hx     Colon Cancer Neg Hx     Depression Neg Hx     Early Death Neg Hx     Hearing Loss Neg Hx     Heart Disease Neg Hx     Learning Disabilities Neg Hx     Kidney Disease Neg Hx     Mental Illness Neg Hx     Mental Retardation Neg Hx     Miscarriages / Stillbirths Neg Hx     Obesity Neg Hx     Osteoporosis Neg Hx     Stroke Neg Hx     Substance Abuse Neg Hx           SOCIAL HISTORY       Social History     Socioeconomic History    Marital status: Legally      Spouse name: None    Number of children: None    Years of education: None    Highest education level: None   Occupational History    None   Social Needs    Financial resource strain: None    Food insecurity     Worry: None     Inability: None    Transportation needs     Medical: None     Non-medical: None   Tobacco Use    Smoking status: Former Smoker     Packs/day: 0.50     Years: 15.00     Pack years: 7.50     Types: Cigarettes     Start date: 2014     Last attempt to quit: 2015     Years since quittin.4    Smokeless tobacco: Former User     Quit date: 3/23/2016   Substance and Sexual Activity    Alcohol use: Not Currently     Alcohol/week: 0.0 standard drinks     Comment: occasionally    Drug use: Yes     Frequency: 3.0 times per week     Types: Marijuana    Sexual activity: Yes     Partners: Male   Lifestyle    Physical activity     Days per week: None     Minutes per session: None    Stress: None   Relationships    Social connections     Talks on phone: None     Gets together: None     Attends Voodoo service: None     Active member of club or organization: None     Attends meetings of clubs or organizations: None     Relationship status: None    Intimate partner violence     Fear of current or ex partner: None     Emotionally abused: None     Physically abused: None     Forced sexual activity: None   Other Topics Concern    None   Social History Narrative    None       SCREENINGS      @FLOW([id number])@      PHYSICAL EXAM    (up to 7 for level 4, 8 or more for level 5)     ED Triage Vitals   BP Temp Temp Source Pulse Resp SpO2 Height Weight   07/19/20 2035 07/19/20 1952 07/19/20 1952 07/19/20 1952 07/19/20 1952 07/19/20 1952 07/19/20 1952 07/19/20 1952   118/61 98.4 °F (36.9 °C) Oral 90 18 97 % 5' 3\" (1.6 m) 220 lb (99.8 kg)       Physical Exam  Vitals signs and nursing note reviewed. Constitutional:       Appearance: Normal appearance. She is well-developed. HENT:      Head: Normocephalic and atraumatic. Right Ear: External ear normal.      Left Ear: External ear normal.      Nose: Nose normal.      Mouth/Throat:      Mouth: Mucous membranes are moist.   Eyes:      Extraocular Movements: Extraocular movements intact. Pupils: Pupils are equal, round, and reactive to light. Neck:      Musculoskeletal: Normal range of motion and neck supple. Cardiovascular:      Rate and Rhythm: Normal rate and regular rhythm. Pulses: Normal pulses. Heart sounds: Normal heart sounds. Pulmonary:      Effort: Pulmonary effort is normal. No respiratory distress. Breath sounds: Normal breath sounds. No stridor. No wheezing, rhonchi or rales.    Chest:      Chest wall: No tenderness. Abdominal:      General: Abdomen is flat. Bowel sounds are normal.      Palpations: Abdomen is soft. Musculoskeletal: Normal range of motion. Skin:     General: Skin is warm and dry. Neurological:      General: No focal deficit present. Mental Status: She is alert and oriented to person, place, and time. Cranial Nerves: No cranial nerve deficit. Sensory: No sensory deficit. Motor: No weakness or abnormal muscle tone. Coordination: Coordination normal.      Gait: Gait normal.      Deep Tendon Reflexes: Reflexes normal.   Psychiatric:         Mood and Affect: Mood normal.         Behavior: Behavior normal.         Thought Content: Thought content normal.         Judgment: Judgment normal.         DIAGNOSTIC RESULTS     EKG: All EKG's are interpreted by the Emergency Department Physician who either signs or Co-signsthis chart in the absence of a cardiologist.    EKG: Normal sinus rhythm rate 81 no acute ST or T wave change. RADIOLOGY:   Non-plain filmimages such as CT, Ultrasound and MRI are read by the radiologist. Plain radiographic images are visualized and preliminarily interpreted by the emergency physician with the below findings:        Interpretation per the Radiologist below, if available at the time ofthis note:    XR CHEST PORTABLE    (Results Pending)     Cute cardiopulmonary process    ED BEDSIDE ULTRASOUND:   Performed by ED Physician - none    LABS:  Labs Reviewed   TROPONIN       All other labs were within normal range or not returned as of this dictation.     EMERGENCY DEPARTMENT COURSE and DIFFERENTIAL DIAGNOSIS/MDM:   Vitals:    Vitals:    07/19/20 1952 07/19/20 2035 07/19/20 2125   BP:  118/61 120/66   Pulse: 90 90 93   Resp: 18 20 20   Temp: 98.4 °F (36.9 °C)  92 °F (33.3 °C)   TempSrc: Oral     SpO2: 97% 97% 99%   Weight: 220 lb (99.8 kg)     Height: 5' 3\" (1.6 m)                  MDM  Number of Diagnoses or Management Options  Diagnosis management comments: 53years old presented to the ER with chest pain acute on chronic from known history of sarcoidosis given morphine x2 with relief EKG and troponin all negative       Amount and/or Complexity of Data Reviewed  Clinical lab tests: ordered and reviewed  Tests in the radiology section of CPT®: ordered and reviewed          CONSULTS:  None    PROCEDURES:  Unless otherwise noted below, none     Procedures    FINAL IMPRESSION      1. Atypical chest pain    2. Sarcoidosis          DISPOSITION/PLAN   DISPOSITION        PATIENT REFERRED TO:  No follow-up provider specified.     DISCHARGE MEDICATIONS:  New Prescriptions    No medications on file          (Please note thatportions of this note were completed with a voice recognition program.  Efforts were made to edit the dictations but occasionally words are mis-transcribed.)    Thierry Clarke MD (electronically signed)  Attending Emergency Physician  \      Alayna Roman MD  07/19/20 [id number]

## 2020-07-21 PROCEDURE — 93010 ELECTROCARDIOGRAM REPORT: CPT | Performed by: INTERNAL MEDICINE

## 2020-07-22 ENCOUNTER — HOSPITAL ENCOUNTER (EMERGENCY)
Age: 50
Discharge: HOME | End: 2020-07-22
Attending: EMERGENCY MEDICINE
Payer: MEDICAID

## 2020-07-22 VITALS
DIASTOLIC BLOOD PRESSURE: 100 MMHG | TEMPERATURE: 99 F | BODY MASS INDEX: 38.98 KG/M2 | HEART RATE: 85 BPM | RESPIRATION RATE: 18 BRPM | WEIGHT: 220 LBS | SYSTOLIC BLOOD PRESSURE: 123 MMHG | OXYGEN SATURATION: 96 % | HEIGHT: 63 IN

## 2020-07-22 PROCEDURE — 96375 TX/PRO/DX INJ NEW DRUG ADDON: CPT

## 2020-07-22 PROCEDURE — 6360000002 HC RX W HCPCS: Performed by: EMERGENCY MEDICINE

## 2020-07-22 PROCEDURE — 96374 THER/PROPH/DIAG INJ IV PUSH: CPT

## 2020-07-22 PROCEDURE — 99283 EMERGENCY DEPT VISIT LOW MDM: CPT

## 2020-07-22 PROCEDURE — 6370000000 HC RX 637 (ALT 250 FOR IP): Performed by: EMERGENCY MEDICINE

## 2020-07-22 RX ORDER — ACETAMINOPHEN 325 MG/1
650 TABLET ORAL ONCE
Status: COMPLETED | OUTPATIENT
Start: 2020-07-22 | End: 2020-07-22

## 2020-07-22 RX ORDER — 0.9 % SODIUM CHLORIDE 0.9 %
500 INTRAVENOUS SOLUTION INTRAVENOUS ONCE
Status: DISCONTINUED | OUTPATIENT
Start: 2020-07-22 | End: 2020-07-22 | Stop reason: HOSPADM

## 2020-07-22 RX ORDER — DIPHENHYDRAMINE HYDROCHLORIDE 50 MG/ML
25 INJECTION INTRAMUSCULAR; INTRAVENOUS ONCE
Status: COMPLETED | OUTPATIENT
Start: 2020-07-22 | End: 2020-07-22

## 2020-07-22 RX ORDER — METOCLOPRAMIDE HYDROCHLORIDE 5 MG/ML
10 INJECTION INTRAMUSCULAR; INTRAVENOUS ONCE
Status: COMPLETED | OUTPATIENT
Start: 2020-07-22 | End: 2020-07-22

## 2020-07-22 RX ADMIN — METOCLOPRAMIDE 10 MG: 5 INJECTION, SOLUTION INTRAMUSCULAR; INTRAVENOUS at 06:36

## 2020-07-22 RX ADMIN — DIPHENHYDRAMINE HYDROCHLORIDE 25 MG: 50 INJECTION, SOLUTION INTRAMUSCULAR; INTRAVENOUS at 06:35

## 2020-07-22 RX ADMIN — ACETAMINOPHEN 650 MG: 325 TABLET ORAL at 06:25

## 2020-07-22 ASSESSMENT — PAIN DESCRIPTION - LOCATION: LOCATION: HEAD

## 2020-07-22 ASSESSMENT — PAIN DESCRIPTION - PAIN TYPE: TYPE: ACUTE PAIN

## 2020-07-22 ASSESSMENT — PAIN DESCRIPTION - DESCRIPTORS: DESCRIPTORS: ACHING

## 2020-07-22 ASSESSMENT — PAIN SCALES - GENERAL
PAINLEVEL_OUTOF10: 10
PAINLEVEL_OUTOF10: 10

## 2020-07-22 ASSESSMENT — PAIN DESCRIPTION - FREQUENCY: FREQUENCY: CONTINUOUS

## 2020-07-22 NOTE — ED TRIAGE NOTES
Pt presents to ED with cc of a headache. Pt states that it started yesterday and she had some associated nausea but denies vomiting. Pt was here a few days ago with the same symptoms. Pt AOx4.

## 2020-07-22 NOTE — ED PROVIDER NOTES
[address] [facility] ED  EMERGENCY DEPARTMENT ENCOUNTER      Pt Name: Boyd Montes  MRN: [id number]  [place] 1970  Date of evaluation: 7/22/2020  Provider: Jyoti Yen MD    CHIEF COMPLAINT       Chief Complaint   Patient presents with    Headache         HISTORY OF PRESENT ILLNESS   (Location/Symptom, Timing/Onset, Context/Setting, Quality, Duration, Modifying Factors, Severity)  Note limiting factors. 56-year-old female with history of migraines presenting with headache. Patient notes that headache started gradually in onset yesterday. Took Fioricet at 11 PM with minimal improvement. No changes in vision, fevers, neck pain numbness or tingling. Nothing noted to improve her symptoms and she presents for evaluation. She states this headache is typical of her migraines. Nursing Notes were reviewed. REVIEW OF SYSTEMS    (2-9 systems for level 4, 10 or more for level 5)     Review of Systems   Neurological: Positive for headaches. All other systems reviewed and are negative. Except as noted above the remainder of the review of systems was reviewed and negative. PAST MEDICAL HISTORY     Past Medical History:   Diagnosis Date    Anxiety     Arthritis     Asthma     Bronchopneumonia     Cancer ([address]     renal    Cerebral artery occlusion with cerebral infarction (HCC)     Chronic bilateral low back pain with sciatica     Chronic kidney disease     Chronic obstructive lung disease ([address] 7/26/2019    Depression     Fibromyalgia     Hypertension     Insulin dependent type 2 diabetes mellitus, uncontrolled ([address] 8/3/2018    Localized enlarged lymph nodes 10/26/2018    Mixed headache     Pure hyperglyceridemia 5/19/2017    Sarcoidosis     Sleep apnea     does not wear cpap    Thyroid goiter          SURGICAL HISTORY       Past Surgical History:   Procedure Laterality Date    BRONCHOSCOPY  10/26/2018    DR. STEARNS    KIDNEY REMOVAL Right 08/2016    KIDNEY 1 Inhaler, Refills: 3      cetirizine (ZYRTEC) 10 MG tablet Take 1 tab po qhs prn allergies  Qty: 30 tablet, Refills: 5    Associated Diagnoses: Acute seasonal allergic rhinitis due to pollen      !! albuterol sulfate  (90 Base) MCG/ACT inhaler Inhale 2 puffs into the lungs every 4 hours as needed for Wheezing  Qty: 1 Inhaler, Refills: 3      diclofenac sodium (VOLTAREN) 1 % GEL Apply 2 g topically 4 times daily as needed for Pain  Qty: 1 Tube, Refills: 3    Associated Diagnoses: Left arm pain; Tendonitis      Insulin Pen Needle (B-D ULTRAFINE III SHORT PEN) 31G X 8 MM MISC Inject 1 each into the skin 3 times daily  Qty: 100 each, Refills: 5      acetaminophen (AMINOFEN) 325 MG tablet Take 2 tablets by mouth every 6 hours as needed for Pain  Qty: 20 tablet, Refills: 0      butalbital-acetaminophen-caffeine (FIORICET, ESGIC) -40 MG per tablet Take 1 tablet by mouth every 6 hours as needed for Headaches  Qty: 30 tablet, Refills: 0      promethazine (PHENERGAN) 25 MG tablet WARNING:  May cause drowsiness. May impair ability to operate vehicles or machinery. Do not use in combination with alcohol. ! Tablet every 6 hours as needed in conjunction with Ultram for headaches  Qty: 20 tablet, Refills: 0      !! levothyroxine (SYNTHROID) 112 MCG tablet Take 1 tablet by mouth Daily  Qty: 30 tablet, Refills: 3    Associated Diagnoses: Hypothyroidism, unspecified type      !! blood glucose test strips (EXACTECH TEST) strip 1 each by In Vitro route 3 times daily (Accu-check test strips) As needed.  DX:E11.65  Qty: 300 strip, Refills: 5    Associated Diagnoses: Insulin dependent type 2 diabetes mellitus, uncontrolled (HCC)      ONE TOUCH ULTRASOFT LANCETS MISC TEST 3 TIMES DAILY AS NEEDED  Qty: 300 each, Refills: 3    Associated Diagnoses: Insulin dependent type 2 diabetes mellitus, uncontrolled (HCC)      albuterol (PROVENTIL) (2.5 MG/3ML) 0.083% nebulizer solution Take 3 mLs by nebulization every 4 hours as needed for Wheezing  Qty: 120 each, Refills: 3      !! metoclopramide (REGLAN) 10 MG tablet Take 1 tablet by mouth 4 times daily  Qty: 120 tablet, Refills: 0      omeprazole (PRILOSEC) 40 MG delayed release capsule Take by mouth      atorvastatin (LIPITOR) 40 MG tablet Take 1 tablet by mouth nightly  Qty: 30 tablet, Refills: 3      metoprolol tartrate (LOPRESSOR) 25 MG tablet Take 1 tablet by mouth 2 times daily  Qty: 60 tablet, Refills: 0      DULoxetine (CYMBALTA) 60 MG extended release capsule TK 1 C PO QD  Refills: 4      fluticasone (FLONASE) 50 MCG/ACT nasal spray 1 spray by Nasal route daily  Qty: 1 Bottle, Refills: 3    Associated Diagnoses: Allergic rhinitis, unspecified seasonality, unspecified trigger      traMADol (ULTRAM) 50 MG tablet Take 50 mg by mouth every 6 hours as needed for Pain. methocarbamol (ROBAXIN) 750 MG tablet Take 750 mg by mouth 4 times daily      Insulin Syringe-Needle U-100 30G X 1/2\" 1 ML MISC 1 each by Does not apply route daily  Qty: 100 each, Refills: 3    Associated Diagnoses: Insulin dependent type 2 diabetes mellitus, uncontrolled ([facility]      ! ! blood glucose test strips (ONETOUCH VERIO) strip TEST 3 TIMES DAILY AS NEEDED  Qty: 300 each, Refills: 3    Associated Diagnoses: Insulin dependent type 2 diabetes mellitus, uncontrolled ([facility])      Blood Glucose Monitoring Suppl (ONETOUCH VERIO) w/Device KIT TEST 3 TIMES DAILY AS NEEDED  Qty: 1 kit, Refills: 0    Associated Diagnoses: Insulin dependent type 2 diabetes mellitus, uncontrolled ([facility])      montelukast (SINGULAIR) 10 MG tablet Take 1 tab nightly at supper for breathing/allergies  Qty: 30 tablet, Refills: 5    Associated Diagnoses: Chronic seasonal allergic rhinitis due to pollen; Moderate persistent asthma with acute exacerbation       !! - Potential duplicate medications found. Please discuss with provider. ALLERGIES     Shellfish-derived products; Ibuprofen; Ketorolac;  Other; Propoxyphene n-acetaminophen; and Toradol [ketorolac tromethamine]    FAMILY HISTORY       Family History   Problem Relation Age of Onset    Cancer Father     Diabetes Father     Allergy (Severe) Father     Heart Attack Father     Prostate Cancer Father     High Blood Pressure Mother     Diabetes Mother     Arthritis Mother     High Cholesterol Mother     Vision Loss Mother     Alcohol Abuse Neg Hx     Anemia Neg Hx     Arrhythmia Neg Hx     Asthma Neg Hx     Atrial Fibrillation Neg Hx     Birth Defects Neg Hx     Breast Cancer Neg Hx     Coronary Art Dis Neg Hx     Colon Cancer Neg Hx     Depression Neg Hx     Early Death Neg Hx     Hearing Loss Neg Hx     Heart Disease Neg Hx     Learning Disabilities Neg Hx     Kidney Disease Neg Hx     Mental Illness Neg Hx     Mental Retardation Neg Hx     Miscarriages / Stillbirths Neg Hx     Obesity Neg Hx     Osteoporosis Neg Hx     Stroke Neg Hx     Substance Abuse Neg Hx           SOCIAL HISTORY       Social History     Socioeconomic History    Marital status: Legally      Spouse name: None    Number of children: None    Years of education: None    Highest education level: None   Occupational History    None   Social Needs    Financial resource strain: None    Food insecurity     Worry: None     Inability: None    Transportation needs     Medical: None     Non-medical: None   Tobacco Use    Smoking status: Former Smoker     Packs/day: 0.50     Years: 15.00     Pack years: 7.50     Types: Cigarettes     Start date: 2014     Last attempt to quit: 2015     Years since quittin.4    Smokeless tobacco: Former User     Quit date: 3/23/2016   Substance and Sexual Activity    Alcohol use: Not Currently     Alcohol/week: 0.0 standard drinks     Comment: occasionally    Drug use: Yes     Frequency: 3.0 times per week     Types: Marijuana    Sexual activity: Yes     Partners: Male   Lifestyle    Physical activity     Days per week: None     Minutes per session: None    Stress: None   Relationships    Social connections     Talks on phone: None     Gets together: None     Attends Mandaeism service: None     Active member of club or organization: None     Attends meetings of clubs or organizations: None     Relationship status: None    Intimate partner violence     Fear of current or ex partner: None     Emotionally abused: None     Physically abused: None     Forced sexual activity: None   Other Topics Concern    None   Social History Narrative    None       SCREENINGS               PHYSICAL EXAM    (up to 7 for level 4, 8 or more for level 5)     ED Triage Vitals [07/22/20 0614]   BP Temp Temp Source Pulse Resp SpO2 Height Weight   (!) 123/100 99 °F (37.2 °C) Oral 85 18 96 % 5' 3\" (1.6 m) 220 lb (99.8 kg)       Physical Exam  Vitals signs and nursing note reviewed. Constitutional:       General: She is not in acute distress. Appearance: Normal appearance. She is well-developed. She is obese. HENT:      Head: Normocephalic and atraumatic. Mouth/Throat:      Mouth: Mucous membranes are moist.      Pharynx: Oropharynx is clear. Eyes:      Extraocular Movements: Extraocular movements intact. Conjunctiva/sclera: Conjunctivae normal.      Pupils: Pupils are equal, round, and reactive to light. Neck:      Musculoskeletal: Normal range of motion and neck supple. Cardiovascular:      Rate and Rhythm: Normal rate and regular rhythm. Pulmonary:      Effort: Pulmonary effort is normal.      Breath sounds: Normal breath sounds. Abdominal:      General: Bowel sounds are normal.      Palpations: Abdomen is soft. Musculoskeletal: Normal range of motion. General: No deformity. Skin:     General: Skin is warm and dry. Capillary Refill: Capillary refill takes less than 2 seconds. Neurological:      General: No focal deficit present. Mental Status: She is alert and oriented to person, place, and time.  Mental status is at baseline. Cranial Nerves: No cranial nerve deficit. Psychiatric:         Thought Content: Thought content normal.         DIAGNOSTIC RESULTS     EKG: All EKG's are interpreted by the Emergency Department Physician who either signs or Co-signs this chart in the absence of a cardiologist.    RADIOLOGY:   Non-plain film images such as CT, Ultrasound and MRI are read by the radiologist. Plain radiographic images are visualized and preliminarily interpreted by the emergency physician with the below findings:    Interpretation per the Radiologist below, if available at the time of this note:    No orders to display       LABS:  Labs Reviewed - No data to display    All other labs were within normal range or not returned as of this dictation. EMERGENCY DEPARTMENT COURSE and DIFFERENTIAL DIAGNOSIS/MDM:   Vitals:    Vitals:    07/22/20 0614   BP: (!) 123/100   Pulse: 85   Resp: 18   Temp: 99 °F (37.2 °C)   TempSrc: Oral   SpO2: 96%   Weight: 220 lb (99.8 kg)   Height: 5' 3\" (1.6 m)       MDM  Number of Diagnoses or Management Options  Acute nonintractable headache, unspecified headache type:   Diagnosis management comments: 52-year-old female presenting with headache. Headache is typical of her migraines and she is well-appearing on exam.  Given several medications with improvement in symptoms. Patient is given a referral to Dr. Yoni Dent, neurology. Patient will be discharged home in good condition. Patient has been hemodynamically stable throughout ED course and is appropriate for outpatient follow up. Patient should follow up with neurology in 2-3 days or return to ED immediately for any new or worsening symptoms. Patient is well appearing on discharge. Patient Progress  Patient progress: stable      Procedures    CRITICAL CARE TIME   Total Critical Care time was 0 minutes, excluding separately reportable procedures.   There was a high probability of clinically significant/life threatening deterioration in the patient's condition which required my urgent intervention. FINAL IMPRESSION      1.  Acute nonintractable headache, unspecified headache type          DISPOSITION/PLAN   DISPOSITION Discharge - Pending Orders Complete 07/22/2020 06:28:44 AM      (Please note that portions of this note were completed with a voice recognition program.  Efforts were made to edit the dictations but occasionally words are mis-transcribed.)    Tandy Rinne, MD (electronically signed)  Attending Emergency Physician        Tandy Rinne, MD  07/22/20 3111

## 2020-07-23 ENCOUNTER — CARE COORDINATION (OUTPATIENT)
Dept: CARE COORDINATION | Age: 50
End: 2020-07-23

## 2020-07-23 NOTE — CARE COORDINATION
Patient contacted regarding OVID-19 risk and screening. Discussed OVID-19 related testing which was not done at this time. Test results were not done. Patient informed of results, if available? N/A. Care Transition Nurse/ Ambulatory Care Manager contacted the patient by telephone to perform follow-up assessment. Verified name and  with patient as identifiers. Patient has following risk factors of: COPD, asthma, diabetes, chronic kidney disease and Sarcoidosis. Symptoms reviewed with patient who verbalized the following symptoms: no new symptoms and no worsening symptoms. Due to no new or worsening symptoms encounter was not routed to provider for escalation. Education provided regarding infection prevention, and signs and symptoms of COVID-19 and when to seek medical attention with patient who verbalized understanding. Discussed exposure protocols and quarantine from [address] you at higher risk for severe illness  and given an opportunity for questions and concerns. The patient agrees to contact the COVID-19 hotline [phone] or PCP office for questions related to their healthcare. CTN/ACM provided contact information for future reference. From CDC: Are you at higher risk for severe illness?  Wash your hands often.  Avoid close contact (6 feet, which is about two arm lengths) with people who are sick.  Put distance between yourself and other people if COVID-19 is spreading in your community.  Clean and disinfect frequently touched surfaces.  Avoid all cruise travel and non-essential air travel.  Call your healthcare professional if you have concerns about COVID-19 and your underlying condition or if you are sick. For more information on steps you can take to protect yourself, see CDC's How to [address] [place] [address] for follow-up call in 5-7 days based on severity of symptoms and risk factors.

## 2020-07-29 ENCOUNTER — CARE COORDINATION (OUTPATIENT)
Dept: CARE COORDINATION | Age: 50
End: 2020-07-29

## 2020-07-29 NOTE — CARE COORDINATION
Patient contacted regarding COVID-19 risk and screening. Discussed COVID-19 related testing which was not done at this time. Test results were not done. Patient informed of results, if available? N/A. Care Transition Nurse/ Ambulatory Care Manager contacted the patient by telephone to perform follow-up assessment. Verified name and  with patient as identifiers. Patient has following risk factors of: COPD, asthma, diabetes, chronic kidney disease and Sarcoidosis. Symptoms reviewed with patient who verbalized the following symptoms: no new symptoms, no worsening symptoms and Headache continues. Due to no new or worsening symptoms encounter was not routed to provider for escalation. Education provided regarding infection prevention, and signs and symptoms of COVID-19 and when to seek medical attention with patient who verbalized understanding. Discussed exposure protocols and quarantine from [address] you at higher risk for severe illness  and given an opportunity for questions and concerns. The patient agrees to contact the COVID-19 hotline [phone] or PCP office for questions related to their healthcare. CTN/ACM provided contact information for future reference. From CDC: Are you at higher risk for severe illness?  Wash your hands often.  Avoid close contact (6 feet, which is about two arm lengths) with people who are sick.  Put distance between yourself and other people if COVID-19 is spreading in your community.  Clean and disinfect frequently touched surfaces.  Avoid all cruise travel and non-essential air travel.  Call your healthcare professional if you have concerns about COVID-19 and your underlying condition or if you are sick. For more information on steps you can take to protect yourself, see CDC's How to [employer] for follow-up call in 7-14 days based on severity of symptoms and risk factors.   Butch Callahan tells me that she continues to have the headache/migraine. I asked about the follow up with Dr Tommy De Jesus. She tells me that she has \"so many other appointments that she has not scheduled this yet. I strongly encouraged her to see him and set up this appointment to achieve improvement. I have asked her to call me with any other questions or concerns.

## 2020-08-06 ENCOUNTER — APPOINTMENT (OUTPATIENT)
Dept: GENERAL RADIOLOGY | Age: 50
End: 2020-08-06
Payer: MEDICAID

## 2020-08-06 ENCOUNTER — HOSPITAL ENCOUNTER (EMERGENCY)
Age: 50
Discharge: HOME | End: 2020-08-06
Attending: EMERGENCY MEDICINE
Payer: MEDICAID

## 2020-08-06 VITALS
HEART RATE: 78 BPM | RESPIRATION RATE: 17 BRPM | TEMPERATURE: 98.3 F | OXYGEN SATURATION: 98 % | WEIGHT: 220 LBS | SYSTOLIC BLOOD PRESSURE: 129 MMHG | DIASTOLIC BLOOD PRESSURE: 65 MMHG | BODY MASS INDEX: 38.97 KG/M2

## 2020-08-06 LAB
ALBUMIN SERPL-MCNC: 3.4 G/DL (ref 3.5–4.6)
ALP BLD-CCNC: 137 U/L (ref 40–130)
ALT SERPL-CCNC: 11 U/L (ref 0–33)
ANION GAP SERPL CALCULATED.3IONS-SCNC: 7 MEQ/L (ref 9–15)
AST SERPL-CCNC: 18 U/L (ref 0–35)
BASOPHILS ABSOLUTE: 0.1 K/UL (ref 0–0.2)
BASOPHILS RELATIVE PERCENT: 0.9 %
BILIRUB SERPL-MCNC: <0.2 MG/DL (ref 0.2–0.7)
BUN BLDV-MCNC: 12 MG/DL (ref 6–20)
CALCIUM SERPL-MCNC: 8.3 MG/DL (ref 8.5–9.9)
CHLORIDE BLD-SCNC: 100 MEQ/L (ref 95–107)
CO2: 26 MEQ/L (ref 20–31)
CREAT SERPL-MCNC: 1.09 MG/DL (ref 0.5–0.9)
EKG ATRIAL RATE: 85 BPM
EKG P AXIS: 45 DEGREES
EKG P-R INTERVAL: 172 MS
EKG Q-T INTERVAL: 382 MS
EKG QRS DURATION: 80 MS
EKG QTC CALCULATION (BAZETT): 454 MS
EKG R AXIS: 7 DEGREES
EKG T AXIS: 11 DEGREES
EKG VENTRICULAR RATE: 85 BPM
EOSINOPHILS ABSOLUTE: 0.3 K/UL (ref 0–0.7)
EOSINOPHILS RELATIVE PERCENT: 3.5 %
GFR AFRICAN AMERICAN: >60
GFR NON-AFRICAN AMERICAN: 53.3
GLOBULIN: 3.2 G/DL (ref 2.3–3.5)
GLUCOSE BLD-MCNC: 179 MG/DL (ref 70–99)
HCT VFR BLD CALC: 42.6 % (ref 37–47)
HEMOGLOBIN: 14.5 G/DL (ref 12–16)
LYMPHOCYTES ABSOLUTE: 2.3 K/UL (ref 1–4.8)
LYMPHOCYTES RELATIVE PERCENT: 29.8 %
MCH RBC QN AUTO: 31.9 PG (ref 27–31.3)
MCHC RBC AUTO-ENTMCNC: 34 % (ref 33–37)
MCV RBC AUTO: 94.1 FL (ref 82–100)
MONOCYTES ABSOLUTE: 0.5 K/UL (ref 0.2–0.8)
MONOCYTES RELATIVE PERCENT: 6.6 %
NEUTROPHILS ABSOLUTE: 4.5 K/UL (ref 1.4–6.5)
NEUTROPHILS RELATIVE PERCENT: 59.2 %
PDW BLD-RTO: 13.4 % (ref 11.5–14.5)
PLATELET # BLD: 247 K/UL (ref 130–400)
POTASSIUM SERPL-SCNC: 4.2 MEQ/L (ref 3.4–4.9)
RBC # BLD: 4.53 M/UL (ref 4.2–5.4)
SODIUM BLD-SCNC: 133 MEQ/L (ref 135–144)
TOTAL PROTEIN: 6.6 G/DL (ref 6.3–8)
TROPONIN: <0.01 NG/ML (ref 0–0.01)
WBC # BLD: 7.7 K/UL (ref 4.8–10.8)

## 2020-08-06 PROCEDURE — 80053 COMPREHEN METABOLIC PANEL: CPT

## 2020-08-06 PROCEDURE — 71045 X-RAY EXAM CHEST 1 VIEW: CPT

## 2020-08-06 PROCEDURE — 6370000000 HC RX 637 (ALT 250 FOR IP): Performed by: EMERGENCY MEDICINE

## 2020-08-06 PROCEDURE — 6360000002 HC RX W HCPCS: Performed by: EMERGENCY MEDICINE

## 2020-08-06 PROCEDURE — 99285 EMERGENCY DEPT VISIT HI MDM: CPT

## 2020-08-06 PROCEDURE — 96375 TX/PRO/DX INJ NEW DRUG ADDON: CPT

## 2020-08-06 PROCEDURE — 36415 COLL VENOUS BLD VENIPUNCTURE: CPT

## 2020-08-06 PROCEDURE — 96374 THER/PROPH/DIAG INJ IV PUSH: CPT

## 2020-08-06 PROCEDURE — 93010 ELECTROCARDIOGRAM REPORT: CPT | Performed by: INTERNAL MEDICINE

## 2020-08-06 PROCEDURE — 93005 ELECTROCARDIOGRAM TRACING: CPT | Performed by: EMERGENCY MEDICINE

## 2020-08-06 PROCEDURE — 85025 COMPLETE CBC W/AUTO DIFF WBC: CPT

## 2020-08-06 PROCEDURE — 84484 ASSAY OF TROPONIN QUANT: CPT

## 2020-08-06 RX ORDER — ONDANSETRON 2 MG/ML
4 INJECTION INTRAMUSCULAR; INTRAVENOUS ONCE
Status: COMPLETED | OUTPATIENT
Start: 2020-08-06 | End: 2020-08-06

## 2020-08-06 RX ORDER — MORPHINE SULFATE 2 MG/ML
4 INJECTION, SOLUTION INTRAMUSCULAR; INTRAVENOUS ONCE
Status: COMPLETED | OUTPATIENT
Start: 2020-08-06 | End: 2020-08-06

## 2020-08-06 RX ADMIN — ONDANSETRON 4 MG: 2 INJECTION INTRAMUSCULAR; INTRAVENOUS at 01:59

## 2020-08-06 RX ADMIN — LIDOCAINE HYDROCHLORIDE: 20 SOLUTION ORAL; TOPICAL at 01:59

## 2020-08-06 RX ADMIN — MORPHINE SULFATE 4 MG: 2 INJECTION, SOLUTION INTRAMUSCULAR; INTRAVENOUS at 02:20

## 2020-08-06 ASSESSMENT — ENCOUNTER SYMPTOMS
ABDOMINAL PAIN: 0
ABDOMINAL DISTENTION: 0
COUGH: 0
PHOTOPHOBIA: 0
SHORTNESS OF BREATH: 0
EYE DISCHARGE: 0
SORE THROAT: 0
CHEST TIGHTNESS: 0
WHEEZING: 0
VOMITING: 0

## 2020-08-06 ASSESSMENT — PAIN SCALES - GENERAL
PAINLEVEL_OUTOF10: 10
PAINLEVEL_OUTOF10: 10

## 2020-08-06 ASSESSMENT — PAIN DESCRIPTION - ORIENTATION: ORIENTATION: LEFT;MID

## 2020-08-06 ASSESSMENT — PAIN DESCRIPTION - PAIN TYPE: TYPE: ACUTE PAIN

## 2020-08-06 ASSESSMENT — PAIN DESCRIPTION - FREQUENCY: FREQUENCY: CONTINUOUS

## 2020-08-06 ASSESSMENT — PAIN DESCRIPTION - DESCRIPTORS: DESCRIPTORS: SHARP;HEAVINESS;PRESSURE

## 2020-08-06 ASSESSMENT — PAIN DESCRIPTION - LOCATION: LOCATION: CHEST

## 2020-08-06 NOTE — ED PROVIDER NOTES
[address] [facility] ED  eMERGENCY dEPARTMENT eNCOUnter      Pt Name: Cale Licona  MRN: [id number]  [place] 1970  Date of evaluation: 8/6/2020  Provider: Feliz Abbasi MD    CHIEF COMPLAINT       Chief Complaint   Patient presents with    Chest Pain         HISTORY OF PRESENT ILLNESS   (Location/Symptom, Timing/Onset,Context/Setting, Quality, Duration, Modifying Factors, Severity)  Note limiting factors. Cale Licona is a 53 y.o. female who presents to the emergency department for evaluation of chest pain. Patient states chest pain began about 30 minutes prior to arrival when she was getting ready for bed. This is nonexertional chest pain is midsternal in nature with no other real associated symptoms. She has been here multiple times for the chest pain but says she is not follow-up with cardiology in a really reinforced with her that she needs to have a cardiologist based on her history of frequent chest pain. She has had some work-ups in the more distant past that were negative. And she stopped following up with cardiology. She is a non-smoker. She denies fever chills. There is currently no shortness of breath or diaphoresis. No nausea or vomiting. HPI    NursingNotes were reviewed. REVIEW OF SYSTEMS    (2-9 systems for level 4, 10 or more for level 5)     Review of Systems   Constitutional: Negative for chills and diaphoresis. HENT: Negative for congestion, ear pain, mouth sores and sore throat. Eyes: Negative for photophobia and discharge. Respiratory: Negative for cough, chest tightness, shortness of breath and wheezing. Cardiovascular: Positive for chest pain. Negative for palpitations. Gastrointestinal: Negative for abdominal distention, abdominal pain and vomiting. Endocrine: Negative for cold intolerance. Genitourinary: Negative for difficulty urinating. Musculoskeletal: Negative for arthralgias. Skin: Negative for pallor and rash. Allergic/Immunologic: Negative for immunocompromised state. Neurological: Negative for dizziness and syncope. Hematological: Negative for adenopathy. Psychiatric/Behavioral: Negative for agitation and hallucinations. All other systems reviewed and are negative. Except as noted above the remainder of the review of systems was reviewed and negative. PAST MEDICAL HISTORY     Past Medical History:   Diagnosis Date    Anxiety     Arthritis     Asthma     Bronchopneumonia     Cancer ([facility]     renal    Cerebral artery occlusion with cerebral infarction (HCC)     Chronic bilateral low back pain with sciatica     Chronic kidney disease     Chronic obstructive lung disease ([facility] 7/26/2019    Depression     Fibromyalgia     Hypertension     Insulin dependent type 2 diabetes mellitus, uncontrolled ([facility] 8/3/2018    Localized enlarged lymph nodes 10/26/2018    Mixed headache     Pure hyperglyceridemia 5/19/2017    Sarcoidosis     Sleep apnea     does not wear cpap    Thyroid goiter          SURGICALHISTORY       Past Surgical History:   Procedure Laterality Date    BRONCHOSCOPY  10/26/2018    DR. STEARNS    KIDNEY REMOVAL Right 08/2016    KIDNEY REMOVAL Right 2016    LUNG BIOPSY Right 10/2018    THYROID LOBECTOMY Right 6/13/14    THYROIDECTOMY  02/21/2019    DR. MAGDALENO    THYROIDECTOMY  2018    URETER STENT PLACEMENT Left 08/2016         CURRENT MEDICATIONS       Previous Medications    ACETAMINOPHEN (AMINOFEN) 325 MG TABLET    Take 2 tablets by mouth every 6 hours as needed for Pain    ALBUTEROL (PROVENTIL) (2.5 MG/3ML) 0.083% NEBULIZER SOLUTION    Take 3 mLs by nebulization every 4 hours as needed for Wheezing    ALBUTEROL SULFATE  (90 BASE) MCG/ACT INHALER    Inhale 2 puffs into the lungs every 4 hours as needed for Wheezing    ATORVASTATIN (LIPITOR) 40 MG TABLET    Take 1 tablet by mouth nightly    BLOOD GLUCOSE MONITORING SUPPL (ONETOUCH VERIO) W/DEVICE KIT    TEST 3 TIMES DAILY AS NEEDED    BLOOD GLUCOSE TEST STRIPS (EXACTECH TEST) STRIP    1 each by In Vitro route 3 times daily (Accu-check test strips) As needed.  DX:E11.65    BLOOD GLUCOSE TEST STRIPS (ONETOUCH VERIO) STRIP    TEST 3 TIMES DAILY AS NEEDED    BUSPIRONE (BUSPAR) 15 MG TABLET    TAKE 1 TABLET BY MOUTH THREE TIMES DAILY    BUTALBITAL-ACETAMINOPHEN-CAFFEINE (FIORICET, ESGIC) -40 MG PER TABLET    Take 1 tablet by mouth every 6 hours as needed for Headaches    CETIRIZINE (ZYRTEC) 10 MG TABLET    Take 1 tab po qhs prn allergies    DICLOFENAC SODIUM (VOLTAREN) 1 % GEL    Apply 2 g topically 4 times daily as needed for Pain    DULOXETINE (CYMBALTA) 60 MG EXTENDED RELEASE CAPSULE    TK 1 C PO QD    FLUTICASONE (FLONASE) 50 MCG/ACT NASAL SPRAY    1 spray by Nasal route daily    INSULIN LISPRO (HUMALOG) 100 UNIT/ML INJECTION VIAL    INJECT 4 UNITS UNDER THE SKIN THREE TIMES DAILY AS NEEDED FOR HIGH BLOOD SUGAR    INSULIN PEN NEEDLE (B-D ULTRAFINE III SHORT PEN) 31G X 8 MM MISC    Inject 1 each into the skin 3 times daily    INSULIN SYRINGE-NEEDLE U-100 30G X 1/2\" 1 ML MISC    1 each by Does not apply route daily    LEVOTHYROXINE (SYNTHROID) 112 MCG TABLET    Take 1 tablet by mouth Daily    LEVOTHYROXINE (SYNTHROID) 150 MCG TABLET    TK 1 T PO ONCE D    MAGNESIUM OXIDE (MAG-OX) 400 MG TABLET    Take 0.5 tablets by mouth daily    METHOCARBAMOL (ROBAXIN) 750 MG TABLET    Take 750 mg by mouth 4 times daily    METOCLOPRAMIDE (REGLAN) 10 MG TABLET    Take 1 tablet by mouth 4 times daily    METOCLOPRAMIDE (REGLAN) 10 MG TABLET    Take 1 tablet by mouth 4 times daily    METOPROLOL TARTRATE (LOPRESSOR) 25 MG TABLET    Take 1 tablet by mouth 2 times daily    MONTELUKAST (SINGULAIR) 10 MG TABLET    Take 1 tab nightly at supper for breathing/allergies    OMEPRAZOLE (PRILOSEC) 40 MG DELAYED RELEASE CAPSULE    Take by mouth    ONE TOUCH ULTRASOFT LANCETS MISC    TEST 3 TIMES DAILY AS NEEDED    PREGABALIN (LYRICA) 150 MG CAPSULE Take 1 capsule by mouth 3 times daily for 90 days. PROMETHAZINE (PHENERGAN) 25 MG TABLET    WARNING:  May cause drowsiness. May impair ability to operate vehicles or machinery. Do not use in combination with alcohol. ! Tablet every 6 hours as needed in conjunction with Ultram for headaches    PROVENTIL  (90 BASE) MCG/ACT INHALER    Inhale 2 puffs into the lungs every 6 hours as needed for Wheezing    SUMATRIPTAN (IMITREX) 25 MG TABLET    TAKE 1 TABLET BY MOUTH 1 TIME AS NEEDED FOR MIGRAINE    TRAMADOL (ULTRAM) 50 MG TABLET    Take 50 mg by mouth every 6 hours as needed for Pain. TRESIBA FLEXTOUCH 100 UNIT/ML SOPN    INJECT 30 UNITS UNDER THE SKIN NIGHTLY       ALLERGIES     Shellfish-derived products; Ibuprofen; Ketorolac;  Other; Propoxyphene n-acetaminophen; and Toradol [ketorolac tromethamine]    FAMILY HISTORY       Family History   Problem Relation Age of Onset    Cancer Father     Diabetes Father     Allergy (Severe) Father     Heart Attack Father     Prostate Cancer Father     High Blood Pressure Mother     Diabetes Mother     Arthritis Mother     High Cholesterol Mother     Vision Loss Mother     Alcohol Abuse Neg Hx     Anemia Neg Hx     Arrhythmia Neg Hx     Asthma Neg Hx     Atrial Fibrillation Neg Hx     Birth Defects Neg Hx     Breast Cancer Neg Hx     Coronary Art Dis Neg Hx     Colon Cancer Neg Hx     Depression Neg Hx     Early Death Neg Hx     Hearing Loss Neg Hx     Heart Disease Neg Hx     Learning Disabilities Neg Hx     Kidney Disease Neg Hx     Mental Illness Neg Hx     Mental Retardation Neg Hx     Miscarriages / Stillbirths Neg Hx     Obesity Neg Hx     Osteoporosis Neg Hx     Stroke Neg Hx     Substance Abuse Neg Hx           SOCIAL HISTORY       Social History     Socioeconomic History    Marital status: Legally      Spouse name: None    Number of children: None    Years of education: None    Highest education level: None Occupational History    None   Social Needs    Financial resource strain: None    Food insecurity     Worry: None     Inability: None    Transportation needs     Medical: None     Non-medical: None   Tobacco Use    Smoking status: Former Smoker     Packs/day: 0.50     Years: 15.00     Pack years: 7.50     Types: Cigarettes     Start date: 2014     Last attempt to quit: 2015     Years since quittin.5    Smokeless tobacco: Former User     Quit date: 3/23/2016   Substance and Sexual Activity    Alcohol use: Not Currently     Alcohol/week: 0.0 standard drinks     Comment: occasionally    Drug use: Yes     Frequency: 3.0 times per week     Types: Marijuana    Sexual activity: Yes     Partners: Male   Lifestyle    Physical activity     Days per week: None     Minutes per session: None    Stress: None   Relationships    Social connections     Talks on phone: None     Gets together: None     Attends Confucianist service: None     Active member of club or organization: None     Attends meetings of clubs or organizations: None     Relationship status: None    Intimate partner violence     Fear of current or ex partner: None     Emotionally abused: None     Physically abused: None     Forced sexual activity: None   Other Topics Concern    None   Social History Narrative    None       SCREENINGS      @FLOW([id number])@      PHYSICAL EXAM    (up to 7 for level 4, 8 or more for level 5)     ED Triage Vitals   BP Temp Temp Source Pulse Resp SpO2 Height Weight   20 0056 20 0056 20 0056 20 0056 20 0056 20 0056 -- 20 0054   133/86 98.3 °F (36.8 °C) Oral 87 20 98 %  220 lb (99.8 kg)       Physical Exam  Vitals signs and nursing note reviewed. Constitutional:       Appearance: She is well-developed. HENT:      Head: Normocephalic.       Right Ear: Tympanic membrane normal.      Left Ear: Tympanic membrane normal.      Nose: Nose normal.      Mouth/Throat: Mouth: Mucous membranes are moist.   Eyes:      Conjunctiva/sclera: Conjunctivae normal.      Pupils: Pupils are equal, round, and reactive to light. Neck:      Musculoskeletal: Normal range of motion and neck supple. Cardiovascular:      Rate and Rhythm: Normal rate and regular rhythm. Heart sounds: Normal heart sounds. Pulmonary:      Effort: Pulmonary effort is normal. No respiratory distress. Breath sounds: Normal breath sounds. No wheezing. Abdominal:      General: Bowel sounds are normal.      Palpations: Abdomen is soft. Tenderness: There is no abdominal tenderness. There is no guarding. Musculoskeletal: Normal range of motion. Skin:     General: Skin is warm and dry. Capillary Refill: Capillary refill takes less than 2 seconds. Neurological:      Mental Status: She is alert and oriented to person, place, and time.    Psychiatric:         Mood and Affect: Mood normal.         DIAGNOSTIC RESULTS     EKG: All EKG's are interpreted by the Emergency Department Physician who either signs or Co-signsthis chart in the absence of a cardiologist.        RADIOLOGY:   Birder Drivers such as CT, Ultrasound and MRI are read by the radiologist. Plain radiographic images are visualized and preliminarily interpreted by the emergency physician with the below findings:      Interpretation per the Radiologist below, if available at the time ofthis note:    XR CHEST PORTABLE    (Results Pending)         ED BEDSIDE ULTRASOUND:   Performed by ED Physician - none    LABS:  Labs Reviewed   COMPREHENSIVE METABOLIC PANEL - Abnormal; Notable for the following components:       Result Value    Sodium 133 (*)     Anion Gap 7 (*)     Glucose 179 (*)     CREATININE 1.09 (*)     GFR Non- 53.3 (*)     Calcium 8.3 (*)     Alb 3.4 (*)     Alkaline Phosphatase 137 (*)     All other components within normal limits   CBC WITH AUTO DIFFERENTIAL - Abnormal; Notable for the following components: MCH 31.9 (*)     All other components within normal limits   TROPONIN       All other labs were within normal range or not returned as of this dictation. EMERGENCY DEPARTMENT COURSE and DIFFERENTIAL DIAGNOSIS/MDM:   Vitals:    Vitals:    08/06/20 0054 08/06/20 0056 08/06/20 0202   BP:  133/86 129/65   Pulse:  87 78   Resp:  20 17   Temp:  98.3 °F (36.8 °C)    TempSrc:  Oral    SpO2:  98% 98%   Weight: 220 lb (99.8 kg)          MDM on recheck patient feeling better. She had normal monitoring. Normal EKG. Negative troponin. She has had several evaluations in the past 30 days regarding the chest pain and the work-ups have been negative. Based on that I feel comfortable letting her being discharged home now that she is feeling better. I have stressed to her that she needs to follow-up with cardiology. They need to reevaluate whether a stress test or some other further testing of her heart is warranted. She assures me she will call on Monday give her both cardiology groups because she was concerned about insurance coverage. CONSULTS:  None    PROCEDURES:  Unless otherwise noted below, none     Procedures    FINAL IMPRESSION      1.  Chest pain, unspecified type          DISPOSITION/PLAN   DISPOSITION Decision To Discharge 08/06/2020 02:53:42 AM      PATIENT REFERRED TO:  [address], DO  [address]  [facility]    In 3 days      Gagan Galaviz MD  [address]  [address] [facility] [address]  [phone]    In 3 days        DISCHARGE MEDICATIONS:  New Prescriptions    No medications on file          (Please note that portions of this note were completed with a voice recognition program.  Efforts were made to edit the dictations but occasionally words are mis-transcribed.)    Bee Celaya MD (electronically signed)  Attending Emergency Physician          Bee Celaya MD  08/06/20 6230       Bee Celaya MD  08/06/20 7978

## 2020-08-06 NOTE — ED NOTES
Obt. Blood to lab, pt medicated per orders, pt refuses dilaudid, pt wants morphine, dr. Berta Colón notified.      Oscar Sheppard RN  08/06/20 2081

## 2020-08-07 ENCOUNTER — CARE COORDINATION (OUTPATIENT)
Dept: CARE COORDINATION | Age: 50
End: 2020-08-07

## 2020-08-07 NOTE — CARE COORDINATION
Patient contacted regarding recent discharge and COVID-19 risk. Discussed COVID-19 related testing which was not done at this time. Test results were not done. Patient informed of results, if available? Yes     Care Transition Nurse/ Ambulatory Care Manager contacted the patient by telephone to perform post discharge assessment. Verified name and  with patient as identifiers. Patient has following risk factors of: COPD and diabetes. CTN/ACM reviewed discharge instructions, medical action plan and red flags related to discharge diagnosis. Reviewed and educated them on any new and changed medications related to discharge diagnosis. Advised obtaining a 90-day supply of all daily and as-needed medications. Education provided regarding infection prevention, and signs and symptoms of COVID-19 and when to seek medical attention with patient who verbalized understanding. Discussed exposure protocols and quarantine from [address] you at higher risk for severe illness  and given an opportunity for questions and concerns. The patient agrees to contact the COVID-19 hotline [phone] or PCP office for questions related to their healthcare. CTN/ACM provided contact information for future reference. From CDC: Are you at higher risk for severe illness?  Wash your hands often.  Avoid close contact (6 feet, which is about two arm lengths) with people who are sick.  Put distance between yourself and other people if COVID-19 is spreading in your community.  Clean and disinfect frequently touched surfaces.  Avoid all cruise travel and non-essential air travel.  Call your healthcare professional if you have concerns about COVID-19 and your underlying condition or if you are sick. For more information on steps you can take to protect yourself, see CDC's How to Compa for follow-up call in 7-14 days based on severity of symptoms and risk factors.   ACM REVIEWED ED VISIT AND RENIE  ACM REVIEWED PREVIOUS [facility] ED FOLLOW UPS AND PATIENT CONTINUES TO DECLINE  NEURO APPT  PATIENT REPORTS SHE CAN HANDLE HERSELF  [facility] REVIEWED COVID EDU, RISKS AND S/S   PATIENT DECLINED ASSISTANCE WITH PCP OR CARDIAC APPT

## 2020-08-08 ENCOUNTER — CARE COORDINATION (OUTPATIENT)
Dept: CARE COORDINATION | Age: 50
End: 2020-08-08

## 2020-08-08 NOTE — CARE COORDINATION
You Patient resolved from the Care Transitions episode on 8/8/2020  Discussed COVID-19 related testing which was not done at this time. Test results were not done. Patient informed of results, if available? N/A    Patient/family has been provided the following resources and education related to COVID-19:                         Signs, symptoms and red flags related to COVID-19            CDC exposure and quarantine guidelines            [employer] exposure contact - [phone]            Contact for their local Department of Health                 Patient currently reports that the following symptoms have improved:  no new/worsening symptoms     No further outreach scheduled with this CTN/ACM. Episode of Care resolved. Patient has this CTN/ACM contact information if future needs arise. Spoke with patient at length about the need to schedule follow up appointments for her health and a improved lifestyle changes. I spoke with her about the chest pain and her sarcoidosis and she understands the general symptoms and disease progression. She understands the need to do this but does not wish to schedule any appointments at this time.

## 2020-08-17 ENCOUNTER — HOSPITAL ENCOUNTER (EMERGENCY)
Age: 50
Discharge: HOME | End: 2020-08-17
Payer: MEDICAID

## 2020-08-17 ENCOUNTER — APPOINTMENT (OUTPATIENT)
Dept: GENERAL RADIOLOGY | Age: 50
End: 2020-08-17
Payer: MEDICAID

## 2020-08-17 VITALS
HEART RATE: 73 BPM | DIASTOLIC BLOOD PRESSURE: 84 MMHG | SYSTOLIC BLOOD PRESSURE: 130 MMHG | RESPIRATION RATE: 20 BRPM | WEIGHT: 220 LBS | TEMPERATURE: 98.4 F | HEIGHT: 63 IN | OXYGEN SATURATION: 98 % | BODY MASS INDEX: 38.98 KG/M2

## 2020-08-17 LAB
ALBUMIN SERPL-MCNC: 3.6 G/DL (ref 3.5–4.6)
ALP BLD-CCNC: 140 U/L (ref 40–130)
ALT SERPL-CCNC: 13 U/L (ref 0–33)
ANION GAP SERPL CALCULATED.3IONS-SCNC: 10 MEQ/L (ref 9–15)
AST SERPL-CCNC: 18 U/L (ref 0–35)
BASOPHILS ABSOLUTE: 0.1 K/UL (ref 0–0.2)
BASOPHILS RELATIVE PERCENT: 0.9 %
BILIRUB SERPL-MCNC: <0.2 MG/DL (ref 0.2–0.7)
BUN BLDV-MCNC: 10 MG/DL (ref 6–20)
CALCIUM SERPL-MCNC: 9.1 MG/DL (ref 8.5–9.9)
CHLORIDE BLD-SCNC: 101 MEQ/L (ref 95–107)
CO2: 25 MEQ/L (ref 20–31)
CREAT SERPL-MCNC: 1.17 MG/DL (ref 0.5–0.9)
EKG ATRIAL RATE: 73 BPM
EKG P AXIS: 33 DEGREES
EKG P-R INTERVAL: 168 MS
EKG Q-T INTERVAL: 388 MS
EKG QRS DURATION: 82 MS
EKG QTC CALCULATION (BAZETT): 427 MS
EKG R AXIS: 9 DEGREES
EKG T AXIS: 9 DEGREES
EKG VENTRICULAR RATE: 73 BPM
EOSINOPHILS ABSOLUTE: 0.3 K/UL (ref 0–0.7)
EOSINOPHILS RELATIVE PERCENT: 3.8 %
GFR AFRICAN AMERICAN: 59.4
GFR NON-AFRICAN AMERICAN: 49.1
GLOBULIN: 3.3 G/DL (ref 2.3–3.5)
GLUCOSE BLD-MCNC: 216 MG/DL (ref 70–99)
HCT VFR BLD CALC: 44.7 % (ref 37–47)
HEMOGLOBIN: 15.1 G/DL (ref 12–16)
LYMPHOCYTES ABSOLUTE: 1.8 K/UL (ref 1–4.8)
LYMPHOCYTES RELATIVE PERCENT: 23.4 %
MAGNESIUM: 2 MG/DL (ref 1.7–2.4)
MCH RBC QN AUTO: 31.8 PG (ref 27–31.3)
MCHC RBC AUTO-ENTMCNC: 33.8 % (ref 33–37)
MCV RBC AUTO: 94 FL (ref 82–100)
MONOCYTES ABSOLUTE: 0.4 K/UL (ref 0.2–0.8)
MONOCYTES RELATIVE PERCENT: 4.6 %
NEUTROPHILS ABSOLUTE: 5.2 K/UL (ref 1.4–6.5)
NEUTROPHILS RELATIVE PERCENT: 67.3 %
PDW BLD-RTO: 13.4 % (ref 11.5–14.5)
PLATELET # BLD: 292 K/UL (ref 130–400)
POTASSIUM SERPL-SCNC: 3.9 MEQ/L (ref 3.4–4.9)
RBC # BLD: 4.76 M/UL (ref 4.2–5.4)
SODIUM BLD-SCNC: 136 MEQ/L (ref 135–144)
TOTAL PROTEIN: 6.9 G/DL (ref 6.3–8)
TROPONIN: <0.01 NG/ML (ref 0–0.01)
WBC # BLD: 7.7 K/UL (ref 4.8–10.8)

## 2020-08-17 PROCEDURE — 71045 X-RAY EXAM CHEST 1 VIEW: CPT

## 2020-08-17 PROCEDURE — 85025 COMPLETE CBC W/AUTO DIFF WBC: CPT

## 2020-08-17 PROCEDURE — 95806 SLEEP STUDY UNATT&RESP EFFT: CPT

## 2020-08-17 PROCEDURE — 84484 ASSAY OF TROPONIN QUANT: CPT

## 2020-08-17 PROCEDURE — C9113 INJ PANTOPRAZOLE SODIUM, VIA: HCPCS | Performed by: NURSE PRACTITIONER

## 2020-08-17 PROCEDURE — 96374 THER/PROPH/DIAG INJ IV PUSH: CPT

## 2020-08-17 PROCEDURE — 83735 ASSAY OF MAGNESIUM: CPT

## 2020-08-17 PROCEDURE — 36415 COLL VENOUS BLD VENIPUNCTURE: CPT

## 2020-08-17 PROCEDURE — 93005 ELECTROCARDIOGRAM TRACING: CPT | Performed by: NURSE PRACTITIONER

## 2020-08-17 PROCEDURE — 96375 TX/PRO/DX INJ NEW DRUG ADDON: CPT

## 2020-08-17 PROCEDURE — 6360000002 HC RX W HCPCS: Performed by: NURSE PRACTITIONER

## 2020-08-17 PROCEDURE — 80053 COMPREHEN METABOLIC PANEL: CPT

## 2020-08-17 PROCEDURE — 99285 EMERGENCY DEPT VISIT HI MDM: CPT

## 2020-08-17 RX ORDER — ONDANSETRON 2 MG/ML
4 INJECTION INTRAMUSCULAR; INTRAVENOUS ONCE
Status: COMPLETED | OUTPATIENT
Start: 2020-08-17 | End: 2020-08-17

## 2020-08-17 RX ORDER — KETOROLAC TROMETHAMINE 30 MG/ML
30 INJECTION, SOLUTION INTRAMUSCULAR; INTRAVENOUS ONCE
Status: DISCONTINUED | OUTPATIENT
Start: 2020-08-17 | End: 2020-08-18 | Stop reason: HOSPADM

## 2020-08-17 RX ORDER — DIPHENHYDRAMINE HYDROCHLORIDE 50 MG/ML
50 INJECTION INTRAMUSCULAR; INTRAVENOUS ONCE
Status: COMPLETED | OUTPATIENT
Start: 2020-08-17 | End: 2020-08-17

## 2020-08-17 RX ORDER — SODIUM CHLORIDE 9 MG/ML
10 INJECTION INTRAVENOUS ONCE
Status: DISCONTINUED | OUTPATIENT
Start: 2020-08-17 | End: 2020-08-18 | Stop reason: HOSPADM

## 2020-08-17 RX ORDER — PANTOPRAZOLE SODIUM 40 MG/10ML
40 INJECTION, POWDER, LYOPHILIZED, FOR SOLUTION INTRAVENOUS ONCE
Status: COMPLETED | OUTPATIENT
Start: 2020-08-17 | End: 2020-08-17

## 2020-08-17 RX ADMIN — DIPHENHYDRAMINE HYDROCHLORIDE 50 MG: 50 INJECTION, SOLUTION INTRAMUSCULAR; INTRAVENOUS at 22:46

## 2020-08-17 RX ADMIN — ONDANSETRON 4 MG: 2 INJECTION INTRAMUSCULAR; INTRAVENOUS at 22:46

## 2020-08-17 RX ADMIN — PANTOPRAZOLE SODIUM 40 MG: 40 INJECTION, POWDER, FOR SOLUTION INTRAVENOUS at 22:42

## 2020-08-17 ASSESSMENT — PAIN DESCRIPTION - LOCATION: LOCATION: CHEST

## 2020-08-17 ASSESSMENT — ENCOUNTER SYMPTOMS
SORE THROAT: 0
EYE PAIN: 0
EYE REDNESS: 0
SHORTNESS OF BREATH: 0
RHINORRHEA: 0
WHEEZING: 0
COUGH: 0
EYE DISCHARGE: 0
ABDOMINAL PAIN: 0
DIARRHEA: 0
VOMITING: 0
CONSTIPATION: 0
TROUBLE SWALLOWING: 0
NAUSEA: 1
BACK PAIN: 0
COLOR CHANGE: 0
BLOOD IN STOOL: 0

## 2020-08-17 ASSESSMENT — PAIN SCALES - GENERAL: PAINLEVEL_OUTOF10: 9

## 2020-08-18 ENCOUNTER — CARE COORDINATION (OUTPATIENT)
Dept: CARE COORDINATION | Age: 50
End: 2020-08-18

## 2020-08-18 ENCOUNTER — PATIENT MESSAGE (OUTPATIENT)
Dept: FAMILY MEDICINE CLINIC | Age: 50
End: 2020-08-18

## 2020-08-18 ENCOUNTER — PATIENT MESSAGE (OUTPATIENT)
Dept: ORTHOPEDIC SURGERY | Age: 50
End: 2020-08-18

## 2020-08-18 NOTE — ED NOTES
[facility], NP, cartside to assess patient.       Tyrese Martinez RN  08/17/20 [id number]

## 2020-08-18 NOTE — TELEPHONE ENCOUNTER
From: Cale Licona  To: Randeen Hodgkins, MD  Sent: 8/18/2020 10:48 AM EDT  Subject: Non-Urgent Medical Question    I'm sending this message, because I was seen in the hospital yesterday for my ongoing chest pain associated with sarcoidosis. I've been going through this for over 2 years, and the pain hasn't gotten any better, yes I see a pulmonologist, but Nia london wants me to speak with my pain management. And let you know my medication that I take for it, doesn't do anything for the pain. I also schedule a appointment to see a cardiologist, to make sure the pain isn't coming from my heart, in which I'm sure it isn't, but just to rule that out. But again, I'm having extremely too much pain and I really don't know what else I can do.

## 2020-08-18 NOTE — ED TRIAGE NOTES
Patient presents with complaints of mid-left anterior chest pain with dizziness and shortness of breath. Patient states she take tramadol at home for her sarcoidosis and related pain. Pain is unrelieved by tramadol.

## 2020-08-18 NOTE — CARE COORDINATION
Patient contacted regarding recent discharge and COVID-19 risk. Discussed COVID-19 related testing which was not done at this time. Test results were not done. Patient informed of results, if available? No testing     Care Transition Nurse/ Ambulatory Care Manager contacted the patient by telephone to perform post discharge assessment. Verified name and  with patient as identifiers. Patient has following risk factors of: COPD, asthma, immunocompromised, diabetes and chronic kidney disease. CTN/ACM reviewed discharge instructions, medical action plan and red flags related to discharge diagnosis. Reviewed and educated them on any new and changed medications related to discharge diagnosis. Advised obtaining a 90-day supply of all daily and as-needed medications. Education provided regarding infection prevention, and signs and symptoms of COVID-19 and when to seek medical attention with patient who verbalized understanding. Discussed exposure protocols and quarantine from [address] you at higher risk for severe illness  and given an opportunity for questions and concerns. The patient agrees to contact the COVID-19 hotline [phone] or PCP office for questions related to their healthcare. CTN/ACM provided contact information for future reference. From CDC: Are you at higher risk for severe illness?  Wash your hands often.  Avoid close contact (6 feet, which is about two arm lengths) with people who are sick.  Put distance between yourself and other people if COVID-19 is spreading in your community.  Clean and disinfect frequently touched surfaces.  Avoid all cruise travel and non-essential air travel.  Call your healthcare professional if you have concerns about COVID-19 and your underlying condition or if you are sick.     For more information on steps you can take to protect yourself, see CDC's How to [address] [place] [address] for follow-up call in 1-2 days based on severity of symptoms and risk factors. Call to evaristo due to recent er visit. She reports continued chest pain. Advised to contact cardiology for follow up per ER. She states she will call her insurance co to obtain approval before contacting cardiology office. Suggested she contact cardiology first and she declined. She will return to ER if pain worsens.  No other symptoms at this time

## 2020-08-18 NOTE — TELEPHONE ENCOUNTER
From: Brittany Matt  To: Marck HoltSUDEEP - CNP  Sent: 8/18/2020 12:52 PM EDT  Subject: Non-Urgent Medical Question    I was seen in the emergency room last night, and was discharge still with pain in my chest, they suggested I talk with my primary doctor and pain management doctor about this but I don't have pain management anymore I did make an appointment to see a cardiologist to rule out anything about my heart (suggested by the ER) but I do know this has been ongoing for over 2 years and it's most likely stemming from sarcoidosis. I don't know what else I can do I don't know too many more pain management doctors and I don't have any kind of recommendations to any so I'm just totally lost. Bad thing is I'm still in pain. Now besides you, the cardiologist and my pulmonologist who do I talk to about this.  Thanks

## 2020-08-18 NOTE — ED PROVIDER NOTES
[facility] [facility]  EMERGENCY DEPARTMENT ENCOUNTER      Pt Name: Francisco Chance  MRN: [id number]  [place] 1970  Date of evaluation: 8/17/2020  Provider: SUDEEP Torres CNP    CHIEF COMPLAINT       Chief Complaint   Patient presents with    Chest Pain     sasys  its worse that when she was here on the 7th           HISTORY OF PRESENT ILLNESS   (Location/Symptom, Timing/Onset,Context/Setting, Quality, Duration, Modifying Factors, Severity)  Note limiting factors. Francisco Chance is a 53 y.o. female who presents to the emergency department with a chart reviewed past medical history of anxiety, hypertensive disorder, abdominal pain, renal cancer, hyperglycemia, marijuana use, and meningitis for chief complaint of chest pain. Patient reports that her chest pain has been ongoing since August 5 and she was evaluated for it and referred to cardiology but her insurance denied the referral to cardiology and she was unable to get in with them. She reports that her chest pain has been constant since then and rates it 7 out of 10 midsternal.  She reports that she has nausea and dizziness with it. She denies having any alleviating modifying factors. Nursing Notes were reviewed. REVIEW OF SYSTEMS    (2-9 systems for level 4, 10 or more for level 5)     Review of Systems   Constitutional: Negative for activity change, appetite change, fatigue and fever. HENT: Negative for congestion, ear pain, rhinorrhea, sore throat and trouble swallowing. Eyes: Negative for pain, discharge and redness. Respiratory: Negative for cough, shortness of breath and wheezing. Cardiovascular: Positive for chest pain. Negative for palpitations. Gastrointestinal: Positive for nausea. Negative for abdominal pain, blood in stool, constipation, diarrhea and vomiting. Endocrine: Negative for polydipsia and polyuria. Genitourinary: Negative for decreased urine volume, dysuria, flank pain and hematuria. Musculoskeletal: Negative for arthralgias, back pain and myalgias. Skin: Negative for color change, rash and wound. Neurological: Positive for dizziness. Negative for syncope, weakness, light-headedness and headaches. Psychiatric/Behavioral: Negative for behavioral problems. All other systems reviewed and are negative. Except as noted above the remainder of the review of systems was reviewed and negative. PAST MEDICAL HISTORY     Past Medical History:   Diagnosis Date    Anxiety     Arthritis     Asthma     Bronchopneumonia     Cancer ([facility] [address]     renal    Cerebral artery occlusion with cerebral infarction (HCC)     Chronic bilateral low back pain with sciatica     Chronic kidney disease     Chronic obstructive lung disease ([facility] [address] 7/26/2019    Depression     Fibromyalgia     Hypertension     Insulin dependent type 2 diabetes mellitus, uncontrolled ([facility] [address] 8/3/2018    Localized enlarged lymph nodes 10/26/2018    Mixed headache     Pure hyperglyceridemia 5/19/2017    Sarcoidosis     Sleep apnea     does not wear cpap    Thyroid goiter      Past Surgical History:   Procedure Laterality Date    BRONCHOSCOPY  10/26/2018    DR. STEARNS    KIDNEY REMOVAL Right 08/2016    KIDNEY REMOVAL Right 2016    LUNG BIOPSY Right 10/2018    THYROID LOBECTOMY Right 6/13/14    THYROIDECTOMY  02/21/2019    DR. MAGDALENO    THYROIDECTOMY  2018    URETER STENT PLACEMENT Left 08/2016     Social History     Socioeconomic History    Marital status: Legally      Spouse name: Not on file    Number of children: Not on file    Years of education: Not on file    Highest education level: Not on file   Occupational History    Not on file   Social Needs    Financial resource strain: Not on file    Food insecurity     Worry: Not on file     Inability: Not on file    Transportation needs     Medical: Not on file     Non-medical: Not on file   Tobacco Use    Smoking status: Former Smoker Packs/day: 0.50     Years: 15.00     Pack years: 7.50     Types: Cigarettes     Start date: 2014     Last attempt to quit: 2015     Years since quittin.5    Smokeless tobacco: Former User     Quit date: 3/23/2016   Substance and Sexual Activity    Alcohol use: Not Currently     Alcohol/week: 0.0 standard drinks     Comment: occasionally    Drug use: Yes     Frequency: 3.0 times per week     Types: Marijuana    Sexual activity: Yes     Partners: Male   Lifestyle    Physical activity     Days per week: Not on file     Minutes per session: Not on file    Stress: Not on file   Relationships    Social connections     Talks on phone: Not on file     Gets together: Not on file     Attends Hinduism service: Not on file     Active member of club or organization: Not on file     Attends meetings of clubs or organizations: Not on file     Relationship status: Not on file    Intimate partner violence     Fear of current or ex partner: Not on file     Emotionally abused: Not on file     Physically abused: Not on file     Forced sexual activity: Not on file   Other Topics Concern    Not on file   Social History Narrative    Not on file       SCREENINGS             PHYSICAL EXAM    (up to 7 for level 4, 8 or more for level 5)     ED Triage Vitals [20 2103]   BP Temp Temp Source Pulse Resp SpO2 Height Weight   (!) 147/84 98.4 °F (36.9 °C) Oral 76 14 99 % 5' 3\" (1.6 m) 220 lb (99.8 kg)       Physical Exam  Vitals signs and nursing note reviewed. Constitutional:       General: She is not in acute distress. Appearance: She is well-developed. She is not diaphoretic. HENT:      Head: Normocephalic and atraumatic. Nose: Nose normal.   Eyes:      Conjunctiva/sclera: Conjunctivae normal.      Pupils: Pupils are equal, round, and reactive to light. Neck:      Musculoskeletal: Normal range of motion and neck supple. Cardiovascular:      Rate and Rhythm: Normal rate and regular rhythm. Heart sounds: Normal heart sounds. Pulmonary:      Effort: Pulmonary effort is normal. No respiratory distress. Breath sounds: Normal breath sounds. Abdominal:      General: Bowel sounds are normal.      Palpations: Abdomen is soft. Tenderness: There is no abdominal tenderness. Skin:     General: Skin is warm and dry. Capillary Refill: Capillary refill takes less than 2 seconds. Findings: No rash. Neurological:      Mental Status: She is alert and oriented to person, place, and time. Cranial Nerves: No cranial nerve deficit. Psychiatric:         Behavior: Behavior normal.         RESULTS     EKG: All EKG's are interpreted by the Emergency Department Physician who either signs or Co-signsthis chart in the absence of a cardiologist.    Normal sinus rhythm rate of 73 bpm.  No ST elevations noted. QT of 388. RADIOLOGY:   Non-plain filmimages such as CT, Ultrasound and MRI are read by the radiologist. Plain radiographic images are visualized and preliminarily interpreted by the emergency physician with the below findings:    No cardiopulmonary process    Interpretation per the Radiologist below, if available at the time ofthis note:    XR CHEST PORTABLE    (Results Pending)         ED BEDSIDE ULTRASOUND:   Performed by ED Physician - none    LABS:  Labs Reviewed   COMPREHENSIVE METABOLIC PANEL - Abnormal; Notable for the following components:       Result Value    Glucose 216 (*)     CREATININE 1.17 (*)     GFR Non- 49.1 (*)     GFR  59.4 (*)     Alkaline Phosphatase 140 (*)     All other components within normal limits   CBC WITH AUTO DIFFERENTIAL - Abnormal; Notable for the following components:    MCH 31.8 (*)     All other components within normal limits   TROPONIN   MAGNESIUM       All other labs were within normal range or not returned as of this dictation.     EMERGENCY DEPARTMENT COURSE and DIFFERENTIAL DIAGNOSIS/MDM:   Vitals:    Vitals: 08/17/20 2103 08/17/20 2140 08/17/20 2248   BP: (!) 147/84 128/87 130/84   Pulse: 76 77 73   Resp: 14 20 20   Temp: 98.4 °F (36.9 °C)     TempSrc: Oral     SpO2: 99% 97% 98%   Weight: 220 lb (99.8 kg)     Height: 5' 3\" (1.6 m)              MDM   Patient is a 78-year-old female presenting to the ER with a chief complaint of chest pain. Hemodynamically stable nontoxic-appearing. She said she cannot take NSAIDs because sometimes they upset her stomach and I gave her Benadryl with it and Zofran. Patient's lab work is unremarkable. X-rays negative. Patient would not wait on a d-dimer to be back. Patient states that she would like to receive morphine for the pain. When I discussed with the patient her high OARRS score and seeking narcotics she refused to discuss this conversation. She states that she is not seeking narcotics but she reports that she wants the pain to be alleviated. When asked the patient what she will do when the pain comes back she states that she will come back here and receive another dose of morphine. I instructed the patient that this is not how medical treatment works and I explained to her that she needs to follow-up with pain management which she states that she does but pain management does not help with anything. I explained to the patient that every time she comes to the emergency department and receives a narcotic it breaks the pain management cycle. At the end of the conversation I told patient that I would give her morphine as long as she understands that pain management follow-up is critical.  Patient decided that she would like to leave [facility]. She states that she does not want to be in this hospital anymore. I told the patient that leaving [address] his risk of permanent disability and death and witness LISHA Miles at bedside. Patient still chooses to leave. She is refusing to sign the AMA form and she had her IV taken out by the RN and she eloped.     Patient decided that she will stay and receive discharge paperwork. Diagnosed with drug-seeking behavior because of her constant request for narcotics from multiple facilities and multiple nursing staff members. She is instructed to follow-up with Dr. Cristopher Martinez and return back if worse. Discharged in a stable condition. CRITICAL CARE TIME       CONSULTS:  None    PROCEDURES:  Unless otherwise noted below, none     Procedures    FINAL IMPRESSION      1. Chest pain, unspecified type    2. Drug-seeking behavior    3.  Anxiety state          DISPOSITION/PLAN   DISPOSITION        PATIENT REFERRED TO:  Klaudia Graham DO  [address]  Katlyn Mcintyre  [address]  [phone]    Schedule an appointment as soon as possible for a visit in 1 day        [address]reras:  New Prescriptions    No medications on file          (Please notethat portions of this note were completed with a voice recognition program.  Efforts were made to edit the dictations but occasionally words are mis-transcribed.)    SUDEEP Harris CNP (electronically signed)  Attending Emergency Physician          SUDEEP Casas CNP  08/17/20 1616  89 SSUDEEP CNP  08/17/20 2475

## 2020-08-19 ENCOUNTER — PATIENT MESSAGE (OUTPATIENT)
Dept: FAMILY MEDICINE CLINIC | Age: 50
End: 2020-08-19

## 2020-08-19 ENCOUNTER — CARE COORDINATION (OUTPATIENT)
Dept: CARE COORDINATION | Age: 50
End: 2020-08-19

## 2020-08-19 NOTE — TELEPHONE ENCOUNTER
From: Jose Salazar  To: SUDEEP Nuñez CNP  Sent: 8/18/2020 5:50 PM EDT  Subject: < No Subject>    I understand about the rheumatology I tried Dr. Fatou Bashir but they don't accept my insurance. So I'm in need of a different referral to a different rheumatologist And yes I been trying to get an appointment with any pain doctor at this time they did place me with a dr. Jf Helton but that's not until December 3rd and then I had a note sent out to a doctor in [address] [place] [address] can't remember his name for pain management but I'll definitely give Dr Xander Dallas another call, sometime tomorrow, I'd I don't hear from them. Thanks      ----- Message -----   From:SUDEEP Toro - CNP   Sent:8/18/2020 4:22 PM EDT   To:Lucy Tariq   Subject:    Bharat Tavera,    I'm sorry that you are continuing to have so much pain. I had placed a referral to Dr. Xander Dallas in February. I would encourage you to get in with her as she does do pain management. Also while I was out I see Dr. Santa Flores was covering for me and placed a referral to rheumatology. I do think that would be a good idea to help manage your care. I will paste the info for them down below. [facility] [place] [id number] [facility], [address] [place] [address]   [phone]     Dr. Guerrero North  [address] [place] [address], [address] [place] [address] [phone]    I hope this helps. Please let me know if there is anything else I can help you with.   Thanks,  Heidy Springer

## 2020-08-19 NOTE — TELEPHONE ENCOUNTER
From: Hedda Ganser  To: SUDEEP Ballesteros CNP  Sent: 8/19/2020 3:23 PM EDT  Subject: < No Subject>    I just spoke to someone in Dr. Nabor Hicks office, and they said she is no longer seeing patients with fibromyalgia. Was told that I need to get a referral to someone else in that field. Thanks      ----- Message -----   From:SUDEEP Galeano CNP   Sent:8/19/2020 10:13 AM EDT   To:Lucy Tariq   Subject:    I did not realize Dr. Radha Whiting did not accept your insurance. I put in a new referral for Dr. Arthur Back through [facility]. Her phone number is [phone]. Anybody in their group is fine. Hope this helps. Let me know if there are any issues.     Thanks,  Luis Daniel Lomeli

## 2020-08-19 NOTE — CARE COORDINATION
Call to evaristo for covid follow up. Left message to return call    Patient contacted regarding COVID-19 risk and screening. Discussed COVID-19 related testing which was not done at this time. Test results were not done. Patient informed of results, if available? No testing. Care Transition Nurse/ Ambulatory Care Manager contacted the patient by telephone to perform follow-up assessment. Verified name and  with patient as identifiers. Patient has following risk factors of: COPD, asthma, immunocompromised and chronic kidney disease. Symptoms reviewed with patient who verbalized the following symptoms: chest pain. Due to no new or worsening symptoms encounter was not routed to provider for escalation. Education provided regarding infection prevention, and signs and symptoms of COVID-19 and when to seek medical attention with patient who verbalized understanding. Discussed exposure protocols and quarantine from [address] you at higher risk for severe illness  and given an opportunity for questions and concerns. The patient agrees to contact the COVID-19 hotline [phone] or PCP office for questions related to their healthcare. CTN/ACM provided contact information for future reference. From CDC: Are you at higher risk for severe illness?  Wash your hands often.  Avoid close contact (6 feet, which is about two arm lengths) with people who are sick.  Put distance between yourself and other people if COVID-19 is spreading in your community.  Clean and disinfect frequently touched surfaces.  Avoid all cruise travel and non-essential air travel.  Call your healthcare professional if you have concerns about COVID-19 and your underlying condition or if you are sick. For more information on steps you can take to protect yourself, see CDC's How to [id number] [place] [address] for follow-up call in 7-14 days based on severity of symptoms and risk factors.     Received call back from evaristo . She continues with same chest pain. No other symptoms. She has been in contact with pcp and advised to contact dr Chas Friedman and dr Luis Cazares. Both phone numbers provided. She will call to schedule appt.  She has appt with cardiology on Monday

## 2020-08-20 PROCEDURE — 93010 ELECTROCARDIOGRAM REPORT: CPT | Performed by: INTERNAL MEDICINE

## 2020-08-21 ENCOUNTER — HOSPITAL ENCOUNTER (OUTPATIENT)
Dept: SLEEP CENTER | Age: 50
Discharge: HOME | End: 2020-08-23
Payer: MEDICAID

## 2020-08-21 PROCEDURE — 95806 SLEEP STUDY UNATT&RESP EFFT: CPT

## 2020-08-24 ENCOUNTER — OFFICE VISIT (OUTPATIENT)
Dept: CARDIOLOGY CLINIC | Age: 50
End: 2020-08-24
Payer: MEDICAID

## 2020-08-24 VITALS
OXYGEN SATURATION: 98 % | WEIGHT: 276 LBS | DIASTOLIC BLOOD PRESSURE: 76 MMHG | SYSTOLIC BLOOD PRESSURE: 124 MMHG | BODY MASS INDEX: 48.89 KG/M2 | HEART RATE: 72 BPM | RESPIRATION RATE: 18 BRPM

## 2020-08-24 PROBLEM — R06.09 DOE (DYSPNEA ON EXERTION): Status: ACTIVE | Noted: 2020-08-24

## 2020-08-24 PROCEDURE — G8427 DOCREV CUR MEDS BY ELIG CLIN: HCPCS | Performed by: INTERNAL MEDICINE

## 2020-08-24 PROCEDURE — G8417 CALC BMI ABV UP PARAM F/U: HCPCS | Performed by: INTERNAL MEDICINE

## 2020-08-24 PROCEDURE — 99214 OFFICE O/P EST MOD 30 MIN: CPT | Performed by: INTERNAL MEDICINE

## 2020-08-24 PROCEDURE — 1036F TOBACCO NON-USER: CPT | Performed by: INTERNAL MEDICINE

## 2020-08-24 ASSESSMENT — ENCOUNTER SYMPTOMS
GASTROINTESTINAL NEGATIVE: 1
WHEEZING: 0
EYES NEGATIVE: 1
STRIDOR: 0
BLOOD IN STOOL: 0
NAUSEA: 0
CHEST TIGHTNESS: 0
COUGH: 0
SHORTNESS OF BREATH: 1

## 2020-08-24 NOTE — PROGRESS NOTES
NEW PATIENT        Patient: Sari Zuluaga  YOB: 1970  MRN: [id number]    Chief Complaint:  Chief Complaint   Patient presents with   [address] from [facility] ER 8/17    Chest Pain    Shortness of Breath     HINES       CV Data:  2016 R RCC - s/p R Nephrectomy. Sarcoid  4/2019 EF 65%       Subjective/HPI  2 recent ER visit for CP and released. She has frequent severe CP and SOB no radiation. Sometimes causes nausea. Nonsmoker  No etoh   Disabled   No FH     EKG:    Past Medical History:   Diagnosis Date    Anxiety     Arthritis     Asthma     Bronchopneumonia     Cancer ([facility] [address]     renal    Cerebral artery occlusion with cerebral infarction ([facility] [address]     Chronic bilateral low back pain with sciatica     Chronic kidney disease     Chronic obstructive lung disease ([facility] [address] 7/26/2019    Depression     Fibromyalgia     Hypertension     Insulin dependent type 2 diabetes mellitus, uncontrolled ([facility] [address] 8/3/2018    Localized enlarged lymph nodes 10/26/2018    Mixed headache     Pure hyperglyceridemia 5/19/2017    Sarcoidosis     Sleep apnea     does not wear cpap    Thyroid goiter        Past Surgical History:   Procedure Laterality Date    BRONCHOSCOPY  10/26/2018    DR. STEARNS    KIDNEY REMOVAL Right 08/2016    KIDNEY REMOVAL Right 2016    LUNG BIOPSY Right 10/2018    THYROID LOBECTOMY Right 6/13/14    THYROIDECTOMY  02/21/2019    DR. MAGDALENO    THYROIDECTOMY  2018    URETER STENT PLACEMENT Left 08/2016       Family History   Problem Relation Age of Onset    Cancer Father     Diabetes Father     Allergy (Severe) Father     Heart Attack Father     Prostate Cancer Father     High Blood Pressure Mother     Diabetes Mother     Arthritis Mother     High Cholesterol Mother     Vision Loss Mother     Alcohol Abuse Neg Hx     Anemia Neg Hx     Arrhythmia Neg Hx     Asthma Neg Hx     Atrial Fibrillation Neg Hx     Birth Defects Neg Hx     Breast Cancer Neg Hx     Coronary Art Dis Neg Hx     Colon Cancer Neg Hx     Depression Neg Hx     Early Death Neg Hx     Hearing Loss Neg Hx     Heart Disease Neg Hx     Learning Disabilities Neg Hx     Kidney Disease Neg Hx     Mental Illness Neg Hx     Mental Retardation Neg Hx     Miscarriages / Stillbirths Neg Hx     Obesity Neg Hx     Osteoporosis Neg Hx     Stroke Neg Hx     Substance Abuse Neg Hx        Social History     Socioeconomic History    Marital status: Legally      Spouse name: None    Number of children: None    Years of education: None    Highest education level: None   Occupational History    None   Social Needs    Financial resource strain: None    Food insecurity     Worry: None     Inability: None    Transportation needs     Medical: None     Non-medical: None   Tobacco Use    Smoking status: Former Smoker     Packs/day: 0.50     Years: 15.00     Pack years: 7.50     Types: Cigarettes     Start date: 2014     Last attempt to quit: 2015     Years since quittin.5    Smokeless tobacco: Former User     Quit date: 3/23/2016   Substance and Sexual Activity    Alcohol use: Not Currently     Alcohol/week: 0.0 standard drinks     Comment: occasionally    Drug use: Yes     Frequency: 3.0 times per week     Types: Marijuana    Sexual activity: Yes     Partners: Male   Lifestyle    Physical activity     Days per week: None     Minutes per session: None    Stress: None   Relationships    Social connections     Talks on phone: None     Gets together: None     Attends Sikh service: None     Active member of club or organization: None     Attends meetings of clubs or organizations: None     Relationship status: None    Intimate partner violence     Fear of current or ex partner: None     Emotionally abused: None     Physically abused: None     Forced sexual activity: None   Other Topics Concern    None   Social History Narrative    None       Allergies   Allergen Reactions  Shellfish-Derived Products     Ibuprofen     Ketorolac     Other     Propoxyphene N-Acetaminophen      Other reaction(s): Hives    Toradol [Ketorolac Tromethamine]      Pt states she cannot take Toradol because she has only 1 kidney. Current Outpatient Medications   Medication Sig Dispense Refill    insulin lispro (HUMALOG) 100 UNIT/ML injection vial INJECT 4 UNITS UNDER THE SKIN THREE TIMES DAILY AS NEEDED FOR HIGH BLOOD SUGAR 10 mL 0    metoclopramide (REGLAN) 10 MG tablet Take 1 tablet by mouth 4 times daily 120 tablet 0    levothyroxine (SYNTHROID) 150 MCG tablet TK 1 T PO ONCE D      busPIRone (BUSPAR) 15 MG tablet TAKE 1 TABLET BY MOUTH THREE TIMES DAILY 90 tablet 2    pregabalin (LYRICA) 150 MG capsule Take 1 capsule by mouth 3 times daily for 90 days. 90 capsule 2    TRESIBA FLEXTOUCH 100 UNIT/ML SOPN INJECT 30 UNITS UNDER THE SKIN NIGHTLY 15 mL 3    magnesium oxide (MAG-OX) 400 MG tablet Take 0.5 tablets by mouth daily 30 tablet 1    SUMAtriptan (IMITREX) 25 MG tablet TAKE 1 TABLET BY MOUTH 1 TIME AS NEEDED FOR MIGRAINE 9 tablet 1    PROVENTIL  (90 Base) MCG/ACT inhaler Inhale 2 puffs into the lungs every 6 hours as needed for Wheezing 1 Inhaler 3    cetirizine (ZYRTEC) 10 MG tablet Take 1 tab po qhs prn allergies 30 tablet 5    albuterol sulfate  (90 Base) MCG/ACT inhaler Inhale 2 puffs into the lungs every 4 hours as needed for Wheezing 1 Inhaler 3    diclofenac sodium (VOLTAREN) 1 % GEL Apply 2 g topically 4 times daily as needed for Pain 1 Tube 3    butalbital-acetaminophen-caffeine (FIORICET, ESGIC) -40 MG per tablet Take 1 tablet by mouth every 6 hours as needed for Headaches 30 tablet 0    promethazine (PHENERGAN) 25 MG tablet WARNING:  May cause drowsiness. May impair ability to operate vehicles or machinery. Do not use in combination with alcohol.     ! Tablet every 6 hours as needed in conjunction with Ultram for headaches 20 tablet 0    blood glucose test strips (EXACTECH TEST) strip 1 each by In Vitro route 3 times daily (Accu-check test strips) As needed. DX:E11.65 300 strip 5    ONE TOUCH ULTRASOFT LANCETS MISC TEST 3 TIMES DAILY AS NEEDED 300 each 3    albuterol (PROVENTIL) (2.5 MG/3ML) 0.083% nebulizer solution Take 3 mLs by nebulization every 4 hours as needed for Wheezing 120 each 3    metoclopramide (REGLAN) 10 MG tablet Take 1 tablet by mouth 4 times daily 120 tablet 0    omeprazole (PRILOSEC) 40 MG delayed release capsule Take by mouth      atorvastatin (LIPITOR) 40 MG tablet Take 1 tablet by mouth nightly 30 tablet 3    metoprolol tartrate (LOPRESSOR) 25 MG tablet Take 1 tablet by mouth 2 times daily 60 tablet 0    DULoxetine (CYMBALTA) 60 MG extended release capsule TK 1 C PO QD  4    fluticasone (FLONASE) 50 MCG/ACT nasal spray 1 spray by Nasal route daily 1 Bottle 3    traMADol (ULTRAM) 50 MG tablet Take 50 mg by mouth every 6 hours as needed for Pain.  methocarbamol (ROBAXIN) 750 MG tablet Take 750 mg by mouth 4 times daily      Insulin Syringe-Needle U-100 30G X 1/2\" 1 ML MISC 1 each by Does not apply route daily 100 each 3    blood glucose test strips (ONETOUCH VERIO) strip TEST 3 TIMES DAILY AS NEEDED 300 each 3    Blood Glucose Monitoring Suppl (ONETOUCH VERIO) w/Device KIT TEST 3 TIMES DAILY AS NEEDED 1 kit 0    montelukast (SINGULAIR) 10 MG tablet Take 1 tab nightly at supper for breathing/allergies 30 tablet 5    Insulin Pen Needle (B-D ULTRAFINE III SHORT PEN) 31G X 8 MM MISC Inject 1 each into the skin 3 times daily 100 each 5     No current facility-administered medications for this visit. Review of Systems:   Review of Systems   Constitutional: Negative. Negative for diaphoresis and fatigue. HENT: Negative. Eyes: Negative. Respiratory: Positive for shortness of breath. Negative for cough, chest tightness, wheezing and stridor. Cardiovascular: Positive for chest pain.  Negative for palpitations and leg swelling. Gastrointestinal: Negative. Negative for blood in stool and nausea. Genitourinary: Negative. Musculoskeletal: Negative. Skin: Negative. Neurological: Negative. Negative for dizziness, syncope, weakness and light-headedness. Hematological: Negative. Psychiatric/Behavioral: Negative. Physical Examination:    /76 (Site: Right Upper Arm, Position: Sitting, Cuff Size: Large Adult)   Pulse 72   Resp 18   Wt 276 lb (125.2 kg)   LMP 08/03/2020   SpO2 98%   BMI 48.89 kg/m²    Physical Exam   Constitutional: She appears healthy. No distress. HENT:   Normal cephalic and Atraumatic   Eyes: Pupils are equal, round, and reactive to light. Neck: Normal range of motion and thyroid normal. Neck supple. No JVD present. No neck adenopathy. No thyromegaly present. Cardiovascular: Normal rate, regular rhythm, normal heart sounds, intact distal pulses and normal pulses. Pulmonary/Chest: Effort normal and breath sounds normal. She has no wheezes. She has no rales. She exhibits no tenderness. Abdominal: Soft. Bowel sounds are normal. There is no abdominal tenderness. Musculoskeletal: Normal range of motion. General: No tenderness or edema. Neurological: She is alert and oriented to person, place, and time. Skin: Skin is warm. No cyanosis. Nails show no clubbing.        LABS:  CBC:   Lab Results   Component Value Date    WBC 7.7 08/17/2020    RBC 4.76 08/17/2020    RBC 4.61 04/04/2019    HGB 15.1 08/17/2020    HCT 44.7 08/17/2020    MCV 94.0 08/17/2020    MCH 31.8 08/17/2020    MCHC 33.8 08/17/2020    RDW 13.4 08/17/2020     08/17/2020    MPV 10.8 04/04/2019     Lipids:  Lab Results   Component Value Date    CHOL 244 (H) 09/11/2019    CHOL 230 (H) 06/24/2018    CHOL 226 (H) 05/19/2017     Lab Results   Component Value Date    TRIG 184 (H) 09/11/2019    TRIG 212 (H) 06/24/2018    TRIG 335 (H) 05/19/2017     Lab Results   Component Value Date HDL 41 09/11/2019    HDL 34 (L) 06/24/2018    HDL 34 (L) 05/19/2017     Lab Results   Component Value Date    LDLCALC 166 (H) 09/11/2019    LDLCALC 154 (H) 06/24/2018    LDLCALC 125 05/19/2017     No results found for: LABVLDL, VLDL  No results found for: CHOLHDLRATIO  CMP:    Lab Results   Component Value Date     08/17/2020    K 3.9 08/17/2020    K 4.0 07/03/2020     08/17/2020    CO2 25 08/17/2020    BUN 10 08/17/2020    CREATININE 1.17 08/17/2020    GFRAA 59.4 08/17/2020    AGRATIO 1.0 04/04/2019    LABGLOM 49.1 08/17/2020    GLUCOSE 216 08/17/2020    GLUCOSE 200 06/13/2020    PROT 6.9 08/17/2020    LABALBU 3.6 08/17/2020    LABALBU 3.8 06/13/2020    CALCIUM 9.1 08/17/2020    BILITOT <0.2 08/17/2020    ALKPHOS 140 08/17/2020    AST 18 08/17/2020    ALT 13 08/17/2020     BMP:    Lab Results   Component Value Date     08/17/2020    K 3.9 08/17/2020    K 4.0 07/03/2020     08/17/2020    CO2 25 08/17/2020    BUN 10 08/17/2020    LABALBU 3.6 08/17/2020    LABALBU 3.8 06/13/2020    CREATININE 1.17 08/17/2020    CALCIUM 9.1 08/17/2020    GFRAA 59.4 08/17/2020    LABGLOM 49.1 08/17/2020    GLUCOSE 216 08/17/2020    GLUCOSE 200 06/13/2020     Magnesium:    Lab Results   Component Value Date    MG 2.0 08/17/2020     TSH:  Lab Results   Component Value Date    TSH 20.110 (H) 01/10/2020             Patient Active Problem List   Diagnosis    Anxiety    Asthma    Hypertensive disorder    Nontoxic goiter, unspecified    Lumbar spondylosis    Cervical spondylosis without myelopathy    Spondylosis of lumbar region without myelopathy or radiculopathy--OARRS PM&R 5/24/17    Unspecified abdominal pain    Renal cancer ([address]    Pure hyperglyceridemia    Morbid obesity (HCC)    Transient cerebral ischemia    Localized swelling, mass and lump, neck    Marijuana use    Chest pain    Meningitis    Shortness of breath    Exposure to communicable disease    Bronchopneumonia    Headache    Type 2 diabetes mellitus treated with insulin (HCC)    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)    Chronic kidney disease, unspecified    Sarcoidosis    Costal chondritis    Intercostal neuralgia    Chest pain, atypical    Personal history of other malignant neoplasm of kidney    Chronic obstructive pulmonary disease, unspecified ([facility] [address]    Tobacco user    Deficient knowledge    Abnormal results of kidney function studies    Acquired absence of kidney    Acquired absence of other organs    Acute kidney failure (HCC)    Allergy status to analgesic agent status    Body mass index (BMI) 40.0-44.9, adult (HCC)    Disorder of kidney and ureter, unspecified    Dysphagia, unspecified    Elevated white blood cell count, unspecified    Family history of ischemic heart disease and other diseases of the circulatory system    Long term (current) use of insulin (HCC)    Moderate single current episode of major depressive disorder (HCC)    Localized enlarged lymph nodes    ELEN (obstructive sleep apnea)    Otalgia, left ear    Oth postproc endocrine and metabolic comp and disorders    Other acute postprocedural pain    Other long term (current) drug therapy    Other malaise    Other muscle spasm    Other specified symptoms and signs involving the circulatory and respiratory systems    Partial loss of teeth, unspecified cause, unspecified class    Personal history of malignant neoplasm of thyroid    Personal history of urinary calculi    Postprocedural hypothyroidism    Prsnl hx of TIA (TIA), and cereb infrc w/o resid deficits    Secondary malignant neoplasm of other specified sites ([facility] [address]    Type 2 diabetes mellitus with hyperglycemia (HCC)    Visual discomfort, unspecified    Personal history of other endocrine, nutritional and metabolic disease    Personal history of nicotine dependence    Substance use disorder    Asthenia    Chronic pain    Dizziness and giddiness    Nausea    Primary fibromyalgia syndrome    Cannabinoid hyperemesis syndrome (HCC)    Chronic pain of right knee    Arthritis of right knee    HINES (dyspnea on exertion)       Medications Discontinued During This Encounter   Medication Reason    acetaminophen (AMINOFEN) 325 MG tablet LIST CLEANUP    levothyroxine (SYNTHROID) 112 MCG tablet DISCONTINUED BY ANOTHER CLINICIAN       Modified Medications    No medications on file       No orders of the defined types were placed in this encounter. Assessment/Plan:    1. Chest pain, unspecified type     - NM MYOCARDIAL SPECT REST EXERCISE OR RX; Future  - ECHO Complete 2D W Doppler W Color; Future    2. Essential hypertension       3. HINES (dyspnea on exertion)     - NM MYOCARDIAL SPECT REST EXERCISE OR RX; Future  - ECHO Complete 2D W Doppler W Color; Future       Counseling:  Heart Healthy Lifestyle, Improve BMI, Low Salt Diet, Take Precautions to Prevent Falls and Walk Daily    Return for AFTER TESTS.     Electronically signed by Pavel Garcia MD on 8/24/2020 at 3:09 PM

## 2020-08-27 RX ORDER — CETIRIZINE HYDROCHLORIDE 10 MG/1
TABLET ORAL
Qty: 30 TABLET | Refills: 5 | Status: SHIPPED | OUTPATIENT
Start: 2020-08-27 | End: 2021-02-25 | Stop reason: SDUPTHER

## 2020-08-27 NOTE — TELEPHONE ENCOUNTER
Pharmacy requesting medication refill.  Please approve or deny this request.    Rx requested:  Requested Prescriptions     Pending Prescriptions Disp Refills    cetirizine (ZYRTEC) 10 MG tablet [Pharmacy Med Name: CETIRIZINE 10MG TABLETS] 30 tablet 5     Sig: TAKE 1 TABLET BY MOUTH EVERY NIGHT AT BEDTIME AS NEEDED FOR ALLERGIES         Last Office Visit:   6/30/2020      Next Visit Date:  Future Appointments   Date Time Provider Valerie Alea   9/3/2020 10:45 AM [facility] INJECTION ROOM 1 [facility] NUC MED [facility] Fac RAD   9/3/2020 11:00 AM [facility] STRESS ROOM 1 [facility] STRESS [facility] Fac RAD   9/3/2020 12:15 PM [facility] NUCLEAR MEDICINE ROOM 2 [facility] NUC MED [facility] Fac RAD   9/3/2020  1:30 PM [facility] ECHO [address] [place] [address]   9/4/2020 10:45 AM [facility] INJECTION ROOM 1 [facility] NUC MED [facility] Fac RAD   9/4/2020 12:00 PM [facility]UCLE[facility]ICINE ROOM 1 [facility] NUC MED [facility] Fac RAD   9/21/2020  2:45 PM Mary Jane Bernard MD [address] [place] [address]   10/1/2020  9:30 AM Virgil Elizalde APRN -  [place] [id number]   11/5/2020 10:00 AM Donal Jeans, MD [facility]

## 2020-09-01 ENCOUNTER — CARE COORDINATION (OUTPATIENT)
Dept: CARE COORDINATION | Age: 50
End: 2020-09-01

## 2020-09-01 NOTE — CARE COORDINATION
Call to evaristo to resolve covid episode. Left message to return call    You Patient resolved from the Care Transitions episode on 9/1/20  Discussed COVID-19 related testing which was not done at this time. Test results were not done. Patient informed of results, if available? No testing    Patient/family has been provided the following resources and education related to COVID-19:                         Signs, symptoms and red flags related to COVID-19            CDC exposure and quarantine guidelines            [facility] exposure contact - [phone]            Contact for their local Department of Health                 Patient currently reports that the following symptoms have improved:  no new/worsening symptoms     No further outreach scheduled with this CTN/ACM. Episode of Care resolved. Patient has this CTN/ACM contact information if future needs arise. Received call back from evaristo. She reports chest pain is improved. She was eval by cardiology and is scheduled for echo on 9/3 and 9/4. She has no other symptoms.

## 2020-09-03 ENCOUNTER — HOSPITAL ENCOUNTER (OUTPATIENT)
Dept: NON INVASIVE DIAGNOSTICS | Age: 50
Discharge: HOME | End: 2020-09-03
Payer: MEDICAID

## 2020-09-03 ENCOUNTER — HOSPITAL ENCOUNTER (OUTPATIENT)
Dept: NUCLEAR MEDICINE | Age: 50
Discharge: HOME | End: 2020-09-05
Payer: MEDICAID

## 2020-09-03 LAB
LV EF: 65 %
LVEF MODALITY: NORMAL

## 2020-09-03 PROCEDURE — 93306 TTE W/DOPPLER COMPLETE: CPT

## 2020-09-03 PROCEDURE — 3430000000 HC RX DIAGNOSTIC RADIOPHARMACEUTICAL: Performed by: INTERNAL MEDICINE

## 2020-09-03 PROCEDURE — 78451 HT MUSCLE IMAGE SPECT SING: CPT

## 2020-09-03 PROCEDURE — A9502 TC99M TETROFOSMIN: HCPCS | Performed by: INTERNAL MEDICINE

## 2020-09-03 PROCEDURE — 78452 HT MUSCLE IMAGE SPECT MULT: CPT

## 2020-09-03 PROCEDURE — 2580000003 HC RX 258: Performed by: INTERNAL MEDICINE

## 2020-09-03 PROCEDURE — 93017 CV STRESS TEST TRACING ONLY: CPT

## 2020-09-03 PROCEDURE — 6360000002 HC RX W HCPCS: Performed by: INTERNAL MEDICINE

## 2020-09-03 RX ORDER — SODIUM CHLORIDE 0.9 % (FLUSH) 0.9 %
10 SYRINGE (ML) INJECTION PRN
Status: COMPLETED | OUTPATIENT
Start: 2020-09-03 | End: 2020-09-03

## 2020-09-03 RX ADMIN — Medication 10 ML: at 10:45

## 2020-09-03 RX ADMIN — TETROFOSMIN 35.4 MILLICURIE: 1.38 INJECTION, POWDER, LYOPHILIZED, FOR SOLUTION INTRAVENOUS at 10:57

## 2020-09-03 RX ADMIN — Medication 10 ML: at 10:58

## 2020-09-03 RX ADMIN — REGADENOSON 0.4 MG: 0.08 INJECTION, SOLUTION INTRAVENOUS at 10:57

## 2020-09-03 RX ADMIN — Medication 10 ML: at 10:57

## 2020-09-03 NOTE — PROGRESS NOTES
Reviewed history, allergies, and medications. Consent confirmed. Lexiscan exam explained. Placed patient on monitor. @5252 Nuclear Medicine tech here to inject Silvio Frazier. SOB noted during recovery phase. Denied chest pain. No ectopy noted. Patient off monitor and instructed to eat, will have last part of exam in 1 hour.

## 2020-09-05 PROCEDURE — 93018 CV STRESS TEST I&R ONLY: CPT | Performed by: INTERNAL MEDICINE

## 2020-09-08 NOTE — PROGRESS NOTES
Patient was scheduled for 2 day nuclear stress test.  Stress part done on 9/3/20 and rest scheduled for 9/4/20, pt was No Show( no transportation). Resting part rescheduled for 9/8/20 at 1000, pt no show again for the same reason. Test ended today, just stress part.

## 2020-09-09 RX ORDER — ALBUTEROL SULFATE 90 MCG
2 HFA AEROSOL WITH ADAPTER (GRAM) INHALATION EVERY 6 HOURS PRN
Qty: 1 INHALER | Refills: 3 | Status: SHIPPED | OUTPATIENT
Start: 2020-09-09 | End: 2020-09-09 | Stop reason: SDUPTHER

## 2020-09-21 ENCOUNTER — APPOINTMENT (OUTPATIENT)
Dept: GENERAL RADIOLOGY | Age: 50
End: 2020-09-21
Payer: MEDICAID

## 2020-09-21 ENCOUNTER — APPOINTMENT (OUTPATIENT)
Dept: NUCLEAR MEDICINE | Age: 50
End: 2020-09-21
Payer: MEDICAID

## 2020-09-21 ENCOUNTER — HOSPITAL ENCOUNTER (OUTPATIENT)
Age: 50
Setting detail: OBSERVATION
Discharge: HOME | End: 2020-09-21
Attending: INTERNAL MEDICINE | Admitting: INTERNAL MEDICINE
Payer: MEDICAID

## 2020-09-21 VITALS
OXYGEN SATURATION: 98 % | HEART RATE: 65 BPM | SYSTOLIC BLOOD PRESSURE: 133 MMHG | DIASTOLIC BLOOD PRESSURE: 63 MMHG | RESPIRATION RATE: 18 BRPM | TEMPERATURE: 98 F | HEIGHT: 63 IN | WEIGHT: 277.8 LBS | BODY MASS INDEX: 49.22 KG/M2

## 2020-09-21 LAB
ALBUMIN SERPL-MCNC: 3.6 G/DL (ref 3.5–4.6)
ALP BLD-CCNC: 127 U/L (ref 40–130)
ALT SERPL-CCNC: 12 U/L (ref 0–33)
AMPHETAMINE SCREEN, URINE: ABNORMAL
ANION GAP SERPL CALCULATED.3IONS-SCNC: 10 MEQ/L (ref 9–15)
AST SERPL-CCNC: 16 U/L (ref 0–35)
BACTERIA: ABNORMAL /HPF
BARBITURATE SCREEN URINE: ABNORMAL
BASOPHILS ABSOLUTE: 0.1 K/UL (ref 0–0.2)
BASOPHILS RELATIVE PERCENT: 1.2 %
BENZODIAZEPINE SCREEN, URINE: ABNORMAL
BILIRUB SERPL-MCNC: <0.2 MG/DL (ref 0.2–0.7)
BILIRUBIN URINE: NEGATIVE
BLOOD, URINE: ABNORMAL
BUN BLDV-MCNC: 10 MG/DL (ref 6–20)
CALCIUM SERPL-MCNC: 8.7 MG/DL (ref 8.5–9.9)
CANNABINOID SCREEN URINE: POSITIVE
CHLORIDE BLD-SCNC: 106 MEQ/L (ref 95–107)
CLARITY: ABNORMAL
CO2: 24 MEQ/L (ref 20–31)
COCAINE METABOLITE SCREEN URINE: ABNORMAL
COLOR: YELLOW
CREAT SERPL-MCNC: 1.12 MG/DL (ref 0.5–0.9)
EKG ATRIAL RATE: 84 BPM
EKG P AXIS: 45 DEGREES
EKG P-R INTERVAL: 164 MS
EKG Q-T INTERVAL: 386 MS
EKG QRS DURATION: 82 MS
EKG QTC CALCULATION (BAZETT): 456 MS
EKG R AXIS: 12 DEGREES
EKG T AXIS: 29 DEGREES
EKG VENTRICULAR RATE: 84 BPM
EOSINOPHILS ABSOLUTE: 0.3 K/UL (ref 0–0.7)
EOSINOPHILS RELATIVE PERCENT: 4 %
EPITHELIAL CELLS, UA: ABNORMAL /HPF (ref 0–5)
GFR AFRICAN AMERICAN: >60
GFR NON-AFRICAN AMERICAN: 51.6
GLOBULIN: 3.3 G/DL (ref 2.3–3.5)
GLUCOSE BLD-MCNC: 117 MG/DL (ref 60–115)
GLUCOSE BLD-MCNC: 129 MG/DL (ref 60–115)
GLUCOSE BLD-MCNC: 141 MG/DL (ref 60–115)
GLUCOSE BLD-MCNC: 162 MG/DL (ref 70–99)
GLUCOSE URINE: NEGATIVE MG/DL
HCT VFR BLD CALC: 41.4 % (ref 37–47)
HEMOGLOBIN: 14.3 G/DL (ref 12–16)
HYALINE CASTS: ABNORMAL /HPF (ref 0–5)
KETONES, URINE: NEGATIVE MG/DL
LEUKOCYTE ESTERASE, URINE: ABNORMAL
LYMPHOCYTES ABSOLUTE: 2.5 K/UL (ref 1–4.8)
LYMPHOCYTES RELATIVE PERCENT: 31.6 %
Lab: ABNORMAL
MCH RBC QN AUTO: 32.1 PG (ref 27–31.3)
MCHC RBC AUTO-ENTMCNC: 34.6 % (ref 33–37)
MCV RBC AUTO: 92.9 FL (ref 82–100)
METHADONE SCREEN, URINE: ABNORMAL
MONOCYTES ABSOLUTE: 0.3 K/UL (ref 0.2–0.8)
MONOCYTES RELATIVE PERCENT: 4.2 %
NEUTROPHILS ABSOLUTE: 4.6 K/UL (ref 1.4–6.5)
NEUTROPHILS RELATIVE PERCENT: 59 %
NITRITE, URINE: NEGATIVE
OPIATE SCREEN URINE: POSITIVE
OXYCODONE URINE: ABNORMAL
PDW BLD-RTO: 13.3 % (ref 11.5–14.5)
PERFORMED ON: ABNORMAL
PH UA: 5.5 (ref 5–9)
PHENCYCLIDINE SCREEN URINE: ABNORMAL
PLATELET # BLD: 261 K/UL (ref 130–400)
POTASSIUM SERPL-SCNC: 3.7 MEQ/L (ref 3.4–4.9)
PROPOXYPHENE SCREEN: ABNORMAL
PROTEIN UA: NEGATIVE MG/DL
RBC # BLD: 4.45 M/UL (ref 4.2–5.4)
RBC UA: ABNORMAL /HPF (ref 0–2)
SODIUM BLD-SCNC: 140 MEQ/L (ref 135–144)
SPECIFIC GRAVITY UA: 1.02 (ref 1–1.03)
TOTAL PROTEIN: 6.9 G/DL (ref 6.3–8)
TROPONIN: <0.01 NG/ML (ref 0–0.01)
URINE REFLEX TO CULTURE: YES
UROBILINOGEN, URINE: 0.2 E.U./DL
WBC # BLD: 7.8 K/UL (ref 4.8–10.8)
WBC UA: ABNORMAL /HPF (ref 0–5)

## 2020-09-21 PROCEDURE — 81001 URINALYSIS AUTO W/SCOPE: CPT

## 2020-09-21 PROCEDURE — 71045 X-RAY EXAM CHEST 1 VIEW: CPT

## 2020-09-21 PROCEDURE — 6360000002 HC RX W HCPCS: Performed by: INTERNAL MEDICINE

## 2020-09-21 PROCEDURE — 96372 THER/PROPH/DIAG INJ SC/IM: CPT

## 2020-09-21 PROCEDURE — 85025 COMPLETE CBC W/AUTO DIFF WBC: CPT

## 2020-09-21 PROCEDURE — 93005 ELECTROCARDIOGRAM TRACING: CPT | Performed by: INTERNAL MEDICINE

## 2020-09-21 PROCEDURE — 2580000003 HC RX 258: Performed by: PHYSICIAN ASSISTANT

## 2020-09-21 PROCEDURE — 99285 EMERGENCY DEPT VISIT HI MDM: CPT

## 2020-09-21 PROCEDURE — G0378 HOSPITAL OBSERVATION PER HR: HCPCS

## 2020-09-21 PROCEDURE — 96375 TX/PRO/DX INJ NEW DRUG ADDON: CPT

## 2020-09-21 PROCEDURE — 93010 ELECTROCARDIOGRAM REPORT: CPT | Performed by: INTERNAL MEDICINE

## 2020-09-21 PROCEDURE — 96376 TX/PRO/DX INJ SAME DRUG ADON: CPT

## 2020-09-21 PROCEDURE — 94664 DEMO&/EVAL PT USE INHALER: CPT

## 2020-09-21 PROCEDURE — 6370000000 HC RX 637 (ALT 250 FOR IP): Performed by: ANESTHESIOLOGY

## 2020-09-21 PROCEDURE — 96374 THER/PROPH/DIAG INJ IV PUSH: CPT

## 2020-09-21 PROCEDURE — 84484 ASSAY OF TROPONIN QUANT: CPT

## 2020-09-21 PROCEDURE — A9502 TC99M TETROFOSMIN: HCPCS | Performed by: INTERNAL MEDICINE

## 2020-09-21 PROCEDURE — 6370000000 HC RX 637 (ALT 250 FOR IP): Performed by: PHYSICIAN ASSISTANT

## 2020-09-21 PROCEDURE — 36415 COLL VENOUS BLD VENIPUNCTURE: CPT

## 2020-09-21 PROCEDURE — 80307 DRUG TEST PRSMV CHEM ANLYZR: CPT

## 2020-09-21 PROCEDURE — 6360000002 HC RX W HCPCS: Performed by: PHYSICIAN ASSISTANT

## 2020-09-21 PROCEDURE — 2580000003 HC RX 258: Performed by: INTERNAL MEDICINE

## 2020-09-21 PROCEDURE — 99220 PR INITIAL OBSERVATION CARE/DAY 70 MINUTES: CPT | Performed by: INTERNAL MEDICINE

## 2020-09-21 PROCEDURE — 80053 COMPREHEN METABOLIC PANEL: CPT

## 2020-09-21 PROCEDURE — 78451 HT MUSCLE IMAGE SPECT SING: CPT

## 2020-09-21 PROCEDURE — 93018 CV STRESS TEST I&R ONLY: CPT | Performed by: INTERNAL MEDICINE

## 2020-09-21 PROCEDURE — 3430000000 HC RX DIAGNOSTIC RADIOPHARMACEUTICAL: Performed by: INTERNAL MEDICINE

## 2020-09-21 PROCEDURE — 87086 URINE CULTURE/COLONY COUNT: CPT

## 2020-09-21 PROCEDURE — 6370000000 HC RX 637 (ALT 250 FOR IP): Performed by: INTERNAL MEDICINE

## 2020-09-21 RX ORDER — ACETAMINOPHEN 325 MG/1
650 TABLET ORAL EVERY 4 HOURS PRN
Status: DISCONTINUED | OUTPATIENT
Start: 2020-09-21 | End: 2020-09-21 | Stop reason: HOSPADM

## 2020-09-21 RX ORDER — DEXTROSE MONOHYDRATE 25 G/50ML
12.5 INJECTION, SOLUTION INTRAVENOUS PRN
Status: DISCONTINUED | OUTPATIENT
Start: 2020-09-21 | End: 2020-09-21 | Stop reason: HOSPADM

## 2020-09-21 RX ORDER — BUSPIRONE HYDROCHLORIDE 7.5 MG/1
15 TABLET ORAL 2 TIMES DAILY
Status: DISCONTINUED | OUTPATIENT
Start: 2020-09-21 | End: 2020-09-21 | Stop reason: HOSPADM

## 2020-09-21 RX ORDER — MORPHINE SULFATE 2 MG/ML
4 INJECTION, SOLUTION INTRAMUSCULAR; INTRAVENOUS ONCE
Status: COMPLETED | OUTPATIENT
Start: 2020-09-21 | End: 2020-09-21

## 2020-09-21 RX ORDER — NICOTINE POLACRILEX 4 MG
15 LOZENGE BUCCAL PRN
Status: DISCONTINUED | OUTPATIENT
Start: 2020-09-21 | End: 2020-09-21 | Stop reason: HOSPADM

## 2020-09-21 RX ORDER — DULOXETIN HYDROCHLORIDE 60 MG/1
60 CAPSULE, DELAYED RELEASE ORAL DAILY
Status: DISCONTINUED | OUTPATIENT
Start: 2020-09-21 | End: 2020-09-21 | Stop reason: HOSPADM

## 2020-09-21 RX ORDER — DEXTROSE MONOHYDRATE 50 MG/ML
100 INJECTION, SOLUTION INTRAVENOUS PRN
Status: DISCONTINUED | OUTPATIENT
Start: 2020-09-21 | End: 2020-09-21 | Stop reason: HOSPADM

## 2020-09-21 RX ORDER — ACETAMINOPHEN 325 MG/1
325 TABLET ORAL ONCE
Status: COMPLETED | OUTPATIENT
Start: 2020-09-21 | End: 2020-09-21

## 2020-09-21 RX ORDER — INSULIN GLARGINE 100 [IU]/ML
30 INJECTION, SOLUTION SUBCUTANEOUS NIGHTLY
Status: DISCONTINUED | OUTPATIENT
Start: 2020-09-21 | End: 2020-09-21 | Stop reason: HOSPADM

## 2020-09-21 RX ORDER — ONDANSETRON 2 MG/ML
4 INJECTION INTRAMUSCULAR; INTRAVENOUS ONCE
Status: COMPLETED | OUTPATIENT
Start: 2020-09-21 | End: 2020-09-21

## 2020-09-21 RX ORDER — 0.9 % SODIUM CHLORIDE 0.9 %
1000 INTRAVENOUS SOLUTION INTRAVENOUS ONCE
Status: COMPLETED | OUTPATIENT
Start: 2020-09-21 | End: 2020-09-21

## 2020-09-21 RX ORDER — TRAMADOL HYDROCHLORIDE 50 MG/1
50 TABLET ORAL EVERY 6 HOURS PRN
Status: DISCONTINUED | OUTPATIENT
Start: 2020-09-21 | End: 2020-09-21 | Stop reason: HOSPADM

## 2020-09-21 RX ORDER — LEVOTHYROXINE SODIUM 0.07 MG/1
150 TABLET ORAL DAILY
Status: DISCONTINUED | OUTPATIENT
Start: 2020-09-21 | End: 2020-09-21 | Stop reason: HOSPADM

## 2020-09-21 RX ORDER — NITROGLYCERIN 0.4 MG/1
0.4 TABLET SUBLINGUAL EVERY 5 MIN PRN
Status: DISCONTINUED | OUTPATIENT
Start: 2020-09-21 | End: 2020-09-21 | Stop reason: HOSPADM

## 2020-09-21 RX ORDER — ALBUTEROL SULFATE 2.5 MG/3ML
2.5 SOLUTION RESPIRATORY (INHALATION) EVERY 4 HOURS PRN
Status: DISCONTINUED | OUTPATIENT
Start: 2020-09-21 | End: 2020-09-21 | Stop reason: HOSPADM

## 2020-09-21 RX ORDER — ATORVASTATIN CALCIUM 40 MG/1
40 TABLET, FILM COATED ORAL NIGHTLY
Status: DISCONTINUED | OUTPATIENT
Start: 2020-09-21 | End: 2020-09-21 | Stop reason: HOSPADM

## 2020-09-21 RX ORDER — SODIUM CHLORIDE 0.9 % (FLUSH) 0.9 %
10 SYRINGE (ML) INJECTION PRN
Status: DISCONTINUED | OUTPATIENT
Start: 2020-09-21 | End: 2020-09-21 | Stop reason: HOSPADM

## 2020-09-21 RX ORDER — PREGABALIN 150 MG/1
150 CAPSULE ORAL 3 TIMES DAILY
Status: DISCONTINUED | OUTPATIENT
Start: 2020-09-21 | End: 2020-09-21 | Stop reason: HOSPADM

## 2020-09-21 RX ORDER — SODIUM CHLORIDE 0.9 % (FLUSH) 0.9 %
10 SYRINGE (ML) INJECTION EVERY 12 HOURS SCHEDULED
Status: DISCONTINUED | OUTPATIENT
Start: 2020-09-21 | End: 2020-09-21 | Stop reason: HOSPADM

## 2020-09-21 RX ADMIN — ENOXAPARIN SODIUM 40 MG: 40 INJECTION SUBCUTANEOUS at 10:19

## 2020-09-21 RX ADMIN — BUSPIRONE HYDROCHLORIDE 15 MG: 7.5 TABLET ORAL at 11:40

## 2020-09-21 RX ADMIN — ONDANSETRON 4 MG: 2 INJECTION INTRAMUSCULAR; INTRAVENOUS at 17:21

## 2020-09-21 RX ADMIN — ACETAMINOPHEN 325 MG: 325 TABLET, FILM COATED ORAL at 12:28

## 2020-09-21 RX ADMIN — ACETAMINOPHEN 650 MG: 325 TABLET, FILM COATED ORAL at 10:19

## 2020-09-21 RX ADMIN — ONDANSETRON 4 MG: 2 INJECTION INTRAMUSCULAR; INTRAVENOUS at 05:02

## 2020-09-21 RX ADMIN — TRAMADOL HYDROCHLORIDE 50 MG: 50 TABLET, FILM COATED ORAL at 14:30

## 2020-09-21 RX ADMIN — NITROGLYCERIN 0.4 MG: 0.4 TABLET, ORALLY DISINTEGRATING SUBLINGUAL at 05:21

## 2020-09-21 RX ADMIN — SODIUM CHLORIDE 1000 ML: 9 INJECTION, SOLUTION INTRAVENOUS at 05:00

## 2020-09-21 RX ADMIN — Medication 10 ML: at 13:12

## 2020-09-21 RX ADMIN — TETROFOSMIN 33.8 MILLICURIE: 1.38 INJECTION, POWDER, LYOPHILIZED, FOR SOLUTION INTRAVENOUS at 13:12

## 2020-09-21 RX ADMIN — LEVOTHYROXINE SODIUM 150 MCG: 0.07 TABLET ORAL at 11:40

## 2020-09-21 RX ADMIN — DULOXETINE HYDROCHLORIDE 60 MG: 60 CAPSULE, DELAYED RELEASE ORAL at 11:40

## 2020-09-21 RX ADMIN — Medication 10 ML: at 10:19

## 2020-09-21 RX ADMIN — MORPHINE SULFATE 4 MG: 2 INJECTION, SOLUTION INTRAMUSCULAR; INTRAVENOUS at 05:31

## 2020-09-21 RX ADMIN — PREGABALIN 150 MG: 150 CAPSULE ORAL at 14:31

## 2020-09-21 RX ADMIN — MORPHINE SULFATE 4 MG: 2 INJECTION, SOLUTION INTRAMUSCULAR; INTRAVENOUS at 05:01

## 2020-09-21 ASSESSMENT — ENCOUNTER SYMPTOMS
BACK PAIN: 0
ABDOMINAL DISTENTION: 0
CHEST TIGHTNESS: 0
ABDOMINAL PAIN: 0
SORE THROAT: 0
NAUSEA: 0
SHORTNESS OF BREATH: 1
DIARRHEA: 0
APNEA: 0
CHOKING: 0
RHINORRHEA: 0
VOMITING: 0
COLOR CHANGE: 0
EYE PAIN: 0
PHOTOPHOBIA: 0
COUGH: 0

## 2020-09-21 ASSESSMENT — PAIN DESCRIPTION - LOCATION
LOCATION: CHEST

## 2020-09-21 ASSESSMENT — PAIN SCALES - WONG BAKER
WONGBAKER_NUMERICALRESPONSE: 10
WONGBAKER_NUMERICALRESPONSE: 10

## 2020-09-21 ASSESSMENT — PAIN DESCRIPTION - ORIENTATION
ORIENTATION: ANTERIOR
ORIENTATION: MID
ORIENTATION: ANTERIOR

## 2020-09-21 ASSESSMENT — PAIN SCALES - GENERAL
PAINLEVEL_OUTOF10: 10
PAINLEVEL_OUTOF10: 7
PAINLEVEL_OUTOF10: 0
PAINLEVEL_OUTOF10: 10
PAINLEVEL_OUTOF10: 10
PAINLEVEL_OUTOF10: 8
PAINLEVEL_OUTOF10: 9
PAINLEVEL_OUTOF10: 10

## 2020-09-21 ASSESSMENT — PAIN DESCRIPTION - ONSET
ONSET: AWAKENED FROM SLEEP
ONSET: AWAKENED FROM SLEEP

## 2020-09-21 ASSESSMENT — PAIN DESCRIPTION - FREQUENCY
FREQUENCY: CONTINUOUS
FREQUENCY: CONTINUOUS

## 2020-09-21 ASSESSMENT — PAIN DESCRIPTION - DESCRIPTORS
DESCRIPTORS: ACHING
DESCRIPTORS: CONSTANT;DISCOMFORT
DESCRIPTORS: STABBING

## 2020-09-21 ASSESSMENT — PAIN DESCRIPTION - DIRECTION: RADIATING_TOWARDS: BACK

## 2020-09-21 NOTE — ED TRIAGE NOTES
Woke up 30 mins ago with 10/10 midsternal chest pain radiating to her back, denies nausea, vomiting, fever or travel. Took tramadol and tried repositioning without relief.   H/o sarcoidosis and feels similar

## 2020-09-21 NOTE — CARE COORDINATION
[facility] AT [place] Case Management Initial Discharge Assessment    Met with Patient to discuss discharge plan. PCP: Bj Dang, [facility]                                Date of Last Visit: 1 MO    If no PCP, list provided? N/A    Discharge Planning    Living Arrangements: independently at home    Who do you live with? SPOUSE    Who helps you with your care:  self    If lives at home:     Do you have any barriers navigating in your home? no    Patient can perform ADL? Yes    Current Services (outpatient and in home) :  None    Dialysis: No    Is transportation available to get to your appointments? Yes- SPOUSE OR [employer]BER    DME Equipment:  YES- [facility]E    Respiratory equipment: None    Respiratory provider:  no     Pharmacy:  yes [facility] [address]    Consult with Medication Assistance Program?  No      Patient agreeable to [facility] ? Declined    Patient agreeable to SNF/Rehab? N/A    Other discharge needs identified? N/A    Freedom of choice list provided with basic dialogue that supports the patient's individualized plan of care/goals and shares the quality data associated with the providers. N/A    Does Patient Have a High-Risk for Readmission Diagnosis (CHF, PN, MI, COPD)? No      The plan for Transition of Care is related to the following treatment goals: CONTROLLED PAIN    Initial Discharge Plan? (Note: please see concurrent daily documentation for any updates after initial note).     HOME, DENIES NEEDS    The Patient and/or patient representative: PATIENT was provided with choice of any post-acute providers for care and equipment and agrees with discharge plan  Yes    Electronically signed by Marcia Chao RN on 9/21/2020 at 1:52 PM

## 2020-09-21 NOTE — PROGRESS NOTES
[facility] [place] Respiratory Therapy Evaluation   Current Order:  PRN albuterol     Home Regimen: PRN albuterol     Ordering Physician:Breanne  Re-evaluation Date:  na    Diagnosis: CP, COPD     Patient Status: Stable / Unstable + Physician notified    The following MDI Criteria must be met in order to convert aerosol to MDI with spacer. If unable to meet, MDI will be converted to aerosol:  []  Patient able to demonstrate the ability to use MDI effectively  []  Patient alert and cooperative  []  Patient able to take deep breath with 5-10 second hold  []  Medication(s) available in this delivery method   []  Peak flow greater than or equal to 200 ml/min            Current Order Substituted To  (same drug, same frequency)   Aerosol to MDI [] Albuterol Sulfate 0.083% unit dose by aerosol Albuterol Sulfate MDI 2 puffs by inhalation with spacer    [] Levalbuterol 1.25 mg unit dose by aerosol Levalbuterol MDI 2 puffs by inhalation with spacer    [] Levalbuterol 0.63 mg unit dose by aerosol Levalbuterol MDI 2 puffs by inhalation with spacer    [] Ipratropium Bromide 0.02% unit dose by aerosol Ipratropium Bromide MDI 2 puffs by inhalation with spacer    [] Duoneb (Ipratropium + Albuterol) unit dose by aerosol Ipratropium MDI + Albuterol MDI 2 puffs by inhalation w/spacer   MDI to Aerosol [] Albuterol Sulfate MDI Albuterol Sulfate 0.083% unit dose by aerosol    [] Levalbuterol MDI 2 puffs by inhalation Levalbuterol 1.25 mg unit dose by aerosol    [] Ipratropium Bromide MDI by inhalation Ipratropium Bromide 0.02% unit dose by aerosol    [] Combivent (Ipratropium + Albuterol) MDI by inhalation Duoneb (Ipratropium + Albuterol) unit dose by aerosol   Treatment Assessment [Frequency/Schedule]:  Change frequency to: ____________no change______________________________________per Protocol, P&T, MEC      Points 0 1 2 3 4   Pulmonary Status  Non-Smoker  []   Smoking history   < 20 pack years  []   Smoking history  ?  20 pack years  []   Pulmonary Disorder  (acute or chronic)  [x]   Severe or Chronic w/ Exacerbation  []     Surgical Status No [x]   Surgeries     General []   Surgery Lower []   Abdominal Thoracic or []   Upper Abdominal Thoracic with  PulmonaryDisorder  []     Chest X-ray Clear/Not  Ordered     [x]  Chronic Changes  Results Pending  []  Infiltrates, atelectasis, pleural effusion, or edema  []  Infiltrates in more than one lobe []  Infiltrate + Atelectasis, &/or pleural effusion  []    Respiratory Pattern Regular,  RR = 12-20 [x]  Increased,  RR = 21-25 []  HINES, irregular,  or RR = 26-30 []  Decreased FEV1  or RR = 31-35 []  Severe SOB, use  of accessory muscles, or RR ? 35  []    Mental Status Alert, oriented,  Cooperative [x]  Confused but Follows commands []  Lethargic or unable to follow commands []  Obtunded  []  Comatose  []    Breath Sounds Clear to  auscultation  [x]  Decreased unilaterally or  in bases only []  Decreased  bilaterally  []  Crackles or intermittent wheezes []  Wheezes []    Cough Strong, Spontan., & nonproductive [x]  Strong,  spontaneous, &  productive []  Weak,  Nonproductive []  Weak, productive or  with wheezes []  No spontaneous  cough or may require suctioning []    Level of Activity Ambulatory [x]  Ambulatory w/ Assist  []  Non-ambulatory []  Paraplegic []  Quadriplegic []    Total    Score:__3_____     Triage Score:___5_____      Tri       Triage:     1. (>20) Freq: Q3    2. (16-20) Freq: Q4   3. (11-15) Freq: QID & Albuterol Q2 PRN    4. (6-10) Freq: TID & Albuterol Q2 PRN    5. (0-5) Freq Q4prn

## 2020-09-21 NOTE — ED PROVIDER NOTES
[address] [facility] ED  eMERGENCY dEPARTMENTeNCOUnter      Pt Name: Frandy Mathis  MRN: [id number]  [place] 1970  Date ofevaluation: 9/21/2020  Provider: Samantha Woodard PA-C    CHIEF COMPLAINT       Chief Complaint   Patient presents with    Chest Pain         HISTORY OF PRESENT ILLNESS   (Location/Symptom, Timing/Onset,Context/Setting, Quality, Duration, Modifying Factors, Severity)  Note limiting factors. Frandy Mathis is a 53 y.o. female who presents to the emergency department chest pain that started 30 minutes prior to arrival that woke her up out of her sleep. Pain is constant pain is tight and sometimes sharp. The pain does not radiate. She denies any nausea vomiting sweating. She actually has an appointment with her cardiology today as she had a stress test.    HPI    NursingNotes were reviewed. REVIEW OF SYSTEMS    (2-9 systems for level 4, 10 or more for level 5)     Review of Systems   Constitutional: Negative for chills, diaphoresis, fatigue and fever. HENT: Negative for congestion, rhinorrhea and sore throat. Eyes: Negative for photophobia and pain. Respiratory: Positive for shortness of breath. Negative for cough. Cardiovascular: Positive for chest pain. Negative for palpitations. Gastrointestinal: Negative for abdominal pain, diarrhea, nausea and vomiting. Genitourinary: Negative for dysuria and flank pain. Musculoskeletal: Negative for back pain. Skin: Negative for rash. Neurological: Negative for dizziness, light-headedness and headaches. Psychiatric/Behavioral: Negative. All other systems reviewed and are negative. Except as noted above the remainder of the review of systems was reviewed and negative.        PAST MEDICAL HISTORY     Past Medical History:   Diagnosis Date    Anxiety     Arthritis     Asthma     Bronchopneumonia     Cancer ([facility] [address]     renal    Cerebral artery occlusion with cerebral infarction (HCC)     Chronic bilateral ALLERGIES    DICLOFENAC SODIUM (VOLTAREN) 1 % GEL    Apply 2 g topically 4 times daily as needed for Pain    DULOXETINE (CYMBALTA) 60 MG EXTENDED RELEASE CAPSULE    TK 1 C PO QD    FLUTICASONE (FLONASE) 50 MCG/ACT NASAL SPRAY    1 spray by Nasal route daily    INSULIN LISPRO (HUMALOG) 100 UNIT/ML INJECTION VIAL    INJECT 4 UNITS UNDER THE SKIN THREE TIMES DAILY AS NEEDED FOR HIGH BLOOD SUGAR    INSULIN PEN NEEDLE (B-D ULTRAFINE III SHORT PEN) 31G X 8 MM MISC    Inject 1 each into the skin 3 times daily    INSULIN SYRINGE-NEEDLE U-100 30G X 1/2\" 1 ML MISC    1 each by Does not apply route daily    LEVOTHYROXINE (SYNTHROID) 150 MCG TABLET    TK 1 T PO ONCE D    MAGNESIUM OXIDE (MAG-OX) 400 MG TABLET    Take 0.5 tablets by mouth daily    METHOCARBAMOL (ROBAXIN) 750 MG TABLET    Take 750 mg by mouth 4 times daily    METOCLOPRAMIDE (REGLAN) 10 MG TABLET    Take 1 tablet by mouth 4 times daily    METOCLOPRAMIDE (REGLAN) 10 MG TABLET    Take 1 tablet by mouth 4 times daily    METOPROLOL TARTRATE (LOPRESSOR) 25 MG TABLET    Take 1 tablet by mouth 2 times daily    MONTELUKAST (SINGULAIR) 10 MG TABLET    Take 1 tab nightly at supper for breathing/allergies    OMEPRAZOLE (PRILOSEC) 40 MG DELAYED RELEASE CAPSULE    Take by mouth    ONE TOUCH ULTRASOFT LANCETS MISC    TEST 3 TIMES DAILY AS NEEDED    PREGABALIN (LYRICA) 150 MG CAPSULE    Take 1 capsule by mouth 3 times daily for 90 days. PROMETHAZINE (PHENERGAN) 25 MG TABLET    WARNING:  May cause drowsiness. May impair ability to operate vehicles or machinery. Do not use in combination with alcohol. ! Tablet every 6 hours as needed in conjunction with Ultram for headaches    SUMATRIPTAN (IMITREX) 25 MG TABLET    TAKE 1 TABLET BY MOUTH 1 TIME AS NEEDED FOR MIGRAINE    TRAMADOL (ULTRAM) 50 MG TABLET    Take 50 mg by mouth every 6 hours as needed for Pain.     TRESIBA FLEXTOUCH 100 UNIT/ML SOPN    INJECT 30 UNITS UNDER THE SKIN NIGHTLY       ALLERGIES     Shellfish-derived products; Ibuprofen; Ketorolac;  Other; Propoxyphene n-acetaminophen; and Toradol [ketorolac tromethamine]    FAMILY HISTORY       Family History   Problem Relation Age of Onset    Cancer Father     Diabetes Father     Allergy (Severe) Father     Heart Attack Father     Prostate Cancer Father     High Blood Pressure Mother     Diabetes Mother     Arthritis Mother     High Cholesterol Mother     Vision Loss Mother     Alcohol Abuse Neg Hx     Anemia Neg Hx     Arrhythmia Neg Hx     Asthma Neg Hx     Atrial Fibrillation Neg Hx     Birth Defects Neg Hx     Breast Cancer Neg Hx     Coronary Art Dis Neg Hx     Colon Cancer Neg Hx     Depression Neg Hx     Early Death Neg Hx     Hearing Loss Neg Hx     Heart Disease Neg Hx     Learning Disabilities Neg Hx     Kidney Disease Neg Hx     Mental Illness Neg Hx     Mental Retardation Neg Hx     Miscarriages / Stillbirths Neg Hx     Obesity Neg Hx     Osteoporosis Neg Hx     Stroke Neg Hx     Substance Abuse Neg Hx           SOCIAL HISTORY       Social History     Socioeconomic History    Marital status: Legally      Spouse name: None    Number of children: None    Years of education: None    Highest education level: None   Occupational History    None   Social Needs    Financial resource strain: None    Food insecurity     Worry: None     Inability: None    Transportation needs     Medical: None     Non-medical: None   Tobacco Use    Smoking status: Former Smoker     Packs/day: 0.50     Years: 15.00     Pack years: 7.50     Types: Cigarettes     Start date: 2014     Last attempt to quit: 2015     Years since quittin.6    Smokeless tobacco: Former User     Quit date: 3/23/2016   Substance and Sexual Activity    Alcohol use: Not Currently     Alcohol/week: 0.0 standard drinks     Comment: occasionally    Drug use: Yes     Frequency: 3.0 times per week     Types: Marijuana    Sexual activity: Yes     Partners: Male   Lifestyle    Physical activity     Days per week: None     Minutes per session: None    Stress: None   Relationships    Social connections     Talks on phone: None     Gets together: None     Attends Sikh service: None     Active member of club or organization: None     Attends meetings of clubs or organizations: None     Relationship status: None    Intimate partner violence     Fear of current or ex partner: None     Emotionally abused: None     Physically abused: None     Forced sexual activity: None   Other Topics Concern    None   Social History Narrative    None       SCREENINGS      @FLOW([id number])@      PHYSICAL EXAM    (up to 7 for level 4, 8 or more for level 5)     ED Triage Vitals [09/21/20 0354]   BP Temp Temp Source Pulse Resp SpO2 Height Weight   137/75 98.7 °F (37.1 °C) Oral 83 22 99 % 5' 3\" (1.6 m) 220 lb (99.8 kg)       Physical Exam  Vitals signs and nursing note reviewed. Constitutional:       General: She is not in acute distress. Appearance: Normal appearance. She is well-developed. She is not diaphoretic. HENT:      Head: Normocephalic and atraumatic. Nose: Nose normal.      Mouth/Throat:      Mouth: Mucous membranes are moist.   Eyes:      General: Lids are normal.      Conjunctiva/sclera: Conjunctivae normal.      Pupils: Pupils are equal, round, and reactive to light. Neck:      Musculoskeletal: Normal range of motion and neck supple. Cardiovascular:      Rate and Rhythm: Normal rate and regular rhythm. Pulses: Normal pulses. Heart sounds: Normal heart sounds. Pulmonary:      Effort: Pulmonary effort is normal.      Breath sounds: Normal breath sounds. Abdominal:      General: Bowel sounds are normal.      Palpations: Abdomen is soft. Tenderness: There is no abdominal tenderness. Lymphadenopathy:      Cervical: No cervical adenopathy. Skin:     General: Skin is warm and dry.       Capillary Refill: Capillary refill takes less than 2 seconds. Findings: No rash. Neurological:      Mental Status: She is alert and oriented to person, place, and time. Psychiatric:         Thought Content: Thought content normal.         Judgment: Judgment normal.         DIAGNOSTIC RESULTS     EKG: All EKG's are interpreted by the Emergency Department Physician who either signs or Co-signsthis chart in the absence of a cardiologist.    nsr 84 bpm no acute st change no ectopy    RADIOLOGY:   Non-plain filmimages such as CT, Ultrasound and MRI are read by the radiologist. Plain radiographic images are visualized and preliminarily interpreted by the emergency physician with the below findings:    nad    Interpretation per the Radiologist below, if available at the time ofthis note:    XR CHEST PORTABLE    (Results Pending)         ED BEDSIDE ULTRASOUND:   Performed by ED Physician - none    LABS:  Labs Reviewed   COMPREHENSIVE METABOLIC PANEL - Abnormal; Notable for the following components:       Result Value    Glucose 162 (*)     CREATININE 1.12 (*)     GFR Non- 51.6 (*)     All other components within normal limits   CBC WITH AUTO DIFFERENTIAL - Abnormal; Notable for the following components:    MCH 32.1 (*)     All other components within normal limits   TROPONIN   URINE RT REFLEX TO CULTURE   URINE DRUG SCREEN       All other labs were within normal range or not returned as of this dictation. EMERGENCY DEPARTMENT COURSE and DIFFERENTIAL DIAGNOSIS/MDM:   Vitals:    Vitals:    09/21/20 0354 09/21/20 0508   BP: 137/75 (!) 122/90   Pulse: 83 76   Resp: 22 22   Temp: 98.7 °F (37.1 °C)    TempSrc: Oral    SpO2: 99% 97%   Weight: 220 lb (99.8 kg)    Height: 5' 3\" (1.6 m)            MDM    Patient is nontoxic no acute distress she is afebrile hemodynamically stable. She presents with chest pain. She is medicated in the ED for pain. Morphine made her pain better. Nitro did not help give her a headache.   She has no acute EKG changes negative troponin x1. Patient does have a history of drug-seeking behavior sarcoidosis which could be contributing to her pain today however based on history of chest pain not having her stress test done and complex medical history patient will be brought in for cardiac rule out. Spoke to Dr. Herrera Gore who accepted the patient. REASSESSMENT          CRITICAL CARE TIME   Total Critical Care time was  minutes, excluding separatelyreportable procedures. There was a high probability ofclinically significant/life threatening deterioration in the patient's condition which required my urgent intervention. CONSULTS:  None    PROCEDURES:  Unless otherwise noted below, none     Procedures    FINAL IMPRESSION      1.  Chest pain, unspecified type          DISPOSITION/PLAN   DISPOSITION Admitted 09/21/2020 05:51:00 AM      PATIENT REFERREDTO:  Alfredo Nguyen, APRN - CNP  [id number] [place] [address]:  New Prescriptions    No medications on file          (Please note that portions of this note were completed with a voice recognition program.  Efforts were made to edit the dictations but occasionally words are mis-transcribed.)    Cindy Brock (electronically signed)  Attending Emergency Physician         Wilner Ulloa PA-C  09/21/20 [id number]

## 2020-09-21 NOTE — H&P
History and Physical  Patient: Gerri Buck  Unit/Bed: O135/I952-59  YOB: 1970  MRN: [id number]  Acct: [de-identified]   Admitting Diagnosis: Chest pain [R07.9]  Admit Date:  9/21/2020  Hospital Day: 0      Chief Complaint: chest pain    History of Present Illness: 53year old female with sarcoidosis, DM, HTN, HPL, fibromyalgia. Presents with constant chest pain for 1 week from the front to the back, sharp, 10/10 all the time. Was not responsive to nitro in ED and got morphine. Now asking for more morphine. Did only stress portion of stress test 3 weeks ago was noncompliant with rest portion. PMHx:  Past Medical History:   Diagnosis Date    Anxiety     Arthritis     Asthma     Bronchopneumonia     Cancer ([facility] [address]     renal    Cerebral artery occlusion with cerebral infarction (HCC)     Chronic bilateral low back pain with sciatica     Chronic kidney disease     Chronic obstructive lung disease ([facility] [address] 7/26/2019    Depression     Fibromyalgia     Hypertension     Insulin dependent type 2 diabetes mellitus, uncontrolled ([address] 8/3/2018    Localized enlarged lymph nodes 10/26/2018    Mixed headache     Pure hyperglyceridemia 5/19/2017    Sarcoidosis     Sleep apnea     does not wear cpap    Thyroid goiter        PSHx:  Past Surgical History:   Procedure Laterality Date    BRONCHOSCOPY  10/26/2018    DR. STEARNS    KIDNEY REMOVAL Right 08/2016    KIDNEY REMOVAL Right 2016    LUNG BIOPSY Right 10/2018    THYROID LOBECTOMY Right 6/13/14    THYROIDECTOMY  02/21/2019    DR. MAGDALENO    THYROIDECTOMY  2018    URETER STENT PLACEMENT Left 08/2016       Social Hx:  Social History     Socioeconomic History    Marital status: Legally      Spouse name: None    Number of children: None    Years of education: None    Highest education level: None   Occupational History    None   Social Needs    Financial resource strain: None    Food insecurity     Worry: None Inability: None    Transportation needs     Medical: None     Non-medical: None   Tobacco Use    Smoking status: Former Smoker     Packs/day: 0.50     Years: 15.00     Pack years: 7.50     Types: Cigarettes     Start date: 2014     Last attempt to quit: 2015     Years since quittin.6    Smokeless tobacco: Former User     Quit date: 3/23/2016   Substance and Sexual Activity    Alcohol use: Not Currently     Alcohol/week: 0.0 standard drinks     Comment: occasionally    Drug use: Yes     Frequency: 3.0 times per week     Types: Marijuana    Sexual activity: Yes     Partners: Male   Lifestyle    Physical activity     Days per week: None     Minutes per session: None    Stress: None   Relationships    Social connections     Talks on phone: None     Gets together: None     Attends Taoism service: None     Active member of club or organization: None     Attends meetings of clubs or organizations: None     Relationship status: None    Intimate partner violence     Fear of current or ex partner: None     Emotionally abused: None     Physically abused: None     Forced sexual activity: None   Other Topics Concern    None   Social History Narrative    None       Family Hx:  Family History   Problem Relation Age of Onset    Cancer Father     Diabetes Father     Allergy (Severe) Father     Heart Attack Father     Prostate Cancer Father     High Blood Pressure Mother     Diabetes Mother     Arthritis Mother     High Cholesterol Mother     Vision Loss Mother     Alcohol Abuse Neg Hx     Anemia Neg Hx     Arrhythmia Neg Hx     Asthma Neg Hx     Atrial Fibrillation Neg Hx     Birth Defects Neg Hx     Breast Cancer Neg Hx     Coronary Art Dis Neg Hx     Colon Cancer Neg Hx     Depression Neg Hx     Early Death Neg Hx     Hearing Loss Neg Hx     Heart Disease Neg Hx     Learning Disabilities Neg Hx     Kidney Disease Neg Hx     Mental Illness Neg Hx     Mental Retardation Neg Hx  Miscarriages / Stillbirths Neg Hx     Obesity Neg Hx     Osteoporosis Neg Hx     Stroke Neg Hx     Substance Abuse Neg Hx        Review of Systems:   Review of Systems   Constitutional: Negative for activity change and appetite change. HENT: Negative for congestion. Respiratory: Negative for apnea, choking and chest tightness. Cardiovascular: Positive for chest pain. Gastrointestinal: Negative for abdominal distention and abdominal pain. Endocrine: Negative for cold intolerance and heat intolerance. Genitourinary: Negative for dysuria and enuresis. Musculoskeletal: Negative for arthralgias and back pain. Skin: Negative for color change. Neurological: Negative for dizziness, seizures, syncope and light-headedness. Psychiatric/Behavioral: Negative for agitation, behavioral problems and confusion. Allergies: Allergies   Allergen Reactions    Shellfish-Derived Products     Ibuprofen     Ketorolac     Other     Propoxyphene N-Acetaminophen      Other reaction(s): Hives    Toradol [Ketorolac Tromethamine]      Pt states she cannot take Toradol because she has only 1 kidney.        Medications:  Current Facility-Administered Medications   Medication Dose Route Frequency Provider Last Rate Last Dose    nitroGLYCERIN (NITROSTAT) SL tablet 0.4 mg  0.4 mg Sublingual Q5 Min PRN Valeria Yo PA-C   0.4 mg at 09/21/20 0521    sodium chloride flush 0.9 % injection 10 mL  10 mL Intravenous 2 times per day Jerman Brown MD   10 mL at 09/21/20 1019    sodium chloride flush 0.9 % injection 10 mL  10 mL Intravenous PRN Jerman Brown MD        acetaminophen (TYLENOL) tablet 650 mg  650 mg Oral Q4H PRN Jerman Brown MD   650 mg at 09/21/20 1019    enoxaparin (LOVENOX) injection 40 mg  40 mg Subcutaneous Daily Jerman Brown MD   40 mg at 09/21/20 1019       Physical Examination:    /74   Pulse 64   Temp 97.9 °F (36.6 °C) (Oral)   Resp 18   Ht 5' 3\" (1.6 m)   Wt 277 lb 12.8 oz (126 kg)   SpO2 99%   BMI 49.21 kg/m²    Physical Exam   Constitutional: She appears healthy. No distress. HENT:   Mouth/Throat: Oropharynx is clear. Eyes: Pupils are equal, round, and reactive to light. Neck: Normal range of motion. No JVD present. Cardiovascular: Regular rhythm, S1 normal, S2 normal, normal heart sounds and intact distal pulses. Exam reveals no gallop. No murmur heard. Pulses:       Radial pulses are 2+ on the right side. Dorsalis pedis pulses are 2+ on the right side. Pulmonary/Chest: Effort normal and breath sounds normal. She has no wheezes. She has no rales. She exhibits no tenderness. Abdominal: Soft. Bowel sounds are normal.   Musculoskeletal: Normal range of motion. General: No edema. Neurological: She is alert and oriented to person, place, and time. She has intact cranial nerves. Skin: Skin is warm and dry. No rash noted.          LABS:  CBC:   Lab Results   Component Value Date    WBC 7.8 09/21/2020    RBC 4.45 09/21/2020    RBC 4.61 04/04/2019    HGB 14.3 09/21/2020    HCT 41.4 09/21/2020    MCV 92.9 09/21/2020    MCH 32.1 09/21/2020    MCHC 34.6 09/21/2020    RDW 13.3 09/21/2020     09/21/2020    MPV 10.8 04/04/2019     CBC with Differential:    Lab Results   Component Value Date    WBC 7.8 09/21/2020    RBC 4.45 09/21/2020    RBC 4.61 04/04/2019    HGB 14.3 09/21/2020    HCT 41.4 09/21/2020     09/21/2020    MCV 92.9 09/21/2020    MCH 32.1 09/21/2020    MCHC 34.6 09/21/2020    RDW 13.3 09/21/2020    NRBC 0.0 04/04/2019    BANDSPCT 1 04/09/2018    LYMPHOPCT 31.6 09/21/2020    MONOPCT 4.2 09/21/2020    BASOPCT 1.2 09/21/2020    MONOSABS 0.3 09/21/2020    LYMPHSABS 2.5 09/21/2020    EOSABS 0.3 09/21/2020    BASOSABS 0.1 09/21/2020     CMP:    Lab Results   Component Value Date     09/21/2020    K 3.7 09/21/2020    K 4.0 07/03/2020     09/21/2020    CO2 24 09/21/2020    BUN 10 09/21/2020 CREATININE 1.12 09/21/2020    GFRAA >60.0 09/21/2020    AGRATIO 1.0 04/04/2019    LABGLOM 51.6 09/21/2020    GLUCOSE 162 09/21/2020    GLUCOSE 200 06/13/2020    PROT 6.9 09/21/2020    LABALBU 3.6 09/21/2020    LABALBU 3.8 06/13/2020    CALCIUM 8.7 09/21/2020    BILITOT <0.2 09/21/2020    ALKPHOS 127 09/21/2020    AST 16 09/21/2020    ALT 12 09/21/2020     BMP:    Lab Results   Component Value Date     09/21/2020    K 3.7 09/21/2020    K 4.0 07/03/2020     09/21/2020    CO2 24 09/21/2020    BUN 10 09/21/2020    LABALBU 3.6 09/21/2020    LABALBU 3.8 06/13/2020    CREATININE 1.12 09/21/2020    CALCIUM 8.7 09/21/2020    GFRAA >60.0 09/21/2020    LABGLOM 51.6 09/21/2020    GLUCOSE 162 09/21/2020    GLUCOSE 200 06/13/2020     Magnesium:    Lab Results   Component Value Date    MG 2.0 08/17/2020     Troponin:    Lab Results   Component Value Date    TROPONINI <0.010 09/21/2020       EKG: EKG: normal EKG, normal sinus rhythm. Assessment/Plan:    Active Hospital Problems    Diagnosis Date Noted    Chest pain [R07.9] 01/24/2018           Atypical chest pain, probably non-cardiac. Will get rest portion of stress test.  The stress portion had some breast attenuation but was otherwise negative. Electronically signed by Velia Neri.  Rena Tariq MD [facility] Director of Cardiology Services and Cardiac Catheterization Laboratory  [facility], [place]   on 9/21/2020 at 10:45 AM

## 2020-09-21 NOTE — ED NOTES
Patient c/o headache with nitro, ongoing chest pain without relief     Erin Connolly RN  09/21/20 [id number]

## 2020-09-22 ENCOUNTER — CARE COORDINATION (OUTPATIENT)
Dept: CASE MANAGEMENT | Age: 50
End: 2020-09-22

## 2020-09-22 NOTE — PROGRESS NOTES
SN rounded on pt hourly. Pt verbalized nausea. SN reported nausea to nurse, Jason Vail and nurse medicated pt with zofran. Pt verbalized a decrease in nausea 45 minutes later. Pt was oriented x3, alert and oriented. Pt's vitals are as follows: Bp 133/63, Resp 18, Temp 98.0, and Spo2 98% on room air. Pt verbalized readiness for discharge. Call light within reach.

## 2020-09-23 LAB — URINE CULTURE, ROUTINE: NORMAL

## 2020-09-25 ENCOUNTER — VIRTUAL VISIT (OUTPATIENT)
Dept: FAMILY MEDICINE CLINIC | Age: 50
End: 2020-09-25
Payer: MEDICAID

## 2020-09-25 PROCEDURE — G8417 CALC BMI ABV UP PARAM F/U: HCPCS | Performed by: NURSE PRACTITIONER

## 2020-09-25 PROCEDURE — 99214 OFFICE O/P EST MOD 30 MIN: CPT | Performed by: NURSE PRACTITIONER

## 2020-09-25 PROCEDURE — G8428 CUR MEDS NOT DOCUMENT: HCPCS | Performed by: NURSE PRACTITIONER

## 2020-09-25 PROCEDURE — 1036F TOBACCO NON-USER: CPT | Performed by: NURSE PRACTITIONER

## 2020-09-25 ASSESSMENT — ENCOUNTER SYMPTOMS
SHORTNESS OF BREATH: 0
EYE REDNESS: 0
WHEEZING: 0
EYE DISCHARGE: 0
COUGH: 0
EYE PAIN: 0
EYE ITCHING: 0
PHOTOPHOBIA: 0

## 2020-09-25 NOTE — PROGRESS NOTES
Father     Diabetes Father     Allergy (Severe) Father     Heart Attack Father     Prostate Cancer Father     High Blood Pressure Mother     Diabetes Mother    Yoshi Walsh Arthritis Mother     High Cholesterol Mother     Vision Loss Mother     Alcohol Abuse Neg Hx     Anemia Neg Hx     Arrhythmia Neg Hx     Asthma Neg Hx     Atrial Fibrillation Neg Hx     Birth Defects Neg Hx     Breast Cancer Neg Hx     Coronary Art Dis Neg Hx     Colon Cancer Neg Hx     Depression Neg Hx     Early Death Neg Hx     Hearing Loss Neg Hx     Heart Disease Neg Hx     Learning Disabilities Neg Hx     Kidney Disease Neg Hx     Mental Illness Neg Hx     Mental Retardation Neg Hx     Miscarriages / Stillbirths Neg Hx     Obesity Neg Hx     Osteoporosis Neg Hx     Stroke Neg Hx     Substance Abuse Neg Hx      Social History     Socioeconomic History    Marital status: Legally      Spouse name: Not on file    Number of children: Not on file    Years of education: Not on file    Highest education level: Not on file   Occupational History    Not on file   Social Needs    Financial resource strain: Not on file    Food insecurity     Worry: Not on file     Inability: Not on file    Transportation needs     Medical: Not on file     Non-medical: Not on file   Tobacco Use    Smoking status: Former Smoker     Packs/day: 0.50     Years: 15.00     Pack years: 7.50     Types: Cigarettes     Start date: 2014     Last attempt to quit: 2015     Years since quittin.6    Smokeless tobacco: Former User     Quit date: 3/23/2016   Substance and Sexual Activity    Alcohol use: Not Currently     Alcohol/week: 0.0 standard drinks     Comment: occasionally    Drug use: Yes     Frequency: 3.0 times per week     Types: Marijuana    Sexual activity: Yes     Partners: Male   Lifestyle    Physical activity     Days per week: Not on file     Minutes per session: Not on file    Stress: Not on file   Relationships  Social connections     Talks on phone: Not on file     Gets together: Not on file     Attends Judaism service: Not on file     Active member of club or organization: Not on file     Attends meetings of clubs or organizations: Not on file     Relationship status: Not on file    Intimate partner violence     Fear of current or ex partner: Not on file     Emotionally abused: Not on file     Physically abused: Not on file     Forced sexual activity: Not on file   Other Topics Concern    Not on file   Social History Narrative    Not on file     Current Outpatient Medications on File Prior to Visit   Medication Sig Dispense Refill    albuterol sulfate HFA (PROVENTIL HFA) 108 (90 Base) MCG/ACT inhaler Inhale 2 puffs into the lungs every 6 hours as needed for Wheezing 1 Inhaler 3    cetirizine (ZYRTEC) 10 MG tablet TAKE 1 TABLET BY MOUTH EVERY NIGHT AT BEDTIME AS NEEDED FOR ALLERGIES 30 tablet 5    insulin lispro (HUMALOG) 100 UNIT/ML injection vial INJECT 4 UNITS UNDER THE SKIN THREE TIMES DAILY AS NEEDED FOR HIGH BLOOD SUGAR 10 mL 0    levothyroxine (SYNTHROID) 150 MCG tablet TK 1 T PO ONCE D      busPIRone (BUSPAR) 15 MG tablet TAKE 1 TABLET BY MOUTH THREE TIMES DAILY 90 tablet 2    pregabalin (LYRICA) 150 MG capsule Take 1 capsule by mouth 3 times daily for 90 days. 90 capsule 2    TRESIBA FLEXTOUCH 100 UNIT/ML SOPN INJECT 30 UNITS UNDER THE SKIN NIGHTLY 15 mL 3    magnesium oxide (MAG-OX) 400 MG tablet Take 0.5 tablets by mouth daily 30 tablet 1    SUMAtriptan (IMITREX) 25 MG tablet TAKE 1 TABLET BY MOUTH 1 TIME AS NEEDED FOR MIGRAINE 9 tablet 1    Insulin Pen Needle (B-D ULTRAFINE III SHORT PEN) 31G X 8 MM MISC Inject 1 each into the skin 3 times daily 100 each 5    butalbital-acetaminophen-caffeine (FIORICET, ESGIC) -40 MG per tablet Take 1 tablet by mouth every 6 hours as needed for Headaches 30 tablet 0    promethazine (PHENERGAN) 25 MG tablet WARNING:  May cause drowsiness.   May impair ability to operate vehicles or machinery. Do not use in combination with alcohol. ! Tablet every 6 hours as needed in conjunction with Ultram for headaches 20 tablet 0    blood glucose test strips (EXACTECH TEST) strip 1 each by In Vitro route 3 times daily (Accu-check test strips) As needed. DX:E11.65 300 strip 5    ONE TOUCH ULTRASOFT LANCETS MISC TEST 3 TIMES DAILY AS NEEDED 300 each 3    albuterol (PROVENTIL) (2.5 MG/3ML) 0.083% nebulizer solution Take 3 mLs by nebulization every 4 hours as needed for Wheezing 120 each 3    metoclopramide (REGLAN) 10 MG tablet Take 1 tablet by mouth 4 times daily 120 tablet 0    atorvastatin (LIPITOR) 40 MG tablet Take 1 tablet by mouth nightly 30 tablet 3    metoprolol tartrate (LOPRESSOR) 25 MG tablet Take 1 tablet by mouth 2 times daily 60 tablet 0    DULoxetine (CYMBALTA) 60 MG extended release capsule TK 1 C PO QD  4    fluticasone (FLONASE) 50 MCG/ACT nasal spray 1 spray by Nasal route daily 1 Bottle 3    traMADol (ULTRAM) 50 MG tablet Take 50 mg by mouth every 6 hours as needed for Pain.  methocarbamol (ROBAXIN) 750 MG tablet Take 750 mg by mouth 4 times daily      Insulin Syringe-Needle U-100 30G X 1/2\" 1 ML MISC 1 each by Does not apply route daily 100 each 3    blood glucose test strips (ONETOUCH VERIO) strip TEST 3 TIMES DAILY AS NEEDED 300 each 3    Blood Glucose Monitoring Suppl (ONETOUCH VERIO) w/Device KIT TEST 3 TIMES DAILY AS NEEDED 1 kit 0    montelukast (SINGULAIR) 10 MG tablet Take 1 tab nightly at supper for breathing/allergies 30 tablet 5     No current facility-administered medications on file prior to visit. Allergies:  Shellfish-derived products; Ibuprofen; Ketorolac; Other; Propoxyphene n-acetaminophen; and Toradol [ketorolac tromethamine]    Review of Systems   Constitutional: Negative for chills, fatigue and fever. HENT: Negative for congestion and ear pain.     Eyes: Negative for photophobia, pain, discharge, redness, itching and visual disturbance. Respiratory: Negative for cough, shortness of breath and wheezing. Cardiovascular: Positive for chest pain. Negative for palpitations and leg swelling. Musculoskeletal: Positive for myalgias. Psychiatric/Behavioral: Negative for self-injury and suicidal ideas. Objective: There were no vitals taken for this visit. Physical Exam  Constitutional:       General: She is awake. She is not in acute distress. Appearance: Normal appearance. She is not ill-appearing or diaphoretic. HENT:      Head: Normocephalic. Right Ear: External ear normal.      Left Ear: External ear normal.      Nose: Nose normal.      Mouth/Throat:      Lips: Pink. Mouth: Mucous membranes are moist.   Eyes:      General:         Right eye: No foreign body or discharge. Left eye: No foreign body or discharge. Conjunctiva/sclera:      Right eye: Right conjunctiva is not injected. No exudate or hemorrhage. Left eye: Left conjunctiva is not injected. No exudate or hemorrhage. Pulmonary:      Effort: Pulmonary effort is normal. No respiratory distress. Skin:     Coloration: Skin is not ashen, cyanotic, jaundiced, mottled, pale or sallow. Neurological:      Mental Status: She is alert and oriented to person, place, and time. Psychiatric:         Mood and Affect: Mood normal.         Speech: Speech normal.         Behavior: Behavior normal. Behavior is cooperative. Assessment:          Diagnosis Orders   1. Chest pain, unspecified type     2. Sarcoidosis     3.  Chalazion of right upper eyelid  YOHAN - Meryl Bailey MD, Ophthalmology, [address] [place] [address]:      Orders Placed This Encounter   Procedures   Jenna Jeffers MD, Ophthalmology, [facility]     Referral Priority:   Routine     Referral Type:   Eval and Treat     Referral Reason:   Specialty Services Required     Referred to Provider:   Kirk Davis MD     Requested Specialty:   Ophthalmology     Number of Visits Requested:   1        No orders of the defined types were placed in this encounter. Return in about 2 months (around 11/24/2020) for evaluation in office. Continue lyrica at current dose. Keep f/u with specialist.   Discussed that it appears she needs to redo the stress test as it wasn't initially finished. She verbalized understanding. Will need to see ophthalmologist for what appears to be chalazion. Reviewed with the patient: current clinicalstatus, medications, activities and diet. Side effects, adverse effects of the medication prescribedtoday, as well as treatment plan/ rationale and result expectations have been discussedwith the patient who expresses understanding and desires to proceed. Close follow upto evaluate treatment results and for coordination of care. I have reviewedthe patient's medical history in detail and updated the computerized patient record. TELEHEALTH EVALUATION -- Audio/Visual (During SNUZP-28 public health emergency)    -   Dimple Terrazas is a 53 y.o. female being evaluated by a Virtual Visit (video visit) encounter to address concerns as mentioned above. A caregiver was present when appropriate. Due to this being a TeleHealth encounter (During FDWUU-32 public health emergency), evaluation of the following organ systems was limited: Vitals/Constitutional/EENT/Resp/CV/GI//MS/Neuro/Skin/Heme-Lymph-Imm. Pursuant to the emergency declaration under the [facility] waiver authority and the [employer] Act, this Virtual Visit was conducted with patient's (and/or legal guardian's) consent, to reduce the patient's risk of exposure to COVID-19 and provide necessary medical care.   The patient (and/or legal guardian) has also been advised to contact this office for worsening conditions or problems, and seek emergency medical treatment and/or call 911 if deemed necessary. Services were provided through a video synchronous discussion virtually to substitute for in-person clinic visit. Type of encounter was X__ Doxy __ MyChart ___Facetime    Patient was located at their home. Provider was located at their _X__ home or        ____ office. --SUDEEP Bullock CNP on 9/25/2020 at 2:15 PM    An electronic signature was used to authenticate this note.

## 2020-09-25 NOTE — PATIENT INSTRUCTIONS
Patient Education        Chest Pain: Care Instructions  Your Care Instructions     There are many things that can cause chest pain. Some are not serious and will get better on their own in a few days. But some kinds of chest pain need more testing and treatment. Your doctor may have recommended a follow-up visit in the next 8 to 12 hours. If you are not getting better, you may need more tests or treatment. Even though your doctor has released you, you still need to watch for any problems. The doctor carefully checked you, but sometimes problems can develop later. If you have new symptoms or if your symptoms do not get better, get medical care right away. If you have worse or different chest pain or pressure that lasts more than 5 minutes or you passed out (lost consciousness), [id number] or seek other emergency help right away. A medical visit is only one step in your treatment. Even if you feel better, you still need to do what your doctor recommends, such as going to all suggested follow-up appointments and taking medicines exactly as directed. This will help you recover and help prevent future problems. How can you care for yourself at home? · Rest until you feel better. · Take your medicine exactly as prescribed. Call your doctor if you think you are having a problem with your medicine. · Do not drive after taking a prescription pain medicine. When should you call for help? [id number]:   · You passed out (lost consciousness). · You have severe difficulty breathing. · You have symptoms of a heart attack. These may include:  ? Chest pain or pressure, or a strange feeling in your chest.  ? Sweating. ? Shortness of breath. ? Nausea or vomiting. ? Pain, pressure, or a strange feeling in your back, neck, jaw, or upper belly or in one or both shoulders or arms. ? Lightheadedness or sudden weakness. ? A fast or irregular heartbeat.   After you call 911, the  may tell you to chew 1 adult-strength or 2 to 4 low-dose aspirin. Wait for an ambulance. Do not try to drive yourself. Call your doctor today if:   · You have any trouble breathing. · Your chest pain gets worse. · You are dizzy or lightheaded, or you feel like you may faint. · You are not getting better as expected. · You are having new or different chest pain. Where can you learn more? Go to https://[employer]. org and sign in to your [employer] account. Enter A120 in the LVL6 box to learn more about \"Chest Pain: Care Instructions. \"     If you do not have an account, please click on the \"Sign Up Now\" link. Current as of: June 26, 2019               Content Version: 12.5  © 3456-0217 Healthwise, Incorporated. Care instructions adapted under license by [facility] ([facility]). If you have questions about a medical condition or this instruction, always ask your healthcare professional. [place] [address] any warranty or liability for your use of this information.

## 2020-09-28 ENCOUNTER — HOSPITAL ENCOUNTER (EMERGENCY)
Age: 50
Discharge: HOME | End: 2020-09-28
Attending: STUDENT IN AN ORGANIZED HEALTH CARE EDUCATION/TRAINING PROGRAM
Payer: MEDICAID

## 2020-09-28 ENCOUNTER — APPOINTMENT (OUTPATIENT)
Dept: GENERAL RADIOLOGY | Age: 50
End: 2020-09-28
Payer: MEDICAID

## 2020-09-28 VITALS
WEIGHT: 277 LBS | DIASTOLIC BLOOD PRESSURE: 83 MMHG | SYSTOLIC BLOOD PRESSURE: 135 MMHG | RESPIRATION RATE: 18 BRPM | TEMPERATURE: 98.9 F | HEART RATE: 75 BPM | BODY MASS INDEX: 49.07 KG/M2 | OXYGEN SATURATION: 97 %

## 2020-09-28 LAB
CHP ED QC CHECK: NORMAL
EKG ATRIAL RATE: 75 BPM
EKG P AXIS: 38 DEGREES
EKG P-R INTERVAL: 146 MS
EKG Q-T INTERVAL: 364 MS
EKG QRS DURATION: 76 MS
EKG QTC CALCULATION (BAZETT): 406 MS
EKG R AXIS: 18 DEGREES
EKG T AXIS: 13 DEGREES
EKG VENTRICULAR RATE: 75 BPM
GLUCOSE BLD-MCNC: 140 MG/DL
GLUCOSE BLD-MCNC: 140 MG/DL (ref 60–115)
PERFORMED ON: ABNORMAL

## 2020-09-28 PROCEDURE — 6370000000 HC RX 637 (ALT 250 FOR IP): Performed by: STUDENT IN AN ORGANIZED HEALTH CARE EDUCATION/TRAINING PROGRAM

## 2020-09-28 PROCEDURE — 71046 X-RAY EXAM CHEST 2 VIEWS: CPT

## 2020-09-28 PROCEDURE — 99283 EMERGENCY DEPT VISIT LOW MDM: CPT

## 2020-09-28 PROCEDURE — 93005 ELECTROCARDIOGRAM TRACING: CPT | Performed by: STUDENT IN AN ORGANIZED HEALTH CARE EDUCATION/TRAINING PROGRAM

## 2020-09-28 PROCEDURE — 99282 EMERGENCY DEPT VISIT SF MDM: CPT

## 2020-09-28 RX ORDER — CYCLOBENZAPRINE HCL 10 MG
10 TABLET ORAL NIGHTLY PRN
Qty: 3 TABLET | Refills: 0 | Status: SHIPPED | OUTPATIENT
Start: 2020-09-28 | End: 2020-10-01

## 2020-09-28 RX ORDER — CYCLOBENZAPRINE HCL 10 MG
10 TABLET ORAL ONCE
Status: COMPLETED | OUTPATIENT
Start: 2020-09-28 | End: 2020-09-28

## 2020-09-28 RX ADMIN — CYCLOBENZAPRINE 10 MG: 10 TABLET, FILM COATED ORAL at 17:12

## 2020-09-28 ASSESSMENT — PAIN DESCRIPTION - LOCATION: LOCATION: CHEST

## 2020-09-28 ASSESSMENT — ENCOUNTER SYMPTOMS
VOMITING: 0
COUGH: 0
SINUS PRESSURE: 0
ABDOMINAL PAIN: 0
DIARRHEA: 0
BACK PAIN: 0
CHEST TIGHTNESS: 0
SHORTNESS OF BREATH: 0
TROUBLE SWALLOWING: 0

## 2020-09-28 ASSESSMENT — PAIN DESCRIPTION - DESCRIPTORS: DESCRIPTORS: SHARP;PRESSURE

## 2020-09-28 ASSESSMENT — PAIN SCALES - GENERAL: PAINLEVEL_OUTOF10: 9

## 2020-09-28 NOTE — ED TRIAGE NOTES
Pt a/o x 3 skin pink w/d resp non labored. . pt reports sharp pain lt side of chest and pressure while sitting on couch. Pt was manipulated by dr Catie Alvarez o/a to room. Lungs clear but diminished. pt also c/o pain in lt arm and numbness.

## 2020-09-28 NOTE — ED NOTES
Discharge  instructions given and reviewed. Patient verbalized understanding. Patient ambulated out of ED with a steady gait to POV.      Kristie Leal RN  09/28/20 [id number]

## 2020-09-28 NOTE — ED PROVIDER NOTES
[address] [facility] ED  eMERGENCY dEPARTMENT eNCOUnter      Pt Name: Andria Brito  MRN: [id number]  [place] 1970  Date of evaluation: 9/28/2020  Provider: Sydnee Austin       Chief Complaint   Patient presents with    Chest Pain         HISTORY OF PRESENT ILLNESS   (Location/Symptom, Timing/Onset,Context/Setting, Quality, Duration, Modifying Factors, Severity)  Note limiting factors. Andria Brito is a 53 y.o. female who presents to the emergency department left-sided chest pain when she was changing positions. Patient states is both sharp and also pressure-like. Position change makes it worse. Movement of the left shoulder makes it worse. On physical exam the patient is depressed first rib on the left which reproduces symptoms. Palpation of the costosternal articulation on the left reproduces the exact same symptoms. Patient denies any fever, chills or cough. Patient informed the ER physician she has a history of sarcoidosis and often times when she gets a chest x-ray that it is seen. Patient had a recent negative nuclear stress dated 9/21/2020 performed by Dr. Romell Cabot. Patient has repeated pain and states that she has had multiple cardiac evaluations. Movement and position change today makes the pain worse. Today pain has been present for 1 hour. Patient's  is present. I have the patient's permission to examine her, discussed her care with her  present. The history is provided by the patient and the spouse. NursingNotes were reviewed. REVIEW OF SYSTEMS    (2-9 systems for level 4, 10 or more for level 5)     Review of Systems   Constitutional: Negative for activity change, appetite change, chills, fever and unexpected weight change. HENT: Negative for drooling, ear pain, nosebleeds, sinus pressure and trouble swallowing. Respiratory: Negative for cough, chest tightness and shortness of breath. Inhale 2 puffs into the lungs every 6 hours as needed for Wheezing, Disp-1 Inhaler,R-3Normal      cetirizine (ZYRTEC) 10 MG tablet TAKE 1 TABLET BY MOUTH EVERY NIGHT AT BEDTIME AS NEEDED FOR ALLERGIES, Disp-30 tablet,R-5Normal      insulin lispro (HUMALOG) 100 UNIT/ML injection vial INJECT 4 UNITS UNDER THE SKIN THREE TIMES DAILY AS NEEDED FOR HIGH BLOOD SUGAR, Disp-10 mL,R-0Normal      levothyroxine (SYNTHROID) 150 MCG tablet TK 1 T PO ONCE DHistorical Med      pregabalin (LYRICA) 150 MG capsule Take 1 capsule by mouth 3 times daily for 90 days. , Disp-90 capsule, R-2Normal      TRESIBA FLEXTOUCH 100 UNIT/ML SOPN INJECT 30 UNITS UNDER THE SKIN NIGHTLY, Disp-15 mL, R-3Normal      magnesium oxide (MAG-OX) 400 MG tablet Take 0.5 tablets by mouth daily, Disp-30 tablet, R-1Normal      SUMAtriptan (IMITREX) 25 MG tablet TAKE 1 TABLET BY MOUTH 1 TIME AS NEEDED FOR MIGRAINE, Disp-9 tablet, R-1Normal      Insulin Pen Needle (B-D ULTRAFINE III SHORT PEN) 31G X 8 MM MISC 3 TIMES DAILY Starting Wed 2/19/2020, Until Mon 8/17/2020, For 90 doses, Disp-100 each,R-5, Normal      butalbital-acetaminophen-caffeine (FIORICET, ESGIC) -40 MG per tablet Take 1 tablet by mouth every 6 hours as needed for Headaches, Disp-30 tablet, R-0Print      promethazine (PHENERGAN) 25 MG tablet WARNING:  May cause drowsiness. May impair ability to operate vehicles or machinery. Do not use in combination with alcohol. ! Tablet every 6 hours as needed in conjunction with Ultram for headaches, Disp-20 tablet, R-0Print      !! blood glucose test strips (EXACTECH TEST) strip 3 TIMES DAILY Starting Mon 12/2/2019, Disp-300 strip, R-5, Normal(Accu-check test strips) As needed.  DX:E11.65      ONE TOUCH ULTRASOFT LANCETS MISC Disp-300 each, R-3, NormalTEST 3 TIMES DAILY AS NEEDED      albuterol (PROVENTIL) (2.5 MG/3ML) 0.083% nebulizer solution Take 3 mLs by nebulization every 4 hours as needed for Wheezing, Disp-120 each, R-3Normal metoclopramide (REGLAN) 10 MG tablet Take 1 tablet by mouth 4 times daily, Disp-120 tablet, R-0Print      atorvastatin (LIPITOR) 40 MG tablet Take 1 tablet by mouth nightly, Disp-30 tablet, R-3Normal      metoprolol tartrate (LOPRESSOR) 25 MG tablet Take 1 tablet by mouth 2 times daily, Disp-60 tablet, R-0Normal      DULoxetine (CYMBALTA) 60 MG extended release capsule TK 1 C PO QD, R-4Historical Med      fluticasone (FLONASE) 50 MCG/ACT nasal spray 1 spray by Nasal route daily, Disp-1 Bottle, R-3Normal      traMADol (ULTRAM) 50 MG tablet Take 50 mg by mouth every 6 hours as needed for Pain. Historical Med      Insulin Syringe-Needle U-100 30G X 1/2\" 1 ML MISC DAILY Starting Fri 11/16/2018, Disp-100 each, R-3, Normal      !! blood glucose test strips (ONETOUCH VERIO) strip Disp-300 each, R-3, NormalTEST 3 TIMES DAILY AS NEEDED      Blood Glucose Monitoring Suppl (ONETOUCH VERIO) w/Device KIT TEST 3 TIMES DAILY AS NEEDED, Disp-1 kit, R-0Normal      montelukast (SINGULAIR) 10 MG tablet Take 1 tab nightly at supper for breathing/allergies, Disp-30 tablet, R-5Normal       !! - Potential duplicate medications found. Please discuss with provider. ALLERGIES     Shellfish-derived products; Ibuprofen; Ketorolac;  Other; Propoxyphene n-acetaminophen; and Toradol [ketorolac tromethamine]    FAMILY HISTORY       Family History   Problem Relation Age of Onset    Cancer Father     Diabetes Father     Allergy (Severe) Father     Heart Attack Father     Prostate Cancer Father     High Blood Pressure Mother     Diabetes Mother     Arthritis Mother     High Cholesterol Mother     Vision Loss Mother     Alcohol Abuse Neg Hx     Anemia Neg Hx     Arrhythmia Neg Hx     Asthma Neg Hx     Atrial Fibrillation Neg Hx     Birth Defects Neg Hx     Breast Cancer Neg Hx     Coronary Art Dis Neg Hx     Colon Cancer Neg Hx     Depression Neg Hx     Early Death Neg Hx     Hearing Loss Neg Hx     Heart Disease Neg Hx  Learning Disabilities Neg Hx     Kidney Disease Neg Hx     Mental Illness Neg Hx     Mental Retardation Neg Hx     Miscarriages / Stillbirths Neg Hx     Obesity Neg Hx     Osteoporosis Neg Hx     Stroke Neg Hx     Substance Abuse Neg Hx           SOCIAL HISTORY       Social History     Socioeconomic History    Marital status: Legally      Spouse name: None    Number of children: None    Years of education: None    Highest education level: None   Occupational History    None   Social Needs    Financial resource strain: None    Food insecurity     Worry: None     Inability: None    Transportation needs     Medical: None     Non-medical: None   Tobacco Use    Smoking status: Former Smoker     Packs/day: 0.50     Years: 15.00     Pack years: 7.50     Types: Cigarettes     Start date: 2014     Last attempt to quit: 2015     Years since quittin.6    Smokeless tobacco: Former User     Quit date: 3/23/2016   Substance and Sexual Activity    Alcohol use: Not Currently     Alcohol/week: 0.0 standard drinks     Comment: occasionally    Drug use: Yes     Frequency: 3.0 times per week     Types: Marijuana    Sexual activity: Yes     Partners: Male   Lifestyle    Physical activity     Days per week: None     Minutes per session: None    Stress: None   Relationships    Social connections     Talks on phone: None     Gets together: None     Attends [employer] service: None     Active member of club or organization: None     Attends meetings of clubs or organizations: None     Relationship status: None    Intimate partner violence     Fear of current or ex partner: None     Emotionally abused: None     Physically abused: None     Forced sexual activity: None   Other Topics Concern    None   Social History Narrative    None       SCREENINGS      @FLOW([id number])@      PHYSICAL EXAM    (up to 7 for level 4, 8 or more for level 5)     ED Triage Vitals [20 1730]   BP Temp Temp BEDSIDE ULTRASOUND:   Performed by ED Physician - none    LABS:  Labs Reviewed   POCT GLUCOSE - Abnormal; Notable for the following components:       Result Value    POC Glucose 140 (*)     All other components within normal limits   POCT GLUCOSE - Normal   POC PREGNANCY UR-QUAL       All other labs were within normal range or not returned as of this dictation. EMERGENCY DEPARTMENT COURSE and DIFFERENTIAL DIAGNOSIS/MDM:   Vitals:    Vitals:    09/28/20 1730   BP: 135/83   Pulse: 75   Resp: 18   Temp: 98.9 °F (37.2 °C)   TempSrc: Oral   SpO2: 97%   Weight: 277 lb (125.6 kg)         MDM    Simple range of motion testing resulted in the patient's first rib slipping back into place. Simple palpation of the musculature of the thoracic spine resulted in significant improvement of discomfort. There is a audible click when the patient was turning her neck and on reexam the first rib height had been realigned. Patient was given Flexeril. The ER physician informed the patient that her chest may be sore since her rib has now gone back into place on its own. ER attending explained to the patient that she may require physical therapy. Patient and her  were upset that she had a prior cardiac evaluations. The ER attending explained to the patient she had symptoms with tremendous risk factors that would require a cardiac work-up and that he does not feel that anything was done wrong. ER attending explained had she not had a recent nuclear stress test she would have received an extensive work-up today. Patient is diabetic and blood sugars 140. The findings were discussed with the patient and her . They verbalized understanding care and have no further questions. CONSULTS:  None    PROCEDURES:  Unless otherwise noted below, none     Procedures    FINAL IMPRESSION      1. Thoracic outlet syndrome of left thoracic outlet    2. Anterior chest wall pain    3.  Morbid obesity with BMI of 45.0-49.9, adult ([address]    4. Former cigarette smoker    5. History of marijuana use    6. History of hypercholesterolemia    7.  History of hypertension          DISPOSITION/PLAN   DISPOSITION Decision To Discharge 09/28/2020 05:48:09 PM      PATIENT REFERRED TO:  SUDEEP Hernandez - CNP  [address] [facility] [address] [facility]  [phone]    Call in 1 day        DISCHARGE MEDICATIONS:  Discharge Medication List as of 9/28/2020  5:51 PM      START taking these medications    Details   cyclobenzaprine (FLEXERIL) 10 MG tablet Take 1 tablet by mouth nightly as needed for Muscle spasms, Disp-3 tablet,R-0Print                (Please note that portions of this note were completed with a voice recognition program.  Efforts were made to edit the dictations but occasionally words are mis-transcribed.)    Laveda Collet, DO (electronically signed)  Attending Emergency Physician          Laveda Collet, DO  09/28/20 [id number]

## 2020-09-28 NOTE — ED NOTES
Dr. Lucita Marrero at the bedside at this time. Assessment completed.       Johan Dawson RN  09/28/20 1813

## 2020-09-29 PROCEDURE — 93010 ELECTROCARDIOGRAM REPORT: CPT | Performed by: INTERNAL MEDICINE

## 2020-09-30 ENCOUNTER — CARE COORDINATION (OUTPATIENT)
Dept: CASE MANAGEMENT | Age: 50
End: 2020-09-30

## 2020-09-30 NOTE — CARE COORDINATION
[id number] Chest Pain  ED Thoracic outlet syndrome of left thoracic outlet, Chest pain. NR  PCP appt  10:00  Med rec/1111F order completed. Needs to be reviewed by the provider   Additional needs identified to be addressed with provider No  none  Discussed COVID-19 related testing which was not done at this time. Test results were not done. Patient informed of results, if available? NA         Method of communication with provider : none    Care Transition Nurse (CTN) contacted the patient by telephone to follow up after admission. Verified name and  with patient as identifiers. Addressed changes since last contact: symptom management-chest/arm pain r/t thoracic outlet syndrome. Pt reports she continues to have persistent left chest ache that radiates down her left arm. Admits some left arm tingling. No numbness or cold to left arm. Discharged needs reviewed: none  Follow up appointment completed? Yes    Advance Care Planning:   Does patient have an Advance Directive:  not on file. CTN reviewed discharge instructions, medical action plan and red flags with patient and discussed any barriers to care and/or understanding of plan of care after discharge. Discussed appropriate site of care based on symptoms and resources available to patient including: When to call 911. The patient agrees to contact the PCP office for questions related to their healthcare. Patients top risk factors for readmission: medical condition  Interventions to address risk factors: Education of patient/family/caregiver/guardian to support self-management-Reviewed symptoms to notify physician. Reviewed seeing chiropractor and OP therapy options. Enc to discuss TENs unit w/ PCP for order. Reviewed CV-19 symptoms to report. Discussed follow-up appointments. If no appointment was previously scheduled, appointment scheduling offered: NA Is follow up appointment scheduled within 7 days of discharge?  Yes  Non-[facility] follow up appointment(s):     CTN s/o. based on severity of symptoms and risk factors. Plan for next call: symptom management-NA  CTN provided contact information for future needs.

## 2020-10-10 ENCOUNTER — APPOINTMENT (OUTPATIENT)
Dept: CT IMAGING | Age: 50
End: 2020-10-10
Payer: MEDICAID

## 2020-10-10 ENCOUNTER — HOSPITAL ENCOUNTER (EMERGENCY)
Age: 50
Discharge: HOME | End: 2020-10-10
Payer: MEDICAID

## 2020-10-10 VITALS
WEIGHT: 250 LBS | DIASTOLIC BLOOD PRESSURE: 85 MMHG | OXYGEN SATURATION: 93 % | HEART RATE: 86 BPM | HEIGHT: 63 IN | RESPIRATION RATE: 18 BRPM | BODY MASS INDEX: 44.3 KG/M2 | TEMPERATURE: 97.9 F | SYSTOLIC BLOOD PRESSURE: 135 MMHG

## 2020-10-10 LAB
ALBUMIN SERPL-MCNC: 3.5 G/DL (ref 3.5–4.6)
ALP BLD-CCNC: 129 U/L (ref 40–130)
ALT SERPL-CCNC: 16 U/L (ref 0–33)
ANION GAP SERPL CALCULATED.3IONS-SCNC: 8 MEQ/L (ref 9–15)
AST SERPL-CCNC: 23 U/L (ref 0–35)
BACTERIA: ABNORMAL /HPF
BASOPHILS ABSOLUTE: 0.1 K/UL (ref 0–0.2)
BASOPHILS RELATIVE PERCENT: 1.4 %
BILIRUB SERPL-MCNC: <0.2 MG/DL (ref 0.2–0.7)
BILIRUBIN URINE: NEGATIVE
BLOOD, URINE: ABNORMAL
BUN BLDV-MCNC: 8 MG/DL (ref 6–20)
CALCIUM SERPL-MCNC: 8.7 MG/DL (ref 8.5–9.9)
CHLORIDE BLD-SCNC: 102 MEQ/L (ref 95–107)
CLARITY: ABNORMAL
CO2: 23 MEQ/L (ref 20–31)
COLOR: ABNORMAL
CREAT SERPL-MCNC: 1.1 MG/DL (ref 0.5–0.9)
EOSINOPHILS ABSOLUTE: 0.3 K/UL (ref 0–0.7)
EOSINOPHILS RELATIVE PERCENT: 4 %
EPITHELIAL CELLS, UA: ABNORMAL /HPF (ref 0–5)
GFR AFRICAN AMERICAN: >60
GFR NON-AFRICAN AMERICAN: 52.7
GLOBULIN: 3.3 G/DL (ref 2.3–3.5)
GLUCOSE BLD-MCNC: 189 MG/DL (ref 70–99)
GLUCOSE URINE: 100 MG/DL
HCT VFR BLD CALC: 44.1 % (ref 37–47)
HEMOGLOBIN: 14.7 G/DL (ref 12–16)
HYALINE CASTS: ABNORMAL /HPF (ref 0–5)
KETONES, URINE: ABNORMAL MG/DL
LACTIC ACID: 1.6 MMOL/L (ref 0.5–2.2)
LEUKOCYTE ESTERASE, URINE: ABNORMAL
LIPASE: 34 U/L (ref 12–95)
LYMPHOCYTES ABSOLUTE: 2.4 K/UL (ref 1–4.8)
LYMPHOCYTES RELATIVE PERCENT: 32 %
MCH RBC QN AUTO: 31.5 PG (ref 27–31.3)
MCHC RBC AUTO-ENTMCNC: 33.5 % (ref 33–37)
MCV RBC AUTO: 94.1 FL (ref 82–100)
MONOCYTES ABSOLUTE: 0.4 K/UL (ref 0.2–0.8)
MONOCYTES RELATIVE PERCENT: 5.9 %
NEUTROPHILS ABSOLUTE: 4.2 K/UL (ref 1.4–6.5)
NEUTROPHILS RELATIVE PERCENT: 56.7 %
NITRITE, URINE: NEGATIVE
PDW BLD-RTO: 13.4 % (ref 11.5–14.5)
PH UA: 6 (ref 5–9)
PLATELET # BLD: 244 K/UL (ref 130–400)
POTASSIUM SERPL-SCNC: 4.5 MEQ/L (ref 3.4–4.9)
PROTEIN UA: 100 MG/DL
RBC # BLD: 4.68 M/UL (ref 4.2–5.4)
RBC UA: ABNORMAL /HPF (ref 0–2)
SODIUM BLD-SCNC: 133 MEQ/L (ref 135–144)
SPECIFIC GRAVITY UA: 1.03 (ref 1–1.03)
TOTAL PROTEIN: 6.8 G/DL (ref 6.3–8)
URINE REFLEX TO CULTURE: YES
UROBILINOGEN, URINE: 1 E.U./DL
WBC # BLD: 7.4 K/UL (ref 4.8–10.8)
WBC UA: ABNORMAL /HPF (ref 0–5)
YEAST: PRESENT /HPF

## 2020-10-10 PROCEDURE — 2580000003 HC RX 258: Performed by: NURSE PRACTITIONER

## 2020-10-10 PROCEDURE — 96361 HYDRATE IV INFUSION ADD-ON: CPT

## 2020-10-10 PROCEDURE — 74176 CT ABD & PELVIS W/O CONTRAST: CPT

## 2020-10-10 PROCEDURE — 81001 URINALYSIS AUTO W/SCOPE: CPT

## 2020-10-10 PROCEDURE — 83605 ASSAY OF LACTIC ACID: CPT

## 2020-10-10 PROCEDURE — 96375 TX/PRO/DX INJ NEW DRUG ADDON: CPT

## 2020-10-10 PROCEDURE — 87086 URINE CULTURE/COLONY COUNT: CPT

## 2020-10-10 PROCEDURE — 6360000002 HC RX W HCPCS: Performed by: NURSE PRACTITIONER

## 2020-10-10 PROCEDURE — 36415 COLL VENOUS BLD VENIPUNCTURE: CPT

## 2020-10-10 PROCEDURE — 83690 ASSAY OF LIPASE: CPT

## 2020-10-10 PROCEDURE — 85025 COMPLETE CBC W/AUTO DIFF WBC: CPT

## 2020-10-10 PROCEDURE — 99283 EMERGENCY DEPT VISIT LOW MDM: CPT

## 2020-10-10 PROCEDURE — 96365 THER/PROPH/DIAG IV INF INIT: CPT

## 2020-10-10 PROCEDURE — 80053 COMPREHEN METABOLIC PANEL: CPT

## 2020-10-10 PROCEDURE — 99284 EMERGENCY DEPT VISIT MOD MDM: CPT

## 2020-10-10 PROCEDURE — 6370000000 HC RX 637 (ALT 250 FOR IP): Performed by: NURSE PRACTITIONER

## 2020-10-10 RX ORDER — 0.9 % SODIUM CHLORIDE 0.9 %
1000 INTRAVENOUS SOLUTION INTRAVENOUS ONCE
Status: COMPLETED | OUTPATIENT
Start: 2020-10-10 | End: 2020-10-10

## 2020-10-10 RX ORDER — MORPHINE SULFATE 2 MG/ML
4 INJECTION, SOLUTION INTRAMUSCULAR; INTRAVENOUS
Status: COMPLETED | OUTPATIENT
Start: 2020-10-10 | End: 2020-10-10

## 2020-10-10 RX ORDER — FLUCONAZOLE 200 MG/1
200 TABLET ORAL DAILY
Qty: 1 TABLET | Refills: 0 | Status: SHIPPED | OUTPATIENT
Start: 2020-10-10 | End: 2020-10-11

## 2020-10-10 RX ORDER — CEPHALEXIN 500 MG/1
500 CAPSULE ORAL 2 TIMES DAILY
Qty: 14 CAPSULE | Refills: 0 | Status: SHIPPED | OUTPATIENT
Start: 2020-10-10 | End: 2020-10-17

## 2020-10-10 RX ORDER — FLUCONAZOLE 100 MG/1
200 TABLET ORAL ONCE
Status: COMPLETED | OUTPATIENT
Start: 2020-10-10 | End: 2020-10-10

## 2020-10-10 RX ORDER — ONDANSETRON 2 MG/ML
4 INJECTION INTRAMUSCULAR; INTRAVENOUS ONCE
Status: COMPLETED | OUTPATIENT
Start: 2020-10-10 | End: 2020-10-10

## 2020-10-10 RX ADMIN — FLUCONAZOLE 200 MG: 100 TABLET ORAL at 05:31

## 2020-10-10 RX ADMIN — MORPHINE SULFATE 4 MG: 2 INJECTION, SOLUTION INTRAMUSCULAR; INTRAVENOUS at 04:41

## 2020-10-10 RX ADMIN — MORPHINE SULFATE 4 MG: 2 INJECTION, SOLUTION INTRAMUSCULAR; INTRAVENOUS at 05:23

## 2020-10-10 RX ADMIN — ONDANSETRON 4 MG: 2 INJECTION INTRAMUSCULAR; INTRAVENOUS at 04:40

## 2020-10-10 RX ADMIN — SODIUM CHLORIDE 1000 ML: 9 INJECTION, SOLUTION INTRAVENOUS at 04:41

## 2020-10-10 RX ADMIN — CEFTRIAXONE SODIUM 1 G: 1 INJECTION, POWDER, FOR SOLUTION INTRAMUSCULAR; INTRAVENOUS at 05:31

## 2020-10-10 ASSESSMENT — PAIN DESCRIPTION - DESCRIPTORS: DESCRIPTORS: ACHING

## 2020-10-10 ASSESSMENT — ENCOUNTER SYMPTOMS
NAUSEA: 0
ABDOMINAL PAIN: 0
VOMITING: 0
TROUBLE SWALLOWING: 0
COLOR CHANGE: 0
RHINORRHEA: 0
COUGH: 0
SINUS PAIN: 0
SORE THROAT: 0
BACK PAIN: 1
DIARRHEA: 0
SHORTNESS OF BREATH: 0
WHEEZING: 0
CHEST TIGHTNESS: 0
SINUS PRESSURE: 0

## 2020-10-10 ASSESSMENT — PAIN SCALES - GENERAL
PAINLEVEL_OUTOF10: 8
PAINLEVEL_OUTOF10: 10

## 2020-10-10 ASSESSMENT — PAIN DESCRIPTION - PAIN TYPE: TYPE: ACUTE PAIN

## 2020-10-10 ASSESSMENT — PAIN DESCRIPTION - ORIENTATION: ORIENTATION: LEFT

## 2020-10-10 ASSESSMENT — PAIN DESCRIPTION - LOCATION: LOCATION: FLANK

## 2020-10-10 NOTE — ED TRIAGE NOTES
Patient here today for left flank pain that started today. She states that she took a tramadol around 1am with no relief. Resp even and unlabored. Skin warm, dry and intact. She is alert and oriented x4.

## 2020-10-10 NOTE — ED PROVIDER NOTES
[address] [facility] ED  EMERGENCY DEPARTMENT ENCOUNTER      Pt Name: Delio Goodwin  MRN: [id number]  Mariamgfgin 1970  Date of evaluation: 10/10/2020  Provider: SUDEEP Piña [id number]       Chief Complaint   Patient presents with    Flank Pain     Left         HISTORY OF PRESENT ILLNESS   (Location/Symptom, Timing/Onset,Context/Setting, Quality, Duration, Modifying Factors, Severity)  Note limiting factors. Delio Goodwin is a 53 y.o. female who presents to the emergency department for complaint of left-sided flank pain for the past day. She reports this started suddenly and felt like a pulled muscle or muscle ache but she could not push on the area or rub the muscle. She states that the pain has been steadily increasing throughout the day to current 10 out of 10 sharp at times stabbing pain consistent with no obvious modifying factors. She states that she has some increase in the discomfort with certain positions however it is not directly affected by movements bending or twisting. She denies any chest pain shortness of breath or difficulty breathing. She denies other abdominal pains nausea vomiting diarrhea. She states she has been urinating more often with frequency urgency and feeling as if she cannot fully empty her bladder. She denies any obvious blood in her urine. She does have a history of previous renal cancer and currently has only 1 functioning kidney. She states she does not usually get urinary tract infections and does not have a known history of any kidney stones. She denies any fevers chills sweats headache dizziness or disorientation. Denies any other illnesses or recent injuries. Nursing Notes were reviewed. REVIEW OF SYSTEMS    (2-9 systems for level 4, 10 or more for level 5)     Review of Systems   Constitutional: Negative for activity change, appetite change, chills, diaphoresis, fatigue and fever.    HENT: Negative for congestion, ear pain, postnasal drip, rhinorrhea, sinus pressure, sinus pain, sore throat and trouble swallowing. Eyes: Negative for visual disturbance. Respiratory: Negative for cough, chest tightness, shortness of breath and wheezing. Cardiovascular: Negative for chest pain and palpitations. Gastrointestinal: Negative for abdominal pain, diarrhea, nausea and vomiting. Genitourinary: Positive for flank pain. Negative for difficulty urinating, dysuria, frequency and urgency. Musculoskeletal: Positive for back pain. Negative for arthralgias, myalgias, neck pain and neck stiffness. Skin: Negative for color change and rash. Neurological: Negative for dizziness, tremors, seizures, syncope, speech difficulty, weakness, light-headedness, numbness and headaches. Except as noted above the remainder of the review of systems was reviewed and negative. PAST MEDICAL HISTORY     Past Medical History:   Diagnosis Date    Anxiety     Arthritis     Asthma     Bronchopneumonia     Cancer ([facility] [address]     renal    Cerebral artery occlusion with cerebral infarction (HCC)     Chronic bilateral low back pain with sciatica     Chronic kidney disease     Chronic obstructive lung disease ([facility] [address] 7/26/2019    Depression     Fibromyalgia     Hypertension     Insulin dependent type 2 diabetes mellitus, uncontrolled ([address] 8/3/2018    Localized enlarged lymph nodes 10/26/2018    Mixed headache     Pure hyperglyceridemia 5/19/2017    Sarcoidosis     Sleep apnea     does not wear cpap    Thyroid goiter      Past Surgical History:   Procedure Laterality Date    BRONCHOSCOPY  10/26/2018    DR. STEARNS    KIDNEY REMOVAL Right 08/2016    KIDNEY REMOVAL Right 2016    LUNG BIOPSY Right 10/2018    THYROID LOBECTOMY Right 6/13/14    THYROIDECTOMY  02/21/2019    DR. MAGDALENO    THYROIDECTOMY  2018    URETER STENT PLACEMENT Left 08/2016     Social History     Socioeconomic History    Marital status: Legally  Spouse name: None    Number of children: None    Years of education: None    Highest education level: None   Occupational History    None   Social Needs    Financial resource strain: None    Food insecurity     Worry: None     Inability: None    Transportation needs     Medical: None     Non-medical: None   Tobacco Use    Smoking status: Former Smoker     Packs/day: 0.50     Years: 15.00     Pack years: 7.50     Types: Cigarettes     Start date: 2014     Last attempt to quit: 2015     Years since quittin.7    Smokeless tobacco: Former User     Quit date: 3/23/2016   Substance and Sexual Activity    Alcohol use: Not Currently     Alcohol/week: 0.0 standard drinks     Comment: occasionally    Drug use: Yes     Frequency: 3.0 times per week     Types: Marijuana    Sexual activity: Yes     Partners: Male   Lifestyle    Physical activity     Days per week: None     Minutes per session: None    Stress: None   Relationships    Social connections     Talks on phone: None     Gets together: None     Attends Shinto service: None     Active member of club or organization: None     Attends meetings of clubs or organizations: None     Relationship status: None    Intimate partner violence     Fear of current or ex partner: None     Emotionally abused: None     Physically abused: None     Forced sexual activity: None   Other Topics Concern    None   Social History Narrative    None       SCREENINGS    [place]dul[place] Coma Scale  Eye Opening: Spontaneous  Best Verbal Response: Oriented  Best Motor Response: Obeys commands  Obdulia Coma Scale Score: 15        PHYSICAL EXAM    (up to 7 for level 4, 8 or more for level 5)     ED Triage Vitals   BP Temp Temp Source Pulse Resp SpO2 Height Weight   10/10/20 0337 10/10/20 0336 10/10/20 0336 10/10/20 0337 10/10/20 0337 10/10/20 0337 10/10/20 0337 10/10/20 0337   122/78 97.9 °F (36.6 °C) Oral 94 18 94 % 5' 3\" (1.6 m) 250 lb (113.4 kg)       Physical Exam  Constitutional:       General: She is not in acute distress. Appearance: Normal appearance. She is obese. She is not ill-appearing, toxic-appearing or diaphoretic. HENT:      Head: Normocephalic and atraumatic. Right Ear: External ear normal.      Left Ear: External ear normal.      Nose: Nose normal.      Mouth/Throat:      Mouth: Mucous membranes are moist.      Pharynx: Oropharynx is clear. Eyes:      General:         Right eye: No discharge. Left eye: No discharge. Conjunctiva/sclera: Conjunctivae normal.      Pupils: Pupils are equal, round, and reactive to light. Neck:      Musculoskeletal: Normal range of motion and neck supple. No neck rigidity or muscular tenderness. Cardiovascular:      Rate and Rhythm: Normal rate and regular rhythm. Pulses: Normal pulses. Pulmonary:      Effort: Pulmonary effort is normal.      Breath sounds: Normal breath sounds. Abdominal:      General: Bowel sounds are normal. There is no distension. Palpations: Abdomen is soft. There is no mass. Tenderness: There is abdominal tenderness in the left upper quadrant. There is no right CVA tenderness or left CVA tenderness. Hernia: No hernia is present. Musculoskeletal: Normal range of motion. General: No tenderness or signs of injury. Skin:     General: Skin is warm and dry. Capillary Refill: Capillary refill takes less than 2 seconds. Neurological:      General: No focal deficit present. Mental Status: She is alert and oriented to person, place, and time. Mental status is at baseline. Cranial Nerves: No cranial nerve deficit. Sensory: No sensory deficit. Motor: No weakness.       Coordination: Coordination normal.         RESULTS     EKG: All EKG's are interpreted by the Emergency Department Physician who either signs or Co-signsthis chart in the absence of a cardiologist.        RADIOLOGY:   Non-plain filmimages such as CT, Ultrasound and MRI are read by the radiologist. Plain radiographic images are visualized and preliminarily interpreted by the emergency physician with the below findings:    CT abdomen pelvis no contrast [facility] radiology shows previous right nephrectomy. No evidence of left urinary tract obstruction or calculus in the bladder. Bladder has a normal appearance but is mostly collapsed. No acute findings. Interpretation per the Radiologist below, if available at the time ofthis note:    CT ABDOMEN PELVIS WO CONTRAST Additional Contrast? None    (Results Pending)         ED BEDSIDE ULTRASOUND:   Performed by ED Physician - none    LABS:  Labs Reviewed   URINE RT REFLEX TO CULTURE - Abnormal; Notable for the following components:       Result Value    Color, UA ORANGE (*)     Clarity, UA CLOUDY (*)     Glucose, Ur 100 (*)     Ketones, Urine TRACE (*)     Blood, Urine LARGE (*)     Protein,  (*)     Leukocyte Esterase, Urine MODERATE (*)     All other components within normal limits   CBC WITH AUTO DIFFERENTIAL - Abnormal; Notable for the following components:    MCH 31.5 (*)     All other components within normal limits   COMPREHENSIVE METABOLIC PANEL - Abnormal; Notable for the following components:    Sodium 133 (*)     Anion Gap 8 (*)     Glucose 189 (*)     CREATININE 1.10 (*)     GFR Non- 52.7 (*)     All other components within normal limits   MICROSCOPIC URINALYSIS - Abnormal; Notable for the following components:    RBC, UA 10-20 (*)     Bacteria, UA FEW (*)     Yeast, UA Present (*)     WBC, UA  (*)     All other components within normal limits   CULTURE, URINE   LIPASE   LACTIC ACID, PLASMA   LACTIC ACID, PLASMA       All other labs were within normal range or not returned as of this dictation.     EMERGENCY DEPARTMENT COURSE and DIFFERENTIAL DIAGNOSIS/MDM:   Vitals:    Vitals:    10/10/20 0336 10/10/20 0337 10/10/20 0430 10/10/20 0500   BP:  122/78 128/74 129/77   Pulse:  94 86    Resp: 18 18    Temp: 97.9 °F (36.6 °C)      TempSrc: Oral      SpO2:  94% 96% 97%   Weight:  250 lb (113.4 kg)     Height:  5' 3\" (1.6 m)              MDM patient presents afebrile nontoxic no acute distress hemodynamically stable. Patient planing of left flank pain concern is the patient has a previous right nephrectomy related to renal cancer. She states that she may have a urinary infection uppercase and urgency previously but denies any dysuria denies fevers. Labs ordered for the evaluation as well as a CT scan of the abdomen pelvis without contrast due to the risk to the patient's kidney. Patient does not appear to have a urinary tract infection with addition finding of yeast in the urine. Given IV Rocephin for this. The CT of the abdomen pelvis has no acute findings and there is no obstructive uropathy. Patient does not have a fever there is no elevation of white count and kidney function remained stable compared to previous labs. Patient states that she is had some decrease in the pain or discomfort following IV medication. Discussed the findings the patient and she states she feels comfortable and discharged home on antibiotic coverage states she does have medication at home for pain and discomfort. Since the patient's lab work shows a stable finding of kidney function and there is no obstructive uropathy no fever no signs of sepsis elevation the white count no lactic acidosis the patient stable for discharge home at this time directed to follow-up closely with her primary care provider. Return immediately to the ER for any onset of new concerning symptoms worsening condition continuous intolerable pain at the onset of any fever even low-grade spreading abdominal pain or inability to tolerate p.o. intake such as vomiting return immediately to the ER. Patient verbalized understandable given instruction education.     CRITICAL CARE TIME       CONSULTS:  None    PROCEDURES:  Unless otherwise noted below, none     Procedures    FINAL IMPRESSION      1. Acute cystitis without hematuria    2.  Left flank pain          DISPOSITION/PLAN   DISPOSITION        PATIENT REFERRED TO:  SUDEEP Velez CNP  [address]  [phone]    Call in 1 day        DISCHARGE MEDICATIONS:  New Prescriptions    CEPHALEXIN (KEFLEX) 500 MG CAPSULE    Take 1 capsule by mouth 2 times daily for 7 days    FLUCONAZOLE (DIFLUCAN) 200 MG TABLET    Take 1 tablet by mouth daily for 1 day          (Please notethat portions of this note were completed with a voice recognition program.  Efforts were made to edit the dictations but occasionally words are mis-transcribed.)    SUDEEP Piña CNP (electronically signed)  Attending Emergency Physician         SUDEEP Piña CNP  10/10/20 3434

## 2020-10-11 LAB — URINE CULTURE, ROUTINE: NORMAL

## 2020-10-12 ENCOUNTER — CARE COORDINATION (OUTPATIENT)
Dept: CARE COORDINATION | Age: 50
End: 2020-10-12

## 2020-10-12 ENCOUNTER — PATIENT MESSAGE (OUTPATIENT)
Dept: FAMILY MEDICINE CLINIC | Age: 50
End: 2020-10-12

## 2020-10-12 NOTE — CARE COORDINATION
Patient contacted regarding recent discharge and COVID-19 risk. Discussed COVID-19 related testing which was not done at this time. Test results were not done. Patient informed of results, if available? N/A     Care Transition Nurse/ Ambulatory Care Manager contacted the patient by telephone to perform post discharge assessment. Verified name and  with patient as identifiers. Patient has following risk factors of: diabetes. CTN/ACM reviewed discharge instructions, medical action plan and red flags related to discharge diagnosis. Reviewed and educated them on any new and changed medications related to discharge diagnosis. Advised obtaining a 90-day supply of all daily and as-needed medications. Education provided regarding infection prevention, and signs and symptoms of COVID-19 and when to seek medical attention with patient who verbalized understanding. Discussed exposure protocols and quarantine from [address] you at higher risk for severe illness  and given an opportunity for questions and concerns. The patient agrees to contact the COVID-19 hotline [phone] or PCP office for questions related to their healthcare. CTN/ACM provided contact information for future reference. From CDC: Are you at higher risk for severe illness?  Wash your hands often.  Avoid close contact (6 feet, which is about two arm lengths) with people who are sick.  Put distance between yourself and other people if COVID-19 is spreading in your community.  Clean and disinfect frequently touched surfaces.  Avoid all cruise travel and non-essential air travel.  Call your healthcare professional if you have concerns about COVID-19 and your underlying condition or if you are sick. For more information on steps you can take to protect yourself, see CDC's How to Protect Yourself    No further follow-up planned at this time based on severity of symptoms and risk factors.     Patient continues to have back pain that radiates down her leg. Patient has a call out to SUDEEP Carcamo CNP. Reviewed new prescriptions. Reviewed COVID-29 precautions. Discussed enrollment in Care Coordination. Patient declined .

## 2020-10-12 NOTE — TELEPHONE ENCOUNTER
From: Serge Stern  To: Nicole Panda, APRN - CNP  Sent: 10/12/2020 5:46 AM EDT  Subject: Non-Urgent Medical Question    I was seen in the hospital, a few days back, they said I had a U T I . .. But my thing is I've had one before, but I've never had this much pain, I haven't slept in the past few days. . the pain is so bad. My question is, does a UTI suppose to hurt this bad? The pain is located in my lower back, that spreads down my left leg, and up my side of my back. I'm taking antibiotics, and tylenol, and then alternate with tramadol. . but nothing's helping. .. Is there anything else you can recommend? Please, I go through to much pain to try and deal with this too. . or should I go back to the hospital?

## 2020-10-13 ENCOUNTER — PATIENT MESSAGE (OUTPATIENT)
Dept: FAMILY MEDICINE CLINIC | Age: 50
End: 2020-10-13

## 2020-10-13 RX ORDER — TIZANIDINE 4 MG/1
4 TABLET ORAL EVERY 8 HOURS PRN
Qty: 30 TABLET | Refills: 0 | Status: SHIPPED | OUTPATIENT
Start: 2020-10-13 | End: 2021-02-06

## 2020-10-13 NOTE — TELEPHONE ENCOUNTER
From: Cornel Cancino  To: SUDEEP Goldberg - CNP  Sent: 10/12/2020 3:59 PM EDT  Subject: < No Subject>    I never knew of an UTI hurting all the way down to my foot. .. My whole leg is inflamed, including the upper part of my back. .. I can't do walk ins right now, only have one car, an hubby works until 6 or even later. So I'd have to make an appointment. That's if I can make it that long to see you while trying to deal with this. I'll see. Thanks       ----- Message -----   From:SUDEEP Neumann - CNP   Sent:10/12/2020 3:55 PM EDT   To:Lucy Tariq   Subject:    Juan Pablo Weaver,     I was just looking through your recent visit and while UTI's can be very painful sometimes your urine culture came back negative. If you are still having symptoms I would encourage you to make a f/u appt so we can fully discuss and see what treatment plan that is appropriate. If you'd like the walk in is also available Monday through Friday 11A-7P and Saturday and Sunday 11a-5P. Hope you're feeling better.     Thanks,  Lb Blood

## 2020-10-13 NOTE — PATIENT INSTRUCTIONS
Patient Education        Sciatica: Exercises  Introduction  Here are some examples of typical rehabilitation exercises for your condition. Start each exercise slowly. Ease off the exercise if you start to have pain. Your doctor or physical therapist will tell you when you can start these exercises and which ones will work best for you. When you are not being active, find a comfortable position for rest. Some people are comfortable on the floor or a medium-firm bed with a small pillow under their head and another under their knees. Some people prefer to lie on their side with a pillow between their knees. Don't stay in one position for too long. Take short walks (10 to 20 minutes) every 2 to 3 hours. Avoid slopes, hills, and stairs until you feel better. Walk only distances you can manage without pain, especially leg pain. How to do the exercises  Back stretches   1. Get down on your hands and knees on the floor. 2. Relax your head and allow it to droop. Round your back up toward the ceiling until you feel a nice stretch in your upper, middle, and lower back. Hold this stretch for as long as it feels comfortable, or about 15 to 30 seconds. 3. Return to the starting position with a flat back while you are on your hands and knees. 4. Let your back sway by pressing your stomach toward the floor. Lift your buttocks toward the ceiling. 5. Hold this position for 15 to 30 seconds. 6. Repeat 2 to 4 times. Follow-up care is a key part of your treatment and safety. Be sure to make and go to all appointments, and call your doctor if you are having problems. It's also a good idea to know your test results and keep a list of the medicines you take. Where can you learn more? Go to https://S[employer] Applesmanav.[employer]. org and sign in to your [employer] account. Enter X530 in the [employer] box to learn more about \"Sciatica: Exercises. \"     If you do not have an account, please click on the \"Sign Up Now\" link.  Current as of: March 2, 2020               Content Version: 12.6  © 0278-8066 [employer]. Care instructions adapted under license by [facility] ([facility]). If you have questions about a medical condition or this instruction, always ask your healthcare professional. [address] any warranty or liability for your use of this information. Patient Education        Low Back Pain: Exercises  Introduction  Here are some examples of exercises for you to try. The exercises may be suggested for a condition or for rehabilitation. Start each exercise slowly. Ease off the exercises if you start to have pain. You will be told when to start these exercises and which ones will work best for you. How to do the exercises  Press-up   7. Lie on your stomach, supporting your body with your forearms. 8. Press your elbows down into the floor to raise your upper back. As you do this, relax your stomach muscles and allow your back to arch without using your back muscles. As your press up, do not let your hips or pelvis come off the floor. 9. Hold for 15 to 30 seconds, then relax. 10. Repeat 2 to 4 times. Alternate arm and leg (bird dog) exercise   Do this exercise slowly. Try to keep your body straight at all times, and do not let one hip drop lower than the other. 1. Start on the floor, on your hands and knees. 2. Tighten your belly muscles. 3. Raise one leg off the floor, and hold it straight out behind you. Be careful not to let your hip drop down, because that will twist your trunk. 4. Hold for about 6 seconds, then lower your leg and switch to the other leg. 5. Repeat 8 to 12 times on each leg. 6. Over time, work up to holding for 10 to 30 seconds each time. 7. If you feel stable and secure with your leg raised, try raising the opposite arm straight out in front of you at the same time. Knee-to-chest exercise   1.  Lie on your back with your knees bent and your feet flat on the floor. 2. Bring one knee to your chest, keeping the other foot flat on the floor (or keeping the other leg straight, whichever feels better on your lower back). 3. Keep your lower back pressed to the floor. Hold for at least 15 to 30 seconds. 4. Relax, and lower the knee to the starting position. 5. Repeat with the other leg. Repeat 2 to 4 times with each leg. 6. To get more stretch, put your other leg flat on the floor while pulling your knee to your chest.    Curl-ups   1. Lie on the floor on your back with your knees bent at a 90-degree angle. Your feet should be flat on the floor, about 12 inches from your buttocks. 2. Cross your arms over your chest. If this bothers your neck, try putting your hands behind your neck (not your head), with your elbows spread apart. 3. Slowly tighten your belly muscles and raise your shoulder blades off the floor. 4. Keep your head in line with your body, and do not press your chin to your chest.  5. Hold this position for 1 or 2 seconds, then slowly lower yourself back down to the floor. 6. Repeat 8 to 12 times. Pelvic tilt exercise   1. Lie on your back with your knees bent. 2. \"Brace\" your stomach. This means to tighten your muscles by pulling in and imagining your belly button moving toward your spine. You should feel like your back is pressing to the floor and your hips and pelvis are rocking back. 3. Hold for about 6 seconds while you breathe smoothly. 4. Repeat 8 to 12 times. Heel dig bridging   1. Lie on your back with both knees bent and your ankles bent so that only your heels are digging into the floor. Your knees should be bent about 90 degrees. 2. Then push your heels into the floor, squeeze your buttocks, and lift your hips off the floor until your shoulders, hips, and knees are all in a straight line.   3. Hold for about 6 seconds as you continue to breathe normally, and then slowly lower your hips back down to the floor and rest for up to 10 seconds. 4. Do 8 to 12 repetitions. Hamstring stretch in doorway   1. Lie on your back in a doorway, with one leg through the open door. 2. Slide your leg up the wall to straighten your knee. You should feel a gentle stretch down the back of your leg. 3. Hold the stretch for at least 15 to 30 seconds. Do not arch your back, point your toes, or bend either knee. Keep one heel touching the floor and the other heel touching the wall. 4. Repeat with your other leg. 5. Do 2 to 4 times for each leg. Hip flexor stretch   1. Kneel on the floor with one knee bent and one leg behind you. Place your forward knee over your foot. Keep your other knee touching the floor. 2. Slowly push your hips forward until you feel a stretch in the upper thigh of your rear leg. 3. Hold the stretch for at least 15 to 30 seconds. Repeat with your other leg. 4. Do 2 to 4 times on each side. Wall sit   1. Stand with your back 10 to 12 inches away from a wall. 2. Lean into the wall until your back is flat against it. 3. Slowly slide down until your knees are slightly bent, pressing your lower back into the wall. 4. Hold for about 6 seconds, then slide back up the wall. 5. Repeat 8 to 12 times. Follow-up care is a key part of your treatment and safety. Be sure to make and go to all appointments, and call your doctor if you are having problems. It's also a good idea to know your test results and keep a list of the medicines you take. Where can you learn more? Go to https://emily.[employer]. org and sign in to your [employer] account. Enter D605 in the [facility] box to learn more about \"Low Back Pain: Exercises. \"     If you do not have an account, please click on the \"Sign Up Now\" link. Current as of: March 2, 2020               Content Version: 12.6  © 1011-0197 [email or web address]. Care instructions adapted under license by [facility] ([facility]).  If you have questions about a medical condition or this instruction, always ask your healthcare professional. [place] [address] any warranty or liability for your use of this information.

## 2020-11-04 ENCOUNTER — APPOINTMENT (OUTPATIENT)
Dept: GENERAL RADIOLOGY | Age: 50
End: 2020-11-04
Payer: MEDICAID

## 2020-11-04 ENCOUNTER — HOSPITAL ENCOUNTER (EMERGENCY)
Age: 50
Discharge: HOME | End: 2020-11-04
Payer: MEDICAID

## 2020-11-04 VITALS
TEMPERATURE: 97.3 F | WEIGHT: 260 LBS | BODY MASS INDEX: 46.07 KG/M2 | RESPIRATION RATE: 18 BRPM | DIASTOLIC BLOOD PRESSURE: 85 MMHG | HEART RATE: 71 BPM | SYSTOLIC BLOOD PRESSURE: 129 MMHG | OXYGEN SATURATION: 99 % | HEIGHT: 63 IN

## 2020-11-04 LAB
ALBUMIN SERPL-MCNC: 3.8 G/DL (ref 3.5–4.6)
ALP BLD-CCNC: 131 U/L (ref 40–130)
ALT SERPL-CCNC: 11 U/L (ref 0–33)
ANION GAP SERPL CALCULATED.3IONS-SCNC: 10 MEQ/L (ref 9–15)
AST SERPL-CCNC: 15 U/L (ref 0–35)
BASOPHILS ABSOLUTE: 0.1 K/UL (ref 0–0.2)
BASOPHILS RELATIVE PERCENT: 1.4 %
BILIRUB SERPL-MCNC: <0.2 MG/DL (ref 0.2–0.7)
BUN BLDV-MCNC: 14 MG/DL (ref 6–20)
CALCIUM SERPL-MCNC: 8.5 MG/DL (ref 8.5–9.9)
CHLORIDE BLD-SCNC: 104 MEQ/L (ref 95–107)
CO2: 24 MEQ/L (ref 20–31)
CREAT SERPL-MCNC: 1.27 MG/DL (ref 0.5–0.9)
EKG ATRIAL RATE: 74 BPM
EKG P AXIS: 30 DEGREES
EKG P-R INTERVAL: 150 MS
EKG Q-T INTERVAL: 386 MS
EKG QRS DURATION: 84 MS
EKG QTC CALCULATION (BAZETT): 428 MS
EKG R AXIS: 14 DEGREES
EKG T AXIS: 4 DEGREES
EKG VENTRICULAR RATE: 74 BPM
EOSINOPHILS ABSOLUTE: 0.2 K/UL (ref 0–0.7)
EOSINOPHILS RELATIVE PERCENT: 3.4 %
GFR AFRICAN AMERICAN: 54
GFR NON-AFRICAN AMERICAN: 44.6
GLOBULIN: 3.2 G/DL (ref 2.3–3.5)
GLUCOSE BLD-MCNC: 232 MG/DL (ref 70–99)
HCT VFR BLD CALC: 45.1 % (ref 37–47)
HEMOGLOBIN: 15 G/DL (ref 12–16)
LYMPHOCYTES ABSOLUTE: 1.9 K/UL (ref 1–4.8)
LYMPHOCYTES RELATIVE PERCENT: 26.7 %
MCH RBC QN AUTO: 32 PG (ref 27–31.3)
MCHC RBC AUTO-ENTMCNC: 33.3 % (ref 33–37)
MCV RBC AUTO: 96.1 FL (ref 82–100)
MONOCYTES ABSOLUTE: 0.3 K/UL (ref 0.2–0.8)
MONOCYTES RELATIVE PERCENT: 4 %
NEUTROPHILS ABSOLUTE: 4.6 K/UL (ref 1.4–6.5)
NEUTROPHILS RELATIVE PERCENT: 64.5 %
PDW BLD-RTO: 13.7 % (ref 11.5–14.5)
PLATELET # BLD: 252 K/UL (ref 130–400)
POTASSIUM SERPL-SCNC: 4.1 MEQ/L (ref 3.4–4.9)
RBC # BLD: 4.69 M/UL (ref 4.2–5.4)
SODIUM BLD-SCNC: 138 MEQ/L (ref 135–144)
TOTAL PROTEIN: 7 G/DL (ref 6.3–8)
TROPONIN: <0.01 NG/ML (ref 0–0.01)
WBC # BLD: 7.1 K/UL (ref 4.8–10.8)

## 2020-11-04 PROCEDURE — 96374 THER/PROPH/DIAG INJ IV PUSH: CPT

## 2020-11-04 PROCEDURE — 2580000003 HC RX 258: Performed by: PHYSICIAN ASSISTANT

## 2020-11-04 PROCEDURE — 93005 ELECTROCARDIOGRAM TRACING: CPT | Performed by: PERSONAL EMERGENCY RESPONSE ATTENDANT

## 2020-11-04 PROCEDURE — 80053 COMPREHEN METABOLIC PANEL: CPT

## 2020-11-04 PROCEDURE — 85025 COMPLETE CBC W/AUTO DIFF WBC: CPT

## 2020-11-04 PROCEDURE — 84484 ASSAY OF TROPONIN QUANT: CPT

## 2020-11-04 PROCEDURE — 6360000002 HC RX W HCPCS: Performed by: PHYSICIAN ASSISTANT

## 2020-11-04 PROCEDURE — 36415 COLL VENOUS BLD VENIPUNCTURE: CPT

## 2020-11-04 PROCEDURE — 71045 X-RAY EXAM CHEST 1 VIEW: CPT

## 2020-11-04 PROCEDURE — 99284 EMERGENCY DEPT VISIT MOD MDM: CPT

## 2020-11-04 PROCEDURE — 2500000003 HC RX 250 WO HCPCS: Performed by: PHYSICIAN ASSISTANT

## 2020-11-04 PROCEDURE — 96375 TX/PRO/DX INJ NEW DRUG ADDON: CPT

## 2020-11-04 PROCEDURE — 6370000000 HC RX 637 (ALT 250 FOR IP): Performed by: PHYSICIAN ASSISTANT

## 2020-11-04 RX ORDER — 0.9 % SODIUM CHLORIDE 0.9 %
1000 INTRAVENOUS SOLUTION INTRAVENOUS ONCE
Status: COMPLETED | OUTPATIENT
Start: 2020-11-04 | End: 2020-11-04

## 2020-11-04 RX ORDER — MORPHINE SULFATE 2 MG/ML
4 INJECTION, SOLUTION INTRAMUSCULAR; INTRAVENOUS ONCE
Status: COMPLETED | OUTPATIENT
Start: 2020-11-04 | End: 2020-11-04

## 2020-11-04 RX ORDER — PANTOPRAZOLE SODIUM 20 MG/1
40 TABLET, DELAYED RELEASE ORAL DAILY
Qty: 30 TABLET | Refills: 0 | Status: SHIPPED | OUTPATIENT
Start: 2020-11-04 | End: 2021-05-17

## 2020-11-04 RX ADMIN — LIDOCAINE HYDROCHLORIDE: 20 SOLUTION ORAL; TOPICAL at 20:16

## 2020-11-04 RX ADMIN — FAMOTIDINE 20 MG: 10 INJECTION INTRAVENOUS at 20:26

## 2020-11-04 RX ADMIN — MORPHINE SULFATE 4 MG: 2 INJECTION, SOLUTION INTRAMUSCULAR; INTRAVENOUS at 21:13

## 2020-11-04 RX ADMIN — SODIUM CHLORIDE 1000 ML: 9 INJECTION, SOLUTION INTRAVENOUS at 20:28

## 2020-11-04 ASSESSMENT — ENCOUNTER SYMPTOMS
ALLERGIC/IMMUNOLOGIC NEGATIVE: 1
ABDOMINAL PAIN: 0
TROUBLE SWALLOWING: 0
APNEA: 0
EYE PAIN: 0
SHORTNESS OF BREATH: 0
COLOR CHANGE: 0

## 2020-11-04 ASSESSMENT — PAIN SCALES - GENERAL
PAINLEVEL_OUTOF10: 6
PAINLEVEL_OUTOF10: 9

## 2020-11-04 ASSESSMENT — PAIN DESCRIPTION - LOCATION
LOCATION: CHEST

## 2020-11-04 ASSESSMENT — PAIN DESCRIPTION - PAIN TYPE: TYPE: ACUTE PAIN

## 2020-11-05 ENCOUNTER — OFFICE VISIT (OUTPATIENT)
Dept: UROLOGY | Age: 50
End: 2020-11-05
Payer: MEDICAID

## 2020-11-05 ENCOUNTER — CARE COORDINATION (OUTPATIENT)
Dept: CARE COORDINATION | Age: 50
End: 2020-11-05

## 2020-11-05 VITALS
BODY MASS INDEX: 46.95 KG/M2 | HEART RATE: 77 BPM | WEIGHT: 265 LBS | HEIGHT: 63 IN | DIASTOLIC BLOOD PRESSURE: 70 MMHG | SYSTOLIC BLOOD PRESSURE: 114 MMHG

## 2020-11-05 LAB
BILIRUBIN, POC: ABNORMAL
BLOOD URINE, POC: ABNORMAL
CLARITY, POC: CLEAR
COLOR, POC: YELLOW
GLUCOSE URINE, POC: ABNORMAL
KETONES, POC: ABNORMAL
LEUKOCYTE EST, POC: ABNORMAL
NITRITE, POC: ABNORMAL
PH, POC: 5.5
PROTEIN, POC: ABNORMAL
SPECIFIC GRAVITY, POC: 1.02
UROBILINOGEN, POC: 0.2

## 2020-11-05 PROCEDURE — 93010 ELECTROCARDIOGRAM REPORT: CPT | Performed by: INTERNAL MEDICINE

## 2020-11-05 PROCEDURE — 81003 URINALYSIS AUTO W/O SCOPE: CPT | Performed by: UROLOGY

## 2020-11-05 PROCEDURE — G8484 FLU IMMUNIZE NO ADMIN: HCPCS | Performed by: UROLOGY

## 2020-11-05 PROCEDURE — 1036F TOBACCO NON-USER: CPT | Performed by: UROLOGY

## 2020-11-05 PROCEDURE — 99213 OFFICE O/P EST LOW 20 MIN: CPT | Performed by: UROLOGY

## 2020-11-05 PROCEDURE — G8427 DOCREV CUR MEDS BY ELIG CLIN: HCPCS | Performed by: UROLOGY

## 2020-11-05 PROCEDURE — G8417 CALC BMI ABV UP PARAM F/U: HCPCS | Performed by: UROLOGY

## 2020-11-05 RX ORDER — ALPRAZOLAM 0.25 MG/1
0.25 TABLET ORAL NIGHTLY PRN
COMMUNITY
Start: 2020-03-23 | End: 2021-05-17 | Stop reason: SDUPTHER

## 2020-11-05 RX ORDER — DULOXETIN HYDROCHLORIDE 60 MG/1
60 CAPSULE, DELAYED RELEASE ORAL EVERY 24 HOURS
COMMUNITY
End: 2021-05-17

## 2020-11-05 NOTE — ED PROVIDER NOTES
[address] [facility]  EMERGENCY DEPARTMENT ENCOUNTER      Pt Name: Jonathan Rossi  MRN: [id number]  [place] 1970  Date of evaluation: 11/4/2020  Provider: Marbella Rice PA-C    CHIEF COMPLAINT       Chief Complaint   Patient presents with    Chest Pain         HISTORY OF PRESENT ILLNESS   (Location/Symptom, Timing/Onset, Context/Setting, Quality, Duration, Modifying Factors, Severity)  Note limiting factors. Jonathan Rossi is a 53 y.o. female who presents to the emergency department with complaints of midsternal chest pain that began approximately 3 hours ago. Patient states that she was sitting down to eat dinner when the pain began. Patient complains of a sharp, stabbing pain. Pain localizes to the midsternal region with no radiation. Patient states a very mild recent cough but denies any chest congestion. Patient denies any recent fevers. EKG was done prior to me seeing her in the EKG is in sinus rhythm. Patient has multiple emergency department visits for chest pain in the past with a history of sarcoidosis    HPI    Nursing Notes were reviewed. REVIEW OF SYSTEMS    (2-9 systems for level 4, 10 or more for level 5)     Review of Systems   Constitutional: Negative for diaphoresis and fever. HENT: Negative for hearing loss and trouble swallowing. Eyes: Negative for pain. Respiratory: Negative for apnea and shortness of breath. Cardiovascular: Positive for chest pain. Gastrointestinal: Negative for abdominal pain. Endocrine: Negative. Genitourinary: Negative for hematuria. Musculoskeletal: Negative for neck pain and neck stiffness. Skin: Negative for color change. Allergic/Immunologic: Negative. Neurological: Negative for dizziness and numbness. Hematological: Negative. Psychiatric/Behavioral: Negative. All other systems reviewed and are negative. Except as noted above the remainder of the review of systems was reviewed and negative.        PAST MEDICAL HISTORY     Past Medical History:   Diagnosis Date    Anxiety     Arthritis     Asthma     Bronchopneumonia     Cancer ([facility]     renal    Cerebral artery occlusion with cerebral infarction (HCC)     Chronic bilateral low back pain with sciatica     Chronic kidney disease     Chronic obstructive lung disease ([facility] 7/26/2019    Depression     Fibromyalgia     Hypertension     Insulin dependent type 2 diabetes mellitus, uncontrolled ([facility] 8/3/2018    Localized enlarged lymph nodes 10/26/2018    Mixed headache     Pure hyperglyceridemia 5/19/2017    Sarcoidosis     Sleep apnea     does not wear cpap    Thyroid goiter          SURGICAL HISTORY       Past Surgical History:   Procedure Laterality Date    BRONCHOSCOPY  10/26/2018    DR. STEARNS    KIDNEY REMOVAL Right 08/2016    KIDNEY REMOVAL Right 2016    LUNG BIOPSY Right 10/2018    THYROID LOBECTOMY Right 6/13/14    THYROIDECTOMY  02/21/2019    DR. MAGDALENO    THYROIDECTOMY  2018    URETER STENT PLACEMENT Left 08/2016         CURRENT MEDICATIONS       Discharge Medication List as of 11/4/2020  9:52 PM      CONTINUE these medications which have NOT CHANGED    Details   magnesium oxide (MAG-OX) 400 (241.3 Mg) MG TABS tablet TAKE 1/2 TABLET BY MOUTH DAILY, Disp-30 tablet,R-5Normal      tiZANidine (ZANAFLEX) 4 MG tablet Take 1 tablet by mouth every 8 hours as needed (back spasm), Disp-30 tablet,R-0Normal      busPIRone (BUSPAR) 15 MG tablet TAKE 1 TABLET BY MOUTH THREE TIMES DAILY, Disp-90 tablet,R-5Normal      albuterol sulfate HFA (PROVENTIL HFA) 108 (90 Base) MCG/ACT inhaler Inhale 2 puffs into the lungs every 6 hours as needed for Wheezing, Disp-1 Inhaler,R-3Normal      cetirizine (ZYRTEC) 10 MG tablet TAKE 1 TABLET BY MOUTH EVERY NIGHT AT BEDTIME AS NEEDED FOR ALLERGIES, Disp-30 tablet,R-5Normal      insulin lispro (HUMALOG) 100 UNIT/ML injection vial INJECT 4 UNITS UNDER THE SKIN THREE TIMES DAILY AS NEEDED FOR HIGH BLOOD SUGAR, Disp-10 mL,R-0Normal      levothyroxine (SYNTHROID) 150 MCG tablet TK 1 T PO ONCE DHistorical Med      pregabalin (LYRICA) 150 MG capsule Take 1 capsule by mouth 3 times daily for 90 days. , Disp-90 capsule, R-2Normal      TRESIBA FLEXTOUCH 100 UNIT/ML SOPN INJECT 30 UNITS UNDER THE SKIN NIGHTLY, Disp-15 mL, R-3Normal      SUMAtriptan (IMITREX) 25 MG tablet TAKE 1 TABLET BY MOUTH 1 TIME AS NEEDED FOR MIGRAINE, Disp-9 tablet, R-1Normal      Insulin Pen Needle (B-D ULTRAFINE III SHORT PEN) 31G X 8 MM MISC 3 TIMES DAILY Starting Wed 2/19/2020, Until Mon 8/17/2020, For 90 doses, Disp-100 each,R-5, Normal      butalbital-acetaminophen-caffeine (FIORICET, ESGIC) -40 MG per tablet Take 1 tablet by mouth every 6 hours as needed for Headaches, Disp-30 tablet, R-0Print      promethazine (PHENERGAN) 25 MG tablet WARNING:  May cause drowsiness. May impair ability to operate vehicles or machinery. Do not use in combination with alcohol. ! Tablet every 6 hours as needed in conjunction with Ultram for headaches, Disp-20 tablet, R-0Print      !! blood glucose test strips (EXACTECH TEST) strip 3 TIMES DAILY Starting Mon 12/2/2019, Disp-300 strip, R-5, Normal(Accu-check test strips) As needed.  DX:E11.65      ONE TOUCH ULTRASOFT LANCETS MISC Disp-300 each, R-3, NormalTEST 3 TIMES DAILY AS NEEDED      albuterol (PROVENTIL) (2.5 MG/3ML) 0.083% nebulizer solution Take 3 mLs by nebulization every 4 hours as needed for Wheezing, Disp-120 each, R-3Normal      metoclopramide (REGLAN) 10 MG tablet Take 1 tablet by mouth 4 times daily, Disp-120 tablet, R-0Print      atorvastatin (LIPITOR) 40 MG tablet Take 1 tablet by mouth nightly, Disp-30 tablet, R-3Normal      metoprolol tartrate (LOPRESSOR) 25 MG tablet Take 1 tablet by mouth 2 times daily, Disp-60 tablet, R-0Normal      DULoxetine (CYMBALTA) 60 MG extended release capsule TK 1 C PO QD, R-4Historical Med      fluticasone (FLONASE) 50 MCG/ACT nasal spray 1 spray by Nasal route daily, Disp-1 Bottle, R-3Normal      traMADol (ULTRAM) 50 MG tablet Take 50 mg by mouth every 6 hours as needed for Pain. Historical Med      Insulin Syringe-Needle U-100 30G X 1/2\" 1 ML MISC DAILY Starting Fri 11/16/2018, Disp-100 each, R-3, Normal      !! blood glucose test strips (ONETOUCH VERIO) strip Disp-300 each, R-3, NormalTEST 3 TIMES DAILY AS NEEDED      Blood Glucose Monitoring Suppl (ONETOUCH VERIO) w/Device KIT TEST 3 TIMES DAILY AS NEEDED, Disp-1 kit, R-0Normal      montelukast (SINGULAIR) 10 MG tablet Take 1 tab nightly at supper for breathing/allergies, Disp-30 tablet, R-5Normal       !! - Potential duplicate medications found. Please discuss with provider. ALLERGIES     Shellfish-derived products; Ibuprofen; Ketorolac;  Other; Propoxyphene n-acetaminophen; and Toradol [ketorolac tromethamine]    FAMILY HISTORY       Family History   Problem Relation Age of Onset    Cancer Father     Diabetes Father     Allergy (Severe) Father     Heart Attack Father     Prostate Cancer Father     High Blood Pressure Mother     Diabetes Mother     Arthritis Mother     High Cholesterol Mother     Vision Loss Mother     Alcohol Abuse Neg Hx     Anemia Neg Hx     Arrhythmia Neg Hx     Asthma Neg Hx     Atrial Fibrillation Neg Hx     Birth Defects Neg Hx     Breast Cancer Neg Hx     Coronary Art Dis Neg Hx     Colon Cancer Neg Hx     Depression Neg Hx     Early Death Neg Hx     Hearing Loss Neg Hx     Heart Disease Neg Hx     Learning Disabilities Neg Hx     Kidney Disease Neg Hx     Mental Illness Neg Hx     Mental Retardation Neg Hx     Miscarriages / Stillbirths Neg Hx     Obesity Neg Hx     Osteoporosis Neg Hx     Stroke Neg Hx     Substance Abuse Neg Hx           SOCIAL HISTORY       Social History     Socioeconomic History    Marital status: Legally      Spouse name: Not on file    Number of children: Not on file    Years of education: Not on file    Highest education level: Not on file   Occupational History    Not on file   Social Needs    Financial resource strain: Not on file    Food insecurity     Worry: Not on file     Inability: Not on file    Transportation needs     Medical: Not on file     Non-medical: Not on file   Tobacco Use    Smoking status: Former Smoker     Packs/day: 0.50     Years: 15.00     Pack years: 7.50     Types: Cigarettes     Start date: 2014     Last attempt to quit: 2015     Years since quittin.7    Smokeless tobacco: Former User     Quit date: 3/23/2016   Substance and Sexual Activity    Alcohol use: Not Currently     Alcohol/week: 0.0 standard drinks     Comment: occasionally    Drug use: Yes     Frequency: 3.0 times per week     Types: Marijuana    Sexual activity: Yes     Partners: Male   Lifestyle    Physical activity     Days per week: Not on file     Minutes per session: Not on file    Stress: Not on file   Relationships    Social connections     Talks on phone: Not on file     Gets together: Not on file     Attends Gnosticism service: Not on file     Active member of club or organization: Not on file     Attends meetings of clubs or organizations: Not on file     Relationship status: Not on file    Intimate partner violence     Fear of current or ex partner: Not on file     Emotionally abused: Not on file     Physically abused: Not on file     Forced sexual activity: Not on file   Other Topics Concern    Not on file   Social History Narrative    Not on file       SCREENINGS                        PHYSICAL EXAM    (up to 7 for level 4, 8 or more for level 5)     ED Triage Vitals [20 1900]   BP Temp Temp Source Pulse Resp SpO2 Height Weight   136/89 97.3 °F (36.3 °C) Temporal 86 19 96 % 5' 3\" (1.6 m) 260 lb (117.9 kg)       Physical Exam  Vitals signs and nursing note reviewed. Constitutional:       General: She is not in acute distress. Appearance: She is well-developed. She is not diaphoretic.    HENT: 1.27 (*)     GFR Non- 44.6 (*)     GFR  54.0 (*)     Alkaline Phosphatase 131 (*)     All other components within normal limits   CBC WITH AUTO DIFFERENTIAL - Abnormal; Notable for the following components:    MCH 32.0 (*)     All other components within normal limits   TROPONIN       All other labs were within normal range or not returned as of this dictation. EMERGENCY DEPARTMENT COURSE and DIFFERENTIAL DIAGNOSIS/MDM:   Vitals:    Vitals:    11/04/20 1900 11/04/20 2058 11/04/20 2147   BP: 136/89 128/68 129/85   Pulse: 86 72 71   Resp: 19 18 18   Temp: 97.3 °F (36.3 °C)     TempSrc: Temporal     SpO2: 96% 100% 99%   Weight: 260 lb (117.9 kg)     Height: 5' 3\" (1.6 m)             [facility]      [address] emergency department with complaints of chest pain that began just prior to arrival.  Patient has had multiple emergency department visits for the same complaints. Patient has a normal EKG, troponin and chest x-ray. She will be discharged home with supportive therapy and PCP follow-up. At the patient's request, I will also refer her to pulmonology for her sarcoidosis      CONSULTS:  None    PROCEDURES:  Unless otherwise noted below, none     Procedures        FINAL IMPRESSION      1. Atypical chest pain    2.  Sarcoidosis          DISPOSITION/PLAN   DISPOSITION Decision To Discharge 11/04/2020 09:29:02 PM      PATIENT REFERRED TO:  SUDEEP Enriquez - [facility]  [address] [facility]  [phone]    Call in 1 day      Sabrina Arciniega Blayne Palomares [address]  [phone]    Call in 2 days        DISCHARGE MEDICATIONS:  Discharge Medication List as of 11/4/2020  9:52 PM      START taking these medications    Details   pantoprazole (PROTONIX) 20 MG tablet Take 2 tablets by mouth daily, Disp-30 tablet,R-0Print           Controlled Substances Monitoring:     RX Monitoring 9/25/2020   Attestation -   Periodic Controlled Substance Monitoring No signs of potential drug abuse or diversion identified. ;Possible medication side effects, risk of tolerance/dependence & alternative treatments discussed. Chronic Pain > 80 MEDD -       (Please note that portions of this note were completed with a voice recognition program.  Efforts were made to edit the dictations but occasionally words are mis-transcribed.)    Mar Raya PA-C (electronically signed)  Attending Emergency Physician  I reviewed the chart. I did not see the patient. I agree with the evaluation, treatment and disposition of the patient as documented by the midlevel provider.    Electronically signed by Cailin Celeste MD on 11/5/2020 at 10:48 AM           Mar Raya PA-C  11/04/20 [id number]       Cailin Celeste MD  11/05/20 1048

## 2020-11-05 NOTE — PROGRESS NOTES
 THYROIDECTOMY  2019    DR. MAGDALENO    THYROIDECTOMY  2018    URETER STENT PLACEMENT Left 2016     Social History     Socioeconomic History    Marital status: Legally      Spouse name: None    Number of children: None    Years of education: None    Highest education level: None   Occupational History    None   Social Needs    Financial resource strain: None    Food insecurity     Worry: None     Inability: None    Transportation needs     Medical: None     Non-medical: None   Tobacco Use    Smoking status: Former Smoker     Packs/day: 0.50     Years: 15.00     Pack years: 7.50     Types: Cigarettes     Start date: 2014     Last attempt to quit: 2015     Years since quittin.7    Smokeless tobacco: Former User     Quit date: 3/23/2016   Substance and Sexual Activity    Alcohol use: Not Currently     Alcohol/week: 0.0 standard drinks     Comment: occasionally    Drug use: Yes     Frequency: 3.0 times per week     Types: Marijuana    Sexual activity: Yes     Partners: Male   Lifestyle    Physical activity     Days per week: None     Minutes per session: None    Stress: None   Relationships    Social connections     Talks on phone: None     Gets together: None     Attends Mormonism service: None     Active member of club or organization: None     Attends meetings of clubs or organizations: None     Relationship status: None    Intimate partner violence     Fear of current or ex partner: None     Emotionally abused: None     Physically abused: None     Forced sexual activity: None   Other Topics Concern    None   Social History Narrative    None     Family History   Problem Relation Age of Onset    Cancer Father     Diabetes Father     Allergy (Severe) Father     Heart Attack Father     Prostate Cancer Father     High Blood Pressure Mother     Diabetes Mother     Arthritis Mother     High Cholesterol Mother     Vision Loss Mother     Alcohol Abuse Neg Hx  Anemia Neg Hx     Arrhythmia Neg Hx     Asthma Neg Hx     Atrial Fibrillation Neg Hx     Birth Defects Neg Hx     Breast Cancer Neg Hx     Coronary Art Dis Neg Hx     Colon Cancer Neg Hx     Depression Neg Hx     Early Death Neg Hx     Hearing Loss Neg Hx     Heart Disease Neg Hx     Learning Disabilities Neg Hx     Kidney Disease Neg Hx     Mental Illness Neg Hx     Mental Retardation Neg Hx     Miscarriages / Stillbirths Neg Hx     Obesity Neg Hx     Osteoporosis Neg Hx     Stroke Neg Hx     Substance Abuse Neg Hx      Current Outpatient Medications   Medication Sig Dispense Refill    DULoxetine (CYMBALTA) 60 MG extended release capsule Take by mouth every 24 hours      ALPRAZolam (XANAX) 0.25 MG tablet       pantoprazole (PROTONIX) 20 MG tablet Take 2 tablets by mouth daily 30 tablet 0    magnesium oxide (MAG-OX) 400 (241.3 Mg) MG TABS tablet TAKE 1/2 TABLET BY MOUTH DAILY 30 tablet 5    tiZANidine (ZANAFLEX) 4 MG tablet Take 1 tablet by mouth every 8 hours as needed (back spasm) 30 tablet 0    busPIRone (BUSPAR) 15 MG tablet TAKE 1 TABLET BY MOUTH THREE TIMES DAILY 90 tablet 5    albuterol sulfate HFA (PROVENTIL HFA) 108 (90 Base) MCG/ACT inhaler Inhale 2 puffs into the lungs every 6 hours as needed for Wheezing 1 Inhaler 3    cetirizine (ZYRTEC) 10 MG tablet TAKE 1 TABLET BY MOUTH EVERY NIGHT AT BEDTIME AS NEEDED FOR ALLERGIES 30 tablet 5    insulin lispro (HUMALOG) 100 UNIT/ML injection vial INJECT 4 UNITS UNDER THE SKIN THREE TIMES DAILY AS NEEDED FOR HIGH BLOOD SUGAR 10 mL 0    levothyroxine (SYNTHROID) 150 MCG tablet TK 1 T PO ONCE D      pregabalin (LYRICA) 150 MG capsule Take 1 capsule by mouth 3 times daily for 90 days.  90 capsule 2    TRESIBA FLEXTOUCH 100 UNIT/ML SOPN INJECT 30 UNITS UNDER THE SKIN NIGHTLY 15 mL 3    SUMAtriptan (IMITREX) 25 MG tablet TAKE 1 TABLET BY MOUTH 1 TIME AS NEEDED FOR MIGRAINE 9 tablet 1    butalbital-acetaminophen-caffeine (FIORICET, ESGIC) -40 MG per tablet Take 1 tablet by mouth every 6 hours as needed for Headaches 30 tablet 0    promethazine (PHENERGAN) 25 MG tablet WARNING:  May cause drowsiness. May impair ability to operate vehicles or machinery. Do not use in combination with alcohol. ! Tablet every 6 hours as needed in conjunction with Ultram for headaches 20 tablet 0    blood glucose test strips (EXACTECH TEST) strip 1 each by In Vitro route 3 times daily (Accu-check test strips) As needed. DX:E11.65 300 strip 5    ONE TOUCH ULTRASOFT LANCETS MISC TEST 3 TIMES DAILY AS NEEDED 300 each 3    albuterol (PROVENTIL) (2.5 MG/3ML) 0.083% nebulizer solution Take 3 mLs by nebulization every 4 hours as needed for Wheezing 120 each 3    metoclopramide (REGLAN) 10 MG tablet Take 1 tablet by mouth 4 times daily 120 tablet 0    atorvastatin (LIPITOR) 40 MG tablet Take 1 tablet by mouth nightly 30 tablet 3    metoprolol tartrate (LOPRESSOR) 25 MG tablet Take 1 tablet by mouth 2 times daily 60 tablet 0    DULoxetine (CYMBALTA) 60 MG extended release capsule TK 1 C PO QD  4    fluticasone (FLONASE) 50 MCG/ACT nasal spray 1 spray by Nasal route daily 1 Bottle 3    traMADol (ULTRAM) 50 MG tablet Take 50 mg by mouth every 6 hours as needed for Pain.  Insulin Syringe-Needle U-100 30G X 1/2\" 1 ML MISC 1 each by Does not apply route daily 100 each 3    blood glucose test strips (ONETOUCH VERIO) strip TEST 3 TIMES DAILY AS NEEDED 300 each 3    Blood Glucose Monitoring Suppl (ONETOUCH VERIO) w/Device KIT TEST 3 TIMES DAILY AS NEEDED 1 kit 0    montelukast (SINGULAIR) 10 MG tablet Take 1 tab nightly at supper for breathing/allergies 30 tablet 5    Insulin Pen Needle (B-D ULTRAFINE III SHORT PEN) 31G X 8 MM MISC Inject 1 each into the skin 3 times daily 100 each 5     No current facility-administered medications for this visit. Shellfish-derived products; Ibuprofen; Ketorolac; Morphine;  Other; Propoxyphene n-acetaminophen; and Toradol [ketorolac tromethamine]  All reviewed and verified by Dr Kavya Buckley on today's visit    No results found for: PSA, PSADIA  Results for POC orders placed in visit on 11/05/20   POCT Urinalysis No Micro (Auto)   Result Value Ref Range    Color, UA yellow     Clarity, UA clear     Glucose, UA POC neg     Bilirubin, UA neg     Ketones, UA neg     Spec Grav, UA 1.025     Blood, UA POC small     pH, UA 5.5     Protein, UA POC neg     Urobilinogen, UA 0.2     Leukocytes, UA neg     Nitrite, UA neg        Physical Exam  Vitals:    11/05/20 0950   BP: 114/70   Pulse: 77   Weight: 265 lb (120.2 kg)   Height: 5' 3\" (1.6 m)     Constitutional: Not in distress. Head and Neck: No lymphadenopathy  Cardiovascular: Normal rate, BP reviewed. Normal rate  Pulmonary/Chest: Normal respiratory effort no wheezing  Abdominal: Not distended. Denies abdominal discomfort  Urologic Exam  CT reviewed. Urinalysis as above. Exam otherwise normal.  Musculoskeletal: Ambulatory. Extremities: No edema  Neurological: Intact no deficits  Skin: No rashes  Psychiatric: Normal affect. Assessment/Medical Necessity-Decision Making  Status post right radical nephrectomy with no evidence of recurrent disease on most recent CT July 2020  Plan  Follow-up 1 year  May order CT at that time  Greater than 50% of 15 minutes spent consulting patient face-to-face  Orders Placed This Encounter   Procedures    POCT Urinalysis No Micro (Auto)     No orders of the defined types were placed in this encounter. Krystle Weaver MD       Please note this report has been partially produced using speech recognition software  And may cause contain errors related to that system including grammar, punctuation and spelling as well as words and phrases that may seem inappropriate. If there are questions or concerns please feel free to contact me to clarify.

## 2020-11-05 NOTE — ED NOTES
Patient c/o mid epigastric pain, started right before eating dinner today. Pain is worse with deep inspiration. Lungs clear, breathing unlabored.       Avis Joseph RN  11/04/20 2033

## 2020-11-05 NOTE — CARE COORDINATION
Patient contacted regarding recent discharge and COVID-19 risk. Discussed COVID-19 related testing which was not done at this time. Test results were not done. Patient informed of results, if available? N/A     Care Transition Nurse/ Ambulatory Care Manager contacted the patient by telephone to perform post discharge assessment. Verified name and  with patient as identifiers. Patient has following risk factors of: COPD and asthma. CTN/ACM reviewed discharge instructions, medical action plan and red flags related to discharge diagnosis. Reviewed and educated them on any new and changed medications related to discharge diagnosis. Education provided regarding infection prevention, and signs and symptoms of COVID-19 and when to seek medical attention with patient who verbalized understanding. Discussed exposure protocols and [facility] you at higher risk for severe illness  and given an opportunity for questions and concerns. The patient agrees to contact the COVID-19 hotline [phone] or PCP office for questions related to their healthcare. CTN/ACM provided contact information for future reference. From CDC: Are you at higher risk for severe illness?  Wash your hands often.  Avoid close contact (6 feet, which is about two arm lengths) with people who are sick.  Put distance between yourself and other people if COVID-19 is spreading in your community.  Clean and disinfect frequently touched surfaces.  Avoid all cruise travel and non-essential air travel.  Call your healthcare professional if you have concerns about COVID-19 and your underlying condition or if you are sick. For more information on steps you can take to protect yourself, see CDC's How to Protect Yourself    No further follow-up planned at this time based on severity of symptoms and risk factors. Patient reports improvement in chest discomfort.  Patient verbalizes understanding of medication changes. Reviewed follow-up instructions. Instructed patient to call SUDEEP Sanches CNP office today to schedule ER follow-up. Informed patient the availability of virtual visits. Instructed patient to contact Dr Peter Jay today to schedule follow-up. Discussed enrollment in Care Coordination program. Patient declined.

## 2020-11-10 ENCOUNTER — APPOINTMENT (OUTPATIENT)
Dept: CT IMAGING | Age: 50
End: 2020-11-10
Payer: MEDICAID

## 2020-11-10 ENCOUNTER — HOSPITAL ENCOUNTER (EMERGENCY)
Age: 50
Discharge: HOME | End: 2020-11-11
Attending: EMERGENCY MEDICINE
Payer: MEDICAID

## 2020-11-10 VITALS
HEART RATE: 77 BPM | TEMPERATURE: 98.4 F | RESPIRATION RATE: 18 BRPM | OXYGEN SATURATION: 98 % | DIASTOLIC BLOOD PRESSURE: 63 MMHG | SYSTOLIC BLOOD PRESSURE: 129 MMHG

## 2020-11-10 LAB
ALBUMIN SERPL-MCNC: 3.6 G/DL (ref 3.5–4.6)
ALP BLD-CCNC: 131 U/L (ref 40–130)
ALT SERPL-CCNC: 13 U/L (ref 0–33)
ANION GAP SERPL CALCULATED.3IONS-SCNC: 11 MEQ/L (ref 9–15)
AST SERPL-CCNC: 17 U/L (ref 0–35)
BASOPHILS ABSOLUTE: 0 K/UL (ref 0–0.2)
BASOPHILS RELATIVE PERCENT: 0.6 %
BILIRUB SERPL-MCNC: <0.2 MG/DL (ref 0.2–0.7)
BUN BLDV-MCNC: 12 MG/DL (ref 6–20)
CALCIUM SERPL-MCNC: 9 MG/DL (ref 8.5–9.9)
CHLORIDE BLD-SCNC: 105 MEQ/L (ref 95–107)
CO2: 20 MEQ/L (ref 20–31)
CREAT SERPL-MCNC: 1.22 MG/DL (ref 0.5–0.9)
EKG ATRIAL RATE: 82 BPM
EKG P AXIS: 42 DEGREES
EKG P-R INTERVAL: 162 MS
EKG Q-T INTERVAL: 374 MS
EKG QRS DURATION: 80 MS
EKG QTC CALCULATION (BAZETT): 436 MS
EKG R AXIS: 13 DEGREES
EKG T AXIS: 3 DEGREES
EKG VENTRICULAR RATE: 82 BPM
EOSINOPHILS ABSOLUTE: 0.2 K/UL (ref 0–0.7)
EOSINOPHILS RELATIVE PERCENT: 3.5 %
GFR AFRICAN AMERICAN: 56.5
GFR NON-AFRICAN AMERICAN: 46.7
GLOBULIN: 3.2 G/DL (ref 2.3–3.5)
GLUCOSE BLD-MCNC: 211 MG/DL (ref 70–99)
HCT VFR BLD CALC: 44.4 % (ref 37–47)
HEMOGLOBIN: 14.6 G/DL (ref 12–16)
LYMPHOCYTES ABSOLUTE: 1.9 K/UL (ref 1–4.8)
LYMPHOCYTES RELATIVE PERCENT: 28 %
MCH RBC QN AUTO: 31.6 PG (ref 27–31.3)
MCHC RBC AUTO-ENTMCNC: 32.8 % (ref 33–37)
MCV RBC AUTO: 96.5 FL (ref 82–100)
MONOCYTES ABSOLUTE: 0.4 K/UL (ref 0.2–0.8)
MONOCYTES RELATIVE PERCENT: 5.6 %
NEUTROPHILS ABSOLUTE: 4.3 K/UL (ref 1.4–6.5)
NEUTROPHILS RELATIVE PERCENT: 62.3 %
PDW BLD-RTO: 13.5 % (ref 11.5–14.5)
PLATELET # BLD: 246 K/UL (ref 130–400)
POTASSIUM SERPL-SCNC: 4.1 MEQ/L (ref 3.4–4.9)
RBC # BLD: 4.61 M/UL (ref 4.2–5.4)
SODIUM BLD-SCNC: 136 MEQ/L (ref 135–144)
TOTAL PROTEIN: 6.8 G/DL (ref 6.3–8)
TROPONIN: <0.01 NG/ML (ref 0–0.01)
WBC # BLD: 6.9 K/UL (ref 4.8–10.8)

## 2020-11-10 PROCEDURE — 85025 COMPLETE CBC W/AUTO DIFF WBC: CPT

## 2020-11-10 PROCEDURE — 84484 ASSAY OF TROPONIN QUANT: CPT

## 2020-11-10 PROCEDURE — 70450 CT HEAD/BRAIN W/O DYE: CPT

## 2020-11-10 PROCEDURE — 6370000000 HC RX 637 (ALT 250 FOR IP): Performed by: EMERGENCY MEDICINE

## 2020-11-10 PROCEDURE — 99284 EMERGENCY DEPT VISIT MOD MDM: CPT

## 2020-11-10 PROCEDURE — 80053 COMPREHEN METABOLIC PANEL: CPT

## 2020-11-10 PROCEDURE — 93005 ELECTROCARDIOGRAM TRACING: CPT | Performed by: EMERGENCY MEDICINE

## 2020-11-10 PROCEDURE — 36415 COLL VENOUS BLD VENIPUNCTURE: CPT

## 2020-11-10 RX ORDER — PREDNISONE 20 MG/1
40 TABLET ORAL ONCE
Status: COMPLETED | OUTPATIENT
Start: 2020-11-10 | End: 2020-11-10

## 2020-11-10 RX ORDER — HYDROCODONE BITARTRATE AND ACETAMINOPHEN 5; 325 MG/1; MG/1
1 TABLET ORAL ONCE
Status: COMPLETED | OUTPATIENT
Start: 2020-11-10 | End: 2020-11-10

## 2020-11-10 RX ORDER — MECLIZINE HYDROCHLORIDE 25 MG/1
25 TABLET ORAL ONCE
Status: COMPLETED | OUTPATIENT
Start: 2020-11-10 | End: 2020-11-10

## 2020-11-10 RX ADMIN — HYDROCODONE BITARTRATE AND ACETAMINOPHEN 1 TABLET: 5; 325 TABLET ORAL at 23:16

## 2020-11-10 RX ADMIN — MECLIZINE HYDROCHLORIDE 25 MG: 25 TABLET ORAL at 23:16

## 2020-11-10 RX ADMIN — PREDNISONE 40 MG: 20 TABLET ORAL at 23:16

## 2020-11-10 ASSESSMENT — ENCOUNTER SYMPTOMS
PHOTOPHOBIA: 0
SORE THROAT: 0
ABDOMINAL PAIN: 0
ABDOMINAL DISTENTION: 0
COUGH: 0
WHEEZING: 0
SHORTNESS OF BREATH: 0
VOMITING: 0
CHEST TIGHTNESS: 0
EYE DISCHARGE: 0

## 2020-11-10 ASSESSMENT — PAIN DESCRIPTION - LOCATION: LOCATION: ARM;SHOULDER;NECK

## 2020-11-10 ASSESSMENT — PAIN DESCRIPTION - DESCRIPTORS: DESCRIPTORS: TINGLING

## 2020-11-10 ASSESSMENT — PAIN SCALES - GENERAL
PAINLEVEL_OUTOF10: 9
PAINLEVEL_OUTOF10: 9

## 2020-11-10 ASSESSMENT — PAIN DESCRIPTION - FREQUENCY: FREQUENCY: CONTINUOUS

## 2020-11-10 ASSESSMENT — PAIN DESCRIPTION - ORIENTATION: ORIENTATION: LEFT

## 2020-11-11 PROCEDURE — 93010 ELECTROCARDIOGRAM REPORT: CPT | Performed by: INTERNAL MEDICINE

## 2020-11-11 RX ORDER — METHYLPREDNISOLONE 4 MG/1
TABLET ORAL
Qty: 1 KIT | Refills: 0 | Status: SHIPPED | OUTPATIENT
Start: 2020-11-11 | End: 2020-11-17

## 2020-11-11 NOTE — ED TRIAGE NOTES
Patient arrived from home via family with c/o numbness/tingling that starts in her left side of neck and radiates down left arm. Patient states numbness/tingling started 30 minutes ago. Patient has hx. Of TIA. Patient has history of DM. States her blood sugars have been normal. Denies any other S/S. Vitals WNL.

## 2020-11-11 NOTE — ED PROVIDER NOTES
[address] [facility] ED  eMERGENCY dEPARTMENT eNCOUnter      Pt Name: Soy Talley  MRN: [id number]  [place] 1970  Date of evaluation: 11/10/2020  Provider: Miri Maxwell MD    [address] [place] [address]       Chief Complaint   Patient presents with    Numbness     C/O numbness and tingling down left side of neck into left arm         HISTORY OF PRESENT ILLNESS   (Location/Symptom, Timing/Onset,Context/Setting, Quality, Duration, Modifying Factors, Severity)  Note limiting factors. Soy Talley is a 53 y.o. female who presents to the emergency department with complaints of numbness, tingling and pain from the left side of her neck down her lateral arm. Her symptoms are consistent with a ulnar nerve distribution of discomfort. No related trauma or injury. Symptoms began 30 minutes prior to arrival.  She now complains of some vertigo-like symptoms also. No chest pain or shortness of breath. No nausea. Pain level is mild    HPI    NursingNotes were reviewed. REVIEW OF SYSTEMS    (2-9 systems for level 4, 10 or more for level 5)     Review of Systems   Constitutional: Negative for chills and diaphoresis. HENT: Negative for congestion, ear pain, mouth sores and sore throat. Eyes: Negative for photophobia and discharge. Respiratory: Negative for cough, chest tightness, shortness of breath and wheezing. Cardiovascular: Negative for chest pain and palpitations. Gastrointestinal: Negative for abdominal distention, abdominal pain and vomiting. Endocrine: Negative for cold intolerance. Genitourinary: Negative for difficulty urinating. Musculoskeletal: Negative for arthralgias. Skin: Negative for pallor and rash. Allergic/Immunologic: Negative for immunocompromised state. Neurological: Positive for dizziness. Negative for syncope and weakness. Hematological: Negative for adenopathy. Psychiatric/Behavioral: Negative for agitation and hallucinations.    All other systems reviewed and are negative. Except as noted above the remainder of the review of systems was reviewed and negative. PAST MEDICAL HISTORY     Past Medical History:   Diagnosis Date    Anxiety     Arthritis     Asthma     Bronchopneumonia     Cancer ([facility]     renal    Cerebral artery occlusion with cerebral infarction (HCC)     Chronic bilateral low back pain with sciatica     Chronic kidney disease     Chronic obstructive lung disease ([facility] 7/26/2019    Depression     Fibromyalgia     Hypertension     Insulin dependent type 2 diabetes mellitus, uncontrolled ([facility] 8/3/2018    Localized enlarged lymph nodes 10/26/2018    Mixed headache     Pure hyperglyceridemia 5/19/2017    Sarcoidosis     Sleep apnea     does not wear cpap    Thyroid goiter          SURGICALHISTORY       Past Surgical History:   Procedure Laterality Date    BRONCHOSCOPY  10/26/2018    DR. STEARNS    KIDNEY REMOVAL Right 08/2016    KIDNEY REMOVAL Right 2016    LUNG BIOPSY Right 10/2018    THYROID LOBECTOMY Right 6/13/14    THYROIDECTOMY  02/21/2019    DR. MAGDALENO    THYROIDECTOMY  2018    URETER STENT PLACEMENT Left 08/2016         CURRENT MEDICATIONS       Previous Medications    ALBUTEROL (PROVENTIL) (2.5 MG/3ML) 0.083% NEBULIZER SOLUTION    Take 3 mLs by nebulization every 4 hours as needed for Wheezing    ALBUTEROL SULFATE HFA (PROVENTIL HFA) 108 (90 BASE) MCG/ACT INHALER    Inhale 2 puffs into the lungs every 6 hours as needed for Wheezing    ALPRAZOLAM (XANAX) 0.25 MG TABLET        ATORVASTATIN (LIPITOR) 40 MG TABLET    Take 1 tablet by mouth nightly    BLOOD GLUCOSE MONITORING SUPPL (ONETOUCH VERIO) W/DEVICE KIT    TEST 3 TIMES DAILY AS NEEDED    BLOOD GLUCOSE TEST STRIPS (EXACTECH TEST) STRIP    1 each by In Vitro route 3 times daily (Accu-check test strips) As needed.  DX:E11.65    BLOOD GLUCOSE TEST STRIPS (ONETOUCH VERIO) STRIP    TEST 3 TIMES DAILY AS NEEDED    BUSPIRONE (BUSPAR) 15 MG TABLET    TAKE 1 TABLET BY MOUTH THREE TIMES DAILY    BUTALBITAL-ACETAMINOPHEN-CAFFEINE (FIORICET, ESGIC) -40 MG PER TABLET    Take 1 tablet by mouth every 6 hours as needed for Headaches    CETIRIZINE (ZYRTEC) 10 MG TABLET    TAKE 1 TABLET BY MOUTH EVERY NIGHT AT BEDTIME AS NEEDED FOR ALLERGIES    DULOXETINE (CYMBALTA) 60 MG EXTENDED RELEASE CAPSULE    TK 1 C PO QD    DULOXETINE (CYMBALTA) 60 MG EXTENDED RELEASE CAPSULE    Take by mouth every 24 hours    FLUTICASONE (FLONASE) 50 MCG/ACT NASAL SPRAY    1 spray by Nasal route daily    INSULIN LISPRO (HUMALOG) 100 UNIT/ML INJECTION VIAL    INJECT 4 UNITS UNDER THE SKIN THREE TIMES DAILY AS NEEDED FOR HIGH BLOOD SUGAR    INSULIN PEN NEEDLE (B-D ULTRAFINE III SHORT PEN) 31G X 8 MM MISC    Inject 1 each into the skin 3 times daily    INSULIN SYRINGE-NEEDLE U-100 30G X 1/2\" 1 ML MISC    1 each by Does not apply route daily    LEVOTHYROXINE (SYNTHROID) 150 MCG TABLET    TK 1 T PO ONCE D    MAGNESIUM OXIDE (MAG-OX) 400 (241.3 MG) MG TABS TABLET    TAKE 1/2 TABLET BY MOUTH DAILY    METOCLOPRAMIDE (REGLAN) 10 MG TABLET    Take 1 tablet by mouth 4 times daily    METOPROLOL TARTRATE (LOPRESSOR) 25 MG TABLET    Take 1 tablet by mouth 2 times daily    MONTELUKAST (SINGULAIR) 10 MG TABLET    Take 1 tab nightly at supper for breathing/allergies    ONE TOUCH ULTRASOFT LANCETS MISC    TEST 3 TIMES DAILY AS NEEDED    PANTOPRAZOLE (PROTONIX) 20 MG TABLET    Take 2 tablets by mouth daily    PREGABALIN (LYRICA) 150 MG CAPSULE    Take 1 capsule by mouth 3 times daily for 90 days. PROMETHAZINE (PHENERGAN) 25 MG TABLET    WARNING:  May cause drowsiness. May impair ability to operate vehicles or machinery. Do not use in combination with alcohol.     ! Tablet every 6 hours as needed in conjunction with Ultram for headaches    SUMATRIPTAN (IMITREX) 25 MG TABLET    TAKE 1 TABLET BY MOUTH 1 TIME AS NEEDED FOR MIGRAINE    TIZANIDINE (ZANAFLEX) 4 MG TABLET    Take 1 tablet by mouth every 8 hours as needed (back Sexual Activity    Alcohol use: Not Currently     Alcohol/week: 0.0 standard drinks     Comment: occasionally    Drug use: Yes     Frequency: 3.0 times per week     Types: Marijuana    Sexual activity: Yes     Partners: Male   Lifestyle    Physical activity     Days per week: None     Minutes per session: None    Stress: None   Relationships    Social connections     Talks on phone: None     Gets together: None     Attends Yarsanism service: None     Active member of club or organization: None     Attends meetings of clubs or organizations: None     Relationship status: None    Intimate partner violence     Fear of current or ex partner: None     Emotionally abused: None     Physically abused: None     Forced sexual activity: None   Other Topics Concern    None   Social History Narrative    None       SCREENINGS      @FLOW([id number])@      PHYSICAL EXAM    (up to 7 for level 4, 8 or more for level 5)     ED Triage Vitals [11/10/20 2232]   BP Temp Temp Source Pulse Resp SpO2 Height Weight   (!) 158/91 98.4 °F (36.9 °C) Temporal 86 20 96 % -- --       Physical Exam  Vitals signs and nursing note reviewed. Constitutional:       Appearance: She is well-developed. HENT:      Head: Normocephalic. Right Ear: Tympanic membrane normal.      Nose: Nose normal.      Mouth/Throat:      Mouth: Mucous membranes are moist.   Eyes:      Conjunctiva/sclera: Conjunctivae normal.      Pupils: Pupils are equal, round, and reactive to light. Neck:      Musculoskeletal: Normal range of motion and neck supple. Cardiovascular:      Rate and Rhythm: Normal rate and regular rhythm. Heart sounds: Normal heart sounds. Pulmonary:      Effort: Pulmonary effort is normal.      Breath sounds: Normal breath sounds. Abdominal:      General: Bowel sounds are normal.      Palpations: Abdomen is soft. Tenderness: There is no abdominal tenderness. There is no guarding. Musculoskeletal: Normal range of motion. Skin:     General: Skin is warm and dry. Capillary Refill: Capillary refill takes less than 2 seconds. Neurological:      General: No focal deficit present. Mental Status: She is alert and oriented to person, place, and time. Sensory: No sensory deficit. Motor: No weakness. Gait: Gait normal.      Deep Tendon Reflexes: Reflexes normal.   Psychiatric:         Mood and Affect: Mood normal.         DIAGNOSTIC RESULTS     EKG: All EKG's are interpreted by the Emergency Department Physician who either signs or Co-signsthis chart in the absence of a cardiologist.  EKG shows a normal sinus rhythm rate 82. No acute ST or T wave changes. Normal axis. Normal EKG. RADIOLOGY:   Non-plain filmimages such as CT, Ultrasound and MRI are read by the radiologist. Plain radiographic images are visualized and preliminarily interpreted by the emergency physician with the below findings:      Interpretation per the Radiologist below, if available at the time ofthis note:    [address]    (Results Pending)         ED BEDSIDE ULTRASOUND:   Performed by ED Physician - none    LABS:  Labs Reviewed   COMPREHENSIVE METABOLIC PANEL - Abnormal; Notable for the following components:       Result Value    Glucose 211 (*)     CREATININE 1.22 (*)     GFR Non- 46.7 (*)     GFR  56.5 (*)     Alkaline Phosphatase 131 (*)     All other components within normal limits   CBC WITH AUTO DIFFERENTIAL - Abnormal; Notable for the following components:    MCH 31.6 (*)     MCHC 32.8 (*)     All other components within normal limits   TROPONIN       All other labs were within normal range or not returned as of this dictation.     EMERGENCY DEPARTMENT COURSE and DIFFERENTIAL DIAGNOSIS/MDM:   Vitals:    Vitals:    11/10/20 2232 11/10/20 2338 11/10/20 2339   BP: (!) 158/91 129/83 129/63   Pulse: 86 77    Resp: 20 18    Temp: 98.4 °F (36.9 °C)     TempSrc: Temporal     SpO2: 96% 98% 98% MDM  Patient had a normal head CT. Her symptoms are most consistent with that of radicular neck pain. She is referred to neurosurgery in follow-up. CONSULTS:  None    PROCEDURES:  Unless otherwise noted below, none     Procedures    FINAL IMPRESSION      1. Cervical radiculopathy          DISPOSITION/PLAN   DISPOSITION Decision To Discharge 11/11/2020 12:26:51 AM      PATIENT REFERRED TO:  Amena David, [address] [place] [address]  [address] [place] [address]  [phone]    In 1 week      Gabino Pham MD  [address] [id number] [phone]    In 3 days        DISCHARGE MEDICATIONS:  New Prescriptions    METHYLPREDNISOLONE (MEDROL, MANUEL,) 4 MG TABLET    Take by mouth.           (Please note that portions of this note were completed with a voice recognition program.  Efforts were made to edit the dictations but occasionally words are mis-transcribed.)    Carmella Winn MD (electronically signed)  Attending Emergency Physician          Carmella Winn MD  11/10/20 0463       Carmella Winn MD  11/11/20 0028

## 2020-11-12 ENCOUNTER — CARE COORDINATION (OUTPATIENT)
Dept: CARE COORDINATION | Age: 50
End: 2020-11-12

## 2020-11-12 NOTE — CARE COORDINATION
Patient contacted regarding recent discharge and COVID-19 risk. Discussed COVID-19 related testing which was not done at this time. Test results were not done. Patient informed of results, if available? No testing     Care Transition Nurse/ Ambulatory Care Manager contacted the patient by telephone to perform post discharge assessment. Verified name and  with patient as identifiers. Patient has following risk factors of: COPD, asthma, immunocompromised and diabetes. CTN/ACM reviewed discharge instructions, medical action plan and red flags related to discharge diagnosis. Reviewed and educated them on any new and changed medications related to discharge diagnosis. Advised obtaining a 90-day supply of all daily and as-needed medications. Education provided regarding infection prevention, and signs and symptoms of COVID-19 and when to seek medical attention with patient who verbalized understanding. Discussed exposure protocols and quarantine from [address] you at higher risk for severe illness  and given an opportunity for questions and concerns. The patient agrees to contact the COVID-19 hotline [phone] or PCP office for questions related to their healthcare. CTN/ACM provided contact information for future reference. From CDC: Are you at higher risk for severe illness?  Wash your hands often.  Avoid close contact (6 feet, which is about two arm lengths) with people who are sick.  Put distance between yourself and other people if COVID-19 is spreading in your community.  Clean and disinfect frequently touched surfaces.  Avoid all cruise travel and non-essential air travel.  Call your healthcare professional if you have concerns about COVID-19 and your underlying condition or if you are sick. For more information on steps you can take to protect yourself, see CDC's How to Protect Yourself    not covid related based on severity of symptoms and risk factors.     Call to evaristo due to recent er visit. She states \"I am ok\". She did obtain rx per er and reports compliance. She will contact dr Kirk Navarro and pcp today for follow up appt.  Reports no other symptoms

## 2020-11-17 ENCOUNTER — TELEPHONE (OUTPATIENT)
Dept: FAMILY MEDICINE CLINIC | Age: 50
End: 2020-11-17

## 2020-11-17 RX ORDER — INSULIN DEGLUDEC INJECTION 100 U/ML
INJECTION, SOLUTION SUBCUTANEOUS
Qty: 15 ML | Refills: 3 | Status: SHIPPED | OUTPATIENT
Start: 2020-11-17 | End: 2021-02-23 | Stop reason: SDUPTHER

## 2020-12-04 ENCOUNTER — TELEPHONE (OUTPATIENT)
Dept: FAMILY MEDICINE CLINIC | Age: 50
End: 2020-12-04

## 2020-12-13 ENCOUNTER — APPOINTMENT (OUTPATIENT)
Dept: GENERAL RADIOLOGY | Age: 50
End: 2020-12-13
Payer: MEDICAID

## 2020-12-13 ENCOUNTER — HOSPITAL ENCOUNTER (EMERGENCY)
Age: 50
Discharge: HOME | End: 2020-12-14
Payer: MEDICAID

## 2020-12-13 LAB
EKG ATRIAL RATE: 72 BPM
EKG P AXIS: 32 DEGREES
EKG P-R INTERVAL: 162 MS
EKG Q-T INTERVAL: 386 MS
EKG QRS DURATION: 78 MS
EKG QTC CALCULATION (BAZETT): 422 MS
EKG R AXIS: 16 DEGREES
EKG T AXIS: -1 DEGREES
EKG VENTRICULAR RATE: 72 BPM

## 2020-12-13 PROCEDURE — 96374 THER/PROPH/DIAG INJ IV PUSH: CPT

## 2020-12-13 PROCEDURE — 93005 ELECTROCARDIOGRAM TRACING: CPT | Performed by: EMERGENCY MEDICINE

## 2020-12-13 PROCEDURE — 71045 X-RAY EXAM CHEST 1 VIEW: CPT

## 2020-12-13 PROCEDURE — 99284 EMERGENCY DEPT VISIT MOD MDM: CPT

## 2020-12-13 RX ORDER — ASPIRIN 325 MG
325 TABLET ORAL ONCE
Status: COMPLETED | OUTPATIENT
Start: 2020-12-13 | End: 2020-12-14

## 2020-12-13 RX ORDER — NITROGLYCERIN 0.4 MG/1
0.4 TABLET SUBLINGUAL EVERY 5 MIN PRN
Status: DISCONTINUED | OUTPATIENT
Start: 2020-12-13 | End: 2020-12-14 | Stop reason: HOSPADM

## 2020-12-13 RX ORDER — METHYLPREDNISOLONE SODIUM SUCCINATE 125 MG/2ML
125 INJECTION, POWDER, LYOPHILIZED, FOR SOLUTION INTRAMUSCULAR; INTRAVENOUS ONCE
Status: COMPLETED | OUTPATIENT
Start: 2020-12-13 | End: 2020-12-14

## 2020-12-13 ASSESSMENT — PAIN DESCRIPTION - PAIN TYPE: TYPE: ACUTE PAIN

## 2020-12-13 ASSESSMENT — PAIN DESCRIPTION - DESCRIPTORS: DESCRIPTORS: STABBING

## 2020-12-13 ASSESSMENT — PAIN SCALES - GENERAL: PAINLEVEL_OUTOF10: 9

## 2020-12-13 ASSESSMENT — PAIN DESCRIPTION - FREQUENCY: FREQUENCY: CONTINUOUS

## 2020-12-13 ASSESSMENT — PAIN DESCRIPTION - LOCATION: LOCATION: CHEST

## 2020-12-13 ASSESSMENT — PAIN DESCRIPTION - ORIENTATION: ORIENTATION: MID

## 2020-12-14 VITALS
RESPIRATION RATE: 25 BRPM | WEIGHT: 260 LBS | TEMPERATURE: 98.6 F | HEART RATE: 70 BPM | DIASTOLIC BLOOD PRESSURE: 67 MMHG | SYSTOLIC BLOOD PRESSURE: 104 MMHG | HEIGHT: 63 IN | BODY MASS INDEX: 46.07 KG/M2 | OXYGEN SATURATION: 95 %

## 2020-12-14 LAB
ALBUMIN SERPL-MCNC: 4.4 G/DL (ref 3.5–4.6)
ALP BLD-CCNC: 155 U/L (ref 40–130)
ALT SERPL-CCNC: 14 U/L (ref 0–33)
ANION GAP SERPL CALCULATED.3IONS-SCNC: 9 MEQ/L (ref 9–15)
AST SERPL-CCNC: 18 U/L (ref 0–35)
BASOPHILS ABSOLUTE: 0.1 K/UL (ref 0–0.2)
BASOPHILS RELATIVE PERCENT: 1 %
BILIRUB SERPL-MCNC: <0.2 MG/DL (ref 0.2–0.7)
BUN BLDV-MCNC: 13 MG/DL (ref 6–20)
CALCIUM SERPL-MCNC: 9.2 MG/DL (ref 8.5–9.9)
CHLORIDE BLD-SCNC: 102 MEQ/L (ref 95–107)
CO2: 25 MEQ/L (ref 20–31)
CREAT SERPL-MCNC: 1.35 MG/DL (ref 0.5–0.9)
EOSINOPHILS ABSOLUTE: 0.2 K/UL (ref 0–0.7)
EOSINOPHILS RELATIVE PERCENT: 2.9 %
GFR AFRICAN AMERICAN: 50.3
GFR NON-AFRICAN AMERICAN: 41.6
GLOBULIN: 3 G/DL (ref 2.3–3.5)
GLUCOSE BLD-MCNC: 223 MG/DL (ref 70–99)
HCT VFR BLD CALC: 43.2 % (ref 37–47)
HEMOGLOBIN: 14.8 G/DL (ref 12–16)
LYMPHOCYTES ABSOLUTE: 1.9 K/UL (ref 1–4.8)
LYMPHOCYTES RELATIVE PERCENT: 25.1 %
MAGNESIUM: 1.9 MG/DL (ref 1.7–2.4)
MCH RBC QN AUTO: 32.3 PG (ref 27–31.3)
MCHC RBC AUTO-ENTMCNC: 34.2 % (ref 33–37)
MCV RBC AUTO: 94.3 FL (ref 82–100)
MONOCYTES ABSOLUTE: 0.4 K/UL (ref 0.2–0.8)
MONOCYTES RELATIVE PERCENT: 4.7 %
NEUTROPHILS ABSOLUTE: 5 K/UL (ref 1.4–6.5)
NEUTROPHILS RELATIVE PERCENT: 66.3 %
PDW BLD-RTO: 13.4 % (ref 11.5–14.5)
PLATELET # BLD: 248 K/UL (ref 130–400)
POTASSIUM SERPL-SCNC: 4.1 MEQ/L (ref 3.4–4.9)
RBC # BLD: 4.58 M/UL (ref 4.2–5.4)
SODIUM BLD-SCNC: 136 MEQ/L (ref 135–144)
TOTAL PROTEIN: 7.4 G/DL (ref 6.3–8)
TROPONIN: <0.01 NG/ML (ref 0–0.01)
WBC # BLD: 7.6 K/UL (ref 4.8–10.8)

## 2020-12-14 PROCEDURE — 93010 ELECTROCARDIOGRAM REPORT: CPT | Performed by: INTERNAL MEDICINE

## 2020-12-14 PROCEDURE — 83735 ASSAY OF MAGNESIUM: CPT

## 2020-12-14 PROCEDURE — 6370000000 HC RX 637 (ALT 250 FOR IP): Performed by: STUDENT IN AN ORGANIZED HEALTH CARE EDUCATION/TRAINING PROGRAM

## 2020-12-14 PROCEDURE — 85025 COMPLETE CBC W/AUTO DIFF WBC: CPT

## 2020-12-14 PROCEDURE — 6360000002 HC RX W HCPCS: Performed by: STUDENT IN AN ORGANIZED HEALTH CARE EDUCATION/TRAINING PROGRAM

## 2020-12-14 PROCEDURE — 36415 COLL VENOUS BLD VENIPUNCTURE: CPT

## 2020-12-14 PROCEDURE — 80053 COMPREHEN METABOLIC PANEL: CPT

## 2020-12-14 PROCEDURE — 84484 ASSAY OF TROPONIN QUANT: CPT

## 2020-12-14 RX ORDER — HYDROCODONE BITARTRATE AND ACETAMINOPHEN 5; 325 MG/1; MG/1
1 TABLET ORAL ONCE
Status: DISCONTINUED | OUTPATIENT
Start: 2020-12-14 | End: 2020-12-14

## 2020-12-14 RX ADMIN — NITROGLYCERIN 0.4 MG: 0.4 TABLET, ORALLY DISINTEGRATING SUBLINGUAL at 00:18

## 2020-12-14 RX ADMIN — ASPIRIN 325 MG: 325 TABLET, FILM COATED ORAL at 00:11

## 2020-12-14 RX ADMIN — NITROGLYCERIN 0.4 MG: 0.4 TABLET, ORALLY DISINTEGRATING SUBLINGUAL at 00:13

## 2020-12-14 RX ADMIN — METHYLPREDNISOLONE SODIUM SUCCINATE 125 MG: 125 INJECTION, POWDER, FOR SOLUTION INTRAMUSCULAR; INTRAVENOUS at 00:13

## 2020-12-14 NOTE — ED TRIAGE NOTES
Pt states that she began having CP mid-sternal x2 days ago. Pt states that she was watching TV when it started. Pt states that it has been increasing since it began and she can not take the pain. Pt has a hx of CP.

## 2020-12-14 NOTE — ED NOTES
Pt awake and alert. Pt continues to c/o chest pain, but refused third dose of Nitro due to Headache. [facility] aware. Will continue to monitor.         Jarrod Fischer RN  12/14/20 5689

## 2020-12-15 ENCOUNTER — CARE COORDINATION (OUTPATIENT)
Dept: CARE COORDINATION | Age: 50
End: 2020-12-15

## 2020-12-15 ASSESSMENT — ENCOUNTER SYMPTOMS
VOMITING: 0
COUGH: 0
PHOTOPHOBIA: 0
NAUSEA: 0
WHEEZING: 0
SHORTNESS OF BREATH: 0
ABDOMINAL PAIN: 0

## 2020-12-15 NOTE — ED PROVIDER NOTES
tiZANidine (ZANAFLEX) 4 MG tablet Take 1 tablet by mouth every 8 hours as needed (back spasm), Disp-30 tablet,R-0Normal      busPIRone (BUSPAR) 15 MG tablet TAKE 1 TABLET BY MOUTH THREE TIMES DAILY, Disp-90 tablet,R-5Normal      albuterol sulfate HFA (PROVENTIL HFA) 108 (90 Base) MCG/ACT inhaler Inhale 2 puffs into the lungs every 6 hours as needed for Wheezing, Disp-1 Inhaler,R-3Normal      cetirizine (ZYRTEC) 10 MG tablet TAKE 1 TABLET BY MOUTH EVERY NIGHT AT BEDTIME AS NEEDED FOR ALLERGIES, Disp-30 tablet,R-5Normal      insulin lispro (HUMALOG) 100 UNIT/ML injection vial INJECT 4 UNITS UNDER THE SKIN THREE TIMES DAILY AS NEEDED FOR HIGH BLOOD SUGAR, Disp-10 mL,R-0Normal      levothyroxine (SYNTHROID) 150 MCG tablet TK 1 T PO ONCE DHistorical Med      pregabalin (LYRICA) 150 MG capsule Take 1 capsule by mouth 3 times daily for 90 days. , Disp-90 capsule,R-2Normal      SUMAtriptan (IMITREX) 25 MG tablet TAKE 1 TABLET BY MOUTH 1 TIME AS NEEDED FOR MIGRAINE, Disp-9 tablet, R-1Normal      Insulin Pen Needle (B-D ULTRAFINE III SHORT PEN) 31G X 8 MM MISC 3 TIMES DAILY Starting Wed 2/19/2020, Until Mon 8/17/2020, For 90 doses, Disp-100 each,R-5, Normal      butalbital-acetaminophen-caffeine (FIORICET, ESGIC) -40 MG per tablet Take 1 tablet by mouth every 6 hours as needed for Headaches, Disp-30 tablet, R-0Print      promethazine (PHENERGAN) 25 MG tablet WARNING:  May cause drowsiness. May impair ability to operate vehicles or machinery. Do not use in combination with alcohol. ! Tablet every 6 hours as needed in conjunction with Ultram for headaches, Disp-20 tablet, R-0Print      !! blood glucose test strips (EXACTECH TEST) strip 3 TIMES DAILY Starting Mon 12/2/2019, Disp-300 strip, R-5, Normal(Accu-check test strips) As needed.  DX:E11.65      ONE TOUCH ULTRASOFT LANCETS MISC Disp-300 each, R-3, NormalTEST 3 TIMES DAILY AS NEEDED      albuterol (PROVENTIL) (2.5 MG/3ML) 0.083% nebulizer solution Take 3 mLs by nebulization every 4 hours as needed for Wheezing, Disp-120 each, R-3Normal      metoclopramide (REGLAN) 10 MG tablet Take 1 tablet by mouth 4 times daily, Disp-120 tablet, R-0Print      atorvastatin (LIPITOR) 40 MG tablet Take 1 tablet by mouth nightly, Disp-30 tablet, R-3Normal      metoprolol tartrate (LOPRESSOR) 25 MG tablet Take 1 tablet by mouth 2 times daily, Disp-60 tablet, R-0Normal      !! DULoxetine (CYMBALTA) 60 MG extended release capsule TK 1 C PO QD, R-4Historical Med      fluticasone (FLONASE) 50 MCG/ACT nasal spray 1 spray by Nasal route daily, Disp-1 Bottle, R-3Normal      traMADol (ULTRAM) 50 MG tablet Take 50 mg by mouth every 6 hours as needed for Pain. Historical Med      Insulin Syringe-Needle U-100 30G X 1/2\" 1 ML MISC DAILY Starting Fri 11/16/2018, Disp-100 each, R-3, Normal      !! blood glucose test strips (ONETOUCH VERIO) strip Disp-300 each, R-3, NormalTEST 3 TIMES DAILY AS NEEDED      Blood Glucose Monitoring Suppl (ONETOUCH VERIO) w/Device KIT TEST 3 TIMES DAILY AS NEEDED, Disp-1 kit, R-0Normal      montelukast (SINGULAIR) 10 MG tablet Take 1 tab nightly at supper for breathing/allergies, Disp-30 tablet, R-5Normal       !! - Potential duplicate medications found. Please discuss with provider.           ALLERGIES     Shellfish-derived products, Ibuprofen, Ketorolac, Morphine, Other, Propoxyphene n-acetaminophen, and Toradol [ketorolac tromethamine]    FAMILY HISTORY       Family History   Problem Relation Age of Onset    Cancer Father     Diabetes Father     Allergy (Severe) Father     Heart Attack Father     Prostate Cancer Father     High Blood Pressure Mother     Diabetes Mother     Arthritis Mother     High Cholesterol Mother     Vision Loss Mother     Alcohol Abuse Neg Hx     Anemia Neg Hx     Arrhythmia Neg Hx     Asthma Neg Hx     Atrial Fibrillation Neg Hx     Birth Defects Neg Hx     Breast Cancer Neg Hx     Coronary Art Dis Neg Hx     Colon Cancer Neg Hx     Depression Neg Hx     Early Death Neg Hx     Hearing Loss Neg Hx     Heart Disease Neg Hx     Learning Disabilities Neg Hx     Kidney Disease Neg Hx     Mental Illness Neg Hx     Mental Retardation Neg Hx     Miscarriages / Stillbirths Neg Hx     Obesity Neg Hx     Osteoporosis Neg Hx     Stroke Neg Hx     Substance Abuse Neg Hx           SOCIAL HISTORY       Social History     Socioeconomic History    Marital status: Legally      Spouse name: None    Number of children: None    Years of education: None    Highest education level: None   Occupational History    None   Social Needs    Financial resource strain: None    Food insecurity     Worry: None     Inability: None    Transportation needs     Medical: None     Non-medical: None   Tobacco Use    Smoking status: Former Smoker     Packs/day: 0.50     Years: 15.00     Pack years: 7.50     Types: Cigarettes     Start date: 2014     Quit date: 2015     Years since quittin.8    Smokeless tobacco: Former User     Quit date: 3/23/2016   Substance and Sexual Activity    Alcohol use: Not Currently     Alcohol/week: 0.0 standard drinks     Comment: occasionally    Drug use: Yes     Frequency: 3.0 times per week     Types: Marijuana    Sexual activity: Yes     Partners: Male   Lifestyle    Physical activity     Days per week: None     Minutes per session: None    Stress: None   Relationships    Social connections     Talks on phone: None     Gets together: None     Attends Yarsani service: None     Active member of club or organization: None     Attends meetings of clubs or organizations: None     Relationship status: None    Intimate partner violence     Fear of current or ex partner: None     Emotionally abused: None     Physically abused: None     Forced sexual activity: None   Other Topics Concern    None   Social History Narrative    None       SCREENINGS        Obdulia Coma Scale  Eye Opening: Spontaneous  Best Verbal Response: Oriented  Best Motor Response: Obeys commands  Obdulia Coma Scale Score: 15               PHYSICAL EXAM    (up to 7 for level 4, 8 or more for level 5)     ED Triage Vitals [12/13/20 2339]   BP Temp Temp Source Pulse Resp SpO2 Height Weight   (!) 139/97 98.6 °F (37 °C) Oral 91 20 98 % 5' 3\" (1.6 m) 260 lb (117.9 kg)       Physical Exam  Constitutional:       General: She is not in acute distress. Appearance: She is well-developed. She is not ill-appearing, toxic-appearing or diaphoretic. HENT:      Head: Normocephalic and atraumatic. Nose: Nose normal.      Mouth/Throat:      Mouth: Mucous membranes are moist.   Eyes:      Pupils: Pupils are equal, round, and reactive to light. Neck:      Musculoskeletal: Normal range of motion. Cardiovascular:      Rate and Rhythm: Normal rate and regular rhythm. Pulses: Normal pulses. Heart sounds: No murmur. No friction rub. No gallop. Pulmonary:      Effort: Pulmonary effort is normal.      Breath sounds: Normal breath sounds. No wheezing, rhonchi or rales. Abdominal:      General: Bowel sounds are normal. There is no distension. Palpations: Abdomen is soft. Tenderness: There is no abdominal tenderness. Musculoskeletal:         General: No swelling. Right lower leg: No edema. Left lower leg: No edema. Skin:     General: Skin is warm and dry. Capillary Refill: Capillary refill takes less than 2 seconds. Neurological:      General: No focal deficit present. Mental Status: She is alert and oriented to person, place, and time. DIAGNOSTIC RESULTS     EKG: All EKG's are interpreted by the Emergency Department Physician who either signs or Co-signs this chart in the absence of a cardiologist.    EKG shows NSR with HR 72, normal axis, normal intervals, no ST changes. No change compared to 12/14/20.        RADIOLOGY:   Non-plain film images such as CT, Ultrasound and MRI are read by the radiologist. Plain radiographic images are visualized and preliminarily interpreted by the emergency physician with the below findings:      Interpretation per the Radiologist below, if available at the time of this note:    XR CHEST PORTABLE   Final Result   No acute cardiopulmonary process seen. ED BEDSIDE ULTRASOUND:   Performed by ED Physician - none    LABS:  Labs Reviewed   COMPREHENSIVE METABOLIC PANEL - Abnormal; Notable for the following components:       Result Value    Glucose 223 (*)     CREATININE 1.35 (*)     GFR Non- 41.6 (*)     GFR  50.3 (*)     Alkaline Phosphatase 155 (*)     All other components within normal limits   CBC WITH AUTO DIFFERENTIAL - Abnormal; Notable for the following components:    MCH 32.3 (*)     All other components within normal limits   MAGNESIUM   TROPONIN   URINE DRUG SCREEN       All other labs were within normal range or not returned as of this dictation. EMERGENCY DEPARTMENT COURSE and DIFFERENTIAL DIAGNOSIS/MDM:   Vitals:    Vitals:    12/14/20 0000 12/14/20 0015 12/14/20 0020 12/14/20 0030   BP: 137/86 118/73  104/67   Pulse: 78 83 87 70   Resp: 25 17  25   Temp:       TempSrc:       SpO2: 96% 95%     Weight:       Height:           MDM     Pt is a 52 yo F who presents to the ED with chest pain. She is afebrile and hemodynamically stable. She was given po ASA, IV solumedrol and PO nitro x 2 in the ED. Pt states she began to have a headache after the 2nd nitro and refused more. EKG shows NSR with HR 72, normal axis, normal intervals, no ST changes. No change compared to 12/14/20. CXR negative for acute abnormality. Labs unremarkable. Trop wnl. Pt reassessed and states she has continued pain. offered pt po medication for her pain and she refused, she states she doesn't think it will help and that she needs something stronger.  She states \"if I could just have one dose of pain medication I will feel better and go home\" and \"I don't want to have to go to another hospital to be treated for my pain. \" pt did not want the po medication that I was offering and so she stated that she wanted to go home. Pt OARRs score of 430. I do not suspect ACS at this time. PERC negative. Pt non toxic appearing with stable vitals and stable for discharge. f/u with cardiology in 1 day. Return to the ED for worsening sx. Given warning signs for which she should return. Pt understands and agrees to plan, all questions answered. REASSESSMENT          CRITICAL CARE TIME   Total Critical Care time was 0 minutes, excluding separately reportable procedures. There was a high probability of clinically significant/life threatening deterioration in the patient's condition which required my urgent intervention. CONSULTS:  None    PROCEDURES:  Unless otherwise noted below, none     Procedures        FINAL IMPRESSION      1. Chest pain, unspecified type    2. Sarcoidosis          DISPOSITION/PLAN   DISPOSITION Decision To Discharge 12/14/2020 12:59:05 AM      PATIENT REFERRED TO:  SUDEEP Dorsey - [facility]  [address] [facility]  [phone]    Schedule an appointment as soon as possible for a visit in 1 day      [facility]) ED  [address] [facility] [address]  [phone]  Go to   As needed, If symptoms worsen    Blanca Novak MD  [address] [place] [address]  [phone]    Schedule an appointment as soon as possible for a visit in 1 day        DISCHARGE MEDICATIONS:  Discharge Medication List as of 12/14/2020  1:00 AM        Controlled Substances Monitoring:     RX Monitoring 9/25/2020   Attestation -   Periodic Controlled Substance Monitoring No signs of potential drug abuse or diversion identified. ;Possible medication side effects, risk of tolerance/dependence & alternative treatments discussed.    Chronic Pain > 80 MEDD -       (Please note that portions of this note were completed with a voice recognition program. Efforts were made to edit the dictations but occasionally words are mis-transcribed.)    Danny Cruz PA-C (electronically signed)           Danny Cruz PA-C  12/15/20 1946

## 2020-12-16 ENCOUNTER — CARE COORDINATION (OUTPATIENT)
Dept: CARE COORDINATION | Age: 50
End: 2020-12-16

## 2021-01-04 ENCOUNTER — HOSPITAL ENCOUNTER (EMERGENCY)
Age: 51
Discharge: HOME | End: 2021-01-04
Attending: EMERGENCY MEDICINE
Payer: MEDICAID

## 2021-01-04 VITALS
WEIGHT: 255 LBS | SYSTOLIC BLOOD PRESSURE: 160 MMHG | BODY MASS INDEX: 45.18 KG/M2 | RESPIRATION RATE: 20 BRPM | HEIGHT: 63 IN | TEMPERATURE: 98.1 F | DIASTOLIC BLOOD PRESSURE: 85 MMHG | OXYGEN SATURATION: 96 % | HEART RATE: 72 BPM

## 2021-01-04 DIAGNOSIS — R51.9 ACUTE NONINTRACTABLE HEADACHE, UNSPECIFIED HEADACHE TYPE: Primary | ICD-10-CM

## 2021-01-04 PROCEDURE — 99284 EMERGENCY DEPT VISIT MOD MDM: CPT

## 2021-01-04 PROCEDURE — 96375 TX/PRO/DX INJ NEW DRUG ADDON: CPT

## 2021-01-04 PROCEDURE — 2580000003 HC RX 258: Performed by: EMERGENCY MEDICINE

## 2021-01-04 PROCEDURE — 6360000002 HC RX W HCPCS: Performed by: EMERGENCY MEDICINE

## 2021-01-04 PROCEDURE — 96374 THER/PROPH/DIAG INJ IV PUSH: CPT

## 2021-01-04 PROCEDURE — 6370000000 HC RX 637 (ALT 250 FOR IP): Performed by: EMERGENCY MEDICINE

## 2021-01-04 RX ORDER — BUTALBITAL, ACETAMINOPHEN AND CAFFEINE 50; 325; 40 MG/1; MG/1; MG/1
1 TABLET ORAL EVERY 6 HOURS PRN
Qty: 18 TABLET | Refills: 0 | Status: SHIPPED | OUTPATIENT
Start: 2021-01-04 | End: 2021-07-07 | Stop reason: SDUPTHER

## 2021-01-04 RX ORDER — BUTALBITAL, ACETAMINOPHEN AND CAFFEINE 300; 40; 50 MG/1; MG/1; MG/1
1 CAPSULE ORAL ONCE
Status: COMPLETED | OUTPATIENT
Start: 2021-01-04 | End: 2021-01-04

## 2021-01-04 RX ORDER — ACETAMINOPHEN 325 MG/1
650 TABLET ORAL ONCE
Status: COMPLETED | OUTPATIENT
Start: 2021-01-04 | End: 2021-01-04

## 2021-01-04 RX ORDER — BUTALBITAL, ACETAMINOPHEN AND CAFFEINE 300; 40; 50 MG/1; MG/1; MG/1
1 CAPSULE ORAL EVERY 4 HOURS PRN
Status: DISCONTINUED | OUTPATIENT
Start: 2021-01-04 | End: 2021-01-04

## 2021-01-04 RX ORDER — 0.9 % SODIUM CHLORIDE 0.9 %
500 INTRAVENOUS SOLUTION INTRAVENOUS ONCE
Status: DISCONTINUED | OUTPATIENT
Start: 2021-01-04 | End: 2021-01-04

## 2021-01-04 RX ORDER — METOCLOPRAMIDE HYDROCHLORIDE 5 MG/ML
10 INJECTION INTRAMUSCULAR; INTRAVENOUS ONCE
Status: COMPLETED | OUTPATIENT
Start: 2021-01-04 | End: 2021-01-04

## 2021-01-04 RX ORDER — DIPHENHYDRAMINE HYDROCHLORIDE 50 MG/ML
25 INJECTION INTRAMUSCULAR; INTRAVENOUS ONCE
Status: COMPLETED | OUTPATIENT
Start: 2021-01-04 | End: 2021-01-04

## 2021-01-04 RX ADMIN — SODIUM CHLORIDE 500 ML: 9 INJECTION, SOLUTION INTRAVENOUS at 02:45

## 2021-01-04 RX ADMIN — BUTALBITAL, ACETAMINOPHEN, AND CAFFEINE 1 CAPSULE: 50; 300; 40 CAPSULE ORAL at 03:27

## 2021-01-04 RX ADMIN — DIPHENHYDRAMINE HYDROCHLORIDE 50 MG: 50 INJECTION INTRAMUSCULAR; INTRAVENOUS at 02:46

## 2021-01-04 RX ADMIN — METOCLOPRAMIDE HYDROCHLORIDE 10 MG: 5 INJECTION INTRAMUSCULAR; INTRAVENOUS at 02:46

## 2021-01-04 RX ADMIN — ACETAMINOPHEN 650 MG: 325 TABLET, FILM COATED ORAL at 02:45

## 2021-01-04 ASSESSMENT — ENCOUNTER SYMPTOMS
VOMITING: 1
NAUSEA: 1

## 2021-01-04 ASSESSMENT — PAIN DESCRIPTION - DESCRIPTORS: DESCRIPTORS: HEADACHE

## 2021-01-04 ASSESSMENT — PAIN DESCRIPTION - PAIN TYPE: TYPE: ACUTE PAIN

## 2021-01-04 ASSESSMENT — PAIN SCALES - GENERAL: PAINLEVEL_OUTOF10: 10

## 2021-01-04 ASSESSMENT — PAIN DESCRIPTION - FREQUENCY: FREQUENCY: CONTINUOUS

## 2021-01-04 NOTE — ED TRIAGE NOTES
Pt states that she has had a migraine since Sunday morning. Pt has taken home medciations with no change. Pt states th she is also vomiting. Pt states that her b/p was high at her home reading. Pt states that it was 150/ unknown.

## 2021-01-04 NOTE — ED PROVIDER NOTES
[address] [facility] ED  EMERGENCY DEPARTMENT ENCOUNTER      Pt Name: Reginaldo Neal  MRN: [id number]  [place] 1970  Date of evaluation: 1/4/2021  Provider: Cristina Castaneda MD    CHIEF COMPLAINT       Chief Complaint   Patient presents with    Migraine         HISTORY OF PRESENT ILLNESS   (Location/Symptom, Timing/Onset, Context/Setting, Quality, Duration, Modifying Factors, Severity)  Note limiting factors. 46-year-old female presenting with a headache. Headache started yesterday and was gradual in onset. She normally takes Fioricet but she was unable to find her prescription. Symptoms have been constant since onset. Nothing noted to make symptoms better. History of headaches like this in the past.  No fevers or neck pain. Denies numbness tingling. No visual changes. Some nausea and 1-2 episodes of vomiting. Nursing Notes were reviewed. REVIEW OF SYSTEMS    (2-9 systems for level 4, 10 or more for level 5)     Review of Systems   Gastrointestinal: Positive for nausea and vomiting. Neurological: Positive for headaches. All other systems reviewed and are negative. Except as noted above the remainder of the review of systems was reviewed and negative.        PAST MEDICAL HISTORY     Past Medical History:   Diagnosis Date    Anxiety     Arthritis     Asthma     Bronchopneumonia     Cancer ([address]     renal    Cerebral artery occlusion with cerebral infarction (HCC)     Chronic bilateral low back pain with sciatica     Chronic kidney disease     Chronic obstructive lung disease ([address] 7/26/2019    Depression     Fibromyalgia     Hypertension     Insulin dependent type 2 diabetes mellitus, uncontrolled ([address] 8/3/2018    Localized enlarged lymph nodes 10/26/2018    Mixed headache     Pure hyperglyceridemia 5/19/2017    Sarcoidosis     Sleep apnea     does not wear cpap    Thyroid goiter          SURGICAL HISTORY       Past Surgical History:   Procedure Laterality Date  BRONCHOSCOPY  10/26/2018    DR. STEARNS    KIDNEY REMOVAL Right 08/2016    KIDNEY REMOVAL Right 2016    LUNG BIOPSY Right 10/2018    THYROID LOBECTOMY Right 6/13/14    THYROIDECTOMY  02/21/2019    DR. MAGDALENO    THYROIDECTOMY  2018    URETER STENT PLACEMENT Left 08/2016         CURRENT MEDICATIONS       Previous Medications    ALBUTEROL (PROVENTIL) (2.5 MG/3ML) 0.083% NEBULIZER SOLUTION    Take 3 mLs by nebulization every 4 hours as needed for Wheezing    ALBUTEROL SULFATE HFA (PROVENTIL HFA) 108 (90 BASE) MCG/ACT INHALER    Inhale 2 puffs into the lungs every 6 hours as needed for Wheezing    ALPRAZOLAM (XANAX) 0.25 MG TABLET        ATORVASTATIN (LIPITOR) 40 MG TABLET    Take 1 tablet by mouth nightly    BLOOD GLUCOSE MONITORING SUPPL (ONETOUCH VERIO) W/DEVICE KIT    TEST 3 TIMES DAILY AS NEEDED    BLOOD GLUCOSE TEST STRIPS (EXACTECH TEST) STRIP    1 each by In Vitro route 3 times daily (Accu-check test strips) As needed.  DX:E11.65    BLOOD GLUCOSE TEST STRIPS (ONETOUCH VERIO) STRIP    TEST 3 TIMES DAILY AS NEEDED    BUSPIRONE (BUSPAR) 15 MG TABLET    TAKE 1 TABLET BY MOUTH THREE TIMES DAILY    CETIRIZINE (ZYRTEC) 10 MG TABLET    TAKE 1 TABLET BY MOUTH EVERY NIGHT AT BEDTIME AS NEEDED FOR ALLERGIES    DULOXETINE (CYMBALTA) 60 MG EXTENDED RELEASE CAPSULE    TK 1 C PO QD    DULOXETINE (CYMBALTA) 60 MG EXTENDED RELEASE CAPSULE    Take by mouth every 24 hours    FLUTICASONE (FLONASE) 50 MCG/ACT NASAL SPRAY    1 spray by Nasal route daily    INSULIN DEGLUDEC (TRESIBA FLEXTOUCH) 100 UNIT/ML SOPN    INJECT 30 UNITS UNDER THE SKIN NIGHTLY    INSULIN LISPRO (HUMALOG) 100 UNIT/ML INJECTION VIAL    INJECT 4 UNITS UNDER THE SKIN THREE TIMES DAILY AS NEEDED FOR HIGH BLOOD SUGAR    INSULIN PEN NEEDLE (B-D ULTRAFINE III SHORT PEN) 31G X 8 MM MISC    Inject 1 each into the skin 3 times daily    INSULIN SYRINGE-NEEDLE U-100 30G X 1/2\" 1 ML MISC    1 each by Does not apply route daily    LEVOTHYROXINE (SYNTHROID) 150 MCG TABLET    TK 1 T PO ONCE D    MAGNESIUM OXIDE (MAG-OX) 400 (241.3 MG) MG TABS TABLET    TAKE 1/2 TABLET BY MOUTH DAILY    METOCLOPRAMIDE (REGLAN) 10 MG TABLET    Take 1 tablet by mouth 4 times daily    METOPROLOL TARTRATE (LOPRESSOR) 25 MG TABLET    Take 1 tablet by mouth 2 times daily    MONTELUKAST (SINGULAIR) 10 MG TABLET    Take 1 tab nightly at supper for breathing/allergies    ONE TOUCH ULTRASOFT LANCETS MISC    TEST 3 TIMES DAILY AS NEEDED    PANTOPRAZOLE (PROTONIX) 20 MG TABLET    Take 2 tablets by mouth daily    PREGABALIN (LYRICA) 150 MG CAPSULE    Take 1 capsule by mouth 3 times daily for 30 days. PROMETHAZINE (PHENERGAN) 25 MG TABLET    WARNING:  May cause drowsiness. May impair ability to operate vehicles or machinery. Do not use in combination with alcohol. ! Tablet every 6 hours as needed in conjunction with Ultram for headaches    SUMATRIPTAN (IMITREX) 25 MG TABLET    TAKE 1 TABLET BY MOUTH 1 TIME AS NEEDED FOR MIGRAINE    TIZANIDINE (ZANAFLEX) 4 MG TABLET    Take 1 tablet by mouth every 8 hours as needed (back spasm)    TRAMADOL (ULTRAM) 50 MG TABLET    Take 50 mg by mouth every 6 hours as needed for Pain.        ALLERGIES     Shellfish-derived products, Ibuprofen, Ketorolac, Morphine, Other, Propoxyphene n-acetaminophen, and Toradol [ketorolac tromethamine]    FAMILY HISTORY       Family History   Problem Relation Age of Onset    Cancer Father     Diabetes Father     Allergy (Severe) Father     Heart Attack Father     Prostate Cancer Father     High Blood Pressure Mother     Diabetes Mother     Arthritis Mother     High Cholesterol Mother     Vision Loss Mother     Alcohol Abuse Neg Hx     Anemia Neg Hx     Arrhythmia Neg Hx     Asthma Neg Hx     Atrial Fibrillation Neg Hx     Birth Defects Neg Hx     Breast Cancer Neg Hx     Coronary Art Dis Neg Hx     Colon Cancer Neg Hx     Depression Neg Hx     Early Death Neg Hx     Hearing Loss Neg Hx     Heart Disease Neg Hx  Learning Disabilities Neg Hx     Kidney Disease Neg Hx     Mental Illness Neg Hx     Mental Retardation Neg Hx     Miscarriages / Stillbirths Neg Hx     Obesity Neg Hx     Osteoporosis Neg Hx     Stroke Neg Hx     Substance Abuse Neg Hx           SOCIAL HISTORY       Social History     Socioeconomic History    Marital status: Legally      Spouse name: None    Number of children: None    Years of education: None    Highest education level: None   Occupational History    None   Social Needs    Financial resource strain: None    Food insecurity     Worry: None     Inability: None    Transportation needs     Medical: None     Non-medical: None   Tobacco Use    Smoking status: Former Smoker     Packs/day: 0.50     Years: 15.00     Pack years: 7.50     Types: Cigarettes     Start date: 2014     Quit date: 2015     Years since quittin.9    Smokeless tobacco: Former User     Quit date: 3/23/2016   Substance and Sexual Activity    Alcohol use: Not Currently     Alcohol/week: 0.0 standard drinks     Comment: occasionally    Drug use: Yes     Frequency: 3.0 times per week     Types: Marijuana    Sexual activity: Yes     Partners: Male   Lifestyle    Physical activity     Days per week: None     Minutes per session: None    Stress: None   Relationships    Social connections     Talks on phone: None     Gets together: None     Attends Buddhist service: None     Active member of club or organization: None     Attends meetings of clubs or organizations: None     Relationship status: None    Intimate partner violence     Fear of current or ex partner: None     Emotionally abused: None     Physically abused: None     Forced sexual activity: None   Other Topics Concern    None   Social History Narrative    None       SCREENINGS               PHYSICAL EXAM    (up to 7 for level 4, 8 or more for level 5)     ED Triage Vitals [21 7797]   BP Temp Temp Source Pulse Resp SpO2 to display    All other labs were within normal range or not returned as of this dictation. EMERGENCY DEPARTMENT COURSE and DIFFERENTIAL DIAGNOSIS/MDM:   Vitals:    Vitals:    01/04/21 0207   BP: (!) 160/85   Pulse: 72   Resp: 20   Temp: 98.1 °F (36.7 °C)   TempSrc: Oral   SpO2: 96%   Weight: 255 lb (115.7 kg)   Height: 5' 3\" (1.6 m)       MDM  Number of Diagnoses or Management Options  Acute nonintractable headache, unspecified headache type  Diagnosis management comments: 41-year-old female with history of headaches presenting with headache. Given Reglan, Benadryl and Fioricet with complete relief of symptoms. I did provide her with a prescription for home. No red flag symptoms to warrant imaging. Patient will be discharged home in good condition. Patient has been hemodynamically stable throughout ED course and is appropriate for outpatient follow up. Patient should follow up with PCP in 2-3 days or return to ED immediately for any new or worsening symptoms. Patient is well appearing on discharge and agreeable with plan of care. Procedures    CRITICAL CARE TIME   Total Critical Care time was 0 minutes, excluding separately reportable procedures. There was a high probability of clinically significant/life threatening deterioration in the patient's condition which required my urgent intervention. FINAL IMPRESSION      1.  Acute nonintractable headache, unspecified headache type          DISPOSITION/PLAN   DISPOSITION Decision To Discharge 01/04/2021 03:28:00 AM      (Please note that portions of this note were completed with a voice recognition program.  Efforts were made to edit the dictations but occasionally words are mis-transcribed.)    Stephanie Suazo MD (electronically signed)  Attending Emergency Physician        Stephanie Suazo MD  01/04/21 2352

## 2021-01-05 ENCOUNTER — TELEPHONE (OUTPATIENT)
Dept: PHARMACY | Facility: CLINIC | Age: 51
End: 2021-01-05

## 2021-01-05 ENCOUNTER — CARE COORDINATION (OUTPATIENT)
Dept: CARE COORDINATION | Age: 51
End: 2021-01-05

## 2021-01-05 SDOH — SOCIAL STABILITY: SOCIAL NETWORK: HOW OFTEN DO YOU ATTEND CHURCH OR RELIGIOUS SERVICES?: NEVER

## 2021-01-05 SDOH — ECONOMIC STABILITY: FOOD INSECURITY: WITHIN THE PAST 12 MONTHS, THE FOOD YOU BOUGHT JUST DIDN'T LAST AND YOU DIDN'T HAVE MONEY TO GET MORE.: SOMETIMES TRUE

## 2021-01-05 SDOH — SOCIAL STABILITY: SOCIAL NETWORK: IN A TYPICAL WEEK, HOW MANY TIMES DO YOU TALK ON THE PHONE WITH FAMILY, FRIENDS, OR NEIGHBORS?: THREE TIMES A WEEK

## 2021-01-05 SDOH — HEALTH STABILITY: PHYSICAL HEALTH: ON AVERAGE, HOW MANY MINUTES DO YOU ENGAGE IN EXERCISE AT THIS LEVEL?: 0 MIN

## 2021-01-05 SDOH — SOCIAL STABILITY: SOCIAL NETWORK: ARE YOU MARRIED, WIDOWED, DIVORCED, SEPARATED, NEVER MARRIED, OR LIVING WITH A PARTNER?: SEPARATED

## 2021-01-05 SDOH — ECONOMIC STABILITY: TRANSPORTATION INSECURITY
IN THE PAST 12 MONTHS, HAS LACK OF TRANSPORTATION KEPT YOU FROM MEETINGS, WORK, OR FROM GETTING THINGS NEEDED FOR DAILY LIVING?: NO

## 2021-01-05 SDOH — ECONOMIC STABILITY: INCOME INSECURITY: HOW HARD IS IT FOR YOU TO PAY FOR THE VERY BASICS LIKE FOOD, HOUSING, MEDICAL CARE, AND HEATING?: SOMEWHAT HARD

## 2021-01-05 SDOH — ECONOMIC STABILITY: FOOD INSECURITY: WITHIN THE PAST 12 MONTHS, YOU WORRIED THAT YOUR FOOD WOULD RUN OUT BEFORE YOU GOT MONEY TO BUY MORE.: SOMETIMES TRUE

## 2021-01-05 SDOH — SOCIAL STABILITY: SOCIAL INSECURITY: WITHIN THE LAST YEAR, HAVE YOU BEEN AFRAID OF YOUR PARTNER OR EX-PARTNER?: NO

## 2021-01-05 SDOH — HEALTH STABILITY: PHYSICAL HEALTH: ON AVERAGE, HOW MANY DAYS PER WEEK DO YOU ENGAGE IN MODERATE TO STRENUOUS EXERCISE (LIKE A BRISK WALK)?: 0 DAYS

## 2021-01-05 ASSESSMENT — ENCOUNTER SYMPTOMS: DYSPNEA ASSOCIATED WITH: EXERTION

## 2021-01-05 NOTE — LETTER
1/5/2021    [address] [place] [id number]    Dear Demetra Mcghee,    I enjoyed speaking with you and wanted to send some additional information. SUDEEP Dorado CNP and I will work together to ensure your needs are met and help you achieve your health goals. We are committed to walk with you on this journey and look forward to working with you. Please feel free to contact me with any questions or concerns. I am available by phone or for appointments at the office. You can reach me at [phone].       In good health,     Sophy Bowers RN     Encl: Diabetes and COPD Zones

## 2021-01-05 NOTE — TELEPHONE ENCOUNTER
----- Message from Torrie Vance RN sent at 1/5/2021 10:32 AM EST -----  Regarding: [facility] Clinical Rx  [facility] Clinical Rx Referral

## 2021-01-05 NOTE — CARE COORDINATION
Ambulatory Care Coordination Note  1/5/2021  CM Risk Score: 11  Charlson 10 Year Mortality Risk Score: 100%     ACC: Carla Blankenship RN    Summary Note:  Patient contacted by phone for Care Coordination enrollment. Introduced myself and the Care Coordination program. Patient agrees to participate in program. Completed initial Care Coordination assessment. Reconciled home medications. Discussed Clinical Rx referral. Patient verbalizes agreement. Discussed referral to [address] [place] [address]. Patient agrees to referral. Reviewed ACP documents. Health Care Decision Maker updated. Patient does not have Advance Directive Living Will. Patient declined referral to ACP at this time. Instructed patient on availability of same day, next day, and Walk-In care. Patient sin in ER on 1/4/2021 for Migraine. Patient c/o headache. Patient states she did have some nausea yesterday with headache. Denies any symptoms of fever, chills, body aches, loss of sense of taste/smell, chest pain, dizziness, sore throat, cough, or congestion. Provided education on COVID-19 precautions including S/S and precautions including  hand washing, social distancing, face coverings, and cleaning of high touch surfaces. Reviewed ER discharge instructions, new medications, and instructions to follow-up with ER. Patient to call office today to schedule virtual visit for ER follow-up. Referral to Clinical Rx for ACC review made by In-Basket messages. A referral to outpatient dietician for Dx Diabetes and COPD [facility] by [facility]. Referral to [facility]  Referral for community resources and long term planning sent by [facility]. A CC Welcome Letter with Diabetes and COPD Zone sheets printed and mailed to patients home address.        Lab Results   Component Value Date    LABA1C 6.4 (H) 01/10/2020    LABA1C 6.3 (H) 05/19/2019    LABA1C 5.5 04/18/2019       Diabetes Assessment    Medic Alert ID: No  Meal Planning: Avoidance of concentrated sweets How often do you test your blood sugar?: Daily, Bedtime   Do you have barriers with adherence to non-pharmacologic self-management interventions? (Nutrition/Exercise/Self-Monitoring): Yes   Have you ever had to go to the ED for symptoms of low blood sugar?: Yes       No patient-reported symptoms   Do you have hyperglycemia symptoms?: Yes   Frequency of Episodes: 1 Per: Month   Do you have hypoglycemia symptoms?: No   Last Blood Sugar Value: 170   Blood Sugar Monitoring Regimen: Morning Fasting, At Bedtime   Blood Sugar Trends: No Change       and   COPD Assessment    Does the patient understand envrionmental exposure?: Yes  Is the patient able to verbalize Rescue vs. Long Acting medications?: Yes  Does the patient have a nebulizer?: Yes  Does the patient use a space with inhaled medications?: No     No patient-reported symptoms         Symptoms:     Symptom course: stable  Breathlessness: exertion  Increase use of rapid acting/rescue inhaled medications?: Yes  Change in chronic cough?: No/At Baseline  Change in sputum?: No/At Baseline  Sputum characteristics: Unable to Specify  Self Monitoring - SaO2: No  Have you had a recent diagnosis of pneumonia either by PCP or at a hospital?: No           Ambulatory Care Coordination Assessment    Care Coordination Protocol  Program Enrollment: Complex Care  Referral from Primary Care Provider: No  Week 1 - Initial Assessment     Do you have all of your prescriptions and are they filled?: Yes  Barriers to medication adherence: Forgets to take  Are you able to afford your medications?: Yes  How often do you have trouble taking your medications the way you have been told to take them?: I always take them as prescribed. Do you have Home O2 Therapy?: No      Ability to seek help/take action for Emergent Urgent situations i.e. fire, crime, inclement weather or health crisis. : Independent  Ability to ambulate to restroom: Independent Ability handle personal hygeine needs (bathing/dressing/grooming): Independent  Ability to manage Medications: Independent  Ability to prepare Food Preparation: Independent  Ability to maintain home (clean home, laundry): Independent  Ability to drive and/or has transportation: Needs Assistance  Ability to manage finances: Independent  Is patient able to live independently?: Yes     Current Housing: Private Residence        Per the Fall Risk Screening, did the patient have 2 or more falls or 1 fall with injury in the past year?: No     Frequent urination at night?: No  Do you use rails/bars?: No  Do you have a non-slip tub mat?: Yes     Are you experiencing loss of meaning?: No  Are you experiencing loss of hope and peace?: No     Thinking about your patient's physical health needs, are there any symptoms or problems (risk indicators) you are unsure about that require further investigation?: No identified areas of uncertainly or problems already being investigated   Are the patients physical health problems impacting on their mental well-being?: Mild impact on mental well-being e.g. \"\"feeling fed-up\"\", \"\"reduced enjoyment\"\"   Are there any problems with your patients lifestyle behaviors (alcohol, drugs, diet, exercise) that are impacting on physical or mental well-being?: No identified areas of concern   Do you have any other concerns about your patients mental well-being?  How would you rate their severity and impact on the patient?: Mild problems - don't interfere with function   How would you rate their home environment in terms of safety and stability (including domestic violence, insecure housing, neighbor harassment)?: Safe, stable, but with some inconsistency   How do daily activities impact on the patient's well-being? (include current or anticipated unemployment, work, caregiving, access to transportation or other): Some general dissatisfaction but no concern How would you rate their social network (family, work, friends)?: Adequate participation with social networks   How would you rate their financial resources (including ability to afford all required medical care)?: Financially secure, some resource challenges   How wells does the patient now understand their health and well-being (symptoms, signs or risk factors) and what they need to do to manage their health?: Reasonable to good understanding and already engages in managing health or is willing to undertake better management   How well do you think your patient can engage in healthcare discussions? (Barriers include language, deafness, aphasia, alcohol or drug problems, learning difficulties, concentration): Clear and open communication, no identified barriers   Do other services need to be involved to help this patient?: Other care/services in place and adequate   Are current services involved with this patient well-coordinated? (Include coordination with other services you are now recommendation): Required care/services in place and adequately coordinated   Suggested Interventions and Community Resources   Other Services or Interventions: 1/5/2021 instructed on same day, next day, and Walk-In Care. Medi Set or Pill Pack: Not Started   Pharmacist: In Process   Registered Dietician: In Process   Social Work: In Process   Other Services: Declined   Zone Management Tools: In Process         Set up/Review an Education Plan, Set up/Review Goals, Schedule an appointment with the patient's PCP              Prior to Admission medications    Medication Sig Start Date End Date Taking? Authorizing Provider   butalbital-acetaminophen-caffeine (FIORICET, ESGIC) -95 MG per tablet Take 1 tablet by mouth every 6 hours as needed for Headaches 1/4/21  Yes Yamilet Stephen MD   pregabalin (LYRICA) 150 MG capsule Take 1 capsule by mouth 3 times daily for 30 days.  12/14/20 1/13/21 Yes Keren Vivar, SUDEEP - CNP Insulin Degludec (TRESIBA FLEXTOUCH) 100 UNIT/ML SOPN INJECT 30 UNITS UNDER THE SKIN NIGHTLY 11/17/20  Yes SUDEEP Hannon CNP   DULoxetine (CYMBALTA) 60 MG extended release capsule Take by mouth every 24 hours   Yes Historical Provider, MD   magnesium oxide (MAG-OX) 400 (241.3 Mg) MG TABS tablet TAKE 1/2 TABLET BY MOUTH DAILY 11/2/20  Yes SUDEEP Hannon CNP   tiZANidine (ZANAFLEX) 4 MG tablet Take 1 tablet by mouth every 8 hours as needed (back spasm) 10/13/20  Yes SUDEEP Hannon CNP   busPIRone (BUSPAR) 15 MG tablet TAKE 1 TABLET BY MOUTH THREE TIMES DAILY 9/28/20  Yes SUDEEP Hannon CNP   albuterol sulfate HFA (PROVENTIL HFA) 108 (90 Base) MCG/ACT inhaler Inhale 2 puffs into the lungs every 6 hours as needed for Wheezing 9/9/20  Yes SUDEEP Hannon CNP   cetirizine (ZYRTEC) 10 MG tablet TAKE 1 TABLET BY MOUTH EVERY NIGHT AT BEDTIME AS NEEDED FOR ALLERGIES 8/27/20  Yes SUDEEP Hannon CNP   insulin lispro (HUMALOG) 100 UNIT/ML injection vial INJECT 4 UNITS UNDER THE SKIN THREE TIMES DAILY AS NEEDED FOR HIGH BLOOD SUGAR 7/10/20  Yes SUDEEP Hannon CNP   levothyroxine (SYNTHROID) 150 MCG tablet TK 1 T PO ONCE D 6/28/20  Yes Historical Provider, MD   SUMAtriptan (IMITREX) 25 MG tablet TAKE 1 TABLET BY MOUTH 1 TIME AS NEEDED FOR MIGRAINE 5/1/20  Yes SUDEEP Hannon CNP   promethazine (PHENERGAN) 25 MG tablet WARNING:  May cause drowsiness. May impair ability to operate vehicles or machinery. Do not use in combination with alcohol. ! Tablet every 6 hours as needed in conjunction with Ultram for headaches 1/18/20  Yes Lisha Corbett PA-C   blood glucose test strips (EXACTECH TEST) strip 1 each by In Vitro route 3 times daily (Accu-check test strips) As needed.  DX:E11.65 12/2/19  Yes Lilibeth Lovelace Place, APRN - CNP   ONE TOUCH ULTRASOFT LANCETS MISC TEST 3 TIMES DAILY AS NEEDED 12/2/19  Yes Julián Reyes, APRN - CNP albuterol (PROVENTIL) (2.5 MG/3ML) 0.083% nebulizer solution Take 3 mLs by nebulization every 4 hours as needed for Wheezing 11/5/19  Yes Maria M Servetas, DO   metoclopramide (REGLAN) 10 MG tablet Take 1 tablet by mouth 4 times daily 10/27/19  Yes SUDEEP Murphy CNP   atorvastatin (LIPITOR) 40 MG tablet Take 1 tablet by mouth nightly 9/11/19  Yes [facility] DO   metoprolol tartrate (LOPRESSOR) 25 MG tablet Take 1 tablet by mouth 2 times daily 9/11/19  Yes [facility] DO   fluticasone (FLONASE) 50 MCG/ACT nasal spray 1 spray by Nasal route daily 6/4/19  Yes SUDEEP Clements CNP   traMADol (ULTRAM) 50 MG tablet Take 50 mg by mouth every 6 hours as needed for Pain.    Yes Historical Provider, MD   Insulin Syringe-Needle U-100 30G X 1/2\" 1 ML MISC 1 each by Does not apply route daily 11/16/18  Yes Carmelina Alvarez, DO   blood glucose test strips (ONETOUCH VERIO) strip TEST 3 TIMES DAILY AS NEEDED 8/16/18  Yes Carmelina Alvarez, DO   Blood Glucose Monitoring Suppl (ONETOUCH VERIO) w/Device KIT TEST 3 TIMES DAILY AS NEEDED 8/16/18  Yes Carmelina Alvarez, DO   montelukast (SINGULAIR) 10 MG tablet Take 1 tab nightly at supper for breathing/allergies 6/28/18  Yes Carmelina Alvarez, DO   ALPRAZolam Danetta Cowden) 0.25 MG tablet  3/23/20   Historical Provider, MD   pantoprazole (PROTONIX) 20 MG tablet Take 2 tablets by mouth daily  Patient not taking: Reported on 1/5/2021 11/4/20   Kyle Gibson PA-C   Insulin Pen Needle (B-D ULTRAFINE III SHORT PEN) 31G X 8 MM MISC Inject 1 each into the skin 3 times daily 2/19/20 8/17/20  SUDEEP Nicholson CNP       Future Appointments   Date Time Provider Valerie Cosby   11/5/2021  9:15 AM MD Silvio Muller [facility]

## 2021-01-06 ENCOUNTER — CARE COORDINATION (OUTPATIENT)
Dept: CARE COORDINATION | Age: 51
End: 2021-01-06

## 2021-01-08 ENCOUNTER — CARE COORDINATION (OUTPATIENT)
Dept: CARE COORDINATION | Age: 51
End: 2021-01-08

## 2021-01-08 ENCOUNTER — SCHEDULED TELEPHONE ENCOUNTER (OUTPATIENT)
Dept: PHARMACY | Facility: CLINIC | Age: 51
End: 2021-01-08

## 2021-01-08 NOTE — TELEPHONE ENCOUNTER
Niko Baird, SUDEEP - CNP, medication review completed with patient:  - After discussion, patient is using her albuterol inhaler a couple times a day and scored a 26 on her COPD assessment test which indicates her COPD is poorly controlled. Patient would benefit from adding a maintenance inhaler such as Stiolto to help control her COPD. Patient is agreeable to starting an inhaler. Please discuss with patient at appointment or route back to me to discuss with patient. See note below for complete details. Thank you,  Jaiden Flores, PharmD, [address] [place], [place] [address]   Direct: [phone]  Department, toll free: [phone], option 7  =======================================================    CLINICAL PHARMACY NOTE - Medication Review  Patient outreach to review medications - Spoke with patient. SUBJECTIVE/OBJECTIVE:   Arjun Blevins is a 53 y.o. female referred to a clinical pharmacy specialist by care coordination general med review. Medications:  Current Outpatient Medications   Medication Sig Dispense Refill    butalbital-acetaminophen-caffeine (FIORICET, ESGIC) -40 MG per tablet Take 1 tablet by mouth every 6 hours as needed for Headaches 18 tablet 0    pregabalin (LYRICA) 150 MG capsule Take 1 capsule by mouth 3 times daily for 30 days.  90 capsule 0    Insulin Degludec (TRESIBA FLEXTOUCH) 100 UNIT/ML SOPN INJECT 30 UNITS UNDER THE SKIN NIGHTLY 15 mL 3    DULoxetine (CYMBALTA) 60 MG extended release capsule Take by mouth every 24 hours      ALPRAZolam (XANAX) 0.25 MG tablet       pantoprazole (PROTONIX) 20 MG tablet Take 2 tablets by mouth daily (Patient not taking: Reported on 1/5/2021) 30 tablet 0    magnesium oxide (MAG-OX) 400 (241.3 Mg) MG TABS tablet TAKE 1/2 TABLET BY MOUTH DAILY 30 tablet 5    tiZANidine (ZANAFLEX) 4 MG tablet Take 1 tablet by mouth every 8 hours as needed (back spasm) 30 tablet 0    busPIRone (BUSPAR) 15 MG tablet TAKE 1 TABLET BY MOUTH THREE TIMES DAILY 90 tablet 5    albuterol sulfate HFA (PROVENTIL HFA) 108 (90 Base) MCG/ACT inhaler Inhale 2 puffs into the lungs every 6 hours as needed for Wheezing 1 Inhaler 3    cetirizine (ZYRTEC) 10 MG tablet TAKE 1 TABLET BY MOUTH EVERY NIGHT AT BEDTIME AS NEEDED FOR ALLERGIES 30 tablet 5    insulin lispro (HUMALOG) 100 UNIT/ML injection vial INJECT 4 UNITS UNDER THE SKIN THREE TIMES DAILY AS NEEDED FOR HIGH BLOOD SUGAR 10 mL 0    levothyroxine (SYNTHROID) 150 MCG tablet TK 1 T PO ONCE D      SUMAtriptan (IMITREX) 25 MG tablet TAKE 1 TABLET BY MOUTH 1 TIME AS NEEDED FOR MIGRAINE 9 tablet 1    Insulin Pen Needle (B-D ULTRAFINE III SHORT PEN) 31G X 8 MM MISC Inject 1 each into the skin 3 times daily 100 each 5    promethazine (PHENERGAN) 25 MG tablet WARNING:  May cause drowsiness. May impair ability to operate vehicles or machinery. Do not use in combination with alcohol. ! Tablet every 6 hours as needed in conjunction with Ultram for headaches 20 tablet 0    blood glucose test strips (EXACTECH TEST) strip 1 each by In Vitro route 3 times daily (Accu-check test strips) As needed. DX:E11.65 300 strip 5    ONE TOUCH ULTRASOFT LANCETS MISC TEST 3 TIMES DAILY AS NEEDED 300 each 3    albuterol (PROVENTIL) (2.5 MG/3ML) 0.083% nebulizer solution Take 3 mLs by nebulization every 4 hours as needed for Wheezing 120 each 3    metoclopramide (REGLAN) 10 MG tablet Take 1 tablet by mouth 4 times daily 120 tablet 0    atorvastatin (LIPITOR) 40 MG tablet Take 1 tablet by mouth nightly 30 tablet 3    metoprolol tartrate (LOPRESSOR) 25 MG tablet Take 1 tablet by mouth 2 times daily 60 tablet 0    fluticasone (FLONASE) 50 MCG/ACT nasal spray 1 spray by Nasal route daily 1 Bottle 3    traMADol (ULTRAM) 50 MG tablet Take 50 mg by mouth every 6 hours as needed for Pain.       Insulin Syringe-Needle U-100 30G X 1/2\" 1 ML MISC 1 each by Does not apply route daily 100 each 3    blood glucose test strips (ONETOUCH VERIO) strip TEST 3 TIMES DAILY AS NEEDED 300 each 3    Blood Glucose Monitoring Suppl (ONETOUCH VERIO) w/Device KIT TEST 3 TIMES DAILY AS NEEDED 1 kit 0    montelukast (SINGULAIR) 10 MG tablet Take 1 tab nightly at supper for breathing/allergies 30 tablet 5     No current facility-administered medications for this visit. Additional Medications (including OTC/Herbal Supplements):  · n/a    Allergies: Allergies   Allergen Reactions    Shellfish-Derived Products     Ibuprofen     Ketorolac     Morphine Other (See Comments)     MADE PATIENT VERY SICK VOMITING    Other     Propoxyphene N-Acetaminophen      Other reaction(s): Hives    Toradol [Ketorolac Tromethamine]      Pt states she cannot take Toradol because she has only 1 kidney. Pertinent Labs/Vitals:  BP Readings from Last 3 Encounters:   01/04/21 (!) 160/85   12/14/20 104/67   11/10/20 129/63     No results found for: LABMICR, [id number]  Lab Results   Component Value Date    LABA1C 6.4 (H) 01/10/2020    LABA1C 6.3 (H) 05/19/2019    LABA1C 5.5 04/18/2019     Lab Results   Component Value Date    CHOL 244 (H) 09/11/2019    TRIG 184 (H) 09/11/2019    HDL 41 09/11/2019    LDLCALC 166 (H) 09/11/2019     ALT   Date Value Ref Range Status   12/14/2020 14 0 - 33 U/L Final     AST   Date Value Ref Range Status   12/14/2020 18 0 - 35 U/L Final     The 10-year ASCVD risk score (Kyree Fitzgerald, et al., 2013) is: 31.4%    Values used to calculate the score:      Age: 53 years      Sex: Female      Is Non- : Yes      Diabetic: Yes      Tobacco smoker: No      Systolic Blood Pressure: 555 mmHg      Is BP treated: Yes      HDL Cholesterol: 41 mg/dL      Total Cholesterol: 244 mg/dL     Lab Results   Component Value Date    CREATININE 1.35 (H) 12/14/2020       CrCl cannot be calculated (Unknown ideal weight.).     Social History:   Social History     Tobacco Use    Smoking status: Former Smoker Packs/day: 0.50     Years: 15.00     Pack years: 7.50     Types: Cigarettes     Start date: 2014     Quit date: 2015     Years since quittin.9    Smokeless tobacco: Former User     Quit date: 3/23/2016   Substance Use Topics    Alcohol use: Not Currently     Alcohol/week: 0.0 standard drinks     Comment: occasionally       Immunizations:   Most Recent Immunizations   Administered Date(s) Administered    Pneumococcal Polysaccharide ([id number]) 2018       Spirometry/PFT results:  n/a    COPD Assessment Test (CAT):   1  I never cough   I cough all the time 2   2 I have no phlegm in my chest My chest is completely full of phlegm 2   3 My chest does not feel tight at all   My chest feels  very tight 3   4 When I walk up a hill or one flight of stairs I am not breathless. When I walk up a hill or one flight of stairs I am very breathless 5   5 I am not limited doing any activities at home I am very limited doing activities  at home 3   6  I am confident leaving my home despite my condition I am not at all confident leaving  my home because of my lung condition 2   7  I sleep soundly I dont sleep soundly because of my  lung condition 5   8 I have lots of  Energy I have no energy at all 4     Total CAT Score 26   *Score 0-5, good to worse; Score <10 is good, higher scores indicate need for treatment improvement    Uses albuterol a couple times per day    ASSESSMENT/PLAN:   - General Assessment:   · Adherence: no issue  · Cost: no issue  · [facility] pharmacy[facility]pharmacies[facility]  · Drug interactions: The following clinically significant interactions were identified via [place] Interaction Analysis as category D or higher: CNS depression and drowsiness, patient counseled on side effects and management. · Renal dosing: No renal adjustments necessary. · Medication monitoring: n/a  · Immunizations: up to date  · Smoking status: former smoker  · Blood pressure: Is at BP target of <140/90.   · Lipids: intervention potential cost savings: 1  Total Interventions Accepted: 1  Time Spent (min): 60

## 2021-01-08 NOTE — CARE COORDINATION
Gustabo Tineo  January 8, 2021    Initial Referral Reason: Diabetes and COPD     Patient Care Team:  SUDEEP Vega CNP as PCP - General (Nurse Practitioner)  SUDEEP Vega CNP as PCP - [facility]  Vazquez Worley MD (Urology)  Alfred Tucker MD as Cardiologist (Cardiology)  Nuria Cardoso RN as [facility] [address] [facility] JOSE ANGEL Bagley, LD as Dietitian (Dietitian)    Past Medical History:    Current Outpatient Medications   Medication Sig Dispense Refill    butalbital-acetaminophen-caffeine (FIORICET, ESGIC) -40 MG per tablet Take 1 tablet by mouth every 6 hours as needed for Headaches 18 tablet 0    pregabalin (LYRICA) 150 MG capsule Take 1 capsule by mouth 3 times daily for 30 days. 90 capsule 0    Insulin Degludec (TRESIBA FLEXTOUCH) 100 UNIT/ML SOPN INJECT 30 UNITS UNDER THE SKIN NIGHTLY 15 mL 3    DULoxetine (CYMBALTA) 60 MG extended release capsule Take 60 mg by mouth every 24 hours       ALPRAZolam (XANAX) 0.25 MG tablet Take 0.25 mg by mouth nightly as needed.        pantoprazole (PROTONIX) 20 MG tablet Take 2 tablets by mouth daily 30 tablet 0    magnesium oxide (MAG-OX) 400 (241.3 Mg) MG TABS tablet TAKE 1/2 TABLET BY MOUTH DAILY 30 tablet 5    tiZANidine (ZANAFLEX) 4 MG tablet Take 1 tablet by mouth every 8 hours as needed (back spasm) 30 tablet 0    busPIRone (BUSPAR) 15 MG tablet TAKE 1 TABLET BY MOUTH THREE TIMES DAILY 90 tablet 5    albuterol sulfate HFA (PROVENTIL HFA) 108 (90 Base) MCG/ACT inhaler Inhale 2 puffs into the lungs every 6 hours as needed for Wheezing 1 Inhaler 3    cetirizine (ZYRTEC) 10 MG tablet TAKE 1 TABLET BY MOUTH EVERY NIGHT AT BEDTIME AS NEEDED FOR ALLERGIES 30 tablet 5    insulin lispro (HUMALOG) 100 UNIT/ML injection vial INJECT 4 UNITS UNDER THE SKIN THREE TIMES DAILY AS NEEDED FOR HIGH BLOOD SUGAR 10 mL 0    levothyroxine (SYNTHROID) 150 MCG tablet Take 150 mcg by mouth Daily       SUMAtriptan (IMITREX) 25 MG tablet TAKE 1 TABLET BY MOUTH 1 TIME AS NEEDED FOR MIGRAINE 9 tablet 1    promethazine (PHENERGAN) 25 MG tablet WARNING:  May cause drowsiness. May impair ability to operate vehicles or machinery. Do not use in combination with alcohol. ! Tablet every 6 hours as needed in conjunction with Ultram for headaches 20 tablet 0    blood glucose test strips (EXACTECH TEST) strip 1 each by In Vitro route 3 times daily (Accu-check test strips) As needed. DX:E11.65 300 strip 5    ONE TOUCH ULTRASOFT LANCETS MISC TEST 3 TIMES DAILY AS NEEDED 300 each 3    albuterol (PROVENTIL) (2.5 MG/3ML) 0.083% nebulizer solution Take 3 mLs by nebulization every 4 hours as needed for Wheezing 120 each 3    metoclopramide (REGLAN) 10 MG tablet Take 1 tablet by mouth 4 times daily 120 tablet 0    atorvastatin (LIPITOR) 40 MG tablet Take 1 tablet by mouth nightly 30 tablet 3    metoprolol tartrate (LOPRESSOR) 25 MG tablet Take 1 tablet by mouth 2 times daily 60 tablet 0    fluticasone (FLONASE) 50 MCG/ACT nasal spray 1 spray by Nasal route daily 1 Bottle 3    traMADol (ULTRAM) 50 MG tablet Take 50 mg by mouth every 6 hours as needed for Pain.  Insulin Syringe-Needle U-100 30G X 1/2\" 1 ML MISC 1 each by Does not apply route daily 100 each 3    blood glucose test strips (ONETOUCH VERIO) strip TEST 3 TIMES DAILY AS NEEDED 300 each 3    Blood Glucose Monitoring Suppl (ONETOUCH VERIO) w/Device KIT TEST 3 TIMES DAILY AS NEEDED 1 kit 0    montelukast (SINGULAIR) 10 MG tablet Take 1 tab nightly at supper for breathing/allergies 30 tablet 5     No current facility-administered medications for this visit.         Biochemical Data, Medical Tests and Procedures:    Lab Results   Component Value Date    LABA1C 6.4 (H) 01/10/2020     No results found for: EAG    Anthropometric Measurements:    Height: 63 inches (160.02 cm)  Weight: 255 lb (115.7 kg)  BMI: 45.17 (obesity class III)  IBW: 115 lb (52.3 kg) +-10%  %IBW: 221.7%  AdBW: 171 lb (77.72 kg)    Physical Exam Findings:  Deferred    Nutrition Interview: RD called pt, explained reason for call and role in care. Pt states appetite is \"there\", typically eats 2 meals/day. See food recall below. RD discussed the aim of nutrition therapy for COPD is to help maintain or restore nutritional well being, including your weight by selecting nourishing foods and drinks each day. Explained muscles used in breathing can require 10x more calories with COPD. Discussed good nutrition is important to help build strong breathing muscles. Discussed the importance of eating balanced meals consistently throughout the day with a variety of foods. Discussed choosing foods with fiber such as fruits, vegetables, whole grains. Discussed choosing foods high in protein such as eggs, milk, yogurt, cheese, poultry, fish, nuts and beans. Discussed the components of a balanced meal using the MyPlate ECGPEIEAU-5/7 plate fruits and/or vegetables, 1/4 plate protein and 1/4 plate starchy carbohydrates with 8 oz glass of low fat milk if desired. RD reiterated the importance of eating balanced meals and snacks consistently throughout the day to help manage BS and to meet estimated nutrition needs. Discussed eating a small balanced snack (pretzels with peanut butter, hard boiled egg with piece of fruit, apple with cheese stick, etc) at lunch time instead of skipping the meal completely. RD acknowledged how it may be difficult to eat enough when not feeling well and explained adding a nutrition supplement (Glucerna) to help better meet estimated nutrition needs. RD discussed either eating a small balanced snack or drinking a Glucerna at lunch time. Pt verbalizes understanding. RD will mail Glucerna coupons. Reviewed the importance of monitoring BS and taking medicine as directed. Pt states she checks her BS daily and this AM  mg/dL.  RD encouraged patient to continue checking BS daily and to focus on eating more consistently throughout the day. Patient  has no nutrition related questions at this time. RD offered to mail educational handouts to pt to reinforce concepts discussed during phone conversation, pt accepted. RD verified address. 24-Hour Diet Recall  Breakfast  Consumed: sausage and boiled eggs    Lunch  Consumed: none    PM Snack  Consumed: pretzels     Dinner  Consumed: taco salad    Beverages: water, pop occassionally     Exercise: not much per pt     Blood sugar checks: Pt states she checks her BS everyday. Per pt this  mg/dL    Nutrition Diagnosis:  #1 Problem Altered nutrition-related lab values: A1C       Etiology related to uncontrolled type 2 diabetes       Signs/Symptoms as evidenced by A1C 6.4 as of 1/10/2020    #2 Problem Food and nutrition-related knowledge deficit       Etiology related to lack of prior nutrition related education regarding diabetes and COPD        Signs/Symptoms as evidenced by referral for diet education and conversation with patient    Nutrition Intervention:     Estimated Needs  diabetic diet providing 5425-1936 kcals to promote wt maintenance (30-35 kcal/kg due to COPD based on AdBW). Estimated daily CHO Needs: 345 g (based on 45-65% total calorie intake)  Estimated daily Protein Needs:  g (based on 1.2-1.7 g/kg due to COPD based on AdBW)  Estimated daily Fluid Needs: 64 oz. #1 Nutrition Information: Provided patient with Nutrition to Manage Your Blood Sugar When You Have COPD, COPD Nutrition Therapy and Glucerna coupons handouts. For reinforcement of concepts discussed during nutrition interview. #2 Nutrition Counseling: Used open-ended questions to assess patients perceived susceptibility and severity of disease state. Discussed potential impact of health threat on patient's lifestyle. Used open-ended questions to assess patient's perception regarding benefits of and barriers to implementation of nutrition therapy.     #3 Nutrition Education: Clearly defined the benefits of nutrition therapy. Summarized and affirmed positive aspects of current nutrition patterns. Provided education regarding value of adherence to diabetic diet. Discussed ways to establish applying concepts of alternatives and choices regarding implementation of diet. Explored ideas for small, incremental goals to initiate behavior change. Monitoring and Evaluation:    Indicator/Goal Criteria   #1 Eat balanced meals consistently throughout the day. #1 Focus on eating 3 meals/day and make these meals balanced using the MyPlate [id number]-9/8 plate fruits and/or vegetables, 1/4 plate protein and 1/4 plate starchy carbohydrates with 8 oz glass of low fat milk if desired. #2 Eat a balanced snack or drink a Glucerna at lunch time instead of skipping the meal completely. #2 Do not skip lunch completely. Include a serving of protein with a serving of carbohydrate when snacking. #3 Monitor BS daily and keep a log. #3 Continue checking BS daily. Follow Up: RD will call pt in 2 weeks to follow up and make sure pt received handouts in mail. RD will answer any nutrition related questions at this time.      [address] [facility] [address]

## 2021-01-12 ENCOUNTER — CARE COORDINATION (OUTPATIENT)
Dept: CARE COORDINATION | Age: 51
End: 2021-01-12

## 2021-01-12 NOTE — CARE COORDINATION
Ambulatory Care Coordination Note  1/12/2021  CM Risk Score: 11  Charlson 10 Year Mortality Risk Score: 100%     ACC: Nikki Ortiz RN    Summary Note: Patient denies any current issues or complaints. Scheduled patient follow-up appointment with SUDEEP Bentley CNP. Discussed diabetes management. Patient has been contacted by [facility], [address] [place] [address], and [facility] . Discussed CC follow-up. Patient verbalizes agreement. Requested CC Clinical Assistant mail out medication box to patient if available. Lab Results   Component Value Date    LABA1C 6.4 (H) 01/10/2020    LABA1C 6.3 (H) 05/19/2019    LABA1C 5.5 04/18/2019      Diabetes Assessment    Medic Alert ID: No  Meal Planning: Avoidance of concentrated sweets   How often do you test your blood sugar?: Daily, Bedtime   Do you have barriers with adherence to non-pharmacologic self-management interventions?  (Nutrition/Exercise/Self-Monitoring): Yes   Have you ever had to go to the ED for symptoms of low blood sugar?: Yes       No patient-reported symptoms   Do you have hyperglycemia symptoms?: No   Do you have hypoglycemia symptoms?: No   Last Blood Sugar Value: 129   Blood Sugar Monitoring Regimen: Morning Fasting, At Bedtime   Blood Sugar Trends: No Change       and   COPD Assessment    Does the patient understand envrionmental exposure?: Yes  Is the patient able to verbalize Rescue vs. Long Acting medications?: Yes  Does the patient have a nebulizer?: Yes  Does the patient use a space with inhaled medications?: No     No patient-reported symptoms         Symptoms:  None: Yes      Change in chronic cough?: No/At Baseline  Change in sputum?: No/At Baseline  Self Monitoring - SaO2: No  Have you had a recent diagnosis of pneumonia either by PCP or at a hospital?: No           Care Coordination Interventions    Program Enrollment: Complex Care  Referral from Primary Care Provider: No  Suggested Interventions and [employer] Medi Set or Pill Pack: Not Started (Comment: 1/5/2021 Discussed picking up medication box.)  Pharmacist: Completed (Comment: 1/5/2021 [facility])  Registered Dietician: Completed (Comment: 1/5/2021 [facility] Dietician)  Social Work: Completed (Comment: 1/5/2021 [facility] Social Work for [employer] and long term planning.)  Other Services: Declined (Comment: 1/5/2021 Declined ACP Referral)  Zone Management Tools: In Process (Comment: 1/5/2021 Diabetes and COPD Zones)  Other Services or Interventions: 1/5/2021 instructed on same day, next day, and Walk-In Care. Goals Addressed                 This Visit's Progress     Conditions and Symptoms   On track     I will schedule office visits, as directed by my provider. I will keep my appointment or reschedule if I have to cancel. I will notify my provider of any barriers to my plan of care. I will follow my Zone Management tool to seek urgent or emergent care. I will notify my provider of any symptoms that indicate a worsening of my condition. COPD  Diabetes    Barriers: lack of education  Plan for overcoming my barriers: Care Coordination, [facility] Social Work, [facility], and [facility] Dietician  Confidence: 9/10  Anticipated Goal Completion Date: 6/5/2021         Medication Management   On track     I will take my medication as directed. I will notify my provider of any problems with medications, like adverse effects or side effects. I will notify my provider/Care Coordinator if I am unable to afford my medications. I will notify my provider for advice before I stop taking any of my medication. I will use a medication box to improve/assist with medication management. Barriers: lack of education  Plan for overcoming my barriers: Care Coordination, [facility]  Confidence: 10/10  Anticipated Goal Completion Date: 6/5/2021            Prior to Admission medications    Medication Sig Start Date End Date Taking?  Authorizing Provider butalbital-acetaminophen-caffeine (FIORICET, ESGIC) -40 MG per tablet Take 1 tablet by mouth every 6 hours as needed for Headaches 1/4/21   Jil Ortiz MD   pregabalin (LYRICA) 150 MG capsule Take 1 capsule by mouth 3 times daily for 30 days. 12/14/20 1/13/21  SUDEEP De Dios CNP   Insulin Degludec (TRESIBA FLEXTOUCH) 100 UNIT/ML SOPN INJECT 30 UNITS UNDER THE SKIN NIGHTLY 11/17/20   SUDEEP De Dios CNP   DULoxetine (CYMBALTA) 60 MG extended release capsule Take 60 mg by mouth every 24 hours     Historical Provider, MD   ALPRAZolam (XANAX) 0.25 MG tablet Take 0.25 mg by mouth nightly as needed.   3/23/20   Historical Provider, MD   pantoprazole (PROTONIX) 20 MG tablet Take 2 tablets by mouth daily 11/4/20   Allie Mazariegos PA-C   magnesium oxide (MAG-OX) 400 (241.3 Mg) MG TABS tablet TAKE 1/2 TABLET BY MOUTH DAILY 11/2/20   Lilibeth Ingram [place], APRN - CNP   tiZANidine (ZANAFLEX) 4 MG tablet Take 1 tablet by mouth every 8 hours as needed (back spasm) 10/13/20   SUDEEP De Dios CNP   busPIRone (BUSPAR) 15 MG tablet TAKE 1 TABLET BY MOUTH THREE TIMES DAILY 9/28/20   Lilibeth Kamland, APRN - CNP   albuterol sulfate HFA (PROVENTIL HFA) 108 (90 Base) MCG/ACT inhaler Inhale 2 puffs into the lungs every 6 hours as needed for Wheezing 9/9/20   Lawayne Staggers, APRN - CNP   cetirizine (ZYRTEC) 10 MG tablet TAKE 1 TABLET BY MOUTH EVERY NIGHT AT BEDTIME AS NEEDED FOR ALLERGIES 8/27/20   Lawrenaldone Staggers, APRN - CNP   insulin lispro (HUMALOG) 100 UNIT/ML injection vial INJECT 4 UNITS UNDER THE SKIN THREE TIMES DAILY AS NEEDED FOR HIGH BLOOD SUGAR 7/10/20   SUDEEP De Dios CNP   levothyroxine (SYNTHROID) 150 MCG tablet Take 150 mcg by mouth Daily  6/28/20   Historical Provider, MD   SUMAtriptan (IMITREX) 25 MG tablet TAKE 1 TABLET BY MOUTH 1 TIME AS NEEDED FOR MIGRAINE 5/1/20   Kathy Lion, APRN - CNP promethazine (PHENERGAN) 25 MG tablet WARNING:  May cause drowsiness. May impair ability to operate vehicles or machinery. Do not use in combination with alcohol. ! Tablet every 6 hours as needed in conjunction with Ultram for headaches 1/18/20   Lisha Corbett PA-C   blood glucose test strips (EXACTECH TEST) strip 1 each by In Vitro route 3 times daily (Accu-check test strips) As needed. DX:E11.65 12/2/19   Lilibeth [place] [place], APRN - CNP   ONE TOUCH ULTRASOFT LANCETS MISC TEST 3 TIMES DAILY AS NEEDED 12/2/19   Lilibeth Lo[address]ace [address], APRN - CNP   albuterol (PROVENTIL) (2.5 MG/3ML) 0.083% nebulizer solution Take 3 mLs by nebulization every 4 hours as needed for Wheezing 11/5/19   Maria M Servedavi, DO   metoclopramide (REGLAN) 10 MG tablet Take 1 tablet by mouth 4 times daily 10/27/19   SUDEEP Prado CNP   atorvastatin (LIPITOR) 40 MG tablet Take 1 tablet by mouth nightly 9/11/19   Anson Watters DO   metoprolol tartrate (LOPRESSOR) 25 MG tablet Take 1 tablet by mouth 2 times daily 9/11/19   Anson Watters DO   fluticasone [facility]) 50 MCG/ACT nasal spray 1 spray by Nasal route daily 6/4/19   SUDEEP Douglass CNP   traMADol (ULTRAM) 50 MG tablet Take 50 mg by mouth every 6 hours as needed for Pain.     Historical Provider, MD   Insulin Syringe-Needle U-100 30G X 1/2\" 1 ML MISC 1 each by Does not apply route daily 11/16/18   Santa Tubbs, DO   blood glucose test strips (ONETOUCH VERIO) strip TEST 3 TIMES DAILY AS NEEDED 8/16/18   Santa Tubbs, DO   Blood Glucose Monitoring Suppl (ONETOUCH VERIO) w/Device KIT TEST 3 TIMES DAILY AS NEEDED 8/16/18   Santa Tubbs DO   montelukast (SINGULAIR) 10 MG tablet Take 1 tab nightly at supper for breathing/allergies 6/28/18   Santa Tubbs, DO       Future Appointments   Date Time Provider Valerie Cosby   1/19/2021 11:15 AM SUDEEP Douglass - CNP [facility]   11/5/2021  9:15 AM Mynor Gomez MD [facility]

## 2021-01-20 ENCOUNTER — CARE COORDINATION (OUTPATIENT)
Dept: CARE COORDINATION | Age: 51
End: 2021-01-20

## 2021-01-20 NOTE — CARE COORDINATION
This writer called once more to check in with patient to discuss referral from [facility] on need for community resources. Patient stated that she will be looking for a new bed, possibly an adjustable bed. Patient reports that she will call her insurance company to see if this may be something they would cover, but reports she does not need assistance with this. Patient shared that she has no current needs at this time. Informed patient to contact this worker if she needs assistance. Will no longer follow patient. Patient verbalized understanding.

## 2021-01-20 NOTE — CARE COORDINATION
Ambulatory Care Coordination Note  1/20/2021  CM Risk Score: 11  Charlson 10 Year Mortality Risk Score: 100%     ACC: Neno Haynes RN    Summary Note: Care Coordinator attempted to reach patient for Care Coordination follow up, left message requesting call back, will follow next week if no call back       Care coordination needs noted below:    Care Coordination Plan of Care: This nurse Care Coordinator will    1. Check on appointment cancellationwith NP Jocy Grace, request she reschedule-     Needs current A1c     Review information form dietary referral       2. Inquire if she received med box      make sure she has all her medications    3. Check on diabetic management-Blood sugar log    4.  check on COPD/breathing        Lab Results   Component Value Date    LABA1C 6.4 (H) 01/10/2020    LABA1C 6.3 (H) 05/19/2019    LABA1C 5.5 04/18/2019

## 2021-01-20 NOTE — CARE COORDINATION
Ambulatory Care Coordination Note  1/20/2021  CM Risk Score: 11  Charlson 10 Year Mortality Risk Score: 100%     ACC: Sheryl Her RN    Summary Note: Phone call to patient to follow up with care, spoke briefly- she is at dentist office, request I call back this afternoon. Care Coordination Plan of Care:    This nurse Care Coordinator will  - Will call patient back this afternoon               ,

## 2021-01-22 ENCOUNTER — CARE COORDINATION (OUTPATIENT)
Dept: CARE COORDINATION | Age: 51
End: 2021-01-22

## 2021-01-22 NOTE — CARE COORDINATION
Contacted Bharat Bernardo and left voicemail regarding Dietitian follow up. Left call back number and will follow up as appropriate.      [address] [facility], [address] [place] [address]

## 2021-01-26 ENCOUNTER — CARE COORDINATION (OUTPATIENT)
Dept: CARE COORDINATION | Age: 51
End: 2021-01-26

## 2021-01-26 NOTE — CARE COORDINATION
Ambulatory Care Coordination Note  1/26/2021  CM Risk Score: 11  Charlson 10 Year Mortality Risk Score: 100%     ACC: Lars Yu RN    Summary Note: PHONE CALL TODAY TO PATIENT TO FOLLOW UP WITH HER CARE    Care Coordination Plan of Care: This nurse Care Coordinator will     1. Check on appointment cancellationwith NP Radha Maria, request she reschedule-     Needs current A1c     States she will make appt when she gets home, on her way out the door          2. Inquire if she received med box- If she did not       receive, will ask Liane to mail to her       message sent to [facility] requesting she mail it        3. make sure she has all her medications      Pt states she has all her meds     3. Check on diabetic management-Blood sugar log      1/25  182 10pm      1/26    124   9am  Logging BG-States she is doing better, watching what she eats, she will review information material mailed to her from dietician,     Lab Results   Component Value Date    LABA1C 6.4 (H) 01/10/2020    LABA1C 6.3 (H) 05/19/2019    LABA1C 5.5 04/18/2019     No results found for: EAG  4. check on COPD/breathing    Inhaler- using 2x day, recommended she use nebulizer at night and see if that helps her breathing      Care Coordination Plan of Care: This nurse Care Coordinator will  -Check on follow up with Radha Maria, if not scheduled assist pt with scheduling  -check on diet teaching, review information, review diet instructions  -check if she received med box  -check on COPD, any issues? ?  -check blood sugar log            Diabetes Assessment    Medic Alert ID: No  Meal Planning: Avoidance of concentrated sweets   How often do you test your blood sugar?: Daily, Bedtime   Do you have barriers with adherence to non-pharmacologic self-management interventions?  (Nutrition/Exercise/Self-Monitoring): No   Have you ever had to go to the ED for symptoms of low blood sugar?: No       No patient-reported symptoms Do you have hyperglycemia symptoms?: No   Do you have hypoglycemia symptoms?: No   Last Blood Sugar Value: 124   Blood Sugar Monitoring Regimen: Before Meals, At Bedtime   Blood Sugar Trends: Steady Decrease      ,   COPD Assessment    Does the patient understand envrionmental exposure?: Yes  Is the patient able to verbalize Rescue vs. Long Acting medications?: Yes  Does the patient have a nebulizer?: Yes  Does the patient use a space with inhaled medications?: No     No patient-reported symptoms         Symptoms:  None: Yes      Symptom course: stable  Increase use of rapid acting/rescue inhaled medications?: No  Change in chronic cough?: No/At Baseline  Change in sputum?: No/At Baseline  Self Monitoring - SaO2: No  Have you had a recent diagnosis of pneumonia either by PCP or at a hospital?: No      and   General Assessment    Do you have any symptoms that are causing concern?: No         Care Coordination Interventions    Program Enrollment: Complex Care  Referral from Primary Care Provider: No  Suggested Interventions and Community Resources  Medi Set or Pill Pack: Not Started (Comment: 1/5/2021 Discussed picking up medication box.)  Pharmacist: Completed (Comment: 1/5/2021 [facility] Clinical Rx)  Registered Dietician: Completed (Comment: 1/5/2021 [address] [place] [address])  Social Work: Completed (Comment: 1/5/2021 [facility] and [facility].)  Other Services: Declined (Comment: 1/5/2021 Declined ACP Referral)  Zone Management Tools: In Process (Comment: 1/5/2021 Diabetes and COPD Zones)  Other Services or Interventions: 1/5/2021 instructed on same day, next day, and Walk-In Care. Goals Addressed                 This Visit's Progress     Conditions and Symptoms   On track     I will schedule office visits, as directed by my provider. I will keep my appointment or reschedule if I have to cancel. I will notify my provider of any barriers to my plan of care. I will follow my Zone Management tool to seek urgent or emergent care. I will notify my provider of any symptoms that indicate a worsening of my condition. COPD- use nebulizer, avoid inhaled irritants, call MD when in yellow zone  Diabetes-modify diet- decrease blood sugars, increase exercise, log blood sugars    Barriers: lack of education  Plan for overcoming my barriers: Care Coordination, [facility] Work, Clinical Rx, and ACC Dietician  Confidence: 9/10  Anticipated Goal Completion Date: 4/5/21         Medication Management   Improving     I will take my medication as directed. I will notify my provider of any problems with medications, like adverse effects or side effects. I will notify my provider/Care Coordinator if I am unable to afford my medications. I will notify my provider for advice before I stop taking any of my medication. I will use a medication box to improve/assist with medication management. Barriers: lack of education  Plan for overcoming my barriers: Care Coordination, Clinical Rx, pill box to organize meds  Confidence: 10/10  Anticipated Goal Completion Date: 4/5/2021            Prior to Admission medications    Medication Sig Start Date End Date Taking? Authorizing Provider   butalbital-acetaminophen-caffeine (FIORICET, ESGIC) -66 MG per tablet Take 1 tablet by mouth every 6 hours as needed for Headaches 1/4/21   Janet Areraga MD   pregabalin (LYRICA) 150 MG capsule Take 1 capsule by mouth 3 times daily for 30 days. 12/14/20 1/13/21  SUDEEP Thomas - CNP   Insulin Degludec (TRESIBA FLEXTOUCH) 100 UNIT/ML SOPN INJECT 30 UNITS UNDER THE SKIN NIGHTLY 11/17/20   SUDEEP Thomas CNP   DULoxetine (CYMBALTA) 60 MG extended release capsule Take 60 mg by mouth every 24 hours     Historical Provider, MD   ALPRAZolam (XANAX) 0.25 MG tablet Take 0.25 mg by mouth nightly as needed.   3/23/20   Historical Provider, MD pantoprazole (PROTONIX) 20 MG tablet Take 2 tablets by mouth daily 11/4/20   Bentley Sanchez PA-C   magnesium oxide (MAG-OX) 400 (241.3 Mg) MG TABS tablet TAKE 1/2 TABLET BY MOUTH DAILY 11/2/20   Melissa Laure Hodgkin, APRN - CNP   tiZANidine (ZANAFLEX) 4 MG tablet Take 1 tablet by mouth every 8 hours as needed (back spasm) 10/13/20   Melissa Laure Hodgkin, APRN - CNP   busPIRone (BUSPAR) 15 MG tablet TAKE 1 TABLET BY MOUTH THREE TIMES DAILY 9/28/20   Melissa Laure Hodgkin, APRN - CNP   albuterol sulfate HFA (PROVENTIL HFA) 108 (90 Base) MCG/ACT inhaler Inhale 2 puffs into the lungs every 6 hours as needed for Wheezing 9/9/20   SUDEEP Talbot CNP   cetirizine (ZYRTEC) 10 MG tablet TAKE 1 TABLET BY MOUTH EVERY NIGHT AT BEDTIME AS NEEDED FOR ALLERGIES 8/27/20   SUDEEP Talbot CNP   insulin lispro (HUMALOG) 100 UNIT/ML injection vial INJECT 4 UNITS UNDER THE SKIN THREE TIMES DAILY AS NEEDED FOR HIGH BLOOD SUGAR 7/10/20   Melissa Laure Hodgkin, APRN - CNP   levothyroxine (SYNTHROID) 150 MCG tablet Take 150 mcg by mouth Daily  6/28/20   Historical Provider, MD   SUMAtriptan (IMITREX) 25 MG tablet TAKE 1 TABLET BY MOUTH 1 TIME AS NEEDED FOR MIGRAINE 5/1/20   SUDEEP Talbot CNP   promethazine (PHENERGAN) 25 MG tablet WARNING:  May cause drowsiness. May impair ability to operate vehicles or machinery. Do not use in combination with alcohol. ! Tablet every 6 hours as needed in conjunction with Ultram for headaches 1/18/20   Adelita Bullock PA-C   blood glucose test strips (EXACTECH TEST) strip 1 each by In Vitro route 3 times daily (Accu-check test strips) As needed.  DX:E11.65 12/2/19   Melissa Laure Hodgkin, APRN - CNP   ONE TOUCH ULTRASOFT LANCETS MISC TEST 3 TIMES DAILY AS NEEDED 12/2/19   Melissa Laure Hodgkin, APRN - CNP   albuterol (PROVENTIL) (2.5 MG/3ML) 0.083% nebulizer solution Take 3 mLs by nebulization every 4 hours as needed for Wheezing 11/5/19   Maria M Ni, DO metoclopramide (REGLAN) 10 MG tablet Take 1 tablet by mouth 4 times daily 10/27/19   Sailaja Browne, APRN - CNP   atorvastatin (LIPITOR) 40 MG tablet Take 1 tablet by mouth nightly 9/11/19   Lyndol Alfonzo, DO   metoprolol tartrate (LOPRESSOR) 25 MG tablet Take 1 tablet by mouth 2 times daily 9/11/19   Lyndol Alfonzo, DO   fluticasone [facility]) 50 MCG/ACT nasal spray 1 spray by Nasal route daily 6/4/19   [address], APRN - CNP   traMADol (ULTRAM) 50 MG tablet Take 50 mg by mouth every 6 hours as needed for Pain.     Historical Provider, MD   Insulin Syringe-Needle U-100 30G X 1/2\" 1 ML MISC 1 each by Does not apply route daily 11/16/18   Bill Casarez, DO   blood glucose test strips (ONETOUCH VERIO) strip TEST 3 TIMES DAILY AS NEEDED 8/16/18   Bill Casarez, DO   Blood Glucose Monitoring Suppl (ONETOUCH VERIO) w/Device KIT TEST 3 TIMES DAILY AS NEEDED 8/16/18   Bill Casarez, DO   montelukast (SINGULAIR) 10 MG tablet Take 1 tab nightly at supper for breathing/allergies 6/28/18   Bill Casarez, DO       Future Appointments   Date Time Provider Valerie Cosby   11/5/2021  9:15 AM Jessica Marin MD [facility]

## 2021-01-29 ENCOUNTER — CARE COORDINATION (OUTPATIENT)
Dept: CARE COORDINATION | Age: 51
End: 2021-01-29

## 2021-01-29 NOTE — CARE COORDINATION
reference-1/2 plate fruits and/or vegetables, 1/4 plate protein and 1/4 plate starchy carbohydrates with 8 oz glass of low fat milk if desired. #1 Patient is eating breakfast and dinner. Pt is focusing on making these meals balanced. #2  Eat a balanced snack or drink a Glucerna at lunch time instead of skipping the meal completely. #2  Do not skip lunch completely. Include a serving of protein with a serving of carbohydrate when snacking. #2 Pt is trying her best to eat a balanced snack at lunch instead of skipping the meal completely. She states she will eat peanut butter crackers. Pt will buy Glucerna to have on hand. #3  Monitor BS daily and keep a log. #3 Continue checking BS daily. #3 Patient is checking BS daily. Pt states her BS has been between 110-129 mg/dL. Plan of Care:  RD encouraged pt to keep working toward goals set. RD will follow up with pt to discuss any questions pt has and check the progress toward goals. Follow Up:    RD will call pt in 3 weeks to follow up and answer any nutrition related questions at that time.      [address] [facility], [address] [place] [address]

## 2021-02-02 ENCOUNTER — CARE COORDINATION (OUTPATIENT)
Dept: CARE COORDINATION | Age: 51
End: 2021-02-02

## 2021-02-02 ASSESSMENT — ENCOUNTER SYMPTOMS: DYSPNEA ASSOCIATED WITH: EXERTION

## 2021-02-02 NOTE — CARE COORDINATION
Ambulatory Care Coordination Note  2/2/2021  CM Risk Score: 11  Charlson 10 Year Mortality Risk Score: 100%     ACC: Sheryl Her RN    Summary Note:   Ambulatory Care Coordination Note    CM Risk Score: 11  Charlson 10 Year Mortality Risk Score: 100%   ACM      ACC: Sheryl Her RN     Summary Note: PHONE CALL TODAY TO PATIENT TO FOLLOW UP WITH HER CARE  NEEDS    1. PCP f/u- Follow  up with Ying Krishnan, on 2/9/21-? ? [facility]  2. Dietary consult review information/recommendations  Indicator/Goal Criteria Progress   #1 Eat balanced meals consistently throughout the day.  #1 Focus on eating 3 meals/day and make these meals balanced using the MyPlate [id number] plate fruits and/or vegetables, 1/4 plate protein and 1/4 plate starchy carbohydrates with 8 oz glass of low fat milk if desired. #1 Patient is eating breakfast and dinner. Pt is focusing on making these meals balanced. #2  Eat a balanced snack or drink a Glucerna at lunch time instead of skipping the meal completely. #2  Do not skip lunch completely. Include a serving of protein with a serving of carbohydrate when snacking.  #2 Pt is trying her best to eat a balanced snack at lunch instead of skipping the meal completely. She states she will eat peanut butter crackers. Pt will buy Glucerna to have on hand. #3  Monitor BS daily and keep a log.  #3 Continue checking BS daily.  #3 Patient is checking BS daily. Pt states her BS has been between 110-129 mg/dL.       3.  Med box Pt states she received it and will fill it tomorrow after she gets all her refills  4  check on COPD- Using nebulizer at night, and proventil twice during the day, has \"a little trouble breathing at night, reviewed medications, Pt states she has not been taking a been Singulair, will forward this information to Ying Krishnan for review/advice  5. blood sugar log - Checking blood sugars only twice a day,    recent readings     2/2/21 180    2/1/21 am 140\"s-  Pm 170- No recent readings above 200 or below 70               Lab Results   Component Value Date     LABA1C 6.4 (H) 01/10/2020     LABA1C 6.3 (H) 05/19/2019     LABA1C 5.5 04/18/2019            Care Coordination Plan of Care: This nurse Care Coordinator will  1. Review PCP notes from 2/9/21- discuss any changes to meds or diet  2-prescriptions -check if she has all medications, inquire about Singulair, did she discuss with Hemal Rondon? ? 3-review diet instructions, ?? Any questions  4-Is she using med box??  ??med complaince  5. Check on COPD, is breathing better or worse? ? Increase us of puffers/nebulizer any maintencance medications added  -check blood sugar      Diabetes Assessment    Medic Alert ID: No  Meal Planning: Avoidance of concentrated sweets, Plate Method   How often do you test your blood sugar?: Daily, Bedtime (Comment: pt states she is only checking blood sugars bid)   Do you have barriers with adherence to non-pharmacologic self-management interventions?  (Nutrition/Exercise/Self-Monitoring): No   Have you ever had to go to the ED for symptoms of low blood sugar?: No       No patient-reported symptoms   Do you have hyperglycemia symptoms?: No   Do you have hypoglycemia symptoms?: No   Last Blood Sugar Value: 180   Blood Sugar Monitoring Regimen: Before Meals, At Bedtime   Blood Sugar Trends: No Change      ,   COPD Assessment    Does the patient understand envrionmental exposure?: Yes  Is the patient able to verbalize Rescue vs. Long Acting medications?: Yes  Does the patient have a nebulizer?: Yes  Does the patient use a space with inhaled medications?: No     Shortness of breath (worse than baseline)         Symptoms:   (Comment: increased shortness of breath at hs)      Symptom course: worsening  Breathlessness: exertion  Increase use of rapid acting/rescue inhaled medications?: Yes  Change in chronic cough?: No/At Baseline  Change in sputum?: No/At Baseline  Self Monitoring - SaO2: No Have you had a recent diagnosis of pneumonia either by PCP or at a hospital?: No      and   General Assessment    Do you have any symptoms that are causing concern?: Yes  Progression since Onset: Gradually Worsening  Reported Symptoms: Shortness of Breath, Congestion             Care Coordination Interventions    Program Enrollment: Complex Care  Referral from Primary Care Provider: No  Suggested Interventions and Community Resources  Medi Set or Pill Pack: Not Started (Comment: 1/5/2021 Discussed picking up medication box.)  Pharmacist: Completed (Comment: 1/5/2021 [facility] Clinical Rx)  Registered Dietician: Completed (Comment: 1/5/2021 [facility])  Social Work: Completed (Comment: 1/5/2021 [facility] Social Work for [employer] and long term planning.)  Other Services: Declined (Comment: 1/5/2021 Declined ACP Referral)  Zone Management Tools: In Process (Comment: 1/5/2021 Diabetes and COPD Zones)  Other Services or Interventions: 1/5/2021 instructed on same day, next day, and Walk-In Care. Goals Addressed    None         Prior to Admission medications    Medication Sig Start Date End Date Taking? Authorizing Provider   butalbital-acetaminophen-caffeine (FIORICET, ESGIC) -43 MG per tablet Take 1 tablet by mouth every 6 hours as needed for Headaches 1/4/21   Lizzette Benjamin MD   pregabalin (LYRICA) 150 MG capsule Take 1 capsule by mouth 3 times daily for 30 days. 12/14/20 1/13/21  SUDEEP Borrego CNP   Insulin Degludec (TRESIBA FLEXTOUCH) 100 UNIT/ML SOPN INJECT 30 UNITS UNDER THE SKIN NIGHTLY 11/17/20   SUDEEP Borrego CNP   DULoxetine (CYMBALTA) 60 MG extended release capsule Take 60 mg by mouth every 24 hours     Historical Provider, MD   ALPRAZolam (XANAX) 0.25 MG tablet Take 0.25 mg by mouth nightly as needed.   3/23/20   Historical Provider, MD   pantoprazole (PROTONIX) 20 MG tablet Take 2 tablets by mouth daily 11/4/20   Kathe Lesch, PA-C magnesium oxide (MAG-OX) 400 (241.3 Mg) MG TABS tablet TAKE 1/2 TABLET BY MOUTH DAILY 11/2/20   Lilibeth Lovelace Place, APRN - CNP   tiZANidine (ZANAFLEX) 4 MG tablet Take 1 tablet by mouth every 8 hours as needed (back spasm) 10/13/20   Lilibeth Lovelace Place, APRN - CNP   busPIRone (BUSPAR) 15 MG tablet TAKE 1 TABLET BY MOUTH THREE TIMES DAILY 9/28/20   Lilibeth Lovelace Place, APRN - CNP   albuterol sulfate HFA (PROVENTIL HFA) 108 (90 Base) MCG/ACT inhaler Inhale 2 puffs into the lungs every 6 hours as needed for Wheezing 9/9/20   Julián Reyes, APRN - CNP   cetirizine (ZYRTEC) 10 MG tablet TAKE 1 TABLET BY MOUTH EVERY NIGHT AT BEDTIME AS NEEDED FOR ALLERGIES 8/27/20   Stanisabella Reyes, APRN - CNP   insulin lispro (HUMALOG) 100 UNIT/ML injection vial INJECT 4 UNITS UNDER THE SKIN THREE TIMES DAILY AS NEEDED FOR HIGH BLOOD SUGAR 7/10/20   Lilibeth Lovelace Place, APRN - CNP   levothyroxine (SYNTHROID) 150 MCG tablet Take 150 mcg by mouth Daily  6/28/20   Historical Provider, MD   SUMAtriptan (IMITREX) 25 MG tablet TAKE 1 TABLET BY MOUTH 1 TIME AS NEEDED FOR MIGRAINE 5/1/20   Julián Reyes, SUDEEP rTistan CNP   promethazine (PHENERGAN) 25 MG tablet WARNING:  May cause drowsiness. May impair ability to operate vehicles or machinery. Do not use in combination with alcohol. ! Tablet every 6 hours as needed in conjunction with Ultram for headaches 1/18/20   Lisha Corbett PA-C   blood glucose test strips (EXACTECH TEST) strip 1 each by In Vitro route 3 times daily (Accu-check test strips) As needed.  DX:E11.65 12/2/19   Lilibeth Lovelace Place, APRN - CNP   ONE TOUCH ULTRASOFT LANCETS MISC TEST 3 TIMES DAILY AS NEEDED 12/2/19   Lilibeth Lovelace Place, APRN - CNP   albuterol (PROVENTIL) (2.5 MG/3ML) 0.083% nebulizer solution Take 3 mLs by nebulization every 4 hours as needed for Wheezing 11/5/19   Maria M Ni DO   metoclopramide (REGLAN) 10 MG tablet Take 1 tablet by mouth 4 times daily 10/27/19   Jumana Rosario, APRN - CNP atorvastatin (LIPITOR) 40 MG tablet Take 1 tablet by mouth nightly 9/11/19   Paolo Marin,    metoprolol tartrate (LOPRESSOR) 25 MG tablet Take 1 tablet by mouth 2 times daily 9/11/19   Paolo Marin DO   fluticasone [facility]) 50 MCG/ACT nasal spray 1 spray by Nasal route daily 6/4/19   SUDEEP Schwartz CNP   traMADol (ULTRAM) 50 MG tablet Take 50 mg by mouth every 6 hours as needed for Pain.     Historical Provider, MD   Insulin Syringe-Needle U-100 30G X 1/2\" 1 ML MISC 1 each by Does not apply route daily 11/16/18   Dre Rodriguez, DO   blood glucose test strips (ONETOUCH VERIO) strip TEST 3 TIMES DAILY AS NEEDED 8/16/18   Dre Rodriguez, DO   Blood Glucose Monitoring Suppl (ONETOUCH VERIO) w/Device KIT TEST 3 TIMES DAILY AS NEEDED 8/16/18   Dre Rodriguez, DO   montelukast (SINGULAIR) 10 MG tablet Take 1 tab nightly at supper for breathing/allergies 6/28/18   Dre Rodriguez, DO       Future Appointments   Date Time Provider Valerie Cosby   2/9/2021 10:45 AM SUDEEP Schwartz CNP [facility]   11/5/2021  9:15 AM Kayla Blakely MD [facility]

## 2021-02-05 ENCOUNTER — APPOINTMENT (OUTPATIENT)
Dept: GENERAL RADIOLOGY | Age: 51
End: 2021-02-05
Payer: MEDICAID

## 2021-02-05 ENCOUNTER — HOSPITAL ENCOUNTER (EMERGENCY)
Age: 51
Discharge: HOME | End: 2021-02-06
Payer: MEDICAID

## 2021-02-05 DIAGNOSIS — R10.9 LEFT FLANK PAIN: ICD-10-CM

## 2021-02-05 DIAGNOSIS — R07.9 CHEST PAIN, UNSPECIFIED TYPE: Primary | ICD-10-CM

## 2021-02-05 LAB
ALBUMIN SERPL-MCNC: 3.9 G/DL (ref 3.5–4.6)
ALP BLD-CCNC: 153 U/L (ref 40–130)
ALT SERPL-CCNC: 16 U/L (ref 0–33)
ANION GAP SERPL CALCULATED.3IONS-SCNC: 9 MEQ/L (ref 9–15)
AST SERPL-CCNC: 17 U/L (ref 0–35)
BASOPHILS ABSOLUTE: 0.1 K/UL (ref 0–0.2)
BASOPHILS RELATIVE PERCENT: 1 %
BILIRUB SERPL-MCNC: <0.2 MG/DL (ref 0.2–0.7)
BUN BLDV-MCNC: 15 MG/DL (ref 6–20)
CALCIUM SERPL-MCNC: 8.9 MG/DL (ref 8.5–9.9)
CHLORIDE BLD-SCNC: 101 MEQ/L (ref 95–107)
CO2: 23 MEQ/L (ref 20–31)
CREAT SERPL-MCNC: 1.33 MG/DL (ref 0.5–0.9)
EKG ATRIAL RATE: 76 BPM
EKG P AXIS: 41 DEGREES
EKG P-R INTERVAL: 158 MS
EKG Q-T INTERVAL: 382 MS
EKG QRS DURATION: 82 MS
EKG QTC CALCULATION (BAZETT): 429 MS
EKG R AXIS: 16 DEGREES
EKG T AXIS: 16 DEGREES
EKG VENTRICULAR RATE: 76 BPM
EOSINOPHILS ABSOLUTE: 0.3 K/UL (ref 0–0.7)
EOSINOPHILS RELATIVE PERCENT: 4 %
GFR AFRICAN AMERICAN: 51.1
GFR NON-AFRICAN AMERICAN: 42.3
GLOBULIN: 3.1 G/DL (ref 2.3–3.5)
GLUCOSE BLD-MCNC: 159 MG/DL (ref 70–99)
HCT VFR BLD CALC: 44.1 % (ref 37–47)
HEMOGLOBIN: 15 G/DL (ref 12–16)
LYMPHOCYTES ABSOLUTE: 2.3 K/UL (ref 1–4.8)
LYMPHOCYTES RELATIVE PERCENT: 28.2 %
MCH RBC QN AUTO: 32.6 PG (ref 27–31.3)
MCHC RBC AUTO-ENTMCNC: 34 % (ref 33–37)
MCV RBC AUTO: 96 FL (ref 82–100)
MONOCYTES ABSOLUTE: 0.5 K/UL (ref 0.2–0.8)
MONOCYTES RELATIVE PERCENT: 5.6 %
NEUTROPHILS ABSOLUTE: 5 K/UL (ref 1.4–6.5)
NEUTROPHILS RELATIVE PERCENT: 61.2 %
PDW BLD-RTO: 13.4 % (ref 11.5–14.5)
PLATELET # BLD: 254 K/UL (ref 130–400)
POTASSIUM SERPL-SCNC: 3.8 MEQ/L (ref 3.4–4.9)
RBC # BLD: 4.59 M/UL (ref 4.2–5.4)
SODIUM BLD-SCNC: 133 MEQ/L (ref 135–144)
TOTAL CK: 419 U/L (ref 0–170)
TOTAL PROTEIN: 7 G/DL (ref 6.3–8)
TROPONIN: <0.01 NG/ML (ref 0–0.01)
WBC # BLD: 8.1 K/UL (ref 4.8–10.8)

## 2021-02-05 PROCEDURE — 93005 ELECTROCARDIOGRAM TRACING: CPT | Performed by: EMERGENCY MEDICINE

## 2021-02-05 PROCEDURE — 80053 COMPREHEN METABOLIC PANEL: CPT

## 2021-02-05 PROCEDURE — 85025 COMPLETE CBC W/AUTO DIFF WBC: CPT

## 2021-02-05 PROCEDURE — 2500000003 HC RX 250 WO HCPCS: Performed by: PHYSICIAN ASSISTANT

## 2021-02-05 PROCEDURE — 6360000002 HC RX W HCPCS: Performed by: PHYSICIAN ASSISTANT

## 2021-02-05 PROCEDURE — 96375 TX/PRO/DX INJ NEW DRUG ADDON: CPT

## 2021-02-05 PROCEDURE — 71045 X-RAY EXAM CHEST 1 VIEW: CPT

## 2021-02-05 PROCEDURE — 96374 THER/PROPH/DIAG INJ IV PUSH: CPT

## 2021-02-05 PROCEDURE — 81001 URINALYSIS AUTO W/SCOPE: CPT

## 2021-02-05 PROCEDURE — 2580000003 HC RX 258: Performed by: PHYSICIAN ASSISTANT

## 2021-02-05 PROCEDURE — 82550 ASSAY OF CK (CPK): CPT

## 2021-02-05 PROCEDURE — 84484 ASSAY OF TROPONIN QUANT: CPT

## 2021-02-05 PROCEDURE — 36415 COLL VENOUS BLD VENIPUNCTURE: CPT

## 2021-02-05 PROCEDURE — 82553 CREATINE MB FRACTION: CPT

## 2021-02-05 PROCEDURE — 99282 EMERGENCY DEPT VISIT SF MDM: CPT

## 2021-02-05 RX ORDER — 0.9 % SODIUM CHLORIDE 0.9 %
1000 INTRAVENOUS SOLUTION INTRAVENOUS ONCE
Status: COMPLETED | OUTPATIENT
Start: 2021-02-05 | End: 2021-02-06

## 2021-02-05 RX ORDER — FENTANYL CITRATE 50 UG/ML
50 INJECTION, SOLUTION INTRAMUSCULAR; INTRAVENOUS ONCE
Status: COMPLETED | OUTPATIENT
Start: 2021-02-05 | End: 2021-02-05

## 2021-02-05 RX ORDER — ONDANSETRON 2 MG/ML
4 INJECTION INTRAMUSCULAR; INTRAVENOUS ONCE
Status: COMPLETED | OUTPATIENT
Start: 2021-02-05 | End: 2021-02-05

## 2021-02-05 RX ADMIN — FAMOTIDINE 20 MG: 10 INJECTION, SOLUTION INTRAVENOUS at 23:23

## 2021-02-05 RX ADMIN — ONDANSETRON 4 MG: 2 INJECTION INTRAMUSCULAR; INTRAVENOUS at 23:23

## 2021-02-05 RX ADMIN — FENTANYL CITRATE 50 MCG: 50 INJECTION, SOLUTION INTRAMUSCULAR; INTRAVENOUS at 23:23

## 2021-02-05 RX ADMIN — SODIUM CHLORIDE 1000 ML: 9 INJECTION, SOLUTION INTRAVENOUS at 23:23

## 2021-02-05 ASSESSMENT — ENCOUNTER SYMPTOMS
COLOR CHANGE: 0
ALLERGIC/IMMUNOLOGIC NEGATIVE: 1
TROUBLE SWALLOWING: 0
ABDOMINAL PAIN: 0
SHORTNESS OF BREATH: 1
EYE PAIN: 0
APNEA: 0

## 2021-02-05 ASSESSMENT — PAIN DESCRIPTION - FREQUENCY: FREQUENCY: CONTINUOUS

## 2021-02-05 ASSESSMENT — PAIN DESCRIPTION - PAIN TYPE: TYPE: ACUTE PAIN

## 2021-02-06 VITALS
RESPIRATION RATE: 21 BRPM | DIASTOLIC BLOOD PRESSURE: 73 MMHG | SYSTOLIC BLOOD PRESSURE: 118 MMHG | BODY MASS INDEX: 44.3 KG/M2 | HEART RATE: 82 BPM | HEIGHT: 63 IN | WEIGHT: 250 LBS | OXYGEN SATURATION: 97 % | TEMPERATURE: 98 F

## 2021-02-06 LAB
BACTERIA: ABNORMAL /HPF
BILIRUBIN URINE: NEGATIVE
BLOOD, URINE: ABNORMAL
CK MB: 2.5 NG/ML (ref 0–3.8)
CLARITY: ABNORMAL
COLOR: YELLOW
CREATINE KINASE-MB INDEX: 0.6 % (ref 0–3.5)
EPITHELIAL CELLS, UA: ABNORMAL /HPF (ref 0–5)
GLUCOSE URINE: NEGATIVE MG/DL
HYALINE CASTS: ABNORMAL /HPF (ref 0–5)
KETONES, URINE: NEGATIVE MG/DL
LEUKOCYTE ESTERASE, URINE: NEGATIVE
NITRITE, URINE: NEGATIVE
PH UA: 5.5 (ref 5–9)
PROTEIN UA: NEGATIVE MG/DL
RBC UA: ABNORMAL /HPF (ref 0–5)
SPECIFIC GRAVITY UA: 1.02 (ref 1–1.03)
URINE REFLEX TO CULTURE: ABNORMAL
UROBILINOGEN, URINE: 0.2 E.U./DL
WBC UA: ABNORMAL /HPF (ref 0–5)

## 2021-02-06 PROCEDURE — 6360000002 HC RX W HCPCS: Performed by: PHYSICIAN ASSISTANT

## 2021-02-06 RX ORDER — TIZANIDINE 2 MG/1
2 TABLET ORAL 3 TIMES DAILY PRN
Qty: 15 TABLET | Refills: 0 | Status: SHIPPED | OUTPATIENT
Start: 2021-02-06 | End: 2022-04-21

## 2021-02-06 RX ADMIN — HYDROMORPHONE HYDROCHLORIDE 1 MG: 1 INJECTION, SOLUTION INTRAMUSCULAR; INTRAVENOUS; SUBCUTANEOUS at 00:39

## 2021-02-06 ASSESSMENT — PAIN SCALES - GENERAL: PAINLEVEL_OUTOF10: 8

## 2021-02-06 NOTE — ED PROVIDER NOTES
[address] [facility] ED  EMERGENCY DEPARTMENT ENCOUNTER      Pt Name: Avis Alberto  MRN: [id number]  [place] 1970  Date of evaluation: 2/5/2021  Provider: Hubert Quach PA-C    CHIEF COMPLAINT       Chief Complaint   Patient presents with    Chest Pain     x 2hours         HISTORY OF PRESENT ILLNESS   (Location/Symptom, Timing/Onset, Context/Setting, Quality, Duration, Modifying Factors, Severity)  Note limiting factors. Avis Alberto is a 53 y.o. female who presents to the emergency department with a 2-hour history of midsternal chest pain that radiates into the back with associated shortness of breath. Patient has a history of sarcoidosis with similar presentation of pain. Patient also states that for the past 2 weeks she has had a history of left flank pain. Denies any hematuria or dysuria. Patient states that she has a follow-up with her family doctor regarding the flank pain on Tuesday. No abdominal pain, nausea or vomiting. Patient denies any recent new or changing cough or chest congestion. No known fevers. HPI    Nursing Notes were reviewed. REVIEW OF SYSTEMS    (2-9 systems for level 4, 10 or more for level 5)     Review of Systems   Constitutional: Negative for diaphoresis and fever. HENT: Negative for hearing loss and trouble swallowing. Eyes: Negative for pain. Respiratory: Positive for shortness of breath. Negative for apnea. Cardiovascular: Positive for chest pain. Gastrointestinal: Negative for abdominal pain. Endocrine: Negative. Genitourinary: Positive for flank pain. Negative for hematuria. Musculoskeletal: Negative for neck pain and neck stiffness. Skin: Negative for color change. Allergic/Immunologic: Negative. Neurological: Negative for dizziness and numbness. Hematological: Negative. Psychiatric/Behavioral: Negative. All other systems reviewed and are negative.       Except as noted above the remainder of the review of systems was reviewed and negative. PAST MEDICAL HISTORY     Past Medical History:   Diagnosis Date    Anxiety     Arthritis     Asthma     Bronchopneumonia     Cancer ([facility] [address]     renal    Cerebral artery occlusion with cerebral infarction (HCC)     Chronic bilateral low back pain with sciatica     Chronic kidney disease     Chronic obstructive lung disease ([facility] [address] 7/26/2019    Depression     Fibromyalgia     Hypertension     Insulin dependent type 2 diabetes mellitus, uncontrolled ([facility] [address] 8/3/2018    Localized enlarged lymph nodes 10/26/2018    Mixed headache     Pure hyperglyceridemia 5/19/2017    Sarcoidosis     Sleep apnea     does not wear cpap    Thyroid goiter          SURGICAL HISTORY       Past Surgical History:   Procedure Laterality Date    BRONCHOSCOPY  10/26/2018    DR. STEARNS    KIDNEY REMOVAL Right 08/2016    KIDNEY REMOVAL Right 2016    LUNG BIOPSY Right 10/2018    THYROID LOBECTOMY Right 6/13/14    THYROIDECTOMY  02/21/2019    DR. MAGDALENO    THYROIDECTOMY  2018    URETER STENT PLACEMENT Left 08/2016         CURRENT MEDICATIONS       Previous Medications    ALBUTEROL (PROVENTIL) (2.5 MG/3ML) 0.083% NEBULIZER SOLUTION    Take 3 mLs by nebulization every 4 hours as needed for Wheezing    ALBUTEROL SULFATE HFA (PROVENTIL HFA) 108 (90 BASE) MCG/ACT INHALER    Inhale 2 puffs into the lungs every 6 hours as needed for Wheezing    ALPRAZOLAM (XANAX) 0.25 MG TABLET    Take 0.25 mg by mouth nightly as needed. ATORVASTATIN (LIPITOR) 40 MG TABLET    Take 1 tablet by mouth nightly    BLOOD GLUCOSE MONITORING SUPPL (ONETOUCH VERIO) W/DEVICE KIT    TEST 3 TIMES DAILY AS NEEDED    BLOOD GLUCOSE TEST STRIPS (EXACTECH TEST) STRIP    1 each by In Vitro route 3 times daily (Accu-check test strips) As needed.  DX:E11.65    BLOOD GLUCOSE TEST STRIPS (ONETOUCH VERIO) STRIP    TEST 3 TIMES DAILY AS NEEDED    BUSPIRONE (BUSPAR) 15 MG TABLET    TAKE 1 TABLET BY MOUTH THREE TIMES DAILY BUTALBITAL-ACETAMINOPHEN-CAFFEINE (FIORICET, ESGIC) -40 MG PER TABLET    Take 1 tablet by mouth every 6 hours as needed for Headaches    CETIRIZINE (ZYRTEC) 10 MG TABLET    TAKE 1 TABLET BY MOUTH EVERY NIGHT AT BEDTIME AS NEEDED FOR ALLERGIES    DULOXETINE (CYMBALTA) 60 MG EXTENDED RELEASE CAPSULE    Take 60 mg by mouth every 24 hours     FLUTICASONE (FLONASE) 50 MCG/ACT NASAL SPRAY    1 spray by Nasal route daily    INSULIN DEGLUDEC (TRESIBA FLEXTOUCH) 100 UNIT/ML SOPN    INJECT 30 UNITS UNDER THE SKIN NIGHTLY    INSULIN LISPRO (HUMALOG) 100 UNIT/ML INJECTION VIAL    INJECT 4 UNITS UNDER THE SKIN THREE TIMES DAILY AS NEEDED FOR HIGH BLOOD SUGAR    INSULIN SYRINGE-NEEDLE U-100 30G X 1/2\" 1 ML MISC    1 each by Does not apply route daily    LEVOTHYROXINE (SYNTHROID) 150 MCG TABLET    Take 150 mcg by mouth Daily     MAGNESIUM OXIDE (MAG-OX) 400 (241.3 MG) MG TABS TABLET    TAKE 1/2 TABLET BY MOUTH DAILY    METOCLOPRAMIDE (REGLAN) 10 MG TABLET    Take 1 tablet by mouth 4 times daily    METOPROLOL TARTRATE (LOPRESSOR) 25 MG TABLET    Take 1 tablet by mouth 2 times daily    MONTELUKAST (SINGULAIR) 10 MG TABLET    Take 1 tab nightly at supper for breathing/allergies    ONE TOUCH ULTRASOFT LANCETS MISC    TEST 3 TIMES DAILY AS NEEDED    PANTOPRAZOLE (PROTONIX) 20 MG TABLET    Take 2 tablets by mouth daily    PREGABALIN (LYRICA) 150 MG CAPSULE    Take 1 capsule by mouth 3 times daily for 30 days. PROMETHAZINE (PHENERGAN) 25 MG TABLET    WARNING:  May cause drowsiness. May impair ability to operate vehicles or machinery. Do not use in combination with alcohol. ! Tablet every 6 hours as needed in conjunction with Ultram for headaches    SUMATRIPTAN (IMITREX) 25 MG TABLET    TAKE 1 TABLET BY MOUTH 1 TIME AS NEEDED FOR MIGRAINE    TRAMADOL (ULTRAM) 50 MG TABLET    Take 50 mg by mouth every 6 hours as needed for Pain.        ALLERGIES     Shellfish-derived products, Ibuprofen, Ketorolac, Morphine, Other, Propoxyphene n-acetaminophen, and Toradol [ketorolac tromethamine]    FAMILY HISTORY       Family History   Problem Relation Age of Onset    Cancer Father     Diabetes Father     Allergy (Severe) Father     Heart Attack Father     Prostate Cancer Father     High Blood Pressure Mother     Diabetes Mother     Arthritis Mother     High Cholesterol Mother     Vision Loss Mother     Alcohol Abuse Neg Hx     Anemia Neg Hx     Arrhythmia Neg Hx     Asthma Neg Hx     Atrial Fibrillation Neg Hx     Birth Defects Neg Hx     Breast Cancer Neg Hx     Coronary Art Dis Neg Hx     Colon Cancer Neg Hx     Depression Neg Hx     Early Death Neg Hx     Hearing Loss Neg Hx     Heart Disease Neg Hx     Learning Disabilities Neg Hx     Kidney Disease Neg Hx     Mental Illness Neg Hx     Mental Retardation Neg Hx     Miscarriages / Stillbirths Neg Hx     Obesity Neg Hx     Osteoporosis Neg Hx     Stroke Neg Hx     Substance Abuse Neg Hx           SOCIAL HISTORY       Social History     Socioeconomic History    Marital status: Legally      Spouse name: Not on file    Number of children: Not on file    Years of education: 15    Highest education level: High school graduate   Occupational History    Not on file   Social Needs    Financial resource strain: Somewhat hard    Food insecurity     Worry: Sometimes true     Inability: Sometimes true    Transportation needs     Medical: No     Non-medical: No   Tobacco Use    Smoking status: Former Smoker     Packs/day: 0.50     Years: 15.00     Pack years: 7.50     Types: Cigarettes     Start date: 2014     Quit date: 2015     Years since quittin.0    Smokeless tobacco: Former User     Quit date: 3/23/2016   Substance and Sexual Activity    Alcohol use: Not Currently     Alcohol/week: 0.0 standard drinks     Comment: occasionally    Drug use: Yes     Frequency: 5.0 times per week     Types: Marijuana    Sexual activity: Yes Partners: Male   Lifestyle    Physical activity     Days per week: 0 days     Minutes per session: 0 min    Stress: Very much   Relationships    Social connections     Talks on phone: Three times a week     Gets together: Never     Attends Congregational service: Never     Active member of club or organization: No     Attends meetings of clubs or organizations: Never     Relationship status:     Intimate partner violence     Fear of current or ex partner: No     Emotionally abused: No     Physically abused: No     Forced sexual activity: No   Other Topics Concern    Not on file   Social History Narrative    Not on file       SCREENINGS                        PHYSICAL EXAM    (up to 7 for level 4, 8 or more for level 5)     ED Triage Vitals   BP Temp Temp Source Pulse Resp SpO2 Height Weight   02/05/21 2240 02/05/21 2239 02/05/21 2239 02/05/21 2239 02/05/21 2241 02/05/21 2240 02/05/21 2239 02/05/21 2239   135/71 98 °F (36.7 °C) Temporal 81 18 98 % 5' 3\" (1.6 m) 250 lb (113.4 kg)       Physical Exam  Vitals signs and nursing note reviewed. Constitutional:       General: She is not in acute distress. Appearance: She is well-developed. HENT:      Head: Normocephalic and atraumatic. Nose: Nose normal.   Eyes:      General: No scleral icterus. Pupils: Pupils are equal, round, and reactive to light. Neck:      Musculoskeletal: Normal range of motion and neck supple. Trachea: No tracheal deviation. Cardiovascular:      Rate and Rhythm: Normal rate and regular rhythm. Heart sounds: Normal heart sounds. No murmur. Pulmonary:      Effort: Pulmonary effort is normal. No respiratory distress. Breath sounds: Normal breath sounds. No wheezing or rales. Abdominal:      General: Bowel sounds are normal. There is no distension. Palpations: Abdomen is soft. Tenderness: There is no abdominal tenderness. There is no guarding. Musculoskeletal: Normal range of motion. components:    Bacteria, UA RARE (*)     WBC, UA 6-9 (*)     All other components within normal limits   TROPONIN   CKMB & RELATIVE PERCENT       All other labs were within normal range or not returned as of this dictation. EMERGENCY DEPARTMENT COURSE and DIFFERENTIAL DIAGNOSIS/MDM:   Vitals:    Vitals:    02/05/21 2345 02/06/21 0000 02/06/21 0015 02/06/21 0030   BP:  105/66  118/73   Pulse: 64 71 74 82   Resp: 17 20 16 21   Temp:       TempSrc:       SpO2: 99% 96% 97% 97%   Weight:       Height:             MDM      REASSESSMENT      To the emergency department with dual complaints of midsternal chest pain as well as left flank pain. Patient has a history of sarcoidosis with multiple emergency department visits for the chest pain in the past.  Patient does admit that this feels similar to her prior exacerbations. Left flank pain evaluation reveals tenderness with muscular spasm in through the left thoracic spinal region over the area of pain. EKG, chest x-ray and cardiac enzymes are negative. Patient will be discharged home with PCP follow-up    CONSULTS:  None    PROCEDURES:  Unless otherwise noted below, none     Procedures      FINAL IMPRESSION      1. Chest pain, unspecified type    2. Left flank pain          DISPOSITION/PLAN   DISPOSITION        PATIENT REFERRED TO:  SUDEEP Vega - CNP  [address]  [phone]    Call in 3 days        DISCHARGE MEDICATIONS:  New Prescriptions    TIZANIDINE (ZANAFLEX) 2 MG TABLET    Take 1 tablet by mouth 3 times daily as needed (back pain/ spasm)     Controlled Substances Monitoring:     RX Monitoring 9/25/2020   Attestation -   Periodic Controlled Substance Monitoring No signs of potential drug abuse or diversion identified. ;Possible medication side effects, risk of tolerance/dependence & alternative treatments discussed.    Chronic Pain > 80 MEDD -       (Please note that portions of this note were completed with a voice recognition program.  Efforts were made to edit the dictations but occasionally words are mis-transcribed.)    Kathe Lesch, PA-C (electronically signed)  Attending Emergency Physician            Kathe Lesch, PA-C  02/06/21 0100

## 2021-02-06 NOTE — ED TRIAGE NOTES
Pt c/o midsternal chest pain that radiates straight through to back x 2 hours. States pain started while she was cooking at home. Denies any other symptoms. Describes pain as \"pressure and stabbing, 9/10\".

## 2021-02-08 ENCOUNTER — CARE COORDINATION (OUTPATIENT)
Dept: CARE COORDINATION | Age: 51
End: 2021-02-08

## 2021-02-08 PROCEDURE — 93010 ELECTROCARDIOGRAM REPORT: CPT | Performed by: INTERNAL MEDICINE

## 2021-02-08 NOTE — CARE COORDINATION
Ambulatory Care Coordination Note  2/8/2021  CM Risk Score: 11  [place] 10 Year Mortality Risk Score: 100%     ACC: Yusra Parikh, RN    Summary Note: ACM FOLLOWING FOR ONGOING NEEDS AND RECENT ER VISIT 2/5/21, NO ANSWER , LEFT MESSAGE WITH THE NATURE OF MY CALL AND REQUESTED [facility] Coordination Plan of Care: This nurse Care Coordinator will follow for ongoing needs    1. Review ER visit from 2/5/21 check on chest pain, any sob? ?  2-prescriptions -check if she has all medications, using pill box  3- review diet instructions, ?? Any questions              4. Check on COPD, is breathing better or worse, pulmonary referral- ??  Increase us of puffers/nebulizer               5, check blood sugars-reinforce need to check tid

## 2021-02-09 ENCOUNTER — OFFICE VISIT (OUTPATIENT)
Dept: FAMILY MEDICINE CLINIC | Age: 51
End: 2021-02-09
Payer: MEDICAID

## 2021-02-09 VITALS
WEIGHT: 276.2 LBS | TEMPERATURE: 97.2 F | HEART RATE: 80 BPM | OXYGEN SATURATION: 98 % | HEIGHT: 63 IN | SYSTOLIC BLOOD PRESSURE: 131 MMHG | BODY MASS INDEX: 48.94 KG/M2 | DIASTOLIC BLOOD PRESSURE: 74 MMHG

## 2021-02-09 DIAGNOSIS — Z79.4 TYPE 2 DIABETES MELLITUS WITH HYPERGLYCEMIA, WITH LONG-TERM CURRENT USE OF INSULIN (HCC): ICD-10-CM

## 2021-02-09 DIAGNOSIS — F33.2 SEVERE EPISODE OF RECURRENT MAJOR DEPRESSIVE DISORDER, WITHOUT PSYCHOTIC FEATURES (HCC): ICD-10-CM

## 2021-02-09 DIAGNOSIS — D86.2 SARCOIDOSIS OF LUNG WITH SARCOIDOSIS OF LYMPH NODES (HCC): ICD-10-CM

## 2021-02-09 DIAGNOSIS — E78.5 DYSLIPIDEMIA: ICD-10-CM

## 2021-02-09 DIAGNOSIS — J45.41 MODERATE PERSISTENT ASTHMA WITH ACUTE EXACERBATION: ICD-10-CM

## 2021-02-09 DIAGNOSIS — F41.9 ANXIETY: ICD-10-CM

## 2021-02-09 DIAGNOSIS — C79.89 SECONDARY MALIGNANT NEOPLASM OF OTHER SPECIFIED SITES (HCC): ICD-10-CM

## 2021-02-09 DIAGNOSIS — Z23 NEED FOR VACCINATION: ICD-10-CM

## 2021-02-09 DIAGNOSIS — E11.9 TYPE 2 DIABETES MELLITUS TREATED WITH INSULIN (HCC): Primary | ICD-10-CM

## 2021-02-09 DIAGNOSIS — E03.9 HYPOTHYROIDISM, UNSPECIFIED TYPE: ICD-10-CM

## 2021-02-09 DIAGNOSIS — E66.01 CLASS 3 SEVERE OBESITY DUE TO EXCESS CALORIES WITH SERIOUS COMORBIDITY AND BODY MASS INDEX (BMI) OF 45.0 TO 49.9 IN ADULT (HCC): ICD-10-CM

## 2021-02-09 DIAGNOSIS — J44.9 CHRONIC OBSTRUCTIVE PULMONARY DISEASE, UNSPECIFIED COPD TYPE (HCC): ICD-10-CM

## 2021-02-09 DIAGNOSIS — E11.9 TYPE 2 DIABETES MELLITUS TREATED WITH INSULIN (HCC): ICD-10-CM

## 2021-02-09 DIAGNOSIS — F19.21 HISTORY OF DRUG DEPENDENCE (HCC): ICD-10-CM

## 2021-02-09 DIAGNOSIS — J30.1 CHRONIC SEASONAL ALLERGIC RHINITIS DUE TO POLLEN: ICD-10-CM

## 2021-02-09 DIAGNOSIS — E11.65 TYPE 2 DIABETES MELLITUS WITH HYPERGLYCEMIA, WITH LONG-TERM CURRENT USE OF INSULIN (HCC): ICD-10-CM

## 2021-02-09 DIAGNOSIS — Z79.4 TYPE 2 DIABETES MELLITUS TREATED WITH INSULIN (HCC): ICD-10-CM

## 2021-02-09 DIAGNOSIS — Z79.4 TYPE 2 DIABETES MELLITUS TREATED WITH INSULIN (HCC): Primary | ICD-10-CM

## 2021-02-09 LAB
CREATININE URINE: 285.1 MG/DL
HBA1C MFR BLD: 7.9 %
MICROALBUMIN UR-MCNC: <1.2 MG/DL
MICROALBUMIN/CREAT UR-RTO: NORMAL MG/G (ref 0–30)

## 2021-02-09 PROCEDURE — 99214 OFFICE O/P EST MOD 30 MIN: CPT | Performed by: NURSE PRACTITIONER

## 2021-02-09 PROCEDURE — 90471 IMMUNIZATION ADMIN: CPT | Performed by: NURSE PRACTITIONER

## 2021-02-09 PROCEDURE — 83036 HEMOGLOBIN GLYCOSYLATED A1C: CPT | Performed by: NURSE PRACTITIONER

## 2021-02-09 PROCEDURE — 3023F SPIROM DOC REV: CPT | Performed by: NURSE PRACTITIONER

## 2021-02-09 PROCEDURE — 2022F DILAT RTA XM EVC RTNOPTHY: CPT | Performed by: NURSE PRACTITIONER

## 2021-02-09 PROCEDURE — G8427 DOCREV CUR MEDS BY ELIG CLIN: HCPCS | Performed by: NURSE PRACTITIONER

## 2021-02-09 PROCEDURE — G8482 FLU IMMUNIZE ORDER/ADMIN: HCPCS | Performed by: NURSE PRACTITIONER

## 2021-02-09 PROCEDURE — 1036F TOBACCO NON-USER: CPT | Performed by: NURSE PRACTITIONER

## 2021-02-09 PROCEDURE — 3051F HG A1C>EQUAL 7.0%<8.0%: CPT | Performed by: NURSE PRACTITIONER

## 2021-02-09 PROCEDURE — G8926 SPIRO NO PERF OR DOC: HCPCS | Performed by: NURSE PRACTITIONER

## 2021-02-09 PROCEDURE — 90688 IIV4 VACCINE SPLT 0.5 ML IM: CPT | Performed by: NURSE PRACTITIONER

## 2021-02-09 PROCEDURE — G8417 CALC BMI ABV UP PARAM F/U: HCPCS | Performed by: NURSE PRACTITIONER

## 2021-02-09 RX ORDER — MONTELUKAST SODIUM 10 MG/1
TABLET ORAL
Qty: 30 TABLET | Refills: 5 | Status: SHIPPED | OUTPATIENT
Start: 2021-02-09 | End: 2021-05-17

## 2021-02-09 RX ORDER — HYDROXYZINE HYDROCHLORIDE 25 MG/1
25 TABLET, FILM COATED ORAL EVERY 8 HOURS PRN
Qty: 90 TABLET | Refills: 2 | Status: SHIPPED | OUTPATIENT
Start: 2021-02-09 | End: 2021-03-11

## 2021-02-09 RX ORDER — BUPROPION HYDROCHLORIDE 150 MG/1
150 TABLET ORAL EVERY MORNING
Qty: 30 TABLET | Refills: 3 | Status: SHIPPED | OUTPATIENT
Start: 2021-02-09 | End: 2021-06-08

## 2021-02-09 ASSESSMENT — ENCOUNTER SYMPTOMS
COUGH: 1
SINUS PRESSURE: 0
WHEEZING: 1
SHORTNESS OF BREATH: 1

## 2021-02-09 NOTE — PROGRESS NOTES
Subjective:      Patient ID: Minerva Pedersen is a 53 y.o. female who presents today for:     Chief Complaint   Patient presents with    Diabetes     Patient presents today to follow up on diabetes.  Health Maintenance     Patient would like flu vaccine. HPI Hypothyroidism  Patient presents for evaluation of thyroid function. Symptoms consist of weight gain, dry skin. Symptoms have present for a few months. The symptoms are moderate. The problem has been unchanged. Previous thyroid studies include TSH. The hypothyroidism is due to hypothyroidism. She reports her breathing has been terrible. She takes inhalers as prescribed. Has been out of singulair and does feel it helped her breathing a lot. She is interested in going back on it. She is interested in pulmonology follow up. She still talks to her pulmonologist periodically, but has not been out there in a long time. It is difficult for her to travel that far, and she would prefer someone closer. She also has noticed anxiety flaring. She feels she gets panic attacks in the middle of the night most often. She will have a panic feeling in her chest. She does admit to being more anxious lately. Has been taking buspar 15mg 3x daily for a while. It used to help more than it does now. DM has been slightly worsening. She reports that it has been under okay control, but has noticed it spiking up to the 200s at times. She used to see endocrinologist, but the doctor stopped seeing patients and she never did follow up with another one. She is agreeable to f/u with endocrine. She does notice visual changes and dizziness when her BG spikes.     Past Medical History:   Diagnosis Date    Anxiety     Arthritis     Asthma     Bronchopneumonia     Cancer ([facility] [address]     renal    Cerebral artery occlusion with cerebral infarction (HCC)     Chronic bilateral low back pain with sciatica     Chronic kidney disease  [facility] 7/26/2019    Depression     Fibromyalgia     Hypertension     Insulin dependent type 2 diabetes mellitus, uncontrolled ([facility] [address] 8/3/2018    Localized enlarged lymph nodes 10/26/2018    Mixed headache     Pure hyperglyceridemia 5/19/2017    Sarcoidosis     Sleep apnea     does not wear cpap    Thyroid goiter      Past Surgical History:   Procedure Laterality Date    BRONCHOSCOPY  10/26/2018    DR. STEARNS    KIDNEY REMOVAL Right 08/2016    KIDNEY REMOVAL Right 2016    LUNG BIOPSY Right 10/2018    THYROID LOBECTOMY Right 6/13/14    THYROIDECTOMY  02/21/2019    DR. MAGDALENO    THYROIDECTOMY  2018    URETER STENT PLACEMENT Left 08/2016     Family History   Problem Relation Age of Onset    Cancer Father     Diabetes Father     Allergy (Severe) Father     Heart Attack Father     Prostate Cancer Father     High Blood Pressure Mother     Diabetes Mother     Arthritis Mother     High Cholesterol Mother     Vision Loss Mother     Alcohol Abuse Neg Hx     Anemia Neg Hx     Arrhythmia Neg Hx     Asthma Neg Hx     Atrial Fibrillation Neg Hx     Birth Defects Neg Hx     Breast Cancer Neg Hx     Coronary Art Dis Neg Hx     Colon Cancer Neg Hx     Depression Neg Hx     Early Death Neg Hx     Hearing Loss Neg Hx     Heart Disease Neg Hx     Learning Disabilities Neg Hx     Kidney Disease Neg Hx     Mental Illness Neg Hx     Mental Retardation Neg Hx     Miscarriages / Stillbirths Neg Hx     Obesity Neg Hx     Osteoporosis Neg Hx     Stroke Neg Hx     Substance Abuse Neg Hx      Social History     Socioeconomic History    Marital status: Legally      Spouse name: Not on file    Number of children: Not on file    Years of education: 15    Highest education level: High school graduate   Occupational History    Not on file   Social Needs    Financial resource strain: Somewhat hard    Food insecurity     Worry: Sometimes true  busPIRone (BUSPAR) 15 MG tablet TAKE 1 TABLET BY MOUTH THREE TIMES DAILY 90 tablet 5    albuterol sulfate HFA (PROVENTIL HFA) 108 (90 Base) MCG/ACT inhaler Inhale 2 puffs into the lungs every 6 hours as needed for Wheezing 1 Inhaler 3    cetirizine (ZYRTEC) 10 MG tablet TAKE 1 TABLET BY MOUTH EVERY NIGHT AT BEDTIME AS NEEDED FOR ALLERGIES 30 tablet 5    insulin lispro (HUMALOG) 100 UNIT/ML injection vial INJECT 4 UNITS UNDER THE SKIN THREE TIMES DAILY AS NEEDED FOR HIGH BLOOD SUGAR 10 mL 0    levothyroxine (SYNTHROID) 150 MCG tablet Take 150 mcg by mouth Daily       SUMAtriptan (IMITREX) 25 MG tablet TAKE 1 TABLET BY MOUTH 1 TIME AS NEEDED FOR MIGRAINE 9 tablet 1    promethazine (PHENERGAN) 25 MG tablet WARNING:  May cause drowsiness. May impair ability to operate vehicles or machinery. Do not use in combination with alcohol. ! Tablet every 6 hours as needed in conjunction with Ultram for headaches 20 tablet 0    blood glucose test strips (EXACTECH TEST) strip 1 each by In Vitro route 3 times daily (Accu-check test strips) As needed. DX:E11.65 300 strip 5    ONE TOUCH ULTRASOFT LANCETS MISC TEST 3 TIMES DAILY AS NEEDED 300 each 3    albuterol (PROVENTIL) (2.5 MG/3ML) 0.083% nebulizer solution Take 3 mLs by nebulization every 4 hours as needed for Wheezing 120 each 3    metoclopramide (REGLAN) 10 MG tablet Take 1 tablet by mouth 4 times daily 120 tablet 0    atorvastatin (LIPITOR) 40 MG tablet Take 1 tablet by mouth nightly 30 tablet 3    metoprolol tartrate (LOPRESSOR) 25 MG tablet Take 1 tablet by mouth 2 times daily 60 tablet 0    fluticasone (FLONASE) 50 MCG/ACT nasal spray 1 spray by Nasal route daily 1 Bottle 3    traMADol (ULTRAM) 50 MG tablet Take 50 mg by mouth every 6 hours as needed for Pain.       Insulin Syringe-Needle U-100 30G X 1/2\" 1 ML MISC 1 each by Does not apply route daily 100 each 3  blood glucose test strips (ONETOUCH VERIO) strip TEST 3 TIMES DAILY AS NEEDED 300 each 3    Blood Glucose Monitoring Suppl (ONETOUCH VERIO) w/Device KIT TEST 3 TIMES DAILY AS NEEDED 1 kit 0    pregabalin (LYRICA) 150 MG capsule Take 1 capsule by mouth 3 times daily for 30 days. 90 capsule 0    ALPRAZolam (XANAX) 0.25 MG tablet Take 0.25 mg by mouth nightly as needed.  pantoprazole (PROTONIX) 20 MG tablet Take 2 tablets by mouth daily (Patient not taking: Reported on 2/9/2021) 30 tablet 0     No current facility-administered medications on file prior to visit. Allergies:  Shellfish-derived products, Ibuprofen, Ketorolac, Morphine, Other, Propoxyphene n-acetaminophen, and Toradol [ketorolac tromethamine]    Review of Systems   Constitutional: Negative for chills, fatigue and fever. HENT: Negative for congestion, ear pain and sinus pressure. Respiratory: Positive for cough, shortness of breath and wheezing. Cardiovascular: Positive for chest pain. Negative for leg swelling. Genitourinary: Negative for difficulty urinating. Neurological: Negative for syncope and headaches. Psychiatric/Behavioral: Negative for self-injury and suicidal ideas. The patient is nervous/anxious. Objective:   /74 (Site: Left Upper Arm, Position: Sitting, Cuff Size: Large Adult)   Pulse 80   Temp 97.2 °F (36.2 °C) (Temporal)   Ht 5' 3\" (1.6 m)   Wt 276 lb 3.2 oz (125.3 kg)   SpO2 98%   Breastfeeding No   BMI 48.93 kg/m²     Physical Exam  Constitutional:       Appearance: She is well-developed. HENT:      Head: Normocephalic. Right Ear: Tympanic membrane, ear canal and external ear normal.      Left Ear: Tympanic membrane, ear canal and external ear normal.      Nose: Nose normal.      Mouth/Throat:      Mouth: Mucous membranes are moist.      Pharynx: Oropharynx is clear. Uvula midline. Eyes:      General:         Right eye: No discharge. Left eye: No discharge. Conjunctiva/sclera: Conjunctivae normal.   Neck:      Musculoskeletal: Normal range of motion. Cardiovascular:      Rate and Rhythm: Normal rate and regular rhythm. Heart sounds: Normal heart sounds. Pulmonary:      Effort: Pulmonary effort is normal. No respiratory distress. Breath sounds: Normal breath sounds. Lymphadenopathy:      Cervical: No cervical adenopathy. Skin:     General: Skin is warm and dry. Neurological:      Mental Status: She is alert and oriented to person, place, and time. Psychiatric:         Mood and Affect: Mood normal.         Behavior: Behavior normal.         Assessment:          Diagnosis Orders   1. Type 2 diabetes mellitus treated with insulin (HCC)  POCT glycosylated hemoglobin (Hb A1C)    Microalbumin / Creatinine Urine Ratio   2. Need for vaccination  INFLUENZA, QUADV, 3 YRS AND OLDER, IM, MDV, 0.5ML ([facility])   3. Chronic seasonal allergic rhinitis due to pollen  montelukast (SINGULAIR) 10 MG tablet   4. Moderate persistent asthma with acute exacerbation  montelukast (SINGULAIR) 10 MG tablet    Tamika Diallo MD, Pulmonology, [facility]   5. Sarcoidosis of lung with sarcoidosis of lymph nodes [facility])  Tamika Diallo MD, Pulmonology, [place]   6. Hypothyroidism, unspecified type  TSH without Reflex   7. Dyslipidemia  Lipid Panel   8. Anxiety  buPROPion (WELLBUTRIN XL) 150 MG extended release tablet    hydrOXYzine (ATARAX) 25 MG tablet   9. Secondary malignant neoplasm of other specified sites ([facility] [address]     10. Severe episode of recurrent major depressive disorder, without psychotic features ([address]     11. History of drug dependence ([address]     12. Chronic obstructive pulmonary disease, unspecified COPD type [facility])  Tamika Diallo MD, Pulmonology, [place]   13.  Type 2 diabetes mellitus with hyperglycemia, with long-term current use of insulin [facility])  Ambulatory referral to Endocrinology 14. Class 3 severe obesity due to excess calories with serious comorbidity and body mass index (BMI) of 45.0 to 49.9 in adult [facility])         Plan:      Orders Placed This Encounter   Procedures    INFLUENZA, QUADV, 3 YRS AND OLDER, IM, MDV, 0.5ML (AFLURIA QUADV)    Microalbumin / Creatinine Urine Ratio     Standing Status:   Future     Number of Occurrences:   1     Standing Expiration Date:   2/9/2022    Lipid Panel     Standing Status:   Future     Standing Expiration Date:   2/9/2022     Order Specific Question:   Is Patient Fasting?/# of Hours     Answer:   8 hours    TSH without Reflex     Standing Status:   Future     Standing Expiration Date:   2/9/2022   Rosita Parra MD, Pulmonology, [place]     Referral Priority:   Routine     Referral Type:   Eval and Treat     Referral Reason:   Specialty Services Required     Referred to Provider:   Tracie Ma MD     Requested Specialty:   Pulmonology     Number of Visits Requested:   1    Ambulatory referral to Endocrinology     Referral Priority:   Routine     Referral Type:   Eval and Treat     Referral Reason:   Specialty Services Required     Referred to Provider:   Sis Dalton MD     Requested Specialty:   Endocrinology     Number of Visits Requested:   1    POCT glycosylated hemoglobin (Hb A1C)     Results for POC orders placed in visit on 02/09/21   POCT glycosylated hemoglobin (Hb A1C)   Result Value Ref Range    Hemoglobin A1C 7.9 %            Orders Placed This Encounter   Medications    montelukast (SINGULAIR) 10 MG tablet     Sig: Take 1 tab nightly at supper for breathing/allergies     Dispense:  30 tablet     Refill:  5    buPROPion (WELLBUTRIN XL) 150 MG extended release tablet     Sig: Take 1 tablet by mouth every morning     Dispense:  30 tablet     Refill:  3    hydrOXYzine (ATARAX) 25 MG tablet     Sig: Take 1 tablet by mouth every 8 hours as needed for Anxiety     Dispense:  90 tablet     Refill:  2 Return in about 1 month (around 3/9/2021). Anxiety- start wellbutrin. Hydroxyzine prn. Discussed that it typically takes about 4-6 weeks to take full effect. Discussed possibility of SI. If any SI pt should stop medication immediately and seek help. Pt was agreeable. Sarcoidosis/asthma/copd- okay to start back on singulair. F/u with pulmonology. DM- worsening. F/u with endocrine. Obesity- continue with dietician. Check labs. F/u in a month. Hypothyroid- check labs. Dyslipidemia- Labs fasting. Reviewed with the patient: current clinicalstatus, medications, activities and diet. Side effects, adverse effects of the medication prescribedtoday, as well as treatment plan/ rationale and result expectations have been discussedwith the patient who expresses understanding and desires to proceed. Close follow upto evaluate treatment results and for coordination of care. I have reviewedthe patient's medical history in detail and updated the computerized patient record.     Radha Maria, SUDEEP - CNP

## 2021-02-09 NOTE — PATIENT INSTRUCTIONS
Patient Education        Hypothyroidism: Care Instructions  Your Care Instructions     When you have hypothyroidism, your body doesn't make enough thyroid hormone. This hormone helps your body use energy. If your thyroid level is low, you may feel tired, be constipated, have an increase in your blood pressure, or have dry skin or memory problems. You may also get cold easily, even when it is warm. Women with low thyroid levels may have heavy menstrual periods. A blood test to find your thyroid-stimulating hormone (TSH) level is used to check for hypothyroidism. A high TSH level may mean that you have it. The treatment for hypothyroidism is thyroid hormone pills. You should start to feel better in 1 to 2 weeks. Most people need treatment for the rest of their lives. You will need regular visits with your doctor to make sure you are doing well and that you have the right dose of medicine. Follow-up care is a key part of your treatment and safety. Be sure to make and go to all appointments, and call your doctor if you are having problems. It's also a good idea to know your test results and keep a list of the medicines you take. How can you care for yourself at home? · Take your thyroid hormone medicine exactly as prescribed. Call your doctor if you think you are having a problem with your medicine. Most people do not have side effects if they take the right amount of medicine regularly. ? Take the medicine 30 minutes before breakfast, and do not take it with calcium, vitamins, or iron. ? Do not take extra doses of your thyroid medicine. It will not help you get better any faster, and it may cause side effects. ? If you forget to take a dose, do NOT take a double dose of medicine. Take your usual dose the next day. · Tell your doctor about all prescription, herbal, or over-the-counter products you take. · Take care of yourself. Eat a healthy diet, get enough sleep, and get regular exercise. When should you call for help? Call 911 anytime you think you may need emergency care. For example, call if:    · You passed out (lost consciousness).     · You have severe trouble breathing.     · You have a very slow heartbeat (less than 60 beats a minute).     · You have a low body temperature (95°F or below). Call your doctor now or seek immediate medical care if:    · You feel tired, sluggish, or weak.     · You have trouble remembering things or concentrating.     · You do not begin to feel better 2 weeks after starting your medicine. Watch closely for changes in your health, and be sure to contact your doctor if you have any problems. Where can you learn more? Go to [email or web address] org and sign in to your [employer] account. Enter U687 in the [employer] box to learn more about \"Hypothyroidism: Care Instructions. \"     If you do not have an account, please click on the \"Sign Up Now\" link. Current as of: March 31, 2020               Content Version: 12.6  © 3878-1722 [employer]. Care instructions adapted under license by [facility] ([facility]). If you have questions about a medical condition or this instruction, always ask your healthcare professional. [address] any warranty or liability for your use of this information.

## 2021-02-09 NOTE — PROGRESS NOTES
Vaccine Information Sheet, \"Influenza - Inactivated\"  given to Jennifer Robertson, or parent/legal guardian of  Jennifer Robertson and verbalized understanding. Patient responses:    Have you ever had a reaction to a flu vaccine? No  Are you able to eat eggs without adverse effects? Yes  Do you have any current illness? No  Have you ever had Guillian Haiku Syndrome? No    Flu vaccine given per order. Please see immunization tab.

## 2021-02-11 ENCOUNTER — CARE COORDINATION (OUTPATIENT)
Dept: CARE COORDINATION | Age: 51
End: 2021-02-11

## 2021-02-11 ASSESSMENT — ENCOUNTER SYMPTOMS: DYSPNEA ASSOCIATED WITH: EXERTION

## 2021-02-11 NOTE — CARE COORDINATION
Diabetes Assessment    Medic Alert ID: No  Meal Planning: Avoidance of concentrated sweets, Plate Method   How often do you test your blood sugar?: Daily, Bedtime (Comment: pt states she is only checking blood sugars bid)   Do you have barriers with adherence to non-pharmacologic self-management interventions?  (Nutrition/Exercise/Self-Monitoring): No   Have you ever had to go to the ED for symptoms of low blood sugar?: No       No patient-reported symptoms   Do you have hyperglycemia symptoms?: Yes   Frequency of Episodes: 1 Per: Day   Do you have hypoglycemia symptoms?: No   Last Blood Sugar Value: 174   Blood Sugar Trends: Steady Increase      ,   COPD Assessment    Does the patient understand envrionmental exposure?: Yes  Is the patient able to verbalize Rescue vs. Long Acting medications?: Yes  Does the patient have a nebulizer?: Yes  Does the patient use a space with inhaled medications?: No     No patient-reported symptoms         Symptoms:  None: Yes      Symptom course: stable  Breathlessness: exertion  Increase use of rapid acting/rescue inhaled medications?: No  Change in chronic cough?: No/At Baseline  Change in sputum?: No/At Baseline  Self Monitoring - SaO2: No  Have you had a recent diagnosis of pneumonia either by PCP or at a hospital?: No      and   General Assessment    Do you have any symptoms that are causing concern?: No         Care Coordination Interventions    Program Enrollment: Complex Care  Referral from Primary Care Provider: No  Suggested Interventions and Community Resources  Medi Set or Pill Pack: Not Started (Comment: 1/5/2021 Discussed picking up medication box.)  Pharmacist: Completed (Comment: 1/5/2021 [facility] Clinical Rx)  Registered Dietician: Completed (Comment: 1/5/2021 [address] [place] [address])  Social Work: Completed (Comment: 1/5/2021 [facility] Social Work for [employer] and long term planning.)  Other Services: Declined (Comment: 1/5/2021 Declined ACP Referral) Zone Management Tools: In Process (Comment: 1/5/2021 Diabetes and COPD Zones)  Other Services or Interventions: 1/5/2021 instructed on same day, next day, and Walk-In Care. Goals Addressed                 This Visit's Progress     Conditions and Symptoms   Improving     I will schedule office visits, as directed by my provider. I will keep my appointment or reschedule if I have to cancel. I will notify my provider of any barriers to my plan of care. I will follow my Zone Management tool to seek urgent or emergent care. I will notify my provider of any symptoms that indicate a worsening of my condition. COPD- use nebulizer, avoid inhaled irritants, call MD when in yellow zone  Diabetes-modify diet- decrease blood sugars, increase exercise, log blood sugars    Barriers: lack of education  Plan for overcoming my barriers: Care Coordination, [facility] Work, Clinical Rx, and [facility] Dietician  Confidence: 9/10  Anticipated Goal Completion Date: 4/5/21         Medication Management   Improving     I will take my medication as directed. I will notify my provider of any problems with medications, like adverse effects or side effects. I will notify my provider/Care Coordinator if I am unable to afford my medications. I will notify my provider for advice before I stop taking any of my medication. I will use a medication box to improve/assist with medication management. Barriers: lack of education  Plan for overcoming my barriers: Care Coordination, Clinical Rx, pill box to organize meds  Confidence: 10/10  Anticipated Goal Completion Date: 4/5/2021            Prior to Admission medications    Medication Sig Start Date End Date Taking?  Authorizing Provider   montelukast (SINGULAIR) 10 MG tablet Take 1 tab nightly at supper for breathing/allergies 2/9/21   Del Munguia, APRN - CNP buPROPion (WELLBUTRIN XL) 150 MG extended release tablet Take 1 tablet by mouth every morning 2/9/21   SUDEEP Arthur CNP   hydrOXYzine (ATARAX) 25 MG tablet Take 1 tablet by mouth every 8 hours as needed for Anxiety 2/9/21 3/11/21  SUDEEP Fleming CNP   tiZANidine (ZANAFLEX) 2 MG tablet Take 1 tablet by mouth 3 times daily as needed (back pain/ spasm) 2/6/21   Gonzalez Jerez PA-C   butalbital-acetaminophen-caffeine (FIORICET, ESGIC) -98 MG per tablet Take 1 tablet by mouth every 6 hours as needed for Headaches 1/4/21   Balaji Neil MD   pregabalin (LYRICA) 150 MG capsule Take 1 capsule by mouth 3 times daily for 30 days. 12/14/20 1/13/21  SUDEEP Fleming CNP   Insulin Degludec (TRESIBA FLEXTOUCH) 100 UNIT/ML SOPN INJECT 30 UNITS UNDER THE SKIN NIGHTLY 11/17/20   SUDEEP Fleming CNP   DULoxetine (CYMBALTA) 60 MG extended release capsule Take 60 mg by mouth every 24 hours     Historical Provider, MD   ALPRAZolam (XANAX) 0.25 MG tablet Take 0.25 mg by mouth nightly as needed.   3/23/20   Historical Provider, MD   pantoprazole (PROTONIX) 20 MG tablet Take 2 tablets by mouth daily  Patient not taking: Reported on 2/9/2021 11/4/20   Gonzalez Jerez PA-C   magnesium oxide (MAG-OX) 400 (241.3 Mg) MG TABS tablet TAKE 1/2 TABLET BY MOUTH DAILY 11/2/20   SUDEEP Fleming CNP   busPIRone (BUSPAR) 15 MG tablet TAKE 1 TABLET BY MOUTH THREE TIMES DAILY 9/28/20   SUDEEP Fleming CNP   albuterol sulfate HFA (PROVENTIL HFA) 108 (90 Base) MCG/ACT inhaler Inhale 2 puffs into the lungs every 6 hours as needed for Wheezing 9/9/20   SUDEEP Arthur CNP   cetirizine (ZYRTEC) 10 MG tablet TAKE 1 TABLET BY MOUTH EVERY NIGHT AT BEDTIME AS NEEDED FOR ALLERGIES 8/27/20   SUDEEP Arthur CNP   insulin lispro (HUMALOG) 100 UNIT/ML injection vial INJECT 4 UNITS UNDER THE SKIN THREE TIMES DAILY AS NEEDED FOR HIGH BLOOD SUGAR 7/10/20   SUDEEP Fleming CNP 2/17/2021  9:00 AM Christopher Cruz MD [facility]   3/9/2021 10:30 AM Jana Holder, APRN - CNP [facility]   4/7/2021  3:30 PM Vargas Angela, [address] [facility]   11/5/2021  9:15 AM K[facility] Lyn [facility]

## 2021-02-16 ENCOUNTER — CARE COORDINATION (OUTPATIENT)
Dept: CARE COORDINATION | Age: 51
End: 2021-02-16

## 2021-02-16 NOTE — CARE COORDINATION
Jai Moyer  2/16/2021    Registered Dietitian Progress Note for Care Coordination    Assessment: Ines Randolph is a 53 y.o. female. RD referred for DM and COPD. RD spoke with patient for initial nutrition assessment on 1/8 and first follow up on 1/29. RD called to follow up with pt today 2/16. RD discussed previous goals with pt. She is drinking a Glucerna in the AM for breakfast. She is eating lunch and dinner. RD reiterated the importance of making these meals balanced and discussed incorporating each component of the plate. Pt verbalizes understanding. She is checking her BS daily. Pt states her BS has been running high. Per pt this AM  mg/dL. RD noted patient's A1C on 2/9/21 documented as 7.9%. RD discussed pt's current A1C and explained the ADA recommends A1C less than 7%. Reiterated the importance of taking medicine as directed and eating balanced meals/snacks consistently throughout the day to help better manage DM. Discussed incorporating physical activity will help as well. Pt verbalizes understanding. Pt has no nutrition related questions at this time.      Barriers to meeting goals: overwhelmed by complexity of regimen     Action:  Reiterated the importance of eating balanced meals/snacks consistently throughout the day, monitoring BS, taking medicine as directed and incorporating physical activity to help manage BS. Pt will continue to drink a Glucerna in the AM for breakfast and eat a balanced meal for lunch and dinner. See assessment note above.        Nutrition Monitoring and Evaluation  Indicator/Goal Criteria Progress   #1 Eat balanced meals consistently throughout the day.  #1 Focus on eating 3 meals/day and make these meals balanced using the MyPlate [id number]-8/5 plate fruits and/or vegetables, 1/4 plate protein and 1/4 plate starchy carbohydrates with 8 oz glass of low fat milk if desired.  #1 Patient states she is drinking a Glucerna in the AM for breakfast. Pt states she eats a balanced meal for lunch and dinner. #2  Eat a balanced snack or drink a Glucerna at lunch time instead of skipping the meal completely. #2  Do not skip lunch completely. Include a serving of protein with a serving of carbohydrate when snacking.  #2 Pt is eating lunch and no longer skipping this meal.    #3  Monitor BS daily and keep a log.  #3 Continue checking BS daily.  #3 Patient is checking BS daily. Pt states this AM  mg/dL.       Plan of Care:  RD encouraged pt to keep working toward goals set. RD will follow up with pt to discuss any questions pt has and check the progress toward goals.      Follow Up:    RD will call pt in 1 month to follow up and answer any nutrition related questions at that time.      [address] [facility], [address] [place] [address]

## 2021-02-17 ENCOUNTER — CARE COORDINATION (OUTPATIENT)
Dept: CARE COORDINATION | Age: 51
End: 2021-02-17

## 2021-02-17 ASSESSMENT — ENCOUNTER SYMPTOMS: DYSPNEA ASSOCIATED WITH: EXERTION

## 2021-02-18 ENCOUNTER — APPOINTMENT (OUTPATIENT)
Dept: CT IMAGING | Age: 51
End: 2021-02-18
Payer: MEDICAID

## 2021-02-18 ENCOUNTER — HOSPITAL ENCOUNTER (EMERGENCY)
Age: 51
Discharge: HOME | End: 2021-02-18
Payer: MEDICAID

## 2021-02-18 VITALS
HEART RATE: 86 BPM | RESPIRATION RATE: 18 BRPM | HEIGHT: 63 IN | SYSTOLIC BLOOD PRESSURE: 130 MMHG | TEMPERATURE: 98.3 F | DIASTOLIC BLOOD PRESSURE: 60 MMHG | WEIGHT: 250 LBS | OXYGEN SATURATION: 99 % | BODY MASS INDEX: 44.3 KG/M2

## 2021-02-18 DIAGNOSIS — R10.9 LEFT FLANK PAIN: Primary | ICD-10-CM

## 2021-02-18 LAB
ALBUMIN SERPL-MCNC: 3.7 G/DL (ref 3.5–4.6)
ALP BLD-CCNC: 154 U/L (ref 40–130)
ALT SERPL-CCNC: 16 U/L (ref 0–33)
ANION GAP SERPL CALCULATED.3IONS-SCNC: 7 MEQ/L (ref 9–15)
AST SERPL-CCNC: 18 U/L (ref 0–35)
BASOPHILS ABSOLUTE: 0.1 K/UL (ref 0–0.2)
BASOPHILS RELATIVE PERCENT: 1.2 %
BILIRUB SERPL-MCNC: 0.3 MG/DL (ref 0.2–0.7)
BILIRUBIN URINE: NEGATIVE
BLOOD, URINE: NEGATIVE
BUN BLDV-MCNC: 11 MG/DL (ref 6–20)
CALCIUM SERPL-MCNC: 9.1 MG/DL (ref 8.5–9.9)
CHLORIDE BLD-SCNC: 102 MEQ/L (ref 95–107)
CLARITY: ABNORMAL
CO2: 27 MEQ/L (ref 20–31)
COLOR: YELLOW
CREAT SERPL-MCNC: 1.2 MG/DL (ref 0.5–0.9)
EOSINOPHILS ABSOLUTE: 0.2 K/UL (ref 0–0.7)
EOSINOPHILS RELATIVE PERCENT: 2.5 %
GFR AFRICAN AMERICAN: 57.6
GFR NON-AFRICAN AMERICAN: 47.6
GLOBULIN: 3.5 G/DL (ref 2.3–3.5)
GLUCOSE BLD-MCNC: 186 MG/DL (ref 70–99)
GLUCOSE URINE: NEGATIVE MG/DL
HCT VFR BLD CALC: 43.2 % (ref 37–47)
HEMOGLOBIN: 14.6 G/DL (ref 12–16)
KETONES, URINE: NEGATIVE MG/DL
LACTIC ACID: 1.1 MMOL/L (ref 0.5–2.2)
LEUKOCYTE ESTERASE, URINE: NEGATIVE
LYMPHOCYTES ABSOLUTE: 2.1 K/UL (ref 1–4.8)
LYMPHOCYTES RELATIVE PERCENT: 24.4 %
MCH RBC QN AUTO: 32.5 PG (ref 27–31.3)
MCHC RBC AUTO-ENTMCNC: 33.9 % (ref 33–37)
MCV RBC AUTO: 95.8 FL (ref 82–100)
MONOCYTES ABSOLUTE: 0.4 K/UL (ref 0.2–0.8)
MONOCYTES RELATIVE PERCENT: 4.1 %
NEUTROPHILS ABSOLUTE: 5.7 K/UL (ref 1.4–6.5)
NEUTROPHILS RELATIVE PERCENT: 67.8 %
NITRITE, URINE: NEGATIVE
PDW BLD-RTO: 13.3 % (ref 11.5–14.5)
PH UA: 5.5 (ref 5–9)
PLATELET # BLD: 279 K/UL (ref 130–400)
POTASSIUM SERPL-SCNC: 4.5 MEQ/L (ref 3.4–4.9)
PROTEIN UA: NEGATIVE MG/DL
RBC # BLD: 4.51 M/UL (ref 4.2–5.4)
SODIUM BLD-SCNC: 136 MEQ/L (ref 135–144)
SPECIFIC GRAVITY UA: 1.02 (ref 1–1.03)
TOTAL PROTEIN: 7.2 G/DL (ref 6.3–8)
URINE REFLEX TO CULTURE: ABNORMAL
UROBILINOGEN, URINE: 0.2 E.U./DL
WBC # BLD: 8.5 K/UL (ref 4.8–10.8)

## 2021-02-18 PROCEDURE — 36415 COLL VENOUS BLD VENIPUNCTURE: CPT

## 2021-02-18 PROCEDURE — 99285 EMERGENCY DEPT VISIT HI MDM: CPT

## 2021-02-18 PROCEDURE — 83605 ASSAY OF LACTIC ACID: CPT

## 2021-02-18 PROCEDURE — 6360000002 HC RX W HCPCS: Performed by: PHYSICIAN ASSISTANT

## 2021-02-18 PROCEDURE — 85025 COMPLETE CBC W/AUTO DIFF WBC: CPT

## 2021-02-18 PROCEDURE — 96375 TX/PRO/DX INJ NEW DRUG ADDON: CPT

## 2021-02-18 PROCEDURE — 96374 THER/PROPH/DIAG INJ IV PUSH: CPT

## 2021-02-18 PROCEDURE — 74176 CT ABD & PELVIS W/O CONTRAST: CPT

## 2021-02-18 PROCEDURE — 80053 COMPREHEN METABOLIC PANEL: CPT

## 2021-02-18 PROCEDURE — 2580000003 HC RX 258: Performed by: PHYSICIAN ASSISTANT

## 2021-02-18 PROCEDURE — 81003 URINALYSIS AUTO W/O SCOPE: CPT

## 2021-02-18 RX ORDER — ONDANSETRON 2 MG/ML
4 INJECTION INTRAMUSCULAR; INTRAVENOUS ONCE
Status: COMPLETED | OUTPATIENT
Start: 2021-02-18 | End: 2021-02-18

## 2021-02-18 RX ORDER — FENTANYL CITRATE 50 UG/ML
75 INJECTION, SOLUTION INTRAMUSCULAR; INTRAVENOUS ONCE
Status: COMPLETED | OUTPATIENT
Start: 2021-02-18 | End: 2021-02-18

## 2021-02-18 RX ORDER — BACLOFEN 10 MG/1
10 TABLET ORAL 3 TIMES DAILY
Qty: 15 TABLET | Refills: 0 | Status: SHIPPED | OUTPATIENT
Start: 2021-02-18 | End: 2021-02-19 | Stop reason: ALTCHOICE

## 2021-02-18 RX ORDER — LIDOCAINE 50 MG/G
OINTMENT TOPICAL
Qty: 30 G | Refills: 0 | Status: SHIPPED | OUTPATIENT
Start: 2021-02-18 | End: 2021-05-17 | Stop reason: ALTCHOICE

## 2021-02-18 RX ORDER — 0.9 % SODIUM CHLORIDE 0.9 %
1000 INTRAVENOUS SOLUTION INTRAVENOUS ONCE
Status: COMPLETED | OUTPATIENT
Start: 2021-02-18 | End: 2021-02-18

## 2021-02-18 RX ADMIN — ONDANSETRON 4 MG: 2 INJECTION INTRAMUSCULAR; INTRAVENOUS at 15:08

## 2021-02-18 RX ADMIN — FENTANYL CITRATE 75 MCG: 50 INJECTION, SOLUTION INTRAMUSCULAR; INTRAVENOUS at 15:08

## 2021-02-18 RX ADMIN — HYDROMORPHONE HYDROCHLORIDE 0.5 MG: 1 INJECTION, SOLUTION INTRAMUSCULAR; INTRAVENOUS; SUBCUTANEOUS at 16:20

## 2021-02-18 RX ADMIN — SODIUM CHLORIDE 1000 ML: 9 INJECTION, SOLUTION INTRAVENOUS at 15:08

## 2021-02-18 ASSESSMENT — ENCOUNTER SYMPTOMS
TROUBLE SWALLOWING: 0
COLOR CHANGE: 0
BACK PAIN: 1
ALLERGIC/IMMUNOLOGIC NEGATIVE: 1
SHORTNESS OF BREATH: 0
APNEA: 0
EYE PAIN: 0
NAUSEA: 1
ABDOMINAL PAIN: 1

## 2021-02-18 ASSESSMENT — PAIN DESCRIPTION - FREQUENCY: FREQUENCY: CONTINUOUS

## 2021-02-18 ASSESSMENT — PAIN DESCRIPTION - PAIN TYPE: TYPE: ACUTE PAIN

## 2021-02-18 ASSESSMENT — PAIN DESCRIPTION - LOCATION: LOCATION: FLANK

## 2021-02-18 ASSESSMENT — PAIN SCALES - GENERAL: PAINLEVEL_OUTOF10: 9

## 2021-02-18 NOTE — ED TRIAGE NOTES
Pt has co left flank pain. Pt states she has had kidney stones in the past and that is what it feels like. Pt is pw/d. aox4 family at bedside.

## 2021-02-18 NOTE — ED PROVIDER NOTES
[address] [facility] ED  eMERGENCYdEPARTMENT eNCOUnter      Pt Name: Gustabo Tineo  MRN: [id number]  [place] 1970  Date of evaluation: 2/18/2021  Provider:Efrain Lyman PA-C    CHIEF COMPLAINT       Chief Complaint   Patient presents with    Flank Pain     left          HISTORY OF PRESENT ILLNESS  (Location/Symptom, Timing/Onset, Context/Setting, Quality, Duration, Modifying Factors, Severity.)   Gustabo Tineo is a 53 y.o. female who presents to the emergency department with complaints of left flank pain that has been ongoing for the past 2 to 3 weeks. Patient was seen in the emergency department 2 weeks ago for chest pain and did have left flank pain at that time, but patient states that the pain was mild then. Patient states that the pain is progressively worsened over the past 2 weeks. Patient states that she just became nauseated today but has not had any vomiting. Patient denies any diarrhea or change in bowel habits. Patient denies any hematuria or dysuria. Patient states that the pain is 8 out of 10 in the left flank when sitting but movement or touching the area causes it to go to 10 out of 10. Patient states that the pain radiates into the left lower quadrant of the abdomen. Patient denies any hematuria or dysuria. HPI    Nursing Notes were reviewed and I agree. REVIEW OF SYSTEMS    (2-9 systems for level 4, 10 or more for level 5)     Review of Systems   Constitutional: Negative for diaphoresis and fever. HENT: Negative for hearing loss and trouble swallowing. Eyes: Negative for pain. Respiratory: Negative for apnea and shortness of breath. Cardiovascular: Negative for chest pain. Gastrointestinal: Positive for abdominal pain and nausea. Endocrine: Negative. Genitourinary: Positive for flank pain. Negative for hematuria. Musculoskeletal: Positive for back pain. Negative for neck pain and neck stiffness. Skin: Negative for color change. Allergic/Immunologic: Negative. Neurological: Negative for dizziness and numbness. Hematological: Negative. Psychiatric/Behavioral: Negative. All other systems reviewed and are negative. Except as noted above the remainder of the review of systems was reviewed and negative. PAST MEDICAL HISTORY     Past Medical History:   Diagnosis Date    Anxiety     Arthritis     Asthma     Bronchopneumonia     Cancer ([facility]     renal    Cerebral artery occlusion with cerebral infarction (HCC)     Chronic bilateral low back pain with sciatica     Chronic kidney disease     Chronic obstructive lung disease ([facility] 7/26/2019    Depression     Fibromyalgia     Hypertension     Insulin dependent type 2 diabetes mellitus, uncontrolled ([facility] 8/3/2018    Localized enlarged lymph nodes 10/26/2018    Mixed headache     Pure hyperglyceridemia 5/19/2017    Sarcoidosis     Sleep apnea     does not wear cpap    Thyroid goiter          SURGICAL HISTORY       Past Surgical History:   Procedure Laterality Date    BRONCHOSCOPY  10/26/2018    DR. STEARNS    KIDNEY REMOVAL Right 08/2016    KIDNEY REMOVAL Right 2016    LUNG BIOPSY Right 10/2018    THYROID LOBECTOMY Right 6/13/14    THYROIDECTOMY  02/21/2019    DR. MAGDALENO    THYROIDECTOMY  2018    URETER STENT PLACEMENT Left 08/2016         CURRENT MEDICATIONS       Previous Medications    ALBUTEROL (PROVENTIL) (2.5 MG/3ML) 0.083% NEBULIZER SOLUTION    Take 3 mLs by nebulization every 4 hours as needed for Wheezing    ALBUTEROL SULFATE HFA (PROVENTIL HFA) 108 (90 BASE) MCG/ACT INHALER    Inhale 2 puffs into the lungs every 6 hours as needed for Wheezing    ALPRAZOLAM (XANAX) 0.25 MG TABLET    Take 0.25 mg by mouth nightly as needed.      ATORVASTATIN (LIPITOR) 40 MG TABLET    Take 1 tablet by mouth nightly    BLOOD GLUCOSE MONITORING SUPPL (ONETOUCH VERIO) W/DEVICE KIT    TEST 3 TIMES DAILY AS NEEDED    BLOOD GLUCOSE TEST STRIPS (EXACTECH TEST) STRIP    1 each by In Vitro route 3 times daily (Accu-check test strips) As needed. DX:E11.65    BLOOD GLUCOSE TEST STRIPS (ONETOUCH VERIO) STRIP    TEST 3 TIMES DAILY AS NEEDED    BUPROPION (WELLBUTRIN XL) 150 MG EXTENDED RELEASE TABLET    Take 1 tablet by mouth every morning    BUSPIRONE (BUSPAR) 15 MG TABLET    TAKE 1 TABLET BY MOUTH THREE TIMES DAILY    BUTALBITAL-ACETAMINOPHEN-CAFFEINE (FIORICET, ESGIC) -40 MG PER TABLET    Take 1 tablet by mouth every 6 hours as needed for Headaches    CETIRIZINE (ZYRTEC) 10 MG TABLET    TAKE 1 TABLET BY MOUTH EVERY NIGHT AT BEDTIME AS NEEDED FOR ALLERGIES    DULOXETINE (CYMBALTA) 60 MG EXTENDED RELEASE CAPSULE    Take 60 mg by mouth every 24 hours     FLUTICASONE (FLONASE) 50 MCG/ACT NASAL SPRAY    1 spray by Nasal route daily    HYDROXYZINE (ATARAX) 25 MG TABLET    Take 1 tablet by mouth every 8 hours as needed for Anxiety    INSULIN DEGLUDEC (TRESIBA FLEXTOUCH) 100 UNIT/ML SOPN    INJECT 30 UNITS UNDER THE SKIN NIGHTLY    INSULIN LISPRO (HUMALOG) 100 UNIT/ML INJECTION VIAL    INJECT 4 UNITS UNDER THE SKIN THREE TIMES DAILY AS NEEDED FOR HIGH BLOOD SUGAR    INSULIN SYRINGE-NEEDLE U-100 30G X 1/2\" 1 ML MISC    1 each by Does not apply route daily    LEVOTHYROXINE (SYNTHROID) 150 MCG TABLET    Take 150 mcg by mouth Daily     MAGNESIUM OXIDE (MAG-OX) 400 (241.3 MG) MG TABS TABLET    TAKE 1/2 TABLET BY MOUTH DAILY    METOCLOPRAMIDE (REGLAN) 10 MG TABLET    Take 1 tablet by mouth 4 times daily    METOPROLOL TARTRATE (LOPRESSOR) 25 MG TABLET    Take 1 tablet by mouth 2 times daily    MONTELUKAST (SINGULAIR) 10 MG TABLET    Take 1 tab nightly at supper for breathing/allergies    ONE TOUCH ULTRASOFT LANCETS MISC    TEST 3 TIMES DAILY AS NEEDED    PANTOPRAZOLE (PROTONIX) 20 MG TABLET    Take 2 tablets by mouth daily    PREGABALIN (LYRICA) 150 MG CAPSULE    Take 1 capsule by mouth 3 times daily for 30 days. PROMETHAZINE (PHENERGAN) 25 MG TABLET    WARNING:  May cause drowsiness.   May impair ability to operate vehicles or machinery. Do not use in combination with alcohol. ! Tablet every 6 hours as needed in conjunction with Ultram for headaches    SUMATRIPTAN (IMITREX) 25 MG TABLET    TAKE 1 TABLET BY MOUTH 1 TIME AS NEEDED FOR MIGRAINE    TIZANIDINE (ZANAFLEX) 2 MG TABLET    Take 1 tablet by mouth 3 times daily as needed (back pain/ spasm)    TRAMADOL (ULTRAM) 50 MG TABLET    Take 50 mg by mouth every 6 hours as needed for Pain.        ALLERGIES     Shellfish-derived products, Ibuprofen, Ketorolac, Morphine, Other, Propoxyphene n-acetaminophen, and Toradol [ketorolac tromethamine]    FAMILY HISTORY       Family History   Problem Relation Age of Onset    Cancer Father     Diabetes Father     Allergy (Severe) Father     Heart Attack Father     Prostate Cancer Father     High Blood Pressure Mother     Diabetes Mother     Arthritis Mother     High Cholesterol Mother     Vision Loss Mother     Alcohol Abuse Neg Hx     Anemia Neg Hx     Arrhythmia Neg Hx     Asthma Neg Hx     Atrial Fibrillation Neg Hx     Birth Defects Neg Hx     Breast Cancer Neg Hx     Coronary Art Dis Neg Hx     Colon Cancer Neg Hx     Depression Neg Hx     Early Death Neg Hx     Hearing Loss Neg Hx     Heart Disease Neg Hx     Learning Disabilities Neg Hx     Kidney Disease Neg Hx     Mental Illness Neg Hx     Mental Retardation Neg Hx     Miscarriages / Stillbirths Neg Hx     Obesity Neg Hx     Osteoporosis Neg Hx     Stroke Neg Hx     Substance Abuse Neg Hx           SOCIAL HISTORY       Social History     Socioeconomic History    Marital status: Legally      Spouse name: None    Number of children: None    Years of education: 15    Highest education level: High school graduate   Occupational History    None   Social Needs    Financial resource strain: Somewhat hard    Food insecurity     Worry: Sometimes true     Inability: Sometimes true    Transportation needs     Medical: No     Non-medical: No   Tobacco Use    Smoking status: Former Smoker     Packs/day: 0.50     Years: 15.00     Pack years: 7.50     Types: Cigarettes     Start date: 2014     Quit date: 2015     Years since quittin.0    Smokeless tobacco: Former User     Quit date: 3/23/2016   Substance and Sexual Activity    Alcohol use: Not Currently     Alcohol/week: 0.0 standard drinks     Comment: occasionally    Drug use: Yes     Frequency: 5.0 times per week     Types: Marijuana    Sexual activity: Yes     Partners: Male   Lifestyle    Physical activity     Days per week: 0 days     Minutes per session: 0 min    Stress: Very much   Relationships    Social connections     Talks on phone: Three times a week     Gets together: Never     Attends [employer] service: Never     Active member of club or organization: No     Attends meetings of clubs or organizations: Never     Relationship status:     Intimate partner violence     Fear of current or ex partner: No     Emotionally abused: No     Physically abused: No     Forced sexual activity: No   Other Topics Concern    None   Social History Narrative    None       SCREENINGS    Obdulia Coma Scale  Eye Opening: Spontaneous  Best Verbal Response: Oriented  Best Motor Response: Obeys commands  Obdulia Coma Scale Score: 15      PHYSICAL EXAM    (up to 7 forlevel 4, 8 or more for level 5)     ED Triage Vitals   BP Temp Temp src Pulse Resp SpO2 Height Weight   -- -- -- -- -- -- -- --       Physical Exam  Vitals signs and nursing note reviewed. Constitutional:       General: She is not in acute distress. Appearance: She is well-developed. She is not diaphoretic. HENT:      Head: Normocephalic and atraumatic. Mouth/Throat:      Pharynx: No oropharyngeal exudate. Eyes:      General: No scleral icterus. Conjunctiva/sclera: Conjunctivae normal.      Pupils: Pupils are equal, round, and reactive to light.    Neck:      Musculoskeletal: Normal range of motion and neck supple. Trachea: No tracheal deviation. Cardiovascular:      Rate and Rhythm: Normal rate. Heart sounds: Normal heart sounds. Pulmonary:      Effort: Pulmonary effort is normal. No respiratory distress. Breath sounds: Normal breath sounds. Abdominal:      General: Bowel sounds are normal. There is no distension. Palpations: Abdomen is soft. Tenderness: There is abdominal tenderness in the left upper quadrant and left lower quadrant. There is left CVA tenderness. There is no right CVA tenderness, guarding or rebound. Musculoskeletal: Normal range of motion. Back:    Skin:     General: Skin is warm and dry. Findings: No erythema or rash. Neurological:      Mental Status: She is alert and oriented to person, place, and time. Cranial Nerves: No cranial nerve deficit. Motor: No abnormal muscle tone. Psychiatric:         Behavior: Behavior normal.         Thought Content:  Thought content normal.         Judgment: Judgment normal.           DIAGNOSTIC RESULTS     RADIOLOGY:   Non-plain film images such as CT, Ultrasound and MRI are read by the radiologist. Plain radiographic images are visualized and preliminarilyinterpreted by Ceasar Padilla PA-C with the below findings:    Neg    Interpretation per the Radiologist below, if available at the time of this note:    CT ABDOMEN PELVIS WO CONTRAST Additional Contrast? None   Final Result      No hydronephrosis or nephrolithiasis, or other acute process in the abdomen/pelvis.            ==========                   LABS:  Labs Reviewed   COMPREHENSIVE METABOLIC PANEL - Abnormal; Notable for the following components:       Result Value    Anion Gap 7 (*)     Glucose 186 (*)     CREATININE 1.20 (*)     GFR Non- 47.6 (*)     GFR  57.6 (*)     Alkaline Phosphatase 154 (*)     All other components within normal limits   CBC WITH AUTO DIFFERENTIAL - Abnormal; Notable for the following components:    MCH 32.5 (*)     All other components within normal limits   URINE RT REFLEX TO CULTURE - Abnormal; Notable for the following components:    Clarity, UA CLOUDY (*)     All other components within normal limits   LACTIC ACID, PLASMA       All other labs were within normal range or not returnedas of this dictation. EMERGENCYDEPARTMENT COURSE and DIFFERENTIAL DIAGNOSIS/MDM:   Vitals:    Vitals:    02/18/21 1459 02/18/21 1622   BP: 130/80 130/66   Pulse: 94 84   Resp: 16 16   Temp: 98.3 °F (36.8 °C)    TempSrc: Temporal    SpO2: 99% 98%   Weight: 250 lb (113.4 kg)    Height: 5' 3\" (1.6 m)        REASSESSMENT      Presented the emergency department with complaints of left flank pain. This is been an ongoing problem for the past several weeks. Patient denied any saddle paresthesias or loss of bowel or bladder control. CT scan is negative. Laboratory testing and urinalysis are normal.  Pain is with touch and movement and I do believe there is a muscular component causing the symptoms. Patient will be given a prescription for baclofen and referred to PCP for follow-up. MDM    PROCEDURES:    Procedures      FINAL IMPRESSION      1. Left flank pain          DISPOSITION/PLAN   DISPOSITION Decision To Discharge 02/18/2021 05:02:54 PM      PATIENT REFERRED TO:  SUDEEP Dye CNP  [address] [facility]  [phone]    Call in 1 day        DISCHARGE MEDICATIONS:  New Prescriptions    BACLOFEN (LIORESAL) 10 MG TABLET    Take 1 tablet by mouth 3 times daily for 5 days    LIDOCAINE (XYLOCAINE) 5 % OINTMENT    Apply topically BID as needed.        (Please note that portions of this note were completed with a voice recognition program.  Efforts were made to edit the dictations but occasionally words are mis-transcribed.)    ASTON Winn PA-C  02/18/21 [id number]

## 2021-02-19 ENCOUNTER — CARE COORDINATION (OUTPATIENT)
Dept: CARE COORDINATION | Age: 51
End: 2021-02-19

## 2021-02-19 ENCOUNTER — VIRTUAL VISIT (OUTPATIENT)
Dept: FAMILY MEDICINE CLINIC | Age: 51
End: 2021-02-19
Payer: MEDICAID

## 2021-02-19 ENCOUNTER — PATIENT MESSAGE (OUTPATIENT)
Dept: FAMILY MEDICINE CLINIC | Age: 51
End: 2021-02-19

## 2021-02-19 DIAGNOSIS — M62.830 BACK SPASM: Primary | ICD-10-CM

## 2021-02-19 DIAGNOSIS — R10.9 FLANK PAIN: ICD-10-CM

## 2021-02-19 PROCEDURE — 99213 OFFICE O/P EST LOW 20 MIN: CPT | Performed by: NURSE PRACTITIONER

## 2021-02-19 PROCEDURE — G8482 FLU IMMUNIZE ORDER/ADMIN: HCPCS | Performed by: NURSE PRACTITIONER

## 2021-02-19 PROCEDURE — 1036F TOBACCO NON-USER: CPT | Performed by: NURSE PRACTITIONER

## 2021-02-19 PROCEDURE — G8417 CALC BMI ABV UP PARAM F/U: HCPCS | Performed by: NURSE PRACTITIONER

## 2021-02-19 PROCEDURE — G8428 CUR MEDS NOT DOCUMENT: HCPCS | Performed by: NURSE PRACTITIONER

## 2021-02-19 RX ORDER — BACLOFEN 10 MG/1
10 TABLET ORAL 3 TIMES DAILY
Qty: 60 TABLET | Refills: 0 | Status: SHIPPED | OUTPATIENT
Start: 2021-02-19

## 2021-02-19 ASSESSMENT — ENCOUNTER SYMPTOMS: BACK PAIN: 1

## 2021-02-19 NOTE — CARE COORDINATION
Ambulatory Care Coordination Note  2/19/2021  CM Risk Score: 11  Charlson 10 Year Mortality Risk Score: 100%     ACC: Lars Yu, RN    Summary Note: ACM CALLED PATIENT FOR FOLLOW UP ON ER VISIT 2/18/21, PT REPORTS LEFT FLANK PAIN CONTINUES, NO TEMPERTURE, TAKING BACLOFEN AS ORDERED, BUT SHE IS STILL UNCOMFORTABLE, PER ER NOTES -ER FOLLOW UP NEEDED FOR TODAY WITH KAITLYN AUSTIN PT IN AGREEMENT WITH VIRTUAL VISIT TODAY. ER VIRTUAL FOLLOW UP SCHEDULED WITH KAITLYN AUSTIN FOR TODAY AT 3PM, PT MADE AWARE AND IN AGREEMENT WITH APPOINTMENT DATE/TIME.       Future Appointments   Date Time Provider Valerie Cosby   2/23/2021 10:00 AM Christopher Ballard MD [facility]   3/9/2021 10:30 AM SUDEEP Azar - CNP [facility]   4/7/2021  3:30 PM Maria Guadalupe Perez MD [address]   11/5/2021  9:15 AM Annette Queen MD [facility]

## 2021-02-19 NOTE — PROGRESS NOTES
 Heart Disease Neg Hx     Learning Disabilities Neg Hx     Kidney Disease Neg Hx     Mental Illness Neg Hx     Mental Retardation Neg Hx     Miscarriages / Stillbirths Neg Hx     Obesity Neg Hx     Osteoporosis Neg Hx     Stroke Neg Hx     Substance Abuse Neg Hx      Social History     Socioeconomic History    Marital status: Legally      Spouse name: Not on file    Number of children: Not on file    Years of education: 15    Highest education level: High school graduate   Occupational History    Not on file   Social Needs    Financial resource strain: Somewhat hard    Food insecurity     Worry: Sometimes true     Inability: Sometimes true    Transportation needs     Medical: No     Non-medical: No   Tobacco Use    Smoking status: Former Smoker     Packs/day: 0.50     Years: 15.00     Pack years: 7.50     Types: Cigarettes     Start date: 2014     Quit date: 2015     Years since quittin.0    Smokeless tobacco: Former User     Quit date: 3/23/2016   Substance and Sexual Activity    Alcohol use: Not Currently     Alcohol/week: 0.0 standard drinks     Comment: occasionally    Drug use: Yes     Frequency: 5.0 times per week     Types: Marijuana    Sexual activity: Yes     Partners: Male   Lifestyle    Physical activity     Days per week: 0 days     Minutes per session: 0 min    Stress: Very much   Relationships    Social connections     Talks on phone:  Three times a week     Gets together: Never     Attends [employer] service: Never     Active member of club or organization: No     Attends meetings of clubs or organizations: Never     Relationship status:     Intimate partner violence     Fear of current or ex partner: No     Emotionally abused: No     Physically abused: No     Forced sexual activity: No   Other Topics Concern    Not on file   Social History Narrative    Not on file     Current Outpatient Medications on File Prior to Visit Medication Sig Dispense Refill    lidocaine (XYLOCAINE) 5 % ointment Apply topically BID as needed. 30 g 0    montelukast (SINGULAIR) 10 MG tablet Take 1 tab nightly at supper for breathing/allergies 30 tablet 5    buPROPion (WELLBUTRIN XL) 150 MG extended release tablet Take 1 tablet by mouth every morning 30 tablet 3    hydrOXYzine (ATARAX) 25 MG tablet Take 1 tablet by mouth every 8 hours as needed for Anxiety 90 tablet 2    tiZANidine (ZANAFLEX) 2 MG tablet Take 1 tablet by mouth 3 times daily as needed (back pain/ spasm) 15 tablet 0    butalbital-acetaminophen-caffeine (FIORICET, ESGIC) -40 MG per tablet Take 1 tablet by mouth every 6 hours as needed for Headaches 18 tablet 0    pregabalin (LYRICA) 150 MG capsule Take 1 capsule by mouth 3 times daily for 30 days. 90 capsule 0    Insulin Degludec (TRESIBA FLEXTOUCH) 100 UNIT/ML SOPN INJECT 30 UNITS UNDER THE SKIN NIGHTLY 15 mL 3    DULoxetine (CYMBALTA) 60 MG extended release capsule Take 60 mg by mouth every 24 hours       ALPRAZolam (XANAX) 0.25 MG tablet Take 0.25 mg by mouth nightly as needed.        pantoprazole (PROTONIX) 20 MG tablet Take 2 tablets by mouth daily (Patient not taking: Reported on 2/9/2021) 30 tablet 0    magnesium oxide (MAG-OX) 400 (241.3 Mg) MG TABS tablet TAKE 1/2 TABLET BY MOUTH DAILY 30 tablet 5    busPIRone (BUSPAR) 15 MG tablet TAKE 1 TABLET BY MOUTH THREE TIMES DAILY 90 tablet 5    albuterol sulfate HFA (PROVENTIL HFA) 108 (90 Base) MCG/ACT inhaler Inhale 2 puffs into the lungs every 6 hours as needed for Wheezing 1 Inhaler 3    cetirizine (ZYRTEC) 10 MG tablet TAKE 1 TABLET BY MOUTH EVERY NIGHT AT BEDTIME AS NEEDED FOR ALLERGIES 30 tablet 5    insulin lispro (HUMALOG) 100 UNIT/ML injection vial INJECT 4 UNITS UNDER THE SKIN THREE TIMES DAILY AS NEEDED FOR HIGH BLOOD SUGAR 10 mL 0    levothyroxine (SYNTHROID) 150 MCG tablet Take 150 mcg by mouth Daily  SUMAtriptan (IMITREX) 25 MG tablet TAKE 1 TABLET BY MOUTH 1 TIME AS NEEDED FOR MIGRAINE 9 tablet 1    promethazine (PHENERGAN) 25 MG tablet WARNING:  May cause drowsiness. May impair ability to operate vehicles or machinery. Do not use in combination with alcohol. ! Tablet every 6 hours as needed in conjunction with Ultram for headaches 20 tablet 0    blood glucose test strips (EXACTECH TEST) strip 1 each by In Vitro route 3 times daily (Accu-check test strips) As needed. DX:E11.65 300 strip 5    ONE TOUCH ULTRASOFT LANCETS MISC TEST 3 TIMES DAILY AS NEEDED 300 each 3    albuterol (PROVENTIL) (2.5 MG/3ML) 0.083% nebulizer solution Take 3 mLs by nebulization every 4 hours as needed for Wheezing 120 each 3    metoclopramide (REGLAN) 10 MG tablet Take 1 tablet by mouth 4 times daily 120 tablet 0    atorvastatin (LIPITOR) 40 MG tablet Take 1 tablet by mouth nightly 30 tablet 3    metoprolol tartrate (LOPRESSOR) 25 MG tablet Take 1 tablet by mouth 2 times daily 60 tablet 0    fluticasone (FLONASE) 50 MCG/ACT nasal spray 1 spray by Nasal route daily 1 Bottle 3    traMADol (ULTRAM) 50 MG tablet Take 50 mg by mouth every 6 hours as needed for Pain.  Insulin Syringe-Needle U-100 30G X 1/2\" 1 ML MISC 1 each by Does not apply route daily 100 each 3    blood glucose test strips (ONETOUCH VERIO) strip TEST 3 TIMES DAILY AS NEEDED 300 each 3    Blood Glucose Monitoring Suppl (ONETOUCH VERIO) w/Device KIT TEST 3 TIMES DAILY AS NEEDED 1 kit 0     No current facility-administered medications on file prior to visit. Allergies:  Shellfish-derived products, Ibuprofen, Ketorolac, Morphine, Other, Propoxyphene n-acetaminophen, and Toradol [ketorolac tromethamine]    Review of Systems   Constitutional: Negative for chills and fatigue. Genitourinary: Negative for difficulty urinating, frequency, genital sores, hematuria and urgency. Musculoskeletal: Positive for arthralgias, back pain, gait problem and myalgias. Objective: There were no vitals taken for this visit. Physical Exam  Constitutional:       General: She is awake. She is not in acute distress. Appearance: Normal appearance. She is not ill-appearing or diaphoretic. HENT:      Head: Normocephalic. Right Ear: External ear normal.      Left Ear: External ear normal.      Nose: Nose normal.      Mouth/Throat:      Lips: Pink. Mouth: Mucous membranes are moist.   Pulmonary:      Effort: Pulmonary effort is normal. No respiratory distress. Skin:     Coloration: Skin is not ashen, cyanotic, jaundiced, mottled, pale or sallow. Neurological:      Mental Status: She is alert and oriented to person, place, and time. Psychiatric:         Mood and Affect: Mood normal.         Speech: Speech normal.         Behavior: Behavior normal. Behavior is cooperative. Assessment:          Diagnosis Orders   1. Back spasm  baclofen (LIORESAL) 10 MG tablet   2. Flank pain         Plan:      No orders of the defined types were placed in this encounter. Orders Placed This Encounter   Medications    baclofen (LIORESAL) 10 MG tablet     Sig: Take 1 tablet by mouth 3 times daily     Dispense:  60 tablet     Refill:  0     Disregard other order       Return as scheduled or sooner if not improving as expected. .    Medication as prescribed. Ice. Back stretches provided via [employer]. F/u if not improving as expected or any new or concerning symptoms. Reviewed with the patient: current clinicalstatus, medications, activities and diet. Side effects, adverse effects of the medication prescribedtoday, as well as treatment plan/ rationale and result expectations have been discussedwith the patient who expresses understanding and desires to proceed. Close follow upto evaluate treatment results and for coordination of care. I have reviewedthe patient's medical history in detail and updated the computerized patient record. TELEHEALTH EVALUATION -- Audio/Visual (During TSKWB-40 public health emergency)    -   Jai Moyer is a 53 y.o. female being evaluated by a Virtual Visit (video visit) encounter to address concerns as mentioned above. A caregiver was present when appropriate. Due to this being a TeleHealth encounter (During ONLEM-64 public health emergency), evaluation of the following organ systems was limited: Vitals/Constitutional/EENT/Resp/CV/GI//MS/Neuro/Skin/Heme-Lymph-Imm. Pursuant to the emergency declaration under the [address] [place] [address], [address] [place] [address] authority and the [employer] and [employer] Act, this Virtual Visit was conducted with patient's (and/or legal guardian's) consent, to reduce the patient's risk of exposure to COVID-19 and provide necessary medical care. The patient (and/or legal guardian) has also been advised to contact this office for worsening conditions or problems, and seek emergency medical treatment and/or call 911 if deemed necessary. Services were provided through a video synchronous discussion virtually to substitute for in-person clinic visit. Type of encounter was X__ Doxy __ MyChart ___Facetime    Patient was located at their home. Provider was located at their _X__ home or        ____ office. --SUDEEP Calderón - CNP on 2/19/2021 at 2:57 PM    An electronic signature was used to authenticate this note.

## 2021-02-19 NOTE — TELEPHONE ENCOUNTER
From: Jai Moyer  To: Loren Goodson, APRN - CNP  Sent: 2/19/2021 3:09 PM EST  Subject: Non-Urgent Medical Question    I believe that was the wrong number you gave me for the eye doctor. .. That was a place in [place]. . eye surgery center. If there's a issue locating the eye doctor can you I just refer me to one that can see about my eye lid.  Thanks

## 2021-02-19 NOTE — PATIENT INSTRUCTIONS
Patient Education        Low Back Arthritis: Exercises  Introduction  Here are some examples of typical rehabilitation exercises for your condition. Start each exercise slowly. Ease off the exercise if you start to have pain. Your doctor or physical therapist will tell you when you can start these exercises and which ones will work best for you. When you are not being active, find a comfortable position for rest. Some people are comfortable on the floor or a medium-firm bed with a small pillow under their head and another under their knees. Some people prefer to lie on their side with a pillow between their knees. Don't stay in one position for too long. Take short walks (10 to 20 minutes) every 2 to 3 hours. Avoid slopes, hills, and stairs until you feel better. Walk only distances you can manage without pain, especially leg pain. How to do the exercises  Pelvic tilt   1. Lie on your back with your knees bent. 2. \"Brace\" your stomachtighten your muscles by pulling in and imagining your belly button moving toward your spine. 3. Press your lower back into the floor. You should feel your hips and pelvis rock back. 4. Hold for 6 seconds while breathing smoothly. 5. Relax and allow your pelvis and hips to rock forward. 6. Repeat 8 to 12 times. Back stretches   1. Get down on your hands and knees on the floor. 2. Relax your head and allow it to droop. Round your back up toward the ceiling until you feel a nice stretch in your upper, middle, and lower back. Hold this stretch for as long as it feels comfortable, or about 15 to 30 seconds. 3. Return to the starting position with a flat back while you are on your hands and knees. 4. Let your back sway by pressing your stomach toward the floor. Lift your buttocks toward the ceiling. 5. Hold this position for 15 to 30 seconds. 6. Repeat 2 to 4 times. Follow-up care is a key part of your treatment and safety. Be sure to make and go to all appointments, and call your doctor if you are having problems. It's also a good idea to know your test results and keep a list of the medicines you take. Where can you learn more? Go to https://chpepiceweb.[employer]. org and sign in to your TOM[employer]O account. Enter L646 in the Sequenta box to learn more about \"Low Back Arthritis: Exercises. \"     If you do not have an account, please click on the \"Sign Up Now\" link. Current as of: March 2, 2020               Content Version: 12.6  © 7474-0954 [employer]. Care instructions adapted under license by [facility] ([facility]). If you have questions about a medical condition or this instruction, always ask your healthcare professional. [address] any warranty or liability for your use of this information. Patient Education        Low Back Pain: Exercises  Introduction  Here are some examples of exercises for you to try. The exercises may be suggested for a condition or for rehabilitation. Start each exercise slowly. Ease off the exercises if you start to have pain. You will be told when to start these exercises and which ones will work best for you. How to do the exercises  Press-up   7. Lie on your stomach, supporting your body with your forearms. 8. Press your elbows down into the floor to raise your upper back. As you do this, relax your stomach muscles and allow your back to arch without using your back muscles. As your press up, do not let your hips or pelvis come off the floor. 9. Hold for 15 to 30 seconds, then relax. 10. Repeat 2 to 4 times. Alternate arm and leg (bird dog) exercise   Do this exercise slowly. Try to keep your body straight at all times, and do not let one hip drop lower than the other. 7. Start on the floor, on your hands and knees. 8. Tighten your belly muscles. 9. Raise one leg off the floor, and hold it straight out behind you. Be careful not to let your hip drop down, because that will twist your trunk. 10. Hold for about 6 seconds, then lower your leg and switch to the other leg. 11. Repeat 8 to 12 times on each leg. 12. Over time, work up to holding for 10 to 30 seconds each time. 13. If you feel stable and secure with your leg raised, try raising the opposite arm straight out in front of you at the same time. Knee-to-chest exercise   1. Lie on your back with your knees bent and your feet flat on the floor. 2. Bring one knee to your chest, keeping the other foot flat on the floor (or keeping the other leg straight, whichever feels better on your lower back). 3. Keep your lower back pressed to the floor. Hold for at least 15 to 30 seconds. 4. Relax, and lower the knee to the starting position. 5. Repeat with the other leg. Repeat 2 to 4 times with each leg. 6. To get more stretch, put your other leg flat on the floor while pulling your knee to your chest.    Curl-ups   1. Lie on the floor on your back with your knees bent at a 90-degree angle. Your feet should be flat on the floor, about 12 inches from your buttocks. 2. Cross your arms over your chest. If this bothers your neck, try putting your hands behind your neck (not your head), with your elbows spread apart. 3. Slowly tighten your belly muscles and raise your shoulder blades off the floor. 4. Keep your head in line with your body, and do not press your chin to your chest.  5. Hold this position for 1 or 2 seconds, then slowly lower yourself back down to the floor. 6. Repeat 8 to 12 times. Pelvic tilt exercise   1. Lie on your back with your knees bent. 2. \"Brace\" your stomach. This means to tighten your muscles by pulling in and imagining your belly button moving toward your spine. You should feel like your back is pressing to the floor and your hips and pelvis are rocking back. 3. Hold for about 6 seconds while you breathe smoothly. 4. Repeat 8 to 12 times. Heel dig bridging   1. Lie on your back with both knees bent and your ankles bent so that only your heels are digging into the floor. Your knees should be bent about 90 degrees. 2. Then push your heels into the floor, squeeze your buttocks, and lift your hips off the floor until your shoulders, hips, and knees are all in a straight line. 3. Hold for about 6 seconds as you continue to breathe normally, and then slowly lower your hips back down to the floor and rest for up to 10 seconds. 4. Do 8 to 12 repetitions. Hamstring stretch in doorway   1. Lie on your back in a doorway, with one leg through the open door. 2. Slide your leg up the wall to straighten your knee. You should feel a gentle stretch down the back of your leg. 3. Hold the stretch for at least 15 to 30 seconds. Do not arch your back, point your toes, or bend either knee. Keep one heel touching the floor and the other heel touching the wall. 4. Repeat with your other leg. 5. Do 2 to 4 times for each leg. Hip flexor stretch   1. Kneel on the floor with one knee bent and one leg behind you. Place your forward knee over your foot. Keep your other knee touching the floor. 2. Slowly push your hips forward until you feel a stretch in the upper thigh of your rear leg. 3. Hold the stretch for at least 15 to 30 seconds. Repeat with your other leg. 4. Do 2 to 4 times on each side. Wall sit   1. Stand with your back 10 to 12 inches away from a wall. 2. Lean into the wall until your back is flat against it. 3. Slowly slide down until your knees are slightly bent, pressing your lower back into the wall. 4. Hold for about 6 seconds, then slide back up the wall. 5. Repeat 8 to 12 times. Follow-up care is a key part of your treatment and safety. Be sure to make and go to all appointments, and call your doctor if you are having problems. It's also a good idea to know your test results and keep a list of the medicines you take. Where can you learn more? Go to https://chpepiceweb.[employer]. org and sign in to your [employer] account. Enter F638 in the FameBit box to learn more about \"Low Back Pain: Exercises. \"     If you do not have an account, please click on the \"Sign Up Now\" link. Current as of: March 2, 2020               Content Version: 12.6  © 3109-4060 [employer]. Care instructions adapted under license by [facility] ([facility]). If you have questions about a medical condition or this instruction, always ask your healthcare professional. [address] any warranty or liability for your use of this information.

## 2021-02-23 ENCOUNTER — OFFICE VISIT (OUTPATIENT)
Dept: ENDOCRINOLOGY | Age: 51
End: 2021-02-23
Payer: MEDICAID

## 2021-02-23 VITALS
BODY MASS INDEX: 48.73 KG/M2 | WEIGHT: 275 LBS | DIASTOLIC BLOOD PRESSURE: 84 MMHG | HEIGHT: 63 IN | HEART RATE: 74 BPM | SYSTOLIC BLOOD PRESSURE: 131 MMHG | OXYGEN SATURATION: 96 %

## 2021-02-23 DIAGNOSIS — E89.0 POSTPROCEDURAL HYPOTHYROIDISM: ICD-10-CM

## 2021-02-23 DIAGNOSIS — Z79.4 TYPE 2 DIABETES MELLITUS WITH HYPERGLYCEMIA, WITH LONG-TERM CURRENT USE OF INSULIN (HCC): ICD-10-CM

## 2021-02-23 DIAGNOSIS — Z79.4 TYPE 2 DIABETES MELLITUS TREATED WITH INSULIN (HCC): ICD-10-CM

## 2021-02-23 DIAGNOSIS — E11.9 TYPE 2 DIABETES MELLITUS TREATED WITH INSULIN (HCC): Primary | ICD-10-CM

## 2021-02-23 DIAGNOSIS — E11.65 TYPE 2 DIABETES MELLITUS WITH HYPERGLYCEMIA, WITH LONG-TERM CURRENT USE OF INSULIN (HCC): ICD-10-CM

## 2021-02-23 DIAGNOSIS — Z79.4 TYPE 2 DIABETES MELLITUS TREATED WITH INSULIN (HCC): Primary | ICD-10-CM

## 2021-02-23 DIAGNOSIS — E11.9 TYPE 2 DIABETES MELLITUS TREATED WITH INSULIN (HCC): ICD-10-CM

## 2021-02-23 DIAGNOSIS — E66.01 MORBID OBESITY (HCC): ICD-10-CM

## 2021-02-23 LAB
ANION GAP SERPL CALCULATED.3IONS-SCNC: 10 MEQ/L (ref 9–15)
BUN BLDV-MCNC: 10 MG/DL (ref 6–20)
CALCIUM SERPL-MCNC: 8.8 MG/DL (ref 8.5–9.9)
CHLORIDE BLD-SCNC: 105 MEQ/L (ref 95–107)
CHP ED QC CHECK: NORMAL
CO2: 21 MEQ/L (ref 20–31)
CREAT SERPL-MCNC: 1.16 MG/DL (ref 0.5–0.9)
GFR AFRICAN AMERICAN: 59.8
GFR NON-AFRICAN AMERICAN: 49.5
GLUCOSE BLD-MCNC: 174 MG/DL (ref 70–99)
GLUCOSE BLD-MCNC: 192 MG/DL
POTASSIUM SERPL-SCNC: 4.2 MEQ/L (ref 3.4–4.9)
SODIUM BLD-SCNC: 136 MEQ/L (ref 135–144)
T4 FREE: 1.58 NG/DL (ref 0.84–1.68)
TSH REFLEX: 0.22 UIU/ML (ref 0.44–3.86)

## 2021-02-23 PROCEDURE — G8427 DOCREV CUR MEDS BY ELIG CLIN: HCPCS | Performed by: INTERNAL MEDICINE

## 2021-02-23 PROCEDURE — 3051F HG A1C>EQUAL 7.0%<8.0%: CPT | Performed by: INTERNAL MEDICINE

## 2021-02-23 PROCEDURE — G8482 FLU IMMUNIZE ORDER/ADMIN: HCPCS | Performed by: INTERNAL MEDICINE

## 2021-02-23 PROCEDURE — G8417 CALC BMI ABV UP PARAM F/U: HCPCS | Performed by: INTERNAL MEDICINE

## 2021-02-23 PROCEDURE — 2022F DILAT RTA XM EVC RTNOPTHY: CPT | Performed by: INTERNAL MEDICINE

## 2021-02-23 PROCEDURE — 1036F TOBACCO NON-USER: CPT | Performed by: INTERNAL MEDICINE

## 2021-02-23 PROCEDURE — 99203 OFFICE O/P NEW LOW 30 MIN: CPT | Performed by: INTERNAL MEDICINE

## 2021-02-23 PROCEDURE — 82962 GLUCOSE BLOOD TEST: CPT | Performed by: INTERNAL MEDICINE

## 2021-02-23 RX ORDER — FLASH GLUCOSE SCANNING READER
EACH MISCELLANEOUS
Qty: 1 DEVICE | Refills: 0 | Status: SHIPPED | OUTPATIENT
Start: 2021-02-23 | End: 2021-06-25

## 2021-02-23 RX ORDER — INSULIN LISPRO 100 [IU]/ML
INJECTION, SOLUTION INTRAVENOUS; SUBCUTANEOUS
Qty: 5 PEN | Refills: 3 | Status: SHIPPED | OUTPATIENT
Start: 2021-02-23 | End: 2021-07-08 | Stop reason: SDUPTHER

## 2021-02-23 RX ORDER — FLASH GLUCOSE SENSOR
KIT MISCELLANEOUS
Qty: 1 EACH | Refills: 0 | Status: SHIPPED | OUTPATIENT
Start: 2021-02-23 | End: 2021-06-25

## 2021-02-23 RX ORDER — LANCETS 33 GAUGE
EACH MISCELLANEOUS
Qty: 200 EACH | Refills: 3 | Status: SHIPPED | OUTPATIENT
Start: 2021-02-23

## 2021-02-23 RX ORDER — SPIRONOLACTONE 50 MG/1
50 TABLET, FILM COATED ORAL DAILY
Qty: 30 TABLET | Refills: 3 | Status: SHIPPED | OUTPATIENT
Start: 2021-02-23 | End: 2021-07-06

## 2021-02-23 RX ORDER — INSULIN DEGLUDEC INJECTION 100 U/ML
INJECTION, SOLUTION SUBCUTANEOUS
Qty: 15 ML | Refills: 3 | Status: SHIPPED | OUTPATIENT
Start: 2021-02-23 | End: 2021-07-08 | Stop reason: SDUPTHER

## 2021-02-23 RX ORDER — DULAGLUTIDE 0.75 MG/.5ML
0.75 INJECTION, SOLUTION SUBCUTANEOUS WEEKLY
Qty: 4 PEN | Refills: 3 | Status: SHIPPED | OUTPATIENT
Start: 2021-02-23 | End: 2021-05-17

## 2021-02-23 RX ORDER — BLOOD-GLUCOSE METER
EACH MISCELLANEOUS
Qty: 1 KIT | Refills: 0 | Status: SHIPPED | OUTPATIENT
Start: 2021-02-23 | End: 2021-11-03 | Stop reason: SDUPTHER

## 2021-02-23 RX ORDER — BLOOD SUGAR DIAGNOSTIC
1 STRIP MISCELLANEOUS DAILY
Qty: 100 EACH | Refills: 3 | Status: SHIPPED | OUTPATIENT
Start: 2021-02-23 | End: 2021-08-13 | Stop reason: SDUPTHER

## 2021-02-23 NOTE — PROGRESS NOTES
Subjective:      Patient ID: Jag Cleary is a 53 y.o. female. Patient referred here for type 2 diabetes hypothyroidism obesity currently on Tresiba and Humalog   A1c fluctuating between 6-8 range  Hypothyroidism levothyroxine 150 mcg daily  Obesity with a BMI of 46  Patient also requesting meter and strips  Diabetes  She presents for her initial diabetic visit. She has type 2 diabetes mellitus. Her disease course has been fluctuating. There are no hypoglycemic associated symptoms. Pertinent negatives for diabetes include no polydipsia, no polyuria and no weight loss. There are no hypoglycemic complications. There are no diabetic complications. Risk factors for coronary artery disease include obesity. Current diabetic treatment includes insulin injections. She is currently taking insulin pre-lunch, pre-dinner, at bedtime and pre-breakfast. Her overall blood glucose range is 180-200 mg/dl. (Lab Results       Component                Value               Date                       LABA1C                   7.9                 02/09/2021              )        Hypothyroidism levothyroxine 150 mcg daily TSH has been fluctuating last TSH was 20+    Morbid obesity Body mass index is 48.71 kg/m². Results for Prisca Regalado (MRN [id number]) as of 2/23/2021 10:08   Ref. Range 6/24/2018 05:58 9/24/2018 21:03 1/14/2019 21:51 3/30/2019 19:00 1/10/2020 14:02   TSH Latest Ref Range: 0.440 - 3.860 uIU/mL 1.940 4.61 (H) 0.97 11.910 (H) 20.110 (H)     Results for Prisca Regalado (MRN [id number]) as of 2/23/2021 10:08   Ref. Range 5/19/2017 00:53 5/25/2018 09:04   T4 Free Latest Ref Range: 0.93 - 1.70 ng/dL 0.85 (L) 0.98       Results for Prisca Regalado (MRN [id number]) as of 2/23/2021 10:08   Ref.  Range 10/27/2018 07:16 4/18/2019 11:17 5/19/2019 06:55 1/10/2020 14:02 2/9/2021 11:02   Hemoglobin A1C Latest Units: % 5.8 5.5 ((NONE)) 6.3 (H) 6.4 (H) 7.9       Results for Prisca Regalado (MRN [id number]) as of 2/23/2021 atypical    Personal history of other malignant neoplasm of kidney    Chronic obstructive pulmonary disease, unspecified ([address]    Tobacco user    Deficient knowledge    Abnormal results of kidney function studies    Acquired absence of kidney    Acquired absence of other organs    Acute kidney failure ([address]    Allergy status to analgesic agent status    Body mass index (BMI) 40.0-44.9, adult    Disorder of kidney and ureter, unspecified    Dysphagia, unspecified    Elevated white blood cell count, unspecified    Family history of ischemic heart disease and other diseases of the circulatory system    Long term (current) use of insulin (HCC)    Moderate single current episode of major depressive disorder (HCC)    Localized enlarged lymph nodes    ELEN (obstructive sleep apnea)    Otalgia, left ear    Oth postproc endocrine and metabolic comp and disorders    Other acute postprocedural pain    Other long term (current) drug therapy    Other malaise    Other muscle spasm    Other specified symptoms and signs involving the circulatory and respiratory systems    Partial loss of teeth, unspecified cause, unspecified class    Personal history of malignant neoplasm of thyroid    Personal history of urinary calculi    Postprocedural hypothyroidism    Prsnl hx of TIA (TIA), and cereb infrc w/o resid deficits    Secondary malignant neoplasm of other specified sites ([address]    Type 2 diabetes mellitus with hyperglycemia (HCC)    Visual discomfort, unspecified    Personal history of other endocrine, nutritional and metabolic disease    History of drug dependence ([address]    Substance use disorder    Asthenia    Chronic pain    Dizziness and giddiness    Nausea    Primary fibromyalgia syndrome    Cannabinoid hyperemesis syndrome    Chronic pain of right knee    Arthritis of right knee    HINES (dyspnea on exertion)    Severe episode of recurrent major depressive disorder, without psychotic features [facility])     Social History     Socioeconomic History    Marital status: Legally      Spouse name: Not on file    Number of children: Not on file    Years of education: 15    Highest education level: High school graduate   Occupational History    Not on file   Social Needs    Financial resource strain: Somewhat hard    Food insecurity     Worry: Sometimes true     Inability: Sometimes true    Transportation needs     Medical: No     Non-medical: No   Tobacco Use    Smoking status: Former Smoker     Packs/day: 0.50     Years: 15.00     Pack years: 7.50     Types: Cigarettes     Start date: 2014     Quit date: 2015     Years since quittin.0    Smokeless tobacco: Former User     Quit date: 3/23/2016   Substance and Sexual Activity    Alcohol use: Not Currently     Alcohol/week: 0.0 standard drinks     Comment: occasionally    Drug use: Yes     Frequency: 5.0 times per week     Types: Marijuana    Sexual activity: Yes     Partners: Male   Lifestyle    Physical activity     Days per week: 0 days     Minutes per session: 0 min    Stress: Very much   Relationships    Social connections     Talks on phone: Three times a week     Gets together: Never     Attends Protestant service: Never     Active member of club or organization: No     Attends meetings of clubs or organizations: Never     Relationship status:     Intimate partner violence     Fear of current or ex partner: No     Emotionally abused: No     Physically abused: No     Forced sexual activity: No   Other Topics Concern    Not on file   Social History Narrative    Not on file     Past Surgical History:   Procedure Laterality Date    BRONCHOSCOPY  10/26/2018    DR. STEARNS    KIDNEY REMOVAL Right 2016    KIDNEY REMOVAL Right 2016    LUNG BIOPSY Right 10/2018    THYROID LOBECTOMY Right 14    THYROIDECTOMY  2019    DR. MAGDALENO    THYROIDECTOMY  2018    URETER STENT PLACEMENT Left 2016 Family History   Problem Relation Age of Onset    Cancer Father     Diabetes Father     Allergy (Severe) Father     Heart Attack Father     Prostate Cancer Father     High Blood Pressure Mother     Diabetes Mother     Arthritis Mother     High Cholesterol Mother     Vision Loss Mother     Alcohol Abuse Neg Hx     Anemia Neg Hx     Arrhythmia Neg Hx     Asthma Neg Hx     Atrial Fibrillation Neg Hx     Birth Defects Neg Hx     Breast Cancer Neg Hx     Coronary Art Dis Neg Hx     Colon Cancer Neg Hx     Depression Neg Hx     Early Death Neg Hx     Hearing Loss Neg Hx     Heart Disease Neg Hx     Learning Disabilities Neg Hx     Kidney Disease Neg Hx     Mental Illness Neg Hx     Mental Retardation Neg Hx     Miscarriages / Stillbirths Neg Hx     Obesity Neg Hx     Osteoporosis Neg Hx     Stroke Neg Hx     Substance Abuse Neg Hx      Allergies   Allergen Reactions    Shellfish-Derived Products     Ibuprofen     Ketorolac     Morphine Other (See Comments)     MADE PATIENT VERY SICK VOMITING    Other     Propoxyphene N-Acetaminophen      Other reaction(s): Hives    Toradol [Ketorolac Tromethamine]      Pt states she cannot take Toradol because she has only 1 kidney. Current Outpatient Medications:     baclofen (LIORESAL) 10 MG tablet, Take 1 tablet by mouth 3 times daily, Disp: 60 tablet, Rfl: 0    lidocaine (XYLOCAINE) 5 % ointment, Apply topically BID as needed. , Disp: 30 g, Rfl: 0    montelukast (SINGULAIR) 10 MG tablet, Take 1 tab nightly at supper for breathing/allergies, Disp: 30 tablet, Rfl: 5    buPROPion (WELLBUTRIN XL) 150 MG extended release tablet, Take 1 tablet by mouth every morning, Disp: 30 tablet, Rfl: 3    hydrOXYzine (ATARAX) 25 MG tablet, Take 1 tablet by mouth every 8 hours as needed for Anxiety, Disp: 90 tablet, Rfl: 2    tiZANidine (ZANAFLEX) 2 MG tablet, Take 1 tablet by mouth 3 times daily as needed (back pain/ spasm), Disp: 15 tablet, Rfl: 0    butalbital-acetaminophen-caffeine (FIORICET, ESGIC) -40 MG per tablet, Take 1 tablet by mouth every 6 hours as needed for Headaches, Disp: 18 tablet, Rfl: 0    Insulin Degludec (TRESIBA FLEXTOUCH) 100 UNIT/ML SOPN, INJECT 30 UNITS UNDER THE SKIN NIGHTLY, Disp: 15 mL, Rfl: 3    DULoxetine (CYMBALTA) 60 MG extended release capsule, Take 60 mg by mouth every 24 hours , Disp: , Rfl:     ALPRAZolam (XANAX) 0.25 MG tablet, Take 0.25 mg by mouth nightly as needed. , Disp: , Rfl:     pantoprazole (PROTONIX) 20 MG tablet, Take 2 tablets by mouth daily, Disp: 30 tablet, Rfl: 0    magnesium oxide (MAG-OX) 400 (241.3 Mg) MG TABS tablet, TAKE 1/2 TABLET BY MOUTH DAILY, Disp: 30 tablet, Rfl: 5    busPIRone (BUSPAR) 15 MG tablet, TAKE 1 TABLET BY MOUTH THREE TIMES DAILY, Disp: 90 tablet, Rfl: 5    albuterol sulfate HFA (PROVENTIL HFA) 108 (90 Base) MCG/ACT inhaler, Inhale 2 puffs into the lungs every 6 hours as needed for Wheezing, Disp: 1 Inhaler, Rfl: 3    cetirizine (ZYRTEC) 10 MG tablet, TAKE 1 TABLET BY MOUTH EVERY NIGHT AT BEDTIME AS NEEDED FOR ALLERGIES, Disp: 30 tablet, Rfl: 5    insulin lispro (HUMALOG) 100 UNIT/ML injection vial, INJECT 4 UNITS UNDER THE SKIN THREE TIMES DAILY AS NEEDED FOR HIGH BLOOD SUGAR, Disp: 10 mL, Rfl: 0    levothyroxine (SYNTHROID) 150 MCG tablet, Take 150 mcg by mouth Daily , Disp: , Rfl:     SUMAtriptan (IMITREX) 25 MG tablet, TAKE 1 TABLET BY MOUTH 1 TIME AS NEEDED FOR MIGRAINE, Disp: 9 tablet, Rfl: 1    promethazine (PHENERGAN) 25 MG tablet, WARNING:  May cause drowsiness. May impair ability to operate vehicles or machinery. Do not use in combination with alcohol. ! Tablet every 6 hours as needed in conjunction with Ultram for headaches, Disp: 20 tablet, Rfl: 0    blood glucose test strips (EXACTECH TEST) strip, 1 each by In Vitro route 3 times daily (Accu-check test strips) As needed.  DX:E11.65, Disp: 300 strip, Rfl: 5    ONE TOUCH ULTRASOFT LANCETS MISC, TEST 3 TIMES DAILY AS NEEDED, Disp: 300 each, Rfl: 3    albuterol (PROVENTIL) (2.5 MG/3ML) 0.083% nebulizer solution, Take 3 mLs by nebulization every 4 hours as needed for Wheezing, Disp: 120 each, Rfl: 3    metoclopramide (REGLAN) 10 MG tablet, Take 1 tablet by mouth 4 times daily, Disp: 120 tablet, Rfl: 0    atorvastatin (LIPITOR) 40 MG tablet, Take 1 tablet by mouth nightly, Disp: 30 tablet, Rfl: 3    metoprolol tartrate (LOPRESSOR) 25 MG tablet, Take 1 tablet by mouth 2 times daily, Disp: 60 tablet, Rfl: 0    fluticasone (FLONASE) 50 MCG/ACT nasal spray, 1 spray by Nasal route daily, Disp: 1 Bottle, Rfl: 3    traMADol (ULTRAM) 50 MG tablet, Take 50 mg by mouth every 6 hours as needed for Pain., Disp: , Rfl:     Insulin Syringe-Needle U-100 30G X 1/2\" 1 ML MISC, 1 each by Does not apply route daily, Disp: 100 each, Rfl: 3    blood glucose test strips (ONETOUCH VERIO) strip, TEST 3 TIMES DAILY AS NEEDED, Disp: 300 each, Rfl: 3    Blood Glucose Monitoring Suppl (ONETOUCH VERIO) w/Device KIT, TEST 3 TIMES DAILY AS NEEDED, Disp: 1 kit, Rfl: 0    pregabalin (LYRICA) 150 MG capsule, Take 1 capsule by mouth 3 times daily for 30 days. , Disp: 90 capsule, Rfl: 0      Review of Systems   Constitutional: Negative for weight loss. Endocrine: Negative for polydipsia and polyuria. Objective:   Physical Exam  Vitals signs reviewed. Constitutional:       Appearance: Normal appearance. She is obese. HENT:      Head: Normocephalic and atraumatic. Right Ear: External ear normal.      Left Ear: External ear normal.      Nose: Nose normal.      Mouth/Throat:      Pharynx: Oropharynx is clear. Eyes:      General: No scleral icterus. Right eye: No discharge. Left eye: No discharge. Extraocular Movements: Extraocular movements intact. Conjunctiva/sclera: Conjunctivae normal.   Neck:      Musculoskeletal: Normal range of motion and neck supple.    Cardiovascular:      Rate and Rhythm: Normal rate.      Pulses: Normal pulses. Heart sounds: Normal heart sounds. Pulmonary:      Breath sounds: Normal breath sounds. Abdominal:      Palpations: Abdomen is soft. Musculoskeletal: Normal range of motion. Skin:     General: Skin is warm and dry. Neurological:      General: No focal deficit present. Mental Status: She is alert and oriented to person, place, and time. Psychiatric:         Mood and Affect: Mood normal.         Behavior: Behavior normal.         Assessment:       Diagnosis Orders   1. Type 2 diabetes mellitus treated with insulin ([facility])  POCT Glucose    Basic Metabolic Panel    Testosterone Free And Total, Non Male   2. Postprocedural hypothyroidism  T4, Free    TSH with Reflex   3. Morbid obesity ([address]  Amb External Referral To Bariatrics   4.  Type 2 diabetes mellitus with hyperglycemia, with long-term current use of insulin ([facility])  Insulin Degludec (TRESIBA FLEXTOUCH) 100 UNIT/ML SOPN           Plan:      Orders Placed This Encounter   Procedures    T4, Free     Standing Status:   Future     Number of Occurrences:   1     Standing Expiration Date:   2/23/2022    TSH with Reflex     Standing Status:   Future     Number of Occurrences:   1     Standing Expiration Date:   2/23/2022    Basic Metabolic Panel     Standing Status:   Future     Number of Occurrences:   1     Standing Expiration Date:   2/23/2022    Testosterone Free And Total, Non Male     Standing Status:   Future     Number of Occurrences:   1     Standing Expiration Date:   2/23/2022    Amb External Referral To Bariatrics     Referral Priority:   Routine     Referral Type:   Consult for Advice and Opinion     Referral Reason:   Specialty Services Required     Referred to Provider:   Demetra Park MD     Requested Specialty:   Bariatric Surgery     Number of Visits Requested:   1    POCT Glucose       Dottie Garza to Dandre and Humalog    Educated about freestyle carlos continuous glucose monitoring refer patient to bariatric surgery  More than 50% of 30-minute spent patient education counseling thank you for the referral  Orders Placed This Encounter   Medications    Dulaglutide (TRULICITY) 8.81 UG/1.0RO SOPN     Sig: Inject 0.75 mg into the skin once a week     Dispense:  4 pen     Refill:  3    Insulin Degludec (TRESIBA FLEXTOUCH) 100 UNIT/ML SOPN     Sig: INJECT 40 UNITS UNDER THE SKIN NIGHTLY     Dispense:  15 mL     Refill:  3    insulin lispro, 1 Unit Dial, (HUMALOG KWIKPEN) 100 UNIT/ML SOPN     Si units at each meals hold if glucose less than 150     Dispense:  5 pen     Refill:  3    spironolactone (ALDACTONE) 50 MG tablet     Sig: Take 1 tablet by mouth daily     Dispense:  30 tablet     Refill:  3    OneTouch Delica Lancets 94B MISC     Sig: qid     Dispense:  200 each     Refill:  3    blood glucose test strips (ONETOUCH VERIO) strip     Si each by In Vitro route daily As needed.      Dispense:  100 each     Refill:  3    Blood Glucose Monitoring Suppl (ONETOUCH VERIO) w/Device KIT     Sig: As  Directed     Dispense:  1 kit     Refill:  00    Insulin Pen Needle (NOVOFINE) 32G X 6 MM MISC     Sig: qid     Dispense:  300 each     Refill:  3    Continuous Blood Gluc Sensor (FREESTYLE JESSICA 14 DAY SENSOR) MISC     Sig: As directed     Dispense:  1 each     Refill:  00    Continuous Blood Gluc  (FREESTYLE JESSICA 14 DAY READER) LENNY     Sig: As directed     Dispense:  1 Device     Refill:  Mary Carmen Cortes MD

## 2021-02-25 DIAGNOSIS — J30.1 ACUTE SEASONAL ALLERGIC RHINITIS DUE TO POLLEN: ICD-10-CM

## 2021-02-25 RX ORDER — CETIRIZINE HYDROCHLORIDE 10 MG/1
TABLET ORAL
Qty: 30 TABLET | Refills: 5 | Status: SHIPPED | OUTPATIENT
Start: 2021-02-25 | End: 2021-09-03

## 2021-02-26 ENCOUNTER — TELEPHONE (OUTPATIENT)
Dept: ENDOCRINOLOGY | Age: 51
End: 2021-02-26

## 2021-02-26 NOTE — TELEPHONE ENCOUNTER
Patient states that trulicity and her glucose meter and supplies both need a prior authorization. Please start one for the patient. Thanks.

## 2021-02-27 ENCOUNTER — HOSPITAL ENCOUNTER (EMERGENCY)
Age: 51
Discharge: HOME | End: 2021-02-28
Payer: MEDICAID

## 2021-02-27 ENCOUNTER — HOSPITAL ENCOUNTER (EMERGENCY)
Age: 51
Discharge: HOME | End: 2021-02-27
Payer: MEDICAID

## 2021-02-27 VITALS
TEMPERATURE: 98.4 F | OXYGEN SATURATION: 98 % | WEIGHT: 262 LBS | HEART RATE: 84 BPM | RESPIRATION RATE: 18 BRPM | BODY MASS INDEX: 46.42 KG/M2 | HEIGHT: 63 IN | DIASTOLIC BLOOD PRESSURE: 85 MMHG | SYSTOLIC BLOOD PRESSURE: 148 MMHG

## 2021-02-27 VITALS
RESPIRATION RATE: 20 BRPM | TEMPERATURE: 98.8 F | WEIGHT: 260 LBS | HEART RATE: 88 BPM | DIASTOLIC BLOOD PRESSURE: 79 MMHG | OXYGEN SATURATION: 96 % | SYSTOLIC BLOOD PRESSURE: 152 MMHG | HEIGHT: 63 IN | BODY MASS INDEX: 46.07 KG/M2

## 2021-02-27 DIAGNOSIS — K08.89 PAIN, DENTAL: Primary | ICD-10-CM

## 2021-02-27 DIAGNOSIS — K04.7 DENTAL ABSCESS: Primary | ICD-10-CM

## 2021-02-27 PROCEDURE — 99284 EMERGENCY DEPT VISIT MOD MDM: CPT

## 2021-02-27 PROCEDURE — 99283 EMERGENCY DEPT VISIT LOW MDM: CPT

## 2021-02-27 PROCEDURE — 6370000000 HC RX 637 (ALT 250 FOR IP): Performed by: PHYSICIAN ASSISTANT

## 2021-02-27 RX ORDER — PENICILLIN V POTASSIUM 500 MG/1
500 TABLET ORAL 4 TIMES DAILY
Qty: 40 TABLET | Refills: 0 | Status: SHIPPED | OUTPATIENT
Start: 2021-02-27 | End: 2021-03-09

## 2021-02-27 RX ORDER — PENICILLIN V POTASSIUM 500 MG/1
500 TABLET ORAL ONCE
Status: COMPLETED | OUTPATIENT
Start: 2021-02-27 | End: 2021-02-27

## 2021-02-27 RX ORDER — TRAMADOL HYDROCHLORIDE 50 MG/1
50 TABLET ORAL ONCE
Status: COMPLETED | OUTPATIENT
Start: 2021-02-27 | End: 2021-02-27

## 2021-02-27 RX ADMIN — TRAMADOL HYDROCHLORIDE 50 MG: 50 TABLET, FILM COATED ORAL at 16:32

## 2021-02-27 RX ADMIN — PENICILLIN V POTASIUM 500 MG: 500 TABLET OROPHARYNGEAL at 16:32

## 2021-02-27 ASSESSMENT — ENCOUNTER SYMPTOMS
BACK PAIN: 0
CHEST TIGHTNESS: 0
COUGH: 0
ABDOMINAL PAIN: 0

## 2021-02-27 ASSESSMENT — PAIN SCALES - GENERAL: PAINLEVEL_OUTOF10: 10

## 2021-02-27 ASSESSMENT — PAIN DESCRIPTION - FREQUENCY: FREQUENCY: CONTINUOUS

## 2021-02-27 ASSESSMENT — PAIN DESCRIPTION - LOCATION
LOCATION: MOUTH
LOCATION: TEETH

## 2021-02-27 ASSESSMENT — PAIN DESCRIPTION - PAIN TYPE: TYPE: ACUTE PAIN

## 2021-02-27 ASSESSMENT — PAIN DESCRIPTION - ORIENTATION: ORIENTATION: LEFT;LOWER

## 2021-02-27 NOTE — ED PROVIDER NOTES
[facility] ED  eMERGENCY dEPARTMENT eNCOUnter      Pt Name: Hakeem Diaz  MRN: [id number]  [place] 1970  Date of evaluation: 2/27/2021  Provider: Gautam Gill PA-C    CHIEF COMPLAINT       Chief Complaint   Patient presents with    Dental Pain     3 days, sees dentist in may, finished abx         HISTORY OF PRESENT ILLNESS   (Location/Symptom, Timing/Onset,Context/Setting, Quality, Duration, Modifying Factors, Severity)  Note limiting factors. Hakeem Diaz is a 53 y.o. female who presents to the emergency department       43-year-old female complains of 2 to 3-week history of discomfort left lower dentition. Placed on amoxicillin by dentist and instructed she needs a root canal or the tooth pulled by oral surgeon. She finished the amoxicillin and pain is acutely worse over the past 2 days. No fever no vomiting no chest pain or shortness of breath. Pain is worse with any type of eating and contact to the area. Nothing makes the pain go away. She is using Ultram without relief. No other associated symptoms or complaints. NursingNotes were reviewed. REVIEW OF SYSTEMS    (2-9 systems for level 4, 10 or more for level 5)     Review of Systems   Constitutional: Negative for fever. HENT: Positive for dental problem. Eyes: Negative for visual disturbance. Respiratory: Negative for cough and chest tightness. Cardiovascular: Negative for chest pain. Gastrointestinal: Negative for abdominal pain. Genitourinary: Negative for dysuria. Musculoskeletal: Negative for back pain. Skin: Negative for rash and wound. Allergic/Immunologic: Negative for immunocompromised state. Neurological: Negative for headaches. Hematological: Negative for adenopathy. Psychiatric/Behavioral: Negative for confusion. Except as noted above the remainder of the review of systems was reviewed and negative.        PAST MEDICAL HISTORY     Past Medical History: METOPROLOL TARTRATE (LOPRESSOR) 25 MG TABLET    Take 1 tablet by mouth 2 times daily    MONTELUKAST (SINGULAIR) 10 MG TABLET    Take 1 tab nightly at supper for breathing/allergies    ONE TOUCH ULTRASOFT LANCETS MISC    TEST 3 TIMES DAILY AS NEEDED    ONETOUCH DELICA LANCETS 13E MISC    qid    PANTOPRAZOLE (PROTONIX) 20 MG TABLET    Take 2 tablets by mouth daily    PREGABALIN (LYRICA) 150 MG CAPSULE    Take 1 capsule by mouth 3 times daily for 30 days. PROMETHAZINE (PHENERGAN) 25 MG TABLET    WARNING:  May cause drowsiness. May impair ability to operate vehicles or machinery. Do not use in combination with alcohol. ! Tablet every 6 hours as needed in conjunction with Ultram for headaches    SPIRONOLACTONE (ALDACTONE) 50 MG TABLET    Take 1 tablet by mouth daily    SUMATRIPTAN (IMITREX) 25 MG TABLET    TAKE 1 TABLET BY MOUTH 1 TIME AS NEEDED FOR MIGRAINE    TIZANIDINE (ZANAFLEX) 2 MG TABLET    Take 1 tablet by mouth 3 times daily as needed (back pain/ spasm)    TRAMADOL (ULTRAM) 50 MG TABLET    Take 50 mg by mouth every 6 hours as needed for Pain.        ALLERGIES     Shellfish-derived products, Ibuprofen, Ketorolac, Morphine, Other, Propoxyphene n-acetaminophen, and Toradol [ketorolac tromethamine]    FAMILY HISTORY       Family History   Problem Relation Age of Onset    Cancer Father     Diabetes Father     Allergy (Severe) Father     Heart Attack Father     Prostate Cancer Father     High Blood Pressure Mother     Diabetes Mother     Arthritis Mother     High Cholesterol Mother     Vision Loss Mother     Alcohol Abuse Neg Hx     Anemia Neg Hx     Arrhythmia Neg Hx     Asthma Neg Hx     Atrial Fibrillation Neg Hx     Birth Defects Neg Hx     Breast Cancer Neg Hx     Coronary Art Dis Neg Hx     Colon Cancer Neg Hx     Depression Neg Hx     Early Death Neg Hx     Hearing Loss Neg Hx     Heart Disease Neg Hx     Learning Disabilities Neg Hx     Kidney Disease Neg Hx  Mental Illness Neg Hx     Mental Retardation Neg Hx     Miscarriages / Stillbirths Neg Hx     Obesity Neg Hx     Osteoporosis Neg Hx     Stroke Neg Hx     Substance Abuse Neg Hx           SOCIAL HISTORY       Social History     Socioeconomic History    Marital status: Legally      Spouse name: None    Number of children: None    Years of education: 15    Highest education level: High school graduate   Occupational History    None   Social Needs    Financial resource strain: Somewhat hard    Food insecurity     Worry: Sometimes true     Inability: Sometimes true    Transportation needs     Medical: No     Non-medical: No   Tobacco Use    Smoking status: Former Smoker     Packs/day: 0.50     Years: 15.00     Pack years: 7.50     Types: Cigarettes     Start date: 2014     Quit date: 2015     Years since quittin.0    Smokeless tobacco: Former User     Quit date: 3/23/2016   Substance and Sexual Activity    Alcohol use: Not Currently     Alcohol/week: 0.0 standard drinks     Comment: occasionally    Drug use: Yes     Frequency: 5.0 times per week     Types: Marijuana    Sexual activity: Yes     Partners: Male   Lifestyle    Physical activity     Days per week: 0 days     Minutes per session: 0 min    Stress: Very much   Relationships    Social connections     Talks on phone:  Three times a week     Gets together: Never     Attends Orthodoxy service: Never     Active member of club or organization: No     Attends meetings of clubs or organizations: Never     Relationship status:     Intimate partner violence     Fear of current or ex partner: No     Emotionally abused: No     Physically abused: No     Forced sexual activity: No   Other Topics Concern    None   Social History Narrative    None       SCREENINGS      @FLOW([id number])@      PHYSICAL EXAM    (up to 7 for level 4, 8 or more for level 5)     ED Triage Vitals [21 1600] All other labs were within normal range or not returned as of this dictation. EMERGENCY DEPARTMENT COURSE and DIFFERENTIAL DIAGNOSIS/MDM:   Vitals:    Vitals:    02/27/21 1600   BP: (!) 148/85   Pulse: 84   Resp: 18   Temp: 98.4 °F (36.9 °C)   TempSrc: Oral   SpO2: 98%   Weight: 262 lb (118.8 kg)   Height: 5' 3\" (1.6 m)            MDM  Number of Diagnoses or Management Options  Dental abscess  Diagnosis management comments: 78-year-old female with left-sided dentition lower for approximately 3 weeks. Symptoms initially improved after course of amoxicillin now worse over the past 2 days. No fever no murmur. Patient states she has only one kidney secondary to history of cancer, also she is diabetic. On exam there is a chipped premolar with erythema of the gingiva. Patient will be placed back on antibiotics, course of penicillin will be given. She currently is on Ultram for pain. She cannot take NSAIDs . She will alternate with Tylenol if needed. Patient will be given health dentistry for follow-up. Instructed on red flag symptoms which would prompt ER evaluation. There is no signs of Cuong's, no trismus on exam.      CRITICAL CARE TIME   Total Critical Care time was   minutes, excluding separately reportableprocedures. There was a high probability of clinicallysignificant/life threatening deterioration in the patient's condition which required my urgent intervention. CONSULTS:  None    PROCEDURES:  Unless otherwise noted below, none     Procedures    FINAL IMPRESSION      1.  Dental abscess          DISPOSITION/PLAN   DISPOSITION Decision To Discharge 02/27/2021 04:18:24 PM      PATIENT REFERRED TO:  [facility]  [address] [facility] [id number] [phone]  In 2 days        DISCHARGE MEDICATIONS:  New Prescriptions    PENICILLIN V POTASSIUM (VEETID) 500 MG TABLET    Take 1 tablet by mouth 4 times daily for 10 days (Please note that portions of this note were completed with a voice recognition program.  Efforts were made to edit the dictations but occasionally words are mis-transcribed.)    Colleen Stewart PA-C (electronically signed)  Attending Emergency Physician         Colleen Stewart PA-C  02/27/21 [id number]

## 2021-02-27 NOTE — ED TRIAGE NOTES
PT to ed from home via triage with c/o right lower jaw dental pain, worsening for the past 3 days. Pt reports that she has been seen and treated with antibiotics. Pt states she has appt with dentist for extraction in May. Pt reports no relief with tylenol. Pt calm and cooperative. Respirations even and unlabored. No s/s of distress at this time.

## 2021-02-28 PROCEDURE — 6370000000 HC RX 637 (ALT 250 FOR IP): Performed by: NURSE PRACTITIONER

## 2021-02-28 RX ORDER — TRAMADOL HYDROCHLORIDE 50 MG/1
50 TABLET ORAL ONCE
Status: COMPLETED | OUTPATIENT
Start: 2021-02-28 | End: 2021-02-28

## 2021-02-28 RX ORDER — LIDOCAINE HYDROCHLORIDE 20 MG/ML
15 SOLUTION OROPHARYNGEAL PRN
Qty: 15 ML | Refills: 0 | Status: SHIPPED | OUTPATIENT
Start: 2021-02-28 | End: 2021-05-17 | Stop reason: ALTCHOICE

## 2021-02-28 RX ORDER — LIDOCAINE HYDROCHLORIDE 20 MG/ML
15 SOLUTION OROPHARYNGEAL PRN
Qty: 15 ML | Refills: 0 | Status: SHIPPED | OUTPATIENT
Start: 2021-02-28 | End: 2021-02-28 | Stop reason: SDUPTHER

## 2021-02-28 RX ORDER — LIDOCAINE HYDROCHLORIDE 20 MG/ML
15 SOLUTION OROPHARYNGEAL ONCE
Status: COMPLETED | OUTPATIENT
Start: 2021-02-28 | End: 2021-02-28

## 2021-02-28 RX ADMIN — TRAMADOL HYDROCHLORIDE 50 MG: 50 TABLET, FILM COATED ORAL at 00:24

## 2021-02-28 RX ADMIN — LIDOCAINE HYDROCHLORIDE 15 ML: 20 SOLUTION ORAL; TOPICAL at 00:24

## 2021-02-28 ASSESSMENT — ENCOUNTER SYMPTOMS
DIARRHEA: 0
SHORTNESS OF BREATH: 0
RHINORRHEA: 0
CONSTIPATION: 0
NAUSEA: 0
ABDOMINAL DISTENTION: 0
SINUS PRESSURE: 0
CHEST TIGHTNESS: 0
VOMITING: 0
TROUBLE SWALLOWING: 0
COLOR CHANGE: 0
SORE THROAT: 0
COUGH: 0
BACK PAIN: 0
ABDOMINAL PAIN: 0
SINUS PAIN: 0
FACIAL SWELLING: 1
WHEEZING: 0

## 2021-02-28 NOTE — ED PROVIDER NOTES
[address] [facility] ED  EMERGENCY DEPARTMENT ENCOUNTER      Pt Name: Reginaldo Neal  MRN: [id number]  [place] 1970  Date of evaluation: 2/27/2021  Provider: Lilia Chauhan       Chief Complaint   Patient presents with    Dental Pain         HISTORY OF PRESENT ILLNESS   (Location/Symptom, Timing/Onset,Context/Setting, Quality, Duration, Modifying Factors, Severity)  Note limiting factors. Reginaldo Neal is a 53 y.o. female who presents to the emergency department for complaint of left-sided lower jaw dental pain swelling and numbness sensation of the left side of her lower lip. Patient ports were seen earlier in the ER and given penicillin antibiotic and a prescription for pain medication. She states she has tramadol at home but she has not been taking it regularly she was taking Tylenol instead for her pain and discomfort, she states that she tries to limit how much tramadol she takes because she is on pain management the did not seem to be directly helping with the dental pain she is having. She did follow-up with a dentist already and was told that she needed a root canal with the caps she could not afford this has been rescheduled for a dental extraction of this tooth. States pain is been ongoing for the past 4 days slowly steadily worsening. She does have a slight swelling sensation of her lower lip but is concerned due to the numbness sensation on the left side of her lower lip. Pain 10 out of 10 aching throbbing. She states she is only taken Tylenol today for the pain which helps mildly. She complained an ice pack but this is not appear to be helping. She denies any swelling of her tongue denies any difficulty breathing or swallowing. She denies any fevers chills sweats. Nursing Notes were reviewed.     REVIEW OF SYSTEMS    (2-9 systems for level 4, 10 or more for level 5)     Review of Systems Constitutional: Negative for activity change, appetite change, chills, diaphoresis, fatigue and fever. HENT: Positive for dental problem and facial swelling. Negative for congestion, postnasal drip, rhinorrhea, sinus pressure, sinus pain, sore throat and trouble swallowing. Eyes: Negative for visual disturbance. Respiratory: Negative for cough, chest tightness, shortness of breath and wheezing. Cardiovascular: Negative for chest pain and palpitations. Gastrointestinal: Negative for abdominal distention, abdominal pain, constipation, diarrhea, nausea and vomiting. Genitourinary: Negative for difficulty urinating, dysuria, flank pain, frequency and urgency. Musculoskeletal: Negative for arthralgias, back pain, gait problem, myalgias, neck pain and neck stiffness. Skin: Negative for color change and rash. Neurological: Positive for numbness (left side of lower lip). Negative for dizziness, tremors, seizures, syncope, speech difficulty, weakness, light-headedness and headaches. Except as noted above the remainder of the review of systems was reviewed and negative. PAST MEDICAL HISTORY     Past Medical History:   Diagnosis Date    Anxiety     Arthritis     Asthma     Bronchopneumonia     Cancer ([address]     renal    Cerebral artery occlusion with cerebral infarction (HCC)     Chronic bilateral low back pain with sciatica     Chronic kidney disease     Chronic obstructive lung disease ([address] 7/26/2019    Depression     Fibromyalgia     Hypertension     Insulin dependent type 2 diabetes mellitus, uncontrolled ([address] 8/3/2018    Localized enlarged lymph nodes 10/26/2018    Mixed headache     Pure hyperglyceridemia 5/19/2017    Sarcoidosis     Sleep apnea     does not wear cpap    Thyroid goiter      Past Surgical History:   Procedure Laterality Date    BRONCHOSCOPY  10/26/2018    DR. STEARNS    KIDNEY REMOVAL Right 08/2016    KIDNEY REMOVAL Right 2016  LUNG BIOPSY Right 10/2018    THYROID LOBECTOMY Right 14    THYROIDECTOMY  2019    DR. MAGDALENO    THYROIDECTOMY  2018    URETER STENT PLACEMENT Left 2016     Social History     Socioeconomic History    Marital status: Legally      Spouse name: None    Number of children: None    Years of education: 15    Highest education level: High school graduate   Occupational History    None   Social Needs    Financial resource strain: Somewhat hard    Food insecurity     Worry: Sometimes true     Inability: Sometimes true    Transportation needs     Medical: No     Non-medical: No   Tobacco Use    Smoking status: Former Smoker     Packs/day: 0.50     Years: 15.00     Pack years: 7.50     Types: Cigarettes     Start date: 2014     Quit date: 2015     Years since quittin.1    Smokeless tobacco: Former User     Quit date: 3/23/2016   Substance and Sexual Activity    Alcohol use: Not Currently     Alcohol/week: 0.0 standard drinks     Comment: occasionally    Drug use: Yes     Frequency: 5.0 times per week     Types: Marijuana    Sexual activity: Yes     Partners: Male   Lifestyle    Physical activity     Days per week: 0 days     Minutes per session: 0 min    Stress: Very much   Relationships    Social connections     Talks on phone:  Three times a week     Gets together: Never     Attends Jain service: Never     Active member of club or organization: No     Attends meetings of clubs or organizations: Never     Relationship status:     Intimate partner violence     Fear of current or ex partner: No     Emotionally abused: No     Physically abused: No     Forced sexual activity: No   Other Topics Concern    None   Social History Narrative    None       SCREENINGS             PHYSICAL EXAM    (up to 7 for level 4, 8 or more for level 5)     ED Triage Vitals [21 2352]   BP Temp Temp Source Pulse Resp SpO2 Height Weight (!) 152/79 98.8 °F (37.1 °C) Oral 88 20 96 % 5' 3\" (1.6 m) 260 lb (117.9 kg)       Physical Exam  Constitutional:       General: She is not in acute distress. Appearance: Normal appearance. She is obese. She is not ill-appearing, toxic-appearing or diaphoretic. HENT:      Head: Normocephalic and atraumatic. Right Ear: External ear normal.      Left Ear: External ear normal.      Nose: Nose normal. No congestion or rhinorrhea. Mouth/Throat:      Mouth: Mucous membranes are moist. No oral lesions. Dentition: Abnormal dentition. Dental tenderness, gingival swelling, dental caries and dental abscesses present. Pharynx: Oropharynx is clear. Uvula midline. No pharyngeal swelling, oropharyngeal exudate or posterior oropharyngeal erythema. Eyes:      General:         Right eye: No discharge. Left eye: No discharge. Conjunctiva/sclera: Conjunctivae normal.      Pupils: Pupils are equal, round, and reactive to light. Neck:      Musculoskeletal: Normal range of motion and neck supple. No neck rigidity or muscular tenderness. Cardiovascular:      Rate and Rhythm: Normal rate and regular rhythm. Pulses: Normal pulses. Pulmonary:      Effort: Pulmonary effort is normal.   Musculoskeletal: Normal range of motion. General: No tenderness or signs of injury. Skin:     General: Skin is warm and dry. Capillary Refill: Capillary refill takes less than 2 seconds. Neurological:      General: No focal deficit present. Mental Status: She is alert and oriented to person, place, and time. Mental status is at baseline.          RESULTS     EKG: All EKG's are interpreted by the Emergency Department Physician who either signs or Co-signsthis chart in the absence of a cardiologist.        RADIOLOGY: Non-plain filmimages such as CT, Ultrasound and MRI are read by the radiologist. Plain radiographic images are visualized and preliminarily interpreted by the emergency physician with the below findings:        Interpretation per the Radiologist below, if available at the time ofthis note:    No orders to display         ED BEDSIDE ULTRASOUND:   Performed by ED Physician - none    LABS:  Labs Reviewed - No data to display    All other labs were within normal range or not returned as of this dictation.     EMERGENCY DEPARTMENT COURSE and DIFFERENTIAL DIAGNOSIS/MDM:   Vitals:    Vitals:    02/27/21 2352   BP: (!) 152/79   Pulse: 88   Resp: 20   Temp: 98.8 °F (37.1 °C)   TempSrc: Oral   SpO2: 96%   Weight: 260 lb (117.9 kg)   Height: 5' 3\" (1.6 m) Call in 1 day        DISCHARGE MEDICATIONS:  Current Discharge Medication List      START taking these medications    Details   lidocaine viscous hcl (XYLOCAINE) 2 % SOLN solution Take 15 mLs by mouth as needed for Dental Pain  Qty: 15 mL, Refills: 0                (Please notethat portions of this note were completed with a voice recognition program.  Efforts were made to edit the dictations but occasionally words are mis-transcribed.)    SUDEEP De Leon CNP (electronically signed)  Attending Emergency Physician         SUDEEP De Leon CNP  02/28/21 [id number]

## 2021-02-28 NOTE — ED TRIAGE NOTES
Pt states that she was seen earlier and is now having numbness to her left lip. Pt states that she is unsure if it is her dental pain or the PCN she was prescribed, pt states that she had a dose here and one about an hour ago, pt states that her lower lip numbness started 30 mins ago. Pt states that she also needs something for pain. Pt states that she took tylenol an hour ago.

## 2021-03-01 ENCOUNTER — CARE COORDINATION (OUTPATIENT)
Dept: CARE COORDINATION | Age: 51
End: 2021-03-01

## 2021-03-01 LAB
SEX HORMONE BINDING GLOBULIN: 35 NMOL/L (ref 30–135)
TESTOSTERONE FREE: 14.9 PG/ML (ref 1.1–5.8)
TESTOSTERONE, FEMALES/CHILDREN: 91 NG/DL (ref 9–55)

## 2021-03-01 NOTE — CARE COORDINATION
Ambulatory Care Coordination Note  3/1/2021  CM Risk Score: 11  Charlson 10 Year Mortality Risk Score: 100%     ACC: Perez Bernardo RN    Summary Note: ACM CALLED TO FOLLOW WITH ROB ON RECENT ER VISIT    1. ER VISIT - Ovidio Dunbaring states tooth pain is not better, states her face is still swollen, states she went to  [place] ER 2/27/21 and yesterday she went to [facility] ER due to ongoing pain and elevated BP, reports BP was elevated at 170/100, states she was given iv antibiotics and was sent home with a  different antibiotic-. spoke with [address] [place] [address] and they report Clindamycin 300mg bid, no record of any new pain medications on file      She states BP is still high today 175/90.- declined allowing me to set up follow up with Jimmie Ken today, but states if BP is still high tomorrow she will allow me to schedule a follow up    Denies chest pain or sob with elevated BP, will follow in am for follow up. Care Coordination Interventions    Program Enrollment: Complex Care  Referral from Primary Care Provider: No  Suggested Interventions and Community Resources  Medi Set or Pill Pack: Completed (Comment: 2/17 using pill box.)  Pharmacist: Completed (Comment: 1/5/2021 [facility] Clinical Rx)  Registered Dietician: Completed (Comment: 1/5/2021 [facility] Dietician)  Social Work: Completed (Comment: 1/5/2021 [facility] Social Work for [employer] and long term planning.)  Other Services: Declined (Comment: 1/5/2021 Declined ACP Referral)  Zone Management Tools: In Process (Comment: 1/5/2021 Diabetes and COPD Zones)  Other Services or Interventions: 1/5/2021 instructed on same day, next day, and Walk-In Care. Goals Addressed    None         Prior to Admission medications    Medication Sig Start Date End Date Taking?  Authorizing Provider   lidocaine viscous hcl (XYLOCAINE) 2 % SOLN solution Take 15 mLs by mouth as needed for Dental Pain 2/28/21   SUDEEP Anthony - VOLODYMYR   penicillin v potassium (VEETID) 500 MG tablet Take 1 tablet by mouth 4 times daily for 10 days 2/27/21 3/9/21  Tony Jones PA-C   cetirizine (ZYRTEC) 10 MG tablet TAKE 1 TABLET BY MOUTH EVERY NIGHT AT BEDTIME AS NEEDED FOR ALLERGIES 2/25/21   Lilibeth RorySUDEEP Vasquez CNP   Dulaglutide (TRULICITY) 6.26 LV/3.7TB SOPN Inject 0.75 mg into the skin once a week 2/23/21   Lola Barrera MD   Insulin Degludec (TRESIBA FLEXTOUCH) 100 UNIT/ML SOPN INJECT 40 UNITS UNDER THE SKIN NIGHTLY 2/23/21   Lola Barrera MD   insulin lispro, 1 Unit Dial, (HUMALOG KWIKPEN) 100 UNIT/ML SOPN 4 units at each meals hold if glucose less than 150 2/23/21   Lola Barrera MD   spironolactone (ALDACTONE) 50 MG tablet Take 1 tablet by mouth daily 2/23/21   Lola Barrera MD   OneTouch Delica Lancets 83Y MISC qid 2/23/21   Lola Barrera MD   blood glucose test strips (ONETOUCH VERIO) strip 1 each by In Vitro route daily As needed. 2/23/21   Lola Barrera MD   Blood Glucose Monitoring Suppl ([facility]adia Gell) w/Device KIT As  Directed 2/23/21   Lola Barrera MD   Insulin Pen Needle (NOVOFINE) 32G X 6 MM MISC qid 2/23/21   Lola Barrera MD   Continuous Blood Gluc Sensor (FREESTYLE JESSICA 14 DAY SENSOR) MISC As directed 2/23/21   Lola Barrera MD   Continuous Blood Gluc  (FREESTYLE JESSICA 14 DAY READER) LENNY As directed 2/23/21   Lola Barrera MD   baclofen (LIORESAL) 10 MG tablet Take 1 tablet by mouth 3 times daily 2/19/21   SUDEEP Espinoza CNP   lidocaine (XYLOCAINE) 5 % ointment Apply topically BID as needed.  2/18/21   Ceasar Padilla PA-C   montelukast (SINGULAIR) 10 MG tablet Take 1 tab nightly at supper for breathing/allergies 2/9/21   Bharati Houston, APRN - CNP   buPROPion (WELLBUTRIN XL) 150 MG extended release tablet Take 1 tablet by mouth every morning 2/9/21   Bharati Coates APRN - CNP   hydrOXYzine (ATARAX) 25 MG tablet Take 1 tablet by mouth every 8 hours as needed for Anxiety 2/9/21 3/11/21  Lilibeth Moore, APRN - CNP   tiZANidine (Katherine Alsophia) 2 MG tablet Take 1 tablet by mouth 3 times daily as needed (back pain/ spasm) 2/6/21   Shiela Glaser PA-C   butalbital-acetaminophen-caffeine (FIORICET, ESGIC) -58 MG per tablet Take 1 tablet by mouth every 6 hours as needed for Headaches 1/4/21   Fay Dalton MD   pregabalin (LYRICA) 150 MG capsule Take 1 capsule by mouth 3 times daily for 30 days. 12/14/20 1/13/21  SUDEEP Arteaga CNP   DULoxetine (CYMBALTA) 60 MG extended release capsule Take 60 mg by mouth every 24 hours     Historical Provider, MD   ALPRAZolam (XANAX) 0.25 MG tablet Take 0.25 mg by mouth nightly as needed. 3/23/20   Historical Provider, MD   pantoprazole (PROTONIX) 20 MG tablet Take 2 tablets by mouth daily 11/4/20   Shiela Glaser PA-C   magnesium oxide (MAG-OX) 400 (241.3 Mg) MG TABS tablet TAKE 1/2 TABLET BY MOUTH DAILY 11/2/20   SUDEEP Arteaga CNP   busPIRone (BUSPAR) 15 MG tablet TAKE 1 TABLET BY MOUTH THREE TIMES DAILY 9/28/20   SUDEEP Arteaga CNP   albuterol sulfate HFA (PROVENTIL HFA) 108 (90 Base) MCG/ACT inhaler Inhale 2 puffs into the lungs every 6 hours as needed for Wheezing 9/9/20   SUDEEP Barragan CNP   insulin lispro (HUMALOG) 100 UNIT/ML injection vial INJECT 4 UNITS UNDER THE SKIN THREE TIMES DAILY AS NEEDED FOR HIGH BLOOD SUGAR 7/10/20   SUDEEP Arteaga CNP   levothyroxine (SYNTHROID) 150 MCG tablet Take 150 mcg by mouth Daily  6/28/20   Historical Provider, MD   SUMAtriptan (IMITREX) 25 MG tablet TAKE 1 TABLET BY MOUTH 1 TIME AS NEEDED FOR MIGRAINE 5/1/20   SUDEEP Barragan CNP   promethazine (PHENERGAN) 25 MG tablet WARNING:  May cause drowsiness. May impair ability to operate vehicles or machinery. Do not use in combination with alcohol.     ! Tablet every 6 hours as needed in conjunction with Ultram for headaches 1/18/20   Lashawn Colby PA-C   blood glucose test strips (EXACTECH TEST) strip 1 each by In Vitro route 3 times daily (Accu-check test

## 2021-03-02 ENCOUNTER — CARE COORDINATION (OUTPATIENT)
Dept: CARE COORDINATION | Age: 51
End: 2021-03-02

## 2021-03-02 NOTE — CARE COORDINATION
KWIKPEN) 100 UNIT/ML SOPN 4 units at each meals hold if glucose less than 150 2/23/21   Onel Boone MD   spironolactone (ALDACTONE) 50 MG tablet Take 1 tablet by mouth daily 2/23/21   Onel Boone MD   OneTouch Delica Lancets 57X MISC qid 2/23/21   Onel Boone MD   blood glucose test strips (ONETOUCH VERIO) strip 1 each by In Vitro route daily As needed. 2/23/21   Onel Boone MD   Blood Glucose Monitoring Suppl ([facility]) w/Device KIT As  Directed 2/23/21   Onel Boone MD   Insulin Pen Needle (NOVOFINE) 32G X 6 MM MISC qid 2/23/21   Onel Boone MD   Continuous Blood Gluc Sensor (FREESTYLE JESSICA 14 DAY SENSOR) MISC As directed 2/23/21   Onel Boone MD   Continuous Blood Gluc  (FREESTYLE JESSICA 14 DAY READER) LENNY As directed 2/23/21   Onel Boone MD   baclofen (LIORESAL) 10 MG tablet Take 1 tablet by mouth 3 times daily 2/19/21   SUDEEP Arthur CNP   lidocaine (XYLOCAINE) 5 % ointment Apply topically BID as needed. 2/18/21   Gonzalez Jerez PA-C   montelukast (SINGULAIR) 10 MG tablet Take 1 tab nightly at supper for breathing/allergies 2/9/21   SUDEEP Arthur CNP   buPROPion (WELLBUTRIN XL) 150 MG extended release tablet Take 1 tablet by mouth every morning 2/9/21   SUDEEP Arthur CNP   hydrOXYzine (ATARAX) 25 MG tablet Take 1 tablet by mouth every 8 hours as needed for Anxiety 2/9/21 3/11/21  SUDEEP Fleming CNP   tiZANidine (ZANAFLEX) 2 MG tablet Take 1 tablet by mouth 3 times daily as needed (back pain/ spasm) 2/6/21   Gonzalez Jerez PA-C   butalbital-acetaminophen-caffeine (FIORICET, ESGIC) -08 MG per tablet Take 1 tablet by mouth every 6 hours as needed for Headaches 1/4/21   Balaji Neil MD   pregabalin (LYRICA) 150 MG capsule Take 1 capsule by mouth 3 times daily for 30 days.  12/14/20 1/13/21  Lilibeth Sanders Carry, APRN - CNP   DULoxetine (CYMBALTA) 60 MG extended release capsule Take 60 mg by mouth every 24 hours     Historical Provider, MD   ALPRAZolam (XANAX) 0.25 MG tablet Take 0.25 mg by mouth nightly as needed. 3/23/20   Historical Provider, MD   pantoprazole (PROTONIX) 20 MG tablet Take 2 tablets by mouth daily 11/4/20   Alon Bran PA-C   magnesium oxide (MAG-OX) 400 (241.3 Mg) MG TABS tablet TAKE 1/2 TABLET BY MOUTH DAILY 11/2/20   SUDEEP Redmond CNP   busPIRone (BUSPAR) 15 MG tablet TAKE 1 TABLET BY MOUTH THREE TIMES DAILY 9/28/20   SUDEEP Redmond CNP   albuterol sulfate HFA (PROVENTIL HFA) 108 (90 Base) MCG/ACT inhaler Inhale 2 puffs into the lungs every 6 hours as needed for Wheezing 9/9/20   SUDEEP Raza CNP   insulin lispro (HUMALOG) 100 UNIT/ML injection vial INJECT 4 UNITS UNDER THE SKIN THREE TIMES DAILY AS NEEDED FOR HIGH BLOOD SUGAR 7/10/20   SUDEEP Redmond CNP   levothyroxine (SYNTHROID) 150 MCG tablet Take 150 mcg by mouth Daily  6/28/20   Historical Provider, MD   SUMAtriptan (IMITREX) 25 MG tablet TAKE 1 TABLET BY MOUTH 1 TIME AS NEEDED FOR MIGRAINE 5/1/20   SUDEEP Raza CNP   promethazine (PHENERGAN) 25 MG tablet WARNING:  May cause drowsiness. May impair ability to operate vehicles or machinery. Do not use in combination with alcohol. ! Tablet every 6 hours as needed in conjunction with Ultram for headaches 1/18/20   Hiwot Mejia PA-C   blood glucose test strips (EXACTECH TEST) strip 1 each by In Vitro route 3 times daily (Accu-check test strips) As needed.  DX:E11.65 12/2/19   SUDEEP Redmond CNP   ONE TOUCH ULTRASOFT LANCETS MISC TEST 3 TIMES DAILY AS NEEDED 12/2/19   SUDEEP Redmond CNP   albuterol (PROVENTIL) (2.5 MG/3ML) 0.083% nebulizer solution Take 3 mLs by nebulization every 4 hours as needed for Wheezing 11/5/19   Maria M Ni,    metoclopramide (REGLAN) 10 MG tablet Take 1 tablet by mouth 4 times daily 10/27/19   SUDEEP Carmona - CNP   atorvastatin (LIPITOR) 40 MG tablet Take 1 tablet by mouth nightly 9/11/19   Olesya Dangelo

## 2021-03-09 ENCOUNTER — VIRTUAL VISIT (OUTPATIENT)
Dept: FAMILY MEDICINE CLINIC | Age: 51
End: 2021-03-09
Payer: MEDICAID

## 2021-03-09 DIAGNOSIS — F41.8 ANXIETY WITH DEPRESSION: ICD-10-CM

## 2021-03-09 DIAGNOSIS — D86.2 SARCOIDOSIS OF LUNG WITH SARCOIDOSIS OF LYMPH NODES (HCC): Primary | ICD-10-CM

## 2021-03-09 DIAGNOSIS — G89.29 OTHER CHRONIC PAIN: ICD-10-CM

## 2021-03-09 DIAGNOSIS — K08.89 PAIN, DENTAL: ICD-10-CM

## 2021-03-09 DIAGNOSIS — I10 ESSENTIAL HYPERTENSION: ICD-10-CM

## 2021-03-09 PROCEDURE — G8417 CALC BMI ABV UP PARAM F/U: HCPCS | Performed by: NURSE PRACTITIONER

## 2021-03-09 PROCEDURE — G8482 FLU IMMUNIZE ORDER/ADMIN: HCPCS | Performed by: NURSE PRACTITIONER

## 2021-03-09 PROCEDURE — 99214 OFFICE O/P EST MOD 30 MIN: CPT | Performed by: NURSE PRACTITIONER

## 2021-03-09 PROCEDURE — G8428 CUR MEDS NOT DOCUMENT: HCPCS | Performed by: NURSE PRACTITIONER

## 2021-03-09 PROCEDURE — 1036F TOBACCO NON-USER: CPT | Performed by: NURSE PRACTITIONER

## 2021-03-09 ASSESSMENT — ENCOUNTER SYMPTOMS
SINUS PRESSURE: 1
SHORTNESS OF BREATH: 0
TROUBLE SWALLOWING: 0
FACIAL SWELLING: 1
COUGH: 0
WHEEZING: 0

## 2021-03-09 NOTE — PROGRESS NOTES
Subjective:      Patient ID: Monica Bustillo is a 53 y.o. female who presents today for:     Chief Complaint   Patient presents with    Follow-up       HPI Pt in today for routine. She reports that she had tooth removed yesterday and had a reaction to the pen V they gave her for it. Her face swelling increased. She reports that she is still in a lot of pain from the tooth extraction. Has been taking her regularly ordered tramadol and rotating with tylenol with mild relief. She reports she would like to follow up with psychiatry for mental health needs. She reports her anxiety has been flaring and some depression symptoms. She is stable right now, but doesn't want to let it get to a bad point. Denies SI. Has been getting tramadol from her pulmonologist. She is ready to get back in with pain management to help manage her pain. Prefers to find somebody not in the area. Pt reports BP to be 145/76 today, but reports that she has been in pain due to tooth. Past Medical History:   Diagnosis Date    Anxiety     Arthritis     Asthma     Bronchopneumonia     Cancer ([address]     renal    Cerebral artery occlusion with cerebral infarction (HCC)     Chronic bilateral low back pain with sciatica     Chronic kidney disease     Chronic obstructive lung disease ([address] 7/26/2019    Depression     Fibromyalgia     Hypertension     Insulin dependent type 2 diabetes mellitus, uncontrolled ([address] 8/3/2018    Localized enlarged lymph nodes 10/26/2018    Mixed headache     Pure hyperglyceridemia 5/19/2017    Sarcoidosis     Sleep apnea     does not wear cpap    Thyroid goiter      Past Surgical History:   Procedure Laterality Date    BRONCHOSCOPY  10/26/2018    DR. STEARNS    KIDNEY REMOVAL Right 08/2016    KIDNEY REMOVAL Right 2016    LUNG BIOPSY Right 10/2018    THYROID LOBECTOMY Right 6/13/14    THYROIDECTOMY  02/21/2019    DR. MAGDALENO    THYROIDECTOMY  2018    URETER STENT PLACEMENT Left 08/2016 Family History   Problem Relation Age of Onset    Cancer Father     Diabetes Father     Allergy (Severe) Father     Heart Attack Father     Prostate Cancer Father     High Blood Pressure Mother     Diabetes Mother     Arthritis Mother     High Cholesterol Mother     Vision Loss Mother     Alcohol Abuse Neg Hx     Anemia Neg Hx     Arrhythmia Neg Hx     Asthma Neg Hx     Atrial Fibrillation Neg Hx     Birth Defects Neg Hx     Breast Cancer Neg Hx     Coronary Art Dis Neg Hx     Colon Cancer Neg Hx     Depression Neg Hx     Early Death Neg Hx     Hearing Loss Neg Hx     Heart Disease Neg Hx     Learning Disabilities Neg Hx     Kidney Disease Neg Hx     Mental Illness Neg Hx     Mental Retardation Neg Hx     Miscarriages / Stillbirths Neg Hx     Obesity Neg Hx     Osteoporosis Neg Hx     Stroke Neg Hx     Substance Abuse Neg Hx      Social History     Socioeconomic History    Marital status: Legally      Spouse name: Not on file    Number of children: Not on file    Years of education: 15    Highest education level: High school graduate   Occupational History    Not on file   Social Needs    Financial resource strain: Somewhat hard    Food insecurity     Worry: Sometimes true     Inability: Sometimes true    Transportation needs     Medical: No     Non-medical: No   Tobacco Use    Smoking status: Former Smoker     Packs/day: 0.50     Years: 15.00     Pack years: 7.50     Types: Cigarettes     Start date: 2014     Quit date: 2015     Years since quittin.1    Smokeless tobacco: Former User     Quit date: 3/23/2016   Substance and Sexual Activity    Alcohol use: Not Currently     Alcohol/week: 0.0 standard drinks     Comment: occasionally    Drug use: Yes     Frequency: 5.0 times per week     Types: Marijuana    Sexual activity: Yes     Partners: Male   Lifestyle    Physical activity     Days per week: 0 days     Minutes per session: 0 min    Stress: Very much   Relationships    Social connections     Talks on phone: Three times a week     Gets together: Never     Attends [employer]ism service: Never     Active member of club or organization: No     Attends meetings of clubs or organizations: Never     Relationship status:     Intimate partner violence     Fear of current or ex partner: No     Emotionally abused: No     Physically abused: No     Forced sexual activity: No   Other Topics Concern    Not on file   Social History Narrative    Not on file     Current Outpatient Medications on File Prior to Visit   Medication Sig Dispense Refill    lidocaine viscous hcl (XYLOCAINE) 2 % SOLN solution Take 15 mLs by mouth as needed for Dental Pain 15 mL 0    cetirizine (ZYRTEC) 10 MG tablet TAKE 1 TABLET BY MOUTH EVERY NIGHT AT BEDTIME AS NEEDED FOR ALLERGIES 30 tablet 5    Dulaglutide (TRULICITY) 8.78 PV/1.6UK SOPN Inject 0.75 mg into the skin once a week 4 pen 3    Insulin Degludec (TRESIBA FLEXTOUCH) 100 UNIT/ML SOPN INJECT 40 UNITS UNDER THE SKIN NIGHTLY 15 mL 3    insulin lispro, 1 Unit Dial, (HUMALOG KWIKPEN) 100 UNIT/ML SOPN 4 units at each meals hold if glucose less than 150 5 pen 3    spironolactone (ALDACTONE) 50 MG tablet Take 1 tablet by mouth daily 30 tablet 3    OneTouch Delica Lancets 54G MISC qid 200 each 3    blood glucose test strips (ONETOUCH VERIO) strip 1 each by In Vitro route daily As needed. 100 each 3    Blood Glucose Monitoring Suppl (ONETOUCH VERIO) w/Device KIT As  Directed 1 kit 00    Insulin Pen Needle (NOVOFINE) 32G X 6 MM MISC qid 300 each 3    Continuous Blood Gluc Sensor (FREESTYLE JESSICA 14 DAY SENSOR) MISC As directed 1 each 00    Continuous Blood Gluc  (FREESTYLE JESSICA 14 DAY READER) LENNY As directed 1 Device 00    baclofen (LIORESAL) 10 MG tablet Take 1 tablet by mouth 3 times daily 60 tablet 0    lidocaine (XYLOCAINE) 5 % ointment Apply topically BID as needed.  30 g 0    montelukast (SINGULAIR) 10 MG tablet Take 1 tab nightly at supper for breathing/allergies 30 tablet 5    buPROPion (WELLBUTRIN XL) 150 MG extended release tablet Take 1 tablet by mouth every morning 30 tablet 3    hydrOXYzine (ATARAX) 25 MG tablet Take 1 tablet by mouth every 8 hours as needed for Anxiety 90 tablet 2    tiZANidine (ZANAFLEX) 2 MG tablet Take 1 tablet by mouth 3 times daily as needed (back pain/ spasm) 15 tablet 0    butalbital-acetaminophen-caffeine (FIORICET, ESGIC) -40 MG per tablet Take 1 tablet by mouth every 6 hours as needed for Headaches 18 tablet 0    pregabalin (LYRICA) 150 MG capsule Take 1 capsule by mouth 3 times daily for 30 days. 90 capsule 0    DULoxetine (CYMBALTA) 60 MG extended release capsule Take 60 mg by mouth every 24 hours       ALPRAZolam (XANAX) 0.25 MG tablet Take 0.25 mg by mouth nightly as needed.  pantoprazole (PROTONIX) 20 MG tablet Take 2 tablets by mouth daily 30 tablet 0    magnesium oxide (MAG-OX) 400 (241.3 Mg) MG TABS tablet TAKE 1/2 TABLET BY MOUTH DAILY 30 tablet 5    busPIRone (BUSPAR) 15 MG tablet TAKE 1 TABLET BY MOUTH THREE TIMES DAILY 90 tablet 5    albuterol sulfate HFA (PROVENTIL HFA) 108 (90 Base) MCG/ACT inhaler Inhale 2 puffs into the lungs every 6 hours as needed for Wheezing 1 Inhaler 3    insulin lispro (HUMALOG) 100 UNIT/ML injection vial INJECT 4 UNITS UNDER THE SKIN THREE TIMES DAILY AS NEEDED FOR HIGH BLOOD SUGAR 10 mL 0    levothyroxine (SYNTHROID) 150 MCG tablet Take 150 mcg by mouth Daily       SUMAtriptan (IMITREX) 25 MG tablet TAKE 1 TABLET BY MOUTH 1 TIME AS NEEDED FOR MIGRAINE 9 tablet 1    promethazine (PHENERGAN) 25 MG tablet WARNING:  May cause drowsiness. May impair ability to operate vehicles or machinery. Do not use in combination with alcohol.     ! Tablet every 6 hours as needed in conjunction with Ultram for headaches 20 tablet 0    blood glucose test strips (EXACTECH TEST) strip 1 each by In Vitro route 3 times daily (Accu-check test strips) As needed. DX:E11.65 300 strip 5    ONE TOUCH ULTRASOFT LANCETS MISC TEST 3 TIMES DAILY AS NEEDED 300 each 3    albuterol (PROVENTIL) (2.5 MG/3ML) 0.083% nebulizer solution Take 3 mLs by nebulization every 4 hours as needed for Wheezing 120 each 3    metoclopramide (REGLAN) 10 MG tablet Take 1 tablet by mouth 4 times daily 120 tablet 0    atorvastatin (LIPITOR) 40 MG tablet Take 1 tablet by mouth nightly 30 tablet 3    metoprolol tartrate (LOPRESSOR) 25 MG tablet Take 1 tablet by mouth 2 times daily 60 tablet 0    fluticasone (FLONASE) 50 MCG/ACT nasal spray 1 spray by Nasal route daily 1 Bottle 3    traMADol (ULTRAM) 50 MG tablet Take 50 mg by mouth every 6 hours as needed for Pain.  Insulin Syringe-Needle U-100 30G X 1/2\" 1 ML MISC 1 each by Does not apply route daily 100 each 3    blood glucose test strips (ONETOUCH VERIO) strip TEST 3 TIMES DAILY AS NEEDED 300 each 3    Blood Glucose Monitoring Suppl (ONETOUCH VERIO) w/Device KIT TEST 3 TIMES DAILY AS NEEDED 1 kit 0     No current facility-administered medications on file prior to visit. Allergies:  Shellfish-derived products, Ibuprofen, Ketorolac, Morphine, Other, Penicillins, Propoxyphene n-acetaminophen, and Toradol [ketorolac tromethamine]    Review of Systems   Constitutional: Negative for chills, fatigue and fever. HENT: Positive for dental problem, facial swelling (improved) and sinus pressure. Negative for congestion, ear pain and trouble swallowing. Respiratory: Negative for cough, shortness of breath and wheezing. Cardiovascular: Positive for chest pain. Negative for palpitations. Psychiatric/Behavioral: Positive for dysphoric mood. Negative for self-injury and suicidal ideas. The patient is nervous/anxious. Objective: There were no vitals taken for this visit. Physical Exam  Constitutional:       General: She is awake. She is not in acute distress. Appearance: Normal appearance.  She is not ill-appearing or diaphoretic. HENT:      Head: Normocephalic. Right Ear: External ear normal.      Left Ear: External ear normal.      Nose: Nose normal.      Mouth/Throat:      Lips: Pink. Mouth: Mucous membranes are moist.   Pulmonary:      Effort: Pulmonary effort is normal. No respiratory distress. Skin:     Coloration: Skin is not ashen, cyanotic, jaundiced, mottled, pale or sallow. Neurological:      Mental Status: She is alert and oriented to person, place, and time. Psychiatric:         Mood and Affect: Mood is depressed. Speech: Speech normal.         Behavior: Behavior normal. Behavior is cooperative. Assessment:          Diagnosis Orders   1. Sarcoidosis of lung with sarcoidosis of lymph nodes ([facility]  Amb External Referral To Pain Clinic   2. Other chronic pain  Amb External Referral To Pain Clinic   3. Anxiety with depression  Bruce Hernandez MD, [address] [place] [address]   4. Pain, dental     5. Essential hypertension         Plan:      Orders Placed This Encounter   Procedures    Amb External Referral To Pain Clinic     Referral Priority:   Routine     Referral Type:   Eval and Treat     Requested Specialty:   Pain Management     Number of Visits Requested:   1    Bruce Hernandez MD, [address] [place] [address]     Referral Priority:   Routine     Referral Type:   Eval and Treat     Referral Reason:   Specialty Services Required     Referred to Provider:   Marco Scott     Requested Specialty:   Psychiatry     Number of Visits Requested:   1        No orders of the defined types were placed in this encounter. Return in about 2 months (around 5/9/2021). HTN- Likely elevated due to pain. Continue to monitor. Continue current medications and DASH diet. Anxiety with depression- Okay for referral to psych.  Number given and told that she should call to schedule as there are intake questions and they prefer the patient to call. Chronic pain/sarcoidosis- Encouraged calling her insurance to see who they cover and choosing from that since she prefers not to have an in network provider I did encourage her to call herself. She was agreeable. Reviewed with the patient: current clinicalstatus, medications, activities and diet. Side effects, adverse effects of the medication prescribedtoday, as well as treatment plan/ rationale and result expectations have been discussedwith the patient who expresses understanding and desires to proceed. Close follow upto evaluate treatment results and for coordination of care. I have reviewedthe patient's medical history in detail and updated the computerized patient record. TELEHEALTH EVALUATION -- Audio/Visual (During IWAZO-00 public health emergency)    -   Avis Alberto is a 53 y.o. female being evaluated by a Virtual Visit (video visit) encounter to address concerns as mentioned above. A caregiver was present when appropriate. Due to this being a TeleHealth encounter (During ZKSKO-35 public health emergency), evaluation of the following organ systems was limited: Vitals/Constitutional/EENT/Resp/CV/GI//MS/Neuro/Skin/Heme-Lymph-Imm. Pursuant to the emergency declaration under the [facility], [address] [place] [address] authority and the [employer] Act, this Virtual Visit was conducted with patient's (and/or legal guardian's) consent, to reduce the patient's risk of exposure to COVID-19 and provide necessary medical care. The patient (and/or legal guardian) has also been advised Xto contact this office for worsening conditions or problems, and seek emergency medical treatment and/or call 911 if deemed necessary. Services were provided through a video synchronous discussion virtually to substitute for in-person clinic visit.  Type of encounter was _X_ Doxy __ MyChart ___Facetime Patient was located at their home. Provider was located at their ___ home or        _X___ office. --SUDEEP Matthews CNP on 3/9/2021 at 10:58 AM    An electronic signature was used to authenticate this note.

## 2021-03-09 NOTE — PATIENT INSTRUCTIONS
Patient Education        Recovering From Depression: Care Instructions  Your Care Instructions     Taking good care of yourself is important as you recover from depression. In time, your symptoms will fade as your treatment takes hold. Do not give up. Instead, focus your energy on getting better. Your mood will improve. It just takes some time. Focus on things that can help you feel better, such as being with friends and family, eating well, and getting enough rest. But take things slowly. Do not do too much too soon. You will begin to feel better gradually. Follow-up care is a key part of your treatment and safety. Be sure to make and go to all appointments, and call your doctor if you are having problems. It's also a good idea to know your test results and keep a list of the medicines you take. How can you care for yourself at home? Be realistic  · If you have a large task to do, break it up into smaller steps you can handle, and just do what you can. · You may want to put off important decisions until your depression has lifted. If you have plans that will have a major impact on your life, such as marriage, divorce, or a job change, try to wait a bit. Talk it over with friends and loved ones who can help you look at the overall picture first.  · Reaching out to people for help is important. Do not isolate yourself. Let your family and friends help you. Find someone you can trust and confide in, and talk to that person. · Be patient, and be kind to yourself. Remember that depression is not your fault and is not something you can overcome with willpower alone. Treatment is important for depression, just like for any other illness. Feeling better takes time, and your mood will improve little by little. Stay active  · Stay busy and get outside. Take a walk, or try some other light exercise. · Talk with your doctor about an exercise program. Exercise can help with mild depression.   · Go to a movie or concert. Take part in a Baptism activity or other social gathering. Go to a [employer] game. · Ask a friend to have dinner with you. Take care of yourself  · Eat a balanced diet with plenty of fresh fruits and vegetables, whole grains, and lean protein. If you have lost your appetite, eat small snacks rather than large meals. · Avoid using illegal drugs or marijuana and drinking alcohol. Do not take medicines that have not been prescribed for you. They may interfere with medicines you may be taking for depression, or they may make your depression worse. · Take your medicines exactly as they are prescribed. You may start to feel better within 1 to 3 weeks of taking antidepressant medicine. But it can take as many as 6 to 8 weeks to see more improvement. If you have questions or concerns about your medicines, or if you do not notice any improvement by 3 weeks, talk to your doctor. · Continue to take your medicine after your symptoms improve. Taking your medicine for at least 6 months after you feel better can help keep you from getting depressed again. If this isn't the first time you have been depressed, your doctor may recommend you to take medicine even longer. · If you have any side effects from your medicine, tell your doctor. Many side effects are mild and will go away on their own after you have been taking the medicine for a few weeks. Some may last longer. Talk to your doctor if side effects are bothering you too much. You might be able to try a different medicine. · Continue counseling. It may help prevent depression from returning, especially if you've had multiple episodes of depression. Talk with your counselor if you are having a hard time attending your sessions or you think the sessions aren't working. Don't just stop going. · Get enough sleep. Talk to your doctor if you are having problems sleeping. · Avoid sleeping pills unless they are prescribed by the doctor treating your depression.  Sleeping pills may make you groggy during the day, and they may interact with other medicine you are taking. · If you have any other illnesses, such as diabetes, heart disease, or high blood pressure, make sure to continue with your treatment. Tell your doctor about all of the medicines you take, including those with or without a prescription. · If you or someone you know talks about suicide, self-harm, or feeling hopeless, get help right away. Call the [facility] at 8-437-463-YKHI (1-161.620.8486) or text HOME to 436871 to access the Crisis Text Line. Consider saving these numbers in your phone. When should you call for help? Call 058 anytime you think you may need emergency care. For example, call if:    · You feel like hurting yourself or someone else.     · Someone you know has depression and is about to attempt or is attempting suicide. Call your doctor now or seek immediate medical care if:    · You hear voices.     · Someone you know has depression and:  ? Starts to give away his or her possessions. ? Uses illegal drugs or drinks alcohol heavily. ? Talks or writes about death, including writing suicide notes or talking about guns, knives, or pills. ? Starts to spend a lot of time alone. ? Acts very aggressively or suddenly appears calm. Watch closely for changes in your health, and be sure to contact your doctor if:    · You do not get better as expected. Where can you learn more? Go to https://CrowdcubesatinderTOOVIA.bLife. org and sign in to your [employer] account. Enter T694 in the [facility] box to learn more about \"Recovering From Depression: Care Instructions. \"     If you do not have an account, please click on the \"Sign Up Now\" link. Current as of: January 31, 2020               Content Version: 12.6  © 3745-1913 [employer]. Care instructions adapted under license by [facility]  If you have questions about a medical condition or this

## 2021-03-15 ENCOUNTER — HOSPITAL ENCOUNTER (EMERGENCY)
Age: 51
Discharge: HOME | End: 2021-03-16
Payer: MEDICAID

## 2021-03-15 ENCOUNTER — CARE COORDINATION (OUTPATIENT)
Dept: CARE COORDINATION | Age: 51
End: 2021-03-15

## 2021-03-15 DIAGNOSIS — R07.9 CHEST PAIN, UNSPECIFIED TYPE: Primary | ICD-10-CM

## 2021-03-15 LAB
EKG ATRIAL RATE: 66 BPM
EKG P AXIS: 37 DEGREES
EKG P-R INTERVAL: 156 MS
EKG Q-T INTERVAL: 384 MS
EKG QRS DURATION: 80 MS
EKG QTC CALCULATION (BAZETT): 402 MS
EKG R AXIS: 19 DEGREES
EKG T AXIS: 16 DEGREES
EKG VENTRICULAR RATE: 66 BPM

## 2021-03-15 PROCEDURE — 96375 TX/PRO/DX INJ NEW DRUG ADDON: CPT

## 2021-03-15 PROCEDURE — 96374 THER/PROPH/DIAG INJ IV PUSH: CPT

## 2021-03-15 PROCEDURE — 93005 ELECTROCARDIOGRAM TRACING: CPT | Performed by: EMERGENCY MEDICINE

## 2021-03-15 PROCEDURE — 96376 TX/PRO/DX INJ SAME DRUG ADON: CPT

## 2021-03-15 PROCEDURE — 99283 EMERGENCY DEPT VISIT LOW MDM: CPT

## 2021-03-15 RX ORDER — ONDANSETRON 2 MG/ML
4 INJECTION INTRAMUSCULAR; INTRAVENOUS ONCE
Status: COMPLETED | OUTPATIENT
Start: 2021-03-15 | End: 2021-03-16

## 2021-03-15 RX ORDER — PEN NEEDLE, DIABETIC 31 GX5/16"
NEEDLE, DISPOSABLE MISCELLANEOUS
Qty: 100 EACH | Refills: 5 | Status: SHIPPED | OUTPATIENT
Start: 2021-03-15 | End: 2021-08-13 | Stop reason: SDUPTHER

## 2021-03-15 ASSESSMENT — PAIN DESCRIPTION - PAIN TYPE: TYPE: ACUTE PAIN

## 2021-03-15 ASSESSMENT — PAIN DESCRIPTION - LOCATION: LOCATION: CHEST

## 2021-03-15 NOTE — CARE COORDINATION
Ambulatory Care Coordination Note  3/15/2021  CM Risk Score: 11  Charlson 10 Year Mortality Risk Score: 100%     ACC: Tenzin Tejeda RN    Summary Note: ACM following for Care Coordination needs, Karely Martin states swelling of face is almost resolved now that tooth has been extracted, her BP is returning to baseline, in the 140/80's, she has not checked it in 2 days, reminded her to keep an eye on it to prevent it from creeping up, she states she has been wearing the CGM and that Dr. Isak Reyna will be reveiwing her blood sugar readings at next visit, she states she is disappointed that her readings may show high due to recent tooth infection, she will review  that with Dr. Isak Reyna also at next visit 3/23 she has an appointment with [facility] this month, is hoping that they will help her with managing her anxiety, Karely Martin states in general she is feeling better. Reminded her of ready care clinics and symptoms to monitor. Care Coordination Interventions    Program Enrollment: Complex Care  Referral from Primary Care Provider: No  Suggested Interventions and Community Resources  Medi Set or Pill Pack: Completed (Comment: 2/17 using pill box.)  Pharmacist: Completed (Comment: 1/5/2021 [facility] Clinical Rx)  Registered Dietician: Completed (Comment: 1/5/2021 [facility] Dietician)  Social Work: Completed (Comment: 1/5/2021 [facility] Social Work for [employer] and long term planning.)  Other Services: Declined (Comment: 1/5/2021 Declined ACP Referral)  Zone Management Tools: In Process (Comment: 1/5/2021 Diabetes and COPD Zones)  Other Services or Interventions: 1/5/2021 instructed on same day, next day, and Walk-In Care. Goals Addressed    None         Prior to Admission medications    Medication Sig Start Date End Date Taking?  Authorizing Provider   B-D ULTRAFINE III SHORT PEN 31G X 8 MM MISC USE THREE TIMES DAILY 3/15/21   SUDEEP Malik - CNP   lidocaine viscous hcl (XYLOCAINE) 2 % SOLN solution Take 15 mLs by mouth as needed for Dental Pain 2/28/21   [facility], APRN - CNP   cetirizine (ZYRTEC) 10 MG tablet TAKE 1 TABLET BY MOUTH EVERY NIGHT AT BEDTIME AS NEEDED FOR ALLERGIES 2/25/21   Lilibeth Rajesh [facility], APRN - CNP   Dulaglutide (TRULICITY) 5.19 GX/5.9FI SOPN Inject 0.75 mg into the skin once a week 2/23/21   Alfredito Anna MD   Insulin Degludec (TRESIBA FLEXTOUCH) 100 UNIT/ML SOPN INJECT 40 UNITS UNDER THE SKIN NIGHTLY 2/23/21   Alfredito Anna MD   insulin lispro, 1 Unit Dial, (HUMALOG KWIKPEN) 100 UNIT/ML SOPN 4 units at each meals hold if glucose less than 150 2/23/21   Alfredito Anna MD   spironolactone (ALDACTONE) 50 MG tablet Take 1 tablet by mouth daily 2/23/21   Alfredito Anna MD   OneTouch Delica Lancets 09I MISC qid 2/23/21   Alfredito Anna MD   blood glucose test strips (ONETOUCH VERIO) strip 1 each by In Vitro route daily As needed. 2/23/21   Alfredito Anna MD   Blood Glucose Monitoring Suppl ([employer]) w/Device KIT As  Directed 2/23/21   Alfredito Anna MD   Insulin Pen Needle (NOVOFINE) 32G X 6 MM MISC qid 2/23/21   Alfredito Anna MD   Continuous Blood Gluc Sensor (FREESTYLE JESSICA 14 DAY SENSOR) MISC As directed 2/23/21   Alfredito Anna MD   Continuous Blood Gluc  (FREESTYLE JESSICA 14 DAY READER) LENNY As directed 2/23/21   Alfredito Anna MD   baclofen (LIORESAL) 10 MG tablet Take 1 tablet by mouth 3 times daily 2/19/21   Herlene Sep, APRN - CNP   lidocaine (XYLOCAINE) 5 % ointment Apply topically BID as needed.  2/18/21   Kathe Lesch, PA-C   montelukast (SINGULAIR) 10 MG tablet Take 1 tab nightly at supper for breathing/allergies 2/9/21   Herlene Sep, APRN - CNP   buPROPion (WELLBUTRIN XL) 150 MG extended release tablet Take 1 tablet by mouth every morning 2/9/21   SUDEEP Walker - CNP   tiZANidine (ZANAFLEX) 2 MG tablet Take 1 tablet by mouth 3 times daily as needed (back pain/ spasm) 2/6/21   Kathe Lesch, PA-C   butalbital-acetaminophen-caffeine (FIORICET, ESGIC) -40 MG per tablet Take 1 tablet by mouth every 6 hours as needed for Headaches 1/4/21   Yamilet Stephen MD   pregabalin (LYRICA) 150 MG capsule Take 1 capsule by mouth 3 times daily for 30 days. 12/14/20 1/13/21  SUDEEP Beckman CNP   DULoxetine (CYMBALTA) 60 MG extended release capsule Take 60 mg by mouth every 24 hours     Historical Provider, MD   ALPRAZolam (XANAX) 0.25 MG tablet Take 0.25 mg by mouth nightly as needed. 3/23/20   Historical Provider, MD   pantoprazole (PROTONIX) 20 MG tablet Take 2 tablets by mouth daily 11/4/20   Raudel Espana PA-C   magnesium oxide (MAG-OX) 400 (241.3 Mg) MG TABS tablet TAKE 1/2 TABLET BY MOUTH DAILY 11/2/20   SUDEEP Beckman CNP   busPIRone (BUSPAR) 15 MG tablet TAKE 1 TABLET BY MOUTH THREE TIMES DAILY 9/28/20   SUDEEP Beckman CNP   albuterol sulfate HFA (PROVENTIL HFA) 108 (90 Base) MCG/ACT inhaler Inhale 2 puffs into the lungs every 6 hours as needed for Wheezing 9/9/20   SUDEEP Torres CNP   insulin lispro (HUMALOG) 100 UNIT/ML injection vial INJECT 4 UNITS UNDER THE SKIN THREE TIMES DAILY AS NEEDED FOR HIGH BLOOD SUGAR 7/10/20   SUDEEP Beckman CNP   levothyroxine (SYNTHROID) 150 MCG tablet Take 150 mcg by mouth Daily  6/28/20   Historical Provider, MD   SUMAtriptan (IMITREX) 25 MG tablet TAKE 1 TABLET BY MOUTH 1 TIME AS NEEDED FOR MIGRAINE 5/1/20   SUDEEP Torres CNP   promethazine (PHENERGAN) 25 MG tablet WARNING:  May cause drowsiness. May impair ability to operate vehicles or machinery. Do not use in combination with alcohol. ! Tablet every 6 hours as needed in conjunction with Ultram for headaches 1/18/20   Caio Williamson PA-C   blood glucose test strips (EXACTECH TEST) strip 1 each by In Vitro route 3 times daily (Accu-check test strips) As needed.  DX:E11.65 12/2/19   SUDEEP Torres CNP   ONE TOUCH ULTRASOFT LANCETS MISC TEST 3 TIMES DAILY AS NEEDED 12/2/19   SUDEEP Torres CNP albuterol (PROVENTIL) (2.5 MG/3ML) 0.083% nebulizer solution Take 3 mLs by nebulization every 4 hours as needed for Wheezing 11/5/19   Maria M Mcfarlanes, DO   metoclopramide (REGLAN) 10 MG tablet Take 1 tablet by mouth 4 times daily 10/27/19   [facility], APRN - CNP   atorvastatin (LIPITOR) 40 MG tablet Take 1 tablet by mouth nightly 9/11/19   [facility] DO   metoprolol tartrate (LOPRESSOR) 25 MG tablet Take 1 tablet by mouth 2 times daily 9/11/19   [facility] DO   fluticasone [facility]) 50 MCG/ACT nasal spray 1 spray by Nasal route daily 6/4/19   Willie Madsen APRN - CNP   traMADol (ULTRAM) 50 MG tablet Take 50 mg by mouth every 6 hours as needed for Pain.     Historical Provider, MD   Insulin Syringe-Needle U-100 30G X 1/2\" 1 ML MISC 1 each by Does not apply route daily 11/16/18   Jonathon James, DO   blood glucose test strips (ONETOUCH VERIO) strip TEST 3 TIMES DAILY AS NEEDED 8/16/18   Jonathon James, DO   Blood Glucose Monitoring Suppl (Dewayne Avila) w/Device KIT TEST 3 TIMES DAILY AS NEEDED 8/16/18   Jonathon James, DO       Future Appointments   Date Time Provider Valerie Cosby   3/23/2021 11:30 AM Onel Boone MD [address] [place] [address]   4/7/2021  3:30 PM Satish Vivar MD [facility]   5/10/2021 11:30 AM Willie Madsen [facility] [address]   11/5/2021  9:15 AM Santa Katz MD [facility]

## 2021-03-16 ENCOUNTER — APPOINTMENT (OUTPATIENT)
Dept: GENERAL RADIOLOGY | Age: 51
End: 2021-03-16
Payer: MEDICAID

## 2021-03-16 ENCOUNTER — CARE COORDINATION (OUTPATIENT)
Dept: CARE COORDINATION | Age: 51
End: 2021-03-16

## 2021-03-16 VITALS
HEART RATE: 75 BPM | HEIGHT: 63 IN | RESPIRATION RATE: 15 BRPM | DIASTOLIC BLOOD PRESSURE: 75 MMHG | SYSTOLIC BLOOD PRESSURE: 121 MMHG | OXYGEN SATURATION: 98 % | WEIGHT: 260 LBS | TEMPERATURE: 97.8 F | BODY MASS INDEX: 46.07 KG/M2

## 2021-03-16 LAB
ALBUMIN SERPL-MCNC: 3.7 G/DL (ref 3.5–4.6)
ALP BLD-CCNC: 124 U/L (ref 40–130)
ALT SERPL-CCNC: 15 U/L (ref 0–33)
ANION GAP SERPL CALCULATED.3IONS-SCNC: 9 MEQ/L (ref 9–15)
AST SERPL-CCNC: 16 U/L (ref 0–35)
BASOPHILS ABSOLUTE: 0.1 K/UL (ref 0–0.2)
BASOPHILS RELATIVE PERCENT: 0.8 %
BILIRUB SERPL-MCNC: <0.2 MG/DL (ref 0.2–0.7)
BUN BLDV-MCNC: 13 MG/DL (ref 6–20)
CALCIUM SERPL-MCNC: 9 MG/DL (ref 8.5–9.9)
CHLORIDE BLD-SCNC: 105 MEQ/L (ref 95–107)
CK MB: 2.7 NG/ML (ref 0–3.8)
CO2: 24 MEQ/L (ref 20–31)
CREAT SERPL-MCNC: 1.23 MG/DL (ref 0.5–0.9)
CREATINE KINASE-MB INDEX: 0.8 % (ref 0–3.5)
EOSINOPHILS ABSOLUTE: 0.3 K/UL (ref 0–0.7)
EOSINOPHILS RELATIVE PERCENT: 3 %
GFR AFRICAN AMERICAN: 55.9
GFR NON-AFRICAN AMERICAN: 46.2
GLOBULIN: 3.1 G/DL (ref 2.3–3.5)
GLUCOSE BLD-MCNC: 142 MG/DL (ref 70–99)
HCT VFR BLD CALC: 41.8 % (ref 37–47)
HEMOGLOBIN: 14 G/DL (ref 12–16)
LYMPHOCYTES ABSOLUTE: 2.5 K/UL (ref 1–4.8)
LYMPHOCYTES RELATIVE PERCENT: 28.1 %
MAGNESIUM: 2 MG/DL (ref 1.7–2.4)
MCH RBC QN AUTO: 31.6 PG (ref 27–31.3)
MCHC RBC AUTO-ENTMCNC: 33.4 % (ref 33–37)
MCV RBC AUTO: 94.6 FL (ref 82–100)
MONOCYTES ABSOLUTE: 0.5 K/UL (ref 0.2–0.8)
MONOCYTES RELATIVE PERCENT: 5.6 %
NEUTROPHILS ABSOLUTE: 5.5 K/UL (ref 1.4–6.5)
NEUTROPHILS RELATIVE PERCENT: 62.5 %
PDW BLD-RTO: 13.2 % (ref 11.5–14.5)
PLATELET # BLD: 296 K/UL (ref 130–400)
POTASSIUM SERPL-SCNC: 4.1 MEQ/L (ref 3.4–4.9)
RBC # BLD: 4.42 M/UL (ref 4.2–5.4)
SODIUM BLD-SCNC: 138 MEQ/L (ref 135–144)
TOTAL CK: 331 U/L (ref 0–170)
TOTAL PROTEIN: 6.8 G/DL (ref 6.3–8)
TROPONIN: <0.01 NG/ML (ref 0–0.01)
TROPONIN: <0.01 NG/ML (ref 0–0.01)
TSH SERPL DL<=0.05 MIU/L-ACNC: 4.96 UIU/ML (ref 0.44–3.86)
WBC # BLD: 8.8 K/UL (ref 4.8–10.8)

## 2021-03-16 PROCEDURE — 84443 ASSAY THYROID STIM HORMONE: CPT

## 2021-03-16 PROCEDURE — 96374 THER/PROPH/DIAG INJ IV PUSH: CPT

## 2021-03-16 PROCEDURE — 93010 ELECTROCARDIOGRAM REPORT: CPT | Performed by: INTERNAL MEDICINE

## 2021-03-16 PROCEDURE — 6360000002 HC RX W HCPCS: Performed by: PHYSICIAN ASSISTANT

## 2021-03-16 PROCEDURE — 96376 TX/PRO/DX INJ SAME DRUG ADON: CPT

## 2021-03-16 PROCEDURE — 71045 X-RAY EXAM CHEST 1 VIEW: CPT

## 2021-03-16 PROCEDURE — 96375 TX/PRO/DX INJ NEW DRUG ADDON: CPT

## 2021-03-16 PROCEDURE — 36415 COLL VENOUS BLD VENIPUNCTURE: CPT

## 2021-03-16 PROCEDURE — 83735 ASSAY OF MAGNESIUM: CPT

## 2021-03-16 PROCEDURE — 6370000000 HC RX 637 (ALT 250 FOR IP): Performed by: PHYSICIAN ASSISTANT

## 2021-03-16 PROCEDURE — 85025 COMPLETE CBC W/AUTO DIFF WBC: CPT

## 2021-03-16 PROCEDURE — 82550 ASSAY OF CK (CPK): CPT

## 2021-03-16 PROCEDURE — 80053 COMPREHEN METABOLIC PANEL: CPT

## 2021-03-16 PROCEDURE — 82553 CREATINE MB FRACTION: CPT

## 2021-03-16 PROCEDURE — 84484 ASSAY OF TROPONIN QUANT: CPT

## 2021-03-16 RX ORDER — OXYCODONE HYDROCHLORIDE AND ACETAMINOPHEN 5; 325 MG/1; MG/1
1 TABLET ORAL ONCE
Status: COMPLETED | OUTPATIENT
Start: 2021-03-16 | End: 2021-03-16

## 2021-03-16 RX ORDER — DEXAMETHASONE SODIUM PHOSPHATE 4 MG/ML
10 INJECTION, SOLUTION INTRA-ARTICULAR; INTRALESIONAL; INTRAMUSCULAR; INTRAVENOUS; SOFT TISSUE ONCE
Status: COMPLETED | OUTPATIENT
Start: 2021-03-16 | End: 2021-03-16

## 2021-03-16 RX ADMIN — ONDANSETRON 4 MG: 2 INJECTION INTRAMUSCULAR; INTRAVENOUS at 00:13

## 2021-03-16 RX ADMIN — OXYCODONE HYDROCHLORIDE AND ACETAMINOPHEN 1 TABLET: 5; 325 TABLET ORAL at 03:06

## 2021-03-16 RX ADMIN — DEXAMETHASONE SODIUM PHOSPHATE 10 MG: 4 INJECTION, SOLUTION INTRAMUSCULAR; INTRAVENOUS at 03:06

## 2021-03-16 RX ADMIN — HYDROMORPHONE HYDROCHLORIDE 0.5 MG: 1 INJECTION, SOLUTION INTRAMUSCULAR; INTRAVENOUS; SUBCUTANEOUS at 00:57

## 2021-03-16 RX ADMIN — HYDROMORPHONE HYDROCHLORIDE 1 MG: 1 INJECTION, SOLUTION INTRAMUSCULAR; INTRAVENOUS; SUBCUTANEOUS at 00:13

## 2021-03-16 ASSESSMENT — PAIN SCALES - GENERAL
PAINLEVEL_OUTOF10: 9
PAINLEVEL_OUTOF10: 4
PAINLEVEL_OUTOF10: 5
PAINLEVEL_OUTOF10: 7
PAINLEVEL_OUTOF10: 4

## 2021-03-16 ASSESSMENT — PAIN DESCRIPTION - PAIN TYPE: TYPE: ACUTE PAIN

## 2021-03-16 NOTE — ED PROVIDER NOTES
[address] [facility] ED  eMERGENCY dEPARTMENT eNCOUnter      Pt Name: Seven Strange  MRN: [id number]  [place] 1970  Date of evaluation: 3/15/2021  Provider: Chase Zuniga PA-C    CHIEF COMPLAINT       Chief Complaint   Patient presents with    Chest Pain     Pt states it feels like her sarcoidosis pain         HISTORY OF PRESENT ILLNESS   (Location/Symptom, Timing/Onset,Context/Setting, Quality, Duration, Modifying Factors, Severity)  Note limiting factors. Seven Strange is a 49 y.o. female who presents to the emergency department patient presents with chest pain states it feels like her sarcoidosis pain states she took her tramadol that she is prescribed without relief. She denies fever chills shortness of breath. Denies nausea vomiting. Symptoms moderate severity nothing improves her symptoms. HPI    NursingNotes were reviewed. REVIEW OF SYSTEMS    (2-9 systems for level 4, 10 or more for level 5)     Review of Systems    Except as noted above the remainder of the review of systems was reviewed and negative. PAST MEDICAL HISTORY     Past Medical History:   Diagnosis Date    Anxiety     Arthritis     Asthma     Bronchopneumonia     Cancer ([address]     renal    Cerebral artery occlusion with cerebral infarction (HCC)     Chronic bilateral low back pain with sciatica     Chronic kidney disease     Chronic obstructive lung disease ([address] 7/26/2019    Depression     Fibromyalgia     Hypertension     Insulin dependent type 2 diabetes mellitus, uncontrolled ([address] 8/3/2018    Localized enlarged lymph nodes 10/26/2018    Mixed headache     Pure hyperglyceridemia 5/19/2017    Sarcoidosis     Sleep apnea     does not wear cpap    Thyroid goiter          SURGICALHISTORY       Past Surgical History:   Procedure Laterality Date    BRONCHOSCOPY  10/26/2018    DR. STEARNS    KIDNEY REMOVAL Right 08/2016    KIDNEY REMOVAL Right 2016    LUNG BIOPSY Right 10/2018    THYROID (SINGULAIR) 10 MG tablet Take 1 tab nightly at supper for breathing/allergies, Disp-30 tablet, R-5Normal      buPROPion (WELLBUTRIN XL) 150 MG extended release tablet Take 1 tablet by mouth every morning, Disp-30 tablet, R-3Normal      tiZANidine (ZANAFLEX) 2 MG tablet Take 1 tablet by mouth 3 times daily as needed (back pain/ spasm), Disp-15 tablet, R-0Print      butalbital-acetaminophen-caffeine (FIORICET, ESGIC) -40 MG per tablet Take 1 tablet by mouth every 6 hours as needed for Headaches, Disp-18 tablet, R-0Print      pregabalin (LYRICA) 150 MG capsule Take 1 capsule by mouth 3 times daily for 30 days. , Disp-90 capsule, R-0Normal      DULoxetine (CYMBALTA) 60 MG extended release capsule Take 60 mg by mouth every 24 hours Historical Med      ALPRAZolam (XANAX) 0.25 MG tablet Take 0.25 mg by mouth nightly as needed. Historical Med      pantoprazole (PROTONIX) 20 MG tablet Take 2 tablets by mouth daily, Disp-30 tablet,R-0Print      magnesium oxide (MAG-OX) 400 (241.3 Mg) MG TABS tablet TAKE 1/2 TABLET BY MOUTH DAILY, Disp-30 tablet,R-5Normal      busPIRone (BUSPAR) 15 MG tablet TAKE 1 TABLET BY MOUTH THREE TIMES DAILY, Disp-90 tablet,R-5Normal      albuterol sulfate HFA (PROVENTIL HFA) 108 (90 Base) MCG/ACT inhaler Inhale 2 puffs into the lungs every 6 hours as needed for Wheezing, Disp-1 Inhaler,R-3Normal      insulin lispro (HUMALOG) 100 UNIT/ML injection vial INJECT 4 UNITS UNDER THE SKIN THREE TIMES DAILY AS NEEDED FOR HIGH BLOOD SUGAR, Disp-10 mL,R-0Normal      levothyroxine (SYNTHROID) 150 MCG tablet Take 150 mcg by mouth Daily Historical Med      SUMAtriptan (IMITREX) 25 MG tablet TAKE 1 TABLET BY MOUTH 1 TIME AS NEEDED FOR MIGRAINE, Disp-9 tablet, R-1Normal      promethazine (PHENERGAN) 25 MG tablet WARNING:  May cause drowsiness. May impair ability to operate vehicles or machinery. Do not use in combination with alcohol.     ! Tablet every 6 hours as needed in conjunction with Ultram for headaches, Disp-20 tablet, R-0Print      !! blood glucose test strips (EXACTECH TEST) strip 3 TIMES DAILY Starting Mon 12/2/2019, Disp-300 strip, R-5, Normal(Accu-check test strips) As needed. DX:E11.65      !! ONE TOUCH ULTRASOFT LANCETS MISC Disp-300 each, R-3, NormalTEST 3 TIMES DAILY AS NEEDED      albuterol (PROVENTIL) (2.5 MG/3ML) 0.083% nebulizer solution Take 3 mLs by nebulization every 4 hours as needed for Wheezing, Disp-120 each, R-3Normal      metoclopramide (REGLAN) 10 MG tablet Take 1 tablet by mouth 4 times daily, Disp-120 tablet, R-0Print      atorvastatin (LIPITOR) 40 MG tablet Take 1 tablet by mouth nightly, Disp-30 tablet, R-3Normal      metoprolol tartrate (LOPRESSOR) 25 MG tablet Take 1 tablet by mouth 2 times daily, Disp-60 tablet, R-0Normal      fluticasone (FLONASE) 50 MCG/ACT nasal spray 1 spray by Nasal route daily, Disp-1 Bottle, R-3Normal      traMADol (ULTRAM) 50 MG tablet Take 50 mg by mouth every 6 hours as needed for Pain. Historical Med      Insulin Syringe-Needle U-100 30G X 1/2\" 1 ML MISC DAILY Starting Fri 11/16/2018, Disp-100 each, R-3, Normal      !! blood glucose test strips (ONETOUCH VERIO) strip Disp-300 each, R-3, NormalTEST 3 TIMES DAILY AS NEEDED      !! Blood Glucose Monitoring Suppl (Abram Restrepo) w/Device KIT TEST 3 TIMES DAILY AS NEEDED, Disp-1 kit, R-0Normal       !! - Potential duplicate medications found. Please discuss with provider.           ALLERGIES     Shellfish-derived products, Ibuprofen, Ketorolac, Morphine, Other, Penicillins, Propoxyphene n-acetaminophen, and Toradol [ketorolac tromethamine]    FAMILY HISTORY       Family History   Problem Relation Age of Onset    Cancer Father     Diabetes Father     Allergy (Severe) Father     Heart Attack Father     Prostate Cancer Father     High Blood Pressure Mother     Diabetes Mother     Arthritis Mother     High Cholesterol Mother     Vision Loss Mother     Alcohol Abuse Neg Hx     Anemia Neg Hx     Arrhythmia Neg Hx     Asthma Neg Hx     Atrial Fibrillation Neg Hx     Birth Defects Neg Hx     Breast Cancer Neg Hx     Coronary Art Dis Neg Hx     Colon Cancer Neg Hx     Depression Neg Hx     Early Death Neg Hx     Hearing Loss Neg Hx     Heart Disease Neg Hx     Learning Disabilities Neg Hx     Kidney Disease Neg Hx     Mental Illness Neg Hx     Mental Retardation Neg Hx     Miscarriages / Stillbirths Neg Hx     Obesity Neg Hx     Osteoporosis Neg Hx     Stroke Neg Hx     Substance Abuse Neg Hx           SOCIAL HISTORY       Social History     Socioeconomic History    Marital status: Legally      Spouse name: None    Number of children: None    Years of education: 15    Highest education level: High school graduate   Occupational History    None   Social Needs    Financial resource strain: Somewhat hard    Food insecurity     Worry: Sometimes true     Inability: Sometimes true    Transportation needs     Medical: No     Non-medical: No   Tobacco Use    Smoking status: Former Smoker     Packs/day: 0.50     Years: 15.00     Pack years: 7.50     Types: Cigarettes     Start date: 2014     Quit date: 2015     Years since quittin.1    Smokeless tobacco: Former User     Quit date: 3/23/2016   Substance and Sexual Activity    Alcohol use: Not Currently     Alcohol/week: 0.0 standard drinks     Comment: occasionally    Drug use: Yes     Frequency: 5.0 times per week     Types: Marijuana    Sexual activity: Yes     Partners: Male   Lifestyle    Physical activity     Days per week: 0 days     Minutes per session: 0 min    Stress: Very much   Relationships    Social connections     Talks on phone:  Three times a week     Gets together: Never     Attends [employer] service: Never     Active member of club or organization: No     Attends meetings of clubs or organizations: Never     Relationship status:     Intimate partner violence     Fear of current or ex partner: No Emotionally abused: No     Physically abused: No     Forced sexual activity: No   Other Topics Concern    None   Social History Narrative    None       SCREENINGS      @FLOW([id number])@      PHYSICAL EXAM    (up to 7 for level 4, 8 or more for level 5)     ED Triage Vitals [03/15/21 2335]   BP Temp Temp Source Pulse Resp SpO2 Height Weight   (!) 143/84 97.8 °F (36.6 °C) Oral 83 18 98 % 5' 3\" (1.6 m) 260 lb (117.9 kg)       Physical Exam  Vitals signs and nursing note reviewed. Constitutional:       General: She is not in acute distress. Appearance: She is well-developed. HENT:      Head: Normocephalic and atraumatic. Nose: Nose normal.      Mouth/Throat:      Mouth: Mucous membranes are moist.   Eyes:      General:         Right eye: No discharge. Left eye: No discharge. Extraocular Movements: Extraocular movements intact. Pupils: Pupils are equal, round, and reactive to light. Neck:      Musculoskeletal: Normal range of motion and neck supple. Cardiovascular:      Rate and Rhythm: Normal rate and regular rhythm. Heart sounds: Normal heart sounds. Pulmonary:      Effort: Pulmonary effort is normal. No respiratory distress. Breath sounds: Normal breath sounds. No stridor. Chest:      Chest wall: Tenderness present. Abdominal:      General: Bowel sounds are normal. There is no distension. Palpations: Abdomen is soft. Musculoskeletal: Normal range of motion. Skin:     General: Skin is warm. Findings: No erythema. Neurological:      Mental Status: She is alert and oriented to person, place, and time. Psychiatric:         Mood and Affect: Mood normal.         DIAGNOSTIC RESULTS     EKG: All EKG's are interpreted by the Emergency Department Physician who either signs or Co-signsthis chart in the absence of a cardiologist.    EKG normal sinus rate 66 negativeST segment elevation.     RADIOLOGY:   Non-plain filmimages such as CT, Ultrasound and MRI are reviewed and ordered  Tests in the radiology section of CPT®: ordered and reviewed          CONSULTS:  None    PROCEDURES:  Unless otherwise noted below, none     Procedures    FINAL IMPRESSION      1.  Chest pain, unspecified type          DISPOSITION/PLAN   DISPOSITION Decision To Discharge 03/16/2021 03:28:06 AM      PATIENT REFERRED TO:  [address], APRN - CNP  [address] [facility] [address] [facility]  [phone]    Call in 1 day      Dr Tomas/[facility]    Call in 1 day      Your cardiologist    Call in 1 day      [facility]) ED  [address] [place] [address]  [phone]    If symptoms worsen      DISCHARGE MEDICATIONS:  Discharge Medication List as of 3/16/2021  3:30 AM             (Please note that portions of this note were completed with a voice recognition program.  Efforts were made to edit the dictations but occasionally words are mis-transcribed.)    Monica Lazaro PA-C (electronically signed)  Attending Emergency Physician       Monica Lazaro PA-C  03/23/21 0499

## 2021-03-16 NOTE — CARE COORDINATION
Rhett Harrell  3/16/2021    Registered Dietitian Progress Note for Care Coordination    Assessment: Lenin Briscoe is a 53 y.o. female. RD referred for DM and COPD. RD spoke with patient for initial nutrition assessment on 1/8, first follow up on 1/29 and second follow up on 2/16. RD called for final follow up with pt today 3/16. Patient states she is \"hanging in there. \" RD discussed previous goals with pt. She explains she was eating consistently throughout the day but recently has been going through a lot. RD reiterated the importance of eating balanced meals/snacks consistently- encouraged patient to drink Glucerna in the AM for breakfast and eat a balanced lunch and dinner. She explains her BS was \"out of control\" due to recent tooth infection and antibiotics. She states it is starting to come down slowly- per pt this AM  mg/dL. RD noted patient has an appointment with Endo on 3/23. Patient has no nutrition related questions or concerns at this time.      Barriers to meeting goals: overwhelmed by complexity of regimen and stress     Action:  Reiterated the importance of eating balanced meals/snacks consistently throughout the day, monitoring BS, taking medicine as directed and incorporating physical activity to help manage BS. Pt will continue to eat balanced meals consistently throughout the day and monitor BS daily. See assessment note above.        Nutrition Monitoring and Evaluation  Indicator/Goal Criteria Progress   #1 Eat balanced meals consistently throughout the day.  #1 Focus on eating 3 meals/day and make these meals balanced using the MyPlate NMRLBOPEC-2/0 plate fruits and/or vegetables, 1/4 plate protein and 1/4 plate starchy carbohydrates with 8 oz glass of low fat milk if desired. #1 Patient is going to start drinking a Glucerna in the AM for breakfast again. Pt will continue to eat a balanced lunch and dinner.     #2  Eat a balanced snack or drink a Glucerna at lunch time instead of skipping the meal completely. #2  Do not skip lunch completely. Include a serving of protein with a serving of carbohydrate when snacking.  #2 Pt is eating lunch and no longer skipping this meal.    #3  Monitor BS daily and keep a log.  #3 Continue checking BS daily.  #3 Patient is checking BS daily. Pt states this AM  mg/dL.        Plan of Care:  RD encouraged pt to keep working toward goals set. RD explained to pt this is final follow up call and provided contact information to pt. Encouraged pt to call RD in future with any nutrition related questions or concerns. Follow Up:    Final follow up call today 3/16/21. RD will continue to follow/assist with patient return call.         [address], [address] [place] [address]

## 2021-03-17 ENCOUNTER — CARE COORDINATION (OUTPATIENT)
Dept: CARE COORDINATION | Age: 51
End: 2021-03-17

## 2021-03-17 NOTE — CARE COORDINATION
Ambulatory Care Coordination Note  3/17/2021  CM Risk Score: 11  Charlson 10 Year Mortality Risk Score: 100%     ACC: Vickie Lopez RN    Summary Note: ACM PHONE CALL TO PATIENT FOR ER FOLLOW UP. I spoke with Brien Blum who reports she is feeling fine and that \"chest pain just came on suddenly\", states there is nothing she can think of that precipitated that event, \"I think its my sarcoidosis\", states she will be seeing Dr. Home Hair soon and hopes that he can help with this issue, I expressed my concerns that she should also have a cardiology follow up, I offered to assist in scheduling, she states she can not remember her Cardiologist name, Chito Garcia is from [facility]\", declined my assistance in scheduling follow up,I stressed how important it was to follow up as symptoms such as chest pain should not go without follow up, she states she will look up his contact information and reach out to him,, she also declined to allow me to set up follow up with Juana Carias, states she will call office if symptoms come back, advised that she should stay on top of this and not wait for acute episodes to seek care Reminded her of ready care clinics and symptoms to monitor. Brien Blum states blood sugars are better controlled now that her abcessed tooth has been removed, am reading was 146, states BP is under good control 130/70 last evening      Care Coordination Interventions    Program Enrollment: Complex Care  Referral from Primary Care Provider: No  Suggested Interventions and Community Resources  Medi Set or Pill Pack: Completed (Comment: 2/17 using pill box.)  Pharmacist: Completed (Comment: 1/5/2021 [facility] Clinical Rx)  Registered Dietician: Completed (Comment: 1/5/2021 [address] [place] [address])  Social Work: Completed (Comment: 1/5/2021 [facility] Social Work for [employer] and long term planning.)  Other Services: Declined (Comment: 1/5/2021 Declined ACP Referral)  Zone Management Tools:  In Process (Comment: 1/5/2021 Diabetes and COPD Zones)  Other Services or Interventions: 1/5/2021 instructed on same day, next day, and Walk-In Care. Goals Addressed    None         Prior to Admission medications    Medication Sig Start Date End Date Taking? Authorizing Provider   B-D ULTRAFINE III SHORT PEN 31G X 8 MM MISC USE THREE TIMES DAILY 3/15/21   SUDEEP Bustamante - CNP   lidocaine viscous hcl (XYLOCAINE) 2 % SOLN solution Take 15 mLs by mouth as needed for Dental Pain 2/28/21   SUDEEP Fry - CNP   cetirizine (ZYRTEC) 10 MG tablet TAKE 1 TABLET BY MOUTH EVERY NIGHT AT BEDTIME AS NEEDED FOR ALLERGIES 2/25/21   LilibethSUDEEP Xavier CNP   Dulaglutide (TRULICITY) 7.66 NM/1.4VO SOPN Inject 0.75 mg into the skin once a week 2/23/21   Leon Ortiz MD   Insulin Degludec (TRESIBA FLEXTOUCH) 100 UNIT/ML SOPN INJECT 40 UNITS UNDER THE SKIN NIGHTLY 2/23/21   Leon Ortiz MD   insulin lispro, 1 Unit Dial, (HUMALOG KWIKPEN) 100 UNIT/ML SOPN 4 units at each meals hold if glucose less than 150 2/23/21   Leon Ortiz MD   spironolactone (ALDACTONE) 50 MG tablet Take 1 tablet by mouth daily 2/23/21   MD Luis RodriguesTouch Delica Lancets 24Z MISC qid 2/23/21   Leon Ortiz MD   blood glucose test strips (ONETOUCH VERIO) strip 1 each by In Vitro route daily As needed. 2/23/21   Leon Ortiz MD   Blood Glucose Monitoring Suppl ([facility]) w/Device KIT As  Directed 2/23/21   Leon Ortiz MD   Insulin Pen Needle (NOVOFINE) 32G X 6 MM MISC qid 2/23/21   Leon Ortiz MD   Continuous Blood Gluc Sensor (FREESTYLE JESSICA 14 DAY SENSOR) MISC As directed 2/23/21   Leon Ortiz MD   Continuous Blood Gluc  (FREESTYLE JESSICA 14 DAY READER) LENNY As directed 2/23/21   Leon Ortiz MD   baclofen (LIORESAL) 10 MG tablet Take 1 tablet by mouth 3 times daily 2/19/21   SUDEEP Bustamante CNP   lidocaine (XYLOCAINE) 5 % ointment Apply topically BID as needed.  2/18/21   Emily Ruggiero PA-C   montelukast (SINGULAIR) 10 MG tablet Take 1 tab nightly at supper for breathing/allergies 2/9/21   SUDEEP Arthur CNP   buPROPion (WELLBUTRIN XL) 150 MG extended release tablet Take 1 tablet by mouth every morning 2/9/21   SUDEEP Arthur CNP   tiZANidine (ZANAFLEX) 2 MG tablet Take 1 tablet by mouth 3 times daily as needed (back pain/ spasm) 2/6/21   Gonzalez Jerez PA-C   butalbital-acetaminophen-caffeine (FIORICET, ESGIC) -12 MG per tablet Take 1 tablet by mouth every 6 hours as needed for Headaches 1/4/21   Balaji Neil MD   pregabalin (LYRICA) 150 MG capsule Take 1 capsule by mouth 3 times daily for 30 days. 12/14/20 1/13/21  SUDEEP Fleming CNP   DULoxetine (CYMBALTA) 60 MG extended release capsule Take 60 mg by mouth every 24 hours     Historical Provider, MD   ALPRAZolam (XANAX) 0.25 MG tablet Take 0.25 mg by mouth nightly as needed. 3/23/20   Historical Provider, MD   pantoprazole (PROTONIX) 20 MG tablet Take 2 tablets by mouth daily 11/4/20   Gonzalez Jerez PA-C   magnesium oxide (MAG-OX) 400 (241.3 Mg) MG TABS tablet TAKE 1/2 TABLET BY MOUTH DAILY 11/2/20   SUDEEP Fleming CNP   busPIRone (BUSPAR) 15 MG tablet TAKE 1 TABLET BY MOUTH THREE TIMES DAILY 9/28/20   SUDEEP Fleming CNP   albuterol sulfate HFA (PROVENTIL HFA) 108 (90 Base) MCG/ACT inhaler Inhale 2 puffs into the lungs every 6 hours as needed for Wheezing 9/9/20   SUDEEP Arthur CNP   insulin lispro (HUMALOG) 100 UNIT/ML injection vial INJECT 4 UNITS UNDER THE SKIN THREE TIMES DAILY AS NEEDED FOR HIGH BLOOD SUGAR 7/10/20   SUEDEP Fleming CNP   levothyroxine (SYNTHROID) 150 MCG tablet Take 150 mcg by mouth Daily  6/28/20   Historical Provider, MD   SUMAtriptan (IMITREX) 25 MG tablet TAKE 1 TABLET BY MOUTH 1 TIME AS NEEDED FOR MIGRAINE 5/1/20   SUDEEP Arthur - CNP   promethazine (PHENERGAN) 25 MG tablet WARNING:  May cause drowsiness. May impair ability to operate vehicles or machinery.   Do not use in combination with alcohol. ! Tablet every 6 hours as needed in conjunction with Ultram for headaches 1/18/20   Maxime Olivas PA-C   blood glucose test strips (EXACTECH TEST) strip 1 each by In Vitro route 3 times daily (Accu-check test strips) As needed. DX:E11.65 12/2/19   Lilibeth TrinidadSUDEEP Emery CNP   ONE TOUCH ULTRASOFT LANCETS MISC TEST 3 TIMES DAILY AS NEEDED 12/2/19   Lilibeth Trinidad Lob, APRN - CNP   albuterol (PROVENTIL) (2.5 MG/3ML) 0.083% nebulizer solution Take 3 mLs by nebulization every 4 hours as needed for Wheezing 11/5/19   Maria M Servetas, DO   metoclopramide (REGLAN) 10 MG tablet Take 1 tablet by mouth 4 times daily 10/27/19   SUDEEP Sofia CNP   atorvastatin (LIPITOR) 40 MG tablet Take 1 tablet by mouth nightly 9/11/19   Jn Lyn, DO   metoprolol tartrate (LOPRESSOR) 25 MG tablet Take 1 tablet by mouth 2 times daily 9/11/19   Jn Lyn, DO   fluticasone [facility]) 50 MCG/ACT nasal spray 1 spray by Nasal route daily 6/4/19   SUDEEP Azar CNP   traMADol (ULTRAM) 50 MG tablet Take 50 mg by mouth every 6 hours as needed for Pain.     Historical Provider, MD   Insulin Syringe-Needle U-100 30G X 1/2\" 1 ML MISC 1 each by Does not apply route daily 11/16/18   Sherie Addison, DO   blood glucose test strips (ONETOUCH VERIO) strip TEST 3 TIMES DAILY AS NEEDED 8/16/18   Sherie Addison, DO   Blood Glucose Monitoring Suppl ([facility]) w/Device KIT TEST 3 TIMES DAILY AS NEEDED 8/16/18   Sherie Addison, DO       Future Appointments   Date Time Provider Valerie Cosby   3/23/2021 11:30 AM Lulú Rocha  [address]   4/7/2021  3:30 PM Maria Guadalupe Perez MD  [address]   5/10/2021 11:30 AM Yee Simpson, [address]   11/5/2021  9:15 AM Annette Queen MD [facility]

## 2021-03-21 ENCOUNTER — HOSPITAL ENCOUNTER (EMERGENCY)
Age: 51
Discharge: HOME | End: 2021-03-21
Attending: EMERGENCY MEDICINE
Payer: MEDICAID

## 2021-03-21 ENCOUNTER — APPOINTMENT (OUTPATIENT)
Dept: CT IMAGING | Age: 51
End: 2021-03-21
Payer: MEDICAID

## 2021-03-21 VITALS
SYSTOLIC BLOOD PRESSURE: 130 MMHG | BODY MASS INDEX: 44.39 KG/M2 | DIASTOLIC BLOOD PRESSURE: 68 MMHG | HEART RATE: 67 BPM | WEIGHT: 260 LBS | HEIGHT: 64 IN | RESPIRATION RATE: 20 BRPM | OXYGEN SATURATION: 98 % | TEMPERATURE: 98.3 F

## 2021-03-21 DIAGNOSIS — R51.9 ACUTE NONINTRACTABLE HEADACHE, UNSPECIFIED HEADACHE TYPE: Primary | ICD-10-CM

## 2021-03-21 LAB
ALBUMIN SERPL-MCNC: 3.6 G/DL (ref 3.5–4.6)
ALP BLD-CCNC: 134 U/L (ref 40–130)
ALT SERPL-CCNC: 12 U/L (ref 0–33)
ANION GAP SERPL CALCULATED.3IONS-SCNC: 10 MEQ/L (ref 9–15)
AST SERPL-CCNC: 13 U/L (ref 0–35)
ATYPICAL LYMPHOCYTE RELATIVE PERCENT: 2 %
BASOPHILS ABSOLUTE: 0 K/UL (ref 0–0.2)
BASOPHILS RELATIVE PERCENT: 0.9 %
BILIRUB SERPL-MCNC: <0.2 MG/DL (ref 0.2–0.7)
BUN BLDV-MCNC: 11 MG/DL (ref 6–20)
CALCIUM SERPL-MCNC: 8.8 MG/DL (ref 8.5–9.9)
CHLORIDE BLD-SCNC: 102 MEQ/L (ref 95–107)
CO2: 23 MEQ/L (ref 20–31)
CREAT SERPL-MCNC: 1.15 MG/DL (ref 0.5–0.9)
EOSINOPHILS ABSOLUTE: 0.3 K/UL (ref 0–0.7)
EOSINOPHILS RELATIVE PERCENT: 3 %
GFR AFRICAN AMERICAN: >60
GFR NON-AFRICAN AMERICAN: 49.9
GLOBULIN: 3.6 G/DL (ref 2.3–3.5)
GLUCOSE BLD-MCNC: 132 MG/DL (ref 70–99)
HCG, URINE, POC: NEGATIVE
HCT VFR BLD CALC: 44.4 % (ref 37–47)
HEMOGLOBIN: 15.1 G/DL (ref 12–16)
LYMPHOCYTES ABSOLUTE: 3.2 K/UL (ref 1–4.8)
LYMPHOCYTES RELATIVE PERCENT: 30 %
Lab: NORMAL
MCH RBC QN AUTO: 31.9 PG (ref 27–31.3)
MCHC RBC AUTO-ENTMCNC: 33.9 % (ref 33–37)
MCV RBC AUTO: 94.1 FL (ref 82–100)
MONOCYTES ABSOLUTE: 0.2 K/UL (ref 0.2–0.8)
MONOCYTES RELATIVE PERCENT: 1.9 %
NEGATIVE QC PASS/FAIL: NORMAL
NEUTROPHILS ABSOLUTE: 6.5 K/UL (ref 1.4–6.5)
NEUTROPHILS RELATIVE PERCENT: 64 %
PDW BLD-RTO: 13.3 % (ref 11.5–14.5)
PLATELET # BLD: 311 K/UL (ref 130–400)
PLATELET SLIDE REVIEW: NORMAL
POSITIVE QC PASS/FAIL: NORMAL
POTASSIUM SERPL-SCNC: 4.2 MEQ/L (ref 3.4–4.9)
RBC # BLD: 4.72 M/UL (ref 4.2–5.4)
RBC # BLD: NORMAL 10*6/UL
SODIUM BLD-SCNC: 135 MEQ/L (ref 135–144)
TOTAL PROTEIN: 7.2 G/DL (ref 6.3–8)
WBC # BLD: 10.1 K/UL (ref 4.8–10.8)

## 2021-03-21 PROCEDURE — 80053 COMPREHEN METABOLIC PANEL: CPT

## 2021-03-21 PROCEDURE — 6360000002 HC RX W HCPCS: Performed by: EMERGENCY MEDICINE

## 2021-03-21 PROCEDURE — 96375 TX/PRO/DX INJ NEW DRUG ADDON: CPT

## 2021-03-21 PROCEDURE — 36415 COLL VENOUS BLD VENIPUNCTURE: CPT

## 2021-03-21 PROCEDURE — 6370000000 HC RX 637 (ALT 250 FOR IP): Performed by: EMERGENCY MEDICINE

## 2021-03-21 PROCEDURE — 2580000003 HC RX 258: Performed by: EMERGENCY MEDICINE

## 2021-03-21 PROCEDURE — 96374 THER/PROPH/DIAG INJ IV PUSH: CPT

## 2021-03-21 PROCEDURE — 70450 CT HEAD/BRAIN W/O DYE: CPT

## 2021-03-21 PROCEDURE — 99284 EMERGENCY DEPT VISIT MOD MDM: CPT

## 2021-03-21 PROCEDURE — 85025 COMPLETE CBC W/AUTO DIFF WBC: CPT

## 2021-03-21 RX ORDER — DEXAMETHASONE SODIUM PHOSPHATE 10 MG/ML
8 INJECTION INTRAMUSCULAR; INTRAVENOUS ONCE
Status: COMPLETED | OUTPATIENT
Start: 2021-03-21 | End: 2021-03-21

## 2021-03-21 RX ORDER — METOCLOPRAMIDE HYDROCHLORIDE 5 MG/ML
20 INJECTION INTRAMUSCULAR; INTRAVENOUS ONCE
Status: COMPLETED | OUTPATIENT
Start: 2021-03-21 | End: 2021-03-21

## 2021-03-21 RX ORDER — ACETAMINOPHEN 500 MG
1000 TABLET ORAL ONCE
Status: COMPLETED | OUTPATIENT
Start: 2021-03-21 | End: 2021-03-21

## 2021-03-21 RX ORDER — DIPHENHYDRAMINE HYDROCHLORIDE 50 MG/ML
50 INJECTION INTRAMUSCULAR; INTRAVENOUS ONCE
Status: COMPLETED | OUTPATIENT
Start: 2021-03-21 | End: 2021-03-21

## 2021-03-21 RX ORDER — ACETAMINOPHEN 500 MG
500 TABLET ORAL 4 TIMES DAILY PRN
Qty: 360 TABLET | Refills: 1 | Status: SHIPPED | OUTPATIENT
Start: 2021-03-21

## 2021-03-21 RX ORDER — METOCLOPRAMIDE 10 MG/1
10 TABLET ORAL 2 TIMES DAILY PRN
Qty: 60 TABLET | Refills: 0 | Status: SHIPPED | OUTPATIENT
Start: 2021-03-21

## 2021-03-21 RX ORDER — 0.9 % SODIUM CHLORIDE 0.9 %
1000 INTRAVENOUS SOLUTION INTRAVENOUS ONCE
Status: COMPLETED | OUTPATIENT
Start: 2021-03-21 | End: 2021-03-21

## 2021-03-21 RX ADMIN — ACETAMINOPHEN 1000 MG: 500 TABLET ORAL at 13:19

## 2021-03-21 RX ADMIN — SODIUM CHLORIDE 1000 ML: 9 INJECTION, SOLUTION INTRAVENOUS at 13:18

## 2021-03-21 RX ADMIN — DEXAMETHASONE SODIUM PHOSPHATE 8 MG: 10 INJECTION INTRAMUSCULAR; INTRAVENOUS at 13:19

## 2021-03-21 RX ADMIN — DIPHENHYDRAMINE HYDROCHLORIDE 50 MG: 50 INJECTION, SOLUTION INTRAMUSCULAR; INTRAVENOUS at 13:18

## 2021-03-21 RX ADMIN — METOCLOPRAMIDE HYDROCHLORIDE 20 MG: 5 INJECTION INTRAMUSCULAR; INTRAVENOUS at 13:19

## 2021-03-21 ASSESSMENT — ENCOUNTER SYMPTOMS
DIARRHEA: 0
BACK PAIN: 0
NAUSEA: 0
ABDOMINAL PAIN: 0
VOMITING: 0
COUGH: 0
SHORTNESS OF BREATH: 0
SORE THROAT: 0

## 2021-03-21 ASSESSMENT — PAIN DESCRIPTION - DESCRIPTORS: DESCRIPTORS: ACHING

## 2021-03-21 ASSESSMENT — PAIN SCALES - GENERAL
PAINLEVEL_OUTOF10: 9
PAINLEVEL_OUTOF10: 3

## 2021-03-21 ASSESSMENT — PAIN DESCRIPTION - PAIN TYPE: TYPE: ACUTE PAIN

## 2021-03-21 ASSESSMENT — PAIN - FUNCTIONAL ASSESSMENT: PAIN_FUNCTIONAL_ASSESSMENT: 0-10

## 2021-03-21 NOTE — ED NOTES
Dr Tay Contreras at [address] [facility].  Deyvi Martinez, LISHA  03/21/21 [id number]

## 2021-03-21 NOTE — ED PROVIDER NOTES
[address] [facility] ED  eMERGENCYdEPARTMENT eNCOUnter      Pt Name: Tai Arroyo  MRN: [id number]  Mariamgfgin 1970  Date of evaluation: 3/21/2021  Aidan Thornton MD    CHIEF COMPLAINT           HPI  Tai Arroyo is a 53 y.o. female per chart review has a h/o DM II, sarcoidosis, CKD, HTN, hpl, depression/anxiety, asthma presents to the ED with headache. Pt notes gradual onset, severe, constant, aching, diffuse headache x 3 days. Pt denies fever, n/v, neck pain, cp, sob, dysuria, diarrhea. ROS  Review of Systems   Constitutional: Negative for activity change, chills and fever. HENT: Negative for ear pain and sore throat. Eyes: Negative for visual disturbance. Respiratory: Negative for cough and shortness of breath. Cardiovascular: Negative for chest pain, palpitations and leg swelling. Gastrointestinal: Negative for abdominal pain, diarrhea, nausea and vomiting. Genitourinary: Negative for dysuria. Musculoskeletal: Negative for back pain. Skin: Negative for rash. Neurological: Positive for headaches. Negative for dizziness and weakness. Except as noted above the remainder of the review of systems was reviewed and negative.        PAST MEDICAL HISTORY     Past Medical History:   Diagnosis Date    Anxiety     Arthritis     Asthma     Bronchopneumonia     Cancer ([address]     renal    Cerebral artery occlusion with cerebral infarction (HCC)     Chronic bilateral low back pain with sciatica     Chronic kidney disease     Chronic obstructive lung disease ([facility] 7/26/2019    Depression     Fibromyalgia     Hypertension     Insulin dependent type 2 diabetes mellitus, uncontrolled ([address] 8/3/2018    Localized enlarged lymph nodes 10/26/2018    Mixed headache     Pure hyperglyceridemia 5/19/2017    Sarcoidosis     Sleep apnea     does not wear cpap    Thyroid goiter          SURGICAL HISTORY       Past Surgical History:   Procedure Laterality Date    (FREESTYLE JESSICA 14 DAY SENSOR) MISC    As directed    DULAGLUTIDE (TRULICITY) 8.47 ME/2.3GF SOPN    Inject 0.75 mg into the skin once a week    DULOXETINE (CYMBALTA) 60 MG EXTENDED RELEASE CAPSULE    Take 60 mg by mouth every 24 hours     FLUTICASONE (FLONASE) 50 MCG/ACT NASAL SPRAY    1 spray by Nasal route daily    INSULIN DEGLUDEC (TRESIBA FLEXTOUCH) 100 UNIT/ML SOPN    INJECT 40 UNITS UNDER THE SKIN NIGHTLY    INSULIN LISPRO (HUMALOG) 100 UNIT/ML INJECTION VIAL    INJECT 4 UNITS UNDER THE SKIN THREE TIMES DAILY AS NEEDED FOR HIGH BLOOD SUGAR    INSULIN LISPRO, 1 UNIT DIAL, (HUMALOG KWIKPEN) 100 UNIT/ML SOPN    4 units at each meals hold if glucose less than 150    INSULIN PEN NEEDLE (NOVOFINE) 32G X 6 MM MISC    qid    INSULIN SYRINGE-NEEDLE U-100 30G X 1/2\" 1 ML MISC    1 each by Does not apply route daily    LEVOTHYROXINE (SYNTHROID) 150 MCG TABLET    Take 150 mcg by mouth Daily     LIDOCAINE (XYLOCAINE) 5 % OINTMENT    Apply topically BID as needed. LIDOCAINE VISCOUS HCL (XYLOCAINE) 2 % SOLN SOLUTION    Take 15 mLs by mouth as needed for Dental Pain    MAGNESIUM OXIDE (MAG-OX) 400 (241.3 MG) MG TABS TABLET    TAKE 1/2 TABLET BY MOUTH DAILY    METOPROLOL TARTRATE (LOPRESSOR) 25 MG TABLET    Take 1 tablet by mouth 2 times daily    MONTELUKAST (SINGULAIR) 10 MG TABLET    Take 1 tab nightly at supper for breathing/allergies    ONE TOUCH ULTRASOFT LANCETS MISC    TEST 3 TIMES DAILY AS NEEDED    ONETOUCH DELICA LANCETS 57A MISC    qid    PANTOPRAZOLE (PROTONIX) 20 MG TABLET    Take 2 tablets by mouth daily    PREGABALIN (LYRICA) 150 MG CAPSULE    Take 1 capsule by mouth 3 times daily for 30 days. PROMETHAZINE (PHENERGAN) 25 MG TABLET    WARNING:  May cause drowsiness. May impair ability to operate vehicles or machinery. Do not use in combination with alcohol.     ! Tablet every 6 hours as needed in conjunction with Ultram for headaches    SPIRONOLACTONE (ALDACTONE) 50 MG TABLET    Take 1 tablet by mouth daily    SUMATRIPTAN (IMITREX) 25 MG TABLET    TAKE 1 TABLET BY MOUTH 1 TIME AS NEEDED FOR MIGRAINE    TIZANIDINE (ZANAFLEX) 2 MG TABLET    Take 1 tablet by mouth 3 times daily as needed (back pain/ spasm)    TRAMADOL (ULTRAM) 50 MG TABLET    Take 50 mg by mouth every 6 hours as needed for Pain.        ALLERGIES     Shellfish-derived products, Ibuprofen, Ketorolac, Morphine, Other, Penicillins, Propoxyphene n-acetaminophen, and Toradol [ketorolac tromethamine]    FAMILY HISTORY       Family History   Problem Relation Age of Onset    Cancer Father     Diabetes Father     Allergy (Severe) Father     Heart Attack Father     Prostate Cancer Father     High Blood Pressure Mother     Diabetes Mother     Arthritis Mother     High Cholesterol Mother     Vision Loss Mother     Alcohol Abuse Neg Hx     Anemia Neg Hx     Arrhythmia Neg Hx     Asthma Neg Hx     Atrial Fibrillation Neg Hx     Birth Defects Neg Hx     Breast Cancer Neg Hx     Coronary Art Dis Neg Hx     Colon Cancer Neg Hx     Depression Neg Hx     Early Death Neg Hx     Hearing Loss Neg Hx     Heart Disease Neg Hx     Learning Disabilities Neg Hx     Kidney Disease Neg Hx     Mental Illness Neg Hx     Mental Retardation Neg Hx     Miscarriages / Stillbirths Neg Hx     Obesity Neg Hx     Osteoporosis Neg Hx     Stroke Neg Hx     Substance Abuse Neg Hx           SOCIAL HISTORY       Social History     Socioeconomic History    Marital status: Legally      Spouse name: None    Number of children: None    Years of education: 15    Highest education level: High school graduate   Occupational History    None   Social Needs    Financial resource strain: Somewhat hard    Food insecurity     Worry: Sometimes true     Inability: Sometimes true    Transportation needs     Medical: No     Non-medical: No   Tobacco Use    Smoking status: Former Smoker     Packs/day: 0.50     Years: 15.00     Pack years: 7.50     Types: Cigarettes     Start date: 2014     Quit date: 2015     Years since quittin.1    Smokeless tobacco: Former User     Quit date: 3/23/2016   Substance and Sexual Activity    Alcohol use: Not Currently     Alcohol/week: 0.0 standard drinks     Comment: occasionally    Drug use: Yes     Frequency: 5.0 times per week     Types: Marijuana    Sexual activity: Yes     Partners: Male   Lifestyle    Physical activity     Days per week: 0 days     Minutes per session: 0 min    Stress: Very much   Relationships    Social connections     Talks on phone: Three times a week     Gets together: Never     Attends [employer] service: Never     Active member of club or organization: No     Attends meetings of clubs or organizations: Never     Relationship status:     Intimate partner violence     Fear of current or ex partner: No     Emotionally abused: No     Physically abused: No     Forced sexual activity: No   Other Topics Concern    None   Social History Narrative    None         PHYSICAL EXAM       ED Triage Vitals [21 1242]   BP Temp Temp Source Pulse Resp SpO2 Height Weight   135/74 98.3 °F (36.8 °C) Oral 78 18 96 % 5' 4\" (1.626 m) 260 lb (117.9 kg)       Physical Exam  Vitals signs and nursing note reviewed. Constitutional:       Appearance: She is well-developed. HENT:      Head: Normocephalic. Right Ear: External ear normal.      Left Ear: External ear normal.   Eyes:      Conjunctiva/sclera: Conjunctivae normal.      Pupils: Pupils are equal, round, and reactive to light. Neck:      Musculoskeletal: Normal range of motion and neck supple. Comments: No nuchal rigidity  Cardiovascular:      Rate and Rhythm: Normal rate and regular rhythm. Heart sounds: Normal heart sounds. Pulmonary:      Effort: Pulmonary effort is normal.      Breath sounds: Normal breath sounds. Abdominal:      General: Bowel sounds are normal. There is no distension.       Palpations: Abdomen is soft. Tenderness: There is no abdominal tenderness. Musculoskeletal: Normal range of motion. Skin:     General: Skin is warm and dry. Neurological:      Mental Status: She is alert and oriented to person, place, and time. Psychiatric:         Mood and Affect: Mood normal.           MDM  52 yo female presents to the ED with headache. Pt is afebrile, hemodynamically stable. Pt given 1  L NS, IV reglan, IV benadryl, PO tylenol, IV decadron in the ED. Very low suspicion for SAH, meningitis. Labs unremarkable. CT head negative. Pt reassessed and feels completely better. Pt educated about headaches. Pt given prescription for reglan, tylenol. Pt given headache warning signs and will f/u with pcp. Pt understands plan. FINAL IMPRESSION      1.  Acute nonintractable headache, unspecified headache type          DISPOSITION/PLAN   DISPOSITION Decision To Discharge 03/21/2021 02:13:58 PM        DISCHARGE MEDICATIONS:  [unfilled]         Lucien Casey MD(electronically signed)  Attending Emergency Physician            Lucien Casey MD  03/21/21 [id number]

## 2021-03-21 NOTE — ED NOTES
Discharge papers reviewed with patient including medications and follow up. Verbalizes understanding. Denies additional questions. Reports headache decreased 3/10 and was acceptable for her to discharge. Ambulated off unit with cane and family. [facility]  Kellen Rae, [facility]  03/21/21 [id number]

## 2021-03-21 NOTE — ED TRIAGE NOTES
Patient reports headache that started two days ago. Patient has taken Tylenol and tramadol without relief.

## 2021-03-23 ENCOUNTER — CARE COORDINATION (OUTPATIENT)
Dept: CARE COORDINATION | Age: 51
End: 2021-03-23

## 2021-03-24 ENCOUNTER — TELEPHONE (OUTPATIENT)
Dept: FAMILY MEDICINE CLINIC | Age: 51
End: 2021-03-24

## 2021-03-24 ENCOUNTER — HOSPITAL ENCOUNTER (EMERGENCY)
Age: 51
Discharge: HOME | End: 2021-03-24
Attending: EMERGENCY MEDICINE
Payer: MEDICAID

## 2021-03-24 ENCOUNTER — OFFICE VISIT (OUTPATIENT)
Dept: FAMILY MEDICINE CLINIC | Age: 51
End: 2021-03-24
Payer: MEDICAID

## 2021-03-24 VITALS
HEIGHT: 63 IN | DIASTOLIC BLOOD PRESSURE: 70 MMHG | OXYGEN SATURATION: 98 % | WEIGHT: 270 LBS | TEMPERATURE: 98.6 F | BODY MASS INDEX: 47.84 KG/M2 | RESPIRATION RATE: 18 BRPM | HEART RATE: 79 BPM | SYSTOLIC BLOOD PRESSURE: 130 MMHG

## 2021-03-24 VITALS
RESPIRATION RATE: 20 BRPM | HEIGHT: 63 IN | HEART RATE: 78 BPM | BODY MASS INDEX: 46.07 KG/M2 | SYSTOLIC BLOOD PRESSURE: 128 MMHG | WEIGHT: 260 LBS | DIASTOLIC BLOOD PRESSURE: 73 MMHG | TEMPERATURE: 97.9 F | OXYGEN SATURATION: 97 %

## 2021-03-24 DIAGNOSIS — R20.0 LEFT FACIAL NUMBNESS: ICD-10-CM

## 2021-03-24 DIAGNOSIS — R51.9 ACUTE NONINTRACTABLE HEADACHE, UNSPECIFIED HEADACHE TYPE: Primary | ICD-10-CM

## 2021-03-24 DIAGNOSIS — R51.9 ACUTE INTRACTABLE HEADACHE, UNSPECIFIED HEADACHE TYPE: ICD-10-CM

## 2021-03-24 LAB — HBA1C MFR BLD: 6.8 %

## 2021-03-24 PROCEDURE — 99283 EMERGENCY DEPT VISIT LOW MDM: CPT

## 2021-03-24 PROCEDURE — 3017F COLORECTAL CA SCREEN DOC REV: CPT | Performed by: PHYSICIAN ASSISTANT

## 2021-03-24 PROCEDURE — 1036F TOBACCO NON-USER: CPT | Performed by: PHYSICIAN ASSISTANT

## 2021-03-24 PROCEDURE — 3044F HG A1C LEVEL LT 7.0%: CPT | Performed by: PHYSICIAN ASSISTANT

## 2021-03-24 PROCEDURE — 2022F DILAT RTA XM EVC RTNOPTHY: CPT | Performed by: PHYSICIAN ASSISTANT

## 2021-03-24 PROCEDURE — 83036 HEMOGLOBIN GLYCOSYLATED A1C: CPT | Performed by: PHYSICIAN ASSISTANT

## 2021-03-24 PROCEDURE — 99213 OFFICE O/P EST LOW 20 MIN: CPT | Performed by: PHYSICIAN ASSISTANT

## 2021-03-24 PROCEDURE — G8417 CALC BMI ABV UP PARAM F/U: HCPCS | Performed by: PHYSICIAN ASSISTANT

## 2021-03-24 PROCEDURE — 6370000000 HC RX 637 (ALT 250 FOR IP): Performed by: EMERGENCY MEDICINE

## 2021-03-24 PROCEDURE — G8482 FLU IMMUNIZE ORDER/ADMIN: HCPCS | Performed by: PHYSICIAN ASSISTANT

## 2021-03-24 PROCEDURE — G8427 DOCREV CUR MEDS BY ELIG CLIN: HCPCS | Performed by: PHYSICIAN ASSISTANT

## 2021-03-24 RX ORDER — VALACYCLOVIR HYDROCHLORIDE 1 G/1
1000 TABLET, FILM COATED ORAL 3 TIMES DAILY
Qty: 30 TABLET | Refills: 0 | Status: SHIPPED | OUTPATIENT
Start: 2021-03-24 | End: 2021-04-03

## 2021-03-24 RX ORDER — PREDNISONE 10 MG/1
10 TABLET ORAL DAILY
Qty: 5 TABLET | Refills: 0 | Status: SHIPPED | OUTPATIENT
Start: 2021-03-24 | End: 2021-03-29

## 2021-03-24 RX ORDER — BUTALBITAL, ACETAMINOPHEN AND CAFFEINE 300; 40; 50 MG/1; MG/1; MG/1
1 CAPSULE ORAL EVERY 4 HOURS PRN
Status: DISCONTINUED | OUTPATIENT
Start: 2021-03-24 | End: 2021-03-24 | Stop reason: HOSPADM

## 2021-03-24 RX ADMIN — BUTALBITAL, ACETAMINOPHEN, AND CAFFEINE 1 CAPSULE: 50; 300; 40 CAPSULE ORAL at 03:53

## 2021-03-24 ASSESSMENT — PAIN SCALES - GENERAL: PAINLEVEL_OUTOF10: 6

## 2021-03-24 ASSESSMENT — ENCOUNTER SYMPTOMS
GASTROINTESTINAL NEGATIVE: 1
RESPIRATORY NEGATIVE: 1

## 2021-03-24 ASSESSMENT — PAIN DESCRIPTION - ORIENTATION: ORIENTATION: LEFT

## 2021-03-24 ASSESSMENT — PAIN DESCRIPTION - LOCATION: LOCATION: HEAD

## 2021-03-24 NOTE — ED NOTES
D/C instructions given to patient no questions ask. Patient verbalized understanding and ambulated from ED without any complications.      Trino Rodriguez RN  03/24/21 [id number]

## 2021-03-24 NOTE — TELEPHONE ENCOUNTER
She was in [facility] today. She will see a neurologist April 29th. Dr Re Levy. She is asking if you know of another neurologist that may get her in sooner? Made a VV tomorrow morning.   YUE

## 2021-03-24 NOTE — ED TRIAGE NOTES
Patient presents with complaints of intermittent sharp, \"shocking\" head pain in her left temple that began around 0200 am this morning. No distress noted in triage. Skin pink, warm, and dry. Respirations equal and unlabored. Patient ambulatory with cane, steady gait noted.

## 2021-03-24 NOTE — PROGRESS NOTES
Jace Adam Karmen, 49 y.o. female presents today with:  Chief Complaint   Patient presents with    Follow-Up from Hospital     Pt. is here today as a hospital follow up, seen in ER this morning for sharp pain on the left side of her head 8/10 -10/10 x 2am this morning. She states she is having numbness and tingling in the left side of the face near lips as well. Lab Results   Component Value Date    LABA1C 6.8 03/24/2021    LABA1C 7.9 02/09/2021    LABA1C 6.4 (H) 01/10/2020     Lab Results   Component Value Date    LABMICR <1.20 02/09/2021    CREATININE 1.15 (H) 03/21/2021     Lab Results   Component Value Date    ALT 12 03/21/2021    AST 13 03/21/2021     Lab Results   Component Value Date    CHOL 244 (H) 09/11/2019    TRIG 184 (H) 09/11/2019    HDL 41 09/11/2019    LDLCALC 166 (H) 09/11/2019          HPI  Is here being seen acutely for ER follow-up from today states she awakened this morning at 2 AM with stabbing left-sided temporal pain also with left-sided facial numbness she went to the ER CT scan was negative symptoms persist also having some decreased hazy vision in the left eye as well- last eye exam a yr ago     Review of Systems   Constitutional: Negative. HENT: Negative. Eyes: Positive for visual disturbance. Respiratory: Negative. Cardiovascular: Negative. Gastrointestinal: Negative. Endocrine: Negative. Genitourinary: Negative. Musculoskeletal: Positive for myalgias. Neurological: Positive for dizziness, numbness and headaches. Negative for facial asymmetry, speech difficulty, weakness and light-headedness. Psychiatric/Behavioral: Negative. All other systems reviewed and are negative.         Past Medical History:   Diagnosis Date    Anxiety     Arthritis     Asthma     Bronchopneumonia     Cancer ([facility] [address]     renal    Cerebral artery occlusion with cerebral infarction (HCC)     Chronic bilateral low back pain with sciatica     Chronic kidney disease     Chronic obstructive lung disease ([facility] 2019    Depression     Fibromyalgia     Hypertension     Insulin dependent type 2 diabetes mellitus, uncontrolled ([facility] 8/3/2018    Localized enlarged lymph nodes 10/26/2018    Mixed headache     Pure hyperglyceridemia 2017    Sarcoidosis     Sleep apnea     does not wear cpap    Thyroid goiter      Past Surgical History:   Procedure Laterality Date    BRONCHOSCOPY  10/26/2018    DR. STEARNS    KIDNEY REMOVAL Right 2016    KIDNEY REMOVAL Right 2016    LUNG BIOPSY Right 10/2018    THYROID LOBECTOMY Right 14    THYROIDECTOMY  2019    DR. MAGDALENO    THYROIDECTOMY  2018    URETER STENT PLACEMENT Left 2016     Social History     Socioeconomic History    Marital status: Legally      Spouse name: Not on file    Number of children: Not on file    Years of education: 15    Highest education level: High school graduate   Occupational History    Not on file   Social Needs    Financial resource strain: Somewhat hard    Food insecurity     Worry: Sometimes true     Inability: Sometimes true    Transportation needs     Medical: No     Non-medical: No   Tobacco Use    Smoking status: Former Smoker     Packs/day: 0.50     Years: 15.00     Pack years: 7.50     Types: Cigarettes     Start date: 2014     Quit date: 2015     Years since quittin.1    Smokeless tobacco: Former User     Quit date: 3/23/2016   Substance and Sexual Activity    Alcohol use: Not Currently     Alcohol/week: 0.0 standard drinks     Comment: occasionally    Drug use: Yes     Frequency: 5.0 times per week     Types: Marijuana    Sexual activity: Yes     Partners: Male   Lifestyle    Physical activity     Days per week: 0 days     Minutes per session: 0 min    Stress: Very much   Relationships    Social connections     Talks on phone:  Three times a week     Gets together: Never     Attends Caodaism service: Never     Active member of club or organization: No     Attends meetings of clubs or organizations: Never     Relationship status:     Intimate partner violence     Fear of current or ex partner: No     Emotionally abused: No     Physically abused: No     Forced sexual activity: No   Other Topics Concern    Not on file   Social History Narrative    Not on file     Family History   Problem Relation Age of Onset    Cancer Father     Diabetes Father     Allergy (Severe) Father     Heart Attack Father     Prostate Cancer Father     High Blood Pressure Mother     Diabetes Mother     Arthritis Mother     High Cholesterol Mother     Vision Loss Mother     Alcohol Abuse Neg Hx     Anemia Neg Hx     Arrhythmia Neg Hx     Asthma Neg Hx     Atrial Fibrillation Neg Hx     Birth Defects Neg Hx     Breast Cancer Neg Hx     Coronary Art Dis Neg Hx     Colon Cancer Neg Hx     Depression Neg Hx     Early Death Neg Hx     Hearing Loss Neg Hx     Heart Disease Neg Hx     Learning Disabilities Neg Hx     Kidney Disease Neg Hx     Mental Illness Neg Hx     Mental Retardation Neg Hx     Miscarriages / Stillbirths Neg Hx     Obesity Neg Hx     Osteoporosis Neg Hx     Stroke Neg Hx     Substance Abuse Neg Hx      Allergies   Allergen Reactions    Shellfish-Derived Products     Ibuprofen     Ketorolac     Morphine Other (See Comments)     MADE PATIENT VERY SICK VOMITING    Other     Penicillins Swelling    Propoxyphene N-Acetaminophen      Other reaction(s): Hives    Toradol [Ketorolac Tromethamine]      Pt states she cannot take Toradol because she has only 1 kidney.         Current Outpatient Medications   Medication Sig Dispense Refill    predniSONE (DELTASONE) 10 MG tablet Take 1 tablet by mouth daily for 5 days 5 tablet 0    valACYclovir (VALTREX) 1 g tablet Take 1 tablet by mouth 3 times daily for 10 days 30 tablet 0    metoclopramide (REGLAN) 10 MG tablet Take 1 tablet by mouth 2 times daily as needed (Headache) 60 tablet 0    acetaminophen (TYLENOL) 500 MG tablet Take 1 tablet by mouth 4 times daily as needed for Pain 360 tablet 1    B-D ULTRAFINE III SHORT PEN 31G X 8 MM MISC USE THREE TIMES DAILY 100 each 5    lidocaine viscous hcl (XYLOCAINE) 2 % SOLN solution Take 15 mLs by mouth as needed for Dental Pain 15 mL 0    cetirizine (ZYRTEC) 10 MG tablet TAKE 1 TABLET BY MOUTH EVERY NIGHT AT BEDTIME AS NEEDED FOR ALLERGIES 30 tablet 5    Dulaglutide (TRULICITY) 1.13 VC/1.6DF SOPN Inject 0.75 mg into the skin once a week 4 pen 3    Insulin Degludec (TRESIBA FLEXTOUCH) 100 UNIT/ML SOPN INJECT 40 UNITS UNDER THE SKIN NIGHTLY 15 mL 3    insulin lispro, 1 Unit Dial, (HUMALOG KWIKPEN) 100 UNIT/ML SOPN 4 units at each meals hold if glucose less than 150 5 pen 3    spironolactone (ALDACTONE) 50 MG tablet Take 1 tablet by mouth daily 30 tablet 3    OneTouch Delica Lancets 75B MISC qid 200 each 3    blood glucose test strips (ONETOUCH VERIO) strip 1 each by In Vitro route daily As needed. 100 each 3    Blood Glucose Monitoring Suppl (ONETOUCH VERIO) w/Device KIT As  Directed 1 kit 00    Insulin Pen Needle (NOVOFINE) 32G X 6 MM MISC qid 300 each 3    Continuous Blood Gluc Sensor (FREESTYLE JESSICA 14 DAY SENSOR) MISC As directed 1 each 00    Continuous Blood Gluc  (FREESTYLE JESSICA 14 DAY READER) LENNY As directed 1 Device 00    baclofen (LIORESAL) 10 MG tablet Take 1 tablet by mouth 3 times daily 60 tablet 0    lidocaine (XYLOCAINE) 5 % ointment Apply topically BID as needed.  30 g 0    montelukast (SINGULAIR) 10 MG tablet Take 1 tab nightly at supper for breathing/allergies 30 tablet 5    buPROPion (WELLBUTRIN XL) 150 MG extended release tablet Take 1 tablet by mouth every morning 30 tablet 3    tiZANidine (ZANAFLEX) 2 MG tablet Take 1 tablet by mouth 3 times daily as needed (back pain/ spasm) 15 tablet 0    butalbital-acetaminophen-caffeine (FIORICET, ESGIC) -40 MG per tablet Take 1 tablet by mouth every 6 hours as needed for Headaches 18 tablet 0    DULoxetine (CYMBALTA) 60 MG extended release capsule Take 60 mg by mouth every 24 hours       ALPRAZolam (XANAX) 0.25 MG tablet Take 0.25 mg by mouth nightly as needed.  pantoprazole (PROTONIX) 20 MG tablet Take 2 tablets by mouth daily 30 tablet 0    magnesium oxide (MAG-OX) 400 (241.3 Mg) MG TABS tablet TAKE 1/2 TABLET BY MOUTH DAILY 30 tablet 5    busPIRone (BUSPAR) 15 MG tablet TAKE 1 TABLET BY MOUTH THREE TIMES DAILY 90 tablet 5    albuterol sulfate HFA (PROVENTIL HFA) 108 (90 Base) MCG/ACT inhaler Inhale 2 puffs into the lungs every 6 hours as needed for Wheezing 1 Inhaler 3    insulin lispro (HUMALOG) 100 UNIT/ML injection vial INJECT 4 UNITS UNDER THE SKIN THREE TIMES DAILY AS NEEDED FOR HIGH BLOOD SUGAR 10 mL 0    levothyroxine (SYNTHROID) 150 MCG tablet Take 150 mcg by mouth Daily       SUMAtriptan (IMITREX) 25 MG tablet TAKE 1 TABLET BY MOUTH 1 TIME AS NEEDED FOR MIGRAINE 9 tablet 1    promethazine (PHENERGAN) 25 MG tablet WARNING:  May cause drowsiness. May impair ability to operate vehicles or machinery. Do not use in combination with alcohol. ! Tablet every 6 hours as needed in conjunction with Ultram for headaches 20 tablet 0    blood glucose test strips (EXACTECH TEST) strip 1 each by In Vitro route 3 times daily (Accu-check test strips) As needed.  DX:E11.65 300 strip 5    ONE TOUCH ULTRASOFT LANCETS MISC TEST 3 TIMES DAILY AS NEEDED 300 each 3    albuterol (PROVENTIL) (2.5 MG/3ML) 0.083% nebulizer solution Take 3 mLs by nebulization every 4 hours as needed for Wheezing 120 each 3    atorvastatin (LIPITOR) 40 MG tablet Take 1 tablet by mouth nightly 30 tablet 3    metoprolol tartrate (LOPRESSOR) 25 MG tablet Take 1 tablet by mouth 2 times daily 60 tablet 0    fluticasone (FLONASE) 50 MCG/ACT nasal spray 1 spray by Nasal route daily 1 Bottle 3    traMADol (ULTRAM) 50 MG tablet Take 50 mg by mouth every 6 hours as

## 2021-03-24 NOTE — TELEPHONE ENCOUNTER
It's unlikely another neurologist can get her in sooner. Most specialist book out by at least a few weeks. We can discuss tomorrow.

## 2021-03-25 ENCOUNTER — CARE COORDINATION (OUTPATIENT)
Dept: CARE COORDINATION | Age: 51
End: 2021-03-25

## 2021-03-25 NOTE — CARE COORDINATION
Ambulatory Care Coordination Note  3/25/2021  CM Risk Score: 11  Charlson 10 Year Mortality Risk Score: 100%     ACC: Oral Jyoti, RN    Summary Note: AC called Darron Boogie for ER follow up and to discuss her MD visit. Reviewed MD noted with Darron Boogie , she states she has not had time to  her prednisone or Valacyclovir, [facility] and medications are ready for pick, Darron Boogie states she will pick those up today, I stressed need to take them as ordered as soon as possible, she verbalized understanding, denies shortness of breath, chest pain, or any other acute symptoms at this time, reminded her of follow up appointments coming up with Karime Bhakta and Dr. Tammy Guidry and stressed need to follow up as scheduled, discussed the availability of ready care and their hours, reminded her that they can manage most symptom management needs except for chest pain or sob, she states her concern is transportation, her insurance requires 2 day notice and she usually does not have transportation, I informed her that if she needs transportation she can call me and I can try to assist with that. Reports blood sugar and blood pressure are in good control, her hbgaic yesterday was 6.8 and she was very pleased, BP was 130/70, she is monitoring those daily, advised if she has any symptoms and she is unsure of where she should go for treatment, she can call PCP or myself and we can give her some direction. , Radha Abarca was appreciative of phone call and verbalized understanding.             Care Coordination Interventions    Program Enrollment: Complex Care  Referral from Primary Care Provider: No  Suggested Interventions and Community Resources  Medi Set or Pill Pack: Completed (Comment: 2/17 using pill box.)  Pharmacist: Completed (Comment: 1/5/2021 [facility] Clinical Rx)  Registered Dietician: Completed (Comment: 1/5/2021 [facility] Dietician)  Social Work: Completed (Comment: 1/5/2021 [facility] Social Work for [employer] and long term planning.)  Other Services: Declined (Comment: 1/5/2021 Declined ACP Referral)  Zone Management Tools: In Process (Comment: 1/5/2021 Diabetes and COPD Zones)  Other Services or Interventions: 1/5/2021 instructed on same day, next day, and Walk-In Care. Goals Addressed    None         Prior to Admission medications    Medication Sig Start Date End Date Taking? Authorizing Provider   predniSONE (DELTASONE) 10 MG tablet Take 1 tablet by mouth daily for 5 days 3/24/21 3/29/21  Arline Patterson PA-C   valACYclovir (VALTREX) 1 g tablet Take 1 tablet by mouth 3 times daily for 10 days 3/24/21 4/3/21  Arline Patterson PA-C   metoclopramide (REGLAN) 10 MG tablet Take 1 tablet by mouth 2 times daily as needed (Headache) 3/21/21   Marina Lin MD   acetaminophen (TYLENOL) 500 MG tablet Take 1 tablet by mouth 4 times daily as needed for Pain 3/21/21   MD LEIGH ANN Gaytan ULTRAFINE III SHORT PEN 31G X 8 MM MISC USE THREE TIMES DAILY 3/15/21   SUDEEP Atkins CNP   lidocaine viscous hcl (XYLOCAINE) 2 % SOLN solution Take 15 mLs by mouth as needed for Dental Pain 2/28/21   SUDEEP Cates CNP   cetirizine (ZYRTEC) 10 MG tablet TAKE 1 TABLET BY MOUTH EVERY NIGHT AT BEDTIME AS NEEDED FOR ALLERGIES 2/25/21   SUDEEP Atkins CNP   Dulaglutide (TRULICITY) 5.46 QO/2.5KC SOPN Inject 0.75 mg into the skin once a week 2/23/21   Malik Montanez MD   Insulin Degludec (TRESIBA FLEXTOUCH) 100 UNIT/ML SOPN INJECT 40 UNITS UNDER THE SKIN NIGHTLY 2/23/21   Malik Montanez MD   insulin lispro, 1 Unit Dial, (HUMALOG KWIKPEN) 100 UNIT/ML SOPN 4 units at each meals hold if glucose less than 150 2/23/21   Malik Montanez MD   spironolactone (ALDACTONE) 50 MG tablet Take 1 tablet by mouth daily 2/23/21   Malik Montanez MD   OneTouch Delica Lancets 12L MISC qid 2/23/21   Malik Montanez MD   blood glucose test strips (ONETOUCH VERIO) strip 1 each by In Vitro route daily As needed.  2/23/21   Malik Montanez MD   Blood the lungs every 6 hours as needed for Wheezing 9/9/20   Herlene SepSUDEEP CNP   insulin lispro (HUMALOG) 100 UNIT/ML injection vial INJECT 4 UNITS UNDER THE SKIN THREE TIMES DAILY AS NEEDED FOR HIGH BLOOD SUGAR 7/10/20   SUDEPE Borrego CNP   levothyroxine (SYNTHROID) 150 MCG tablet Take 150 mcg by mouth Daily  6/28/20   Historical Provider, MD   SUMAtriptan (IMITREX) 25 MG tablet TAKE 1 TABLET BY MOUTH 1 TIME AS NEEDED FOR MIGRAINE 5/1/20   Herlene SepSUDEEP CNP   promethazine (PHENERGAN) 25 MG tablet WARNING:  May cause drowsiness. May impair ability to operate vehicles or machinery. Do not use in combination with alcohol. ! Tablet every 6 hours as needed in conjunction with Ultram for headaches 1/18/20   Aroldo Mancini PA-C   blood glucose test strips (EXACTECH TEST) strip 1 each by In Vitro route 3 times daily (Accu-check test strips) As needed. DX:E11.65 12/2/19   SUDEEP Borrego CNP   ONE TOUCH ULTRASOFT LANCETS MISC TEST 3 TIMES DAILY AS NEEDED 12/2/19   SUDEEP Borrego CNP   albuterol (PROVENTIL) (2.5 MG/3ML) 0.083% nebulizer solution Take 3 mLs by nebulization every 4 hours as needed for Wheezing 11/5/19   Maria M Ni, DO   atorvastatin (LIPITOR) 40 MG tablet Take 1 tablet by mouth nightly 9/11/19   [facility] DO   metoprolol tartrate (LOPRESSOR) 25 MG tablet Take 1 tablet by mouth 2 times daily 9/11/19   [facility] DO   fluticasone Leroy Ask) 50 MCG/ACT nasal spray 1 spray by Nasal route daily 6/4/19   lene SDUEEP Van CNP   traMADol (ULTRAM) 50 MG tablet Take 50 mg by mouth every 6 hours as needed for Pain.     Historical Provider, MD   Insulin Syringe-Needle U-100 30G X 1/2\" 1 ML MISC 1 each by Does not apply route daily 11/16/18   Geoff Sullivan DO   blood glucose test strips (ONETOUCH VERIO) strip TEST 3 TIMES DAILY AS NEEDED 8/16/18   Geoff Sullivan, DO   Blood Glucose Monitoring Suppl (ONETOUCH VERIO) w/Device KIT TEST 3 TIMES DAILY AS NEEDED 8/16/18   Lashonda Flores, DO       Future Appointments   Date Time Provider Valerie Cosby   4/1/2021  1:45 PM Christopher Rubio  [address] [place] [address]   4/7/2021  3:30 PM Ranulfo Hayden MD  [address]   4/29/2021 10:30 AM Leanor Cockayne, APRN - CNP [place]   5/10/2021 11:30 AM SUDEEP High CNP MLOX [facility]   11/5/2021  9:15 AM MD Shari Johnson

## 2021-03-27 DIAGNOSIS — F41.9 ANXIETY: ICD-10-CM

## 2021-03-29 RX ORDER — BUSPIRONE HYDROCHLORIDE 15 MG/1
TABLET ORAL
Qty: 90 TABLET | Refills: 5 | Status: SHIPPED | OUTPATIENT
Start: 2021-03-29 | End: 2021-10-04

## 2021-03-30 ENCOUNTER — HOSPITAL ENCOUNTER (EMERGENCY)
Age: 51
Discharge: HOME | End: 2021-03-31
Attending: EMERGENCY MEDICINE
Payer: MEDICAID

## 2021-03-30 ENCOUNTER — TELEPHONE (OUTPATIENT)
Dept: FAMILY MEDICINE CLINIC | Age: 51
End: 2021-03-30

## 2021-03-30 ENCOUNTER — APPOINTMENT (OUTPATIENT)
Dept: GENERAL RADIOLOGY | Age: 51
End: 2021-03-30
Payer: MEDICAID

## 2021-03-30 VITALS
HEART RATE: 99 BPM | SYSTOLIC BLOOD PRESSURE: 155 MMHG | OXYGEN SATURATION: 94 % | DIASTOLIC BLOOD PRESSURE: 81 MMHG | HEIGHT: 63 IN | TEMPERATURE: 98.2 F | RESPIRATION RATE: 18 BRPM | WEIGHT: 250 LBS | BODY MASS INDEX: 44.3 KG/M2

## 2021-03-30 DIAGNOSIS — R07.89 CHEST WALL PAIN: Primary | ICD-10-CM

## 2021-03-30 DIAGNOSIS — R20.2 FACIAL PARESTHESIA: Primary | ICD-10-CM

## 2021-03-30 LAB
EKG ATRIAL RATE: 86 BPM
EKG P AXIS: 42 DEGREES
EKG P-R INTERVAL: 150 MS
EKG Q-T INTERVAL: 360 MS
EKG QRS DURATION: 78 MS
EKG QTC CALCULATION (BAZETT): 430 MS
EKG R AXIS: 15 DEGREES
EKG T AXIS: 15 DEGREES
EKG VENTRICULAR RATE: 86 BPM

## 2021-03-30 PROCEDURE — 84484 ASSAY OF TROPONIN QUANT: CPT

## 2021-03-30 PROCEDURE — 99282 EMERGENCY DEPT VISIT SF MDM: CPT

## 2021-03-30 PROCEDURE — 36415 COLL VENOUS BLD VENIPUNCTURE: CPT

## 2021-03-30 PROCEDURE — 71045 X-RAY EXAM CHEST 1 VIEW: CPT

## 2021-03-30 PROCEDURE — 80048 BASIC METABOLIC PNL TOTAL CA: CPT

## 2021-03-30 PROCEDURE — 93005 ELECTROCARDIOGRAM TRACING: CPT | Performed by: EMERGENCY MEDICINE

## 2021-03-30 PROCEDURE — 85025 COMPLETE CBC W/AUTO DIFF WBC: CPT

## 2021-03-30 RX ORDER — SODIUM CHLORIDE 0.9 % (FLUSH) 0.9 %
3 SYRINGE (ML) INJECTION EVERY 8 HOURS
Status: DISCONTINUED | OUTPATIENT
Start: 2021-03-30 | End: 2021-03-31 | Stop reason: HOSPADM

## 2021-03-30 RX ORDER — ONDANSETRON 2 MG/ML
4 INJECTION INTRAMUSCULAR; INTRAVENOUS ONCE
Status: COMPLETED | OUTPATIENT
Start: 2021-03-30 | End: 2021-03-31

## 2021-03-30 ASSESSMENT — ENCOUNTER SYMPTOMS
GASTROINTESTINAL NEGATIVE: 1
RESPIRATORY NEGATIVE: 1
CHEST TIGHTNESS: 0
BLOOD IN STOOL: 0
NAUSEA: 0
BACK PAIN: 0
WHEEZING: 0
SHORTNESS OF BREATH: 0
ABDOMINAL PAIN: 0
ABDOMINAL DISTENTION: 0
DIARRHEA: 0
SORE THROAT: 0
EYE DISCHARGE: 0
EYES NEGATIVE: 1
COUGH: 0
VOMITING: 0
SINUS PAIN: 0
EYE PAIN: 0

## 2021-03-30 ASSESSMENT — PAIN DESCRIPTION - LOCATION: LOCATION: CHEST

## 2021-03-30 ASSESSMENT — PAIN DESCRIPTION - PAIN TYPE: TYPE: ACUTE PAIN

## 2021-03-30 ASSESSMENT — PAIN DESCRIPTION - FREQUENCY: FREQUENCY: CONTINUOUS

## 2021-03-30 ASSESSMENT — PAIN SCALES - GENERAL: PAINLEVEL_OUTOF10: 10

## 2021-03-31 ENCOUNTER — CARE COORDINATION (OUTPATIENT)
Dept: CARE COORDINATION | Age: 51
End: 2021-03-31

## 2021-03-31 ENCOUNTER — TELEPHONE (OUTPATIENT)
Dept: FAMILY MEDICINE CLINIC | Age: 51
End: 2021-03-31

## 2021-03-31 LAB
ANION GAP SERPL CALCULATED.3IONS-SCNC: 11 MEQ/L (ref 9–15)
BASOPHILS ABSOLUTE: 0.1 K/UL (ref 0–0.2)
BASOPHILS RELATIVE PERCENT: 0.8 %
BUN BLDV-MCNC: 11 MG/DL (ref 6–20)
CALCIUM SERPL-MCNC: 9.3 MG/DL (ref 8.5–9.9)
CHLORIDE BLD-SCNC: 102 MEQ/L (ref 95–107)
CO2: 23 MEQ/L (ref 20–31)
CREAT SERPL-MCNC: 1.65 MG/DL (ref 0.5–0.9)
EOSINOPHILS ABSOLUTE: 0.2 K/UL (ref 0–0.7)
EOSINOPHILS RELATIVE PERCENT: 3 %
GFR AFRICAN AMERICAN: 39.8
GFR NON-AFRICAN AMERICAN: 32.9
GLUCOSE BLD-MCNC: 270 MG/DL (ref 70–99)
HCT VFR BLD CALC: 43.5 % (ref 37–47)
HEMOGLOBIN: 14.6 G/DL (ref 12–16)
LYMPHOCYTES ABSOLUTE: 2 K/UL (ref 1–4.8)
LYMPHOCYTES RELATIVE PERCENT: 24.1 %
MCH RBC QN AUTO: 31.8 PG (ref 27–31.3)
MCHC RBC AUTO-ENTMCNC: 33.6 % (ref 33–37)
MCV RBC AUTO: 94.7 FL (ref 82–100)
MONOCYTES ABSOLUTE: 0.4 K/UL (ref 0.2–0.8)
MONOCYTES RELATIVE PERCENT: 4.9 %
NEUTROPHILS ABSOLUTE: 5.6 K/UL (ref 1.4–6.5)
NEUTROPHILS RELATIVE PERCENT: 67.2 %
PDW BLD-RTO: 13.3 % (ref 11.5–14.5)
PLATELET # BLD: 278 K/UL (ref 130–400)
POTASSIUM SERPL-SCNC: 4.2 MEQ/L (ref 3.4–4.9)
RBC # BLD: 4.59 M/UL (ref 4.2–5.4)
SODIUM BLD-SCNC: 136 MEQ/L (ref 135–144)
TROPONIN: <0.01 NG/ML (ref 0–0.01)
WBC # BLD: 8.4 K/UL (ref 4.8–10.8)

## 2021-03-31 PROCEDURE — 6360000002 HC RX W HCPCS: Performed by: EMERGENCY MEDICINE

## 2021-03-31 PROCEDURE — 96374 THER/PROPH/DIAG INJ IV PUSH: CPT

## 2021-03-31 PROCEDURE — 96375 TX/PRO/DX INJ NEW DRUG ADDON: CPT

## 2021-03-31 PROCEDURE — 2580000003 HC RX 258: Performed by: EMERGENCY MEDICINE

## 2021-03-31 PROCEDURE — 93010 ELECTROCARDIOGRAM REPORT: CPT | Performed by: INTERNAL MEDICINE

## 2021-03-31 RX ADMIN — SODIUM CHLORIDE, PRESERVATIVE FREE 3 ML: 5 INJECTION INTRAVENOUS at 00:12

## 2021-03-31 RX ADMIN — HYDROMORPHONE HYDROCHLORIDE 1 MG: 1 INJECTION, SOLUTION INTRAMUSCULAR; INTRAVENOUS; SUBCUTANEOUS at 00:09

## 2021-03-31 RX ADMIN — ONDANSETRON 4 MG: 2 INJECTION INTRAMUSCULAR; INTRAVENOUS at 00:09

## 2021-03-31 ASSESSMENT — PAIN SCALES - GENERAL: PAINLEVEL_OUTOF10: 10

## 2021-03-31 NOTE — CARE COORDINATION
Ambulatory Care Coordination Note  3/31/2021  CM Risk Score: 11  Charlson 10 Year Mortality Risk Score: 100%     ACC: Dorita House, LISHA    Summary Note: I called Ancelmo Booth for ER follow up. I left a message with the nature of my call and requested a call back   I reminded Ancelmo Booth of upcoming appointments and requested she schedule follow up with Derrick Hernandez and sade, advised if she needs my helps scheduling follow up, she can call me back .         Care Coordination Interventions    Program Enrollment: Complex Care  Referral from Primary Care Provider: No  Suggested Interventions and Community Resources  Medi Set or Pill Pack: Completed (Comment: 2/17 using pill box.)  Pharmacist: Completed (Comment: 1/5/2021 [facility] Clinical Rx)  Registered Dietician: Completed (Comment: 1/5/2021 [address] [place] [address])  Social Work: Completed (Comment: 1/5/2021 [facility] Social Work for [employer] and long term planning.)  Other Services: Declined (Comment: 1/5/2021 Declined ACP Referral)  Zone Management Tools: In Process (Comment: 1/5/2021 Diabetes and COPD Zones)  Other Services or Interventions: 1/5/2021 instructed on same day, next day, and Walk-In Care. Goals Addressed    None         Prior to Admission medications    Medication Sig Start Date End Date Taking?  Authorizing Provider   busPIRone (BUSPAR) 15 MG tablet TAKE 1 TABLET BY MOUTH THREE TIMES DAILY 3/29/21   SUDEEP Meyer - CNP   valACYclovir (VALTREX) 1 g tablet Take 1 tablet by mouth 3 times daily for 10 days 3/24/21 4/3/21  Arline Patterson PA-C   metoclopramide (REGLAN) 10 MG tablet Take 1 tablet by mouth 2 times daily as needed (Headache) 3/21/21   Fredrick Mccullough MD   acetaminophen (TYLENOL) 500 MG tablet Take 1 tablet by mouth 4 times daily as needed for Pain 3/21/21   Fredrick Mccullough MD   B-D ULTRAFINE III SHORT PEN 31G X 8 MM MISC USE THREE TIMES DAILY 3/15/21   Lilibeth Lew APRN - CNP   lidocaine viscous hcl (XYLOCAINE) 2 % SOLN solution Take 15 mLs by mouth as needed for Dental Pain 2/28/21   Madeleine Chiqui, APRN - CNP   cetirizine (ZYRTEC) 10 MG tablet TAKE 1 TABLET BY MOUTH EVERY NIGHT AT BEDTIME AS NEEDED FOR ALLERGIES 2/25/21   Lilibeth Lewisruss Johnathanbasim, SUDEEP - CNP   Dulaglutide (TRULICITY) 5.05 WJ/6.6JA SOPN Inject 0.75 mg into the skin once a week 2/23/21   Sis Dalton MD   Insulin Degludec (TRESIBA FLEXTOUCH) 100 UNIT/ML SOPN INJECT 40 UNITS UNDER THE SKIN NIGHTLY 2/23/21   Sis Dalton MD   insulin lispro, 1 Unit Dial, (HUMALOG KWIKPEN) 100 UNIT/ML SOPN 4 units at each meals hold if glucose less than 150 2/23/21   Sis Dalton MD   spironolactone (ALDACTONE) 50 MG tablet Take 1 tablet by mouth daily 2/23/21   Sis Dalton MD   OneTouch Delica Lancets 36J MISC qid 2/23/21   Sis Dalton MD   blood glucose test strips (ONETOUCH VERIO) strip 1 each by In Vitro route daily As needed. 2/23/21   Sis Dalton MD   Blood Glucose Monitoring Suppl (Abram Restrepo) w/Device KIT As  Directed 2/23/21   Sis Dalton MD   Insulin Pen Needle (NOVOFINE) 32G X 6 MM MISC qid 2/23/21   Sis Dalton MD   Continuous Blood Gluc Sensor (FREESTYLE JESSICA 14 DAY SENSOR) MISC As directed 2/23/21   Sis Dalton MD   Continuous Blood Gluc  (FREESTYLE JESSICA 14 DAY READER) LENNY As directed 2/23/21   Ssi Dalton MD   baclofen (LIORESAL) 10 MG tablet Take 1 tablet by mouth 3 times daily 2/19/21   [address], SUDEEP - CNP   lidocaine (XYLOCAINE) 5 % ointment Apply topically BID as needed.  2/18/21   Ankur Brambila PA-C   montelukast (SINGULAIR) 10 MG tablet Take 1 tab nightly at supper for breathing/allergies 2/9/21   [address], APRN - CNP   buPROPion (WELLBUTRIN XL) 150 MG extended release tablet Take 1 tablet by mouth every morning 2/9/21   [address], APRN - CNP   tiZANidine (ZANAFLEX) 2 MG tablet Take 1 tablet by mouth 3 times daily as needed (back pain/ spasm) 2/6/21   Ankur Brambila PA-C   butalbital-acetaminophen-caffeine (FIORICET, ESGIC) -09 MG per tablet Take 1 tablet by mouth every 6 hours as needed for Headaches 1/4/21   Khanh Addison MD   pregabalin (LYRICA) 150 MG capsule Take 1 capsule by mouth 3 times daily for 30 days. 12/14/20 1/13/21  SUDEEP Jimenez CNP   DULoxetine (CYMBALTA) 60 MG extended release capsule Take 60 mg by mouth every 24 hours     Historical Provider, MD   ALPRAZolam (XANAX) 0.25 MG tablet Take 0.25 mg by mouth nightly as needed. 3/23/20   Historical Provider, MD   pantoprazole (PROTONIX) 20 MG tablet Take 2 tablets by mouth daily 11/4/20   Riki Majano PA-C   magnesium oxide (MAG-OX) 400 (241.3 Mg) MG TABS tablet TAKE 1/2 TABLET BY MOUTH DAILY 11/2/20   SUDEEP Jimenez CNP   albuterol sulfate HFA (PROVENTIL HFA) 108 (90 Base) MCG/ACT inhaler Inhale 2 puffs into the lungs every 6 hours as needed for Wheezing 9/9/20   SUDEEP Jay CNP   insulin lispro (HUMALOG) 100 UNIT/ML injection vial INJECT 4 UNITS UNDER THE SKIN THREE TIMES DAILY AS NEEDED FOR HIGH BLOOD SUGAR 7/10/20   SUDEEP Jimenez CNP   levothyroxine (SYNTHROID) 150 MCG tablet Take 150 mcg by mouth Daily  6/28/20   Historical Provider, MD   SUMAtriptan (IMITREX) 25 MG tablet TAKE 1 TABLET BY MOUTH 1 TIME AS NEEDED FOR MIGRAINE 5/1/20   SUDEEP Jay CNP   promethazine (PHENERGAN) 25 MG tablet WARNING:  May cause drowsiness. May impair ability to operate vehicles or machinery. Do not use in combination with alcohol. ! Tablet every 6 hours as needed in conjunction with Ultram for headaches 1/18/20   Payton Serna PA-C   blood glucose test strips (EXACTECH TEST) strip 1 each by In Vitro route 3 times daily (Accu-check test strips) As needed.  DX:E11.65 12/2/19   SUDEEP Jimenez CNP   ONE TOUCH ULTRASOFT LANCETS MISC TEST 3 TIMES DAILY AS NEEDED 12/2/19   Lilibeth Tray Jung, APRN - CNP   albuterol (PROVENTIL) (2.5 MG/3ML) 0.083% nebulizer solution Take 3 mLs by nebulization every 4 hours as needed for Wheezing 11/5/19   Maria M Servetas, DO   atorvastatin (LIPITOR) 40 MG tablet Take 1 tablet by mouth nightly 9/11/19   Jn Lyn DO   metoprolol tartrate (LOPRESSOR) 25 MG tablet Take 1 tablet by mouth 2 times daily 9/11/19   Jn Lyn,    fluticasone [facility]) 50 MCG/ACT nasal spray 1 spray by Nasal route daily 6/4/19   SUDEEP Azar CNP   traMADol (ULTRAM) 50 MG tablet Take 50 mg by mouth every 6 hours as needed for Pain.     Historical Provider, MD   Insulin Syringe-Needle U-100 30G X 1/2\" 1 ML MISC 1 each by Does not apply route daily 11/16/18   Sherie Addison, DO   blood glucose test strips (ONETOUCH VERIO) strip TEST 3 TIMES DAILY AS NEEDED 8/16/18   Sherie Addison, DO   Blood Glucose Monitoring Suppl (Delicia Hattie) w/Device KIT TEST 3 TIMES DAILY AS NEEDED 8/16/18   Sherie Addison, DO       Future Appointments   Date Time Provider Valerie Cosby   4/1/2021  1:45 PM Christopher Ballard MD [address]   4/7/2021  3:30 PM Maria Guadalupe Perez MD [facility]   4/29/2021 10:30 AM SUDEEP Flores CNP   5/10/2021 11:30 AM SUDEEP Bello CNP [facility]   11/5/2021  9:15 AM MD Shari Morse

## 2021-03-31 NOTE — TELEPHONE ENCOUNTER
Aylin pt's Chiqui , called office stating pt was  back in the ED yesterday 3/30/2021, this was pt's 6th visit to ED. Pt was left a message to call and schedule f/u visit.

## 2021-03-31 NOTE — ED TRIAGE NOTES
Arrived to the ER for c/o CP. States has been having chest pain t/o the day but increased in intensity approx 30 min ago. Reports pain as a sharp constant pain that is nonradiating. Denies n/v, SOB, or diaphoresis. No fever, chills. Has been having headache for the last week and was started on medications by her PCP for trigeminal neuralgia but pain is unrelieved and feels as though has increased.

## 2021-03-31 NOTE — TELEPHONE ENCOUNTER
I will place a neurology consult.  If sxs are worsening return to er
Patient called because she still has the \"electric shock\" pain on the left side of her face. Her lip and area of face near her lip is still feeling numb. She states that this has gotten worse since her appt on 3/24. Please advise, thank you.
Spoke to patient she stated that she went to the ER last night.
no chest pain and no edema.

## 2021-03-31 NOTE — ED PROVIDER NOTES
[address] [facility] ED  eMERGENCY dEPARTMENT eNCOUnter      Pt Name: Unruly Riggs  MRN: [id number]  [place] 1970  Date of evaluation: 3/30/2021  Provider: Mitesh Lino DO    CHIEF COMPLAINT       Chief Complaint   Patient presents with    Chest Pain         HISTORY OF PRESENT ILLNESS   (Location/Symptom, Timing/Onset,Context/Setting, Quality, Duration, Modifying Factors, Severity)  Note limiting factors. Unruly Riggs is a 49 y.o. female who presents to the emergency department planing of chest pain is reproducible it hurts when she pushes on her right pushed on it. It the pains in the left side of the chest.  The patient's had a chest pain multiple multiple times she has probably 50 EKGs in the computer she was noncompliant with one of the stress test never got the thing completed but she has a negative stress test in 2016. The patient has a history of multiple illnesses she is complaining of chest wall tenderness associated with some shortness of breath. Pain started earlier today in the morning and has been constant but it is gotten worse the last couple of hours tonight. Its achy in nature. Sharp at times she also gets a sharp pain across to her head sometimes last a few seconds. HPI    NursingNotes were reviewed. REVIEW OF SYSTEMS    (2-9 systems for level 4, 10 or more for level 5)     Review of Systems   Constitutional: Negative. Negative for chills and fever. HENT: Negative. Negative for ear pain, sinus pain and sore throat. Eyes: Negative. Negative for pain and discharge. Respiratory: Negative. Negative for cough, chest tightness, shortness of breath and wheezing. Cardiovascular: Positive for chest pain. Negative for palpitations and leg swelling. Gastrointestinal: Negative. Negative for abdominal distention, abdominal pain, blood in stool, diarrhea, nausea and vomiting. Endocrine: Negative. Negative for polydipsia and polyuria.    Genitourinary: nebulization every 4 hours as needed for Wheezing    ALBUTEROL SULFATE HFA (PROVENTIL HFA) 108 (90 BASE) MCG/ACT INHALER    Inhale 2 puffs into the lungs every 6 hours as needed for Wheezing    ALPRAZOLAM (XANAX) 0.25 MG TABLET    Take 0.25 mg by mouth nightly as needed. ATORVASTATIN (LIPITOR) 40 MG TABLET    Take 1 tablet by mouth nightly    B-D ULTRAFINE III SHORT PEN 31G X 8 MM MISC    USE THREE TIMES DAILY    BACLOFEN (LIORESAL) 10 MG TABLET    Take 1 tablet by mouth 3 times daily    BLOOD GLUCOSE MONITORING SUPPL (ONETOUCH VERIO) W/DEVICE KIT    TEST 3 TIMES DAILY AS NEEDED    BLOOD GLUCOSE MONITORING SUPPL (ONETOUCH VERIO) W/DEVICE KIT    As  Directed    BLOOD GLUCOSE TEST STRIPS (EXACTECH TEST) STRIP    1 each by In Vitro route 3 times daily (Accu-check test strips) As needed. DX:E11.65    BLOOD GLUCOSE TEST STRIPS (ONETOUCH VERIO) STRIP    TEST 3 TIMES DAILY AS NEEDED    BLOOD GLUCOSE TEST STRIPS (ONETOUCH VERIO) STRIP    1 each by In Vitro route daily As needed.     BUPROPION (WELLBUTRIN XL) 150 MG EXTENDED RELEASE TABLET    Take 1 tablet by mouth every morning    BUSPIRONE (BUSPAR) 15 MG TABLET    TAKE 1 TABLET BY MOUTH THREE TIMES DAILY    BUTALBITAL-ACETAMINOPHEN-CAFFEINE (FIORICET, ESGIC) -40 MG PER TABLET    Take 1 tablet by mouth every 6 hours as needed for Headaches    CETIRIZINE (ZYRTEC) 10 MG TABLET    TAKE 1 TABLET BY MOUTH EVERY NIGHT AT BEDTIME AS NEEDED FOR ALLERGIES    CONTINUOUS BLOOD GLUC  (FREESTYLE JESSICA 14 DAY READER) LENNY    As directed    CONTINUOUS BLOOD GLUC SENSOR (FREESTYLE JESSICA 14 DAY SENSOR) MISC    As directed    DULAGLUTIDE (TRULICITY) 5.63 JW/0.5XK SOPN    Inject 0.75 mg into the skin once a week    DULOXETINE (CYMBALTA) 60 MG EXTENDED RELEASE CAPSULE    Take 60 mg by mouth every 24 hours     FLUTICASONE (FLONASE) 50 MCG/ACT NASAL SPRAY    1 spray by Nasal route daily    INSULIN DEGLUDEC (TRESIBA FLEXTOUCH) 100 UNIT/ML SOPN    INJECT 40 UNITS UNDER THE SKIN VALACYCLOVIR (VALTREX) 1 G TABLET    Take 1 tablet by mouth 3 times daily for 10 days       ALLERGIES     Shellfish-derived products, Ibuprofen, Ketorolac, Morphine, Other, Penicillins, Propoxyphene n-acetaminophen, and Toradol [ketorolac tromethamine]    FAMILY HISTORY       Family History   Problem Relation Age of Onset    Cancer Father     Diabetes Father     Allergy (Severe) Father     Heart Attack Father     Prostate Cancer Father     High Blood Pressure Mother     Diabetes Mother     Arthritis Mother     High Cholesterol Mother     Vision Loss Mother     Alcohol Abuse Neg Hx     Anemia Neg Hx     Arrhythmia Neg Hx     Asthma Neg Hx     Atrial Fibrillation Neg Hx     Birth Defects Neg Hx     Breast Cancer Neg Hx     Coronary Art Dis Neg Hx     Colon Cancer Neg Hx     Depression Neg Hx     Early Death Neg Hx     Hearing Loss Neg Hx     Heart Disease Neg Hx     Learning Disabilities Neg Hx     Kidney Disease Neg Hx     Mental Illness Neg Hx     Mental Retardation Neg Hx     Miscarriages / Stillbirths Neg Hx     Obesity Neg Hx     Osteoporosis Neg Hx     Stroke Neg Hx     Substance Abuse Neg Hx           SOCIAL HISTORY       Social History     Socioeconomic History    Marital status: Legally      Spouse name: None    Number of children: None    Years of education: 15    Highest education level: High school graduate   Occupational History    None   Social Needs    Financial resource strain: Somewhat hard    Food insecurity     Worry: Sometimes true     Inability: Sometimes true    Transportation needs     Medical: No     Non-medical: No   Tobacco Use    Smoking status: Former Smoker     Packs/day: 0.50     Years: 15.00     Pack years: 7.50     Types: Cigarettes     Start date: 2014     Quit date: 2015     Years since quittin.1    Smokeless tobacco: Former User     Quit date: 3/23/2016   Substance and Sexual Activity    Alcohol use: Not Currently sounds: Normal heart sounds. No murmur. No friction rub. Pulmonary:      Effort: Pulmonary effort is normal. No respiratory distress. Breath sounds: Normal breath sounds. No stridor. No wheezing or rhonchi. Chest:      Chest wall: Tenderness present. Abdominal:      General: Bowel sounds are normal. There is no distension. Palpations: Abdomen is soft. Tenderness: There is no abdominal tenderness. Musculoskeletal: Normal range of motion. General: No swelling, tenderness, deformity or signs of injury. Right lower leg: No edema. Left lower leg: No edema. Skin:     General: Skin is warm and dry. Coloration: Skin is not jaundiced or pale. Findings: No bruising or erythema. Neurological:      Mental Status: She is alert and oriented to person, place, and time.    Psychiatric:         Behavior: Behavior normal.     Chest tender to palpation    DIAGNOSTIC RESULTS     EKG: All EKG's are interpreted by the Emergency Department Physician who either signs or Co-signsthis chart in the absence of a cardiologist.    EKG interpreted by myself shows normal sinus rhythm with a ventricular rate of 86 NM interval 150 ms QT corrected 430 ms there is no ST-T wave changes very normal EKG    RADIOLOGY:   Non-plain filmimages such as CT, Ultrasound and MRI are read by the radiologist. Plain radiographic images are visualized and preliminarily interpreted by the emergency physician with the below findings:    Hest x-ray is nonacute normal cardiopulmonary shadow    Interpretation per the Radiologist below, if available at the time ofthis note:    XR CHEST PORTABLE    (Results Pending)         ED BEDSIDE ULTRASOUND:   Performed by ED Physician - none    LABS:  Labs Reviewed   BASIC METABOLIC PANEL - Abnormal; Notable for the following components:       Result Value    Glucose 270 (*)     CREATININE 1.65 (*)     GFR Non- 32.9 (*)     GFR  39.8 (*)     All

## 2021-04-01 ENCOUNTER — VIRTUAL VISIT (OUTPATIENT)
Dept: ENDOCRINOLOGY | Age: 51
End: 2021-04-01
Payer: MEDICAID

## 2021-04-01 DIAGNOSIS — L68.0 HIRSUTISM: ICD-10-CM

## 2021-04-01 DIAGNOSIS — Z79.4 TYPE 2 DIABETES MELLITUS TREATED WITH INSULIN (HCC): Primary | ICD-10-CM

## 2021-04-01 DIAGNOSIS — E11.9 TYPE 2 DIABETES MELLITUS TREATED WITH INSULIN (HCC): Primary | ICD-10-CM

## 2021-04-01 DIAGNOSIS — E89.0 POSTPROCEDURAL HYPOTHYROIDISM: ICD-10-CM

## 2021-04-01 PROCEDURE — 99442 PR PHYS/QHP TELEPHONE EVALUATION 11-20 MIN: CPT | Performed by: INTERNAL MEDICINE

## 2021-04-01 NOTE — PROGRESS NOTES
2021    TELEHEALTH EVALUATION -- Audio/Visual (During YBF-79 public health emergency)    Due to [place] 19 outbreak, patient's office visit was converted to a virtual visit. Patient was contacted and agreed to proceed with a virtual visit via Telephone Visit  The risks and benefits of converting to a virtual visit were discussed in light of the current infectious disease epidemic. Patient also understood that insurance coverage and co-pays are up to their individual insurance plans. HPI: Telephone visit patient was at home I was at my office    Khalif Farah (:  1970) has requested an audio/video evaluation for the following concern(s):    Follow-up on type 2 diabetes hirsutism hypothyroidism patient is currently on Tresiba 40 units at bedtime and Humalog 4 units with each meals for glucose more than 150 overall blood sugars have improved recently had some type of jaw infection which raises sugars also seen in the emergency room for chest wall pain  Testing blood sugar 3 times daily  Last A1c was 6.8 down from 7.9 before  Patient was unable to get Trulicity due to insurance reasons prior authorization      Labs done through recent visit show increased the testosterone level leading to hirsutism patient is on Aldactone for that    Hypothyroidism on replacement with levothyroxine 150 mcg daily thyroid function test reviewed very close to normal    Results for Amedeo Ledger (MRN [id number]) as of 2021 14:21   Ref. Range 2021 10:50   Testosterone, Females/Children Latest Ref Range: 9 - 55 ng/dL 91 (H)         Results for Amedeo Ledger (MRN [id number]) as of 2021 14:21   Ref. Range 2021 10:50   Testosterone, Free Latest Ref Range: 1.1 - 5.8 pg/mL 14.9 (H)       Results for Amedeo Ledger (MRN [id number]) as of 2021 14:10   Ref.  Range 2018 09:04 2021 10:50   T4 Free Latest Ref Range: 0.84 - 1.68 ng/dL 0.98 1.58       Results for Amedeo Ledger (MRN [id number]) as of 4/1/2021 14:10   Ref. Range 1/10/2020 14:02 2/23/2021 10:50 3/16/2021 00:00   TSH Latest Ref Range: 0.440 - 3.860 uIU/mL 20.110 (H) 0.217 (L) 4.960 (H)       Results for Berta Mirnada (MRN [id number]) as of 4/1/2021 14:10   Ref.  Range 3/24/2021 11:37 3/30/2021 23:45   Sodium Latest Ref Range: 135 - 144 mEq/L  136   Potassium Latest Ref Range: 3.4 - 4.9 mEq/L  4.2   Chloride Latest Ref Range: 95 - 107 mEq/L  102   CO2 Latest Ref Range: 20 - 31 mEq/L  23   BUN Latest Ref Range: 6 - 20 mg/dL  11   Creatinine Latest Ref Range: 0.50 - 0.90 mg/dL  1.65 (H)   Anion Gap Latest Ref Range: 9 - 15 mEq/L  11   GFR Non- Latest Ref Range: >60   32.9 (L)   GFR  Latest Ref Range: >60   39.8 (L)   Glucose Latest Ref Range: 70 - 99 mg/dL  270 (H)   Calcium Latest Ref Range: 8.5 - 9.9 mg/dL  9.3   Troponin Latest Ref Range: 0.000 - 0.010 ng/mL  <0.010   Hemoglobin A1C Latest Units: % 6.8      Patient Active Problem List   Diagnosis    Anxiety    Asthma    Hypertensive disorder    Nontoxic goiter, unspecified    Cervical spondylosis without myelopathy    Spondylosis of lumbar region without myelopathy or radiculopathy--OARRS PM&R 5/24/17    Unspecified abdominal pain    Renal cancer (HCC)    Pure hyperglyceridemia    Morbid obesity (HCC)    Transient cerebral ischemia    Localized swelling, mass and lump, neck    Marijuana use    Chest pain    Meningitis    Shortness of breath    Exposure to communicable disease    Bronchopneumonia    Headache    Type 2 diabetes mellitus treated with insulin ([facility] [address]    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)    Chronic kidney disease, unspecified    Sarcoidosis    Costal chondritis    Intercostal neuralgia    Chest pain, atypical    Personal history of other malignant neoplasm of kidney    Chronic obstructive pulmonary disease, unspecified (HCC)    Tobacco user    Deficient knowledge    Abnormal results of kidney TAKE 1 TABLET BY MOUTH THREE TIMES DAILY  SUDEEP Savage - CNP   valACYclovir (VALTREX) 1 g tablet Take 1 tablet by mouth 3 times daily for 10 days  Arline Patterson PA-C   metoclopramide (REGLAN) 10 MG tablet Take 1 tablet by mouth 2 times daily as needed (Headache)  Hua Torres MD   acetaminophen (TYLENOL) 500 MG tablet Take 1 tablet by mouth 4 times daily as needed for Pain  Hua Torres MD   B-D ULTRAFINE III SHORT PEN 31G X 8 MM MISC USE THREE TIMES DAILY  SUDEEP Talavera CNP   lidocaine viscous hcl (XYLOCAINE) 2 % SOLN solution Take 15 mLs by mouth as needed for Dental Pain  SUDEEP Valentino CNP   cetirizine (ZYRTEC) 10 MG tablet TAKE 1 TABLET BY MOUTH EVERY NIGHT AT BEDTIME AS NEEDED FOR ALLERGIES  SUDEEP Talavear CNP   Dulaglutide (TRULICITY) 4.28 JM/7.0JZ SOPN Inject 0.75 mg into the skin once a week  Christopher Khan MD   Insulin Degludec (TRESIBA FLEXTOUCH) 100 UNIT/ML SOPN INJECT 40 UNITS UNDER THE SKIN NIGHTLY  Christopher Khan MD   insulin lispro, 1 Unit Dial, (HUMALOG KWIKPEN) 100 UNIT/ML SOPN 4 units at each meals hold if glucose less than 150  Christopher Khan MD   spironolactone (ALDACTONE) 50 MG tablet Take 1 tablet by mouth daily  MD Luis MarquezTouch Delica Lancets 87N MIS qid  Laine Bravo MD   blood glucose test strips (ONETOUCH VERIO) strip 1 each by In Vitro route daily As needed. Laine Bravo MD   Blood Glucose Monitoring Suppl ([facility]) w/Device KIT As  Directed  Laine Bravo MD   Insulin Pen Needle (NOVOFINE) 32G X 6 MM MISC qid  Laine Bravo MD   Continuous Blood Gluc Sensor (FREESTYLE JESSICA 14 DAY SENSOR) MISC As directed  Laine Bravo MD   Continuous Blood Gluc  (FREESTYLE JESSICA 14 DAY READER) LENNY As directed  Laine Bravo MD   baclofen (LIORESAL) 10 MG tablet Take 1 tablet by mouth 3 times daily  Lilibeth Boudreaux Gander, APRN - CNP   lidocaine (XYLOCAINE) 5 % ointment Apply topically BID as needed.   ASTON Boland route 3 times daily (Accu-check test strips) As needed. DX:E11.65  SUDEEP Jones CNP   ONE TOUCH ULTRASOFT LANCETS MISC TEST 3 TIMES DAILY AS NEEDED  SUDEEP Myrick CNP   albuterol (PROVENTIL) (2.5 MG/3ML) 0.083% nebulizer solution Take 3 mLs by nebulization every 4 hours as needed for Wheezing  Maria M Ni, DO   atorvastatin (LIPITOR) 40 MG tablet Take 1 tablet by mouth nightly  [facility] DO   metoprolol tartrate (LOPRESSOR) 25 MG tablet Take 1 tablet by mouth 2 times daily  Delmar Carrington, DO   fluticasone (FLONASE) 50 MCG/ACT nasal spray 1 spray by Nasal route daily  SUDEEP Jones CNP   traMADol (ULTRAM) 50 MG tablet Take 50 mg by mouth every 6 hours as needed for Pain.   Historical Provider, MD   Insulin Syringe-Needle U-100 30G X 1/2\" 1 ML MISC 1 each by Does not apply route daily  Priscilla Solorzano,    blood glucose test strips (ONETOUCH VERIO) strip TEST 3 TIMES DAILY AS NEEDED  Priscilla Solorzano DO   Blood Glucose Monitoring Suppl (Mely Modi) w/Device KIT TEST 3 TIMES DAILY AS NEEDED  Priscilla Solorzano, DO       Social History     Tobacco Use    Smoking status: Former Smoker     Packs/day: 0.50     Years: 15.00     Pack years: 7.50     Types: Cigarettes     Start date: 2014     Quit date: 2015     Years since quittin.1    Smokeless tobacco: Former User     Quit date: 3/23/2016   Substance Use Topics    Alcohol use: Not Currently     Alcohol/week: 0.0 standard drinks     Comment: occasionally    Drug use: Yes     Frequency: 5.0 times per week     Types: Marijuana            PHYSICAL EXAMINATION:  [ INSTRUCTIONS:  \"[x]\" Indicates a positive item  \"[]\" Indicates a negative item  -- DELETE ALL ITEMS NOT EXAMINED]  [] Alert  [] Oriented to person/place/time    [] No apparent distress  [] Toxic appearing    [] Face flushed appearing [] Sclera clear  [] Lips are cyanotic      [] Breathing appears normal  [] Appears tachypneic      [] Rash on visible skin    [] Cranial Nerves II-XII grossly intact    [] Motor grossly intact in visible upper extremities    [] Motor grossly intact in visible lower extremities    [] Normal Mood  [] Anxious appearing    [] Depressed appearing  [] Confused appearing      [] Poor short term memory  [] Poor long term memory    [] OTHER:      Due to this being a TeleHealth encounter, evaluation of the following organ systems is limited: Vitals/Constitutional/EENT/Resp/CV/GI//MS/Neuro/Skin/Heme-Lymph-Imm. ASSESSMENT/PLAN:       Diagnosis Orders   1. Type 2 diabetes mellitus treated with insulin (HCC)  Basic Metabolic Panel    Hemoglobin A1C   2. Postprocedural hypothyroidism  T4, Free    TSH without Reflex   3. Hirsutism       Orders Placed This Encounter   Procedures    Basic Metabolic Panel     Standing Status:   Future     Standing Expiration Date:   4/1/2022    T4, Free     Standing Status:   Future     Standing Expiration Date:   4/1/2022    TSH without Reflex     Standing Status:   Future     Standing Expiration Date:   4/1/2022    Hemoglobin A1C     Standing Status:   Future     Standing Expiration Date:   4/1/2022     Continue Tresiba 40 units at bedtime Humalog 4 units 3 times a day continue Synthroid 150 mcg daily and Aldactone 50 mg daily patient to follow-up in 3 months with repeat labs A1c goal of less than 7  An  electronic signature was used to authenticate this note. --Maribell Radford MD on 4/1/2021 at 2:09 PM        Pursuant to the emergency declaration under the [address] [place] [address], [facility] waiver authority and the [employer] Act, this Virtual  Visit was conducted, with patient's consent, to reduce the patient's risk of exposure to COVID-19 and provide continuity of care for an established patient. Services were provided through a video synchronous discussion virtually to substitute for in-person clinic visit.

## 2021-04-07 ENCOUNTER — OFFICE VISIT (OUTPATIENT)
Dept: PULMONOLOGY | Age: 51
End: 2021-04-07
Payer: MEDICAID

## 2021-04-07 VITALS
TEMPERATURE: 97.8 F | WEIGHT: 272.4 LBS | BODY MASS INDEX: 48.27 KG/M2 | DIASTOLIC BLOOD PRESSURE: 78 MMHG | HEART RATE: 84 BPM | OXYGEN SATURATION: 96 % | RESPIRATION RATE: 16 BRPM | SYSTOLIC BLOOD PRESSURE: 132 MMHG | HEIGHT: 63 IN

## 2021-04-07 DIAGNOSIS — D86.9 SARCOIDOSIS: ICD-10-CM

## 2021-04-07 DIAGNOSIS — E66.01 CLASS 3 SEVERE OBESITY DUE TO EXCESS CALORIES WITH SERIOUS COMORBIDITY AND BODY MASS INDEX (BMI) OF 45.0 TO 49.9 IN ADULT (HCC): ICD-10-CM

## 2021-04-07 DIAGNOSIS — G47.30 SLEEP APNEA, UNSPECIFIED TYPE: ICD-10-CM

## 2021-04-07 DIAGNOSIS — J45.41 MODERATE PERSISTENT ASTHMA WITH ACUTE EXACERBATION: ICD-10-CM

## 2021-04-07 DIAGNOSIS — N18.9 CHRONIC KIDNEY DISEASE, UNSPECIFIED CKD STAGE: ICD-10-CM

## 2021-04-07 DIAGNOSIS — D86.9 SARCOIDOSIS: Primary | ICD-10-CM

## 2021-04-07 PROCEDURE — 99214 OFFICE O/P EST MOD 30 MIN: CPT | Performed by: INTERNAL MEDICINE

## 2021-04-07 PROCEDURE — G8427 DOCREV CUR MEDS BY ELIG CLIN: HCPCS | Performed by: INTERNAL MEDICINE

## 2021-04-07 PROCEDURE — 1036F TOBACCO NON-USER: CPT | Performed by: INTERNAL MEDICINE

## 2021-04-07 PROCEDURE — 3017F COLORECTAL CA SCREEN DOC REV: CPT | Performed by: INTERNAL MEDICINE

## 2021-04-07 PROCEDURE — G8417 CALC BMI ABV UP PARAM F/U: HCPCS | Performed by: INTERNAL MEDICINE

## 2021-04-07 RX ORDER — FLUTICASONE FUROATE, UMECLIDINIUM BROMIDE AND VILANTEROL TRIFENATATE 200; 62.5; 25 UG/1; UG/1; UG/1
1 POWDER RESPIRATORY (INHALATION) DAILY
Qty: 1 EACH | Refills: 3 | Status: SHIPPED | OUTPATIENT
Start: 2021-04-07 | End: 2021-06-10

## 2021-04-07 NOTE — PROGRESS NOTES
Subjective:     Markie Jimenez is a 49 y.o. female who complains today of:     Chief Complaint   Patient presents with    New Patient     Asthma and Sarcoidosis       HPI  Patient presents for sarcoidosis, asthma, COPD and sleep apnea    Patient presents for evaluation of above issues, she is to follow-up at [facility] clinic she has not seen her pulmonologist for 1 year, she was on methotrexate but she was taken off it 1 year ago because it did not help, she does report dyspnea on exertion, no chest pain, no nasal congestion or postnasal drip, no heartburn, no lower extremity edema, no heart racing or palpitation, she does have skin rash, she does follow-up with ophthalmology, she had sleep study done previously showing mild obstructive sleep apnea, she quit smoking 6 years ago she smoked for almost 30 years up to 1 pack/day. Weight is stable            Allergies:  Shellfish-derived products, Ibuprofen, Ketorolac, Morphine, Other, Penicillins, Propoxyphene n-acetaminophen, and Toradol [ketorolac tromethamine]  Past Medical History:   Diagnosis Date    Anxiety     Arthritis     Asthma     Bronchopneumonia     Cancer ([address]     renal    Cerebral artery occlusion with cerebral infarction ([address]     Chronic bilateral low back pain with sciatica     Chronic kidney disease     Chronic obstructive lung disease ([address] 7/26/2019    Depression     Fibromyalgia     Hypertension     Insulin dependent type 2 diabetes mellitus, uncontrolled ([address] 8/3/2018    Localized enlarged lymph nodes 10/26/2018    Mixed headache     Pure hyperglyceridemia 5/19/2017    Sarcoidosis     Sleep apnea     does not wear cpap    Thyroid goiter      Past Surgical History:   Procedure Laterality Date    BRONCHOSCOPY  10/26/2018    DR. STEARNS    KIDNEY REMOVAL Right 08/2016    KIDNEY REMOVAL Right 2016    LUNG BIOPSY Right 10/2018    THYROID LOBECTOMY Right 6/13/14    THYROIDECTOMY  02/21/2019    DR. MAGDALENO    THYROIDECTOMY     URETER STENT PLACEMENT Left 2016     Family History   Problem Relation Age of Onset    Cancer Father     Diabetes Father     Allergy (Severe) Father     Heart Attack Father     Prostate Cancer Father     High Blood Pressure Mother     Diabetes Mother     Arthritis Mother     High Cholesterol Mother     Vision Loss Mother     Alcohol Abuse Neg Hx     Anemia Neg Hx     Arrhythmia Neg Hx     Asthma Neg Hx     Atrial Fibrillation Neg Hx     Birth Defects Neg Hx     Breast Cancer Neg Hx     Coronary Art Dis Neg Hx     Colon Cancer Neg Hx     Depression Neg Hx     Early Death Neg Hx     Hearing Loss Neg Hx     Heart Disease Neg Hx     Learning Disabilities Neg Hx     Kidney Disease Neg Hx     Mental Illness Neg Hx     Mental Retardation Neg Hx     Miscarriages / Stillbirths Neg Hx     Obesity Neg Hx     Osteoporosis Neg Hx     Stroke Neg Hx     Substance Abuse Neg Hx      Social History     Socioeconomic History    Marital status: Legally      Spouse name: Not on file    Number of children: Not on file    Years of education: 15    Highest education level: High school graduate   Occupational History    Not on file   Social Needs    Financial resource strain: Somewhat hard    Food insecurity     Worry: Sometimes true     Inability: Sometimes true    Transportation needs     Medical: No     Non-medical: No   Tobacco Use    Smoking status: Former Smoker     Packs/day: 0.50     Years: 15.00     Pack years: 7.50     Types: Cigarettes     Start date: 2014     Quit date: 2015     Years since quittin.2    Smokeless tobacco: Former User     Quit date: 3/23/2016   Substance and Sexual Activity    Alcohol use: Not Currently     Alcohol/week: 0.0 standard drinks     Comment: occasionally    Drug use: Yes     Frequency: 5.0 times per week     Types: Marijuana    Sexual activity: Yes     Partners: Male   Lifestyle    Physical activity     Days per week: 0 days     Minutes per session: 0 min    Stress: Very much   Relationships    Social connections     Talks on phone: Three times a week     Gets together: Never     Attends Jehov[employer]'s witness service: Never     Active member of club or organization: No     Attends meetings of clubs or organizations: Never     Relationship status:     Intimate partner violence     Fear of current or ex partner: No     Emotionally abused: No     Physically abused: No     Forced sexual activity: No   Other Topics Concern    Not on file   Social History Narrative    Not on file         Review of Systems      ROS: 10 organs review of system is done including general, psychological, ENT, hematological, endocrine, respiratory, cardiovascular, gastrointestinal,musculoskeletal, neurological,  allergy and Immunology is done and is otherwise negative.     Current Outpatient Medications   Medication Sig Dispense Refill    Fluticasone-Umeclidin-Vilant (TRELEGY ELLIPTA) 200-62.5-25 MCG/INH AEPB Inhale 1 puff into the lungs daily 1 each 3    busPIRone (BUSPAR) 15 MG tablet TAKE 1 TABLET BY MOUTH THREE TIMES DAILY 90 tablet 5    metoclopramide (REGLAN) 10 MG tablet Take 1 tablet by mouth 2 times daily as needed (Headache) 60 tablet 0    acetaminophen (TYLENOL) 500 MG tablet Take 1 tablet by mouth 4 times daily as needed for Pain 360 tablet 1    B-D ULTRAFINE III SHORT PEN 31G X 8 MM MISC USE THREE TIMES DAILY 100 each 5    cetirizine (ZYRTEC) 10 MG tablet TAKE 1 TABLET BY MOUTH EVERY NIGHT AT BEDTIME AS NEEDED FOR ALLERGIES 30 tablet 5    Insulin Degludec (TRESIBA FLEXTOUCH) 100 UNIT/ML SOPN INJECT 40 UNITS UNDER THE SKIN NIGHTLY 15 mL 3    insulin lispro, 1 Unit Dial, (HUMALOG KWIKPEN) 100 UNIT/ML SOPN 4 units at each meals hold if glucose less than 150 5 pen 3    spironolactone (ALDACTONE) 50 MG tablet Take 1 tablet by mouth daily 30 tablet 3    OneTouch Delica Lancets 26S MISC qid 200 each 3    blood glucose test strips (ONETOUCH alcohol. ! Tablet every 6 hours as needed in conjunction with Ultram for headaches 20 tablet 0    blood glucose test strips (EXACTECH TEST) strip 1 each by In Vitro route 3 times daily (Accu-check test strips) As needed. DX:E11.65 300 strip 5    ONE TOUCH ULTRASOFT LANCETS MISC TEST 3 TIMES DAILY AS NEEDED 300 each 3    albuterol (PROVENTIL) (2.5 MG/3ML) 0.083% nebulizer solution Take 3 mLs by nebulization every 4 hours as needed for Wheezing 120 each 3    atorvastatin (LIPITOR) 40 MG tablet Take 1 tablet by mouth nightly 30 tablet 3    metoprolol tartrate (LOPRESSOR) 25 MG tablet Take 1 tablet by mouth 2 times daily 60 tablet 0    fluticasone (FLONASE) 50 MCG/ACT nasal spray 1 spray by Nasal route daily 1 Bottle 3    Insulin Syringe-Needle U-100 30G X 1/2\" 1 ML MISC 1 each by Does not apply route daily 100 each 3    blood glucose test strips (ONETOUCH VERIO) strip TEST 3 TIMES DAILY AS NEEDED 300 each 3    Blood Glucose Monitoring Suppl (ONETOUCH VERIO) w/Device KIT TEST 3 TIMES DAILY AS NEEDED 1 kit 0    lidocaine viscous hcl (XYLOCAINE) 2 % SOLN solution Take 15 mLs by mouth as needed for Dental Pain (Patient not taking: Reported on 4/7/2021) 15 mL 0    Dulaglutide (TRULICITY) 5.48 TS/1.5IJ SOPN Inject 0.75 mg into the skin once a week (Patient not taking: Reported on 4/7/2021) 4 pen 3    pregabalin (LYRICA) 150 MG capsule Take 1 capsule by mouth 3 times daily for 30 days. 90 capsule 0    SUMAtriptan (IMITREX) 25 MG tablet TAKE 1 TABLET BY MOUTH 1 TIME AS NEEDED FOR MIGRAINE (Patient not taking: Reported on 4/7/2021) 9 tablet 1    traMADol (ULTRAM) 50 MG tablet Take 50 mg by mouth every 6 hours as needed for Pain. No current facility-administered medications for this visit.         Objective:     Vitals:    04/07/21 1529   BP: 132/78   Site: Right Lower Arm   Position: Sitting   Cuff Size: Medium Adult   Pulse: 84   Resp: 16   Temp: 97.8 °F (36.6 °C)   TempSrc: Tympanic   SpO2: 96%   Weight: 272 lb 6.4 oz (123.6 kg)   Height: 5' 3\" (1.6 m)         Physical Exam  Constitutional:       General: She is not in acute distress. Appearance: She is well-developed. She is not diaphoretic. HENT:      Head: Normocephalic and atraumatic. Eyes:      Conjunctiva/sclera: Conjunctivae normal.      Pupils: Pupils are equal, round, and reactive to light. Neck:      Musculoskeletal: Normal range of motion and neck supple. Cardiovascular:      Rate and Rhythm: Normal rate and regular rhythm. Heart sounds: No murmur. No friction rub. No gallop. Pulmonary:      Effort: Pulmonary effort is normal. No respiratory distress. Breath sounds: Normal breath sounds. No wheezing or rales. Chest:      Chest wall: No tenderness. Abdominal:      General: There is no distension. Palpations: Abdomen is soft. Tenderness: There is no abdominal tenderness. There is no rebound. Musculoskeletal:         General: No tenderness. Right lower leg: No edema. Left lower leg: No edema. Lymphadenopathy:      Cervical: No cervical adenopathy. Skin:     General: Skin is warm and dry. Findings: No erythema. Neurological:      Mental Status: She is alert and oriented to person, place, and time. Psychiatric:         Judgment: Judgment normal.         Imaging studies reviewed by me   CT chest reviewed with the patient chest x-ray March 2021, reviewed by me and is unremarkable  Lab results reviewed in chart   home sleep study August 2020, shows AHI 13.5  ECHO: EF 65%     Records from [facility] reviewed  Spirometry 2018 shows FEV1 64%, improved to 89% postbronchodilator, 39% response, normal FEV1 to FVC ratio, DLCO 79%  CT chest 2019 no lymphadenopathy, no reported interstitial lung disease     Assessment and Plan       Diagnosis Orders   1. Sarcoidosis  Full PFT Study With Bronchodilator    CT CHEST HIGH RESOLUTION    Fluticasone-Umeclidin-Vilant (TRELEGY ELLIPTA) 200-62.5-25 MCG/INH AEPB   2.  Chronic kidney disease, unspecified CKD stage  YOHAN Quinn MD, Nephrology, [place]   3. Sleep apnea, unspecified type  Sleep Study with PAP Titration   4. Moderate persistent asthma with acute exacerbation  Fluticasone-Umeclidin-Vilant (TRELEGY ELLIPTA) 200-62.5-25 MCG/INH AEPB   5. Class 3 severe obesity due to excess calories with serious comorbidity and body mass index (BMI) of 45.0 to 49.9 in adult [facility])       · History of sarcoidosis, CT scan 2019 shows no interstitial lung disease, no significant lymphadenopathy, patient has some skin rash which could be due to sarcoidosis, and she does report dyspnea on exertion which could be multifactorial.  Will obtain CT chest, PFT, ACE levels, vitamin D level, and patient had recently renal profile and CBC done which I reviewed in chart. She does show chronic kidney disease and I will refer her to nephrology. Currently not on any systemic treatment for sarcoidosis and will evaluate that after PFT and CT chest.   · Asthma, will start patient on Trelegy Ellipta, and evaluate on follow-up. Will repeat PFT.   · Obstructive sleep apnea, mild, will obtain CPAP titration and initiate treatment results are available  · Weight loss strongly recommended      Orders Placed This Encounter   Procedures    CT CHEST HIGH RESOLUTION     Standing Status:   Future     Standing Expiration Date:   4/7/2022     Order Specific Question:   Reason for exam:     Answer:   sarcoidosis    Angiotensin Converting Enzyme     Standing Status:   Future     Standing Expiration Date:   4/7/2022    Allergen, Respiratory, Region 5 Panel     Standing Status:   Future     Standing Expiration Date:   4/7/2022    Vitamin D 25 Hydroxy     Standing Status:   Future     Standing Expiration Date:   4/7/2022    Vitamin D 1,25 Dihydroxy     Standing Status:   Future     Standing Expiration Date:   4/7/2022   Nelson Quinn MD, Nephrology, [place]     Referral Priority:   Routine Referral Type:   Eval and Treat     Referral Reason:   Specialty Services Required     Referred to Provider:   Willie Bell MD     Requested Specialty:   Nephrology     Number of Visits Requested:   1    Full PFT Study With Bronchodilator     Standing Status:   Future     Standing Expiration Date:   5/7/2021    Sleep Study with PAP Titration     Standing Status:   Future     Standing Expiration Date:   10/7/2022     Order Specific Question:   Sleep Study Titration Type     Answer:   Split Night Study (Baseline followed by PAP Titration)     Order Specific Question:   Location For Sleep Study     Answer:   [place]     Order Specific Question:   Select Sleep Lab Location     Answer:   [facility]     Orders Placed This Encounter   Medications    Fluticasone-Umeclidin-Vilant (Silverio Carl) 013-77.4-42 MCG/INH AEPB     Sig: Inhale 1 puff into the lungs daily     Dispense:  1 each     Refill:  3            Discussed with patient the importance of exercise and weight control and  overall health and well-being. Reviewed with the patient: current clinical status, medications, activities and diet. Side effects, adverse effects of the medication prescribed today, as well as treatment plan and result expectations have been discussed with the patient who expresses understanding and desires to proceed. Return in about 8 weeks (around 6/2/2021).       Alda Galan MD

## 2021-04-08 ENCOUNTER — CARE COORDINATION (OUTPATIENT)
Dept: CARE COORDINATION | Age: 51
End: 2021-04-08

## 2021-04-08 ASSESSMENT — ENCOUNTER SYMPTOMS: DYSPNEA ASSOCIATED WITH: EXERTION

## 2021-04-08 NOTE — CARE COORDINATION
Ambulatory Care Coordination Note  4/8/2021  CM Risk Score: 11  Charlson 10 Year Mortality Risk Score: 100%     ACC: Tevin Dinh, LISHA    Summary Note: I spoke with Jez Rush who reports she saw Dr. Hemal Arellano yesterday and he as ordered multiple test to determine diagnosis and treatment, she is waiting to hear back from scheduling to get scheduled for PFT and sleep study, she also needs to complete some labs but was unable to do so yesterday, she will go back tomorrow, Jez Rush reprots blood sugars are well managed, most recent blood sugar this am was 127, her BP is controlled, states she checks it regularly, current reading 136/71, She did report increase in allergy symptoms, message sent to [facility] [address] with her concern, using nebulizer bid- no increased use thus far,.Racquel states she has all her medications and is taking them as ordered, she completed follow up with Dr. Nile Loza, Aic was 6.8. States no recent episode if chest pain- declines follow up with cardiology as she states she had complete work up last year that was negative. Nephrology referral was ordered with Dr. Zuleika Dover. Jez Rush was in good spirits and is hopeful that testing will provide a diagnosis and treatment for her ongoing issues. Lab Results   Component Value Date    LABA1C 6.8 03/24/2021    LABA1C 7.9 02/09/2021    LABA1C 6.4 (H) 01/10/2020     Care Coordination Plan of Care: This nurse Care Coordinator will follow on ongoing care needs  BP- readings  BG-readings  Allergies,- are they better? ?did she get another medication to help  COPD- how is her breathing- use of nebulizer  Did testing get done PFT and sleep study? ? Nephrology consult, CT chest??  Chest pain, any new episodes  .            Care Coordination Interventions    Program Enrollment: Complex Care  Referral from Primary Care Provider: No  Suggested Interventions and Community Resources  Medi Set or Pill Pack: Completed (Comment: 2/17 using pill box.)  Pharmacist: Completed (Comment: 1/5/2021 [facility] Clinical Rx)  Registered Dietician: Completed (Comment: 1/5/2021 [facility] Dietician)  Social Work: Completed (Comment: 1/5/2021 [address] [place] [address] Social Work for [employer] and long term planning.)  Other Services: Declined (Comment: 1/5/2021 Declined ACP Referral)  Zone Management Tools: In Process (Comment: 1/5/2021 Diabetes and COPD Zones)  Other Services or Interventions: 1/5/2021 instructed on same day, next day, and Walk-In Care. Goals Addressed                 This Visit's Progress     Conditions and Symptoms   Improving     I will schedule office visits, as directed by my provider. I will keep my appointment or reschedule if I have to cancel. I will notify my provider of any barriers to my plan of care. I will follow my Zone Management tool to seek urgent or emergent care. I will notify my provider of any symptoms that indicate a worsening of my condition. COPD- use nebulizer, avoid inhaled irritants, call MD when in yellow zone  Diabetes-modify diet- decrease blood sugars, increase exercise, log blood sugars  2/17  New referrals to endo and pulm  3/9/21New referral to pain management and [facility]    Barriers: lack of education  Plan for overcoming my barriers: Care Coordination, [address] [place] [address] Social Work, Clinical Rx, and [address] [place] [address] Dietician  Confidence: 9/10  Anticipated Goal Completion Date: 4/5/21         COMPLETED: HEMOGLOBIN A1C < 7        NEW DM DX.   2/17/21 aic 7.9  Diteician referral       Medication Management   Improving     I will take my medication as directed. I will notify my provider of any problems with medications, like adverse effects or side effects. I will notify my provider/Care Coordinator if I am unable to afford my medications. I will notify my provider for advice before I stop taking any of my medication. I will use a medication box to improve/assist with medication management.     Using pill box  Beter medication compliance noted    Barriers: lack of education  Plan for overcoming my barriers: Care Coordination, Clinical Rx, pill box to organize meds  Confidence: 10/10  Anticipated Goal Completion Date: 4/5/2021            Prior to Admission medications    Medication Sig Start Date End Date Taking? Authorizing Provider   Fluticasone-Umeclidin-Vilant (TRELEGY ELLIPTA) 200-62.5-25 MCG/INH AEPB Inhale 1 puff into the lungs daily 4/7/21   Ifrah Friday, MD   busPIRone (BUSPAR) 15 MG tablet TAKE 1 TABLET BY MOUTH THREE TIMES DAILY 3/29/21   SUDEEP Gaviria CNP   metoclopramide (REGLAN) 10 MG tablet Take 1 tablet by mouth 2 times daily as needed (Headache) 3/21/21   Chris Garza MD   acetaminophen (TYLENOL) 500 MG tablet Take 1 tablet by mouth 4 times daily as needed for Pain 3/21/21   Chris Garza MD   B-D ULTRAFINE III SHORT PEN 31G X 8 MM MISC USE THREE TIMES DAILY 3/15/21   SUDEEP Griffith CNP   lidocaine viscous hcl (XYLOCAINE) 2 % SOLN solution Take 15 mLs by mouth as needed for Dental Pain  Patient not taking: Reported on 4/7/2021 2/28/21   SUDEEP Cornelius - CNP   cetirizine (ZYRTEC) 10 MG tablet TAKE 1 TABLET BY MOUTH EVERY NIGHT AT BEDTIME AS NEEDED FOR ALLERGIES 2/25/21   SUDEEP Griffith CNP   Dulaglutide (TRULICITY) 8.16 KIRA/7.5KL SOPN Inject 0.75 mg into the skin once a week  Patient not taking: Reported on 4/7/2021 2/23/21   Mala Isaacs MD   Insulin Degludec (TRESIBA FLEXTOUCH) 100 UNIT/ML SOPN INJECT 40 UNITS UNDER THE SKIN NIGHTLY 2/23/21   Mala Isaacs MD   insulin lispro, 1 Unit Dial, (HUMALOG KWIKPEN) 100 UNIT/ML SOPN 4 units at each meals hold if glucose less than 150 2/23/21   Mala Isaacs MD   spironolactone (ALDACTONE) 50 MG tablet Take 1 tablet by mouth daily 2/23/21   Mala Isaacs MD   OneTouch Delica Lancets 47V MISC qid 2/23/21   Mala Isaacs MD   blood glucose test strips (ONETOUCH VERIO) strip 1 each by In Vitro route daily As needed.  2/23/21   Mala Isaacs MD   Blood Glucose Monitoring [facility] VERIO) w/Device KIT As  Directed 2/23/21   Karyn Veliz MD   Insulin Pen Needle (NOVOFINE) 32G X 6 MM MISC qid 2/23/21   Karyn Veliz MD   Continuous Blood Gluc Sensor (FREESTYLE JESSICA 14 DAY SENSOR) MISC As directed 2/23/21   Karyn Veliz MD   Continuous Blood Gluc  (FREESTYLE JESSICA 14 DAY READER) LENNY As directed 2/23/21   Karyn Veliz MD   baclofen (LIORESAL) 10 MG tablet Take 1 tablet by mouth 3 times daily 2/19/21   SUDEEP Perales CNP   lidocaine (XYLOCAINE) 5 % ointment Apply topically BID as needed. 2/18/21   Dang Salguero PA-C   montelukast (SINGULAIR) 10 MG tablet Take 1 tab nightly at supper for breathing/allergies 2/9/21   SUDEEP Perales CNP   buPROPion (WELLBUTRIN XL) 150 MG extended release tablet Take 1 tablet by mouth every morning 2/9/21   SUDEEP Perales CNP   tiZANidine (ZANAFLEX) 2 MG tablet Take 1 tablet by mouth 3 times daily as needed (back pain/ spasm) 2/6/21   Dang Salguero PA-C   butalbital-acetaminophen-caffeine (FIORICET, ESGIC) -01 MG per tablet Take 1 tablet by mouth every 6 hours as needed for Headaches 1/4/21   Marc Garcia MD   pregabalin (LYRICA) 150 MG capsule Take 1 capsule by mouth 3 times daily for 30 days. 12/14/20 1/13/21  SUDEEP Malloy CNP   DULoxetine (CYMBALTA) 60 MG extended release capsule Take 60 mg by mouth every 24 hours     Historical Provider, MD   ALPRAZolam (XANAX) 0.25 MG tablet Take 0.25 mg by mouth nightly as needed.   3/23/20   Historical Provider, MD   pantoprazole (PROTONIX) 20 MG tablet Take 2 tablets by mouth daily 11/4/20   Dang Salguero PA-C   magnesium oxide (MAG-OX) 400 (241.3 Mg) MG TABS tablet TAKE 1/2 TABLET BY MOUTH DAILY 11/2/20   SUDEEP Malloy CNP   albuterol sulfate HFA (PROVENTIL HFA) 108 (90 Base) MCG/ACT inhaler Inhale 2 puffs into the lungs every 6 hours as needed for Wheezing 9/9/20   SUDEEP Malloy CNP   insulin lispro (HUMALOG) 100 UNIT/ML injection vial INJECT 4 UNITS UNDER THE SKIN THREE TIMES DAILY AS NEEDED FOR HIGH BLOOD SUGAR 7/10/20   Lilibeth Abbasi SUDEEP Chi CNP   levothyroxine (SYNTHROID) 150 MCG tablet Take 150 mcg by mouth Daily  6/28/20   Historical Provider, MD   SUMAtriptan (IMITREX) 25 MG tablet TAKE 1 TABLET BY MOUTH 1 TIME AS NEEDED FOR MIGRAINE  Patient not taking: Reported on 4/7/2021 5/1/20   SUDEEP Flores CNP   promethazine (PHENERGAN) 25 MG tablet WARNING:  May cause drowsiness. May impair ability to operate vehicles or machinery. Do not use in combination with alcohol. ! Tablet every 6 hours as needed in conjunction with Ultram for headaches 1/18/20   Tess Pretty PA-C   blood glucose test strips (EXACTECH TEST) strip 1 each by In Vitro route 3 times daily (Accu-check test strips) As needed. DX:E11.65 12/2/19   Lilibethsmitha LucasSUDEEP Escamilla CNP   ONE TOUCH ULTRASOFT LANCETS MISC TEST 3 TIMES DAILY AS NEEDED 12/2/19   Lilibeth Abbasi SUDEEP Chi CNP   albuterol (PROVENTIL) (2.5 MG/3ML) 0.083% nebulizer solution Take 3 mLs by nebulization every 4 hours as needed for Wheezing 11/5/19   Maria M Ni,    atorvastatin (LIPITOR) 40 MG tablet Take 1 tablet by mouth nightly 9/11/19   Natali Noble,    metoprolol tartrate (LOPRESSOR) 25 MG tablet Take 1 tablet by mouth 2 times daily 9/11/19   Natali Noble,    fluticasone [facility]) 50 MCG/ACT nasal spray 1 spray by Nasal route daily 6/4/19   SUDEEP Flores CNP   traMADol (ULTRAM) 50 MG tablet Take 50 mg by mouth every 6 hours as needed for Pain.     Historical Provider, MD   Insulin Syringe-Needle U-100 30G X 1/2\" 1 ML MISC 1 each by Does not apply route daily 11/16/18   Vijaya Hays,    blood glucose test strips (ONETOUCH VERIO) strip TEST 3 TIMES DAILY AS NEEDED 8/16/18   Vijaya Hays DO   Blood Glucose Monitoring Suppl Daniella Ware) w/Device KIT TEST 3 TIMES DAILY AS NEEDED 8/16/18   Vijaya Hays, DO       Future Appointments   Date Time Provider Valerie Cosby

## 2021-04-09 ENCOUNTER — CARE COORDINATION (OUTPATIENT)
Dept: CARE COORDINATION | Age: 51
End: 2021-04-09

## 2021-04-09 NOTE — CARE COORDINATION
Received request from Rudy Cancino to contact patient to assist with transpotation options for an appointment next week, Monday. Called patient to discuss transportation options. Patient reports that she just received a reminder call for her appointment today and reports that she needs a ride to this appointment that is scheduled on Monday. Discuss transportation benefit through her insurance company. Patient reports that she did call her insurance to request a ride, however they need a 3 day notice in advance to schedule appointment, so this option will not work. Shared with patient that this writer can offer local cab contact information, however not sure of cost. Patient reports that she does not have the money to pay for transportation at this time. Patient reports that she will reschedule this appointment for another time due to transportation concern.

## 2021-04-12 DIAGNOSIS — J30.9 ALLERGIC RHINITIS, UNSPECIFIED SEASONALITY, UNSPECIFIED TRIGGER: Primary | ICD-10-CM

## 2021-04-12 RX ORDER — FLUTICASONE PROPIONATE 50 MCG
1 SPRAY, SUSPENSION (ML) NASAL DAILY
Qty: 1 BOTTLE | Refills: 0 | Status: SHIPPED | OUTPATIENT
Start: 2021-04-12 | End: 2021-05-18

## 2021-04-14 ENCOUNTER — CARE COORDINATION (OUTPATIENT)
Dept: CARE COORDINATION | Age: 51
End: 2021-04-14

## 2021-04-14 DIAGNOSIS — J45.41 MODERATE PERSISTENT ASTHMA WITH ACUTE EXACERBATION: Primary | ICD-10-CM

## 2021-04-14 LAB — VITAMIN D 25-HYDROXY: 8.1 NG/ML (ref 30–100)

## 2021-04-14 RX ORDER — BUDESONIDE AND FORMOTEROL FUMARATE DIHYDRATE 160; 4.5 UG/1; UG/1
2 AEROSOL RESPIRATORY (INHALATION) 2 TIMES DAILY
Qty: 1 INHALER | Refills: 3 | Status: SHIPPED | OUTPATIENT
Start: 2021-04-14

## 2021-04-14 ASSESSMENT — ENCOUNTER SYMPTOMS: DYSPNEA ASSOCIATED WITH: EXERTION

## 2021-04-14 NOTE — CARE COORDINATION
Ambulatory Care Coordination Note  4/14/2021  CM Risk Score: 11  Charlson 10 Year Mortality Risk Score: 100%     ACC: Sharyle Jakob, RN    Summary Note: I had called Dolly Marcos earlier for care coordination follow up-Racquel called me back. She was at Rhuematology appointment today , she states Dr. Cleo Nathan  made some medication changes but she is not sure of the name of new medication, she will let me know after she picks it up from the pharmacy. Dolly Marcos will also be following up pain doctor at [address] [facility] to recall name  She has not followed with Jadien Stephen yet. States she has had to many appointments , she will call to schedule. BP readings have been \"ok\"  Some high readings related pain, Blood sugars are running good 149,today- she picked up Flonase, states allergies are better after use of flonase- no increased use of nebulizer  Using nebulizer bid, Nephrology new pt visit with Dr. Cielo Hung scheduled for  5/7/21. PFT scheduled, she will call scheduling [phone] and schedule CT of chest today, and will try to get labs done soon, sleep lab has not called her to schedule sleep study, I called sleep lab  and left message requesting they contact Dolly Marcos to schedule. no Dolly Marcos reports nonew episodes of chest pain or headaches. Care Coordination Plan of Care: This nurse Care Coordinator will follow on ongoing care needs  BP- readings  BG-readings  Allergies,- is she doing ok? COPD- how is her breathing- use of nebulizer ? ?more? ? Needs testing get done ---PFT,.sleep study? CT chest??labs? Nephrology consult? Pain consult? ?  RA med? ?  Chest pain/HA, any new episodes?         Care Coordination Interventions    Program Enrollment: Complex Care  Referral from Primary Care Provider: No  Suggested Interventions and Community Resources  Medi Set or Pill Pack: Completed (Comment: 2/17 using pill box.)  Pharmacist: Completed (Comment: 1/5/2021 [facility] Clinical Rx)  Registered Dietician: Completed (Comment: 1/5/2021 [facility] Dietician)  Social Work: Completed (Comment: 1/5/2021 [address]  [facility] Social Work for [employer] and long term planning.)  Other Services: Declined (Comment: 1/5/2021 Declined ACP Referral)  Zone Management Tools: In Process (Comment: 1/5/2021 Diabetes and COPD Zones)  Other Services or Interventions: 1/5/2021 instructed on same day, next day, and Walk-In Care. Goals Addressed                 This Visit's Progress     Conditions and Symptoms   Improving     I will schedule office visits, as directed by my provider. I will keep my appointment or reschedule if I have to cancel. I will notify my provider of any barriers to my plan of care. I will follow my Zone Management tool to seek urgent or emergent care. I will notify my provider of any symptoms that indicate a worsening of my condition. COPD- use nebulizer, avoid inhaled irritants, call MD when in yellow zone  Diabetes-modify diet- decrease blood sugars, increase exercise, log blood sugars  2/17  New referrals to endo and pulm  3/9/21New referral to pain management and [facility]  4/12 nephrology referral, ct chest PFT and sleep study ordered    Barriers: lack of education  Plan for overcoming my barriers: Care Coordination, [facility]  [facility] Social Work, Clinical Rx, and [facility] Dietician  Confidence: 9/10  Anticipated Goal Completion Date: 5/5/21         Medication Management   Improving     I will take my medication as directed. I will notify my provider of any problems with medications, like adverse effects or side effects. I will notify my provider/Care Coordinator if I am unable to afford my medications. I will notify my provider for advice before I stop taking any of my medication. I will use a medication box to improve/assist with medication management.     Using pill box  Beter medication compliance noted    Barriers: lack of education  Plan for overcoming my barriers: Care Coordination, Clinical Rx, pill box to organize meds  Confidence: 10/10 Anticipated Goal Completion Date: 4/5/2021            Prior to Admission medications    Medication Sig Start Date End Date Taking?  Authorizing Provider   budesonide-formoterol (SYMBICORT) 160-4.5 MCG/ACT AERO Inhale 2 puffs into the lungs 2 times daily 4/14/21   Nat Benites MD   tiotropium (SPIRIVA RESPIMAT) 2.5 MCG/ACT AERS inhaler Inhale 2 puffs into the lungs daily 4/14/21   Nat Benites MD   fluticasone (FLONASE) 50 MCG/ACT nasal spray 1 spray by Each Nostril route daily 4/12/21   SUDEEP Choi CNP   Fluticasone-Umeclidin-Vilant (TRELEGY ELLIPTA) 200-62.5-25 MCG/INH AEPB Inhale 1 puff into the lungs daily 4/7/21   Nat Benites MD   busPIRone (BUSPAR) 15 MG tablet TAKE 1 TABLET BY MOUTH THREE TIMES DAILY 3/29/21   SUDEEP Huff CNP   metoclopramide (REGLAN) 10 MG tablet Take 1 tablet by mouth 2 times daily as needed (Headache) 3/21/21   Malcolm Bardales MD   acetaminophen (TYLENOL) 500 MG tablet Take 1 tablet by mouth 4 times daily as needed for Pain 3/21/21   Malcolm Bardales MD   B-D ULTRAFINE III SHORT PEN 31G X 8 MM MISC USE THREE TIMES DAILY 3/15/21   SUDEEP Linda CNP   lidocaine viscous hcl (XYLOCAINE) 2 % SOLN solution Take 15 mLs by mouth as needed for Dental Pain  Patient not taking: Reported on 4/7/2021 2/28/21   SUDEEP Bettencourt CNP   cetirizine (ZYRTEC) 10 MG tablet TAKE 1 TABLET BY MOUTH EVERY NIGHT AT BEDTIME AS NEEDED FOR ALLERGIES 2/25/21   SUDEEP Linda CNP   Dulaglutide (TRULICITY) 6.49 UO/9.0OV SOPN Inject 0.75 mg into the skin once a week  Patient not taking: Reported on 4/7/2021 2/23/21   Joya Rush MD   Insulin Degludec (TRESIBA FLEXTOUCH) 100 UNIT/ML SOPN INJECT 40 UNITS UNDER THE SKIN NIGHTLY 2/23/21   Joya Rush MD   insulin lispro, 1 Unit Dial, (HUMALOG KWIKPEN) 100 UNIT/ML SOPN 4 units at each meals hold if glucose less than 150 2/23/21   Joya Rush MD   spironolactone (ALDACTONE) 50 MG tablet Take 1 tablet by mouth daily 2/23/21   MD Bella Marquezuch Delica Lancets 33Q MISC qid 2/23/21   Laine Bravo MD   blood glucose test strips (ONETOUCH VERIO) strip 1 each by In Vitro route daily As needed. 2/23/21   Laine Bravo MD   Blood Glucose Monitoring Suppl ([facility]) w/Device KIT As  Directed 2/23/21   Laine Bravo MD   Insulin Pen Needle (NOVOFINE) 32G X 6 MM MISC qid 2/23/21   Laine Bravo MD   Continuous Blood Gluc Sensor (FREESTYLE JESSICA 14 DAY SENSOR) MISC As directed 2/23/21   Laine Bravo MD   Continuous Blood Gluc  (FREESTYLE JESSICA 14 DAY READER) LENNY As directed 2/23/21   Laine Bravo MD   baclofen (LIORESAL) 10 MG tablet Take 1 tablet by mouth 3 times daily 2/19/21   SUDEEP Merchant CNP   lidocaine (XYLOCAINE) 5 % ointment Apply topically BID as needed. 2/18/21   Whitney Cruz PA-C   montelukast (SINGULAIR) 10 MG tablet Take 1 tab nightly at supper for breathing/allergies 2/9/21   SUDEEP Merchant CNP   buPROPion (WELLBUTRIN XL) 150 MG extended release tablet Take 1 tablet by mouth every morning 2/9/21   SUDEEP Merchant CNP   tiZANidine (ZANAFLEX) 2 MG tablet Take 1 tablet by mouth 3 times daily as needed (back pain/ spasm) 2/6/21   Whitney Cruz PA-C   butalbital-acetaminophen-caffeine (FIORICET, ESGIC) -39 MG per tablet Take 1 tablet by mouth every 6 hours as needed for Headaches 1/4/21   Addie Bowers MD   pregabalin (LYRICA) 150 MG capsule Take 1 capsule by mouth 3 times daily for 30 days. 12/14/20 1/13/21  SUDEEP Talavera CNP   DULoxetine (CYMBALTA) 60 MG extended release capsule Take 60 mg by mouth every 24 hours     Historical Provider, MD   ALPRAZolam (XANAX) 0.25 MG tablet Take 0.25 mg by mouth nightly as needed.   3/23/20   Historical Provider, MD   pantoprazole (PROTONIX) 20 MG tablet Take 2 tablets by mouth daily 11/4/20   Whitney Cruz PA-C   magnesium oxide (MAG-OX) 400 (241.3 Mg) MG TABS tablet TAKE 1/2 TABLET BY MOUTH DAILY 11/2/20   Lilibeth GARZA SUDEEP Burgos CNP   albuterol sulfate HFA (PROVENTIL HFA) 108 (90 Base) MCG/ACT inhaler Inhale 2 puffs into the lungs every 6 hours as needed for Wheezing 9/9/20   SUDEEP Rice CNP   insulin lispro (HUMALOG) 100 UNIT/ML injection vial INJECT 4 UNITS UNDER THE SKIN THREE TIMES DAILY AS NEEDED FOR HIGH BLOOD SUGAR 7/10/20   SUDEEP Gonsales CNP   levothyroxine (SYNTHROID) 150 MCG tablet Take 150 mcg by mouth Daily  6/28/20   Historical Provider, MD   SUMAtriptan (IMITREX) 25 MG tablet TAKE 1 TABLET BY MOUTH 1 TIME AS NEEDED FOR MIGRAINE  Patient not taking: Reported on 4/7/2021 5/1/20   SUDEEP Rice CNP   promethazine (PHENERGAN) 25 MG tablet WARNING:  May cause drowsiness. May impair ability to operate vehicles or machinery. Do not use in combination with alcohol. ! Tablet every 6 hours as needed in conjunction with Ultram for headaches 1/18/20   Lisa Tabares PA-C   blood glucose test strips (EXACTECH TEST) strip 1 each by In Vitro route 3 times daily (Accu-check test strips) As needed. DX:E11.65 12/2/19   SUDEEP Gonsales CNP   ONE TOUCH ULTRASOFT LANCETS MISC TEST 3 TIMES DAILY AS NEEDED 12/2/19   SUDEEP Gonsales CNP   albuterol (PROVENTIL) (2.5 MG/3ML) 0.083% nebulizer solution Take 3 mLs by nebulization every 4 hours as needed for Wheezing 11/5/19   Maria M Servetas, DO   atorvastatin (LIPITOR) 40 MG tablet Take 1 tablet by mouth nightly 9/11/19   Valeen Lunger, DO   metoprolol tartrate (LOPRESSOR) 25 MG tablet Take 1 tablet by mouth 2 times daily 9/11/19   Valeen Lunger, DO   traMADol (ULTRAM) 50 MG tablet Take 50 mg by mouth every 6 hours as needed for Pain.     Historical Provider, MD   Insulin Syringe-Needle U-100 30G X 1/2\" 1 ML MISC 1 each by Does not apply route daily 11/16/18   Alexey Burleson, DO   blood glucose test strips (ONETOUCH VERIO) strip TEST 3 TIMES DAILY AS NEEDED 8/16/18   Alexey Burleson DO   Blood Glucose Monitoring Suppl [facility] VERIO) w/Device KIT TEST 3 TIMES DAILY AS NEEDED 8/16/18   Marjorie Rich, DO       Future Appointments   Date Time Provider Valerie Alea   4/20/2021  5:00 PM [place] CT ROOM 1 [facility]LOZ CT [facility] Fac RAD   4/26/2021 12:15 PM [place] PFT ROOM [facility] PFT [address] [place] [address]   4/29/2021 10:30 AM Rustam Costa APRN - CNP [facility] EMERGENCY [facility] AT [place]   5/10/2021 11:30 AM SUDEEP Malloy - CNP [facility] [place]   6/17/2021  3:30 PM Ginny Reis MD [address]   11/5/2021  9:15 AM Joya Olson MD [facility]

## 2021-04-17 LAB
ANGIOTENSIN CONVERTING ENZYME: 60 U/L (ref 9–67)
VITAMIN D 1,25-DIHYDROXY: 44.9 PG/ML (ref 19.9–79.3)

## 2021-04-19 ENCOUNTER — HOSPITAL ENCOUNTER (EMERGENCY)
Age: 51
Discharge: LEFT BEFORE DISCHARGE | End: 2021-04-19
Payer: MEDICAID

## 2021-04-19 VITALS
HEART RATE: 97 BPM | WEIGHT: 272 LBS | BODY MASS INDEX: 48.2 KG/M2 | TEMPERATURE: 98.2 F | SYSTOLIC BLOOD PRESSURE: 127 MMHG | RESPIRATION RATE: 22 BRPM | HEIGHT: 63 IN | DIASTOLIC BLOOD PRESSURE: 79 MMHG | OXYGEN SATURATION: 97 %

## 2021-04-19 LAB
2000687N OAK TREE IGE: 0.26 KU/L (ref 0–0.34)
ALLERGEN BERMUDA GRASS IGE: 0.22 KU/L (ref 0–0.34)
ALLERGEN BIRCH IGE: 0.11 KU/L (ref 0–0.34)
ALLERGEN DOG DANDER IGE: 18.8 KU/L (ref 0–0.34)
ALLERGEN GERMAN COCKROACH IGE: 5.28 KU/L (ref 0–0.34)
ALLERGEN HORMODENDRUM IGE: 0.29 KUL/L (ref 0–0.34)
ALLERGEN MOUSE EPITHELIA IGE: 1.92 KU/L (ref 0–0.34)
ALLERGEN PECAN TREE IGE: 0.18 KU/L (ref 0–0.34)
ALLERGEN PIGWEED ROUGH IGE: 0.12 KU/L (ref 0–0.34)
ALLERGEN SHEEP SORREL (W18) IGE: 0.26 KU/L (ref 0–0.34)
ALLERGEN TREE SYCAMORE: 0.26 KU/L (ref 0–0.34)
ALLERGEN WALNUT TREE IGE: 0.18 KU/L (ref 0–0.34)
ALLERGEN WHITE MULBERRY TREE, IGE: 0.11 KU/L (ref 0–0.34)
ALLERGEN, TREE, WHITE ASH IGE: 0.26 KU/L (ref 0–0.34)
ALTERNARIA ALTERNATA: 0.26 KU/L (ref 0–0.34)
ASPERGILLUS FUMIGATUS: 0.4 KU/L (ref 0–0.34)
CAT DANDER ANTIBODY: 18.6 KU/L (ref 0–0.34)
COTTONWOOD TREE: 0.19 KU/L (ref 0–0.34)
D. FARINAE: 45.8 KU/L (ref 0–0.34)
D. PTERONYSSINUS: 30.5 KU/L (ref 0–0.34)
ELM TREE: 0.26 KU/L (ref 0–0.34)
IGE: 1802 IU/ML
MAPLE/BOXELDER TREE: 0.27 KU/L (ref 0–0.34)
MOUNTAIN CEDAR TREE: 0.68 KU/L (ref 0–0.34)
MUCOR RACEMOSUS: 0.15 KU/L (ref 0–0.34)
P. NOTATUM: 0.49 KU/L (ref 0–0.34)
RUSSIAN THISTLE: 0.15 KU/L (ref 0–0.34)
SHORT RAGWD(A ARTEMIS.) IGE: 0.21 KU/L (ref 0–0.34)
TIMOTHY GRASS: 0.23 KU/L (ref 0–0.34)

## 2021-04-19 ASSESSMENT — PAIN SCALES - GENERAL: PAINLEVEL_OUTOF10: 9

## 2021-04-19 NOTE — ED NOTES
Attempted to call pt two more times for treatment. Pt still not in waiting room or outside.       Shahana Rangel RN  04/19/21 [id number]

## 2021-04-19 NOTE — ED TRIAGE NOTES
H/o sarcoidosis, known to ED for same, c/o middle chest pain consistent with sarcoid pain she experiences

## 2021-04-20 ENCOUNTER — HOSPITAL ENCOUNTER (OUTPATIENT)
Dept: CT IMAGING | Age: 51
Discharge: HOME | End: 2021-04-22
Payer: MEDICAID

## 2021-04-20 ENCOUNTER — CARE COORDINATION (OUTPATIENT)
Dept: CARE COORDINATION | Age: 51
End: 2021-04-20

## 2021-04-20 DIAGNOSIS — D86.9 SARCOIDOSIS: ICD-10-CM

## 2021-04-20 PROCEDURE — 71250 CT THORAX DX C-: CPT

## 2021-04-20 NOTE — CARE COORDINATION
Ambulatory Care Coordination Note  4/20/2021  CM Risk Score: 11  Charlson 10 Year Mortality Risk Score: 100%     ACC: Pedro Brito, RN    Summary Note:  I called Deion Llanos for an ER follow up, she did not answer, I left a message with the nature of my call and requested a call back. [address] called me back-She states her chest was hurting really bad last night, she went to both [facility] ER and [address] [place] [address] ER- At [address] [place] [address] -work up done and Pain was relieved after they gave her Percocet and Dilaudid- she was sent home and  was instructed to follow up with Pulmonary. Reminded Deion Llanos that  has chest CT scheduled for today at 5pm, she states she plans to go, she is still drowsy from last night, she will take a nap and then try to make it to her test, she was given the number to scheduling in case she needs to reschedule. Care Coordination Plan of Care: This nurse Care Coordinator will follow on ongoing care needs  BP- readings  BG-readings  Allergies,- is she doing ok? COPD- how is her breathing- use of nebulizer ? ?more? ? Needs testing get done ---PFT,.sleep study? CT chest??labs? Nephrology consult? Pain consult? ?  RA med? ?  Chest pain/HA, any new episodes?         Future Appointments   Date Time Provider Valerie Cosby   4/20/2021  5:00 PM [place] CT ROOM 1 MLOZ CT [facility] Fac RAD   4/26/2021 12:15 PM [place] PFT ROOM [address]   4/29/2021 10:30 AM [facility] Clarke [facility] [place]   5/10/2021 11:30 AM Lilibeth Hawkins [facility]N - CNP [facility] [place]   6/17/2021  3:30 PM Sabrina Moseley MD [facility]   11/5/2021  9:15 AM Morgan Roe MD [facility]

## 2021-04-21 RX ORDER — LEVOTHYROXINE SODIUM 0.15 MG/1
150 TABLET ORAL DAILY
Qty: 30 TABLET | Refills: 3 | Status: SHIPPED | OUTPATIENT
Start: 2021-04-21 | End: 2021-07-08 | Stop reason: SDUPTHER

## 2021-04-27 ENCOUNTER — CARE COORDINATION (OUTPATIENT)
Dept: CARE COORDINATION | Age: 51
End: 2021-04-27

## 2021-04-27 ASSESSMENT — ENCOUNTER SYMPTOMS: DYSPNEA ASSOCIATED WITH: EXERTION

## 2021-04-27 NOTE — CARE COORDINATION
Ambulatory Care Coordination Note  4/27/2021  CM Risk Score: 11  Charlson 10 Year Mortality Risk Score: 100%     ACC: Maddie Pinon RN    Summary Note: I called Santo Nazaroi to discuss ongoing care needs, she states she got covid shot Saturday and feltl a little under the weather, feels better today, had HA and nausea-in response to vaccine,states her BP was 147/70 today and her BG  [id number]. Santo Nazario has follow up with pain management 6/2/21. She will reschedule neurology later, as she has too many appointments coming up. She is taking new RA medications  she will check her medications and let me know next time what their names are so I can add to her medication list,. Allergies are better, using flonase as needed-less use of rescue inhalers. This nurse Care Coordinator will follow on ongoing care needs  BP- readings  BG-readings  Allergies,- is she doing ok? COPD- how is her breathing- use of nebulizer ??more? ? Needs testing get done ---PFT,.sleep study? CT chest??labs? Nephrology consult? Pain consult? ?  RA med? ?  Chest pain/HA, any new episodes?         Care Coordination Interventions    Program Enrollment: Complex Care  Referral from Primary Care Provider: No  Suggested Interventions and Community Resources  Medi Set or Pill Pack: Completed (Comment: 2/17 using pill box.)  Pharmacist: Completed (Comment: 1/5/2021 [facility] [employer] Clinical Rx)  Registered Dietician: Completed (Comment: 1/5/2021 [facility] Dietician)  Social Work: Completed (Comment: 1/5/2021 [facility] [employer] Social Work for [employer] and long term planning.)  Other Services: Declined (Comment: 1/5/2021 Declined ACP Referral)  Zone Management Tools: In Process (Comment: 1/5/2021 Diabetes and COPD Zones)  Other Services or Interventions: 1/5/2021 instructed on same day, next day, and Walk-In Care. Goals Addressed                 This Visit's Progress     Conditions and Symptoms   Improving     I will schedule office visits, as directed by my provider.   I will keep my appointment or reschedule if I have to cancel. I will notify my provider of any barriers to my plan of care. I will follow my Zone Management tool to seek urgent or emergent care. I will notify my provider of any symptoms that indicate a worsening of my condition. COPD- use nebulizer, avoid inhaled irritants, call MD when in yellow zone  Diabetes-modify diet- decrease blood sugars, increase exercise, log blood sugars  2/17  New referrals to endo and pulm  3/9/21New referral to pain management and [facility]  4/12 nephrology referral, ct chest PFT and sleep study ordered    Barriers: lack of education  Plan for overcoming my barriers: Care Coordination, [address] [facility] Social Work, Clinical Rx, and [address] [facility] Dietician  Confidence: 9/10  Anticipated Goal Completion Date: 5/5/21              Prior to Admission medications    Medication Sig Start Date End Date Taking?  Authorizing Provider   Insulin Degludec (TRESIBA FLEXTOUCH) 100 UNIT/ML SOPN INJECT 40 UNITS UNDER THE SKIN NIGHTLY 2/23/21  Yes Yaneli Loza MD   insulin lispro, 1 Unit Dial, (HUMALOG KWIKPEN) 100 UNIT/ML SOPN 4 units at each meals hold if glucose less than 150 2/23/21  Yes Christopher Khan MD   insulin lispro (HUMALOG) 100 UNIT/ML injection vial INJECT 4 UNITS UNDER THE SKIN THREE TIMES DAILY AS NEEDED FOR HIGH BLOOD SUGAR 7/10/20  Yes SUDEEP Saleh - CNP   levothyroxine (SYNTHROID) 150 MCG tablet Take 1 tablet by mouth Daily 4/21/21   Yaneli Loza MD   budesonide-formoterol (SYMBICORT) 160-4.5 MCG/ACT AERO Inhale 2 puffs into the lungs 2 times daily 4/14/21   Sekou Carey MD   tiotropium (SPIRIVA RESPIMAT) 2.5 MCG/ACT AERS inhaler Inhale 2 puffs into the lungs daily 4/14/21   Sekou Carey MD   fluticasone (FLONASE) 50 MCG/ACT nasal spray 1 spray by Each Nostril route daily 4/12/21   SUDEEP Nair - CNP   Fluticasone-Umeclidin-Vilant (TRELEGY ELLIPTA) 200-62.5-25 MCG/INH AEPB Inhale 1 puff into the lungs daily 4/7/21   Manaf Barbara Pérez MD   busPIRone (BUSPAR) 15 MG tablet TAKE 1 TABLET BY MOUTH THREE TIMES DAILY 3/29/21   SUDEEP Brooks CNP   metoclopramide (REGLAN) 10 MG tablet Take 1 tablet by mouth 2 times daily as needed (Headache) 3/21/21   Mayte Gould MD   acetaminophen (TYLENOL) 500 MG tablet Take 1 tablet by mouth 4 times daily as needed for Pain 3/21/21   Mayte Gould MD   B-D ULTRAFINE III SHORT PEN 31G X 8 MM MISC USE THREE TIMES DAILY 3/15/21   SUDEEP Estrada CNP   lidocaine viscous hcl (XYLOCAINE) 2 % SOLN solution Take 15 mLs by mouth as needed for Dental Pain  Patient not taking: Reported on 4/7/2021 2/28/21   SUDEEP Simpson CNP   cetirizine (ZYRTEC) 10 MG tablet TAKE 1 TABLET BY MOUTH EVERY NIGHT AT BEDTIME AS NEEDED FOR ALLERGIES 2/25/21   SUDEEP Estrada CNP   Dulaglutide (TRULICITY) 5.11 UB/5.5TZ SOPN Inject 0.75 mg into the skin once a week  Patient not taking: Reported on 4/7/2021 2/23/21   Shahida Green MD   spironolactone (ALDACTONE) 50 MG tablet Take 1 tablet by mouth daily 2/23/21   MD Luis RiveroTouch Delica Lancets 51G MISC qid 2/23/21   Shahida Green MD   blood glucose test strips (ONETOUCH VERIO) strip 1 each by In Vitro route daily As needed. 2/23/21   Shahida Green MD   Blood Glucose Monitoring Suppl [facility]) w/Device KIT As  Directed 2/23/21   Shahida Green MD   Insulin Pen Needle (NOVOFINE) 32G X 6 MM MISC qid 2/23/21   Shahida Green MD   Continuous Blood Gluc Sensor (FREESTYLE JESSICA 14 DAY SENSOR) MISC As directed 2/23/21   Shahida Green MD   Continuous Blood Gluc  (FREESTYLE JESSICA 14 DAY READER) LENNY As directed 2/23/21   Shahida Green MD   baclofen (LIORESAL) 10 MG tablet Take 1 tablet by mouth 3 times daily 2/19/21   SUDEEP Negro - CNP   lidocaine (XYLOCAINE) 5 % ointment Apply topically BID as needed.  2/18/21   Gurvinder Boss PA-C   montelukast (SINGULAIR) 10 MG tablet Take 1 tab nightly at supper for breathing/allergies 2/9/21 SUDEEP Simpson CNP   buPROPion (WELLBUTRIN XL) 150 MG extended release tablet Take 1 tablet by mouth every morning 2/9/21   SUDEEP Simpson CNP   tiZANidine (ZANAFLEX) 2 MG tablet Take 1 tablet by mouth 3 times daily as needed (back pain/ spasm) 2/6/21   Taiwo Macias PA-C   butalbital-acetaminophen-caffeine (FIORICET, ESGIC) -04 MG per tablet Take 1 tablet by mouth every 6 hours as needed for Headaches 1/4/21   Flo Pena MD   pregabalin (LYRICA) 150 MG capsule Take 1 capsule by mouth 3 times daily for 30 days. 12/14/20 1/13/21  SUDEEP Rivas CNP   DULoxetine (CYMBALTA) 60 MG extended release capsule Take 60 mg by mouth every 24 hours     Historical Provider, MD   ALPRAZolam (XANAX) 0.25 MG tablet Take 0.25 mg by mouth nightly as needed. 3/23/20   Historical Provider, MD   pantoprazole (PROTONIX) 20 MG tablet Take 2 tablets by mouth daily 11/4/20   Taiwo Macias PA-C   magnesium oxide (MAG-OX) 400 (241.3 Mg) MG TABS tablet TAKE 1/2 TABLET BY MOUTH DAILY 11/2/20   SUDEEP Rivas CNP   albuterol sulfate HFA (PROVENTIL HFA) 108 (90 Base) MCG/ACT inhaler Inhale 2 puffs into the lungs every 6 hours as needed for Wheezing 9/9/20   SUDEEP Simpson CNP   SUMAtriptan (IMITREX) 25 MG tablet TAKE 1 TABLET BY MOUTH 1 TIME AS NEEDED FOR MIGRAINE  Patient not taking: Reported on 4/7/2021 5/1/20   SUDEEP Simpson CNP   promethazine (PHENERGAN) 25 MG tablet WARNING:  May cause drowsiness. May impair ability to operate vehicles or machinery. Do not use in combination with alcohol. ! Tablet every 6 hours as needed in conjunction with Ultram for headaches 1/18/20   Rylie Roth PA-C   blood glucose test strips (EXACTECH TEST) strip 1 each by In Vitro route 3 times daily (Accu-check test strips) As needed.  DX:E11.65 12/2/19   Carrington Counts, APRN - CNP   ONE TOUCH ULTRASOFT LANCETS MISC TEST 3 TIMES DAILY AS NEEDED 12/2/19   Zeb Counts, APRN - CNP   albuterol (PROVENTIL) (2.5 MG/3ML) 0.083% nebulizer solution Take 3 mLs by nebulization every 4 hours as needed for Wheezing 11/5/19   Maria M Servetas, DO   atorvastatin (LIPITOR) 40 MG tablet Take 1 tablet by mouth nightly 9/11/19   Radha German, DO   metoprolol tartrate (LOPRESSOR) 25 MG tablet Take 1 tablet by mouth 2 times daily 9/11/19   Radha German, DO   traMADol (ULTRAM) 50 MG tablet Take 50 mg by mouth every 6 hours as needed for Pain.     Historical Provider, MD   Insulin Syringe-Needle U-100 30G X 1/2\" 1 ML MISC 1 each by Does not apply route daily 11/16/18   Theresa Lungchichi, DO   blood glucose test strips (ONETOUCH VERIO) strip TEST 3 TIMES DAILY AS NEEDED 8/16/18   [facility], DO   Blood Glucose Monitoring Suppl ([facility]) w/Device KIT TEST 3 TIMES DAILY AS NEEDED 8/16/18   [facility], DO       Future Appointments   Date Time Provider Valerie Cosby   5/3/2021  3:00 PM [place] PFT ROOM [address]   5/10/2021 11:30 AM SUDEEP Santos - CNP [facility]   6/17/2021  3:30 PM Hayder Peterson MD [address]   11/5/2021  9:15 AM Maureen Stearns MD [facility]

## 2021-04-28 ENCOUNTER — APPOINTMENT (OUTPATIENT)
Dept: GENERAL RADIOLOGY | Age: 51
End: 2021-04-28
Payer: MEDICAID

## 2021-04-28 ENCOUNTER — HOSPITAL ENCOUNTER (EMERGENCY)
Age: 51
Discharge: HOME | End: 2021-04-29
Payer: MEDICAID

## 2021-04-28 DIAGNOSIS — R07.89 ATYPICAL CHEST PAIN: Primary | ICD-10-CM

## 2021-04-28 LAB
ALBUMIN SERPL-MCNC: 4.1 G/DL (ref 3.5–4.6)
ALP BLD-CCNC: 152 U/L (ref 40–130)
ALT SERPL-CCNC: 15 U/L (ref 0–33)
ANION GAP SERPL CALCULATED.3IONS-SCNC: 12 MEQ/L (ref 9–15)
AST SERPL-CCNC: 15 U/L (ref 0–35)
BASOPHILS ABSOLUTE: 0.1 K/UL (ref 0–0.2)
BASOPHILS RELATIVE PERCENT: 0.8 %
BILIRUB SERPL-MCNC: <0.2 MG/DL (ref 0.2–0.7)
BUN BLDV-MCNC: 16 MG/DL (ref 6–20)
CALCIUM SERPL-MCNC: 9.2 MG/DL (ref 8.5–9.9)
CHLORIDE BLD-SCNC: 102 MEQ/L (ref 95–107)
CK MB: 2.1 NG/ML (ref 0–3.8)
CO2: 22 MEQ/L (ref 20–31)
CREAT SERPL-MCNC: 1.36 MG/DL (ref 0.5–0.9)
CREATINE KINASE-MB INDEX: 0.7 % (ref 0–3.5)
EOSINOPHILS ABSOLUTE: 0.3 K/UL (ref 0–0.7)
EOSINOPHILS RELATIVE PERCENT: 3.3 %
GFR AFRICAN AMERICAN: 49.8
GFR NON-AFRICAN AMERICAN: 41.1
GLOBULIN: 3.4 G/DL (ref 2.3–3.5)
GLUCOSE BLD-MCNC: 247 MG/DL (ref 70–99)
HCT VFR BLD CALC: 44 % (ref 37–47)
HEMOGLOBIN: 14.9 G/DL (ref 12–16)
LYMPHOCYTES ABSOLUTE: 2.1 K/UL (ref 1–4.8)
LYMPHOCYTES RELATIVE PERCENT: 22.5 %
MCH RBC QN AUTO: 32.1 PG (ref 27–31.3)
MCHC RBC AUTO-ENTMCNC: 33.7 % (ref 33–37)
MCV RBC AUTO: 95.3 FL (ref 82–100)
MONOCYTES ABSOLUTE: 0.6 K/UL (ref 0.2–0.8)
MONOCYTES RELATIVE PERCENT: 5.9 %
NEUTROPHILS ABSOLUTE: 6.4 K/UL (ref 1.4–6.5)
NEUTROPHILS RELATIVE PERCENT: 67.5 %
PDW BLD-RTO: 13.6 % (ref 11.5–14.5)
PLATELET # BLD: 275 K/UL (ref 130–400)
POTASSIUM SERPL-SCNC: 4.8 MEQ/L (ref 3.4–4.9)
RBC # BLD: 4.62 M/UL (ref 4.2–5.4)
SODIUM BLD-SCNC: 136 MEQ/L (ref 135–144)
TOTAL CK: 297 U/L (ref 0–170)
TOTAL PROTEIN: 7.5 G/DL (ref 6.3–8)
TROPONIN: <0.01 NG/ML (ref 0–0.01)
WBC # BLD: 9.5 K/UL (ref 4.8–10.8)

## 2021-04-28 PROCEDURE — 84484 ASSAY OF TROPONIN QUANT: CPT

## 2021-04-28 PROCEDURE — 6360000002 HC RX W HCPCS: Performed by: PHYSICIAN ASSISTANT

## 2021-04-28 PROCEDURE — 85025 COMPLETE CBC W/AUTO DIFF WBC: CPT

## 2021-04-28 PROCEDURE — 96374 THER/PROPH/DIAG INJ IV PUSH: CPT

## 2021-04-28 PROCEDURE — 99283 EMERGENCY DEPT VISIT LOW MDM: CPT

## 2021-04-28 PROCEDURE — 82550 ASSAY OF CK (CPK): CPT

## 2021-04-28 PROCEDURE — 36415 COLL VENOUS BLD VENIPUNCTURE: CPT

## 2021-04-28 PROCEDURE — 80053 COMPREHEN METABOLIC PANEL: CPT

## 2021-04-28 PROCEDURE — 82553 CREATINE MB FRACTION: CPT

## 2021-04-28 PROCEDURE — 71045 X-RAY EXAM CHEST 1 VIEW: CPT

## 2021-04-28 PROCEDURE — 96375 TX/PRO/DX INJ NEW DRUG ADDON: CPT

## 2021-04-28 PROCEDURE — 93005 ELECTROCARDIOGRAM TRACING: CPT | Performed by: PHYSICIAN ASSISTANT

## 2021-04-28 PROCEDURE — 96376 TX/PRO/DX INJ SAME DRUG ADON: CPT

## 2021-04-28 RX ORDER — ONDANSETRON 2 MG/ML
4 INJECTION INTRAMUSCULAR; INTRAVENOUS ONCE
Status: COMPLETED | OUTPATIENT
Start: 2021-04-28 | End: 2021-04-28

## 2021-04-28 RX ADMIN — ONDANSETRON 4 MG: 2 INJECTION INTRAMUSCULAR; INTRAVENOUS at 21:45

## 2021-04-28 RX ADMIN — HYDROMORPHONE HYDROCHLORIDE 0.5 MG: 1 INJECTION, SOLUTION INTRAMUSCULAR; INTRAVENOUS; SUBCUTANEOUS at 23:00

## 2021-04-28 RX ADMIN — HYDROMORPHONE HYDROCHLORIDE 0.5 MG: 1 INJECTION, SOLUTION INTRAMUSCULAR; INTRAVENOUS; SUBCUTANEOUS at 21:45

## 2021-04-28 ASSESSMENT — PAIN SCALES - GENERAL: PAINLEVEL_OUTOF10: 9

## 2021-04-28 ASSESSMENT — ENCOUNTER SYMPTOMS
APNEA: 0
TROUBLE SWALLOWING: 0
ALLERGIC/IMMUNOLOGIC NEGATIVE: 1
ABDOMINAL PAIN: 0
EYE PAIN: 0
COLOR CHANGE: 0

## 2021-04-28 ASSESSMENT — PAIN DESCRIPTION - DESCRIPTORS: DESCRIPTORS: ACHING

## 2021-04-28 ASSESSMENT — PAIN DESCRIPTION - PAIN TYPE: TYPE: ACUTE PAIN

## 2021-04-29 VITALS
BODY MASS INDEX: 44.3 KG/M2 | OXYGEN SATURATION: 98 % | HEART RATE: 78 BPM | TEMPERATURE: 98.3 F | SYSTOLIC BLOOD PRESSURE: 132 MMHG | WEIGHT: 250 LBS | DIASTOLIC BLOOD PRESSURE: 71 MMHG | RESPIRATION RATE: 20 BRPM | HEIGHT: 63 IN

## 2021-04-29 ASSESSMENT — PAIN SCALES - GENERAL: PAINLEVEL_OUTOF10: 5

## 2021-04-29 ASSESSMENT — PAIN DESCRIPTION - LOCATION: LOCATION: CHEST

## 2021-04-29 NOTE — ED NOTES
IV dc'd cath intact. No signs of redness, swelling, or bleeding. Dressing applied. Discharge instructions given to and explained to pt. Pt verbalized understanding and denies questions at this time. Pt stable at discharge.       Chay Clifford RN  04/29/21 [id number]

## 2021-04-29 NOTE — ED PROVIDER NOTES
[address] [facility] ED  EMERGENCY DEPARTMENT ENCOUNTER      Pt Name: Markie Jimenez  MRN: [id number]  [place] 1970  Date of evaluation: 4/28/2021  Provider: Amy Brandt PA-C    CHIEF COMPLAINT       Chief Complaint   Patient presents with    Chest Pain         HISTORY OF PRESENT ILLNESS   (Location/Symptom, Timing/Onset, Context/Setting, Quality, Duration, Modifying Factors, Severity)  Note limiting factors. Markie Jimenez is a 49 y.o. female who presents to the emergency department with complaints of anterior chest pain that has been ongoing all day today. Patient states that she received her Covid vaccine on Saturday and since then she has been feeling generally unwell. Patient has a history of sarcoidosis with multiple emergency department visits for chest pain in the past.  When asked what patient was here for she states \"the same thing I am always here for\". Patient denies any vomiting or diarrhea. Patient denies any known fevers or chills. HPI    Nursing Notes were reviewed. REVIEW OF SYSTEMS    (2-9 systems for level 4, 10 or more for level 5)     Review of Systems   Constitutional: Negative for diaphoresis and fever. HENT: Negative for hearing loss and trouble swallowing. Eyes: Negative for pain. Respiratory: Negative for apnea. Cardiovascular: Positive for chest pain. Gastrointestinal: Negative for abdominal pain. Endocrine: Negative. Genitourinary: Negative for hematuria. Musculoskeletal: Negative for neck pain and neck stiffness. Skin: Negative for color change. Allergic/Immunologic: Negative. Neurological: Negative for dizziness and numbness. Hematological: Negative. Psychiatric/Behavioral: Negative. All other systems reviewed and are negative. Except as noted above the remainder of the review of systems was reviewed and negative.        PAST MEDICAL HISTORY     Past Medical History:   Diagnosis Date    Anxiety     Arthritis     Asthma  Bronchopneumonia     Cancer ([facility] [address]     renal    Cerebral artery occlusion with cerebral infarction (HCC)     Chronic bilateral low back pain with sciatica     Chronic kidney disease     Chronic obstructive lung disease ([facility] [address] 7/26/2019    Depression     Fibromyalgia     Hypertension     Insulin dependent type 2 diabetes mellitus, uncontrolled ([facility] [address] 8/3/2018    Localized enlarged lymph nodes 10/26/2018    Mixed headache     Pure hyperglyceridemia 5/19/2017    Sarcoidosis     Sleep apnea     does not wear cpap    Thyroid goiter          SURGICAL HISTORY       Past Surgical History:   Procedure Laterality Date    BRONCHOSCOPY  10/26/2018    DR. STEARNS    KIDNEY REMOVAL Right 08/2016    KIDNEY REMOVAL Right 2016    LUNG BIOPSY Right 10/2018    THYROID LOBECTOMY Right 6/13/14    THYROIDECTOMY  02/21/2019    DR. MAGDALENO    THYROIDECTOMY  2018    URETER STENT PLACEMENT Left 08/2016         CURRENT MEDICATIONS       Previous Medications    ACETAMINOPHEN (TYLENOL) 500 MG TABLET    Take 1 tablet by mouth 4 times daily as needed for Pain    ALBUTEROL (PROVENTIL) (2.5 MG/3ML) 0.083% NEBULIZER SOLUTION    Take 3 mLs by nebulization every 4 hours as needed for Wheezing    ALBUTEROL SULFATE HFA (PROVENTIL HFA) 108 (90 BASE) MCG/ACT INHALER    Inhale 2 puffs into the lungs every 6 hours as needed for Wheezing    ALPRAZOLAM (XANAX) 0.25 MG TABLET    Take 0.25 mg by mouth nightly as needed. ATORVASTATIN (LIPITOR) 40 MG TABLET    Take 1 tablet by mouth nightly    B-D ULTRAFINE III SHORT PEN 31G X 8 MM MISC    USE THREE TIMES DAILY    BACLOFEN (LIORESAL) 10 MG TABLET    Take 1 tablet by mouth 3 times daily    BLOOD GLUCOSE MONITORING SUPPL (ONETOUCH VERIO) W/DEVICE KIT    TEST 3 TIMES DAILY AS NEEDED    BLOOD GLUCOSE MONITORING SUPPL (ONETOUCH VERIO) W/DEVICE KIT    As  Directed    BLOOD GLUCOSE TEST STRIPS (EXACTECH TEST) STRIP    1 each by In Vitro route 3 times daily (Accu-check test strips) As needed. DX: E11.65    BLOOD GLUCOSE TEST STRIPS (ONETOUCH VERIO) STRIP    TEST 3 TIMES DAILY AS NEEDED    BLOOD GLUCOSE TEST STRIPS (ONETOUCH VERIO) STRIP    1 each by In Vitro route daily As needed. BUDESONIDE-FORMOTEROL (SYMBICORT) 160-4.5 MCG/ACT AERO    Inhale 2 puffs into the lungs 2 times daily    BUPROPION (WELLBUTRIN XL) 150 MG EXTENDED RELEASE TABLET    Take 1 tablet by mouth every morning    BUSPIRONE (BUSPAR) 15 MG TABLET    TAKE 1 TABLET BY MOUTH THREE TIMES DAILY    BUTALBITAL-ACETAMINOPHEN-CAFFEINE (FIORICET, ESGIC) -40 MG PER TABLET    Take 1 tablet by mouth every 6 hours as needed for Headaches    CETIRIZINE (ZYRTEC) 10 MG TABLET    TAKE 1 TABLET BY MOUTH EVERY NIGHT AT BEDTIME AS NEEDED FOR ALLERGIES    CONTINUOUS BLOOD GLUC  (FREESTYLE JESSICA 14 DAY READER) LENNY    As directed    CONTINUOUS BLOOD GLUC SENSOR (FREESTYLE JESSICA 14 DAY SENSOR) MISC    As directed    DULAGLUTIDE (TRULICITY) 8.27 LF/6.3NU SOPN    Inject 0.75 mg into the skin once a week    DULOXETINE (CYMBALTA) 60 MG EXTENDED RELEASE CAPSULE    Take 60 mg by mouth every 24 hours     FLUTICASONE (FLONASE) 50 MCG/ACT NASAL SPRAY    1 spray by Each Nostril route daily    FLUTICASONE-UMECLIDIN-VILANT (TRELEGY ELLIPTA) 200-62.5-25 MCG/INH AEPB    Inhale 1 puff into the lungs daily    INSULIN DEGLUDEC (TRESIBA FLEXTOUCH) 100 UNIT/ML SOPN    INJECT 40 UNITS UNDER THE SKIN NIGHTLY    INSULIN LISPRO (HUMALOG) 100 UNIT/ML INJECTION VIAL    INJECT 4 UNITS UNDER THE SKIN THREE TIMES DAILY AS NEEDED FOR HIGH BLOOD SUGAR    INSULIN LISPRO, 1 UNIT DIAL, (HUMALOG KWIKPEN) 100 UNIT/ML SOPN    4 units at each meals hold if glucose less than 150    INSULIN PEN NEEDLE (NOVOFINE) 32G X 6 MM MISC    qid    INSULIN SYRINGE-NEEDLE U-100 30G X 1/2\" 1 ML MISC    1 each by Does not apply route daily    LEVOTHYROXINE (SYNTHROID) 150 MCG TABLET    Take 1 tablet by mouth Daily    LIDOCAINE (XYLOCAINE) 5 % OINTMENT    Apply topically BID as needed.     LIDOCAINE VISCOUS HCL (XYLOCAINE) 2 % SOLN SOLUTION    Take 15 mLs by mouth as needed for Dental Pain    MAGNESIUM OXIDE (MAG-OX) 400 (241.3 MG) MG TABS TABLET    TAKE 1/2 TABLET BY MOUTH DAILY    METOCLOPRAMIDE (REGLAN) 10 MG TABLET    Take 1 tablet by mouth 2 times daily as needed (Headache)    METOPROLOL TARTRATE (LOPRESSOR) 25 MG TABLET    Take 1 tablet by mouth 2 times daily    MONTELUKAST (SINGULAIR) 10 MG TABLET    Take 1 tab nightly at supper for breathing/allergies    ONE TOUCH ULTRASOFT LANCETS MISC    TEST 3 TIMES DAILY AS NEEDED    ONETOUCH DELICA LANCETS 52S MISC    qid    PANTOPRAZOLE (PROTONIX) 20 MG TABLET    Take 2 tablets by mouth daily    PREGABALIN (LYRICA) 150 MG CAPSULE    Take 1 capsule by mouth 3 times daily for 30 days. PROMETHAZINE (PHENERGAN) 25 MG TABLET    WARNING:  May cause drowsiness. May impair ability to operate vehicles or machinery. Do not use in combination with alcohol. ! Tablet every 6 hours as needed in conjunction with Ultram for headaches    SPIRONOLACTONE (ALDACTONE) 50 MG TABLET    Take 1 tablet by mouth daily    SUMATRIPTAN (IMITREX) 25 MG TABLET    TAKE 1 TABLET BY MOUTH 1 TIME AS NEEDED FOR MIGRAINE    TIOTROPIUM (SPIRIVA RESPIMAT) 2.5 MCG/ACT AERS INHALER    Inhale 2 puffs into the lungs daily    TIZANIDINE (ZANAFLEX) 2 MG TABLET    Take 1 tablet by mouth 3 times daily as needed (back pain/ spasm)    TRAMADOL (ULTRAM) 50 MG TABLET    Take 50 mg by mouth every 6 hours as needed for Pain.        ALLERGIES     Shellfish-derived products, Ibuprofen, Ketorolac, Morphine, Other, Penicillins, Propoxyphene n-acetaminophen, and Toradol [ketorolac tromethamine]    FAMILY HISTORY       Family History   Problem Relation Age of Onset    Cancer Father     Diabetes Father     Allergy (Severe) Father     Heart Attack Father     Prostate Cancer Father     High Blood Pressure Mother     Diabetes Mother     Arthritis Mother     High Cholesterol Mother     Vision Loss Mother     Alcohol Abuse Neg Hx     Anemia Neg Hx     Arrhythmia Neg Hx     Asthma Neg Hx     Atrial Fibrillation Neg Hx     Birth Defects Neg Hx     Breast Cancer Neg Hx     Coronary Art Dis Neg Hx     Colon Cancer Neg Hx     Depression Neg Hx     Early Death Neg Hx     Hearing Loss Neg Hx     Heart Disease Neg Hx     Learning Disabilities Neg Hx     Kidney Disease Neg Hx     Mental Illness Neg Hx     Mental Retardation Neg Hx     Miscarriages / Stillbirths Neg Hx     Obesity Neg Hx     Osteoporosis Neg Hx     Stroke Neg Hx     Substance Abuse Neg Hx           SOCIAL HISTORY       Social History     Socioeconomic History    Marital status: Legally      Spouse name: None    Number of children: None    Years of education: 15    Highest education level: High school graduate   Occupational History    None   Social Needs    Financial resource strain: Somewhat hard    Food insecurity     Worry: Sometimes true     Inability: Sometimes true    Transportation needs     Medical: No     Non-medical: No   Tobacco Use    Smoking status: Former Smoker     Packs/day: 0.50     Years: 15.00     Pack years: 7.50     Types: Cigarettes     Start date: 2014     Quit date: 2015     Years since quittin.2    Smokeless tobacco: Former User     Quit date: 3/23/2016   Substance and Sexual Activity    Alcohol use: Not Currently     Alcohol/week: 0.0 standard drinks     Comment: occasionally    Drug use: Yes     Frequency: 5.0 times per week     Types: Marijuana    Sexual activity: Yes     Partners: Male   Lifestyle    Physical activity     Days per week: 0 days     Minutes per session: 0 min    Stress: Very much   Relationships    Social connections     Talks on phone:  Three times a week     Gets together: Never     Attends Nondenominational service: Never     Active member of club or organization: No     Attends meetings of clubs or organizations: Never     Relationship status:     Intimate partner violence     Fear of current or ex partner: No     Emotionally abused: No     Physically abused: No     Forced sexual activity: No   Other Topics Concern    None   Social History Narrative    None       SCREENINGS                        PHYSICAL EXAM    (up to 7 for level 4, 8 or more for level 5)     ED Triage Vitals [04/28/21 2105]   BP Temp Temp Source Pulse Resp SpO2 Height Weight   (!) 146/72 98.3 °F (36.8 °C) Temporal 81 18 99 % 5' 3\" (1.6 m) 250 lb (113.4 kg)       Physical Exam  Vitals signs and nursing note reviewed. Constitutional:       General: She is not in acute distress. Appearance: She is well-developed. She is not diaphoretic. HENT:      Head: Normocephalic and atraumatic. Mouth/Throat:      Pharynx: No oropharyngeal exudate. Eyes:      General: No scleral icterus. Conjunctiva/sclera: Conjunctivae normal.      Pupils: Pupils are equal, round, and reactive to light. Neck:      Musculoskeletal: Normal range of motion and neck supple. Trachea: No tracheal deviation. Cardiovascular:      Rate and Rhythm: Normal rate. Heart sounds: Normal heart sounds. Pulmonary:      Effort: Pulmonary effort is normal. No respiratory distress. Breath sounds: Normal breath sounds. Abdominal:      General: Bowel sounds are normal. There is no distension. Palpations: Abdomen is soft. Musculoskeletal: Normal range of motion. Skin:     General: Skin is warm and dry. Findings: No erythema or rash. Neurological:      Mental Status: She is alert and oriented to person, place, and time. Cranial Nerves: No cranial nerve deficit. Motor: No abnormal muscle tone. Psychiatric:         Behavior: Behavior normal.         Thought Content:  Thought content normal.         Judgment: Judgment normal.         DIAGNOSTIC RESULTS     EKG: All EKG's are interpreted by the Emergency Department Physician who either signs or Co-signs this chart in the absence of a cardiologist.    Normal sinus rhythm, rate 77 bpm, no acute ST elevation or ischemic changes    RADIOLOGY:   Non-plain film images such as CT, Ultrasound and MRI are read by the radiologist. Plain radiographic images are visualized and preliminarily interpreted by the emergency physician with the below findings:        Interpretation per the Radiologist below, if available at the time of this note:    XR CHEST PORTABLE    (Results Pending)         ED BEDSIDE ULTRASOUND:   Performed by ED Physician - none    LABS:  Labs Reviewed   COMPREHENSIVE METABOLIC PANEL - Abnormal; Notable for the following components:       Result Value    Glucose 247 (*)     CREATININE 1.36 (*)     GFR Non- 41.1 (*)     GFR  49.8 (*)     Alkaline Phosphatase 152 (*)     All other components within normal limits   CBC WITH AUTO DIFFERENTIAL - Abnormal; Notable for the following components:    MCH 32.1 (*)     All other components within normal limits   CK - Abnormal; Notable for the following components: Total  (*)     All other components within normal limits   TROPONIN   CKMB & RELATIVE PERCENT       All other labs were within normal range or not returned as of this dictation. EMERGENCY DEPARTMENT COURSE and DIFFERENTIAL DIAGNOSIS/MDM:   Vitals:    Vitals:    04/28/21 2105 04/28/21 2300 04/28/21 2315 04/28/21 2320   BP: (!) 146/72 130/71     Pulse: 81  78 81   Resp: 18  22 24   Temp: 98.3 °F (36.8 °C)      TempSrc: Temporal      SpO2: 99% 97% 97% 99%   Weight: 250 lb (113.4 kg)      Height: 5' 3\" (1.6 m)            MDM      REASSESSMENT        Patient presented to the emergency department with acute exacerbation of her chronic chest pain. Laboratory testing and EKG done as screening tools and negative. Patient be discharged home with PCP follow-up    CONSULTS:  None    PROCEDURES:  Unless otherwise noted below, none     Procedures        FINAL IMPRESSION      1.  Atypical chest pain

## 2021-04-30 LAB
EKG ATRIAL RATE: 77 BPM
EKG P AXIS: 41 DEGREES
EKG P-R INTERVAL: 150 MS
EKG Q-T INTERVAL: 374 MS
EKG QRS DURATION: 82 MS
EKG QTC CALCULATION (BAZETT): 423 MS
EKG R AXIS: 17 DEGREES
EKG T AXIS: 19 DEGREES
EKG VENTRICULAR RATE: 77 BPM

## 2021-04-30 PROCEDURE — 93010 ELECTROCARDIOGRAM REPORT: CPT | Performed by: INTERNAL MEDICINE

## 2021-05-03 ENCOUNTER — HOSPITAL ENCOUNTER (OUTPATIENT)
Dept: PULMONOLOGY | Age: 51
Discharge: HOME | End: 2021-05-03
Payer: MEDICAID

## 2021-05-03 DIAGNOSIS — D86.9 SARCOIDOSIS: ICD-10-CM

## 2021-05-03 PROCEDURE — 94726 PLETHYSMOGRAPHY LUNG VOLUMES: CPT

## 2021-05-03 PROCEDURE — 99291 CRITICAL CARE FIRST HOUR: CPT | Performed by: INTERNAL MEDICINE

## 2021-05-03 PROCEDURE — 94729 DIFFUSING CAPACITY: CPT

## 2021-05-03 PROCEDURE — 94010 BREATHING CAPACITY TEST: CPT

## 2021-05-04 ENCOUNTER — CARE COORDINATION (OUTPATIENT)
Dept: CARE COORDINATION | Age: 51
End: 2021-05-04

## 2021-05-04 NOTE — PROCEDURES
[address]                      [facility], [address] [facility]                    PULMONARY FUNCTION  Doralee Seton   49 y.o.   female  Height 63 in  Weight 250 lb      Referring provider   Cherylene Morgans, MD    Reading provider   Chasity Blakely    Test meets ATS criteria for acceptability and reproducibility Yes    Diagnosis: HINES: Yes  Cough   Yes, wheezing Yes  Smoking   quit 6.5 years ago    Spirometry   FVC            2.81 L   100%     FEV1          2.25 L  100%     FEV1/FVC  79  %                ZUB04-29% 2.16 L  90%       Lung volume   SVC           2.91 L  104%   RV              1.48 L 85%   TLC            4.39  L 89%   RV/TLC      33 %    DLCO           99 %     Test interpretation      Spirometry lung volume and DLCO are normal        Clinical correlation is recommended     Gray Garvin [facility], 5/4/2021 11:15 AM

## 2021-05-04 NOTE — CARE COORDINATION
Addressed    None         Prior to Admission medications    Medication Sig Start Date End Date Taking?  Authorizing Provider   levothyroxine (SYNTHROID) 150 MCG tablet Take 1 tablet by mouth Daily 4/21/21   David Chapin MD   budesonide-formoterol (SYMBICORT) 160-4.5 MCG/ACT AERO Inhale 2 puffs into the lungs 2 times daily 4/14/21   Geraldo Bullock MD   tiotropium (SPIRIVA RESPIMAT) 2.5 MCG/ACT AERS inhaler Inhale 2 puffs into the lungs daily 4/14/21   Geraldo Bullock MD   fluticasone (FLONASE) 50 MCG/ACT nasal spray 1 spray by Each Nostril route daily 4/12/21   SUDEEP Davies CNP   Fluticasone-Umeclidin-Vilant (TRELEGY ELLIPTA) 200-62.5-25 MCG/INH AEPB Inhale 1 puff into the lungs daily 4/7/21   Geraldo Bullock MD   busPIRone (BUSPAR) 15 MG tablet TAKE 1 TABLET BY MOUTH THREE TIMES DAILY 3/29/21   SUDEEP Leslie CNP   metoclopramide (REGLAN) 10 MG tablet Take 1 tablet by mouth 2 times daily as needed (Headache) 3/21/21   Ursula Rodas MD   acetaminophen (TYLENOL) 500 MG tablet Take 1 tablet by mouth 4 times daily as needed for Pain 3/21/21   Ursula Rodas MD   B-D ULTRAFINE III SHORT PEN 31G X 8 MM MISC USE THREE TIMES DAILY 3/15/21   SUDEEP Guadalupe CNP   lidocaine viscous hcl (XYLOCAINE) 2 % SOLN solution Take 15 mLs by mouth as needed for Dental Pain  Patient not taking: Reported on 4/7/2021 2/28/21   SUDEEP Barrera CNP   cetirizine (ZYRTEC) 10 MG tablet TAKE 1 TABLET BY MOUTH EVERY NIGHT AT BEDTIME AS NEEDED FOR ALLERGIES 2/25/21   SUDEEP Guadalupe CNP   Dulaglutide (TRULICITY) 3.27 BY/7.2BX SOPN Inject 0.75 mg into the skin once a week  Patient not taking: Reported on 4/7/2021 2/23/21   David Chapin MD   Insulin Degludec (TRESIBA FLEXTOUCH) 100 UNIT/ML SOPN INJECT 40 UNITS UNDER THE SKIN NIGHTLY 2/23/21   David Chapin MD   insulin lispro, 1 Unit Dial, (HUMALOG KWIKPEN) 100 UNIT/ML SOPN 4 units at each meals hold if glucose less than 150 2/23/21   Christopher Khan MD   spironolactone (ALDACTONE) 50 MG tablet Take 1 tablet by mouth daily 2/23/21   MD Bella Matiasuch Delica Lancets 20Z MISC qid 2/23/21   Adrian Otto MD   blood glucose test strips (ONETOUCH VERIO) strip 1 each by In Vitro route daily As needed. 2/23/21   Adrian Otto MD   Blood Glucose Monitoring Suppl ([facility]) w/Device KIT As  Directed 2/23/21   Adrian Otto MD   Insulin Pen Needle (NOVOFINE) 32G X 6 MM MISC qid 2/23/21   Adrian Otto MD   Continuous Blood Gluc Sensor (FREESTYLE JESSICA 14 DAY SENSOR) MISC As directed 2/23/21   Adrian Otto MD   Continuous Blood Gluc  (FREESTYLE JESSICA 14 DAY READER) LENNY As directed 2/23/21   Adrian Otto MD   baclofen (LIORESAL) 10 MG tablet Take 1 tablet by mouth 3 times daily 2/19/21   Roddie Nails, APRN - CNP   lidocaine (XYLOCAINE) 5 % ointment Apply topically BID as needed. 2/18/21   Yrn Dent PA-C   montelukast (SINGULAIR) 10 MG tablet Take 1 tab nightly at supper for breathing/allergies 2/9/21   Roddie Nails, SUDEEP - CNP   buPROPion (WELLBUTRIN XL) 150 MG extended release tablet Take 1 tablet by mouth every morning 2/9/21   Roddie Nails, APRN - CNP   tiZANidine (ZANAFLEX) 2 MG tablet Take 1 tablet by mouth 3 times daily as needed (back pain/ spasm) 2/6/21   Yrn Dent PA-C   butalbital-acetaminophen-caffeine (FIORICET, ESGIC) -32 MG per tablet Take 1 tablet by mouth every 6 hours as needed for Headaches 1/4/21   Orlando Guthrie MD   pregabalin (LYRICA) 150 MG capsule Take 1 capsule by mouth 3 times daily for 30 days. 12/14/20 1/13/21  SUDEEP Griffin CNP   DULoxetine (CYMBALTA) 60 MG extended release capsule Take 60 mg by mouth every 24 hours     Historical Provider, MD   ALPRAZolam (XANAX) 0.25 MG tablet Take 0.25 mg by mouth nightly as needed.   3/23/20   Historical Provider, MD   pantoprazole (PROTONIX) 20 MG tablet Take 2 tablets by mouth daily 11/4/20   Yrn Dent PA-C   magnesium oxide (MAG-OX) 400 (241.3 Mg) MG TABS tablet TAKE 1/2 TABLET BY MOUTH DAILY 11/2/20   Lilibeth SUDEEP Stauffer CNP   albuterol sulfate HFA (PROVENTIL HFA) 108 (90 Base) MCG/ACT inhaler Inhale 2 puffs into the lungs every 6 hours as needed for Wheezing 9/9/20   SUDEEP Mao CNP   insulin lispro (HUMALOG) 100 UNIT/ML injection vial INJECT 4 UNITS UNDER THE SKIN THREE TIMES DAILY AS NEEDED FOR HIGH BLOOD SUGAR 7/10/20   SUDEEP Nagel CNP   SUMAtriptan (IMITREX) 25 MG tablet TAKE 1 TABLET BY MOUTH 1 TIME AS NEEDED FOR MIGRAINE  Patient not taking: Reported on 4/7/2021 5/1/20   SUDEEP Mao CNP   promethazine (PHENERGAN) 25 MG tablet WARNING:  May cause drowsiness. May impair ability to operate vehicles or machinery. Do not use in combination with alcohol. ! Tablet every 6 hours as needed in conjunction with Ultram for headaches 1/18/20   Jasmin Davis PA-C   blood glucose test strips (EXACTECH TEST) strip 1 each by In Vitro route 3 times daily (Accu-check test strips) As needed. DX:E11.65 12/2/19   SUDEEP Nagel CNP   ONE TOUCH ULTRASOFT LANCETS MISC TEST 3 TIMES DAILY AS NEEDED 12/2/19   SUDEEP Nagel CNP   albuterol (PROVENTIL) (2.5 MG/3ML) 0.083% nebulizer solution Take 3 mLs by nebulization every 4 hours as needed for Wheezing 11/5/19   Maria M Servetas, DO   atorvastatin (LIPITOR) 40 MG tablet Take 1 tablet by mouth nightly 9/11/19   Kenya Dodson, DO   metoprolol tartrate (LOPRESSOR) 25 MG tablet Take 1 tablet by mouth 2 times daily 9/11/19   Kenya Dodson, DO   traMADol (ULTRAM) 50 MG tablet Take 50 mg by mouth every 6 hours as needed for Pain.     Historical Provider, MD   Insulin Syringe-Needle U-100 30G X 1/2\" 1 ML MISC 1 each by Does not apply route daily 11/16/18   Evan Brower, DO   blood glucose test strips (ONETOUCH VERIO) strip TEST 3 TIMES DAILY AS NEEDED 8/16/18   Evan Brower DO   Blood Glucose Monitoring Suppl (ONETOUCH VERIO) w/Device KIT TEST 3 TIMES DAILY AS NEEDED 8/16/18   Alexey Burleson,        Future Appointments   Date Time Provider Valerie Alea   5/10/2021 11:30 AM SUDEEP Rice - CNP [facility]   5/10/2021  8:30 PM Kieran Lees [address]   6/17/2021  3:30 PM [facility]   11/5/2021  9:15 AM Valentina Sheldon MD [facility]

## 2021-05-17 ENCOUNTER — OFFICE VISIT (OUTPATIENT)
Dept: FAMILY MEDICINE CLINIC | Age: 51
End: 2021-05-17
Payer: MEDICAID

## 2021-05-17 VITALS
OXYGEN SATURATION: 96 % | SYSTOLIC BLOOD PRESSURE: 129 MMHG | TEMPERATURE: 97.5 F | HEIGHT: 63 IN | BODY MASS INDEX: 49.22 KG/M2 | DIASTOLIC BLOOD PRESSURE: 73 MMHG | WEIGHT: 277.8 LBS | HEART RATE: 78 BPM

## 2021-05-17 DIAGNOSIS — F41.9 ANXIETY: Primary | ICD-10-CM

## 2021-05-17 DIAGNOSIS — F41.0 PANIC ATTACKS: ICD-10-CM

## 2021-05-17 DIAGNOSIS — M79.7 PRIMARY FIBROMYALGIA SYNDROME: ICD-10-CM

## 2021-05-17 DIAGNOSIS — F32.A DEPRESSION, UNSPECIFIED DEPRESSION TYPE: ICD-10-CM

## 2021-05-17 DIAGNOSIS — M79.7 FIBROMYALGIA: ICD-10-CM

## 2021-05-17 PROCEDURE — G8417 CALC BMI ABV UP PARAM F/U: HCPCS | Performed by: NURSE PRACTITIONER

## 2021-05-17 PROCEDURE — 99214 OFFICE O/P EST MOD 30 MIN: CPT | Performed by: NURSE PRACTITIONER

## 2021-05-17 PROCEDURE — 1036F TOBACCO NON-USER: CPT | Performed by: NURSE PRACTITIONER

## 2021-05-17 PROCEDURE — G8427 DOCREV CUR MEDS BY ELIG CLIN: HCPCS | Performed by: NURSE PRACTITIONER

## 2021-05-17 PROCEDURE — 3017F COLORECTAL CA SCREEN DOC REV: CPT | Performed by: NURSE PRACTITIONER

## 2021-05-17 RX ORDER — DULOXETIN HYDROCHLORIDE 30 MG/1
30 CAPSULE, DELAYED RELEASE ORAL DAILY
Qty: 30 CAPSULE | Refills: 1 | Status: SHIPPED | OUTPATIENT
Start: 2021-05-17 | End: 2021-06-25 | Stop reason: SINTOL

## 2021-05-17 RX ORDER — ALPRAZOLAM 0.25 MG/1
0.25 TABLET ORAL DAILY PRN
Qty: 30 TABLET | Refills: 0 | Status: SHIPPED | OUTPATIENT
Start: 2021-05-17 | End: 2021-06-16

## 2021-05-17 RX ORDER — PREGABALIN 150 MG/1
150 CAPSULE ORAL 3 TIMES DAILY
Qty: 90 CAPSULE | Refills: 1 | Status: SHIPPED | OUTPATIENT
Start: 2021-05-17 | End: 2021-09-07 | Stop reason: SDUPTHER

## 2021-05-17 ASSESSMENT — ENCOUNTER SYMPTOMS
SHORTNESS OF BREATH: 0
COUGH: 0

## 2021-05-17 NOTE — PATIENT INSTRUCTIONS
Patient Education        Panic Attacks: Care Instructions  Overview     During a panic attack, you may have a feeling of intense fear or terror, trouble breathing, chest pain or tightness, heartbeat changes, dizziness, sweating, and shaking. A panic attack starts suddenly and usually lasts from 5 to 20 minutes but may last even longer. An attack can begin with a stressful event. Or it can happen without a cause. Although panic attacks can cause scary symptoms, you can learn to manage them with self-care, counseling, and medicine. Follow-up care is a key part of your treatment and safety. Be sure to make and go to all appointments, and call your doctor if you are having problems. It's also a good idea to know your test results and keep a list of the medicines you take. How can you care for yourself at home? · Take your medicine exactly as directed. Call your doctor if you think you are having a problem with your medicine. · Go to your counseling sessions and follow-up appointments. · Recognize and accept your anxiety. Then, when you are in a situation that makes you anxious, say to yourself, \"This is not an emergency. I feel uncomfortable, but I am not in danger. I can keep going even if I feel anxious. \"  · Be kind to your body:  ? Relieve tension with exercise or a massage. ? Get enough rest.  ? Avoid alcohol, caffeine, nicotine, and illegal drugs. They can increase your anxiety level, cause sleep problems, or trigger a panic attack. ? Learn and do relaxation techniques. See below for more about these techniques. · Engage your mind. Get out and do something you enjoy. Go to a funny movie, or take a walk or hike. Plan your day. Having too much or too little to do can make you anxious. · Keep a record of your symptoms. Discuss your fears with a good friend or family member, or join a support group for people with similar problems. Talking to others sometimes relieves stress.   · Get involved in social groups, or volunteer to help others. Being alone sometimes makes things seem worse than they are. · Get at least 30 minutes of exercise on most days of the week to relieve stress. Walking is a good choice. You also may want to do other activities, such as running, swimming, cycling, or playing tennis or team sports. Relaxation techniques  Do relaxation exercises for 10 to 20 minutes a day. You can play soothing, relaxing music while you do them, if you wish. · Tell others in your house that you are going to do your relaxation exercises. Ask them not to disturb you. · Find a comfortable place, away from all distractions and noise. · Lie down on your back, or sit with your back straight. · Focus on your breathing. Make it slow and steady. · Breathe in through your nose. Breathe out through either your nose or mouth. · Breathe deeply, filling up the area between your navel and your rib cage. Breathe so that your belly goes up and down. · Do not hold your breath. · Breathe like this for 5 to 10 minutes. Notice the feeling of calmness throughout your whole body. As you continue to breathe slowly and deeply, relax by doing the following for another 5 to 10 minutes:  · Tighten and relax each muscle group in your body. You can begin at your toes and work your way up to your head. · Imagine your muscle groups relaxing and becoming heavy. · Empty your mind of all thoughts. · Let yourself relax more and more deeply. · Become aware of the state of calmness that surrounds you. · When your relaxation time is over, you can bring yourself back to alertness by moving your fingers and toes and then your hands and feet and then stretching and moving your entire body. Sometimes people fall asleep during relaxation, but they usually wake up shortly afterward. · Always give yourself time to return to full alertness before you drive a car or do anything that might cause an accident if you are not fully alert.  Never play a relaxation tape while driving a car. When should you call for help? Call 911 anytime you think you may need emergency care. For example, call if:    · You feel you cannot stop from hurting yourself or someone else. Watch closely for changes in your health, and be sure to contact your doctor if:    · Your panic attacks get worse.     · You have new or different anxiety.     · You are not getting better as expected. Where can you learn more? Go to [email or web address] org and sign in to your [employer] account. Enter H601 in the [employer] box to learn more about \"Panic Attacks: Care Instructions. \"     If you do not have an account, please click on the \"Sign Up Now\" link. Current as of: September 23, 2020               Content Version: 12.8  © 7597-0627 Healthwise, Incorporated. Care instructions adapted under license by [facility] [place] ([facility]). If you have questions about a medical condition or this instruction, always ask your healthcare professional. [place] [address] any warranty or liability for your use of this information.

## 2021-05-17 NOTE — PROGRESS NOTES
Subjective:      Patient ID: Ady Ardon is a 49 y.o. female who presents today for:     Chief Complaint   Patient presents with    Anxiety     Patient presents today to follow up on anxiety.  Chronic Pain    Insomnia       HPI Pt f/u today and would like to discuss anxiety. She reports it has been flaring a lot lately. She reports she has been taking buspar and hasn't noticed any improvement in symptoms. She reports she weaned cymbalta because she didn't feel like that was helping either. She reports that it has been flaring up for at least a month. She reports that she has been getting panic attacks. She reports that they have been terrible and at least twice per week. She reports they are lasting longer. She reports when it happens she will get palpitations and sob and need to sit down on the floor and take deep breaths. She reports she stopped the cymbalta about 2 months ago. She stopped it also because she thought it was causing her problems sleeping. She continues to have problems sleeping. She reports that she has also noticed fibromyalgia has been flaring. She reports that it has been flaring worse for about 2 months. She would like to a refill today. She reports that she has been taking it, but had a lot extra and didn't need a refill. She has been taking as prescribed 3x daily. She reports the associated swelling has improved the longer she has been on it.      Past Medical History:   Diagnosis Date    Anxiety     Arthritis     Asthma     Bronchopneumonia     Cancer ([address]     renal    Cerebral artery occlusion with cerebral infarction (HCC)     Chronic bilateral low back pain with sciatica     Chronic kidney disease     Chronic obstructive lung disease ([facility] 7/26/2019    Depression     Fibromyalgia     Hypertension     Insulin dependent type 2 diabetes mellitus, uncontrolled ([address] 8/3/2018    Localized enlarged lymph nodes 10/26/2018    Mixed headache     Pure hyperglyceridemia 2017    Sarcoidosis     Sleep apnea     does not wear cpap    Thyroid goiter      Past Surgical History:   Procedure Laterality Date    BRONCHOSCOPY  10/26/2018    DR. STEARNS    KIDNEY REMOVAL Right 2016    KIDNEY REMOVAL Right 2016    LUNG BIOPSY Right 10/2018    THYROID LOBECTOMY Right 14    THYROIDECTOMY  2019    DR. MAGDALENO    THYROIDECTOMY  2018    URETER STENT PLACEMENT Left 2016     Family History   Problem Relation Age of Onset    Cancer Father     Diabetes Father     Allergy (Severe) Father     Heart Attack Father     Prostate Cancer Father     High Blood Pressure Mother     Diabetes Mother     Arthritis Mother     High Cholesterol Mother     Vision Loss Mother     Alcohol Abuse Neg Hx     Anemia Neg Hx     Arrhythmia Neg Hx     Asthma Neg Hx     Atrial Fibrillation Neg Hx     Birth Defects Neg Hx     Breast Cancer Neg Hx     Coronary Art Dis Neg Hx     Colon Cancer Neg Hx     Depression Neg Hx     Early Death Neg Hx     Hearing Loss Neg Hx     Heart Disease Neg Hx     Learning Disabilities Neg Hx     Kidney Disease Neg Hx     Mental Illness Neg Hx     Mental Retardation Neg Hx     Miscarriages / Stillbirths Neg Hx     Obesity Neg Hx     Osteoporosis Neg Hx     Stroke Neg Hx     Substance Abuse Neg Hx      Social History     Socioeconomic History    Marital status: Legally      Spouse name: Not on file    Number of children: Not on file    Years of education: 15    Highest education level: High school graduate   Occupational History    Not on file   Tobacco Use    Smoking status: Former Smoker     Packs/day: 0.50     Years: 15.00     Pack years: 7.50     Types: Cigarettes     Start date: 2014     Quit date: 2015     Years since quittin.3    Smokeless tobacco: Former User     Quit date: 3/23/2016   Vaping Use    Vaping Use: Never used   Substance and Sexual Activity    Alcohol use: Not Currently Alcohol/week: 0.0 standard drinks     Comment: occasionally    Drug use: Yes     Frequency: 5.0 times per week     Types: Marijuana    Sexual activity: Yes     Partners: Male   Other Topics Concern    Not on file   Social History Narrative    Not on file     Social Determinants of Health     Financial Resource Strain: Medium Risk    Difficulty of Paying Living Expenses: Somewhat hard   Food Insecurity: Food Insecurity Present    Worried About Running Out of Food in the Last Year: Sometimes true    Maximiliano of Food in the Last Year: Sometimes true   Transportation Needs: No Transportation Needs    Lack of Transportation (Medical): No    Lack of Transportation (Non-Medical): No   Physical Activity: Inactive    Days of Exercise per Week: 0 days    Minutes of Exercise per Session: 0 min   Stress: Stress Concern Present    Feeling of Stress : Very much   Social Connections: Socially Isolated    Frequency of Communication with Friends and Family:  Three times a week    Frequency of Social Gatherings with Friends and Family: Never    Attends Buddhist Services: Never    Active Member of Clubs or Organizations: No    Attends Club or Organization Meetings: Never    Marital Status:    Intimate Partner Violence: Not At Risk    Fear of Current or Ex-Partner: No    Emotionally Abused: No    Physically Abused: No    Sexually Abused: No     Current Outpatient Medications on File Prior to Visit   Medication Sig Dispense Refill    levothyroxine (SYNTHROID) 150 MCG tablet Take 1 tablet by mouth Daily 30 tablet 3    budesonide-formoterol (SYMBICORT) 160-4.5 MCG/ACT AERO Inhale 2 puffs into the lungs 2 times daily 1 Inhaler 3    tiotropium (SPIRIVA RESPIMAT) 2.5 MCG/ACT AERS inhaler Inhale 2 puffs into the lungs daily 1 Inhaler 3    fluticasone (FLONASE) 50 MCG/ACT nasal spray 1 spray by Each Nostril route daily 1 Bottle 0    busPIRone (BUSPAR) 15 MG tablet TAKE 1 TABLET BY MOUTH THREE TIMES DAILY 90 tablet 5    metoclopramide (REGLAN) 10 MG tablet Take 1 tablet by mouth 2 times daily as needed (Headache) 60 tablet 0    acetaminophen (TYLENOL) 500 MG tablet Take 1 tablet by mouth 4 times daily as needed for Pain 360 tablet 1    B-D ULTRAFINE III SHORT PEN 31G X 8 MM MISC USE THREE TIMES DAILY 100 each 5    cetirizine (ZYRTEC) 10 MG tablet TAKE 1 TABLET BY MOUTH EVERY NIGHT AT BEDTIME AS NEEDED FOR ALLERGIES 30 tablet 5    Insulin Degludec (TRESIBA FLEXTOUCH) 100 UNIT/ML SOPN INJECT 40 UNITS UNDER THE SKIN NIGHTLY 15 mL 3    insulin lispro, 1 Unit Dial, (HUMALOG KWIKPEN) 100 UNIT/ML SOPN 4 units at each meals hold if glucose less than 150 5 pen 3    spironolactone (ALDACTONE) 50 MG tablet Take 1 tablet by mouth daily 30 tablet 3    OneTouch Delica Lancets 42J MISC qid 200 each 3    blood glucose test strips (ONETOUCH VERIO) strip 1 each by In Vitro route daily As needed. 100 each 3    Blood Glucose Monitoring Suppl (ONETOUCH VERIO) w/Device KIT As  Directed 1 kit 00    Insulin Pen Needle (NOVOFINE) 32G X 6 MM MISC qid 300 each 3    baclofen (LIORESAL) 10 MG tablet Take 1 tablet by mouth 3 times daily 60 tablet 0    buPROPion (WELLBUTRIN XL) 150 MG extended release tablet Take 1 tablet by mouth every morning 30 tablet 3    tiZANidine (ZANAFLEX) 2 MG tablet Take 1 tablet by mouth 3 times daily as needed (back pain/ spasm) 15 tablet 0    butalbital-acetaminophen-caffeine (FIORICET, ESGIC) -40 MG per tablet Take 1 tablet by mouth every 6 hours as needed for Headaches 18 tablet 0    magnesium oxide (MAG-OX) 400 (241.3 Mg) MG TABS tablet TAKE 1/2 TABLET BY MOUTH DAILY 30 tablet 5    albuterol sulfate HFA (PROVENTIL HFA) 108 (90 Base) MCG/ACT inhaler Inhale 2 puffs into the lungs every 6 hours as needed for Wheezing 1 Inhaler 3    blood glucose test strips (EXACTECH TEST) strip 1 each by In Vitro route 3 times daily (Accu-check test strips) As needed.  DX:E11.65 [address] ULTRASOFT LANCETS MISC TEST 3 TIMES DAILY AS NEEDED 300 each 3    albuterol (PROVENTIL) (2.5 MG/3ML) 0.083% nebulizer solution Take 3 mLs by nebulization every 4 hours as needed for Wheezing 120 each 3    atorvastatin (LIPITOR) 40 MG tablet Take 1 tablet by mouth nightly 30 tablet 3    Insulin Syringe-Needle U-100 30G X 1/2\" 1 ML MISC 1 each by Does not apply route daily 100 each 3    blood glucose test strips (ONETOUCH VERIO) strip TEST 3 TIMES DAILY AS NEEDED 300 each 3    Blood Glucose Monitoring Suppl (ONETOUCH VERIO) w/Device KIT TEST 3 TIMES DAILY AS NEEDED 1 kit 0    Fluticasone-Umeclidin-Vilant (TRELEGY ELLIPTA) 200-62.5-25 MCG/INH AEPB Inhale 1 puff into the lungs daily (Patient not taking: Reported on 5/17/2021) 1 each 3    Continuous Blood Gluc Sensor (FREESTYLE JESSICA 14 DAY SENSOR) MISC As directed (Patient not taking: Reported on 5/17/2021) 1 each 00    Continuous Blood Gluc  (FREESTYLE JESSICA 14 DAY READER) LENNY As directed (Patient not taking: Reported on 5/17/2021) 1 Device 00    insulin lispro (HUMALOG) 100 UNIT/ML injection vial INJECT 4 UNITS UNDER THE SKIN THREE TIMES DAILY AS NEEDED FOR HIGH BLOOD SUGAR (Patient not taking: Reported on 5/17/2021) 10 mL 0    SUMAtriptan (IMITREX) 25 MG tablet TAKE 1 TABLET BY MOUTH 1 TIME AS NEEDED FOR MIGRAINE (Patient not taking: Reported on 4/7/2021) 9 tablet 1     No current facility-administered medications on file prior to visit. Allergies:  Shellfish-derived products, Ibuprofen, Ketorolac, Morphine, Other, Penicillins, Propoxyphene n-acetaminophen, and Toradol [ketorolac tromethamine]    Review of Systems   Constitutional: Negative for chills, fatigue and fever. HENT: Negative for congestion. Respiratory: Negative for cough and shortness of breath. Musculoskeletal: Positive for myalgias. Psychiatric/Behavioral: Positive for dysphoric mood and sleep disturbance. Negative for self-injury and suicidal ideas.  The patient is nervous/anxious. Objective:   /73 (Site: Left Upper Arm, Position: Sitting, Cuff Size: Large Adult)   Pulse 78   Temp 97.5 °F (36.4 °C) (Temporal)   Ht 5' 3\" (1.6 m)   Wt 277 lb 12.8 oz (126 kg)   SpO2 96%   Breastfeeding No   BMI 49.21 kg/m²     Physical Exam  Constitutional:       Appearance: She is well-developed. HENT:      Head: Normocephalic. Right Ear: External ear normal.      Left Ear: External ear normal.      Nose: Nose normal.      Mouth/Throat:      Mouth: Mucous membranes are moist.      Pharynx: Oropharynx is clear. Eyes:      Conjunctiva/sclera: Conjunctivae normal.   Cardiovascular:      Rate and Rhythm: Normal rate and regular rhythm. Heart sounds: Normal heart sounds. Pulmonary:      Effort: Pulmonary effort is normal.      Breath sounds: Normal breath sounds. Musculoskeletal:         General: Normal range of motion. Cervical back: Normal range of motion. Skin:     General: Skin is warm and dry. Neurological:      Mental Status: She is alert and oriented to person, place, and time. Psychiatric:         Mood and Affect: Mood is anxious and depressed. Speech: Speech normal.         Behavior: Behavior normal. Behavior is cooperative. Assessment:          Diagnosis Orders   1. Anxiety  DULoxetine (CYMBALTA) 30 MG extended release capsule    ALPRAZolam (XANAX) 0.25 MG tablet   2. Primary fibromyalgia syndrome     3. Depression, unspecified depression type  DULoxetine (CYMBALTA) 30 MG extended release capsule   4. Fibromyalgia  DULoxetine (CYMBALTA) 30 MG extended release capsule    pregabalin (LYRICA) 150 MG capsule   5. Panic attacks  ALPRAZolam (XANAX) 0.25 MG tablet       Plan:      No orders of the defined types were placed in this encounter.          Orders Placed This Encounter   Medications    DULoxetine (CYMBALTA) 30 MG extended release capsule     Sig: Take 1 capsule by mouth daily     Dispense:  30 capsule     Refill:  1   

## 2021-05-18 ENCOUNTER — CARE COORDINATION (OUTPATIENT)
Dept: CARE COORDINATION | Age: 51
End: 2021-05-18

## 2021-05-18 NOTE — CARE COORDINATION
Ambulatory Care Coordination Note  5/18/2021  CM Risk Score: 11  Charlson 10 Year Mortality Risk Score: 100%     ACC: Bogdan Vasques, LISHA    Summary Note: I called Elyse Pat to follow up on ongoing care needs, no answer, I left a message wit th nature of my call and requested a call back, reviewed PCP visit note and med changes, will follow to discuss. This nurse Care Coordinator will follow on ongoing care needs  BP- readings  BG-readings  Allergies,- is she doing ok? COPD- how is her breathing- use of nebulizer ??more? ? Needs testing get done ---PFT,.sleep study? CT chest??labs?  -cymbalta started? ?   Nephrology consult? Pain consult? ?  RA med? ? 6/2     Chest pain/HA, any new episodes? Follow up with Marissa Martinez???    Care Coordination Interventions    Program Enrollment: Complex Care  Referral from Primary Care Provider: No  Suggested Interventions and Community Resources  Medi Set or Pill Pack: Completed (Comment: 2/17 using pill box.)  Pharmacist: Completed (Comment: 1/5/2021 [facility] Clinical Rx)  Registered Dietician: Completed (Comment: 1/5/2021 [facility] Dietician)  Social Work: Completed (Comment: 1/5/2021 [facility] Social Work for [employer] and long term planning.)  Other Services: Declined (Comment: 1/5/2021 Declined ACP Referral)  Zone Management Tools: In Process (Comment: 1/5/2021 Diabetes and COPD Zones)  Other Services or Interventions: 1/5/2021 instructed on same day, next day, and Walk-In Care. Goals Addressed    None         Prior to Admission medications    Medication Sig Start Date End Date Taking? Authorizing Provider   fluticasone (FLONASE) 50 MCG/ACT nasal spray SHAKE LIQUID AND USE 1 SPRAY IN EACH NOSTRIL DAILY 5/18/21   SUDEEP Eng CNP   DULoxetine (CYMBALTA) 30 MG extended release capsule Take 1 capsule by mouth daily 5/17/21   SUDEEP Eng CNP   pregabalin (LYRICA) 150 MG capsule Take 1 capsule by mouth 3 times daily for 60 days.  5/17/21 7/16/21  Lilibeth GARZA Adriana Haff, APRN - CNP   ALPRAZolam Andrewjillian Ivanlander) 0.25 MG tablet Take 1 tablet by mouth daily as needed for Sleep or Anxiety for up to 30 days. 5/17/21 6/16/21  SUDEEP Treviño CNP   levothyroxine (SYNTHROID) 150 MCG tablet Take 1 tablet by mouth Daily 4/21/21   Jamaal Morse MD   budesonide-formoterol (SYMBICORT) 160-4.5 MCG/ACT AERO Inhale 2 puffs into the lungs 2 times daily 4/14/21   Alda Galan MD   tiotropium (SPIRIVA RESPIMAT) 2.5 MCG/ACT AERS inhaler Inhale 2 puffs into the lungs daily 4/14/21   Alda Galan MD   Fluticasone-Umeclidin-Vilant (TRELEGY ELLIPTA) 200-62.5-25 MCG/INH AEPB Inhale 1 puff into the lungs daily  Patient not taking: Reported on 5/17/2021 4/7/21   Alda Galan MD   busPIRone (BUSPAR) 15 MG tablet TAKE 1 TABLET BY MOUTH THREE TIMES DAILY 3/29/21   SUDEEP Oswald CNP   metoclopramide (REGLAN) 10 MG tablet Take 1 tablet by mouth 2 times daily as needed (Headache) 3/21/21   Alin Barrera MD   acetaminophen (TYLENOL) 500 MG tablet Take 1 tablet by mouth 4 times daily as needed for Pain 3/21/21   Alin Barrera MD   B-D ULTRAFINE III SHORT PEN 31G X 8 MM MISC USE THREE TIMES DAILY 3/15/21   SUDEEP Roldan CNP   cetirizine (ZYRTEC) 10 MG tablet TAKE 1 TABLET BY MOUTH EVERY NIGHT AT BEDTIME AS NEEDED FOR ALLERGIES 2/25/21   SUDEEP Treviño CNP   Insulin Degludec (TRESIBA FLEXTOUCH) 100 UNIT/ML SOPN INJECT 40 UNITS UNDER THE SKIN NIGHTLY 2/23/21   Jamaal Morse MD   insulin lispro, 1 Unit Dial, (HUMALOG KWIKPEN) 100 UNIT/ML SOPN 4 units at each meals hold if glucose less than 150 2/23/21   Jamaal Morse MD   spironolactone (ALDACTONE) 50 MG tablet Take 1 tablet by mouth daily 2/23/21   Jamaal Morse MD   OneTouch Delica Lancets 64N MISC qid 2/23/21   Jamaal Morse MD   blood glucose test strips (ONETOUCH VERIO) strip 1 each by In Vitro route daily As needed.  2/23/21   Jamaal Morse MD   Blood Glucose Monitoring Suppl (Bahman Andre) w/Device KIT As  Directed 2/23/21 Dewitte Seip, MD   Insulin Pen Needle (NOVOFINE) 32G X 6 MM MISC qid 2/23/21   Dewitte Seip, MD   Continuous Blood Gluc Sensor (FREESTYLE JESSICA 14 DAY SENSOR) MISC As directed  Patient not taking: Reported on 5/17/2021 2/23/21   Dewitte Seip, MD   Continuous Blood Gluc  (FREESTYLE JESSICA 14 DAY READER) LENNY As directed  Patient not taking: Reported on 5/17/2021 2/23/21   Dewitte Seip, MD   baclofen (LIORESAL) 10 MG tablet Take 1 tablet by mouth 3 times daily 2/19/21   SUDEEP Martins CNP   buPROPion (WELLBUTRIN XL) 150 MG extended release tablet Take 1 tablet by mouth every morning 2/9/21   SUDEEP Martins CNP   tiZANidine (ZANAFLEX) 2 MG tablet Take 1 tablet by mouth 3 times daily as needed (back pain/ spasm) 2/6/21   Ly Chowdhury PA-C   butalbital-acetaminophen-caffeine (FIORICET, ESGIC) -23 MG per tablet Take 1 tablet by mouth every 6 hours as needed for Headaches 1/4/21   Renetta Grossman MD   magnesium oxide (MAG-OX) 400 (241.3 Mg) MG TABS tablet TAKE 1/2 TABLET BY MOUTH DAILY 11/2/20   Melissa Althea Krabbe, APRN - CNP   albuterol sulfate HFA (PROVENTIL HFA) 108 (90 Base) MCG/ACT inhaler Inhale 2 puffs into the lungs every 6 hours as needed for Wheezing 9/9/20   SUDEEP Martins CNP   insulin lispro (HUMALOG) 100 UNIT/ML injection vial INJECT 4 UNITS UNDER THE SKIN THREE TIMES DAILY AS NEEDED FOR HIGH BLOOD SUGAR  Patient not taking: Reported on 5/17/2021 7/10/20   SUDEEP Martins CNP   SUMAtriptan (IMITREX) 25 MG tablet TAKE 1 TABLET BY MOUTH 1 TIME AS NEEDED FOR MIGRAINE  Patient not taking: Reported on 4/7/2021 5/1/20   SUDEEP Martins CNP   blood glucose test strips (EXACTECH TEST) strip 1 each by In Vitro route 3 times daily (Accu-check test strips) As needed.  DX:E11.65 12/2/19   Melissa Althea Krabbe, APRN - CNP   ONE TOUCH ULTRASOFT LANCETS MISC TEST 3 TIMES DAILY AS NEEDED 12/2/19   Melissa Althea Krabbe, APRN - CNP   albuterol (PROVENTIL) (2.5 MG/3ML) 0.083% nebulizer solution Take 3 mLs by nebulization every 4 hours as needed for Wheezing 11/5/19   Maria M Ni, DO   atorvastatin (LIPITOR) 40 MG tablet Take 1 tablet by mouth nightly 9/11/19   Danette Loredo, DO   Insulin Syringe-Needle U-100 30G X 1/2\" 1 ML MISC 1 each by Does not apply route daily 11/16/18   Mitul Tomlinson, DO   blood glucose test strips (ONETOUCH VERIO) strip TEST 3 TIMES DAILY AS NEEDED 8/16/18   [facility], DO   Blood Glucose Monitoring Suppl (ONETOUCH VERIO) w/Device KIT TEST 3 TIMES DAILY AS NEEDED 8/16/18   [facility], DO       Future Appointments   Date Time Provider Valerie Cosby   6/17/2021  3:30 PM Rufino Michel MD [address]   6/25/2021  3:00 PM SUDEEP Childers - CNP [facility]   11/5/2021  9:15 AM Thad Queen MD [facility]

## 2021-05-28 ENCOUNTER — APPOINTMENT (OUTPATIENT)
Dept: CT IMAGING | Age: 51
End: 2021-05-28
Payer: MEDICAID

## 2021-05-28 ENCOUNTER — APPOINTMENT (OUTPATIENT)
Dept: GENERAL RADIOLOGY | Age: 51
End: 2021-05-28
Payer: MEDICAID

## 2021-05-28 ENCOUNTER — HOSPITAL ENCOUNTER (EMERGENCY)
Age: 51
Discharge: HOME | End: 2021-05-28
Payer: MEDICAID

## 2021-05-28 VITALS
BODY MASS INDEX: 46.07 KG/M2 | DIASTOLIC BLOOD PRESSURE: 60 MMHG | HEIGHT: 63 IN | TEMPERATURE: 98.5 F | OXYGEN SATURATION: 96 % | RESPIRATION RATE: 24 BRPM | HEART RATE: 83 BPM | WEIGHT: 260 LBS | SYSTOLIC BLOOD PRESSURE: 108 MMHG

## 2021-05-28 DIAGNOSIS — R50.9 FEVER, UNSPECIFIED FEVER CAUSE: Primary | ICD-10-CM

## 2021-05-28 DIAGNOSIS — R73.9 HYPERGLYCEMIA: ICD-10-CM

## 2021-05-28 LAB
ALBUMIN SERPL-MCNC: 3.9 G/DL (ref 3.5–4.6)
ALP BLD-CCNC: 170 U/L (ref 40–130)
ALT SERPL-CCNC: 14 U/L (ref 0–33)
ANION GAP SERPL CALCULATED.3IONS-SCNC: 13 MEQ/L (ref 9–15)
AST SERPL-CCNC: 12 U/L (ref 0–35)
BASOPHILS ABSOLUTE: 0 K/UL (ref 0–0.2)
BASOPHILS RELATIVE PERCENT: 0.5 %
BILIRUB SERPL-MCNC: 0.3 MG/DL (ref 0.2–0.7)
BILIRUBIN URINE: NEGATIVE
BLOOD, URINE: NEGATIVE
BUN BLDV-MCNC: 12 MG/DL (ref 6–20)
CALCIUM SERPL-MCNC: 9 MG/DL (ref 8.5–9.9)
CHLORIDE BLD-SCNC: 100 MEQ/L (ref 95–107)
CLARITY: CLEAR
CO2: 19 MEQ/L (ref 20–31)
COLOR: YELLOW
CREAT SERPL-MCNC: 1.36 MG/DL (ref 0.5–0.9)
EOSINOPHILS ABSOLUTE: 0.3 K/UL (ref 0–0.7)
EOSINOPHILS RELATIVE PERCENT: 3.3 %
GFR AFRICAN AMERICAN: 49.8
GFR NON-AFRICAN AMERICAN: 41.1
GLOBULIN: 3.2 G/DL (ref 2.3–3.5)
GLUCOSE BLD-MCNC: 261 MG/DL (ref 60–115)
GLUCOSE BLD-MCNC: 314 MG/DL (ref 70–99)
GLUCOSE URINE: >=1000 MG/DL
HCT VFR BLD CALC: 45.7 % (ref 37–47)
HEMOGLOBIN: 15.7 G/DL (ref 12–16)
KETONES, URINE: ABNORMAL MG/DL
LACTIC ACID: 1.6 MMOL/L (ref 0.5–2.2)
LEUKOCYTE ESTERASE, URINE: NEGATIVE
LYMPHOCYTES ABSOLUTE: 1.1 K/UL (ref 1–4.8)
LYMPHOCYTES RELATIVE PERCENT: 11.6 %
MCH RBC QN AUTO: 32 PG (ref 27–31.3)
MCHC RBC AUTO-ENTMCNC: 34.3 % (ref 33–37)
MCV RBC AUTO: 93.1 FL (ref 82–100)
MONOCYTES ABSOLUTE: 0.7 K/UL (ref 0.2–0.8)
MONOCYTES RELATIVE PERCENT: 6.9 %
NEUTROPHILS ABSOLUTE: 7.4 K/UL (ref 1.4–6.5)
NEUTROPHILS RELATIVE PERCENT: 77.7 %
NITRITE, URINE: NEGATIVE
PDW BLD-RTO: 13.3 % (ref 11.5–14.5)
PERFORMED ON: ABNORMAL
PH UA: 6 (ref 5–9)
PLATELET # BLD: 250 K/UL (ref 130–400)
POTASSIUM SERPL-SCNC: 4.1 MEQ/L (ref 3.4–4.9)
PROCALCITONIN: 0.09 NG/ML (ref 0–0.15)
PROTEIN UA: NEGATIVE MG/DL
RBC # BLD: 4.91 M/UL (ref 4.2–5.4)
SODIUM BLD-SCNC: 132 MEQ/L (ref 135–144)
SPECIFIC GRAVITY UA: 1.02 (ref 1–1.03)
TOTAL PROTEIN: 7.1 G/DL (ref 6.3–8)
TSH SERPL DL<=0.05 MIU/L-ACNC: 0.41 UIU/ML (ref 0.44–3.86)
URINE REFLEX TO CULTURE: ABNORMAL
UROBILINOGEN, URINE: 0.2 E.U./DL
WBC # BLD: 9.5 K/UL (ref 4.8–10.8)

## 2021-05-28 PROCEDURE — 99283 EMERGENCY DEPT VISIT LOW MDM: CPT

## 2021-05-28 PROCEDURE — 84443 ASSAY THYROID STIM HORMONE: CPT

## 2021-05-28 PROCEDURE — 2580000003 HC RX 258: Performed by: PERSONAL EMERGENCY RESPONSE ATTENDANT

## 2021-05-28 PROCEDURE — 84145 PROCALCITONIN (PCT): CPT

## 2021-05-28 PROCEDURE — 85025 COMPLETE CBC W/AUTO DIFF WBC: CPT

## 2021-05-28 PROCEDURE — 96376 TX/PRO/DX INJ SAME DRUG ADON: CPT

## 2021-05-28 PROCEDURE — 71045 X-RAY EXAM CHEST 1 VIEW: CPT

## 2021-05-28 PROCEDURE — 81003 URINALYSIS AUTO W/O SCOPE: CPT

## 2021-05-28 PROCEDURE — 36415 COLL VENOUS BLD VENIPUNCTURE: CPT

## 2021-05-28 PROCEDURE — 6370000000 HC RX 637 (ALT 250 FOR IP): Performed by: PERSONAL EMERGENCY RESPONSE ATTENDANT

## 2021-05-28 PROCEDURE — 73200 CT UPPER EXTREMITY W/O DYE: CPT

## 2021-05-28 PROCEDURE — 96375 TX/PRO/DX INJ NEW DRUG ADDON: CPT

## 2021-05-28 PROCEDURE — 96374 THER/PROPH/DIAG INJ IV PUSH: CPT

## 2021-05-28 PROCEDURE — 80053 COMPREHEN METABOLIC PANEL: CPT

## 2021-05-28 PROCEDURE — 74150 CT ABDOMEN W/O CONTRAST: CPT

## 2021-05-28 PROCEDURE — 6360000002 HC RX W HCPCS: Performed by: PERSONAL EMERGENCY RESPONSE ATTENDANT

## 2021-05-28 PROCEDURE — 87040 BLOOD CULTURE FOR BACTERIA: CPT

## 2021-05-28 PROCEDURE — 83605 ASSAY OF LACTIC ACID: CPT

## 2021-05-28 RX ORDER — CEPHALEXIN 500 MG/1
500 CAPSULE ORAL ONCE
Status: COMPLETED | OUTPATIENT
Start: 2021-05-28 | End: 2021-05-28

## 2021-05-28 RX ORDER — ONDANSETRON 2 MG/ML
4 INJECTION INTRAMUSCULAR; INTRAVENOUS ONCE
Status: COMPLETED | OUTPATIENT
Start: 2021-05-28 | End: 2021-05-28

## 2021-05-28 RX ORDER — CEPHALEXIN 500 MG/1
1000 CAPSULE ORAL 2 TIMES DAILY
Qty: 28 CAPSULE | Refills: 0 | Status: SHIPPED | OUTPATIENT
Start: 2021-05-28 | End: 2021-06-04

## 2021-05-28 RX ORDER — ACETAMINOPHEN 500 MG
1000 TABLET ORAL ONCE
Status: COMPLETED | OUTPATIENT
Start: 2021-05-28 | End: 2021-05-28

## 2021-05-28 RX ORDER — 0.9 % SODIUM CHLORIDE 0.9 %
1000 INTRAVENOUS SOLUTION INTRAVENOUS ONCE
Status: COMPLETED | OUTPATIENT
Start: 2021-05-28 | End: 2021-05-28

## 2021-05-28 RX ADMIN — SODIUM CHLORIDE 1000 ML: 9 INJECTION, SOLUTION INTRAVENOUS at 03:37

## 2021-05-28 RX ADMIN — HYDROMORPHONE HYDROCHLORIDE 1 MG: 1 INJECTION, SOLUTION INTRAMUSCULAR; INTRAVENOUS; SUBCUTANEOUS at 03:37

## 2021-05-28 RX ADMIN — CEPHALEXIN 500 MG: 500 CAPSULE ORAL at 06:11

## 2021-05-28 RX ADMIN — ACETAMINOPHEN 1000 MG: 500 TABLET ORAL at 03:37

## 2021-05-28 RX ADMIN — INSULIN HUMAN 6 UNITS: 100 INJECTION, SOLUTION PARENTERAL at 05:19

## 2021-05-28 RX ADMIN — HYDROMORPHONE HYDROCHLORIDE 0.5 MG: 1 INJECTION, SOLUTION INTRAMUSCULAR; INTRAVENOUS; SUBCUTANEOUS at 05:35

## 2021-05-28 RX ADMIN — ONDANSETRON 4 MG: 2 INJECTION INTRAMUSCULAR; INTRAVENOUS at 03:37

## 2021-05-28 ASSESSMENT — ENCOUNTER SYMPTOMS
NAUSEA: 0
SHORTNESS OF BREATH: 0
DIARRHEA: 0
BLOOD IN STOOL: 0
VOMITING: 0
COLOR CHANGE: 0
COUGH: 0
SORE THROAT: 0
BACK PAIN: 0
RHINORRHEA: 0
ABDOMINAL PAIN: 0

## 2021-05-28 ASSESSMENT — PAIN DESCRIPTION - LOCATION: LOCATION: FLANK

## 2021-05-28 ASSESSMENT — PAIN DESCRIPTION - DESCRIPTORS: DESCRIPTORS: SHARP

## 2021-05-28 ASSESSMENT — PAIN SCALES - GENERAL
PAINLEVEL_OUTOF10: 8
PAINLEVEL_OUTOF10: 9
PAINLEVEL_OUTOF10: 9

## 2021-05-28 ASSESSMENT — PAIN DESCRIPTION - PAIN TYPE: TYPE: ACUTE PAIN

## 2021-05-28 ASSESSMENT — PAIN DESCRIPTION - ORIENTATION: ORIENTATION: LEFT

## 2021-05-28 NOTE — ED NOTES
Discharge instructions explained to patient who voices understanding. Patient is ambulatory from ED with no distress observed.      Lillie Gaffney RN  05/28/21 [id number]

## 2021-05-28 NOTE — ED TRIAGE NOTES
Pt c/o fever and left sided flank pain x 1 day. Pt also c/o dysuria, denies hematuria. Denies n/v/d. States she got her second dose of the Moderna vaccine on Wednesday and she thinks this may be related. Pt has not taken any OTC meds to reduce fever in the past 6 hours.

## 2021-05-28 NOTE — ED PROVIDER NOTES
Chronic bilateral low back pain with sciatica     Chronic kidney disease     Chronic obstructive lung disease ([facility] [address] 7/26/2019    Depression     Fibromyalgia     Hypertension     Insulin dependent type 2 diabetes mellitus, uncontrolled ([facility] [address] 8/3/2018    Localized enlarged lymph nodes 10/26/2018    Mixed headache     Pure hyperglyceridemia 5/19/2017    Sarcoidosis     Sleep apnea     does not wear cpap    Thyroid goiter          SURGICAL HISTORY       Past Surgical History:   Procedure Laterality Date    BRONCHOSCOPY  10/26/2018    DR. STEARNS    KIDNEY REMOVAL Right 08/2016    KIDNEY REMOVAL Right 2016    LUNG BIOPSY Right 10/2018    THYROID LOBECTOMY Right 6/13/14    THYROIDECTOMY  02/21/2019    DR. MAGDALENO    THYROIDECTOMY  2018    URETER STENT PLACEMENT Left 08/2016         CURRENT MEDICATIONS       Previous Medications    ACETAMINOPHEN (TYLENOL) 500 MG TABLET    Take 1 tablet by mouth 4 times daily as needed for Pain    ALBUTEROL (PROVENTIL) (2.5 MG/3ML) 0.083% NEBULIZER SOLUTION    Take 3 mLs by nebulization every 4 hours as needed for Wheezing    ALBUTEROL SULFATE HFA (PROVENTIL HFA) 108 (90 BASE) MCG/ACT INHALER    Inhale 2 puffs into the lungs every 6 hours as needed for Wheezing    ALPRAZOLAM (XANAX) 0.25 MG TABLET    Take 1 tablet by mouth daily as needed for Sleep or Anxiety for up to 30 days. ATORVASTATIN (LIPITOR) 40 MG TABLET    Take 1 tablet by mouth nightly    B-D ULTRAFINE III SHORT PEN 31G X 8 MM MISC    USE THREE TIMES DAILY    BACLOFEN (LIORESAL) 10 MG TABLET    Take 1 tablet by mouth 3 times daily    BLOOD GLUCOSE MONITORING SUPPL (ONETOUCH VERIO) W/DEVICE KIT    TEST 3 TIMES DAILY AS NEEDED    BLOOD GLUCOSE MONITORING SUPPL (ONETOUCH VERIO) W/DEVICE KIT    As  Directed    BLOOD GLUCOSE TEST STRIPS (EXACTECH TEST) STRIP    1 each by In Vitro route 3 times daily (Accu-check test strips) As needed.  DX:E11.65    BLOOD GLUCOSE TEST STRIPS (ONETOUCH VERIO) STRIP    TEST 3 TIMES DAILY AS NEEDED    BLOOD GLUCOSE TEST STRIPS (ONETOUCH VERIO) STRIP    1 each by In Vitro route daily As needed.     BUDESONIDE-FORMOTEROL (SYMBICORT) 160-4.5 MCG/ACT AERO    Inhale 2 puffs into the lungs 2 times daily    BUPROPION (WELLBUTRIN XL) 150 MG EXTENDED RELEASE TABLET    Take 1 tablet by mouth every morning    BUSPIRONE (BUSPAR) 15 MG TABLET    TAKE 1 TABLET BY MOUTH THREE TIMES DAILY    BUTALBITAL-ACETAMINOPHEN-CAFFEINE (FIORICET, ESGIC) -40 MG PER TABLET    Take 1 tablet by mouth every 6 hours as needed for Headaches    CETIRIZINE (ZYRTEC) 10 MG TABLET    TAKE 1 TABLET BY MOUTH EVERY NIGHT AT BEDTIME AS NEEDED FOR ALLERGIES    CONTINUOUS BLOOD GLUC  (FREESTYLE JESSICA 14 DAY READER) LENNY    As directed    CONTINUOUS BLOOD GLUC SENSOR (FREESTYLE JESSICA 14 DAY SENSOR) MISC    As directed    DULOXETINE (CYMBALTA) 30 MG EXTENDED RELEASE CAPSULE    Take 1 capsule by mouth daily    FLUTICASONE (FLONASE) 50 MCG/ACT NASAL SPRAY    SHAKE LIQUID AND USE 1 SPRAY IN EACH NOSTRIL DAILY    FLUTICASONE-UMECLIDIN-VILANT (TRELEGY ELLIPTA) 200-62.5-25 MCG/INH AEPB    Inhale 1 puff into the lungs daily    INSULIN DEGLUDEC (TRESIBA FLEXTOUCH) 100 UNIT/ML SOPN    INJECT 40 UNITS UNDER THE SKIN NIGHTLY    INSULIN LISPRO (HUMALOG) 100 UNIT/ML INJECTION VIAL    INJECT 4 UNITS UNDER THE SKIN THREE TIMES DAILY AS NEEDED FOR HIGH BLOOD SUGAR    INSULIN LISPRO, 1 UNIT DIAL, (HUMALOG KWIKPEN) 100 UNIT/ML SOPN    4 units at each meals hold if glucose less than 150    INSULIN PEN NEEDLE (NOVOFINE) 32G X 6 MM MISC    qid    INSULIN SYRINGE-NEEDLE U-100 30G X 1/2\" 1 ML MISC    1 each by Does not apply route daily    LEVOTHYROXINE (SYNTHROID) 150 MCG TABLET    Take 1 tablet by mouth Daily    MAGNESIUM OXIDE (MAG-OX) 400 (241.3 MG) MG TABS TABLET    TAKE 1/2 TABLET BY MOUTH DAILY    METOCLOPRAMIDE (REGLAN) 10 MG TABLET    Take 1 tablet by mouth 2 times daily as needed (Headache)    ONE TOUCH ULTRASOFT LANCETS MISC    TEST 3 TIMES DAILY AS NEEDED    ONETOUCH DELEDUARDO LANCETS 31I MISC    qid    PREGABALIN (LYRICA) 150 MG CAPSULE    Take 1 capsule by mouth 3 times daily for 60 days.     SPIRONOLACTONE (ALDACTONE) 50 MG TABLET    Take 1 tablet by mouth daily    SUMATRIPTAN (IMITREX) 25 MG TABLET    TAKE 1 TABLET BY MOUTH 1 TIME AS NEEDED FOR MIGRAINE    TIOTROPIUM (SPIRIVA RESPIMAT) 2.5 MCG/ACT AERS INHALER    Inhale 2 puffs into the lungs daily    TIZANIDINE (ZANAFLEX) 2 MG TABLET    Take 1 tablet by mouth 3 times daily as needed (back pain/ spasm)       ALLERGIES     Shellfish-derived products, Ibuprofen, Ketorolac, Morphine, Other, Penicillins, Propoxyphene n-acetaminophen, and Toradol [ketorolac tromethamine]    FAMILY HISTORY       Family History   Problem Relation Age of Onset    Cancer Father     Diabetes Father     Allergy (Severe) Father     Heart Attack Father     Prostate Cancer Father     High Blood Pressure Mother     Diabetes Mother     Arthritis Mother     High Cholesterol Mother     Vision Loss Mother     Alcohol Abuse Neg Hx     Anemia Neg Hx     Arrhythmia Neg Hx     Asthma Neg Hx     Atrial Fibrillation Neg Hx     Birth Defects Neg Hx     Breast Cancer Neg Hx     Coronary Art Dis Neg Hx     Colon Cancer Neg Hx     Depression Neg Hx     Early Death Neg Hx     Hearing Loss Neg Hx     Heart Disease Neg Hx     Learning Disabilities Neg Hx     Kidney Disease Neg Hx     Mental Illness Neg Hx     Mental Retardation Neg Hx     Miscarriages / Stillbirths Neg Hx     Obesity Neg Hx     Osteoporosis Neg Hx     Stroke Neg Hx     Substance Abuse Neg Hx           SOCIAL HISTORY       Social History     Socioeconomic History    Marital status: Legally      Spouse name: None    Number of children: None    Years of education: 15    Highest education level: High school graduate   Occupational History    None   Tobacco Use    Smoking status: Former Smoker     Packs/day: 0.50     Years: 15.00 Pack years: 7.50     Types: Cigarettes     Start date: 2014     Quit date: 2015     Years since quittin.3    Smokeless tobacco: Former User     Quit date: 3/23/2016   Vaping Use    Vaping Use: Never used   Substance and Sexual Activity    Alcohol use: Not Currently     Alcohol/week: 0.0 standard drinks     Comment: occasionally    Drug use: Yes     Frequency: 5.0 times per week     Types: Marijuana    Sexual activity: Yes     Partners: Male   Other Topics Concern    None   Social History Narrative    None     Social Determinants of Health     Financial Resource Strain: Medium Risk    Difficulty of Paying Living Expenses: Somewhat hard   Food Insecurity: Food Insecurity Present    Worried About Running Out of Food in the Last Year: Sometimes true    Maximiliano of Food in the Last Year: Sometimes true   Transportation Needs: No Transportation Needs    Lack of Transportation (Medical): No    Lack of Transportation (Non-Medical): No   Physical Activity: Inactive    Days of Exercise per Week: 0 days    Minutes of Exercise per Session: 0 min   Stress: Stress Concern Present    Feeling of Stress : Very much   Social Connections: Socially Isolated    Frequency of Communication with Friends and Family: Three times a week    Frequency of Social Gatherings with Friends and Family: Never    Attends Baptism Services: Never    Active Member of Clubs or Organizations: No    Attends Club or Organization Meetings: Never    Marital Status:    Intimate Partner Violence: Not At Risk    Fear of Current or Ex-Partner: No    Emotionally Abused: No    Physically Abused: No    Sexually Abused: No         PHYSICAL EXAM         ED Triage Vitals [21 0303]   BP Temp Temp Source Pulse Resp SpO2 Height Weight   (!) 157/87 102.4 °F (39.1 °C) Oral 108 18 97 % 5' 3\" (1.6 m) 260 lb (117.9 kg)       Physical Exam  Constitutional:       Appearance: She is well-developed.    HENT:      Head: Normocephalic and atraumatic. Mouth/Throat:      Pharynx: No oropharyngeal exudate or posterior oropharyngeal erythema. Eyes:      Conjunctiva/sclera: Conjunctivae normal.      Pupils: Pupils are equal, round, and reactive to light. Neck:      Trachea: No tracheal deviation. Cardiovascular:      Heart sounds: Normal heart sounds. Pulmonary:      Effort: Pulmonary effort is normal. No respiratory distress. Breath sounds: Normal breath sounds. No stridor. Abdominal:      General: Bowel sounds are normal. There is no distension. Palpations: Abdomen is soft. There is no mass. Tenderness: There is no abdominal tenderness. There is no guarding or rebound. Comments: Patient seems to have mild bilateral CVA tenderness, no flank ecchymosis, no signs of trauma   Musculoskeletal:         General: Swelling and tenderness present. Normal range of motion. Cervical back: Normal range of motion and neck supple. Comments: Patient does have swelling and induration with faint erythema to right deltoid where vaccine was given. No abscess or fluctuance, no drainage, no notable wounds. Skin:     General: Skin is warm and dry. Capillary Refill: Capillary refill takes less than 2 seconds. Findings: No rash. Neurological:      Mental Status: She is alert and oriented to person, place, and time. Deep Tendon Reflexes: Reflexes are normal and symmetric. Psychiatric:         Behavior: Behavior normal.         Thought Content:  Thought content normal.         Judgment: Judgment normal.         DIAGNOSTIC RESULTS     EKG:All EKG's are interpreted by the Emergency Department Physician who either signs or Co-signs this chart in the absence of a cardiologist.        RADIOLOGY:   Non-plain film images such as CT, Ultrasound and MRI are read by theradiologist. Plain radiographic images are visualized and preliminarily interpreted by the emergency physician with the below findings:    Interpretation per theRadiologist below, if available at the time of this note:    CT KIDNEY WO CONTRAST    (Results Pending)   CT HUMERUS RIGHT WO CONTRAST    (Results Pending)   XR CHEST PORTABLE    (Results Pending)           LABS:  Labs Reviewed   COMPREHENSIVE METABOLIC PANEL - Abnormal; Notable for the following components:       Result Value    Sodium 132 (*)     CO2 19 (*)     Glucose 314 (*)     CREATININE 1.36 (*)     GFR Non- 41.1 (*)     GFR  49.8 (*)     Alkaline Phosphatase 170 (*)     All other components within normal limits   CBC WITH AUTO DIFFERENTIAL - Abnormal; Notable for the following components:    MCH 32.0 (*)     Neutrophils Absolute 7.4 (*)     All other components within normal limits   URINE RT REFLEX TO CULTURE - Abnormal; Notable for the following components:    Glucose, Ur >=1000 (*)     Ketones, Urine TRACE (*)     All other components within normal limits   TSH WITHOUT REFLEX - Abnormal; Notable for the following components:    TSH 0.411 (*)     All other components within normal limits   POCT GLUCOSE - Abnormal; Notable for the following components:    POC Glucose 261 (*)     All other components within normal limits   CULTURE, BLOOD 2   CULTURE, BLOOD 1   LACTIC ACID, PLASMA   PROCALCITONIN       All other labs were within normal range or not returned as of this dictation. EMERGENCY DEPARTMENT COURSE and DIFFERENTIAL DIAGNOSIS/MDM:   Vitals:    Vitals:    05/28/21 0345 05/28/21 0400 05/28/21 0430 05/28/21 0530   BP:  124/67 (!) 123/58 (!) 116/46   Pulse: 108 96 98 88   Resp: 22 25 23 24   Temp:    98.5 °F (36.9 °C)   TempSrc:    Oral   SpO2: 96% 95% 95% 97%   Weight:       Height:             MDM    CO2 19, creatinine 1.36, glucose 314, TSH 0.411. Urine shows ketones, no infection. CT renal shows no acute process. CT of arm shows no fluid collection but edema possibly from IM injection.   Patient was given 1 L fluids, Tylenol, Zofran, Dilaudid, and on reassessment symptoms are much improved, she is afebrile. Fevers most likely due to vaccine response, patient placed on Keflex for right arm induration. She appears nontoxic in no apparent distress. Standard anticipatory guidance given to patient upon discharge. Have given them a specific time frame in which to follow-up and who to follow-up with. I have also advised them that they should return to the emergency department if they get worse, or not getting better or develop any new or concerning symptoms. Patient demonstrates understanding. CRITICAL CARE TIME   Total Critical Caretime was 0 minutes, excluding separately reportable procedures. There was a high probability of clinically significant/life threatening deterioration in the patient's condition which required my urgent intervention. Procedures    FINAL IMPRESSION      1. Fever, unspecified fever cause    2. Hyperglycemia          DISPOSITION/PLAN   DISPOSITION Discharge - Pending Orders Complete 05/28/2021 06:03:20 AM      PATIENT REFERRED TO:  SUDEEP Vale CNP  [address] [phone]    In 2 days        DISCHARGE MEDICATIONS:  New Prescriptions    CEPHALEXIN (KEFLEX) 500 MG CAPSULE    Take 2 capsules by mouth 2 times daily for 7 days          (Please notethat portions of this note were completed with a voice recognition program.  Efforts were made to edit the dictations but occasionally words are mis-transcribed. )    ANTONIO Chinchilla (electronically signed)  Emergency Physician Assistant          ANTONIO Petit  05/29/21 0001

## 2021-06-02 LAB — BLOOD CULTURE, ROUTINE: NORMAL

## 2021-06-07 DIAGNOSIS — F41.9 ANXIETY: ICD-10-CM

## 2021-06-07 NOTE — ED PROVIDER NOTES
503b/5NOR
 50.1 (*)     All other components within normal limits   URINE RT REFLEX TO CULTURE - Abnormal; Notable for the following components:    Glucose, Ur >=1000 (*)     Ketones, Urine TRACE (*)     Blood, Urine TRACE (*)     All other components within normal limits   POCT GLUCOSE - Abnormal; Notable for the following components:    POC Glucose 305 (*)     All other components within normal limits   POCT GLUCOSE - Abnormal; Notable for the following components:    POC Glucose 280 (*)     All other components within normal limits   POCT GLUCOSE - Normal   POCT GLUCOSE - Normal   POCT GLUCOSE - Normal   URINE CULTURE   MICROSCOPIC URINALYSIS   POCT GLUCOSE       All other labs were within normal range or not returned as of this dictation. EMERGENCY DEPARTMENT COURSE and DIFFERENTIAL DIAGNOSIS/MDM:   Vitals:    Vitals:    09/01/19 2338 09/02/19 0030 09/02/19 0140 09/02/19 0200   BP: (!) 147/91 (!) 168/85 (!) 159/88 (!) 133/53   Pulse: 106 110 96 97   Resp: 18 23 28 23   Temp: 98.7 °F (37.1 °C)      TempSrc: Oral      SpO2: 100% 96% 96% 97%   Weight: 230 lb (104.3 kg)      Height: 5' 3\" (1.6 m)               MDM patient is afebrile nontoxic no acute distress hemodynamically stable with a noted mild tachycardia. Patient's point-of-care glucose was over 300 on triage. Due to patient's report of hyperglycemia with symptoms base labs ordered for further evaluation and patient was started on IV fluids. Patient has dosed with additional Lantus prior to arrival.  Due to patient's recent use of additional Lantus IV fluids were ordered only to assess for ability to decrease glucose. Patient given multiple fluid boluses and as the glucose level decreases patient states that her symptoms are getting significantly better. She states she has only a mild minor headache and the dizziness is significantly better. Patient's glucose is under 250 and she is feeling notably better.   Patient stable for discharge home at

## 2021-06-08 RX ORDER — BUPROPION HYDROCHLORIDE 150 MG/1
150 TABLET ORAL EVERY MORNING
Qty: 30 TABLET | Refills: 3 | Status: SHIPPED | OUTPATIENT
Start: 2021-06-08 | End: 2021-10-04

## 2021-06-10 ENCOUNTER — CARE COORDINATION (OUTPATIENT)
Dept: CARE COORDINATION | Age: 51
End: 2021-06-10

## 2021-06-10 ENCOUNTER — HOSPITAL ENCOUNTER (EMERGENCY)
Age: 51
Discharge: HOME | End: 2021-06-10
Attending: EMERGENCY MEDICINE
Payer: MEDICAID

## 2021-06-10 ENCOUNTER — APPOINTMENT (OUTPATIENT)
Dept: GENERAL RADIOLOGY | Age: 51
End: 2021-06-10
Payer: MEDICAID

## 2021-06-10 VITALS
WEIGHT: 250 LBS | HEART RATE: 85 BPM | HEIGHT: 63 IN | OXYGEN SATURATION: 98 % | RESPIRATION RATE: 18 BRPM | TEMPERATURE: 97.6 F | BODY MASS INDEX: 44.3 KG/M2 | SYSTOLIC BLOOD PRESSURE: 132 MMHG | DIASTOLIC BLOOD PRESSURE: 87 MMHG

## 2021-06-10 DIAGNOSIS — R07.9 CHEST PAIN, UNSPECIFIED TYPE: Primary | ICD-10-CM

## 2021-06-10 DIAGNOSIS — R73.9 HYPERGLYCEMIA: ICD-10-CM

## 2021-06-10 LAB
ALBUMIN SERPL-MCNC: 3.7 G/DL (ref 3.5–4.6)
ALP BLD-CCNC: 176 U/L (ref 40–130)
ALT SERPL-CCNC: 17 U/L (ref 0–33)
ANION GAP SERPL CALCULATED.3IONS-SCNC: 12 MEQ/L (ref 9–15)
AST SERPL-CCNC: 19 U/L (ref 0–35)
BASOPHILS ABSOLUTE: 0.1 K/UL (ref 0–0.2)
BASOPHILS RELATIVE PERCENT: 1 %
BILIRUB SERPL-MCNC: <0.2 MG/DL (ref 0.2–0.7)
BUN BLDV-MCNC: 12 MG/DL (ref 6–20)
CALCIUM SERPL-MCNC: 9.2 MG/DL (ref 8.5–9.9)
CHLORIDE BLD-SCNC: 101 MEQ/L (ref 95–107)
CO2: 22 MEQ/L (ref 20–31)
CREAT SERPL-MCNC: 1.3 MG/DL (ref 0.5–0.9)
EOSINOPHILS ABSOLUTE: 0.3 K/UL (ref 0–0.7)
EOSINOPHILS RELATIVE PERCENT: 3.5 %
GFR AFRICAN AMERICAN: 52.4
GFR NON-AFRICAN AMERICAN: 43.3
GLOBULIN: 3.3 G/DL (ref 2.3–3.5)
GLUCOSE BLD-MCNC: 169 MG/DL (ref 60–115)
GLUCOSE BLD-MCNC: 431 MG/DL (ref 70–99)
HCT VFR BLD CALC: 43.9 % (ref 37–47)
HEMOGLOBIN: 14.7 G/DL (ref 12–16)
LYMPHOCYTES ABSOLUTE: 2.4 K/UL (ref 1–4.8)
LYMPHOCYTES RELATIVE PERCENT: 26.9 %
MCH RBC QN AUTO: 31.3 PG (ref 27–31.3)
MCHC RBC AUTO-ENTMCNC: 33.5 % (ref 33–37)
MCV RBC AUTO: 93.4 FL (ref 82–100)
MONOCYTES ABSOLUTE: 0.5 K/UL (ref 0.2–0.8)
MONOCYTES RELATIVE PERCENT: 5.7 %
NEUTROPHILS ABSOLUTE: 5.6 K/UL (ref 1.4–6.5)
NEUTROPHILS RELATIVE PERCENT: 62.9 %
PDW BLD-RTO: 13.3 % (ref 11.5–14.5)
PERFORMED ON: ABNORMAL
PLATELET # BLD: 267 K/UL (ref 130–400)
POTASSIUM SERPL-SCNC: 4 MEQ/L (ref 3.4–4.9)
RBC # BLD: 4.7 M/UL (ref 4.2–5.4)
SODIUM BLD-SCNC: 135 MEQ/L (ref 135–144)
TOTAL PROTEIN: 7 G/DL (ref 6.3–8)
TROPONIN: <0.01 NG/ML (ref 0–0.01)
WBC # BLD: 8.8 K/UL (ref 4.8–10.8)

## 2021-06-10 PROCEDURE — 36415 COLL VENOUS BLD VENIPUNCTURE: CPT

## 2021-06-10 PROCEDURE — 6370000000 HC RX 637 (ALT 250 FOR IP): Performed by: EMERGENCY MEDICINE

## 2021-06-10 PROCEDURE — 6360000002 HC RX W HCPCS: Performed by: EMERGENCY MEDICINE

## 2021-06-10 PROCEDURE — 96376 TX/PRO/DX INJ SAME DRUG ADON: CPT

## 2021-06-10 PROCEDURE — 84484 ASSAY OF TROPONIN QUANT: CPT

## 2021-06-10 PROCEDURE — 96374 THER/PROPH/DIAG INJ IV PUSH: CPT

## 2021-06-10 PROCEDURE — 99284 EMERGENCY DEPT VISIT MOD MDM: CPT

## 2021-06-10 PROCEDURE — 80053 COMPREHEN METABOLIC PANEL: CPT

## 2021-06-10 PROCEDURE — 85025 COMPLETE CBC W/AUTO DIFF WBC: CPT

## 2021-06-10 PROCEDURE — 2580000003 HC RX 258: Performed by: EMERGENCY MEDICINE

## 2021-06-10 PROCEDURE — 96375 TX/PRO/DX INJ NEW DRUG ADDON: CPT

## 2021-06-10 PROCEDURE — 71046 X-RAY EXAM CHEST 2 VIEWS: CPT

## 2021-06-10 RX ORDER — 0.9 % SODIUM CHLORIDE 0.9 %
1000 INTRAVENOUS SOLUTION INTRAVENOUS ONCE
Status: COMPLETED | OUTPATIENT
Start: 2021-06-10 | End: 2021-06-10

## 2021-06-10 RX ORDER — ONDANSETRON 2 MG/ML
4 INJECTION INTRAMUSCULAR; INTRAVENOUS ONCE
Status: COMPLETED | OUTPATIENT
Start: 2021-06-10 | End: 2021-06-10

## 2021-06-10 RX ORDER — LORAZEPAM 2 MG/ML
1 INJECTION INTRAMUSCULAR ONCE
Status: COMPLETED | OUTPATIENT
Start: 2021-06-10 | End: 2021-06-10

## 2021-06-10 RX ADMIN — HYDROMORPHONE HYDROCHLORIDE 0.5 MG: 1 INJECTION, SOLUTION INTRAMUSCULAR; INTRAVENOUS; SUBCUTANEOUS at 06:37

## 2021-06-10 RX ADMIN — HYDROMORPHONE HYDROCHLORIDE 1 MG: 1 INJECTION, SOLUTION INTRAMUSCULAR; INTRAVENOUS; SUBCUTANEOUS at 04:33

## 2021-06-10 RX ADMIN — SODIUM CHLORIDE 1000 ML: 9 INJECTION, SOLUTION INTRAVENOUS at 06:37

## 2021-06-10 RX ADMIN — ONDANSETRON 4 MG: 2 INJECTION INTRAMUSCULAR; INTRAVENOUS at 04:41

## 2021-06-10 RX ADMIN — INSULIN HUMAN 10 UNITS: 100 INJECTION, SOLUTION PARENTERAL at 06:53

## 2021-06-10 RX ADMIN — LORAZEPAM 1 MG: 2 INJECTION INTRAMUSCULAR; INTRAVENOUS at 04:33

## 2021-06-10 ASSESSMENT — PAIN SCALES - GENERAL
PAINLEVEL_OUTOF10: 8
PAINLEVEL_OUTOF10: 10
PAINLEVEL_OUTOF10: 7
PAINLEVEL_OUTOF10: 10

## 2021-06-10 ASSESSMENT — PAIN DESCRIPTION - FREQUENCY: FREQUENCY: CONTINUOUS

## 2021-06-10 ASSESSMENT — ENCOUNTER SYMPTOMS
SHORTNESS OF BREATH: 1
ABDOMINAL PAIN: 0
WHEEZING: 0
COUGH: 0
PHOTOPHOBIA: 0
DYSPNEA ASSOCIATED WITH: EXERTION
CHEST TIGHTNESS: 0
ABDOMINAL DISTENTION: 0
EYE DISCHARGE: 0
VOMITING: 0
SORE THROAT: 0

## 2021-06-10 ASSESSMENT — PAIN DESCRIPTION - LOCATION: LOCATION: CHEST

## 2021-06-10 ASSESSMENT — PAIN DESCRIPTION - PAIN TYPE: TYPE: ACUTE PAIN

## 2021-06-10 ASSESSMENT — PAIN DESCRIPTION - DESCRIPTORS: DESCRIPTORS: TIGHTNESS;SHARP

## 2021-06-10 NOTE — ED TRIAGE NOTES
Left another message (5th voicemail message) for pt to call back to schedule overdue INR appt (last INR in clinic was on 7/13/17). Antoni   Pt has co sob and chest tightness. Pt states she has anxiety and it feels like that. Pt is aox4 pwd.

## 2021-06-10 NOTE — CARE COORDINATION
Initial Contact Social Work Note - Ambulatory  6/10/2021      Date of referral: 6/10/2021  Referral received from: Nirali Vazquez  Reason for referral: Referral to [facility]    Previous  referral: Yes  If yes, brief summary of outcome: Transportation need to physician appointment. .  Cab service as offered. Two Identifiers Verified: Yes    Insurance Provider: [address]:  None     Status: Not a [employer] Providers: None    ADL Assistance Needed: N/A Patient stated she is independent with ADL'S    Housing/Living Concerns or Home Modification Needs-None indicated at this time. Transportation Concern: Patient does not drive. Patient uses transportation that [facility] provides but finds the [employer] to be unreliable. She has friends and ex- helps with errands and transportation. Medication Cost Concern:  No Problems at this time. Medication Adherence Concern: No issues. Financial Concern(s): None    Income (only if applicable): SSI    Ability to Read/Write: Yes    Advance Care Plan:  Not discussed. Other: Patient was in agreement for referral to [facility] for counseling. Identified Needs:   Counseling Services. [facility]     Social Work Plan:   [facility]erral to [facility]    Next Steps:  Complete referral to [facility] [employer].       Goals Addressed    None

## 2021-06-10 NOTE — ED PROVIDER NOTES
[address] [facility] ED  eMERGENCY dEPARTMENT eNCOUnter      Pt Name: Librado Cooper  MRN: [id number]  [place] 1970  Date of evaluation: 6/10/2021  Provider: Miki Sheppard MD    CHIEF COMPLAINT       Chief Complaint   Patient presents with    Shortness of Breath         HISTORY OF PRESENT ILLNESS   (Location/Symptom, Timing/Onset,Context/Setting, Quality, Duration, Modifying Factors, Severity)  Note limiting factors. Librado Cooper is a 49 y.o. female who presents to the emergency department for evaluation of chest pain and shortness of breath. Patient reports feeling this way for the past several hours. She has chest pain that she describes as \"what I always have\". In addition she feels a full or drowning sensation with her breathing. No related fever or chills. No related nausea vomiting. No cough. HPI    NursingNotes were reviewed. REVIEW OF SYSTEMS    (2-9 systems for level 4, 10 or more for level 5)     Review of Systems   Constitutional: Negative for chills and diaphoresis. HENT: Negative for congestion, ear pain, mouth sores and sore throat. Eyes: Negative for photophobia and discharge. Respiratory: Positive for shortness of breath. Negative for cough, chest tightness and wheezing. Cardiovascular: Positive for chest pain. Negative for palpitations. Gastrointestinal: Negative for abdominal distention, abdominal pain and vomiting. Endocrine: Negative for cold intolerance. Genitourinary: Negative for difficulty urinating. Musculoskeletal: Negative for arthralgias. Skin: Negative for pallor and rash. Allergic/Immunologic: Negative for immunocompromised state. Neurological: Negative for dizziness and syncope. Hematological: Negative for adenopathy. Psychiatric/Behavioral: Negative for agitation and hallucinations. The patient is nervous/anxious. All other systems reviewed and are negative.       Except as noted above the remainder of the review of systems was reviewed and negative. PAST MEDICAL HISTORY     Past Medical History:   Diagnosis Date    Anxiety     Arthritis     Asthma     Bronchopneumonia     Cancer ([facility]     renal    Cerebral artery occlusion with cerebral infarction (HCC)     Chronic bilateral low back pain with sciatica     Chronic kidney disease     Chronic obstructive lung disease ([facility] 7/26/2019    Depression     Fibromyalgia     Hypertension     Insulin dependent type 2 diabetes mellitus, uncontrolled ([facility] 8/3/2018    Localized enlarged lymph nodes 10/26/2018    Mixed headache     Pure hyperglyceridemia 5/19/2017    Sarcoidosis     Sleep apnea     does not wear cpap    Thyroid goiter          SURGICALHISTORY       Past Surgical History:   Procedure Laterality Date    BRONCHOSCOPY  10/26/2018    DR. STEARNS    KIDNEY REMOVAL Right 08/2016    KIDNEY REMOVAL Right 2016    LUNG BIOPSY Right 10/2018    THYROID LOBECTOMY Right 6/13/14    THYROIDECTOMY  02/21/2019    DR. MAGDALENO    THYROIDECTOMY  2018    URETER STENT PLACEMENT Left 08/2016         CURRENT MEDICATIONS       Previous Medications    ACETAMINOPHEN (TYLENOL) 500 MG TABLET    Take 1 tablet by mouth 4 times daily as needed for Pain    ALBUTEROL (PROVENTIL) (2.5 MG/3ML) 0.083% NEBULIZER SOLUTION    Take 3 mLs by nebulization every 4 hours as needed for Wheezing    ALBUTEROL SULFATE HFA (PROVENTIL HFA) 108 (90 BASE) MCG/ACT INHALER    Inhale 2 puffs into the lungs every 6 hours as needed for Wheezing    ALPRAZOLAM (XANAX) 0.25 MG TABLET    Take 1 tablet by mouth daily as needed for Sleep or Anxiety for up to 30 days.     ATORVASTATIN (LIPITOR) 40 MG TABLET    Take 1 tablet by mouth nightly    B-D ULTRAFINE III SHORT PEN 31G X 8 MM MISC    USE THREE TIMES DAILY    BACLOFEN (LIORESAL) 10 MG TABLET    Take 1 tablet by mouth 3 times daily    BLOOD GLUCOSE MONITORING SUPPL (ONETOUCH VERIO) W/DEVICE KIT    TEST 3 TIMES DAILY AS NEEDED    BLOOD GLUCOSE MONITORING SUPPL (ONETOUCH VERIO) W/DEVICE KIT    As  Directed    BLOOD GLUCOSE TEST STRIPS (EXACTECH TEST) STRIP    1 each by In Vitro route 3 times daily (Accu-check test strips) As needed. DX:E11.65    BLOOD GLUCOSE TEST STRIPS (ONETOUCH VERIO) STRIP    TEST 3 TIMES DAILY AS NEEDED    BLOOD GLUCOSE TEST STRIPS (ONETOUCH VERIO) STRIP    1 each by In Vitro route daily As needed.     BUDESONIDE-FORMOTEROL (SYMBICORT) 160-4.5 MCG/ACT AERO    Inhale 2 puffs into the lungs 2 times daily    BUPROPION (WELLBUTRIN XL) 150 MG EXTENDED RELEASE TABLET    TAKE 1 TABLET BY MOUTH EVERY MORNING    BUSPIRONE (BUSPAR) 15 MG TABLET    TAKE 1 TABLET BY MOUTH THREE TIMES DAILY    BUTALBITAL-ACETAMINOPHEN-CAFFEINE (FIORICET, ESGIC) -40 MG PER TABLET    Take 1 tablet by mouth every 6 hours as needed for Headaches    CETIRIZINE (ZYRTEC) 10 MG TABLET    TAKE 1 TABLET BY MOUTH EVERY NIGHT AT BEDTIME AS NEEDED FOR ALLERGIES    CONTINUOUS BLOOD GLUC  (FREESTYLE JESSICA 14 DAY READER) LENNY    As directed    CONTINUOUS BLOOD GLUC SENSOR (FREESTYLE JESSICA 14 DAY SENSOR) MISC    As directed    DULOXETINE (CYMBALTA) 30 MG EXTENDED RELEASE CAPSULE    Take 1 capsule by mouth daily    FLUTICASONE (FLONASE) 50 MCG/ACT NASAL SPRAY    SHAKE LIQUID AND USE 1 SPRAY IN EACH NOSTRIL DAILY    FLUTICASONE-UMECLIDIN-VILANT (TRELEGY ELLIPTA) 200-62.5-25 MCG/INH AEPB    Inhale 1 puff into the lungs daily    INSULIN DEGLUDEC (TRESIBA FLEXTOUCH) 100 UNIT/ML SOPN    INJECT 40 UNITS UNDER THE SKIN NIGHTLY    INSULIN LISPRO (HUMALOG) 100 UNIT/ML INJECTION VIAL    INJECT 4 UNITS UNDER THE SKIN THREE TIMES DAILY AS NEEDED FOR HIGH BLOOD SUGAR    INSULIN LISPRO, 1 UNIT DIAL, (HUMALOG KWIKPEN) 100 UNIT/ML SOPN    4 units at each meals hold if glucose less than 150    INSULIN PEN NEEDLE (NOVOFINE) 32G X 6 MM MISC    qid    INSULIN SYRINGE-NEEDLE U-100 30G X 1/2\" 1 ML MISC    1 each by Does not apply route daily    LEVOTHYROXINE (SYNTHROID) 150 MCG TABLET    Take 1 tablet by mouth Daily    MAGNESIUM OXIDE (MAG-OX) 400 (241.3 MG) MG TABS TABLET    TAKE 1/2 TABLET BY MOUTH DAILY    METOCLOPRAMIDE (REGLAN) 10 MG TABLET    Take 1 tablet by mouth 2 times daily as needed (Headache)    ONE TOUCH ULTRASOFT LANCETS MISC    TEST 3 TIMES DAILY AS NEEDED    ONETOUCH DELICA LANCETS 08T MISC    qid    PREGABALIN (LYRICA) 150 MG CAPSULE    Take 1 capsule by mouth 3 times daily for 60 days.     SPIRONOLACTONE (ALDACTONE) 50 MG TABLET    Take 1 tablet by mouth daily    SUMATRIPTAN (IMITREX) 25 MG TABLET    TAKE 1 TABLET BY MOUTH 1 TIME AS NEEDED FOR MIGRAINE    TIOTROPIUM (SPIRIVA RESPIMAT) 2.5 MCG/ACT AERS INHALER    Inhale 2 puffs into the lungs daily    TIZANIDINE (ZANAFLEX) 2 MG TABLET    Take 1 tablet by mouth 3 times daily as needed (back pain/ spasm)       ALLERGIES     Shellfish-derived products, Ibuprofen, Ketorolac, Morphine, Other, Penicillins, Propoxyphene n-acetaminophen, and Toradol [ketorolac tromethamine]    FAMILY HISTORY       Family History   Problem Relation Age of Onset    Cancer Father     Diabetes Father     Allergy (Severe) Father     Heart Attack Father     Prostate Cancer Father     High Blood Pressure Mother     Diabetes Mother     Arthritis Mother     High Cholesterol Mother     Vision Loss Mother     Alcohol Abuse Neg Hx     Anemia Neg Hx     Arrhythmia Neg Hx     Asthma Neg Hx     Atrial Fibrillation Neg Hx     Birth Defects Neg Hx     Breast Cancer Neg Hx     Coronary Art Dis Neg Hx     Colon Cancer Neg Hx     Depression Neg Hx     Early Death Neg Hx     Hearing Loss Neg Hx     Heart Disease Neg Hx     Learning Disabilities Neg Hx     Kidney Disease Neg Hx     Mental Illness Neg Hx     Mental Retardation Neg Hx     Miscarriages / Stillbirths Neg Hx     Obesity Neg Hx     Osteoporosis Neg Hx     Stroke Neg Hx     Substance Abuse Neg Hx           SOCIAL HISTORY       Social History     Socioeconomic History    Marital status: Legally      Spouse name: None    Number of children: None    Years of education: 15    Highest education level: High school graduate   Occupational History    None   Tobacco Use    Smoking status: Former Smoker     Packs/day: 0.50     Years: 15.00     Pack years: 7.50     Types: Cigarettes     Start date: 2014     Quit date: 2015     Years since quittin.3    Smokeless tobacco: Former User     Quit date: 3/23/2016   Vaping Use    Vaping Use: Never used   Substance and Sexual Activity    Alcohol use: Not Currently     Alcohol/week: 0.0 standard drinks     Comment: occasionally    Drug use: Yes     Frequency: 5.0 times per week     Types: Marijuana    Sexual activity: Yes     Partners: Male   Other Topics Concern    None   Social History Narrative    None     Social Determinants of Health     Financial Resource Strain: Medium Risk    Difficulty of Paying Living Expenses: Somewhat hard   Food Insecurity: Food Insecurity Present    Worried About Running Out of Food in the Last Year: Sometimes true    Maximiliano of Food in the Last Year: Sometimes true   Transportation Needs: No Transportation Needs    Lack of Transportation (Medical): No    Lack of Transportation (Non-Medical): No   Physical Activity: Inactive    Days of Exercise per Week: 0 days    Minutes of Exercise per Session: 0 min   Stress: Stress Concern Present    Feeling of Stress : Very much   Social Connections: Socially Isolated    Frequency of Communication with Friends and Family:  Three times a week    Frequency of Social Gatherings with Friends and Family: Never    Attends Orthodox Services: Never    Active Member of Clubs or Organizations: No    Attends Club or Organization Meetings: Never    Marital Status:    Intimate Partner Violence: Not At Risk    Fear of Current or Ex-Partner: No    Emotionally Abused: No    Physically Abused: No    Sexually Abused: No       SCREENINGS    [place] Coma Scale  Eye Opening: Spontaneous  Best Verbal Response: Oriented  Best Motor Response: Obeys commands  [place] Coma Scale Score: 15 @FLOW([id number])@      PHYSICAL EXAM    (up to 7 for level 4, 8 or more for level 5)     ED Triage Vitals [06/10/21 0347]   BP Temp Temp Source Pulse Resp SpO2 Height Weight   (!) 149/77 97.6 °F (36.4 °C) Temporal 88 20 98 % 5' 3\" (1.6 m) 250 lb (113.4 kg)       Physical Exam  Vitals and nursing note reviewed. Constitutional:       Appearance: She is well-developed. HENT:      Head: Normocephalic. Nose: Nose normal.   Eyes:      Conjunctiva/sclera: Conjunctivae normal.      Pupils: Pupils are equal, round, and reactive to light. Cardiovascular:      Rate and Rhythm: Normal rate and regular rhythm. Heart sounds: Normal heart sounds. Pulmonary:      Effort: Pulmonary effort is normal.      Breath sounds: Normal breath sounds. No decreased breath sounds, wheezing, rhonchi or rales. Abdominal:      General: Bowel sounds are normal.      Palpations: Abdomen is soft. Tenderness: There is no abdominal tenderness. There is no guarding. Musculoskeletal:         General: Normal range of motion. Cervical back: Normal range of motion and neck supple. Skin:     General: Skin is warm and dry. Capillary Refill: Capillary refill takes less than 2 seconds. Neurological:      Mental Status: She is alert and oriented to person, place, and time. Psychiatric:         Mood and Affect: Mood is anxious. DIAGNOSTIC RESULTS     EKG: All EKG's are interpreted by the Emergency Department Physician who either signs or Co-signsthis chart in the absence of a cardiologist.    EKG shows normal sinus rhythm rate of 78. No acute ST or T wave changes. Normal axis. Normal EKG. No changes on the EKG from previous.     RADIOLOGY:   Non-plain filmimages such as CT, Ultrasound and MRI are read by the radiologist. Plain radiographic images are visualized and preliminarily interpreted by the emergency physician with the below findings:      Interpretation per the Radiologist below, if available at the time ofthis note:    XR CHEST (2 VW)    (Results Pending)         ED BEDSIDE ULTRASOUND:   Performed by ED Physician - none    LABS:  Labs Reviewed   COMPREHENSIVE METABOLIC PANEL - Abnormal; Notable for the following components:       Result Value    Glucose 431 (*)     CREATININE 1.30 (*)     GFR Non- 43.3 (*)     GFR  52.4 (*)     Alkaline Phosphatase 176 (*)     All other components within normal limits    Narrative:     CALL  [place]  [facility] tel. [phone],  Glucose results called to and read back by Dr Romana Fiore, 06/10/2021 06:21, by  TORO   CBC WITH AUTO DIFFERENTIAL   TROPONIN       All other labs were within normal range or not returned as of this dictation. EMERGENCY DEPARTMENT COURSE and DIFFERENTIAL DIAGNOSIS/MDM:   Vitals:    Vitals:    06/10/21 0347 06/10/21 0430 06/10/21 0548   BP: (!) 149/77 (!) 145/83 117/61   Pulse: 88  89   Resp: 20  20   Temp: 97.6 °F (36.4 °C)     TempSrc: Temporal     SpO2: 98% 98% 98%   Weight: 250 lb (113.4 kg)     Height: 5' 3\" (1.6 m)          MDM patient work-up was negative today except for elevated blood sugar. Stressed to her that she has been compliant with her diet watch her blood sugars more closely. She was given insulin supplement here and fluids. Discharged home improved. CONSULTS:  None    PROCEDURES:  Unless otherwise noted below, none     Procedures    FINAL IMPRESSION      1. Chest pain, unspecified type    2.  Hyperglycemia          DISPOSITION/PLAN   DISPOSITION Decision To Discharge 06/10/2021 06:51:08 AM      PATIENT REFERRED TO:  SUDEEP Lara - CNP  [id number] [place] [id number]  [phone]    In 2 days        DISCHARGE MEDICATIONS:  New Prescriptions    No medications on file          (Please note that portions of this note were completed with a voice recognition program.  Efforts were made to edit the dictations but occasionally words are mis-transcribed.)    Halima Decker MD (electronically signed)  Attending Emergency Physician          Halima Decker MD  06/10/21 [facility] Dagoberto Ovalle MD  06/10/21 [id number]

## 2021-06-10 NOTE — CARE COORDINATION
Telephone call to [facility]. They indicated that they do do take patient's insurance and they would make a home visit. They stated that it could take up to 14 days for intake assessment.

## 2021-06-10 NOTE — CARE COORDINATION
SUDEEP Linda CNP   pregabalin (LYRICA) 150 MG capsule Take 1 capsule by mouth 3 times daily for 60 days. 5/17/21 7/16/21  SUDEEP Linda CNP   ALPRAZolam [place] Life) 0.25 MG tablet Take 1 tablet by mouth daily as needed for Sleep or Anxiety for up to 30 days.  5/17/21 6/16/21  SUDEEP Linda CNP   levothyroxine (SYNTHROID) 150 MCG tablet Take 1 tablet by mouth Daily 4/21/21   Joya Rush MD   budesonide-formoterol (SYMBICORT) 160-4.5 MCG/ACT AERO Inhale 2 puffs into the lungs 2 times daily 4/14/21   Nat Benites MD   tiotropium (SPIRIVA RESPIMAT) 2.5 MCG/ACT AERS inhaler Inhale 2 puffs into the lungs daily 4/14/21   Nat Benites MD   Fluticasone-Umeclidin-Vilant (TRELEGY ELLIPTA) 200-62.5-25 MCG/INH AEPB Inhale 1 puff into the lungs daily  Patient not taking: Reported on 5/17/2021 4/7/21   Nat Benites MD   busPIRone (BUSPAR) 15 MG tablet TAKE 1 TABLET BY MOUTH THREE TIMES DAILY 3/29/21   SUDEEP Huff CNP   metoclopramide (REGLAN) 10 MG tablet Take 1 tablet by mouth 2 times daily as needed (Headache) 3/21/21   Malcolm Bardales MD   acetaminophen (TYLENOL) 500 MG tablet Take 1 tablet by mouth 4 times daily as needed for Pain 3/21/21   Malcolm Bardales MD   B-D ULTRAFINE III SHORT PEN 31G X 8 MM MISC USE THREE TIMES DAILY 3/15/21   SUDEEP Choi CNP   cetirizine (ZYRTEC) 10 MG tablet TAKE 1 TABLET BY MOUTH EVERY NIGHT AT BEDTIME AS NEEDED FOR ALLERGIES 2/25/21   SUDEEP Linda CNP   Insulin Degludec (TRESIBA FLEXTOUCH) 100 UNIT/ML SOPN INJECT 40 UNITS UNDER THE SKIN NIGHTLY 2/23/21   Joya Rush MD   insulin lispro, 1 Unit Dial, (HUMALOG KWIKPEN) 100 UNIT/ML SOPN 4 units at each meals hold if glucose less than 150 2/23/21   Joya Rush MD   spironolactone (ALDACTONE) 50 MG tablet Take 1 tablet by mouth daily 2/23/21   MD Maia Dalton Delica Lancets 39G MISC qid 2/23/21   Joya Rush MD   blood glucose test strips (ONETOUCH VERIO) strip 1 each 0.083% nebulizer solution Take 3 mLs by nebulization every 4 hours as needed for Wheezing 11/5/19   Maria M Ni, DO   atorvastatin (LIPITOR) 40 MG tablet Take 1 tablet by mouth nightly 9/11/19   Kt Del Toro, DO   Insulin Syringe-Needle U-100 30G X 1/2\" 1 ML MISC 1 each by Does not apply route daily 11/16/18   Zaki Langston, DO   blood glucose test strips (ONETOUCH VERIO) strip TEST 3 TIMES DAILY AS NEEDED 8/16/18   Zaki Dennises, DO   Blood Glucose Monitoring Suppl (ONETOUCH VERIO) w/Device KIT TEST 3 TIMES DAILY AS NEEDED 8/16/18   Zaki Dennises, DO       Future Appointments   Date Time Provider Valerie Cosby   6/17/2021  3:30 PM Shankar Rain MD [facility]   6/22/2021  4:00 PM Maria E Price MD [facility]   6/25/2021  3:00 PM SUDEEP Chance - CNP [facility]   11/5/2021  9:15 AM Berto Ascencio MD [facility]

## 2021-06-10 NOTE — CARE COORDINATION
diet- decrease blood sugars, increase exercise, log blood sugars  2/17  New referrals to endo and pulm  3/9/21New referral to pain management and [facility]  4/12 nephrology referral, ct chest PFT and sleep study ordered  6/10 not following up with referrals, danny, nephrology, psych- needs  a lot of reinforcement    Barriers: lack of education  Plan for overcoming my barriers: Care Coordination, [facility] Social Work, Clinical Rx, and [facility] Dietician  Confidence: 9/10  Anticipated Goal Completion Date: 5/5/21         Medication Management   Improving     I will take my medication as directed. I will notify my provider of any problems with medications, like adverse effects or side effects. I will notify my provider/Care Coordinator if I am unable to afford my medications. I will notify my provider for advice before I stop taking any of my medication. I will use a medication box to improve/assist with medication management. Using pill box  Beter medication compliance noted    Barriers: lack of education  Plan for overcoming my barriers: Care Coordination, Clinical Rx, pill box to organize meds  Confidence: 10/10  Anticipated Goal Completion Date: 4/5/2021            Prior to Admission medications    Medication Sig Start Date End Date Taking? Authorizing Provider   buPROPion (WELLBUTRIN XL) 150 MG extended release tablet TAKE 1 TABLET BY MOUTH EVERY MORNING 6/8/21   SUDEEP Calderon CNP   fluticasone (FLONASE) 50 MCG/ACT nasal spray SHAKE LIQUID AND USE 1 SPRAY IN EACH NOSTRIL DAILY 5/18/21   SUDEEP Calderon CNP   DULoxetine (CYMBALTA) 30 MG extended release capsule Take 1 capsule by mouth daily 5/17/21   SUDEEP Calderon CNP   pregabalin (LYRICA) 150 MG capsule Take 1 capsule by mouth 3 times daily for 60 days. 5/17/21 7/16/21  SUDEEP Calderon CNP   ALPRAZolam Maxwell Chakraborty) 0.25 MG tablet Take 1 tablet by mouth daily as needed for Sleep or Anxiety for up to 30 days.  5/17/21 6/16/21  SUDEEP Saleh CNP   levothyroxine (SYNTHROID) 150 MCG tablet Take 1 tablet by mouth Daily 4/21/21   Yaneli Loza MD   budesonide-formoterol (SYMBICORT) 160-4.5 MCG/ACT AERO Inhale 2 puffs into the lungs 2 times daily 4/14/21   Sekou Carey MD   tiotropium (SPIRIVA RESPIMAT) 2.5 MCG/ACT AERS inhaler Inhale 2 puffs into the lungs daily 4/14/21   Sekou Carey MD   Fluticasone-Umeclidin-Vilant (TRELEGY ELLIPTA) 200-62.5-25 MCG/INH AEPB Inhale 1 puff into the lungs daily  Patient not taking: Reported on 5/17/2021 4/7/21   Sekou Carey MD   busPIRone (BUSPAR) 15 MG tablet TAKE 1 TABLET BY MOUTH THREE TIMES DAILY 3/29/21   SUDEEP Enciso CNP   metoclopramide (REGLAN) 10 MG tablet Take 1 tablet by mouth 2 times daily as needed (Headache) 3/21/21   Myrna Garay MD   acetaminophen (TYLENOL) 500 MG tablet Take 1 tablet by mouth 4 times daily as needed for Pain 3/21/21   Myrna Garay MD   B-D ULTRAFINE III SHORT PEN 31G X 8 MM MISC USE THREE TIMES DAILY 3/15/21   SUDEEP Nair CNP   cetirizine (ZYRTEC) 10 MG tablet TAKE 1 TABLET BY MOUTH EVERY NIGHT AT BEDTIME AS NEEDED FOR ALLERGIES 2/25/21   SUDEEP Saleh CNP   Insulin Degludec (TRESIBA FLEXTOUCH) 100 UNIT/ML SOPN INJECT 40 UNITS UNDER THE SKIN NIGHTLY 2/23/21   Yaneli Loza MD   insulin lispro, 1 Unit Dial, (HUMALOG KWIKPEN) 100 UNIT/ML SOPN 4 units at each meals hold if glucose less than 150 2/23/21   Yaneli Loza MD   spironolactone (ALDACTONE) 50 MG tablet Take 1 tablet by mouth daily 2/23/21   Yaneli Loza MD   OneTouch Delica Lancets 25D MISC qid 2/23/21   Yaneli Loza MD   blood glucose test strips (ONETOUCH VERIO) strip 1 each by In Vitro route daily As needed.  2/23/21   Yaneli Loza MD   Blood Glucose Monitoring Suppl ([place]) w/Device KIT As  Directed 2/23/21   Yaneli Loza MD   Insulin Pen Needle (NOVOFINE) 32G X 6 MM MISC qid 2/23/21   Yaneli Loza MD   Continuous Blood Gluc Sensor ([facility] As directed  Patient not taking: Reported on 5/17/2021 2/23/21   DANIELLE Mayen MD   Continuous Blood Gluc  (FREESTYLE JESSICA 14 DAY READER) LENNY As directed  Patient not taking: Reported on 5/17/2021 2/23/21   DANIELLE Mayen MD   baclofen (LIORESAL) 10 MG tablet Take 1 tablet by mouth 3 times daily 2/19/21   SUDEEP Angela CNP   tiZANidine (ZANAFLEX) 2 MG tablet Take 1 tablet by mouth 3 times daily as needed (back pain/ spasm) 2/6/21   Antonia Ponce PA-C   butalbital-acetaminophen-caffeine (FIORICET, ESGIC) -61 MG per tablet Take 1 tablet by mouth every 6 hours as needed for Headaches 1/4/21   Yee Quinn MD   magnesium oxide (MAG-OX) 400 (241.3 Mg) MG TABS tablet TAKE 1/2 TABLET BY MOUTH DAILY 11/2/20   SUDEEP Romo CNP   albuterol sulfate HFA (PROVENTIL HFA) 108 (90 Base) MCG/ACT inhaler Inhale 2 puffs into the lungs every 6 hours as needed for Wheezing 9/9/20   SUDEEP Angela CNP   insulin lispro (HUMALOG) 100 UNIT/ML injection vial INJECT 4 UNITS UNDER THE SKIN THREE TIMES DAILY AS NEEDED FOR HIGH BLOOD SUGAR  Patient not taking: Reported on 5/17/2021 7/10/20   SUDEEP Angela CNP   SUMAtriptan (IMITREX) 25 MG tablet TAKE 1 TABLET BY MOUTH 1 TIME AS NEEDED FOR MIGRAINE  Patient not taking: Reported on 4/7/2021 5/1/20   SUDEEP Angela CNP   blood glucose test strips (EXACTECH TEST) strip 1 each by In Vitro route 3 times daily (Accu-check test strips) As needed.  DX:E11.65 12/2/19   SUDEEP Romo CNP   ONE TOUCH ULTRASOFT LANCETS MISC TEST 3 TIMES DAILY AS NEEDED 12/2/19   SUDEEP Romo CNP   albuterol (PROVENTIL) (2.5 MG/3ML) 0.083% nebulizer solution Take 3 mLs by nebulization every 4 hours as needed for Wheezing 11/5/19   Maria M Servetas, DO   atorvastatin (LIPITOR) 40 MG tablet Take 1 tablet by mouth nightly 9/11/19   Ashish Goodson, DO   Insulin Syringe-Needle U-100 30G X 1/2\" 1 ML MISC 1 each by Does not apply route daily 11/16/18   Jose Garcia, DO   blood glucose test strips [facility]) strip TEST 3 TIMES DAILY AS NEEDED 8/16/18   Jose Garcia, DO   Blood Glucose Monitoring Suppl (ONETOUCH VERIO) w/Device KIT TEST 3 TIMES DAILY AS NEEDED 8/16/18   Jose Garcia, DO       Future Appointments   Date Time Provider Valerie Cosby   6/17/2021  3:30 PM Ifrah North MD [address]   6/22/2021  4:00 PM Mala Isaacs MD [address] [place] [address]   6/25/2021  3:00 PM Myrna Garcia APRN - CNP [facility]   11/5/2021  9:15 AM Phong Malone MD [facility]

## 2021-06-15 LAB
EKG ATRIAL RATE: 78 BPM
EKG P AXIS: 45 DEGREES
EKG P-R INTERVAL: 170 MS
EKG Q-T INTERVAL: 378 MS
EKG QRS DURATION: 76 MS
EKG QTC CALCULATION (BAZETT): 430 MS
EKG R AXIS: 4 DEGREES
EKG T AXIS: 17 DEGREES
EKG VENTRICULAR RATE: 78 BPM

## 2021-06-17 ENCOUNTER — OFFICE VISIT (OUTPATIENT)
Dept: PULMONOLOGY | Age: 51
End: 2021-06-17
Payer: MEDICAID

## 2021-06-17 VITALS
TEMPERATURE: 97.4 F | DIASTOLIC BLOOD PRESSURE: 70 MMHG | BODY MASS INDEX: 48.48 KG/M2 | HEIGHT: 63 IN | SYSTOLIC BLOOD PRESSURE: 130 MMHG | RESPIRATION RATE: 16 BRPM | HEART RATE: 91 BPM | OXYGEN SATURATION: 98 % | WEIGHT: 273.6 LBS

## 2021-06-17 DIAGNOSIS — E66.01 CLASS 3 SEVERE OBESITY DUE TO EXCESS CALORIES WITH SERIOUS COMORBIDITY AND BODY MASS INDEX (BMI) OF 45.0 TO 49.9 IN ADULT (HCC): ICD-10-CM

## 2021-06-17 DIAGNOSIS — G47.30 SLEEP APNEA, UNSPECIFIED TYPE: Primary | ICD-10-CM

## 2021-06-17 DIAGNOSIS — D86.9 SARCOIDOSIS: ICD-10-CM

## 2021-06-17 DIAGNOSIS — R09.81 NASAL CONGESTION: ICD-10-CM

## 2021-06-17 DIAGNOSIS — J45.41 MODERATE PERSISTENT ASTHMA WITH ACUTE EXACERBATION: ICD-10-CM

## 2021-06-17 PROCEDURE — 3017F COLORECTAL CA SCREEN DOC REV: CPT | Performed by: INTERNAL MEDICINE

## 2021-06-17 PROCEDURE — 99214 OFFICE O/P EST MOD 30 MIN: CPT | Performed by: INTERNAL MEDICINE

## 2021-06-17 PROCEDURE — 1036F TOBACCO NON-USER: CPT | Performed by: INTERNAL MEDICINE

## 2021-06-17 PROCEDURE — G8417 CALC BMI ABV UP PARAM F/U: HCPCS | Performed by: INTERNAL MEDICINE

## 2021-06-17 PROCEDURE — G8427 DOCREV CUR MEDS BY ELIG CLIN: HCPCS | Performed by: INTERNAL MEDICINE

## 2021-06-17 RX ORDER — ZOLPIDEM TARTRATE 5 MG/1
5 TABLET ORAL PRN
Qty: 1 TABLET | Refills: 0 | Status: SHIPPED | OUTPATIENT
Start: 2021-06-17 | End: 2021-06-18

## 2021-06-17 NOTE — PROGRESS NOTES
Subjective:     Librado Cooper is a 49 y.o. female who complains today of:     Chief Complaint   Patient presents with    Follow-up     8 Week F/U for Sarcoidosis     Results     PFT and CT Scan of Chest       HPI  Patient presents for sarcoidosis/sleep apnea follow-up      She is doing good, minimal dyspnea on exertion, no coughing, no chest pain, no fever no chills, no lower extremity edema, she could not do the titration study for CPAP due to anxiety, weight is stable, no skin rash, no joint swelling stiffness or pain, no heartburn no heart racing or palpitation, no vision problems. Allergies:  Shellfish-derived products, Ibuprofen, Ketorolac, Morphine, Other, Penicillins, Propoxyphene n-acetaminophen, and Toradol [ketorolac tromethamine]  Past Medical History:   Diagnosis Date    Anxiety     Arthritis     Asthma     Bronchopneumonia     Cancer ([facility] [address]     renal    Cerebral artery occlusion with cerebral infarction (HCC)     Chronic bilateral low back pain with sciatica     Chronic kidney disease     Chronic obstructive lung disease ([facility] [address] 7/26/2019    Depression     Fibromyalgia     Hypertension     Insulin dependent type 2 diabetes mellitus, uncontrolled ([facility] [address] 8/3/2018    Localized enlarged lymph nodes 10/26/2018    Mixed headache     Pure hyperglyceridemia 5/19/2017    Sarcoidosis     Sleep apnea     does not wear cpap    Thyroid goiter      Past Surgical History:   Procedure Laterality Date    BRONCHOSCOPY  10/26/2018    DR. STEARNS    KIDNEY REMOVAL Right 08/2016    KIDNEY REMOVAL Right 2016    LUNG BIOPSY Right 10/2018    THYROID LOBECTOMY Right 6/13/14    THYROIDECTOMY  02/21/2019    DR. MAGDALENO    THYROIDECTOMY  2018    URETER STENT PLACEMENT Left 08/2016     Family History   Problem Relation Age of Onset    Cancer Father     Diabetes Father     Allergy (Severe) Father     Heart Attack Father     Prostate Cancer Father     High Blood Pressure Mother    Brenda Draft Diabetes Mother     Arthritis Mother     High Cholesterol Mother     Vision Loss Mother     Alcohol Abuse Neg Hx     Anemia Neg Hx     Arrhythmia Neg Hx     Asthma Neg Hx     Atrial Fibrillation Neg Hx     Birth Defects Neg Hx     Breast Cancer Neg Hx     Coronary Art Dis Neg Hx     Colon Cancer Neg Hx     Depression Neg Hx     Early Death Neg Hx     Hearing Loss Neg Hx     Heart Disease Neg Hx     Learning Disabilities Neg Hx     Kidney Disease Neg Hx     Mental Illness Neg Hx     Mental Retardation Neg Hx     Miscarriages / Stillbirths Neg Hx     Obesity Neg Hx     Osteoporosis Neg Hx     Stroke Neg Hx     Substance Abuse Neg Hx      Social History     Socioeconomic History    Marital status: Legally      Spouse name: Not on file    Number of children: Not on file    Years of education: 15    Highest education level: High school graduate   Occupational History    Not on file   Tobacco Use    Smoking status: Former Smoker     Packs/day: 0.50     Years: 15.00     Pack years: 7.50     Types: Cigarettes     Start date: 2014     Quit date: 2015     Years since quittin.4    Smokeless tobacco: Former User     Quit date: 3/23/2016   Vaping Use    Vaping Use: Never used   Substance and Sexual Activity    Alcohol use: Not Currently     Alcohol/week: 0.0 standard drinks     Comment: occasionally    Drug use: Yes     Frequency: 5.0 times per week     Types: Marijuana    Sexual activity: Yes     Partners: Male   Other Topics Concern    Not on file   Social History Narrative    Not on file     Social Determinants of Health     Financial Resource Strain: Medium Risk    Difficulty of Paying Living Expenses: Somewhat hard   Food Insecurity: Food Insecurity Present    Worried About Running Out of Food in the Last Year: Sometimes true    Maximiliano of Food in the Last Year: Sometimes true   Transportation Needs: No Transportation Needs    Lack of Transportation (Medical): No    Lack of Transportation (Non-Medical): No   Physical Activity: Inactive    Days of Exercise per Week: 0 days    Minutes of Exercise per Session: 0 min   Stress: Stress Concern Present    Feeling of Stress : Very much   Social Connections: Socially Isolated    Frequency of Communication with Friends and Family: Three times a week    Frequency of Social Gatherings with Friends and Family: Never    Attends [employer] Services: Never    Active Member of Clubs or Organizations: No    Attends Club or Organization Meetings: Never    Marital Status:    Intimate Partner Violence: Not At Risk    Fear of Current or Ex-Partner: No    Emotionally Abused: No    Physically Abused: No    Sexually Abused: No         Review of Systems      ROS: 10 organs review of system is done including general, psychological, ENT, hematological, endocrine, respiratory, cardiovascular, gastrointestinal,musculoskeletal, neurological,  allergy and Immunology is done and is otherwise negative. Current Outpatient Medications   Medication Sig Dispense Refill    zolpidem (AMBIEN) 5 MG tablet Take 1 tablet by mouth as needed for Sleep (on sleep study day) for up to 1 day. 1 tablet 0    buPROPion (WELLBUTRIN XL) 150 MG extended release tablet TAKE 1 TABLET BY MOUTH EVERY MORNING 30 tablet 3    fluticasone (FLONASE) 50 MCG/ACT nasal spray SHAKE LIQUID AND USE 1 SPRAY IN EACH NOSTRIL DAILY 16 g 5    DULoxetine (CYMBALTA) 30 MG extended release capsule Take 1 capsule by mouth daily 30 capsule 1    pregabalin (LYRICA) 150 MG capsule Take 1 capsule by mouth 3 times daily for 60 days.  90 capsule 1    levothyroxine (SYNTHROID) 150 MCG tablet Take 1 tablet by mouth Daily 30 tablet 3    budesonide-formoterol (SYMBICORT) 160-4.5 MCG/ACT AERO Inhale 2 puffs into the lungs 2 times daily 1 Inhaler 3    tiotropium (SPIRIVA RESPIMAT) 2.5 MCG/ACT AERS inhaler Inhale 2 puffs into the lungs daily 1 Inhaler 3    busPIRone (BUSPAR) 15 MG tablet TAKE 1 TABLET BY MOUTH THREE TIMES DAILY 90 tablet 5    metoclopramide (REGLAN) 10 MG tablet Take 1 tablet by mouth 2 times daily as needed (Headache) 60 tablet 0    acetaminophen (TYLENOL) 500 MG tablet Take 1 tablet by mouth 4 times daily as needed for Pain 360 tablet 1    B-D ULTRAFINE III SHORT PEN 31G X 8 MM MISC USE THREE TIMES DAILY 100 each 5    cetirizine (ZYRTEC) 10 MG tablet TAKE 1 TABLET BY MOUTH EVERY NIGHT AT BEDTIME AS NEEDED FOR ALLERGIES 30 tablet 5    Insulin Degludec (TRESIBA FLEXTOUCH) 100 UNIT/ML SOPN INJECT 40 UNITS UNDER THE SKIN NIGHTLY 15 mL 3    insulin lispro, 1 Unit Dial, (HUMALOG KWIKPEN) 100 UNIT/ML SOPN 4 units at each meals hold if glucose less than 150 5 pen 3    spironolactone (ALDACTONE) 50 MG tablet Take 1 tablet by mouth daily 30 tablet 3    OneTouch Delica Lancets 56P MISC qid 200 each 3    blood glucose test strips (ONETOUCH VERIO) strip 1 each by In Vitro route daily As needed.  100 each 3    Blood Glucose Monitoring Suppl (ONETOUCH VERIO) w/Device KIT As  Directed 1 kit 00    Insulin Pen Needle (NOVOFINE) 32G X 6 MM MISC qid 300 each 3    Continuous Blood Gluc Sensor (FREESTYLE JESSICA 14 DAY SENSOR) MISC As directed 1 each 00    Continuous Blood Gluc  (FREESTYLE JESSICA 14 DAY READER) LENNY As directed 1 Device 00    baclofen (LIORESAL) 10 MG tablet Take 1 tablet by mouth 3 times daily 60 tablet 0    tiZANidine (ZANAFLEX) 2 MG tablet Take 1 tablet by mouth 3 times daily as needed (back pain/ spasm) 15 tablet 0    butalbital-acetaminophen-caffeine (FIORICET, ESGIC) -40 MG per tablet Take 1 tablet by mouth every 6 hours as needed for Headaches 18 tablet 0    magnesium oxide (MAG-OX) 400 (241.3 Mg) MG TABS tablet TAKE 1/2 TABLET BY MOUTH DAILY 30 tablet 5    albuterol sulfate HFA (PROVENTIL HFA) 108 (90 Base) MCG/ACT inhaler Inhale 2 puffs into the lungs every 6 hours as needed for Wheezing 1 Inhaler 3    insulin lispro (HUMALOG) 100 UNIT/ML injection vial INJECT 4 UNITS UNDER THE SKIN THREE TIMES DAILY AS NEEDED FOR HIGH BLOOD SUGAR 10 mL 0    SUMAtriptan (IMITREX) 25 MG tablet TAKE 1 TABLET BY MOUTH 1 TIME AS NEEDED FOR MIGRAINE 9 tablet 1    blood glucose test strips (EXACTECH TEST) strip 1 each by In Vitro route 3 times daily (Accu-check test strips) As needed. DX:E11.65 300 strip 5    ONE TOUCH ULTRASOFT LANCETS MISC TEST 3 TIMES DAILY AS NEEDED 300 each 3    albuterol (PROVENTIL) (2.5 MG/3ML) 0.083% nebulizer solution Take 3 mLs by nebulization every 4 hours as needed for Wheezing 120 each 3    atorvastatin (LIPITOR) 40 MG tablet Take 1 tablet by mouth nightly 30 tablet 3    Insulin Syringe-Needle U-100 30G X 1/2\" 1 ML MISC 1 each by Does not apply route daily 100 each 3    blood glucose test strips (ONETOUCH VERIO) strip TEST 3 TIMES DAILY AS NEEDED 300 each 3    Blood Glucose Monitoring Suppl (ONETOUCH VERIO) w/Device KIT TEST 3 TIMES DAILY AS NEEDED 1 kit 0     No current facility-administered medications for this visit. Objective:     Vitals:    06/17/21 1442   BP: 130/70   Site: Right Lower Arm   Position: Sitting   Cuff Size: Medium Adult   Pulse: 91   Resp: 16   Temp: 97.4 °F (36.3 °C)   TempSrc: Tympanic   SpO2: 98%   Weight: 273 lb 9.6 oz (124.1 kg)   Height: 5' 3\" (1.6 m)         Physical Exam  Constitutional:       General: She is not in acute distress. Appearance: She is well-developed. She is not diaphoretic. HENT:      Head: Normocephalic and atraumatic. Eyes:      Conjunctiva/sclera: Conjunctivae normal.      Pupils: Pupils are equal, round, and reactive to light. Cardiovascular:      Rate and Rhythm: Normal rate and regular rhythm. Heart sounds: No murmur heard. No friction rub. No gallop. Pulmonary:      Effort: Pulmonary effort is normal. No respiratory distress. Breath sounds: Normal breath sounds. No wheezing or rales. Chest:      Chest wall: No tenderness.    Abdominal:      General: There is no distension. Palpations: Abdomen is soft. Tenderness: There is no abdominal tenderness. There is no rebound. Musculoskeletal:         General: No tenderness. Cervical back: Normal range of motion and neck supple. Right lower leg: No edema. Left lower leg: No edema. Lymphadenopathy:      Cervical: No cervical adenopathy. Skin:     General: Skin is warm and dry. Findings: No erythema. Neurological:      Mental Status: She is alert and oriented to person, place, and time. Psychiatric:         Judgment: Judgment normal.         Imaging studies reviewed by me CT chest shows no interstitial lung disease, small area of pleural thickening, otherwise no mediastinal lymphadenopathy, and no lung nodules. Lab results reviewed in chart  PFT May 2021, FEV1 100%, normal FEV1 to FVC ratio, normal lung volumes and DLCO  ECHO: September 2020, EF 65%,    Assessment and Plan       Diagnosis Orders   1. Sleep apnea, unspecified type  zolpidem (AMBIEN) 5 MG tablet   2. Sarcoidosis     3. Moderate persistent asthma with acute exacerbation     4. Class 3 severe obesity due to excess calories with serious comorbidity and body mass index (BMI) of 45.0 to 49.9 in adult ([facility] [address]     5. Nasal congestion       · Probable ELEN, patient could not do in lab study due to anxiety, will prescribe Ambien, she also has Xanax at home she can use at low-dose as needed, will reevaluate after sleep study  · History of sarcoidosis, no interstitial lung disease, no mediastinal adenopathy, no systemic symptoms, continue to monitor clinically for now patient was on methotrexate at some point in the past  · Weight loss is recommended  · History of asthma, currently symptoms are controlled  · Nasal congestion, continue Flonase and azelastine      No orders of the defined types were placed in this encounter.     Orders Placed This Encounter   Medications    zolpidem (AMBIEN) 5 MG tablet     Sig: Take 1 tablet by mouth as needed for Sleep (on sleep study day) for up to 1 day. Dispense:  1 tablet     Refill:  0            Discussed with patient the importance of exercise and weight control and  overall health and well-being. Reviewed with the patient: current clinical status, medications, activities and diet. Side effects, adverse effects of the medication prescribed today, as well as treatment plan and result expectations have been discussed with the patient who expresses understanding and desires to proceed. Return in about 3 months (around 9/17/2021).       [facility], MD

## 2021-06-22 ENCOUNTER — HOSPITAL ENCOUNTER (EMERGENCY)
Age: 51
Discharge: HOME | End: 2021-06-23
Payer: MEDICAID

## 2021-06-22 ENCOUNTER — APPOINTMENT (OUTPATIENT)
Dept: CT IMAGING | Age: 51
End: 2021-06-22
Payer: MEDICAID

## 2021-06-22 DIAGNOSIS — Z79.4 TYPE 2 DIABETES MELLITUS WITH HYPERGLYCEMIA, WITH LONG-TERM CURRENT USE OF INSULIN (HCC): ICD-10-CM

## 2021-06-22 DIAGNOSIS — R10.32 LEFT LOWER QUADRANT ABDOMINAL PAIN: Primary | ICD-10-CM

## 2021-06-22 DIAGNOSIS — E11.65 TYPE 2 DIABETES MELLITUS WITH HYPERGLYCEMIA, WITH LONG-TERM CURRENT USE OF INSULIN (HCC): ICD-10-CM

## 2021-06-22 LAB
ALBUMIN SERPL-MCNC: 3.6 G/DL (ref 3.5–4.6)
ALP BLD-CCNC: 156 U/L (ref 40–130)
ALT SERPL-CCNC: 20 U/L (ref 0–33)
ANION GAP SERPL CALCULATED.3IONS-SCNC: 10 MEQ/L (ref 9–15)
AST SERPL-CCNC: 23 U/L (ref 0–35)
BASOPHILS ABSOLUTE: 0.1 K/UL (ref 0–0.2)
BASOPHILS RELATIVE PERCENT: 0.8 %
BILIRUB SERPL-MCNC: <0.2 MG/DL (ref 0.2–0.7)
BUN BLDV-MCNC: 10 MG/DL (ref 6–20)
CALCIUM SERPL-MCNC: 9.8 MG/DL (ref 8.5–9.9)
CHLORIDE BLD-SCNC: 103 MEQ/L (ref 95–107)
CO2: 22 MEQ/L (ref 20–31)
CREAT SERPL-MCNC: 1.16 MG/DL (ref 0.5–0.9)
EOSINOPHILS ABSOLUTE: 0.3 K/UL (ref 0–0.7)
EOSINOPHILS RELATIVE PERCENT: 3.7 %
GFR AFRICAN AMERICAN: 59.8
GFR NON-AFRICAN AMERICAN: 49.4
GLOBULIN: 3.3 G/DL (ref 2.3–3.5)
GLUCOSE BLD-MCNC: 279 MG/DL (ref 70–99)
HCT VFR BLD CALC: 45.4 % (ref 37–47)
HEMOGLOBIN: 15.6 G/DL (ref 12–16)
LIPASE: 47 U/L (ref 12–95)
LYMPHOCYTES ABSOLUTE: 2.2 K/UL (ref 1–4.8)
LYMPHOCYTES RELATIVE PERCENT: 26.1 %
MCH RBC QN AUTO: 31.8 PG (ref 27–31.3)
MCHC RBC AUTO-ENTMCNC: 34.4 % (ref 33–37)
MCV RBC AUTO: 92.6 FL (ref 82–100)
MONOCYTES ABSOLUTE: 0.4 K/UL (ref 0.2–0.8)
MONOCYTES RELATIVE PERCENT: 4.5 %
NEUTROPHILS ABSOLUTE: 5.5 K/UL (ref 1.4–6.5)
NEUTROPHILS RELATIVE PERCENT: 64.9 %
PDW BLD-RTO: 13.4 % (ref 11.5–14.5)
PLATELET # BLD: 252 K/UL (ref 130–400)
POTASSIUM SERPL-SCNC: 4.2 MEQ/L (ref 3.4–4.9)
RBC # BLD: 4.91 M/UL (ref 4.2–5.4)
REASON FOR REJECTION: NORMAL
REJECTED TEST: NORMAL
SODIUM BLD-SCNC: 135 MEQ/L (ref 135–144)
TOTAL PROTEIN: 6.9 G/DL (ref 6.3–8)
WBC # BLD: 8.5 K/UL (ref 4.8–10.8)

## 2021-06-22 PROCEDURE — 81003 URINALYSIS AUTO W/O SCOPE: CPT

## 2021-06-22 PROCEDURE — 96374 THER/PROPH/DIAG INJ IV PUSH: CPT

## 2021-06-22 PROCEDURE — 96376 TX/PRO/DX INJ SAME DRUG ADON: CPT

## 2021-06-22 PROCEDURE — 74176 CT ABD & PELVIS W/O CONTRAST: CPT

## 2021-06-22 PROCEDURE — 96375 TX/PRO/DX INJ NEW DRUG ADDON: CPT

## 2021-06-22 PROCEDURE — 99283 EMERGENCY DEPT VISIT LOW MDM: CPT

## 2021-06-22 PROCEDURE — 85025 COMPLETE CBC W/AUTO DIFF WBC: CPT

## 2021-06-22 PROCEDURE — 36415 COLL VENOUS BLD VENIPUNCTURE: CPT

## 2021-06-22 PROCEDURE — 83690 ASSAY OF LIPASE: CPT

## 2021-06-22 PROCEDURE — 80053 COMPREHEN METABOLIC PANEL: CPT

## 2021-06-22 PROCEDURE — 6360000002 HC RX W HCPCS: Performed by: PHYSICIAN ASSISTANT

## 2021-06-22 RX ORDER — ONDANSETRON 2 MG/ML
4 INJECTION INTRAMUSCULAR; INTRAVENOUS ONCE
Status: COMPLETED | OUTPATIENT
Start: 2021-06-22 | End: 2021-06-22

## 2021-06-22 RX ADMIN — ONDANSETRON 4 MG: 2 INJECTION INTRAMUSCULAR; INTRAVENOUS at 22:17

## 2021-06-22 RX ADMIN — HYDROMORPHONE HYDROCHLORIDE 1 MG: 1 INJECTION, SOLUTION INTRAMUSCULAR; INTRAVENOUS; SUBCUTANEOUS at 22:17

## 2021-06-22 ASSESSMENT — ENCOUNTER SYMPTOMS
NAUSEA: 1
COUGH: 0
SHORTNESS OF BREATH: 0
APNEA: 0
VOMITING: 0
ABDOMINAL DISTENTION: 0
ANAL BLEEDING: 0
PHOTOPHOBIA: 0
ABDOMINAL PAIN: 1
VOICE CHANGE: 0
EYE DISCHARGE: 0

## 2021-06-22 ASSESSMENT — PAIN SCALES - GENERAL
PAINLEVEL_OUTOF10: 10
PAINLEVEL_OUTOF10: 10

## 2021-06-22 ASSESSMENT — PAIN DESCRIPTION - ORIENTATION: ORIENTATION: LEFT

## 2021-06-22 ASSESSMENT — PAIN DESCRIPTION - DESCRIPTORS: DESCRIPTORS: SHARP

## 2021-06-22 ASSESSMENT — PAIN DESCRIPTION - LOCATION: LOCATION: ABDOMEN

## 2021-06-23 VITALS
OXYGEN SATURATION: 93 % | SYSTOLIC BLOOD PRESSURE: 147 MMHG | DIASTOLIC BLOOD PRESSURE: 81 MMHG | HEART RATE: 78 BPM | WEIGHT: 255 LBS | HEIGHT: 63 IN | TEMPERATURE: 98.4 F | RESPIRATION RATE: 16 BRPM | BODY MASS INDEX: 45.18 KG/M2

## 2021-06-23 LAB
BILIRUBIN URINE: NEGATIVE
BLOOD, URINE: NEGATIVE
CLARITY: CLEAR
COLOR: YELLOW
GLUCOSE URINE: >=1000 MG/DL
KETONES, URINE: NEGATIVE MG/DL
LEUKOCYTE ESTERASE, URINE: NEGATIVE
NITRITE, URINE: NEGATIVE
PH UA: 5.5 (ref 5–9)
PROTEIN UA: NEGATIVE MG/DL
SPECIFIC GRAVITY UA: 1.02 (ref 1–1.03)
URINE REFLEX TO CULTURE: ABNORMAL
UROBILINOGEN, URINE: 0.2 E.U./DL

## 2021-06-23 PROCEDURE — 96376 TX/PRO/DX INJ SAME DRUG ADON: CPT

## 2021-06-23 PROCEDURE — 6370000000 HC RX 637 (ALT 250 FOR IP): Performed by: PHYSICIAN ASSISTANT

## 2021-06-23 PROCEDURE — 6360000002 HC RX W HCPCS: Performed by: PHYSICIAN ASSISTANT

## 2021-06-23 RX ORDER — ONDANSETRON 4 MG/1
4 TABLET, FILM COATED ORAL EVERY 8 HOURS PRN
Qty: 20 TABLET | Refills: 0 | Status: SHIPPED | OUTPATIENT
Start: 2021-06-23

## 2021-06-23 RX ORDER — HYDROCODONE BITARTRATE AND ACETAMINOPHEN 5; 325 MG/1; MG/1
1 TABLET ORAL EVERY 6 HOURS PRN
Qty: 12 TABLET | Refills: 0 | Status: SHIPPED | OUTPATIENT
Start: 2021-06-23 | End: 2021-06-25

## 2021-06-23 RX ORDER — DICYCLOMINE HYDROCHLORIDE 10 MG/1
20 CAPSULE ORAL ONCE
Status: COMPLETED | OUTPATIENT
Start: 2021-06-23 | End: 2021-06-23

## 2021-06-23 RX ORDER — DICYCLOMINE HYDROCHLORIDE 10 MG/1
10 CAPSULE ORAL EVERY 6 HOURS PRN
Qty: 20 CAPSULE | Refills: 0 | Status: SHIPPED | OUTPATIENT
Start: 2021-06-23 | End: 2021-06-25 | Stop reason: SDUPTHER

## 2021-06-23 RX ADMIN — HYDROMORPHONE HYDROCHLORIDE 0.5 MG: 1 INJECTION, SOLUTION INTRAMUSCULAR; INTRAVENOUS; SUBCUTANEOUS at 00:22

## 2021-06-23 RX ADMIN — DICYCLOMINE HYDROCHLORIDE 20 MG: 10 CAPSULE ORAL at 00:22

## 2021-06-23 ASSESSMENT — PAIN SCALES - GENERAL: PAINLEVEL_OUTOF10: 9

## 2021-06-23 NOTE — ED PROVIDER NOTES
[address] [facility] ED  eMERGENCY dEPARTMENT eNCOUnter      Pt Name: Aidee Mcgraw  MRN: [id number]  [place] 1970  Date of evaluation: 6/22/2021  Provider: Tyrel Clarke PA-C    CHIEF COMPLAINT       Chief Complaint   Patient presents with    Abdominal Pain     left abdominal pain, started PTA         HISTORY OF PRESENT ILLNESS   (Location/Symptom, Timing/Onset,Context/Setting, Quality, Duration, Modifying Factors, Severity)  Note limiting factors. Aidee Mcgraw is a 49 y.o. female who presents to the emergency department presents with left-sided abdominal pain started 45 minutes prior to arrival patient states he has a history of kidney stones denies fever chills nausea vomiting denies rectal bleeding as pain burning frequent urination. Is mild to moderate severity worse touch motion. HPI    NursingNotes were reviewed. REVIEW OF SYSTEMS    (2-9 systems for level 4, 10 or more for level 5)     Review of Systems   Constitutional: Negative for activity change, appetite change, fever and unexpected weight change. HENT: Negative for ear discharge, nosebleeds and voice change. Eyes: Negative for photophobia and discharge. Respiratory: Negative for apnea, cough and shortness of breath. Cardiovascular: Negative for chest pain. Gastrointestinal: Positive for abdominal pain and nausea. Negative for abdominal distention, anal bleeding and vomiting. Endocrine: Negative for cold intolerance, heat intolerance and polyphagia. Genitourinary: Negative for dysuria, flank pain and genital sores. Musculoskeletal: Negative for joint swelling. Skin: Negative for pallor. Allergic/Immunologic: Negative for immunocompromised state. Neurological: Negative for seizures and facial asymmetry. Hematological: Does not bruise/bleed easily. Psychiatric/Behavioral: Negative for behavioral problems, self-injury and sleep disturbance.    All other systems reviewed and are negative. Except as noted above the remainder of the review of systems was reviewed and negative. PAST MEDICAL HISTORY     Past Medical History:   Diagnosis Date    Anxiety     Arthritis     Asthma     Bronchopneumonia     Cancer ([facility]     renal    Cerebral artery occlusion with cerebral infarction (HCC)     Chronic bilateral low back pain with sciatica     Chronic kidney disease     Chronic obstructive lung disease ([facility] 7/26/2019    Depression     Fibromyalgia     Hypertension     Insulin dependent type 2 diabetes mellitus, uncontrolled ([facility] [facility] 8/3/2018    Localized enlarged lymph nodes 10/26/2018    Mixed headache     Pure hyperglyceridemia 5/19/2017    Sarcoidosis     Sleep apnea     does not wear cpap    Thyroid goiter          SURGICALHISTORY       Past Surgical History:   Procedure Laterality Date    BRONCHOSCOPY  10/26/2018    DR. STEARNS    KIDNEY REMOVAL Right 08/2016    KIDNEY REMOVAL Right 2016    LUNG BIOPSY Right 10/2018    THYROID LOBECTOMY Right 6/13/14    THYROIDECTOMY  02/21/2019    DR. MAGDALENO    THYROIDECTOMY  2018    URETER STENT PLACEMENT Left 08/2016         CURRENT MEDICATIONS       Previous Medications    ACETAMINOPHEN (TYLENOL) 500 MG TABLET    Take 1 tablet by mouth 4 times daily as needed for Pain    ALBUTEROL (PROVENTIL) (2.5 MG/3ML) 0.083% NEBULIZER SOLUTION    Take 3 mLs by nebulization every 4 hours as needed for Wheezing    ALBUTEROL SULFATE HFA (PROVENTIL HFA) 108 (90 BASE) MCG/ACT INHALER    Inhale 2 puffs into the lungs every 6 hours as needed for Wheezing    ATORVASTATIN (LIPITOR) 40 MG TABLET    Take 1 tablet by mouth nightly    B-D ULTRAFINE III SHORT PEN 31G X 8 MM MISC    USE THREE TIMES DAILY    BACLOFEN (LIORESAL) 10 MG TABLET    Take 1 tablet by mouth 3 times daily    BLOOD GLUCOSE MONITORING SUPPL (ONETOUCH VERIO) W/DEVICE KIT    TEST 3 TIMES DAILY AS NEEDED    BLOOD GLUCOSE MONITORING SUPPL (ONETOUCH VERIO) W/DEVICE KIT    As  Directed BLOOD GLUCOSE TEST STRIPS (EXACTECH TEST) STRIP    1 each by In Vitro route 3 times daily (Accu-check test strips) As needed. DX:E11.65    BLOOD GLUCOSE TEST STRIPS (ONETOUCH VERIO) STRIP    TEST 3 TIMES DAILY AS NEEDED    BLOOD GLUCOSE TEST STRIPS (ONETOUCH VERIO) STRIP    1 each by In Vitro route daily As needed.     BUDESONIDE-FORMOTEROL (SYMBICORT) 160-4.5 MCG/ACT AERO    Inhale 2 puffs into the lungs 2 times daily    BUPROPION (WELLBUTRIN XL) 150 MG EXTENDED RELEASE TABLET    TAKE 1 TABLET BY MOUTH EVERY MORNING    BUSPIRONE (BUSPAR) 15 MG TABLET    TAKE 1 TABLET BY MOUTH THREE TIMES DAILY    BUTALBITAL-ACETAMINOPHEN-CAFFEINE (FIORICET, ESGIC) -40 MG PER TABLET    Take 1 tablet by mouth every 6 hours as needed for Headaches    CETIRIZINE (ZYRTEC) 10 MG TABLET    TAKE 1 TABLET BY MOUTH EVERY NIGHT AT BEDTIME AS NEEDED FOR ALLERGIES    CONTINUOUS BLOOD GLUC  (FREESTYLE JESSICA 14 DAY READER) LENNY    As directed    CONTINUOUS BLOOD GLUC SENSOR (FREESTYLE JESSICA 14 DAY SENSOR) MISC    As directed    DULOXETINE (CYMBALTA) 30 MG EXTENDED RELEASE CAPSULE    Take 1 capsule by mouth daily    FLUTICASONE (FLONASE) 50 MCG/ACT NASAL SPRAY    SHAKE LIQUID AND USE 1 SPRAY IN EACH NOSTRIL DAILY    INSULIN DEGLUDEC (TRESIBA FLEXTOUCH) 100 UNIT/ML SOPN    INJECT 40 UNITS UNDER THE SKIN NIGHTLY    INSULIN LISPRO (HUMALOG) 100 UNIT/ML INJECTION VIAL    INJECT 4 UNITS UNDER THE SKIN THREE TIMES DAILY AS NEEDED FOR HIGH BLOOD SUGAR    INSULIN LISPRO, 1 UNIT DIAL, (HUMALOG KWIKPEN) 100 UNIT/ML SOPN    4 units at each meals hold if glucose less than 150    INSULIN PEN NEEDLE (NOVOFINE) 32G X 6 MM MISC    qid    INSULIN SYRINGE-NEEDLE U-100 30G X 1/2\" 1 ML MISC    1 each by Does not apply route daily    LEVOTHYROXINE (SYNTHROID) 150 MCG TABLET    Take 1 tablet by mouth Daily    MAGNESIUM OXIDE (MAG-OX) 400 (241.3 MG) MG TABS TABLET    TAKE 1/2 TABLET BY MOUTH DAILY    METOCLOPRAMIDE (REGLAN) 10 MG TABLET    Take 1 tablet by mouth 2 times daily as needed (Headache)    ONE TOUCH ULTRASOFT LANCETS MISC    TEST 3 TIMES DAILY AS NEEDED    ONETOUCH DELICA LANCETS 33P MISC    qid    PREGABALIN (LYRICA) 150 MG CAPSULE    Take 1 capsule by mouth 3 times daily for 60 days.     SPIRONOLACTONE (ALDACTONE) 50 MG TABLET    Take 1 tablet by mouth daily    SUMATRIPTAN (IMITREX) 25 MG TABLET    TAKE 1 TABLET BY MOUTH 1 TIME AS NEEDED FOR MIGRAINE    TIOTROPIUM (SPIRIVA RESPIMAT) 2.5 MCG/ACT AERS INHALER    Inhale 2 puffs into the lungs daily    TIZANIDINE (ZANAFLEX) 2 MG TABLET    Take 1 tablet by mouth 3 times daily as needed (back pain/ spasm)       ALLERGIES     Shellfish-derived products, Ibuprofen, Ketorolac, Morphine, Other, Penicillins, Propoxyphene n-acetaminophen, and Toradol [ketorolac tromethamine]    FAMILY HISTORY       Family History   Problem Relation Age of Onset    Cancer Father     Diabetes Father     Allergy (Severe) Father     Heart Attack Father     Prostate Cancer Father     High Blood Pressure Mother     Diabetes Mother     Arthritis Mother     High Cholesterol Mother     Vision Loss Mother     Alcohol Abuse Neg Hx     Anemia Neg Hx     Arrhythmia Neg Hx     Asthma Neg Hx     Atrial Fibrillation Neg Hx     Birth Defects Neg Hx     Breast Cancer Neg Hx     Coronary Art Dis Neg Hx     Colon Cancer Neg Hx     Depression Neg Hx     Early Death Neg Hx     Hearing Loss Neg Hx     Heart Disease Neg Hx     Learning Disabilities Neg Hx     Kidney Disease Neg Hx     Mental Illness Neg Hx     Mental Retardation Neg Hx     Miscarriages / Stillbirths Neg Hx     Obesity Neg Hx     Osteoporosis Neg Hx     Stroke Neg Hx     Substance Abuse Neg Hx           SOCIAL HISTORY       Social History     Socioeconomic History    Marital status: Legally      Spouse name: None    Number of children: None    Years of education: 15    Highest education level: High school graduate   Occupational History    None Tobacco Use    Smoking status: Former Smoker     Packs/day: 0.50     Years: 15.00     Pack years: 7.50     Types: Cigarettes     Start date: 2014     Quit date: 2015     Years since quittin.4    Smokeless tobacco: Former User     Quit date: 3/23/2016   Vaping Use    Vaping Use: Never used   Substance and Sexual Activity    Alcohol use: Not Currently     Alcohol/week: 0.0 standard drinks     Comment: occasionally    Drug use: Yes     Frequency: 5.0 times per week     Types: Marijuana    Sexual activity: Yes     Partners: Male   Other Topics Concern    None   Social History Narrative    None     Social Determinants of Health     Financial Resource Strain: Medium Risk    Difficulty of Paying Living Expenses: Somewhat hard   Food Insecurity: Food Insecurity Present    Worried About Running Out of Food in the Last Year: Sometimes true    Maximiliano of Food in the Last Year: Sometimes true   Transportation Needs: No Transportation Needs    Lack of Transportation (Medical): No    Lack of Transportation (Non-Medical): No   Physical Activity: Inactive    Days of Exercise per Week: 0 days    Minutes of Exercise per Session: 0 min   Stress: Stress Concern Present    Feeling of Stress : Very much   Social Connections: Socially Isolated    Frequency of Communication with Friends and Family:  Three times a week    Frequency of Social Gatherings with Friends and Family: Never    Attends Sabianism Services: Never    Active Member of Clubs or Organizations: No    Attends Club or Organization Meetings: Never    Marital Status:    Intimate Partner Violence: Not At Risk    Fear of Current or Ex-Partner: No    Emotionally Abused: No    Physically Abused: No    Sexually Abused: No       SCREENINGS      @FLOW([id number])@      PHYSICAL EXAM    (up to 7 for level 4, 8 or more for level 5)     ED Triage Vitals [21 2135]   BP Temp Temp src Pulse Resp SpO2 Height Weight   (!) 147/87 98.4 °F (36.9 °C) -- 88 16 98 % 5' 3\" (1.6 m) 255 lb (115.7 kg)       Physical Exam  Vitals and nursing note reviewed. Constitutional:       General: She is not in acute distress. Appearance: She is well-developed. HENT:      Head: Normocephalic and atraumatic. Right Ear: External ear normal.      Left Ear: External ear normal.      Nose: Nose normal.      Mouth/Throat:      Mouth: Mucous membranes are moist.   Eyes:      General:         Right eye: No discharge. Left eye: No discharge. Pupils: Pupils are equal, round, and reactive to light. Cardiovascular:      Rate and Rhythm: Normal rate and regular rhythm. Pulses: Normal pulses. Heart sounds: Normal heart sounds. Pulmonary:      Effort: Pulmonary effort is normal. No respiratory distress. Breath sounds: Normal breath sounds. No stridor. Abdominal:      General: Bowel sounds are normal. There is no distension. Palpations: Abdomen is soft. Tenderness: There is abdominal tenderness in the left lower quadrant. Musculoskeletal:         General: Normal range of motion. Cervical back: Normal range of motion and neck supple. Skin:     General: Skin is warm. Findings: No erythema. Neurological:      Mental Status: She is alert and oriented to person, place, and time. Motor: No weakness.    Psychiatric:         Mood and Affect: Mood normal.         DIAGNOSTIC RESULTS     EKG: All EKG's are interpreted by the Emergency Department Physician who either signs or Co-signsthis chart in the absence of a cardiologist.         RADIOLOGY:   Orlin Jefferson such as CT, Ultrasound and MRI are read by the radiologist. Plain radiographic images are visualized and preliminarily interpreted by the emergency physician with the below findings:    See prelim CT report    Interpretation per the Radiologist below, if available at the time ofthis note:    CT ABDOMEN PELVIS WO CONTRAST Additional Contrast? None (Results Pending)         ED BEDSIDE ULTRASOUND:   Performed by ED Physician - none    LABS:  Labs Reviewed   CBC WITH AUTO DIFFERENTIAL - Abnormal; Notable for the following components:       Result Value    MCH 31.8 (*)     All other components within normal limits   URINE RT REFLEX TO CULTURE - Abnormal; Notable for the following components:    Glucose, Ur >=1000 (*)     All other components within normal limits   COMPREHENSIVE METABOLIC PANEL - Abnormal; Notable for the following components:    Glucose 279 (*)     CREATININE 1.16 (*)     GFR Non- 49.4 (*)     GFR  59.8 (*)     Alkaline Phosphatase 156 (*)     All other components within normal limits   SPECIMEN REJECTION   LIPASE       All other labs were within normal range or not returned as of this dictation. EMERGENCY DEPARTMENT COURSE and DIFFERENTIAL DIAGNOSIS/MDM:   Vitals:    Vitals:    06/22/21 2135 06/22/21 2145 06/23/21 0015   BP: (!) 147/87 (!) 139/104 136/86   Pulse: 88  76   Resp: 16     Temp: 98.4 °F (36.9 °C)     SpO2: 98% 99% 100%   Weight: 255 lb (115.7 kg)     Height: 5' 3\" (1.6 m)              MDM  Number of Diagnoses or Management Options  Left lower quadrant abdominal pain  Type 2 diabetes mellitus with hyperglycemia, with long-term current use of insulin (HCC)  Diagnosis management comments: Noncontrasted CT scan secondary to CAT scan dye allergy per patient. Patient got relief with pain medication. Will disposition patient with medications including pain and antispasm medicine clear liquid diet she is to follow-up primary care she is a diabetic she does know her glucose is elevated from labs. Amount and/or Complexity of Data Reviewed  Clinical lab tests: reviewed and ordered  Tests in the radiology section of CPT®: reviewed and ordered        CRITICAL CARE TIME       CONSULTS:  None    PROCEDURES:  Unless otherwise noted below, none     Procedures    FINAL IMPRESSION      1.  Left lower quadrant

## 2021-06-23 NOTE — ED TRIAGE NOTES
Patient c/o sharp left abdominal pain, started PTA. Pt denies any N/V/D. Respirations equal and unlabored. Patient has h/o kidney stones.

## 2021-06-24 ENCOUNTER — CARE COORDINATION (OUTPATIENT)
Dept: CARE COORDINATION | Age: 51
End: 2021-06-24

## 2021-06-24 ASSESSMENT — ENCOUNTER SYMPTOMS: DYSPNEA ASSOCIATED WITH: EXERTION

## 2021-06-24 NOTE — CARE COORDINATION
Ambulatory Care Coordination Note  6/24/2021  CM Risk Score: 11  Charlson 10 Year Mortality Risk Score: 100%     ACC: Maddie Pinon, RN    Summary Note: I called Snato Nazario for ER follow up, states she had abdominal pain, all testing was negative but she reports she was having sharp pains in her abdomen, she has a follow up with CNP- Lilibeth tomorrow, she plans to attend, advised that she needs to attend that visit as she has been having ongoing medical issues- she verbalized understanding. Discussed elevated blood sugars, states her AM blood sugar this am was 196,states it has been fluctuating, no changes in diet or activity,  I advised that she needs to follow up with Dr. Vinod Abdalla, she agreed to let me schedule follow up, follow up scheduled for July 8 at [facility] states her breathing has been better. Allergies are doing ok, states she is taking cymbalta as ordered, no increased use of nebs or inhaler, she still needs to complete sleep study, she states she will call and schedule that at later time,she has a lot going on right now. She agreed to let me schedule her nephrology consult with Dr. Sandra Benitez, it was scheduled for 7/28/21 at 4pm, she is following a pain specialist who recommended acupuncture, but treatment has not started, she could not recall the name  of pain specialist, she has a neuro consult ordered, but states she wants to wait on that as well as she has so much going on. [employer] has contacted her and will be scheduling appointment in the next few week, honorio made aware of appointment scheduled and is in agreement. Care Coordination Plan of Care: This nurse Care Coordinator will follow up on:  BP- readings  BG-readings very high Is ER -is she taking meds and monitoring diet- follow up with Bill-any changes? ? Allergies,- is she doing ok?did she  allergy med??  COPD- how is her breathing- use of nebulizer ??more? ?   Needs testing get done ---sleep study?   -cymbalta better effects? ?  Nephrology consult? 7/28  Pain consult? ? Did she get acupunture that was recommended? Neuro referral , has she scheduled                                                                                                                                                Chest pain/HA,      Follow up with [facility]? ?         Care Coordination Interventions    Program Enrollment: Complex Care  Referral from Primary Care Provider: No  Suggested Interventions and Community Resources  Medi Set or Pill Pack: Completed (Comment: 2/17 using pill box.)  Pharmacist: Completed (Comment: 1/5/2021 [facility] [employer] Clinical Rx)  Registered Dietician: Completed (Comment: 1/5/2021 [facility] [employer] Dietician)  Social Work: Completed (Comment: 1/5/2021 [facility] [employer] Social Work for [employer] and long term planning.)  Other Services: Declined (Comment: 1/5/2021 Declined ACP Referral)  Zone Management Tools: In Process (Comment: 1/5/2021 Diabetes and COPD Zones)  Other Services or Interventions: 1/5/2021 instructed on same day, next day, and Walk-In Care. Goals Addressed                 This Visit's Progress     Conditions and Symptoms   No change     I will schedule office visits, as directed by my provider. I will keep my appointment or reschedule if I have to cancel. I will notify my provider of any barriers to my plan of care. I will follow my Zone Management tool to seek urgent or emergent care. I will notify my provider of any symptoms that indicate a worsening of my condition.     COPD- use nebulizer, avoid inhaled irritants, call MD when in yellow zone  Diabetes-modify diet- decrease blood sugars, increase exercise, log blood sugars  2/17  New referrals to endo and pulm  3/9/21New referral to pain management and [facility]  4/12 nephrology referral, ct chest PFT and sleep study ordered  6/10 not following up with referrals, danny, nephrology, psych- needs  a lot of reinforcement  6/24 needs support and encouragement to schedule follow ups    Barriers: lack of education  Plan for overcoming my barriers: Care Coordination, [address] [facility] Social Work, Clinical Rx, and [address] [facility] Dietician  Confidence: 9/10  Anticipated Goal Completion Date: 5/5/21         Medication Management   No change     I will take my medication as directed. I will notify my provider of any problems with medications, like adverse effects or side effects. I will notify my provider/Care Coordinator if I am unable to afford my medications. I will notify my provider for advice before I stop taking any of my medication. I will use a medication box to improve/assist with medication management. Using pill box  Beter medication compliance noted  6/24 states taking all meds as ordered    Barriers: lack of education  Plan for overcoming my barriers: Care Coordination, Clinical Rx, pill box to organize meds  Confidence: 10/10  Anticipated Goal Completion Date: 4/5/2021            Prior to Admission medications    Medication Sig Start Date End Date Taking? Authorizing Provider   ondansetron (ZOFRAN) 4 MG tablet Take 1 tablet by mouth every 8 hours as needed for Nausea 6/23/21   Felecia Galaviz PA-C   dicyclomine (BENTYL) 10 MG capsule Take 1 capsule by mouth every 6 hours as needed (cramps) 6/23/21   Felecia Galaviz PA-C   HYDROcodone-acetaminophen (NORCO) 5-325 MG per tablet Take 1 tablet by mouth every 6 hours as needed for Pain for up to 3 days. 6/23/21 6/26/21  Felecia Galaviz PA-C   buPROPion (WELLBUTRIN XL) 150 MG extended release tablet TAKE 1 TABLET BY MOUTH EVERY MORNING 6/8/21   SUDEEP Seo CNP   fluticasone (FLONASE) 50 MCG/ACT nasal spray SHAKE LIQUID AND USE 1 SPRAY IN EACH NOSTRIL DAILY 5/18/21   SUDEEP Seo CNP   DULoxetine (CYMBALTA) 30 MG extended release capsule Take 1 capsule by mouth daily 5/17/21   SUDEEP Seo CNP   pregabalin (LYRICA) 150 MG capsule Take 1 capsule by mouth 3 times daily for 60 days.  5/17/21 7/16/21  SUDEEP Ashley CNP   levothyroxine (SYNTHROID) 150 MCG tablet Take 1 tablet by mouth Daily 4/21/21   Santy Oneill MD   budesonide-formoterol (SYMBICORT) 160-4.5 MCG/ACT AERO Inhale 2 puffs into the lungs 2 times daily 4/14/21   Felipe Weaver MD   tiotropium (SPIRIVA RESPIMAT) 2.5 MCG/ACT AERS inhaler Inhale 2 puffs into the lungs daily 4/14/21   Felipe Weaver MD   busPIRone (BUSPAR) 15 MG tablet TAKE 1 TABLET BY MOUTH THREE TIMES DAILY 3/29/21   SUDEEP Arrieta CNP   metoclopramide (REGLAN) 10 MG tablet Take 1 tablet by mouth 2 times daily as needed (Headache) 3/21/21   Blayne Azar MD   acetaminophen (TYLENOL) 500 MG tablet Take 1 tablet by mouth 4 times daily as needed for Pain 3/21/21   Blayne Azar MD   B-D ULTRAFINE III SHORT PEN 31G X 8 MM MISC USE THREE TIMES DAILY 3/15/21   SUDEEP Rao CNP   cetirizine (ZYRTEC) 10 MG tablet TAKE 1 TABLET BY MOUTH EVERY NIGHT AT BEDTIME AS NEEDED FOR ALLERGIES 2/25/21   SUDEEP Ashley CNP   Insulin Degludec (TRESIBA FLEXTOUCH) 100 UNIT/ML SOPN INJECT 40 UNITS UNDER THE SKIN NIGHTLY 2/23/21   Santy Oneill MD   insulin lispro, 1 Unit Dial, (HUMALOG KWIKPEN) 100 UNIT/ML SOPN 4 units at each meals hold if glucose less than 150 2/23/21   Santy Oneill MD   spironolactone (ALDACTONE) 50 MG tablet Take 1 tablet by mouth daily 2/23/21   Santy Oneill MD   OneTouch Delica Lancets 26T MISC qid 2/23/21   Santy Oneill MD   blood glucose test strips (ONETOUCH VERIO) strip 1 each by In Vitro route daily As needed.  2/23/21   Santy Oneill MD   Blood Glucose Monitoring Suppl ([facility]) w/Device KIT As  Directed 2/23/21   Santy Oneill MD   Insulin Pen Needle (NOVOFINE) 32G X 6 MM MISC qid 2/23/21   Santy Oneill MD   Continuous Blood Gluc Sensor (FREESTYLE JESSICA 14 DAY SENSOR) MISC As directed 2/23/21   Santy Oneill MD   Continuous Blood Gluc  (FREESTYLE JESSICA 14 DAY READER) LENNY As directed 2/23/21   Santy Oneill MD   baclofen (LIORESAL) 10 MG tablet Take 1 tablet by mouth 3 times daily 2/19/21   SUDEEP Padilla - CNP   tiZANidine (ZANAFLEX) 2 MG tablet Take 1 tablet by mouth 3 times daily as needed (back pain/ spasm) 2/6/21   Deshawn Padilla PA-C   butalbital-acetaminophen-caffeine (FIORICET, ESGIC) -42 MG per tablet Take 1 tablet by mouth every 6 hours as needed for Headaches 1/4/21   Nolvia Kelley MD   magnesium oxide (MAG-OX) 400 (241.3 Mg) MG TABS tablet TAKE 1/2 TABLET BY MOUTH DAILY 11/2/20   Lilibethsmitha Rivas Bitters, SUDEEP - CNP   albuterol sulfate HFA (PROVENTIL HFA) 108 (90 Base) MCG/ACT inhaler Inhale 2 puffs into the lungs every 6 hours as needed for Wheezing 9/9/20   Lilibeth Lópezgeo Robsons, SUDEEP - CNP   insulin lispro (HUMALOG) 100 UNIT/ML injection vial INJECT 4 UNITS UNDER THE SKIN THREE TIMES DAILY AS NEEDED FOR HIGH BLOOD SUGAR 7/10/20   Lilibeth Reneeane Bitters, APRN - CNP   SUMAtriptan (IMITREX) 25 MG tablet TAKE 1 TABLET BY MOUTH 1 TIME AS NEEDED FOR MIGRAINE 5/1/20   Lilibeth Esquivel, SUDEEP - CNP   blood glucose test strips (EXACTECH TEST) strip 1 each by In Vitro route 3 times daily (Accu-check test strips) As needed.  DX:E11.65 12/2/19   Lilibethsmitha Reyesne Robsons, APRN - CNP   ONE TOUCH ULTRASOFT LANCETS MISC TEST 3 TIMES DAILY AS NEEDED 12/2/19   Lilibeth Reneeane Bitters, APRN - CNP   albuterol (PROVENTIL) (2.5 MG/3ML) 0.083% nebulizer solution Take 3 mLs by nebulization every 4 hours as needed for Wheezing 11/5/19   Maria M Servetas, DO   atorvastatin (LIPITOR) 40 MG tablet Take 1 tablet by mouth nightly 9/11/19   Raudel Harrell, DO   Insulin Syringe-Needle U-100 30G X 1/2\" 1 ML MISC 1 each by Does not apply route daily 11/16/18   Marlen Castle, DO   blood glucose test strips (ONETOUCH VERIO) strip TEST 3 TIMES DAILY AS NEEDED 8/16/18   Marlen Castle, DO   Blood Glucose Monitoring Suppl (ONETOUCH VERIO) w/Device KIT TEST 3 TIMES DAILY AS NEEDED 8/16/18   Marlen Castle, DO       Future Appointments   Date Time Provider Valerie Cosby 6/25/2021  3:00 PM Lilibeth Mercy Barba, [address]   7/8/2021  4:00 PM Christopher Atkinson  [address]   11/5/2021  9:15 AM Radu Enamorado MD [facility]

## 2021-06-24 NOTE — CARE COORDINATION
Telephone call to patient. She indicated that she did hear from [facility]. They expressed plan to follow up with her in a few weeks. No other concerns were expressed at time of phone call.

## 2021-06-25 ENCOUNTER — CARE COORDINATION (OUTPATIENT)
Dept: CARE COORDINATION | Age: 51
End: 2021-06-25

## 2021-06-25 ENCOUNTER — OFFICE VISIT (OUTPATIENT)
Dept: FAMILY MEDICINE CLINIC | Age: 51
End: 2021-06-25
Payer: MEDICAID

## 2021-06-25 VITALS
SYSTOLIC BLOOD PRESSURE: 127 MMHG | HEART RATE: 77 BPM | HEIGHT: 63 IN | TEMPERATURE: 98.3 F | WEIGHT: 276 LBS | OXYGEN SATURATION: 96 % | DIASTOLIC BLOOD PRESSURE: 62 MMHG | BODY MASS INDEX: 48.9 KG/M2

## 2021-06-25 DIAGNOSIS — R10.32 LEFT LOWER QUADRANT ABDOMINAL PAIN: ICD-10-CM

## 2021-06-25 DIAGNOSIS — Z12.11 COLON CANCER SCREENING: ICD-10-CM

## 2021-06-25 DIAGNOSIS — E11.9 TYPE 2 DIABETES MELLITUS TREATED WITH INSULIN (HCC): Primary | ICD-10-CM

## 2021-06-25 DIAGNOSIS — Z79.4 TYPE 2 DIABETES MELLITUS TREATED WITH INSULIN (HCC): Primary | ICD-10-CM

## 2021-06-25 DIAGNOSIS — F41.9 ANXIETY: ICD-10-CM

## 2021-06-25 LAB — HBA1C MFR BLD: 9.4 %

## 2021-06-25 PROCEDURE — G8417 CALC BMI ABV UP PARAM F/U: HCPCS | Performed by: NURSE PRACTITIONER

## 2021-06-25 PROCEDURE — 99214 OFFICE O/P EST MOD 30 MIN: CPT | Performed by: NURSE PRACTITIONER

## 2021-06-25 PROCEDURE — 3046F HEMOGLOBIN A1C LEVEL >9.0%: CPT | Performed by: NURSE PRACTITIONER

## 2021-06-25 PROCEDURE — 3017F COLORECTAL CA SCREEN DOC REV: CPT | Performed by: NURSE PRACTITIONER

## 2021-06-25 PROCEDURE — 83036 HEMOGLOBIN GLYCOSYLATED A1C: CPT | Performed by: NURSE PRACTITIONER

## 2021-06-25 PROCEDURE — 2022F DILAT RTA XM EVC RTNOPTHY: CPT | Performed by: NURSE PRACTITIONER

## 2021-06-25 PROCEDURE — 1036F TOBACCO NON-USER: CPT | Performed by: NURSE PRACTITIONER

## 2021-06-25 PROCEDURE — G8427 DOCREV CUR MEDS BY ELIG CLIN: HCPCS | Performed by: NURSE PRACTITIONER

## 2021-06-25 RX ORDER — VENLAFAXINE HYDROCHLORIDE 37.5 MG/1
37.5 CAPSULE, EXTENDED RELEASE ORAL DAILY
Qty: 30 CAPSULE | Refills: 0 | Status: SHIPPED | OUTPATIENT
Start: 2021-06-25 | End: 2021-07-26

## 2021-06-25 RX ORDER — DICYCLOMINE HYDROCHLORIDE 10 MG/1
10 CAPSULE ORAL EVERY 6 HOURS PRN
Qty: 120 CAPSULE | Refills: 0 | Status: SHIPPED | OUTPATIENT
Start: 2021-06-25

## 2021-06-25 RX ORDER — HYDROCODONE BITARTRATE AND ACETAMINOPHEN 5; 325 MG/1; MG/1
1 TABLET ORAL EVERY 6 HOURS PRN
COMMUNITY
End: 2021-09-08 | Stop reason: ALTCHOICE

## 2021-06-25 ASSESSMENT — ENCOUNTER SYMPTOMS
SHORTNESS OF BREATH: 0
RECTAL PAIN: 0
COLOR CHANGE: 0
CONSTIPATION: 0
WHEEZING: 0
NAUSEA: 1
DIARRHEA: 0
ABDOMINAL PAIN: 1
BLOOD IN STOOL: 0
VOMITING: 0
COUGH: 0
ABDOMINAL DISTENTION: 0
ANAL BLEEDING: 0

## 2021-06-25 NOTE — PROGRESS NOTES
Subjective:      Patient ID: Thom Alexander is a 49 y.o. female who presents today for:     Chief Complaint   Patient presents with    Abdominal Pain     Pt presents today with left lower abdominal pain, was in ER on 6/23/21. Pt states pain still comes and goes    Anxiety     Pt presents today to f/u on anxiety    Insomnia     Pt presents today to f/u on insomnia. HPI Pt presents to f/u on anxiety. She reports she tried to take cymbalta but it has been making her sick and she had to stop it. She reports anxiety has still been flaring up. She reports that she is still interested in trying controller medication. She also spoke to psych and someone is supposed to come to her house in the next 2 weeks. DM2 has been worsening. She takes medication as prescribed and sliding scale insulin throughout the day. She denies any recent diet changes. Has been more stressed lately. Has f/u with Dr. Nile Loza July 8th. Pt in today for f/u on abdominal pain. She went to Er and had labs and CT which were negative. She reports mild improvement in pain, but it still comes and goes. She reports mild nausea. No diarrhea or constipation. Denies bloody stools. Past Medical History:   Diagnosis Date    Anxiety     Arthritis     Asthma     Bronchopneumonia     Cancer ([address]     renal    Cerebral artery occlusion with cerebral infarction (HCC)     Chronic bilateral low back pain with sciatica     Chronic kidney disease     Chronic obstructive lung disease ([address] 7/26/2019    Depression     Fibromyalgia     Hypertension     Insulin dependent type 2 diabetes mellitus, uncontrolled ([address] 8/3/2018    Localized enlarged lymph nodes 10/26/2018    Mixed headache     Pure hyperglyceridemia 5/19/2017    Sarcoidosis     Sleep apnea     does not wear cpap    Thyroid goiter      Past Surgical History:   Procedure Laterality Date    BRONCHOSCOPY  10/26/2018    DR. STEARNS    KIDNEY REMOVAL Right 08/2016    KIDNEY REMOVAL Right 2016    LUNG BIOPSY Right 10/2018    THYROID LOBECTOMY Right 14    THYROIDECTOMY  2019    DR. MAGDALENO    THYROIDECTOMY  2018    URETER STENT PLACEMENT Left 2016     Family History   Problem Relation Age of Onset    Cancer Father     Diabetes Father     Allergy (Severe) Father     Heart Attack Father     Prostate Cancer Father     High Blood Pressure Mother     Diabetes Mother     Arthritis Mother     High Cholesterol Mother     Vision Loss Mother     Alcohol Abuse Neg Hx     Anemia Neg Hx     Arrhythmia Neg Hx     Asthma Neg Hx     Atrial Fibrillation Neg Hx     Birth Defects Neg Hx     Breast Cancer Neg Hx     Coronary Art Dis Neg Hx     Colon Cancer Neg Hx     Depression Neg Hx     Early Death Neg Hx     Hearing Loss Neg Hx     Heart Disease Neg Hx     Learning Disabilities Neg Hx     Kidney Disease Neg Hx     Mental Illness Neg Hx     Mental Retardation Neg Hx     Miscarriages / Stillbirths Neg Hx     Obesity Neg Hx     Osteoporosis Neg Hx     Stroke Neg Hx     Substance Abuse Neg Hx      Social History     Socioeconomic History    Marital status: Legally      Spouse name: Not on file    Number of children: Not on file    Years of education: 15    Highest education level: High school graduate   Occupational History    Not on file   Tobacco Use    Smoking status: Former Smoker     Packs/day: 0.50     Years: 15.00     Pack years: 7.50     Types: Cigarettes     Start date: 2014     Quit date: 2015     Years since quittin.4    Smokeless tobacco: Former User     Quit date: 3/23/2016   Vaping Use    Vaping Use: Never used   Substance and Sexual Activity    Alcohol use: Not Currently     Alcohol/week: 0.0 standard drinks     Comment: occasionally    Drug use: Yes     Frequency: 5.0 times per week     Types: Marijuana    Sexual activity: Yes     Partners: Male   Other Topics Concern    Not on file   Social History Narrative    Not on file     Social Determinants of Health     Financial Resource Strain: Medium Risk    Difficulty of Paying Living Expenses: Somewhat hard   Food Insecurity: Food Insecurity Present    Worried About Running Out of Food in the Last Year: Sometimes true    Maximiliano of Food in the Last Year: Sometimes true   Transportation Needs: No Transportation Needs    Lack of Transportation (Medical): No    Lack of Transportation (Non-Medical): No   Physical Activity: Inactive    Days of Exercise per Week: 0 days    Minutes of Exercise per Session: 0 min   Stress: Stress Concern Present    Feeling of Stress : Very much   Social Connections: Socially Isolated    Frequency of Communication with Friends and Family: Three times a week    Frequency of Social Gatherings with Friends and Family: Never    Attends Islam Services: Never    Active Member of Clubs or Organizations: No    Attends Club or Organization Meetings: Never    Marital Status:    Intimate Partner Violence: Not At Risk    Fear of Current or Ex-Partner: No    Emotionally Abused: No    Physically Abused: No    Sexually Abused: No     Current Outpatient Medications on File Prior to Visit   Medication Sig Dispense Refill    HYDROcodone-acetaminophen (NORCO) 5-325 MG per tablet Take 1 tablet by mouth every 6 hours as needed for Pain.  ondansetron (ZOFRAN) 4 MG tablet Take 1 tablet by mouth every 8 hours as needed for Nausea 20 tablet 0    buPROPion (WELLBUTRIN XL) 150 MG extended release tablet TAKE 1 TABLET BY MOUTH EVERY MORNING 30 tablet 3    fluticasone (FLONASE) 50 MCG/ACT nasal spray SHAKE LIQUID AND USE 1 SPRAY IN EACH NOSTRIL DAILY 16 g 5    pregabalin (LYRICA) 150 MG capsule Take 1 capsule by mouth 3 times daily for 60 days.  90 capsule 1    levothyroxine (SYNTHROID) 150 MCG tablet Take 1 tablet by mouth Daily 30 tablet 3    budesonide-formoterol (SYMBICORT) 160-4.5 MCG/ACT AERO Inhale 2 puffs into the lungs 2 times daily 1 Inhaler 3    tiotropium (SPIRIVA RESPIMAT) 2.5 MCG/ACT AERS inhaler Inhale 2 puffs into the lungs daily 1 Inhaler 3    busPIRone (BUSPAR) 15 MG tablet TAKE 1 TABLET BY MOUTH THREE TIMES DAILY 90 tablet 5    metoclopramide (REGLAN) 10 MG tablet Take 1 tablet by mouth 2 times daily as needed (Headache) 60 tablet 0    acetaminophen (TYLENOL) 500 MG tablet Take 1 tablet by mouth 4 times daily as needed for Pain 360 tablet 1    B-D ULTRAFINE III SHORT PEN 31G X 8 MM MISC USE THREE TIMES DAILY 100 each 5    cetirizine (ZYRTEC) 10 MG tablet TAKE 1 TABLET BY MOUTH EVERY NIGHT AT BEDTIME AS NEEDED FOR ALLERGIES 30 tablet 5    Insulin Degludec (TRESIBA FLEXTOUCH) 100 UNIT/ML SOPN INJECT 40 UNITS UNDER THE SKIN NIGHTLY 15 mL 3    insulin lispro, 1 Unit Dial, (HUMALOG KWIKPEN) 100 UNIT/ML SOPN 4 units at each meals hold if glucose less than 150 5 pen 3    spironolactone (ALDACTONE) 50 MG tablet Take 1 tablet by mouth daily 30 tablet 3    OneTouch Delica Lancets 54E MISC qid 200 each 3    blood glucose test strips (ONETOUCH VERIO) strip 1 each by In Vitro route daily As needed.  100 each 3    Blood Glucose Monitoring Suppl (ONETOUCH VERIO) w/Device KIT As  Directed 1 kit 00    Insulin Pen Needle (NOVOFINE) 32G X 6 MM MISC qid 300 each 3    baclofen (LIORESAL) 10 MG tablet Take 1 tablet by mouth 3 times daily 60 tablet 0    tiZANidine (ZANAFLEX) 2 MG tablet Take 1 tablet by mouth 3 times daily as needed (back pain/ spasm) 15 tablet 0    butalbital-acetaminophen-caffeine (FIORICET, ESGIC) -40 MG per tablet Take 1 tablet by mouth every 6 hours as needed for Headaches 18 tablet 0    magnesium oxide (MAG-OX) 400 (241.3 Mg) MG TABS tablet TAKE 1/2 TABLET BY MOUTH DAILY 30 tablet 5    albuterol sulfate HFA (PROVENTIL HFA) 108 (90 Base) MCG/ACT inhaler Inhale 2 puffs into the lungs every 6 hours as needed for Wheezing 1 Inhaler 3    SUMAtriptan (IMITREX) 25 MG tablet TAKE 1 TABLET BY MOUTH 1 TIME requisition be faxed to: Erickson Black @ [phone]. See [email or web address] for further information. Standing Status:   Future     Standing Expiration Date:   6/25/2022    POCT glycosylated hemoglobin (Hb A1C)     Results for POC orders placed in visit on 06/25/21   POCT glycosylated hemoglobin (Hb A1C)   Result Value Ref Range    Hemoglobin A1C 9.4 %          Orders Placed This Encounter   Medications    venlafaxine (EFFEXOR XR) 37.5 MG extended release capsule     Sig: Take 1 capsule by mouth daily     Dispense:  30 capsule     Refill:  0    dicyclomine (BENTYL) 10 MG capsule     Sig: Take 1 capsule by mouth every 6 hours as needed (cramps)     Dispense:  120 capsule     Refill:  0       Return in about 1 month (around 7/25/2021). Anxiety- start effexor. Discussed that it typically takes about 4-6 weeks to take full effect. Discussed possibility of SI. If any SI pt should stop medication immediately and seek help. Pt was agreeable. Abdominal pain- continue bentyl as needed. F/u fi not continuing to improve or any other new symptoms. DM2- keep f/u with Dr. Malia Gama. Continue current medications. Reviewed with the patient: current clinicalstatus, medications, activities and diet. Side effects, adverse effects of the medication prescribedtoday, as well as treatment plan/ rationale and result expectations have been discussedwith the patient who expresses understanding and desires to proceed. Close follow upto evaluate treatment results and for coordination of care. I have reviewedthe patient's medical history in detail and updated the computerized patient record.     Cathleen Barrera, SUDEEP - CNP

## 2021-06-25 NOTE — PATIENT INSTRUCTIONS
Patient Education        Learning About Anxiety Disorders  What are anxiety disorders? Anxiety disorders are a type of medical problem. They cause severe anxiety. When you feel anxious, you feel that something bad is about to happen. This feeling interferes with your life. These disorders include:  · Generalized anxiety disorder. You feel worried and stressed about many everyday events and activities. This goes on for several months and disrupts your life on most days. · Panic disorder. You have repeated panic attacks. A panic attack is a sudden, intense fear or anxiety. It may make you feel short of breath. Your heart may pound. · Social anxiety disorder. You feel very anxious about what you will say or do in front of people. For example, you may be scared to talk or eat in public. This problem affects your daily life. · Phobias. You are very scared of a specific object, situation, or activity. For example, you may fear spiders, high places, or small spaces. What are the symptoms? Generalized anxiety disorder  Symptoms may include:  · Feeling worried and stressed about many things almost every day. · Feeling tired or irritable. You may have a hard time concentrating. · Having headaches or muscle aches. · Having a hard time getting to sleep or staying asleep. Panic disorder  You may have repeated panic attacks when there is no reason for feeling afraid. You may change your daily activities because you worry that you will have another attack. Symptoms may include:  · Intense fear, terror, or anxiety. · Trouble breathing or very fast breathing. · Chest pain or tightness. · A heartbeat that races or is not regular. Social anxiety disorder  Symptoms may include:  · Fear about a social situation, such as eating in front of others or speaking in public. You may worry a lot. Or you may be afraid that something bad will happen. · Anxiety that can cause you to blush, sweat, and feel shaky.   · [id number] [employer]. Care instructions adapted under license by [facility] ([facility]). If you have questions about a medical condition or this instruction, always ask your healthcare professional. [address] any warranty or liability for your use of this information.

## 2021-07-06 ENCOUNTER — CARE COORDINATION (OUTPATIENT)
Dept: CARE COORDINATION | Age: 51
End: 2021-07-06

## 2021-07-06 RX ORDER — SPIRONOLACTONE 50 MG/1
50 TABLET, FILM COATED ORAL DAILY
Qty: 30 TABLET | Refills: 3 | Status: SHIPPED | OUTPATIENT
Start: 2021-07-06 | End: 2021-11-03

## 2021-07-06 NOTE — CARE COORDINATION
Ambulatory Care Coordination Note  7/6/2021  CM Risk Score: 11  Charlson 10 Year Mortality Risk Score: 100%     ACC: Bradford Patel, RN    Summary Note: Received message from [place] requesting refill on Fioricet for her headaches, message sent to [facility] staff requesting their assistance with this. Care Coordination Interventions    Program Enrollment: Complex Care  Referral from Primary Care Provider: No  Suggested Interventions and Community Resources  Medi Set or Pill Pack: Completed (Comment: 2/17 using pill box.)  Pharmacist: Completed (Comment: 1/5/2021 [facility] Clinical Rx)  Registered Dietician: Completed (Comment: 1/5/2021 [facility] Dietician)  Social Work: Completed (Comment: 1/5/2021 [facility] Social Work for [employer] and long term planning.)  Other Services: Declined (Comment: 1/5/2021 Declined ACP Referral)  Zone Management Tools: In Process (Comment: 1/5/2021 Diabetes and COPD Zones)  Other Services or Interventions: 1/5/2021 instructed on same day, next day, and Walk-In Care. Goals Addressed    None         Prior to Admission medications    Medication Sig Start Date End Date Taking? Authorizing Provider   HYDROcodone-acetaminophen (NORCO) 5-325 MG per tablet Take 1 tablet by mouth every 6 hours as needed for Pain.     Historical Provider, MD   venlafaxine (EFFEXOR XR) 37.5 MG extended release capsule Take 1 capsule by mouth daily 6/25/21   SUDEEP Coleman CNP   dicyclomine (BENTYL) 10 MG capsule Take 1 capsule by mouth every 6 hours as needed (cramps) 6/25/21   SUDEEP Padron CNP   ondansetron (ZOFRAN) 4 MG tablet Take 1 tablet by mouth every 8 hours as needed for Nausea 6/23/21   Omid Morales PA-C   buPROPion (WELLBUTRIN XL) 150 MG extended release tablet TAKE 1 TABLET BY MOUTH EVERY MORNING 6/8/21   SUDEEP Padron CNP   fluticasone (FLONASE) 50 MCG/ACT nasal spray SHAKE LIQUID AND USE 1 SPRAY IN EACH NOSTRIL DAILY 5/18/21   SUDEEP Coleman - CNP   pregabalin (LYRICA) 150 MG capsule Take 1 capsule by mouth 3 times daily for 60 days. 5/17/21 7/16/21  SUDEEP Dempsey CNP   levothyroxine (SYNTHROID) 150 MCG tablet Take 1 tablet by mouth Daily 4/21/21   Onesimo Vo MD   budesonide-formoterol (SYMBICORT) 160-4.5 MCG/ACT AERO Inhale 2 puffs into the lungs 2 times daily 4/14/21   Elsi Hernandez MD   tiotropium (SPIRIVA RESPIMAT) 2.5 MCG/ACT AERS inhaler Inhale 2 puffs into the lungs daily 4/14/21   Elsi Hernandez MD   busPIRone (BUSPAR) 15 MG tablet TAKE 1 TABLET BY MOUTH THREE TIMES DAILY 3/29/21   SUDEEP Garcia CNP   metoclopramide (REGLAN) 10 MG tablet Take 1 tablet by mouth 2 times daily as needed (Headache) 3/21/21   Shannon Mckeon MD   acetaminophen (TYLENOL) 500 MG tablet Take 1 tablet by mouth 4 times daily as needed for Pain 3/21/21   Shannon Mckeon MD   B-D ULTRAFINE III SHORT PEN 31G X 8 MM MISC USE THREE TIMES DAILY 3/15/21   SUDEEP Donato CNP   cetirizine (ZYRTEC) 10 MG tablet TAKE 1 TABLET BY MOUTH EVERY NIGHT AT BEDTIME AS NEEDED FOR ALLERGIES 2/25/21   SUDEPE Dempsey CNP   Insulin Degludec (TRESIBA FLEXTOUCH) 100 UNIT/ML SOPN INJECT 40 UNITS UNDER THE SKIN NIGHTLY 2/23/21   Onesimo Vo MD   insulin lispro, 1 Unit Dial, (HUMALOG KWIKPEN) 100 UNIT/ML SOPN 4 units at each meals hold if glucose less than 150 2/23/21   Onesimo Vo MD   spironolactone (ALDACTONE) 50 MG tablet Take 1 tablet by mouth daily 2/23/21   MD Maia Gaxiola Delica Lancets 81N MISC qid 2/23/21   Onesimo Vo MD   blood glucose test strips (ONETOUCH VERIO) strip 1 each by In Vitro route daily As needed.  2/23/21   Onesimo Vo MD   Blood Glucose Monitoring Suppl (Hemant Womack) w/Device KIT As  Directed 2/23/21   Onesimo Vo MD   Insulin Pen Needle (NOVOFINE) 32G X 6 MM MISC qid 2/23/21   Onesimo Vo MD   baclofen (LIORESAL) 10 MG tablet Take 1 tablet by mouth 3 times daily 2/19/21   SUDEEP Donato - CNP   tiZANidine (ZANAFLEX) 2 MG tablet Take 1 tablet by mouth 3 times daily as needed (back pain/ spasm) 2/6/21   Satish Kearney PA-C   butalbital-acetaminophen-caffeine (FIORICET, ESGIC) -04 MG per tablet Take 1 tablet by mouth every 6 hours as needed for Headaches 1/4/21   Reilly Treviño MD   magnesium oxide (MAG-OX) 400 (241.3 Mg) MG TABS tablet TAKE 1/2 TABLET BY MOUTH DAILY 11/2/20   SUDEEP Back CNP   albuterol sulfate HFA (PROVENTIL HFA) 108 (90 Base) MCG/ACT inhaler Inhale 2 puffs into the lungs every 6 hours as needed for Wheezing 9/9/20   SUDEEP Back CNP   SUMAtriptan (IMITREX) 25 MG tablet TAKE 1 TABLET BY MOUTH 1 TIME AS NEEDED FOR MIGRAINE 5/1/20   SUDEEP Back CNP   blood glucose test strips (EXACTECH TEST) strip 1 each by In Vitro route 3 times daily (Accu-check test strips) As needed.  DX:E11.65 12/2/19   SUDEEP Back CNP   ONE TOUCH ULTRASOFT LANCETS MISC TEST 3 TIMES DAILY AS NEEDED 12/2/19   SUDEEP Back CNP   albuterol (PROVENTIL) (2.5 MG/3ML) 0.083% nebulizer solution Take 3 mLs by nebulization every 4 hours as needed for Wheezing 11/5/19   Maria M Ni, DO   atorvastatin (LIPITOR) 40 MG tablet Take 1 tablet by mouth nightly 9/11/19   Faustino Jimenez, DO   Insulin Syringe-Needle U-100 30G X 1/2\" 1 ML MISC 1 each by Does not apply route daily 11/16/18   Joleen Alpers, DO   blood glucose test strips (ONETOUCH VERIO) strip TEST 3 TIMES DAILY AS NEEDED 8/16/18   Joleen Alpers, DO   Blood Glucose Monitoring Suppl (ONETOUCH VERIO) w/Device KIT TEST 3 TIMES DAILY AS NEEDED 8/16/18   Joleen Alpers, DO       Future Appointments   Date Time Provider [facility]   7/8/2021  4:00 PM Christopher Smith  [address]   7/26/2021  4:30 PM SUDEEP Lou - CNP [facility]   11/5/2021  9:15 AM Romeo Clayton MD [facility]

## 2021-07-07 RX ORDER — BUTALBITAL, ACETAMINOPHEN AND CAFFEINE 50; 325; 40 MG/1; MG/1; MG/1
1 TABLET ORAL EVERY 6 HOURS PRN
Qty: 30 TABLET | Refills: 1 | Status: SHIPPED | OUTPATIENT
Start: 2021-07-07

## 2021-07-07 NOTE — TELEPHONE ENCOUNTER
Pt is calling in requesting a refill on medication(s). Requested Prescriptions     Pending Prescriptions Disp Refills    butalbital-acetaminophen-caffeine (FIORICET, ESGIC) -40 MG per tablet 18 tablet 0     Sig: Take 1 tablet by mouth every 6 hours as needed for Headaches          Patient's Last Office Visit:  6/25/2021     Patient's Next Visit:  Future Appointments   Date Time Provider [facility]   7/8/2021  4:00 PM Diane Tavares MD [address] [place] [address]   7/26/2021  4:30 PM Reg Blakely, [address] [place] [address]   11/5/2021  9:15 AM Yeimi Garcia MD [facility] [address]:  Please send the medication to the pharmacy listed.       Other Comments:  LVO 6/25/21

## 2021-07-08 ENCOUNTER — OFFICE VISIT (OUTPATIENT)
Dept: ENDOCRINOLOGY | Age: 51
End: 2021-07-08
Payer: MEDICAID

## 2021-07-08 VITALS
DIASTOLIC BLOOD PRESSURE: 79 MMHG | HEIGHT: 63 IN | WEIGHT: 272 LBS | HEART RATE: 77 BPM | BODY MASS INDEX: 48.2 KG/M2 | SYSTOLIC BLOOD PRESSURE: 117 MMHG | OXYGEN SATURATION: 95 %

## 2021-07-08 DIAGNOSIS — Z79.4 TYPE 2 DIABETES MELLITUS TREATED WITH INSULIN (HCC): Primary | ICD-10-CM

## 2021-07-08 DIAGNOSIS — E89.0 POSTPROCEDURAL HYPOTHYROIDISM: ICD-10-CM

## 2021-07-08 DIAGNOSIS — Z79.4 TYPE 2 DIABETES MELLITUS WITH HYPERGLYCEMIA, WITH LONG-TERM CURRENT USE OF INSULIN (HCC): ICD-10-CM

## 2021-07-08 DIAGNOSIS — E11.65 TYPE 2 DIABETES MELLITUS WITH HYPERGLYCEMIA, WITH LONG-TERM CURRENT USE OF INSULIN (HCC): ICD-10-CM

## 2021-07-08 DIAGNOSIS — E11.9 TYPE 2 DIABETES MELLITUS TREATED WITH INSULIN (HCC): Primary | ICD-10-CM

## 2021-07-08 LAB
CHP ED QC CHECK: NORMAL
GLUCOSE BLD-MCNC: 233 MG/DL

## 2021-07-08 PROCEDURE — 2022F DILAT RTA XM EVC RTNOPTHY: CPT | Performed by: INTERNAL MEDICINE

## 2021-07-08 PROCEDURE — G8427 DOCREV CUR MEDS BY ELIG CLIN: HCPCS | Performed by: INTERNAL MEDICINE

## 2021-07-08 PROCEDURE — 3046F HEMOGLOBIN A1C LEVEL >9.0%: CPT | Performed by: INTERNAL MEDICINE

## 2021-07-08 PROCEDURE — 1036F TOBACCO NON-USER: CPT | Performed by: INTERNAL MEDICINE

## 2021-07-08 PROCEDURE — 3017F COLORECTAL CA SCREEN DOC REV: CPT | Performed by: INTERNAL MEDICINE

## 2021-07-08 PROCEDURE — 82962 GLUCOSE BLOOD TEST: CPT | Performed by: INTERNAL MEDICINE

## 2021-07-08 PROCEDURE — 99214 OFFICE O/P EST MOD 30 MIN: CPT | Performed by: INTERNAL MEDICINE

## 2021-07-08 PROCEDURE — G8417 CALC BMI ABV UP PARAM F/U: HCPCS | Performed by: INTERNAL MEDICINE

## 2021-07-08 RX ORDER — INSULIN LISPRO 100 [IU]/ML
INJECTION, SOLUTION INTRAVENOUS; SUBCUTANEOUS
Qty: 5 PEN | Refills: 3 | Status: SHIPPED | OUTPATIENT
Start: 2021-07-08 | End: 2021-10-07 | Stop reason: SDUPTHER

## 2021-07-08 RX ORDER — INSULIN DEGLUDEC INJECTION 100 U/ML
INJECTION, SOLUTION SUBCUTANEOUS
Qty: 15 ML | Refills: 3 | Status: SHIPPED | OUTPATIENT
Start: 2021-07-08 | End: 2021-10-07 | Stop reason: SDUPTHER

## 2021-07-08 RX ORDER — ALPRAZOLAM 0.25 MG/1
0.25 TABLET ORAL NIGHTLY PRN
COMMUNITY
End: 2021-09-20 | Stop reason: SDUPTHER

## 2021-07-08 RX ORDER — LEVOTHYROXINE SODIUM 0.12 MG/1
125 TABLET ORAL DAILY
Qty: 30 TABLET | Refills: 6 | Status: SHIPPED | OUTPATIENT
Start: 2021-07-08 | End: 2021-08-04

## 2021-07-08 NOTE — PROGRESS NOTES
2021    Assessment:       Diagnosis Orders   1. Type 2 diabetes mellitus treated with insulin ([facility])  POCT Glucose    Hemoglobin U7O    Basic Metabolic Panel    HM DIABETES FOOT EXAM   2. Type 2 diabetes mellitus with hyperglycemia, with long-term current use of insulin ([facility])  Insulin Degludec (TRESIBA FLEXTOUCH) 100 UNIT/ML SOPN   3. Postprocedural hypothyroidism           PLAN:     Orders Placed This Encounter   Procedures    Hemoglobin A1C     Standing Status:   Future     Standing Expiration Date:   2022    Basic Metabolic Panel     Standing Status:   Future     Standing Expiration Date:   2022    TSH without Reflex     Standing Status:   Future     Standing Expiration Date:   2022    T4, Free     Standing Status:   Future     Standing Expiration Date:   2022    POCT Glucose    HM DIABETES FOOT EXAM     Increased dose of insulin lower dose of Synthroid  A1c goal of 7 or lower  Orders Placed This Encounter   Medications    levothyroxine (SYNTHROID) 125 MCG tablet     Sig: Take 1 tablet by mouth Daily     Dispense:  30 tablet     Refill:  06    Insulin Degludec (TRESIBA FLEXTOUCH) 100 UNIT/ML SOPN     Sig: INJECT 60 UNITS UNDER THE SKIN NIGHTLY     Dispense:  15 mL     Refill:  3    insulin lispro, 1 Unit Dial, (HUMALOG KWIKPEN) 100 UNIT/ML SOPN     Si units at each meals hold if glucose less than 150     Dispense:  5 pen     Refill:  3           Orders Placed This Encounter   Procedures    POCT Glucose     No orders of the defined types were placed in this encounter. No follow-ups on file.   Subjective:     Chief Complaint   Patient presents with    Diabetes    Hypothyroidism    Obesity     Vitals:    21 1621   BP: 117/79   Pulse: 77   SpO2: 95%   Weight: 272 lb (123.4 kg)   Height: 5' 3\" (1.6 m)     Wt Readings from Last 3 Encounters:   21 272 lb (123.4 kg)   21 276 lb (125.2 kg)   21 255 lb (115.7 kg)     BP Readings from Last 3 Encounters: 07/08/21 117/79   06/25/21 127/62   06/23/21 (!) 147/81     Follow-up on type 2 diabetes patient on Tresiba plus Humalog blood sugars have been going obesity with BMI of 48  History of hypothyroidism. TSH was suppressed currently levothyroxine 150 mcg daily    Diabetes  She presents for her follow-up diabetic visit. She has type 2 diabetes mellitus. Hypoglycemia symptoms include nervousness/anxiousness. Associated symptoms include fatigue. Risk factors for coronary artery disease include obesity. Current diabetic treatment includes insulin injections. She is currently taking insulin pre-breakfast, pre-lunch, pre-dinner and at bedtime. Her overall blood glucose range is >200 mg/dl. (Lab Results       Component                Value               Date                       LABA1C                   9.4                 06/25/2021            )     Past Medical History:   Diagnosis Date    Anxiety     Arthritis     Asthma     Bronchopneumonia     Cancer ([address]     renal    Cerebral artery occlusion with cerebral infarction (HCC)     Chronic bilateral low back pain with sciatica     Chronic kidney disease     Chronic obstructive lung disease ([address] 7/26/2019    Depression     Fibromyalgia     Hypertension     Insulin dependent type 2 diabetes mellitus, uncontrolled ([address] 8/3/2018    Localized enlarged lymph nodes 10/26/2018    Mixed headache     Pure hyperglyceridemia 5/19/2017    Sarcoidosis     Sleep apnea     does not wear cpap    Thyroid goiter      Past Surgical History:   Procedure Laterality Date    BRONCHOSCOPY  10/26/2018    DR. STEARNS    KIDNEY REMOVAL Right 08/2016    KIDNEY REMOVAL Right 2016    LUNG BIOPSY Right 10/2018    THYROID LOBECTOMY Right 6/13/14    THYROIDECTOMY  02/21/2019    DR. MAGDALENO    THYROIDECTOMY  2018    URETER STENT PLACEMENT Left 08/2016     Social History     Socioeconomic History    Marital status: Legally      Spouse name: Not on file    Number of children: Not on file    Years of education: 15    Highest education level: High school graduate   Occupational History    Not on file   Tobacco Use    Smoking status: Former Smoker     Packs/day: 0.50     Years: 15.00     Pack years: 7.50     Types: Cigarettes     Start date: 2014     Quit date: 2015     Years since quittin.4    Smokeless tobacco: Former User     Quit date: 3/23/2016   Vaping Use    Vaping Use: Never used   Substance and Sexual Activity    Alcohol use: Not Currently     Alcohol/week: 0.0 standard drinks     Comment: occasionally    Drug use: Yes     Frequency: 5.0 times per week     Types: Marijuana    Sexual activity: Yes     Partners: Male   Other Topics Concern    Not on file   Social History Narrative    Not on file     Social Determinants of Health     Financial Resource Strain: Medium Risk    Difficulty of Paying Living Expenses: Somewhat hard   Food Insecurity: Food Insecurity Present    Worried About Running Out of Food in the Last Year: Sometimes true    Maximiliano of Food in the Last Year: Sometimes true   Transportation Needs: No Transportation Needs    Lack of Transportation (Medical): No    Lack of Transportation (Non-Medical): No   Physical Activity: Inactive    Days of Exercise per Week: 0 days    Minutes of Exercise per Session: 0 min   Stress: Stress Concern Present    Feeling of Stress : Very much   Social Connections: Socially Isolated    Frequency of Communication with Friends and Family:  Three times a week    Frequency of Social Gatherings with Friends and Family: Never    Attends Hinduism Services: Never    Active Member of Clubs or Organizations: No    Attends Club or Organization Meetings: Never    Marital Status:    Intimate Partner Violence: Not At Risk    Fear of Current or Ex-Partner: No    Emotionally Abused: No    Physically Abused: No    Sexually Abused: No     Family History   Problem Relation Age of Onset    Cancer Father     Diabetes Father     Allergy (Severe) Father     Heart Attack Father     Prostate Cancer Father     High Blood Pressure Mother     Diabetes Mother    Shen Arthritis Mother     High Cholesterol Mother     Vision Loss Mother     Alcohol Abuse Neg Hx     Anemia Neg Hx     Arrhythmia Neg Hx     Asthma Neg Hx     Atrial Fibrillation Neg Hx     Birth Defects Neg Hx     Breast Cancer Neg Hx     Coronary Art Dis Neg Hx     Colon Cancer Neg Hx     Depression Neg Hx     Early Death Neg Hx     Hearing Loss Neg Hx     Heart Disease Neg Hx     Learning Disabilities Neg Hx     Kidney Disease Neg Hx     Mental Illness Neg Hx     Mental Retardation Neg Hx     Miscarriages / Stillbirths Neg Hx     Obesity Neg Hx     Osteoporosis Neg Hx     Stroke Neg Hx     Substance Abuse Neg Hx      Allergies   Allergen Reactions    Shellfish-Derived Products     Ibuprofen     Ketorolac     Morphine Other (See Comments)     MADE PATIENT VERY SICK VOMITING    Other     Penicillins Swelling    Propoxyphene N-Acetaminophen      Other reaction(s): Hives    Toradol [Ketorolac Tromethamine]      Pt states she cannot take Toradol because she has only 1 kidney. Current Outpatient Medications:     ALPRAZolam (XANAX) 0.25 MG tablet, Take 0.25 mg by mouth nightly as needed for Anxiety. , Disp: , Rfl:     butalbital-acetaminophen-caffeine (FIORICET, ESGIC) -40 MG per tablet, Take 1 tablet by mouth every 6 hours as needed for Headaches, Disp: 30 tablet, Rfl: 1    spironolactone (ALDACTONE) 50 MG tablet, TAKE 1 TABLET BY MOUTH DAILY, Disp: 30 tablet, Rfl: 3    venlafaxine (EFFEXOR XR) 37.5 MG extended release capsule, Take 1 capsule by mouth daily, Disp: 30 capsule, Rfl: 0    dicyclomine (BENTYL) 10 MG capsule, Take 1 capsule by mouth every 6 hours as needed (cramps), Disp: 120 capsule, Rfl: 0    ondansetron (ZOFRAN) 4 MG tablet, Take 1 tablet by mouth every 8 hours as needed for Nausea, Disp: 20 tablet, Rfl: 0    buPROPion (WELLBUTRIN XL) 150 MG extended release tablet, TAKE 1 TABLET BY MOUTH EVERY MORNING, Disp: 30 tablet, Rfl: 3    fluticasone (FLONASE) 50 MCG/ACT nasal spray, SHAKE LIQUID AND USE 1 SPRAY IN EACH NOSTRIL DAILY, Disp: 16 g, Rfl: 5    pregabalin (LYRICA) 150 MG capsule, Take 1 capsule by mouth 3 times daily for 60 days. , Disp: 90 capsule, Rfl: 1    levothyroxine (SYNTHROID) 150 MCG tablet, Take 1 tablet by mouth Daily, Disp: 30 tablet, Rfl: 3    budesonide-formoterol (SYMBICORT) 160-4.5 MCG/ACT AERO, Inhale 2 puffs into the lungs 2 times daily, Disp: 1 Inhaler, Rfl: 3    tiotropium (SPIRIVA RESPIMAT) 2.5 MCG/ACT AERS inhaler, Inhale 2 puffs into the lungs daily, Disp: 1 Inhaler, Rfl: 3    busPIRone (BUSPAR) 15 MG tablet, TAKE 1 TABLET BY MOUTH THREE TIMES DAILY, Disp: 90 tablet, Rfl: 5    metoclopramide (REGLAN) 10 MG tablet, Take 1 tablet by mouth 2 times daily as needed (Headache), Disp: 60 tablet, Rfl: 0    acetaminophen (TYLENOL) 500 MG tablet, Take 1 tablet by mouth 4 times daily as needed for Pain, Disp: 360 tablet, Rfl: 1    B-D ULTRAFINE III SHORT PEN 31G X 8 MM MISC, USE THREE TIMES DAILY, Disp: 100 each, Rfl: 5    cetirizine (ZYRTEC) 10 MG tablet, TAKE 1 TABLET BY MOUTH EVERY NIGHT AT BEDTIME AS NEEDED FOR ALLERGIES, Disp: 30 tablet, Rfl: 5    Insulin Degludec (TRESIBA FLEXTOUCH) 100 UNIT/ML SOPN, INJECT 40 UNITS UNDER THE SKIN NIGHTLY, Disp: 15 mL, Rfl: 3    insulin lispro, 1 Unit Dial, (HUMALOG KWIKPEN) 100 UNIT/ML SOPN, 4 units at each meals hold if glucose less than 150, Disp: 5 pen, Rfl: 3    OneTouch Delica Lancets 32J MISC, qid, Disp: 200 each, Rfl: 3    blood glucose test strips (ONETOUCH VERIO) strip, 1 each by In Vitro route daily As needed. , Disp: 100 each, Rfl: 3    Blood Glucose Monitoring Suppl (ONETOUCH VERIO) w/Device KIT, As  Directed, Disp: 1 kit, Rfl: 00    Insulin Pen Needle (NOVOFINE) 32G X 6 MM MISC, qid, Disp: 300 each, Rfl: 3    baclofen (LIORESAL) 10 MG tablet, Take 1 tablet by mouth 3 times daily, Disp: 60 tablet, Rfl: 0    tiZANidine (ZANAFLEX) 2 MG tablet, Take 1 tablet by mouth 3 times daily as needed (back pain/ spasm), Disp: 15 tablet, Rfl: 0    magnesium oxide (MAG-OX) 400 (241.3 Mg) MG TABS tablet, TAKE 1/2 TABLET BY MOUTH DAILY, Disp: 30 tablet, Rfl: 5    albuterol sulfate HFA (PROVENTIL HFA) 108 (90 Base) MCG/ACT inhaler, Inhale 2 puffs into the lungs every 6 hours as needed for Wheezing, Disp: 1 Inhaler, Rfl: 3    SUMAtriptan (IMITREX) 25 MG tablet, TAKE 1 TABLET BY MOUTH 1 TIME AS NEEDED FOR MIGRAINE, Disp: 9 tablet, Rfl: 1    blood glucose test strips (EXACTECH TEST) strip, 1 each by In Vitro route 3 times daily (Accu-check test strips) As needed. DX:E11.65, Disp: 300 strip, Rfl: 5    ONE TOUCH ULTRASOFT LANCETS MISC, TEST 3 TIMES DAILY AS NEEDED, Disp: 300 each, Rfl: 3    albuterol (PROVENTIL) (2.5 MG/3ML) 0.083% nebulizer solution, Take 3 mLs by nebulization every 4 hours as needed for Wheezing, Disp: 120 each, Rfl: 3    atorvastatin (LIPITOR) 40 MG tablet, Take 1 tablet by mouth nightly, Disp: 30 tablet, Rfl: 3    Insulin Syringe-Needle U-100 30G X 1/2\" 1 ML MISC, 1 each by Does not apply route daily, Disp: 100 each, Rfl: 3    blood glucose test strips (ONETOUCH VERIO) strip, TEST 3 TIMES DAILY AS NEEDED, Disp: 300 each, Rfl: 3    Blood Glucose Monitoring Suppl (ONETOUCH VERIO) w/Device KIT, TEST 3 TIMES DAILY AS NEEDED, Disp: 1 kit, Rfl: 0    HYDROcodone-acetaminophen (NORCO) 5-325 MG per tablet, Take 1 tablet by mouth every 6 hours as needed for Pain.  (Patient not taking: Reported on 7/8/2021), Disp: , Rfl:   Lab Results   Component Value Date     (L) 07/04/2021    K 4.1 07/04/2021     07/04/2021    CO2 22 06/22/2021    BUN 10 06/22/2021    CREATININE 1.18 (H) 07/04/2021    GLUCOSE 233 07/08/2021    CALCIUM 8.9 07/04/2021    PROT 7.1 07/04/2021    LABALBU 3.8 07/04/2021    BILITOT 0.3 07/04/2021    ALKPHOS 163 (H) 07/04/2021    AST 6 (L) 07/04/2021    ALT 13 07/04/2021    LABGLOM 48 (A) 07/04/2021    GFRAA 58 (A) 07/04/2021    AGRATIO 1.0 04/04/2019    GLOB 3.3 06/22/2021     Lab Results   Component Value Date    WBC 6.5 07/04/2021    HGB 15.1 07/04/2021    HCT 46.2 (H) 07/04/2021    MCV 95 07/04/2021     07/04/2021     Lab Results   Component Value Date    LABA1C 9.4 06/25/2021    LABA1C 6.8 03/24/2021    LABA1C 7.9 02/09/2021     Lab Results   Component Value Date    HDL 41 09/11/2019    HDL 34 (L) 06/24/2018    HDL 34 (L) 05/19/2017    LDLCALC 166 (H) 09/11/2019    LDLCALC 154 (H) 06/24/2018    LDLCALC 125 05/19/2017    CHOL 244 (H) 09/11/2019    CHOL 230 (H) 06/24/2018    CHOL 226 (H) 05/19/2017    TRIG 184 (H) 09/11/2019    TRIG 212 (H) 06/24/2018    TRIG 335 (H) 05/19/2017       Lab Results   Component Value Date    TSH 0.411 (L) 05/28/2021    TSH 4.960 (H) 03/16/2021    TSH 20.110 (H) 01/10/2020    TSHREFLEX 0.217 (L) 02/23/2021    FT3 2.9 09/24/2018    FT3 2.7 05/19/2017    T4FREE 1.58 02/23/2021    T4FREE 0.98 05/25/2018    T4FREE 0.85 (L) 05/19/2017       Review of Systems   Constitutional: Positive for fatigue. Endocrine: Negative. Musculoskeletal: Positive for arthralgias. Psychiatric/Behavioral: Positive for dysphoric mood. The patient is nervous/anxious. All other systems reviewed and are negative. Objective:   Physical Exam  Vitals reviewed. Constitutional:       Appearance: Normal appearance. She is obese. HENT:      Head: Normocephalic and atraumatic. Hair is normal.      Right Ear: External ear normal.      Left Ear: External ear normal.      Nose: Nose normal.   Eyes:      General: No scleral icterus. Right eye: No discharge. Left eye: No discharge. Extraocular Movements: Extraocular movements intact. Conjunctiva/sclera: Conjunctivae normal.   Neck:      Thyroid: Thyromegaly present.       Trachea: Trachea normal.   Cardiovascular:      Rate and Rhythm: Normal rate. Pulmonary:      Effort: Pulmonary effort is normal.   Musculoskeletal:         General: Normal range of motion. Cervical back: Normal range of motion and neck supple. Feet:    Neurological:      General: No focal deficit present. Mental Status: She is alert and oriented to person, place, and time.    Psychiatric:         Mood and Affect: Mood normal.         Behavior: Behavior normal.

## 2021-07-09 ENCOUNTER — CARE COORDINATION (OUTPATIENT)
Dept: CARE COORDINATION | Age: 51
End: 2021-07-09

## 2021-07-09 ASSESSMENT — ENCOUNTER SYMPTOMS: DYSPNEA ASSOCIATED WITH: EXERTION

## 2021-07-09 NOTE — CARE COORDINATION
Ambulatory Care Coordination Note  7/9/2021  CM Risk Score: 11  Charlson 10 Year Mortality Risk Score: 100%     ACC: Deangelo Elliott, RN    Summary Note: I called Deep Saldaña to discuss ongoing care needs. We discussed her elevated blood sugars, she states Dr. Inge Leon increased her insulin , her nightly insulin was increased to 60 and her humalog sliding scale increased as well- she has not checked her blood sugar today- he most recent number was 193 last night, Deep Saldaña states she is receptive to speaking with dietician again and reviewing diabetic diet, referral made to [facility]. Deep Saldaña feels some of her elevated blood glucose readings are related to her increased anxiety level. Deep Saldaña met with [facility]- they visited her at the house yesterday-they discussed some of her anxiety and suggested some ideas to help her cope and decrease anxiety. she is still taking Cymbalta,Racquel states her BP has been good 117/76, denies chest pain, shortness of breath, swelling, or abdominal pain. States she is going out of to town today and will be back next week,  reminded Rockefrain Dayford of of nephrology appointment on 7/28, she just finished Physical Therapy at  [facility], she has not seen her pain doctor or received Karuna Gallego states she will follow up to schedule sleep study when she gets back next week, she will wait to see neurology at later  time because she has a lot gojng on right now, she will be having diabetic eye exam at [facility], her insurance will be scheduling that, Deep Dayford reports her breathing has been good, and her allergies are managed.  I discussed that although all her health care goals have not been met, she has been provided with the tools to manage her self care and has made great improvements in her self care including following up and reporting symptoms promptly, monitoring her BP and BG and taking her meds as ordered, reminded her to stay focused and persistent in meeting her goals for health, advised that we will graduate soon and I want to make sure she has all the tools to succeed, she has most of her follow ups scheduled, states she has all her medications, is aware of ready clinic and same day/ next day appointments. Dietician to assist with diet education review and [facility] following for counseling. Will make CALLIE Cross aware that we will graduate Deep Dayford at next encounter    [address] Contreras of Care: This nurse Care Coordinator will follow up on:  BP- readings  BG-readings very high Is ER -is she taking meds and monitoring diet- follow up with Bill-any changes? ?dietician referral?  Marilyne Friends she doing ok?did she  allergy med??  COPD- how is her breathing- use of nebulizer ??more? ? Needs testing get done ---sleep study?   -cymbalta better effects? ?  Nephrology consult? 7/28  Pain consult? ? Did she get acupunture that was recommended? Neuro referral , has she scheduled                                                                                                                                                Chest pain/HA,      Follow up with [facility]? ?  Graduate           Lab Results   Component Value Date    LABA1C 9.4 06/25/2021    LABA1C 6.8 03/24/2021    LABA1C 7.9 02/09/2021     No results found for: EAG    Care Coordination Interventions    Program Enrollment: Complex Care  Referral from Primary Care Provider: No  Suggested Interventions and Community Resources  Medi Set or Pill Pack: Completed (Comment: 2/17 using pill box.)  Pharmacist: Completed (Comment: 1/5/2021 [facility] Clinical Rx)  Registered Dietician: Completed (Comment: 1/5/2021 [facility] Dietician)  Social Work: Completed (Comment: 1/5/2021 [facility] Social Work for [employer] and long term planning.)  Other Services: Declined (Comment: 1/5/2021 Declined ACP Referral)  Zone Management Tools:  In Process (Comment: 1/5/2021 Diabetes and COPD Zones)  Other Services or Interventions: 1/5/2021 instructed on same day, next day, and Walk-In Care. Goals Addressed                 This Visit's Progress     Conditions and Symptoms   Improving     I will schedule office visits, as directed by my provider. I will keep my appointment or reschedule if I have to cancel. I will notify my provider of any barriers to my plan of care. I will follow my Zone Management tool to seek urgent or emergent care. I will notify my provider of any symptoms that indicate a worsening of my condition. COPD- use nebulizer, avoid inhaled irritants, call MD when in yellow zone  Diabetes-modify diet- decrease blood sugars, increase exercise, log blood sugars  2/17  New referrals to endo and pulm  3/9/21New referral to pain management and [facility]  4/12 nephrology referral, ct chest PFT and sleep study ordered  6/10 not following up with referrals, danny, nephrology, psych- needs  a lot of reinforcement  6/24 needs support and encouragement to schedule follow ups  7/9 [facility] involved, doing better with her follow ups    Barriers: lack of education  Plan for overcoming my barriers: Care Coordination, [facility] Social Work, Clinical Rx, and [facility] Dietician  Confidence: 9/10  Anticipated Goal Completion Date: 5/5/21              Prior to Admission medications    Medication Sig Start Date End Date Taking? Authorizing Provider   ALPRAZolam (XANAX) 0.25 MG tablet Take 0.25 mg by mouth nightly as needed for Anxiety.     Historical Provider, MD   levothyroxine (SYNTHROID) 125 MCG tablet Take 1 tablet by mouth Daily 7/8/21   Vlad Oates MD   Insulin Degludec (TRESIBA FLEXTOUCH) 100 UNIT/ML SOPN INJECT 60 UNITS UNDER THE SKIN NIGHTLY 7/8/21   Vlad Oates MD   insulin lispro, 1 Unit Dial, (HUMALOG KWIKPEN) 100 UNIT/ML SOPN 14 units at each meals hold if glucose less than 150 7/8/21   Vlad Oates MD   butalbital-acetaminophen-caffeine (FIORICET, ESGIC) -40 MG per tablet Take 1 tablet by mouth every 6 hours as needed for Headaches 7/7/21   Lilibeth [place]end, APRN - CNP   spironolactone (ALDACTONE) 50 MG tablet TAKE 1 TABLET BY MOUTH DAILY 7/6/21   Kelle Jacinto MD   HYDROcodone-acetaminophen (NORCO) 5-325 MG per tablet Take 1 tablet by mouth every 6 hours as needed for Pain. Patient not taking: Reported on 7/8/2021    Historical Provider, MD   venlafaxine (EFFEXOR XR) 37.5 MG extended release capsule Take 1 capsule by mouth daily 6/25/21   SUDEEP Agrawal CNP   dicyclomine (BENTYL) 10 MG capsule Take 1 capsule by mouth every 6 hours as needed (cramps) 6/25/21   Lilibeth [place] BlendSUDEEP - CNP   ondansetron (ZOFRAN) 4 MG tablet Take 1 tablet by mouth every 8 hours as needed for Nausea 6/23/21   Cathy West PA-C   buPROPion (WELLBUTRIN XL) 150 MG extended release tablet TAKE 1 TABLET BY MOUTH EVERY MORNING 6/8/21   Lilibeth [place] BlendSUDEEP - CNP   fluticasone (FLONASE) 50 MCG/ACT nasal spray SHAKE LIQUID AND USE 1 SPRAY IN EACH NOSTRIL DAILY 5/18/21   Lilibeth [place] BlendSUDEEP - CNP   pregabalin (LYRICA) 150 MG capsule Take 1 capsule by mouth 3 times daily for 60 days.  5/17/21 7/16/21  Lilibeth [place] BlendSUDEEP - VOLODYMYR   budesonide-formoterol (SYMBICORT) 160-4.5 MCG/ACT AERO Inhale 2 puffs into the lungs 2 times daily 4/14/21   Leonie Sage MD   tiotropium (SPIRIVA RESPIMAT) 2.5 MCG/ACT AERS inhaler Inhale 2 puffs into the lungs daily 4/14/21   Leonie Sage MD   busPIRone (BUSPAR) 15 MG tablet TAKE 1 TABLET BY MOUTH THREE TIMES DAILY 3/29/21   Gildardo Goncalves, SUDEEP Tristan CNP   metoclopramide (REGLAN) 10 MG tablet Take 1 tablet by mouth 2 times daily as needed (Headache) 3/21/21   Freda Cornejo MD   acetaminophen (TYLENOL) 500 MG tablet Take 1 tablet by mouth 4 times daily as needed for Pain 3/21/21   Freda Cornejo MD   B-D ULTRAFINE III SHORT PEN 31G X 8 MM MISC USE THREE TIMES DAILY 3/15/21   SUDEEP Agrawal CNP   cetirizine (ZYRTEC) 10 MG tablet TAKE 1 TABLET BY MOUTH EVERY NIGHT AT BEDTIME AS NEEDED FOR ALLERGIES 2/25/21 SUDEEP Thompson CNP   OneTouch Delica Lancets 32U MISC qid 2/23/21   Vlad Oates MD   blood glucose test strips (ONETOUCH VERIO) strip 1 each by In Vitro route daily As needed. 2/23/21   Vlad Oates MD   Blood Glucose Monitoring Suppl (Adrienne Feliciano) w/Device KIT As  Directed 2/23/21   Vlad Oates MD   Insulin Pen Needle (NOVOFINE) 32G X 6 MM MISC qid 2/23/21   Vlad Oates MD   baclofen (LIORESAL) 10 MG tablet Take 1 tablet by mouth 3 times daily 2/19/21   SUDEEP Thompson CNP   tiZANidine (ZANAFLEX) 2 MG tablet Take 1 tablet by mouth 3 times daily as needed (back pain/ spasm) 2/6/21   Nahed Rodriguez PA-C   magnesium oxide (MAG-OX) 400 (241.3 Mg) MG TABS tablet TAKE 1/2 TABLET BY MOUTH DAILY 11/2/20   SUDEEP Dominguez CNP   albuterol sulfate HFA (PROVENTIL HFA) 108 (90 Base) MCG/ACT inhaler Inhale 2 puffs into the lungs every 6 hours as needed for Wheezing 9/9/20   SUDEEP Dominguez CNP   SUMAtriptan (IMITREX) 25 MG tablet TAKE 1 TABLET BY MOUTH 1 TIME AS NEEDED FOR MIGRAINE 5/1/20   SUDEEP Dominguez CNP   blood glucose test strips (EXACTECH TEST) strip 1 each by In Vitro route 3 times daily (Accu-check test strips) As needed.  DX:E11.65 12/2/19   SUDEEP Dominguez CNP   ONE TOUCH ULTRASOFT LANCETS MISC TEST 3 TIMES DAILY AS NEEDED 12/2/19   SUDEEP Dominguez CNP   albuterol (PROVENTIL) (2.5 MG/3ML) 0.083% nebulizer solution Take 3 mLs by nebulization every 4 hours as needed for Wheezing 11/5/19   Maria M Ni,    atorvastatin (LIPITOR) 40 MG tablet Take 1 tablet by mouth nightly 9/11/19   Rolando Cleary DO   Insulin Syringe-Needle U-100 30G X 1/2\" 1 ML MISC 1 each by Does not apply route daily 11/16/18   Krystle Cuellar, DO   blood glucose test strips (ONETOUCH VERIO) strip TEST 3 TIMES DAILY AS NEEDED 8/16/18   Krystle Cuellar, DO   Blood Glucose Monitoring Suppl (ONETOUCH VERIO) w/Device KIT TEST 3 TIMES DAILY AS NEEDED 8/16/18   Krystle Cuellar, DO Future Appointments   Date Time Provider Valerie Alea   7/26/2021  4:30 PM Mikayla Thorne, [address]   10/7/2021  4:00 PM Christopher Pearce  [address]   11/5/2021  9:15 AM Elena Edwards MD [facility]

## 2021-07-12 ENCOUNTER — CARE COORDINATION (OUTPATIENT)
Dept: CARE COORDINATION | Age: 51
End: 2021-07-12

## 2021-07-12 NOTE — CARE COORDINATION
Contacted Dina Shipman and left voicemail regarding Dietitian referral. Left call back number and will follow up as appropriate.          [facility], [address] [place] [address]

## 2021-07-13 ENCOUNTER — CARE COORDINATION (OUTPATIENT)
Dept: CARE COORDINATION | Age: 51
End: 2021-07-13

## 2021-07-13 NOTE — CARE COORDINATION
Telephone call with patient. She stated that [facility] has been to her home. No other needs or concerns were voiced at time of phone call. Discussed plan to discharge from [address] [place] [address]  and patient is in agreement. Confirmed that patient has this writer's phone number for future reference as necessary.

## 2021-07-15 ENCOUNTER — CARE COORDINATION (OUTPATIENT)
Dept: CARE COORDINATION | Age: 51
End: 2021-07-15

## 2021-07-15 NOTE — CARE COORDINATION
Ambulatory Care Coordination Note  7/15/2021  CM Risk Score: 11  Charlson 10 Year Mortality Risk Score: 100%     ACC: Kirit Pham RN    Summary Note: I called Martha Cano to graduate her from care coordination, she has follow ups scheduled and has had education, advised she call dietician back to discuss diabetic diet review, reminded to schedule sleep study and keep calender with all her scheduled  follow ups. Martha Cano has not met all her goals,but she has worked hard on following up with her clinicians , taking her medications as ordered, and has taken initiative in making healthier choices. Advised if she needs dejan further assistance with coordination of care, she should let her PCP know so they can make a new referral.      Care Coordination Interventions    Program Enrollment: Complex Care  Referral from Primary Care Provider: No  Suggested Interventions and Community Resources  Medi Set or Pill Pack: Completed (Comment: 2/17 using pill box.)  Pharmacist: Completed (Comment: 1/5/2021 [facility] Clinical Rx)  Registered Dietician: Completed (Comment: 1/5/2021 [address] [place] [address])  Social Work: Completed (Comment: 1/5/2021 [facility] Social Work for [employer] and long term planning.)  Other Services: Declined (Comment: 1/5/2021 Declined ACP Referral)  Zone Management Tools: In Process (Comment: 1/5/2021 Diabetes and COPD Zones)  Other Services or Interventions: 1/5/2021 instructed on same day, next day, and Walk-In Care. Goals Addressed                 This Visit's Progress     COMPLETED: Conditions and Symptoms   Improving     I will schedule office visits, as directed by my provider. I will keep my appointment or reschedule if I have to cancel. I will notify my provider of any barriers to my plan of care. I will follow my Zone Management tool to seek urgent or emergent care. I will notify my provider of any symptoms that indicate a worsening of my condition.     COPD- use nebulizer, avoid inhaled irritants, call MD when in yellow zone  Diabetes-modify diet- decrease blood sugars, increase exercise, log blood sugars  2/17  New referrals to endo and pulm  3/9/21New referral to pain management and [facility]  4/12 nephrology referral, ct chest PFT and sleep study ordered  6/10 not following up with referrals, danny, nephrology, psych- needs  a lot of reinforcement  6/24 needs support and encouragement to schedule follow ups  7/9 [facility] involved, doing better with her follow ups  7/15 attending follow ups as scheduled, reminded of symptoms to monitor and need to call md promptly, her aic remains elevated, she is agreeable to review dietary recommendations and contact clinician if BG numbers remain high      Barriers: lack of education  Plan for overcoming my barriers: Care Coordination, [facility] Social Work, Clinical Rx, and [facility] Dietician  Confidence: 9/10  Anticipated Goal Completion Date: 5/5/21              Prior to Admission medications    Medication Sig Start Date End Date Taking? Authorizing Provider   ALPRAZolam (XANAX) 0.25 MG tablet Take 0.25 mg by mouth nightly as needed for Anxiety. Historical Provider, MD   levothyroxine (SYNTHROID) 125 MCG tablet Take 1 tablet by mouth Daily 7/8/21   Joanna Campos MD   Insulin Degludec (TRESIBA FLEXTOUCH) 100 UNIT/ML SOPN INJECT 60 UNITS UNDER THE SKIN NIGHTLY 7/8/21   Joanna Campos MD   insulin lispro, 1 Unit Dial, (HUMALOG KWIKPEN) 100 UNIT/ML SOPN 14 units at each meals hold if glucose less than 150 7/8/21   Joanna Campos MD   butalbital-acetaminophen-caffeine (FIORICET, ESGIC) -40 MG per tablet Take 1 tablet by mouth every 6 hours as needed for Headaches 7/7/21   Gail Jessica APRN - CNP   spironolactone (ALDACTONE) 50 MG tablet TAKE 1 TABLET BY MOUTH DAILY 7/6/21   Joanna Campos MD   HYDROcodone-acetaminophen (NORCO) 5-325 MG per tablet Take 1 tablet by mouth every 6 hours as needed for Pain.   Patient not taking: Reported on 7/8/2021    Historical Provider, MD   venlafaxine (EFFEXOR XR) 37.5 MG extended release capsule Take 1 capsule by mouth daily 6/25/21   SUDEEP Lou CNP   dicyclomine (BENTYL) 10 MG capsule Take 1 capsule by mouth every 6 hours as needed (cramps) 6/25/21   SUDEEP Back CNP   ondansetron (ZOFRAN) 4 MG tablet Take 1 tablet by mouth every 8 hours as needed for Nausea 6/23/21   Leonor Licona PA-C   buPROPion (WELLBUTRIN XL) 150 MG extended release tablet TAKE 1 TABLET BY MOUTH EVERY MORNING 6/8/21   SUDEEP Back CNP   fluticasone (FLONASE) 50 MCG/ACT nasal spray SHAKE LIQUID AND USE 1 SPRAY IN EACH NOSTRIL DAILY 5/18/21   SUDEEP Back CNP   pregabalin (LYRICA) 150 MG capsule Take 1 capsule by mouth 3 times daily for 60 days. 5/17/21 7/16/21  SUDEEP Back CNP   budesonide-formoterol (SYMBICORT) 160-4.5 MCG/ACT AERO Inhale 2 puffs into the lungs 2 times daily 4/14/21   Omero Marley MD   tiotropium (SPIRIVA RESPIMAT) 2.5 MCG/ACT AERS inhaler Inhale 2 puffs into the lungs daily 4/14/21   Omero Marley MD   busPIRone (BUSPAR) 15 MG tablet TAKE 1 TABLET BY MOUTH THREE TIMES DAILY 3/29/21   SUDEEP Hyatt CNP   metoclopramide (REGLAN) 10 MG tablet Take 1 tablet by mouth 2 times daily as needed (Headache) 3/21/21   Taqueria Arreaga MD   acetaminophen (TYLENOL) 500 MG tablet Take 1 tablet by mouth 4 times daily as needed for Pain 3/21/21   Taqueria Arreaga MD   B-D ULTRAFINE III SHORT PEN 31G X 8 MM MISC USE THREE TIMES DAILY 3/15/21   SUDEEP Lou CNP   cetirizine (ZYRTEC) 10 MG tablet TAKE 1 TABLET BY MOUTH EVERY NIGHT AT BEDTIME AS NEEDED FOR ALLERGIES 2/25/21   Lilibeth Anthony Slate, APRN - CNP   OneTouch Delica Lancets 12I MISC qid 2/23/21   Joanna Campos MD   blood glucose test strips (ONETOUCH VERIO) strip 1 each by In Vitro route daily As needed.  2/23/21   Joanna Campos MD   Blood Glucose Monitoring Suppl (Dilia Shaikh) w/Device KIT As  Directed 2/23/21   Joanna Campos MD Insulin Pen Needle (NOVOFINE) 32G X 6 MM MISC qid 2/23/21   Andree Barrera MD   baclofen (LIORESAL) 10 MG tablet Take 1 tablet by mouth 3 times daily 2/19/21   SUDEEP Casey CNP   tiZANidine (ZANAFLEX) 2 MG tablet Take 1 tablet by mouth 3 times daily as needed (back pain/ spasm) 2/6/21   Britta Mack PA-C   magnesium oxide (MAG-OX) 400 (241.3 Mg) MG TABS tablet TAKE 1/2 TABLET BY MOUTH DAILY 11/2/20   Lilibeth BurgerSUDEEP Sen CNP   albuterol sulfate HFA (PROVENTIL HFA) 108 (90 Base) MCG/ACT inhaler Inhale 2 puffs into the lungs every 6 hours as needed for Wheezing 9/9/20   SUDEEP Dangelo CNP   SUMAtriptan (IMITREX) 25 MG tablet TAKE 1 TABLET BY MOUTH 1 TIME AS NEEDED FOR MIGRAINE 5/1/20   SUDEEP Dangelo CNP   blood glucose test strips (EXACTECH TEST) strip 1 each by In Vitro route 3 times daily (Accu-check test strips) As needed.  DX:E11.65 12/2/19   Lilibeth SUDEEP Calderon CNP   ONE TOUCH ULTRASOFT LANCETS MISC TEST 3 TIMES DAILY AS NEEDED 12/2/19   Lilibethsmitha BurgerSUDEEP Sen CNP   albuterol (PROVENTIL) (2.5 MG/3ML) 0.083% nebulizer solution Take 3 mLs by nebulization every 4 hours as needed for Wheezing 11/5/19   Maria M Servetas, DO   atorvastatin (LIPITOR) 40 MG tablet Take 1 tablet by mouth nightly 9/11/19   Elana Mercury, DO   Insulin Syringe-Needle U-100 30G X 1/2\" 1 ML MISC 1 each by Does not apply route daily 11/16/18   Sina Resendiz, DO   blood glucose test strips (ONETOUCH VERIO) strip TEST 3 TIMES DAILY AS NEEDED 8/16/18   Sina Resendiz, DO   Blood Glucose Monitoring Suppl (ONETOUCH VERIO) w/Device KIT TEST 3 TIMES DAILY AS NEEDED 8/16/18   Sina Astrid, DO       Future Appointments   Date Time Provider Valerie Cosby   7/26/2021  4:30 PM SUDEEP Casey - CNP [facility]   10/7/2021  4:00 PM Christopher Swanson  [address]   11/5/2021  9:15 AM Sarah Richardson MD [facility]

## 2021-07-15 NOTE — LETTER
7/15/2021    [address] [place] [id number]      Kari Gray     Congratulations on the progress you have made improving and taking charge of your health! I know you will continue to use the knowledge and tools you have gained to continue down a healthy path. Again, congratulations and please know if there are any changes or you have a need for coordination of care in the future, you may always contact your PCP office and request referral to a  to help you.         In good health,       Liz Stanley RN

## 2021-07-16 ENCOUNTER — HOSPITAL ENCOUNTER (EMERGENCY)
Age: 51
Discharge: HOME | End: 2021-07-16
Attending: EMERGENCY MEDICINE
Payer: MEDICAID

## 2021-07-16 VITALS
SYSTOLIC BLOOD PRESSURE: 156 MMHG | DIASTOLIC BLOOD PRESSURE: 83 MMHG | OXYGEN SATURATION: 97 % | HEIGHT: 63 IN | TEMPERATURE: 98 F | HEART RATE: 65 BPM | BODY MASS INDEX: 45.18 KG/M2 | RESPIRATION RATE: 18 BRPM | WEIGHT: 255 LBS

## 2021-07-16 DIAGNOSIS — R07.9 CHEST PAIN, UNSPECIFIED TYPE: Primary | ICD-10-CM

## 2021-07-16 LAB
ALBUMIN SERPL-MCNC: 3.4 G/DL (ref 3.5–4.6)
ALP BLD-CCNC: 156 U/L (ref 40–130)
ALT SERPL-CCNC: 16 U/L (ref 0–33)
ANION GAP SERPL CALCULATED.3IONS-SCNC: 9 MEQ/L (ref 9–15)
AST SERPL-CCNC: 21 U/L (ref 0–35)
BASOPHILS ABSOLUTE: 0.1 K/UL (ref 0–0.2)
BASOPHILS RELATIVE PERCENT: 1.2 %
BILIRUB SERPL-MCNC: <0.2 MG/DL (ref 0.2–0.7)
BUN BLDV-MCNC: 11 MG/DL (ref 6–20)
CALCIUM SERPL-MCNC: 9 MG/DL (ref 8.5–9.9)
CHLORIDE BLD-SCNC: 104 MEQ/L (ref 95–107)
CO2: 22 MEQ/L (ref 20–31)
CREAT SERPL-MCNC: 1.19 MG/DL (ref 0.5–0.9)
EKG ATRIAL RATE: 65 BPM
EKG P AXIS: 33 DEGREES
EKG P-R INTERVAL: 154 MS
EKG Q-T INTERVAL: 400 MS
EKG QRS DURATION: 84 MS
EKG QTC CALCULATION (BAZETT): 416 MS
EKG R AXIS: 9 DEGREES
EKG T AXIS: 4 DEGREES
EKG VENTRICULAR RATE: 65 BPM
EOSINOPHILS ABSOLUTE: 0.3 K/UL (ref 0–0.7)
EOSINOPHILS RELATIVE PERCENT: 3.4 %
GFR AFRICAN AMERICAN: 58
GFR NON-AFRICAN AMERICAN: 48
GLOBULIN: 3 G/DL (ref 2.3–3.5)
GLUCOSE BLD-MCNC: 259 MG/DL (ref 70–99)
HCT VFR BLD CALC: 42.9 % (ref 37–47)
HEMOGLOBIN: 14.2 G/DL (ref 12–16)
LYMPHOCYTES ABSOLUTE: 2.6 K/UL (ref 1–4.8)
LYMPHOCYTES RELATIVE PERCENT: 30.4 %
MCH RBC QN AUTO: 31.5 PG (ref 27–31.3)
MCHC RBC AUTO-ENTMCNC: 33.1 % (ref 33–37)
MCV RBC AUTO: 95.2 FL (ref 82–100)
MONOCYTES ABSOLUTE: 0.4 K/UL (ref 0.2–0.8)
MONOCYTES RELATIVE PERCENT: 4.5 %
NEUTROPHILS ABSOLUTE: 5.2 K/UL (ref 1.4–6.5)
NEUTROPHILS RELATIVE PERCENT: 60.5 %
PDW BLD-RTO: 13.3 % (ref 11.5–14.5)
PLATELET # BLD: 168 K/UL (ref 130–400)
PLATELET SLIDE REVIEW: ADEQUATE
POTASSIUM SERPL-SCNC: 4.1 MEQ/L (ref 3.4–4.9)
RBC # BLD: 4.51 M/UL (ref 4.2–5.4)
SLIDE REVIEW: ABNORMAL
SODIUM BLD-SCNC: 135 MEQ/L (ref 135–144)
TOTAL PROTEIN: 6.4 G/DL (ref 6.3–8)
TROPONIN: <0.01 NG/ML (ref 0–0.01)
WBC # BLD: 8.6 K/UL (ref 4.8–10.8)

## 2021-07-16 PROCEDURE — 84484 ASSAY OF TROPONIN QUANT: CPT

## 2021-07-16 PROCEDURE — 96375 TX/PRO/DX INJ NEW DRUG ADDON: CPT

## 2021-07-16 PROCEDURE — 36415 COLL VENOUS BLD VENIPUNCTURE: CPT

## 2021-07-16 PROCEDURE — 6360000002 HC RX W HCPCS: Performed by: EMERGENCY MEDICINE

## 2021-07-16 PROCEDURE — 85025 COMPLETE CBC W/AUTO DIFF WBC: CPT

## 2021-07-16 PROCEDURE — 93005 ELECTROCARDIOGRAM TRACING: CPT | Performed by: EMERGENCY MEDICINE

## 2021-07-16 PROCEDURE — 93010 ELECTROCARDIOGRAM REPORT: CPT | Performed by: INTERNAL MEDICINE

## 2021-07-16 PROCEDURE — 96374 THER/PROPH/DIAG INJ IV PUSH: CPT

## 2021-07-16 PROCEDURE — 80053 COMPREHEN METABOLIC PANEL: CPT

## 2021-07-16 PROCEDURE — 99282 EMERGENCY DEPT VISIT SF MDM: CPT

## 2021-07-16 RX ORDER — LORAZEPAM 2 MG/ML
1 INJECTION INTRAMUSCULAR ONCE
Status: COMPLETED | OUTPATIENT
Start: 2021-07-16 | End: 2021-07-16

## 2021-07-16 RX ORDER — ONDANSETRON 2 MG/ML
4 INJECTION INTRAMUSCULAR; INTRAVENOUS ONCE
Status: COMPLETED | OUTPATIENT
Start: 2021-07-16 | End: 2021-07-16

## 2021-07-16 RX ADMIN — ONDANSETRON 4 MG: 2 INJECTION INTRAMUSCULAR; INTRAVENOUS at 03:51

## 2021-07-16 RX ADMIN — Medication 1 MG: at 03:50

## 2021-07-16 RX ADMIN — LORAZEPAM 1 MG: 2 INJECTION INTRAMUSCULAR; INTRAVENOUS at 05:03

## 2021-07-16 ASSESSMENT — PAIN DESCRIPTION - PAIN TYPE: TYPE: ACUTE PAIN

## 2021-07-16 ASSESSMENT — PAIN SCALES - GENERAL
PAINLEVEL_OUTOF10: 9
PAINLEVEL_OUTOF10: 9

## 2021-07-16 ASSESSMENT — ENCOUNTER SYMPTOMS
EYE DISCHARGE: 0
ABDOMINAL DISTENTION: 0
COUGH: 0
VOMITING: 0
NAUSEA: 0
WHEEZING: 0
SHORTNESS OF BREATH: 0
SORE THROAT: 0
CHEST TIGHTNESS: 0
ABDOMINAL PAIN: 0
PHOTOPHOBIA: 0

## 2021-07-16 ASSESSMENT — PAIN DESCRIPTION - FREQUENCY: FREQUENCY: CONTINUOUS

## 2021-07-16 ASSESSMENT — PAIN DESCRIPTION - LOCATION: LOCATION: CHEST

## 2021-07-16 ASSESSMENT — PAIN DESCRIPTION - DESCRIPTORS: DESCRIPTORS: PRESSURE

## 2021-07-16 ASSESSMENT — PAIN DESCRIPTION - ORIENTATION: ORIENTATION: MID

## 2021-07-16 NOTE — ED PROVIDER NOTES
[address] [facility] ED  eMERGENCY dEPARTMENT eNCOUnter      Pt Name: Yan Mora  MRN: [id number]  [place] 1970  Date of evaluation: 7/16/2021  Provider: Mary Al MD    CHIEF COMPLAINT       Chief Complaint   Patient presents with    Chest Pain         HISTORY OF PRESENT ILLNESS   (Location/Symptom, Timing/Onset,Context/Setting, Quality, Duration, Modifying Factors, Severity)  Note limiting factors. Yan Mora is a 49 y.o. female who presents to the emergency department for evaluation of chest pain. Patient states chest pain started about 20 minutes prior to arrival while she was getting ready for bed. Nonexertional.  Midsternal pain radiates through to the back. She has had this on several other occasions been evaluated here in this emergency department for similar pain. No associated shortness of breath. No nausea vomiting. Last cardiac stress test was September 2020 and negative. She denies shortness of breath, diaphoresis nausea or vomiting. HPI    NursingNotes were reviewed. REVIEW OF SYSTEMS    (2-9 systems for level 4, 10 or more for level 5)     Review of Systems   Constitutional: Negative for chills and diaphoresis. HENT: Negative for congestion, ear pain, mouth sores and sore throat. Eyes: Negative for photophobia and discharge. Respiratory: Negative for cough, chest tightness, shortness of breath and wheezing. Cardiovascular: Positive for chest pain. Negative for palpitations. Gastrointestinal: Negative for abdominal distention, abdominal pain, nausea and vomiting. Endocrine: Negative for cold intolerance. Genitourinary: Negative for difficulty urinating. Musculoskeletal: Negative for arthralgias. Skin: Negative for pallor and rash. Allergic/Immunologic: Negative for immunocompromised state. Neurological: Negative for dizziness and syncope. Hematological: Negative for adenopathy.    Psychiatric/Behavioral: Negative for agitation and hallucinations. All other systems reviewed and are negative. Except as noted above the remainder of the review of systems was reviewed and negative. PAST MEDICAL HISTORY     Past Medical History:   Diagnosis Date    Anxiety     Arthritis     Asthma     Bronchopneumonia     Cancer ([facility] [address]     renal    Cerebral artery occlusion with cerebral infarction (HCC)     Chronic bilateral low back pain with sciatica     Chronic kidney disease     Chronic obstructive lung disease ([facility] [address] 7/26/2019    Depression     Fibromyalgia     Hypertension     Insulin dependent type 2 diabetes mellitus, uncontrolled ([facility] [address] 8/3/2018    Localized enlarged lymph nodes 10/26/2018    Mixed headache     Pure hyperglyceridemia 5/19/2017    Sarcoidosis     Sleep apnea     does not wear cpap    Thyroid goiter          SURGICALHISTORY       Past Surgical History:   Procedure Laterality Date    BRONCHOSCOPY  10/26/2018    DR. STEARNS    KIDNEY REMOVAL Right 08/2016    KIDNEY REMOVAL Right 2016    LUNG BIOPSY Right 10/2018    THYROID LOBECTOMY Right 6/13/14    THYROIDECTOMY  02/21/2019    DR. MAGDALENO    THYROIDECTOMY  2018    URETER STENT PLACEMENT Left 08/2016         CURRENT MEDICATIONS       Previous Medications    ACETAMINOPHEN (TYLENOL) 500 MG TABLET    Take 1 tablet by mouth 4 times daily as needed for Pain    ALBUTEROL (PROVENTIL) (2.5 MG/3ML) 0.083% NEBULIZER SOLUTION    Take 3 mLs by nebulization every 4 hours as needed for Wheezing    ALBUTEROL SULFATE HFA (PROVENTIL HFA) 108 (90 BASE) MCG/ACT INHALER    Inhale 2 puffs into the lungs every 6 hours as needed for Wheezing    ALPRAZOLAM (XANAX) 0.25 MG TABLET    Take 0.25 mg by mouth nightly as needed for Anxiety.     ATORVASTATIN (LIPITOR) 40 MG TABLET    Take 1 tablet by mouth nightly    B-D ULTRAFINE III SHORT PEN 31G X 8 MM MISC    USE THREE TIMES DAILY    BACLOFEN (LIORESAL) 10 MG TABLET    Take 1 tablet by mouth 3 times daily    BLOOD GLUCOSE MONITORING SUPPL (ONETOUCH VERIO) W/DEVICE KIT    TEST 3 TIMES DAILY AS NEEDED    BLOOD GLUCOSE MONITORING SUPPL (ONETOUCH VERIO) W/DEVICE KIT    As  Directed    BLOOD GLUCOSE TEST STRIPS (EXACTECH TEST) STRIP    1 each by In Vitro route 3 times daily (Accu-check test strips) As needed. DX:E11.65    BLOOD GLUCOSE TEST STRIPS (ONETOUCH VERIO) STRIP    TEST 3 TIMES DAILY AS NEEDED    BLOOD GLUCOSE TEST STRIPS (ONETOUCH VERIO) STRIP    1 each by In Vitro route daily As needed. BUDESONIDE-FORMOTEROL (SYMBICORT) 160-4.5 MCG/ACT AERO    Inhale 2 puffs into the lungs 2 times daily    BUPROPION (WELLBUTRIN XL) 150 MG EXTENDED RELEASE TABLET    TAKE 1 TABLET BY MOUTH EVERY MORNING    BUSPIRONE (BUSPAR) 15 MG TABLET    TAKE 1 TABLET BY MOUTH THREE TIMES DAILY    BUTALBITAL-ACETAMINOPHEN-CAFFEINE (FIORICET, ESGIC) -40 MG PER TABLET    Take 1 tablet by mouth every 6 hours as needed for Headaches    CETIRIZINE (ZYRTEC) 10 MG TABLET    TAKE 1 TABLET BY MOUTH EVERY NIGHT AT BEDTIME AS NEEDED FOR ALLERGIES    DICYCLOMINE (BENTYL) 10 MG CAPSULE    Take 1 capsule by mouth every 6 hours as needed (cramps)    FLUTICASONE (FLONASE) 50 MCG/ACT NASAL SPRAY    SHAKE LIQUID AND USE 1 SPRAY IN EACH NOSTRIL DAILY    HYDROCODONE-ACETAMINOPHEN (NORCO) 5-325 MG PER TABLET    Take 1 tablet by mouth every 6 hours as needed for Pain.     INSULIN DEGLUDEC (TRESIBA FLEXTOUCH) 100 UNIT/ML SOPN    INJECT 60 UNITS UNDER THE SKIN NIGHTLY    INSULIN LISPRO, 1 UNIT DIAL, (HUMALOG KWIKPEN) 100 UNIT/ML SOPN    14 units at each meals hold if glucose less than 150    INSULIN PEN NEEDLE (NOVOFINE) 32G X 6 MM MISC    qid    INSULIN SYRINGE-NEEDLE U-100 30G X 1/2\" 1 ML MISC    1 each by Does not apply route daily    LEVOTHYROXINE (SYNTHROID) 125 MCG TABLET    Take 1 tablet by mouth Daily    MAGNESIUM OXIDE (MAG-OX) 400 (241.3 MG) MG TABS TABLET    TAKE 1/2 TABLET BY MOUTH DAILY    METOCLOPRAMIDE (REGLAN) 10 MG TABLET    Take 1 tablet by mouth 2 times daily as needed (Headache)    ONDANSETRON (ZOFRAN) 4 MG TABLET    Take 1 tablet by mouth every 8 hours as needed for Nausea    ONE TOUCH ULTRASOFT LANCETS MISC    TEST 3 TIMES DAILY AS NEEDED    ONETOUCH DELICA LANCETS 54Q MISC    qid    PREGABALIN (LYRICA) 150 MG CAPSULE    Take 1 capsule by mouth 3 times daily for 60 days.     SPIRONOLACTONE (ALDACTONE) 50 MG TABLET    TAKE 1 TABLET BY MOUTH DAILY    SUMATRIPTAN (IMITREX) 25 MG TABLET    TAKE 1 TABLET BY MOUTH 1 TIME AS NEEDED FOR MIGRAINE    TIOTROPIUM (SPIRIVA RESPIMAT) 2.5 MCG/ACT AERS INHALER    Inhale 2 puffs into the lungs daily    TIZANIDINE (ZANAFLEX) 2 MG TABLET    Take 1 tablet by mouth 3 times daily as needed (back pain/ spasm)    VENLAFAXINE (EFFEXOR XR) 37.5 MG EXTENDED RELEASE CAPSULE    Take 1 capsule by mouth daily       ALLERGIES     Shellfish-derived products, Ibuprofen, Ketorolac, Morphine, Other, Penicillins, Propoxyphene n-acetaminophen, and Toradol [ketorolac tromethamine]    FAMILY HISTORY       Family History   Problem Relation Age of Onset    Cancer Father     Diabetes Father     Allergy (Severe) Father     Heart Attack Father     Prostate Cancer Father     High Blood Pressure Mother     Diabetes Mother     Arthritis Mother     High Cholesterol Mother     Vision Loss Mother     Alcohol Abuse Neg Hx     Anemia Neg Hx     Arrhythmia Neg Hx     Asthma Neg Hx     Atrial Fibrillation Neg Hx     Birth Defects Neg Hx     Breast Cancer Neg Hx     Coronary Art Dis Neg Hx     Colon Cancer Neg Hx     Depression Neg Hx     Early Death Neg Hx     Hearing Loss Neg Hx     Heart Disease Neg Hx     Learning Disabilities Neg Hx     Kidney Disease Neg Hx     Mental Illness Neg Hx     Mental Retardation Neg Hx     Miscarriages / Stillbirths Neg Hx     Obesity Neg Hx     Osteoporosis Neg Hx     Stroke Neg Hx     Substance Abuse Neg Hx           SOCIAL HISTORY       Social History     Socioeconomic History    Marital status: Legally  Spouse name: Not on file    Number of children: Not on file    Years of education: 15    Highest education level: High school graduate   Occupational History    Not on file   Tobacco Use    Smoking status: Former Smoker     Packs/day: 0.50     Years: 15.00     Pack years: 7.50     Types: Cigarettes     Start date: 2014     Quit date: 2015     Years since quittin.4    Smokeless tobacco: Former User     Quit date: 3/23/2016   Vaping Use    Vaping Use: Never used   Substance and Sexual Activity    Alcohol use: Not Currently     Alcohol/week: 0.0 standard drinks     Comment: occasionally    Drug use: Yes     Frequency: 5.0 times per week     Types: Marijuana    Sexual activity: Yes     Partners: Male   Other Topics Concern    Not on file   Social History Narrative    Not on file     Social Determinants of Health     Financial Resource Strain: Medium Risk    Difficulty of Paying Living Expenses: Somewhat hard   Food Insecurity: Food Insecurity Present    Worried About Running Out of Food in the Last Year: Sometimes true    Maximiliano of Food in the Last Year: Sometimes true   Transportation Needs: No Transportation Needs    Lack of Transportation (Medical): No    Lack of Transportation (Non-Medical): No   Physical Activity: Inactive    Days of Exercise per Week: 0 days    Minutes of Exercise per Session: 0 min   Stress: Stress Concern Present    Feeling of Stress : Very much   Social Connections: Socially Isolated    Frequency of Communication with Friends and Family:  Three times a week    Frequency of Social Gatherings with Friends and Family: Never    Attends [employer] Services: Never    Active Member of Clubs or Organizations: No    Attends Club or Organization Meetings: Never    Marital Status:    Intimate Partner Violence: Not At Risk    Fear of Current or Ex-Partner: No    Emotionally Abused: No    Physically Abused: No    Sexually Abused: No       SCREENINGS @FLOW([id number])@      PHYSICAL EXAM    (up to 7 for level 4, 8 or more for level 5)     ED Triage Vitals [07/16/21 0247]   BP Temp Temp Source Pulse Resp SpO2 Height Weight   (!) 141/83 98 °F (36.7 °C) Temporal 70 18 97 % 5' 3\" (1.6 m) 255 lb (115.7 kg)       Physical Exam  Vitals and nursing note reviewed. Constitutional:       Appearance: She is well-developed. HENT:      Head: Normocephalic. Right Ear: Tympanic membrane normal.      Left Ear: Tympanic membrane normal.      Nose: Nose normal.      Mouth/Throat:      Mouth: Mucous membranes are moist.   Eyes:      Conjunctiva/sclera: Conjunctivae normal.      Pupils: Pupils are equal, round, and reactive to light. Cardiovascular:      Rate and Rhythm: Normal rate and regular rhythm. Heart sounds: Normal heart sounds. Pulmonary:      Effort: Pulmonary effort is normal.      Breath sounds: Normal breath sounds. Abdominal:      General: Bowel sounds are normal.      Palpations: Abdomen is soft. Tenderness: There is no abdominal tenderness. There is no guarding. Musculoskeletal:         General: Normal range of motion. Cervical back: Normal range of motion and neck supple. Skin:     General: Skin is warm and dry. Capillary Refill: Capillary refill takes less than 2 seconds. Neurological:      Mental Status: She is alert and oriented to person, place, and time. Psychiatric:         Mood and Affect: Mood normal.         DIAGNOSTIC RESULTS     EKG: All EKG's are interpreted by the Emergency Department Physician who either signs or Co-signsthis chart in the absence of a cardiologist.    Normal sinus rhythm rate of 65 no acute ST or T wave changes. Normal axis. Normal EKG.     RADIOLOGY:   Non-plain filmimages such as CT, Ultrasound and MRI are read by the radiologist. Plain radiographic images are visualized and preliminarily interpreted by the emergency physician with the below findings:        Interpretation per the Radiologist below, if available at the time ofthis note:    No orders to display         ED BEDSIDE ULTRASOUND:   Performed by ED Physician - none    LABS:  Labs Reviewed   COMPREHENSIVE METABOLIC PANEL - Abnormal; Notable for the following components:       Result Value    Glucose 259 (*)     CREATININE 1.19 (*)     GFR Non- 48.0 (*)     GFR  58.0 (*)     Albumin 3.4 (*)     Alkaline Phosphatase 156 (*)     All other components within normal limits   CBC WITH AUTO DIFFERENTIAL - Abnormal; Notable for the following components:    MCH 31.5 (*)     All other components within normal limits   TROPONIN       All other labs were within normal range or not returned as of this dictation. EMERGENCY DEPARTMENT COURSE and DIFFERENTIAL DIAGNOSIS/MDM:   Vitals:    Vitals:    07/16/21 0247 07/16/21 0345 07/16/21 0400 07/16/21 0445   BP: (!) 141/83 (!) 159/102 (!) 168/91 (!) 144/107   Pulse: 70 72 75 60   Resp: 18 18 18 18   Temp: 98 °F (36.7 °C)      TempSrc: Temporal      SpO2: 97% 97% 96% 97%   Weight: 255 lb (115.7 kg)      Height: 5' 3\" (1.6 m)             MDM during her evaluation here the patient felt anxious at one point and required Ativan. Otherwise her work-up once again is negative. I do not feel that she needs admission with her isolated symptoms that she has had is recurrent. She has had recent CTAs of the chest no history of PE. Patient is discharged home improved. Recommended follow-up with her primary care physician. CONSULTS:  None    PROCEDURES:  Unless otherwise noted below, none     Procedures    FINAL IMPRESSION      1.  Chest pain, unspecified type          DISPOSITION/PLAN   DISPOSITION        PATIENT REFERRED TO:  SUDEEP Donato - CNP  [id number] [place] [phone]    In 3 days        DISCHARGE MEDICATIONS:  New Prescriptions    No medications on file          (Please note that portions of this note were completed with a voice recognition program.  Efforts were made to edit the dictations but occasionally words are mis-transcribed.)    Gal Ybarra MD (electronically signed)  Attending Emergency Physician          Gal Ybarra MD  07/16/21 [id number]

## 2021-07-19 ENCOUNTER — CARE COORDINATION (OUTPATIENT)
Dept: CARE COORDINATION | Age: 51
End: 2021-07-19

## 2021-07-19 NOTE — CARE COORDINATION
Katie Oh  July 19, 2021    Initial Referral Reason: Diabetes    Patient Care Team:  SUDEEP Harris CNP as PCP - General (Nurse Practitioner)  SUDEEP Harris CNP as PCP - [facility]  Tien Russo MD (Urology)  Felecia Ruiz MD as Cardiologist (Cardiology)  Willie Mcneal RD, JEREMIAH as Dietitian (Dietitian)    Past Medical History:    Current Outpatient Medications   Medication Sig Dispense Refill    ALPRAZolam (XANAX) 0.25 MG tablet Take 0.25 mg by mouth nightly as needed for Anxiety.  levothyroxine (SYNTHROID) 125 MCG tablet Take 1 tablet by mouth Daily 30 tablet 06    Insulin Degludec (TRESIBA FLEXTOUCH) 100 UNIT/ML SOPN INJECT 60 UNITS UNDER THE SKIN NIGHTLY 15 mL 3    insulin lispro, 1 Unit Dial, (HUMALOG KWIKPEN) 100 UNIT/ML SOPN 14 units at each meals hold if glucose less than 150 5 pen 3    butalbital-acetaminophen-caffeine (FIORICET, ESGIC) -40 MG per tablet Take 1 tablet by mouth every 6 hours as needed for Headaches 30 tablet 1    spironolactone (ALDACTONE) 50 MG tablet TAKE 1 TABLET BY MOUTH DAILY 30 tablet 3    HYDROcodone-acetaminophen (NORCO) 5-325 MG per tablet Take 1 tablet by mouth every 6 hours as needed for Pain. (Patient not taking: Reported on 7/8/2021)      venlafaxine (EFFEXOR XR) 37.5 MG extended release capsule Take 1 capsule by mouth daily 30 capsule 0    dicyclomine (BENTYL) 10 MG capsule Take 1 capsule by mouth every 6 hours as needed (cramps) 120 capsule 0    ondansetron (ZOFRAN) 4 MG tablet Take 1 tablet by mouth every 8 hours as needed for Nausea 20 tablet 0    buPROPion (WELLBUTRIN XL) 150 MG extended release tablet TAKE 1 TABLET BY MOUTH EVERY MORNING 30 tablet 3    fluticasone (FLONASE) 50 MCG/ACT nasal spray SHAKE LIQUID AND USE 1 SPRAY IN EACH NOSTRIL DAILY 16 g 5    pregabalin (LYRICA) 150 MG capsule Take 1 capsule by mouth 3 times daily for 60 days.  90 capsule 1    budesonide-formoterol (SYMBICORT) 160-4.5 MCG/ACT AERO Inhale 2 puffs into the lungs 2 times daily 1 Inhaler 3    tiotropium (SPIRIVA RESPIMAT) 2.5 MCG/ACT AERS inhaler Inhale 2 puffs into the lungs daily 1 Inhaler 3    busPIRone (BUSPAR) 15 MG tablet TAKE 1 TABLET BY MOUTH THREE TIMES DAILY 90 tablet 5    metoclopramide (REGLAN) 10 MG tablet Take 1 tablet by mouth 2 times daily as needed (Headache) 60 tablet 0    acetaminophen (TYLENOL) 500 MG tablet Take 1 tablet by mouth 4 times daily as needed for Pain 360 tablet 1    B-D ULTRAFINE III SHORT PEN 31G X 8 MM MISC USE THREE TIMES DAILY 100 each 5    cetirizine (ZYRTEC) 10 MG tablet TAKE 1 TABLET BY MOUTH EVERY NIGHT AT BEDTIME AS NEEDED FOR ALLERGIES 30 tablet 5    OneTouch Delica Lancets 03B MISC qid 200 each 3    blood glucose test strips (ONETOUCH VERIO) strip 1 each by In Vitro route daily As needed. 100 each 3    Blood Glucose Monitoring Suppl (ONETOUCH VERIO) w/Device KIT As  Directed 1 kit 00    Insulin Pen Needle (NOVOFINE) 32G X 6 MM MISC qid 300 each 3    baclofen (LIORESAL) 10 MG tablet Take 1 tablet by mouth 3 times daily 60 tablet 0    tiZANidine (ZANAFLEX) 2 MG tablet Take 1 tablet by mouth 3 times daily as needed (back pain/ spasm) 15 tablet 0    magnesium oxide (MAG-OX) 400 (241.3 Mg) MG TABS tablet TAKE 1/2 TABLET BY MOUTH DAILY 30 tablet 5    albuterol sulfate HFA (PROVENTIL HFA) 108 (90 Base) MCG/ACT inhaler Inhale 2 puffs into the lungs every 6 hours as needed for Wheezing 1 Inhaler 3    SUMAtriptan (IMITREX) 25 MG tablet TAKE 1 TABLET BY MOUTH 1 TIME AS NEEDED FOR MIGRAINE 9 tablet 1    blood glucose test strips (EXACTECH TEST) strip 1 each by In Vitro route 3 times daily (Accu-check test strips) As needed.  DX:E11.65 300 strip 5    ONE TOUCH ULTRASOFT LANCETS MISC TEST 3 TIMES DAILY AS NEEDED 300 each 3    albuterol (PROVENTIL) (2.5 MG/3ML) 0.083% nebulizer solution Take 3 mLs by nebulization every 4 hours as needed for Wheezing 120 each 3    atorvastatin (LIPITOR) 40 MG tablet Take 1 tablet by mouth nightly 30 tablet 3    Insulin Syringe-Needle U-100 30G X 1/2\" 1 ML MISC 1 each by Does not apply route daily 100 each 3    blood glucose test strips (ONETOUCH VERIO) strip TEST 3 TIMES DAILY AS NEEDED 300 each 3    Blood Glucose Monitoring Suppl (ONETOUCH VERIO) w/Device KIT TEST 3 TIMES DAILY AS NEEDED 1 kit 0     No current facility-administered medications for this visit. Biochemical Data, Medical Tests and Procedures:    Lab Results   Component Value Date    LABA1C 9.4 06/25/2021     No results found for: EAG    Lab Results   Component Value Date    CHOL 244 (H) 09/11/2019    CHOL 230 (H) 06/24/2018    CHOL 226 (H) 05/19/2017     Lab Results   Component Value Date    TRIG 184 (H) 09/11/2019    TRIG 212 (H) 06/24/2018    TRIG 335 (H) 05/19/2017     Lab Results   Component Value Date    HDL 41 09/11/2019    HDL 34 (L) 06/24/2018    HDL 34 (L) 05/19/2017     Lab Results   Component Value Date    LDLCALC 166 (H) 09/11/2019    LDLCALC 154 (H) 06/24/2018    LDLCALC 125 05/19/2017       Anthropometric Measurements:    Height: 63 inches (160.02 cm)  Weight: 255 lb (115.7 kg)  BMI: 45.17 (obesity class III)  IBW: 115 lb (52.27 kg) +-10%  %IBW: 221.74%  AdBW: 171 lb (77.7 kg)     Physical Exam Findings:  Deferred    Nutrition Interview: RD called pt, explained reason for call and role in care. Pt states appetite is \"okay\", typically eats 2 meals/day with one snack. See food recall below. RD explained the importance of having a consistent carbohydrate intake throughout the day to help keep blood sugars steady, avoiding spikes and dips. Reviewed which foods contain carbohydrates and which foods do not contain carbohydrates. Explained carbohydrates are the main source of fuel for our bodies and the importance of choosing healthy sources such as whole grains, fruits and vegetables.  Explained carbohydrates in food raise blood glucose and the importance of having balanced meals/snacks to help keep blood sugar steady. Discussed how eating a carbohydrate alone such as a banana versus a banana with peanut butter will affect blood sugar differently. Explained the components of a balanced meal using the MyPlate [id number]-4/4 plate fruits and/or vegetables, 1/4 plate protein and 1/4 plate starchy carbohydrates with 8 oz glass of low fat milk if desired. RD used pt's breakfast example to explain which components are complete and which components are incomplete. Discussed ways to make the meal more balanced- watching portion size of cereal and adding a protein source such as a hard boiled egg. Discussed the importance of making meals balanced and incorporating a variety. Explained the importance of not skipping meals. Pt states she wakes up between 9:30-10AM and eats her first meal at noon. RD discussed eating a balanced snack such as banana with peanut butter instead of skipping meal completely. Pt verbalizes understanding. Reviewed the importance of checking BS daily and taking medicine as directed. Pt states she is checking her BS BID and this AM  mg/dL. Pt reports a BS of 539 mg/dL yesterday 7/18- reviewed signs/symptoms of hyperglycemia. Discussed the importance of staying hydrated and incorporating physical activity. Pt states she cleans her house daily for exercise. Pt has no nutrition related questions at this time. RD offered to mail educational handouts to pt to reinforce concepts discussed during phone conversation, pt accepted and prefers MyChart.     24-Hour Diet Recall  Breakfast  Consumed: none    Lunch  Consumed: 12PM- bowl of cereal    Dinner  Consumed: 5-6PM rigatoni     Bedtime Snack  Consumed: 8-9 PM crackers with peanut butter    Beverages: water and pop (\"here and there\" per pt)    Frequency of meals away from home: pt states she eats out on Fridays    Exercise: none    Blood sugar checks: BID    Nutrition Diagnosis:  #1 Problem Altered nutrition-related lab values: A1C Etiology related to uncontrolled type 2 diabetes       Signs/Symptoms as evidenced by A1C 9.4% as of 6/25/21    Nutrition Intervention:     Estimated Needs  diabetic diet providing 2000 kcals to promote wt loss ([address] based on obesity class III). Estimated daily CHO Needs: 275 g (based on 45-65% total calorie intake)  Estimated daily Protein Needs: 62-77 g (based on 0.8-1.0 g/kg based on AdBW for obesity class III)  Estimated daily Fluid Needs: 64 oz. #1 Nutrition Information: Provided patient with Managing Blood Sugar, Hyperglycemia, Hypoglycemia, Checking Blood Sugar, Planning Healthy Meals handouts. For reinforcement of concepts discussed during nutrition interview. #2 Nutrition Counseling: Used open-ended questions to assess patients perceived susceptibility and severity of disease state. Discussed potential impact of health threat on patient's lifestyle. Used open-ended questions to assess patient's perception regarding benefits of and barriers to implementation of nutrition therapy. #3 Nutrition Education: Clearly defined the benefits of nutrition therapy. Summarized and affirmed positive aspects of current nutrition patterns. Provided education regarding value of adherence to diabetic diet. Discussed ways to establish applying concepts of alternatives and choices regarding implementation of diet. Explored ideas for small, incremental goals to initiate behavior change. Monitoring and Evaluation:    Indicator/Goal Criteria   #1 Eat balanced meals consistently throughout the day. Do not skip meals. #1 Focus on making meals balanced and eat 3 meals instead of only 2 meals/day. Make these meals balanced using the MyPlate [id number]-2/2 plate fruits and/or vegetables, 1/4 plate protein and 1/4 plate starchy carbohydrates with 8 oz glass of low fat milk if desired. #2 Check BS daily and take medicine as directed. #2 Continue checking BS BID and keep a log.      Follow Up: RD will call pt in 1 week to follow up and make sure pt received handouts in mail. RD will answer any nutrition related questions at this time.      [address] [facility],    [phone]     ,

## 2021-07-23 ENCOUNTER — TELEPHONE (OUTPATIENT)
Dept: ENDOCRINOLOGY | Age: 51
End: 2021-07-23

## 2021-07-23 NOTE — TELEPHONE ENCOUNTER
Pt is calling she increased her insulin like instructed but her sugars are still running high.  300-400 please advise

## 2021-07-26 ENCOUNTER — VIRTUAL VISIT (OUTPATIENT)
Dept: FAMILY MEDICINE CLINIC | Age: 51
End: 2021-07-26
Payer: MEDICAID

## 2021-07-26 DIAGNOSIS — E11.9 TYPE 2 DIABETES MELLITUS TREATED WITH INSULIN (HCC): ICD-10-CM

## 2021-07-26 DIAGNOSIS — F41.9 ANXIETY: Primary | ICD-10-CM

## 2021-07-26 DIAGNOSIS — F32.A DEPRESSION, UNSPECIFIED DEPRESSION TYPE: ICD-10-CM

## 2021-07-26 DIAGNOSIS — Z79.4 TYPE 2 DIABETES MELLITUS TREATED WITH INSULIN (HCC): ICD-10-CM

## 2021-07-26 PROCEDURE — 3046F HEMOGLOBIN A1C LEVEL >9.0%: CPT | Performed by: NURSE PRACTITIONER

## 2021-07-26 PROCEDURE — 1036F TOBACCO NON-USER: CPT | Performed by: NURSE PRACTITIONER

## 2021-07-26 PROCEDURE — 3017F COLORECTAL CA SCREEN DOC REV: CPT | Performed by: NURSE PRACTITIONER

## 2021-07-26 PROCEDURE — 2022F DILAT RTA XM EVC RTNOPTHY: CPT | Performed by: NURSE PRACTITIONER

## 2021-07-26 PROCEDURE — 99213 OFFICE O/P EST LOW 20 MIN: CPT | Performed by: NURSE PRACTITIONER

## 2021-07-26 PROCEDURE — G8417 CALC BMI ABV UP PARAM F/U: HCPCS | Performed by: NURSE PRACTITIONER

## 2021-07-26 PROCEDURE — G8428 CUR MEDS NOT DOCUMENT: HCPCS | Performed by: NURSE PRACTITIONER

## 2021-07-26 RX ORDER — VENLAFAXINE HYDROCHLORIDE 75 MG/1
75 CAPSULE, EXTENDED RELEASE ORAL DAILY
Qty: 30 CAPSULE | Refills: 3 | Status: SHIPPED | OUTPATIENT
Start: 2021-07-26 | End: 2021-12-07

## 2021-07-26 ASSESSMENT — ENCOUNTER SYMPTOMS
SHORTNESS OF BREATH: 0
COUGH: 0
WHEEZING: 0

## 2021-07-26 NOTE — PROGRESS NOTES
Fibrillation Neg Hx     Birth Defects Neg Hx     Breast Cancer Neg Hx     Coronary Art Dis Neg Hx     Colon Cancer Neg Hx     Depression Neg Hx     Early Death Neg Hx     Hearing Loss Neg Hx     Heart Disease Neg Hx     Learning Disabilities Neg Hx     Kidney Disease Neg Hx     Mental Illness Neg Hx     Mental Retardation Neg Hx     Miscarriages / Stillbirths Neg Hx     Obesity Neg Hx     Osteoporosis Neg Hx     Stroke Neg Hx     Substance Abuse Neg Hx      Social History     Socioeconomic History    Marital status: Legally      Spouse name: Not on file    Number of children: Not on file    Years of education: 15    Highest education level: High school graduate   Occupational History    Not on file   Tobacco Use    Smoking status: Former Smoker     Packs/day: 0.50     Years: 15.00     Pack years: 7.50     Types: Cigarettes     Start date: 2014     Quit date: 2015     Years since quittin.5    Smokeless tobacco: Former User     Quit date: 3/23/2016   Vaping Use    Vaping Use: Never used   Substance and Sexual Activity    Alcohol use: Not Currently     Alcohol/week: 0.0 standard drinks     Comment: occasionally    Drug use: Yes     Frequency: 5.0 times per week     Types: Marijuana    Sexual activity: Yes     Partners: Male   Other Topics Concern    Not on file   Social History Narrative    Not on file     Social Determinants of Health     Financial Resource Strain: Medium Risk    Difficulty of Paying Living Expenses: Somewhat hard   Food Insecurity: Food Insecurity Present    Worried About Running Out of Food in the Last Year: Sometimes true    Maximiliano of Food in the Last Year: Sometimes true   Transportation Needs: No Transportation Needs    Lack of Transportation (Medical): No    Lack of Transportation (Non-Medical):  No   Physical Activity: Inactive    Days of Exercise per Week: 0 days    Minutes of Exercise per Session: 0 min   Stress: Stress Concern Present    Feeling of Stress : Very much   Social Connections: Socially Isolated    Frequency of Communication with Friends and Family: Three times a week    Frequency of Social Gatherings with Friends and Family: Never    Attends Holiness Services: Never    Active Member of Clubs or Organizations: No    Attends Club or Organization Meetings: Never    Marital Status:    Intimate Partner Violence: Not At Risk    Fear of Current or Ex-Partner: No    Emotionally Abused: No    Physically Abused: No    Sexually Abused: No     Current Outpatient Medications on File Prior to Visit   Medication Sig Dispense Refill    ALPRAZolam (XANAX) 0.25 MG tablet Take 0.25 mg by mouth nightly as needed for Anxiety.  levothyroxine (SYNTHROID) 125 MCG tablet Take 1 tablet by mouth Daily 30 tablet 06    Insulin Degludec (TRESIBA FLEXTOUCH) 100 UNIT/ML SOPN INJECT 60 UNITS UNDER THE SKIN NIGHTLY 15 mL 3    insulin lispro, 1 Unit Dial, (HUMALOG KWIKPEN) 100 UNIT/ML SOPN 14 units at each meals hold if glucose less than 150 5 pen 3    butalbital-acetaminophen-caffeine (FIORICET, ESGIC) -40 MG per tablet Take 1 tablet by mouth every 6 hours as needed for Headaches 30 tablet 1    spironolactone (ALDACTONE) 50 MG tablet TAKE 1 TABLET BY MOUTH DAILY 30 tablet 3    HYDROcodone-acetaminophen (NORCO) 5-325 MG per tablet Take 1 tablet by mouth every 6 hours as needed for Pain.  (Patient not taking: Reported on 7/8/2021)      dicyclomine (BENTYL) 10 MG capsule Take 1 capsule by mouth every 6 hours as needed (cramps) 120 capsule 0    ondansetron (ZOFRAN) 4 MG tablet Take 1 tablet by mouth every 8 hours as needed for Nausea 20 tablet 0    buPROPion (WELLBUTRIN XL) 150 MG extended release tablet TAKE 1 TABLET BY MOUTH EVERY MORNING 30 tablet 3    fluticasone (FLONASE) 50 MCG/ACT nasal spray SHAKE LIQUID AND USE 1 SPRAY IN EACH NOSTRIL DAILY 16 g 5    pregabalin (LYRICA) 150 MG capsule Take 1 capsule by mouth 3 times daily for 60 days. 90 capsule 1    budesonide-formoterol (SYMBICORT) 160-4.5 MCG/ACT AERO Inhale 2 puffs into the lungs 2 times daily 1 Inhaler 3    tiotropium (SPIRIVA RESPIMAT) 2.5 MCG/ACT AERS inhaler Inhale 2 puffs into the lungs daily 1 Inhaler 3    busPIRone (BUSPAR) 15 MG tablet TAKE 1 TABLET BY MOUTH THREE TIMES DAILY 90 tablet 5    metoclopramide (REGLAN) 10 MG tablet Take 1 tablet by mouth 2 times daily as needed (Headache) 60 tablet 0    acetaminophen (TYLENOL) 500 MG tablet Take 1 tablet by mouth 4 times daily as needed for Pain 360 tablet 1    B-D ULTRAFINE III SHORT PEN 31G X 8 MM MISC USE THREE TIMES DAILY 100 each 5    cetirizine (ZYRTEC) 10 MG tablet TAKE 1 TABLET BY MOUTH EVERY NIGHT AT BEDTIME AS NEEDED FOR ALLERGIES 30 tablet 5    OneTouch Delica Lancets 92G MISC qid 200 each 3    blood glucose test strips (ONETOUCH VERIO) strip 1 each by In Vitro route daily As needed. 100 each 3    Blood Glucose Monitoring Suppl (ONETOUCH VERIO) w/Device KIT As  Directed 1 kit 00    Insulin Pen Needle (NOVOFINE) 32G X 6 MM MISC qid 300 each 3    baclofen (LIORESAL) 10 MG tablet Take 1 tablet by mouth 3 times daily 60 tablet 0    tiZANidine (ZANAFLEX) 2 MG tablet Take 1 tablet by mouth 3 times daily as needed (back pain/ spasm) 15 tablet 0    magnesium oxide (MAG-OX) 400 (241.3 Mg) MG TABS tablet TAKE 1/2 TABLET BY MOUTH DAILY 30 tablet 5    albuterol sulfate HFA (PROVENTIL HFA) 108 (90 Base) MCG/ACT inhaler Inhale 2 puffs into the lungs every 6 hours as needed for Wheezing 1 Inhaler 3    SUMAtriptan (IMITREX) 25 MG tablet TAKE 1 TABLET BY MOUTH 1 TIME AS NEEDED FOR MIGRAINE 9 tablet 1    blood glucose test strips (EXACTECH TEST) strip 1 each by In Vitro route 3 times daily (Accu-check test strips) As needed.  DX:E11.65 300 strip 5    ONE TOUCH ULTRASOFT LANCETS MISC TEST 3 TIMES DAILY AS NEEDED 300 each 3    albuterol (PROVENTIL) (2.5 MG/3ML) 0.083% nebulizer solution Take 3 mLs by nebulization every 4 hours as needed for Wheezing 120 each 3    atorvastatin (LIPITOR) 40 MG tablet Take 1 tablet by mouth nightly 30 tablet 3    Insulin Syringe-Needle U-100 30G X 1/2\" 1 ML MISC 1 each by Does not apply route daily 100 each 3    blood glucose test strips (ONETOUCH VERIO) strip TEST 3 TIMES DAILY AS NEEDED 300 each 3    Blood Glucose Monitoring Suppl (ONETOUCH VERIO) w/Device KIT TEST 3 TIMES DAILY AS NEEDED 1 kit 0     No current facility-administered medications on file prior to visit. Allergies:  Shellfish-derived products, Ibuprofen, Ketorolac, Morphine, Other, Penicillins, Propoxyphene n-acetaminophen, and Toradol [ketorolac tromethamine]    Review of Systems   Constitutional: Negative for chills, fatigue and fever. HENT: Negative for congestion. Respiratory: Negative for cough, shortness of breath and wheezing. Cardiovascular: Negative for chest pain, palpitations and leg swelling. Neurological: Negative for dizziness and headaches. Psychiatric/Behavioral: Positive for dysphoric mood. Negative for self-injury and suicidal ideas. The patient is nervous/anxious. Objective: There were no vitals taken for this visit. Physical Exam  Constitutional:       General: She is awake. She is not in acute distress. Appearance: Normal appearance. She is not ill-appearing or diaphoretic. HENT:      Head: Normocephalic. Right Ear: External ear normal.      Left Ear: External ear normal.      Nose: Nose normal.      Mouth/Throat:      Lips: Pink. Mouth: Mucous membranes are moist.   Pulmonary:      Effort: Pulmonary effort is normal. No respiratory distress. Skin:     Coloration: Skin is not ashen, cyanotic, jaundiced, mottled, pale or sallow. Neurological:      Mental Status: She is alert and oriented to person, place, and time.    Psychiatric:         Mood and Affect: Mood normal.         Speech: Speech normal.         Behavior: Behavior normal. Behavior is cooperative. Assessment:          Diagnosis Orders   1. Anxiety  venlafaxine (EFFEXOR XR) 75 MG extended release capsule   2. Depression, unspecified depression type  venlafaxine (EFFEXOR XR) 75 MG extended release capsule   3. Type 2 diabetes mellitus treated with insulin ([address]         Plan:      No orders of the defined types were placed in this encounter. Orders Placed This Encounter   Medications    venlafaxine (EFFEXOR XR) 75 MG extended release capsule     Sig: Take 1 capsule by mouth daily     Dispense:  30 capsule     Refill:  3       Return in about 6 weeks (around 9/6/2021). Anxiety- continue with counselor and psychologist. Increase effexor. Discussed that it typically takes about 4-6 weeks to take full effect. Discussed possibility of SI. If any SI pt should stop medication immediately and seek help. Pt was agreeable. Dm2- continue regular f/u with endocrinology. Reviewed with the patient: current clinicalstatus, medications, activities and diet. Side effects, adverse effects of the medication prescribedtoday, as well as treatment plan/ rationale and result expectations have been discussedwith the patient who expresses understanding and desires to proceed. Close follow upto evaluate treatment results and for coordination of care. I have reviewedthe patient's medical history in detail and updated the computerized patient record. TELEHEALTH EVALUATION -- Audio/Visual (During [employer]-94 public health emergency)    -   Kathya Deluca is a 49 y.o. female being evaluated by a Virtual Visit (video visit) encounter to address concerns as mentioned above. A caregiver was present when appropriate. Due to this being a TeleHealth encounter (During [facility]-26 public health emergency), evaluation of the following organ systems was limited: Vitals/Constitutional/EENT/Resp/CV/GI//MS/Neuro/Skin/Heme-Lymph-Imm.   Pursuant to the emergency declaration under the [employer] and the National Emergencies Act, [facility] waiver authority and the [employer] and [employer] Act, this Virtual Visit was conducted with patient's (and/or legal guardian's) consent, to reduce the patient's risk of exposure to COVID-19 and provide necessary medical care. The patient (and/or legal guardian) has also been advised to contact this office for worsening conditions or problems, and seek emergency medical treatment and/or call 911 if deemed necessary. Services were provided through a video synchronous discussion virtually to substitute for in-person clinic visit. Type of encounter was _X_ Doxy __ MyChart ___Facetime    Patient was located at their home. Provider was located at their ___ home or        __X__ office. --SUDEEP Soriano CNP on 7/26/2021 at 4:39 PM    An electronic signature was used to authenticate this note.

## 2021-07-26 NOTE — PATIENT INSTRUCTIONS
heartbeat that is faster than normal.  · A hard time focusing. Phobias  Symptoms may include:  · More fear than most people of being around an object, being in a situation, or doing an activity. You might also be stressed about the chance of being around the thing you fear. · Worry about losing control, panicking, fainting, or having physical symptoms like a faster heartbeat when you are around the situation or object. How are these disorders treated? Anxiety disorders can be treated with medicines or counseling. A combination of both may be used. Medicines may include:  · Antidepressants. These may help your symptoms by keeping chemicals in your brain in balance. · Benzodiazepines. These may give you short-term relief of your symptoms. Some people use cognitive-behavioral therapy. A therapist helps you learn to change stressful or bad thoughts into helpful thoughts. Lead a healthy lifestyle  A healthy lifestyle may help you feel better. · Get at least 30 minutes of exercise on most days of the week. Walking is a good choice. · Eat a healthy diet. Include fruits, vegetables, lean proteins, and whole grains in your diet each day. · Try to go to bed at the same time every night. Try for 8 hours of sleep a night. · Find ways to manage stress. Try relaxation exercises. · Avoid alcohol and illegal drugs. Follow-up care is a key part of your treatment and safety. Be sure to make and go to all appointments, and call your doctor if you are having problems. It's also a good idea to know your test results and keep a list of the medicines you take. Where can you learn more? Go to https://emily.[employer]. org and sign in to your [employer] account. Enter E250 in the [employer]iance box to learn more about \"Learning About Anxiety Disorders. \"     If you do not have an account, please click on the \"Sign Up Now\" link.   Current as of: September 23, 2020               Content Version: 12.9  © 2481-2871 [employer], [employer]. Care instructions adapted under license by [facility] ([facility]). If you have questions about a medical condition or this instruction, always ask your healthcare professional. [address] any warranty or liability for your use of this information.

## 2021-07-27 ENCOUNTER — CARE COORDINATION (OUTPATIENT)
Dept: CARE COORDINATION | Age: 51
End: 2021-07-27

## 2021-07-27 DIAGNOSIS — Z79.4 TYPE 2 DIABETES MELLITUS TREATED WITH INSULIN (HCC): ICD-10-CM

## 2021-07-27 DIAGNOSIS — E11.9 TYPE 2 DIABETES MELLITUS TREATED WITH INSULIN (HCC): ICD-10-CM

## 2021-07-27 DIAGNOSIS — E89.0 POSTPROCEDURAL HYPOTHYROIDISM: ICD-10-CM

## 2021-07-27 LAB
ANION GAP SERPL CALCULATED.3IONS-SCNC: 10 MEQ/L (ref 9–15)
BUN BLDV-MCNC: 11 MG/DL (ref 6–20)
CALCIUM SERPL-MCNC: 9.1 MG/DL (ref 8.5–9.9)
CHLORIDE BLD-SCNC: 104 MEQ/L (ref 95–107)
CO2: 22 MEQ/L (ref 20–31)
CREAT SERPL-MCNC: 1.18 MG/DL (ref 0.5–0.9)
GFR AFRICAN AMERICAN: 58.6
GFR NON-AFRICAN AMERICAN: 48.4
GLUCOSE BLD-MCNC: 156 MG/DL (ref 70–99)
HBA1C MFR BLD: 8.2 % (ref 4.8–5.9)
POTASSIUM SERPL-SCNC: 4.3 MEQ/L (ref 3.4–4.9)
SODIUM BLD-SCNC: 136 MEQ/L (ref 135–144)
T4 FREE: 1.13 NG/DL (ref 0.84–1.68)
TSH SERPL DL<=0.05 MIU/L-ACNC: 0.51 UIU/ML (ref 0.44–3.86)

## 2021-08-03 ENCOUNTER — HOSPITAL ENCOUNTER (EMERGENCY)
Age: 51
Discharge: HOME | End: 2021-08-03
Payer: MEDICAID

## 2021-08-03 ENCOUNTER — APPOINTMENT (OUTPATIENT)
Dept: GENERAL RADIOLOGY | Age: 51
End: 2021-08-03
Payer: MEDICAID

## 2021-08-03 ENCOUNTER — CARE COORDINATION (OUTPATIENT)
Dept: CARE COORDINATION | Age: 51
End: 2021-08-03

## 2021-08-03 VITALS
SYSTOLIC BLOOD PRESSURE: 132 MMHG | DIASTOLIC BLOOD PRESSURE: 80 MMHG | BODY MASS INDEX: 45.18 KG/M2 | OXYGEN SATURATION: 99 % | TEMPERATURE: 98.3 F | WEIGHT: 255 LBS | HEIGHT: 63 IN | HEART RATE: 89 BPM | RESPIRATION RATE: 18 BRPM

## 2021-08-03 DIAGNOSIS — Z86.2 HISTORY OF SARCOIDOSIS: ICD-10-CM

## 2021-08-03 DIAGNOSIS — R07.9 CHEST PAIN, UNSPECIFIED TYPE: Primary | ICD-10-CM

## 2021-08-03 DIAGNOSIS — F41.1 ANXIETY STATE: ICD-10-CM

## 2021-08-03 LAB
ALBUMIN SERPL-MCNC: 4.3 G/DL (ref 3.5–4.6)
ALP BLD-CCNC: 166 U/L (ref 40–130)
ALT SERPL-CCNC: 17 U/L (ref 0–33)
ANION GAP SERPL CALCULATED.3IONS-SCNC: 12 MEQ/L (ref 9–15)
AST SERPL-CCNC: 22 U/L (ref 0–35)
BASOPHILS ABSOLUTE: 0 K/UL (ref 0–0.2)
BASOPHILS RELATIVE PERCENT: 0.6 %
BILIRUB SERPL-MCNC: 0.3 MG/DL (ref 0.2–0.7)
BUN BLDV-MCNC: 12 MG/DL (ref 6–20)
CALCIUM SERPL-MCNC: 9.7 MG/DL (ref 8.5–9.9)
CHLORIDE BLD-SCNC: 103 MEQ/L (ref 95–107)
CO2: 21 MEQ/L (ref 20–31)
CREAT SERPL-MCNC: 1.31 MG/DL (ref 0.5–0.9)
EOSINOPHILS ABSOLUTE: 0.2 K/UL (ref 0–0.7)
EOSINOPHILS RELATIVE PERCENT: 2 %
GFR AFRICAN AMERICAN: 51.9
GFR NON-AFRICAN AMERICAN: 42.9
GLOBULIN: 4 G/DL (ref 2.3–3.5)
GLUCOSE BLD-MCNC: 134 MG/DL (ref 70–99)
HCT VFR BLD CALC: 46.4 % (ref 37–47)
HEMOGLOBIN: 15.8 G/DL (ref 12–16)
LYMPHOCYTES ABSOLUTE: 1.6 K/UL (ref 1–4.8)
LYMPHOCYTES RELATIVE PERCENT: 20 %
MAGNESIUM: 1.8 MG/DL (ref 1.7–2.4)
MCH RBC QN AUTO: 32.2 PG (ref 27–31.3)
MCHC RBC AUTO-ENTMCNC: 34 % (ref 33–37)
MCV RBC AUTO: 94.9 FL (ref 82–100)
MONOCYTES ABSOLUTE: 0.7 K/UL (ref 0.2–0.8)
MONOCYTES RELATIVE PERCENT: 8.2 %
NEUTROPHILS ABSOLUTE: 5.7 K/UL (ref 1.4–6.5)
NEUTROPHILS RELATIVE PERCENT: 70 %
PDW BLD-RTO: 13.7 % (ref 11.5–14.5)
PLATELET # BLD: 280 K/UL (ref 130–400)
PLATELET SLIDE REVIEW: ADEQUATE
POTASSIUM SERPL-SCNC: 4.4 MEQ/L (ref 3.4–4.9)
RBC # BLD: 4.89 M/UL (ref 4.2–5.4)
RBC # BLD: NORMAL 10*6/UL
REASON FOR REJECTION: NORMAL
REJECTED TEST: NORMAL
SMUDGE CELLS: 1.8
SODIUM BLD-SCNC: 136 MEQ/L (ref 135–144)
TOTAL PROTEIN: 8.3 G/DL (ref 6.3–8)
TROPONIN: <0.01 NG/ML (ref 0–0.01)
TROPONIN: <0.01 NG/ML (ref 0–0.01)
WBC # BLD: 8.2 K/UL (ref 4.8–10.8)

## 2021-08-03 PROCEDURE — 83735 ASSAY OF MAGNESIUM: CPT

## 2021-08-03 PROCEDURE — 80307 DRUG TEST PRSMV CHEM ANLYZR: CPT

## 2021-08-03 PROCEDURE — 99283 EMERGENCY DEPT VISIT LOW MDM: CPT

## 2021-08-03 PROCEDURE — 6360000002 HC RX W HCPCS: Performed by: STUDENT IN AN ORGANIZED HEALTH CARE EDUCATION/TRAINING PROGRAM

## 2021-08-03 PROCEDURE — 85025 COMPLETE CBC W/AUTO DIFF WBC: CPT

## 2021-08-03 PROCEDURE — 93005 ELECTROCARDIOGRAM TRACING: CPT | Performed by: STUDENT IN AN ORGANIZED HEALTH CARE EDUCATION/TRAINING PROGRAM

## 2021-08-03 PROCEDURE — 71045 X-RAY EXAM CHEST 1 VIEW: CPT

## 2021-08-03 PROCEDURE — 80053 COMPREHEN METABOLIC PANEL: CPT

## 2021-08-03 PROCEDURE — 96375 TX/PRO/DX INJ NEW DRUG ADDON: CPT

## 2021-08-03 PROCEDURE — 96374 THER/PROPH/DIAG INJ IV PUSH: CPT

## 2021-08-03 PROCEDURE — 36415 COLL VENOUS BLD VENIPUNCTURE: CPT

## 2021-08-03 PROCEDURE — 84484 ASSAY OF TROPONIN QUANT: CPT

## 2021-08-03 RX ORDER — ONDANSETRON 2 MG/ML
4 INJECTION INTRAMUSCULAR; INTRAVENOUS ONCE
Status: COMPLETED | OUTPATIENT
Start: 2021-08-03 | End: 2021-08-03

## 2021-08-03 RX ORDER — MORPHINE SULFATE 2 MG/ML
4 INJECTION, SOLUTION INTRAMUSCULAR; INTRAVENOUS
Status: DISCONTINUED | OUTPATIENT
Start: 2021-08-03 | End: 2021-08-04 | Stop reason: HOSPADM

## 2021-08-03 RX ADMIN — MORPHINE SULFATE 4 MG: 2 INJECTION, SOLUTION INTRAMUSCULAR; INTRAVENOUS at 20:31

## 2021-08-03 RX ADMIN — ONDANSETRON 4 MG: 2 INJECTION INTRAMUSCULAR; INTRAVENOUS at 20:32

## 2021-08-03 ASSESSMENT — PAIN DESCRIPTION - ORIENTATION: ORIENTATION: LEFT

## 2021-08-03 ASSESSMENT — ENCOUNTER SYMPTOMS
WHEEZING: 0
SHORTNESS OF BREATH: 0
COUGH: 0
ABDOMINAL PAIN: 0
NAUSEA: 0
VOMITING: 0
PHOTOPHOBIA: 0

## 2021-08-03 ASSESSMENT — PAIN DESCRIPTION - LOCATION: LOCATION: CHEST

## 2021-08-03 ASSESSMENT — PAIN SCALES - GENERAL
PAINLEVEL_OUTOF10: 8
PAINLEVEL_OUTOF10: 8

## 2021-08-03 ASSESSMENT — PAIN DESCRIPTION - DESCRIPTORS: DESCRIPTORS: ACHING

## 2021-08-03 NOTE — CARE COORDINATION
Aliza Jolley  8/3/2021    Registered Dietitian Progress Note for Care Coordination    Assessment: Omar Reynolds is a 49 y.o. female. RD referred for DM. RD spoke with patient for initial nutrition assessment on 7/19 and outreached 7/27 for follow up, left VM this outreach. RD called to follow up with pt today 8/3. Pt states she received the educational handouts and found the information helpful. RD discussed previous goals with pt. Pt states she is eating breakfast, lunch dinner and one snack each day. Pt states she is monitoring her BS daily- per pt this AM  mg/dL and this afternoon before lunch  mg/dL. RD noted patient's A1C on 6/25/21 was documented as 9.4% and patient's A1C on 7/27/21 documented as 8.2%. RD acknowledged the changes patient has made and encouraged her to continue the awesome work. Pt has no nutrition related questions at this time. RD offered to follow up with patient in a few weeks, pt accepted and very appreciative. Barriers to meeting goals: overwhelmed by complexity of regimen    Action:  Reiterated the importance of eating balanced meals/snacks consistently throughout the day, taking medicine as directed and monitoring BS daily to help manage BS. Pt will continue to eat 3 meals with 1 snack/day and check BS daily. See assessment note above. Nutrition Monitoring and Evaluation  Indicator/Goal Criteria Progress   #1 Eat balanced meals consistently throughout the day. Do not skip meals. #1 Focus on making meals balanced and eat 3 meals instead of only 2 meals/day. Make these meals balanced using the MyPlate FTRRLLCTM-9/3 plate fruits and/or vegetables, 1/4 plate protein and 1/4 plate starchy carbohydrates with 8 oz glass of low fat milk if desired. #1 Pt is eating 3 meals/day with one balanced snack. #2  Check BS daily and take medicine as directed. #2 Continue checking BS BID and keep a log. #2 Pt is checking BS daily.  Per pt this AM  mg/dL and this afternoon before lunch  mg/dL. Plan of Care:  RD encouraged pt to keep working toward goals set. RD will follow up with pt to discuss any questions pt has and check the progress toward goals. Follow Up:    RD will call pt in 3 weeks to follow up and answer any nutrition related questions at that time.      [address]

## 2021-08-03 NOTE — CARE COORDINATION
Contacted Marissa Perez and left voicemail regarding Dietitian follow up. Left call back number. RD spoke with patient for initial nutrition assessment on 7/19. RD outreached 7/27 and today 8/3 for follow up- left VM both outreaches. RD will continue to follow/assist with patient return call.        [address] [facility], [address] [place] [address]

## 2021-08-04 ENCOUNTER — CARE COORDINATION (OUTPATIENT)
Dept: CARE COORDINATION | Age: 51
End: 2021-08-04

## 2021-08-04 DIAGNOSIS — J30.1 CHRONIC SEASONAL ALLERGIC RHINITIS DUE TO POLLEN: ICD-10-CM

## 2021-08-04 DIAGNOSIS — E11.65 TYPE 2 DIABETES MELLITUS WITH HYPERGLYCEMIA, WITH LONG-TERM CURRENT USE OF INSULIN (HCC): ICD-10-CM

## 2021-08-04 DIAGNOSIS — J45.41 MODERATE PERSISTENT ASTHMA WITH ACUTE EXACERBATION: ICD-10-CM

## 2021-08-04 DIAGNOSIS — Z79.4 TYPE 2 DIABETES MELLITUS WITH HYPERGLYCEMIA, WITH LONG-TERM CURRENT USE OF INSULIN (HCC): ICD-10-CM

## 2021-08-04 LAB
AMPHETAMINE SCREEN, URINE: ABNORMAL
BARBITURATE SCREEN URINE: ABNORMAL
BENZODIAZEPINE SCREEN, URINE: ABNORMAL
CANNABINOID SCREEN URINE: POSITIVE
COCAINE METABOLITE SCREEN URINE: ABNORMAL
EKG ATRIAL RATE: 90 BPM
EKG P AXIS: 57 DEGREES
EKG P-R INTERVAL: 166 MS
EKG Q-T INTERVAL: 358 MS
EKG QRS DURATION: 78 MS
EKG QTC CALCULATION (BAZETT): 437 MS
EKG R AXIS: -6 DEGREES
EKG T AXIS: 30 DEGREES
EKG VENTRICULAR RATE: 90 BPM
Lab: ABNORMAL
METHADONE SCREEN, URINE: ABNORMAL
OPIATE SCREEN URINE: ABNORMAL
OXYCODONE URINE: ABNORMAL
PHENCYCLIDINE SCREEN URINE: ABNORMAL
PROPOXYPHENE SCREEN: ABNORMAL

## 2021-08-04 PROCEDURE — 93010 ELECTROCARDIOGRAM REPORT: CPT | Performed by: INTERNAL MEDICINE

## 2021-08-04 RX ORDER — MONTELUKAST SODIUM 10 MG/1
TABLET ORAL
Qty: 30 TABLET | Refills: 5 | Status: SHIPPED | OUTPATIENT
Start: 2021-08-04 | End: 2022-01-31

## 2021-08-04 RX ORDER — LEVOTHYROXINE SODIUM 0.12 MG/1
125 TABLET ORAL DAILY
Qty: 30 TABLET | Refills: 3 | Status: SHIPPED | OUTPATIENT
Start: 2021-08-04 | End: 2021-12-14

## 2021-08-04 RX ORDER — LEVOTHYROXINE SODIUM 0.15 MG/1
150 TABLET ORAL DAILY
Qty: 30 TABLET | Refills: 3 | OUTPATIENT
Start: 2021-08-04

## 2021-08-04 NOTE — ED PROVIDER NOTES
[facility] ED  EMERGENCY DEPARTMENT ENCOUNTER      Pt Name: Marissa Perez  MRN: [id number]  [place] 1970  Date of evaluation: 8/3/2021  Provider: Michael Enrique Dr       Chief Complaint   Patient presents with    Chest Pain         HISTORY OF PRESENT ILLNESS   (Location/Symptom, Timing/Onset, Context/Setting, Quality, Duration, Modifying Factors, Severity)  Note limiting factors. Marissa Perez is a 49 y.o. female who per chart reviews past medical history of anxiety asthma hypertension renal cancer TIA sarcoidosis presents to the emergency department at onset intermittent moderate substernal chest pain described as tightness that began earlier this afternoon. Occasional radiation to the neck. Has not taken anything for symptoms. Denies aggravating and alleviating factors. It is nonexertional not associate with dizziness shortness of breath diaphoresis nausea or vomiting. Patient does not have a history of major cardiac events PE or DVT. Follows with Dr. Dola Lesch. Stress test normal approximately 7 months ago per patient. Patient states history of sarcoidosis and current symptoms feel similar. She reports increased stress today as her mother fell and needed to be taken to the hospital.  Patient states she went to [facility] for chest pain evaluation but the wait was too long. Patient has been seen in the ED multiple times for similar. Denies fever chills shortness of breath cough leg swelling hemoptysis back pain flank pain abdominal pain nausea vomiting diarrhea diaphoresis orthopnea or dyspnea on exertion. No sick contacts or exposures to Covid. HPI    Nursing Notes were reviewed. REVIEW OF SYSTEMS    (2-9 systems for level 4, 10 or more for level 5)     Review of Systems   Constitutional: Negative for chills, fatigue and fever. HENT: Negative for congestion. Eyes: Negative for photophobia.    Respiratory: Negative for cough, shortness of breath and wheezing. Cardiovascular: Positive for chest pain. Negative for palpitations. Gastrointestinal: Negative for abdominal pain, nausea and vomiting. Genitourinary: Negative for dysuria, frequency and hematuria. Musculoskeletal: Negative for myalgias. Allergic/Immunologic: Negative for immunocompromised state. Neurological: Negative for dizziness, weakness and headaches. Psychiatric/Behavioral: The patient is nervous/anxious. All other systems reviewed and are negative. Except as noted above the remainder of the review of systems was reviewed and negative. PAST MEDICAL HISTORY     Past Medical History:   Diagnosis Date    Anxiety     Arthritis     Asthma     Bronchopneumonia     Cancer ([facility]     renal    Cerebral artery occlusion with cerebral infarction (HCC)     Chronic bilateral low back pain with sciatica     Chronic kidney disease     Chronic obstructive lung disease ([facility] 7/26/2019    Depression     Fibromyalgia     Hypertension     Insulin dependent type 2 diabetes mellitus, uncontrolled ([facility] [facility] 8/3/2018    Localized enlarged lymph nodes 10/26/2018    Mixed headache     Pure hyperglyceridemia 5/19/2017    Sarcoidosis     Sleep apnea     does not wear cpap    Thyroid goiter          SURGICAL HISTORY       Past Surgical History:   Procedure Laterality Date    BRONCHOSCOPY  10/26/2018    DR. STEARNS    KIDNEY REMOVAL Right 08/2016    KIDNEY REMOVAL Right 2016    LUNG BIOPSY Right 10/2018    THYROID LOBECTOMY Right 6/13/14    THYROIDECTOMY  02/21/2019    DR. MAGDALENO    THYROIDECTOMY  2018    URETER STENT PLACEMENT Left 08/2016         CURRENT MEDICATIONS       Discharge Medication List as of 8/3/2021 11:30 PM      CONTINUE these medications which have NOT CHANGED    Details   venlafaxine (EFFEXOR XR) 75 MG extended release capsule Take 1 capsule by mouth daily, Disp-30 capsule, R-3Normal      ALPRAZolam (XANAX) 0.25 MG tablet Take 0.25 mg by mouth nightly as needed for Anxiety. Historical Med      levothyroxine (SYNTHROID) 125 MCG tablet Take 1 tablet by mouth Daily, Disp-30 tablet, R-06Normal      Insulin Degludec (TRESIBA FLEXTOUCH) 100 UNIT/ML SOPN INJECT 60 UNITS UNDER THE SKIN NIGHTLY, Disp-15 mL, R-3Normal      insulin lispro, 1 Unit Dial, (HUMALOG KWIKPEN) 100 UNIT/ML SOPN 14 units at each meals hold if glucose less than 150, Disp-5 pen, R-3Normal      butalbital-acetaminophen-caffeine (FIORICET, ESGIC) -40 MG per tablet Take 1 tablet by mouth every 6 hours as needed for Headaches, Disp-30 tablet, R-1Normal      spironolactone (ALDACTONE) 50 MG tablet TAKE 1 TABLET BY MOUTH DAILY, Disp-30 tablet, R-3Normal      HYDROcodone-acetaminophen (NORCO) 5-325 MG per tablet Take 1 tablet by mouth every 6 hours as needed for Pain. Historical Med      dicyclomine (BENTYL) 10 MG capsule Take 1 capsule by mouth every 6 hours as needed (cramps), Disp-120 capsule, R-0Normal      ondansetron (ZOFRAN) 4 MG tablet Take 1 tablet by mouth every 8 hours as needed for Nausea, Disp-20 tablet, R-0Print      buPROPion (WELLBUTRIN XL) 150 MG extended release tablet TAKE 1 TABLET BY MOUTH EVERY MORNING, Disp-30 tablet, R-3Normal      fluticasone (FLONASE) 50 MCG/ACT nasal spray SHAKE LIQUID AND USE 1 SPRAY IN EACH NOSTRIL DAILY, Disp-16 g, R-5Normal      pregabalin (LYRICA) 150 MG capsule Take 1 capsule by mouth 3 times daily for 60 days. , Disp-90 capsule, R-1Normal      budesonide-formoterol (SYMBICORT) 160-4.5 MCG/ACT AERO Inhale 2 puffs into the lungs 2 times daily, Disp-1 Inhaler, R-3Normal      tiotropium (SPIRIVA RESPIMAT) 2.5 MCG/ACT AERS inhaler Inhale 2 puffs into the lungs daily, Disp-1 Inhaler, R-3Normal      busPIRone (BUSPAR) 15 MG tablet TAKE 1 TABLET BY MOUTH THREE TIMES DAILY, Disp-90 tablet, R-5Normal      metoclopramide (REGLAN) 10 MG tablet Take 1 tablet by mouth 2 times daily as needed (Headache), Disp-60 tablet, R-0Print      acetaminophen (TYLENOL) 500 MG tablet Take 1 tablet by mouth 4 times daily as needed for Pain, Disp-360 tablet, R-1Print      !! B-D ULTRAFINE III SHORT PEN 31G X 8 MM MISC Disp-100 each, R-5, Normal      cetirizine (ZYRTEC) 10 MG tablet TAKE 1 TABLET BY MOUTH EVERY NIGHT AT BEDTIME AS NEEDED FOR ALLERGIES, Disp-30 tablet, R-5Normal      !! OneTouch Delica Lancets 34G MISC qid, Disp-200 each, R-3Normal      !! blood glucose test strips (ONETOUCH VERIO) strip 1 each by In Vitro route daily As needed. , Disp-100 each, R-3Normal      !! Blood Glucose Monitoring Suppl (Octaviano Serum) w/Device KIT As  Directed, Disp-1 kit, R-00Normal      !! Insulin Pen Needle (NOVOFINE) 32G X 6 MM MISC Disp-300 each, R-3, Normalqid      baclofen (LIORESAL) 10 MG tablet Take 1 tablet by mouth 3 times daily, Disp-60 tablet, R-0Disregard other orderNormal      tiZANidine (ZANAFLEX) 2 MG tablet Take 1 tablet by mouth 3 times daily as needed (back pain/ spasm), Disp-15 tablet, R-0Print      magnesium oxide (MAG-OX) 400 (241.3 Mg) MG TABS tablet TAKE 1/2 TABLET BY MOUTH DAILY, Disp-30 tablet,R-5Normal      albuterol sulfate HFA (PROVENTIL HFA) 108 (90 Base) MCG/ACT inhaler Inhale 2 puffs into the lungs every 6 hours as needed for Wheezing, Disp-1 Inhaler,R-3Normal      SUMAtriptan (IMITREX) 25 MG tablet TAKE 1 TABLET BY MOUTH 1 TIME AS NEEDED FOR MIGRAINE, Disp-9 tablet, R-1Normal      !! blood glucose test strips (EXACTECH TEST) strip 3 TIMES DAILY Starting Mon 12/2/2019, Disp-300 strip, R-5, Normal(Accu-check test strips) As needed.  DX:E11.65      !! ONE TOUCH ULTRASOFT LANCETS MISC Disp-300 each, R-3, NormalTEST 3 TIMES DAILY AS NEEDED      albuterol (PROVENTIL) (2.5 MG/3ML) 0.083% nebulizer solution Take 3 mLs by nebulization every 4 hours as needed for Wheezing, Disp-120 each, R-3Normal      atorvastatin (LIPITOR) 40 MG tablet Take 1 tablet by mouth nightly, Disp-30 tablet, R-3Normal      Insulin Syringe-Needle U-100 30G X 1/2\" 1 ML MISC DAILY Starting Fri 11/16/2018, Disp-100 each, R-3, Normal      !! blood glucose test strips (ONETOUCH VERIO) strip Disp-300 each, R-3, NormalTEST 3 TIMES DAILY AS NEEDED      !! Blood Glucose Monitoring Suppl (Rob Durand) w/Device KIT TEST 3 TIMES DAILY AS NEEDED, Disp-1 kit, R-0Normal       !! - Potential duplicate medications found. Please discuss with provider.           ALLERGIES     Shellfish-derived products, Ibuprofen, Ketorolac, Morphine, Other, Penicillins, Propoxyphene n-acetaminophen, and Toradol [ketorolac tromethamine]    FAMILY HISTORY       Family History   Problem Relation Age of Onset    Cancer Father     Diabetes Father     Allergy (Severe) Father     Heart Attack Father     Prostate Cancer Father     High Blood Pressure Mother     Diabetes Mother     Arthritis Mother     High Cholesterol Mother     Vision Loss Mother     Alcohol Abuse Neg Hx     Anemia Neg Hx     Arrhythmia Neg Hx     Asthma Neg Hx     Atrial Fibrillation Neg Hx     Birth Defects Neg Hx     Breast Cancer Neg Hx     Coronary Art Dis Neg Hx     Colon Cancer Neg Hx     Depression Neg Hx     Early Death Neg Hx     Hearing Loss Neg Hx     Heart Disease Neg Hx     Learning Disabilities Neg Hx     Kidney Disease Neg Hx     Mental Illness Neg Hx     Mental Retardation Neg Hx     Miscarriages / Stillbirths Neg Hx     Obesity Neg Hx     Osteoporosis Neg Hx     Stroke Neg Hx     Substance Abuse Neg Hx           SOCIAL HISTORY       Social History     Socioeconomic History    Marital status: Legally      Spouse name: None    Number of children: None    Years of education: 15    Highest education level: High school graduate   Occupational History    None   Tobacco Use    Smoking status: Former Smoker     Packs/day: 0.50     Years: 15.00     Pack years: 7.50     Types: Cigarettes     Start date: 2014     Quit date: 2015     Years since quittin.5    Smokeless tobacco: Former User     Quit date: 3/23/2016   Vaping Use    Vaping Use: Never used   Substance and Sexual Activity    Alcohol use: Not Currently     Alcohol/week: 0.0 standard drinks     Comment: occasionally    Drug use: Yes     Frequency: 5.0 times per week     Types: Marijuana    Sexual activity: Yes     Partners: Male   Other Topics Concern    None   Social History Narrative    None     Social Determinants of Health     Financial Resource Strain: Medium Risk    Difficulty of Paying Living Expenses: Somewhat hard   Food Insecurity: Food Insecurity Present    Worried About Running Out of Food in the Last Year: Sometimes true    Maximiliano of Food in the Last Year: Sometimes true   Transportation Needs: No Transportation Needs    Lack of Transportation (Medical): No    Lack of Transportation (Non-Medical): No   Physical Activity: Inactive    Days of Exercise per Week: 0 days    Minutes of Exercise per Session: 0 min   Stress: Stress Concern Present    Feeling of Stress : Very much   Social Connections: Socially Isolated    Frequency of Communication with Friends and Family: Three times a week    Frequency of Social Gatherings with Friends and Family: Never    Attends Evangelical Services: Never    Active Member of Clubs or Organizations: No    Attends Club or Organization Meetings: Never    Marital Status:    Intimate Partner Violence: Not At Risk    Fear of Current or Ex-Partner: No    Emotionally Abused: No    Physically Abused: No    Sexually Abused: No       SCREENINGS                        PHYSICAL EXAM    (up to 7 for level 4, 8 or more for level 5)     ED Triage Vitals [08/03/21 1943]   BP Temp Temp Source Pulse Resp SpO2 Height Weight   132/80 98.3 °F (36.8 °C) Oral 89 18 99 % 5' 3\" (1.6 m) 255 lb (115.7 kg)       Physical Exam  Constitutional:       General: She is not in acute distress. Appearance: She is well-developed. She is not ill-appearing, toxic-appearing or diaphoretic. HENT:      Head: Normocephalic and atraumatic. Nose: Nose normal.      Mouth/Throat:      Mouth: Mucous membranes are moist.   Eyes:      Pupils: Pupils are equal, round, and reactive to light. Cardiovascular:      Rate and Rhythm: Normal rate and regular rhythm. Heart sounds: No murmur heard. No friction rub. No gallop. Pulmonary:      Effort: Pulmonary effort is normal.      Breath sounds: Normal breath sounds. No wheezing, rhonchi or rales. Abdominal:      General: Bowel sounds are normal. There is no distension. Palpations: Abdomen is soft. Tenderness: There is no abdominal tenderness. There is no guarding or rebound. Musculoskeletal:         General: No swelling. Cervical back: Normal range of motion. Right lower leg: No edema. Left lower leg: No edema. Skin:     General: Skin is warm and dry. Capillary Refill: Capillary refill takes less than 2 seconds. Findings: No rash. Neurological:      General: No focal deficit present. Mental Status: She is alert and oriented to person, place, and time. DIAGNOSTIC RESULTS     EKG: All EKG's are interpreted by the Emergency Department Physician who either signs or Co-signs this chart in the absence of a cardiologist.    EKG shows NSR with HR 89, normal axis, normal intervals, no ST changes. No change compared to 7/16/21.          RADIOLOGY:   Non-plain film images such as CT, Ultrasound and MRI are read by the radiologist. Plain radiographic images are visualized and preliminarily interpreted by the emergency physician with the below findings:        Interpretation per the Radiologist below, if available at the time of this note:    XR CHEST PORTABLE    (Results Pending)         ED BEDSIDE ULTRASOUND:   Performed by ED Physician - none    LABS:  Labs Reviewed   URINE DRUG SCREEN - Abnormal; Notable for the following components:       Result Value    Cannabinoid Scrn, Ur POSITIVE (*)     All other components within normal limits   CBC WITH AUTO DIFFERENTIAL - Abnormal; Notable for the following components:    MCH 32.2 (*)     All other components within normal limits   COMPREHENSIVE METABOLIC PANEL - Abnormal; Notable for the following components:    Glucose 134 (*)     CREATININE 1.31 (*)     GFR Non- 42.9 (*)     GFR  51.9 (*)     Total Protein 8.3 (*)     Alkaline Phosphatase 166 (*)     Globulin 4.0 (*)     All other components within normal limits    Narrative:     REDRAW   SPECIMEN REJECTION   TROPONIN    Narrative:     REDRAW   MAGNESIUM    Narrative:     REDRAW   TROPONIN       All other labs were within normal range or not returned as of this dictation. EMERGENCY DEPARTMENT COURSE and DIFFERENTIAL DIAGNOSIS/MDM:   Vitals:    Vitals:    08/03/21 1943   BP: 132/80   Pulse: 89   Resp: 18   Temp: 98.3 °F (36.8 °C)   TempSrc: Oral   SpO2: 99%   Weight: 255 lb (115.7 kg)   Height: 5' 3\" (1.6 m)       MDM    Patient is a 71-year-old female presents the ED for evaluation of chest pain. She is afebrile and hemodynamically stable. She was given p.o. aspirin IV morphine IV Zofran in the ED. EKG shows NSR with HR 89, normal axis, normal intervals, no ST changes. No change compared to 7/16/21. CMP remarkable for glucose of 134 creatinine 1.31 GFR 59.1 consistent with baseline. Remainder of labs are unremarkable. Troponin I is less than 0.010. Repeat troponin at 2-1/2 hours is less than 0.010. Chest x-ray NAD. Patient reassessed with complete resolution of her symptoms. Less concern for ACS at this time. She is nontoxic-appearing stable vital stable for discharge. Follow-up with cardiology in 1 to 2 days return to the ED for worsening symptoms. She was given chest pain warning signs. Patient understands and agrees to plan. All questions answered. REASSESSMENT          CRITICAL CARE TIME   Total Critical Care time was 0 minutes, excluding separately reportable procedures.   There was a high probability of clinically significant/life threatening deterioration in the patient's condition which required my urgent intervention. CONSULTS:  None    PROCEDURES:  Unless otherwise noted below, none     Procedures        FINAL IMPRESSION      1. Chest pain, unspecified type    2. Anxiety state    3. History of sarcoidosis          DISPOSITION/PLAN   DISPOSITION Decision To Discharge 08/03/2021 11:29:07 PM      PATIENT REFERRED TO:  SUDEEP Lou - CNP  [address] [facility]  [phone]    Schedule an appointment as soon as possible for a visit in 1 day      [address]  [phone]    Schedule an appointment as soon as possible for a visit in 1 day      [facility]) ED  [address] [facility] [address] [phone]  Go to   As needed, If symptoms worsen      DISCHARGE MEDICATIONS:  Discharge Medication List as of 8/3/2021 11:30 PM        Controlled Substances Monitoring:     RX Monitoring 6/25/2021   Attestation -   Periodic Controlled Substance Monitoring No signs of potential drug abuse or diversion identified. ;Possible medication side effects, risk of tolerance/dependence & alternative treatments discussed.    Chronic Pain > 80 MEDD -       (Please note that portions of this note were completed with a voice recognition program.  Efforts were made to edit the dictations but occasionally words are mis-transcribed.)    Dre Nielsen PA-C (electronically signed)           Dre Nielsen PA-C  08/04/21 0087

## 2021-08-04 NOTE — TELEPHONE ENCOUNTER
Requesting medication refill.  Please approve or deny this request.    Rx requested:  Requested Prescriptions     Pending Prescriptions Disp Refills    montelukast (SINGULAIR) 10 MG tablet [Pharmacy Med Name: MONTELUKAST 10MG TABLETS] 30 tablet 5     Sig: TAKE 1 TABLET BY MOUTH EVERY NIGHT AT SUPPER FOR BREATHING OR ALLERGIES       Last Office Visit:   7/26/2021    Last Filled:      Last Labs:      Next Visit Date:  Future Appointments   Date Time Provider Valerie Vizcarrai   9/7/2021  3:15 PM Valentín Siegel APRN -  [place], [address]   10/7/2021  4:00 PM Christopher Fowler  [address]   11/5/2021  9:15 AM Krystle Weaver MD [facility]

## 2021-08-04 NOTE — CARE COORDINATION
Contacted patient by phone for Care Coordination enrollment. Introduced myself and the Care Coordination program. Patient declined services. Patient doesn't feel need to resume services at this time. Patient denies any current chest pain. Patient states she is feeling better since going to the ER yesterday. Offered to schedule follow-up appointment with SUDEEP Jones CNP. Patient eclined.

## 2021-08-13 RX ORDER — BLOOD SUGAR DIAGNOSTIC
1 STRIP MISCELLANEOUS DAILY
Qty: 100 EACH | Refills: 3 | Status: SHIPPED | OUTPATIENT
Start: 2021-08-13 | End: 2021-10-07 | Stop reason: SDUPTHER

## 2021-08-16 RX ORDER — PEN NEEDLE, DIABETIC 31 GX5/16"
1 NEEDLE, DISPOSABLE MISCELLANEOUS 3 TIMES DAILY
Qty: 100 EACH | Refills: 5 | Status: SHIPPED | OUTPATIENT
Start: 2021-08-16 | End: 2022-03-30

## 2021-08-24 ENCOUNTER — CARE COORDINATION (OUTPATIENT)
Dept: CARE COORDINATION | Age: 51
End: 2021-08-24

## 2021-09-03 DIAGNOSIS — J30.1 ACUTE SEASONAL ALLERGIC RHINITIS DUE TO POLLEN: ICD-10-CM

## 2021-09-03 RX ORDER — CETIRIZINE HYDROCHLORIDE 10 MG/1
TABLET ORAL
Qty: 30 TABLET | Refills: 5 | Status: SHIPPED | OUTPATIENT
Start: 2021-09-03 | End: 2022-03-02

## 2021-09-07 ENCOUNTER — OFFICE VISIT (OUTPATIENT)
Dept: FAMILY MEDICINE CLINIC | Age: 51
End: 2021-09-07
Payer: MEDICAID

## 2021-09-07 VITALS
HEIGHT: 63 IN | SYSTOLIC BLOOD PRESSURE: 130 MMHG | BODY MASS INDEX: 48.37 KG/M2 | TEMPERATURE: 97.6 F | HEART RATE: 80 BPM | WEIGHT: 273 LBS | OXYGEN SATURATION: 98 % | DIASTOLIC BLOOD PRESSURE: 72 MMHG

## 2021-09-07 DIAGNOSIS — M72.2 PLANTAR FASCIITIS, BILATERAL: Primary | ICD-10-CM

## 2021-09-07 DIAGNOSIS — F41.9 ANXIETY: ICD-10-CM

## 2021-09-07 DIAGNOSIS — M79.7 FIBROMYALGIA: ICD-10-CM

## 2021-09-07 PROCEDURE — 99214 OFFICE O/P EST MOD 30 MIN: CPT | Performed by: NURSE PRACTITIONER

## 2021-09-07 PROCEDURE — G8417 CALC BMI ABV UP PARAM F/U: HCPCS | Performed by: NURSE PRACTITIONER

## 2021-09-07 PROCEDURE — 3017F COLORECTAL CA SCREEN DOC REV: CPT | Performed by: NURSE PRACTITIONER

## 2021-09-07 PROCEDURE — G8427 DOCREV CUR MEDS BY ELIG CLIN: HCPCS | Performed by: NURSE PRACTITIONER

## 2021-09-07 PROCEDURE — 1036F TOBACCO NON-USER: CPT | Performed by: NURSE PRACTITIONER

## 2021-09-07 RX ORDER — PREGABALIN 150 MG/1
150 CAPSULE ORAL 3 TIMES DAILY
Qty: 90 CAPSULE | Refills: 2 | Status: SHIPPED | OUTPATIENT
Start: 2021-09-07 | End: 2022-01-05 | Stop reason: SDUPTHER

## 2021-09-07 NOTE — PATIENT INSTRUCTIONS
Patient Education        Plantar Fasciitis: Exercises  Introduction  Here are some examples of exercises for you to try. The exercises may be suggested for a condition or for rehabilitation. Start each exercise slowly. Ease off the exercises if you start to have pain. You will be told when to start these exercises and which ones will work best for you. How to do the exercises  Towel stretch   1. Sit with your legs extended and knees straight. 2. Place a towel around your foot just under the toes. 3. Hold each end of the towel in each hand, with your hands above your knees. 4. Pull back with the towel so that your foot stretches toward you. 5. Hold the position for at least 15 to 30 seconds. 6. Repeat 2 to 4 times a session, up to 5 sessions a day. Calf stretch   This exercise stretches the muscles at the back of the lower leg (the calf) and the Achilles tendon. Do this exercise 3 or 4 times a day, 5 days a week. 1. Stand facing a wall with your hands on the wall at about eye level. Put the leg you want to stretch about a step behind your other leg. 2. Keeping your back heel on the floor, bend your front knee until you feel a stretch in the back leg. 3. Hold the stretch for 15 to 30 seconds. Repeat 2 to 4 times. Plantar fascia and calf stretch   Stretching the plantar fascia and calf muscles can increase flexibility and decrease heel pain. You can do this exercise several times each day and before and after activity. 1. Stand on a step as shown above. Be sure to hold on to the banister. 2. Slowly let your heels down over the edge of the step as you relax your calf muscles. You should feel a gentle stretch across the bottom of your foot and up the back of your leg to your knee. 3. Hold the stretch about 15 to 30 seconds, and then tighten your calf muscle a little to bring your heel back up to the level of the step. Repeat 2 to 4 times.     Towel curls   Make this exercise more challenging by placing a weighted object, such as a soup can, on the other end of the towel. 1. While sitting, place your foot on a towel on the floor and scrunch the towel toward you with your toes. 2. Then, also using your toes, push the towel away from you. [place] pickups   1. Put marbles on the floor next to a cup.  2. Using your toes, try to lift the marbles up from the floor and put them in the cup. Follow-up care is a key part of your treatment and safety. Be sure to make and go to all appointments, and call your doctor if you are having problems. It's also a good idea to know your test results and keep a list of the medicines you take. Where can you learn more? Go to https://PayRangepeRundown[employer]. org and sign in to your [employer] account. Enter H736 in the Fluency box to learn more about \"Plantar Fasciitis: Exercises. \"     If you do not have an account, please click on the \"Sign Up Now\" link. Current as of: November 16, 2020               Content Version: 12.9  © 9035-5144 [employer]wise, Incorporated. Care instructions adapted under license by [facility] ([facility]). If you have questions about a medical condition or this instruction, always ask your healthcare professional. [address] any warranty or liability for your use of this information.

## 2021-09-07 NOTE — PROGRESS NOTES
Subjective:      Patient ID: Zeus Pond is a 49 y.o. female who presents today for:     Chief Complaint   Patient presents with    Follow-up     from feet pain and knee pain        HPI Pt reports bilateral foot pain for the last few months. Worse in the morning. Starts as soon as she stands and tries to walk. She report trying icy hot and tylenol with no relief. She had a bad flare of sarcoidosis a couple of weeks ago and had went to ER. Her BG was over 400. She had no symptoms at the time. Has been monitoring it since and it has not been that high again. Does have f/u with Dr. Juan Manuel Vines scheduled. Reports that increase in effexor has helped a lot with anxiety symptoms. She Has not had any panic attacks and has felt much more calm. No ill side effects. Does need refill of lyrica. Works moderately well for neuropathy. No ill side effects. Past Medical History:   Diagnosis Date    Anxiety     Arthritis     Asthma     Bronchopneumonia     Cancer ([address]     renal    Cerebral artery occlusion with cerebral infarction (HCC)     Chronic bilateral low back pain with sciatica     Chronic kidney disease     Chronic obstructive lung disease ([address] 7/26/2019    Depression     Fibromyalgia     Hypertension     Insulin dependent type 2 diabetes mellitus, uncontrolled ([address] 8/3/2018    Localized enlarged lymph nodes 10/26/2018    Mixed headache     Pure hyperglyceridemia 5/19/2017    Sarcoidosis     Sleep apnea     does not wear cpap    Thyroid goiter      Past Surgical History:   Procedure Laterality Date    BRONCHOSCOPY  10/26/2018    DR. STEARNS    KIDNEY REMOVAL Right 08/2016    KIDNEY REMOVAL Right 2016    LUNG BIOPSY Right 10/2018    THYROID LOBECTOMY Right 6/13/14    THYROIDECTOMY  02/21/2019    DR. MAGDALENO    THYROIDECTOMY  2018    URETER STENT PLACEMENT Left 08/2016     Family History   Problem Relation Age of Onset    Cancer Father     Diabetes Father     Allergy (Severe) Father     Heart Attack Father     Prostate Cancer Father     High Blood Pressure Mother     Diabetes Mother    [facility] Arthritis Mother     High Cholesterol Mother     Vision Loss Mother     Alcohol Abuse Neg Hx     Anemia Neg Hx     Arrhythmia Neg Hx     Asthma Neg Hx     Atrial Fibrillation Neg Hx     Birth Defects Neg Hx     Breast Cancer Neg Hx     Coronary Art Dis Neg Hx     Colon Cancer Neg Hx     Depression Neg Hx     Early Death Neg Hx     Hearing Loss Neg Hx     Heart Disease Neg Hx     Learning Disabilities Neg Hx     Kidney Disease Neg Hx     Mental Illness Neg Hx     Mental Retardation Neg Hx     Miscarriages / Stillbirths Neg Hx     Obesity Neg Hx     Osteoporosis Neg Hx     Stroke Neg Hx     Substance Abuse Neg Hx      Social History     Socioeconomic History    Marital status: Legally      Spouse name: Not on file    Number of children: Not on file    Years of education: 15    Highest education level: High school graduate   Occupational History    Not on file   Tobacco Use    Smoking status: Former Smoker     Packs/day: 0.50     Years: 15.00     Pack years: 7.50     Types: Cigarettes     Start date: 2014     Quit date: 2015     Years since quittin.6    Smokeless tobacco: Former User     Quit date: 3/23/2016   Vaping Use    Vaping Use: Never used   Substance and Sexual Activity    Alcohol use: Not Currently     Alcohol/week: 0.0 standard drinks     Comment: occasionally    Drug use: Yes     Frequency: 5.0 times per week     Types: Marijuana    Sexual activity: Yes     Partners: Male   Other Topics Concern    Not on file   Social History Narrative    Not on file     Social Determinants of Health     Financial Resource Strain: Medium Risk    Difficulty of Paying Living Expenses: Somewhat hard   Food Insecurity: Food Insecurity Present    Worried About Running Out of Food in the Last Year: Sometimes true    Maximiliano of Food in the Last Year: Sometimes true   Transportation Needs: No Transportation Needs    Lack of Transportation (Medical): No    Lack of Transportation (Non-Medical): No   Physical Activity: Inactive    Days of Exercise per Week: 0 days    Minutes of Exercise per Session: 0 min   Stress: Stress Concern Present    Feeling of Stress : Very much   Social Connections: Socially Isolated    Frequency of Communication with Friends and Family: Three times a week    Frequency of Social Gatherings with Friends and Family: Never    Attends Anabaptism Services: Never    Active Member of Clubs or Organizations: No    Attends Club or Organization Meetings: Never    Marital Status:    Intimate Partner Violence: Not At Risk    Fear of Current or Ex-Partner: No    Emotionally Abused: No    Physically Abused: No    Sexually Abused: No     Current Outpatient Medications on File Prior to Visit   Medication Sig Dispense Refill    cetirizine (ZYRTEC) 10 MG tablet TAKE 1 TABLET BY MOUTH EVERY NIGHT AT BEDTIME AS NEEDED FOR ALLERGIES 30 tablet 5    Insulin Pen Needle (B-D ULTRAFINE III SHORT PEN) 31G X 8 MM MISC Inject 1 each into the skin 3 times daily And as needed 100 each 5    blood glucose test strips (ONETOUCH VERIO) strip 1 each by In Vitro route daily As needed. 100 each 3    montelukast (SINGULAIR) 10 MG tablet TAKE 1 TABLET BY MOUTH EVERY NIGHT AT SUPPER FOR BREATHING OR ALLERGIES 30 tablet 5    levothyroxine (SYNTHROID) 125 MCG tablet Take 1 tablet by mouth Daily 30 tablet 3    venlafaxine (EFFEXOR XR) 75 MG extended release capsule Take 1 capsule by mouth daily 30 capsule 3    ALPRAZolam (XANAX) 0.25 MG tablet Take 0.25 mg by mouth nightly as needed for Anxiety.       Insulin Degludec (TRESIBA FLEXTOUCH) 100 UNIT/ML SOPN INJECT 60 UNITS UNDER THE SKIN NIGHTLY 15 mL 3    insulin lispro, 1 Unit Dial, (HUMALOG KWIKPEN) 100 UNIT/ML SOPN 14 units at each meals hold if glucose less than 150 5 pen 3    butalbital-acetaminophen-caffeine (FIORICET, ESGIC) -40 MG per tablet Take 1 tablet by mouth every 6 hours as needed for Headaches 30 tablet 1    spironolactone (ALDACTONE) 50 MG tablet TAKE 1 TABLET BY MOUTH DAILY 30 tablet 3    dicyclomine (BENTYL) 10 MG capsule Take 1 capsule by mouth every 6 hours as needed (cramps) 120 capsule 0    ondansetron (ZOFRAN) 4 MG tablet Take 1 tablet by mouth every 8 hours as needed for Nausea 20 tablet 0    buPROPion (WELLBUTRIN XL) 150 MG extended release tablet TAKE 1 TABLET BY MOUTH EVERY MORNING 30 tablet 3    fluticasone (FLONASE) 50 MCG/ACT nasal spray SHAKE LIQUID AND USE 1 SPRAY IN EACH NOSTRIL DAILY 16 g 5    budesonide-formoterol (SYMBICORT) 160-4.5 MCG/ACT AERO Inhale 2 puffs into the lungs 2 times daily 1 Inhaler 3    tiotropium (SPIRIVA RESPIMAT) 2.5 MCG/ACT AERS inhaler Inhale 2 puffs into the lungs daily 1 Inhaler 3    busPIRone (BUSPAR) 15 MG tablet TAKE 1 TABLET BY MOUTH THREE TIMES DAILY 90 tablet 5    acetaminophen (TYLENOL) 500 MG tablet Take 1 tablet by mouth 4 times daily as needed for Pain 360 tablet 1    OneTouch Delica Lancets 50P MISC qid 200 each 3    Blood Glucose Monitoring Suppl (ONETOUCH VERIO) w/Device KIT As  Directed 1 kit 00    Insulin Pen Needle (NOVOFINE) 32G X 6 MM MISC qid 300 each 3    baclofen (LIORESAL) 10 MG tablet Take 1 tablet by mouth 3 times daily 60 tablet 0    tiZANidine (ZANAFLEX) 2 MG tablet Take 1 tablet by mouth 3 times daily as needed (back pain/ spasm) 15 tablet 0    magnesium oxide (MAG-OX) 400 (241.3 Mg) MG TABS tablet TAKE 1/2 TABLET BY MOUTH DAILY 30 tablet 5    albuterol sulfate HFA (PROVENTIL HFA) 108 (90 Base) MCG/ACT inhaler Inhale 2 puffs into the lungs every 6 hours as needed for Wheezing 1 Inhaler 3    blood glucose test strips (EXACTECH TEST) strip 1 each by In Vitro route 3 times daily (Accu-check test strips) As needed.  DX:E11.65 300 strip 5    ONE TOUCH ULTRASOFT LANCETS MISC TEST 3 TIMES DAILY AS NEEDED 300 each 3    albuterol (PROVENTIL) (2.5 MG/3ML) 0.083% nebulizer solution Take 3 mLs by nebulization every 4 hours as needed for Wheezing 120 each 3    atorvastatin (LIPITOR) 40 MG tablet Take 1 tablet by mouth nightly 30 tablet 3    Insulin Syringe-Needle U-100 30G X 1/2\" 1 ML MISC 1 each by Does not apply route daily 100 each 3    blood glucose test strips (ONETOUCH VERIO) strip TEST 3 TIMES DAILY AS NEEDED 300 each 3    Blood Glucose Monitoring Suppl (ONETOUCH VERIO) w/Device KIT TEST 3 TIMES DAILY AS NEEDED 1 kit 0    HYDROcodone-acetaminophen (NORCO) 5-325 MG per tablet Take 1 tablet by mouth every 6 hours as needed for Pain. (Patient not taking: Reported on 7/8/2021)      metoclopramide (REGLAN) 10 MG tablet Take 1 tablet by mouth 2 times daily as needed (Headache) (Patient not taking: Reported on 9/7/2021) 60 tablet 0    SUMAtriptan (IMITREX) 25 MG tablet TAKE 1 TABLET BY MOUTH 1 TIME AS NEEDED FOR MIGRAINE (Patient not taking: Reported on 9/7/2021) 9 tablet 1     No current facility-administered medications on file prior to visit. Allergies:  Shellfish-derived products, Ibuprofen, Ketorolac, Morphine, Other, Penicillins, Propoxyphene n-acetaminophen, and Toradol [ketorolac tromethamine]    Review of Systems   Constitutional: Negative for chills and fever. HENT: Negative for congestion. Respiratory: Negative for cough, shortness of breath and wheezing. Cardiovascular: Positive for chest pain (resolved now). Negative for palpitations and leg swelling. Gastrointestinal: Negative for constipation and diarrhea. Musculoskeletal: Positive for gait problem and myalgias. Neurological: Negative for dizziness and headaches.        Objective:   /72 (Site: Right Upper Arm, Position: Sitting, Cuff Size: Large Adult)   Pulse 80   Temp 97.6 °F (36.4 °C) (Temporal)   Ht 5' 3\" (1.6 m)   Wt 273 lb (123.8 kg)   SpO2 98%   Breastfeeding No   BMI 48.36 kg/m²     Physical clinicalstatus, medications, activities and diet. Side effects, adverse effects of the medication prescribedtoday, as well as treatment plan/ rationale and result expectations have been discussedwith the patient who expresses understanding and desires to proceed. Close follow upto evaluate treatment results and for coordination of care. I have reviewedthe patient's medical history in detail and updated the computerized patient record.     Monica Garcia, APRN - CNP

## 2021-09-08 ASSESSMENT — ENCOUNTER SYMPTOMS
CONSTIPATION: 0
WHEEZING: 0
COUGH: 0
SHORTNESS OF BREATH: 0
DIARRHEA: 0

## 2021-09-13 ENCOUNTER — HOSPITAL ENCOUNTER (EMERGENCY)
Age: 51
Discharge: HOME | End: 2021-09-14
Payer: MEDICAID

## 2021-09-13 ENCOUNTER — APPOINTMENT (OUTPATIENT)
Dept: GENERAL RADIOLOGY | Age: 51
End: 2021-09-13
Payer: MEDICAID

## 2021-09-13 DIAGNOSIS — Z86.2 HISTORY OF SARCOIDOSIS: ICD-10-CM

## 2021-09-13 DIAGNOSIS — R07.9 CHEST PAIN, UNSPECIFIED TYPE: Primary | ICD-10-CM

## 2021-09-13 PROCEDURE — 96374 THER/PROPH/DIAG INJ IV PUSH: CPT

## 2021-09-13 PROCEDURE — 36415 COLL VENOUS BLD VENIPUNCTURE: CPT

## 2021-09-13 PROCEDURE — 96375 TX/PRO/DX INJ NEW DRUG ADDON: CPT

## 2021-09-13 PROCEDURE — 96376 TX/PRO/DX INJ SAME DRUG ADON: CPT

## 2021-09-13 PROCEDURE — 93005 ELECTROCARDIOGRAM TRACING: CPT | Performed by: STUDENT IN AN ORGANIZED HEALTH CARE EDUCATION/TRAINING PROGRAM

## 2021-09-13 PROCEDURE — 71045 X-RAY EXAM CHEST 1 VIEW: CPT

## 2021-09-13 PROCEDURE — 99285 EMERGENCY DEPT VISIT HI MDM: CPT

## 2021-09-13 PROCEDURE — 84484 ASSAY OF TROPONIN QUANT: CPT

## 2021-09-13 PROCEDURE — 83735 ASSAY OF MAGNESIUM: CPT

## 2021-09-13 PROCEDURE — 80053 COMPREHEN METABOLIC PANEL: CPT

## 2021-09-13 PROCEDURE — 82550 ASSAY OF CK (CPK): CPT

## 2021-09-13 PROCEDURE — 82553 CREATINE MB FRACTION: CPT

## 2021-09-13 RX ORDER — ALBUTEROL SULFATE 90 UG/1
2 AEROSOL, METERED RESPIRATORY (INHALATION) EVERY 6 HOURS PRN
Qty: 1 EACH | Refills: 1 | Status: SHIPPED | OUTPATIENT
Start: 2021-09-13 | End: 2021-11-01

## 2021-09-13 RX ORDER — ONDANSETRON 2 MG/ML
4 INJECTION INTRAMUSCULAR; INTRAVENOUS ONCE
Status: COMPLETED | OUTPATIENT
Start: 2021-09-13 | End: 2021-09-14

## 2021-09-13 ASSESSMENT — PAIN DESCRIPTION - DESCRIPTORS: DESCRIPTORS: SHARP;PRESSURE

## 2021-09-13 ASSESSMENT — PAIN SCALES - GENERAL: PAINLEVEL_OUTOF10: 9

## 2021-09-13 ASSESSMENT — PAIN DESCRIPTION - LOCATION: LOCATION: CHEST

## 2021-09-13 ASSESSMENT — PAIN DESCRIPTION - PAIN TYPE: TYPE: ACUTE PAIN

## 2021-09-13 NOTE — TELEPHONE ENCOUNTER
Requesting medication refill.  Please approve or deny this request.    Rx requested:  Requested Prescriptions     Pending Prescriptions Disp Refills    albuterol sulfate HFA (PROVENTIL HFA) 108 (90 Base) MCG/ACT inhaler       Sig: Inhale 2 puffs into the lungs every 6 hours as needed for Wheezing       Last Office Visit:   9/7/2021    Last Filled:      Last Labs:      Next Visit Date:  Future Appointments   Date Time Provider Valerie Cosby   9/29/2021 10:45 AM Fredy Rodriguez MD [facility]   10/7/2021  4:00 PM Lesley Shukla  [address]   11/5/2021  9:15 AM Miri Still MD [facility]   12/7/2021  3:15 PM Lilibeth Summers, [address]

## 2021-09-14 VITALS
DIASTOLIC BLOOD PRESSURE: 73 MMHG | SYSTOLIC BLOOD PRESSURE: 114 MMHG | RESPIRATION RATE: 18 BRPM | OXYGEN SATURATION: 95 % | HEART RATE: 75 BPM | BODY MASS INDEX: 45.18 KG/M2 | HEIGHT: 63 IN | TEMPERATURE: 98 F | WEIGHT: 255 LBS

## 2021-09-14 LAB
ALBUMIN SERPL-MCNC: 4.3 G/DL (ref 3.5–4.6)
ALP BLD-CCNC: 169 U/L (ref 40–130)
ALT SERPL-CCNC: 19 U/L (ref 0–33)
ANION GAP SERPL CALCULATED.3IONS-SCNC: 11 MEQ/L (ref 9–15)
AST SERPL-CCNC: 20 U/L (ref 0–35)
BASOPHILS ABSOLUTE: 0.1 K/UL (ref 0–0.2)
BASOPHILS RELATIVE PERCENT: 0.6 %
BILIRUB SERPL-MCNC: 0.3 MG/DL (ref 0.2–0.7)
BUN BLDV-MCNC: 13 MG/DL (ref 6–20)
CALCIUM SERPL-MCNC: 9.5 MG/DL (ref 8.5–9.9)
CHLORIDE BLD-SCNC: 99 MEQ/L (ref 95–107)
CK MB: 2.7 NG/ML (ref 0–3.8)
CO2: 24 MEQ/L (ref 20–31)
CREAT SERPL-MCNC: 1.29 MG/DL (ref 0.5–0.9)
CREATINE KINASE-MB INDEX: 0.5 % (ref 0–3.5)
EKG ATRIAL RATE: 79 BPM
EKG P AXIS: 30 DEGREES
EKG P-R INTERVAL: 154 MS
EKG Q-T INTERVAL: 366 MS
EKG QRS DURATION: 84 MS
EKG QTC CALCULATION (BAZETT): 419 MS
EKG R AXIS: 4 DEGREES
EKG T AXIS: 13 DEGREES
EKG VENTRICULAR RATE: 79 BPM
EOSINOPHILS ABSOLUTE: 0.3 K/UL (ref 0–0.7)
EOSINOPHILS RELATIVE PERCENT: 2.9 %
GFR AFRICAN AMERICAN: 52.8
GFR NON-AFRICAN AMERICAN: 43.7
GLOBULIN: 3.7 G/DL (ref 2.3–3.5)
GLUCOSE BLD-MCNC: 266 MG/DL (ref 70–99)
HCT VFR BLD CALC: 46.6 % (ref 37–47)
HEMOGLOBIN: 15.7 G/DL (ref 12–16)
LYMPHOCYTES ABSOLUTE: 2.2 K/UL (ref 1–4.8)
LYMPHOCYTES RELATIVE PERCENT: 24.2 %
MAGNESIUM: 1.9 MG/DL (ref 1.7–2.4)
MCH RBC QN AUTO: 31.5 PG (ref 27–31.3)
MCHC RBC AUTO-ENTMCNC: 33.7 % (ref 33–37)
MCV RBC AUTO: 93.6 FL (ref 82–100)
MONOCYTES ABSOLUTE: 0.5 K/UL (ref 0.2–0.8)
MONOCYTES RELATIVE PERCENT: 5.8 %
NEUTROPHILS ABSOLUTE: 6 K/UL (ref 1.4–6.5)
NEUTROPHILS RELATIVE PERCENT: 66.5 %
PDW BLD-RTO: 13.4 % (ref 11.5–14.5)
PLATELET # BLD: 256 K/UL (ref 130–400)
POTASSIUM SERPL-SCNC: 4.7 MEQ/L (ref 3.4–4.9)
RBC # BLD: 4.98 M/UL (ref 4.2–5.4)
SODIUM BLD-SCNC: 134 MEQ/L (ref 135–144)
TOTAL CK: 492 U/L (ref 0–170)
TOTAL PROTEIN: 8 G/DL (ref 6.3–8)
TROPONIN: <0.01 NG/ML (ref 0–0.01)
TROPONIN: <0.01 NG/ML (ref 0–0.01)
WBC # BLD: 9.1 K/UL (ref 4.8–10.8)

## 2021-09-14 PROCEDURE — 93010 ELECTROCARDIOGRAM REPORT: CPT | Performed by: INTERNAL MEDICINE

## 2021-09-14 PROCEDURE — 96374 THER/PROPH/DIAG INJ IV PUSH: CPT

## 2021-09-14 PROCEDURE — 96375 TX/PRO/DX INJ NEW DRUG ADDON: CPT

## 2021-09-14 PROCEDURE — 36415 COLL VENOUS BLD VENIPUNCTURE: CPT

## 2021-09-14 PROCEDURE — 84484 ASSAY OF TROPONIN QUANT: CPT

## 2021-09-14 PROCEDURE — 96376 TX/PRO/DX INJ SAME DRUG ADON: CPT

## 2021-09-14 PROCEDURE — 6360000002 HC RX W HCPCS: Performed by: PHYSICIAN ASSISTANT

## 2021-09-14 PROCEDURE — 85025 COMPLETE CBC W/AUTO DIFF WBC: CPT

## 2021-09-14 RX ADMIN — HYDROMORPHONE HYDROCHLORIDE 1 MG: 1 INJECTION, SOLUTION INTRAMUSCULAR; INTRAVENOUS; SUBCUTANEOUS at 00:35

## 2021-09-14 RX ADMIN — HYDROMORPHONE HYDROCHLORIDE 0.5 MG: 1 INJECTION, SOLUTION INTRAMUSCULAR; INTRAVENOUS; SUBCUTANEOUS at 02:42

## 2021-09-14 RX ADMIN — ONDANSETRON 4 MG: 2 INJECTION INTRAMUSCULAR; INTRAVENOUS at 00:35

## 2021-09-14 ASSESSMENT — PAIN SCALES - GENERAL
PAINLEVEL_OUTOF10: 3
PAINLEVEL_OUTOF10: 3
PAINLEVEL_OUTOF10: 8
PAINLEVEL_OUTOF10: 3

## 2021-09-14 ASSESSMENT — ENCOUNTER SYMPTOMS
EYE DISCHARGE: 0
APNEA: 0
VOICE CHANGE: 0
COUGH: 0
VOMITING: 0
ANAL BLEEDING: 0
ABDOMINAL DISTENTION: 0
NAUSEA: 0
SHORTNESS OF BREATH: 0
PHOTOPHOBIA: 0

## 2021-09-14 NOTE — ED NOTES
Bed: 07  Expected date: 9/14/21  Expected time: 3:12 AM  Means of arrival:   Comments:  Bed 978 [facility]  09/14/21 7679

## 2021-09-14 NOTE — ED TRIAGE NOTES
Arrived to the ER for c/o chest pain. States CP began around 1400 while sleeping and increased in intensity in the last hour. Pain is a sharp pressure that is midsternal and radiates to the back. Denies n/v, diaphoresis. Took Norco earlier without relief. No aggravating or relieving factors.

## 2021-09-14 NOTE — ED PROVIDER NOTES
Arthritis     Asthma     Bronchopneumonia     Cancer [address]     renal    Cerebral artery occlusion with cerebral infarction ([facility]     Chronic bilateral low back pain with sciatica     Chronic kidney disease     Chronic obstructive lung disease ([facility] 7/26/2019    Depression     Fibromyalgia     Hypertension     Insulin dependent type 2 diabetes mellitus, uncontrolled ([facility] 8/3/2018    Localized enlarged lymph nodes 10/26/2018    Mixed headache     Pure hyperglyceridemia 5/19/2017    Sarcoidosis     Sleep apnea     does not wear cpap    Thyroid goiter          SURGICALHISTORY       Past Surgical History:   Procedure Laterality Date    BRONCHOSCOPY  10/26/2018    DR. STEARNS    KIDNEY REMOVAL Right 08/2016    KIDNEY REMOVAL Right 2016    LUNG BIOPSY Right 10/2018    THYROID LOBECTOMY Right 6/13/14    THYROIDECTOMY  02/21/2019    DR. MAGDALENO    THYROIDECTOMY  2018    URETER STENT PLACEMENT Left 08/2016         CURRENT MEDICATIONS       Previous Medications    ACETAMINOPHEN (TYLENOL) 500 MG TABLET    Take 1 tablet by mouth 4 times daily as needed for Pain    ALBUTEROL (PROVENTIL) (2.5 MG/3ML) 0.083% NEBULIZER SOLUTION    Take 3 mLs by nebulization every 4 hours as needed for Wheezing    ALBUTEROL SULFATE HFA (PROVENTIL HFA) 108 (90 BASE) MCG/ACT INHALER    Inhale 2 puffs into the lungs every 6 hours as needed for Wheezing    ALPRAZOLAM (XANAX) 0.25 MG TABLET    Take 0.25 mg by mouth nightly as needed for Anxiety. ATORVASTATIN (LIPITOR) 40 MG TABLET    Take 1 tablet by mouth nightly    BACLOFEN (LIORESAL) 10 MG TABLET    Take 1 tablet by mouth 3 times daily    BLOOD GLUCOSE MONITORING SUPPL (ONETOUCH VERIO) W/DEVICE KIT    TEST 3 TIMES DAILY AS NEEDED    BLOOD GLUCOSE MONITORING SUPPL (ONETOUCH VERIO) W/DEVICE KIT    As  Directed    BLOOD GLUCOSE TEST STRIPS (EXACTECH TEST) STRIP    1 each by In Vitro route 3 times daily (Accu-check test strips) As needed.  DX:E11.65    BLOOD GLUCOSE TEST STRIPS (ONETOUCH VERIO) STRIP    TEST 3 TIMES DAILY AS NEEDED    BLOOD GLUCOSE TEST STRIPS (ONETOUCH VERIO) STRIP    1 each by In Vitro route daily As needed.     BUDESONIDE-FORMOTEROL (SYMBICORT) 160-4.5 MCG/ACT AERO    Inhale 2 puffs into the lungs 2 times daily    BUPROPION (WELLBUTRIN XL) 150 MG EXTENDED RELEASE TABLET    TAKE 1 TABLET BY MOUTH EVERY MORNING    BUSPIRONE (BUSPAR) 15 MG TABLET    TAKE 1 TABLET BY MOUTH THREE TIMES DAILY    BUTALBITAL-ACETAMINOPHEN-CAFFEINE (FIORICET, ESGIC) -40 MG PER TABLET    Take 1 tablet by mouth every 6 hours as needed for Headaches    CETIRIZINE (ZYRTEC) 10 MG TABLET    TAKE 1 TABLET BY MOUTH EVERY NIGHT AT BEDTIME AS NEEDED FOR ALLERGIES    DICLOFENAC SODIUM (VOLTAREN) 1 % GEL    Apply 2 g topically 4 times daily    DICYCLOMINE (BENTYL) 10 MG CAPSULE    Take 1 capsule by mouth every 6 hours as needed (cramps)    FLUTICASONE (FLONASE) 50 MCG/ACT NASAL SPRAY    SHAKE LIQUID AND USE 1 SPRAY IN EACH NOSTRIL DAILY    INSULIN DEGLUDEC (TRESIBA FLEXTOUCH) 100 UNIT/ML SOPN    INJECT 60 UNITS UNDER THE SKIN NIGHTLY    INSULIN LISPRO, 1 UNIT DIAL, (HUMALOG KWIKPEN) 100 UNIT/ML SOPN    14 units at each meals hold if glucose less than 150    INSULIN PEN NEEDLE (B-D ULTRAFINE III SHORT PEN) 31G X 8 MM MISC    Inject 1 each into the skin 3 times daily And as needed    INSULIN PEN NEEDLE (NOVOFINE) 32G X 6 MM MISC    qid    INSULIN SYRINGE-NEEDLE U-100 30G X 1/2\" 1 ML MISC    1 each by Does not apply route daily    LEVOTHYROXINE (SYNTHROID) 125 MCG TABLET    Take 1 tablet by mouth Daily    MAGNESIUM OXIDE (MAG-OX) 400 (241.3 MG) MG TABS TABLET    TAKE 1/2 TABLET BY MOUTH DAILY    METOCLOPRAMIDE (REGLAN) 10 MG TABLET    Take 1 tablet by mouth 2 times daily as needed (Headache)    MONTELUKAST (SINGULAIR) 10 MG TABLET    TAKE 1 TABLET BY MOUTH EVERY NIGHT AT SUPPER FOR BREATHING OR ALLERGIES    ONDANSETRON (ZOFRAN) 4 MG TABLET    Take 1 tablet by mouth every 8 hours as needed for Nausea ONE TOUCH ULTRASOFT LANCETS MISC    TEST 3 TIMES DAILY AS NEEDED    ONETOUCH DELICA LANCETS 42I MISC    qid    PREGABALIN (LYRICA) 150 MG CAPSULE    Take 1 capsule by mouth 3 times daily for 90 days.     SPIRONOLACTONE (ALDACTONE) 50 MG TABLET    TAKE 1 TABLET BY MOUTH DAILY    SUMATRIPTAN (IMITREX) 25 MG TABLET    TAKE 1 TABLET BY MOUTH 1 TIME AS NEEDED FOR MIGRAINE    TIOTROPIUM (SPIRIVA RESPIMAT) 2.5 MCG/ACT AERS INHALER    Inhale 2 puffs into the lungs daily    TIZANIDINE (ZANAFLEX) 2 MG TABLET    Take 1 tablet by mouth 3 times daily as needed (back pain/ spasm)    VENLAFAXINE (EFFEXOR XR) 75 MG EXTENDED RELEASE CAPSULE    Take 1 capsule by mouth daily            Shellfish-derived products, Ibuprofen, Ketorolac, Morphine, Other, Penicillins, Propoxyphene n-acetaminophen, and Toradol [ketorolac tromethamine]    FAMILY HISTORY       Family History   Problem Relation Age of Onset    Cancer Father     Diabetes Father     Allergy (Severe) Father     Heart Attack Father     Prostate Cancer Father     High Blood Pressure Mother     Diabetes Mother     Arthritis Mother     High Cholesterol Mother     Vision Loss Mother     Alcohol Abuse Neg Hx     Anemia Neg Hx     Arrhythmia Neg Hx     Asthma Neg Hx     Atrial Fibrillation Neg Hx     Birth Defects Neg Hx     Breast Cancer Neg Hx     Coronary Art Dis Neg Hx     Colon Cancer Neg Hx     Depression Neg Hx     Early Death Neg Hx     Hearing Loss Neg Hx     Heart Disease Neg Hx     Learning Disabilities Neg Hx     Kidney Disease Neg Hx     Mental Illness Neg Hx     Mental Retardation Neg Hx     Miscarriages / Stillbirths Neg Hx     Obesity Neg Hx     Osteoporosis Neg Hx     Stroke Neg Hx     Substance Abuse Neg Hx           SOCIAL HISTORY       Social History     Socioeconomic History    Marital status: Legally      Spouse name: Not on file    Number of children: Not on file    Years of education: 12    Highest education level: High school graduate   Occupational History    Not on file   Tobacco Use    Smoking status: Former Smoker     Packs/day: 0.50     Years: 15.00     Pack years: 7.50     Types: Cigarettes     Start date: 2014     Quit date: 2015     Years since quittin.6    Smokeless tobacco: Former User     Quit date: 3/23/2016   Vaping Use    Vaping Use: Never used   Substance and Sexual Activity    Alcohol use: Not Currently     Alcohol/week: 0.0 standard drinks     Comment: occasionally    Drug use: Yes     Frequency: 5.0 times per week     Types: Marijuana    Sexual activity: Yes     Partners: Male   Other Topics Concern    Not on file   Social History Narrative    Not on file     Social Determinants of Health     Financial Resource Strain: Medium Risk    Difficulty of Paying Living Expenses: Somewhat hard   Food Insecurity: Food Insecurity Present    Worried About Running Out of Food in the Last Year: Sometimes true    Maximiliano of Food in the Last Year: Sometimes true   Transportation Needs: No Transportation Needs    Lack of Transportation (Medical): No    Lack of Transportation (Non-Medical): No   Physical Activity: Inactive    Days of Exercise per Week: 0 days    Minutes of Exercise per Session: 0 min   Stress: Stress Concern Present    Feeling of Stress : Very much   Social Connections: Socially Isolated    Frequency of Communication with Friends and Family:  Three times a week    Frequency of Social Gatherings with Friends and Family: Never    Attends Mormon Services: Never    Active Member of Clubs or Organizations: No    Attends Club or Organization Meetings: Never    Marital Status:    Intimate Partner Violence: Not At Risk    Fear of Current or Ex-Partner: No    Emotionally Abused: No    Physically Abused: No    Sexually Abused: No       SCREENINGS   Ebola Virus Disease (EVD) Screening   Temp: 98 °F (36.7 °C)  WA Assessment  BP: 132/79  Pulse: 76    PHYSICAL EXAM (up to 7 for level 4, 8 or more for level 5)     ED Triage Vitals [09/13/21 2258]   BP Temp Temp Source Pulse Resp SpO2 Height Weight   132/79 98 °F (36.7 °C) Oral 75 16 98 % 5' 3\" (1.6 m) 255 lb (115.7 kg)       Physical Exam  Vitals and nursing note reviewed. Constitutional:       General: She is not in acute distress. Appearance: She is well-developed. HENT:      Head: Normocephalic and atraumatic. Right Ear: Tympanic membrane and external ear normal.      Left Ear: Tympanic membrane and external ear normal.      Nose: Nose normal.      Mouth/Throat:      Mouth: Mucous membranes are moist.   Eyes:      General:         Right eye: No discharge. Left eye: No discharge. Pupils: Pupils are equal, round, and reactive to light. Cardiovascular:      Rate and Rhythm: Normal rate and regular rhythm. Pulses: Normal pulses. Heart sounds: Normal heart sounds. Pulmonary:      Effort: Pulmonary effort is normal. No respiratory distress. Breath sounds: Normal breath sounds. No stridor. Abdominal:      General: Bowel sounds are normal. There is no distension. Palpations: Abdomen is soft. Tenderness: There is no right CVA tenderness or left CVA tenderness. Musculoskeletal:         General: Normal range of motion. Cervical back: Normal range of motion and neck supple. Right lower leg: No edema. Left lower leg: No edema. Skin:     General: Skin is warm. Findings: No erythema. Neurological:      Mental Status: She is alert and oriented to person, place, and time.    Psychiatric:         Mood and Affect: Mood normal.         RESULTS     EKG: All EKG's are interpreted by the Emergency Department Physician who either signs or Co-signsthis chart in the absence of a cardiologist.    EKG normal sinus rate 79 - ST segment ovation    RADIOLOGY:   Non-plain filmimages such as CT, Ultrasound and MRI are read by the radiologist. Plain radiographic images are [address] [facility] [address]  [phone]    Call in 1 day      Dr Willie Jack    Call in 1 day      your cardiologist    Call in 1 day      [facility]) ED  [address] [address]  [phone]    If symptoms worsen      DISCHARGE MEDICATIONS:  New Prescriptions    No medications on file          (Please note that portions of this note were completed with a voice recognition program.  Efforts were made to edit the dictations but occasionally words are mis-transcribed.)    Maya Hernandez PA-C (electronically signed)  Attending Emergency Physician       Maya Hernandez PA-C  09/28/21 [id number]

## 2021-09-20 DIAGNOSIS — Z79.4 TYPE 2 DIABETES MELLITUS WITH HYPERGLYCEMIA, WITH LONG-TERM CURRENT USE OF INSULIN (HCC): ICD-10-CM

## 2021-09-20 DIAGNOSIS — E11.65 TYPE 2 DIABETES MELLITUS WITH HYPERGLYCEMIA, WITH LONG-TERM CURRENT USE OF INSULIN (HCC): ICD-10-CM

## 2021-09-20 NOTE — TELEPHONE ENCOUNTER
Requesting medication refill. Please approve or deny this request.    Rx requested:  Requested Prescriptions     Pending Prescriptions Disp Refills    ALPRAZolam (XANAX) 0.25 MG tablet 30 tablet 0     Sig: Take 1 tablet by mouth nightly as needed for Anxiety for up to 30 days.        Last Office Visit:   9/7/2021    Last Filled:      Last Labs:      Next Visit Date:  Future Appointments   Date Time Provider Department Center   9/29/2021 10:45 AM Johny Alex MD  [address]   10/7/2021  4:00 PM Cecelia Holliday MD [facility]   11/5/2021  9:15 AM [facility]ulkner[facility] [address] [facility]   12/7/2021  3:15 PM Lilibeth Myles, [address]

## 2021-09-21 RX ORDER — ALPRAZOLAM 0.25 MG/1
0.25 TABLET ORAL NIGHTLY PRN
Qty: 30 TABLET | Refills: 0 | Status: SHIPPED | OUTPATIENT
Start: 2021-09-21 | End: 2021-10-21

## 2021-09-27 ENCOUNTER — CARE COORDINATION (OUTPATIENT)
Dept: CARE COORDINATION | Age: 51
End: 2021-09-27

## 2021-09-27 NOTE — CARE COORDINATION
Cornel Cancino  9/27/2021    Registered Dietitian Progress Note for Care Coordination    Assessment: José Miguel Russo is a 49 y.o. female. RD referred for DM. RD spoke with patient for initial nutrition assessment on 7/19 and has been following up with patient. RD called for final follow up with pt today 9/27/2. RD discussed previous goals with pt. Pt states things are going \"good. \" Pt is eating balanced meals consistently and monitoring BS daily. No BS reading provided per pt, pt states she has not yet checked it this morning. No nutrition related questions or concerns at this time. Barriers to meeting goals: overwhelmed by complexity of regimen    Action:  Reiterated the importance of eating balanced meals/snacks consistently throughout the day, taking medicine as directed and monitoring BS daily to help manage BS. Pt will continue to eat 3 meals with 1 snack/day and check BS daily. See assessment note above.     Nutrition Monitoring and Evaluation  Indicator/Goal Criteria Progress   #1 Eat balanced meals consistently throughout the day. Do not skip meals. #1 Focus on making meals balanced and eat 3 meals instead of only 2 meals/day. Make these meals balanced using the MyPlate [id number]-1/0 plate fruits and/or vegetables, 1/4 plate protein and 1/4 plate starchy carbohydrates with 8 oz glass of low fat milk if desired. #1 Pt is eating 3 meals/day with one balanced snack.    #2  Check BS daily and take medicine as directed. #2 Continue checking BS BID and keep a log. #2 Pt is checking BS daily. Pt states BS \"fluctuates\" but no BS reading provided per pt. Plan of Care:  RD encouraged pt to keep working toward goals set. RD explained to pt this is final follow up call and provided contact information to pt. Encouraged pt to call RD in future with any nutrition related questions or concerns. Follow Up:    Final follow up call today 9/27/21. RD will continue to follow/assist with patient return call. [facility], [address] [place] [address]

## 2021-09-28 ENCOUNTER — HOSPITAL ENCOUNTER (EMERGENCY)
Age: 51
Discharge: HOME | End: 2021-09-29
Attending: EMERGENCY MEDICINE
Payer: MEDICAID

## 2021-09-28 DIAGNOSIS — R07.9 CHEST PAIN, UNSPECIFIED TYPE: Primary | ICD-10-CM

## 2021-09-28 PROCEDURE — 93005 ELECTROCARDIOGRAM TRACING: CPT | Performed by: EMERGENCY MEDICINE

## 2021-09-28 PROCEDURE — 96374 THER/PROPH/DIAG INJ IV PUSH: CPT

## 2021-09-28 PROCEDURE — 99285 EMERGENCY DEPT VISIT HI MDM: CPT

## 2021-09-28 ASSESSMENT — PAIN DESCRIPTION - FREQUENCY: FREQUENCY: CONTINUOUS

## 2021-09-28 ASSESSMENT — PAIN DESCRIPTION - ORIENTATION: ORIENTATION: MID

## 2021-09-28 ASSESSMENT — PAIN SCALES - GENERAL: PAINLEVEL_OUTOF10: 9

## 2021-09-28 ASSESSMENT — PAIN DESCRIPTION - LOCATION: LOCATION: CHEST

## 2021-09-28 ASSESSMENT — PAIN DESCRIPTION - DESCRIPTORS: DESCRIPTORS: PRESSURE

## 2021-09-28 ASSESSMENT — PAIN DESCRIPTION - PAIN TYPE: TYPE: ACUTE PAIN

## 2021-09-29 ENCOUNTER — OFFICE VISIT (OUTPATIENT)
Dept: PULMONOLOGY | Age: 51
End: 2021-09-29
Payer: MEDICAID

## 2021-09-29 ENCOUNTER — APPOINTMENT (OUTPATIENT)
Dept: GENERAL RADIOLOGY | Age: 51
End: 2021-09-29
Payer: MEDICAID

## 2021-09-29 VITALS
SYSTOLIC BLOOD PRESSURE: 109 MMHG | TEMPERATURE: 96.4 F | BODY MASS INDEX: 48.05 KG/M2 | HEART RATE: 71 BPM | OXYGEN SATURATION: 98 % | HEIGHT: 63 IN | WEIGHT: 271.2 LBS | DIASTOLIC BLOOD PRESSURE: 60 MMHG | RESPIRATION RATE: 16 BRPM

## 2021-09-29 VITALS
DIASTOLIC BLOOD PRESSURE: 73 MMHG | TEMPERATURE: 97.4 F | OXYGEN SATURATION: 97 % | BODY MASS INDEX: 45.36 KG/M2 | WEIGHT: 256 LBS | RESPIRATION RATE: 13 BRPM | HEIGHT: 63 IN | HEART RATE: 81 BPM | SYSTOLIC BLOOD PRESSURE: 121 MMHG

## 2021-09-29 DIAGNOSIS — G47.30 SLEEP APNEA, UNSPECIFIED TYPE: ICD-10-CM

## 2021-09-29 DIAGNOSIS — E66.01 CLASS 3 SEVERE OBESITY DUE TO EXCESS CALORIES WITH SERIOUS COMORBIDITY AND BODY MASS INDEX (BMI) OF 45.0 TO 49.9 IN ADULT (HCC): ICD-10-CM

## 2021-09-29 DIAGNOSIS — G89.29 OTHER CHRONIC PAIN: ICD-10-CM

## 2021-09-29 DIAGNOSIS — J45.41 MODERATE PERSISTENT ASTHMA WITH ACUTE EXACERBATION: ICD-10-CM

## 2021-09-29 DIAGNOSIS — D86.9 SARCOIDOSIS: Primary | ICD-10-CM

## 2021-09-29 LAB
ALBUMIN SERPL-MCNC: 3.9 G/DL (ref 3.5–4.6)
ALP BLD-CCNC: 160 U/L (ref 40–130)
ALT SERPL-CCNC: 17 U/L (ref 0–33)
ANION GAP SERPL CALCULATED.3IONS-SCNC: 9 MEQ/L (ref 9–15)
AST SERPL-CCNC: 19 U/L (ref 0–35)
BASOPHILS ABSOLUTE: 0.1 K/UL (ref 0–0.2)
BASOPHILS RELATIVE PERCENT: 1.6 %
BILIRUB SERPL-MCNC: <0.2 MG/DL (ref 0.2–0.7)
BUN BLDV-MCNC: 16 MG/DL (ref 6–20)
CALCIUM SERPL-MCNC: 9.5 MG/DL (ref 8.5–9.9)
CHLORIDE BLD-SCNC: 100 MEQ/L (ref 95–107)
CO2: 22 MEQ/L (ref 20–31)
CREAT SERPL-MCNC: 1.2 MG/DL (ref 0.5–0.9)
EKG ATRIAL RATE: 82 BPM
EKG P AXIS: 42 DEGREES
EKG P-R INTERVAL: 154 MS
EKG Q-T INTERVAL: 378 MS
EKG QRS DURATION: 84 MS
EKG QTC CALCULATION (BAZETT): 441 MS
EKG R AXIS: 10 DEGREES
EKG T AXIS: 10 DEGREES
EKG VENTRICULAR RATE: 82 BPM
EOSINOPHILS ABSOLUTE: 0.3 K/UL (ref 0–0.7)
EOSINOPHILS RELATIVE PERCENT: 3.6 %
GFR AFRICAN AMERICAN: 57.4
GFR NON-AFRICAN AMERICAN: 47.5
GLOBULIN: 3.3 G/DL (ref 2.3–3.5)
GLUCOSE BLD-MCNC: 262 MG/DL (ref 70–99)
HCT VFR BLD CALC: 44.7 % (ref 37–47)
HEMOGLOBIN: 15.2 G/DL (ref 12–16)
LIPASE: 41 U/L (ref 12–95)
LYMPHOCYTES ABSOLUTE: 2.2 K/UL (ref 1–4.8)
LYMPHOCYTES RELATIVE PERCENT: 29.3 %
MAGNESIUM: 2 MG/DL (ref 1.7–2.4)
MCH RBC QN AUTO: 32.1 PG (ref 27–31.3)
MCHC RBC AUTO-ENTMCNC: 34.1 % (ref 33–37)
MCV RBC AUTO: 94.3 FL (ref 82–100)
MONOCYTES ABSOLUTE: 0.4 K/UL (ref 0.2–0.8)
MONOCYTES RELATIVE PERCENT: 5.7 %
NEUTROPHILS ABSOLUTE: 4.6 K/UL (ref 1.4–6.5)
NEUTROPHILS RELATIVE PERCENT: 59.8 %
PDW BLD-RTO: 13.7 % (ref 11.5–14.5)
PLATELET # BLD: 268 K/UL (ref 130–400)
POTASSIUM SERPL-SCNC: 4 MEQ/L (ref 3.4–4.9)
RBC # BLD: 4.74 M/UL (ref 4.2–5.4)
SODIUM BLD-SCNC: 131 MEQ/L (ref 135–144)
TOTAL PROTEIN: 7.2 G/DL (ref 6.3–8)
TROPONIN: <0.01 NG/ML (ref 0–0.01)
WBC # BLD: 7.6 K/UL (ref 4.8–10.8)

## 2021-09-29 PROCEDURE — 80053 COMPREHEN METABOLIC PANEL: CPT

## 2021-09-29 PROCEDURE — 93010 ELECTROCARDIOGRAM REPORT: CPT | Performed by: INTERNAL MEDICINE

## 2021-09-29 PROCEDURE — G8427 DOCREV CUR MEDS BY ELIG CLIN: HCPCS | Performed by: INTERNAL MEDICINE

## 2021-09-29 PROCEDURE — G8417 CALC BMI ABV UP PARAM F/U: HCPCS | Performed by: INTERNAL MEDICINE

## 2021-09-29 PROCEDURE — 99214 OFFICE O/P EST MOD 30 MIN: CPT | Performed by: INTERNAL MEDICINE

## 2021-09-29 PROCEDURE — 71045 X-RAY EXAM CHEST 1 VIEW: CPT

## 2021-09-29 PROCEDURE — 83735 ASSAY OF MAGNESIUM: CPT

## 2021-09-29 PROCEDURE — 36415 COLL VENOUS BLD VENIPUNCTURE: CPT

## 2021-09-29 PROCEDURE — 6370000000 HC RX 637 (ALT 250 FOR IP): Performed by: EMERGENCY MEDICINE

## 2021-09-29 PROCEDURE — 85025 COMPLETE CBC W/AUTO DIFF WBC: CPT

## 2021-09-29 PROCEDURE — 83690 ASSAY OF LIPASE: CPT

## 2021-09-29 PROCEDURE — 1036F TOBACCO NON-USER: CPT | Performed by: INTERNAL MEDICINE

## 2021-09-29 PROCEDURE — 6360000002 HC RX W HCPCS: Performed by: EMERGENCY MEDICINE

## 2021-09-29 PROCEDURE — 3017F COLORECTAL CA SCREEN DOC REV: CPT | Performed by: INTERNAL MEDICINE

## 2021-09-29 PROCEDURE — 84484 ASSAY OF TROPONIN QUANT: CPT

## 2021-09-29 RX ORDER — ASPIRIN 325 MG
325 TABLET ORAL ONCE
Status: COMPLETED | OUTPATIENT
Start: 2021-09-29 | End: 2021-09-29

## 2021-09-29 RX ORDER — HYDROXYCHLOROQUINE SULFATE 200 MG/1
200 TABLET, FILM COATED ORAL 2 TIMES DAILY
Qty: 60 TABLET | Refills: 0 | Status: SHIPPED | OUTPATIENT
Start: 2021-09-29 | End: 2021-11-04

## 2021-09-29 RX ORDER — FENTANYL CITRATE 50 UG/ML
50 INJECTION, SOLUTION INTRAMUSCULAR; INTRAVENOUS ONCE
Status: COMPLETED | OUTPATIENT
Start: 2021-09-29 | End: 2021-09-29

## 2021-09-29 RX ADMIN — FENTANYL CITRATE 50 MCG: 50 INJECTION INTRAMUSCULAR; INTRAVENOUS at 01:08

## 2021-09-29 RX ADMIN — ASPIRIN 325 MG: 325 TABLET, FILM COATED ORAL at 00:33

## 2021-09-29 ASSESSMENT — PAIN SCALES - GENERAL: PAINLEVEL_OUTOF10: 9

## 2021-09-29 ASSESSMENT — PAIN DESCRIPTION - PAIN TYPE: TYPE: ACUTE PAIN;CHRONIC PAIN

## 2021-09-29 NOTE — ED PROVIDER NOTES
[facility] ED  EMERGENCY MEDICINE     Pt Name: Claudean Bene  MRN: [id number]  [place] 1970  Date of evaluation: 9/28/2021  PCP:    [facility]  Provider: Chuyita Uribe DO    CHIEF COMPLAINT       Chief Complaint   Patient presents with    Chest Pain       HISTORY OF PRESENT ILLNESS    HPI     64-year-old female with history of hypertension, obesity, hyperlipidemia, marijuana abuse, sarcoidosis, COPD, type 2 diabetes, CKD, Major depression, history of TIA, presents to the emergency department with complaint of chest pain on the left side. Is not radiating. Patient has been here multiple times for the same complaint. She has not tried anything at home for pain. She denies any shortness of breath, nausea, vomiting, radiation of the pain. She states it is a 10 out of 10 pain. Denies any heart disease in the past.  She is not on any blood thinners. No alleviating or exacerbating factors. States that she was \"laying down when it started\". Denies any history of cardiac or pulmonary disease. Has not been having any upper respiratory symptoms including cough, congestion, rhinorrhea, fever, or chills. Triage notes and Nursing notes were reviewed by myself. Any discrepancies are addressed above.     PAST MEDICAL HISTORY     Past Medical History:   Diagnosis Date    Anxiety     Arthritis     Asthma     Bronchopneumonia     Cancer ([address]     renal    Cerebral artery occlusion with cerebral infarction (HCC)     Chronic bilateral low back pain with sciatica     Chronic kidney disease     Chronic obstructive lung disease ([address] 7/26/2019    Depression     Fibromyalgia     Hypertension     Insulin dependent type 2 diabetes mellitus, uncontrolled ([address] 8/3/2018    Localized enlarged lymph nodes 10/26/2018    Mixed headache     Pure hyperglyceridemia 5/19/2017    Sarcoidosis     Sleep apnea     does not wear cpap    Thyroid goiter        SURGICAL HISTORY tablet by mouth 3 times daily as needed (back pain/ spasm), Disp-15 tablet, R-0Print      magnesium oxide (MAG-OX) 400 (241.3 Mg) MG TABS tablet TAKE 1/2 TABLET BY MOUTH DAILY, Disp-30 tablet,R-5Normal      SUMAtriptan (IMITREX) 25 MG tablet TAKE 1 TABLET BY MOUTH 1 TIME AS NEEDED FOR MIGRAINE, Disp-9 tablet, R-1Normal      !! blood glucose test strips (EXACTECH TEST) strip 3 TIMES DAILY Starting Mon 12/2/2019, Disp-300 strip, R-5, Normal(Accu-check test strips) As needed. DX:E11.65      !! ONE TOUCH ULTRASOFT LANCETS MISC Disp-300 each, R-3, NormalTEST 3 TIMES DAILY AS NEEDED      albuterol (PROVENTIL) (2.5 MG/3ML) 0.083% nebulizer solution Take 3 mLs by nebulization every 4 hours as needed for Wheezing, Disp-120 each, R-3Normal      atorvastatin (LIPITOR) 40 MG tablet Take 1 tablet by mouth nightly, Disp-30 tablet, R-3Normal      Insulin Syringe-Needle U-100 30G X 1/2\" 1 ML MISC DAILY Starting Fri 11/16/2018, Disp-100 each, R-3, Normal      !! blood glucose test strips (ONETOUCH VERIO) strip Disp-300 each, R-3, NormalTEST 3 TIMES DAILY AS NEEDED      !! Blood Glucose Monitoring Suppl (Lyons Number) w/Device KIT TEST 3 TIMES DAILY AS NEEDED, Disp-1 kit, R-0Normal       !! - Potential duplicate medications found. Please discuss with provider. ALLERGIES       Allergies   Allergen Reactions    Shellfish-Derived Products     Ibuprofen     Ketorolac     Morphine Other (See Comments)     MADE PATIENT VERY SICK VOMITING    Other     Penicillins Swelling    Propoxyphene N-Acetaminophen      Other reaction(s): Hives    Toradol [Ketorolac Tromethamine]      Pt states she cannot take Toradol because she has only 1 kidney.        FAMILY HISTORY       Family History   Problem Relation Age of Onset    Cancer Father     Diabetes Father     Allergy (Severe) Father     Heart Attack Father     Prostate Cancer Father     High Blood Pressure Mother     Diabetes Mother     Arthritis Mother     High Cholesterol Mother     Vision Loss Mother     Alcohol Abuse Neg Hx     Anemia Neg Hx     Arrhythmia Neg Hx     Asthma Neg Hx     Atrial Fibrillation Neg Hx     Birth Defects Neg Hx     Breast Cancer Neg Hx     Coronary Art Dis Neg Hx     Colon Cancer Neg Hx     Depression Neg Hx     Early Death Neg Hx     Hearing Loss Neg Hx     Heart Disease Neg Hx     Learning Disabilities Neg Hx     Kidney Disease Neg Hx     Mental Illness Neg Hx     Mental Retardation Neg Hx     Miscarriages / Stillbirths Neg Hx     Obesity Neg Hx     Osteoporosis Neg Hx     Stroke Neg Hx     Substance Abuse Neg Hx         SOCIAL HISTORY       Social History     Socioeconomic History    Marital status: Legally      Spouse name: None    Number of children: None    Years of education: 15    Highest education level: High school graduate   Occupational History    None   Tobacco Use    Smoking status: Former Smoker     Packs/day: 0.50     Years: 15.00     Pack years: 7.50     Types: Cigarettes     Start date: 2014     Quit date: 2015     Years since quittin.6    Smokeless tobacco: Former User     Quit date: 3/23/2016   Vaping Use    Vaping Use: Never used   Substance and Sexual Activity    Alcohol use: Not Currently     Alcohol/week: 0.0 standard drinks     Comment: occasionally    Drug use: Yes     Frequency: 5.0 times per week     Types: Marijuana    Sexual activity: Yes     Partners: Male   Other Topics Concern    None   Social History Narrative    None     Social Determinants of Health     Financial Resource Strain: Medium Risk    Difficulty of Paying Living Expenses: Somewhat hard   Food Insecurity: Food Insecurity Present    Worried About Running Out of Food in the Last Year: Sometimes true    Maximiliano of Food in the Last Year: Sometimes true   Transportation Needs: No Transportation Needs    Lack of Transportation (Medical): No    Lack of Transportation (Non-Medical):  No   Physical Activity: Inactive    Days of Exercise per Week: 0 days    Minutes of Exercise per Session: 0 min   Stress: Stress Concern Present    Feeling of Stress : Very much   Social Connections: Socially Isolated    Frequency of Communication with Friends and Family: Three times a week    Frequency of Social Gatherings with Friends and Family: Never    Attends Advent Services: Never    Active Member of Clubs or Organizations: No    Attends Club or Organization Meetings: Never    Marital Status:    Intimate Partner Violence: Not At Risk    Fear of Current or Ex-Partner: No    Emotionally Abused: No    Physically Abused: No    Sexually Abused: No       REVIEW OF SYSTEMS     Review of Systems   Cardiovascular: Positive for chest pain. Except as noted above the remainder of the review of systems was reviewed and is negative. SCREENINGS        [place] Coma Scale  Eye Opening: Spontaneous  Best Verbal Response: Oriented  Best Motor Response: Obeys commands  [place] Coma Scale Score: 15               PHYSICAL EXAM    (up to 7 for level 4, 8 or more for level 5)     ED Triage Vitals [09/28/21 2338]   BP Temp Temp Source Pulse Resp SpO2 Height Weight   127/71 97.4 °F (36.3 °C) Oral 90 20 96 % 5' 3\" (1.6 m) 256 lb (116.1 kg)       Physical Exam  Constitutional:       General: She is not in acute distress. Appearance: Normal appearance. She is normal weight. She is not ill-appearing. HENT:      Head: Normocephalic and atraumatic. Right Ear: External ear normal.      Left Ear: External ear normal.      Nose: Nose normal. No congestion or rhinorrhea. Mouth/Throat:      Mouth: Mucous membranes are moist.      Pharynx: No oropharyngeal exudate or posterior oropharyngeal erythema. Eyes:      General:         Right eye: No discharge. Left eye: No discharge. Extraocular Movements: Extraocular movements intact. Pupils: Pupils are equal, round, and reactive to light. Cardiovascular:      Rate and Rhythm: Normal rate and regular rhythm. Pulses: Normal pulses. Pulmonary:      Effort: Pulmonary effort is normal.      Breath sounds: Normal breath sounds. Abdominal:      General: Abdomen is flat. Bowel sounds are normal. There is no distension. Tenderness: There is no abdominal tenderness. There is no right CVA tenderness, left CVA tenderness, guarding or rebound. Musculoskeletal:         General: No swelling or tenderness. Normal range of motion. Cervical back: Normal range of motion and neck supple. Right lower leg: No edema. Left lower leg: No edema. Skin:     General: Skin is warm and dry. Capillary Refill: Capillary refill takes less than 2 seconds. Neurological:      General: No focal deficit present. Mental Status: She is alert. Psychiatric:         Mood and Affect: Mood normal.           DIAGNOSTIC RESULTS     EKG:(none if blank)  All EKGs are interpreted by the Emergency Department Physician who either signs or Co-signs this chart in the absence of a cardiologist.    EKG performed at 2351 shows normal sinus rhythm with a heart of 82 bpm QTC of 441 ms. No significant ST-T wave abnormalities noted. RADIOLOGY: (none if blank)   I directly visualized the following images and reviewed the radiologist interpretations.   Interpretation per the Radiologist below, if available at the time of this note:  XR CHEST PORTABLE    (Results Pending)       LABS:  Labs Reviewed   COMPREHENSIVE METABOLIC PANEL - Abnormal; Notable for the following components:       Result Value    Sodium 131 (*)     Glucose 262 (*)     CREATININE 1.20 (*)     GFR Non- 47.5 (*)     GFR  57.4 (*)     Alkaline Phosphatase 160 (*)     All other components within normal limits   CBC WITH AUTO DIFFERENTIAL - Abnormal; Notable for the following components:    MCH 32.1 (*)     All other components within normal limits   LIPASE MAGNESIUM   TROPONIN       All other labs were within normal range or not returned as of this dictation. Please note, any cultures that may have been sent were not resulted at the time of this patient visit. EMERGENCY DEPARTMENT COURSE and Medical Decision Making:     Vitals:    Vitals:    09/28/21 2338 09/29/21 0115 09/29/21 0130 09/29/21 0200   BP: 127/71 133/86 121/69 121/73   Pulse: 90 69 74 81   Resp: 20 15 21 13   Temp: 97.4 °F (36.3 °C)      TempSrc: Oral      SpO2: 96% 96% 97% 97%   Weight: 256 lb (116.1 kg)      Height: 5' 3\" (1.6 m)          PROCEDURES: (None if blank)  Procedures       MDM       Patient was seen here recently for the same symptoms. Patient has been worked up for this multiple times in his worked up negative for ACS or CAD. Explained to her that she needs to follow-up with her cardiologist.  Apparently, upon discharge, patient was asking for Dilaudid. I wanted to explain to her that all of her lab work was normal.  She should follow-up in order to get more answers. She has a follow-up appoint with her pulmonologist tomorrow. Strict return precautions and follow up instructions were discussed with the patient with which the patient agrees    ED Medications administered this visit:    Medications   aspirin tablet 325 mg (325 mg Oral Given 9/29/21 0033)   fentaNYL (SUBLIMAZE) injection 50 mcg (50 mcg IntraVENous Given 9/29/21 0108)         FINAL IMPRESSION      1.  Chest pain, unspecified type          DISPOSITION/PLAN   DISPOSITION Decision To Discharge 09/29/2021 02:24:14 AM      PATIENT REFERRED TO:  Robe Suaoz, [address] [place] [phone]            DISCHARGE MEDICATIONS:  Discharge Medication List as of 9/29/2021  2:24 AM                 Karen Espana DO (electronically signed)  Attending Physician, Emergency Department         Karen Espana, [place] [address]

## 2021-09-29 NOTE — ED TRIAGE NOTES
Pt states that she began having CP 45 mins ago, pt states that she was getting ready for bed when it started.  Pt states it is mid-sternal.

## 2021-09-29 NOTE — PROGRESS NOTES
Subjective:     Cornel Cancino is a 49 y.o. female who complains today of:     Chief Complaint   Patient presents with    Follow-up     3 Month F/U for Sarcoidosis    Chest Pain       HPI  Patient presents for sarcoidosis      She report dyspnea exertion, she has cough occasionally, with clear phlegm, no chest pain, she reports chest pain, substernal, recurrent and chronic, she was in emergency room yesterday for that reason, no fever or chills, her weight is stable, no nausea no vomiting, she is compliant with her inhalers, she decided not to do sleep study, no lower extremity edema, she has skin rash on her face and her back. No heartburn, no nasal congestion or postnasal drip, weight is stable. Allergies:  Shellfish-derived products, Ibuprofen, Ketorolac, Morphine, Other, Penicillins, Propoxyphene n-acetaminophen, and Toradol [ketorolac tromethamine]  Past Medical History:   Diagnosis Date    Anxiety     Arthritis     Asthma     Bronchopneumonia     Cancer ([address]     renal    Cerebral artery occlusion with cerebral infarction (HCC)     Chronic bilateral low back pain with sciatica     Chronic kidney disease     Chronic obstructive lung disease ([address] 7/26/2019    Depression     Fibromyalgia     Hypertension     Insulin dependent type 2 diabetes mellitus, uncontrolled ([address] 8/3/2018    Localized enlarged lymph nodes 10/26/2018    Mixed headache     Pure hyperglyceridemia 5/19/2017    Sarcoidosis     Sleep apnea     does not wear cpap    Thyroid goiter      Past Surgical History:   Procedure Laterality Date    BRONCHOSCOPY  10/26/2018    DR. STEARNS    KIDNEY REMOVAL Right 08/2016    KIDNEY REMOVAL Right 2016    LUNG BIOPSY Right 10/2018    THYROID LOBECTOMY Right 6/13/14    THYROIDECTOMY  02/21/2019    DR. MAGDALENO    THYROIDECTOMY  2018    URETER STENT PLACEMENT Left 08/2016     Family History   Problem Relation Age of Onset    Cancer Father     Diabetes Father    [place] Self Allergy (Severe) Father     Heart Attack Father     Prostate Cancer Father     High Blood Pressure Mother     Diabetes Mother    Vish[place] Southern Arthritis Mother     High Cholesterol Mother     Vision Loss Mother     Alcohol Abuse Neg Hx     Anemia Neg Hx     Arrhythmia Neg Hx     Asthma Neg Hx     Atrial Fibrillation Neg Hx     Birth Defects Neg Hx     Breast Cancer Neg Hx     Coronary Art Dis Neg Hx     Colon Cancer Neg Hx     Depression Neg Hx     Early Death Neg Hx     Hearing Loss Neg Hx     Heart Disease Neg Hx     Learning Disabilities Neg Hx     Kidney Disease Neg Hx     Mental Illness Neg Hx     Mental Retardation Neg Hx     Miscarriages / Stillbirths Neg Hx     Obesity Neg Hx     Osteoporosis Neg Hx     Stroke Neg Hx     Substance Abuse Neg Hx      Social History     Socioeconomic History    Marital status: Legally      Spouse name: Not on file    Number of children: Not on file    Years of education: 15    Highest education level: High school graduate   Occupational History    Not on file   Tobacco Use    Smoking status: Former Smoker     Packs/day: 0.50     Years: 15.00     Pack years: 7.50     Types: Cigarettes     Start date: 2014     Quit date: 2015     Years since quittin.6    Smokeless tobacco: Former User     Quit date: 3/23/2016   Vaping Use    Vaping Use: Never used   Substance and Sexual Activity    Alcohol use: Not Currently     Alcohol/week: 0.0 standard drinks     Comment: occasionally    Drug use: Yes     Frequency: 5.0 times per week     Types: Marijuana    Sexual activity: Yes     Partners: Male   Other Topics Concern    Not on file   Social History Narrative    Not on file     Social Determinants of Health     Financial Resource Strain: Medium Risk    Difficulty of Paying Living Expenses: Somewhat hard   Food Insecurity: Food Insecurity Present    Worried About Running Out of Food in the Last Year: Sometimes true    Maximiliano of Food in the Last Year: Sometimes true   Transportation Needs: No Transportation Needs    Lack of Transportation (Medical): No    Lack of Transportation (Non-Medical): No   Physical Activity: Inactive    Days of Exercise per Week: 0 days    Minutes of Exercise per Session: 0 min   Stress: Stress Concern Present    Feeling of Stress : Very much   Social Connections: Socially Isolated    Frequency of Communication with Friends and Family: Three times a week    Frequency of Social Gatherings with Friends and Family: Never    Attends Jew Services: Never    Active Member of Clubs or Organizations: No    Attends Club or Organization Meetings: Never    Marital Status:    Intimate Partner Violence: Not At Risk    Fear of Current or Ex-Partner: No    Emotionally Abused: No    Physically Abused: No    Sexually Abused: No         Review of Systems      ROS: 10 organs review of system is done including general, psychological, ENT, hematological, endocrine, respiratory, cardiovascular, gastrointestinal,musculoskeletal, neurological,  allergy and Immunology is done and is otherwise negative. Current Outpatient Medications   Medication Sig Dispense Refill    hydroxychloroquine (PLAQUENIL) 200 MG tablet Take 1 tablet by mouth 2 times daily 60 tablet 0    ALPRAZolam (XANAX) 0.25 MG tablet Take 1 tablet by mouth nightly as needed for Anxiety for up to 30 days. 30 tablet 0    albuterol sulfate HFA (PROVENTIL HFA) 108 (90 Base) MCG/ACT inhaler Inhale 2 puffs into the lungs every 6 hours as needed for Wheezing 1 each 1    diclofenac sodium (VOLTAREN) 1 % GEL Apply 2 g topically 4 times daily 150 g 0    pregabalin (LYRICA) 150 MG capsule Take 1 capsule by mouth 3 times daily for 90 days.  90 capsule 2    cetirizine (ZYRTEC) 10 MG tablet TAKE 1 TABLET BY MOUTH EVERY NIGHT AT BEDTIME AS NEEDED FOR ALLERGIES 30 tablet 5    Insulin Pen Needle (B-D ULTRAFINE III SHORT PEN) 31G X 8 MM MISC Inject 1 each into the skin 3 times daily And as needed 100 each 5    blood glucose test strips (ONETOUCH VERIO) strip 1 each by In Vitro route daily As needed.  100 each 3    montelukast (SINGULAIR) 10 MG tablet TAKE 1 TABLET BY MOUTH EVERY NIGHT AT SUPPER FOR BREATHING OR ALLERGIES 30 tablet 5    levothyroxine (SYNTHROID) 125 MCG tablet Take 1 tablet by mouth Daily 30 tablet 3    venlafaxine (EFFEXOR XR) 75 MG extended release capsule Take 1 capsule by mouth daily 30 capsule 3    Insulin Degludec (TRESIBA FLEXTOUCH) 100 UNIT/ML SOPN INJECT 60 UNITS UNDER THE SKIN NIGHTLY 15 mL 3    insulin lispro, 1 Unit Dial, (HUMALOG KWIKPEN) 100 UNIT/ML SOPN 14 units at each meals hold if glucose less than 150 5 pen 3    butalbital-acetaminophen-caffeine (FIORICET, ESGIC) -40 MG per tablet Take 1 tablet by mouth every 6 hours as needed for Headaches 30 tablet 1    spironolactone (ALDACTONE) 50 MG tablet TAKE 1 TABLET BY MOUTH DAILY 30 tablet 3    dicyclomine (BENTYL) 10 MG capsule Take 1 capsule by mouth every 6 hours as needed (cramps) 120 capsule 0    ondansetron (ZOFRAN) 4 MG tablet Take 1 tablet by mouth every 8 hours as needed for Nausea 20 tablet 0    buPROPion (WELLBUTRIN XL) 150 MG extended release tablet TAKE 1 TABLET BY MOUTH EVERY MORNING 30 tablet 3    fluticasone (FLONASE) 50 MCG/ACT nasal spray SHAKE LIQUID AND USE 1 SPRAY IN EACH NOSTRIL DAILY 16 g 5    budesonide-formoterol (SYMBICORT) 160-4.5 MCG/ACT AERO Inhale 2 puffs into the lungs 2 times daily 1 Inhaler 3    tiotropium (SPIRIVA RESPIMAT) 2.5 MCG/ACT AERS inhaler Inhale 2 puffs into the lungs daily 1 Inhaler 3    busPIRone (BUSPAR) 15 MG tablet TAKE 1 TABLET BY MOUTH THREE TIMES DAILY 90 tablet 5    metoclopramide (REGLAN) 10 MG tablet Take 1 tablet by mouth 2 times daily as needed (Headache) 60 tablet 0    acetaminophen (TYLENOL) 500 MG tablet Take 1 tablet by mouth 4 times daily as needed for Pain 360 tablet 1    OneTouch Delica Lancets 26I MISC qid 200 each 3    Blood Glucose Monitoring Suppl (ONETOUCH VERIO) w/Device KIT As  Directed 1 kit 00    Insulin Pen Needle (NOVOFINE) 32G X 6 MM MISC qid 300 each 3    baclofen (LIORESAL) 10 MG tablet Take 1 tablet by mouth 3 times daily 60 tablet 0    tiZANidine (ZANAFLEX) 2 MG tablet Take 1 tablet by mouth 3 times daily as needed (back pain/ spasm) 15 tablet 0    magnesium oxide (MAG-OX) 400 (241.3 Mg) MG TABS tablet TAKE 1/2 TABLET BY MOUTH DAILY 30 tablet 5    SUMAtriptan (IMITREX) 25 MG tablet TAKE 1 TABLET BY MOUTH 1 TIME AS NEEDED FOR MIGRAINE 9 tablet 1    blood glucose test strips (EXACTECH TEST) strip 1 each by In Vitro route 3 times daily (Accu-check test strips) As needed. DX:E11.65 300 strip 5    ONE TOUCH ULTRASOFT LANCETS MISC TEST 3 TIMES DAILY AS NEEDED 300 each 3    albuterol (PROVENTIL) (2.5 MG/3ML) 0.083% nebulizer solution Take 3 mLs by nebulization every 4 hours as needed for Wheezing 120 each 3    atorvastatin (LIPITOR) 40 MG tablet Take 1 tablet by mouth nightly 30 tablet 3    Insulin Syringe-Needle U-100 30G X 1/2\" 1 ML MISC 1 each by Does not apply route daily 100 each 3    blood glucose test strips (ONETOUCH VERIO) strip TEST 3 TIMES DAILY AS NEEDED 300 each 3    Blood Glucose Monitoring Suppl (ONETOUCH VERIO) w/Device KIT TEST 3 TIMES DAILY AS NEEDED 1 kit 0     No current facility-administered medications for this visit. Objective:     Vitals:    09/29/21 1049   BP: 109/60   Site: Left Upper Arm   Position: Sitting   Cuff Size: Large Adult   Pulse: 71   Resp: 16   Temp: 96.4 °F (35.8 °C)   TempSrc: Tympanic   SpO2: 98%   Weight: 271 lb 3.2 oz (123 kg)   Height: 5' 3\" (1.6 m)         Physical Exam  Constitutional:       General: She is not in acute distress. Appearance: She is well-developed. She is not diaphoretic. HENT:      Head: Normocephalic and atraumatic.    Eyes:      Conjunctiva/sclera: Conjunctivae normal.      Pupils: Pupils are equal, round, and reactive to light. Cardiovascular:      Rate and Rhythm: Normal rate and regular rhythm. Heart sounds: No murmur heard. No friction rub. No gallop. Pulmonary:      Effort: Pulmonary effort is normal. No respiratory distress. Breath sounds: Normal breath sounds. No wheezing or rales. Chest:      Chest wall: No tenderness. Abdominal:      General: There is no distension. Palpations: Abdomen is soft. Tenderness: There is no abdominal tenderness. There is no rebound. Musculoskeletal:         General: No tenderness. Cervical back: Normal range of motion and neck supple. Right lower leg: No edema. Left lower leg: No edema. Lymphadenopathy:      Cervical: No cervical adenopathy. Skin:     General: Skin is warm and dry. Findings: No erythema. Neurological:      Mental Status: She is alert and oriented to person, place, and time. Psychiatric:         Judgment: Judgment normal.         Imaging studies reviewed by me chest x-ray done today reviewed by me, shows no acute abnormality  CT chest report from outside hospital July 2021, report no mass or nodule, mild diffuse bronchial wall thickening. Lab results reviewed in chart  PFT May 2021, FEV1 100%  ECHO: September 2020, EF 65%    Assessment and Plan       Diagnosis Orders   1. Sarcoidosis  hydroxychloroquine (PLAQUENIL) 200 MG tablet    G6Pd, 2 Mutations   2. Other chronic pain  AFL - Annmarie Blakely MD, Pain Management, [place]   3. Moderate persistent asthma with acute exacerbation     4. Sleep apnea, unspecified type     5.  Class 3 severe obesity due to excess calories with serious comorbidity and body mass index (BMI) of 45.0 to 49.9 in adult [facility])       · Sarcoidosis, with underlying asthma, symptoms not well controlled, currently with skin rash, will start treatment with Plaquenil and will evaluate on follow-up, will obtain G6PD prior to starting Plaquenil I discussed with the patient not to start Plaquenil until I call her back with the G6PD results. Otherwise instructed patient to see ophthalmologist on yearly basis since she is going to be on Plaquenil and she has underlying sarcoidosis she understands and she will schedule appointment with ophthalmology. · Chronic chest pain, likely musculoskeletal, will refer patient to pain management clinic  · Moderate persistent asthma, continue Symbicort and Spiriva  · Probable sleep apnea, patient declined sleep study  · Weight loss is recommended      Orders Placed This Encounter   Procedures    G6Pd, 2 Mutations     Standing Status:   Future     Standing Expiration Date:   9/29/2022    YOHAN - Sonia Valdez MD, Pain Management, [place]     Referral Priority:   Routine     Referral Type:   Eval and Treat     Referral Reason:   Specialty Services Required     Referred to Provider:   José Miguel Guzman MD     Requested Specialty:   Pain Medicine     Number of Visits Requested:   1     Orders Placed This Encounter   Medications    hydroxychloroquine (PLAQUENIL) 200 MG tablet     Sig: Take 1 tablet by mouth 2 times daily     Dispense:  60 tablet     Refill:  0            Discussed with patient the importance of exercise and weight control and  overall health and well-being. Reviewed with the patient: current clinical status, medications, activities and diet. Side effects, adverse effects of the medication prescribed today, as well as treatment plan and result expectations have been discussed with the patient who expresses understanding and desires to proceed. Return in about 3 months (around 12/29/2021).       Vineet Unger MD immune

## 2021-09-30 ENCOUNTER — TELEPHONE (OUTPATIENT)
Dept: PHARMACY | Facility: CLINIC | Age: 51
End: 2021-09-30

## 2021-09-30 ENCOUNTER — CARE COORDINATION (OUTPATIENT)
Dept: CARE COORDINATION | Age: 51
End: 2021-09-30

## 2021-09-30 ASSESSMENT — ENCOUNTER SYMPTOMS: DYSPNEA ASSOCIATED WITH: EXERTION

## 2021-09-30 NOTE — TELEPHONE ENCOUNTER
Received a referral: from Care Coordinator to review patients medications. Called patient to schedule a time to speak with a pharmacist over the telephone. No answer left VM: Please contact us at  [phone] to schedule this appointment. Pharmacists are available Monday thru Friday 7:30 AM till 5:30 PM.      Carole Chadwick CPhT.    [address] [facility] free: [phone]

## 2021-09-30 NOTE — TELEPHONE ENCOUNTER
----- Message from Grecia Becker RN sent at 9/30/2021 12:57 PM EDT -----  Regarding: [facility] Clinical Rx Referral  [facility] Clinical Rx referral for Medication review

## 2021-09-30 NOTE — TELEPHONE ENCOUNTER
Patient returned call and would like to be scheduled for Monday Oct 4th at noon.      Thank you,  Dorothea More, PharmD, [address] Pharmacist  Department: [phone]

## 2021-09-30 NOTE — CARE COORDINATION
Ambulatory Care Coordination Note  9/30/2021  CM Risk Score: 11  Charlson 10 Year Mortality Risk Score: 100%     ACC: Evangelina Weber RN    Summary Note:  Patient contacted by phone for Care Coordination enrollment. Introduced myself and the Care Coordination program. Patient agrees to participate in program. Completed initial Care Coordination assessment. Patient declined reconcilin home medications at this time. Discussed Clinical Rx referral. Patient verbalizes agreement. Discussed referral to [address] [place] [address]. Patient agrees to referral. Reviewed ACP documents. Health Care Decision Maker current. Patient doesn't currently have an Advance Directive Living Will . Instructed patient on availability of same day, next day, and Walk-In care. Referral to Clinical Rx for ACC review made by In-Basket messages. A referral to outpatient dietician for Dx Diabetes sent by [facility] message. A CC Welcome Letter with COPD Zones, How To Recognize COPD Flare-Ups, Diabetes Zones, High Blood Sugar, Low Blood Sugar, and Reducing Risks handouts mailed to patients home address. Lab Results   Component Value Date    LABA1C 8.2 (H) 07/27/2021    LABA1C 9.4 06/25/2021    LABA1C 6.8 03/24/2021       Diabetes Assessment    Medic Alert ID: No  Meal Planning: Avoidance of concentrated sweets, Plate Method   How often do you test your blood sugar?: Bedtime, Meals (Comment: Checking 4 times a day)   Do you have barriers with adherence to non-pharmacologic self-management interventions?  (Nutrition/Exercise/Self-Monitoring): No   Have you ever had to go to the ED for symptoms of low blood sugar?: No       Increase or Decrease trend in Blood Sugars   Do you have hyperglycemia symptoms?: Yes    Per: Day   Do you have hypoglycemia symptoms?: No   Blood Sugar Monitoring Regimen: At Bedtime, Before Meals   Blood Sugar Trends: Steady Increase      ,   COPD Assessment    Does the patient understand envrionmental exposure?: Yes  Is the patient able to verbalize Rescue vs. Long Acting medications?: Yes  Does the patient have a nebulizer?: Yes  Does the patient use a space with inhaled medications?: No     Increase in cough, Shortness of breath (worse than baseline)         Symptoms:  COPD associated increased fatigue: Pos      Symptom course: worsening  Breathlessness: exertion  Increase use of rapid acting/rescue inhaled medications?: Yes  Change in chronic cough?: Increased  Change in sputum?: No/At Baseline  Sputum characteristics: Clear  Self Monitoring - SaO2: No  Have you had a recent diagnosis of pneumonia either by PCP or at a hospital?: No      and   General Assessment    Do you have any symptoms that are causing concern?: No           Ambulatory Care Coordination Assessment    Care Coordination Protocol  Program Enrollment: Complex Care  Referral from Primary Care Provider: No  Week 1 - Initial Assessment     Do you have all of your prescriptions and are they filled?: Yes  Barriers to medication adherence: Forgets to take  Are you able to afford your medications?: Yes  How often do you have trouble taking your medications the way you have been told to take them?: I always take them as prescribed. Do you have Home O2 Therapy?: No      Ability to seek help/take action for Emergent Urgent situations i.e. fire, crime, inclement weather or health crisis. : Independent  Ability to ambulate to restroom: Independent  Ability handle personal hygeine needs (bathing/dressing/grooming): Independent  Ability to manage Medications: Independent  Ability to prepare Food Preparation: Independent  Ability to maintain home (clean home, laundry): Independent  Ability to drive and/or has transportation: Needs Assistance  Ability to do shopping: Needs Assistance  Ability to manage finances:  Independent  Is patient able to live independently?: Yes     Current Housing: Private Residence        Per the [facility], did the patient have 2 or more falls or 1 fall with injury in the past year?: No     Frequent urination at night?: No  Do you use rails/bars?: No  Do you have a non-slip tub mat?: Yes     Are you experiencing loss of meaning?: No  Are you experiencing loss of hope and peace?: No     Thinking about your patient's physical health needs, are there any symptoms or problems (risk indicators) you are unsure about that require further investigation?: No identified areas of uncertainly or problems already being investigated   Are the patients physical health problems impacting on their mental well-being?: No identified areas of concern   Are there any problems with your patients lifestyle behaviors (alcohol, drugs, diet, exercise) that are impacting on physical or mental well-being?: No identified areas of concern   Do you have any other concerns about your patients mental well-being?  How would you rate their severity and impact on the patient?: No identified areas of concern   How would you rate their home environment in terms of safety and stability (including domestic violence, insecure housing, neighbor harassment)?: Consistently safe, supportive, stable, no identified problems   How do daily activities impact on the patient's well-being? (include current or anticipated unemployment, work, caregiving, access to transportation or other): No identified problems or perceived positive benefits   How would you rate their social network (family, work, friends)?: Adequate participation with social networks   How would you rate their financial resources (including ability to afford all required medical care)?: Financially secure, resources adequate, no identified problems   How wells does the patient now understand their health and well-being (symptoms, signs or risk factors) and what they need to do to manage their health?: Reasonable to good understanding and already engages in managing health or is willing to undertake better management   How well do you think your patient can engage in healthcare discussions? (Barriers include language, deafness, aphasia, alcohol or drug problems, learning difficulties, concentration): Clear and open communication, no identified barriers   Do other services need to be involved to help this patient?: Other care/services in place and adequate   Are current services involved with this patient well-coordinated? (Include coordination with other services you are now recommendation): Required care/services in place and adequately coordinated   Suggested Interventions and Community Resources  Diabetes Education: Not Started    Other Services or Interventions: 9/30/2021 Instructed on same day, next day, and Walk-In Care. Pharmacist: In Process   Registered Dietician: In Process   Zone Management Tools: In Process         Set up/Review an Education Plan, Set up/Review Goals              Prior to Admission medications    Medication Sig Start Date End Date Taking? Authorizing Provider   hydroxychloroquine (PLAQUENIL) 200 MG tablet Take 1 tablet by mouth 2 times daily 9/29/21 10/29/21  Silvana Arguello MD   ALPRAZolam (XANAX) 0.25 MG tablet Take 1 tablet by mouth nightly as needed for Anxiety for up to 30 days. 9/21/21 10/21/21  SUDEEP Melvin CNP   albuterol sulfate HFA (PROVENTIL HFA) 108 (90 Base) MCG/ACT inhaler Inhale 2 puffs into the lungs every 6 hours as needed for Wheezing 9/13/21   Maria Guadalupe [place], APRN - CNP   diclofenac sodium (VOLTAREN) 1 % GEL Apply 2 g topically 4 times daily 9/7/21   Maria Guadalupe [place], APRN - CNP   pregabalin (LYRICA) 150 MG capsule Take 1 capsule by mouth 3 times daily for 90 days.  9/7/21 12/6/21  SUDEEP Melvin CNP   cetirizine (ZYRTEC) 10 MG tablet TAKE 1 TABLET BY MOUTH EVERY NIGHT AT BEDTIME AS NEEDED FOR ALLERGIES 9/3/21   SUDEEP Melvin CNP   Insulin Pen Needle (B-D ULTRAFINE III SHORT PEN) 31G X 8 MM MISC Inject 1 each into the skin 3 times daily And as needed 8/16/21   Lilibeth GARZA metoclopramide (REGLAN) 10 MG tablet Take 1 tablet by mouth 2 times daily as needed (Headache) 3/21/21   Topher Uribe MD   acetaminophen (TYLENOL) 500 MG tablet Take 1 tablet by mouth 4 times daily as needed for Pain 3/21/21   Topher Uribe MD   OneTouch Delica Lancets 56K MISC qid 2/23/21   Jo Ann Mendez MD   Blood Glucose Monitoring Suppl ([facility]) w/Device KIT As  Directed 2/23/21   Jo Ann Mendez MD   Insulin Pen Needle (NOVOFINE) 32G X 6 MM MISC qid 2/23/21   Jo Ann Mendez MD   baclofen (LIORESAL) 10 MG tablet Take 1 tablet by mouth 3 times daily 2/19/21   SUDEEP Velez CNP   tiZANidine (ZANAFLEX) 2 MG tablet Take 1 tablet by mouth 3 times daily as needed (back pain/ spasm) 2/6/21   Danni Garza PA-C   magnesium oxide (MAG-OX) 400 (241.3 Mg) MG TABS tablet TAKE 1/2 TABLET BY MOUTH DAILY 11/2/20   SUDEEP Hooper CNP   SUMAtriptan (IMITREX) 25 MG tablet TAKE 1 TABLET BY MOUTH 1 TIME AS NEEDED FOR MIGRAINE 5/1/20   SUDEEP Hooper CNP   blood glucose test strips (EXACTECH TEST) strip 1 each by In Vitro route 3 times daily (Accu-check test strips) As needed.  DX:E11.65 12/2/19   SUDEEP Hooper CNP   ONE TOUCH ULTRASOFT LANCETS MISC TEST 3 TIMES DAILY AS NEEDED 12/2/19   SUDEEP Hooper CNP   albuterol (PROVENTIL) (2.5 MG/3ML) 0.083% nebulizer solution Take 3 mLs by nebulization every 4 hours as needed for Wheezing 11/5/19   Maria Mtj BartaDO isreal   atorvastatin (LIPITOR) 40 MG tablet Take 1 tablet by mouth nightly 9/11/19   Missy Bravo,    Insulin Syringe-Needle U-100 30G X 1/2\" 1 ML MISC 1 each by Does not apply route daily 11/16/18   Suman Calvin DO   blood glucose test strips (ONETOUCH VERIO) strip TEST 3 TIMES DAILY AS NEEDED 8/16/18   Suman Calvin DO   Blood Glucose Monitoring Suppl (ONETOUCH VERIO) w/Device KIT TEST 3 TIMES DAILY AS NEEDED 8/16/18   Suman Calvin DO       Future Appointments   Date Time Provider Valerie Cosby   10/7/2021  4:00 PM Onesimo Vo  S [address] [place] [address]   11/5/2021  9:15 AM Vargas Rodriguez MD [facility]   12/7/2021  3:15 PM SU[facility]psey - [facility]   12/29/2021  1:30 PM Elsi Hernandez MD [facility]

## 2021-10-01 ENCOUNTER — CARE COORDINATION (OUTPATIENT)
Dept: CARE COORDINATION | Age: 51
End: 2021-10-01

## 2021-10-01 NOTE — CARE COORDINATION
Jenna Victor  October 1, 2021    Initial Referral Reason: Diabetes    Patient Care Team:  SUDEEP Leos CNP as PCP - General (Nurse Practitioner)  SUDEEP Leos CNP as PCP - [facility]  Pauly Hernandez MD (Urology)  Mando Velazco MD as Cardiologist (Cardiology)  Donny Grant RN as Ambulatory Care Manager  Romina Cahng RD, JEREMIAH as Dietitian (Dietitian)    Past Medical History:    Current Outpatient Medications   Medication Sig Dispense Refill    hydroxychloroquine (PLAQUENIL) 200 MG tablet Take 1 tablet by mouth 2 times daily 60 tablet 0    ALPRAZolam (XANAX) 0.25 MG tablet Take 1 tablet by mouth nightly as needed for Anxiety for up to 30 days. 30 tablet 0    albuterol sulfate HFA (PROVENTIL HFA) 108 (90 Base) MCG/ACT inhaler Inhale 2 puffs into the lungs every 6 hours as needed for Wheezing 1 each 1    diclofenac sodium (VOLTAREN) 1 % GEL Apply 2 g topically 4 times daily 150 g 0    pregabalin (LYRICA) 150 MG capsule Take 1 capsule by mouth 3 times daily for 90 days. 90 capsule 2    cetirizine (ZYRTEC) 10 MG tablet TAKE 1 TABLET BY MOUTH EVERY NIGHT AT BEDTIME AS NEEDED FOR ALLERGIES 30 tablet 5    Insulin Pen Needle (B-D ULTRAFINE III SHORT PEN) 31G X 8 MM MISC Inject 1 each into the skin 3 times daily And as needed 100 each 5    blood glucose test strips (ONETOUCH VERIO) strip 1 each by In Vitro route daily As needed.  100 each 3    montelukast (SINGULAIR) 10 MG tablet TAKE 1 TABLET BY MOUTH EVERY NIGHT AT SUPPER FOR BREATHING OR ALLERGIES 30 tablet 5    levothyroxine (SYNTHROID) 125 MCG tablet Take 1 tablet by mouth Daily 30 tablet 3    venlafaxine (EFFEXOR XR) 75 MG extended release capsule Take 1 capsule by mouth daily 30 capsule 3    Insulin Degludec (TRESIBA FLEXTOUCH) 100 UNIT/ML SOPN INJECT 60 UNITS UNDER THE SKIN NIGHTLY 15 mL 3    insulin lispro, 1 Unit Dial, (HUMALOG KWIKPEN) 100 UNIT/ML SOPN 14 units at each meals hold if glucose less than 150 5 pen 3    butalbital-acetaminophen-caffeine (FIORICET, ESGIC) -40 MG per tablet Take 1 tablet by mouth every 6 hours as needed for Headaches 30 tablet 1    spironolactone (ALDACTONE) 50 MG tablet TAKE 1 TABLET BY MOUTH DAILY 30 tablet 3    dicyclomine (BENTYL) 10 MG capsule Take 1 capsule by mouth every 6 hours as needed (cramps) 120 capsule 0    ondansetron (ZOFRAN) 4 MG tablet Take 1 tablet by mouth every 8 hours as needed for Nausea 20 tablet 0    buPROPion (WELLBUTRIN XL) 150 MG extended release tablet TAKE 1 TABLET BY MOUTH EVERY MORNING 30 tablet 3    fluticasone (FLONASE) 50 MCG/ACT nasal spray SHAKE LIQUID AND USE 1 SPRAY IN EACH NOSTRIL DAILY 16 g 5    budesonide-formoterol (SYMBICORT) 160-4.5 MCG/ACT AERO Inhale 2 puffs into the lungs 2 times daily 1 Inhaler 3    tiotropium (SPIRIVA RESPIMAT) 2.5 MCG/ACT AERS inhaler Inhale 2 puffs into the lungs daily 1 Inhaler 3    busPIRone (BUSPAR) 15 MG tablet TAKE 1 TABLET BY MOUTH THREE TIMES DAILY 90 tablet 5    metoclopramide (REGLAN) 10 MG tablet Take 1 tablet by mouth 2 times daily as needed (Headache) 60 tablet 0    acetaminophen (TYLENOL) 500 MG tablet Take 1 tablet by mouth 4 times daily as needed for Pain 360 tablet 1    OneTouch Delica Lancets 04L MISC qid 200 each 3    Blood Glucose Monitoring Suppl (ONETOUCH VERIO) w/Device KIT As  Directed 1 kit 00    Insulin Pen Needle (NOVOFINE) 32G X 6 MM MISC qid 300 each 3    baclofen (LIORESAL) 10 MG tablet Take 1 tablet by mouth 3 times daily 60 tablet 0    tiZANidine (ZANAFLEX) 2 MG tablet Take 1 tablet by mouth 3 times daily as needed (back pain/ spasm) 15 tablet 0    magnesium oxide (MAG-OX) 400 (241.3 Mg) MG TABS tablet TAKE 1/2 TABLET BY MOUTH DAILY 30 tablet 5    SUMAtriptan (IMITREX) 25 MG tablet TAKE 1 TABLET BY MOUTH 1 TIME AS NEEDED FOR MIGRAINE 9 tablet 1    blood glucose test strips (EXACTECH TEST) strip 1 each by In Vitro route 3 times daily (Accu-check test strips) As needed. MyPlate [id number] plate fruits and/or vegetables, 1/4 plate protein and 1/4 plate starchy carbohydrates with 8 oz glass of low fat milk if desired. RD used patient's breakfast example (eggs and crackers) and discussed ways to make the meal more balanced- switching to whole wheat bread instead of crackers and adding a small piece of fruit. Pt states she notices when she eats fruit, her BS spikes. RD explained CHO in food raise blood sugar and the importance of having each component. Discussed how eating a banana versus a banana with peanut butter will affect BS differently. RD discussed the importance of watching portion sizes. RD explained the importance of exercise to help better manage BS- discussed the recommendation is 150 minutes/week. Discussed starting small and taking a 10 minute walk daily and slowly increasing duration. RD send patient handouts to [facility] [address] [place] [address] on 7/19 and encouraged patient to review the Planning Healthy Meals. Reviewed the importance of taking medicine as directed and monitoring BS daily- pt states this AM  mg/dL. Pt has no nutrition related questions at this time. 24-Hour Diet Recall  Breakfast  Consumed: eggs and crackers    Lunch  Consumed: bologna sandwich on mae bread    Dinner  Consumed: chicken salad    Beverages: water    Nutrition Diagnosis:  #1 Problem Altered nutrition-related lab values: A1C       Etiology related to uncontrolled type 2 diabetes       Signs/Symptoms as evidenced by A1C 8.2% as of 7/27/21    Nutrition Intervention:     Estimated Needs  diabetic diet providing 2000 kcals to promote wt loss ([address] based on AdBW for obesity class III). Estimated daily CHO Needs: 275 g (based on 45-65% total calorie intake)  Estimated daily Protein Needs: 62-77 g (based on 0.8-1.0 g/kg based on AdBW for obesity class III)  Estimated daily Fluid Needs: 64 oz.      Monitoring and Evaluation:    Indicator/Goal Criteria   #1 Eat balanced meals consistently

## 2021-10-03 DIAGNOSIS — F41.9 ANXIETY: ICD-10-CM

## 2021-10-03 NOTE — TELEPHONE ENCOUNTER
Requesting medication refill.  Please approve or deny this request.    Rx requested:  Requested Prescriptions     Pending Prescriptions Disp Refills    buPROPion (WELLBUTRIN XL) 150 MG extended release tablet [Pharmacy Med Name: BUPROPION XL 150MG TABLETS (24 H)] 30 tablet 3     Sig: TAKE 1 TABLET BY MOUTH EVERY MORNING       Last Office Visit:   9/7/2021    Last Filled:      Last Labs:      Next Visit Date:  Future Appointments   Date Time Provider Valerie Vizcarrai   10/7/2021  4:00 PM DANIELLE Mayen MD [address] [place] [address]   11/5/2021  9:15 AM Isabel Kruse MD [facility]   12/7/2021  3:15 PM SUDEEP Griggs - CNP [facility]   12/29/2021  1:30 PM Darling Miller MD [facility]

## 2021-10-03 NOTE — TELEPHONE ENCOUNTER
is calling in requesting a refill on medication(s). Requested Prescriptions     Pending Prescriptions Disp Refills    busPIRone (BUSPAR) 15 MG tablet [Pharmacy Med Name: BUSPIRONE 15MG TABLETS] 90 tablet 5     Sig: TAKE 1 TABLET BY MOUTH THREE TIMES DAILY        Patient's Last Office Visit:  Visit date not found     Patient's Next Visit:  Future Appointments   Date Time Provider Valerie Alea   10/7/2021  4:00 PM Martin Sotomayor MD [address] [place] [address]   11/5/2021  9:15 AM Nila Parham MD [facility]   12/7/2021  3:15 PM Lilibeth Elaine, APRN - CNP [facility]   12/29/2021  1:30 PM MD Fiordaliza Taylor 1:  Please send the medication to the pharmacy listed.     OtherComments:

## 2021-10-04 RX ORDER — BUSPIRONE HYDROCHLORIDE 15 MG/1
TABLET ORAL
Qty: 90 TABLET | Refills: 5 | Status: SHIPPED | OUTPATIENT
Start: 2021-10-04 | End: 2022-04-01

## 2021-10-04 RX ORDER — BUPROPION HYDROCHLORIDE 150 MG/1
150 TABLET ORAL EVERY MORNING
Qty: 30 TABLET | Refills: 3 | Status: SHIPPED | OUTPATIENT
Start: 2021-10-04 | End: 2022-01-31

## 2021-10-07 ENCOUNTER — VIRTUAL VISIT (OUTPATIENT)
Dept: ENDOCRINOLOGY | Age: 51
End: 2021-10-07
Payer: MEDICAID

## 2021-10-07 DIAGNOSIS — E89.0 POSTPROCEDURAL HYPOTHYROIDISM: Primary | ICD-10-CM

## 2021-10-07 DIAGNOSIS — D86.9 SARCOIDOSIS: Primary | ICD-10-CM

## 2021-10-07 DIAGNOSIS — E11.65 TYPE 2 DIABETES MELLITUS WITH HYPERGLYCEMIA, WITH LONG-TERM CURRENT USE OF INSULIN (HCC): ICD-10-CM

## 2021-10-07 DIAGNOSIS — Z79.4 TYPE 2 DIABETES MELLITUS WITH HYPERGLYCEMIA, WITH LONG-TERM CURRENT USE OF INSULIN (HCC): ICD-10-CM

## 2021-10-07 PROCEDURE — 99442 PR PHYS/QHP TELEPHONE EVALUATION 11-20 MIN: CPT | Performed by: INTERNAL MEDICINE

## 2021-10-07 RX ORDER — INSULIN LISPRO 100 [IU]/ML
INJECTION, SOLUTION INTRAVENOUS; SUBCUTANEOUS
Qty: 5 PEN | Refills: 3 | Status: SHIPPED | OUTPATIENT
Start: 2021-10-07 | End: 2022-03-30 | Stop reason: SDUPTHER

## 2021-10-07 RX ORDER — BLOOD SUGAR DIAGNOSTIC
1 STRIP MISCELLANEOUS 3 TIMES DAILY
Qty: 100 EACH | Refills: 3 | Status: SHIPPED | OUTPATIENT
Start: 2021-10-07 | End: 2021-11-03 | Stop reason: SDUPTHER

## 2021-10-07 RX ORDER — INSULIN DEGLUDEC INJECTION 100 U/ML
INJECTION, SOLUTION SUBCUTANEOUS
Qty: 15 ML | Refills: 3 | Status: SHIPPED | OUTPATIENT
Start: 2021-10-07 | End: 2021-11-30 | Stop reason: SDUPTHER

## 2021-10-07 NOTE — PROGRESS NOTES
Spoke with the patient, apparently she already started taking Plaquenil, she has G6PD allele 1 abnormality, I did literature search and apparently hemolysis with Plaquenil in this case is rare, patient already taking medication, will repeat CBC next week if hemoglobin remains stable will continue same. Discussed with the patient to call me back if she noticed any weakness, pale skin, dizziness or feeling unwell.       Coretta could you please remind the patient on Monday to come and do the CBC, order already in chart

## 2021-10-07 NOTE — PROGRESS NOTES
10/7/2021    TELEHEALTH EVALUATION -- Audio/Visual (During AFZJY-13 public health emergency)    Due to COVID 19 outbreak, patient's office visit was converted to a virtual visit. Patient was contacted and agreed to proceed with a virtual visit via Telephone Visit  The risks and benefits of converting to a virtual visit were discussed in light of the current infectious disease epidemic. Patient also understood that insurance coverage and co-pays are up to their individual insurance plans. HPI: Telephone visit patient was at home blood sugars are staying in the low to mid 200 range currently on Tresiba 80 units at night Humalog 14 with each meals no recent labs to review had labs in July A1c was coming down to 8.2 from 9.4 thyroid function tests were stable    Hypothyroidism on replacement with levothyroxine 125 mcg daily    Jenna Victor (:  1970) has requested an audio/video evaluation for the following concern(s):      Results for Jetty Campanile (MRN [id number]) as of 10/7/2021 16:11   Ref. Range 1/10/2020 14:02 2021 11:02 3/24/2021 11:37 2021 14:54 2021 16:34   Hemoglobin A1C Latest Ref Range: 4.8 - 5.9 % 6.4 (H) 7.9 6.8 9.4 8.2 (H)       Results for Jetty Campanile (MRN [id number]) as of 10/7/2021 16:11   Ref.  Range 2021 00:15   Sodium Latest Ref Range: 135 - 144 mEq/L 131 (L)   Potassium Latest Ref Range: 3.4 - 4.9 mEq/L 4.0   Chloride Latest Ref Range: 95 - 107 mEq/L 100   CO2 Latest Ref Range: 20 - 31 mEq/L 22   BUN Latest Ref Range: 6 - 20 mg/dL 16   Creatinine Latest Ref Range: 0.50 - 0.90 mg/dL 1.20 (H)   Anion Gap Latest Ref Range: 9 - 15 mEq/L 9   GFR Non- Latest Ref Range: >60  47.5 (L)   GFR  Latest Ref Range: >60  57.4 (L)   Magnesium Latest Ref Range: 1.7 - 2.4 mg/dL 2.0   Glucose Latest Ref Range: 70 - 99 mg/dL 262 (H)   Calcium Latest Ref Range: 8.5 - 9.9 mg/dL 9.5   Total Protein Latest Ref Range: 6.3 - 8.0 g/dL 7.2   Troponin Latest Ref Range: 0.000 - 0.010 ng/mL <0.010   Albumin Latest Ref Range: 3.5 - 4.6 g/dL 3.9   Globulin Latest Ref Range: 2.3 - 3.5 g/dL 3.3   Alk Phos Latest Ref Range: 40 - 130 U/L 160 (H)   ALT Latest Ref Range: 0 - 33 U/L 17   AST Latest Ref Range: 0 - 35 U/L 19   Bilirubin Latest Ref Range: 0.2 - 0.7 mg/dL <0.2   Lipase Latest Ref Range: 12 - 95 U/L 41   WBC Latest Ref Range: 4.8 - 10.8 K/uL 7.6   RBC Latest Ref Range: 4.20 - 5.40 M/uL 4.74   Hemoglobin Quant Latest Ref Range: 12.0 - 16.0 g/dL 15.2   Hematocrit Latest Ref Range: 37.0 - 47.0 % 44.7   MCV Latest Ref Range: 82.0 - 100.0 fL 94.3   MCH Latest Ref Range: 27.0 - 31.3 pg 32.1 (H)   MCHC Latest Ref Range: 33.0 - 37.0 % 34.1   RDW Latest Ref Range: 11.5 - 14.5 % 13.7   Platelet Count Latest Ref Range: 130 - 400 K/uL 268   Neutrophils % Latest Units: % 59.8   Lymphocyte % Latest Units: % 29.3   Monocytes % Latest Units: % 5.7   Eosinophils % Latest Units: % 3.6   Basophils % Latest Units: % 1.6   Neutrophils Absolute Latest Ref Range: 1.4 - 6.5 K/uL 4.6   Lymphocytes Absolute Latest Ref Range: 1.0 - 4.8 K/uL 2.2   Monocytes Absolute Latest Ref Range: 0.2 - 0.8 K/uL 0.4   Eosinophils Absolute Latest Ref Range: 0.0 - 0.7 K/uL 0.3   Basophils Absolute Latest Ref Range: 0.0 - 0.2 K/uL 0.1       Review of Systems   Constitutional: Positive for fatigue. Psychiatric/Behavioral: Positive for dysphoric mood. All other systems reviewed and are negative. Prior to Visit Medications    Medication Sig Taking?  Authorizing Provider   busPIRone (BUSPAR) 15 MG tablet TAKE 1 TABLET BY MOUTH THREE TIMES DAILY  Lilibeth Arevalo, APRN - CNP   buPROPion (WELLBUTRIN XL) 150 MG extended release tablet TAKE 1 TABLET BY MOUTH EVERY MORNING  SUDEEP Myrick - VOLODYMYR   hydroxychloroquine (PLAQUENIL) 200 MG tablet Take 1 tablet by mouth 2 times daily  Suzanne May MD   ALPRAZolam (XANAX) 0.25 MG tablet Take 1 tablet by mouth nightly as needed for Anxiety for up to 30 days.  SUDEEP Mejia CNP   albuterol sulfate HFA (PROVENTIL HFA) 108 (90 Base) MCG/ACT inhaler Inhale 2 puffs into the lungs every 6 hours as needed for Wheezing  SUDEEP Brennan CNP   diclofenac sodium (VOLTAREN) 1 % GEL Apply 2 g topically 4 times daily  SUDEEP Brennan CNP   pregabalin (LYRICA) 150 MG capsule Take 1 capsule by mouth 3 times daily for 90 days. SUDEEP Brennan CNP   cetirizine (ZYRTEC) 10 MG tablet TAKE 1 TABLET BY MOUTH EVERY NIGHT AT BEDTIME AS NEEDED FOR ALLERGIES  SUDEEP Brennan CNP   Insulin Pen Needle (B-D ULTRAFINE III SHORT PEN) 31G X 8 MM MISC Inject 1 each into the skin 3 times daily And as needed  SUDEEP Mejia CNP   blood glucose test strips (ONETOUCH VERIO) strip 1 each by In Vitro route daily As needed.   Jerry Christie MD   montelukast (SINGULAIR) 10 MG tablet TAKE 1 TABLET BY MOUTH EVERY NIGHT AT SUPPER FOR BREATHING OR ALLERGIES  SUDEEP Brennan CNP   levothyroxine (SYNTHROID) 125 MCG tablet Take 1 tablet by mouth Daily  Jerry Christie MD   venlafaxine (EFFEXOR XR) 75 MG extended release capsule Take 1 capsule by mouth daily  SUDEEP Brennan CNP   Insulin Degludec (TRESIBA FLEXTOUCH) 100 UNIT/ML SOPN INJECT 60 UNITS UNDER THE SKIN NIGHTLY  Jerry Christie MD   insulin lispro, 1 Unit Dial, (HUMALOG KWIKPEN) 100 UNIT/ML SOPN 14 units at each meals hold if glucose less than 150  Christopher Khan MD   butalbital-acetaminophen-caffeine (FIORICET, ESGIC) -40 MG per tablet Take 1 tablet by mouth every 6 hours as needed for Headaches  SUDEEP Brennan CNP   spironolactone (ALDACTONE) 50 MG tablet TAKE 1 TABLET BY MOUTH DAILY  Christopher Khan MD   dicyclomine (BENTYL) 10 MG capsule Take 1 capsule by mouth every 6 hours as needed (cramps)  SUDEEP Brennan CNP   ondansetron (ZOFRAN) 4 MG tablet Take 1 tablet by mouth every 8 hours as needed for Nausea  Gary Gamboa PA-C   fluticasone (FLONASE) 50 MCG/ACT nasal spray SHAKE LIQUID AND USE 1 SPRAY IN EACH NOSTRIL DAILY  SUDEEP Myrick CNP   budesonide-formoterol (SYMBICORT) 160-4.5 MCG/ACT AERO Inhale 2 puffs into the lungs 2 times daily  Kenia Mo MD   tiotropium (SPIRIVA RESPIMAT) 2.5 MCG/ACT AERS inhaler Inhale 2 puffs into the lungs daily  Kenia Mo MD   metoclopramide (REGLAN) 10 MG tablet Take 1 tablet by mouth 2 times daily as needed (Headache)  Benjie Zavaleta MD   acetaminophen (TYLENOL) 500 MG tablet Take 1 tablet by mouth 4 times daily as needed for Pain  Benjie Zavaleta MD   OneTouch Delica Lancets 33Z MISC qid  Viridiana Cuello MD   Blood Glucose Monitoring Suppl ([facility]) w/Device KIT As  Directed  Viridiana Cuello MD   Insulin Pen Needle (NOVOFINE) 32G X 6 MM MISC qid  Viridiana Cuello MD   baclofen (LIORESAL) 10 MG tablet Take 1 tablet by mouth 3 times daily  Melissa Janee Kayser, APRN - CNP   tiZANidine (ZANAFLEX) 2 MG tablet Take 1 tablet by mouth 3 times daily as needed (back pain/ spasm)  Vicky Oakley PA-C   magnesium oxide (MAG-OX) 400 (241.3 Mg) MG TABS tablet TAKE 1/2 TABLET BY MOUTH DAILY  Melissa Janee Kayser, APRN - CNP   SUMAtriptan (IMITREX) 25 MG tablet TAKE 1 TABLET BY MOUTH 1 TIME AS NEEDED FOR MIGRAINE  Melissa Janee Kayser, APRN - CNP   blood glucose test strips (EXACTECH TEST) strip 1 each by In Vitro route 3 times daily (Accu-check test strips) As needed.  DX:E11.65  Melissa Janee Kayser, APRN - CNP   ONE TOUCH ULTRASOFT LANCETS MISC TEST 3 TIMES DAILY AS NEEDED  SUDEEP Myrick CNP   albuterol (PROVENTIL) (2.5 MG/3ML) 0.083% nebulizer solution Take 3 mLs by nebulization every 4 hours as needed for Wheezing  Maria M Ni,    atorvastatin (LIPITOR) 40 MG tablet Take 1 tablet by mouth nightly  Tessa Rucker DO   Insulin Syringe-Needle U-100 30G X 1/2\" 1 ML MISC 1 each by Does not apply route daily  Pathamlet De La Cruz, DO   blood glucose test strips (ONETOUCH VERIO) strip TEST 3 TIMES DAILY AS NEEDED  Pritesh De La Cruz, DO Blood Glucose Monitoring Suppl ([facility]) w/Device KIT TEST 3 TIMES DAILY AS NEEDED  Brad Celis DO       Social History     Tobacco Use    Smoking status: Former Smoker     Packs/day: 0.50     Years: 15.00     Pack years: 7.50     Types: Cigarettes     Start date: 2014     Quit date: 3/1/2015     Years since quittin.6    Smokeless tobacco: Never Used   Vaping Use    Vaping Use: Never used   Substance Use Topics    Alcohol use: Not Currently     Alcohol/week: 0.0 standard drinks     Comment: occasionally    Drug use: Yes     Frequency: 5.0 times per week     Types: Marijuana            PHYSICAL EXAMINATION:  [ INSTRUCTIONS:  \"[x]\" Indicates a positive item  \"[]\" Indicates a negative item  -- DELETE ALL ITEMS NOT EXAMINED]  [] Alert  [] Oriented to person/place/time    [] No apparent distress  [] Toxic appearing    [] Face flushed appearing [] Sclera clear  [] Lips are cyanotic      [] Breathing appears normal  [] Appears tachypneic      [] Rash on visible skin    [] Cranial Nerves II-XII grossly intact    [] Motor grossly intact in visible upper extremities    [] Motor grossly intact in visible lower extremities    [] Normal Mood  [] Anxious appearing    [] Depressed appearing  [] Confused appearing      [] Poor short term memory  [] Poor long term memory    [] OTHER:      Due to this being a TeleHealth encounter, evaluation of the following organ systems is limited: Vitals/Constitutional/EENT/Resp/CV/GI//MS/Neuro/Skin/Heme-Lymph-Imm. ASSESSMENT/PLAN:     Diagnosis Orders   1. Postprocedural hypothyroidism  T4, Free    TSH without Reflex   2.  Type 2 diabetes mellitus with hyperglycemia, with long-term current use of insulin (HCC)  Insulin Degludec (TRESIBA FLEXTOUCH) 100 UNIT/ML SOPN    insulin lispro, 1 Unit Dial, (HUMALOG KWIKPEN) 100 UNIT/ML SOPN    Basic Metabolic Panel    Hemoglobin A1C     Orders Placed This Encounter   Procedures    Basic Metabolic Panel     Standing Status: Future     Standing Expiration Date:   10/7/2022    Hemoglobin A1C     Standing Status:   Future     Standing Expiration Date:   10/7/2022    T4, Free     Standing Status:   Future     Standing Expiration Date:   10/7/2022    TSH without Reflex     Standing Status:   Future     Standing Expiration Date:   10/7/2022     Increase insulin dose  Continue current dose of Synthroid    Orders Placed This Encounter   Medications    Insulin Degludec (TRESIBA FLEXTOUCH) 100 UNIT/ML SOPN     Sig: INJECT 90 UNITS UNDER THE SKIN NIGHTLY     Dispense:  15 mL     Refill:  3    insulin lispro, 1 Unit Dial, (HUMALOG KWIKPEN) 100 UNIT/ML SOPN     Si units at each meals hold if glucose less than 150     Dispense:  5 pen     Refill:  3    blood glucose test strips (ONETOUCH VERIO) strip     Si each by In Vitro route 3 times daily As needed. Dispense:  100 each     Refill:  3     Total time spent was 15 minutes    An  electronic signature was used to authenticate this note. --Katharina Watters MD on 10/7/2021 at 4:11 PM        Pursuant to the emergency declaration under the [facility], [facility] waiver authority and the [employer] Act, this Virtual  Visit was conducted, with patient's consent, to reduce the patient's risk of exposure to COVID-19 and provide continuity of care for an established patient. Services were provided through a video synchronous discussion virtually to substitute for in-person clinic visit.

## 2021-10-08 ENCOUNTER — CARE COORDINATION (OUTPATIENT)
Dept: CARE COORDINATION | Age: 51
End: 2021-10-08

## 2021-10-08 ASSESSMENT — ENCOUNTER SYMPTOMS: DYSPNEA ASSOCIATED WITH: EXERTION

## 2021-10-08 NOTE — CARE COORDINATION
Ambulatory Care Coordination Note  10/8/2021  CM Risk Score: 11  Charlson 10 Year Mortality Risk Score: 100%     ACC: Nolan Alba RN    Summary Note: CC follow-up call placed to the patient. Patient denies any current issues or concerns. Discussed medications. Patient's insulin dosage increased at recent appointment. Reviewed medication changes. Provided education on medication. Reviewed S/S to report to Dr Racquel Barrientos r/t Plaquenil therapy. Reviewed patient to have CBC done on Monday. Discussed COPD management and S/S of Exacerbation. Patient indicates increase cough and SOB with current weather. Patient report change is typical fluctuation of symptoms this time of year. Reviewed upcoming scheduled appointments. Discussed CC follow-up in approximately 1 week. Patient verbalizes agreement. Lab Results   Component Value Date    LABA1C 8.2 (H) 07/27/2021    LABA1C 9.4 06/25/2021    LABA1C 6.8 03/24/2021        Diabetes Assessment    Medic Alert ID: No  Meal Planning: Avoidance of concentrated sweets, Plate Method   How often do you test your blood sugar?: Other (Comment: Checking 2-3 times a day)   Do you have barriers with adherence to non-pharmacologic self-management interventions? (Nutrition/Exercise/Self-Monitoring): No   Have you ever had to go to the ED for symptoms of low blood sugar?: No       No patient-reported symptoms   Do you have hyperglycemia symptoms?: No   Do you have hypoglycemia symptoms?: No   Last Blood Sugar Value: 147   Blood Sugar Trends: No Change      ,   COPD Assessment    Does the patient understand envrionmental exposure?: Yes  Is the patient able to verbalize Rescue vs. Long Acting medications?: Yes  Does the patient have a nebulizer?: Yes  Does the patient use a space with inhaled medications?: No     No patient-reported symptoms         Symptoms:     Symptom course: stable (Comment: increase SOB and cough due to weather change.  patient states her usual.)  Breathlessness: exertion  Increase use of rapid acting/rescue inhaled medications?: No  Change in chronic cough?: No/At Baseline  Change in sputum?: No/At Baseline  Self Monitoring - SaO2: No  Have you had a recent diagnosis of pneumonia either by PCP or at a hospital?: No      and   General Assessment    Do you have any symptoms that are causing concern?: No           Care Coordination Interventions    Program Enrollment: Complex Care  Referral from Primary Care Provider: No  Suggested Interventions and Community Resources  Diabetes Education: Not Started  Pharmacist: Completed (Comment: 9/30/2021 Clinical Rx)  Registered Dietician: Completed (Comment: 9/30/2021)  Zone Management Tools: Completed (Comment: 9/30/2021 COPD Zones and Diabetes Zones)  Other Services or Interventions: 9/30/2021 Instructed on same day, next day, and Walk-In Care. Goals Addressed                 This Visit's Progress     Conditions and Symptoms   On track     I will schedule office visits, as directed by my provider. I will keep my appointment or reschedule if I have to cancel. I will notify my provider of any barriers to my plan of care. I will follow my Zone Management tool to seek urgent or emergent care. I will notify my provider of any symptoms that indicate a worsening of my condition. Diabetes  COPD    Barriers: lack of education  Plan for overcoming my barriers: ARSH, Clinical R, ACC Dietician  Confidence: 9/10  Anticipated Goal Completion Date: 3/30/2022         Self Monitoring   On track     Self-Monitored Blood Glucose - I will check my blood sugar blood sugars ac & HS  I will notify my provider of any trends of increasing or decreasing blood sugars over a 1 month period. I will notify my provider if I have any blood sugar readings less than 70 more than 2 times a month.     None Recently Recorded    Barriers: lack of motivation and lack of education  Plan for overcoming my barriers: ACMEHNAZ  Confidence: 10/10  Anticipated Goal Completion Date: 11/30/2021              Prior to Admission medications    Medication Sig Start Date End Date Taking? Authorizing Provider   Insulin Degludec (TRESIBA FLEXTOUCH) 100 UNIT/ML SOPN INJECT 90 UNITS UNDER THE SKIN NIGHTLY 10/7/21   Kevin Tellez MD   insulin lispro, 1 Unit Dial, (HUMALOG KWIKPEN) 100 UNIT/ML SOPN 22 units at each meals hold if glucose less than 150 10/7/21   Kevin Tellez MD   blood glucose test strips (ONETOUCH VERIO) strip 1 each by In Vitro route 3 times daily As needed. 10/7/21   Kevin Tellez MD   busPIRone (BUSPAR) 15 MG tablet TAKE 1 TABLET BY MOUTH THREE TIMES DAILY 10/4/21   SUDEEP Escobedo CNP   buPROPion (WELLBUTRIN XL) 150 MG extended release tablet TAKE 1 TABLET BY MOUTH EVERY MORNING 10/4/21   SUDEEP Jimenez CNP   hydroxychloroquine (PLAQUENIL) 200 MG tablet Take 1 tablet by mouth 2 times daily 9/29/21 10/29/21  Anitha Juan MD   ALPRAZolam (XANAX) 0.25 MG tablet Take 1 tablet by mouth nightly as needed for Anxiety for up to 30 days. 9/21/21 10/21/21  SUDEEP Jimenez CNP   albuterol sulfate HFA (PROVENTIL HFA) 108 (90 Base) MCG/ACT inhaler Inhale 2 puffs into the lungs every 6 hours as needed for Wheezing 9/13/21   SUDEEP Escobedo CNP   diclofenac sodium (VOLTAREN) 1 % GEL Apply 2 g topically 4 times daily 9/7/21   SUDEEP Escobedo CNP   pregabalin (LYRICA) 150 MG capsule Take 1 capsule by mouth 3 times daily for 90 days.  9/7/21 12/6/21  SUDEEP Jimenez CNP   cetirizine (ZYRTEC) 10 MG tablet TAKE 1 TABLET BY MOUTH EVERY NIGHT AT BEDTIME AS NEEDED FOR ALLERGIES 9/3/21   SUDEEP Jimenez CNP   Insulin Pen Needle (B-D ULTRAFINE III SHORT PEN) 31G X 8 MM MISC Inject 1 each into the skin 3 times daily And as needed 8/16/21   SUDEEP Escobedo CNP   montelukast (SINGULAIR) 10 MG tablet TAKE 1 TABLET BY MOUTH EVERY NIGHT AT SUPPER FOR BREATHING OR ALLERGIES 8/4/21   SUDEEP Escobedo CNP levothyroxine (SYNTHROID) 125 MCG tablet Take 1 tablet by mouth Daily 8/4/21   Liseth Todd MD   venlafaxine (EFFEXOR XR) 75 MG extended release capsule Take 1 capsule by mouth daily 7/26/21   SUDEEP Phelps CNP   butalbital-acetaminophen-caffeine (FIORICET, ESGIC) -40 MG per tablet Take 1 tablet by mouth every 6 hours as needed for Headaches 7/7/21   SUDEEP Phelps CNP   spironolactone (ALDACTONE) 50 MG tablet TAKE 1 TABLET BY MOUTH DAILY 7/6/21   Liseth Todd MD   dicyclomine (BENTYL) 10 MG capsule Take 1 capsule by mouth every 6 hours as needed (cramps) 6/25/21   SUDEEP Noble CNP   ondansetron (ZOFRAN) 4 MG tablet Take 1 tablet by mouth every 8 hours as needed for Nausea 6/23/21   Mya Darden PA-C   fluticasone (FLONASE) 50 MCG/ACT nasal spray SHAKE LIQUID AND USE 1 SPRAY IN EACH NOSTRIL DAILY 5/18/21   SUDEEP Noble CNP   budesonide-formoterol (SYMBICORT) 160-4.5 MCG/ACT AERO Inhale 2 puffs into the lungs 2 times daily 4/14/21   Simon Ricci MD   tiotropium (SPIRIVA RESPIMAT) 2.5 MCG/ACT AERS inhaler Inhale 2 puffs into the lungs daily 4/14/21   Simon Ricci MD   metoclopramide (REGLAN) 10 MG tablet Take 1 tablet by mouth 2 times daily as needed (Headache) 3/21/21   Celestino Jaimes MD   acetaminophen (TYLENOL) 500 MG tablet Take 1 tablet by mouth 4 times daily as needed for Pain 3/21/21   Celestino Fester, MD   OneTouch Delica Lancets 69H MISC qid 2/23/21   Liseth Todd MD   Blood Glucose Monitoring Suppl ([place]) w/Device KIT As  Directed 2/23/21   Liseth Todd MD   Insulin Pen Needle (NOVOFINE) 32G X 6 MM MISC qid 2/23/21   Liseth Todd MD   baclofen (LIORESAL) 10 MG tablet Take 1 tablet by mouth 3 times daily 2/19/21   SUDEEP Phelps - CNP   tiZANidine (ZANAFLEX) 2 MG tablet Take 1 tablet by mouth 3 times daily as needed (back pain/ spasm) 2/6/21   Martín Allred PA-C   magnesium oxide (MAG-OX) 400 (241.3 Mg) MG TABS tablet TAKE 1/2 TABLET BY MOUTH DAILY 11/2/20   Lilibethsmitha Watts, APRN - CNP   SUMAtriptan (IMITREX) 25 MG tablet TAKE 1 TABLET BY MOUTH 1 TIME AS NEEDED FOR MIGRAINE 5/1/20   Lilibethsmitha Watts, APRN - CNP   blood glucose test strips (EXACTECH TEST) strip 1 each by In Vitro route 3 times daily (Accu-check test strips) As needed.  DX:E11.65 12/2/19   Lilibethsmitha Watts, APRN - CNP   ONE TOUCH ULTRASOFT LANCETS MISC TEST 3 TIMES DAILY AS NEEDED 12/2/19   Lilibethsmitha Tuckera Mac, APRN - CNP   albuterol (PROVENTIL) (2.5 MG/3ML) 0.083% nebulizer solution Take 3 mLs by nebulization every 4 hours as needed for Wheezing 11/5/19   Maria M Servetas, DO   atorvastatin (LIPITOR) 40 MG tablet Take 1 tablet by mouth nightly 9/11/19   [facility] DO   Insulin Syringe-Needle U-100 30G X 1/2\" 1 ML MISC 1 each by Does not apply route daily 11/16/18   Primo Beverly, DO   blood glucose test strips (ONETOUCH VERIO) strip TEST 3 TIMES DAILY AS NEEDED 8/16/18   Primo Beverly, DO   Blood Glucose Monitoring Suppl (ONETOUCH VERIO) w/Device KIT TEST 3 TIMES DAILY AS NEEDED 8/16/18   Primo Beverly, DO       Future Appointments   Date Time Provider Valerie Cosby   11/5/2021  9:15 AM Rob Stovall MD [facility]   12/7/2021  3:15 PM Booker Melton APRN - CNP [facility]   12/29/2021  1:30 PM Simon Ricci MD [facility]

## 2021-10-11 NOTE — PROGRESS NOTES
I spoke to patient this morning 10/11/2021 to remind her to come in sometime today and have her CBC drawn.

## 2021-10-16 ENCOUNTER — CARE COORDINATION (OUTPATIENT)
Dept: CARE COORDINATION | Age: 51
End: 2021-10-16

## 2021-10-16 NOTE — CARE COORDINATION
Ambulatory Care Coordination Note  10/16/2021  CM Risk Score: 11  Charlson 10 Year Mortality Risk Score: 100%     ACC: Crystal Garcia RN    Summary Note: CC follow-up cll placed t patient. Patient was seen at [facility] ER yesterday (10/15/2021) for chest pan. Patent reports pain was actually of her neck and shoulder. Discussed atypical chest pain in woman. Patient denies any current pain. Patient states no medication changes. Discussed scheduling an ER follow-up appointment. Patient declined. Patient reports her blood sugar is steadily going down since medication changes. Provided education on portion sizes. Discussed Diabetic friendly shakes. Discussed ACM follow-up call. Patient verbalizes agreement. Lab Results   Component Value Date    LABA1C 8.2 (H) 07/27/2021    LABA1C 9.4 06/25/2021    LABA1C 6.8 03/24/2021        Diabetes Assessment    Medic Alert ID: No  Meal Planning: Avoidance of concentrated sweets, Plate Method   How often do you test your blood sugar?: Other (Comment: Checking 2-3 times a day)   Do you have barriers with adherence to non-pharmacologic self-management interventions?  (Nutrition/Exercise/Self-Monitoring): No   Have you ever had to go to the ED for symptoms of low blood sugar?: No       No patient-reported symptoms   Do you have hyperglycemia symptoms?: No   Do you have hypoglycemia symptoms?: No   Last Blood Sugar Value: 169   Blood Sugar Trends: Steady Decrease      ,   COPD Assessment    Does the patient understand envrionmental exposure?: Yes  Is the patient able to verbalize Rescue vs. Long Acting medications?: Yes  Does the patient have a nebulizer?: Yes  Does the patient use a space with inhaled medications?: No     No patient-reported symptoms         Symptoms:  None: Yes    (Comment: denies)      Symptom course: stable  Breathlessness: none  Increase use of rapid acting/rescue inhaled medications?: No  Change in chronic cough?: No/At Baseline  Change in sputum?: No/At Baseline  Have you had a recent diagnosis of pneumonia either by PCP or at a hospital?: No      and   General Assessment    Do you have any symptoms that are causing concern?: No           Care Coordination Interventions    Program Enrollment: Complex Care  Referral from Primary Care Provider: No  Suggested Interventions and Community Resources  Diabetes Education: Not Started  Pharmacist: Completed (Comment: 9/30/2021 Clinical Rx)  Registered Dietician: Completed (Comment: 9/30/2021)  Zone Management Tools: Completed (Comment: 9/30/2021 COPD Zones and Diabetes Zones)  Other Services or Interventions: 9/30/2021 Instructed on same day, next day, and Walk-In Care. Goals Addressed                 This Visit's Progress     Conditions and Symptoms   On track     I will schedule office visits, as directed by my provider. I will keep my appointment or reschedule if I have to cancel. I will notify my provider of any barriers to my plan of care. I will follow my Zone Management tool to seek urgent or emergent care. I will notify my provider of any symptoms that indicate a worsening of my condition. Diabetes  COPD    Barriers: lack of education  Plan for overcoming my barriers: ARSH, Clinical R, ACC Dietician  Confidence: 9/10  Anticipated Goal Completion Date: 3/30/2022         Self Monitoring   On track     Self-Monitored Blood Glucose - I will check my blood sugar blood sugars ac & HS  I will notify my provider of any trends of increasing or decreasing blood sugars over a 1 month period. I will notify my provider if I have any blood sugar readings less than 70 more than 2 times a month. None Recently Recorded    Barriers: lack of motivation and lack of education  Plan for overcoming my barriers: ARSH  Confidence: 10/10  Anticipated Goal Completion Date: 11/30/2021              Prior to Admission medications    Medication Sig Start Date End Date Taking?  Authorizing Provider   Insulin Degludec (TRESIBA FLEXTOUCH) 100 UNIT/ML SOPN INJECT 90 UNITS UNDER THE SKIN NIGHTLY 10/7/21   Mindy Alexandra MD   insulin lispro, 1 Unit Dial, (HUMALOG KWIKPEN) 100 UNIT/ML SOPN 22 units at each meals hold if glucose less than 150 10/7/21   Mindy Alexandra MD   blood glucose test strips (ONETOUCH VERIO) strip 1 each by In Vitro route 3 times daily As needed. 10/7/21   Mindy Alexandra MD   busPIRone (BUSPAR) 15 MG tablet TAKE 1 TABLET BY MOUTH THREE TIMES DAILY 10/4/21   SUDEEP Aldana CNP   buPROPion (WELLBUTRIN XL) 150 MG extended release tablet TAKE 1 TABLET BY MOUTH EVERY MORNING 10/4/21   SUDEEP Kruger CNP   hydroxychloroquine (PLAQUENIL) 200 MG tablet Take 1 tablet by mouth 2 times daily 9/29/21 10/29/21  Nghia Fletcher MD   ALPRAZolam (XANAX) 0.25 MG tablet Take 1 tablet by mouth nightly as needed for Anxiety for up to 30 days. 9/21/21 10/21/21  SUDEEP Kruger CNP   albuterol sulfate HFA (PROVENTIL HFA) 108 (90 Base) MCG/ACT inhaler Inhale 2 puffs into the lungs every 6 hours as needed for Wheezing 9/13/21   SUDEEP Aldana CNP   diclofenac sodium (VOLTAREN) 1 % GEL Apply 2 g topically 4 times daily 9/7/21   SUDEEP Aldana CNP   pregabalin (LYRICA) 150 MG capsule Take 1 capsule by mouth 3 times daily for 90 days.  9/7/21 12/6/21  SUDEEP Kruger CNP   cetirizine (ZYRTEC) 10 MG tablet TAKE 1 TABLET BY MOUTH EVERY NIGHT AT BEDTIME AS NEEDED FOR ALLERGIES 9/3/21   SUDEEP Kruger CNP   Insulin Pen Needle (B-D ULTRAFINE III SHORT PEN) 31G X 8 MM MISC Inject 1 each into the skin 3 times daily And as needed 8/16/21   SUDEEP Aldana CNP   montelukast (SINGULAIR) 10 MG tablet TAKE 1 TABLET BY MOUTH EVERY NIGHT AT SUPPER FOR BREATHING OR ALLERGIES 8/4/21   SUDEEP Kruger CNP   levothyroxine (SYNTHROID) 125 MCG tablet Take 1 tablet by mouth Daily 8/4/21   Mindy Alexandra MD   venlafaxine (EFFEXOR XR) 75 MG extended release capsule Take 1 capsule by mouth daily 7/26/21 Leti Found, APRN - CNP   butalbital-acetaminophen-caffeine (FIORICET, ESGIC) -57 MG per tablet Take 1 tablet by mouth every 6 hours as needed for Headaches 7/7/21   SUDEEP Hauser CNP   spironolactone (ALDACTONE) 50 MG tablet TAKE 1 TABLET BY MOUTH DAILY 7/6/21   DANIELLE Mayen MD   dicyclomine (BENTYL) 10 MG capsule Take 1 capsule by mouth every 6 hours as needed (cramps) 6/25/21   SUDEEP Zamora CNP   ondansetron (ZOFRAN) 4 MG tablet Take 1 tablet by mouth every 8 hours as needed for Nausea 6/23/21   Davie Reno PA-C   fluticasone (FLONASE) 50 MCG/ACT nasal spray SHAKE LIQUID AND USE 1 SPRAY IN EACH NOSTRIL DAILY 5/18/21   SUDEEP Zamora CNP   budesonide-formoterol (SYMBICORT) 160-4.5 MCG/ACT AERO Inhale 2 puffs into the lungs 2 times daily 4/14/21   Jayden Garcia MD   tiotropium (SPIRIVA RESPIMAT) 2.5 MCG/ACT AERS inhaler Inhale 2 puffs into the lungs daily 4/14/21   Jayden Garcia MD   metoclopramide (REGLAN) 10 MG tablet Take 1 tablet by mouth 2 times daily as needed (Headache) 3/21/21   Nica George MD   acetaminophen (TYLENOL) 500 MG tablet Take 1 tablet by mouth 4 times daily as needed for Pain 3/21/21   Nica George MD   OneTouch Delica Lancets 20D MISC qid 2/23/21   DANIELLE Mayen MD   Blood Glucose Monitoring Suppl (Stevo Gallo) w/Device KIT As  Directed 2/23/21   DANIELLE Mayen MD   Insulin Pen Needle (NOVOFINE) 32G X 6 MM MISC qid 2/23/21   DANIELLE Mayen MD   baclofen (LIORESAL) 10 MG tablet Take 1 tablet by mouth 3 times daily 2/19/21   SUDEEP Hauser CNP   tiZANidine (ZANAFLEX) 2 MG tablet Take 1 tablet by mouth 3 times daily as needed (back pain/ spasm) 2/6/21   Enrique Ferro PA-C   magnesium oxide (MAG-OX) 400 (241.3 Mg) MG TABS tablet TAKE 1/2 TABLET BY MOUTH DAILY 11/2/20   SUDEEP Zamora CNP   SUMAtriptan (IMITREX) 25 MG tablet TAKE 1 TABLET BY MOUTH 1 TIME AS NEEDED FOR MIGRAINE 5/1/20   SUDEEP Zamora CNP   blood glucose test strips (EXACTECH TEST) strip 1 each by In Vitro route 3 times daily (Accu-check test strips) As needed.  DX:E11.65 12/2/19   SUDEEP Howard CNP   ONE TOUCH ULTRASOFT LANCETS MISC TEST 3 TIMES DAILY AS NEEDED 12/2/19   SUDEEP Howard CNP   albuterol (PROVENTIL) (2.5 MG/3ML) 0.083% nebulizer solution Take 3 mLs by nebulization every 4 hours as needed for Wheezing 11/5/19   Maria M Ni, DO   atorvastatin (LIPITOR) 40 MG tablet Take 1 tablet by mouth nightly 9/11/19   Avery Alberts, DO   Insulin Syringe-Needle U-100 30G X 1/2\" 1 ML MISC 1 each by Does not apply route daily 11/16/18   Ruddy Pall, DO   blood glucose test strips (ONETOUCH VERIO) strip TEST 3 TIMES DAILY AS NEEDED 8/16/18   Ruddy Pall, DO   Blood Glucose Monitoring Suppl (ONETOUCH VERIO) w/Device KIT TEST 3 TIMES DAILY AS NEEDED 8/16/18   Ruddy Pall, DO       Future Appointments   Date Time Provider Valerie Cosby   11/5/2021  9:15 AM Denzel Cartwright MD [facility]   12/7/2021  3:15 PM SUDEEP Huerta CNP MLOX [facility]   12/29/2021  1:30 PM Walter White MD [address] [place] [address]

## 2021-10-22 ENCOUNTER — CARE COORDINATION (OUTPATIENT)
Dept: CARE COORDINATION | Age: 51
End: 2021-10-22

## 2021-10-22 DIAGNOSIS — M79.7 FIBROMYALGIA: ICD-10-CM

## 2021-10-22 NOTE — TELEPHONE ENCOUNTER
Requesting medication refill. Please approve or deny this request.    Rx requested:  Requested Prescriptions     Pending Prescriptions Disp Refills    pregabalin (LYRICA) 150 MG capsule 90 capsule 2     Sig: Take 1 capsule by mouth 3 times daily for 90 days.        Last Office Visit:   9/7/2021    Last Filled:      Last Labs:      Next Visit Date:  Future Appointments   Date Time Provider Valerie Cosby   11/5/2021  9:15 AM Gabriela Nance MD [facility]   12/7/2021  3:15 PM SUDEEP Ray - CNP [facility]   12/29/2021  1:30 PM Nikos De La Garza MD [facility]

## 2021-10-22 NOTE — CARE COORDINATION
Contacted Inell Rm and left voicemail regarding Dietitian follow up. Left call back number and will follow up as appropriate.        [address] [facility], [facility]

## 2021-10-24 RX ORDER — PREGABALIN 150 MG/1
150 CAPSULE ORAL 3 TIMES DAILY
Qty: 90 CAPSULE | Refills: 2 | OUTPATIENT
Start: 2021-10-24 | End: 2022-01-22

## 2021-10-28 ENCOUNTER — PATIENT MESSAGE (OUTPATIENT)
Dept: FAMILY MEDICINE CLINIC | Age: 51
End: 2021-10-28

## 2021-10-29 ENCOUNTER — CARE COORDINATION (OUTPATIENT)
Dept: CARE COORDINATION | Age: 51
End: 2021-10-29

## 2021-10-29 RX ORDER — CYCLOBENZAPRINE HCL 10 MG
10 TABLET ORAL EVERY 8 HOURS PRN
COMMUNITY
Start: 2021-10-15 | End: 2022-04-21

## 2021-10-29 RX ORDER — MECLIZINE HCL 12.5 MG/1
12.5 TABLET ORAL 2 TIMES DAILY PRN
COMMUNITY
Start: 2021-10-26

## 2021-10-29 ASSESSMENT — ENCOUNTER SYMPTOMS: DYSPNEA ASSOCIATED WITH: EXERTION

## 2021-10-29 NOTE — CARE COORDINATION
Lauren Pennington  10/29/2021    Registered Dietitian Progress Note for Care Coordination    Assessment: Shandra Gould is a 49 y.o. female. RD referred for DM. RD spoke with patient for initial nutrition assessment on 10/1 and outreached 10/22, left VM this outreach. RD called to follow up with pt today 10/29. Pt states she is doing [place]. \" RD discussed previous goals with pt. Pt states she is eating well. RD discussed the importance of making meals balanced and eating consistently throughout the day. Pt states BS has been \"good\"- no BS reading provided per pt. Pt will focus on exercising weekly. Pt has no nutrition related questions at this time. Barriers to meeting goals: lack of motivation and overwhelmed by complexity of regimen    Action:  Reiterated the importance of eating balanced meals consistently, taking medicine as directed and monitoring BS daily. See assessment note above. Nutrition Monitoring and Evaluation  Indicator/Goal Criteria Progress   #1 Eat balanced meals consistently throughout the day. #1 Focus on adding more variety to meals- using the MyPlate ADRIANOEJK-0/8 plate fruits and/or vegetables, 1/4 plate protein and 1/4 plate starchy carbohydrates with 8 oz glass of low fat milk if desired. #1 Pt states she is eating well and eating 3 meals/day. #2  Incorporate exercise daily. #2 Start by taking a 10 minute walk 3x/week and slowly increase frequency and duration. #2 Pt will focus on incorporating exercise. Plan of Care:  RD encouraged pt to keep working toward goals set. RD explained to pt this is final follow up call and provided contact information to pt. Encouraged pt to call RD in future with any nutrition related questions or concerns. Follow Up:    Final follow up call today 10/29/21. RD will continue to follow/assist with patient return call.         [address] [facility], [address] [place] [address]

## 2021-10-29 NOTE — CARE COORDINATION
Ambulatory Care Coordination Note  10/29/2021  CM Risk Score: 11  Charlson 10 Year Mortality Risk Score: 100%     ACC: Kirstin Newton RN    Summary Note: Contacted patient for CC follow-up. Patient seen at [facility] ER on 10/25/2021 for C/O dizziness. Patient CXR indicated possible infiltrate. Patient reports she was inpatient for 24 hours and received IV antibiotics while in the hospital. Patient stated she was not discharged home on oral antibiotics. Patient prescribed Antivert PRN. Patient reports HA at times. Patient denies any current CP, SOB, dizziness, increase cough, fever, or chills. Reviewed next scheduled appointment with SUDEEP Neal scheduling ED follow-up appointment. Patient verbalizes agreement. Dicussed that the [facility]  will contact her by Monday  to schedule appointment. Discussed Palliative Care services. Patient declined. Discussed CC follow-up call in approximately 2-3 weeks. Patient verbalizes agreement. Lab Results   Component Value Date    LABA1C 8.2 (H) 07/27/2021    LABA1C 9.4 06/25/2021    LABA1C 6.8 03/24/2021        Diabetes Assessment    Medic Alert ID: No  Meal Planning: Avoidance of concentrated sweets, Plate Method   How often do you test your blood sugar?: Other (Comment: Checking 2-3 times a day)   Do you have barriers with adherence to non-pharmacologic self-management interventions?  (Nutrition/Exercise/Self-Monitoring): No   Have you ever had to go to the ED for symptoms of low blood sugar?: No       No patient-reported symptoms   Do you have hyperglycemia symptoms?: No   Do you have hypoglycemia symptoms?: No   Last Blood Sugar Value: 149   Blood Sugar Trends: No Change      ,   COPD Assessment    Does the patient understand envrionmental exposure?: Yes  Is the patient able to verbalize Rescue vs. Long Acting medications?: Yes  Does the patient have a nebulizer?: Yes  Does the patient use a space with inhaled medications?: No No patient-reported symptoms         Symptoms:   (Comment: recent episodes of CP (evaluated in ER). Denies currently.)      Symptom course: stable  Breathlessness: exertion  Change in chronic cough?: No/At Baseline  Change in sputum?: No/At Baseline  Have you had a recent diagnosis of pneumonia either by PCP or at a hospital?:  (Comment: [facility] ER inpatient for 24 hours. Received IV antibiotics - CXR possible pneumonia. )      and   General Assessment    Do you have any symptoms that are causing concern?: Yes  Progression since Onset: Gradually Improving  Reported Symptoms: Other (Comment: vertigo/dizziness)           Care Coordination Interventions    Program Enrollment: Complex Care  Referral from Primary Care Provider: No  Suggested Interventions and Community Resources  Diabetes Education: Not Started  Palliative Care: Declined (Comment: 10/29/2021 Declined)  Pharmacist: Completed (Comment: 9/30/2021 Clinical Rx)  Registered Dietician: Completed (Comment: 9/30/2021)  Zone Management Tools: Completed (Comment: 9/30/2021 COPD Zones and Diabetes Zones)  Other Services or Interventions: 9/30/2021 Instructed on same day, next day, and Walk-In Care. Goals Addressed                 This Visit's Progress     Conditions and Symptoms   On track     I will schedule office visits, as directed by my provider. I will keep my appointment or reschedule if I have to cancel. I will notify my provider of any barriers to my plan of care. I will follow my Zone Management tool to seek urgent or emergent care. I will notify my provider of any symptoms that indicate a worsening of my condition.     Diabetes  COPD    Barriers: lack of education  Plan for overcoming my barriers: ARSH, Clinical R, ACC Dietician  Confidence: 9/10  Anticipated Goal Completion Date: 3/30/2022         Self Monitoring   On track     Self-Monitored Blood Glucose - I will check my blood sugar blood sugars ac & HS  I will notify my provider of any 10 MG tablet TAKE 1 TABLET BY MOUTH EVERY NIGHT AT BEDTIME AS NEEDED FOR ALLERGIES 9/3/21   SUDEEP Fraser CNP   Insulin Pen Needle (B-D ULTRAFINE III SHORT PEN) 31G X 8 MM MISC Inject 1 each into the skin 3 times daily And as needed 8/16/21   SUDEEP Amato CNP   montelukast (SINGULAIR) 10 MG tablet TAKE 1 TABLET BY MOUTH EVERY NIGHT AT SUPPER FOR BREATHING OR ALLERGIES 8/4/21   SUDEEP Fraser CNP   levothyroxine (SYNTHROID) 125 MCG tablet Take 1 tablet by mouth Daily 8/4/21   Thania Murphy MD   venlafaxine (EFFEXOR XR) 75 MG extended release capsule Take 1 capsule by mouth daily 7/26/21   SUDEEP Amato CNP   butalbital-acetaminophen-caffeine (FIORICET, ESGIC) -40 MG per tablet Take 1 tablet by mouth every 6 hours as needed for Headaches 7/7/21   SUDEEP Amato CNP   spironolactone (ALDACTONE) 50 MG tablet TAKE 1 TABLET BY MOUTH DAILY 7/6/21   Thania Murphy MD   dicyclomine (BENTYL) 10 MG capsule Take 1 capsule by mouth every 6 hours as needed (cramps) 6/25/21   SUDEEP Fraser CNP   ondansetron (ZOFRAN) 4 MG tablet Take 1 tablet by mouth every 8 hours as needed for Nausea 6/23/21   Caio Martinez PA-C   fluticasone (FLONASE) 50 MCG/ACT nasal spray SHAKE LIQUID AND USE 1 SPRAY IN EACH NOSTRIL DAILY 5/18/21   SUDEEP Fraser CNP   budesonide-formoterol (SYMBICORT) 160-4.5 MCG/ACT AERO Inhale 2 puffs into the lungs 2 times daily 4/14/21   Suzanne May MD   tiotropium (SPIRIVA RESPIMAT) 2.5 MCG/ACT AERS inhaler Inhale 2 puffs into the lungs daily 4/14/21   Suzanne May MD   metoclopramide (REGLAN) 10 MG tablet Take 1 tablet by mouth 2 times daily as needed (Headache) 3/21/21   Natacha Todd MD   acetaminophen (TYLENOL) 500 MG tablet Take 1 tablet by mouth 4 times daily as needed for Pain 3/21/21   Natacha Todd MD   OneTouch Delica Lancets 10T MISC qid 2/23/21   Thania Murphy MD   Blood Glucose Monitoring Suppl (Leonela Stewart) w/Device KIT As  Directed 2/23/21   Santos Daigle MD   Insulin Pen Needle (NOVOFINE) 32G X 6 MM MISC qid 2/23/21   Santos Daigle MD   baclofen (LIORESAL) 10 MG tablet Take 1 tablet by mouth 3 times daily 2/19/21   SUDEEP Calvillo CNP   tiZANidine (ZANAFLEX) 2 MG tablet Take 1 tablet by mouth 3 times daily as needed (back pain/ spasm) 2/6/21   Raheem Pastor PA-C   magnesium oxide (MAG-OX) 400 (241.3 Mg) MG TABS tablet TAKE 1/2 TABLET BY MOUTH DAILY 11/2/20   SUDEEP Carreon CNP   SUMAtriptan (IMITREX) 25 MG tablet TAKE 1 TABLET BY MOUTH 1 TIME AS NEEDED FOR MIGRAINE 5/1/20   SUDEEP Carreon CNP   blood glucose test strips (EXACTECH TEST) strip 1 each by In Vitro route 3 times daily (Accu-check test strips) As needed.  DX:E11.65 12/2/19   SUDEEP Carreon CNP   ONE TOUCH ULTRASOFT LANCETS MISC TEST 3 TIMES DAILY AS NEEDED 12/2/19   SUDEEP Carreon CNP   albuterol (PROVENTIL) (2.5 MG/3ML) 0.083% nebulizer solution Take 3 mLs by nebulization every 4 hours as needed for Wheezing 11/5/19   Maria M Bartas, DO   atorvastatin (LIPITOR) 40 MG tablet Take 1 tablet by mouth nightly 9/11/19   Briana Wolfe, DO   Insulin Syringe-Needle U-100 30G X 1/2\" 1 ML MISC 1 each by Does not apply route daily 11/16/18   Howie Farr,    blood glucose test strips (ONETOUCH VERIO) strip TEST 3 TIMES DAILY AS NEEDED 8/16/18   Howie Farr DO   Blood Glucose Monitoring Suppl (ONETOUCH VERIO) w/Device KIT TEST 3 TIMES DAILY AS NEEDED 8/16/18   Howie Farr DO       Future Appointments   Date Time Provider [facility]   12/7/2021  3:15 PM SUDEEP Calvillo CNP MLOX [facility] [place]   12/29/2021  1:30 PM Milena Monroe MD [facility]

## 2021-10-31 NOTE — TELEPHONE ENCOUNTER
Rx requested:  Requested Prescriptions     Pending Prescriptions Disp Refills    albuterol sulfate  (90 Base) MCG/ACT inhaler [Pharmacy Med Name: ALBUTEROL HFA INH (200 PUFFS)6.7GM] 6.7 g      Sig: INHALE 2 PUFFS INTO THE LUNGS EVERY 6 HOURS AS NEEDED FOR WHEEZING       Last Office Visit:   9/7/2021      Next Visit Date:  Future Appointments   Date Time Provider Valerie Cosby   12/7/2021  3:15 PM SUDEEP Ray CNP MLOX [facility]   12/29/2021  1:30 PM Nikos De La Garza MD [facility]

## 2021-11-01 RX ORDER — ALBUTEROL SULFATE 90 UG/1
2 AEROSOL, METERED RESPIRATORY (INHALATION) EVERY 6 HOURS PRN
Qty: 6.7 G | Refills: 1 | Status: SHIPPED | OUTPATIENT
Start: 2021-11-01 | End: 2022-10-05 | Stop reason: SDUPTHER

## 2021-11-03 DIAGNOSIS — D86.9 SARCOIDOSIS: ICD-10-CM

## 2021-11-03 DIAGNOSIS — E11.9 TYPE 2 DIABETES MELLITUS TREATED WITH INSULIN (HCC): Primary | ICD-10-CM

## 2021-11-03 DIAGNOSIS — J45.41 MODERATE PERSISTENT ASTHMA WITH ACUTE EXACERBATION: ICD-10-CM

## 2021-11-03 DIAGNOSIS — Z79.4 TYPE 2 DIABETES MELLITUS TREATED WITH INSULIN (HCC): Primary | ICD-10-CM

## 2021-11-03 RX ORDER — BLOOD-GLUCOSE METER
EACH MISCELLANEOUS
Qty: 1 KIT | Refills: 0 | Status: SHIPPED | OUTPATIENT
Start: 2021-11-03

## 2021-11-03 RX ORDER — BLOOD SUGAR DIAGNOSTIC
STRIP MISCELLANEOUS
Qty: 100 EACH | Refills: 3 | Status: SHIPPED | OUTPATIENT
Start: 2021-11-03

## 2021-11-03 RX ORDER — SPIRONOLACTONE 50 MG/1
50 TABLET, FILM COATED ORAL DAILY
Qty: 30 TABLET | Refills: 3 | Status: SHIPPED | OUTPATIENT
Start: 2021-11-03 | End: 2022-03-03

## 2021-11-03 NOTE — TELEPHONE ENCOUNTER
Pharmacy  requesting medication refill.  Please approve or deny this request.    Rx requested:  Requested Prescriptions     Pending Prescriptions Disp Refills    spironolactone (ALDACTONE) 50 MG tablet [Pharmacy Med Name: SPIRONOLACTONE 50MG TABLETS] 30 tablet 3     Sig: TAKE 1 TABLET BY MOUTH DAILY         Last Office Visit:   10/7/2021      Next Visit Date:  Future Appointments   Date Time Provider Valerie Cosby   11/9/2021  3:45 PM [facility]  [place] [address]   12/7/2021  3:15 PM SUDEEP Bone - CNP MLOX [facility]   12/29/2021  1:30 PM Wilbert Najera MD [address] [place] [address]

## 2021-11-04 RX ORDER — HYDROXYCHLOROQUINE SULFATE 200 MG/1
TABLET, FILM COATED ORAL
Qty: 60 TABLET | Refills: 2 | Status: SHIPPED | OUTPATIENT
Start: 2021-11-04

## 2021-11-09 ENCOUNTER — VIRTUAL VISIT (OUTPATIENT)
Dept: FAMILY MEDICINE CLINIC | Age: 51
End: 2021-11-09
Payer: MEDICAID

## 2021-11-09 DIAGNOSIS — J18.9 PNEUMONIA DUE TO INFECTIOUS ORGANISM, UNSPECIFIED LATERALITY, UNSPECIFIED PART OF LUNG: Primary | ICD-10-CM

## 2021-11-09 PROCEDURE — 99213 OFFICE O/P EST LOW 20 MIN: CPT | Performed by: STUDENT IN AN ORGANIZED HEALTH CARE EDUCATION/TRAINING PROGRAM

## 2021-11-09 ASSESSMENT — ENCOUNTER SYMPTOMS
ABDOMINAL PAIN: 0
SORE THROAT: 0
VOMITING: 0
COUGH: 0
SHORTNESS OF BREATH: 1
RHINORRHEA: 0
NAUSEA: 0
WHEEZING: 0
DIARRHEA: 0

## 2021-11-09 NOTE — PROGRESS NOTES
Meagan Manuel (:  1970) is a 49 y.o. female,Established patient, here for evaluation of the following chief complaint(s): Follow-Up from [facility] to the ER end of Oct with chest pain & dizziness. Was admitted with pneumonia. Dx home the following day. States she is doing well now. All symptoms have resolved. Has no concerns at this time. )         ASSESSMENT/PLAN:  1. Pneumonia due to infectious organism, unspecified laterality, unspecified part of lung  Patient with appointment for follow-up from recent pneumonia infection. She is doing much better. She is asymptomatic at this time. No lingering fevers or chills. She finished all antibiotics. She is scheduled to see PCP next month in person as well as her pulmonologist for sarcoidosis. She is instructed that if any symptoms return or worsen that she should follow-up sooner. Patient voiced understanding. All questions answered. Follow-up as scheduled    Return as scheduled. SUBJECTIVE/OBJECTIVE:  HPI     Patient with follow-up appointment from recent ER visit for flank pain and chest pain with some dizziness. She was diagnosed with pneumonia. She is doing much better now. She has finished antibiotic. She denies any lingering symptoms. No fevers or chills. No chest pain. She does have chronic shortness of breath from sarcoidosis. No rhinorrhea or nasal congestion. No abdominal pain, nausea, vomiting or diarrhea. She is overall feeling well. She is scheduled for follow-up with her PCP next month. She is also scheduled with her pulmonologist for her sarcoid next month as well. She has no other concerns at this time. Review of Systems   Constitutional: Negative for chills, fatigue, fever and unexpected weight change. HENT: Negative for congestion, rhinorrhea and sore throat. Respiratory: Positive for shortness of breath (Chronic). Negative for cough and wheezing.     Cardiovascular: Negative for chest pain, palpitations and leg swelling. Gastrointestinal: Negative for abdominal pain, diarrhea, nausea and vomiting. Musculoskeletal: Negative for arthralgias and joint swelling. Skin: Negative for rash. Neurological: Negative for weakness, light-headedness, numbness and headaches. Patient-Reported Vitals 3/9/2021   Patient-Reported Weight 566   Patient-Reported Height 5'3   Patient-Reported Systolic 750   Patient-Reported Diastolic 76   Patient-Reported Pulse 88   Patient-Reported Temperature 97.7        Physical Exam  Due to nature of virtual visit, unable to complete full physical exam at this time, patient does not sound short of breath while talking. She does not appear in respiratory distress. On this date 11/9/2021 I have spent 12 minutes reviewing previous notes, test results and face to face (virtual) with the patient discussing the diagnosis and importance of compliance with the treatment plan as well as documenting on the day of the visit. Landrum Gitelman, was evaluated through a synchronous (real-time) audio-video encounter. The patient (or guardian if applicable) is aware that this is a billable service. Verbal consent to proceed has been obtained within the past 12 months. The visit was conducted pursuant to the emergency declaration under the [facility] [place], [address] [place] [address] authority and the [employer] General Act. Patient identification was verified, and a caregiver was present when appropriate. The patient was located in a state where the provider was credentialed to provide care. An electronic signature was used to authenticate this note.     --Mary Haynes DO

## 2021-11-18 ENCOUNTER — HOSPITAL ENCOUNTER (EMERGENCY)
Age: 51
Discharge: LEFT BEFORE DISCHARGE | End: 2021-11-18
Attending: EMERGENCY MEDICINE
Payer: MEDICAID

## 2021-11-18 ENCOUNTER — CARE COORDINATION (OUTPATIENT)
Dept: CARE COORDINATION | Age: 51
End: 2021-11-18

## 2021-11-18 ENCOUNTER — APPOINTMENT (OUTPATIENT)
Dept: GENERAL RADIOLOGY | Age: 51
End: 2021-11-18
Payer: MEDICAID

## 2021-11-18 ENCOUNTER — APPOINTMENT (OUTPATIENT)
Dept: CT IMAGING | Age: 51
End: 2021-11-18
Payer: MEDICAID

## 2021-11-18 VITALS
OXYGEN SATURATION: 98 % | HEART RATE: 82 BPM | DIASTOLIC BLOOD PRESSURE: 75 MMHG | BODY MASS INDEX: 45.18 KG/M2 | WEIGHT: 255 LBS | TEMPERATURE: 98.7 F | RESPIRATION RATE: 18 BRPM | SYSTOLIC BLOOD PRESSURE: 158 MMHG | HEIGHT: 63 IN

## 2021-11-18 DIAGNOSIS — Z88.9 MULTIPLE DRUG ALLERGIES: ICD-10-CM

## 2021-11-18 DIAGNOSIS — N28.9 RENAL INSUFFICIENCY: ICD-10-CM

## 2021-11-18 DIAGNOSIS — R06.00 DYSPNEA AND RESPIRATORY ABNORMALITIES: ICD-10-CM

## 2021-11-18 DIAGNOSIS — R79.89 ELEVATED D-DIMER: ICD-10-CM

## 2021-11-18 DIAGNOSIS — R06.89 DYSPNEA AND RESPIRATORY ABNORMALITIES: ICD-10-CM

## 2021-11-18 DIAGNOSIS — E66.01 MORBID OBESITY WITH BMI OF 45.0-49.9, ADULT (HCC): ICD-10-CM

## 2021-11-18 DIAGNOSIS — Z86.2 PERSONAL HISTORY OF SARCOIDOSIS: ICD-10-CM

## 2021-11-18 DIAGNOSIS — R07.9 RECURRENT CHEST PAIN: Primary | ICD-10-CM

## 2021-11-18 LAB
ALBUMIN SERPL-MCNC: 4.2 G/DL (ref 3.5–4.6)
ALP BLD-CCNC: 145 U/L (ref 40–130)
ALT SERPL-CCNC: 17 U/L (ref 0–33)
ANION GAP SERPL CALCULATED.3IONS-SCNC: 12 MEQ/L (ref 9–15)
AST SERPL-CCNC: 18 U/L (ref 0–35)
BASOPHILS ABSOLUTE: 0.1 K/UL (ref 0–0.2)
BASOPHILS RELATIVE PERCENT: 1.4 %
BILIRUB SERPL-MCNC: <0.2 MG/DL (ref 0.2–0.7)
BUN BLDV-MCNC: 11 MG/DL (ref 6–20)
C-REACTIVE PROTEIN, HIGH SENSITIVITY: 8 MG/L (ref 0–5)
CALCIUM SERPL-MCNC: 9.3 MG/DL (ref 8.5–9.9)
CHLORIDE BLD-SCNC: 104 MEQ/L (ref 95–107)
CK MB: 2.5 NG/ML (ref 0–3.8)
CO2: 23 MEQ/L (ref 20–31)
CREAT SERPL-MCNC: 1.4 MG/DL (ref 0.5–0.9)
CREATINE KINASE-MB INDEX: 0.6 % (ref 0–3.5)
D DIMER: 0.54 MG/L FEU (ref 0–0.5)
EOSINOPHILS ABSOLUTE: 0.3 K/UL (ref 0–0.7)
EOSINOPHILS RELATIVE PERCENT: 3.9 %
GFR AFRICAN AMERICAN: 48
GFR NON-AFRICAN AMERICAN: 39.7
GLOBULIN: 3.3 G/DL (ref 2.3–3.5)
GLUCOSE BLD-MCNC: 139 MG/DL (ref 70–99)
HCT VFR BLD CALC: 44.9 % (ref 37–47)
HEMOGLOBIN: 14.9 G/DL (ref 12–16)
LYMPHOCYTES ABSOLUTE: 1.7 K/UL (ref 1–4.8)
LYMPHOCYTES RELATIVE PERCENT: 24.3 %
MCH RBC QN AUTO: 31.4 PG (ref 27–31.3)
MCHC RBC AUTO-ENTMCNC: 33.1 % (ref 33–37)
MCV RBC AUTO: 94.7 FL (ref 82–100)
MONOCYTES ABSOLUTE: 0.4 K/UL (ref 0.2–0.8)
MONOCYTES RELATIVE PERCENT: 5.4 %
NEUTROPHILS ABSOLUTE: 4.5 K/UL (ref 1.4–6.5)
NEUTROPHILS RELATIVE PERCENT: 65 %
PDW BLD-RTO: 13.5 % (ref 11.5–14.5)
PLATELET # BLD: 286 K/UL (ref 130–400)
POC CREATININE WHOLE BLOOD: 1.4
POTASSIUM SERPL-SCNC: 4.6 MEQ/L (ref 3.4–4.9)
RBC # BLD: 4.74 M/UL (ref 4.2–5.4)
SODIUM BLD-SCNC: 139 MEQ/L (ref 135–144)
TOTAL CK: 423 U/L (ref 0–170)
TOTAL PROTEIN: 7.5 G/DL (ref 6.3–8)
TROPONIN: <0.01 NG/ML (ref 0–0.01)
WBC # BLD: 7 K/UL (ref 4.8–10.8)

## 2021-11-18 PROCEDURE — 85025 COMPLETE CBC W/AUTO DIFF WBC: CPT

## 2021-11-18 PROCEDURE — 36415 COLL VENOUS BLD VENIPUNCTURE: CPT

## 2021-11-18 PROCEDURE — 93005 ELECTROCARDIOGRAM TRACING: CPT | Performed by: PHYSICIAN ASSISTANT

## 2021-11-18 PROCEDURE — 82553 CREATINE MB FRACTION: CPT

## 2021-11-18 PROCEDURE — 82550 ASSAY OF CK (CPK): CPT

## 2021-11-18 PROCEDURE — 85379 FIBRIN DEGRADATION QUANT: CPT

## 2021-11-18 PROCEDURE — 99283 EMERGENCY DEPT VISIT LOW MDM: CPT

## 2021-11-18 PROCEDURE — 80053 COMPREHEN METABOLIC PANEL: CPT

## 2021-11-18 PROCEDURE — 84484 ASSAY OF TROPONIN QUANT: CPT

## 2021-11-18 PROCEDURE — 71045 X-RAY EXAM CHEST 1 VIEW: CPT

## 2021-11-18 PROCEDURE — 86141 C-REACTIVE PROTEIN HS: CPT

## 2021-11-18 RX ORDER — ACETAMINOPHEN 500 MG
1000 TABLET ORAL ONCE
Status: DISCONTINUED | OUTPATIENT
Start: 2021-11-18 | End: 2021-11-19 | Stop reason: HOSPADM

## 2021-11-18 RX ORDER — 0.9 % SODIUM CHLORIDE 0.9 %
1000 INTRAVENOUS SOLUTION INTRAVENOUS ONCE
Status: DISCONTINUED | OUTPATIENT
Start: 2021-11-18 | End: 2021-11-19 | Stop reason: HOSPADM

## 2021-11-18 ASSESSMENT — ENCOUNTER SYMPTOMS
ALLERGIC/IMMUNOLOGIC NEGATIVE: 1
ABDOMINAL PAIN: 0
EYE PAIN: 0
APNEA: 0
COUGH: 0
COLOR CHANGE: 0
TROUBLE SWALLOWING: 0

## 2021-11-18 ASSESSMENT — HEART SCORE: ECG: 0

## 2021-11-18 ASSESSMENT — PAIN SCALES - GENERAL: PAINLEVEL_OUTOF10: 8

## 2021-11-18 ASSESSMENT — PAIN DESCRIPTION - ORIENTATION: ORIENTATION: MID

## 2021-11-18 ASSESSMENT — PAIN DESCRIPTION - LOCATION: LOCATION: CHEST

## 2021-11-18 ASSESSMENT — PAIN DESCRIPTION - PAIN TYPE: TYPE: ACUTE PAIN

## 2021-11-18 NOTE — CARE COORDINATION
Ambulatory Care Coordination Note  11/18/2021  CM Risk Score: 11  Charlson 10 Year Mortality Risk Score: 100%     ACC: Gerardo Ngo RN    Summary Note: ACM follow-up call placed to patient. Patient denies any current issues or concerns. Discussed patient's last ER visit for c/o Flank pain. Patient reports symptoms resolved. Patient reports compliance with BGM daily. Discussed blood sugar goals. Discussed vaccinations and Covid-19 Booster. Discussed ACM follow-up call in approximately 1 month. Patient verbalizes agreement. Patient instructed to call ACM earlier with any concerns or questions. Patient verbalizes understanding. Diabetes Assessment    Medic Alert ID: No  Meal Planning: Avoidance of concentrated sweets, Plate Method   How often do you test your blood sugar?: Other (Comment: Checking 2-3 times a day)   Do you have barriers with adherence to non-pharmacologic self-management interventions?  (Nutrition/Exercise/Self-Monitoring): No   Have you ever had to go to the ED for symptoms of low blood sugar?: No       No patient-reported symptoms   Do you have hyperglycemia symptoms?: No   Do you have hypoglycemia symptoms?: No   Last Blood Sugar Value: 172   Blood Sugar Monitoring Regimen: Once a Day   Blood Sugar Trends: No Change      ,   COPD Assessment    Does the patient understand envrionmental exposure?: Yes  Is the patient able to verbalize Rescue vs. Long Acting medications?: Yes  Does the patient have a nebulizer?: Yes  Does the patient use a space with inhaled medications?: No     No patient-reported symptoms         Symptoms:  None: Yes      Symptom course: stable  Breathlessness: none  Increase use of rapid acting/rescue inhaled medications?: No  Change in sputum?: No/At Baseline  Self Monitoring - SaO2: No  Have you had a recent diagnosis of pneumonia either by PCP or at a hospital?:  (Comment: Resolved)      and   General Assessment    Do you have any symptoms that are causing concern?: No Care Coordination Interventions    Program Enrollment: Complex Care  Referral from Primary Care Provider: No  Suggested Interventions and Community Resources  Diabetes Education: Not Started  Palliative Care: Declined (Comment: 10/29/2021 Declined)  Pharmacist: Completed (Comment: 9/30/2021 Clinical Rx)  Registered Dietician: Completed (Comment: 9/30/2021)  Zone Management Tools: Completed (Comment: 9/30/2021 COPD Zones and Diabetes Zones)  Other Services or Interventions: 9/30/2021 Instructed on same day, next day, and Walk-In Care. Goals Addressed                 This Visit's Progress     Conditions and Symptoms   On track     I will schedule office visits, as directed by my provider. I will keep my appointment or reschedule if I have to cancel. I will notify my provider of any barriers to my plan of care. I will follow my Zone Management tool to seek urgent or emergent care. I will notify my provider of any symptoms that indicate a worsening of my condition. Diabetes  COPD    Barriers: lack of education  Plan for overcoming my barriers: ACM, Clinical R, ACC Dietician  Confidence: 9/10  Anticipated Goal Completion Date: 3/30/2022         Self Monitoring   On track     Self-Monitored Blood Glucose - I will check my blood sugar blood sugars ac & HS  I will notify my provider of any trends of increasing or decreasing blood sugars over a 1 month period. I will notify my provider if I have any blood sugar readings less than 70 more than 2 times a month. None Recently Recorded    Barriers: lack of motivation and lack of education  Plan for overcoming my barriers: ACMEHNAZ  Confidence: 10/10  Anticipated Goal Completion Date: 11/30/2021              Prior to Admission medications    Medication Sig Start Date End Date Taking?  Authorizing Provider   hydroxychloroquine (PLAQUENIL) 200 MG tablet TAKE 1 TABLET BY MOUTH TWICE DAILY 11/4/21   Seven Muller MD   spironolactone (ALDACTONE) 50 MG tablet TAKE 1 TABLET BY MOUTH DAILY 11/3/21   John Khanna MD   Blood Glucose Monitoring Suppl ([place]) w/Device KIT As  Directed 11/3/21   John Khanna MD   blood glucose test strips (ONETOUCH VERIO) strip Test 3x daily e11.65 11/3/21   John Khanna MD   albuterol sulfate  (90 Base) MCG/ACT inhaler INHALE 2 PUFFS INTO THE LUNGS EVERY 6 HOURS AS NEEDED FOR WHEEZING 11/1/21   Melissa Lucio Gilford, APRN - CNP   cyclobenzaprine (FLEXERIL) 10 MG tablet Take 10 mg by mouth every 8 hours as needed for Muscle spasms 10/15/21   Historical Provider, MD   meclizine (ANTIVERT) 12.5 MG tablet Take 12.5 mg by mouth 2 times daily as needed for Dizziness 10/26/21   Historical Provider, MD   Insulin Degludec (TRESIBA FLEXTOUCH) 100 UNIT/ML SOPN INJECT 90 UNITS UNDER THE SKIN NIGHTLY 10/7/21   John Khanna MD   insulin lispro, 1 Unit Dial, (HUMALOG KWIKPEN) 100 UNIT/ML SOPN 22 units at each meals hold if glucose less than 150 10/7/21   Christopher Rizvi MD   busPIRone (BUSPAR) 15 MG tablet TAKE 1 TABLET BY MOUTH THREE TIMES DAILY 10/4/21   SUDEEP Venegas CNP   buPROPion (WELLBUTRIN XL) 150 MG extended release tablet TAKE 1 TABLET BY MOUTH EVERY MORNING 10/4/21   Melissa Lucio Gilford, APRN - CNP   diclofenac sodium (VOLTAREN) 1 % GEL Apply 2 g topically 4 times daily 9/7/21   SUDEEP Venegas CNP   pregabalin (LYRICA) 150 MG capsule Take 1 capsule by mouth 3 times daily for 90 days.  9/7/21 12/6/21  Melissa Lucio Gilford, APRN - CNP   cetirizine (ZYRTEC) 10 MG tablet TAKE 1 TABLET BY MOUTH EVERY NIGHT AT BEDTIME AS NEEDED FOR ALLERGIES 9/3/21   Melissa Lucio Gilford, APRN - CNP   Insulin Pen Needle (B-D ULTRAFINE III SHORT PEN) 31G X 8 MM MISC Inject 1 each into the skin 3 times daily And as needed 8/16/21   SUDEEP Venegas CNP   montelukast (SINGULAIR) 10 MG tablet TAKE 1 TABLET BY MOUTH EVERY NIGHT AT SUPPER FOR BREATHING OR ALLERGIES 8/4/21   Melissa Lucio Gilford, APRN - CNP   levothyroxine (SYNTHROID) 125 MCG tablet Take 1 tablet by mouth Daily 8/4/21   Lukas Simon MD   venlafaxine (EFFEXOR XR) 75 MG extended release capsule Take 1 capsule by mouth daily 7/26/21   SUDEEP Holguin CNP   butalbital-acetaminophen-caffeine (FIORICET, ESGIC) -42 MG per tablet Take 1 tablet by mouth every 6 hours as needed for Headaches 7/7/21   SUDEEP Holguin CNP   dicyclomine (BENTYL) 10 MG capsule Take 1 capsule by mouth every 6 hours as needed (cramps) 6/25/21   SUDEEP Hernández CNP   ondansetron (ZOFRAN) 4 MG tablet Take 1 tablet by mouth every 8 hours as needed for Nausea 6/23/21   Esteban Valdez PA-C   fluticasone (FLONASE) 50 MCG/ACT nasal spray SHAKE LIQUID AND USE 1 SPRAY IN EACH NOSTRIL DAILY 5/18/21   SUDEEP Hernández CNP   budesonide-formoterol (SYMBICORT) 160-4.5 MCG/ACT AERO Inhale 2 puffs into the lungs 2 times daily 4/14/21   Margot Brewer MD   metoclopramide (REGLAN) 10 MG tablet Take 1 tablet by mouth 2 times daily as needed (Headache) 3/21/21   Lana Frank MD   acetaminophen (TYLENOL) 500 MG tablet Take 1 tablet by mouth 4 times daily as needed for Pain 3/21/21   Lana Frank MD   OneTouch Delica Lancets 21U MISC qid 2/23/21   Lukas Simon MD   Insulin Pen Needle (NOVOFINE) 32G X 6 MM MISC qid 2/23/21   Lukas Simon MD   baclofen (LIORESAL) 10 MG tablet Take 1 tablet by mouth 3 times daily 2/19/21   SUDEEP Holguin CNP   tiZANidine (ZANAFLEX) 2 MG tablet Take 1 tablet by mouth 3 times daily as needed (back pain/ spasm) 2/6/21   Sweta Paez PA-C   magnesium oxide (MAG-OX) 400 (241.3 Mg) MG TABS tablet TAKE 1/2 TABLET BY MOUTH DAILY 11/2/20   SUDEEP Hernández CNP   SUMAtriptan (IMITREX) 25 MG tablet TAKE 1 TABLET BY MOUTH 1 TIME AS NEEDED FOR MIGRAINE 5/1/20   SUDEEP Hernández CNP   blood glucose test strips (EXACTECH TEST) strip 1 each by In Vitro route 3 times daily (Accu-check test strips) As needed.  DX:E11.65 12/2/19   SUDEEP Holguin CNP ONE TOUCH ULTRASOFT LANCETS MISC TEST 3 TIMES DAILY AS NEEDED 12/2/19   SUDEEP Ca CNP   albuterol (PROVENTIL) (2.5 MG/3ML) 0.083% nebulizer solution Take 3 mLs by nebulization every 4 hours as needed for Wheezing 11/5/19   Maira M Servetas, DO   atorvastatin (LIPITOR) 40 MG tablet Take 1 tablet by mouth nightly 9/11/19   Shanna Conner, DO   Insulin Syringe-Needle U-100 30G X 1/2\" 1 ML MISC 1 each by Does not apply route daily 11/16/18   Debby Roldan, DO   blood glucose test strips (ONETOUCH VERIO) strip TEST 3 TIMES DAILY AS NEEDED 8/16/18   Debby Roldan, DO   Blood Glucose Monitoring Suppl (ONETOUCH VERIO) w/Device KIT TEST 3 TIMES DAILY AS NEEDED 8/16/18   Debby Roldan, DO       Future Appointments   Date Time Provider [facility]   12/7/2021  3:15 PM SUDEEP Gaona CNP [facility]   12/29/2021  1:30 PM Clair Faust MD 1 [facility]

## 2021-11-19 LAB
EKG ATRIAL RATE: 79 BPM
EKG P AXIS: 38 DEGREES
EKG P-R INTERVAL: 164 MS
EKG Q-T INTERVAL: 378 MS
EKG QRS DURATION: 84 MS
EKG QTC CALCULATION (BAZETT): 433 MS
EKG R AXIS: 8 DEGREES
EKG T AXIS: 9 DEGREES
EKG VENTRICULAR RATE: 79 BPM

## 2021-11-19 PROCEDURE — 93010 ELECTROCARDIOGRAM REPORT: CPT | Performed by: INTERNAL MEDICINE

## 2021-11-19 NOTE — ED NOTES
At bedside with Dr. Arabella Avila for examination. Upon Dr. Arabella Avila attempting to reproduce pts chest pain, patient jerks arms and yells \"you aren't cracking my sternum like that! I ended up in the ER again because of you! I always have problems with you! \" Pt declined further physical examination. Pt did allow Dr. Arabella Avila to listen to lungs and heart.       Tara Gibson RN  11/18/21 5011

## 2021-11-19 NOTE — ED PROVIDER NOTES
[address] [facility]  EMERGENCY DEPARTMENT ENCOUNTER      Pt Name: Kg Amaya  MRN: [id number]  [place] 1970  Date of evaluation: 11/18/2021  Provider: Sydnee Ford       Chief Complaint   Patient presents with    Chest Pain         HISTORY OF PRESENT ILLNESS   (Location/Symptom, Timing/Onset, Context/Setting, Quality, Duration, Modifying Factors, Severity)  Note limiting factors. Kg Amaya is a 49 y.o. female who presents to the emergency department chest pain for the past 3 hours. Tried taking tylenol for the symptoms. Currently rates the pain at 8-9. No recent URI symptoms. Mild SOB. States is the same pain that she has had before multiple times. She states that they cannot find a cause. Patient's records indicate that she had a nuclear stress test Dr. Dustin Pizano 9/21/2020 that was completely normal.    Patient symptoms abated for 3 hours. When asked if she is ever had an EGD she states that she has but she cannot remember how long ago. Patient denies any fever, chills, cough, loss of smell, loss of taste or diarrhea. HPI    Nursing Notes were reviewed. REVIEW OF SYSTEMS    (2-9 systems for level 4, 10 or more for level 5)     Review of Systems   Constitutional: Negative for diaphoresis and fever. HENT: Negative for hearing loss and trouble swallowing. Eyes: Negative for pain. Respiratory: Negative for apnea and cough. Cardiovascular: Positive for chest pain. Gastrointestinal: Negative for abdominal pain. Endocrine: Negative. Genitourinary: Negative for hematuria. Musculoskeletal: Negative for neck pain and neck stiffness. Skin: Negative for color change. Allergic/Immunologic: Negative. Neurological: Negative for dizziness and numbness. Hematological: Negative. Psychiatric/Behavioral: Negative. All other systems reviewed and are negative.       Except as noted above the remainder of the review of systems was reviewed and negative. PAST MEDICAL HISTORY     Past Medical History:   Diagnosis Date    Anxiety     Arthritis     Asthma     Bronchopneumonia     Cancer ([facility] [address]     renal    Cerebral artery occlusion with cerebral infarction (HCC)     Chronic bilateral low back pain with sciatica     Chronic kidney disease     Chronic obstructive lung disease ([facility] [address] 7/26/2019    Depression     Fibromyalgia     Hypertension     Insulin dependent type 2 diabetes mellitus, uncontrolled ([facility] [address] 8/3/2018    Localized enlarged lymph nodes 10/26/2018    Mixed headache     Pure hyperglyceridemia 5/19/2017    Sarcoidosis     Sleep apnea     does not wear cpap    Thyroid goiter          SURGICAL HISTORY       Past Surgical History:   Procedure Laterality Date    BRONCHOSCOPY  10/26/2018    DR. STEARNS    KIDNEY REMOVAL Right 08/2016    KIDNEY REMOVAL Right 2016    LUNG BIOPSY Right 10/2018    THYROID LOBECTOMY Right 6/13/14    THYROIDECTOMY  02/21/2019    DR. MAGDALENO    THYROIDECTOMY  2018    URETER STENT PLACEMENT Left 08/2016         CURRENT MEDICATIONS       Discharge Medication List as of 11/18/2021 11:00 PM      CONTINUE these medications which have NOT CHANGED    Details   hydroxychloroquine (PLAQUENIL) 200 MG tablet TAKE 1 TABLET BY MOUTH TWICE DAILY, Disp-60 tablet, R-2Normal      spironolactone (ALDACTONE) 50 MG tablet TAKE 1 TABLET BY MOUTH DAILY, Disp-30 tablet, R-3Normal      !!  Blood Glucose Monitoring Suppl (Sofia Bryant) w/Device KIT As  Directed, Disp-1 kit, R-00Normal      !! blood glucose test strips (ONETOUCH VERIO) strip Test 3x daily e11.65, Disp-100 each, R-3Normal      albuterol sulfate  (90 Base) MCG/ACT inhaler INHALE 2 PUFFS INTO THE LUNGS EVERY 6 HOURS AS NEEDED FOR WHEEZING, Disp-6.7 g, R-1Normal      cyclobenzaprine (FLEXERIL) 10 MG tablet Take 10 mg by mouth every 8 hours as needed for Muscle spasmsHistorical Med      meclizine (ANTIVERT) 12.5 MG tablet Take 12.5 mg by mouth 2 times daily as needed for DizzinessHistorical Med      Insulin Degludec (TRESIBA FLEXTOUCH) 100 UNIT/ML SOPN INJECT 90 UNITS UNDER THE SKIN NIGHTLY, Disp-15 mL, R-3Normal      insulin lispro, 1 Unit Dial, (HUMALOG KWIKPEN) 100 UNIT/ML SOPN 22 units at each meals hold if glucose less than 150, Disp-5 pen, R-3Normal      busPIRone (BUSPAR) 15 MG tablet TAKE 1 TABLET BY MOUTH THREE TIMES DAILY, Disp-90 tablet, R-5Normal      buPROPion (WELLBUTRIN XL) 150 MG extended release tablet TAKE 1 TABLET BY MOUTH EVERY MORNING, Disp-30 tablet, R-3Normal      diclofenac sodium (VOLTAREN) 1 % GEL Apply 2 g topically 4 times daily, Topical, 4 TIMES DAILY Starting Tue 9/7/2021, Disp-150 g, R-0, Normal      pregabalin (LYRICA) 150 MG capsule Take 1 capsule by mouth 3 times daily for 90 days. , Disp-90 capsule, R-2Normal      cetirizine (ZYRTEC) 10 MG tablet TAKE 1 TABLET BY MOUTH EVERY NIGHT AT BEDTIME AS NEEDED FOR ALLERGIES, Disp-30 tablet, R-5Normal      !!  Insulin Pen Needle (B-D ULTRAFINE III SHORT PEN) 31G X 8 MM MISC 3 TIMES DAILY Starting Mon 8/16/2021, Disp-100 each, R-5, NormalAnd as needed      montelukast (SINGULAIR) 10 MG tablet TAKE 1 TABLET BY MOUTH EVERY NIGHT AT SUPPER FOR BREATHING OR ALLERGIES, Disp-30 tablet, R-5Normal      levothyroxine (SYNTHROID) 125 MCG tablet Take 1 tablet by mouth Daily, Disp-30 tablet, R-3Normal      venlafaxine (EFFEXOR XR) 75 MG extended release capsule Take 1 capsule by mouth daily, Disp-30 capsule, R-3Normal      butalbital-acetaminophen-caffeine (FIORICET, ESGIC) -40 MG per tablet Take 1 tablet by mouth every 6 hours as needed for Headaches, Disp-30 tablet, R-1Normal      dicyclomine (BENTYL) 10 MG capsule Take 1 capsule by mouth every 6 hours as needed (cramps), Disp-120 capsule, R-0Normal      ondansetron (ZOFRAN) 4 MG tablet Take 1 tablet by mouth every 8 hours as needed for Nausea, Disp-20 tablet, R-0Print      fluticasone (FLONASE) 50 MCG/ACT nasal spray SHAKE LIQUID AND USE 1 SPRAY IN EACH NOSTRIL DAILY, Disp-16 g, R-5Normal      budesonide-formoterol (SYMBICORT) 160-4.5 MCG/ACT AERO Inhale 2 puffs into the lungs 2 times daily, Disp-1 Inhaler, R-3Normal      metoclopramide (REGLAN) 10 MG tablet Take 1 tablet by mouth 2 times daily as needed (Headache), Disp-60 tablet, R-0Print      acetaminophen (TYLENOL) 500 MG tablet Take 1 tablet by mouth 4 times daily as needed for Pain, Disp-360 tablet, R-1Print      !! OneTouch Delica Lancets 92X MISC qid, Disp-200 each, R-3Normal      !! Insulin Pen Needle (NOVOFINE) 32G X 6 MM MISC Disp-300 each, R-3, Normalqid      baclofen (LIORESAL) 10 MG tablet Take 1 tablet by mouth 3 times daily, Disp-60 tablet, R-0Disregard other orderNormal      tiZANidine (ZANAFLEX) 2 MG tablet Take 1 tablet by mouth 3 times daily as needed (back pain/ spasm), Disp-15 tablet, R-0Print      magnesium oxide (MAG-OX) 400 (241.3 Mg) MG TABS tablet TAKE 1/2 TABLET BY MOUTH DAILY, Disp-30 tablet,R-5Normal      SUMAtriptan (IMITREX) 25 MG tablet TAKE 1 TABLET BY MOUTH 1 TIME AS NEEDED FOR MIGRAINE, Disp-9 tablet, R-1Normal      !! blood glucose test strips (EXACTECH TEST) strip 3 TIMES DAILY Starting Mon 12/2/2019, Disp-300 strip, R-5, Normal(Accu-check test strips) As needed. DX:E11.65      !! ONE TOUCH ULTRASOFT LANCETS MISC Disp-300 each, R-3, NormalTEST 3 TIMES DAILY AS NEEDED      albuterol (PROVENTIL) (2.5 MG/3ML) 0.083% nebulizer solution Take 3 mLs by nebulization every 4 hours as needed for Wheezing, Disp-120 each, R-3Normal      atorvastatin (LIPITOR) 40 MG tablet Take 1 tablet by mouth nightly, Disp-30 tablet, R-3Normal      Insulin Syringe-Needle U-100 30G X 1/2\" 1 ML MISC DAILY Starting Fri 11/16/2018, Disp-100 each, R-3, Normal      !! blood glucose test strips (ONETOUCH VERIO) strip Disp-300 each, R-3, NormalTEST 3 TIMES DAILY AS NEEDED      !!  Blood Glucose Monitoring Suppl (Christian Escalante) w/Device KIT TEST 3 TIMES DAILY AS NEEDED, Disp-1 kit, R-0Normal       !! - Potential duplicate medications found. Please discuss with provider.           ALLERGIES     Shellfish-derived products, Ibuprofen, Ketorolac, Morphine, Other, Penicillins, Propoxyphene n-acetaminophen, and Toradol [ketorolac tromethamine]    FAMILY HISTORY       Family History   Problem Relation Age of Onset    Cancer Father     Diabetes Father     Allergy (Severe) Father     Heart Attack Father     Prostate Cancer Father     High Blood Pressure Mother     Diabetes Mother     Arthritis Mother     High Cholesterol Mother     Vision Loss Mother     Alcohol Abuse Neg Hx     Anemia Neg Hx     Arrhythmia Neg Hx     Asthma Neg Hx     Atrial Fibrillation Neg Hx     Birth Defects Neg Hx     Breast Cancer Neg Hx     Coronary Art Dis Neg Hx     Colon Cancer Neg Hx     Depression Neg Hx     Early Death Neg Hx     Hearing Loss Neg Hx     Heart Disease Neg Hx     Learning Disabilities Neg Hx     Kidney Disease Neg Hx     Mental Illness Neg Hx     Mental Retardation Neg Hx     Miscarriages / Stillbirths Neg Hx     Obesity Neg Hx     Osteoporosis Neg Hx     Stroke Neg Hx     Substance Abuse Neg Hx           SOCIAL HISTORY       Social History     Socioeconomic History    Marital status: Legally      Spouse name: None    Number of children: None    Years of education: 15    Highest education level: High school graduate   Occupational History    None   Tobacco Use    Smoking status: Former Smoker     Packs/day: 0.50     Years: 15.00     Pack years: 7.50     Types: Cigarettes     Start date: 2014     Quit date: 3/1/2015     Years since quittin.7    Smokeless tobacco: Never Used   Vaping Use    Vaping Use: Never used   Substance and Sexual Activity    Alcohol use: Not Currently     Alcohol/week: 0.0 standard drinks     Comment: occasionally    Drug use: Yes     Frequency: 5.0 times per week     Types: Marijuana Rahda Alvarez    Sexual activity: Yes Partners: Male   Other Topics Concern    None   Social History Narrative    None     Social Determinants of Health     Financial Resource Strain: Medium Risk    Difficulty of Paying Living Expenses: Somewhat hard   Food Insecurity: Food Insecurity Present    Worried About Running Out of Food in the Last Year: Sometimes true    Maximiliano of Food in the Last Year: Sometimes true   Transportation Needs: No Transportation Needs    Lack of Transportation (Medical): No    Lack of Transportation (Non-Medical): No   Physical Activity: Inactive    Days of Exercise per Week: 0 days    Minutes of Exercise per Session: 0 min   Stress: Stress Concern Present    Feeling of Stress : Very much   Social Connections: Socially Isolated    Frequency of Communication with Friends and Family: Three times a week    Frequency of Social Gatherings with Friends and Family: Never    Attends Spiritism Services: Never    Active Member of Clubs or Organizations: No    Attends Club or Organization Meetings: Never    Marital Status:    Intimate Partner Violence: Not At Risk    Fear of Current or Ex-Partner: No    Emotionally Abused: No    Physically Abused: No    Sexually Abused: No   Housing Stability:     Unable to Pay for Housing in the Last Year: Not on file    Number of [place] in the Last Year: Not on file    Unstable Housing in the Last Year: Not on file       SCREENINGS          Heart Score for chest pain patients  History:  Moderately Suspicious  ECG: Normal  Patient Age: > 39 and < 65 years  *Risk factors for Atherosclerotic disease: Diabetes Mellitus, Obesity  Risk Factors: 1 or 2 risk factors  Troponin: < 1X normal limit (This calculation assumes a normal troponin.)  Heart Score Total: 3             PHYSICAL EXAM    (up to 7 for level 4, 8 or more for level 5)     ED Triage Vitals   BP Temp Temp src Pulse Resp SpO2 Height Weight   -- -- -- -- -- -- -- --       Physical Exam  Vitals and nursing note reviewed. Constitutional:       General: She is awake. She is not in acute distress. Appearance: Normal appearance. She is well-developed. She is morbidly obese. She is not ill-appearing, toxic-appearing or diaphoretic. Comments: No photophobia. No phonophobia. HENT:      Head: Normocephalic and atraumatic. No Reeves's sign. Right Ear: External ear normal.      Left Ear: External ear normal.      Nose: Nose normal. No congestion or rhinorrhea. Mouth/Throat:      Mouth: Mucous membranes are moist.      Pharynx: Oropharynx is clear. No oropharyngeal exudate or posterior oropharyngeal erythema. Eyes:      General: No scleral icterus. Right eye: No foreign body or discharge. Left eye: No discharge. Extraocular Movements: Extraocular movements intact. Conjunctiva/sclera: Conjunctivae normal.      Left eye: No exudate. Pupils: Pupils are equal, round, and reactive to light. Neck:      Vascular: No JVD. Trachea: No tracheal deviation. Comments: No meningismus. Cardiovascular:      Rate and Rhythm: Normal rate and regular rhythm. Pulses: Normal pulses. Heart sounds: Normal heart sounds. Heart sounds not distant. No murmur heard. No friction rub. No gallop. Pulmonary:      Effort: Pulmonary effort is normal. No respiratory distress. Breath sounds: Normal breath sounds. No stridor. No wheezing, rhonchi or rales. Chest:      Chest wall: No tenderness. Abdominal:      General: Abdomen is flat. Bowel sounds are normal. There is no distension. Palpations: Abdomen is soft. There is no mass. Tenderness: There is no abdominal tenderness. There is no right CVA tenderness, left CVA tenderness, guarding or rebound. Hernia: No hernia is present. Musculoskeletal:         General: No swelling, tenderness, deformity or signs of injury. Normal range of motion. Cervical back: Normal range of motion and neck supple. No rigidity. Comments: The ER physician asked the patient's to bring her elbows back words while she is sitting up to see if that reproduces the symptoms and she refuses. Lymphadenopathy:      Head:      Right side of head: No submental adenopathy. Left side of head: No submental adenopathy. Skin:     General: Skin is warm and dry. Capillary Refill: Capillary refill takes less than 2 seconds. Coloration: Skin is not jaundiced or pale. Findings: No bruising, erythema, lesion or rash. Neurological:      General: No focal deficit present. Mental Status: She is alert and oriented to person, place, and time. Mental status is at baseline. Cranial Nerves: No cranial nerve deficit. Sensory: No sensory deficit. Motor: No weakness or abnormal muscle tone. Coordination: Coordination normal.      Deep Tendon Reflexes: Reflexes are normal and symmetric. Psychiatric:         Mood and Affect: Mood normal.         Behavior: Behavior normal. Behavior is cooperative. Thought Content: Thought content normal.         Judgment: Judgment normal.         DIAGNOSTIC RESULTS     EKG: All EKG's are interpreted by the Emergency Department Physician who either signs or Co-signs this chart in the absence of a cardiologist.    EKG: Normal sinus rhythm at 79 bpm.  T wave inversion in III. Normal axis. QT interval is 378ms. No PVCs. RADIOLOGY:   Non-plain film images such as CT, Ultrasound and MRI are read by the radiologist. Plain radiographic images are visualized and preliminarily interpreted by the emergency physician with the below findings:    Portable chest x-ray: Poor inspiratory effort, crowded lung markings, underpenetrated.     Interpretation per the Radiologist below, if available at the time of this note:    XR CHEST PORTABLE    (Results Pending)         ED BEDSIDE ULTRASOUND:   Performed by ED Physician - none    LABS:  Labs Reviewed   CBC WITH AUTO DIFFERENTIAL - Abnormal; Notable for the following components:       Result Value    MCH 31.4 (*)     All other components within normal limits   CK - Abnormal; Notable for the following components: Total  (*)     All other components within normal limits   COMPREHENSIVE METABOLIC PANEL - Abnormal; Notable for the following components:    Glucose 139 (*)     CREATININE 1.40 (*)     GFR Non- 39.7 (*)     GFR  48.0 (*)     Alkaline Phosphatase 145 (*)     All other components within normal limits   HIGH SENSITIVITY CRP - Abnormal; Notable for the following components:    CRP High Sensitivity 8.0 (*)     All other components within normal limits   D-DIMER, QUANTITATIVE - Abnormal; Notable for the following components:    D-Dimer, Quant 0.54 (*)     All other components within normal limits    Narrative:     Adilson Pinzon tel. [phone],  Coag results called to and read back by ANTONIO DOSS, 11/18/2021 21:33, by [facility]   POCT CREATININE - URINE - Normal   TROPONIN   CKMB & RELATIVE PERCENT       All other labs were within normal range or not returned as of this dictation. MDM  Patient's had frequent assessments for chest pain. She does have a history of sarcoidosis. Multiple EKGs and multiple ER visits. Patient had a negative nuclear stress 9/21/2020. Patient has not had an EGD in the past year. She cannot recall when she has had that. OARRS report extensive. Notation in [address] [place] [address] Rd from Dr. Cheri Sheldon 6/2/17recommends against Narcotics. Patient was ordered IV Pepcid and also a GI cocktail. The patient refused this. The patient pointed her finger at the physician states I dealt with you before and you are always difficult. The ER physician asked the patient what would you like. He asked her to get what would you like. The patient states she has been through this before and she knows what works for her. The ER physician then stat what would you like.   He asked this patient repeatedly if there is something else that she would like and she states \"you are the doctor. \"  The ER physician states that is why I have ordered you Pepcid and a GI cocktail. Patient states she took Tylenol at home and she rather just simply go home. ER physician explained to her that her D-dimer is elevated this could be a blood clot and she would be taking those chances on her own hand. ER physician suggested to get a CAT scan even if it is another hospital.    After several minutes the patient stated that she was going to think about whether she was going to get the CAT scan or not and asked how long it would take. There is another patient is already had of the patient as well as a couple other CAT scans as well. The patient was informed of this by the nurse. Procedures        FINAL IMPRESSION      1. Recurrent chest pain    2. Personal history of sarcoidosis    3. Morbid obesity with BMI of 45.0-49.9, adult ([facility] [address]    4. Dyspnea and respiratory abnormalities    5. Elevated d-dimer    6. Renal insufficiency    7. Multiple drug allergies          DISPOSITION/PLAN   DISPOSITION [place] 11/18/2021 10:41:18 PM      PATIENT REFERRED TO:  Milly Rush MD  [facility] [id number] [phone]    Call in 1 day  To arrange follow up for chronic pain.     Jovanny Nieves, APRN - VOL[place]MYR  [address] [facility]  [phone]    Schedule an appointment as soon as possible for a visit in 1 day      Jay Muñoz [facility]  [address]  [facility]  [phone]    Call in 1 day  To arrange a follow up visit for \"renal insuffiency\"    [facility]) ED  [address] [address]  [phone]  Go to   If symptoms worsen      DISCHARGE MEDICATIONS:  Discharge Medication List as of 11/18/2021 11:00 PM        Controlled Substances Monitoring:     RX Monitoring 9/7/2021   Attestation -   Periodic Controlled Substance Monitoring No signs of potential drug abuse or diversion identified. ;Possible medication side effects, risk of tolerance/dependence & alternative treatments discussed.    Chronic Pain > 80 MEDD -       (Please note that portions of this note were completed with a voice recognition program.  Efforts were made to edit the dictations but occasionally words are mis-transcribed.)    Del Sotelo DO (electronically signed)  Attending Emergency Physician            Del Sotelo DO  11/19/21 0019

## 2021-11-19 NOTE — ED TRIAGE NOTES
Arrived to the ER for c/o CP. States CP began a few hours ago and is midsternal radiating to the back describes as a sharp continuous. No aggravating or relieving factors. Took tylenol without relief. Denies n/v, palpitations, or recent illness. +mild SOB, headache.

## 2021-11-19 NOTE — ED NOTES
Patient offered GI cocktail and pepcid, patient refused stating it will not work and she would like something else, reported to Dr Sourav Win, RN  11/18/21 2127

## 2021-11-19 NOTE — ED NOTES
PT REFUSED FLUIDS AND CT SCAN AT THIS TIME. PIV REMOVED BY [facility] RN. PT REQUESTING DISCHARGE PAPERWORK. PAPERWORK BEING PREPARED BY DR. Leobardo Olea.      Darline Morales RN  11/18/21 [id number]

## 2021-11-30 DIAGNOSIS — E11.65 TYPE 2 DIABETES MELLITUS WITH HYPERGLYCEMIA, WITH LONG-TERM CURRENT USE OF INSULIN (HCC): ICD-10-CM

## 2021-11-30 DIAGNOSIS — Z79.4 TYPE 2 DIABETES MELLITUS WITH HYPERGLYCEMIA, WITH LONG-TERM CURRENT USE OF INSULIN (HCC): ICD-10-CM

## 2021-11-30 RX ORDER — INSULIN DEGLUDEC INJECTION 100 U/ML
INJECTION, SOLUTION SUBCUTANEOUS
Qty: 10 PEN | Refills: 3 | Status: SHIPPED | OUTPATIENT
Start: 2021-11-30 | End: 2022-01-06 | Stop reason: SDUPTHER

## 2021-12-07 DIAGNOSIS — F41.9 ANXIETY: ICD-10-CM

## 2021-12-07 DIAGNOSIS — F32.A DEPRESSION, UNSPECIFIED DEPRESSION TYPE: ICD-10-CM

## 2021-12-07 RX ORDER — VENLAFAXINE HYDROCHLORIDE 75 MG/1
75 CAPSULE, EXTENDED RELEASE ORAL DAILY
Qty: 30 CAPSULE | Refills: 0 | Status: SHIPPED | OUTPATIENT
Start: 2021-12-07 | End: 2022-01-05 | Stop reason: SDUPTHER

## 2021-12-07 NOTE — TELEPHONE ENCOUNTER
Future Appointments    1/5/2022 Jagdeep Davies, [address] [place] [address]  Primary and Specialty Care 3 Month F/U       Recent Visits    11/09/2021 Pneumonia due to infectious organism, unspecified laterality, unspecified part of lung   [facility]                   09/07/2021 Plantar fasciitis, bilateral   [facility] [address] [facility], APRN - CNP

## 2021-12-09 DIAGNOSIS — D86.2 SARCOIDOSIS OF LUNG WITH SARCOIDOSIS OF LYMPH NODES (HCC): ICD-10-CM

## 2021-12-09 DIAGNOSIS — G89.29 OTHER CHRONIC PAIN: ICD-10-CM

## 2021-12-09 DIAGNOSIS — M79.7 FIBROMYALGIA: Primary | ICD-10-CM

## 2021-12-14 ENCOUNTER — CARE COORDINATION (OUTPATIENT)
Dept: CARE COORDINATION | Age: 51
End: 2021-12-14

## 2021-12-14 DIAGNOSIS — Z79.4 TYPE 2 DIABETES MELLITUS WITH HYPERGLYCEMIA, WITH LONG-TERM CURRENT USE OF INSULIN (HCC): ICD-10-CM

## 2021-12-14 DIAGNOSIS — E11.65 TYPE 2 DIABETES MELLITUS WITH HYPERGLYCEMIA, WITH LONG-TERM CURRENT USE OF INSULIN (HCC): ICD-10-CM

## 2021-12-14 RX ORDER — LEVOTHYROXINE SODIUM 0.12 MG/1
125 TABLET ORAL DAILY
Qty: 30 TABLET | Refills: 3 | Status: SHIPPED | OUTPATIENT
Start: 2021-12-14 | End: 2022-05-13 | Stop reason: SDUPTHER

## 2021-12-14 NOTE — CARE COORDINATION
medications?: No     No patient-reported symptoms         Symptoms:  COPD associated fever: Pos      Symptom course: stable  Breathlessness: none  Change in chronic cough?: No/At Baseline  Change in sputum?: No/At Baseline  Have you had a recent diagnosis of pneumonia either by PCP or at a hospital?: No      and   General Assessment    Do you have any symptoms that are causing concern?: Yes  Progression since Onset: Unchanged  Reported Symptoms: Fever, Other (Comment: Reports fever and elevated BGM. Onset following receiving booster on 12/12/2021.)           Care Coordination Interventions    Program Enrollment: Complex Care  Referral from Primary Care Provider: No  Suggested Interventions and Community Resources  Diabetes Education: Not Started  Palliative Care: Declined (Comment: 10/29/2021 Declined)  Pharmacist: Completed (Comment: 9/30/2021 Clinical Rx)  Registered Dietician: Completed (Comment: 9/30/2021)  Zone Management Tools: Completed (Comment: 9/30/2021 COPD Zones and Diabetes Zones)  Other Services or Interventions: 9/30/2021 Instructed on same day, next day, and Walk-In Care. Goals Addressed                 This Visit's Progress     Conditions and Symptoms   On track     I will schedule office visits, as directed by my provider. I will keep my appointment or reschedule if I have to cancel. I will notify my provider of any barriers to my plan of care. I will follow my Zone Management tool to seek urgent or emergent care. I will notify my provider of any symptoms that indicate a worsening of my condition.     Diabetes  COPD    Barriers: lack of education  Plan for overcoming my barriers: [facility], Clinical R, ACC Dietician  Confidence: 9/10  Anticipated Goal Completion Date: 3/30/2022         Self Monitoring   On track     Self-Monitored Blood Glucose - I will check my blood sugar blood sugars ac & HS  I will notify my provider of any trends of increasing or decreasing blood sugars over a 1 month period. I will notify my provider if I have any blood sugar readings less than 70 more than 2 times a month. None Recently Recorded    Barriers: lack of motivation and lack of education  Plan for overcoming my barriers: ACM  Confidence: 10/10  Anticipated Goal Completion Date: 11/30/2021              Prior to Admission medications    Medication Sig Start Date End Date Taking?  Authorizing Provider   levothyroxine (SYNTHROID) 125 MCG tablet TAKE 1 TABLET BY MOUTH DAILY 12/14/21   René Mcgill MD   venlafaxine (EFFEXOR XR) 75 MG extended release capsule TAKE 1 CAPSULE BY MOUTH DAILY 12/7/21   Waldo Duverney, APRN - CNP   Insulin Degludec (TRESIBA FLEXTOUCH) 100 UNIT/ML SOPN INJECT 90 UNITS UNDER THE SKIN NIGHTLY pt needs 10 pens for a 30 day supply 11/30/21   René Mcgill MD   hydroxychloroquine (PLAQUENIL) 200 MG tablet TAKE 1 TABLET BY MOUTH TWICE DAILY 11/4/21   Gray Youssef MD   spironolactone (ALDACTONE) 50 MG tablet TAKE 1 TABLET BY MOUTH DAILY 11/3/21   René Mcgill MD   Blood Glucose Monitoring Suppl ([facility]) w/Device KIT As  Directed 11/3/21   René Mcgill MD   blood glucose test strips (ONETOUCH VERIO) strip Test 3x daily e11.65 11/3/21   René Mcgill MD   albuterol sulfate  (90 Base) MCG/ACT inhaler INHALE 2 PUFFS INTO THE LUNGS EVERY 6 HOURS AS NEEDED FOR WHEEZING 11/1/21   SUDEEP Matos CNP   cyclobenzaprine (FLEXERIL) 10 MG tablet Take 10 mg by mouth every 8 hours as needed for Muscle spasms 10/15/21   Historical Provider, MD   meclizine (ANTIVERT) 12.5 MG tablet Take 12.5 mg by mouth 2 times daily as needed for Dizziness 10/26/21   Historical Provider, MD   insulin lispro, 1 Unit Dial, (HUMALOG KWIKPEN) 100 UNIT/ML SOPN 22 units at each meals hold if glucose less than 150 10/7/21   René Mcgill MD   busPIRone (BUSPAR) 15 MG tablet TAKE 1 TABLET BY MOUTH THREE TIMES DAILY 10/4/21   Lilibeth Loretta Madrid, APRN - CNP   buPROPion (WELLBUTRIN XL) 150 MG extended release tablet TAKE 1 TABLET BY MOUTH EVERY MORNING 10/4/21   [facility], APRN - CNP   diclofenac sodium (VOLTAREN) 1 % GEL Apply 2 g topically 4 times daily 9/7/21   SUDEEP Bone CNP   pregabalin (LYRICA) 150 MG capsule Take 1 capsule by mouth 3 times daily for 90 days.  9/7/21 12/6/21  [facility], APRN - CNP   cetirizine (ZYRTEC) 10 MG tablet TAKE 1 TABLET BY MOUTH EVERY NIGHT AT BEDTIME AS NEEDED FOR ALLERGIES 9/3/21   [facility], APRN - CNP   Insulin Pen Needle (B-D ULTRAFINE III SHORT PEN) 31G X 8 MM MISC Inject 1 each into the skin 3 times daily And as needed 8/16/21   SUDEEP Bone CNP   montelukast (SINGULAIR) 10 MG tablet TAKE 1 TABLET BY MOUTH EVERY NIGHT AT SUPPER FOR BREATHING OR ALLERGIES 8/4/21   [facility], APRN - CNP   butalbital-acetaminophen-caffeine (FIORICET, ESGIC) -40 MG per tablet Take 1 tablet by mouth every 6 hours as needed for Headaches 7/7/21   SUDEEP Bone CNP   dicyclomine (BENTYL) 10 MG capsule Take 1 capsule by mouth every 6 hours as needed (cramps) 6/25/21   [facility] CNP   ondansetron (ZOFRAN) 4 MG tablet Take 1 tablet by mouth every 8 hours as needed for Nausea 6/23/21   Derrek Booth PA-C   fluticasone (FLONASE) 50 MCG/ACT nasal spray SHAKE LIQUID AND USE 1 SPRAY IN EACH NOSTRIL DAILY 5/18/21   [facility], APRN - CNP   budesonide-formoterol (SYMBICORT) 160-4.5 MCG/ACT AERO Inhale 2 puffs into the lungs 2 times daily 4/14/21   Wilbert Najera MD   metoclopramide (REGLAN) 10 MG tablet Take 1 tablet by mouth 2 times daily as needed (Headache) 3/21/21   Olu Orozco MD   acetaminophen (TYLENOL) 500 MG tablet Take 1 tablet by mouth 4 times daily as needed for Pain 3/21/21   Olu Orozco MD   OneTouch Delica Lancets 19B MISC qid 2/23/21   Dwayne Chaney MD   Insulin Pen Needle (NOVOFINE) 32G X 6 MM MISC qid 2/23/21   Dwayne Chaney MD   baclofen (LIORESAL) 10 MG tablet Take 1 tablet by mouth 3 times daily 2/19/21 Jeramy Mccormick, APRN - CNP   tiZANidine (ZANAFLEX) 2 MG tablet Take 1 tablet by mouth 3 times daily as needed (back pain/ spasm) 2/6/21   Ya Parker PA-C   magnesium oxide (MAG-OX) 400 (241.3 Mg) MG TABS tablet TAKE 1/2 TABLET BY MOUTH DAILY 11/2/20   [facility]eth [facility], APRN - CNP   SUMAtriptan (IMITREX) 25 MG tablet TAKE 1 TABLET BY MOUTH 1 TIME AS NEEDED FOR MIGRAINE 5/1/20   [facility]eth [facility]ton [facility], APRN - CNP   blood glucose test strips (EXACTECH TEST) strip 1 each by In Vitro route 3 times daily (Accu-check test strips) As needed.  DX:E11.65 12/2/19   [facility], APRN - CNP   ONE TOUCH ULTRASOFT LANCETS MISC TEST 3 TIMES DAILY AS NEEDED 12/2/19   Lilibeth Sánchez, APRN - CNP   albuterol (PROVENTIL) (2.5 MG/3ML) 0.083% nebulizer solution Take 3 mLs by nebulization every 4 hours as needed for Wheezing 11/5/19   Maria Mtj Ni, DO   atorvastatin (LIPITOR) 40 MG tablet Take 1 tablet by mouth nightly 9/11/19   Flakito Gonzalez, DO   Insulin Syringe-Needle U-100 30G X 1/2\" 1 ML MISC 1 each by Does not apply route daily 11/16/18   Dylon Douglas DO   blood glucose test strips (ONETOUCH VERIO) strip TEST 3 TIMES DAILY AS NEEDED 8/16/18   Dylon Douglas DO   Blood Glucose Monitoring Suppl (ONETOUCH VERIO) w/Device KIT TEST 3 TIMES DAILY AS NEEDED 8/16/18   Dylon Douglas DO       Future Appointments   Date Time Provider Valerie Cosby   12/29/2021  1:30 PM Nikos De La Garza MD [address]   1/5/2022  3:15 PM Lilibeth Calin Sánchez, [address]

## 2022-01-05 ENCOUNTER — OFFICE VISIT (OUTPATIENT)
Dept: FAMILY MEDICINE CLINIC | Age: 52
End: 2022-01-05
Payer: MEDICAID

## 2022-01-05 VITALS
BODY MASS INDEX: 49.01 KG/M2 | DIASTOLIC BLOOD PRESSURE: 62 MMHG | HEART RATE: 83 BPM | WEIGHT: 276.6 LBS | TEMPERATURE: 97.8 F | HEIGHT: 63 IN | OXYGEN SATURATION: 99 % | SYSTOLIC BLOOD PRESSURE: 122 MMHG

## 2022-01-05 DIAGNOSIS — G89.29 CHRONIC PAIN OF RIGHT KNEE: ICD-10-CM

## 2022-01-05 DIAGNOSIS — M79.7 FIBROMYALGIA: ICD-10-CM

## 2022-01-05 DIAGNOSIS — F32.A DEPRESSION, UNSPECIFIED DEPRESSION TYPE: ICD-10-CM

## 2022-01-05 DIAGNOSIS — F33.2 SEVERE EPISODE OF RECURRENT MAJOR DEPRESSIVE DISORDER, WITHOUT PSYCHOTIC FEATURES (HCC): ICD-10-CM

## 2022-01-05 DIAGNOSIS — J45.41 MODERATE PERSISTENT ASTHMA WITH ACUTE EXACERBATION: Primary | ICD-10-CM

## 2022-01-05 DIAGNOSIS — M47.816 SPONDYLOSIS OF LUMBAR REGION WITHOUT MYELOPATHY OR RADICULOPATHY: ICD-10-CM

## 2022-01-05 DIAGNOSIS — F19.21 HISTORY OF DRUG DEPENDENCE (HCC): ICD-10-CM

## 2022-01-05 DIAGNOSIS — D86.2 SARCOIDOSIS OF LUNG WITH SARCOIDOSIS OF LYMPH NODES (HCC): ICD-10-CM

## 2022-01-05 DIAGNOSIS — C79.89 SECONDARY MALIGNANT NEOPLASM OF OTHER SPECIFIED SITES (HCC): ICD-10-CM

## 2022-01-05 DIAGNOSIS — E11.65 TYPE 2 DIABETES MELLITUS WITH HYPERGLYCEMIA, WITH LONG-TERM CURRENT USE OF INSULIN (HCC): ICD-10-CM

## 2022-01-05 DIAGNOSIS — E03.9 HYPOTHYROIDISM, UNSPECIFIED TYPE: ICD-10-CM

## 2022-01-05 DIAGNOSIS — R42 DIZZINESS AND GIDDINESS: ICD-10-CM

## 2022-01-05 DIAGNOSIS — R06.09 DOE (DYSPNEA ON EXERTION): ICD-10-CM

## 2022-01-05 DIAGNOSIS — F41.9 ANXIETY: ICD-10-CM

## 2022-01-05 DIAGNOSIS — Z79.4 TYPE 2 DIABETES MELLITUS WITH HYPERGLYCEMIA, WITH LONG-TERM CURRENT USE OF INSULIN (HCC): ICD-10-CM

## 2022-01-05 DIAGNOSIS — D86.9 SARCOIDOSIS: ICD-10-CM

## 2022-01-05 DIAGNOSIS — J44.9 CHRONIC OBSTRUCTIVE PULMONARY DISEASE, UNSPECIFIED COPD TYPE (HCC): ICD-10-CM

## 2022-01-05 DIAGNOSIS — M25.561 CHRONIC PAIN OF RIGHT KNEE: ICD-10-CM

## 2022-01-05 DIAGNOSIS — M72.2 PLANTAR FASCIITIS OF RIGHT FOOT: ICD-10-CM

## 2022-01-05 PROCEDURE — 3023F SPIROM DOC REV: CPT | Performed by: NURSE PRACTITIONER

## 2022-01-05 PROCEDURE — G8417 CALC BMI ABV UP PARAM F/U: HCPCS | Performed by: NURSE PRACTITIONER

## 2022-01-05 PROCEDURE — G8427 DOCREV CUR MEDS BY ELIG CLIN: HCPCS | Performed by: NURSE PRACTITIONER

## 2022-01-05 PROCEDURE — 3046F HEMOGLOBIN A1C LEVEL >9.0%: CPT | Performed by: NURSE PRACTITIONER

## 2022-01-05 PROCEDURE — 3017F COLORECTAL CA SCREEN DOC REV: CPT | Performed by: NURSE PRACTITIONER

## 2022-01-05 PROCEDURE — 99214 OFFICE O/P EST MOD 30 MIN: CPT | Performed by: NURSE PRACTITIONER

## 2022-01-05 PROCEDURE — 2022F DILAT RTA XM EVC RTNOPTHY: CPT | Performed by: NURSE PRACTITIONER

## 2022-01-05 PROCEDURE — 1036F TOBACCO NON-USER: CPT | Performed by: NURSE PRACTITIONER

## 2022-01-05 PROCEDURE — G8484 FLU IMMUNIZE NO ADMIN: HCPCS | Performed by: NURSE PRACTITIONER

## 2022-01-05 RX ORDER — VENLAFAXINE HYDROCHLORIDE 75 MG/1
75 CAPSULE, EXTENDED RELEASE ORAL DAILY
Qty: 30 CAPSULE | Refills: 5 | Status: SHIPPED | OUTPATIENT
Start: 2022-01-05 | End: 2022-06-21

## 2022-01-05 RX ORDER — PREGABALIN 150 MG/1
150 CAPSULE ORAL 3 TIMES DAILY
Qty: 90 CAPSULE | Refills: 2 | Status: SHIPPED | OUTPATIENT
Start: 2022-01-05 | End: 2022-03-18 | Stop reason: DRUGHIGH

## 2022-01-05 ASSESSMENT — PATIENT HEALTH QUESTIONNAIRE - PHQ9
SUM OF ALL RESPONSES TO PHQ9 QUESTIONS 1 & 2: 0
6. FEELING BAD ABOUT YOURSELF - OR THAT YOU ARE A FAILURE OR HAVE LET YOURSELF OR YOUR FAMILY DOWN: 0
10. IF YOU CHECKED OFF ANY PROBLEMS, HOW DIFFICULT HAVE THESE PROBLEMS MADE IT FOR YOU TO DO YOUR WORK, TAKE CARE OF THINGS AT HOME, OR GET ALONG WITH OTHER PEOPLE: 2
4. FEELING TIRED OR HAVING LITTLE ENERGY: 3
8. MOVING OR SPEAKING SO SLOWLY THAT OTHER PEOPLE COULD HAVE NOTICED. OR THE OPPOSITE, BEING SO FIGETY OR RESTLESS THAT YOU HAVE BEEN MOVING AROUND A LOT MORE THAN USUAL: 3
3. TROUBLE FALLING OR STAYING ASLEEP: 3
SUM OF ALL RESPONSES TO PHQ QUESTIONS 1-9: 15
SUM OF ALL RESPONSES TO PHQ QUESTIONS 1-9: 15
7. TROUBLE CONCENTRATING ON THINGS, SUCH AS READING THE NEWSPAPER OR WATCHING TELEVISION: 3
2. FEELING DOWN, DEPRESSED OR HOPELESS: 0
1. LITTLE INTEREST OR PLEASURE IN DOING THINGS: 0
SUM OF ALL RESPONSES TO PHQ QUESTIONS 1-9: 15
9. THOUGHTS THAT YOU WOULD BE BETTER OFF DEAD, OR OF HURTING YOURSELF: 0
5. POOR APPETITE OR OVEREATING: 3
SUM OF ALL RESPONSES TO PHQ QUESTIONS 1-9: 15

## 2022-01-05 NOTE — PROGRESS NOTES
Subjective:      Patient ID: Cassie Rivera is a 49 y.o. female who presents today for:     Chief Complaint   Patient presents with    Diabetes     Patient presents today to follow up on diabetes.  Anxiety       HPI Pt in today to f/u per routine. She reports diabetes have been under good control. Denies any significant highs or lows. Gets a headache if it gets high. No hypoglycemic episodes. Taking medication as prescribed. Reports that anxiety has been under moderate control. Depression was doing well, but recently has been flaring due to the holidays and her family having COVID. Right plantar surface of foot has been hurting intermittently for a while. It is worse when walking with no shoe. It shoots down the middle of the foot. It gives patient a difficult time standing and walking. She reports her back pain and sob have been flaring. She has a hx of sarcoidosis and COPD. She has a difficult time standing in the shower. Is concerned with falling. Would like to consider shower chair to help with this. Past Medical History:   Diagnosis Date    Anxiety     Arthritis     Asthma     Bronchopneumonia     Cancer ([address]     renal    Cerebral artery occlusion with cerebral infarction (HCC)     Chronic bilateral low back pain with sciatica     Chronic kidney disease     Chronic obstructive lung disease ([address] 7/26/2019    Depression     Fibromyalgia     Hypertension     Insulin dependent type 2 diabetes mellitus, uncontrolled ([address] 8/3/2018    Localized enlarged lymph nodes 10/26/2018    Mixed headache     Pure hyperglyceridemia 5/19/2017    Sarcoidosis     Sleep apnea     does not wear cpap    Thyroid goiter      Past Surgical History:   Procedure Laterality Date    BRONCHOSCOPY  10/26/2018    DR. STEARNS    KIDNEY REMOVAL Right 08/2016    KIDNEY REMOVAL Right 2016    LUNG BIOPSY Right 10/2018    THYROID LOBECTOMY Right 6/13/14    THYROIDECTOMY  02/21/2019    DR. Madelaine Lemus THYROIDECTOMY  2018    URETER STENT PLACEMENT Left 2016     Family History   Problem Relation Age of Onset    Cancer Father     Diabetes Father     Allergy (Severe) Father     Heart Attack Father     Prostate Cancer Father     High Blood Pressure Mother     Diabetes Mother     Arthritis Mother     High Cholesterol Mother     Vision Loss Mother     Alcohol Abuse Neg Hx     Anemia Neg Hx     Arrhythmia Neg Hx     Asthma Neg Hx     Atrial Fibrillation Neg Hx     Birth Defects Neg Hx     Breast Cancer Neg Hx     Coronary Art Dis Neg Hx     Colon Cancer Neg Hx     Depression Neg Hx     Early Death Neg Hx     Hearing Loss Neg Hx     Heart Disease Neg Hx     Learning Disabilities Neg Hx     Kidney Disease Neg Hx     Mental Illness Neg Hx     Mental Retardation Neg Hx     Miscarriages / Stillbirths Neg Hx     Obesity Neg Hx     Osteoporosis Neg Hx     Stroke Neg Hx     Substance Abuse Neg Hx      Social History     Socioeconomic History    Marital status: Legally      Spouse name: Not on file    Number of children: Not on file    Years of education: 15    Highest education level: High school graduate   Occupational History    Not on file   Tobacco Use    Smoking status: Former Smoker     Packs/day: 0.50     Years: 15.00     Pack years: 7.50     Types: Cigarettes     Start date: 2014     Quit date: 3/1/2015     Years since quittin.8    Smokeless tobacco: Never Used   Vaping Use    Vaping Use: Never used   Substance and Sexual Activity    Alcohol use: Not Currently     Alcohol/week: 0.0 standard drinks     Comment: occasionally    Drug use: Yes     Frequency: 5.0 times per week     Types: Marijuana Ravi Don)    Sexual activity: Yes     Partners: Male   Other Topics Concern    Not on file   Social History Narrative    Not on file     Social Determinants of Health     Financial Resource Strain: Medium Risk    Difficulty of Paying Living Expenses: Somewhat hard Food Insecurity: Food Insecurity Present    Worried About Running Out of Food in the Last Year: Sometimes true    Maximiliano of Food in the Last Year: Sometimes true   Transportation Needs: No Transportation Needs    Lack of Transportation (Medical): No    Lack of Transportation (Non-Medical): No   Physical Activity: Inactive    Days of Exercise per Week: 0 days    Minutes of Exercise per Session: 0 min   Stress: Stress Concern Present    Feeling of Stress : Very much   Social Connections: Socially Isolated    Frequency of Communication with Friends and Family:  Three times a week    Frequency of Social Gatherings with Friends and Family: Never    Attends [employer] Services: Never    Active Member of Clubs or Organizations: No    Attends Club or Organization Meetings: Never    Marital Status:    Intimate Partner Violence: Not At Risk    Fear of Current or Ex-Partner: No    Emotionally Abused: No    Physically Abused: No    Sexually Abused: No   Housing Stability:     Unable to Pay for Housing in the Last Year: Not on file    Number of [place] in the Last Year: Not on file    Unstable Housing in the Last Year: Not on file     Current Outpatient Medications on File Prior to Visit   Medication Sig Dispense Refill    levothyroxine (SYNTHROID) 125 MCG tablet TAKE 1 TABLET BY MOUTH DAILY 30 tablet 3    Insulin Degludec (TRESIBA FLEXTOUCH) 100 UNIT/ML SOPN INJECT 90 UNITS UNDER THE SKIN NIGHTLY pt needs 10 pens for a 30 day supply 10 pen 3    hydroxychloroquine (PLAQUENIL) 200 MG tablet TAKE 1 TABLET BY MOUTH TWICE DAILY 60 tablet 2    spironolactone (ALDACTONE) 50 MG tablet TAKE 1 TABLET BY MOUTH DAILY 30 tablet 3    Blood Glucose Monitoring Suppl (ONETOUCH VERIO) w/Device KIT As  Directed 1 kit 00    blood glucose test strips (ONETOUCH VERIO) strip Test 3x daily e11.65 100 each 3    albuterol sulfate  (90 Base) MCG/ACT inhaler INHALE 2 PUFFS INTO THE LUNGS EVERY 6 HOURS AS NEEDED FOR WHEEZING 6.7 g 1    cyclobenzaprine (FLEXERIL) 10 MG tablet Take 10 mg by mouth every 8 hours as needed for Muscle spasms      meclizine (ANTIVERT) 12.5 MG tablet Take 12.5 mg by mouth 2 times daily as needed for Dizziness      insulin lispro, 1 Unit Dial, (HUMALOG KWIKPEN) 100 UNIT/ML SOPN 22 units at each meals hold if glucose less than 150 5 pen 3    busPIRone (BUSPAR) 15 MG tablet TAKE 1 TABLET BY MOUTH THREE TIMES DAILY 90 tablet 5    buPROPion (WELLBUTRIN XL) 150 MG extended release tablet TAKE 1 TABLET BY MOUTH EVERY MORNING 30 tablet 3    diclofenac sodium (VOLTAREN) 1 % GEL Apply 2 g topically 4 times daily 150 g 0    cetirizine (ZYRTEC) 10 MG tablet TAKE 1 TABLET BY MOUTH EVERY NIGHT AT BEDTIME AS NEEDED FOR ALLERGIES 30 tablet 5    Insulin Pen Needle (B-D ULTRAFINE III SHORT PEN) 31G X 8 MM MISC Inject 1 each into the skin 3 times daily And as needed 100 each 5    montelukast (SINGULAIR) 10 MG tablet TAKE 1 TABLET BY MOUTH EVERY NIGHT AT SUPPER FOR BREATHING OR ALLERGIES 30 tablet 5    butalbital-acetaminophen-caffeine (FIORICET, ESGIC) -40 MG per tablet Take 1 tablet by mouth every 6 hours as needed for Headaches 30 tablet 1    dicyclomine (BENTYL) 10 MG capsule Take 1 capsule by mouth every 6 hours as needed (cramps) 120 capsule 0    ondansetron (ZOFRAN) 4 MG tablet Take 1 tablet by mouth every 8 hours as needed for Nausea 20 tablet 0    fluticasone (FLONASE) 50 MCG/ACT nasal spray SHAKE LIQUID AND USE 1 SPRAY IN EACH NOSTRIL DAILY 16 g 5    budesonide-formoterol (SYMBICORT) 160-4.5 MCG/ACT AERO Inhale 2 puffs into the lungs 2 times daily 1 Inhaler 3    metoclopramide (REGLAN) 10 MG tablet Take 1 tablet by mouth 2 times daily as needed (Headache) 60 tablet 0    acetaminophen (TYLENOL) 500 MG tablet Take 1 tablet by mouth 4 times daily as needed for Pain 360 tablet 1    OneTouch Delica Lancets 47E MISC qid 200 each 3    Insulin Pen Needle (NOVOFINE) 32G X 6 MM MISC qid 300 each 3    baclofen (LIORESAL) 10 MG tablet Take 1 tablet by mouth 3 times daily 60 tablet 0    tiZANidine (ZANAFLEX) 2 MG tablet Take 1 tablet by mouth 3 times daily as needed (back pain/ spasm) 15 tablet 0    magnesium oxide (MAG-OX) 400 (241.3 Mg) MG TABS tablet TAKE 1/2 TABLET BY MOUTH DAILY 30 tablet 5    SUMAtriptan (IMITREX) 25 MG tablet TAKE 1 TABLET BY MOUTH 1 TIME AS NEEDED FOR MIGRAINE 9 tablet 1    blood glucose test strips (EXACTECH TEST) strip 1 each by In Vitro route 3 times daily (Accu-check test strips) As needed. DX:E11.65 300 strip 5    ONE TOUCH ULTRASOFT LANCETS MISC TEST 3 TIMES DAILY AS NEEDED 300 each 3    albuterol (PROVENTIL) (2.5 MG/3ML) 0.083% nebulizer solution Take 3 mLs by nebulization every 4 hours as needed for Wheezing 120 each 3    atorvastatin (LIPITOR) 40 MG tablet Take 1 tablet by mouth nightly 30 tablet 3    Insulin Syringe-Needle U-100 30G X 1/2\" 1 ML MISC 1 each by Does not apply route daily 100 each 3    blood glucose test strips (ONETOUCH VERIO) strip TEST 3 TIMES DAILY AS NEEDED 300 each 3    Blood Glucose Monitoring Suppl (ONETOUCH VERIO) w/Device KIT TEST 3 TIMES DAILY AS NEEDED 1 kit 0     No current facility-administered medications on file prior to visit. Allergies:  Shellfish-derived products, Ibuprofen, Ketorolac, Morphine, Other, Penicillins, Propoxyphene n-acetaminophen, and Toradol [ketorolac tromethamine]    Review of Systems    Objective:   /62 (Site: Right Upper Arm, Position: Sitting, Cuff Size: Large Adult)   Pulse 83   Temp 97.8 °F (36.6 °C) (Temporal)   Ht 5' 3\" (1.6 m)   Wt 276 lb 9.6 oz (125.5 kg)   LMP 06/01/2021   SpO2 99%   BMI 49.00 kg/m²     Physical Exam  Constitutional:       Appearance: She is well-developed. HENT:      Head: Normocephalic.       Right Ear: Tympanic membrane, ear canal and external ear normal.      Left Ear: Tympanic membrane, ear canal and external ear normal.      Nose: Nose normal. Mouth/Throat:      Mouth: Mucous membranes are moist.      Pharynx: Oropharynx is clear. Uvula midline. Eyes:      General:         Right eye: No discharge. Left eye: No discharge. Conjunctiva/sclera: Conjunctivae normal.   Cardiovascular:      Rate and Rhythm: Normal rate and regular rhythm. Heart sounds: Normal heart sounds. Pulmonary:      Effort: Pulmonary effort is normal. No respiratory distress. Breath sounds: Normal breath sounds. Musculoskeletal:      Cervical back: Normal range of motion. Right foot: Normal range of motion. Tenderness present. No bunion, foot drop, prominent metatarsal heads, laceration, bony tenderness or crepitus. Feet:    Lymphadenopathy:      Cervical: No cervical adenopathy. Skin:     General: Skin is warm and dry. Neurological:      Mental Status: She is alert and oriented to person, place, and time. Psychiatric:         Mood and Affect: Mood normal.         Behavior: Behavior normal.         Assessment:          Diagnosis Orders   1. Moderate persistent asthma with acute exacerbation  Shower chair    DME Order for Bath/Shower Seat as OP   2. Anxiety  venlafaxine (EFFEXOR XR) 75 MG extended release capsule   3. Depression, unspecified depression type  venlafaxine (EFFEXOR XR) 75 MG extended release capsule   4. Secondary malignant neoplasm of other specified sites ([address]     5. Sarcoidosis of lung with sarcoidosis of lymph nodes ([address]  Shower chair    DME Order for [employer] as OP   6. Severe episode of recurrent major depressive disorder, without psychotic features ([address]     7. History of drug dependence ([address]     8. Chronic obstructive pulmonary disease, unspecified COPD type ([address]  Shower chair    DME Order for [employer] as OP   9. Type 2 diabetes mellitus with hyperglycemia, with long-term current use of insulin (HCC)     10.  Spondylosis of lumbar region without myelopathy or radiculopathy--OARRS PM&R 5/24/17  Shower chair    DME Order for Bath/Shower Seat as OP   11. Dizziness and giddiness  Shower chair    DME Order for [employer] as OP   12. HINES (dyspnea on exertion)  Shower chair    DME Order for [employer] as OP   13. Sarcoidosis  Shower chair    DME Order for [employer] as OP   14. Chronic pain of right knee  Shower chair    DME Order for Bath/Shower Seat as OP   15. Hypothyroidism, unspecified type  TSH without Reflex   16. Fibromyalgia  pregabalin (LYRICA) 150 MG capsule   17. Plantar fasciitis of right foot         Plan:      Orders Placed This Encounter   Procedures    TSH without Reflex     Standing Status:   Future     Standing Expiration Date:   1/5/2023   [facility] Shower chair     Bariatric shower chair    DME Order for Bath/Shower Seat as OP     Bath/Shower Bench with back - bariatric    Current patient weight: Weight: 276 lb 9.6 oz (125.5 kg)   Current patient height: Height: 5' 3\" (160 cm)  Diagnosis: lumbar spondylosis, hines, sarcoidosis,  CP, dizziness, knee pain  Duration: Purchase          Orders Placed This Encounter   Medications    venlafaxine (EFFEXOR XR) 75 MG extended release capsule     Sig: Take 1 capsule by mouth daily     Dispense:  30 capsule     Refill:  5    pregabalin (LYRICA) 150 MG capsule     Sig: Take 1 capsule by mouth 3 times daily for 90 days. Dispense:  90 capsule     Refill:  2       Return in about 3 months (around 4/5/2022). Conditions chronic and stable. Continue current treatment plan. Pt would likely benefit from shower chair due to stability issues and medical hx. Plantar fasciitis- encouraged wearing shoes at all times when up walking around. Home exercises given. Encouraged rolling frozen water bottle under foot. Reviewed with the patient: current clinicalstatus, medications, activities and diet.      Side effects, adverse effects of the medication prescribedtoday, as well as treatment plan/ rationale and result expectations have been discussedwith the patient who expresses understanding and desires to proceed. Close follow upto evaluate treatment results and for coordination of care. I have reviewedthe patient's medical history in detail and updated the computerized patient record.     Annemarie Rosales, APRN - CNP

## 2022-01-05 NOTE — PATIENT INSTRUCTIONS
Patient Education        Plantar Fasciitis: Exercises  Introduction  Here are some examples of exercises for you to try. The exercises may be suggested for a condition or for rehabilitation. Start each exercise slowly. Ease off the exercises if you start to have pain. You will be told when to start these exercises and which ones will work best for you. How to do the exercises  Towel stretch    1. Sit with your legs extended and knees straight. 2. Place a towel around your foot just under the toes. 3. Hold each end of the towel in each hand, with your hands above your knees. 4. Pull back with the towel so that your foot stretches toward you. 5. Hold the position for at least 15 to 30 seconds. 6. Repeat 2 to 4 times a session, up to 5 sessions a day. Calf stretch    This exercise stretches the muscles at the back of the lower leg (the calf) and the Achilles tendon. Do this exercise 3 or 4 times a day, 5 days a week. 1. Stand facing a wall with your hands on the wall at about eye level. Put the leg you want to stretch about a step behind your other leg. 2. Keeping your back heel on the floor, bend your front knee until you feel a stretch in the back leg. 3. Hold the stretch for 15 to 30 seconds. Repeat 2 to 4 times. Plantar fascia and calf stretch    Stretching the plantar fascia and calf muscles can increase flexibility and decrease heel pain. You can do this exercise several times each day and before and after activity. 1. Stand on a step as shown above. Be sure to hold on to the banister. 2. Slowly let your heels down over the edge of the step as you relax your calf muscles. You should feel a gentle stretch across the bottom of your foot and up the back of your leg to your knee. 3. Hold the stretch about 15 to 30 seconds, and then tighten your calf muscle a little to bring your heel back up to the level of the step. Repeat 2 to 4 times.   Towel curls    Make this exercise more challenging by placing a weighted object, such as a soup can, on the other end of the towel. 1. While sitting, place your foot on a towel on the floor and scrunch the towel toward you with your toes. 2. Then, also using your toes, push the towel away from you. [place] pickups    1. Put marbles on the floor next to a cup.  2. Using your toes, try to lift the marbles up from the floor and put them in the cup. Follow-up care is a key part of your treatment and safety. Be sure to make and go to all appointments, and call your doctor if you are having problems. It's also a good idea to know your test results and keep a list of the medicines you take. Where can you learn more? Go to [email or web address]://[email or web address]. org and sign in to your [employer] account. Enter G074 in the OneShift box to learn more about \"Plantar Fasciitis: Exercises. \"     If you do not have an account, please click on the \"Sign Up Now\" link. Current as of: July 1, 2021               Content Version: 13.1  © 0016-0847 Healthwise, Incorporated. Care instructions adapted under license by [facility] [place] ([facility]). If you have questions about a medical condition or this instruction, always ask your healthcare professional. [address] any warranty or liability for your use of this information.

## 2022-01-06 DIAGNOSIS — E11.65 TYPE 2 DIABETES MELLITUS WITH HYPERGLYCEMIA, WITH LONG-TERM CURRENT USE OF INSULIN (HCC): ICD-10-CM

## 2022-01-06 DIAGNOSIS — Z79.4 TYPE 2 DIABETES MELLITUS WITH HYPERGLYCEMIA, WITH LONG-TERM CURRENT USE OF INSULIN (HCC): ICD-10-CM

## 2022-01-06 RX ORDER — INSULIN DEGLUDEC INJECTION 100 U/ML
INJECTION, SOLUTION SUBCUTANEOUS
Qty: 15 PEN | Refills: 3 | Status: SHIPPED | OUTPATIENT
Start: 2022-01-06 | End: 2022-02-17 | Stop reason: SDUPTHER

## 2022-01-11 ENCOUNTER — CARE COORDINATION (OUTPATIENT)
Dept: CARE COORDINATION | Age: 52
End: 2022-01-11

## 2022-01-11 NOTE — CARE COORDINATION
Ambulatory Care Coordination Note  1/11/2022  CM Risk Score: 11  Charlson 10 Year Mortality Risk Score: 100%     ACC: Fede Santiago RN    Summary Note: Contacted patient for AC follow-up. Patient reports she is doing well. She denies any issues or concerns. Patient reported a recent episode of bronchitis. She states she went to the hospital to have it treated. Patient reports symptoms have resolved. Patient states her blood sugar readings have been good. Specific BG levels not provided. Patient indicated she feels she is able to manage on her own and no longer needs support by AC. Discussed discharge from [address] [place] [address]. Patient verbalizes agreement. Reviewed the availability of same day, next day, and walk-In Care. Reviewed upcoming scheduled appointments. Patient discharged/graduated from Care Coordination. Lab Results   Component Value Date    LABA1C 8.2 (H) 07/27/2021    LABA1C 9.4 06/25/2021    LABA1C 6.8 03/24/2021        Diabetes Assessment    Medic Alert ID: No  Meal Planning: Avoidance of concentrated sweets, Plate Method   How often do you test your blood sugar?: Other (Comment: Checking 2-3 times a day)   Do you have barriers with adherence to non-pharmacologic self-management interventions? (Nutrition/Exercise/Self-Monitoring): No   Have you ever had to go to the ED for symptoms of low blood sugar?: No       No patient-reported symptoms   Do you have hyperglycemia symptoms?: No   Do you have hypoglycemia symptoms?: No   Blood Sugar Trends: No Change      ,   COPD Assessment    Does the patient understand envrionmental exposure?: Yes  Is the patient able to verbalize Rescue vs. Long Acting medications?: Yes  Does the patient have a nebulizer?: Yes  Does the patient use a space with inhaled medications?: No     No patient-reported symptoms         Symptoms:  None: Yes    (Comment: Denies)      Symptom course: stable (Comment: Patient states she had been treaded for bronchitis and since has resolved. . treated at [facility] ER on 1/5/2022.)  Breathlessness: none  Change in chronic cough?: No/At Baseline  Change in sputum?: No/At Baseline  Self Monitoring - SaO2: No  Have you had a recent diagnosis of pneumonia either by PCP or at a hospital?: No      and   General Assessment             Care Coordination Interventions    Program Enrollment: Complex Care  Referral from Primary Care Provider: No  Suggested Interventions and Community Resources  Diabetes Education: Not Started  Palliative Care: Declined (Comment: 10/29/2021 Declined)  Pharmacist: Completed (Comment: 9/30/2021 Clinical Rx)  Registered Dietician: Completed (Comment: 9/30/2021)  Zone Management Tools: Completed (Comment: 9/30/2021 COPD Zones and Diabetes Zones)  Other Services or Interventions: 9/30/2021 Instructed on same day, next day, and Walk-In Care. Reviewed on 1/11/2022. Goals Addressed                 This Visit's Progress     COMPLETED: Conditions and Symptoms        I will schedule office visits, as directed by my provider. I will keep my appointment or reschedule if I have to cancel. I will notify my provider of any barriers to my plan of care. I will follow my Zone Management tool to seek urgent or emergent care. I will notify my provider of any symptoms that indicate a worsening of my condition. Diabetes  COPD    Barriers: lack of education  Plan for overcoming my barriers: [facility] Clinical R, ACC Dietician  Confidence: 9/10  Anticipated Goal Completion Date: 3/30/2022         COMPLETED: Self Monitoring        Self-Monitored Blood Glucose - I will check my blood sugar blood sugars ac & HS  I will notify my provider of any trends of increasing or decreasing blood sugars over a 1 month period. I will notify my provider if I have any blood sugar readings less than 70 more than 2 times a month.     None Recently Recorded    Barriers: lack of motivation and lack of education  Plan for overcoming my barriers: ACMEHNAZ  Confidence: 10/10  Anticipated Goal times daily 9/7/21   SUDEEP Meek CNP   cetirizine (ZYRTEC) 10 MG tablet TAKE 1 TABLET BY MOUTH EVERY NIGHT AT BEDTIME AS NEEDED FOR ALLERGIES 9/3/21   SUDEEP Eller CNP   Insulin Pen Needle (B-D ULTRAFINE III SHORT PEN) 31G X 8 MM MISC Inject 1 each into the skin 3 times daily And as needed 8/16/21   SUDEEP Meek CNP   montelukast (SINGULAIR) 10 MG tablet TAKE 1 TABLET BY MOUTH EVERY NIGHT AT SUPPER FOR BREATHING OR ALLERGIES 8/4/21   SUDEEP Eller CNP   butalbital-acetaminophen-caffeine (FIORICET, ESGIC) -40 MG per tablet Take 1 tablet by mouth every 6 hours as needed for Headaches 7/7/21   SUDEEP Meek CNP   dicyclomine (BENTYL) 10 MG capsule Take 1 capsule by mouth every 6 hours as needed (cramps) 6/25/21   SUDEEP Eller CNP   ondansetron (ZOFRAN) 4 MG tablet Take 1 tablet by mouth every 8 hours as needed for Nausea 6/23/21   Nicole Harris PA-C   fluticasone (FLONASE) 50 MCG/ACT nasal spray SHAKE LIQUID AND USE 1 SPRAY IN EACH NOSTRIL DAILY 5/18/21   SUDEEP Eller CNP   budesonide-formoterol (SYMBICORT) 160-4.5 MCG/ACT AERO Inhale 2 puffs into the lungs 2 times daily 4/14/21   Zeenat Castle MD   metoclopramide (REGLAN) 10 MG tablet Take 1 tablet by mouth 2 times daily as needed (Headache) 3/21/21   Kavin Michael MD   acetaminophen (TYLENOL) 500 MG tablet Take 1 tablet by mouth 4 times daily as needed for Pain 3/21/21   Kavin Michael MD   OneTouch Delica Lancets 58R MISC qid 2/23/21   Mitchel Orozco MD   Insulin Pen Needle (NOVOFINE) 32G X 6 MM MISC qid 2/23/21   Mitchel Orozco MD   baclofen (LIORESAL) 10 MG tablet Take 1 tablet by mouth 3 times daily 2/19/21   SUDEEP Meek - CNP   tiZANidine (ZANAFLEX) 2 MG tablet Take 1 tablet by mouth 3 times daily as needed (back pain/ spasm) 2/6/21   Kelton Del Rosario PA-C   magnesium oxide (MAG-OX) 400 (241.3 Mg) MG TABS tablet TAKE 1/2 TABLET BY MOUTH DAILY 11/2/20   Candida Bacca Isabella Parker, APRN - CNP   SUMAtriptan (IMITREX) 25 MG tablet TAKE 1 TABLET BY MOUTH 1 TIME AS NEEDED FOR MIGRAINE 5/1/20   Lilibeth Isabella Parker, APRN - CNP   blood glucose test strips (EXACTECH TEST) strip 1 each by In Vitro route 3 times daily (Accu-check test strips) As needed.  DX:E11.65 12/2/19   Lilibeth Isabella Parker APRN - CNP   ONE TOUCH ULTRASOFT LANCETS MISC TEST 3 TIMES DAILY AS NEEDED 12/2/19   Lilibeth Isabella Parker, APRN - CNP   albuterol (PROVENTIL) (2.5 MG/3ML) 0.083% nebulizer solution Take 3 mLs by nebulization every 4 hours as needed for Wheezing 11/5/19   Maria M Servetas, DO   atorvastatin (LIPITOR) 40 MG tablet Take 1 tablet by mouth nightly 9/11/19   Altamease Alken, DO   Insulin Syringe-Needle U-100 30G X 1/2\" 1 ML MISC 1 each by Does not apply route daily 11/16/18   Franny Girt, DO   blood glucose test strips (ONETOUCH VERIO) strip TEST 3 TIMES DAILY AS NEEDED 8/16/18   Franny Girt, DO   Blood Glucose Monitoring Suppl (ONETOUCH VERIO) w/Device KIT TEST 3 TIMES DAILY AS NEEDED 8/16/18   Franny Girt, DO       Future Appointments   Date Time Provider Valerie Cosby   1/13/2022  7:30 PM [place] [facility] ROOM 1 [facility]   4/5/2022  3:15 PM Lilibeth Isabella Parker, [address]

## 2022-01-13 ENCOUNTER — HOSPITAL ENCOUNTER (OUTPATIENT)
Dept: GENERAL RADIOLOGY | Age: 52
Discharge: HOME | End: 2022-01-15
Payer: MEDICAID

## 2022-01-13 ENCOUNTER — HOSPITAL ENCOUNTER (OUTPATIENT)
Dept: MRI IMAGING | Age: 52
Discharge: HOME | End: 2022-01-15
Payer: MEDICAID

## 2022-01-13 DIAGNOSIS — M54.50 LOW BACK PAIN, UNSPECIFIED BACK PAIN LATERALITY, UNSPECIFIED CHRONICITY, UNSPECIFIED WHETHER SCIATICA PRESENT: ICD-10-CM

## 2022-01-13 DIAGNOSIS — Z85.528 HX OF RENAL CELL CANCER: ICD-10-CM

## 2022-01-13 PROCEDURE — 72110 X-RAY EXAM L-2 SPINE 4/>VWS: CPT

## 2022-01-13 PROCEDURE — 72148 MRI LUMBAR SPINE W/O DYE: CPT

## 2022-01-17 ENCOUNTER — PATIENT MESSAGE (OUTPATIENT)
Dept: FAMILY MEDICINE CLINIC | Age: 52
End: 2022-01-17

## 2022-01-18 ENCOUNTER — HOSPITAL ENCOUNTER (OUTPATIENT)
Dept: PHYSICAL THERAPY | Age: 52
Setting detail: THERAPIES SERIES
Discharge: HOME | End: 2022-01-18
Payer: MEDICAID

## 2022-01-18 PROCEDURE — 97162 PT EVAL MOD COMPLEX 30 MIN: CPT | Performed by: PHYSICAL THERAPIST

## 2022-01-18 ASSESSMENT — PAIN - FUNCTIONAL ASSESSMENT: PAIN_FUNCTIONAL_ASSESSMENT: PREVENTS OR INTERFERES WITH MANY ACTIVE NOT PASSIVE ACTIVITIES

## 2022-01-18 ASSESSMENT — PAIN DESCRIPTION - ORIENTATION: ORIENTATION: RIGHT;LEFT

## 2022-01-18 ASSESSMENT — PAIN DESCRIPTION - ONSET: ONSET: ON-GOING

## 2022-01-18 ASSESSMENT — PAIN DESCRIPTION - PROGRESSION: CLINICAL_PROGRESSION: NOT CHANGED

## 2022-01-18 ASSESSMENT — PAIN DESCRIPTION - PAIN TYPE: TYPE: CHRONIC PAIN

## 2022-01-18 ASSESSMENT — PAIN SCALES - GENERAL: PAINLEVEL_OUTOF10: 10

## 2022-01-18 ASSESSMENT — PAIN DESCRIPTION - FREQUENCY: FREQUENCY: CONTINUOUS

## 2022-01-18 ASSESSMENT — PAIN DESCRIPTION - DESCRIPTORS: DESCRIPTORS: ACHING

## 2022-01-18 ASSESSMENT — PAIN DESCRIPTION - DIRECTION: RADIATING_TOWARDS: LEFT BUTTOCK

## 2022-01-18 ASSESSMENT — PAIN DESCRIPTION - LOCATION: LOCATION: BACK

## 2022-01-19 NOTE — PROGRESS NOTES
[address]  PHYSICAL THERAPY PLAN OF CARE   [address], [address] [facility]         Ph: [phone]  Fax: [phone]    [] Certification  [] Recertification [x]  Plan of Care  [] Progress Note [] Discharge      To: Astrid Kumar M.D. From:  Helen Guzman PT  Patient: Yudy Oconnor     : 1970  Diagnosis: Lumbar stenosis, with neurogenic claudication     Date: 2022  Treatment Diagnosis: Painful low back with right lumbar paraspinal tightness, decreased trunk range of motion       Progress Report Period from:  2022  to 2022                   OBJECTIVE:   Short Term Goals - Time Frame for Short term goals: 3-5 treatments    Goals Current/Discharge status  Met   Short term goal 1: Increase active motion of the trunk by 10 degrees all directions  Trunk motion decreased [] yes  [] no   Short term goal 2: Minimal soft tissue tightness of the right lumbar paraspinals  Moderate soft tissue tightness right lumbar paraspinals [] yes  [] no   Short term goal 3: Pain of the low back decreased to 7/10  Back pain reported 10/10 [] yes  [] no       [] yes  [] no      []  yes  []  no     Long Term Goals - Time Frame for Long term goals : 6-10  Goals Current/ Discharge status Met   Long term goal 1: No further soft tissue tightness of the right lumbar paraspinals See above [] yes  [] no   Long term goal 2:  Increase active trunk range of motion to within functional limits so she can do her ADL's and home making with greater ease Very limited trunk movements [] yes  [] no   Long term goal 3: Lower extremity strength 4/5 to 4+/5 of hips and knees Right lower extremity strength 3+/5, left 4/5 [] yes  [] no   Long term goal 4: Pain of the lumbar area 5/10  [] yes  [] no   Long term goal 5: Oswestry Low Back Pain Questionaire will be scored at discharge less than 30 Scored 30 indicating significant disability [] yes  [] no       [] yes  [] no       [] yes  [] no        Body structures, Functions, Activity limitations: Decreased functional mobility ,Decreased ROM,Decreased strength,Decreased posture,Increased pain  Assessment: Problem List:  1. Increased soft tissue tightness right lumbar paraspinals  2. Decreased active trunk range of motion  3. Decreased lower extremity strength  4. Back pain  5. Oswestry total score 30  Prognosis: Fair      PT Education: Goals;PT Role;Plan of Care  Patient Education: Discussed with her about lowering her pain of her back, and not necessarily eliminating it    PLAN: [x] Evaluate and Treat  Frequency/Duration:  Plan  Times per week: 2  Plan weeks: 5  Current Treatment Recommendations: Strengthening,ROM,Functional Mobility Training,Pain Management,Modalities,Manual Therapy - Soft Tissue Mobilization,Home Exercise Program                       Patient Status:[x] Continue/ Initiate plan of Care    [] Discharge PT. Recommend pt continue with HEP. [] Additional visits requested, Please re-certify for additional visits:          Signature: Electronically signed by Helen Guzman PT on 1/19/22 at 3:47 PM EST      If you have any questions or concerns, please don't hesitate to call. Thank you for your referral.    I have reviewed this plan of care and certify a need for medically necessary rehabilitation services.     Physician Signature:__________________________________________________________  Date:  Please sign and return
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
## 2022-01-20 ENCOUNTER — APPOINTMENT (OUTPATIENT)
Dept: PHYSICAL THERAPY | Age: 52
End: 2022-01-20
Payer: MEDICAID

## 2022-01-22 ENCOUNTER — HOSPITAL ENCOUNTER (EMERGENCY)
Age: 52
Discharge: HOME | End: 2022-01-23
Payer: MEDICAID

## 2022-01-22 ENCOUNTER — APPOINTMENT (OUTPATIENT)
Dept: CT IMAGING | Age: 52
End: 2022-01-22
Payer: MEDICAID

## 2022-01-22 VITALS
HEART RATE: 99 BPM | DIASTOLIC BLOOD PRESSURE: 67 MMHG | BODY MASS INDEX: 46.95 KG/M2 | SYSTOLIC BLOOD PRESSURE: 121 MMHG | TEMPERATURE: 98.2 F | RESPIRATION RATE: 18 BRPM | OXYGEN SATURATION: 98 % | WEIGHT: 265 LBS | HEIGHT: 63 IN

## 2022-01-22 DIAGNOSIS — K29.00 ACUTE SUPERFICIAL GASTRITIS WITHOUT HEMORRHAGE: Primary | ICD-10-CM

## 2022-01-22 PROCEDURE — 6360000002 HC RX W HCPCS: Performed by: PHYSICIAN ASSISTANT

## 2022-01-22 PROCEDURE — 83690 ASSAY OF LIPASE: CPT

## 2022-01-22 PROCEDURE — 81001 URINALYSIS AUTO W/SCOPE: CPT

## 2022-01-22 PROCEDURE — 96374 THER/PROPH/DIAG INJ IV PUSH: CPT

## 2022-01-22 PROCEDURE — 87086 URINE CULTURE/COLONY COUNT: CPT

## 2022-01-22 PROCEDURE — 99283 EMERGENCY DEPT VISIT LOW MDM: CPT

## 2022-01-22 PROCEDURE — 80053 COMPREHEN METABOLIC PANEL: CPT

## 2022-01-22 PROCEDURE — 85025 COMPLETE CBC W/AUTO DIFF WBC: CPT

## 2022-01-22 PROCEDURE — 36415 COLL VENOUS BLD VENIPUNCTURE: CPT

## 2022-01-22 PROCEDURE — 96375 TX/PRO/DX INJ NEW DRUG ADDON: CPT

## 2022-01-22 PROCEDURE — 99284 EMERGENCY DEPT VISIT MOD MDM: CPT

## 2022-01-22 RX ORDER — 0.9 % SODIUM CHLORIDE 0.9 %
1000 INTRAVENOUS SOLUTION INTRAVENOUS ONCE
Status: COMPLETED | OUTPATIENT
Start: 2022-01-22 | End: 2022-01-23

## 2022-01-22 RX ORDER — FENTANYL CITRATE 50 UG/ML
50 INJECTION, SOLUTION INTRAMUSCULAR; INTRAVENOUS ONCE
Status: COMPLETED | OUTPATIENT
Start: 2022-01-22 | End: 2022-01-23

## 2022-01-22 RX ORDER — ONDANSETRON 2 MG/ML
4 INJECTION INTRAMUSCULAR; INTRAVENOUS ONCE
Status: COMPLETED | OUTPATIENT
Start: 2022-01-22 | End: 2022-01-22

## 2022-01-22 RX ADMIN — ONDANSETRON 4 MG: 2 INJECTION INTRAMUSCULAR; INTRAVENOUS at 23:57

## 2022-01-22 ASSESSMENT — PAIN DESCRIPTION - LOCATION: LOCATION: ABDOMEN

## 2022-01-22 ASSESSMENT — PAIN DESCRIPTION - PAIN TYPE: TYPE: ACUTE PAIN

## 2022-01-22 ASSESSMENT — PAIN SCALES - GENERAL: PAINLEVEL_OUTOF10: 9

## 2022-01-22 ASSESSMENT — PAIN DESCRIPTION - FREQUENCY: FREQUENCY: CONTINUOUS

## 2022-01-22 ASSESSMENT — PAIN DESCRIPTION - DESCRIPTORS: DESCRIPTORS: BURNING;SHARP

## 2022-01-23 ENCOUNTER — APPOINTMENT (OUTPATIENT)
Dept: CT IMAGING | Age: 52
End: 2022-01-23
Payer: MEDICAID

## 2022-01-23 LAB
ALBUMIN SERPL-MCNC: 4.1 G/DL (ref 3.5–4.6)
ALP BLD-CCNC: 140 U/L (ref 40–130)
ALT SERPL-CCNC: 11 U/L (ref 0–33)
ANION GAP SERPL CALCULATED.3IONS-SCNC: 11 MEQ/L (ref 9–15)
AST SERPL-CCNC: 19 U/L (ref 0–35)
BACTERIA: ABNORMAL /HPF
BASOPHILS ABSOLUTE: 0 K/UL (ref 0–0.2)
BASOPHILS RELATIVE PERCENT: 0.8 %
BILIRUB SERPL-MCNC: 0.3 MG/DL (ref 0.2–0.7)
BILIRUBIN URINE: NEGATIVE
BLOOD, URINE: NEGATIVE
BUN BLDV-MCNC: 14 MG/DL (ref 6–20)
CALCIUM SERPL-MCNC: 9.4 MG/DL (ref 8.5–9.9)
CHLORIDE BLD-SCNC: 101 MEQ/L (ref 95–107)
CLARITY: ABNORMAL
CO2: 23 MEQ/L (ref 20–31)
COLOR: YELLOW
CREAT SERPL-MCNC: 1.4 MG/DL (ref 0.5–0.9)
EOSINOPHILS ABSOLUTE: 0.2 K/UL (ref 0–0.7)
EOSINOPHILS RELATIVE PERCENT: 3.2 %
EPITHELIAL CELLS, UA: ABNORMAL /HPF (ref 0–5)
GFR AFRICAN AMERICAN: 48
GFR NON-AFRICAN AMERICAN: 39.7
GLOBULIN: 3.8 G/DL (ref 2.3–3.5)
GLUCOSE BLD-MCNC: 175 MG/DL (ref 70–99)
GLUCOSE URINE: NEGATIVE MG/DL
HCT VFR BLD CALC: 45.7 % (ref 37–47)
HEMOGLOBIN: 15.3 G/DL (ref 12–16)
HYALINE CASTS: ABNORMAL /HPF (ref 0–5)
KETONES, URINE: NEGATIVE MG/DL
LEUKOCYTE ESTERASE, URINE: ABNORMAL
LIPASE: 40 U/L (ref 12–95)
LYMPHOCYTES ABSOLUTE: 1.8 K/UL (ref 1–4.8)
LYMPHOCYTES RELATIVE PERCENT: 28 %
MCH RBC QN AUTO: 31.3 PG (ref 27–31.3)
MCHC RBC AUTO-ENTMCNC: 33.5 % (ref 33–37)
MCV RBC AUTO: 93.4 FL (ref 82–100)
MONOCYTES ABSOLUTE: 0.4 K/UL (ref 0.2–0.8)
MONOCYTES RELATIVE PERCENT: 6.1 %
NEUTROPHILS ABSOLUTE: 3.9 K/UL (ref 1.4–6.5)
NEUTROPHILS RELATIVE PERCENT: 61.9 %
NITRITE, URINE: NEGATIVE
PDW BLD-RTO: 13.4 % (ref 11.5–14.5)
PH UA: 5.5 (ref 5–9)
PLATELET # BLD: 266 K/UL (ref 130–400)
POTASSIUM SERPL-SCNC: 4.2 MEQ/L (ref 3.4–4.9)
PROTEIN UA: NEGATIVE MG/DL
RBC # BLD: 4.89 M/UL (ref 4.2–5.4)
RBC UA: ABNORMAL /HPF (ref 0–5)
SODIUM BLD-SCNC: 135 MEQ/L (ref 135–144)
SPECIFIC GRAVITY UA: 1.02 (ref 1–1.03)
TOTAL PROTEIN: 7.9 G/DL (ref 6.3–8)
URINE REFLEX TO CULTURE: YES
UROBILINOGEN, URINE: 0.2 E.U./DL
WBC # BLD: 6.3 K/UL (ref 4.8–10.8)
WBC UA: ABNORMAL /HPF (ref 0–5)

## 2022-01-23 PROCEDURE — 2580000003 HC RX 258: Performed by: PHYSICIAN ASSISTANT

## 2022-01-23 PROCEDURE — 6360000002 HC RX W HCPCS: Performed by: PHYSICIAN ASSISTANT

## 2022-01-23 PROCEDURE — 6370000000 HC RX 637 (ALT 250 FOR IP): Performed by: PHYSICIAN ASSISTANT

## 2022-01-23 PROCEDURE — 2500000003 HC RX 250 WO HCPCS: Performed by: PHYSICIAN ASSISTANT

## 2022-01-23 PROCEDURE — 74176 CT ABD & PELVIS W/O CONTRAST: CPT

## 2022-01-23 RX ORDER — SUCRALFATE 1 G/1
1 TABLET ORAL 4 TIMES DAILY
Qty: 40 TABLET | Refills: 0 | Status: SHIPPED | OUTPATIENT
Start: 2022-01-23

## 2022-01-23 RX ORDER — SUCRALFATE 1 G/1
1 TABLET ORAL ONCE
Status: COMPLETED | OUTPATIENT
Start: 2022-01-23 | End: 2022-01-23

## 2022-01-23 RX ORDER — ONDANSETRON 4 MG/1
4 TABLET, ORALLY DISINTEGRATING ORAL EVERY 8 HOURS PRN
Qty: 20 TABLET | Refills: 0 | Status: SHIPPED | OUTPATIENT
Start: 2022-01-23

## 2022-01-23 RX ORDER — PANTOPRAZOLE SODIUM 20 MG/1
20 TABLET, DELAYED RELEASE ORAL DAILY
Qty: 30 TABLET | Refills: 0 | Status: SHIPPED | OUTPATIENT
Start: 2022-01-23

## 2022-01-23 RX ADMIN — FENTANYL CITRATE 50 MCG: 50 INJECTION INTRAMUSCULAR; INTRAVENOUS at 00:02

## 2022-01-23 RX ADMIN — SODIUM CHLORIDE 1000 ML: 9 INJECTION, SOLUTION INTRAVENOUS at 00:01

## 2022-01-23 RX ADMIN — FAMOTIDINE 20 MG: 10 INJECTION, SOLUTION INTRAVENOUS at 00:02

## 2022-01-23 RX ADMIN — SUCRALFATE 1 G: 1 TABLET ORAL at 00:57

## 2022-01-23 ASSESSMENT — ENCOUNTER SYMPTOMS
ABDOMINAL PAIN: 1
SHORTNESS OF BREATH: 0
COLOR CHANGE: 0
APNEA: 0
TROUBLE SWALLOWING: 0
EYE PAIN: 0
ALLERGIC/IMMUNOLOGIC NEGATIVE: 1
NAUSEA: 1

## 2022-01-23 ASSESSMENT — PAIN SCALES - GENERAL: PAINLEVEL_OUTOF10: 10

## 2022-01-23 NOTE — ED TRIAGE NOTES
Pt c/ mid abd pain for a few hours that is sharp and burning.  Pt is a/o x 4 calm, no distress noted

## 2022-01-23 NOTE — ED PROVIDER NOTES
[address] [facility] ED  eMERGENCYdEPARTMENT eNCOUnter      Pt Name: Maira Peraza  MRN: [id number]  [place] 1970  Date of evaluation: 1/22/2022  Provider:Efrain London PA-C    CHIEF COMPLAINT       Chief Complaint   Patient presents with    Abdominal Pain     pt c/o mid abd pain for a few hours with nausea         HISTORY OF PRESENT ILLNESS  (Location/Symptom, Timing/Onset, Context/Setting, Quality, Duration, Modifying Factors, Severity.)   Maira Peraza is a 49 y.o. female who presents to the emergency department with complaints of epigastric abdominal pain that began approximately 2 hours prior to arrival.  Patient states associated nausea but denies any vomiting. Patient rates her pain at a 10 out of 10. Patient denies any chest pain associated with the symptoms. Patient states that she tried taking Tums without improvement. HPI    Nursing Notes were reviewed and I agree. REVIEW OF SYSTEMS    (2-9 systems for level 4, 10 or more for level 5)     Review of Systems   Constitutional: Negative for diaphoresis and fever. HENT: Negative for hearing loss and trouble swallowing. Eyes: Negative for pain. Respiratory: Negative for apnea and shortness of breath. Cardiovascular: Negative for chest pain. Gastrointestinal: Positive for abdominal pain and nausea. Endocrine: Negative. Genitourinary: Negative for hematuria. Musculoskeletal: Negative for neck pain and neck stiffness. Skin: Negative for color change. Allergic/Immunologic: Negative. Neurological: Negative for dizziness and numbness. Hematological: Negative. Psychiatric/Behavioral: Negative. All other systems reviewed and are negative. Except as noted above the remainder of the review of systems was reviewed and negative.        PAST MEDICAL HISTORY     Past Medical History:   Diagnosis Date    Anxiety     Arthritis     Asthma     Bronchopneumonia     Cancer ([facility] [address]     renal    Cerebral artery occlusion with cerebral infarction [facility])     Chronic bilateral low back pain with sciatica     Chronic kidney disease     Chronic obstructive lung disease ([facility] [address] 7/26/2019    Depression     Fibromyalgia     Hypertension     Insulin dependent type 2 diabetes mellitus, uncontrolled ([facility] [address] 8/3/2018    Localized enlarged lymph nodes 10/26/2018    Mixed headache     Pure hyperglyceridemia 5/19/2017    Sarcoidosis     Sleep apnea     does not wear cpap    Thyroid goiter          SURGICAL HISTORY       Past Surgical History:   Procedure Laterality Date    BRONCHOSCOPY  10/26/2018    DR. STEARNS    KIDNEY REMOVAL Right 08/2016    KIDNEY REMOVAL Right 2016    LUNG BIOPSY Right 10/2018    THYROID LOBECTOMY Right 6/13/14    THYROIDECTOMY  02/21/2019    DR. MAGDALENO    THYROIDECTOMY  2018    URETER STENT PLACEMENT Left 08/2016         CURRENT MEDICATIONS       Previous Medications    ACETAMINOPHEN (TYLENOL) 500 MG TABLET    Take 1 tablet by mouth 4 times daily as needed for Pain    ALBUTEROL (PROVENTIL) (2.5 MG/3ML) 0.083% NEBULIZER SOLUTION    Take 3 mLs by nebulization every 4 hours as needed for Wheezing    ALBUTEROL SULFATE  (90 BASE) MCG/ACT INHALER    INHALE 2 PUFFS INTO THE LUNGS EVERY 6 HOURS AS NEEDED FOR WHEEZING    ATORVASTATIN (LIPITOR) 40 MG TABLET    Take 1 tablet by mouth nightly    BACLOFEN (LIORESAL) 10 MG TABLET    Take 1 tablet by mouth 3 times daily    BLOOD GLUCOSE MONITORING SUPPL (ONETOUCH VERIO) W/DEVICE KIT    TEST 3 TIMES DAILY AS NEEDED    BLOOD GLUCOSE MONITORING SUPPL (ONETOUCH VERIO) W/DEVICE KIT    As  Directed    BLOOD GLUCOSE TEST STRIPS (EXACTECH TEST) STRIP    1 each by In Vitro route 3 times daily (Accu-check test strips) As needed.  DX:E11.65    BLOOD GLUCOSE TEST STRIPS (ONETOUCH VERIO) STRIP    TEST 3 TIMES DAILY AS NEEDED    BLOOD GLUCOSE TEST STRIPS (ONETOUCH VERIO) STRIP    Test 3x daily e11.65    BUDESONIDE-FORMOTEROL (SYMBICORT) 160-4.5 MCG/ACT AERO    Inhale 2 puffs into the lungs 2 times daily    BUPROPION (WELLBUTRIN XL) 150 MG EXTENDED RELEASE TABLET    TAKE 1 TABLET BY MOUTH EVERY MORNING    BUSPIRONE (BUSPAR) 15 MG TABLET    TAKE 1 TABLET BY MOUTH THREE TIMES DAILY    BUTALBITAL-ACETAMINOPHEN-CAFFEINE (FIORICET, ESGIC) -40 MG PER TABLET    Take 1 tablet by mouth every 6 hours as needed for Headaches    CETIRIZINE (ZYRTEC) 10 MG TABLET    TAKE 1 TABLET BY MOUTH EVERY NIGHT AT BEDTIME AS NEEDED FOR ALLERGIES    CYCLOBENZAPRINE (FLEXERIL) 10 MG TABLET    Take 10 mg by mouth every 8 hours as needed for Muscle spasms    DICLOFENAC SODIUM (VOLTAREN) 1 % GEL    Apply 2 g topically 4 times daily    DICYCLOMINE (BENTYL) 10 MG CAPSULE    Take 1 capsule by mouth every 6 hours as needed (cramps)    FLUTICASONE (FLONASE) 50 MCG/ACT NASAL SPRAY    SHAKE LIQUID AND USE 1 SPRAY IN EACH NOSTRIL DAILY    HYDROXYCHLOROQUINE (PLAQUENIL) 200 MG TABLET    TAKE 1 TABLET BY MOUTH TWICE DAILY    INSULIN DEGLUDEC (TRESIBA FLEXTOUCH) 100 UNIT/ML SOPN    INJECT 90 UNITS UNDER THE SKIN NIGHTLY pt needs 10 pens for a 30 day supply    INSULIN LISPRO, 1 UNIT DIAL, (HUMALOG KWIKPEN) 100 UNIT/ML SOPN    22 units at each meals hold if glucose less than 150    INSULIN PEN NEEDLE (B-D ULTRAFINE III SHORT PEN) 31G X 8 MM MISC    Inject 1 each into the skin 3 times daily And as needed    INSULIN PEN NEEDLE (NOVOFINE) 32G X 6 MM MISC    qid    INSULIN SYRINGE-NEEDLE U-100 30G X 1/2\" 1 ML MISC    1 each by Does not apply route daily    LEVOTHYROXINE (SYNTHROID) 125 MCG TABLET    TAKE 1 TABLET BY MOUTH DAILY    MAGNESIUM OXIDE (MAG-OX) 400 (241.3 MG) MG TABS TABLET    TAKE 1/2 TABLET BY MOUTH DAILY    MECLIZINE (ANTIVERT) 12.5 MG TABLET    Take 12.5 mg by mouth 2 times daily as needed for Dizziness    METOCLOPRAMIDE (REGLAN) 10 MG TABLET    Take 1 tablet by mouth 2 times daily as needed (Headache)    MONTELUKAST (SINGULAIR) 10 MG TABLET    TAKE 1 TABLET BY MOUTH EVERY NIGHT AT SUPPER FOR BREATHING OR ALLERGIES    ONDANSETRON (ZOFRAN) 4 MG TABLET    Take 1 tablet by mouth every 8 hours as needed for Nausea    ONE TOUCH ULTRASOFT LANCETS MISC    TEST 3 TIMES DAILY AS NEEDED    ONETOUCH DELICA LANCETS 71Z MISC    qid    PREGABALIN (LYRICA) 150 MG CAPSULE    Take 1 capsule by mouth 3 times daily for 90 days.     SPIRONOLACTONE (ALDACTONE) 50 MG TABLET    TAKE 1 TABLET BY MOUTH DAILY    SUMATRIPTAN (IMITREX) 25 MG TABLET    TAKE 1 TABLET BY MOUTH 1 TIME AS NEEDED FOR MIGRAINE    TIZANIDINE (ZANAFLEX) 2 MG TABLET    Take 1 tablet by mouth 3 times daily as needed (back pain/ spasm)    VENLAFAXINE (EFFEXOR XR) 75 MG EXTENDED RELEASE CAPSULE    Take 1 capsule by mouth daily       ALLERGIES     Shellfish-derived products, Ibuprofen, Ketorolac, Morphine, Other, Penicillins, Propoxyphene n-acetaminophen, and Toradol [ketorolac tromethamine]    FAMILY HISTORY       Family History   Problem Relation Age of Onset    Cancer Father     Diabetes Father     Allergy (Severe) Father     Heart Attack Father     Prostate Cancer Father     High Blood Pressure Mother     Diabetes Mother     Arthritis Mother     High Cholesterol Mother     Vision Loss Mother     Alcohol Abuse Neg Hx     Anemia Neg Hx     Arrhythmia Neg Hx     Asthma Neg Hx     Atrial Fibrillation Neg Hx     Birth Defects Neg Hx     Breast Cancer Neg Hx     Coronary Art Dis Neg Hx     Colon Cancer Neg Hx     Depression Neg Hx     Early Death Neg Hx     Hearing Loss Neg Hx     Heart Disease Neg Hx     Learning Disabilities Neg Hx     Kidney Disease Neg Hx     Mental Illness Neg Hx     Mental Retardation Neg Hx     Miscarriages / Stillbirths Neg Hx     Obesity Neg Hx     Osteoporosis Neg Hx     Stroke Neg Hx     Substance Abuse Neg Hx           SOCIAL HISTORY       Social History     Socioeconomic History    Marital status: Legally      Spouse name: Not on file    Number of children: Not on file    Years of education: 15    Last Year: Not on file       SCREENINGS           PHYSICAL EXAM    (up to 7 forlevel 4, 8 or more for level 5)     ED Triage Vitals [01/22/22 2311]   BP Temp Temp Source Pulse Resp SpO2 Height Weight   121/67 98.2 °F (36.8 °C) Oral 99 18 98 % 5' 3\" (1.6 m) 265 lb (120.2 kg)       Physical Exam  Vitals and nursing note reviewed. Constitutional:       General: She is not in acute distress. Appearance: She is well-developed. She is not diaphoretic. HENT:      Head: Normocephalic and atraumatic. Mouth/Throat:      Pharynx: No oropharyngeal exudate. Eyes:      General: No scleral icterus. Conjunctiva/sclera: Conjunctivae normal.      Pupils: Pupils are equal, round, and reactive to light. Neck:      Trachea: No tracheal deviation. Cardiovascular:      Rate and Rhythm: Normal rate. Heart sounds: Normal heart sounds. Pulmonary:      Effort: Pulmonary effort is normal. No respiratory distress. Breath sounds: Normal breath sounds. Abdominal:      General: Bowel sounds are normal. There is no distension. Palpations: Abdomen is soft. Tenderness: There is abdominal tenderness in the epigastric area. There is no guarding or rebound. Musculoskeletal:         General: Normal range of motion. Cervical back: Normal range of motion and neck supple. Skin:     General: Skin is warm and dry. Findings: No erythema or rash. Neurological:      Mental Status: She is alert and oriented to person, place, and time. Cranial Nerves: No cranial nerve deficit. Motor: No abnormal muscle tone. Psychiatric:         Behavior: Behavior normal.         Thought Content:  Thought content normal.         Judgment: Judgment normal.           DIAGNOSTIC RESULTS     RADIOLOGY:   Non-plain film images such as CT, Ultrasound and MRI are read by the radiologist. Plain radiographic images are visualized and preliminarilyinterpreted by Nellie Fischer PA-C with the below findings:    neg    Interpretation per the Radiologist below, if available at the time of this note:    CT ABDOMEN PELVIS WO CONTRAST Additional Contrast? None   Final Result   1. No acute findings. 2.  No appendicitis. 3.  Diverticulosis of the colon without diverticulitis. LABS:  Labs Reviewed   COMPREHENSIVE METABOLIC PANEL - Abnormal; Notable for the following components:       Result Value    Glucose 175 (*)     CREATININE 1.40 (*)     GFR Non- 39.7 (*)     GFR  48.0 (*)     Alkaline Phosphatase 140 (*)     Globulin 3.8 (*)     All other components within normal limits   CBC WITH AUTO DIFFERENTIAL   LIPASE   URINE RT REFLEX TO CULTURE       All other labs were within normal range or not returnedas of this dictation. EMERGENCYDEPARTMENT COURSE and DIFFERENTIAL DIAGNOSIS/MDM:   Vitals:    Vitals:    01/22/22 2311   BP: 121/67   Pulse: 99   Resp: 18   Temp: 98.2 °F (36.8 °C)   TempSrc: Oral   SpO2: 98%   Weight: 265 lb (120.2 kg)   Height: 5' 3\" (1.6 m)       REASSESSMENT      Patient presented with epigastric abdominal pain ongoing for the past 2 to 3 hours. CT and laboratory testing unremarkable. Likely gastritis given patient's location and quality of pain. She did have improved symptoms after Pepcid, fentanyl and Zofran. Patient will be discharged home on PPI as well as Carafate and Zofran. Close PCP follow-up recommended    MDM    PROCEDURES:    Procedures      FINAL IMPRESSION      1.  Acute superficial gastritis without hemorrhage          DISPOSITION/PLAN   DISPOSITION Decision To Discharge 01/23/2022 12:54:57 AM      PATIENT REFERRED TO:  SUDEEP Bernard - CNP  [address] [facility]  [phone]    Call in 2 days        DISCHARGE MEDICATIONS:  New Prescriptions    ONDANSETRON (ZOFRAN ODT) 4 MG DISINTEGRATING TABLET    Take 1 tablet by mouth every 8 hours as needed for Nausea    PANTOPRAZOLE (PROTONIX) 20 MG TABLET    Take 1

## 2022-01-24 LAB — URINE CULTURE, ROUTINE: NORMAL

## 2022-01-25 ENCOUNTER — APPOINTMENT (OUTPATIENT)
Dept: PHYSICAL THERAPY | Age: 52
End: 2022-01-25
Payer: MEDICAID

## 2022-01-31 DIAGNOSIS — F41.9 ANXIETY: ICD-10-CM

## 2022-01-31 DIAGNOSIS — J30.1 CHRONIC SEASONAL ALLERGIC RHINITIS DUE TO POLLEN: ICD-10-CM

## 2022-01-31 DIAGNOSIS — J45.41 MODERATE PERSISTENT ASTHMA WITH ACUTE EXACERBATION: ICD-10-CM

## 2022-01-31 RX ORDER — BUPROPION HYDROCHLORIDE 150 MG/1
150 TABLET ORAL EVERY MORNING
Qty: 30 TABLET | Refills: 3 | Status: SHIPPED | OUTPATIENT
Start: 2022-01-31 | End: 2022-07-31

## 2022-01-31 RX ORDER — MONTELUKAST SODIUM 10 MG/1
TABLET ORAL
Qty: 30 TABLET | Refills: 5 | Status: SHIPPED | OUTPATIENT
Start: 2022-01-31 | End: 2022-07-31

## 2022-01-31 RX ORDER — BUPROPION HYDROCHLORIDE 150 MG/1
150 TABLET ORAL EVERY MORNING
Qty: 30 TABLET | Refills: 3 | Status: SHIPPED | OUTPATIENT
Start: 2022-01-31 | End: 2022-01-31 | Stop reason: SDUPTHER

## 2022-01-31 NOTE — TELEPHONE ENCOUNTER
Requesting medication refill.  Please approve or deny this request.    Rx requested:  Requested Prescriptions     Pending Prescriptions Disp Refills    montelukast (SINGULAIR) 10 MG tablet [Pharmacy Med Name: MONTELUKAST 10MG TABLETS] 30 tablet 5     Sig: TAKE 1 TABLET BY MOUTH EVERY NIGHT AT SUPPER FOR BREATHING OR ALLERGIES    buPROPion (WELLBUTRIN XL) 150 MG extended release tablet [Pharmacy Med Name: BUPROPION XL 150MG TABLETS (24 H)] 30 tablet 3     Sig: TAKE 1 TABLET BY MOUTH EVERY MORNING       Last Office Visit:   1/5/2022    Last Filled:      Last Labs:      Next Visit Date:  Future Appointments   Date Time Provider Valerie Cosby   4/5/2022  3:15 PM Lilibeth Ibanez, [address]

## 2022-02-08 ENCOUNTER — PATIENT MESSAGE (OUTPATIENT)
Dept: FAMILY MEDICINE CLINIC | Age: 52
End: 2022-02-08

## 2022-02-08 DIAGNOSIS — M54.50 ACUTE LOW BACK PAIN, UNSPECIFIED BACK PAIN LATERALITY, UNSPECIFIED WHETHER SCIATICA PRESENT: Primary | ICD-10-CM

## 2022-02-08 RX ORDER — LIDOCAINE 50 MG/G
1 PATCH TOPICAL DAILY
Qty: 10 PATCH | Refills: 0 | Status: SHIPPED | OUTPATIENT
Start: 2022-02-08 | End: 2022-08-08 | Stop reason: SDUPTHER

## 2022-02-08 NOTE — TELEPHONE ENCOUNTER
From: Kari Hearne  To: Emma Gay  Sent: 2/8/2022 1:01 PM EST  Subject: Prescription    Hi Lilibeth. .. I wanted to ask I had a prescription for lidocaine patches from the hospital but there's no refill on them. I got them for my back, but I'm not trying to go back to the hospital. Could you refill them for me.  Thanks

## 2022-02-14 ENCOUNTER — HOSPITAL ENCOUNTER (EMERGENCY)
Age: 52
Discharge: HOME | End: 2022-02-14
Attending: EMERGENCY MEDICINE
Payer: MEDICAID

## 2022-02-14 ENCOUNTER — APPOINTMENT (OUTPATIENT)
Dept: GENERAL RADIOLOGY | Age: 52
End: 2022-02-14
Payer: MEDICAID

## 2022-02-14 VITALS
OXYGEN SATURATION: 97 % | HEIGHT: 63 IN | SYSTOLIC BLOOD PRESSURE: 125 MMHG | TEMPERATURE: 98.4 F | WEIGHT: 265 LBS | BODY MASS INDEX: 46.95 KG/M2 | RESPIRATION RATE: 18 BRPM | HEART RATE: 87 BPM | DIASTOLIC BLOOD PRESSURE: 71 MMHG

## 2022-02-14 DIAGNOSIS — M79.671 RIGHT FOOT PAIN: Primary | ICD-10-CM

## 2022-02-14 DIAGNOSIS — M72.2 PLANTAR FASCIITIS, RIGHT: ICD-10-CM

## 2022-02-14 PROCEDURE — 99283 EMERGENCY DEPT VISIT LOW MDM: CPT

## 2022-02-14 PROCEDURE — 6370000000 HC RX 637 (ALT 250 FOR IP): Performed by: EMERGENCY MEDICINE

## 2022-02-14 PROCEDURE — 73610 X-RAY EXAM OF ANKLE: CPT

## 2022-02-14 RX ORDER — HYDROCODONE BITARTRATE AND ACETAMINOPHEN 5; 325 MG/1; MG/1
1 TABLET ORAL ONCE
Status: COMPLETED | OUTPATIENT
Start: 2022-02-14 | End: 2022-02-14

## 2022-02-14 RX ORDER — HYDROCODONE BITARTRATE AND ACETAMINOPHEN 5; 325 MG/1; MG/1
1 TABLET ORAL EVERY 6 HOURS PRN
Qty: 10 TABLET | Refills: 0 | Status: SHIPPED | OUTPATIENT
Start: 2022-02-14 | End: 2022-02-17

## 2022-02-14 RX ADMIN — HYDROCODONE BITARTRATE AND ACETAMINOPHEN 1 TABLET: 5; 325 TABLET ORAL at 21:33

## 2022-02-14 ASSESSMENT — PAIN SCALES - GENERAL
PAINLEVEL_OUTOF10: 6
PAINLEVEL_OUTOF10: 9

## 2022-02-14 ASSESSMENT — ENCOUNTER SYMPTOMS
NAUSEA: 0
CHEST TIGHTNESS: 0
ABDOMINAL PAIN: 0
SHORTNESS OF BREATH: 0
EYE PAIN: 0
VOMITING: 0
SORE THROAT: 0

## 2022-02-14 ASSESSMENT — PAIN DESCRIPTION - LOCATION: LOCATION: ANKLE

## 2022-02-14 ASSESSMENT — PAIN DESCRIPTION - PAIN TYPE: TYPE: ACUTE PAIN

## 2022-02-14 ASSESSMENT — PAIN DESCRIPTION - ORIENTATION: ORIENTATION: RIGHT

## 2022-02-14 NOTE — PROGRESS NOTES
[address]  PHYSICAL THERAPY PLAN OF CARE   [address] [place], [address] [facility]         Ph: [phone]  Fax: [phone]    [] Certification  [] Recertification []  Plan of Care  [] Progress Note [x] Discharge      To: Corrina Weston M.D. From:  Clarice Bagley PT  Patient: Antonieta Hubbard     : 1970  Diagnosis: Lumbar stenosis, with neurogenic claudication     Date: 2022  Treatment Diagnosis: Painful low back with right lumbar paraspinal tightness, decreased trunk range of motion       Progress Report Period from:  2022  to 2022                   OBJECTIVE:   Short Term Goals - Time Frame for Short term goals: 3-5 treatments    Goals Current/Discharge status  Met   Short term goal 1: Increase active motion of the trunk by 10 degrees all directions   [] yes  [x] no   Short term goal 2: Minimal soft tissue tightness of the right lumbar paraspinals   [] yes  [x] no   Short term goal 3: Pain of the low back decreased to 7/10   [] yes  [x] no       [] yes  [] no      []  yes  []  no     Long Term Goals - Time Frame for Long term goals : 6-10  Goals Current/ Discharge status Met   Long term goal 1: No further soft tissue tightness of the right lumbar paraspinals  [] yes  [x] no   Long term goal 2: Increase active trunk range of motion to within functional limits so she can do her ADL's and home making with greater ease  [] yes  [x] no   Long term goal 3: Lower extremity strength 4/5 to 4+/5 of hips and knees  [] yes  [x] no   Long term goal 4: Pain of the lumbar area 5/10  [] yes  [] no   Long term goal 5: Oswestry Low Back Pain Questionaire will be scored at discharge less than 30  [] yes  [x] no       [] yes  [] no       [] yes  [] no        Body structures, Functions, Activity limitations: Decreased functional mobility ,Decreased ROM,Decreased strength,Decreased posture,Increased pain  Assessment: Problem List:  1.   Increased soft tissue tightness right lumbar paraspinals  2. Decreased active trunk range of motion  3. Decreased lower extremity strength  4. Back pain  5. Oswestry total score 30  Prognosis: Fair           PLAN: [] Evaluate and Treat  Frequency/Duration:  Plan  Times per week: 2  Plan weeks: 5  Current Treatment Recommendations: Strengthening,ROM,Functional Mobility Training,Pain Management,Modalities,Manual Therapy - Soft Tissue Mobilization,Home Exercise Program     Patient received authorization from insurance for physical therapy. On 2/9/2022 she was called and she requested to be discharged and was not interested in physical therapy at this time. Patient Status:[] Continue/ Initiate plan of Care    [x] Discharge PT. Recommend pt continue with HEP. [] Additional visits requested, Please re-certify for additional visits:          Signature: Electronically signed by Radha Tabares PT on 2/14/22 at 5:35 PM EST      If you have any questions or concerns, please don't hesitate to call. Thank you for your referral.    I have reviewed this plan of care and certify a need for medically necessary rehabilitation services.     Physician Signature:__________________________________________________________  Date:  Please sign and return

## 2022-02-15 NOTE — ED PROVIDER NOTES
[address] [facility] ED  EMERGENCY DEPARTMENT ENCOUNTER      Pt Name: Kristen Arita  MRN: [id number]  [place] 1970  Date of evaluation: 2/14/2022  Provider: Cathleen Proctor DO    CHIEF COMPLAINT       Chief Complaint   Patient presents with    Ankle Pain     rt w/o injury         HISTORY OF PRESENT ILLNESS   (Location/Symptom, Timing/Onset, Context/Setting, Quality, Duration, Modifying Factors, Severity)  Note limiting factors. Kristen Arita is a 49 y.o. female who presents to the emergency department . Patient complains of pain in her right heel and her right Achilles. She did not injure herself. She woke up and she had this pain. It is excruciatingly painful to put weight on her right heel. Patient uses a cane anyways. Does have history of fibromyalgia, gout, renal cancer, chronic kidney disease, type 2 diabetes hypertension. HPI    Nursing Notes were reviewed. REVIEW OF SYSTEMS    (2-9 systems for level 4, 10 or more for level 5)     Review of Systems   Constitutional: Negative for activity change, appetite change and fatigue. HENT: Negative for congestion and sore throat. Eyes: Negative for pain and visual disturbance. Respiratory: Negative for chest tightness and shortness of breath. Cardiovascular: Negative for chest pain. Gastrointestinal: Negative for abdominal pain, nausea and vomiting. Endocrine: Negative for polydipsia. Genitourinary: Negative for flank pain and urgency. Musculoskeletal: Positive for arthralgias. Negative for gait problem and neck stiffness. Skin: Negative for rash. Neurological: Negative for weakness, light-headedness and headaches. Psychiatric/Behavioral: Negative for confusion and sleep disturbance. Except as noted above the remainder of the review of systems was reviewed and negative.        PAST MEDICAL HISTORY     Past Medical History:   Diagnosis Date    Anxiety     Arthritis     Asthma     Bronchopneumonia     Cancer [facility])     renal    Cerebral artery occlusion with cerebral infarction [facility])     Chronic bilateral low back pain with sciatica     Chronic kidney disease     Chronic obstructive lung disease ([facility] [address] 7/26/2019    Depression     Fibromyalgia     Gout     rt knee    Hypertension     Insulin dependent type 2 diabetes mellitus, uncontrolled ([facility] [address] 8/3/2018    Localized enlarged lymph nodes 10/26/2018    Mixed headache     Pure hyperglyceridemia 5/19/2017    Sarcoidosis     Sleep apnea     does not wear cpap    Thyroid goiter          SURGICAL HISTORY       Past Surgical History:   Procedure Laterality Date    BRONCHOSCOPY  10/26/2018    DR. STEARNS    KIDNEY REMOVAL Right 08/2016    KIDNEY REMOVAL Right 2016    LUNG BIOPSY Right 10/2018    THYROID LOBECTOMY Right 6/13/14    THYROIDECTOMY  02/21/2019    DR. MAGDALENO    THYROIDECTOMY  2018    URETER STENT PLACEMENT Left 08/2016         CURRENT MEDICATIONS       Previous Medications    ACETAMINOPHEN (TYLENOL) 500 MG TABLET    Take 1 tablet by mouth 4 times daily as needed for Pain    ALBUTEROL (PROVENTIL) (2.5 MG/3ML) 0.083% NEBULIZER SOLUTION    Take 3 mLs by nebulization every 4 hours as needed for Wheezing    ALBUTEROL SULFATE  (90 BASE) MCG/ACT INHALER    INHALE 2 PUFFS INTO THE LUNGS EVERY 6 HOURS AS NEEDED FOR WHEEZING    ATORVASTATIN (LIPITOR) 40 MG TABLET    Take 1 tablet by mouth nightly    BACLOFEN (LIORESAL) 10 MG TABLET    Take 1 tablet by mouth 3 times daily    BLOOD GLUCOSE MONITORING SUPPL (ONETOUCH VERIO) W/DEVICE KIT    TEST 3 TIMES DAILY AS NEEDED    BLOOD GLUCOSE MONITORING SUPPL (ONETOUCH VERIO) W/DEVICE KIT    As  Directed    BLOOD GLUCOSE TEST STRIPS (EXACTECH TEST) STRIP    1 each by In Vitro route 3 times daily (Accu-check test strips) As needed.  DX:E11.65    BLOOD GLUCOSE TEST STRIPS (ONETOUCH VERIO) STRIP    TEST 3 TIMES DAILY AS NEEDED    BLOOD GLUCOSE TEST STRIPS (ONETOUCH VERIO) STRIP    Test 3x daily e11.65 BUDESONIDE-FORMOTEROL (SYMBICORT) 160-4.5 MCG/ACT AERO    Inhale 2 puffs into the lungs 2 times daily    BUPROPION (WELLBUTRIN XL) 150 MG EXTENDED RELEASE TABLET    TAKE 1 TABLET BY MOUTH EVERY MORNING    BUSPIRONE (BUSPAR) 15 MG TABLET    TAKE 1 TABLET BY MOUTH THREE TIMES DAILY    BUTALBITAL-ACETAMINOPHEN-CAFFEINE (FIORICET, ESGIC) -40 MG PER TABLET    Take 1 tablet by mouth every 6 hours as needed for Headaches    CETIRIZINE (ZYRTEC) 10 MG TABLET    TAKE 1 TABLET BY MOUTH EVERY NIGHT AT BEDTIME AS NEEDED FOR ALLERGIES    CYCLOBENZAPRINE (FLEXERIL) 10 MG TABLET    Take 10 mg by mouth every 8 hours as needed for Muscle spasms    DICLOFENAC SODIUM (VOLTAREN) 1 % GEL    Apply 2 g topically 4 times daily    DICYCLOMINE (BENTYL) 10 MG CAPSULE    Take 1 capsule by mouth every 6 hours as needed (cramps)    FLUTICASONE (FLONASE) 50 MCG/ACT NASAL SPRAY    SHAKE LIQUID AND USE 1 SPRAY IN EACH NOSTRIL DAILY    HYDROXYCHLOROQUINE (PLAQUENIL) 200 MG TABLET    TAKE 1 TABLET BY MOUTH TWICE DAILY    INSULIN DEGLUDEC (TRESIBA FLEXTOUCH) 100 UNIT/ML SOPN    INJECT 90 UNITS UNDER THE SKIN NIGHTLY pt needs 10 pens for a 30 day supply    INSULIN LISPRO, 1 UNIT DIAL, (HUMALOG KWIKPEN) 100 UNIT/ML SOPN    22 units at each meals hold if glucose less than 150    INSULIN PEN NEEDLE (B-D ULTRAFINE III SHORT PEN) 31G X 8 MM MISC    Inject 1 each into the skin 3 times daily And as needed    INSULIN PEN NEEDLE (NOVOFINE) 32G X 6 MM MISC    qid    INSULIN SYRINGE-NEEDLE U-100 30G X 1/2\" 1 ML MISC    1 each by Does not apply route daily    LEVOTHYROXINE (SYNTHROID) 125 MCG TABLET    TAKE 1 TABLET BY MOUTH DAILY    LIDOCAINE (LIDODERM) 5 %    Place 1 patch onto the skin daily for 10 days 12 hours on, 12 hours off.     MAGNESIUM OXIDE (MAG-OX) 400 (241.3 MG) MG TABS TABLET    TAKE 1/2 TABLET BY MOUTH DAILY    MECLIZINE (ANTIVERT) 12.5 MG TABLET    Take 12.5 mg by mouth 2 times daily as needed for Dizziness    METOCLOPRAMIDE (REGLAN) 10 MG TABLET    Take 1 tablet by mouth 2 times daily as needed (Headache)    MONTELUKAST (SINGULAIR) 10 MG TABLET    TAKE 1 TABLET BY MOUTH EVERY NIGHT AT SUPPER FOR BREATHING OR ALLERGIES    ONDANSETRON (ZOFRAN ODT) 4 MG DISINTEGRATING TABLET    Take 1 tablet by mouth every 8 hours as needed for Nausea    ONDANSETRON (ZOFRAN) 4 MG TABLET    Take 1 tablet by mouth every 8 hours as needed for Nausea    ONE TOUCH ULTRASOFT LANCETS MISC    TEST 3 TIMES DAILY AS NEEDED    ONETOUCH DELICA LANCETS 80O MISC    qid    PANTOPRAZOLE (PROTONIX) 20 MG TABLET    Take 1 tablet by mouth daily    PREGABALIN (LYRICA) 150 MG CAPSULE    Take 1 capsule by mouth 3 times daily for 90 days.     SPIRONOLACTONE (ALDACTONE) 50 MG TABLET    TAKE 1 TABLET BY MOUTH DAILY    SUCRALFATE (CARAFATE) 1 GM TABLET    Take 1 tablet by mouth 4 times daily    SUMATRIPTAN (IMITREX) 25 MG TABLET    TAKE 1 TABLET BY MOUTH 1 TIME AS NEEDED FOR MIGRAINE    TIZANIDINE (ZANAFLEX) 2 MG TABLET    Take 1 tablet by mouth 3 times daily as needed (back pain/ spasm)    VENLAFAXINE (EFFEXOR XR) 75 MG EXTENDED RELEASE CAPSULE    Take 1 capsule by mouth daily       ALLERGIES     Shellfish-derived products, Ibuprofen, Ketorolac, Morphine, Other, Penicillins, Propoxyphene n-acetaminophen, and Toradol [ketorolac tromethamine]    FAMILY HISTORY       Family History   Problem Relation Age of Onset    Cancer Father     Diabetes Father     Allergy (Severe) Father     Heart Attack Father     Prostate Cancer Father     High Blood Pressure Mother     Diabetes Mother     Arthritis Mother     High Cholesterol Mother     Vision Loss Mother     Alcohol Abuse Neg Hx     Anemia Neg Hx     Arrhythmia Neg Hx     Asthma Neg Hx     Atrial Fibrillation Neg Hx     Birth Defects Neg Hx     Breast Cancer Neg Hx     Coronary Art Dis Neg Hx     Colon Cancer Neg Hx     Depression Neg Hx     Early Death Neg Hx     Hearing Loss Neg Hx     Heart Disease Neg Hx     Learning Disabilities Neg Hx     Kidney Disease Neg Hx     Mental Illness Neg Hx     Mental Retardation Neg Hx     Miscarriages / Stillbirths Neg Hx     Obesity Neg Hx     Osteoporosis Neg Hx     Stroke Neg Hx     Substance Abuse Neg Hx           SOCIAL HISTORY       Social History     Socioeconomic History    Marital status: Legally      Spouse name: None    Number of children: None    Years of education: 15    Highest education level: High school graduate   Occupational History    None   Tobacco Use    Smoking status: Former Smoker     Packs/day: 0.50     Years: 15.00     Pack years: 7.50     Types: Cigarettes     Start date: 2014     Quit date: 3/1/2015     Years since quittin.9    Smokeless tobacco: Never Used   Vaping Use    Vaping Use: Never used   Substance and Sexual Activity    Alcohol use: Not Currently     Alcohol/week: 0.0 standard drinks     Comment: occasionally    Drug use: Yes     Frequency: 5.0 times per week     Types: Marijuana [place])    Sexual activity: Yes     Partners: Male   Other Topics Concern    None   Social History Narrative    None     Social Determinants of Health     Financial Resource Strain:     Difficulty of Paying Living Expenses: Not on file   Food Insecurity:     Worried About Running Out of Food in the Last Year: Not on file    Maximiliano of Food in the Last Year: Not on file   Transportation Needs:     Lack of Transportation (Medical): Not on file    Lack of Transportation (Non-Medical):  Not on file   Physical Activity:     Days of Exercise per Week: Not on file    Minutes of Exercise per Session: Not on file   Stress:     Feeling of Stress : Not on file   Social Connections:     Frequency of Communication with Friends and Family: Not on file    Frequency of Social Gatherings with Friends and Family: Not on file    Attends Confucianism Services: Not on file    Active Member of Clubs or Organizations: Not on file    Attends Club or Organization Meetings: Not on file    Marital Status: Not on file   Intimate Partner Violence:     Fear of Current or Ex-Partner: Not on file    Emotionally Abused: Not on file    Physically Abused: Not on file    Sexually Abused: Not on file   Housing Stability:     Unable to Pay for Housing in the Last Year: Not on file    Number of [place] in the Last Year: Not on file    Unstable Housing in the Last Year: Not on file       SCREENINGS                        PHYSICAL EXAM    (up to 7 for level 4, 8 or more for level 5)     ED Triage Vitals [02/14/22 2041]   BP Temp Temp Source Pulse Resp SpO2 Height Weight   125/71 98.4 °F (36.9 °C) Oral 87 18 97 % 5' 3\" (1.6 m) 265 lb (120.2 kg)       Physical Exam  Vitals and nursing note reviewed. Constitutional:       General: She is not in acute distress. Appearance: She is well-developed. She is not diaphoretic. HENT:      Head: Normocephalic and atraumatic. Right Ear: External ear normal.      Left Ear: External ear normal.      Mouth/Throat:      Pharynx: No oropharyngeal exudate. Eyes:      Conjunctiva/sclera: Conjunctivae normal.      Pupils: Pupils are equal, round, and reactive to light. Neck:      Thyroid: No thyromegaly. Vascular: No JVD. Trachea: No tracheal deviation. Cardiovascular:      Rate and Rhythm: Normal rate. Heart sounds: Normal heart sounds. No murmur heard. Pulmonary:      Effort: Pulmonary effort is normal. No respiratory distress. Breath sounds: Normal breath sounds. No wheezing. Abdominal:      General: Bowel sounds are normal.      Palpations: Abdomen is soft. Tenderness: There is no abdominal tenderness. There is no guarding. Musculoskeletal:         General: Tenderness present. Normal range of motion. Cervical back: Normal range of motion and neck supple. Comments: Right foot very tender at the heel and the Achilles. There is nothing warm red or deformed.    Skin: General: Skin is warm and dry. Findings: No rash. Neurological:      Mental Status: She is alert and oriented to person, place, and time. Cranial Nerves: No cranial nerve deficit. Psychiatric:         Behavior: Behavior normal.         DIAGNOSTIC RESULTS     EKG: All EKG's are interpreted by the Emergency Department Physician who either signs or Co-signs this chart in the absence of a cardiologist.        RADIOLOGY:   Non-plain film images such as CT, Ultrasound and MRI are read by the radiologist. Plain radiographic images are visualized and preliminarily interpreted by the emergency physician with the below findings:        Interpretation per the Radiologist below, if available at the time of this note:    XR ANKLE RIGHT (MIN 3 VIEWS)    (Results Pending)     neg for fracture    ED BEDSIDE ULTRASOUND:   Performed by ED Physician - none    LABS:  Labs Reviewed - No data to display    All other labs were within normal range or not returned as of this dictation. EMERGENCY DEPARTMENT COURSE and DIFFERENTIAL DIAGNOSIS/MDM:   Vitals:    Vitals:    02/14/22 2041   BP: 125/71   Pulse: 87   Resp: 18   Temp: 98.4 °F (36.9 °C)   TempSrc: Oral   SpO2: 97%   Weight: 265 lb (120.2 kg)   Height: 5' 3\" (1.6 m)       Patient is here with pain in the Achilles and the heel of the right foot. It could be plantar fasciitis. I did put her in a orthopedic boot and referred her to podiatry. Small supply of Norco.  Cannot take NSAIDs because she only has 1 kidney and has chronic kidney disease. MDM      REASSESSMENT          CRITICAL CARE TIME   Total Critical Care time was 0 minutes, excluding separately reportable procedures. There was a high probability of clinically significant/life threatening deterioration in the patient's condition which required my urgent intervention. CONSULTS:  None    PROCEDURES:  Unless otherwise noted below, none     Procedures      FINAL IMPRESSION      1. Right foot pain    2. Plantar fasciitis, right          DISPOSITION/PLAN   DISPOSITION Discharge - Pending Orders Complete 02/14/2022 09:20:32 PM      PATIENT REFERRED TO:  Thalia Frost DPM  [address] [address] [phone]    Schedule an appointment as soon as possible for a visit         DISCHARGE MEDICATIONS:  New Prescriptions    HYDROCODONE-ACETAMINOPHEN (NORCO) 5-325 MG PER TABLET    Take 1 tablet by mouth every 6 hours as needed for Pain for up to 3 days. Controlled Substances Monitoring:     RX Monitoring 1/5/2022   Attestation -   Periodic Controlled Substance Monitoring No signs of potential drug abuse or diversion identified. ;Possible medication side effects, risk of tolerance/dependence & alternative treatments discussed.    Chronic Pain > 80 MEDD -       (Please note that portions of this note were completed with a voice recognition program.  Efforts were made to edit the dictations but occasionally words are mis-transcribed.)    Lesa Massey DO (electronically signed)  Attending Emergency Physician            Lesa Massey DO  02/14/22 [id number]

## 2022-02-17 DIAGNOSIS — Z79.4 TYPE 2 DIABETES MELLITUS WITH HYPERGLYCEMIA, WITH LONG-TERM CURRENT USE OF INSULIN (HCC): ICD-10-CM

## 2022-02-17 DIAGNOSIS — E11.65 TYPE 2 DIABETES MELLITUS WITH HYPERGLYCEMIA, WITH LONG-TERM CURRENT USE OF INSULIN (HCC): ICD-10-CM

## 2022-02-17 RX ORDER — INSULIN DEGLUDEC INJECTION 100 U/ML
INJECTION, SOLUTION SUBCUTANEOUS
Qty: 15 PEN | Refills: 3 | Status: SHIPPED | OUTPATIENT
Start: 2022-02-17 | End: 2022-04-15 | Stop reason: SDUPTHER

## 2022-03-02 DIAGNOSIS — J30.1 ACUTE SEASONAL ALLERGIC RHINITIS DUE TO POLLEN: ICD-10-CM

## 2022-03-02 RX ORDER — CETIRIZINE HYDROCHLORIDE 10 MG/1
TABLET ORAL
Qty: 30 TABLET | Refills: 5 | Status: SHIPPED | OUTPATIENT
Start: 2022-03-02 | End: 2022-08-29

## 2022-03-02 NOTE — TELEPHONE ENCOUNTER
Rx request   Requested Prescriptions     Pending Prescriptions Disp Refills    cetirizine (ZYRTEC) 10 MG tablet [Pharmacy Med Name: CETIRIZINE 10MG TABLETS] 30 tablet 5     Sig: TAKE 1 TABLET BY MOUTH EVERY NIGHT AT BEDTIME AS NEEDED FOR ALLERGIES     LOV 1/5/2022    Next Visit Date:  Future Appointments   Date Time Provider Valerie Cosby   4/5/2022  3:15 PM Lilibeth Esquivel, [address]

## 2022-03-03 RX ORDER — SPIRONOLACTONE 50 MG/1
50 TABLET, FILM COATED ORAL DAILY
Qty: 30 TABLET | Refills: 3 | Status: SHIPPED | OUTPATIENT
Start: 2022-03-03 | End: 2022-06-30

## 2022-03-04 ENCOUNTER — APPOINTMENT (OUTPATIENT)
Dept: CT IMAGING | Age: 52
End: 2022-03-04
Payer: MEDICAID

## 2022-03-04 ENCOUNTER — APPOINTMENT (OUTPATIENT)
Dept: GENERAL RADIOLOGY | Age: 52
End: 2022-03-04
Payer: MEDICAID

## 2022-03-04 ENCOUNTER — HOSPITAL ENCOUNTER (EMERGENCY)
Age: 52
Discharge: HOME | End: 2022-03-04
Payer: MEDICAID

## 2022-03-04 VITALS
RESPIRATION RATE: 18 BRPM | SYSTOLIC BLOOD PRESSURE: 117 MMHG | TEMPERATURE: 97.9 F | HEIGHT: 63 IN | OXYGEN SATURATION: 97 % | BODY MASS INDEX: 46.95 KG/M2 | HEART RATE: 84 BPM | WEIGHT: 265 LBS | DIASTOLIC BLOOD PRESSURE: 71 MMHG

## 2022-03-04 DIAGNOSIS — R07.9 CHEST PAIN, UNSPECIFIED TYPE: Primary | ICD-10-CM

## 2022-03-04 DIAGNOSIS — R20.2 NUMBNESS AND TINGLING IN LEFT ARM: ICD-10-CM

## 2022-03-04 DIAGNOSIS — R20.0 NUMBNESS AND TINGLING IN LEFT ARM: ICD-10-CM

## 2022-03-04 LAB
ALBUMIN SERPL-MCNC: 4.3 G/DL (ref 3.5–4.6)
ALP BLD-CCNC: 139 U/L (ref 40–130)
ALT SERPL-CCNC: 19 U/L (ref 0–33)
ANION GAP SERPL CALCULATED.3IONS-SCNC: 12 MEQ/L (ref 9–15)
ANISOCYTOSIS: ABNORMAL
AST SERPL-CCNC: 17 U/L (ref 0–35)
BASOPHILS ABSOLUTE: 0 K/UL (ref 0–0.2)
BASOPHILS RELATIVE PERCENT: 0.4 %
BILIRUB SERPL-MCNC: <0.2 MG/DL (ref 0.2–0.7)
BUN BLDV-MCNC: 17 MG/DL (ref 6–20)
CALCIUM SERPL-MCNC: 9.4 MG/DL (ref 8.5–9.9)
CHLORIDE BLD-SCNC: 100 MEQ/L (ref 95–107)
CO2: 22 MEQ/L (ref 20–31)
CREAT SERPL-MCNC: 1.55 MG/DL (ref 0.5–0.9)
EOSINOPHILS ABSOLUTE: 0 K/UL (ref 0–0.7)
EOSINOPHILS RELATIVE PERCENT: 0.6 %
GFR AFRICAN AMERICAN: 42.7
GFR NON-AFRICAN AMERICAN: 35.3
GLOBULIN: 3.4 G/DL (ref 2.3–3.5)
GLUCOSE BLD-MCNC: 268 MG/DL (ref 70–99)
HCT VFR BLD CALC: 45.7 % (ref 37–47)
HEMOGLOBIN: 15.3 G/DL (ref 12–16)
LYMPHOCYTES ABSOLUTE: 1 K/UL (ref 1–4.8)
LYMPHOCYTES RELATIVE PERCENT: 8 %
MAGNESIUM: 2 MG/DL (ref 1.7–2.4)
MCH RBC QN AUTO: 31.4 PG (ref 27–31.3)
MCHC RBC AUTO-ENTMCNC: 33.4 % (ref 33–37)
MCV RBC AUTO: 94 FL (ref 82–100)
MICROCYTES: ABNORMAL
MONOCYTES ABSOLUTE: 0.8 K/UL (ref 0.2–0.8)
MONOCYTES RELATIVE PERCENT: 5.6 %
NEUTROPHILS ABSOLUTE: 11 K/UL (ref 1.4–6.5)
NEUTROPHILS RELATIVE PERCENT: 87 %
PDW BLD-RTO: 13.1 % (ref 11.5–14.5)
PLATELET # BLD: 243 K/UL (ref 130–400)
POTASSIUM SERPL-SCNC: 4.7 MEQ/L (ref 3.4–4.9)
RBC # BLD: 4.86 M/UL (ref 4.2–5.4)
SODIUM BLD-SCNC: 134 MEQ/L (ref 135–144)
TOTAL PROTEIN: 7.7 G/DL (ref 6.3–8)
TROPONIN: <0.01 NG/ML (ref 0–0.01)
WBC # BLD: 12.6 K/UL (ref 4.8–10.8)

## 2022-03-04 PROCEDURE — 99284 EMERGENCY DEPT VISIT MOD MDM: CPT

## 2022-03-04 PROCEDURE — 83735 ASSAY OF MAGNESIUM: CPT

## 2022-03-04 PROCEDURE — 84484 ASSAY OF TROPONIN QUANT: CPT

## 2022-03-04 PROCEDURE — 80053 COMPREHEN METABOLIC PANEL: CPT

## 2022-03-04 PROCEDURE — 71046 X-RAY EXAM CHEST 2 VIEWS: CPT

## 2022-03-04 PROCEDURE — 93005 ELECTROCARDIOGRAM TRACING: CPT | Performed by: STUDENT IN AN ORGANIZED HEALTH CARE EDUCATION/TRAINING PROGRAM

## 2022-03-04 PROCEDURE — 70450 CT HEAD/BRAIN W/O DYE: CPT

## 2022-03-04 PROCEDURE — 85025 COMPLETE CBC W/AUTO DIFF WBC: CPT

## 2022-03-04 PROCEDURE — 6370000000 HC RX 637 (ALT 250 FOR IP): Performed by: STUDENT IN AN ORGANIZED HEALTH CARE EDUCATION/TRAINING PROGRAM

## 2022-03-04 PROCEDURE — 36415 COLL VENOUS BLD VENIPUNCTURE: CPT

## 2022-03-04 RX ORDER — ONDANSETRON 4 MG/1
4 TABLET, ORALLY DISINTEGRATING ORAL ONCE
Status: COMPLETED | OUTPATIENT
Start: 2022-03-04 | End: 2022-03-04

## 2022-03-04 RX ORDER — HYDROCODONE BITARTRATE AND ACETAMINOPHEN 5; 325 MG/1; MG/1
1 TABLET ORAL ONCE
Status: COMPLETED | OUTPATIENT
Start: 2022-03-04 | End: 2022-03-04

## 2022-03-04 RX ADMIN — HYDROCODONE BITARTRATE AND ACETAMINOPHEN 1 TABLET: 5; 325 TABLET ORAL at 00:46

## 2022-03-04 RX ADMIN — ONDANSETRON 4 MG: 4 TABLET, ORALLY DISINTEGRATING ORAL at 00:46

## 2022-03-04 ASSESSMENT — ENCOUNTER SYMPTOMS
CHEST TIGHTNESS: 0
EYE PAIN: 0
NAUSEA: 0
SORE THROAT: 0
SHORTNESS OF BREATH: 0
DIARRHEA: 0
BACK PAIN: 0

## 2022-03-04 ASSESSMENT — PAIN SCALES - GENERAL
PAINLEVEL_OUTOF10: 6
PAINLEVEL_OUTOF10: 9
PAINLEVEL_OUTOF10: 9

## 2022-03-04 ASSESSMENT — PAIN DESCRIPTION - ORIENTATION: ORIENTATION: LEFT

## 2022-03-04 ASSESSMENT — PAIN DESCRIPTION - LOCATION: LOCATION: CHEST

## 2022-03-04 ASSESSMENT — PAIN DESCRIPTION - PAIN TYPE: TYPE: ACUTE PAIN

## 2022-03-04 ASSESSMENT — PAIN - FUNCTIONAL ASSESSMENT: PAIN_FUNCTIONAL_ASSESSMENT: 0-10

## 2022-03-04 NOTE — ED TRIAGE NOTES
Pt states she started having chest pain this evening with pain radiating down the left side of her arm with tingling in her hands, denies sick contacts, denies n/v/d, pt states she has had chest pain similar to this in the past and was told it was sarcoidosis but the arm pain with tingling is new

## 2022-03-04 NOTE — ED NOTES
Discharge instructions discussed with patient. No further questions. Patient assisted to ED exit via wheelchair.       Leonardo Queen RN  03/04/22 [id number]

## 2022-03-04 NOTE — ED PROVIDER NOTES
[facility] ED  eMERGENCYdEPARTMENT eNCOUnter      Pt Name: Henry Watkins  MRN: [id number]  [place] 1970  Date of evaluation: 3/4/2022  Provider:Colby Tate PA-C    CHIEF COMPLAINT           HPI  Henry Watkins is a 49 y.o. female per chart review has a h/o sarcoidosis, marijuana use, anxiety, headache, chest pain presents with chest pain. Patient reports gradual onset, severe, pressure in the sternal area associated with left-sided weakness that has been worsening for the past few hours. Patient states she is not able to lift her left arm up at all. Patient states she can only walk with the help of her significant other. She denies back pain, shortness of breath, fever, chills, abdominal pain. Patient denies slurring of speech, confusion, dizziness. ROS  Review of Systems   Constitutional: Negative for chills, fatigue and fever. HENT: Negative for ear pain, hearing loss and sore throat. Eyes: Negative for pain and visual disturbance. Respiratory: Negative for chest tightness and shortness of breath. Cardiovascular: Positive for chest pain. Gastrointestinal: Negative for diarrhea and nausea. Endocrine: Negative for cold intolerance. Genitourinary: Negative for hematuria. Musculoskeletal: Negative for back pain. Skin: Negative for rash and wound. Neurological: Positive for weakness. Negative for dizziness and headaches. Psychiatric/Behavioral: Negative for behavioral problems and confusion. Except as noted above the remainder of the review of systems was reviewed and negative.        PAST MEDICAL HISTORY     Past Medical History:   Diagnosis Date    Anxiety     Arthritis     Asthma     Bronchopneumonia     Cancer ([facility]     renal    Cerebral artery occlusion with cerebral infarction ([facility]     Chronic bilateral low back pain with sciatica     Chronic kidney disease     Chronic obstructive lung disease ([facility] 7/26/2019    Depression     Fibromyalgia     Gout     rt knee    Hypertension     Insulin dependent type 2 diabetes mellitus, uncontrolled ([facility] [address] 8/3/2018    Localized enlarged lymph nodes 10/26/2018    Mixed headache     Pure hyperglyceridemia 5/19/2017    Sarcoidosis     Sleep apnea     does not wear cpap    Thyroid goiter          SURGICAL HISTORY       Past Surgical History:   Procedure Laterality Date    BRONCHOSCOPY  10/26/2018    DR. STEARNS    KIDNEY REMOVAL Right 08/2016    KIDNEY REMOVAL Right 2016    LUNG BIOPSY Right 10/2018    THYROID LOBECTOMY Right 6/13/14    THYROIDECTOMY  02/21/2019    DR. MAGDALENO    THYROIDECTOMY  2018    URETER STENT PLACEMENT Left 08/2016         CURRENTMEDICATIONS       Discharge Medication List as of 3/4/2022  1:56 AM      CONTINUE these medications which have NOT CHANGED    Details   spironolactone (ALDACTONE) 50 MG tablet TAKE 1 TABLET BY MOUTH DAILY, Disp-30 tablet, R-3Normal      cetirizine (ZYRTEC) 10 MG tablet TAKE 1 TABLET BY MOUTH EVERY NIGHT AT BEDTIME AS NEEDED FOR ALLERGIES, Disp-30 tablet, R-5Normal      Insulin Degludec (TRESIBA FLEXTOUCH) 100 UNIT/ML SOPN INJECT 90 UNITS UNDER THE SKIN NIGHTLY pt needs 10 pens for a 30 day supply, Disp-15 pen, R-3Normal      montelukast (SINGULAIR) 10 MG tablet TAKE 1 TABLET BY MOUTH EVERY NIGHT AT SUPPER FOR BREATHING OR ALLERGIES, Disp-30 tablet, R-5Normal      buPROPion (WELLBUTRIN XL) 150 MG extended release tablet TAKE 1 TABLET BY MOUTH EVERY MORNING, Disp-30 tablet, R-3Normal      pantoprazole (PROTONIX) 20 MG tablet Take 1 tablet by mouth daily, Disp-30 tablet, R-0Print      sucralfate (CARAFATE) 1 GM tablet Take 1 tablet by mouth 4 times daily, Disp-40 tablet, R-0Print      ondansetron (ZOFRAN ODT) 4 MG disintegrating tablet Take 1 tablet by mouth every 8 hours as needed for Nausea, Disp-20 tablet, R-0Print      venlafaxine (EFFEXOR XR) 75 MG extended release capsule Take 1 capsule by mouth daily, Disp-30 capsule, R-5Normal pregabalin (LYRICA) 150 MG capsule Take 1 capsule by mouth 3 times daily for 90 days. , Disp-90 capsule, R-2Normal      levothyroxine (SYNTHROID) 125 MCG tablet TAKE 1 TABLET BY MOUTH DAILY, Disp-30 tablet, R-3Normal      hydroxychloroquine (PLAQUENIL) 200 MG tablet TAKE 1 TABLET BY MOUTH TWICE DAILY, Disp-60 tablet, R-2Normal      !! Blood Glucose Monitoring Suppl (Mike Orozco) w/Device KIT As  Directed, Disp-1 kit, R-00Normal      !! blood glucose test strips (ONETOUCH VERIO) strip Test 3x daily e11.65, Disp-100 each, R-3Normal      albuterol sulfate  (90 Base) MCG/ACT inhaler INHALE 2 PUFFS INTO THE LUNGS EVERY 6 HOURS AS NEEDED FOR WHEEZING, Disp-6.7 g, R-1Normal      cyclobenzaprine (FLEXERIL) 10 MG tablet Take 10 mg by mouth every 8 hours as needed for Muscle spasmsHistorical Med      meclizine (ANTIVERT) 12.5 MG tablet Take 12.5 mg by mouth 2 times daily as needed for DizzinessHistorical Med      insulin lispro, 1 Unit Dial, (HUMALOG KWIKPEN) 100 UNIT/ML SOPN 22 units at each meals hold if glucose less than 150, Disp-5 pen, R-3Normal      busPIRone (BUSPAR) 15 MG tablet TAKE 1 TABLET BY MOUTH THREE TIMES DAILY, Disp-90 tablet, R-5Normal      diclofenac sodium (VOLTAREN) 1 % GEL Apply 2 g topically 4 times daily, Topical, 4 TIMES DAILY Starting Tue 9/7/2021, Disp-150 g, R-0, Normal      !!  Insulin Pen Needle (B-D ULTRAFINE III SHORT PEN) 31G X 8 MM MISC 3 TIMES DAILY Starting Mon 8/16/2021, Disp-100 each, R-5, NormalAnd as needed      butalbital-acetaminophen-caffeine (FIORICET, ESGIC) -40 MG per tablet Take 1 tablet by mouth every 6 hours as needed for Headaches, Disp-30 tablet, R-1Normal      dicyclomine (BENTYL) 10 MG capsule Take 1 capsule by mouth every 6 hours as needed (cramps), Disp-120 capsule, R-0Normal      ondansetron (ZOFRAN) 4 MG tablet Take 1 tablet by mouth every 8 hours as needed for Nausea, Disp-20 tablet, R-0Print      fluticasone (FLONASE) 50 MCG/ACT nasal spray SHAKE LIQUID AND USE 1 SPRAY IN EACH NOSTRIL DAILY, Disp-16 g, R-5Normal      budesonide-formoterol (SYMBICORT) 160-4.5 MCG/ACT AERO Inhale 2 puffs into the lungs 2 times daily, Disp-1 Inhaler, R-3Normal      metoclopramide (REGLAN) 10 MG tablet Take 1 tablet by mouth 2 times daily as needed (Headache), Disp-60 tablet, R-0Print      acetaminophen (TYLENOL) 500 MG tablet Take 1 tablet by mouth 4 times daily as needed for Pain, Disp-360 tablet, R-1Print      !! OneTouch Delica Lancets 48T MISC qid, Disp-200 each, R-3Normal      !! Insulin Pen Needle (NOVOFINE) 32G X 6 MM MISC Disp-300 each, R-3, Normalqid      baclofen (LIORESAL) 10 MG tablet Take 1 tablet by mouth 3 times daily, Disp-60 tablet, R-0Disregard other orderNormal      tiZANidine (ZANAFLEX) 2 MG tablet Take 1 tablet by mouth 3 times daily as needed (back pain/ spasm), Disp-15 tablet, R-0Print      magnesium oxide (MAG-OX) 400 (241.3 Mg) MG TABS tablet TAKE 1/2 TABLET BY MOUTH DAILY, Disp-30 tablet,R-5Normal      SUMAtriptan (IMITREX) 25 MG tablet TAKE 1 TABLET BY MOUTH 1 TIME AS NEEDED FOR MIGRAINE, Disp-9 tablet, R-1Normal      !! blood glucose test strips (EXACTECH TEST) strip 3 TIMES DAILY Starting Mon 12/2/2019, Disp-300 strip, R-5, Normal(Accu-check test strips) As needed. DX:E11.65      !! ONE TOUCH ULTRASOFT LANCETS MISC Disp-300 each, R-3, NormalTEST 3 TIMES DAILY AS NEEDED      albuterol (PROVENTIL) (2.5 MG/3ML) 0.083% nebulizer solution Take 3 mLs by nebulization every 4 hours as needed for Wheezing, Disp-120 each, R-3Normal      atorvastatin (LIPITOR) 40 MG tablet Take 1 tablet by mouth nightly, Disp-30 tablet, R-3Normal      Insulin Syringe-Needle U-100 30G X 1/2\" 1 ML MISC DAILY Starting Fri 11/16/2018, Disp-100 each, R-3, Normal      !! blood glucose test strips (ONETOUCH VERIO) strip Disp-300 each, R-3, NormalTEST 3 TIMES DAILY AS NEEDED      !!  Blood Glucose Monitoring Suppl (Denzel Worthington) w/Device KIT TEST 3 TIMES DAILY AS NEEDED, Disp-1 kit, R-0Normal       !! - Potential duplicate medications found. Please discuss with provider.           ALLERGIES     Shellfish-derived products, Ibuprofen, Ketorolac, Morphine, Other, Penicillins, Propoxyphene n-acetaminophen, and Toradol [ketorolac tromethamine]    FAMILY HISTORY       Family History   Problem Relation Age of Onset    Cancer Father     Diabetes Father     Allergy (Severe) Father     Heart Attack Father     Prostate Cancer Father     High Blood Pressure Mother     Diabetes Mother     Arthritis Mother     High Cholesterol Mother     Vision Loss Mother     Alcohol Abuse Neg Hx     Anemia Neg Hx     Arrhythmia Neg Hx     Asthma Neg Hx     Atrial Fibrillation Neg Hx     Birth Defects Neg Hx     Breast Cancer Neg Hx     Coronary Art Dis Neg Hx     Colon Cancer Neg Hx     Depression Neg Hx     Early Death Neg Hx     Hearing Loss Neg Hx     Heart Disease Neg Hx     Learning Disabilities Neg Hx     Kidney Disease Neg Hx     Mental Illness Neg Hx     Mental Retardation Neg Hx     Miscarriages / Stillbirths Neg Hx     Obesity Neg Hx     Osteoporosis Neg Hx     Stroke Neg Hx     Substance Abuse Neg Hx           SOCIAL HISTORY       Social History     Socioeconomic History    Marital status: Legally      Spouse name: None    Number of children: None    Years of education: 15    Highest education level: High school graduate   Occupational History    None   Tobacco Use    Smoking status: Former Smoker     Packs/day: 0.50     Years: 15.00     Pack years: 7.50     Types: Cigarettes     Start date: 2014     Quit date: 3/1/2015     Years since quittin.0    Smokeless tobacco: Never Used   Vaping Use    Vaping Use: Never used   Substance and Sexual Activity    Alcohol use: Not Currently     Alcohol/week: 0.0 standard drinks     Comment: occasionally    Drug use: Yes     Frequency: 5.0 times per week     Types: Marijuana Cindy Bhardwaj    Sexual activity: Yes Partners: Male   Other Topics Concern    None   Social History Narrative    None     Social Determinants of Health     Financial Resource Strain:     Difficulty of Paying Living Expenses: Not on file   Food Insecurity:     Worried About Running Out of Food in the Last Year: Not on file    Maximiliano of Food in the Last Year: Not on file   Transportation Needs:     Lack of Transportation (Medical): Not on file    Lack of Transportation (Non-Medical): Not on file   Physical Activity:     Days of Exercise per Week: Not on file    Minutes of Exercise per Session: Not on file   Stress:     Feeling of Stress : Not on file   Social Connections:     Frequency of Communication with Friends and Family: Not on file    Frequency of Social Gatherings with Friends and Family: Not on file    Attends Zoroastrian Services: Not on file    Active Member of [address] [place] [address] [employer] or Organizations: Not on file    Attends Club or Organization Meetings: Not on file    Marital Status: Not on file   Intimate Partner Violence:     Fear of Current or Ex-Partner: Not on file    Emotionally Abused: Not on file    Physically Abused: Not on file    Sexually Abused: Not on file   Housing Stability:     Unable to Pay for Housing in the Last Year: Not on file    Number of [place] in the Last Year: Not on file    Unstable Housing in the Last Year: Not on file         PHYSICAL EXAM       ED Triage Vitals [03/04/22 0006]   BP Temp Temp Source Pulse Resp SpO2 Height Weight   (!) 155/93 97.9 °F (36.6 °C) Oral 93 18 97 % 5' 3\" (1.6 m) 265 lb (120.2 kg)       Physical Exam  Constitutional:       Appearance: Normal appearance. HENT:      Head: Normocephalic and atraumatic. Right Ear: External ear normal.      Left Ear: External ear normal.      Nose: Nose normal.      Mouth/Throat:      Mouth: Mucous membranes are moist.   Eyes:      Extraocular Movements: Extraocular movements intact.       Conjunctiva/sclera: Conjunctivae normal. Cardiovascular:      Rate and Rhythm: Normal rate and regular rhythm. Heart sounds: Normal heart sounds. Pulmonary:      Effort: Pulmonary effort is normal.      Breath sounds: Normal breath sounds. No stridor. No wheezing or rhonchi. Abdominal:      Palpations: Abdomen is soft. Tenderness: There is no abdominal tenderness. Musculoskeletal:         General: Normal range of motion. Cervical back: Normal range of motion and neck supple. No tenderness. Skin:     General: Skin is warm and dry. Neurological:      General: No focal deficit present. Mental Status: She is alert and oriented to person, place, and time. GCS: GCS eye subscore is 4. GCS verbal subscore is 5. GCS motor subscore is 6. Cranial Nerves: Cranial nerves are intact. Sensory: Sensation is intact. Motor: Motor function is intact. Coordination: Coordination is intact. Gait: Gait is intact. Deep Tendon Reflexes: Reflexes are normal and symmetric. Comments: Patient has 2+ reflexes in both arms bilaterally. Patient's left arm was held up and Miguel and patient provided initial resistance on the following of the arm before slowly letting it down on her own. Psychiatric:         Mood and Affect: Mood normal.         Behavior: Behavior normal.           MDM  This is a 60-year-old female presenting with chest pain. Patient is afebrile and hemodynamically stable. Patient given p.o. Norco for chest pain. EKG shows NSR with HR 89, normal axis, normal intervals, no ST changes. IV attempted to be placed but patient declined. Chest x-ray unremarkable. CT scan unremarkable. Labs unremarkable. Patient's chart review indicates frequent ER visits for chest pain and numbness. Likely anxiety related. Patient is agreeable to discharge with PCP follow-up and will return if symptoms change or worsen. FINAL IMPRESSION      1. Chest pain, unspecified type    2.  Numbness and tingling in left arm          DISPOSITION/PLAN   DISPOSITION          DISCHARGE MEDICATIONS:  [unfilled]         Mary Herrera PA-C(electronically signed)  Attending Emergency Physician           Mary Herrera PA-C  03/05/22 0002

## 2022-03-05 LAB
EKG ATRIAL RATE: 89 BPM
EKG P AXIS: 43 DEGREES
EKG P-R INTERVAL: 162 MS
EKG Q-T INTERVAL: 352 MS
EKG QRS DURATION: 90 MS
EKG QTC CALCULATION (BAZETT): 428 MS
EKG R AXIS: -3 DEGREES
EKG T AXIS: 29 DEGREES
EKG VENTRICULAR RATE: 89 BPM

## 2022-03-05 PROCEDURE — 93010 ELECTROCARDIOGRAM REPORT: CPT | Performed by: INTERNAL MEDICINE

## 2022-03-16 ENCOUNTER — PATIENT MESSAGE (OUTPATIENT)
Dept: FAMILY MEDICINE CLINIC | Age: 52
End: 2022-03-16

## 2022-03-16 DIAGNOSIS — G89.29 OTHER CHRONIC PAIN: ICD-10-CM

## 2022-03-16 DIAGNOSIS — M47.816 SPONDYLOSIS OF LUMBAR REGION WITHOUT MYELOPATHY OR RADICULOPATHY: Primary | ICD-10-CM

## 2022-03-16 NOTE — TELEPHONE ENCOUNTER
From: Ivana Farmer  To: Teofilo Houston  Sent: 3/16/2022 1:22 PM EDT  Subject: Back pain    Hi . .I'm coming to you, because I spoke with my pain management doctor on the pain I'm dealing with of my back and asked him what he could do for me about the pain he insisted I wait till April 1st which is the day I speak with the surgeon who is planning on doing the surgery for my back but he didn't recommend anything else and I don't know what else to do I'm in so much pain and I tried to explain that to him so I'm asking you what should or what could I do to alleviate some of this pain for him to be my pain management doctor you would think he would have directed me but he wanted me just to wait till April and I don't think I can I'm trying to avoid the hospital but Tylenol has totally failed me is there anything else you could think of or prescribe that would help me thanks also he said since I'm on Lyrica that should help but Lyrica barely helps with my fibromyalgia so it definitely doesn't touch my back pain

## 2022-03-17 ENCOUNTER — HOSPITAL ENCOUNTER (EMERGENCY)
Age: 52
Discharge: HOME | End: 2022-03-17
Attending: STUDENT IN AN ORGANIZED HEALTH CARE EDUCATION/TRAINING PROGRAM
Payer: MEDICAID

## 2022-03-17 ENCOUNTER — APPOINTMENT (OUTPATIENT)
Dept: CT IMAGING | Age: 52
End: 2022-03-17
Payer: MEDICAID

## 2022-03-17 VITALS
TEMPERATURE: 97.4 F | WEIGHT: 270 LBS | RESPIRATION RATE: 16 BRPM | SYSTOLIC BLOOD PRESSURE: 122 MMHG | BODY MASS INDEX: 47.84 KG/M2 | HEIGHT: 63 IN | OXYGEN SATURATION: 99 % | DIASTOLIC BLOOD PRESSURE: 69 MMHG | HEART RATE: 68 BPM

## 2022-03-17 DIAGNOSIS — K57.90 DIVERTICULOSIS: ICD-10-CM

## 2022-03-17 DIAGNOSIS — R10.9 LEFT FLANK PAIN: Primary | ICD-10-CM

## 2022-03-17 LAB
ALBUMIN SERPL-MCNC: 3.8 G/DL (ref 3.5–4.6)
ALP BLD-CCNC: 131 U/L (ref 40–130)
ALT SERPL-CCNC: 20 U/L (ref 0–33)
AMPHETAMINE SCREEN, URINE: NORMAL
ANION GAP SERPL CALCULATED.3IONS-SCNC: 12 MEQ/L (ref 9–15)
AST SERPL-CCNC: 22 U/L (ref 0–35)
BACTERIA: ABNORMAL /HPF
BARBITURATE SCREEN URINE: NORMAL
BASOPHILS ABSOLUTE: 0.1 K/UL (ref 0–0.2)
BASOPHILS RELATIVE PERCENT: 0.9 %
BENZODIAZEPINE SCREEN, URINE: NORMAL
BILIRUB SERPL-MCNC: <0.2 MG/DL (ref 0.2–0.7)
BILIRUBIN URINE: NEGATIVE
BLOOD, URINE: ABNORMAL
BUN BLDV-MCNC: 14 MG/DL (ref 6–20)
CALCIUM SERPL-MCNC: 8.8 MG/DL (ref 8.5–9.9)
CANNABINOID SCREEN URINE: NORMAL
CHLORIDE BLD-SCNC: 103 MEQ/L (ref 95–107)
CLARITY: CLEAR
CO2: 20 MEQ/L (ref 20–31)
COCAINE METABOLITE SCREEN URINE: NORMAL
COLOR: YELLOW
CREAT SERPL-MCNC: 1.74 MG/DL (ref 0.5–0.9)
EOSINOPHILS ABSOLUTE: 0.2 K/UL (ref 0–0.7)
EOSINOPHILS RELATIVE PERCENT: 3.2 %
EPITHELIAL CELLS, UA: ABNORMAL /HPF (ref 0–5)
GFR AFRICAN AMERICAN: 37.3
GFR NON-AFRICAN AMERICAN: 30.8
GLOBULIN: 3.6 G/DL (ref 2.3–3.5)
GLUCOSE BLD-MCNC: 178 MG/DL (ref 70–99)
GLUCOSE URINE: NEGATIVE MG/DL
HCG, URINE, POC: NEGATIVE
HCT VFR BLD CALC: 47.5 % (ref 37–47)
HEMOGLOBIN: 16.1 G/DL (ref 12–16)
HYALINE CASTS: ABNORMAL /HPF (ref 0–5)
KETONES, URINE: ABNORMAL MG/DL
LEUKOCYTE ESTERASE, URINE: ABNORMAL
LYMPHOCYTES ABSOLUTE: 1.6 K/UL (ref 1–4.8)
LYMPHOCYTES RELATIVE PERCENT: 25.7 %
Lab: NORMAL
Lab: NORMAL
MCH RBC QN AUTO: 31.7 PG (ref 27–31.3)
MCHC RBC AUTO-ENTMCNC: 33.8 % (ref 33–37)
MCV RBC AUTO: 93.9 FL (ref 82–100)
METHADONE SCREEN, URINE: NORMAL
MONOCYTES ABSOLUTE: 0.4 K/UL (ref 0.2–0.8)
MONOCYTES RELATIVE PERCENT: 6.2 %
NEGATIVE QC PASS/FAIL: NORMAL
NEUTROPHILS ABSOLUTE: 4.1 K/UL (ref 1.4–6.5)
NEUTROPHILS RELATIVE PERCENT: 64 %
NITRITE, URINE: NEGATIVE
OPIATE SCREEN URINE: NORMAL
OXYCODONE URINE: NORMAL
PDW BLD-RTO: 13.9 % (ref 11.5–14.5)
PH UA: 5.5 (ref 5–9)
PHENCYCLIDINE SCREEN URINE: NORMAL
PLATELET # BLD: 275 K/UL (ref 130–400)
POSITIVE QC PASS/FAIL: NORMAL
POTASSIUM SERPL-SCNC: 4.4 MEQ/L (ref 3.4–4.9)
PROPOXYPHENE SCREEN: NORMAL
PROTEIN UA: NEGATIVE MG/DL
RBC # BLD: 5.06 M/UL (ref 4.2–5.4)
RBC UA: ABNORMAL /HPF (ref 0–5)
SODIUM BLD-SCNC: 135 MEQ/L (ref 135–144)
SPECIFIC GRAVITY UA: 1.02 (ref 1–1.03)
TOTAL CK: 561 U/L (ref 0–170)
TOTAL PROTEIN: 7.4 G/DL (ref 6.3–8)
URINE REFLEX TO CULTURE: YES
UROBILINOGEN, URINE: 0.2 E.U./DL
WBC # BLD: 6.4 K/UL (ref 4.8–10.8)
WBC UA: ABNORMAL /HPF (ref 0–5)

## 2022-03-17 PROCEDURE — 6370000000 HC RX 637 (ALT 250 FOR IP): Performed by: PHYSICIAN ASSISTANT

## 2022-03-17 PROCEDURE — 80307 DRUG TEST PRSMV CHEM ANLYZR: CPT

## 2022-03-17 PROCEDURE — 74150 CT ABDOMEN W/O CONTRAST: CPT

## 2022-03-17 PROCEDURE — 99284 EMERGENCY DEPT VISIT MOD MDM: CPT

## 2022-03-17 PROCEDURE — 82550 ASSAY OF CK (CPK): CPT

## 2022-03-17 PROCEDURE — 36415 COLL VENOUS BLD VENIPUNCTURE: CPT

## 2022-03-17 PROCEDURE — 6370000000 HC RX 637 (ALT 250 FOR IP): Performed by: STUDENT IN AN ORGANIZED HEALTH CARE EDUCATION/TRAINING PROGRAM

## 2022-03-17 PROCEDURE — 87086 URINE CULTURE/COLONY COUNT: CPT

## 2022-03-17 PROCEDURE — 85025 COMPLETE CBC W/AUTO DIFF WBC: CPT

## 2022-03-17 PROCEDURE — 80053 COMPREHEN METABOLIC PANEL: CPT

## 2022-03-17 PROCEDURE — 81001 URINALYSIS AUTO W/SCOPE: CPT

## 2022-03-17 RX ORDER — HYDROCODONE BITARTRATE AND ACETAMINOPHEN 5; 325 MG/1; MG/1
1 TABLET ORAL ONCE
Status: COMPLETED | OUTPATIENT
Start: 2022-03-17 | End: 2022-03-17

## 2022-03-17 RX ORDER — HYDROCODONE BITARTRATE AND ACETAMINOPHEN 5; 325 MG/1; MG/1
1 TABLET ORAL EVERY 6 HOURS PRN
Qty: 10 TABLET | Refills: 0 | Status: SHIPPED | OUTPATIENT
Start: 2022-03-17 | End: 2022-03-20

## 2022-03-17 RX ORDER — TRAMADOL HYDROCHLORIDE 50 MG/1
100 TABLET ORAL ONCE
Status: COMPLETED | OUTPATIENT
Start: 2022-03-17 | End: 2022-03-17

## 2022-03-17 RX ORDER — TAMSULOSIN HYDROCHLORIDE 0.4 MG/1
0.4 CAPSULE ORAL ONCE
Status: COMPLETED | OUTPATIENT
Start: 2022-03-17 | End: 2022-03-17

## 2022-03-17 RX ADMIN — TAMSULOSIN HYDROCHLORIDE 0.4 MG: 0.4 CAPSULE ORAL at 21:34

## 2022-03-17 RX ADMIN — HYDROCODONE BITARTRATE AND ACETAMINOPHEN 1 TABLET: 5; 325 TABLET ORAL at 23:24

## 2022-03-17 RX ADMIN — TRAMADOL HYDROCHLORIDE 100 MG: 50 TABLET, COATED ORAL at 21:35

## 2022-03-17 ASSESSMENT — ENCOUNTER SYMPTOMS
BACK PAIN: 0
DIARRHEA: 0
SINUS PRESSURE: 0
COUGH: 0
VOMITING: 0
ABDOMINAL PAIN: 0
CHEST TIGHTNESS: 0
TROUBLE SWALLOWING: 0
SHORTNESS OF BREATH: 0

## 2022-03-17 ASSESSMENT — PAIN - FUNCTIONAL ASSESSMENT: PAIN_FUNCTIONAL_ASSESSMENT: 0-10

## 2022-03-17 ASSESSMENT — PAIN SCALES - GENERAL
PAINLEVEL_OUTOF10: 9

## 2022-03-17 ASSESSMENT — PAIN DESCRIPTION - DESCRIPTORS: DESCRIPTORS: SHARP;ACHING

## 2022-03-17 ASSESSMENT — PAIN DESCRIPTION - LOCATION: LOCATION: FLANK

## 2022-03-17 ASSESSMENT — PAIN DESCRIPTION - ORIENTATION: ORIENTATION: LEFT

## 2022-03-17 ASSESSMENT — PAIN DESCRIPTION - PAIN TYPE: TYPE: ACUTE PAIN

## 2022-03-17 NOTE — ED TRIAGE NOTES
Patient presents for L flank pain and hematuria. Patient states she had kidney CA 6yrs ago and had R kidney removed. Patient states she has renal stents in the L kidney. Pain x2 days. Hematuria started today. Denies pain with urination. Patient A&Ox4. Skin p/w/d. Respirations even and unlabored. No distress noted or reported at this time.

## 2022-03-18 RX ORDER — PREGABALIN 200 MG/1
200 CAPSULE ORAL 3 TIMES DAILY
Qty: 90 CAPSULE | Refills: 0 | Status: SHIPPED | OUTPATIENT
Start: 2022-03-18 | End: 2022-06-21 | Stop reason: SDUPTHER

## 2022-03-18 NOTE — ED PROVIDER NOTES
[address] [facility] ED  eMERGENCY dEPARTMENT eNCOUnter      Pt Name: Cassie Rivera  MRN: [id number]  [place] 1970  Date of evaluation: 3/17/2022  Provider: Nae Gant, [address] [place] [address]       Chief Complaint   Patient presents with    Flank Pain     L side          HISTORY OF PRESENT ILLNESS   (Location/Symptom, Timing/Onset,Context/Setting, Quality, Duration, Modifying Factors, Severity)  Note limiting factors. Cassie Rivera is a 49 y.o. female who presents to the emergency department complaint sharp stabbing left-sided flank pain x2 days. History of kidney stones. Still some blood in urine. Patient denies any fever, chills or cough. Patient has had nausea vomiting or diarrhea. Patient is concerned that she could have a kidney stone. Stabbing severe pain sharp in quality. The history is provided by the patient and a significant other. NursingNotes were reviewed. REVIEW OF SYSTEMS    (2-9 systems for level 4, 10 or more for level 5)     Review of Systems   Constitutional: Negative for activity change, appetite change, chills, fever and unexpected weight change. HENT: Negative for drooling, ear pain, nosebleeds, sinus pressure and trouble swallowing. Respiratory: Negative for cough, chest tightness and shortness of breath. Cardiovascular: Negative for chest pain and leg swelling. Gastrointestinal: Negative for abdominal pain, diarrhea and vomiting. Endocrine: Negative for polydipsia and polyphagia. Genitourinary: Positive for flank pain and hematuria. Negative for dysuria and frequency. Musculoskeletal: Negative for back pain and myalgias. Skin: Negative for pallor and rash. Neurological: Negative for syncope, weakness and headaches. Hematological: Does not bruise/bleed easily. All other systems reviewed and are negative. Except as noted above the remainder of the review of systems was reviewed and negative.        PAST MEDICAL HISTORY     Past Medical History:   Diagnosis Date    Anxiety     Arthritis     Asthma     Bronchopneumonia     Cancer ([facility]     renal    Cerebral artery occlusion with cerebral infarction (HCC)     Chronic bilateral low back pain with sciatica     Chronic kidney disease     Chronic obstructive lung disease ([facility] 7/26/2019    Depression     Fibromyalgia     Gout     rt knee    Hypertension     Insulin dependent type 2 diabetes mellitus, uncontrolled ([facility] 8/3/2018    Localized enlarged lymph nodes 10/26/2018    Mixed headache     Pure hyperglyceridemia 5/19/2017    Sarcoidosis     Sleep apnea     does not wear cpap    Thyroid goiter          SURGICALHISTORY       Past Surgical History:   Procedure Laterality Date    BRONCHOSCOPY  10/26/2018    DR. STEARNS    KIDNEY REMOVAL Right 08/2016    KIDNEY REMOVAL Right 2016    LUNG BIOPSY Right 10/2018    THYROID LOBECTOMY Right 6/13/14    THYROIDECTOMY  02/21/2019    DR. MAGDALENO    THYROIDECTOMY  2018    URETER STENT PLACEMENT Left 08/2016         CURRENT MEDICATIONS       Discharge Medication List as of 3/17/2022 11:26 PM      CONTINUE these medications which have NOT CHANGED    Details   spironolactone (ALDACTONE) 50 MG tablet TAKE 1 TABLET BY MOUTH DAILY, Disp-30 tablet, R-3Normal      cetirizine (ZYRTEC) 10 MG tablet TAKE 1 TABLET BY MOUTH EVERY NIGHT AT BEDTIME AS NEEDED FOR ALLERGIES, Disp-30 tablet, R-5Normal      Insulin Degludec (TRESIBA FLEXTOUCH) 100 UNIT/ML SOPN INJECT 90 UNITS UNDER THE SKIN NIGHTLY pt needs 10 pens for a 30 day supply, Disp-15 pen, R-3Normal      montelukast (SINGULAIR) 10 MG tablet TAKE 1 TABLET BY MOUTH EVERY NIGHT AT SUPPER FOR BREATHING OR ALLERGIES, Disp-30 tablet, R-5Normal      buPROPion (WELLBUTRIN XL) 150 MG extended release tablet TAKE 1 TABLET BY MOUTH EVERY MORNING, Disp-30 tablet, R-3Normal      pantoprazole (PROTONIX) 20 MG tablet Take 1 tablet by mouth daily, Disp-30 tablet, R-0Print      sucralfate (CARAFATE) 1 GM tablet Take 1 tablet by mouth 4 times daily, Disp-40 tablet, R-0Print      ondansetron (ZOFRAN ODT) 4 MG disintegrating tablet Take 1 tablet by mouth every 8 hours as needed for Nausea, Disp-20 tablet, R-0Print      venlafaxine (EFFEXOR XR) 75 MG extended release capsule Take 1 capsule by mouth daily, Disp-30 capsule, R-5Normal      pregabalin (LYRICA) 150 MG capsule Take 1 capsule by mouth 3 times daily for 90 days. , Disp-90 capsule, R-2Normal      levothyroxine (SYNTHROID) 125 MCG tablet TAKE 1 TABLET BY MOUTH DAILY, Disp-30 tablet, R-3Normal      hydroxychloroquine (PLAQUENIL) 200 MG tablet TAKE 1 TABLET BY MOUTH TWICE DAILY, Disp-60 tablet, R-2Normal      !! Blood Glucose Monitoring Suppl (Keaton Felipe) w/Device KIT As  Directed, Disp-1 kit, R-00Normal      !! blood glucose test strips (ONETOUCH VERIO) strip Test 3x daily e11.65, Disp-100 each, R-3Normal      albuterol sulfate  (90 Base) MCG/ACT inhaler INHALE 2 PUFFS INTO THE LUNGS EVERY 6 HOURS AS NEEDED FOR WHEEZING, Disp-6.7 g, R-1Normal      cyclobenzaprine (FLEXERIL) 10 MG tablet Take 10 mg by mouth every 8 hours as needed for Muscle spasmsHistorical Med      meclizine (ANTIVERT) 12.5 MG tablet Take 12.5 mg by mouth 2 times daily as needed for DizzinessHistorical Med      insulin lispro, 1 Unit Dial, (HUMALOG KWIKPEN) 100 UNIT/ML SOPN 22 units at each meals hold if glucose less than 150, Disp-5 pen, R-3Normal      busPIRone (BUSPAR) 15 MG tablet TAKE 1 TABLET BY MOUTH THREE TIMES DAILY, Disp-90 tablet, R-5Normal      diclofenac sodium (VOLTAREN) 1 % GEL Apply 2 g topically 4 times daily, Topical, 4 TIMES DAILY Starting Tue 9/7/2021, Disp-150 g, R-0, Normal      !!  Insulin Pen Needle (B-D ULTRAFINE III SHORT PEN) 31G X 8 MM MISC 3 TIMES DAILY Starting Mon 8/16/2021, Disp-100 each, R-5, NormalAnd as needed      butalbital-acetaminophen-caffeine (FIORICET, ESGIC) -40 MG per tablet Take 1 tablet by mouth every 6 hours as needed for Headaches, Disp-30 tablet, R-1Normal      dicyclomine (BENTYL) 10 MG capsule Take 1 capsule by mouth every 6 hours as needed (cramps), Disp-120 capsule, R-0Normal      ondansetron (ZOFRAN) 4 MG tablet Take 1 tablet by mouth every 8 hours as needed for Nausea, Disp-20 tablet, R-0Print      fluticasone (FLONASE) 50 MCG/ACT nasal spray SHAKE LIQUID AND USE 1 SPRAY IN EACH NOSTRIL DAILY, Disp-16 g, R-5Normal      budesonide-formoterol (SYMBICORT) 160-4.5 MCG/ACT AERO Inhale 2 puffs into the lungs 2 times daily, Disp-1 Inhaler, R-3Normal      metoclopramide (REGLAN) 10 MG tablet Take 1 tablet by mouth 2 times daily as needed (Headache), Disp-60 tablet, R-0Print      acetaminophen (TYLENOL) 500 MG tablet Take 1 tablet by mouth 4 times daily as needed for Pain, Disp-360 tablet, R-1Print      !! OneTouch Delica Lancets 87T MISC qid, Disp-200 each, R-3Normal      !! Insulin Pen Needle (NOVOFINE) 32G X 6 MM MISC Disp-300 each, R-3, Normalqid      baclofen (LIORESAL) 10 MG tablet Take 1 tablet by mouth 3 times daily, Disp-60 tablet, R-0Disregard other orderNormal      tiZANidine (ZANAFLEX) 2 MG tablet Take 1 tablet by mouth 3 times daily as needed (back pain/ spasm), Disp-15 tablet, R-0Print      magnesium oxide (MAG-OX) 400 (241.3 Mg) MG TABS tablet TAKE 1/2 TABLET BY MOUTH DAILY, Disp-30 tablet,R-5Normal      SUMAtriptan (IMITREX) 25 MG tablet TAKE 1 TABLET BY MOUTH 1 TIME AS NEEDED FOR MIGRAINE, Disp-9 tablet, R-1Normal      !! blood glucose test strips (EXACTECH TEST) strip 3 TIMES DAILY Starting Mon 12/2/2019, Disp-300 strip, R-5, Normal(Accu-check test strips) As needed.  DX:E11.65      !! ONE TOUCH ULTRASOFT LANCETS MISC Disp-300 each, R-3, NormalTEST 3 TIMES DAILY AS NEEDED      albuterol (PROVENTIL) (2.5 MG/3ML) 0.083% nebulizer solution Take 3 mLs by nebulization every 4 hours as needed for Wheezing, Disp-120 each, R-3Normal      atorvastatin (LIPITOR) 40 MG tablet Take 1 tablet by mouth nightly, Disp-30 tablet, R-3Normal Insulin Syringe-Needle U-100 30G X 1/2\" 1 ML MISC DAILY Starting Fri 11/16/2018, Disp-100 each, R-3, Normal      !! blood glucose test strips (ONETOUCH VERIO) strip Disp-300 each, R-3, NormalTEST 3 TIMES DAILY AS NEEDED      !! Blood Glucose Monitoring Suppl Cate Dean) w/Device KIT TEST 3 TIMES DAILY AS NEEDED, Disp-1 kit, R-0Normal       !! - Potential duplicate medications found. Please discuss with provider.           ALLERGIES     Shellfish-derived products, Hydromorphone, Ibuprofen, Ketorolac, Morphine, Other, Penicillin g, Penicillins, Propoxyphene n-acetaminophen, Shellfish allergy, and Toradol [ketorolac tromethamine]    FAMILY HISTORY       Family History   Problem Relation Age of Onset    Cancer Father     Diabetes Father     Allergy (Severe) Father     Heart Attack Father     Prostate Cancer Father     High Blood Pressure Mother     Diabetes Mother     Arthritis Mother     High Cholesterol Mother     Vision Loss Mother     Alcohol Abuse Neg Hx     Anemia Neg Hx     Arrhythmia Neg Hx     Asthma Neg Hx     Atrial Fibrillation Neg Hx     Birth Defects Neg Hx     Breast Cancer Neg Hx     Coronary Art Dis Neg Hx     Colon Cancer Neg Hx     Depression Neg Hx     Early Death Neg Hx     Hearing Loss Neg Hx     Heart Disease Neg Hx     Learning Disabilities Neg Hx     Kidney Disease Neg Hx     Mental Illness Neg Hx     Mental Retardation Neg Hx     Miscarriages / Stillbirths Neg Hx     Obesity Neg Hx     Osteoporosis Neg Hx     Stroke Neg Hx     Substance Abuse Neg Hx           SOCIAL HISTORY       Social History     Socioeconomic History    Marital status: Legally      Spouse name: None    Number of children: None    Years of education: 15    Highest education level: High school graduate   Occupational History    None   Tobacco Use    Smoking status: Former Smoker     Packs/day: 0.50     Years: 15.00     Pack years: 7.50     Types: Cigarettes     Start date: 2014     Quit date: 3/1/2015     Years since quittin.0    Smokeless tobacco: Never Used   Vaping Use    Vaping Use: Never used   Substance and Sexual Activity    Alcohol use: Not Currently     Alcohol/week: 0.0 standard drinks     Comment: occasionally    Drug use: Not Currently     Frequency: 5.0 times per week     Types: Marijuana [facility])    Sexual activity: Yes     Partners: Male   Other Topics Concern    None   Social History Narrative    None     Social Determinants of Health     Financial Resource Strain:     Difficulty of Paying Living Expenses: Not on file   Food Insecurity:     Worried About Running Out of Food in the Last Year: Not on file    Maximiliano of Food in the Last Year: Not on file   Transportation Needs:     Lack of Transportation (Medical): Not on file    Lack of Transportation (Non-Medical):  Not on file   Physical Activity:     Days of Exercise per Week: Not on file    Minutes of Exercise per Session: Not on file   Stress:     Feeling of Stress : Not on file   Social Connections:     Frequency of Communication with Friends and Family: Not on file    Frequency of Social Gatherings with Friends and Family: Not on file    Attends Scientologist Services: Not on file    Active Member of [address] [place] [address] Compendium or Organizations: Not on file    Attends Club or Organization Meetings: Not on file    Marital Status: Not on file   Intimate Partner Violence:     Fear of Current or Ex-Partner: Not on file    Emotionally Abused: Not on file    Physically Abused: Not on file    Sexually Abused: Not on file   Housing Stability:     Unable to Pay for Housing in the Last Year: Not on file    Number of [place] in the Last Year: Not on file    Unstable Housing in the Last Year: Not on file       SCREENINGS    [place] Coma Scale  Eye Opening: Spontaneous  Best Verbal Response: Oriented  Best Motor Response: Obeys commands  [place] Coma Scale Score: 15 @FLOW([id number])@      PHYSICAL EXAM    (up to 7 for level 4, 8 or more for level 5)     ED Triage Vitals [03/17/22 1925]   BP Temp Temp Source Pulse Resp SpO2 Height Weight   133/76 97.4 °F (36.3 °C) Temporal 81 18 98 % 5' 3\" (1.6 m) 270 lb (122.5 kg)       Physical Exam  Vitals and nursing note reviewed. Constitutional:       General: She is awake. Appearance: Normal appearance. She is well-developed and normal weight. She is not ill-appearing, toxic-appearing or diaphoretic. Comments: No photophobia. No phonophobia. HENT:      Head: Normocephalic and atraumatic. No Reeves's sign. Right Ear: External ear normal.      Left Ear: External ear normal.      Nose: Nose normal. No congestion or rhinorrhea. Mouth/Throat:      Mouth: Mucous membranes are moist.      Pharynx: Oropharynx is clear. No oropharyngeal exudate or posterior oropharyngeal erythema. Eyes:      General: No scleral icterus. Right eye: No foreign body or discharge. Left eye: No discharge. Extraocular Movements: Extraocular movements intact. Conjunctiva/sclera: Conjunctivae normal.      Left eye: No exudate. Pupils: Pupils are equal, round, and reactive to light. Neck:      Vascular: No JVD. Trachea: No tracheal deviation. Comments: No meningismus. Cardiovascular:      Rate and Rhythm: Normal rate and regular rhythm. Heart sounds: Normal heart sounds. Heart sounds not distant. No murmur heard. No friction rub. No gallop. Pulmonary:      Effort: Pulmonary effort is normal. No respiratory distress. Breath sounds: Normal breath sounds. No stridor. No wheezing, rhonchi or rales. Chest:      Chest wall: No tenderness. Abdominal:      General: Abdomen is flat. Bowel sounds are normal. There is no distension or abdominal bruit. There are no signs of injury. Palpations: Abdomen is soft. There is no shifting dullness, fluid wave, hepatomegaly, splenomegaly, mass or pulsatile mass. Tenderness:  There is no abdominal tenderness. There is left CVA tenderness. There is no right CVA tenderness, guarding or rebound. Negative signs include Yo's sign and McBurney's sign. Hernia: No hernia is present. Musculoskeletal:         General: No swelling, tenderness, deformity or signs of injury. Normal range of motion. Cervical back: Normal range of motion and neck supple. No rigidity. Lymphadenopathy:      Head:      Right side of head: No submental adenopathy. Left side of head: No submental adenopathy. Skin:     General: Skin is warm and dry. Capillary Refill: Capillary refill takes less than 2 seconds. Coloration: Skin is not jaundiced or pale. Findings: No bruising, erythema, lesion or rash. Neurological:      General: No focal deficit present. Mental Status: She is alert and oriented to person, place, and time. Mental status is at baseline. Cranial Nerves: No cranial nerve deficit. Sensory: No sensory deficit. Motor: No weakness. Coordination: Coordination normal.      Deep Tendon Reflexes: Reflexes are normal and symmetric. Psychiatric:         Mood and Affect: Mood normal.         Behavior: Behavior normal. Behavior is cooperative. Thought Content: Thought content normal.         Judgment: Judgment normal.         DIAGNOSTIC RESULTS     EKG: All EKG's are interpreted by the Emergency Department Physician who either signs or Co-signsthis chart in the absence of a cardiologist.        RADIOLOGY:   Marah Redding such as CT, Ultrasound and MRI are read by the radiologist. Plain radiographic images are visualized and preliminarily interpreted by the emergency physician with the below findings:        Interpretation per the Radiologist below, if available at the time ofthis note:    [address]   Final Result      Right nephrectomy. No left renal calculi/hydronephrosis. No left hydroureter/ureteral calculi. Sigmoid diverticulosis. All CT scans at this facility use dose modulation, iterative reconstruction, and/or weight based dosing when appropriate to reduce radiation dose to as low as reasonably achievable. ED BEDSIDE ULTRASOUND:   Performed by ED Physician - none    LABS:  Labs Reviewed   CBC WITH AUTO DIFFERENTIAL - Abnormal; Notable for the following components:       Result Value    Hemoglobin 16.1 (*)     Hematocrit 47.5 (*)     MCH 31.7 (*)     All other components within normal limits   COMPREHENSIVE METABOLIC PANEL - Abnormal; Notable for the following components:    Glucose 178 (*)     CREATININE 1.74 (*)     GFR Non- 30.8 (*)     GFR  37.3 (*)     Alkaline Phosphatase 131 (*)     Globulin 3.6 (*)     All other components within normal limits   CK - Abnormal; Notable for the following components: Total  (*)     All other components within normal limits   URINALYSIS WITH REFLEX TO CULTURE - Abnormal; Notable for the following components:    Ketones, Urine TRACE (*)     Blood, Urine LARGE (*)     Leukocyte Esterase, Urine TRACE (*)     All other components within normal limits   MICROSCOPIC URINALYSIS - Abnormal; Notable for the following components:    Bacteria, UA RARE (*)     WBC, UA 10-20 (*)     RBC, UA 10-20 (*)     All other components within normal limits   CULTURE, URINE   URINE DRUG SCREEN   C-REACTIVE PROTEIN   POC PREGNANCY UR-QUAL       All other labs were within normal range or not returned as of this dictation. EMERGENCY DEPARTMENT COURSE and DIFFERENTIAL DIAGNOSIS/MDM:   Vitals:    Vitals:    03/17/22 1925 03/17/22 2212 03/17/22 2324   BP: 133/76 (!) 108/51 122/69   Pulse: 81 70 68   Resp: 18 16 16   Temp: 97.4 °F (36.3 °C)     TempSrc: Temporal     SpO2: 98% 98% 99%   Weight: 270 lb (122.5 kg)     Height: 5' 3\" (1.6 m)         Leonides Tellez PA-C; will follow up on the laboratory, urinalysis, and imaging studies.   He will determine the patient's disposition pending these results. MDM  Stabbing flank pain with hematuria. Differential diagnosis includes UTI, renal hematoma, renal cell carcinoma, ureteral lithiasis and or other urinary obstruction. Patient ordered Ultram, oral Flomax, IV lidocaine for visceral (kidney) pain, renal CT, and urinalysis. Patient has baseline renal dysfunction. CAT scan showed no obstructive uropathy. Amilcar Del Cid PA-C discharge the patient. CONSULTS:  IP CONSULT TO PHARMACY    PROCEDURES:  Unless otherwise noted below, none     Procedures    FINAL IMPRESSION      1. Left flank pain    2. Diverticulosis          DISPOSITION/PLAN   DISPOSITION Decision To Discharge 03/17/2022 11:24:21 PM      PATIENT REFERRED TO:  SUDEEP Bernard CNP  [address] [facility]  [phone]    Call in 1 day      Rojelio Buckley  [address] [place] [address]  [phone]    Call in 1 day      [facility]) ED  [address] [place] [address]  [phone]  Go to   If symptoms worsen      DISCHARGE MEDICATIONS:  Discharge Medication List as of 3/17/2022 11:26 PM      START taking these medications    Details   HYDROcodone-acetaminophen (NORCO) 5-325 MG per tablet Take 1 tablet by mouth every 6 hours as needed for Pain for up to 3 days. Intended supply: 3 days.  Take lowest dose possible to manage pain, Disp-10 tablet, R-0Print                (Please note that portions of this note were completed with a voice recognition program.  Efforts were made to edit the dictations but occasionally words are mis-transcribed.)    Fidencio Worley DO (electronically signed)  Attending Emergency Physician          Fidencio Worley DO  03/18/22 [id number]

## 2022-03-19 LAB — URINE CULTURE, ROUTINE: NORMAL

## 2022-03-29 ENCOUNTER — PATIENT MESSAGE (OUTPATIENT)
Dept: ENDOCRINOLOGY | Age: 52
End: 2022-03-29

## 2022-03-29 DIAGNOSIS — E11.65 TYPE 2 DIABETES MELLITUS WITH HYPERGLYCEMIA, WITH LONG-TERM CURRENT USE OF INSULIN (HCC): ICD-10-CM

## 2022-03-29 DIAGNOSIS — Z79.4 TYPE 2 DIABETES MELLITUS WITH HYPERGLYCEMIA, WITH LONG-TERM CURRENT USE OF INSULIN (HCC): ICD-10-CM

## 2022-03-30 RX ORDER — INSULIN LISPRO 100 [IU]/ML
INJECTION, SOLUTION INTRAVENOUS; SUBCUTANEOUS
Qty: 5 PEN | Refills: 3 | Status: SHIPPED | OUTPATIENT
Start: 2022-03-30

## 2022-03-30 RX ORDER — PEN NEEDLE, DIABETIC 31 GX5/16"
NEEDLE, DISPOSABLE MISCELLANEOUS
Qty: 100 EACH | Refills: 5 | Status: SHIPPED | OUTPATIENT
Start: 2022-03-30 | End: 2022-08-14 | Stop reason: SDUPTHER

## 2022-03-30 NOTE — TELEPHONE ENCOUNTER
Rx request   Requested Prescriptions     Pending Prescriptions Disp Refills    B-D ULTRAFINE III SHORT PEN 31G X 8 MM MISC [Pharmacy Med Name: B-D PEN NDL SHRT [id number](5/16) ARIAN] 100 each 5     Sig: USE THREE TIMES DAILY AND AS NEEDED     LOV 1/5/2022  Next Visit Date:  Future Appointments   Date Time Provider Valerie Cosby   4/5/2022  3:15 PM SUDEEP Meek - CNP [facility]   4/12/2022  3:30 PM Deniz Edwards MD [facility]

## 2022-03-30 NOTE — TELEPHONE ENCOUNTER
From: Shaniqua Lang  To: Dr. Vasu Painter  Sent: 3/29/2022 6:50 PM EDT  Subject: In need of refill on My Tresiba insulin. .. And Lispro insulin    Need my Tresiba insulin and Lispro insulin refilled.  Thanks

## 2022-04-01 DIAGNOSIS — F41.9 ANXIETY: ICD-10-CM

## 2022-04-01 RX ORDER — BUSPIRONE HYDROCHLORIDE 15 MG/1
TABLET ORAL
Qty: 90 TABLET | Refills: 0 | Status: SHIPPED | OUTPATIENT
Start: 2022-04-01 | End: 2022-05-03

## 2022-04-01 NOTE — TELEPHONE ENCOUNTER
Future Appointments    Encounter Information    Provider Department Appt Notes   4/5/2022 Gaudencio Juan, [address] [facility] 3 month f/u   4/12/2022 Bertin Shah MD [facility] Gastroenterology New Patient diverticulosis       Past Visits    Date Provider Specialty Visit Type Primary Dx   01/05/2022 SUDEEP Rees - CNP Family Medicine Office Visit Moderate persistent asthma with acute exacerbation

## 2022-04-15 DIAGNOSIS — E11.65 TYPE 2 DIABETES MELLITUS WITH HYPERGLYCEMIA, WITH LONG-TERM CURRENT USE OF INSULIN (HCC): ICD-10-CM

## 2022-04-15 DIAGNOSIS — Z79.4 TYPE 2 DIABETES MELLITUS WITH HYPERGLYCEMIA, WITH LONG-TERM CURRENT USE OF INSULIN (HCC): ICD-10-CM

## 2022-04-15 RX ORDER — INSULIN DEGLUDEC INJECTION 100 U/ML
INJECTION, SOLUTION SUBCUTANEOUS
Qty: 15 PEN | Refills: 1 | Status: SHIPPED | OUTPATIENT
Start: 2022-04-15 | End: 2022-07-14 | Stop reason: SDUPTHER

## 2022-04-21 ENCOUNTER — OFFICE VISIT (OUTPATIENT)
Dept: FAMILY MEDICINE CLINIC | Age: 52
End: 2022-04-21
Payer: MEDICAID

## 2022-04-21 VITALS
SYSTOLIC BLOOD PRESSURE: 118 MMHG | HEART RATE: 72 BPM | TEMPERATURE: 97.5 F | DIASTOLIC BLOOD PRESSURE: 72 MMHG | OXYGEN SATURATION: 97 %

## 2022-04-21 DIAGNOSIS — M26.622 ARTHRALGIA OF LEFT TEMPOROMANDIBULAR JOINT: Primary | ICD-10-CM

## 2022-04-21 DIAGNOSIS — M79.18 MUSCLE PAIN, MYOFASCIAL: ICD-10-CM

## 2022-04-21 PROCEDURE — 1036F TOBACCO NON-USER: CPT | Performed by: NURSE PRACTITIONER

## 2022-04-21 PROCEDURE — G8417 CALC BMI ABV UP PARAM F/U: HCPCS | Performed by: NURSE PRACTITIONER

## 2022-04-21 PROCEDURE — 3017F COLORECTAL CA SCREEN DOC REV: CPT | Performed by: NURSE PRACTITIONER

## 2022-04-21 PROCEDURE — 99213 OFFICE O/P EST LOW 20 MIN: CPT | Performed by: NURSE PRACTITIONER

## 2022-04-21 PROCEDURE — G8427 DOCREV CUR MEDS BY ELIG CLIN: HCPCS | Performed by: NURSE PRACTITIONER

## 2022-04-21 RX ORDER — CYCLOBENZAPRINE HCL 5 MG
5 TABLET ORAL NIGHTLY PRN
Qty: 7 TABLET | Refills: 0 | Status: SHIPPED | OUTPATIENT
Start: 2022-04-21 | End: 2022-04-28

## 2022-04-21 SDOH — ECONOMIC STABILITY: TRANSPORTATION INSECURITY
IN THE PAST 12 MONTHS, HAS THE LACK OF TRANSPORTATION KEPT YOU FROM MEDICAL APPOINTMENTS OR FROM GETTING MEDICATIONS?: NO

## 2022-04-21 SDOH — ECONOMIC STABILITY: FOOD INSECURITY: WITHIN THE PAST 12 MONTHS, YOU WORRIED THAT YOUR FOOD WOULD RUN OUT BEFORE YOU GOT MONEY TO BUY MORE.: NEVER TRUE

## 2022-04-21 SDOH — ECONOMIC STABILITY: FOOD INSECURITY: WITHIN THE PAST 12 MONTHS, THE FOOD YOU BOUGHT JUST DIDN'T LAST AND YOU DIDN'T HAVE MONEY TO GET MORE.: NEVER TRUE

## 2022-04-21 ASSESSMENT — ENCOUNTER SYMPTOMS
SORE THROAT: 0
WHEEZING: 0
PHOTOPHOBIA: 0
FACIAL SWELLING: 0
VOICE CHANGE: 0
NAUSEA: 0
RHINORRHEA: 0
SHORTNESS OF BREATH: 0
TROUBLE SWALLOWING: 0
SINUS PRESSURE: 0
COUGH: 0
EYE REDNESS: 0
SINUS PAIN: 0
EYE PAIN: 0
EYE DISCHARGE: 0
DIARRHEA: 0
CHEST TIGHTNESS: 0
VOMITING: 0

## 2022-04-21 ASSESSMENT — SOCIAL DETERMINANTS OF HEALTH (SDOH): HOW HARD IS IT FOR YOU TO PAY FOR THE VERY BASICS LIKE FOOD, HOUSING, MEDICAL CARE, AND HEATING?: NOT HARD AT ALL

## 2022-04-21 NOTE — PROGRESS NOTES
Subjective:      Patient ID: Kari Gray is a 47 y.o. female who presents today for:  Chief Complaint   Patient presents with    Headache     Patient is here c/o headache. Pt states she has a shocking kind of pain on L side of head, above the ear. Pt describes pain as sharp, has been ognoing for a few days. Pt states she rates pain above a 10. HPI   Patient is here with c/o sharp pain to the side of the head that comes and goes. Reports pain last about 1 minute. Says it feels like a shock nerve type pain. Says it does radiate to her left eye at times. She denies any vision changes or sensitivity. No eye watering or nasal drainage at the time. Reports pain is severe when it happens and she is unable to move. Says she has a few episodes yesterday and today. Says she also has some upper jaw pain. Denies any recently illness or tooth pain. She has HX migraines but she has not had a migraine for awhile. She has not seen Neurology recently. Denies any other neurological conditions. Reports some upper jaw pain and tender to the touch the temporal area. Patient denies pain with opening and closing mouth. Says she thinks she has been clenching jaw. Denies any dizziness or other neurological symptoms. Patient had a normal CT brain  3/2022.             Past Medical History:   Diagnosis Date    Anxiety     Arthritis     Asthma     Bronchopneumonia     Cancer ([address]     renal    Cerebral artery occlusion with cerebral infarction (HCC)     Chronic bilateral low back pain with sciatica     Chronic kidney disease     Chronic obstructive lung disease ([address] 7/26/2019    Depression     Fibromyalgia     Gout     rt knee    Hypertension     Insulin dependent type 2 diabetes mellitus, uncontrolled ([address] 8/3/2018    Localized enlarged lymph nodes 10/26/2018    Mixed headache     Pure hyperglyceridemia 5/19/2017    Sarcoidosis     Sleep apnea     does not wear cpap    Thyroid goiter      Past Surgical History:   Procedure Laterality Date    BRONCHOSCOPY  10/26/2018    DR. STEARNS    KIDNEY REMOVAL Right 2016    KIDNEY REMOVAL Right 2016    LUNG BIOPSY Right 10/2018    THYROID LOBECTOMY Right 14    THYROIDECTOMY  2019    DR. MAGDALENO    THYROIDECTOMY  2018    URETER STENT PLACEMENT Left 2016     Social History     Socioeconomic History    Marital status: Legally      Spouse name: Not on file    Number of children: Not on file    Years of education: 15    Highest education level: High school graduate   Occupational History    Not on file   Tobacco Use    Smoking status: Former Smoker     Packs/day: 0.50     Years: 15.00     Pack years: 7.50     Types: Cigarettes     Start date: 2014     Quit date: 3/1/2015     Years since quittin.1    Smokeless tobacco: Never Used   Vaping Use    Vaping Use: Never used   Substance and Sexual Activity    Alcohol use: Not Currently     Alcohol/week: 0.0 standard drinks     Comment: occasionally    Drug use: Not Currently     Frequency: 5.0 times per week     Types: Marijuana Nicole Dasen)    Sexual activity: Yes     Partners: Male   Other Topics Concern    Not on file   Social History Narrative    Not on file     Social Determinants of Health     Financial Resource Strain: Low Risk     Difficulty of Paying Living Expenses: Not hard at all   Food Insecurity: No Food Insecurity    Worried About Running Out of Food in the Last Year: Never true    Maximiliano of Food in the Last Year: Never true   Transportation Needs: No Transportation Needs    Lack of Transportation (Medical): No    Lack of Transportation (Non-Medical):  No   Physical Activity:     Days of Exercise per Week: Not on file    Minutes of Exercise per Session: Not on file   Stress:     Feeling of Stress : Not on file   Social Connections:     Frequency of Communication with Friends and Family: Not on file    Frequency of Social Gatherings with Friends and Family: Not on file    Attends Rastafari Services: Not on file    Active Member of Clubs or Organizations: Not on file    Attends Club or Organization Meetings: Not on file    Marital Status: Not on file   Intimate Partner Violence:     Fear of Current or Ex-Partner: Not on file    Emotionally Abused: Not on file    Physically Abused: Not on file    Sexually Abused: Not on file   Housing Stability:     Unable to Pay for Housing in the Last Year: Not on file    Number of Places Lived in the Last Year: Not on file    Unstable Housing in the Last Year: Not on file     Family History   Problem Relation Age of Onset    Cancer Father     Diabetes Father     Allergy (Severe) Father     Heart Attack Father     Prostate Cancer Father     High Blood Pressure Mother     Diabetes Mother    [facility] Arthritis Mother     High Cholesterol Mother     Vision Loss Mother     Alcohol Abuse Neg Hx     Anemia Neg Hx     Arrhythmia Neg Hx     Asthma Neg Hx     Atrial Fibrillation Neg Hx     Birth Defects Neg Hx     Breast Cancer Neg Hx     Coronary Art Dis Neg Hx     Colon Cancer Neg Hx     Depression Neg Hx     Early Death Neg Hx     Hearing Loss Neg Hx     Heart Disease Neg Hx     Learning Disabilities Neg Hx     Kidney Disease Neg Hx     Mental Illness Neg Hx     Mental Retardation Neg Hx     Miscarriages / Stillbirths Neg Hx     Obesity Neg Hx     Osteoporosis Neg Hx     Stroke Neg Hx     Substance Abuse Neg Hx      Allergies   Allergen Reactions    Shellfish-Derived Products     Hydromorphone Hives    Ibuprofen Other (See Comments)    Ketorolac     Morphine Other (See Comments) and Hives     MADE PATIENT VERY SICK VOMITING    Other     Penicillin G Other (See Comments)    Penicillins Swelling    Propoxyphene N-Acetaminophen      Other reaction(s): Hives    Shellfish Allergy Angioedema and Swelling    Toradol [Ketorolac Tromethamine]      Pt states she cannot take Toradol because she has only 1 kidney.      Current Outpatient Medications   Medication Sig Dispense Refill    cyclobenzaprine (FLEXERIL) 5 MG tablet Take 1 tablet by mouth nightly as needed for Muscle spasms 7 tablet 0    Insulin Degludec (TRESIBA FLEXTOUCH) 100 UNIT/ML SOPN INJECT 90 UNITS UNDER THE SKIN NIGHTLY pt needs 10 pens for a 30 day supply 15 pen 1    busPIRone (BUSPAR) 15 MG tablet TAKE 1 TABLET BY MOUTH THREE TIMES DAILY 90 tablet 0    B-D ULTRAFINE III SHORT PEN 31G X 8 MM MISC USE THREE TIMES DAILY AND AS NEEDED 100 each 5    insulin lispro, 1 Unit Dial, (HUMALOG KWIKPEN) 100 UNIT/ML SOPN 22 units at each meals hold if glucose less than 150 5 pen 3    spironolactone (ALDACTONE) 50 MG tablet TAKE 1 TABLET BY MOUTH DAILY 30 tablet 3    cetirizine (ZYRTEC) 10 MG tablet TAKE 1 TABLET BY MOUTH EVERY NIGHT AT BEDTIME AS NEEDED FOR ALLERGIES 30 tablet 5    montelukast (SINGULAIR) 10 MG tablet TAKE 1 TABLET BY MOUTH EVERY NIGHT AT SUPPER FOR BREATHING OR ALLERGIES 30 tablet 5    buPROPion (WELLBUTRIN XL) 150 MG extended release tablet TAKE 1 TABLET BY MOUTH EVERY MORNING 30 tablet 3    pantoprazole (PROTONIX) 20 MG tablet Take 1 tablet by mouth daily 30 tablet 0    sucralfate (CARAFATE) 1 GM tablet Take 1 tablet by mouth 4 times daily 40 tablet 0    ondansetron (ZOFRAN ODT) 4 MG disintegrating tablet Take 1 tablet by mouth every 8 hours as needed for Nausea 20 tablet 0    venlafaxine (EFFEXOR XR) 75 MG extended release capsule Take 1 capsule by mouth daily 30 capsule 5    levothyroxine (SYNTHROID) 125 MCG tablet TAKE 1 TABLET BY MOUTH DAILY 30 tablet 3    hydroxychloroquine (PLAQUENIL) 200 MG tablet TAKE 1 TABLET BY MOUTH TWICE DAILY 60 tablet 2    Blood Glucose Monitoring Suppl (ONETOUCH VERIO) w/Device KIT As  Directed 1 kit 00    blood glucose test strips (ONETOUCH VERIO) strip Test 3x daily e11.65 100 each 3    albuterol sulfate  (90 Base) MCG/ACT inhaler INHALE 2 PUFFS INTO THE LUNGS EVERY 6 HOURS AS NEEDED FOR WHEEZING 6.7 g 1    meclizine (ANTIVERT) 12.5 MG tablet Take 12.5 mg by mouth 2 times daily as needed for Dizziness      diclofenac sodium (VOLTAREN) 1 % GEL Apply 2 g topically 4 times daily 150 g 0    butalbital-acetaminophen-caffeine (FIORICET, ESGIC) -40 MG per tablet Take 1 tablet by mouth every 6 hours as needed for Headaches 30 tablet 1    dicyclomine (BENTYL) 10 MG capsule Take 1 capsule by mouth every 6 hours as needed (cramps) 120 capsule 0    ondansetron (ZOFRAN) 4 MG tablet Take 1 tablet by mouth every 8 hours as needed for Nausea 20 tablet 0    fluticasone (FLONASE) 50 MCG/ACT nasal spray SHAKE LIQUID AND USE 1 SPRAY IN EACH NOSTRIL DAILY 16 g 5    budesonide-formoterol (SYMBICORT) 160-4.5 MCG/ACT AERO Inhale 2 puffs into the lungs 2 times daily 1 Inhaler 3    metoclopramide (REGLAN) 10 MG tablet Take 1 tablet by mouth 2 times daily as needed (Headache) 60 tablet 0    acetaminophen (TYLENOL) 500 MG tablet Take 1 tablet by mouth 4 times daily as needed for Pain 360 tablet 1    OneTouch Delica Lancets 72N MISC qid 200 each 3    Insulin Pen Needle (NOVOFINE) 32G X 6 MM MISC qid 300 each 3    baclofen (LIORESAL) 10 MG tablet Take 1 tablet by mouth 3 times daily 60 tablet 0    magnesium oxide (MAG-OX) 400 (241.3 Mg) MG TABS tablet TAKE 1/2 TABLET BY MOUTH DAILY 30 tablet 5    SUMAtriptan (IMITREX) 25 MG tablet TAKE 1 TABLET BY MOUTH 1 TIME AS NEEDED FOR MIGRAINE 9 tablet 1    blood glucose test strips (EXACTECH TEST) strip 1 each by In Vitro route 3 times daily (Accu-check test strips) As needed.  DX:E11.65 300 strip 5    ONE TOUCH ULTRASOFT LANCETS MISC TEST 3 TIMES DAILY AS NEEDED 300 each 3    albuterol (PROVENTIL) (2.5 MG/3ML) 0.083% nebulizer solution Take 3 mLs by nebulization every 4 hours as needed for Wheezing 120 each 3    atorvastatin (LIPITOR) 40 MG tablet Take 1 tablet by mouth nightly 30 tablet 3    Insulin Syringe-Needle U-100 30G X 1/2\" 1 ML MISC 1 each by Does not apply route daily 100 each 3    blood glucose test strips (ONETOUCH VERIO) strip TEST 3 TIMES DAILY AS NEEDED 300 each 3    Blood Glucose Monitoring Suppl (ONETOUCH VERIO) w/Device KIT TEST 3 TIMES DAILY AS NEEDED 1 kit 0    pregabalin (LYRICA) 200 MG capsule Take 1 capsule by mouth three times daily for 30 days. 90 capsule 0     No current facility-administered medications for this visit. Review of Systems   Constitutional: Negative for activity change, appetite change, chills, diaphoresis, fatigue, fever and unexpected weight change. HENT: Negative for congestion, dental problem, ear discharge, ear pain, facial swelling, hearing loss, postnasal drip, rhinorrhea, sinus pressure, sinus pain, sneezing, sore throat, tinnitus, trouble swallowing and voice change. Eyes: Negative for photophobia, pain, discharge, redness and visual disturbance. Respiratory: Negative for cough, chest tightness, shortness of breath and wheezing. Gastrointestinal: Negative for diarrhea, nausea and vomiting. Musculoskeletal: Negative for neck pain and neck stiffness. Skin: Negative for rash. Neurological: Positive for headaches. Negative for dizziness, tremors, seizures, syncope, facial asymmetry, speech difficulty, weakness, light-headedness and numbness. Hematological: Negative for adenopathy. Objective:   /72 (Site: Right Upper Arm, Position: Sitting, Cuff Size: Large Adult)   Pulse 72   Temp 97.5 °F (36.4 °C)   LMP 06/01/2021   SpO2 97%     Physical Exam  Vitals reviewed. Constitutional:       General: She is awake. She is not in acute distress. Appearance: Normal appearance. She is obese. She is not ill-appearing, toxic-appearing or diaphoretic. HENT:      Head: Normocephalic and atraumatic. Right Ear: Hearing, tympanic membrane, ear canal and external ear normal. There is no impacted cerumen.       Left Ear: Hearing, tympanic membrane, ear canal and external ear normal. There is no impacted cerumen. Nose: Nose normal.      Mouth/Throat:      Lips: Pink. Mouth: Mucous membranes are moist. No oral lesions. Dentition: No gum lesions. Tongue: No lesions. Palate: No lesions. Pharynx: Oropharynx is clear. Uvula midline. No pharyngeal swelling, oropharyngeal exudate, posterior oropharyngeal erythema or uvula swelling. Tonsils: No tonsillar exudate or tonsillar abscesses. 0 on the right. 0 on the left. Eyes:      General: Lids are normal.      Extraocular Movements: Extraocular movements intact. Conjunctiva/sclera: Conjunctivae normal.      Pupils: Pupils are equal, round, and reactive to light. Neck:      Vascular: No carotid bruit. Cardiovascular:      Rate and Rhythm: Normal rate and regular rhythm. Pulses: Normal pulses. Heart sounds: Normal heart sounds, S1 normal and S2 normal.   Pulmonary:      Effort: Pulmonary effort is normal.      Breath sounds: Normal breath sounds and air entry. Musculoskeletal:         General: Tenderness present. Normal range of motion. Cervical back: Normal range of motion and neck supple. No tenderness. Lymphadenopathy:      Cervical: No cervical adenopathy. Skin:     General: Skin is warm. Capillary Refill: Capillary refill takes less than 2 seconds. Neurological:      General: No focal deficit present. Mental Status: She is alert and oriented to person, place, and time. Mental status is at baseline. Cranial Nerves: Cranial nerves are intact. Sensory: Sensation is intact. Motor: Motor function is intact. Coordination: Coordination is intact. Psychiatric:         Attention and Perception: Attention and perception normal.         Mood and Affect: Mood and affect normal.         Speech: Speech normal.         Behavior: Behavior normal. Behavior is cooperative. Thought Content:  Thought content normal.         Cognition and Memory: Cognition and memory normal.         Judgment: Judgment normal.         Assessment:       Diagnosis Orders   1. Arthralgia of left temporomandibular joint  cyclobenzaprine (FLEXERIL) 5 MG tablet   2. Muscle pain, myofascial  cyclobenzaprine (FLEXERIL) 5 MG tablet     No results found for this visit on 04/21/22. Plan:     Assessment & Plan   Kiki Wen was seen today for headache. Diagnoses and all orders for this visit:    Discussed with patient neurological exam in office normal and Ct head last month negative. Advised likely nerve or MSK related. Discussed will try muscle relaxer at night for the TMJ and muscular pain. DD SX include trigeminal neuralgia, stabbing HA, cluster HA. Advised ER with any worsening symptoms and advised to make follow up with PCP if sx do not improve. Advised may need referral to neurology for further workup as well. Discussed the use and SE of the medication and patient aware as she has taken this in the past.   Advised to call with any questions or concerns. Arthralgia of left temporomandibular joint  -     cyclobenzaprine (FLEXERIL) 5 MG tablet; Take 1 tablet by mouth nightly as needed for Muscle spasms    Muscle pain, myofascial  -     cyclobenzaprine (FLEXERIL) 5 MG tablet; Take 1 tablet by mouth nightly as needed for Muscle spasms      No orders of the defined types were placed in this encounter. Orders Placed This Encounter   Medications    cyclobenzaprine (FLEXERIL) 5 MG tablet     Sig: Take 1 tablet by mouth nightly as needed for Muscle spasms     Dispense:  7 tablet     Refill:  0     Medications Discontinued During This Encounter   Medication Reason    cyclobenzaprine (FLEXERIL) 10 MG tablet LIST CLEANUP    tiZANidine (ZANAFLEX) 2 MG tablet LIST CLEANUP     Return for worsening of condition, if symptoms do not improve in 3-5 days. Reviewed with the patient/family: current clinical status & medications.   Side effects of the medication prescribed today, as well as treatment plan/rationale and result expectations have been discussed with the patient/family who expresses understanding. Patient will be discharged home in stable condition. Follow up with PCP to evaluate treatment results or return if symptoms worsen or fail to improve. Discussed signs and symptoms which require immediate follow-up in ED/call to 911. Understanding verbalized. I have reviewed the patient's medical history in detail and updated the computerized patient record.     Giovanni Lopez, APRN - CNP

## 2022-04-21 NOTE — PATIENT INSTRUCTIONS
stress.  Get at least 30 minutes of exercise on most days of the week to relieve stress. Walking is a good choice.  Ask your doctor if you can take an over-the-counter pain medicine, such as acetaminophen (Tylenol), ibuprofen (Advil, Motrin), or naproxen (Aleve). Be safe with medicines. Read and follow all instructions on the label.  Use good posture for sitting and standing. Slumping your shoulders disturbs the alignment of your facial bones and muscles.  Don't:  ? Hold a phone between your shoulder and your jaw. ? Open your mouth all the way, like when you sing loudly or yawn. ? Clench or grind your teeth, bite your lips, or chew your fingernails. ? Clench things such as pens, pipes, or cigars between your teeth. When should you call for help? Call your doctor now or seek immediate medical care if:     Your jaw is locked open or shut or it is hard to move your jaw. Watch closely for changes in your health, and be sure to contact your doctor if:     Your jaw pain gets worse.      Your face is swollen.      You do not get better as expected. Where can you learn more? Go to [email or web address]://[email or web address]. org and sign in to your [employer] account. Enter K861 in the "[employer] box to learn more about \"Temporomandibular Disorder: Care Instructions. \"     If you do not have an account, please click on the \"Sign Up Now\" link. Current as of: June 30, 2021               Content Version: 13.2  © 2006-2022 [employer]. Care instructions adapted under license by [facility] [place] ([facility]). If you have questions about a medical condition or this instruction, always ask your healthcare professional. [place] [address] any warranty or liability for your use of this information.

## 2022-04-27 ENCOUNTER — PATIENT MESSAGE (OUTPATIENT)
Dept: FAMILY MEDICINE CLINIC | Age: 52
End: 2022-04-27

## 2022-04-27 DIAGNOSIS — R05.9 COUGH: Primary | ICD-10-CM

## 2022-04-28 RX ORDER — DEXTROMETHORPHAN HYDROBROMIDE AND PROMETHAZINE HYDROCHLORIDE 15; 6.25 MG/5ML; MG/5ML
5 SYRUP ORAL 4 TIMES DAILY PRN
Qty: 140 ML | Refills: 0 | Status: SHIPPED | OUTPATIENT
Start: 2022-04-28 | End: 2022-05-05

## 2022-04-28 NOTE — TELEPHONE ENCOUNTER
From: Kari Gray  To: Francis Kidney  Sent: 4/27/2022 11:36 PM EDT  Subject: Riki Murdock, I'm on the verge of getting sick, it's starting in my throat, and I have a small cough. . I'm trying not to get as bad a Delvin Livingston got, he developed a very bad cough, mucus, fever, and all of the above, and I know if I get as sick as him I'm in trouble, because i already have a problem with my breathing.  So is there something you can send to the pharmacy to help with my starting cough and runny nose and a little bit of congestion starting in my chest. Thanks

## 2022-05-01 DIAGNOSIS — F41.9 ANXIETY: ICD-10-CM

## 2022-05-02 NOTE — TELEPHONE ENCOUNTER
Rx requested:  Requested Prescriptions     Pending Prescriptions Disp Refills    busPIRone (BUSPAR) 15 MG tablet [Pharmacy Med Name: BUSPIRONE 15MG TABLETS] 90 tablet 0     Sig: TAKE 1 TABLET BY MOUTH THREE TIMES DAILY         Last Office Visit:   01/05/2022      Next Visit Date:  No future appointments.

## 2022-05-03 RX ORDER — BUSPIRONE HYDROCHLORIDE 15 MG/1
TABLET ORAL
Qty: 90 TABLET | Refills: 0 | Status: SHIPPED | OUTPATIENT
Start: 2022-05-03 | End: 2022-05-31

## 2022-05-07 ENCOUNTER — HOSPITAL ENCOUNTER (EMERGENCY)
Age: 52
Discharge: HOME | End: 2022-05-07
Payer: MEDICAID

## 2022-05-07 ENCOUNTER — APPOINTMENT (OUTPATIENT)
Dept: GENERAL RADIOLOGY | Age: 52
End: 2022-05-07
Payer: MEDICAID

## 2022-05-07 VITALS
HEIGHT: 62 IN | RESPIRATION RATE: 16 BRPM | BODY MASS INDEX: 46.93 KG/M2 | OXYGEN SATURATION: 98 % | SYSTOLIC BLOOD PRESSURE: 122 MMHG | WEIGHT: 255 LBS | HEART RATE: 88 BPM | DIASTOLIC BLOOD PRESSURE: 71 MMHG | TEMPERATURE: 98.4 F

## 2022-05-07 DIAGNOSIS — S63.610A SPRAIN OF RIGHT INDEX FINGER, UNSPECIFIED SITE OF DIGIT, INITIAL ENCOUNTER: Primary | ICD-10-CM

## 2022-05-07 PROCEDURE — 99283 EMERGENCY DEPT VISIT LOW MDM: CPT

## 2022-05-07 PROCEDURE — 73130 X-RAY EXAM OF HAND: CPT

## 2022-05-07 ASSESSMENT — PAIN DESCRIPTION - ORIENTATION: ORIENTATION: RIGHT

## 2022-05-07 ASSESSMENT — PAIN DESCRIPTION - DESCRIPTORS: DESCRIPTORS: ACHING

## 2022-05-07 ASSESSMENT — ENCOUNTER SYMPTOMS
ABDOMINAL PAIN: 0
BACK PAIN: 0
COUGH: 0
SHORTNESS OF BREATH: 0

## 2022-05-07 ASSESSMENT — PAIN SCALES - GENERAL: PAINLEVEL_OUTOF10: 5

## 2022-05-07 ASSESSMENT — PAIN DESCRIPTION - PAIN TYPE: TYPE: ACUTE PAIN

## 2022-05-07 ASSESSMENT — PAIN DESCRIPTION - FREQUENCY: FREQUENCY: CONTINUOUS

## 2022-05-07 ASSESSMENT — PAIN DESCRIPTION - LOCATION: LOCATION: FINGER (COMMENT WHICH ONE)

## 2022-05-07 ASSESSMENT — PAIN - FUNCTIONAL ASSESSMENT: PAIN_FUNCTIONAL_ASSESSMENT: 0-10

## 2022-05-07 NOTE — ED TRIAGE NOTES
Pt c/o right index fingere pain after falling three weeks ago, no edema noted, ROM limited with discomfort, sensation intact.

## 2022-05-07 NOTE — ED PROVIDER NOTES
[facility] ED  eMERGENCY dEPARTMENT eNCOUnter      Pt Name: Aliza Jolley  MRN: [id number]  [place] 1970  Date of evaluation: 5/7/2022  Provider: SUDEEP Hernandez CNP      HISTORY OF PRESENT ILLNESS    Aliza Jolley is a 47 y.o. female who presents to the Emergency Department with R 2nd digit pain after she fell and hyperflexed the finger about 3 weeks ago. She continues to have pain in the finger with some mild swelling. Pain is moderate. REVIEW OF SYSTEMS       Review of Systems   Constitutional: Negative for fever. HENT: Negative for congestion. Respiratory: Negative for cough and shortness of breath. Cardiovascular: Negative for chest pain. Gastrointestinal: Negative for abdominal pain. Genitourinary: Negative for dysuria. Musculoskeletal: Negative for arthralgias and back pain. R 2nd digit pain   Skin: Negative for rash. All other systems reviewed and are negative. PAST MEDICAL HISTORY     Past Medical History:   Diagnosis Date    Anxiety     Arthritis     Asthma     Bronchopneumonia     Cancer ([address]     renal    Cerebral artery occlusion with cerebral infarction (HCC)     Chronic bilateral low back pain with sciatica     Chronic kidney disease     Chronic obstructive lung disease ([address] 7/26/2019    Depression     Fibromyalgia     Gout     rt knee    Hypertension     Insulin dependent type 2 diabetes mellitus, uncontrolled ([address] 8/3/2018    Localized enlarged lymph nodes 10/26/2018    Mixed headache     Pure hyperglyceridemia 5/19/2017    Sarcoidosis     Sleep apnea     does not wear cpap    Thyroid goiter          SURGICAL HISTORY       Past Surgical History:   Procedure Laterality Date    BRONCHOSCOPY  10/26/2018    DR. STEARNS    KIDNEY REMOVAL Right 08/2016    KIDNEY REMOVAL Right 2016    LUNG BIOPSY Right 10/2018    THYROID LOBECTOMY Right 6/13/14    THYROIDECTOMY  02/21/2019    DR. MAGDALENO    THYROIDECTOMY 2018    URETER STENT PLACEMENT Left 08/2016         CURRENT MEDICATIONS       Previous Medications    ACETAMINOPHEN (TYLENOL) 500 MG TABLET    Take 1 tablet by mouth 4 times daily as needed for Pain    ALBUTEROL (PROVENTIL) (2.5 MG/3ML) 0.083% NEBULIZER SOLUTION    Take 3 mLs by nebulization every 4 hours as needed for Wheezing    ALBUTEROL SULFATE  (90 BASE) MCG/ACT INHALER    INHALE 2 PUFFS INTO THE LUNGS EVERY 6 HOURS AS NEEDED FOR WHEEZING    ATORVASTATIN (LIPITOR) 40 MG TABLET    Take 1 tablet by mouth nightly    B-D ULTRAFINE III SHORT PEN 31G X 8 MM MISC    USE THREE TIMES DAILY AND AS NEEDED    BACLOFEN (LIORESAL) 10 MG TABLET    Take 1 tablet by mouth 3 times daily    BLOOD GLUCOSE MONITORING SUPPL (ONETOUCH VERIO) W/DEVICE KIT    TEST 3 TIMES DAILY AS NEEDED    BLOOD GLUCOSE MONITORING SUPPL (ONETOUCH VERIO) W/DEVICE KIT    As  Directed    BLOOD GLUCOSE TEST STRIPS (EXACTECH TEST) STRIP    1 each by In Vitro route 3 times daily (Accu-check test strips) As needed.  DX:E11.65    BLOOD GLUCOSE TEST STRIPS (ONETOUCH VERIO) STRIP    TEST 3 TIMES DAILY AS NEEDED    BLOOD GLUCOSE TEST STRIPS (ONETOUCH VERIO) STRIP    Test 3x daily e11.65    BUDESONIDE-FORMOTEROL (SYMBICORT) 160-4.5 MCG/ACT AERO    Inhale 2 puffs into the lungs 2 times daily    BUPROPION (WELLBUTRIN XL) 150 MG EXTENDED RELEASE TABLET    TAKE 1 TABLET BY MOUTH EVERY MORNING    BUSPIRONE (BUSPAR) 15 MG TABLET    TAKE 1 TABLET BY MOUTH THREE TIMES DAILY    BUTALBITAL-ACETAMINOPHEN-CAFFEINE (FIORICET, ESGIC) -40 MG PER TABLET    Take 1 tablet by mouth every 6 hours as needed for Headaches    CETIRIZINE (ZYRTEC) 10 MG TABLET    TAKE 1 TABLET BY MOUTH EVERY NIGHT AT BEDTIME AS NEEDED FOR ALLERGIES    DICLOFENAC SODIUM (VOLTAREN) 1 % GEL    Apply 2 g topically 4 times daily    DICYCLOMINE (BENTYL) 10 MG CAPSULE    Take 1 capsule by mouth every 6 hours as needed (cramps)    FLUTICASONE (FLONASE) 50 MCG/ACT NASAL SPRAY    SHAKE LIQUID AND USE 1 SPRAY IN EACH NOSTRIL DAILY    HYDROXYCHLOROQUINE (PLAQUENIL) 200 MG TABLET    TAKE 1 TABLET BY MOUTH TWICE DAILY    INSULIN DEGLUDEC (TRESIBA FLEXTOUCH) 100 UNIT/ML SOPN    INJECT 90 UNITS UNDER THE SKIN NIGHTLY pt needs 10 pens for a 30 day supply    INSULIN LISPRO, 1 UNIT DIAL, (HUMALOG KWIKPEN) 100 UNIT/ML SOPN    22 units at each meals hold if glucose less than 150    INSULIN PEN NEEDLE (NOVOFINE) 32G X 6 MM MISC    qid    INSULIN SYRINGE-NEEDLE U-100 30G X 1/2\" 1 ML MISC    1 each by Does not apply route daily    LEVOTHYROXINE (SYNTHROID) 125 MCG TABLET    TAKE 1 TABLET BY MOUTH DAILY    MAGNESIUM OXIDE (MAG-OX) 400 (241.3 MG) MG TABS TABLET    TAKE 1/2 TABLET BY MOUTH DAILY    MECLIZINE (ANTIVERT) 12.5 MG TABLET    Take 12.5 mg by mouth 2 times daily as needed for Dizziness    METOCLOPRAMIDE (REGLAN) 10 MG TABLET    Take 1 tablet by mouth 2 times daily as needed (Headache)    MONTELUKAST (SINGULAIR) 10 MG TABLET    TAKE 1 TABLET BY MOUTH EVERY NIGHT AT SUPPER FOR BREATHING OR ALLERGIES    ONDANSETRON (ZOFRAN ODT) 4 MG DISINTEGRATING TABLET    Take 1 tablet by mouth every 8 hours as needed for Nausea    ONDANSETRON (ZOFRAN) 4 MG TABLET    Take 1 tablet by mouth every 8 hours as needed for Nausea    ONE TOUCH ULTRASOFT LANCETS MISC    TEST 3 TIMES DAILY AS NEEDED    ONETOUCH DELICA LANCETS 00R MISC    qid    PANTOPRAZOLE (PROTONIX) 20 MG TABLET    Take 1 tablet by mouth daily    PREGABALIN (LYRICA) 200 MG CAPSULE    Take 1 capsule by mouth three times daily for 30 days.     SPIRONOLACTONE (ALDACTONE) 50 MG TABLET    TAKE 1 TABLET BY MOUTH DAILY    SUCRALFATE (CARAFATE) 1 GM TABLET    Take 1 tablet by mouth 4 times daily    SUMATRIPTAN (IMITREX) 25 MG TABLET    TAKE 1 TABLET BY MOUTH 1 TIME AS NEEDED FOR MIGRAINE    VENLAFAXINE (EFFEXOR XR) 75 MG EXTENDED RELEASE CAPSULE    Take 1 capsule by mouth daily       ALLERGIES     Shellfish-derived products, Hydromorphone, Ibuprofen, Ketorolac, Morphine, Other, Penicillin g, Penicillins, Propoxyphene n-acetaminophen, Shellfish allergy, and Toradol [ketorolac tromethamine]    FAMILY HISTORY       Family History   Problem Relation Age of Onset    Cancer Father     Diabetes Father     Allergy (Severe) Father     Heart Attack Father     Prostate Cancer Father     High Blood Pressure Mother     Diabetes Mother     Arthritis Mother     High Cholesterol Mother     Vision Loss Mother     Alcohol Abuse Neg Hx     Anemia Neg Hx     Arrhythmia Neg Hx     Asthma Neg Hx     Atrial Fibrillation Neg Hx     Birth Defects Neg Hx     Breast Cancer Neg Hx     Coronary Art Dis Neg Hx     Colon Cancer Neg Hx     Depression Neg Hx     Early Death Neg Hx     Hearing Loss Neg Hx     Heart Disease Neg Hx     Learning Disabilities Neg Hx     Kidney Disease Neg Hx     Mental Illness Neg Hx     Mental Retardation Neg Hx     Miscarriages / Stillbirths Neg Hx     Obesity Neg Hx     Osteoporosis Neg Hx     Stroke Neg Hx     Substance Abuse Neg Hx           SOCIAL HISTORY       Social History     Socioeconomic History    Marital status: Legally      Spouse name: None    Number of children: None    Years of education: 15    Highest education level: High school graduate   Occupational History    None   Tobacco Use    Smoking status: Former Smoker     Packs/day: 0.50     Years: 15.00     Pack years: 7.50     Types: Cigarettes     Start date: 2014     Quit date: 3/1/2015     Years since quittin.1    Smokeless tobacco: Never Used   Vaping Use    Vaping Use: Never used   Substance and Sexual Activity    Alcohol use: Not Currently     Alcohol/week: 0.0 standard drinks     Comment: occasionally    Drug use: Not Currently     Frequency: 5.0 times per week     Types: Marijuana Fronie [place]aja)    Sexual activity: Yes     Partners: Male   Other Topics Concern    None   Social History Narrative    None     Social Determinants of Health     Financial Resource Strain: Low Risk     Difficulty of Paying Living Expenses: Not hard at all   Food Insecurity: No Food Insecurity    Worried About Running Out of Food in the Last Year: Never true    Maximiliano of Food in the Last Year: Never true   Transportation Needs: No Transportation Needs    Lack of Transportation (Medical): No    Lack of Transportation (Non-Medical): No   Physical Activity:     Days of Exercise per Week: Not on file    Minutes of Exercise per Session: Not on file   Stress:     Feeling of Stress : Not on file   Social Connections:     Frequency of Communication with Friends and Family: Not on file    Frequency of Social Gatherings with Friends and Family: Not on file    Attends [employer] Services: Not on file    Active Member of [address] [place] [address] [employer] or Organizations: Not on file    Attends Club or Organization Meetings: Not on file    Marital Status: Not on file   Intimate Partner Violence:     Fear of Current or Ex-Partner: Not on file    Emotionally Abused: Not on file    Physically Abused: Not on file    Sexually Abused: Not on file   Housing Stability:     Unable to Pay for Housing in the Last Year: Not on file    Number of [place] in the Last Year: Not on file    Unstable Housing in the Last Year: Not on file       SCREENINGS    [place] Coma Scale  Eye Opening: Spontaneous  Best Verbal Response: Oriented  Best Motor Response: Obeys commands  [place] Coma Scale Score: 15 @FLOW([id number])@      PHYSICAL EXAM    (up to 7 for level 4, 8 or more for level 5)     ED Triage Vitals [05/07/22 1538]   BP Temp Temp Source Pulse Resp SpO2 Height Weight   122/71 98.4 °F (36.9 °C) Oral 88 16 98 % 5' 2\" (1.575 m) 255 lb (115.7 kg)       Physical Exam  Vitals and nursing note reviewed. Constitutional:       Appearance: She is well-developed. HENT:      Head: Normocephalic and atraumatic.       Right Ear: External ear normal.      Left Ear: External ear normal.   Eyes:      Conjunctiva/sclera: Conjunctivae normal.      Pupils: Pupils are equal, round, and reactive to light. Cardiovascular:      Rate and Rhythm: Normal rate and regular rhythm. Pulmonary:      Effort: Pulmonary effort is normal.      Breath sounds: Normal breath sounds. Abdominal:      General: Bowel sounds are normal. There is no distension. Palpations: Abdomen is soft. Tenderness: There is no abdominal tenderness. Musculoskeletal:         General: Normal range of motion. Right hand: Swelling and tenderness present. No deformity. Normal range of motion. There is no disruption of two-point discrimination. Normal capillary refill. Normal pulse. Hands:       Cervical back: Normal range of motion and neck supple. Skin:     General: Skin is warm and dry. Neurological:      Mental Status: She is alert and oriented to person, place, and time. Deep Tendon Reflexes: Reflexes are normal and symmetric. Psychiatric:         Judgment: Judgment normal.           All other labs were within normal range or not returned as of this dictation. EMERGENCY DEPARTMENT COURSE and DIFFERENTIALDIAGNOSIS/MDM:   Vitals:    Vitals:    05/07/22 1538   BP: 122/71   Pulse: 88   Resp: 16   Temp: 98.4 °F (36.9 °C)   TempSrc: Oral   SpO2: 98%   Weight: 255 lb (115.7 kg)   Height: 5' 2\" (1.575 m)            47 yr old female with R finger sprain. F/U with ortho in 2-3 days. Patient xray was negative. Patient verbalizes understanding. PROCEDURES:  Unless otherwise noted below, none     Procedures      FINAL IMPRESSION      1.  Sprain of right index finger, unspecified site of digit, initial encounter          DISPOSITION/PLAN   DISPOSITION Decision To Discharge 05/07/2022 04:14:20 PM          SUDEEP Sinclair CNP (electronically signed)  Attending Emergency Physician     SUDEEP Sinclair CNP  05/07/22 [id number]

## 2022-05-13 ENCOUNTER — APPOINTMENT (OUTPATIENT)
Dept: GENERAL RADIOLOGY | Age: 52
End: 2022-05-13
Payer: MEDICAID

## 2022-05-13 ENCOUNTER — HOSPITAL ENCOUNTER (EMERGENCY)
Age: 52
Discharge: HOME | End: 2022-05-13
Payer: MEDICAID

## 2022-05-13 VITALS
BODY MASS INDEX: 46.07 KG/M2 | OXYGEN SATURATION: 97 % | RESPIRATION RATE: 16 BRPM | HEIGHT: 63 IN | HEART RATE: 84 BPM | SYSTOLIC BLOOD PRESSURE: 120 MMHG | TEMPERATURE: 98.5 F | WEIGHT: 260 LBS | DIASTOLIC BLOOD PRESSURE: 71 MMHG

## 2022-05-13 DIAGNOSIS — Z79.4 TYPE 2 DIABETES MELLITUS WITH HYPERGLYCEMIA, WITH LONG-TERM CURRENT USE OF INSULIN (HCC): ICD-10-CM

## 2022-05-13 DIAGNOSIS — M72.2 PLANTAR FASCIITIS OF LEFT FOOT: Primary | ICD-10-CM

## 2022-05-13 DIAGNOSIS — M76.62 ACHILLES TENDINITIS OF LEFT LOWER EXTREMITY: ICD-10-CM

## 2022-05-13 DIAGNOSIS — E11.65 TYPE 2 DIABETES MELLITUS WITH HYPERGLYCEMIA, WITH LONG-TERM CURRENT USE OF INSULIN (HCC): ICD-10-CM

## 2022-05-13 PROCEDURE — 73630 X-RAY EXAM OF FOOT: CPT

## 2022-05-13 PROCEDURE — 99283 EMERGENCY DEPT VISIT LOW MDM: CPT

## 2022-05-13 PROCEDURE — 73610 X-RAY EXAM OF ANKLE: CPT

## 2022-05-13 RX ORDER — LEVOTHYROXINE SODIUM 0.12 MG/1
125 TABLET ORAL DAILY
Qty: 30 TABLET | Refills: 3 | Status: SHIPPED | OUTPATIENT
Start: 2022-05-13 | End: 2022-09-07 | Stop reason: SDUPTHER

## 2022-05-13 ASSESSMENT — PAIN - FUNCTIONAL ASSESSMENT: PAIN_FUNCTIONAL_ASSESSMENT: 0-10

## 2022-05-13 ASSESSMENT — PAIN DESCRIPTION - LOCATION: LOCATION: FOOT

## 2022-05-13 ASSESSMENT — PAIN DESCRIPTION - ORIENTATION: ORIENTATION: LEFT

## 2022-05-13 ASSESSMENT — PAIN SCALES - GENERAL: PAINLEVEL_OUTOF10: 10

## 2022-05-13 ASSESSMENT — PAIN DESCRIPTION - DESCRIPTORS: DESCRIPTORS: ACHING;SORE

## 2022-05-13 ASSESSMENT — ENCOUNTER SYMPTOMS: COLOR CHANGE: 0

## 2022-05-13 ASSESSMENT — PAIN DESCRIPTION - PAIN TYPE: TYPE: ACUTE PAIN

## 2022-05-13 NOTE — ED TRIAGE NOTES
Pt to the ED via [facility] into triage with c/o left ankle/foot pain. Pt denies any trauma or injury. Pt is alert and oriented and states that her pain is 10/10 and very tender to touch to posterior around her achilles area.   Pt wearing a boot that she already had from a previous injury

## 2022-05-13 NOTE — ED NOTES
Pt provided with discharge instructions and f/u care instructions. Verbalizes understanding. Pt ambulatory off unit in stable condition.      Paolo Corbin RN  05/13/22 [id number]

## 2022-05-13 NOTE — ED PROVIDER NOTES
[facility] ED  eMERGENCYdEPARTMENT eNCOUnter      Pt Name: Miya Chanel  MRN: [id number]  Angelica 1970of evaluation: 5/13/2022  Elizabeth Dominguez PA-C    CHIEF COMPLAINT       Chief Complaint   Patient presents with    Foot Pain     left, no injury         HISTORY OF PRESENT ILLNESS  (Location/Symptom, Timing/Onset, Context/Setting, Quality, Duration, Modifying Factors, Severity.)   Miya Chanel is a 47 y.o. female who presents to the emergency department with a 1 day history of left ankle and foot pain. Patient states she was doing physical therapy yesterday for her back and was on a recumbent bicycle. States that her foot and ankle started to hurt after physical therapy the pain progressively worsened throughout the day and into this morning. Patient denies fall or twisting injury to her foot or ankle. Patient has history of right ankle Achilles tendon strain in the past.  She has a low rise walking boot that she has been using on her left ankle and foot. She states this helps some. Patient notes pain with dorsiflexion or plantarflexion of her foot and ankle and any attempted weightbearing. Patient has 1 functioning kidney and cannot take NSAIDs. She has tramadol at home for pain. She is also on Lyrica for fibromyalgia and diabetic neuropathy. These seem to be the only medicines for pain that she can take. The history is provided by the patient. Nursing Notes were reviewed and I agree. REVIEW OF SYSTEMS    (2-9 systems for level 4, 10 or more for level 5)     Review of Systems   Musculoskeletal: Positive for arthralgias (Left foot and ankle) and gait problem. Negative for joint swelling. Skin: Negative for color change, pallor, rash and wound. Neurological: Positive for numbness (Baseline neuropathy). Negative for weakness. as noted above the remainder of the review of systems was reviewed and negative.        PAST MEDICAL HISTORY Past Medical History:   Diagnosis Date    Anxiety     Arthritis     Asthma     Bronchopneumonia     Cancer ([facility] [address]     renal    Cerebral artery occlusion with cerebral infarction (HCC)     Chronic bilateral low back pain with sciatica     Chronic kidney disease     Chronic obstructive lung disease ([facility] [address] 7/26/2019    Depression     Fibromyalgia     Gout     rt knee    Hypertension     Insulin dependent type 2 diabetes mellitus, uncontrolled ([facility] [address] 8/3/2018    Localized enlarged lymph nodes 10/26/2018    Mixed headache     Pure hyperglyceridemia 5/19/2017    Sarcoidosis     Sleep apnea     does not wear cpap    Thyroid goiter          SURGICAL HISTORY       Past Surgical History:   Procedure Laterality Date    BRONCHOSCOPY  10/26/2018    DR. STEARNS    KIDNEY REMOVAL Right 08/2016    KIDNEY REMOVAL Right 2016    LUNG BIOPSY Right 10/2018    THYROID LOBECTOMY Right 6/13/14    THYROIDECTOMY  02/21/2019    DR. MAGDALENO    THYROIDECTOMY  2018    URETER STENT PLACEMENT Left 08/2016         CURRENT MEDICATIONS       Previous Medications    ACETAMINOPHEN (TYLENOL) 500 MG TABLET    Take 1 tablet by mouth 4 times daily as needed for Pain    ALBUTEROL (PROVENTIL) (2.5 MG/3ML) 0.083% NEBULIZER SOLUTION    Take 3 mLs by nebulization every 4 hours as needed for Wheezing    ALBUTEROL SULFATE  (90 BASE) MCG/ACT INHALER    INHALE 2 PUFFS INTO THE LUNGS EVERY 6 HOURS AS NEEDED FOR WHEEZING    ATORVASTATIN (LIPITOR) 40 MG TABLET    Take 1 tablet by mouth nightly    B-D ULTRAFINE III SHORT PEN 31G X 8 MM MISC    USE THREE TIMES DAILY AND AS NEEDED    BACLOFEN (LIORESAL) 10 MG TABLET    Take 1 tablet by mouth 3 times daily    BLOOD GLUCOSE MONITORING SUPPL (ONETOUCH VERIO) W/DEVICE KIT    TEST 3 TIMES DAILY AS NEEDED    BLOOD GLUCOSE MONITORING SUPPL (ONETOUCH VERIO) W/DEVICE KIT    As  Directed    BLOOD GLUCOSE TEST STRIPS (EXACTECH TEST) STRIP    1 each by In Vitro route 3 times daily (Accu-check test strips) As needed.  DX:E11.65    BLOOD GLUCOSE TEST STRIPS (ONETOUCH VERIO) STRIP    TEST 3 TIMES DAILY AS NEEDED    BLOOD GLUCOSE TEST STRIPS (ONETOUCH VERIO) STRIP    Test 3x daily e11.65    BUDESONIDE-FORMOTEROL (SYMBICORT) 160-4.5 MCG/ACT AERO    Inhale 2 puffs into the lungs 2 times daily    BUPROPION (WELLBUTRIN XL) 150 MG EXTENDED RELEASE TABLET    TAKE 1 TABLET BY MOUTH EVERY MORNING    BUSPIRONE (BUSPAR) 15 MG TABLET    TAKE 1 TABLET BY MOUTH THREE TIMES DAILY    BUTALBITAL-ACETAMINOPHEN-CAFFEINE (FIORICET, ESGIC) -40 MG PER TABLET    Take 1 tablet by mouth every 6 hours as needed for Headaches    CETIRIZINE (ZYRTEC) 10 MG TABLET    TAKE 1 TABLET BY MOUTH EVERY NIGHT AT BEDTIME AS NEEDED FOR ALLERGIES    DICLOFENAC SODIUM (VOLTAREN) 1 % GEL    Apply 2 g topically 4 times daily    DICYCLOMINE (BENTYL) 10 MG CAPSULE    Take 1 capsule by mouth every 6 hours as needed (cramps)    FLUTICASONE (FLONASE) 50 MCG/ACT NASAL SPRAY    SHAKE LIQUID AND USE 1 SPRAY IN EACH NOSTRIL DAILY    HYDROXYCHLOROQUINE (PLAQUENIL) 200 MG TABLET    TAKE 1 TABLET BY MOUTH TWICE DAILY    INSULIN DEGLUDEC (TRESIBA FLEXTOUCH) 100 UNIT/ML SOPN    INJECT 90 UNITS UNDER THE SKIN NIGHTLY pt needs 10 pens for a 30 day supply    INSULIN LISPRO, 1 UNIT DIAL, (HUMALOG KWIKPEN) 100 UNIT/ML SOPN    22 units at each meals hold if glucose less than 150    INSULIN PEN NEEDLE (NOVOFINE) 32G X 6 MM MISC    qid    INSULIN SYRINGE-NEEDLE U-100 30G X 1/2\" 1 ML MISC    1 each by Does not apply route daily    LEVOTHYROXINE (SYNTHROID) 125 MCG TABLET    Take 1 tablet by mouth Daily    MAGNESIUM OXIDE (MAG-OX) 400 (241.3 MG) MG TABS TABLET    TAKE 1/2 TABLET BY MOUTH DAILY    MECLIZINE (ANTIVERT) 12.5 MG TABLET    Take 12.5 mg by mouth 2 times daily as needed for Dizziness    METOCLOPRAMIDE (REGLAN) 10 MG TABLET    Take 1 tablet by mouth 2 times daily as needed (Headache)    MONTELUKAST (SINGULAIR) 10 MG TABLET    TAKE 1 TABLET BY MOUTH EVERY NIGHT AT SUPPER FOR BREATHING OR ALLERGIES    ONDANSETRON (ZOFRAN ODT) 4 MG DISINTEGRATING TABLET    Take 1 tablet by mouth every 8 hours as needed for Nausea    ONDANSETRON (ZOFRAN) 4 MG TABLET    Take 1 tablet by mouth every 8 hours as needed for Nausea    ONE TOUCH ULTRASOFT LANCETS MISC    TEST 3 TIMES DAILY AS NEEDED    ONETOUCH DELICA LANCETS 74Y MISC    qid    PANTOPRAZOLE (PROTONIX) 20 MG TABLET    Take 1 tablet by mouth daily    PREGABALIN (LYRICA) 200 MG CAPSULE    Take 1 capsule by mouth three times daily for 30 days.     SPIRONOLACTONE (ALDACTONE) 50 MG TABLET    TAKE 1 TABLET BY MOUTH DAILY    SUCRALFATE (CARAFATE) 1 GM TABLET    Take 1 tablet by mouth 4 times daily    SUMATRIPTAN (IMITREX) 25 MG TABLET    TAKE 1 TABLET BY MOUTH 1 TIME AS NEEDED FOR MIGRAINE    VENLAFAXINE (EFFEXOR XR) 75 MG EXTENDED RELEASE CAPSULE    Take 1 capsule by mouth daily       ALLERGIES     Shellfish-derived products, Hydromorphone, Ibuprofen, Ketorolac, Morphine, Other, Penicillin g, Penicillins, Propoxyphene n-acetaminophen, Shellfish allergy, and Toradol [ketorolac tromethamine]    HISTORY       Family History   Problem Relation Age of Onset    Cancer Father     Diabetes Father     Allergy (Severe) Father     Heart Attack Father     Prostate Cancer Father     High Blood Pressure Mother     Diabetes Mother     Arthritis Mother     High Cholesterol Mother     Vision Loss Mother     Alcohol Abuse Neg Hx     Anemia Neg Hx     Arrhythmia Neg Hx     Asthma Neg Hx     Atrial Fibrillation Neg Hx     Birth Defects Neg Hx     Breast Cancer Neg Hx     Coronary Art Dis Neg Hx     Colon Cancer Neg Hx     Depression Neg Hx     Early Death Neg Hx     Hearing Loss Neg Hx     Heart Disease Neg Hx     Learning Disabilities Neg Hx     Kidney Disease Neg Hx     Mental Illness Neg Hx     Mental Retardation Neg Hx     Miscarriages / Stillbirths Neg Hx     Obesity Neg Hx     Osteoporosis Neg Hx     Stroke Neg Hx     Substance Abuse Neg Hx           SOCIAL HISTORY       Social History     Socioeconomic History    Marital status: Legally      Spouse name: None    Number of children: None    Years of education: 15    Highest education level: High school graduate   Occupational History    None   Tobacco Use    Smoking status: Former Smoker     Packs/day: 0.50     Years: 15.00     Pack years: 7.50     Types: Cigarettes     Start date: 2014     Quit date: 3/1/2015     Years since quittin.2    Smokeless tobacco: Never Used   Vaping Use    Vaping Use: Never used   Substance and Sexual Activity    Alcohol use: Not Currently     Alcohol/week: 0.0 standard drinks     Comment: occasionally    Drug use: Not Currently     Frequency: 5.0 times per week     Types: Marijuana Paullette Nim)    Sexual activity: Yes     Partners: Male   Other Topics Concern    None   Social History Narrative    None     Social Determinants of Health     Financial Resource Strain: Low Risk     Difficulty of Paying Living Expenses: Not hard at all   Food Insecurity: No Food Insecurity    Worried About Running Out of Food in the Last Year: Never true    Maximiliano of Food in the Last Year: Never true   Transportation Needs: No Transportation Needs    Lack of Transportation (Medical): No    Lack of Transportation (Non-Medical):  No   Physical Activity:     Days of Exercise per Week: Not on file    Minutes of Exercise per Session: Not on file   Stress:     Feeling of Stress : Not on file   Social Connections:     Frequency of Communication with Friends and Family: Not on file    Frequency of Social Gatherings with Friends and Family: Not on file    Attends Evangelical Services: Not on file    Active Member of Clubs or Organizations: Not on file    Attends Club or Organization Meetings: Not on file    Marital Status: Not on file   Intimate Partner Violence:     Fear of Current or Ex-Partner: Not on file    Emotionally Abused: Not on file  Physically Abused: Not on file    Sexually Abused: Not on file   Housing Stability:     Unable to Pay for Housing in the Last Year: Not on file    Number of Places Lived in the Last Year: Not on file    Unstable Housing in the Last Year: Not on file       SCREENINGS    [place] Coma Scale  Eye Opening: Spontaneous  Best Verbal Response: Oriented  Best Motor Response: Obeys commands  [place] Coma Scale Score: 15      PHYSICAL EXAM    (up to 7 forlevel 4, 8 or more for level 5)     ED Triage Vitals [05/13/22 1749]   BP Temp Temp Source Pulse Resp SpO2 Height Weight   120/71 98.5 °F (36.9 °C) Oral 84 16 97 % 5' 3\" (1.6 m) 260 lb (117.9 kg)       Physical Exam  Vitals and nursing note reviewed. Constitutional:       General: She is not in acute distress. Appearance: She is well-developed. She is not diaphoretic. HENT:      Head: Normocephalic and atraumatic. Eyes:      Conjunctiva/sclera: Conjunctivae normal.      Pupils: Pupils are equal, round, and reactive to light. Pulmonary:      Effort: Pulmonary effort is normal.      Breath sounds: Normal breath sounds. Musculoskeletal:         General: No tenderness or deformity. Normal range of motion. Cervical back: Normal range of motion and neck supple. Comments: Left foot: No bruising swelling or discoloration. Pedal pulses are strong. Distal circulation and sensation are fully intact. Positive tenderness to direct palpation of plantar surface of foot especially over heel. No tenderness to proximal lateral foot compression. Good active and passive range of motion. Left ankle: No bruising swelling or discoloration. Achilles is quite tender to palpation. Achilles is intact. Modified Frank's test negative for Achilles rupture. no tenderness over bony processes. No proximal tib-fib tenderness. No calf swelling tenderness or roping varicosities. Pain with dorsiflexion/plantarflexion of foot and ankle.   No foot drop     Skin: General: Skin is warm and dry. Findings: No erythema or rash. Neurological:      Mental Status: She is alert and oriented to person, place, and time. Psychiatric:         Behavior: Behavior normal.           DIAGNOSTIC RESULTS     RADIOLOGY:   Non-plain film images such as CT, Ultrasound and MRI are read by the radiologist. Zari Kendall radiographic images are visualized and preliminarilyinterpreted by Della Méndez PA-C with the below findings:    Left ankle: no Fracture. No dislocation. Small calcaneal heel spur. Some calcification of Achilles tendon. Left foot:  no Fracture. No dislocation. Small calcaneal heel spur. Some calcification of Achilles tendon. Patient notified that her X-rays will additionally be reviewed by a radiologist and she will be notified of any discrepancies. Interpretation per the Radiologist below, if available at the time of this note:    XR ANKLE LEFT (MIN 3 VIEWS)    (Results Pending)   XR FOOT LEFT (MIN 3 VIEWS)    (Results Pending)       LABS:  Labs Reviewed - No data to display    All other labs were within normal range or not returnedas of this dictation. EMERGENCYDEPARTMENT COURSE and DIFFERENTIAL DIAGNOSIS/MDM:   Vitals:    Vitals:    05/13/22 1749   BP: 120/71   Pulse: 84   Resp: 16   Temp: 98.5 °F (36.9 °C)   TempSrc: Oral   SpO2: 97%   Weight: 260 lb (117.9 kg)   Height: 5' 3\" (1.6 m)           MDM    This appears to be Achilles tendinopathy and plantar fasciitis secondary to recent physical therapy activity. Very limited options for pain control. She has tramadol at home. She is also on Lyrica. She is not able to take NSAIDs. She has allergies to Morphine and hydromorphone as well. We will continue using the walking boot. She was counseled to elevate, rest, ice over the weekend. Podiatry referral was provided. PROCEDURES:    Procedures      FINAL IMPRESSION      1. Plantar fasciitis of left foot    2.  Achilles tendinitis of left lower extremity          DISPOSITION/PLAN   DISPOSITION Decision To Discharge 05/13/2022 06:41:01 PM      PATIENT REFERRED TO:  Your podiatrist at the [facility] in [place]    In 3 days        DISCHARGE MEDICATIONS:  New Prescriptions    No medications on file       (Please note thatportions of this note were completed with a voice recognition program.  Efforts were made to edit the dictations but occasionally words are mis-transcribed.)    ASTON Rowe PA-C  05/13/22 [facility]  05/13/22 2624

## 2022-05-31 DIAGNOSIS — F41.9 ANXIETY: ICD-10-CM

## 2022-05-31 RX ORDER — BUSPIRONE HYDROCHLORIDE 15 MG/1
TABLET ORAL
Qty: 90 TABLET | Refills: 0 | Status: SHIPPED | OUTPATIENT
Start: 2022-05-31 | End: 2022-06-30

## 2022-05-31 NOTE — TELEPHONE ENCOUNTER
Rx requested:  Requested Prescriptions     Pending Prescriptions Disp Refills    busPIRone (BUSPAR) 15 MG tablet [Pharmacy Med Name: BUSPIRONE 15MG TABLETS] 90 tablet 0     Sig: TAKE 1 TABLET BY MOUTH THREE TIMES DAILY         Last Office Visit:   1/5/2022      Next Visit Date:  No future appointments.

## 2022-06-03 DIAGNOSIS — R79.0 LOW MAGNESIUM LEVEL: ICD-10-CM

## 2022-06-03 DIAGNOSIS — M47.816 SPONDYLOSIS OF LUMBAR REGION WITHOUT MYELOPATHY OR RADICULOPATHY: ICD-10-CM

## 2022-06-03 DIAGNOSIS — G89.29 OTHER CHRONIC PAIN: ICD-10-CM

## 2022-06-06 RX ORDER — PREGABALIN 200 MG/1
200 CAPSULE ORAL 3 TIMES DAILY
Qty: 90 CAPSULE | Refills: 0 | OUTPATIENT
Start: 2022-06-06 | End: 2022-07-06

## 2022-06-07 ENCOUNTER — HOSPITAL ENCOUNTER (EMERGENCY)
Age: 52
Discharge: HOME | End: 2022-06-08
Payer: MEDICAID

## 2022-06-07 DIAGNOSIS — R10.9 FLANK PAIN: Primary | ICD-10-CM

## 2022-06-07 PROCEDURE — 96376 TX/PRO/DX INJ SAME DRUG ADON: CPT

## 2022-06-07 PROCEDURE — 99284 EMERGENCY DEPT VISIT MOD MDM: CPT

## 2022-06-07 PROCEDURE — 96374 THER/PROPH/DIAG INJ IV PUSH: CPT

## 2022-06-07 PROCEDURE — 96375 TX/PRO/DX INJ NEW DRUG ADDON: CPT

## 2022-06-07 ASSESSMENT — PAIN - FUNCTIONAL ASSESSMENT
PAIN_FUNCTIONAL_ASSESSMENT: 0-10
PAIN_FUNCTIONAL_ASSESSMENT: PREVENTS OR INTERFERES SOME ACTIVE ACTIVITIES AND ADLS

## 2022-06-07 ASSESSMENT — PAIN DESCRIPTION - LOCATION: LOCATION: FLANK

## 2022-06-07 ASSESSMENT — PAIN DESCRIPTION - PAIN TYPE: TYPE: ACUTE PAIN

## 2022-06-07 ASSESSMENT — PAIN DESCRIPTION - FREQUENCY: FREQUENCY: CONTINUOUS

## 2022-06-07 ASSESSMENT — PAIN DESCRIPTION - ORIENTATION: ORIENTATION: LEFT

## 2022-06-07 ASSESSMENT — PAIN SCALES - GENERAL: PAINLEVEL_OUTOF10: 10

## 2022-06-08 ENCOUNTER — APPOINTMENT (OUTPATIENT)
Dept: CT IMAGING | Age: 52
End: 2022-06-08
Payer: MEDICAID

## 2022-06-08 VITALS
RESPIRATION RATE: 16 BRPM | WEIGHT: 260 LBS | BODY MASS INDEX: 46.07 KG/M2 | HEIGHT: 63 IN | DIASTOLIC BLOOD PRESSURE: 84 MMHG | SYSTOLIC BLOOD PRESSURE: 111 MMHG | OXYGEN SATURATION: 94 % | HEART RATE: 85 BPM | TEMPERATURE: 98.1 F

## 2022-06-08 LAB
ALBUMIN SERPL-MCNC: 3.9 G/DL (ref 3.5–4.6)
ALP BLD-CCNC: 140 U/L (ref 40–130)
ALT SERPL-CCNC: 20 U/L (ref 0–33)
ANION GAP SERPL CALCULATED.3IONS-SCNC: 11 MEQ/L (ref 9–15)
AST SERPL-CCNC: 22 U/L (ref 0–35)
BASOPHILS ABSOLUTE: 0.1 K/UL (ref 0–0.2)
BASOPHILS RELATIVE PERCENT: 0.7 %
BILIRUB SERPL-MCNC: <0.2 MG/DL (ref 0.2–0.7)
BILIRUBIN URINE: NEGATIVE
BLOOD, URINE: NEGATIVE
BUN BLDV-MCNC: 18 MG/DL (ref 6–20)
CALCIUM SERPL-MCNC: 9.3 MG/DL (ref 8.5–9.9)
CHLORIDE BLD-SCNC: 102 MEQ/L (ref 95–107)
CLARITY: CLEAR
CO2: 21 MEQ/L (ref 20–31)
COLOR: YELLOW
CREAT SERPL-MCNC: 1.44 MG/DL (ref 0.5–0.9)
EOSINOPHILS ABSOLUTE: 0.2 K/UL (ref 0–0.7)
EOSINOPHILS RELATIVE PERCENT: 2.5 %
GFR AFRICAN AMERICAN: 46.4
GFR NON-AFRICAN AMERICAN: 38.3
GLOBULIN: 3.3 G/DL (ref 2.3–3.5)
GLUCOSE BLD-MCNC: 257 MG/DL (ref 70–99)
GLUCOSE URINE: 100 MG/DL
HCT VFR BLD CALC: 43.8 % (ref 37–47)
HEMOGLOBIN: 14.9 G/DL (ref 12–16)
KETONES, URINE: NEGATIVE MG/DL
LACTIC ACID: 1.5 MMOL/L (ref 0.5–2.2)
LEUKOCYTE ESTERASE, URINE: NEGATIVE
LYMPHOCYTES ABSOLUTE: 2 K/UL (ref 1–4.8)
LYMPHOCYTES RELATIVE PERCENT: 26.5 %
MCH RBC QN AUTO: 32.3 PG (ref 27–31.3)
MCHC RBC AUTO-ENTMCNC: 34.1 % (ref 33–37)
MCV RBC AUTO: 94.7 FL (ref 82–100)
MONOCYTES ABSOLUTE: 0.5 K/UL (ref 0.2–0.8)
MONOCYTES RELATIVE PERCENT: 6.4 %
NEUTROPHILS ABSOLUTE: 4.8 K/UL (ref 1.4–6.5)
NEUTROPHILS RELATIVE PERCENT: 63.9 %
NITRITE, URINE: NEGATIVE
PDW BLD-RTO: 13.8 % (ref 11.5–14.5)
PH UA: 5.5 (ref 5–9)
PLATELET # BLD: 283 K/UL (ref 130–400)
POTASSIUM SERPL-SCNC: 4.4 MEQ/L (ref 3.4–4.9)
PROTEIN UA: NEGATIVE MG/DL
RBC # BLD: 4.62 M/UL (ref 4.2–5.4)
SODIUM BLD-SCNC: 134 MEQ/L (ref 135–144)
SPECIFIC GRAVITY UA: 1.02 (ref 1–1.03)
TOTAL PROTEIN: 7.2 G/DL (ref 6.3–8)
URINE REFLEX TO CULTURE: ABNORMAL
UROBILINOGEN, URINE: 0.2 E.U./DL
WBC # BLD: 7.5 K/UL (ref 4.8–10.8)

## 2022-06-08 PROCEDURE — 80053 COMPREHEN METABOLIC PANEL: CPT

## 2022-06-08 PROCEDURE — 96375 TX/PRO/DX INJ NEW DRUG ADDON: CPT

## 2022-06-08 PROCEDURE — 96374 THER/PROPH/DIAG INJ IV PUSH: CPT

## 2022-06-08 PROCEDURE — 81003 URINALYSIS AUTO W/O SCOPE: CPT

## 2022-06-08 PROCEDURE — 96376 TX/PRO/DX INJ SAME DRUG ADON: CPT

## 2022-06-08 PROCEDURE — 83605 ASSAY OF LACTIC ACID: CPT

## 2022-06-08 PROCEDURE — 74150 CT ABDOMEN W/O CONTRAST: CPT

## 2022-06-08 PROCEDURE — 36415 COLL VENOUS BLD VENIPUNCTURE: CPT

## 2022-06-08 PROCEDURE — 85025 COMPLETE CBC W/AUTO DIFF WBC: CPT

## 2022-06-08 PROCEDURE — 6360000002 HC RX W HCPCS: Performed by: STUDENT IN AN ORGANIZED HEALTH CARE EDUCATION/TRAINING PROGRAM

## 2022-06-08 PROCEDURE — 2580000003 HC RX 258: Performed by: STUDENT IN AN ORGANIZED HEALTH CARE EDUCATION/TRAINING PROGRAM

## 2022-06-08 RX ORDER — CYCLOBENZAPRINE HCL 10 MG
10 TABLET ORAL NIGHTLY PRN
Qty: 10 TABLET | Refills: 0 | Status: SHIPPED | OUTPATIENT
Start: 2022-06-08 | End: 2022-06-18

## 2022-06-08 RX ORDER — MORPHINE SULFATE 4 MG/ML
4 INJECTION, SOLUTION INTRAMUSCULAR; INTRAVENOUS
Status: DISCONTINUED | OUTPATIENT
Start: 2022-06-08 | End: 2022-06-08 | Stop reason: HOSPADM

## 2022-06-08 RX ORDER — 0.9 % SODIUM CHLORIDE 0.9 %
500 INTRAVENOUS SOLUTION INTRAVENOUS ONCE
Status: COMPLETED | OUTPATIENT
Start: 2022-06-08 | End: 2022-06-08

## 2022-06-08 RX ORDER — MORPHINE SULFATE 2 MG/ML
2 INJECTION, SOLUTION INTRAMUSCULAR; INTRAVENOUS ONCE
Status: COMPLETED | OUTPATIENT
Start: 2022-06-08 | End: 2022-06-08

## 2022-06-08 RX ORDER — ONDANSETRON 2 MG/ML
4 INJECTION INTRAMUSCULAR; INTRAVENOUS ONCE
Status: COMPLETED | OUTPATIENT
Start: 2022-06-08 | End: 2022-06-08

## 2022-06-08 RX ADMIN — MORPHINE SULFATE 4 MG: 4 INJECTION, SOLUTION INTRAMUSCULAR; INTRAVENOUS at 00:38

## 2022-06-08 RX ADMIN — MORPHINE SULFATE 2 MG: 2 INJECTION, SOLUTION INTRAMUSCULAR; INTRAVENOUS at 02:15

## 2022-06-08 RX ADMIN — SODIUM CHLORIDE 500 ML: 9 INJECTION, SOLUTION INTRAVENOUS at 00:37

## 2022-06-08 RX ADMIN — ONDANSETRON 4 MG: 2 INJECTION INTRAMUSCULAR; INTRAVENOUS at 00:39

## 2022-06-08 ASSESSMENT — PAIN DESCRIPTION - ORIENTATION
ORIENTATION: LEFT
ORIENTATION: LEFT

## 2022-06-08 ASSESSMENT — PAIN DESCRIPTION - LOCATION
LOCATION: FLANK
LOCATION: FLANK

## 2022-06-08 ASSESSMENT — PAIN SCALES - GENERAL
PAINLEVEL_OUTOF10: 10
PAINLEVEL_OUTOF10: 6

## 2022-06-08 ASSESSMENT — PAIN DESCRIPTION - DESCRIPTORS: DESCRIPTORS: STABBING

## 2022-06-08 NOTE — ED TRIAGE NOTES
C/O Lt. Flank pain since last p.m. Patient has h/o kidney stones. Alert and oriented, skin is warm, dry, and of normal color for ethnicity.

## 2022-06-09 ASSESSMENT — ENCOUNTER SYMPTOMS
SORE THROAT: 0
SHORTNESS OF BREATH: 0
NAUSEA: 0
EYE PAIN: 0
BACK PAIN: 0
CHEST TIGHTNESS: 0
DIARRHEA: 0

## 2022-06-09 NOTE — ED PROVIDER NOTES
[address] [facility] ED  eMERGENCYdEPARTMENT eNCOUnter      Pt Name: Yan Mora  MRN: [id number]  [place] 1970  Date of evaluation: 6/7/2022  Roderick Ricardo PA-C    CHIEF COMPLAINT           HPI  Yan Mora is a 47 y.o. female resents with left flank pain. Patient reports gradual onset, worsening, severe, sharp, tearing pain to the left flank has been ongoing for the past few days. Patient denies bloody urine, fever, chills, bowel or bladder changes, shortness of breath. ROS  Review of Systems   Constitutional: Negative for chills, fatigue and fever. HENT: Negative for ear pain, hearing loss and sore throat. Eyes: Negative for pain and visual disturbance. Respiratory: Negative for chest tightness and shortness of breath. Cardiovascular: Negative for chest pain. Gastrointestinal: Negative for diarrhea and nausea. Endocrine: Negative for cold intolerance. Genitourinary: Positive for flank pain. Negative for hematuria. Musculoskeletal: Negative for back pain. Skin: Negative for rash and wound. Neurological: Negative for dizziness and headaches. Psychiatric/Behavioral: Negative for behavioral problems and confusion. Except as noted above the remainder of the review of systems was reviewed and negative.        PAST MEDICAL HISTORY     Past Medical History:   Diagnosis Date    Anxiety     Arthritis     Asthma     Bronchopneumonia     Cancer ([address]     renal    Cerebral artery occlusion with cerebral infarction (HCC)     Chronic bilateral low back pain with sciatica     Chronic kidney disease     Chronic obstructive lung disease ([address] 7/26/2019    Depression     Fibromyalgia     Gout     rt knee    Hypertension     Insulin dependent type 2 diabetes mellitus, uncontrolled ([address] 8/3/2018    Localized enlarged lymph nodes 10/26/2018    Mixed headache     Pure hyperglyceridemia 5/19/2017    Sarcoidosis     Sleep apnea     does not wear cpap  Thyroid goiter          SURGICAL HISTORY       Past Surgical History:   Procedure Laterality Date    BRONCHOSCOPY  10/26/2018    DR. STEARNS    LUNG BIOPSY Right 10/2018    NEPHRECTOMY Right 08/2016    NEPHRECTOMY Right 2016    THYROID LOBECTOMY Right 6/13/14    THYROIDECTOMY  02/21/2019    DR. MAGDALENO    THYROIDECTOMY  2018    URETERAL STENT PLACEMENT Left 08/2016         CURRENTMEDICATIONS       Discharge Medication List as of 6/8/2022  2:09 AM      CONTINUE these medications which have NOT CHANGED    Details   magnesium oxide (MAG-OX) 400 (241.3 Mg) MG TABS tablet Take 1 tablet by mouth daily, Disp-30 tablet, R-5Normal      busPIRone (BUSPAR) 15 MG tablet TAKE 1 TABLET BY MOUTH THREE TIMES DAILY, Disp-90 tablet, R-0Normal      levothyroxine (SYNTHROID) 125 MCG tablet Take 1 tablet by mouth Daily, Disp-30 tablet, R-3Normal      Insulin Degludec (TRESIBA FLEXTOUCH) 100 UNIT/ML SOPN INJECT 90 UNITS UNDER THE SKIN NIGHTLY pt needs 10 pens for a 30 day supply, Disp-15 pen, R-1Normal      !! B-D ULTRAFINE III SHORT PEN 31G X 8 MM MISC Disp-100 each, R-5, Normal      insulin lispro, 1 Unit Dial, (HUMALOG KWIKPEN) 100 UNIT/ML SOPN 22 units at each meals hold if glucose less than 150, Disp-5 pen, R-3Normal      pregabalin (LYRICA) 200 MG capsule Take 1 capsule by mouth three times daily for 30 days. , Disp-90 capsule, R-0Normal      spironolactone (ALDACTONE) 50 MG tablet TAKE 1 TABLET BY MOUTH DAILY, Disp-30 tablet, R-3Normal      cetirizine (ZYRTEC) 10 MG tablet TAKE 1 TABLET BY MOUTH EVERY NIGHT AT BEDTIME AS NEEDED FOR ALLERGIES, Disp-30 tablet, R-5Normal      montelukast (SINGULAIR) 10 MG tablet TAKE 1 TABLET BY MOUTH EVERY NIGHT AT SUPPER FOR BREATHING OR ALLERGIES, Disp-30 tablet, R-5Normal      buPROPion (WELLBUTRIN XL) 150 MG extended release tablet TAKE 1 TABLET BY MOUTH EVERY MORNING, Disp-30 tablet, R-3Normal      pantoprazole (PROTONIX) 20 MG tablet Take 1 tablet by mouth daily, Disp-30 tablet, R-0Print      sucralfate (CARAFATE) 1 GM tablet Take 1 tablet by mouth 4 times daily, Disp-40 tablet, R-0Print      ondansetron (ZOFRAN ODT) 4 MG disintegrating tablet Take 1 tablet by mouth every 8 hours as needed for Nausea, Disp-20 tablet, R-0Print      venlafaxine (EFFEXOR XR) 75 MG extended release capsule Take 1 capsule by mouth daily, Disp-30 capsule, R-5Normal      hydroxychloroquine (PLAQUENIL) 200 MG tablet TAKE 1 TABLET BY MOUTH TWICE DAILY, Disp-60 tablet, R-2Normal      !!  Blood Glucose Monitoring Suppl (Mac Nicole) w/Device KIT As  Directed, Disp-1 kit, R-00Normal      !! blood glucose test strips (ONETOUCH VERIO) strip Test 3x daily e11.65, Disp-100 each, R-3Normal      albuterol sulfate  (90 Base) MCG/ACT inhaler INHALE 2 PUFFS INTO THE LUNGS EVERY 6 HOURS AS NEEDED FOR WHEEZING, Disp-6.7 g, R-1Normal      meclizine (ANTIVERT) 12.5 MG tablet Take 12.5 mg by mouth 2 times daily as needed for DizzinessHistorical Med      diclofenac sodium (VOLTAREN) 1 % GEL Apply 2 g topically 4 times daily, Topical, 4 TIMES DAILY Starting Tue 9/7/2021, Disp-150 g, R-0, Normal      butalbital-acetaminophen-caffeine (FIORICET, ESGIC) -40 MG per tablet Take 1 tablet by mouth every 6 hours as needed for Headaches, Disp-30 tablet, R-1Normal      dicyclomine (BENTYL) 10 MG capsule Take 1 capsule by mouth every 6 hours as needed (cramps), Disp-120 capsule, R-0Normal      ondansetron (ZOFRAN) 4 MG tablet Take 1 tablet by mouth every 8 hours as needed for Nausea, Disp-20 tablet, R-0Print      fluticasone (FLONASE) 50 MCG/ACT nasal spray SHAKE LIQUID AND USE 1 SPRAY IN EACH NOSTRIL DAILY, Disp-16 g, R-5Normal      budesonide-formoterol (SYMBICORT) 160-4.5 MCG/ACT AERO Inhale 2 puffs into the lungs 2 times daily, Disp-1 Inhaler, R-3Normal      metoclopramide (REGLAN) 10 MG tablet Take 1 tablet by mouth 2 times daily as needed (Headache), Disp-60 tablet, R-0Print      acetaminophen (TYLENOL) 500 MG tablet Take 1 tablet by mouth 4 times daily as needed for Pain, Disp-360 tablet, R-1Print      !! OneTouch Delica Lancets 04R MISC qid, Disp-200 each, R-3Normal      !! Insulin Pen Needle (NOVOFINE) 32G X 6 MM MISC Disp-300 each, R-3, Normalqid      baclofen (LIORESAL) 10 MG tablet Take 1 tablet by mouth 3 times daily, Disp-60 tablet, R-0Disregard other orderNormal      SUMAtriptan (IMITREX) 25 MG tablet TAKE 1 TABLET BY MOUTH 1 TIME AS NEEDED FOR MIGRAINE, Disp-9 tablet, R-1Normal      !! blood glucose test strips (EXACTECH TEST) strip 3 TIMES DAILY Starting Mon 12/2/2019, Disp-300 strip, R-5, Normal(Accu-check test strips) As needed. DX:E11.65      !! ONE TOUCH ULTRASOFT LANCETS MISC Disp-300 each, R-3, NormalTEST 3 TIMES DAILY AS NEEDED      albuterol (PROVENTIL) (2.5 MG/3ML) 0.083% nebulizer solution Take 3 mLs by nebulization every 4 hours as needed for Wheezing, Disp-120 each, R-3Normal      atorvastatin (LIPITOR) 40 MG tablet Take 1 tablet by mouth nightly, Disp-30 tablet, R-3Normal      Insulin Syringe-Needle U-100 30G X 1/2\" 1 ML MISC DAILY Starting Fri 11/16/2018, Disp-100 each, R-3, Normal      !! blood glucose test strips (ONETOUCH VERIO) strip Disp-300 each, R-3, NormalTEST 3 TIMES DAILY AS NEEDED      !! Blood Glucose Monitoring Suppl Garrett Durand) w/Device KIT TEST 3 TIMES DAILY AS NEEDED, Disp-1 kit, R-0Normal       !! - Potential duplicate medications found. Please discuss with provider.           ALLERGIES     Shellfish-derived products, Hydromorphone, Ibuprofen, Ketorolac, Morphine, Other, Penicillin g, Penicillins, Propoxyphene n-acetaminophen, Shellfish allergy, and Toradol [ketorolac tromethamine]    FAMILY HISTORY       Family History   Problem Relation Age of Onset    Cancer Father     Diabetes Father     Allergy (Severe) Father     Heart Attack Father     Prostate Cancer Father     High Blood Pressure Mother     Diabetes Mother     Arthritis Mother     High Cholesterol Mother     Vision Loss Mother     Alcohol Abuse Neg Hx     Anemia Neg Hx     Arrhythmia Neg Hx     Asthma Neg Hx     Atrial Fibrillation Neg Hx     Birth Defects Neg Hx     Breast Cancer Neg Hx     Coronary Art Dis Neg Hx     Colon Cancer Neg Hx     Depression Neg Hx     Early Death Neg Hx     Hearing Loss Neg Hx     Heart Disease Neg Hx     Learning Disabilities Neg Hx     Kidney Disease Neg Hx     Mental Illness Neg Hx     Mental Retardation Neg Hx     Miscarriages / Stillbirths Neg Hx     Obesity Neg Hx     Osteoporosis Neg Hx     Stroke Neg Hx     Substance Abuse Neg Hx           SOCIAL HISTORY       Social History     Socioeconomic History    Marital status: Legally      Spouse name: Not on file    Number of children: Not on file    Years of education: 15    Highest education level: High school graduate   Occupational History    Not on file   Tobacco Use    Smoking status: Former Smoker     Packs/day: 0.50     Years: 15.00     Pack years: 7.50     Types: Cigarettes     Start date: 2014     Quit date: 3/1/2015     Years since quittin.2    Smokeless tobacco: Never Used   Vaping Use    Vaping Use: Never used   Substance and Sexual Activity    Alcohol use: Not Currently     Alcohol/week: 0.0 standard drinks     Comment: occasionally    Drug use: Not Currently     Frequency: 5.0 times per week     Types: Marijuana Cindy Mckinley)    Sexual activity: Yes     Partners: Male   Other Topics Concern    Not on file   Social History Narrative    Not on file     Social Determinants of Health     Financial Resource Strain: Low Risk     Difficulty of Paying Living Expenses: Not hard at all   Food Insecurity: No Food Insecurity    Worried About Running Out of Food in the Last Year: Never true    Maximiliano of Food in the Last Year: Never true   Transportation Needs: No Transportation Needs    Lack of Transportation (Medical): No    Lack of Transportation (Non-Medical):  No   Physical Activity:     Days Skin:     General: Skin is warm and dry. Neurological:      General: No focal deficit present. Mental Status: She is alert and oriented to person, place, and time. Psychiatric:         Mood and Affect: Mood normal.         Behavior: Behavior normal.           MDM  Is a 32-year female presenting with flank pain. Patient is afebrile hemodynamically stable. Patient given 1 L normal saline, IV morphine, IV Zofran. Labs unremarkable. CT kidney negative for stones and hydronephrosis. Likely lumbar strain. Patient able to discharge with muscle relaxers. She will return if symptoms change or worsen. FINAL IMPRESSION      1.  Flank pain          DISPOSITION/PLAN   DISPOSITION Decision To Discharge 06/08/2022 02:09:07 AM        DISCHARGE MEDICATIONS:  [unfilled]         Ivania Bell PA-C(electronically signed)  Attending Emergency Physician           Ivania Bell PA-C  06/09/22 [id number]

## 2022-06-13 ENCOUNTER — TELEPHONE (OUTPATIENT)
Dept: FAMILY MEDICINE CLINIC | Age: 52
End: 2022-06-13

## 2022-06-13 NOTE — TELEPHONE ENCOUNTER
[facility] ([facility]) ED Follow up Call     Reason for ED visit:   Flank Pain          Hi [place] , this is Dajuan Rodriguez from Dr. [facility] office, just calling to see how you are doing after your recent ED visit. Did you receive discharge instructions? Yes  Do you understand the discharge instructions? Yes  Did the ED give you any new prescriptions? Yes  Were you able to fill your prescriptions? No: Had some at home      Do you have one of our red, yellow and green  Zone sheets that help you to determine when you should go to the ED? Not Applicable      Do you need or want to make a follow up appt with your PCP? Not Applicable    Do you have any further needs in the home i.e. Equipment?   Not Applicable        FU appts/Provider:    Future Appointments   Date Time Provider Valerie Cosby   6/21/2022  3:00 PM Amena David, [address]

## 2022-06-21 ENCOUNTER — OFFICE VISIT (OUTPATIENT)
Dept: FAMILY MEDICINE CLINIC | Age: 52
End: 2022-06-21
Payer: MEDICAID

## 2022-06-21 VITALS
HEART RATE: 80 BPM | OXYGEN SATURATION: 98 % | TEMPERATURE: 98 F | DIASTOLIC BLOOD PRESSURE: 76 MMHG | SYSTOLIC BLOOD PRESSURE: 116 MMHG | WEIGHT: 272.2 LBS | BODY MASS INDEX: 48.23 KG/M2 | HEIGHT: 63 IN

## 2022-06-21 DIAGNOSIS — Z79.4 TYPE 2 DIABETES MELLITUS TREATED WITH INSULIN (HCC): ICD-10-CM

## 2022-06-21 DIAGNOSIS — D86.2 SARCOIDOSIS OF LUNG WITH SARCOIDOSIS OF LYMPH NODES (HCC): Primary | ICD-10-CM

## 2022-06-21 DIAGNOSIS — G89.29 OTHER CHRONIC PAIN: ICD-10-CM

## 2022-06-21 DIAGNOSIS — E11.9 TYPE 2 DIABETES MELLITUS TREATED WITH INSULIN (HCC): ICD-10-CM

## 2022-06-21 DIAGNOSIS — M47.816 SPONDYLOSIS OF LUMBAR REGION WITHOUT MYELOPATHY OR RADICULOPATHY: ICD-10-CM

## 2022-06-21 PROCEDURE — 3017F COLORECTAL CA SCREEN DOC REV: CPT | Performed by: NURSE PRACTITIONER

## 2022-06-21 PROCEDURE — 3046F HEMOGLOBIN A1C LEVEL >9.0%: CPT | Performed by: NURSE PRACTITIONER

## 2022-06-21 PROCEDURE — 1036F TOBACCO NON-USER: CPT | Performed by: NURSE PRACTITIONER

## 2022-06-21 PROCEDURE — 99214 OFFICE O/P EST MOD 30 MIN: CPT | Performed by: NURSE PRACTITIONER

## 2022-06-21 PROCEDURE — G8427 DOCREV CUR MEDS BY ELIG CLIN: HCPCS | Performed by: NURSE PRACTITIONER

## 2022-06-21 PROCEDURE — G8417 CALC BMI ABV UP PARAM F/U: HCPCS | Performed by: NURSE PRACTITIONER

## 2022-06-21 PROCEDURE — 2022F DILAT RTA XM EVC RTNOPTHY: CPT | Performed by: NURSE PRACTITIONER

## 2022-06-21 RX ORDER — PREGABALIN 200 MG/1
200 CAPSULE ORAL 3 TIMES DAILY
Qty: 90 CAPSULE | Refills: 2 | Status: SHIPPED | OUTPATIENT
Start: 2022-06-21 | End: 2022-08-01 | Stop reason: SDUPTHER

## 2022-06-21 ASSESSMENT — ENCOUNTER SYMPTOMS
SHORTNESS OF BREATH: 0
SINUS PRESSURE: 0
WHEEZING: 0
COUGH: 0

## 2022-06-21 NOTE — PATIENT INSTRUCTIONS
Patient Education        Chronic Pain: Care Instructions  Your Care Instructions     Chronic pain is pain that lasts a long time (months or even years) and may or may not have a clear cause. It is different from acute pain, which usually does have a clear cause--like an injury or illness--and gets better over time. Chronicpain:   Lasts over time but may vary from day to day.  Does not go away despite efforts to end it.  May disrupt your sleep and lead to fatigue.  May cause depression or anxiety.  May make your muscles tense, causing more pain.  Can disrupt your work, hobbies, home life, and relationships with friends and family. Chronic pain is a very real condition. It is not just in your head. Treatment can help and usually includes several methods used together, such as medicines, physical therapy, exercise, and other treatments. Learning how to relax andchanging negative thought patterns can also help you cope. Chronic pain is complex. Taking an active role in your treatment will help you better manage your pain. Tell your doctor if you have trouble dealing with your pain. You may have to try several things before you find what works best foryou. Follow-up care is a key part of your treatment and safety. Be sure to make and go to all appointments, and call your doctor if you are having problems. It's also a good idea to know your test results and keep alist of the medicines you take. How can you care for yourself at home?  Pace yourself. Break up large jobs into smaller tasks. Save harder tasks for days when you have less pain, or go back and forth between hard tasks and easier ones. Take rest breaks.  Relax, and reduce stress. Relaxation techniques such as deep breathing or meditation can help.  Keep moving. Gentle, daily exercise can help reduce pain over the long run. Try low- or no-impact exercises such as walking, swimming, and stationary biking. Do stretches to stay flexible.    Try heat, cold packs, and massage.  Get enough sleep. Chronic pain can make you tired and drain your energy. Talk with your doctor if you have trouble sleeping because of pain.  Think positive. Your thoughts can affect your pain level. Do things that you enjoy to distract yourself when you have pain instead of focusing on the pain. See a movie, read a book, listen to music, or spend time with a friend.  If you think you are depressed, talk to your doctor about treatment.  Keep a daily pain diary. Record how your moods, thoughts, sleep patterns, activities, and medicine affect your pain. You may find that your pain is worse during or after certain activities or when you are feeling a certain emotion. Having a record of your pain can help you and your doctor find the best ways to treat your pain.  Take pain medicines exactly as directed. ? If the doctor gave you a prescription medicine for pain, take it as prescribed. ? If you are not taking a prescription pain medicine, ask your doctor if you can take an over-the-counter medicine. Reducing constipation caused by pain medicine   Talk to your doctor about a laxative. If a laxative doesn't work, your doctor may suggest a prescription medicine.  Include fruits, vegetables, beans, and whole grains in your diet each day. These foods are high in fiber.  If your doctor recommends it, get more exercise. Walking is a good choice. Bit by bit, increase the amount you walk every day. Try for at least 30 minutes on most days of the week.  Schedule time each day for a bowel movement. A daily routine may help. Take your time and do not strain when having a bowel movement. When should you call for help? Call your doctor now or seek immediate medical care if:     Your pain gets worse or is out of control.      You feel down or blue, or you do not enjoy things like you once did. You may be depressed, which is common in people with chronic pain.  Depression can be treated.      You have vomiting or cramps for more than 2 hours. Watch closely for changes in your health, and be sure to contact your doctor if:     You cannot sleep because of pain.      You are very worried or anxious about your pain.      You have trouble taking your pain medicine.      You have any concerns about your pain medicine.      You have trouble with bowel movements, such as:  ? No bowel movement in 3 days. ? Blood in the anal area, in your stool, or on the toilet paper. ? Diarrhea for more than 24 hours. Where can you learn more? Go to https://[employer] org and sign in to your [employer] account. Enter N004 in the [facility] box to learn more about \"Chronic Pain: Care Instructions. \"     If you do not have an account, please click on the \"Sign Up Now\" link. Current as of: December 13, 2021               Content Version: 13.3  © 0084-5644 [employer], [employer]. Care instructions adapted under license by [facility] ([facility]). If you have questions about a medical condition or this instruction, always ask your healthcare professional. Ria Plasencia any warranty or liability for your use of this information.

## 2022-06-21 NOTE — PROGRESS NOTES
Subjective:      Patient ID: Kimberly Love is a 47 y.o. female who presents today for:     Chief Complaint   Patient presents with    Discuss Medications     would like to discuss getting back on lyrica. states she had to make an appointment to get refills       HPI Pt in today to f/u on chronic pain. She reports she was taking lyrica and ran out about a week ago. The pain has been horrific since being off lyrica. She had not had it filled for a while because she had a lot at home and didn't need to get a refill, but has not been off of it very long. She follows with endo for diabetes. Has been doing well. Denies any hypo or hyperglycemic symptoms. Past Medical History:   Diagnosis Date    Anxiety     Arthritis     Asthma     Bronchopneumonia     Cancer ([address]     renal    Cerebral artery occlusion with cerebral infarction (HCC)     Chronic bilateral low back pain with sciatica     Chronic kidney disease     Chronic obstructive lung disease ([address] 7/26/2019    Depression     Fibromyalgia     Gout     rt knee    Hypertension     Insulin dependent type 2 diabetes mellitus, uncontrolled ([address] 8/3/2018    Localized enlarged lymph nodes 10/26/2018    Mixed headache     Pure hyperglyceridemia 5/19/2017    Sarcoidosis     Sleep apnea     does not wear cpap    Thyroid goiter      Past Surgical History:   Procedure Laterality Date    BRONCHOSCOPY  10/26/2018    DR. STEARNS    KIDNEY REMOVAL Right 08/2016    KIDNEY REMOVAL Right 2016    LUNG BIOPSY Right 10/2018    THYROID LOBECTOMY Right 6/13/14    THYROIDECTOMY  02/21/2019    DR. MAGDALENO    THYROIDECTOMY  2018    URETER STENT PLACEMENT Left 08/2016     Family History   Problem Relation Age of Onset    Cancer Father     Diabetes Father     Allergy (Severe) Father     Heart Attack Father     Prostate Cancer Father     High Blood Pressure Mother     Diabetes Mother     Arthritis Mother     High Cholesterol Mother     Vision Loss Mother     Alcohol Abuse Neg Hx     Anemia Neg Hx     Arrhythmia Neg Hx     Asthma Neg Hx     Atrial Fibrillation Neg Hx     Birth Defects Neg Hx     Breast Cancer Neg Hx     Coronary Art Dis Neg Hx     Colon Cancer Neg Hx     Depression Neg Hx     Early Death Neg Hx     Hearing Loss Neg Hx     Heart Disease Neg Hx     Learning Disabilities Neg Hx     Kidney Disease Neg Hx     Mental Illness Neg Hx     Mental Retardation Neg Hx     Miscarriages / Stillbirths Neg Hx     Obesity Neg Hx     Osteoporosis Neg Hx     Stroke Neg Hx     Substance Abuse Neg Hx      Social History     Socioeconomic History    Marital status: Legally      Spouse name: Not on file    Number of children: Not on file    Years of education: 15    Highest education level: High school graduate   Occupational History    Not on file   Tobacco Use    Smoking status: Former Smoker     Packs/day: 0.50     Years: 15.00     Pack years: 7.50     Types: Cigarettes     Start date: 2014     Quit date: 3/1/2015     Years since quittin.3    Smokeless tobacco: Never Used   Vaping Use    Vaping Use: Never used   Substance and Sexual Activity    Alcohol use: Not Currently     Alcohol/week: 0.0 standard drinks     Comment: occasionally    Drug use: Not Currently     Frequency: 5.0 times per week     Types: Marijuana Paullette Nim)    Sexual activity: Yes     Partners: Male   Other Topics Concern    Not on file   Social History Narrative    Not on file     Social Determinants of Health     Financial Resource Strain: Low Risk     Difficulty of Paying Living Expenses: Not hard at all   Food Insecurity: No Food Insecurity    Worried About Running Out of Food in the Last Year: Never true    Maximiliano of Food in the Last Year: Never true   Transportation Needs: No Transportation Needs    Lack of Transportation (Medical): No    Lack of Transportation (Non-Medical):  No   Physical Activity:     Days of Exercise per Week: Not on file    Minutes of Exercise per Session: Not on file   Stress:     Feeling of Stress : Not on file   Social Connections:     Frequency of Communication with Friends and Family: Not on file    Frequency of Social Gatherings with Friends and Family: Not on file    Attends Christian Services: Not on file    Active Member of [address] [place] [address] or Organizations: Not on file    Attends Club or Organization Meetings: Not on file    Marital Status: Not on file   Intimate Partner Violence:     Fear of Current or Ex-Partner: Not on file    Emotionally Abused: Not on file    Physically Abused: Not on file    Sexually Abused: Not on file   Housing Stability:     Unable to Pay for Housing in the Last Year: Not on file    Number of [place] in the Last Year: Not on file    Unstable Housing in the Last Year: Not on file     Current Outpatient Medications on File Prior to Visit   Medication Sig Dispense Refill    magnesium oxide (MAG-OX) 400 (241.3 Mg) MG TABS tablet Take 1 tablet by mouth daily 30 tablet 5    busPIRone (BUSPAR) 15 MG tablet TAKE 1 TABLET BY MOUTH THREE TIMES DAILY 90 tablet 0    levothyroxine (SYNTHROID) 125 MCG tablet Take 1 tablet by mouth Daily 30 tablet 3    Insulin Degludec (TRESIBA FLEXTOUCH) 100 UNIT/ML SOPN INJECT 90 UNITS UNDER THE SKIN NIGHTLY pt needs 10 pens for a 30 day supply 15 pen 1    B-D ULTRAFINE III SHORT PEN 31G X 8 MM MISC USE THREE TIMES DAILY AND AS NEEDED 100 each 5    insulin lispro, 1 Unit Dial, (HUMALOG KWIKPEN) 100 UNIT/ML SOPN 22 units at each meals hold if glucose less than 150 5 pen 3    cetirizine (ZYRTEC) 10 MG tablet TAKE 1 TABLET BY MOUTH EVERY NIGHT AT BEDTIME AS NEEDED FOR ALLERGIES 30 tablet 5    montelukast (SINGULAIR) 10 MG tablet TAKE 1 TABLET BY MOUTH EVERY NIGHT AT SUPPER FOR BREATHING OR ALLERGIES 30 tablet 5    buPROPion (WELLBUTRIN XL) 150 MG extended release tablet TAKE 1 TABLET BY MOUTH EVERY MORNING 30 tablet 3    ondansetron (ZOFRAN ODT) 4 MG disintegrating tablet Take 1 tablet by mouth every 8 hours as needed for Nausea 20 tablet 0    Blood Glucose Monitoring Suppl (ONETOUCH VERIO) w/Device KIT As  Directed 1 kit 00    blood glucose test strips (ONETOUCH VERIO) strip Test 3x daily e11.65 100 each 3    albuterol sulfate  (90 Base) MCG/ACT inhaler INHALE 2 PUFFS INTO THE LUNGS EVERY 6 HOURS AS NEEDED FOR WHEEZING 6.7 g 1    meclizine (ANTIVERT) 12.5 MG tablet Take 12.5 mg by mouth 2 times daily as needed for Dizziness      diclofenac sodium (VOLTAREN) 1 % GEL Apply 2 g topically 4 times daily 150 g 0    butalbital-acetaminophen-caffeine (FIORICET, ESGIC) -40 MG per tablet Take 1 tablet by mouth every 6 hours as needed for Headaches 30 tablet 1    ondansetron (ZOFRAN) 4 MG tablet Take 1 tablet by mouth every 8 hours as needed for Nausea 20 tablet 0    budesonide-formoterol (SYMBICORT) 160-4.5 MCG/ACT AERO Inhale 2 puffs into the lungs 2 times daily 1 Inhaler 3    acetaminophen (TYLENOL) 500 MG tablet Take 1 tablet by mouth 4 times daily as needed for Pain 360 tablet 1    OneTouch Delica Lancets 98W MISC qid 200 each 3    Insulin Pen Needle (NOVOFINE) 32G X 6 MM MISC qid 300 each 3    blood glucose test strips (EXACTECH TEST) strip 1 each by In Vitro route 3 times daily (Accu-check test strips) As needed.  DX:E11.65 300 strip 5    ONE TOUCH ULTRASOFT LANCETS MISC TEST 3 TIMES DAILY AS NEEDED 300 each 3    albuterol (PROVENTIL) (2.5 MG/3ML) 0.083% nebulizer solution Take 3 mLs by nebulization every 4 hours as needed for Wheezing 120 each 3    atorvastatin (LIPITOR) 40 MG tablet Take 1 tablet by mouth nightly 30 tablet 3    Insulin Syringe-Needle U-100 30G X 1/2\" 1 ML MISC 1 each by Does not apply route daily 100 each 3    blood glucose test strips (ONETOUCH VERIO) strip TEST 3 TIMES DAILY AS NEEDED 300 each 3    Blood Glucose Monitoring Suppl (ONETOUCH VERIO) w/Device KIT TEST 3 TIMES DAILY AS NEEDED 1 kit 0    spironolactone (ALDACTONE) 50 MG tablet TAKE 1 TABLET BY MOUTH DAILY (Patient not taking: Reported on 6/21/2022) 30 tablet 3    pantoprazole (PROTONIX) 20 MG tablet Take 1 tablet by mouth daily (Patient not taking: Reported on 6/21/2022) 30 tablet 0    sucralfate (CARAFATE) 1 GM tablet Take 1 tablet by mouth 4 times daily (Patient not taking: Reported on 6/21/2022) 40 tablet 0    hydroxychloroquine (PLAQUENIL) 200 MG tablet TAKE 1 TABLET BY MOUTH TWICE DAILY (Patient not taking: Reported on 6/21/2022) 60 tablet 2    dicyclomine (BENTYL) 10 MG capsule Take 1 capsule by mouth every 6 hours as needed (cramps) (Patient not taking: Reported on 6/21/2022) 120 capsule 0    fluticasone (FLONASE) 50 MCG/ACT nasal spray SHAKE LIQUID AND USE 1 SPRAY IN EACH NOSTRIL DAILY (Patient not taking: Reported on 6/21/2022) 16 g 5    metoclopramide (REGLAN) 10 MG tablet Take 1 tablet by mouth 2 times daily as needed (Headache) (Patient not taking: Reported on 6/21/2022) 60 tablet 0    baclofen (LIORESAL) 10 MG tablet Take 1 tablet by mouth 3 times daily (Patient not taking: Reported on 6/21/2022) 60 tablet 0    SUMAtriptan (IMITREX) 25 MG tablet TAKE 1 TABLET BY MOUTH 1 TIME AS NEEDED FOR MIGRAINE (Patient not taking: Reported on 6/21/2022) 9 tablet 1     No current facility-administered medications on file prior to visit. Allergies:  Shellfish-derived products, Hydromorphone, Ibuprofen, Ketorolac, Morphine, Other, Penicillin g, Penicillins, Propoxyphene n-acetaminophen, Shellfish allergy, and Toradol [ketorolac tromethamine]    Review of Systems   Constitutional: Negative for chills, fatigue and fever. HENT: Negative for congestion, ear pain, postnasal drip and sinus pressure. Respiratory: Negative for cough, shortness of breath and wheezing. Cardiovascular: Negative for chest pain, palpitations and leg swelling. Endocrine: Negative for polydipsia, polyphagia and polyuria. Neurological: Negative for dizziness and headaches. Objective:   /76 (Site: Right Upper Arm, Position: Sitting, Cuff Size: Large Adult)   Pulse 80   Temp 98 °F (36.7 °C) (Temporal)   Ht 5' 3\" (1.6 m)   Wt 272 lb 3.2 oz (123.5 kg)   LMP 06/01/2021   SpO2 98%   BMI 48.22 kg/m²     Physical Exam  Constitutional:       Appearance: She is well-developed. HENT:      Head: Normocephalic. Right Ear: Tympanic membrane, ear canal and external ear normal.      Left Ear: Tympanic membrane, ear canal and external ear normal.      Nose: Nose normal.      Mouth/Throat:      Mouth: Mucous membranes are moist.      Pharynx: Oropharynx is clear. Uvula midline. Eyes:      General:         Right eye: No discharge. Left eye: No discharge. Conjunctiva/sclera: Conjunctivae normal.   Cardiovascular:      Rate and Rhythm: Normal rate and regular rhythm. Heart sounds: Normal heart sounds. Pulmonary:      Effort: Pulmonary effort is normal. No respiratory distress. Breath sounds: Normal breath sounds. Musculoskeletal:      Cervical back: Normal range of motion. Lymphadenopathy:      Cervical: No cervical adenopathy. Skin:     General: Skin is warm and dry. Neurological:      Mental Status: She is alert and oriented to person, place, and time. Psychiatric:         Mood and Affect: Mood normal.         Behavior: Behavior normal.         Assessment:          Diagnosis Orders   1. Sarcoidosis of lung with sarcoidosis of lymph nodes ([facility]     2. Spondylosis of lumbar region without myelopathy or radiculopathy--OARRS PM&R 5/24/17  pregabalin (LYRICA) 200 MG capsule   3. Other chronic pain  pregabalin (LYRICA) 200 MG capsule   4. Type 2 diabetes mellitus treated with insulin ([facility]         Plan:      No orders of the defined types were placed in this encounter. Orders Placed This Encounter   Medications    pregabalin (LYRICA) 200 MG capsule     Sig: Take 1 capsule by mouth three times daily for 30 days.      Dispense:  90 capsule Refill:  2       Return in about 6 months (around 12/21/2022), or if symptoms worsen or fail to improve. 1. Spondylosis of lumbar region without myelopathy or radiculopathy--OARRS PM&R 5/24/17  Doing well. Okay to continue. PDMP reviewed. - pregabalin (LYRICA) 200 MG capsule; Take 1 capsule by mouth three times daily for 30 days. Dispense: 90 capsule; Refill: 2    2. Other chronic pain    - pregabalin (LYRICA) 200 MG capsule; Take 1 capsule by mouth three times daily for 30 days. Dispense: 90 capsule; Refill: 2    3. Sarcoidosis of lung with sarcoidosis of lymph nodes ([facility]  Stable. 4. Type 2 diabetes mellitus treated with insulin ([facility]  Continue f/u with endo        Reviewed with the patient: current clinicalstatus, medications, activities and diet. Side effects, adverse effects of the medication prescribedtoday, as well as treatment plan/ rationale and result expectations have been discussedwith the patient who expresses understanding and desires to proceed. Close follow upto evaluate treatment results and for coordination of care. I have reviewedthe patient's medical history in detail and updated the computerized patient record.     SUDEEP Beth - CNP

## 2022-06-30 DIAGNOSIS — F41.9 ANXIETY: ICD-10-CM

## 2022-06-30 RX ORDER — SPIRONOLACTONE 50 MG/1
50 TABLET, FILM COATED ORAL DAILY
Qty: 30 TABLET | Refills: 3 | Status: SHIPPED | OUTPATIENT
Start: 2022-06-30

## 2022-06-30 RX ORDER — BUSPIRONE HYDROCHLORIDE 15 MG/1
TABLET ORAL
Qty: 90 TABLET | Refills: 0 | Status: SHIPPED | OUTPATIENT
Start: 2022-06-30 | End: 2022-07-31

## 2022-07-11 DIAGNOSIS — R79.0 LOW MAGNESIUM LEVEL: ICD-10-CM

## 2022-07-12 NOTE — TELEPHONE ENCOUNTER
Rx requested:  Requested Prescriptions     Pending Prescriptions Disp Refills    magnesium oxide (MAG-OX) 400 (241.3 Mg) MG TABS tablet 30 tablet 5     Sig: Take 1 tablet by mouth daily         Last Office Visit:   6/21/2022      Next Visit Date:  Future Appointments   Date Time Provider Valerie Cosby   12/21/2022  3:15  [address]

## 2022-07-14 DIAGNOSIS — Z79.4 TYPE 2 DIABETES MELLITUS WITH HYPERGLYCEMIA, WITH LONG-TERM CURRENT USE OF INSULIN (HCC): ICD-10-CM

## 2022-07-14 DIAGNOSIS — E11.65 TYPE 2 DIABETES MELLITUS WITH HYPERGLYCEMIA, WITH LONG-TERM CURRENT USE OF INSULIN (HCC): ICD-10-CM

## 2022-07-15 RX ORDER — INSULIN DEGLUDEC INJECTION 100 U/ML
INJECTION, SOLUTION SUBCUTANEOUS
Qty: 15 PEN | Refills: 1 | Status: SHIPPED | OUTPATIENT
Start: 2022-07-15 | End: 2022-07-22 | Stop reason: SDUPTHER

## 2022-07-17 ENCOUNTER — PATIENT MESSAGE (OUTPATIENT)
Dept: ENDOCRINOLOGY | Age: 52
End: 2022-07-17

## 2022-07-17 DIAGNOSIS — Z79.4 TYPE 2 DIABETES MELLITUS WITH HYPERGLYCEMIA, WITH LONG-TERM CURRENT USE OF INSULIN (HCC): ICD-10-CM

## 2022-07-17 DIAGNOSIS — E11.65 TYPE 2 DIABETES MELLITUS WITH HYPERGLYCEMIA, WITH LONG-TERM CURRENT USE OF INSULIN (HCC): ICD-10-CM

## 2022-07-22 RX ORDER — INSULIN DEGLUDEC INJECTION 100 U/ML
INJECTION, SOLUTION SUBCUTANEOUS
Qty: 15 PEN | Refills: 2 | Status: SHIPPED | OUTPATIENT
Start: 2022-07-22 | End: 2022-09-07 | Stop reason: SDUPTHER

## 2022-07-22 NOTE — TELEPHONE ENCOUNTER
From: Tara Zhang  To: Dr. Liseth Todd  Sent: 7/17/2022 5:44 PM EDT  Subject: Insulin     I was sent over one pen of insulin I was told by the pharmacy, usually I'm sent at least a months worth because I use 80 units each sitting and its only for doses per pen for me. So I'll be calling back to refill about every other week. So could you fill enough so I won't have to call back until the end of the month like usual. Thank you. Also I need my pramacy changed. ..  My new pramacy is [phone]

## 2022-07-26 ENCOUNTER — PATIENT MESSAGE (OUTPATIENT)
Dept: FAMILY MEDICINE CLINIC | Age: 52
End: 2022-07-26

## 2022-07-26 DIAGNOSIS — M54.6 BILATERAL THORACIC BACK PAIN, UNSPECIFIED CHRONICITY: Primary | ICD-10-CM

## 2022-07-30 DIAGNOSIS — J45.41 MODERATE PERSISTENT ASTHMA WITH ACUTE EXACERBATION: ICD-10-CM

## 2022-07-30 DIAGNOSIS — F41.9 ANXIETY: ICD-10-CM

## 2022-07-30 DIAGNOSIS — J30.1 CHRONIC SEASONAL ALLERGIC RHINITIS DUE TO POLLEN: ICD-10-CM

## 2022-07-30 NOTE — TELEPHONE ENCOUNTER
requesting medication refill.  Please approve or deny this request.    Rx requested:  Requested Prescriptions     Pending Prescriptions Disp Refills    montelukast (SINGULAIR) 10 MG tablet [Pharmacy Med Name: MONTELUKAST 10MG TABLETS] 30 tablet 5     Sig: TAKE 1 TABLET BY MOUTH EVERY NIGHT AT SUPPER FOR BREATHING OR ALLERGIES    busPIRone (BUSPAR) 15 MG tablet [Pharmacy Med Name: BUSPIRONE 15MG TABLETS] 90 tablet 0     Sig: TAKE 1 TABLET BY MOUTH THREE TIMES DAILY    buPROPion (WELLBUTRIN XL) 150 MG extended release tablet [Pharmacy Med Name: BUPROPION XL 150MG TABLETS (24 H)] 30 tablet 3     Sig: TAKE 1 TABLET BY MOUTH EVERY MORNING         Last Office Visit:   6/21/2022      Next Visit Date:  Future Appointments   Date Time Provider Valerie Cosby   12/21/2022  3:15 PM Lilibeth Marcos, [address]

## 2022-07-31 RX ORDER — BUSPIRONE HYDROCHLORIDE 15 MG/1
TABLET ORAL
Qty: 90 TABLET | Refills: 3 | Status: SHIPPED | OUTPATIENT
Start: 2022-07-31

## 2022-07-31 RX ORDER — MONTELUKAST SODIUM 10 MG/1
TABLET ORAL
Qty: 30 TABLET | Refills: 5 | Status: SHIPPED | OUTPATIENT
Start: 2022-07-31

## 2022-07-31 RX ORDER — BUPROPION HYDROCHLORIDE 150 MG/1
150 TABLET ORAL EVERY MORNING
Qty: 30 TABLET | Refills: 3 | Status: SHIPPED | OUTPATIENT
Start: 2022-07-31 | End: 2022-08-29

## 2022-08-01 DIAGNOSIS — M47.816 SPONDYLOSIS OF LUMBAR REGION WITHOUT MYELOPATHY OR RADICULOPATHY: ICD-10-CM

## 2022-08-01 DIAGNOSIS — G89.29 OTHER CHRONIC PAIN: ICD-10-CM

## 2022-08-01 NOTE — TELEPHONE ENCOUNTER
requesting medication refill. Please approve or deny this request.    Rx requested:  Requested Prescriptions     Pending Prescriptions Disp Refills    pregabalin (LYRICA) 200 MG capsule 90 capsule 2     Sig: Take 1 capsule by mouth in the morning and 1 capsule at noon and 1 capsule in the evening. Do all this for 30 days.          Last Office Visit:   6/21/2022      Next Visit Date:  Future Appointments   Date Time Provider Valerie Cosby   12/21/2022  3:15 PM Lauren Agosto, [address]

## 2022-08-03 RX ORDER — PREGABALIN 200 MG/1
200 CAPSULE ORAL 3 TIMES DAILY
Qty: 90 CAPSULE | Refills: 2 | Status: SHIPPED | OUTPATIENT
Start: 2022-08-03 | End: 2022-09-02

## 2022-08-08 DIAGNOSIS — M54.50 ACUTE LOW BACK PAIN, UNSPECIFIED BACK PAIN LATERALITY, UNSPECIFIED WHETHER SCIATICA PRESENT: ICD-10-CM

## 2022-08-08 RX ORDER — LIDOCAINE 50 MG/G
1 PATCH TOPICAL DAILY
Qty: 10 PATCH | Refills: 0 | Status: SHIPPED | OUTPATIENT
Start: 2022-08-08 | End: 2022-08-18

## 2022-08-08 NOTE — TELEPHONE ENCOUNTER
Patient requesting medication refill. Please approve or deny this request.    Rx requested:  Requested Prescriptions     Pending Prescriptions Disp Refills    lidocaine (LIDODERM) 5 % 10 patch 0     Sig: Place 1 patch onto the skin in the morning for 10 days. 12 hours on, 12 hours off. .         Last Office Visit:   6/21/2022      Next Visit Date:  Future Appointments   Date Time Provider Valerie Cosby   12/21/2022  3:15 PM Camila Scott, [address]

## 2022-08-16 RX ORDER — PEN NEEDLE, DIABETIC 31 GX5/16"
1 NEEDLE, DISPOSABLE MISCELLANEOUS 3 TIMES DAILY
Qty: 100 EACH | Refills: 5 | Status: SHIPPED | OUTPATIENT
Start: 2022-08-16

## 2022-08-29 DIAGNOSIS — F41.9 ANXIETY: ICD-10-CM

## 2022-08-29 DIAGNOSIS — J30.1 ACUTE SEASONAL ALLERGIC RHINITIS DUE TO POLLEN: ICD-10-CM

## 2022-08-29 RX ORDER — BUPROPION HYDROCHLORIDE 150 MG/1
150 TABLET ORAL EVERY MORNING
Qty: 30 TABLET | Refills: 0 | Status: SHIPPED | OUTPATIENT
Start: 2022-08-29

## 2022-08-29 RX ORDER — CETIRIZINE HYDROCHLORIDE 10 MG/1
TABLET ORAL
Qty: 30 TABLET | Refills: 0 | Status: SHIPPED | OUTPATIENT
Start: 2022-08-29 | End: 2022-09-28

## 2022-08-29 NOTE — TELEPHONE ENCOUNTER
Requested Prescriptions     Pending Prescriptions Disp Refills    cetirizine (ZYRTEC) 10 MG tablet [Pharmacy Med Name: CETIRIZINE 10MG TABLETS] 30 tablet 5     Sig: TAKE 1 TABLET BY MOUTH EVERY NIGHT AT BEDTIME AS NEEDED FOR ALLERGIES

## 2022-08-29 NOTE — TELEPHONE ENCOUNTER
Requested Prescriptions     Pending Prescriptions Disp Refills    buPROPion (WELLBUTRIN XL) 150 MG extended release tablet [Pharmacy Med Name: BUPROPION XL 150MG TABLETS (24 H)] 30 tablet 3     Sig: TAKE 1 TABLET BY MOUTH EVERY MORNING

## 2022-09-07 DIAGNOSIS — E11.65 TYPE 2 DIABETES MELLITUS WITH HYPERGLYCEMIA, WITH LONG-TERM CURRENT USE OF INSULIN (HCC): ICD-10-CM

## 2022-09-07 DIAGNOSIS — Z79.4 TYPE 2 DIABETES MELLITUS WITH HYPERGLYCEMIA, WITH LONG-TERM CURRENT USE OF INSULIN (HCC): ICD-10-CM

## 2022-09-07 RX ORDER — LEVOTHYROXINE SODIUM 0.12 MG/1
125 TABLET ORAL DAILY
Qty: 30 TABLET | Refills: 3 | Status: SHIPPED | OUTPATIENT
Start: 2022-09-07

## 2022-09-08 RX ORDER — INSULIN DEGLUDEC INJECTION 100 U/ML
INJECTION, SOLUTION SUBCUTANEOUS
Qty: 15 ML | Refills: 3 | Status: SHIPPED | OUTPATIENT
Start: 2022-09-08

## 2022-09-11 DIAGNOSIS — R79.0 LOW MAGNESIUM LEVEL: ICD-10-CM

## 2022-09-28 DIAGNOSIS — J30.1 ACUTE SEASONAL ALLERGIC RHINITIS DUE TO POLLEN: ICD-10-CM

## 2022-09-28 RX ORDER — CETIRIZINE HYDROCHLORIDE 10 MG/1
TABLET ORAL
Qty: 30 TABLET | Refills: 2 | Status: SHIPPED | OUTPATIENT
Start: 2022-09-28

## 2022-10-05 RX ORDER — ALBUTEROL SULFATE 90 UG/1
2 AEROSOL, METERED RESPIRATORY (INHALATION) EVERY 6 HOURS PRN
Qty: 6.7 G | Refills: 1 | Status: SHIPPED | OUTPATIENT
Start: 2022-10-05 | End: 2022-11-03

## 2022-10-05 NOTE — TELEPHONE ENCOUNTER
Rx requested:  Requested Prescriptions     Pending Prescriptions Disp Refills    albuterol sulfate HFA (PROVENTIL;VENTOLIN;PROAIR) 108 (90 Base) MCG/ACT inhaler 6.7 g 1     Sig: Inhale 2 puffs into the lungs every 6 hours as needed for Wheezing         Last Office Visit:   6/21/2022      Next Visit Date:  Future Appointments   Date Time Provider Valerie Cosby   12/21/2022  3:15 PM Gladis López, [address]

## 2022-10-29 NOTE — CARE COORDINATION
Ambulatory Care Coordination Note  2/17/2021  CM Risk Score: 11  Charlson 10 Year Mortality Risk Score: 100%     ACC: Dorita House, RN    Summary Note: ACM CALLED PATIENT TO FOLLOW FOR ONGOING CARE COORDINATION NEEDS:      1. denies any more chest pain, still states taking Wellbutrin to help with depression and xanax and buspar to help with anxiety. 2-prescriptions - states she has all her prescriptions-using pill box,     3 review diet instructions, Dietician following- , monitoring concentrated sweets and carb intake, offered encouragement.                4. Check on COPD, states she is breathing better  using nebulizer less, only at bedtime,taking singulair now and feels it has helped her                  a lot , follow up with Dr Everlina Schlatter 4/7/21,                              5, blood sugars  -reinforce need to check tid- blood glucose >200-this am -states blood sugar being up may be related to dental infection, she is taking antibiotics and following with her dentist on Monday 2/.22/21, stressed need to check BG  three times a day and use coverage as ordered, follow up with  next week tayo. Hgba1c is higher   Lab Results   Component Value Date    LABA1C 7.9 02/09/2021    LABA1C 6.4 (H) 01/10/2020    LABA1C 6.3 (H) 05/19/2019       Care Coordination Plan of Care: This nurse Care Coordinator will follow for ongoing needs                 1. Check on COPD, pulmonology new pt Dr. Radha Toribio  4/7/21              2, check blood sugars- is she checking Tid- did she get repeat lab work? ? F/u scheduled with Dr. Audie Johnson- see ntoes              3. Anxiety/depression-is she feeling better? ?                                          Lab Results         Care Coordination Interventions    Program Enrollment: Complex Care  Referral from Primary Care Provider: No  Suggested Interventions and Community Resources  Medi Set or Pill Pack: Not Started (Comment: 1/5/2021 Discussed picking up medication box.) Patient not taking: Reported on 2/9/2021 11/4/20   Alon Bran PA-C   magnesium oxide (MAG-OX) 400 (241.3 Mg) MG TABS tablet TAKE 1/2 TABLET BY MOUTH DAILY 11/2/20   SUDEEP Raza CNP   busPIRone (BUSPAR) 15 MG tablet TAKE 1 TABLET BY MOUTH THREE TIMES DAILY 9/28/20   SUDEEP Redmond CNP   albuterol sulfate HFA (PROVENTIL HFA) 108 (90 Base) MCG/ACT inhaler Inhale 2 puffs into the lungs every 6 hours as needed for Wheezing 9/9/20   SUDEEP Raza CNP   cetirizine (ZYRTEC) 10 MG tablet TAKE 1 TABLET BY MOUTH EVERY NIGHT AT BEDTIME AS NEEDED FOR ALLERGIES 8/27/20   SUDEEP Raza CNP   insulin lispro (HUMALOG) 100 UNIT/ML injection vial INJECT 4 UNITS UNDER THE SKIN THREE TIMES DAILY AS NEEDED FOR HIGH BLOOD SUGAR 7/10/20   SUDEEP Redmond CNP   levothyroxine (SYNTHROID) 150 MCG tablet Take 150 mcg by mouth Daily  6/28/20   Historical Provider, MD   SUMAtriptan (IMITREX) 25 MG tablet TAKE 1 TABLET BY MOUTH 1 TIME AS NEEDED FOR MIGRAINE 5/1/20   SUDEEP Raza CNP   promethazine (PHENERGAN) 25 MG tablet WARNING:  May cause drowsiness. May impair ability to operate vehicles or machinery. Do not use in combination with alcohol. ! Tablet every 6 hours as needed in conjunction with Ultram for headaches 1/18/20   Hiwot Mejia PA-C   blood glucose test strips (EXACTECH TEST) strip 1 each by In Vitro route 3 times daily (Accu-check test strips) As needed.  DX:E11.65 12/2/19   SUDEEP Redmond CNP   ONE TOUCH ULTRASOFT LANCETS MISC TEST 3 TIMES DAILY AS NEEDED 12/2/19   SUDEEP Redmond CNP   albuterol (PROVENTIL) (2.5 MG/3ML) 0.083% nebulizer solution Take 3 mLs by nebulization every 4 hours as needed for Wheezing 11/5/19   Maria Mtj Bartas,    metoclopramide (REGLAN) 10 MG tablet Take 1 tablet by mouth 4 times daily 10/27/19   Ema Echevarria, APRN - CNP atorvastatin (LIPITOR) 40 MG tablet Take 1 tablet by mouth nightly 9/11/19 Josephine Friday, DO   metoprolol tartrate (LOPRESSOR) 25 MG tablet Take 1 tablet by mouth 2 times daily 9/11/19 Josephine Friday, DO   fluticasone [facility]) 50 MCG/ACT nasal spray 1 spray by Nasal route daily 6/4/19   SUDEEP Jay CNP   traMADol (ULTRAM) 50 MG tablet Take 50 mg by mouth every 6 hours as needed for Pain.     Historical Provider, MD   Insulin Syringe-Needle U-100 30G X 1/2\" 1 ML MISC 1 each by Does not apply route daily 11/16/18   Sparks Laughter, DO   blood glucose test strips (ONETOUCH VERIO) strip TEST 3 TIMES DAILY AS NEEDED 8/16/18   Sparks Laughter, DO   Blood Glucose Monitoring Suppl (Nelly Stiles) w/Device KIT TEST 3 TIMES DAILY AS NEEDED 8/16/18   Sparks Laughter, DO       Future Appointments   Date Time Provider Valerie Cosby   2/23/2021 10:00 AM Christopher Machado MD [facility]   3/9/2021 10:30 AM SUDEEP Jay CNP [facility]   4/7/2021  3:30 PM Eufemia Guo MD  [address]   11/5/2021  9:15 AM Elpidio Barnes MD [facility] no

## 2022-11-03 DIAGNOSIS — F41.9 ANXIETY: ICD-10-CM

## 2022-11-03 DIAGNOSIS — R79.0 LOW MAGNESIUM LEVEL: ICD-10-CM

## 2022-11-03 RX ORDER — VENLAFAXINE HYDROCHLORIDE 37.5 MG/1
37.5 CAPSULE, EXTENDED RELEASE ORAL DAILY
Qty: 30 CAPSULE | Refills: 2 | Status: SHIPPED | OUTPATIENT
Start: 2022-11-03 | End: 2022-12-08 | Stop reason: ALTCHOICE

## 2022-11-03 RX ORDER — ALBUTEROL SULFATE 90 UG/1
2 AEROSOL, METERED RESPIRATORY (INHALATION) EVERY 6 HOURS PRN
Qty: 6.7 G | Refills: 1 | Status: SHIPPED | OUTPATIENT
Start: 2022-11-03

## 2022-11-03 RX ORDER — MAGNESIUM OXIDE TAB 400 MG (241.3 MG ELEMENTAL MG) 400 (241.3 MG) MG
TAB ORAL
Qty: 30 TABLET | Refills: 5 | Status: SHIPPED | OUTPATIENT
Start: 2022-11-03 | End: 2022-12-08

## 2022-11-03 NOTE — TELEPHONE ENCOUNTER
Rx requested:  Requested Prescriptions     Pending Prescriptions Disp Refills    MAGNESIUM-OXIDE 400 (240 Mg) MG tablet [Pharmacy Med Name: MAG-OXIDE 400MG TABLETS] 30 tablet 0     Sig: TAKE 1/2 TABLET BY MOUTH DAILY    albuterol sulfate HFA (PROVENTIL;VENTOLIN;PROAIR) 108 (90 Base) MCG/ACT inhaler [Pharmacy Med Name: ALBUTEROL HFA INH (200 PUFFS) 6.7GM] 6.7 g 1     Sig: INHALE 2 PUFFS INTO THE LUNGS EVERY 6 HOURS AS NEEDED FOR WHEEZING    venlafaxine (EFFEXOR XR) 37.5 MG extended release capsule [Pharmacy Med Name: VENLAFAXINE ER 37.5MG CAPSULES] 30 capsule 2     Sig: TAKE 1 CAPSULE BY MOUTH DAILY         Last Office Visit:   6/21/2022      Next Visit Date:  Future Appointments   Date Time Provider Valerie Cosby   12/21/2022  3:15 PM Lilibeth Harris, [address]

## 2022-11-08 DIAGNOSIS — F41.9 ANXIETY: ICD-10-CM

## 2022-11-08 DIAGNOSIS — M47.816 SPONDYLOSIS OF LUMBAR REGION WITHOUT MYELOPATHY OR RADICULOPATHY: ICD-10-CM

## 2022-11-08 DIAGNOSIS — J45.41 MODERATE PERSISTENT ASTHMA WITH ACUTE EXACERBATION: ICD-10-CM

## 2022-11-08 DIAGNOSIS — Z79.4 TYPE 2 DIABETES MELLITUS WITH HYPERGLYCEMIA, WITH LONG-TERM CURRENT USE OF INSULIN (HCC): ICD-10-CM

## 2022-11-08 DIAGNOSIS — E11.65 TYPE 2 DIABETES MELLITUS WITH HYPERGLYCEMIA, WITH LONG-TERM CURRENT USE OF INSULIN (HCC): ICD-10-CM

## 2022-11-08 DIAGNOSIS — G89.29 OTHER CHRONIC PAIN: ICD-10-CM

## 2022-11-08 DIAGNOSIS — J30.1 CHRONIC SEASONAL ALLERGIC RHINITIS DUE TO POLLEN: ICD-10-CM

## 2022-11-09 RX ORDER — INSULIN DEGLUDEC INJECTION 100 U/ML
INJECTION, SOLUTION SUBCUTANEOUS
Qty: 15 ML | Refills: 0 | Status: SHIPPED | OUTPATIENT
Start: 2022-11-09

## 2022-11-09 RX ORDER — INSULIN LISPRO 100 [IU]/ML
INJECTION, SOLUTION INTRAVENOUS; SUBCUTANEOUS
Qty: 5 ADJUSTABLE DOSE PRE-FILLED PEN SYRINGE | Refills: 0 | Status: SHIPPED | OUTPATIENT
Start: 2022-11-09

## 2022-11-09 RX ORDER — SPIRONOLACTONE 50 MG/1
50 TABLET, FILM COATED ORAL DAILY
Qty: 30 TABLET | Refills: 0 | Status: SHIPPED | OUTPATIENT
Start: 2022-11-09

## 2022-11-09 NOTE — TELEPHONE ENCOUNTER
requesting medication refill. Please approve or deny this request.    Rx requested:  Requested Prescriptions     Pending Prescriptions Disp Refills    montelukast (SINGULAIR) 10 MG tablet 30 tablet 5    busPIRone (BUSPAR) 15 MG tablet 90 tablet 3    pregabalin (LYRICA) 200 MG capsule 90 capsule 2     Sig: Take 1 capsule by mouth three times daily for 30 days.          Last Office Visit:   6/21/2022      Next Visit Date:  Future Appointments   Date Time Provider Valerie Cosby   12/21/2022  3:15 PM Cholo Newell, [address]

## 2022-11-11 RX ORDER — BUSPIRONE HYDROCHLORIDE 15 MG/1
15 TABLET ORAL 3 TIMES DAILY
Qty: 90 TABLET | Refills: 3 | Status: SHIPPED | OUTPATIENT
Start: 2022-11-11

## 2022-11-11 RX ORDER — PREGABALIN 200 MG/1
200 CAPSULE ORAL 3 TIMES DAILY
Qty: 90 CAPSULE | Refills: 0 | Status: SHIPPED | OUTPATIENT
Start: 2022-11-11 | End: 2022-12-11

## 2022-11-11 RX ORDER — MONTELUKAST SODIUM 10 MG/1
10 TABLET ORAL NIGHTLY
Qty: 30 TABLET | Refills: 5 | Status: SHIPPED | OUTPATIENT
Start: 2022-11-11

## 2022-11-22 ENCOUNTER — PATIENT MESSAGE (OUTPATIENT)
Dept: FAMILY MEDICINE CLINIC | Age: 52
End: 2022-11-22

## 2022-11-22 DIAGNOSIS — K08.89 PAIN, DENTAL: Primary | ICD-10-CM

## 2022-11-22 RX ORDER — CLINDAMYCIN HYDROCHLORIDE 300 MG/1
300 CAPSULE ORAL 3 TIMES DAILY
Qty: 30 CAPSULE | Refills: 0 | Status: SHIPPED | OUTPATIENT
Start: 2022-11-22 | End: 2022-12-02

## 2022-11-22 NOTE — TELEPHONE ENCOUNTER
From: Logan Diop  To: Larissa Fletcher  Sent: 11/22/2022 9:35 AM EST  Subject: Antibiotic    Could you send something to the pharmacy for a toothache. Anything other than penicillin products because I'm allergic to those.  Thank you

## 2022-12-06 DIAGNOSIS — Z79.4 TYPE 2 DIABETES MELLITUS WITH HYPERGLYCEMIA, WITH LONG-TERM CURRENT USE OF INSULIN (HCC): ICD-10-CM

## 2022-12-06 DIAGNOSIS — E11.65 TYPE 2 DIABETES MELLITUS WITH HYPERGLYCEMIA, WITH LONG-TERM CURRENT USE OF INSULIN (HCC): ICD-10-CM

## 2022-12-07 RX ORDER — INSULIN LISPRO 100 [IU]/ML
INJECTION, SOLUTION INTRAVENOUS; SUBCUTANEOUS
Qty: 15 ML | OUTPATIENT
Start: 2022-12-07

## 2022-12-08 ENCOUNTER — OFFICE VISIT (OUTPATIENT)
Dept: ENDOCRINOLOGY | Age: 52
End: 2022-12-08

## 2022-12-08 VITALS
SYSTOLIC BLOOD PRESSURE: 118 MMHG | HEIGHT: 63 IN | WEIGHT: 274 LBS | OXYGEN SATURATION: 94 % | DIASTOLIC BLOOD PRESSURE: 75 MMHG | BODY MASS INDEX: 48.55 KG/M2 | HEART RATE: 78 BPM

## 2022-12-08 DIAGNOSIS — E11.65 TYPE 2 DIABETES MELLITUS WITH HYPERGLYCEMIA, WITH LONG-TERM CURRENT USE OF INSULIN (HCC): Primary | ICD-10-CM

## 2022-12-08 DIAGNOSIS — E11.9 TYPE 2 DIABETES MELLITUS TREATED WITH INSULIN (HCC): ICD-10-CM

## 2022-12-08 DIAGNOSIS — Z79.4 TYPE 2 DIABETES MELLITUS WITH HYPERGLYCEMIA, WITH LONG-TERM CURRENT USE OF INSULIN (HCC): Primary | ICD-10-CM

## 2022-12-08 DIAGNOSIS — Z79.4 TYPE 2 DIABETES MELLITUS TREATED WITH INSULIN (HCC): ICD-10-CM

## 2022-12-08 DIAGNOSIS — E89.0 POSTPROCEDURAL HYPOTHYROIDISM: ICD-10-CM

## 2022-12-08 LAB
CHP ED QC CHECK: NORMAL
GLUCOSE BLD-MCNC: 155 MG/DL
HBA1C MFR BLD: 7.8 %

## 2022-12-08 RX ORDER — BLOOD SUGAR DIAGNOSTIC
STRIP MISCELLANEOUS
Qty: 100 EACH | Refills: 3 | Status: SHIPPED | OUTPATIENT
Start: 2022-12-08

## 2022-12-08 RX ORDER — INSULIN DEGLUDEC INJECTION 100 U/ML
INJECTION, SOLUTION SUBCUTANEOUS
Qty: 15 ML | Refills: 0 | Status: SHIPPED | OUTPATIENT
Start: 2022-12-08 | End: 2022-12-14 | Stop reason: SDUPTHER

## 2022-12-08 RX ORDER — FLASH GLUCOSE SENSOR
KIT MISCELLANEOUS
Qty: 2 EACH | Refills: 3 | Status: SHIPPED | OUTPATIENT
Start: 2022-12-08

## 2022-12-08 RX ORDER — INSULIN LISPRO 100 [IU]/ML
INJECTION, SOLUTION INTRAVENOUS; SUBCUTANEOUS
Qty: 5 ADJUSTABLE DOSE PRE-FILLED PEN SYRINGE | Refills: 0 | Status: SHIPPED | OUTPATIENT
Start: 2022-12-08 | End: 2022-12-14 | Stop reason: SDUPTHER

## 2022-12-08 RX ORDER — SEMAGLUTIDE 1.34 MG/ML
INJECTION, SOLUTION SUBCUTANEOUS
COMMUNITY
Start: 2022-11-15 | End: 2022-12-14 | Stop reason: SINTOL

## 2022-12-08 RX ORDER — FLASH GLUCOSE SCANNING READER
EACH MISCELLANEOUS
Qty: 1 EACH | Refills: 0 | Status: SHIPPED | OUTPATIENT
Start: 2022-12-08

## 2022-12-08 RX ORDER — PEN NEEDLE, DIABETIC 31 GX5/16"
1 NEEDLE, DISPOSABLE MISCELLANEOUS 3 TIMES DAILY
Qty: 100 EACH | Refills: 5 | Status: SHIPPED | OUTPATIENT
Start: 2022-12-08

## 2022-12-08 NOTE — PROGRESS NOTES
2022    Assessment:       Diagnosis Orders   1. Type 2 diabetes mellitus with hyperglycemia, with long-term current use of insulin ([facility])  POCT Glucose    POCT glycosylated hemoglobin (Hb A1C)      2. Type 2 diabetes mellitus treated with insulin ([facility] [address]        3. Postprocedural hypothyroidism              PLAN:     Orders Placed This Encounter   Procedures    Hemoglobin A1C     Standing Status:   Future     Standing Expiration Date:       Basic Metabolic Panel, Fasting     Standing Status:   Future     Standing Expiration Date:   2023    T4, Free     Standing Status:   Future     Standing Expiration Date:   2023    TSH with Reflex     Standing Status:   Future     Standing Expiration Date:   2023    POCT Glucose    POCT glycosylated hemoglobin (Hb A1C)     Orders Placed This Encounter   Medications    blood glucose test strips (ONETOUCH VERIO) strip     Sig: Test 3x daily e11.65     Dispense:  100 each     Refill:  3    DISCONTD: Insulin Degludec (TRESIBA FLEXTOUCH) 100 UNIT/ML SOPN     Sig: INJECT 80  UNITS UNDER THE SKIN NIGHTLY pt needs 10 pens for a 30 day supply     Dispense:  15 mL     Refill:  0     Patient needs an appointment for any future refills. Last visit 10/2021. DISCONTD: insulin lispro, 1 Unit Dial, (HUMALOG KWIKPEN) 100 UNIT/ML SOPN     Si units at each meals hold if glucose less than 150     Dispense:  5 Adjustable Dose Pre-filled Pen Syringe     Refill:  0     Patient needs an appointment for any future refills. Last visit 10/2021.     Insulin Pen Needle (B-D ULTRAFINE III SHORT PEN) 31G X 8 MM MISC     Sig: Inject 1 each into the skin 3 times daily     Dispense:  100 each     Refill:  5    Continuous Blood Gluc Sensor (FREESTYLE JESSICA 2 SENSOR) MISC     Sig: Every 2 weeks     Dispense:  2 each     Refill:  3    Continuous Blood Gluc  (FREESTYLE JESSICA 2 READER) LENNY     Sig: As directed     Dispense:  1 each     Refill:  0     Continue current dose of Synthroid  Increased dose of Tresiba to 80 units  Increase Humalog 22 units with each meals given freestyle carlos continuous glucose monitoring follow-up in 4 weeks  Continue Ozempic  Orders Placed This Encounter   Procedures    POCT Glucose    POCT glycosylated hemoglobin (Hb A1C)     No orders of the defined types were placed in this encounter. No follow-ups on file. Subjective:     Chief Complaint   Patient presents with    Diabetes    Hypothyroidism     Vitals:    12/08/22 1308   BP: 118/75   Pulse: 78   SpO2: 94%   Weight: 274 lb (124.3 kg)   Height: 5' 3\" (1.6 m)     Wt Readings from Last 3 Encounters:   12/08/22 274 lb (124.3 kg)   06/21/22 272 lb 3.2 oz (123.5 kg)   06/07/22 260 lb (117.9 kg)     BP Readings from Last 3 Encounters:   12/08/22 118/75   06/21/22 116/76   06/08/22 111/84     Follow-up on type 2 diabetes obesity-hypothyroidism patient has been seen after 1 year currently on Tresiba 80 units at bedtime Humalog 14 with each meals A1c's have been high started recently by primary care on Ozempic  Hemoglobin A1c was 7.8  Hypothyroidism on replacement with levothyroxine 125 mcg daily thyroid function test were reviewed from a year ago stable  Morbid obesity BMI at 48    Diabetes  She presents for her follow-up diabetic visit. She has type 2 diabetes mellitus. Associated symptoms include fatigue. Symptoms are worsening. Diabetic complications include a CVA. Risk factors for coronary artery disease include obesity. Current diabetic treatment includes insulin injections. She is currently taking insulin pre-lunch, at bedtime, pre-dinner and pre-breakfast. Her overall blood glucose range is 180-200 mg/dl. (Hemoglobin A1C       Date                     Value               Ref Range           Status                12/08/2022               7.8                 %                   Final            ----------  )   Thyroid Problem  Presents for follow-up visit. Symptoms include fatigue.  The symptoms have been stable. Past Medical History:   Diagnosis Date    Anxiety     Arthritis     Asthma     Bronchopneumonia     Cancer ([facility] [address]     renal    Cerebral artery occlusion with cerebral infarction [facility])     Chronic bilateral low back pain with sciatica     Chronic kidney disease     Chronic obstructive lung disease ([facility] 2019    Depression     Fibromyalgia     Gout     rt knee    Hypertension     Insulin dependent type 2 diabetes mellitus, uncontrolled 8/3/2018    Localized enlarged lymph nodes 10/26/2018    Mixed headache     Pure hyperglyceridemia 2017    Sarcoidosis     Sleep apnea     does not wear cpap    Thyroid goiter      Past Surgical History:   Procedure Laterality Date    BRONCHOSCOPY  10/26/2018    DR. STEARNS    KIDNEY REMOVAL Right 2016    KIDNEY REMOVAL Right 2016    LUNG BIOPSY Right 10/2018    THYROID LOBECTOMY Right 15    THYROIDECTOMY  2019    DR. MAGDALENO    THYROIDECTOMY  2018    URETER STENT PLACEMENT Left 2016     Social History     Socioeconomic History    Marital status: Legally      Spouse name: Not on file    Number of children: Not on file    Years of education: 12    Highest education level: High school graduate   Occupational History    Not on file   Tobacco Use    Smoking status: Former     Packs/day: 0.50     Years: 15.00     Pack years: 7.50     Types: Cigarettes     Start date: 2014     Quit date: 3/1/2015     Years since quittin.7    Smokeless tobacco: Never   Vaping Use    Vaping Use: Never used   Substance and Sexual Activity    Alcohol use: Not Currently     Alcohol/week: 0.0 standard drinks     Comment: occasionally    Drug use: Not Currently     Frequency: 5.0 times per week     Types: Marijuana [facility])    Sexual activity: Yes     Partners: Male   Other Topics Concern    Not on file   Social History Narrative    Not on file     Social Determinants of Health     Financial Resource Strain: Low Risk     Difficulty of Paying Living Expenses: Not hard at all   Food Insecurity: No Food Insecurity    Worried About [address] [place] [address] in the Last Year: Never true    Ran Out of Food in the Last Year: Never true   Transportation Needs: No Transportation Needs    Lack of Transportation (Medical): No    Lack of Transportation (Non-Medical): No   Physical Activity: Not on file   Stress: Not on file   Social Connections: Not on file   Intimate Partner Violence: Not on file   Housing Stability: Not on file     Family History   Problem Relation Age of Onset    Cancer Father     Diabetes Father     Allergy (Severe) Father     Heart Attack Father     Prostate Cancer Father     High Blood Pressure Mother     Diabetes Mother     Arthritis Mother     High Cholesterol Mother     Vision Loss Mother     Alcohol Abuse Neg Hx     Anemia Neg Hx     Arrhythmia Neg Hx     Asthma Neg Hx     Atrial Fibrillation Neg Hx     Birth Defects Neg Hx     Breast Cancer Neg Hx     Coronary Art Dis Neg Hx     Colon Cancer Neg Hx     Depression Neg Hx     Early Death Neg Hx     Hearing Loss Neg Hx     Heart Disease Neg Hx     Learning Disabilities Neg Hx     Kidney Disease Neg Hx     Mental Illness Neg Hx     Mental Retardation Neg Hx     Miscarriages / Stillbirths Neg Hx     Obesity Neg Hx     Osteoporosis Neg Hx     Stroke Neg Hx     Substance Abuse Neg Hx      Allergies   Allergen Reactions    Shellfish-Derived Products     Hydromorphone Hives    Ibuprofen Other (See Comments)    Ketorolac     Morphine Other (See Comments) and Hives     MADE PATIENT VERY SICK VOMITING    Other     Penicillin G Other (See Comments)    Penicillins Swelling    Propoxyphene N-Acetaminophen      Other reaction(s): Hives    Shellfish Allergy Angioedema and Swelling    Toradol [Ketorolac Tromethamine]      Pt states she cannot take Toradol because she has only 1 kidney.        Current Outpatient Medications:     montelukast (SINGULAIR) 10 MG tablet, Take 1 tablet by mouth nightly, Disp: 30 tablet, Rfl: 5    busPIRone (BUSPAR) 15 MG tablet, Take 15 mg by mouth 3 times daily, Disp: 90 tablet, Rfl: 3    pregabalin (LYRICA) 200 MG capsule, Take 1 capsule by mouth three times daily for 30 days. , Disp: 90 capsule, Rfl: 0    insulin lispro, 1 Unit Dial, (HUMALOG KWIKPEN) 100 UNIT/ML SOPN, 22 units at each meals hold if glucose less than 150, Disp: 5 Adjustable Dose Pre-filled Pen Syringe, Rfl: 0    spironolactone (ALDACTONE) 50 MG tablet, Take 1 tablet by mouth daily, Disp: 30 tablet, Rfl: 0    Insulin Degludec (TRESIBA FLEXTOUCH) 100 UNIT/ML SOPN, INJECT 90 UNITS UNDER THE SKIN NIGHTLY pt needs 10 pens for a 30 day supply, Disp: 15 mL, Rfl: 0    albuterol sulfate HFA (PROVENTIL;VENTOLIN;PROAIR) 108 (90 Base) MCG/ACT inhaler, INHALE 2 PUFFS INTO THE LUNGS EVERY 6 HOURS AS NEEDED FOR WHEEZING, Disp: 6.7 g, Rfl: 1    cetirizine (ZYRTEC) 10 MG tablet, TAKE 1 TABLET BY MOUTH EVERY NIGHT AT BEDTIME AS NEEDED FOR ALLERGIES, Disp: 30 tablet, Rfl: 2    magnesium oxide (MAG-OX) 400 (241.3 Mg) MG TABS tablet, Take 1 tablet by mouth daily, Disp: 30 tablet, Rfl: 5    levothyroxine (SYNTHROID) 125 MCG tablet, Take 1 tablet by mouth Daily, Disp: 30 tablet, Rfl: 3    buPROPion (WELLBUTRIN XL) 150 MG extended release tablet, TAKE 1 TABLET BY MOUTH EVERY MORNING, Disp: 30 tablet, Rfl: 0    Insulin Pen Needle (B-D ULTRAFINE III SHORT PEN) 31G X 8 MM MISC, Inject 1 each into the skin 3 times daily, Disp: 100 each, Rfl: 5    ondansetron (ZOFRAN ODT) 4 MG disintegrating tablet, Take 1 tablet by mouth every 8 hours as needed for Nausea, Disp: 20 tablet, Rfl: 0    Blood Glucose Monitoring Suppl (ONETOUCH VERIO) w/Device KIT, As  Directed, Disp: 1 kit, Rfl: 00    blood glucose test strips (ONETOUCH VERIO) strip, Test 3x daily e11.65, Disp: 100 each, Rfl: 3    meclizine (ANTIVERT) 12.5 MG tablet, Take 12.5 mg by mouth 2 times daily as needed for Dizziness, Disp: , Rfl:     butalbital-acetaminophen-caffeine (FIORICET, ESGIC) -40 MG per tablet, Take 1 tablet by mouth every 6 hours as needed for Headaches, Disp: 30 tablet, Rfl: 1    budesonide-formoterol (SYMBICORT) 160-4.5 MCG/ACT AERO, Inhale 2 puffs into the lungs 2 times daily, Disp: 1 Inhaler, Rfl: 3    acetaminophen (TYLENOL) 500 MG tablet, Take 1 tablet by mouth 4 times daily as needed for Pain, Disp: 360 tablet, Rfl: 1    albuterol (PROVENTIL) (2.5 MG/3ML) 0.083% nebulizer solution, Take 3 mLs by nebulization every 4 hours as needed for Wheezing, Disp: 120 each, Rfl: 3    atorvastatin (LIPITOR) 40 MG tablet, Take 1 tablet by mouth nightly, Disp: 30 tablet, Rfl: 3    OZEMPIC, 0.25 OR 0.5 MG/DOSE, 2 MG/1.5ML SOPN, INJECT 0.5 MG UNDER THE SKIN ONCE A WEEK, Disp: , Rfl:   Lab Results   Component Value Date     (L) 08/02/2022    K 4.4 08/02/2022     08/02/2022    CO2 21 06/08/2022    BUN 18 06/08/2022    CREATININE 1.56 (H) 08/02/2022    GLUCOSE 155 12/08/2022    CALCIUM 9.5 08/02/2022    PROT 7.6 08/02/2022    LABALBU 4.0 08/02/2022    BILITOT 0.4 08/02/2022    ALKPHOS 126 (H) 08/02/2022    AST 29 08/02/2022    ALT 26 08/02/2022    LABGLOM 38.3 (L) 06/08/2022    GFRAA 46.4 (L) 06/08/2022    AGRATIO 1.0 04/04/2019    GLOB 3.3 06/08/2022     Lab Results   Component Value Date    WBC 7.8 08/02/2022    HGB 16.2 (H) 08/02/2022    HCT 48.0 (H) 08/02/2022    MCV 94 08/02/2022     08/02/2022     Lab Results   Component Value Date    LABA1C 7.8 12/08/2022    LABA1C 8.2 (H) 07/27/2021    LABA1C 9.4 06/25/2021     Lab Results   Component Value Date    HDL 41 09/11/2019    HDL 34 (L) 06/24/2018    HDL 34 (L) 05/19/2017    LDLCALC 166 (H) 09/11/2019    LDLCALC 154 (H) 06/24/2018    LDLCALC 125 05/19/2017    CHOL 244 (H) 09/11/2019    CHOL 230 (H) 06/24/2018    CHOL 226 (H) 05/19/2017    TRIG 184 (H) 09/11/2019    TRIG 212 (H) 06/24/2018    TRIG 335 (H) 05/19/2017     No results found for: TESTM  Lab Results   Component Value Date    TSH 0.507 07/27/2021    TSH 0.411 (L) 05/28/2021    TSH 4.960 (H) 03/16/2021    TSHREFLEX 0.217 (L) 02/23/2021    FT3 2.9 09/24/2018    FT3 2.7 05/19/2017    T4FREE 1.13 07/27/2021    T4FREE 1.58 02/23/2021    T4FREE 0.98 05/25/2018     No results found for: TPOABS    Review of Systems   Constitutional:  Positive for fatigue. Cardiovascular: Negative. Endocrine: Negative. All other systems reviewed and are negative. Objective:   Physical Exam  Vitals reviewed. Constitutional:       General: She is not in acute distress. Appearance: Normal appearance. She is obese. HENT:      Head: Normocephalic and atraumatic. Right Ear: External ear normal.      Left Ear: External ear normal.      Nose: Nose normal.   Eyes:      General: No scleral icterus. Right eye: No discharge. Left eye: No discharge. Extraocular Movements: Extraocular movements intact. Conjunctiva/sclera: Conjunctivae normal.   Cardiovascular:      Rate and Rhythm: Normal rate. Pulmonary:      Effort: Pulmonary effort is normal.   Musculoskeletal:         General: Normal range of motion. Cervical back: Normal range of motion and neck supple. Neurological:      General: No focal deficit present. Mental Status: She is alert and oriented to person, place, and time.    Psychiatric:         Mood and Affect: Mood normal.         Behavior: Behavior normal.

## 2022-12-14 ENCOUNTER — OFFICE VISIT (OUTPATIENT)
Dept: ENDOCRINOLOGY | Age: 52
End: 2022-12-14

## 2022-12-14 VITALS
WEIGHT: 276 LBS | HEIGHT: 63 IN | OXYGEN SATURATION: 63 % | HEART RATE: 81 BPM | SYSTOLIC BLOOD PRESSURE: 112 MMHG | BODY MASS INDEX: 48.9 KG/M2 | DIASTOLIC BLOOD PRESSURE: 68 MMHG

## 2022-12-14 DIAGNOSIS — E89.0 POSTPROCEDURAL HYPOTHYROIDISM: ICD-10-CM

## 2022-12-14 DIAGNOSIS — K85.00 IDIOPATHIC ACUTE PANCREATITIS, UNSPECIFIED COMPLICATION STATUS: ICD-10-CM

## 2022-12-14 DIAGNOSIS — Z79.4 TYPE 2 DIABETES MELLITUS WITH HYPERGLYCEMIA, WITH LONG-TERM CURRENT USE OF INSULIN (HCC): Primary | ICD-10-CM

## 2022-12-14 DIAGNOSIS — E66.01 MORBID OBESITY (HCC): ICD-10-CM

## 2022-12-14 DIAGNOSIS — E11.65 TYPE 2 DIABETES MELLITUS WITH HYPERGLYCEMIA, WITH LONG-TERM CURRENT USE OF INSULIN (HCC): Primary | ICD-10-CM

## 2022-12-14 LAB
CHP ED QC CHECK: NORMAL
GLUCOSE BLD-MCNC: 212 MG/DL

## 2022-12-14 RX ORDER — INSULIN DEGLUDEC INJECTION 100 U/ML
INJECTION, SOLUTION SUBCUTANEOUS
Qty: 15 ML | Refills: 0 | Status: SHIPPED | OUTPATIENT
Start: 2022-12-14

## 2022-12-14 RX ORDER — INSULIN LISPRO 100 [IU]/ML
INJECTION, SOLUTION INTRAVENOUS; SUBCUTANEOUS
Qty: 5 ADJUSTABLE DOSE PRE-FILLED PEN SYRINGE | Refills: 0 | Status: SHIPPED | OUTPATIENT
Start: 2022-12-14

## 2022-12-14 ASSESSMENT — ENCOUNTER SYMPTOMS
NAUSEA: 1
ABDOMINAL PAIN: 1

## 2022-12-14 NOTE — PROGRESS NOTES
2022    Assessment:       Diagnosis Orders   1. Type 2 diabetes mellitus with hyperglycemia, with long-term current use of insulin ([facility])  POCT Glucose    Insulin Degludec (TRESIBA FLEXTOUCH) 100 UNIT/ML SOPN    insulin lispro, 1 Unit Dial, (HUMALOG KWIKPEN) 100 UNIT/ML SOPN      2. Morbid obesity ([address]        3. Idiopathic acute pancreatitis, unspecified complication status              PLAN:     Increased dose of Tresiba and Humalog  Discontinue Ozempic  Continue current dose of Synthroid  Patient to use over-the-counter PPI like Protonix or Nexium  Follow-up with gastroenterologist through the [facility]  More than 50% of 30 minutes spent patient education counseling  Diabetes education provided today:    Nutrition as a mainstream of diabetes therapy. [place] about label reading. Continuous Glucose monitor. How it works and checks blood sugars every 5 min. for 4 days during our tests. Managing high and low sugar readings. Orders Placed This Encounter   Medications    Insulin Degludec (TRESIBA FLEXTOUCH) 100 UNIT/ML SOPN     Sig: INJECT 100  UNITS UNDER THE SKIN NIGHTLY pt needs 10 pens for a 30 day supply     Dispense:  15 mL     Refill:  0     Patient needs an appointment for any future refills. Last visit 10/2021. insulin lispro, 1 Unit Dial, (HUMALOG KWIKPEN) 100 UNIT/ML SOPN     Si-35 units at each meals hold if glucose less than 150     Dispense:  5 Adjustable Dose Pre-filled Pen Syringe     Refill:  0     Patient needs an appointment for any future refills. Last visit 10/2021. Orders Placed This Encounter   Procedures    POCT Glucose     No orders of the defined types were placed in this encounter. No follow-ups on file.   Subjective:     Chief Complaint   Patient presents with    Diabetes    Follow-Up from Hospital     Vitals:    22 1253   BP: 112/68   Pulse: 81   SpO2: (!) 63%   Weight: 276 lb (125.2 kg)   Height: 5' 3\" (1.6 m)     Wt Readings from Last 3 Encounters:   12/14/22 276 lb (125.2 kg)   12/08/22 274 lb (124.3 kg)   06/21/22 272 lb 3.2 oz (123.5 kg)     BP Readings from Last 3 Encounters:   12/14/22 112/68   12/08/22 118/75   06/21/22 116/76     4-week follow-up on diabetes patient blood sugars have been still high over the last week or so recently was seen in the emergency room at [facility] for abdominal pain/chest pain patient was started on Ozempic close to a week ago through family doctor lipase level was slightly elevated she had lipase level done recently to which were normal CAT scan done from October was essentially unremarkable  Does complain of some epigastric pain with some heartburn and nausea which could have been from Chema Celis started using freestViptable Faster which was downloaded and reviewed average blood sugar was 222    Diabetes  She presents for her follow-up diabetic visit. She has type 2 diabetes mellitus. Her disease course has been worsening. Associated symptoms include fatigue. Diabetic complications include a CVA. Risk factors for coronary artery disease include obesity. Current diabetic treatment includes insulin injections. She is currently taking insulin pre-breakfast, pre-lunch, pre-dinner and at bedtime. Her overall blood glucose range is >200 mg/dl. (Review of 2-week freestyle carlos downloads average blood sugar was 222  23% in range 45% was high 32% was very high  No hypoglycemia)   Abdominal Pain  This is a new problem. The current episode started in the past 7 days. The onset quality is undetermined. The pain is located in the epigastric region. The pain is moderate. Associated symptoms include nausea.      Diagnostic Testing     ED Labs Ordered and Reviewed   COMP METABOLIC PANEL - Abnormal; Notable for the following components:   Result Value Ref Range   Albumin 3.8 (*) 3.9 - 4.9 g/dL   Alkaline Phosphatase 163 (*) 34 - 123 U/L   Glucose 247 (*) 74 - 99 mg/dL   Creatinine 1.38 (*) 0.58 - 0.96 mg/dL   Sodium 133 (*) 136 - 144 mmol/L   Estimated Glomerular Filtration Rate 46 (*) >=60 mL/min/1.73m²   All other components within normal limits   LIPASE BLD - Abnormal; Notable for the following components:   Lipase 75 (*) 16 - 61 U/L   All other components within normal limits   HIGH SENSITIVITY TROPONIN T (INITIAL) - Abnormal; Notable for the following components:   ALESHA High Sensitivity 12 (*) <12 ng/L   All other components within normal limits   CBC + DIFF - Abnormal; Notable for the following components:   Hematocrit 47.0 (*) 36.0 - 46.0 %   All other components within normal limits   Narrative: This is an appended report. These results have been appended to a previously verified report. MAGNESIUM BLD - Normal   NT PRO BNP - Normal   HIGH SENSITIVITY TROPONIN T (SECOND) - Normal   HIGH SENSITIVITY TROPONIN T (THIRD) 3 HRS AFTER INITIAL - Normal   EXPEDITED COVID, FLU A/B + RSV - Normal     XR CHEST 1V FRONTAL PORT  Final Result  IMPRESSION:    No acute radiographic abnormality         Past Medical History:   Diagnosis Date    Anxiety     Arthritis     Asthma     Bronchopneumonia     Cancer (HCC)     renal    Cerebral artery occlusion with cerebral infarction [facility])     Chronic bilateral low back pain with sciatica     Chronic kidney disease     Chronic obstructive lung disease ([facility] 7/26/2019    Depression     Fibromyalgia     Gout     rt knee    Hypertension     Insulin dependent type 2 diabetes mellitus, uncontrolled 8/3/2018    Localized enlarged lymph nodes 10/26/2018    Mixed headache     Pure hyperglyceridemia 5/19/2017    Sarcoidosis     Sleep apnea     does not wear cpap    Thyroid goiter      Past Surgical History:   Procedure Laterality Date    BRONCHOSCOPY  10/26/2018    DR. STEARNS    KIDNEY REMOVAL Right 08/2016    KIDNEY REMOVAL Right 2016    LUNG BIOPSY Right 10/2018    THYROID LOBECTOMY Right 6/13/14    THYROIDECTOMY  02/21/2019    DR. MAGDALENO    THYROIDECTOMY  2018    URETER STENT PLACEMENT Left 08/2016     Social History     Socioeconomic History    Marital status: Legally      Spouse name: Not on file    Number of children: Not on file    Years of education: 12    Highest education level: High school graduate   Occupational History    Not on file   Tobacco Use    Smoking status: Former     Packs/day: 0.50     Years: 15.00     Pack years: 7.50     Types: Cigarettes     Start date: 2014     Quit date: 3/1/2015     Years since quittin.7    Smokeless tobacco: Never   Vaping Use    Vaping Use: Never used   Substance and Sexual Activity    Alcohol use: Not Currently     Alcohol/week: 0.0 standard drinks     Comment: occasionally    Drug use: Not Currently     Frequency: 5.0 times per week     Types: Marijuana Seabron Barban)    Sexual activity: Yes     Partners: Male   Other Topics Concern    Not on file   Social History Narrative    Not on file     Social Determinants of Health     Financial Resource Strain: Low Risk     Difficulty of Paying Living Expenses: Not hard at all   Food Insecurity: No Food Insecurity    Worried About Running Out of Food in the Last Year: Never true    [address] in the Last Year: Never true   Transportation Needs: No Transportation Needs    Lack of Transportation (Medical): No    Lack of Transportation (Non-Medical):  No   Physical Activity: Not on file   Stress: Not on file   Social Connections: Not on file   Intimate Partner Violence: Not on file   Housing Stability: Not on file     Family History   Problem Relation Age of Onset    Cancer Father     Diabetes Father     Allergy (Severe) Father     Heart Attack Father     Prostate Cancer Father     High Blood Pressure Mother     Diabetes Mother     Arthritis Mother     High Cholesterol Mother     Vision Loss Mother     Alcohol Abuse Neg Hx     Anemia Neg Hx     Arrhythmia Neg Hx     Asthma Neg Hx     Atrial Fibrillation Neg Hx     Birth Defects Neg Hx     Breast Cancer Neg Hx     Coronary Art Dis Neg Hx     Colon Cancer Neg Hx Depression Neg Hx     Early Death Neg Hx     Hearing Loss Neg Hx     Heart Disease Neg Hx     Learning Disabilities Neg Hx     Kidney Disease Neg Hx     Mental Illness Neg Hx     Mental Retardation Neg Hx     Miscarriages / Stillbirths Neg Hx     Obesity Neg Hx     Osteoporosis Neg Hx     Stroke Neg Hx     Substance Abuse Neg Hx      Allergies   Allergen Reactions    Shellfish-Derived Products     Hydromorphone Hives    Ibuprofen Other (See Comments)    Ketorolac     Morphine Other (See Comments) and Hives     MADE PATIENT VERY SICK VOMITING    Other     Penicillin G Other (See Comments)    Penicillins Swelling    Propoxyphene N-Acetaminophen      Other reaction(s): Hives    Shellfish Allergy Angioedema and Swelling    Toradol [Ketorolac Tromethamine]      Pt states she cannot take Toradol because she has only 1 kidney.        Current Outpatient Medications:     OZEMPIC, 0.25 OR 0.5 MG/DOSE, 2 MG/1.5ML SOPN, INJECT 0.5 MG UNDER THE SKIN ONCE A WEEK, Disp: , Rfl:     blood glucose test strips (ONETOUCH VERIO) strip, Test 3x daily e11.65, Disp: 100 each, Rfl: 3    Insulin Degludec (TRESIBA FLEXTOUCH) 100 UNIT/ML SOPN, INJECT 80  UNITS UNDER THE SKIN NIGHTLY pt needs 10 pens for a 30 day supply, Disp: 15 mL, Rfl: 0    insulin lispro, 1 Unit Dial, (HUMALOG KWIKPEN) 100 UNIT/ML SOPN, 22 units at each meals hold if glucose less than 150, Disp: 5 Adjustable Dose Pre-filled Pen Syringe, Rfl: 0    Insulin Pen Needle (B-D ULTRAFINE III SHORT PEN) 31G X 8 MM MISC, Inject 1 each into the skin 3 times daily, Disp: 100 each, Rfl: 5    Continuous Blood Gluc Sensor (FREESTYLE JESSICA 2 SENSOR) [facility] [place], Every 2 weeks, Disp: 2 each, Rfl: 3    Continuous Blood Gluc  (FREESTYLE JESSICA 2 READER) LENNY, As directed, Disp: 1 each, Rfl: 0    montelukast (SINGULAIR) 10 MG tablet, Take 1 tablet by mouth nightly, Disp: 30 tablet, Rfl: 5    busPIRone (BUSPAR) 15 MG tablet, Take 15 mg by mouth 3 times daily, Disp: 90 tablet, Rfl: 3 spironolactone (ALDACTONE) 50 MG tablet, Take 1 tablet by mouth daily, Disp: 30 tablet, Rfl: 0    albuterol sulfate HFA (PROVENTIL;VENTOLIN;PROAIR) 108 (90 Base) MCG/ACT inhaler, INHALE 2 PUFFS INTO THE LUNGS EVERY 6 HOURS AS NEEDED FOR WHEEZING, Disp: 6.7 g, Rfl: 1    cetirizine (ZYRTEC) 10 MG tablet, TAKE 1 TABLET BY MOUTH EVERY NIGHT AT BEDTIME AS NEEDED FOR ALLERGIES, Disp: 30 tablet, Rfl: 2    magnesium oxide (MAG-OX) 400 (241.3 Mg) MG TABS tablet, Take 1 tablet by mouth daily, Disp: 30 tablet, Rfl: 5    levothyroxine (SYNTHROID) 125 MCG tablet, Take 1 tablet by mouth Daily, Disp: 30 tablet, Rfl: 3    buPROPion (WELLBUTRIN XL) 150 MG extended release tablet, TAKE 1 TABLET BY MOUTH EVERY MORNING, Disp: 30 tablet, Rfl: 0    ondansetron (ZOFRAN ODT) 4 MG disintegrating tablet, Take 1 tablet by mouth every 8 hours as needed for Nausea, Disp: 20 tablet, Rfl: 0    Blood Glucose Monitoring Suppl (ONETOUCH VERIO) w/Device KIT, As  Directed, Disp: 1 kit, Rfl: 00    meclizine (ANTIVERT) 12.5 MG tablet, Take 12.5 mg by mouth 2 times daily as needed for Dizziness, Disp: , Rfl:     butalbital-acetaminophen-caffeine (FIORICET, ESGIC) -40 MG per tablet, Take 1 tablet by mouth every 6 hours as needed for Headaches, Disp: 30 tablet, Rfl: 1    budesonide-formoterol (SYMBICORT) 160-4.5 MCG/ACT AERO, Inhale 2 puffs into the lungs 2 times daily, Disp: 1 Inhaler, Rfl: 3    acetaminophen (TYLENOL) 500 MG tablet, Take 1 tablet by mouth 4 times daily as needed for Pain, Disp: 360 tablet, Rfl: 1    albuterol (PROVENTIL) (2.5 MG/3ML) 0.083% nebulizer solution, Take 3 mLs by nebulization every 4 hours as needed for Wheezing, Disp: 120 each, Rfl: 3    atorvastatin (LIPITOR) 40 MG tablet, Take 1 tablet by mouth nightly, Disp: 30 tablet, Rfl: 3    pregabalin (LYRICA) 200 MG capsule, Take 1 capsule by mouth three times daily for 30 days. , Disp: 90 capsule, Rfl: 0  Lab Results   Component Value Date     (L) 08/02/2022    K 4.4 08/02/2022     08/02/2022    CO2 21 06/08/2022    BUN 18 06/08/2022    CREATININE 1.56 (H) 08/02/2022    GLUCOSE 212 12/14/2022    CALCIUM 9.5 08/02/2022    PROT 7.6 08/02/2022    LABALBU 4.0 08/02/2022    BILITOT 0.4 08/02/2022    ALKPHOS 126 (H) 08/02/2022    AST 29 08/02/2022    ALT 26 08/02/2022    LABGLOM 38.3 (L) 06/08/2022    GFRAA 46.4 (L) 06/08/2022    AGRATIO 1.0 04/04/2019    GLOB 3.3 06/08/2022     Lab Results   Component Value Date    WBC 7.8 08/02/2022    HGB 16.2 (H) 08/02/2022    HCT 48.0 (H) 08/02/2022    MCV 94 08/02/2022     08/02/2022     Lab Results   Component Value Date    LABA1C 7.8 12/08/2022    LABA1C 8.2 (H) 07/27/2021    LABA1C 9.4 06/25/2021     Lab Results   Component Value Date    HDL 41 09/11/2019    HDL 34 (L) 06/24/2018    HDL 34 (L) 05/19/2017    LDLCALC 166 (H) 09/11/2019    LDLCALC 154 (H) 06/24/2018    LDLCALC 125 05/19/2017    CHOL 244 (H) 09/11/2019    CHOL 230 (H) 06/24/2018    CHOL 226 (H) 05/19/2017    TRIG 184 (H) 09/11/2019    TRIG 212 (H) 06/24/2018    TRIG 335 (H) 05/19/2017     No results found for: TESTM  Lab Results   Component Value Date    TSH 0.507 07/27/2021    TSH 0.411 (L) 05/28/2021    TSH 4.960 (H) 03/16/2021    TSHREFLEX 0.217 (L) 02/23/2021    FT3 2.9 09/24/2018    FT3 2.7 05/19/2017    T4FREE 1.13 07/27/2021    T4FREE 1.58 02/23/2021    T4FREE 0.98 05/25/2018     No results found for: TPOABS    Review of Systems   Constitutional:  Positive for fatigue. Cardiovascular: Negative. Gastrointestinal:  Positive for abdominal pain and nausea. All other systems reviewed and are negative. Objective:   Physical Exam  Vitals reviewed. Constitutional:       General: She is not in acute distress. Appearance: Normal appearance. She is obese. HENT:      Head: Normocephalic and atraumatic. Right Ear: External ear normal.      Left Ear: External ear normal.      Nose: Nose normal.   Eyes:      General: No scleral icterus.         Right eye: No discharge. Left eye: No discharge. Extraocular Movements: Extraocular movements intact. Conjunctiva/sclera: Conjunctivae normal.   Cardiovascular:      Rate and Rhythm: Normal rate. Pulmonary:      Effort: Pulmonary effort is normal.   Abdominal:      Palpations: Abdomen is soft. Musculoskeletal:         General: Normal range of motion. Cervical back: Normal range of motion and neck supple. Neurological:      General: No focal deficit present. Mental Status: She is alert and oriented to person, place, and time.    Psychiatric:         Mood and Affect: Mood normal.         Behavior: Behavior normal.

## 2023-01-03 DIAGNOSIS — Z79.4 TYPE 2 DIABETES MELLITUS WITH HYPERGLYCEMIA, WITH LONG-TERM CURRENT USE OF INSULIN (HCC): ICD-10-CM

## 2023-01-03 DIAGNOSIS — E11.65 TYPE 2 DIABETES MELLITUS WITH HYPERGLYCEMIA, WITH LONG-TERM CURRENT USE OF INSULIN (HCC): ICD-10-CM

## 2023-01-03 RX ORDER — INSULIN DEGLUDEC INJECTION 100 U/ML
INJECTION, SOLUTION SUBCUTANEOUS
Qty: 15 ML | Refills: 0 | Status: SHIPPED | OUTPATIENT
Start: 2023-01-03

## 2023-01-03 NOTE — TELEPHONE ENCOUNTER
Comments: This request is coming from the pharmacy. Last Office Visit (last PCP visit):   12/14/2022    Next Visit Date:  Future Appointments   Date Time Provider Valerie Alea   1/11/2023  1:00 PM Jerry Christie  [address]   3/9/2023  4:00 PM Jerry Christie  [address]       If hasn't been seen in over a year OR hasn't followed up according to last diabetes/ADHD visit, make appointment for patient before sending refill to provider.     Rx requested:  Requested Prescriptions     Pending Prescriptions Disp Refills    Insulin Degludec (TRESIBA FLEXTOUCH) 100 UNIT/ML SOPN 15 mL 0     Sig: INJECT 100  UNITS UNDER THE SKIN NIGHTLY pt needs 10 pens for a 30 day supply

## 2023-01-17 DIAGNOSIS — Z79.4 TYPE 2 DIABETES MELLITUS WITH HYPERGLYCEMIA, WITH LONG-TERM CURRENT USE OF INSULIN (HCC): ICD-10-CM

## 2023-01-17 DIAGNOSIS — E11.65 TYPE 2 DIABETES MELLITUS WITH HYPERGLYCEMIA, WITH LONG-TERM CURRENT USE OF INSULIN (HCC): ICD-10-CM

## 2023-01-17 RX ORDER — INSULIN LISPRO 100 [IU]/ML
INJECTION, SOLUTION INTRAVENOUS; SUBCUTANEOUS
Qty: 15 ML | Refills: 3 | Status: SHIPPED | OUTPATIENT
Start: 2023-01-17

## 2023-01-17 RX ORDER — INSULIN LISPRO 100 [IU]/ML
INJECTION, SOLUTION INTRAVENOUS; SUBCUTANEOUS
Qty: 5 ADJUSTABLE DOSE PRE-FILLED PEN SYRINGE | Refills: 0 | OUTPATIENT
Start: 2023-01-17

## 2023-01-24 DIAGNOSIS — E11.65 TYPE 2 DIABETES MELLITUS WITH HYPERGLYCEMIA, WITH LONG-TERM CURRENT USE OF INSULIN (HCC): ICD-10-CM

## 2023-01-24 DIAGNOSIS — Z79.4 TYPE 2 DIABETES MELLITUS WITH HYPERGLYCEMIA, WITH LONG-TERM CURRENT USE OF INSULIN (HCC): ICD-10-CM

## 2023-01-25 RX ORDER — PEN NEEDLE, DIABETIC 31 GX5/16"
1 NEEDLE, DISPOSABLE MISCELLANEOUS 3 TIMES DAILY
Qty: 100 EACH | Refills: 5 | Status: SHIPPED | OUTPATIENT
Start: 2023-01-25

## 2023-01-26 ENCOUNTER — OFFICE VISIT (OUTPATIENT)
Dept: ENDOCRINOLOGY | Age: 53
End: 2023-01-26
Payer: MEDICAID

## 2023-01-26 VITALS
DIASTOLIC BLOOD PRESSURE: 72 MMHG | HEIGHT: 63 IN | OXYGEN SATURATION: 96 % | WEIGHT: 284 LBS | SYSTOLIC BLOOD PRESSURE: 116 MMHG | HEART RATE: 77 BPM | BODY MASS INDEX: 50.32 KG/M2

## 2023-01-26 DIAGNOSIS — Z79.4 TYPE 2 DIABETES MELLITUS WITH HYPERGLYCEMIA, WITH LONG-TERM CURRENT USE OF INSULIN (HCC): Primary | ICD-10-CM

## 2023-01-26 DIAGNOSIS — E11.65 TYPE 2 DIABETES MELLITUS WITH HYPERGLYCEMIA, WITH LONG-TERM CURRENT USE OF INSULIN (HCC): Primary | ICD-10-CM

## 2023-01-26 DIAGNOSIS — E66.01 MORBID OBESITY (HCC): ICD-10-CM

## 2023-01-26 LAB
CHP ED QC CHECK: NORMAL
GLUCOSE BLD-MCNC: 153 MG/DL

## 2023-01-26 PROCEDURE — 1036F TOBACCO NON-USER: CPT | Performed by: INTERNAL MEDICINE

## 2023-01-26 PROCEDURE — 3074F SYST BP LT 130 MM HG: CPT | Performed by: INTERNAL MEDICINE

## 2023-01-26 PROCEDURE — 99213 OFFICE O/P EST LOW 20 MIN: CPT | Performed by: INTERNAL MEDICINE

## 2023-01-26 PROCEDURE — 3078F DIAST BP <80 MM HG: CPT | Performed by: INTERNAL MEDICINE

## 2023-01-26 PROCEDURE — G8427 DOCREV CUR MEDS BY ELIG CLIN: HCPCS | Performed by: INTERNAL MEDICINE

## 2023-01-26 PROCEDURE — G8417 CALC BMI ABV UP PARAM F/U: HCPCS | Performed by: INTERNAL MEDICINE

## 2023-01-26 PROCEDURE — 3017F COLORECTAL CA SCREEN DOC REV: CPT | Performed by: INTERNAL MEDICINE

## 2023-01-26 PROCEDURE — 3046F HEMOGLOBIN A1C LEVEL >9.0%: CPT | Performed by: INTERNAL MEDICINE

## 2023-01-26 PROCEDURE — 2022F DILAT RTA XM EVC RTNOPTHY: CPT | Performed by: INTERNAL MEDICINE

## 2023-01-26 PROCEDURE — 82962 GLUCOSE BLOOD TEST: CPT | Performed by: INTERNAL MEDICINE

## 2023-01-26 PROCEDURE — G8484 FLU IMMUNIZE NO ADMIN: HCPCS | Performed by: INTERNAL MEDICINE

## 2023-01-26 RX ORDER — INSULIN DEGLUDEC 200 U/ML
INJECTION, SOLUTION SUBCUTANEOUS
Qty: 30 ADJUSTABLE DOSE PRE-FILLED PEN SYRINGE | Refills: 3 | Status: SHIPPED | OUTPATIENT
Start: 2023-01-26

## 2023-01-26 RX ORDER — LIDOCAINE 50 MG/G
PATCH TOPICAL
COMMUNITY
Start: 2022-01-18

## 2023-01-26 RX ORDER — LANOLIN ALCOHOL/MO/W.PET/CERES
CREAM (GRAM) TOPICAL
COMMUNITY
Start: 2023-01-03

## 2023-01-26 RX ORDER — LOSARTAN POTASSIUM 25 MG/1
TABLET ORAL
COMMUNITY
Start: 2023-01-12

## 2023-01-26 NOTE — PROGRESS NOTES
2023    Assessment:       Diagnosis Orders   1. Type 2 diabetes mellitus with hyperglycemia, with long-term current use of insulin ([facility])  POCT Glucose    Basic Metabolic Panel    Hemoglobin A1C      2. Morbid obesity ([address]                PLAN:     Orders Placed This Encounter   Procedures    Basic Metabolic Panel     Standing Status:   Future     Standing Expiration Date:   2024    Hemoglobin A1C     Standing Status:   Future     Standing Expiration Date:   2024    POCT Glucose     Increase dose of Tresiba  Continue current dose of Humalog 30-35  units  Repeat labs  A1c goal of 7 or lower  Orders Placed This Encounter   Medications    Insulin Degludec (TRESIBA FLEXTOUCH) 200 UNIT/ML SOPN     Si-120 units at bedtime     Dispense:  30 Adjustable Dose Pre-filled Pen Syringe     Refill:  3         Orders Placed This Encounter   Procedures    POCT Glucose     No orders of the defined types were placed in this encounter. No follow-ups on file. Subjective:     Chief Complaint   Patient presents with    Diabetes    Obesity     Vitals:    23 1347   BP: 116/72   Pulse: 77   SpO2: 96%   Weight: 284 lb (128.8 kg)   Height: 5' 3\" (1.6 m)     Wt Readings from Last 3 Encounters:   23 284 lb (128.8 kg)   22 276 lb (125.2 kg)   22 274 lb (124.3 kg)     BP Readings from Last 3 Encounters:   23 116/72   22 112/68   22 118/75     Follow-up on type 2 diabetes obesity patient is on Tresiba 100 units at bedtime Humalog 3 times a day had 2-week carlos download average blood sugar was 1 68 62% in range 34% was high 4 % very high  No hypoglycemia  Morbid obesity BMI at 50    Diabetes  She presents for her follow-up diabetic visit. She has type 2 diabetes mellitus. Associated symptoms include fatigue. Pertinent negatives for diabetes include no polyuria and no weight loss. Symptoms are improving. Diabetic complications include a CVA.  Risk factors for coronary artery disease include obesity. Current diabetic treatment includes insulin injections. She is currently taking insulin pre-breakfast, pre-lunch, pre-dinner and at bedtime. Her overall blood glucose range is 180-200 mg/dl. (Hemoglobin A1C       Date                     Value               Ref Range           Status                2022               7.8                 %                   Final            ----------    )   Past Medical History:   Diagnosis Date    Anxiety     Arthritis     Asthma     Bronchopneumonia     Cancer ([facility]     renal    Cerebral artery occlusion with cerebral infarction [facility])     Chronic bilateral low back pain with sciatica     Chronic kidney disease     Chronic obstructive lung disease ([facility] 2019    Depression     Fibromyalgia     Gout     rt knee    Hypertension     Insulin dependent type 2 diabetes mellitus, uncontrolled 8/3/2018    Localized enlarged lymph nodes 10/26/2018    Mixed headache     Pure hyperglyceridemia 2017    Sarcoidosis     Sleep apnea     does not wear cpap    Thyroid goiter      Past Surgical History:   Procedure Laterality Date    BRONCHOSCOPY  10/26/2018    DR. STEARNS    KIDNEY REMOVAL Right 2016    KIDNEY REMOVAL Right 2016    LUNG BIOPSY Right 10/2018    THYROID LOBECTOMY Right 15    THYROIDECTOMY  2019    DR. MAGDALENO    THYROIDECTOMY  2018    URETER STENT PLACEMENT Left 2016     Social History     Socioeconomic History    Marital status: Legally      Spouse name: Not on file    Number of children: Not on file    Years of education: 12    Highest education level: High school graduate   Occupational History    Not on file   Tobacco Use    Smoking status: Former     Packs/day: 0.50     Years: 15.00     Pack years: 7.50     Types: Cigarettes     Start date: 2014     Quit date: 3/1/2015     Years since quittin.9    Smokeless tobacco: Never   Vaping Use    Vaping Use: Never used   Substance and Sexual Activity    Alcohol use: Not Currently     Alcohol/week: 0.0 standard drinks     Comment: occasionally    Drug use: Not Currently     Frequency: 5.0 times per week     Types: Marijuana Meliton Shoemaker)    Sexual activity: Yes     Partners: Male   Other Topics Concern    Not on file   Social History Narrative    Not on file     Social Determinants of Health     Financial Resource Strain: Low Risk     Difficulty of Paying Living Expenses: Not hard at all   Food Insecurity: No Food Insecurity    Worried About [address] in the Last Year: Never true    [address] in the Last Year: Never true   Transportation Needs: No Transportation Needs    Lack of Transportation (Medical): No    Lack of Transportation (Non-Medical):  No   Physical Activity: Not on file   Stress: Not on file   Social Connections: Not on file   Intimate Partner Violence: Not on file   Housing Stability: Not on file     Family History   Problem Relation Age of Onset    Cancer Father     Diabetes Father     Allergy (Severe) Father     Heart Attack Father     Prostate Cancer Father     High Blood Pressure Mother     Diabetes Mother     Arthritis Mother     High Cholesterol Mother     Vision Loss Mother     Alcohol Abuse Neg Hx     Anemia Neg Hx     Arrhythmia Neg Hx     Asthma Neg Hx     Atrial Fibrillation Neg Hx     Birth Defects Neg Hx     Breast Cancer Neg Hx     Coronary Art Dis Neg Hx     Colon Cancer Neg Hx     Depression Neg Hx     Early Death Neg Hx     Hearing Loss Neg Hx     Heart Disease Neg Hx     Learning Disabilities Neg Hx     Kidney Disease Neg Hx     Mental Illness Neg Hx     Mental Retardation Neg Hx     Miscarriages / Stillbirths Neg Hx     Obesity Neg Hx     Osteoporosis Neg Hx     Stroke Neg Hx     Substance Abuse Neg Hx      Allergies   Allergen Reactions    Shellfish-Derived Products     Hydromorphone Hives    Ibuprofen Other (See Comments)    Ketorolac     Morphine Other (See Comments) and Hives     MADE PATIENT VERY SICK VOMITING    Other     Ozempic (0.25 Or 0.5 Mg-Dose) [Semaglutide(0.25 Or 0.5mg-Dos)]      Pancreatitis     Penicillin G Other (See Comments)    Penicillins Swelling    Propoxyphene N-Acetaminophen      Other reaction(s): Hives    Shellfish Allergy Angioedema and Swelling    Toradol [Ketorolac Tromethamine]      Pt states she cannot take Toradol because she has only 1 kidney. Current Outpatient Medications:     lidocaine (LIDODERM) 5 %, Apply topically, Disp: , Rfl:     magnesium oxide (MAG-OX) 400 (240 Mg) MG tablet, TAKE 1 TABLET BY MOUTH ONCE DAILY, Disp: , Rfl:     Insulin Pen Needle (B-D ULTRAFINE III SHORT PEN) 31G X 8 MM MISC, Inject 1 each into the skin 3 times daily, Disp: 100 each, Rfl: 5    insulin lispro, 1 Unit Dial, (HUMALOG/ADMELOG) 100 UNIT/ML SOPN, INJECT 30-35 UNITS UNDER THE SKIN AT [facility] MEAL. HOLD IF BLOOD GLUCOSE LESS THAN 150.  NEED APPOINTMENT FOR FURTHER REFILL, Disp: 15 mL, Rfl: 3    Insulin Degludec (TRESIBA FLEXTOUCH) 100 UNIT/ML SOPN, INJECT 100  UNITS UNDER THE SKIN NIGHTLY pt needs 10 pens for a 30 day supply, Disp: 15 mL, Rfl: 0    blood glucose test strips (ONETOUCH VERIO) strip, Test 3x daily e11.65, Disp: 100 each, Rfl: 3    Continuous Blood Gluc Sensor (FREESTYLE JESSICA 2 SENSOR) [facility], Every 2 weeks, Disp: 2 each, Rfl: 3    Continuous Blood Gluc  (FREESTYLE JESSICA 2 READER) LENNY, As directed, Disp: 1 each, Rfl: 0    montelukast (SINGULAIR) 10 MG tablet, Take 1 tablet by mouth nightly, Disp: 30 tablet, Rfl: 5    busPIRone (BUSPAR) 15 MG tablet, Take 15 mg by mouth 3 times daily, Disp: 90 tablet, Rfl: 3    spironolactone (ALDACTONE) 50 MG tablet, Take 1 tablet by mouth daily, Disp: 30 tablet, Rfl: 0    albuterol sulfate HFA (PROVENTIL;VENTOLIN;PROAIR) 108 (90 Base) MCG/ACT inhaler, INHALE 2 PUFFS INTO THE LUNGS EVERY 6 HOURS AS NEEDED FOR WHEEZING, Disp: 6.7 g, Rfl: 1    cetirizine (ZYRTEC) 10 MG tablet, TAKE 1 TABLET BY MOUTH EVERY NIGHT AT BEDTIME AS NEEDED FOR ALLERGIES, Disp: 30 tablet, Rfl: 2 levothyroxine (SYNTHROID) 125 MCG tablet, Take 1 tablet by mouth Daily, Disp: 30 tablet, Rfl: 3    buPROPion (WELLBUTRIN XL) 150 MG extended release tablet, TAKE 1 TABLET BY MOUTH EVERY MORNING, Disp: 30 tablet, Rfl: 0    ondansetron (ZOFRAN ODT) 4 MG disintegrating tablet, Take 1 tablet by mouth every 8 hours as needed for Nausea, Disp: 20 tablet, Rfl: 0    Blood Glucose Monitoring Suppl (ONETOUCH VERIO) w/Device KIT, As  Directed, Disp: 1 kit, Rfl: 00    meclizine (ANTIVERT) 12.5 MG tablet, Take 12.5 mg by mouth 2 times daily as needed for Dizziness, Disp: , Rfl:     butalbital-acetaminophen-caffeine (FIORICET, ESGIC) -40 MG per tablet, Take 1 tablet by mouth every 6 hours as needed for Headaches, Disp: 30 tablet, Rfl: 1    budesonide-formoterol (SYMBICORT) 160-4.5 MCG/ACT AERO, Inhale 2 puffs into the lungs 2 times daily, Disp: 1 Inhaler, Rfl: 3    acetaminophen (TYLENOL) 500 MG tablet, Take 1 tablet by mouth 4 times daily as needed for Pain, Disp: 360 tablet, Rfl: 1    albuterol (PROVENTIL) (2.5 MG/3ML) 0.083% nebulizer solution, Take 3 mLs by nebulization every 4 hours as needed for Wheezing, Disp: 120 each, Rfl: 3    atorvastatin (LIPITOR) 40 MG tablet, Take 1 tablet by mouth nightly, Disp: 30 tablet, Rfl: 3    losartan (COZAAR) 25 MG tablet, Take by mouth (Patient not taking: Reported on 1/26/2023), Disp: , Rfl:     pregabalin (LYRICA) 200 MG capsule, Take 1 capsule by mouth three times daily for 30 days. , Disp: 90 capsule, Rfl: 0  Lab Results   Component Value Date     (L) 08/02/2022    K 4.4 08/02/2022     08/02/2022    CO2 21 06/08/2022    BUN 18 06/08/2022    CREATININE 1.56 (H) 08/02/2022    GLUCOSE 153 01/26/2023    CALCIUM 9.5 08/02/2022    PROT 7.6 08/02/2022    LABALBU 4.0 08/02/2022    BILITOT 0.4 08/02/2022    ALKPHOS 126 (H) 08/02/2022    AST 29 08/02/2022    ALT 26 08/02/2022    LABGLOM 38.3 (L) 06/08/2022    GFRAA 46.4 (L) 06/08/2022    AGRATIO 1.0 04/04/2019    GLOB 3.3 06/08/2022     Lab Results   Component Value Date    WBC 7.8 08/02/2022    HGB 16.2 (H) 08/02/2022    HCT 48.0 (H) 08/02/2022    MCV 94 08/02/2022     08/02/2022     Lab Results   Component Value Date    LABA1C 7.8 12/08/2022    LABA1C 8.2 (H) 07/27/2021    LABA1C 9.4 06/25/2021     Lab Results   Component Value Date    HDL 41 09/11/2019    HDL 34 (L) 06/24/2018    HDL 34 (L) 05/19/2017    LDLCALC 166 (H) 09/11/2019    LDLCALC 154 (H) 06/24/2018    LDLCALC 125 05/19/2017    CHOL 244 (H) 09/11/2019    CHOL 230 (H) 06/24/2018    CHOL 226 (H) 05/19/2017    TRIG 184 (H) 09/11/2019    TRIG 212 (H) 06/24/2018    TRIG 335 (H) 05/19/2017     No results found for: TESTM  Lab Results   Component Value Date    TSH 0.507 07/27/2021    TSH 0.411 (L) 05/28/2021    TSH 4.960 (H) 03/16/2021    TSHREFLEX 0.217 (L) 02/23/2021    FT3 2.9 09/24/2018    FT3 2.7 05/19/2017    T4FREE 1.13 07/27/2021    T4FREE 1.58 02/23/2021    T4FREE 0.98 05/25/2018     No results found for: TPOABS    Review of Systems   Constitutional:  Positive for fatigue. Negative for weight loss. Eyes: Negative. Endocrine: Negative for polyuria. Musculoskeletal:  Positive for arthralgias. Psychiatric/Behavioral:  Positive for sleep disturbance. All other systems reviewed and are negative. Objective:   Physical Exam  Vitals reviewed. Constitutional:       General: She is not in acute distress. Appearance: Normal appearance. She is obese. HENT:      Head: Normocephalic and atraumatic. Right Ear: External ear normal.      Left Ear: External ear normal.      Nose: Nose normal.   Eyes:      General: No scleral icterus. Right eye: No discharge. Left eye: No discharge. Extraocular Movements: Extraocular movements intact. Conjunctiva/sclera: Conjunctivae normal.   Cardiovascular:      Rate and Rhythm: Normal rate.    Pulmonary:      Effort: Pulmonary effort is normal.   Musculoskeletal:         General: Normal range of motion. Cervical back: Normal range of motion and neck supple. Neurological:      General: No focal deficit present. Mental Status: She is alert and oriented to person, place, and time.    Psychiatric:         Mood and Affect: Mood normal.         Behavior: Behavior normal.

## 2023-01-30 ENCOUNTER — TELEPHONE (OUTPATIENT)
Dept: ENDOCRINOLOGY | Age: 53
End: 2023-01-30

## 2023-02-01 NOTE — TELEPHONE ENCOUNTER
PA done on covermymeds
Patient needs prior authorization for the tresiba. Please start this for her. Thanks!
Spoke to patient , her PA was approved for her medication on covermymeds.
Was unable to leave a message PA was approved .  Phone rang busy
on med with relief/depression/anxiety

## 2023-02-03 ASSESSMENT — ENCOUNTER SYMPTOMS: EYES NEGATIVE: 1

## 2023-02-08 DIAGNOSIS — Z79.4 TYPE 2 DIABETES MELLITUS WITH HYPERGLYCEMIA, WITH LONG-TERM CURRENT USE OF INSULIN (HCC): ICD-10-CM

## 2023-02-08 DIAGNOSIS — E11.65 TYPE 2 DIABETES MELLITUS WITH HYPERGLYCEMIA, WITH LONG-TERM CURRENT USE OF INSULIN (HCC): ICD-10-CM

## 2023-02-08 RX ORDER — FLASH GLUCOSE SENSOR
KIT MISCELLANEOUS
Qty: 2 EACH | Refills: 3 | Status: SHIPPED | OUTPATIENT
Start: 2023-02-08

## 2023-02-08 RX ORDER — PEN NEEDLE, DIABETIC 31 GX5/16"
1 NEEDLE, DISPOSABLE MISCELLANEOUS 3 TIMES DAILY
Qty: 100 EACH | Refills: 5 | Status: SHIPPED | OUTPATIENT
Start: 2023-02-08 | End: 2023-02-09 | Stop reason: SDUPTHER

## 2023-02-09 DIAGNOSIS — Z79.4 TYPE 2 DIABETES MELLITUS WITH HYPERGLYCEMIA, WITH LONG-TERM CURRENT USE OF INSULIN (HCC): ICD-10-CM

## 2023-02-09 DIAGNOSIS — E11.65 TYPE 2 DIABETES MELLITUS WITH HYPERGLYCEMIA, WITH LONG-TERM CURRENT USE OF INSULIN (HCC): ICD-10-CM

## 2023-02-10 RX ORDER — PEN NEEDLE, DIABETIC 31 GX5/16"
1 NEEDLE, DISPOSABLE MISCELLANEOUS 3 TIMES DAILY
Qty: 100 EACH | Refills: 5 | Status: SHIPPED | OUTPATIENT
Start: 2023-02-10

## 2023-02-23 NOTE — TELEPHONE ENCOUNTER
Received a referral:  from Care Coordinator to review patients medications. Called patient to schedule a time to speak with a pharmacist over the telephone. Spoke to patient and advised them of the above message. Patient verified understanding and scheduled their appointment: tomorrow at [address].   Clinical Pharmacy   Toll free: [phone], option 7
72

## 2023-03-03 DIAGNOSIS — E11.65 TYPE 2 DIABETES MELLITUS WITH HYPERGLYCEMIA, WITH LONG-TERM CURRENT USE OF INSULIN (HCC): ICD-10-CM

## 2023-03-03 DIAGNOSIS — Z79.4 TYPE 2 DIABETES MELLITUS WITH HYPERGLYCEMIA, WITH LONG-TERM CURRENT USE OF INSULIN (HCC): ICD-10-CM

## 2023-03-03 RX ORDER — PEN NEEDLE, DIABETIC 31 GX5/16"
1 NEEDLE, DISPOSABLE MISCELLANEOUS 3 TIMES DAILY
Qty: 100 EACH | Refills: 5 | Status: SHIPPED | OUTPATIENT
Start: 2023-03-03

## 2023-03-24 ENCOUNTER — PATIENT OUTREACH (OUTPATIENT)
Dept: CARE COORDINATION | Facility: CLINIC | Age: 53
End: 2023-03-24
Payer: MEDICAID

## 2023-03-24 DIAGNOSIS — M54.10 RADICULOPATHY, UNSPECIFIED SPINAL REGION: ICD-10-CM

## 2023-03-24 NOTE — PROGRESS NOTES
Discharge Facility: [facility]  Discharge Diagnosis:  Final Discharge Diagnoses: Acute low back pain, Intractable low back pain, Lumbar radiculopathy, chronic   Procedures: Date: 21-Mar-2023 17:16:00  Procedure Name: 1. L4-S1 decompression; fluorsocopy     Admission Date:3.18.23  Discharge Date: 3.23.23    PCP appt: 3.31.23    Ms. Austin was admitted for pain control and subsequently underwent lumbar decompression by Dr. Julien Mcneil. She recovered initially in the PACU and was transferred to Ortho/spine floor for further care. She has tolerated oral intake for nutrition and pain medication. She has been ambulatory to the restroom and urinating without complications. Inspection of incision shows well approximated incisions with no surrounding erythema or edema covered in Dermabond. She has worked well with physical therapy. She will be discharged to home and follow-up outpatient.    Engagement  Call Start Time: 1304 (3/24/2023  1:04 PM)    Medications  Medications reviewed with patient/caregiver?: Yes (3/24/2023  1:04 PM)  Is the patient having any side effects they believe may be caused by any medication additions or changes?: (!) Yes (3/24/2023  1:04 PM)  Does the patient have all medications ordered at discharge?: Yes (3/24/2023  1:04 PM)  Care Management Interventions: No intervention needed (3/24/2023  1:04 PM)  Is the patient taking all medications as directed (includes completed medication regime)?: Yes (3/24/2023  1:04 PM)  Care Management Interventions: Nurse provided patient education (3/24/2023  1:04 PM)    Appointments  Does the patient have a primary care provider?: Yes (3/24/2023  1:04 PM)  Care Management Interventions: Verified appointment date/time/provider (3/24/2023  1:04 PM)  Care Management Interventions: Advised patient to keep appointment (3/24/2023  1:04 PM)    Patient Teaching  Does the patient have access to their discharge instructions?: Yes (3/24/2023  1:04 PM)  Care Management  Interventions: Reviewed instructions with patient (3/24/2023  1:04 PM)  What is the patient's perception of their health status since discharge?: Improving (3/24/2023  1:04 PM)  Is the patient/caregiver able to teach back the hierarchy of who to call/visit for symptoms/problems? PCP, Specialist, Home Health nurse, Urgent Care, ED, 911: Yes (3/24/2023  1:04 PM)    Wrap Up  Call End Time: 1313 (3/24/2023  1:04 PM)

## 2023-03-26 PROBLEM — R10.9 ABDOMINAL CRAMPS: Status: ACTIVE | Noted: 2023-03-26

## 2023-03-26 PROBLEM — N28.89 RENAL MASS: Status: ACTIVE | Noted: 2023-03-26

## 2023-03-26 PROBLEM — E11.9 TYPE 2 DIABETES MELLITUS WITHOUT COMPLICATION, WITH LONG-TERM CURRENT USE OF INSULIN (MULTI): Status: ACTIVE | Noted: 2023-03-26

## 2023-03-26 PROBLEM — E04.9 GOITER: Status: ACTIVE | Noted: 2023-03-26

## 2023-03-26 PROBLEM — E89.0 H/O THYROIDECTOMY: Status: ACTIVE | Noted: 2023-03-26

## 2023-03-26 PROBLEM — C64.9 CANCER OF KIDNEY (MULTI): Status: ACTIVE | Noted: 2023-03-26

## 2023-03-26 PROBLEM — T78.40XA ALLERGIES: Status: ACTIVE | Noted: 2023-03-26

## 2023-03-26 PROBLEM — E04.1 THYROID NODULE: Status: ACTIVE | Noted: 2023-03-26

## 2023-03-26 PROBLEM — R10.9 LEFT FLANK PAIN: Status: ACTIVE | Noted: 2023-03-26

## 2023-03-26 PROBLEM — E04.9 SUBSTERNAL GOITER: Status: ACTIVE | Noted: 2023-03-26

## 2023-03-26 PROBLEM — R25.2 LEG CRAMPS: Status: ACTIVE | Noted: 2023-03-26

## 2023-03-26 PROBLEM — M54.16 LUMBAR RADICULOPATHY: Status: ACTIVE | Noted: 2023-03-26

## 2023-03-26 PROBLEM — R59.0 MEDIASTINAL ADENOPATHY: Status: ACTIVE | Noted: 2023-03-26

## 2023-03-26 PROBLEM — R93.89 ABNORMAL MRI: Status: ACTIVE | Noted: 2023-03-26

## 2023-03-26 PROBLEM — R13.10 DYSPHAGIA: Status: ACTIVE | Noted: 2023-03-26

## 2023-03-26 PROBLEM — E66.9 OBESITY: Status: ACTIVE | Noted: 2023-03-26

## 2023-03-26 PROBLEM — Z90.5 H/O KIDNEY REMOVAL: Status: ACTIVE | Noted: 2023-03-26

## 2023-03-26 PROBLEM — M79.7 FIBROMYALGIA: Status: ACTIVE | Noted: 2023-03-26

## 2023-03-26 PROBLEM — D86.9 SARCOIDOSIS: Status: ACTIVE | Noted: 2023-03-26

## 2023-03-26 PROBLEM — G89.18 POSTOPERATIVE PAIN: Status: ACTIVE | Noted: 2023-03-26

## 2023-03-26 PROBLEM — G44.229 TENSION HEADACHE, CHRONIC: Status: ACTIVE | Noted: 2023-03-26

## 2023-03-26 PROBLEM — E11.9 DIABETES (MULTI): Status: ACTIVE | Noted: 2023-03-26

## 2023-03-26 PROBLEM — G89.29 CHRONIC BILATERAL LOW BACK PAIN WITH RIGHT-SIDED SCIATICA: Status: ACTIVE | Noted: 2023-03-26

## 2023-03-26 PROBLEM — E78.5 HYPERLIPIDEMIA: Status: ACTIVE | Noted: 2023-03-26

## 2023-03-26 PROBLEM — Z79.4 TYPE 2 DIABETES MELLITUS WITHOUT COMPLICATION, WITH LONG-TERM CURRENT USE OF INSULIN (MULTI): Status: ACTIVE | Noted: 2023-03-26

## 2023-03-26 PROBLEM — M54.41 CHRONIC BILATERAL LOW BACK PAIN WITH RIGHT-SIDED SCIATICA: Status: ACTIVE | Noted: 2023-03-26

## 2023-03-26 PROBLEM — Z98.890 STATUS POST LUMBAR SPINE SURGERY FOR DECOMPRESSION OF SPINAL CORD: Status: ACTIVE | Noted: 2023-03-26

## 2023-03-26 PROBLEM — E55.9 VITAMIN D DEFICIENCY: Status: ACTIVE | Noted: 2023-03-26

## 2023-03-26 PROBLEM — J45.909 ASTHMA (HHS-HCC): Status: ACTIVE | Noted: 2023-03-26

## 2023-03-26 PROBLEM — C64.9 RENAL CELL CARCINOMA (MULTI): Status: ACTIVE | Noted: 2023-03-26

## 2023-03-26 PROBLEM — R11.0 NAUSEA IN ADULT: Status: ACTIVE | Noted: 2023-03-26

## 2023-03-26 PROBLEM — F41.9 ANXIETY: Status: ACTIVE | Noted: 2023-03-26

## 2023-03-26 RX ORDER — LANCETS
EACH MISCELLANEOUS
COMMUNITY
Start: 2018-08-16 | End: 2023-07-25 | Stop reason: SDUPTHER

## 2023-03-26 RX ORDER — LOSARTAN POTASSIUM 25 MG/1
1 TABLET ORAL DAILY
COMMUNITY
Start: 2023-01-12 | End: 2023-04-12 | Stop reason: SDUPTHER

## 2023-03-26 RX ORDER — PREGABALIN 200 MG/1
1 CAPSULE ORAL 3 TIMES DAILY
COMMUNITY
Start: 2022-08-03 | End: 2023-04-12 | Stop reason: SDUPTHER

## 2023-03-26 RX ORDER — SPIRONOLACTONE 50 MG/1
1 TABLET, FILM COATED ORAL DAILY
COMMUNITY
Start: 2022-11-07 | End: 2023-10-18

## 2023-03-26 RX ORDER — PEN NEEDLE, DIABETIC 31 GX5/16"
NEEDLE, DISPOSABLE MISCELLANEOUS
COMMUNITY
Start: 2023-03-03 | End: 2023-09-12 | Stop reason: SDUPTHER

## 2023-03-26 RX ORDER — MONTELUKAST SODIUM 10 MG/1
1 TABLET ORAL NIGHTLY
COMMUNITY
Start: 2018-06-20 | End: 2023-08-28 | Stop reason: SDUPTHER

## 2023-03-26 RX ORDER — LANOLIN ALCOHOL/MO/W.PET/CERES
CREAM (GRAM) TOPICAL
COMMUNITY
Start: 2022-10-21 | End: 2023-07-10 | Stop reason: SDUPTHER

## 2023-03-26 RX ORDER — INSULIN LISPRO 100 [IU]/ML
INJECTION, SOLUTION INTRAVENOUS; SUBCUTANEOUS
COMMUNITY
Start: 2022-12-28 | End: 2023-08-02 | Stop reason: SDUPTHER

## 2023-03-26 RX ORDER — LEVOTHYROXINE SODIUM 125 UG/1
1 TABLET ORAL DAILY
COMMUNITY
Start: 2022-11-07 | End: 2023-05-11 | Stop reason: SDUPTHER

## 2023-03-31 ENCOUNTER — TELEMEDICINE (OUTPATIENT)
Dept: PRIMARY CARE | Facility: CLINIC | Age: 53
End: 2023-03-31
Payer: MEDICAID

## 2023-03-31 ENCOUNTER — APPOINTMENT (OUTPATIENT)
Dept: PRIMARY CARE | Facility: CLINIC | Age: 53
End: 2023-03-31
Payer: MEDICAID

## 2023-03-31 DIAGNOSIS — C64.9 RENAL CELL CARCINOMA, UNSPECIFIED LATERALITY (MULTI): Primary | ICD-10-CM

## 2023-03-31 DIAGNOSIS — Z79.4 TYPE 2 DIABETES MELLITUS WITH STAGE 3B CHRONIC KIDNEY DISEASE, WITH LONG-TERM CURRENT USE OF INSULIN (MULTI): ICD-10-CM

## 2023-03-31 DIAGNOSIS — C64.9 MALIGNANT NEOPLASM OF KIDNEY, UNSPECIFIED LATERALITY (MULTI): ICD-10-CM

## 2023-03-31 DIAGNOSIS — N18.32 TYPE 2 DIABETES MELLITUS WITH STAGE 3B CHRONIC KIDNEY DISEASE, WITH LONG-TERM CURRENT USE OF INSULIN (MULTI): ICD-10-CM

## 2023-03-31 DIAGNOSIS — Z79.4 TYPE 2 DIABETES MELLITUS WITHOUT COMPLICATION, WITH LONG-TERM CURRENT USE OF INSULIN (MULTI): ICD-10-CM

## 2023-03-31 DIAGNOSIS — E11.22 TYPE 2 DIABETES MELLITUS WITH STAGE 3B CHRONIC KIDNEY DISEASE, WITH LONG-TERM CURRENT USE OF INSULIN (MULTI): ICD-10-CM

## 2023-03-31 DIAGNOSIS — Z98.890 STATUS POST LUMBAR SPINE SURGERY FOR DECOMPRESSION OF SPINAL CORD: ICD-10-CM

## 2023-03-31 DIAGNOSIS — E11.9 TYPE 2 DIABETES MELLITUS WITHOUT COMPLICATION, WITH LONG-TERM CURRENT USE OF INSULIN (MULTI): ICD-10-CM

## 2023-03-31 DIAGNOSIS — M54.16 LUMBAR RADICULOPATHY: ICD-10-CM

## 2023-03-31 PROCEDURE — 99212 OFFICE O/P EST SF 10 MIN: CPT | Performed by: FAMILY MEDICINE

## 2023-03-31 RX ORDER — IBUPROFEN 200 MG
CAPSULE ORAL
COMMUNITY
Start: 2018-08-16 | End: 2023-07-24 | Stop reason: SDUPTHER

## 2023-03-31 RX ORDER — BENZOCAINE AND MENTHOL 15; 3.6 MG/1; MG/1
LOZENGE ORAL
COMMUNITY
Start: 2023-03-28 | End: 2023-08-08 | Stop reason: ALTCHOICE

## 2023-03-31 RX ORDER — BUTALBITAL, ACETAMINOPHEN AND CAFFEINE 50; 325; 40 MG/1; MG/1; MG/1
TABLET ORAL
COMMUNITY
Start: 2018-06-28 | End: 2023-08-23 | Stop reason: ALTCHOICE

## 2023-03-31 RX ORDER — ATORVASTATIN CALCIUM 20 MG/1
1 TABLET, FILM COATED ORAL NIGHTLY
COMMUNITY
Start: 2022-11-15 | End: 2024-05-10 | Stop reason: ALTCHOICE

## 2023-03-31 RX ORDER — NYSTATIN 100000 [USP'U]/ML
5 SUSPENSION ORAL EVERY 6 HOURS
COMMUNITY
Start: 2023-03-01 | End: 2023-08-23 | Stop reason: ALTCHOICE

## 2023-03-31 RX ORDER — DICYCLOMINE HYDROCHLORIDE 20 MG/1
TABLET ORAL
COMMUNITY
End: 2023-08-08 | Stop reason: ALTCHOICE

## 2023-03-31 RX ORDER — ALBUTEROL SULFATE 0.83 MG/ML
3 SOLUTION RESPIRATORY (INHALATION) EVERY 6 HOURS PRN
COMMUNITY
Start: 2018-04-14 | End: 2023-08-10 | Stop reason: SDUPTHER

## 2023-03-31 RX ORDER — PREGABALIN 200 MG/1
CAPSULE ORAL
COMMUNITY
End: 2023-05-16 | Stop reason: SDUPTHER

## 2023-03-31 RX ORDER — CLINDAMYCIN HYDROCHLORIDE 300 MG/1
1 CAPSULE ORAL
COMMUNITY
Start: 2022-11-22 | End: 2023-08-02 | Stop reason: ALTCHOICE

## 2023-03-31 RX ORDER — ACETAMINOPHEN 325 MG/1
TABLET ORAL
COMMUNITY

## 2023-03-31 RX ORDER — CYCLOBENZAPRINE HCL 10 MG
TABLET ORAL
COMMUNITY
Start: 2021-10-02 | End: 2023-08-23 | Stop reason: ALTCHOICE

## 2023-03-31 RX ORDER — CETIRIZINE HYDROCHLORIDE 10 MG/1
TABLET ORAL
COMMUNITY
Start: 2022-10-21 | End: 2023-07-31 | Stop reason: SDUPTHER

## 2023-03-31 RX ORDER — FLASH GLUCOSE SCANNING READER
EACH MISCELLANEOUS
COMMUNITY
Start: 2022-12-09 | End: 2023-08-30 | Stop reason: ALTCHOICE

## 2023-03-31 RX ORDER — INSULIN LISPRO 100 [IU]/ML
INJECTION, SOLUTION INTRAVENOUS; SUBCUTANEOUS
COMMUNITY
End: 2023-11-07 | Stop reason: SDUPTHER

## 2023-03-31 RX ORDER — INSULIN DEGLUDEC 100 U/ML
100 INJECTION, SOLUTION SUBCUTANEOUS NIGHTLY
COMMUNITY
Start: 2020-03-09 | End: 2023-09-12 | Stop reason: ALTCHOICE

## 2023-03-31 RX ORDER — INSULIN LISPRO 100 [IU]/ML
INJECTION, SOLUTION INTRAVENOUS; SUBCUTANEOUS 2 TIMES DAILY
COMMUNITY
End: 2023-08-23 | Stop reason: ALTCHOICE

## 2023-03-31 RX ORDER — ONDANSETRON 4 MG/1
TABLET, ORALLY DISINTEGRATING ORAL EVERY 8 HOURS PRN
COMMUNITY
Start: 2022-08-02 | End: 2023-08-23 | Stop reason: ALTCHOICE

## 2023-03-31 RX ORDER — LIDOCAINE 50 MG/G
1 PATCH TOPICAL
COMMUNITY
Start: 2022-01-18 | End: 2023-08-23 | Stop reason: ALTCHOICE

## 2023-03-31 RX ORDER — BUSPIRONE HYDROCHLORIDE 15 MG/1
TABLET ORAL
COMMUNITY
End: 2023-05-01 | Stop reason: SDUPTHER

## 2023-03-31 RX ORDER — OXYCODONE AND ACETAMINOPHEN 5; 325 MG/1; MG/1
1 TABLET ORAL EVERY 6 HOURS PRN
COMMUNITY
End: 2023-09-07 | Stop reason: WASHOUT

## 2023-03-31 RX ORDER — ALBUTEROL SULFATE 90 UG/1
AEROSOL, METERED RESPIRATORY (INHALATION) 3 TIMES DAILY PRN
COMMUNITY
End: 2023-07-10 | Stop reason: SDUPTHER

## 2023-03-31 RX ORDER — CIPROFLOXACIN 250 MG/1
250 TABLET, FILM COATED ORAL 2 TIMES DAILY
Qty: 8 TABLET | Refills: 0 | COMMUNITY
Start: 2022-08-04 | End: 2022-08-08

## 2023-03-31 RX ORDER — INSULIN DEGLUDEC 200 U/ML
INJECTION, SOLUTION SUBCUTANEOUS
COMMUNITY
End: 2023-08-02 | Stop reason: SDUPTHER

## 2023-03-31 NOTE — PROGRESS NOTES
Subjective   Patient ID: Alysa Austin is a 53 y.o. female Patient called for complaints of a stomach virus she was vomiting and had diarrhea yesterday. Patient states she took no OTC medications. Patient states she is feeling well and her medications are all ready for her to  at the pharmacy. Patient states she needs no refills at the moment.  HPI   See cc  Review of Systems  12 Systems have been reviewed as follows.  Constitutional: Fever, weight gain, weight loss, appetite change, night sweats, fatigue, chills.  Eyes : blurry, double vision, vision, loss, tearing, redness, pain, sensitivity to light, glaucoma.  Ears, nose, mouth, and throat: Hearing loss, ringing in the ears, ear pain, nasal congestion, nasal drainage, nosebleeds, mouth, throat, irritation tooth problem.  Cardiovascular :chest pain, pressure, heart racing, palpitations, sweating, leg swelling, high or low blood pressure  Pulmonary: Cough, yellow or green sputum, blood and sputum, shortness of breath, wheezing  Gastrointestinal: Nausea, vomiting, diarrhea, constipation, pain, blood in stool, or vomitus, heartburn, difficulty swallowing  Genitourinary: incontinence, abnormal bleeding, abnormal discharge, urinary frequency, urinary hesitancy, pain, impotence sexual problem, infection, urinary retention  Musculoskeletal: Pain, stiffness, joint, redness or warmth, arthritis, back pain, weakness, muscle wasting, sprain or fracture  Neuro: Weight weakness, dizziness, change in voice, change in taste change in vision, change in hearing, loss, or change of sensation, trouble walking, balance problems coordination problems, shaking, speech problem  Endocrine , cold or heat intolerance, blood sugar problem, weight gain or loss missed periods hot flashes, sweats, change in body hair, change in libido, increased thirst, increased urination  Heme/lymph: Swelling, bleeding, problem anemia, bruising, enlarged lymph nodes  Allergic/immunologic: H. plus  nasal drip, watery itchy eyes, nasal drainage, immunosuppressed  The above were reviewed and noted negative except as noted in HPI and Problem List.    Objective   There were no vitals taken for this visit.    Physical Exam  Constitutional: Well developed, well nourished, alert and in no acute distress     Assessment/Plan   Problem List Items Addressed This Visit          Nervous    Lumbar radiculopathy    Relevant Orders    Follow Up In [facility] - PCP    Status post lumbar spine surgery for decompression of spinal cord    Relevant Orders    Follow Up In [facility] - PCP       Genitourinary    Cancer of kidney (CMS/HCC)    Relevant Orders    Follow Up In [facility] - PCP    Renal cell carcinoma (CMS/HCC) - Primary    Relevant Orders    Follow Up In [facility] - PCP       Endocrine/Metabolic    Diabetes (CMS/HCC)    Relevant Orders    Follow Up In [facility] - PCP    Follow Up In [facility] - PCP    Type 2 diabetes mellitus without complication, with long-term current use of insulin (CMS/HCC)    Relevant Orders    Follow Up In [facility] - PCP     Continue current medications and therapy for chronic medical conditions    See prev visit    Virtual Visit - Audio and Visual Communication Real Time

## 2023-04-07 DIAGNOSIS — J45.41 MODERATE PERSISTENT ASTHMA WITH ACUTE EXACERBATION: ICD-10-CM

## 2023-04-07 DIAGNOSIS — J30.1 CHRONIC SEASONAL ALLERGIC RHINITIS DUE TO POLLEN: ICD-10-CM

## 2023-04-07 RX ORDER — MONTELUKAST SODIUM 10 MG/1
TABLET ORAL
Qty: 30 TABLET | Refills: 0 | Status: SHIPPED | OUTPATIENT
Start: 2023-04-07

## 2023-04-07 NOTE — TELEPHONE ENCOUNTER
Rx requested:  Requested Prescriptions     Pending Prescriptions Disp Refills    montelukast (SINGULAIR) 10 MG tablet [Pharmacy Med Name: MONTELUKAST 10MG TABLETS] 30 tablet 0     Sig: TAKE 1 TABLET BY MOUTH EVERY NIGHT         Last Office Visit:   6/21/2022      Next Visit Date:  Future Appointments   Date Time Provider Department Center   5/25/2023  4:15 PM MD Nuris Bell

## 2023-04-10 ENCOUNTER — TELEPHONE (OUTPATIENT)
Dept: PRIMARY CARE | Facility: CLINIC | Age: 53
End: 2023-04-10
Payer: MEDICAID

## 2023-04-10 DIAGNOSIS — M54.41 CHRONIC BILATERAL LOW BACK PAIN WITH RIGHT-SIDED SCIATICA: Primary | ICD-10-CM

## 2023-04-10 DIAGNOSIS — G89.29 CHRONIC BILATERAL LOW BACK PAIN WITH RIGHT-SIDED SCIATICA: Primary | ICD-10-CM

## 2023-04-10 RX ORDER — PREGABALIN 200 MG/1
200 CAPSULE ORAL 3 TIMES DAILY
Qty: 90 CAPSULE | Refills: 0 | Status: CANCELLED | OUTPATIENT
Start: 2023-04-10

## 2023-04-12 DIAGNOSIS — G89.29 CHRONIC BILATERAL LOW BACK PAIN WITH RIGHT-SIDED SCIATICA: ICD-10-CM

## 2023-04-12 DIAGNOSIS — I10 PRIMARY HYPERTENSION: Primary | ICD-10-CM

## 2023-04-12 DIAGNOSIS — M54.41 CHRONIC BILATERAL LOW BACK PAIN WITH RIGHT-SIDED SCIATICA: ICD-10-CM

## 2023-04-12 NOTE — TELEPHONE ENCOUNTER
Dr. Delcid patient  Requesting refill for Losartan and Lyrica  Pharmacy: [facility] on [address] in [place]

## 2023-04-14 RX ORDER — PREGABALIN 200 MG/1
200 CAPSULE ORAL 3 TIMES DAILY
Qty: 45 CAPSULE | Refills: 0 | Status: SHIPPED | OUTPATIENT
Start: 2023-04-14 | End: 2023-05-11 | Stop reason: SDUPTHER

## 2023-04-14 RX ORDER — LOSARTAN POTASSIUM 25 MG/1
25 TABLET ORAL DAILY
Qty: 30 TABLET | Refills: 2 | Status: SHIPPED | OUTPATIENT
Start: 2023-04-14 | End: 2023-08-24 | Stop reason: SDUPTHER

## 2023-04-25 ENCOUNTER — APPOINTMENT (OUTPATIENT)
Dept: PRIMARY CARE | Facility: CLINIC | Age: 53
End: 2023-04-25
Payer: MEDICAID

## 2023-05-01 DIAGNOSIS — F41.9 ANXIETY: Primary | ICD-10-CM

## 2023-05-01 RX ORDER — BUSPIRONE HYDROCHLORIDE 15 MG/1
15 TABLET ORAL 2 TIMES DAILY
Qty: 60 TABLET | Refills: 3 | Status: SHIPPED | OUTPATIENT
Start: 2023-05-01

## 2023-05-01 NOTE — TELEPHONE ENCOUNTER
INSURANCE COVERAGE REGARDING PAYMENT  FOR YOUR COLONOSCOPY        Colon Cancer is the second leading cause of death among cancers, per the American [employer] Society. It is preventable. Early detection is the key. Your doctor will determine which tests need to be done for prevention and/or treatment. However, colonoscopies can be performed for several reasons:     Screening To screen for any problems within the colon. In this case, the patient is not symptomatic and does not have a personal history of previous colon cancer/condition or abnormal findings. Billed as screening.   Surveillance To monitor for a previously diagnosed colon condition (such as polyps) or when performed at more frequent intervals than every ten years because the patient has a personal/family history of colonic polyps or colon cancer. Billed as diagnostic.   Diagnostic Patient with symptoms, used to evaluate the colon. Billed as diagnostic.       If during a screening colonoscopy, our physician finds an abnormality, performs a biopsy or polypectomy (removal of polyp), your insurance company may consider the procedure to be a diagnostic exam and no longer a screening procedure.     Every insurance company is different. We encourage you to call your insurance company regarding plan benefits. Generally, screening benefits and diagnostic benefits may be paid at different levels. Charges associated with anesthesia or type of facility may also be processed differently. This varies with each insurance company, so we want you to be aware of this prior to your procedure. You do not have to call your insurance company if you have Medicare. For an estimate of potential charges, you may call the [facility] Patient Contact Center at [phone], option 5.     The authorization staff at [facility] will contact your insurance company to check precertification requirements for your colonoscopy. However, precertification, which serves as notification  Dr. Delcid patient  Refill for Buspirone 15 mg, take 1 tabet by mouth twice daily  Pharmacy: [facility] on [address] in [place]   is never a guarantee of payment. If you have questions regarding precertification for your procedure, please contact your insurance company. If you need further assistance call our authorization department at [phone].

## 2023-05-02 ENCOUNTER — DOCUMENTATION (OUTPATIENT)
Dept: PRIMARY CARE | Facility: CLINIC | Age: 53
End: 2023-05-02
Payer: MEDICAID

## 2023-05-02 NOTE — PROGRESS NOTES
Patient has met target of no readmission for (30 or 90) days post (hospital, SNF, rehab) discharge and is graduated from Transitional Care Management program at this time.

## 2023-05-03 DIAGNOSIS — Z79.4 TYPE 2 DIABETES MELLITUS WITH HYPERGLYCEMIA, WITH LONG-TERM CURRENT USE OF INSULIN (HCC): ICD-10-CM

## 2023-05-03 DIAGNOSIS — E11.65 TYPE 2 DIABETES MELLITUS WITH HYPERGLYCEMIA, WITH LONG-TERM CURRENT USE OF INSULIN (HCC): ICD-10-CM

## 2023-05-03 RX ORDER — INSULIN LISPRO 100 [IU]/ML
INJECTION, SOLUTION INTRAVENOUS; SUBCUTANEOUS
Qty: 15 ML | Refills: 3 | Status: SHIPPED | OUTPATIENT
Start: 2023-05-03

## 2023-05-09 DIAGNOSIS — E03.9 HYPOTHYROIDISM, UNSPECIFIED TYPE: Primary | ICD-10-CM

## 2023-05-09 DIAGNOSIS — G89.29 CHRONIC BILATERAL LOW BACK PAIN WITH RIGHT-SIDED SCIATICA: ICD-10-CM

## 2023-05-09 DIAGNOSIS — M54.41 CHRONIC BILATERAL LOW BACK PAIN WITH RIGHT-SIDED SCIATICA: ICD-10-CM

## 2023-05-11 ENCOUNTER — TELEPHONE (OUTPATIENT)
Dept: PRIMARY CARE | Facility: CLINIC | Age: 53
End: 2023-05-11
Payer: MEDICAID

## 2023-05-12 DIAGNOSIS — G89.29 CHRONIC BILATERAL LOW BACK PAIN WITH RIGHT-SIDED SCIATICA: Primary | ICD-10-CM

## 2023-05-12 DIAGNOSIS — M54.41 CHRONIC BILATERAL LOW BACK PAIN WITH RIGHT-SIDED SCIATICA: Primary | ICD-10-CM

## 2023-05-12 DIAGNOSIS — E03.9 HYPOTHYROIDISM, UNSPECIFIED TYPE: ICD-10-CM

## 2023-05-12 RX ORDER — LEVOTHYROXINE SODIUM 125 UG/1
125 TABLET ORAL DAILY
Qty: 90 TABLET | Refills: 1 | Status: SHIPPED | OUTPATIENT
Start: 2023-05-12 | End: 2023-05-16 | Stop reason: SDUPTHER

## 2023-05-12 RX ORDER — LEVOTHYROXINE SODIUM 125 UG/1
125 TABLET ORAL DAILY
Qty: 90 TABLET | Refills: 1 | OUTPATIENT
Start: 2023-05-12

## 2023-05-12 RX ORDER — PREGABALIN 200 MG/1
200 CAPSULE ORAL 3 TIMES DAILY
Qty: 90 CAPSULE | Refills: 0 | OUTPATIENT
Start: 2023-05-12 | End: 2023-06-11

## 2023-05-12 RX ORDER — PREGABALIN 200 MG/1
200 CAPSULE ORAL 3 TIMES DAILY
Qty: 90 CAPSULE | Refills: 0 | Status: SHIPPED | OUTPATIENT
Start: 2023-05-12 | End: 2023-05-16 | Stop reason: ALTCHOICE

## 2023-05-12 NOTE — TELEPHONE ENCOUNTER
Dr. Delcid patient  Refill for Pregabalin and Levothyroxine  [facility] #[id number] [address]

## 2023-05-16 RX ORDER — LEVOTHYROXINE SODIUM 125 UG/1
125 TABLET ORAL DAILY
Qty: 90 TABLET | Refills: 1 | Status: SHIPPED | OUTPATIENT
Start: 2023-05-16 | End: 2024-01-22

## 2023-05-16 RX ORDER — PREGABALIN 200 MG/1
200 CAPSULE ORAL 3 TIMES DAILY
COMMUNITY
Start: 2022-08-03 | End: 2023-08-08 | Stop reason: ALTCHOICE

## 2023-05-16 RX ORDER — PREGABALIN 200 MG/1
200 CAPSULE ORAL 3 TIMES DAILY
Qty: 45 CAPSULE | Refills: 0 | Status: SHIPPED | OUTPATIENT
Start: 2023-05-16 | End: 2023-06-27 | Stop reason: SDUPTHER

## 2023-05-25 ENCOUNTER — OFFICE VISIT (OUTPATIENT)
Dept: ENDOCRINOLOGY | Age: 53
End: 2023-05-25
Payer: MEDICAID

## 2023-05-25 VITALS
DIASTOLIC BLOOD PRESSURE: 71 MMHG | SYSTOLIC BLOOD PRESSURE: 117 MMHG | HEART RATE: 82 BPM | BODY MASS INDEX: 48.37 KG/M2 | HEIGHT: 63 IN | WEIGHT: 273 LBS | OXYGEN SATURATION: 96 %

## 2023-05-25 DIAGNOSIS — E11.65 TYPE 2 DIABETES MELLITUS WITH HYPERGLYCEMIA, WITH LONG-TERM CURRENT USE OF INSULIN (HCC): Primary | ICD-10-CM

## 2023-05-25 DIAGNOSIS — E66.01 MORBID OBESITY (HCC): ICD-10-CM

## 2023-05-25 DIAGNOSIS — Z79.4 TYPE 2 DIABETES MELLITUS WITH HYPERGLYCEMIA, WITH LONG-TERM CURRENT USE OF INSULIN (HCC): Primary | ICD-10-CM

## 2023-05-25 DIAGNOSIS — Z79.4 TYPE 2 DIABETES MELLITUS WITH HYPERGLYCEMIA, WITH LONG-TERM CURRENT USE OF INSULIN (HCC): ICD-10-CM

## 2023-05-25 DIAGNOSIS — E11.65 TYPE 2 DIABETES MELLITUS WITH HYPERGLYCEMIA, WITH LONG-TERM CURRENT USE OF INSULIN (HCC): ICD-10-CM

## 2023-05-25 LAB
ANION GAP SERPL CALCULATED.3IONS-SCNC: 9 MEQ/L (ref 9–15)
BUN SERPL-MCNC: 10 MG/DL (ref 6–20)
CALCIUM SERPL-MCNC: 9.4 MG/DL (ref 8.5–9.9)
CHLORIDE SERPL-SCNC: 103 MEQ/L (ref 95–107)
CHP ED QC CHECK: NORMAL
CO2 SERPL-SCNC: 24 MEQ/L (ref 20–31)
CREAT SERPL-MCNC: 1.31 MG/DL (ref 0.5–0.9)
GLUCOSE BLD-MCNC: 143 MG/DL
GLUCOSE SERPL-MCNC: 139 MG/DL (ref 70–99)
POTASSIUM SERPL-SCNC: 4.2 MEQ/L (ref 3.4–4.9)
SODIUM SERPL-SCNC: 136 MEQ/L (ref 135–144)

## 2023-05-25 PROCEDURE — 99213 OFFICE O/P EST LOW 20 MIN: CPT | Performed by: INTERNAL MEDICINE

## 2023-05-25 PROCEDURE — 3017F COLORECTAL CA SCREEN DOC REV: CPT | Performed by: INTERNAL MEDICINE

## 2023-05-25 PROCEDURE — 2022F DILAT RTA XM EVC RTNOPTHY: CPT | Performed by: INTERNAL MEDICINE

## 2023-05-25 PROCEDURE — 3078F DIAST BP <80 MM HG: CPT | Performed by: INTERNAL MEDICINE

## 2023-05-25 PROCEDURE — 3051F HG A1C>EQUAL 7.0%<8.0%: CPT | Performed by: INTERNAL MEDICINE

## 2023-05-25 PROCEDURE — G8427 DOCREV CUR MEDS BY ELIG CLIN: HCPCS | Performed by: INTERNAL MEDICINE

## 2023-05-25 PROCEDURE — G8417 CALC BMI ABV UP PARAM F/U: HCPCS | Performed by: INTERNAL MEDICINE

## 2023-05-25 PROCEDURE — 82962 GLUCOSE BLOOD TEST: CPT | Performed by: INTERNAL MEDICINE

## 2023-05-25 PROCEDURE — 3074F SYST BP LT 130 MM HG: CPT | Performed by: INTERNAL MEDICINE

## 2023-05-25 PROCEDURE — 1036F TOBACCO NON-USER: CPT | Performed by: INTERNAL MEDICINE

## 2023-05-26 LAB — HBA1C MFR BLD: 7.6 % (ref 4.8–5.9)

## 2023-06-27 DIAGNOSIS — M54.41 CHRONIC BILATERAL LOW BACK PAIN WITH RIGHT-SIDED SCIATICA: ICD-10-CM

## 2023-06-27 DIAGNOSIS — G89.29 CHRONIC BILATERAL LOW BACK PAIN WITH RIGHT-SIDED SCIATICA: ICD-10-CM

## 2023-06-27 NOTE — TELEPHONE ENCOUNTER
Dr Delcid pt    Pt phoned office and is requesting refill on     Pregabalin    [facility]   Star Wedge Flap Text: The defect edges were debeveled with a #15 scalpel blade.  Given the location of the defect, shape of the defect and the proximity to free margins a star wedge flap was deemed most appropriate.  Using a sterile surgical marker, an appropriate rotation flap was drawn incorporating the defect and placing the expected incisions within the relaxed skin tension lines where possible. The area thus outlined was incised deep to adipose tissue with a #15 scalpel blade.  The skin margins were undermined to an appropriate distance in all directions utilizing iris scissors.

## 2023-06-28 ENCOUNTER — PATIENT OUTREACH (OUTPATIENT)
Dept: PRIMARY CARE | Facility: CLINIC | Age: 53
End: 2023-06-28
Payer: MEDICAID

## 2023-06-28 RX ORDER — DOXYCYCLINE 100 MG/1
100 CAPSULE ORAL 2 TIMES DAILY
COMMUNITY
Start: 2023-06-27 | End: 2023-08-02 | Stop reason: ALTCHOICE

## 2023-06-28 RX ORDER — LEVOFLOXACIN 750 MG/1
750 TABLET ORAL DAILY
COMMUNITY
Start: 2023-06-27 | End: 2023-08-08 | Stop reason: ALTCHOICE

## 2023-06-28 NOTE — PROGRESS NOTES
Discharge Facility: [facility]  Discharge Diagnosis: COVID  Admission Date: 6/25/23  Discharge Date: 6/27/23    PCP Appointment Date: 7/10/23  Specialist Appointment Date: N/A  Hospital Encounter and Summary: Linked  See discharge assessment below for further details    Medications  Medications reviewed with patient/caregiver?: Yes (new/changes only) (6/28/2023 11:40 AM)  Is the patient having any side effects they believe may be caused by any medication additions or changes?: No (6/28/2023 11:40 AM)  Does the patient have all medications ordered at discharge?: Yes (6/28/2023 11:40 AM)  Care Management Interventions: No intervention needed (6/28/2023 11:40 AM)  Is the patient taking all medications as directed (includes completed medication regime)?: Yes (6/28/2023 11:40 AM)  Care Management Interventions: Provided patient education (6/28/2023 11:40 AM)  Medication Comments: Start: doxycycline, levaquin (6/28/2023 11:40 AM)    Appointments  Does the patient have a primary care provider?: Yes (6/28/2023 11:40 AM)  Care Management Interventions: Advised patient to make appointment (6/28/2023 11:40 AM)  Care Management Interventions: Advised patient to keep appointment (6/28/2023 11:40 AM)    Self Management  Has home health visited the patient within 72 hours of discharge?: Not applicable (6/28/2023 11:40 AM)  What Durable Medical Equipment (DME) was ordered?: n/a (6/28/2023 11:40 AM)    Patient Teaching  Does the patient have access to their discharge instructions?: Yes (6/28/2023 11:40 AM)  Care Management Interventions: Reviewed instructions with patient (6/28/2023 11:40 AM)  What is the patient's perception of their health status since discharge?: Improving (6/28/2023 11:40 AM)  Is the patient/caregiver able to teach back the hierarchy of who to call/visit for symptoms/problems? PCP, Specialist, Home Health nurse, Urgent Care, ED, 911: Yes (6/28/2023 11:40 AM)

## 2023-06-30 RX ORDER — PREGABALIN 200 MG/1
200 CAPSULE ORAL 3 TIMES DAILY
Qty: 45 CAPSULE | Refills: 0 | Status: SHIPPED | OUTPATIENT
Start: 2023-06-30 | End: 2023-08-08 | Stop reason: SDUPTHER

## 2023-07-05 RX ORDER — INSULIN DEGLUDEC 200 U/ML
INJECTION, SOLUTION SUBCUTANEOUS
Qty: 30 ML | Refills: 3 | Status: SHIPPED | OUTPATIENT
Start: 2023-07-05

## 2023-07-10 ENCOUNTER — TELEMEDICINE (OUTPATIENT)
Dept: PRIMARY CARE | Facility: CLINIC | Age: 53
End: 2023-07-10
Payer: MEDICAID

## 2023-07-10 DIAGNOSIS — J40 BRONCHITIS: ICD-10-CM

## 2023-07-10 DIAGNOSIS — E61.2 MAGNESIUM DEFICIENCY: ICD-10-CM

## 2023-07-10 DIAGNOSIS — J45.909 ASTHMA, UNSPECIFIED ASTHMA SEVERITY, UNSPECIFIED WHETHER COMPLICATED, UNSPECIFIED WHETHER PERSISTENT (HHS-HCC): ICD-10-CM

## 2023-07-10 DIAGNOSIS — U07.1 COVID: ICD-10-CM

## 2023-07-10 DIAGNOSIS — J01.10 ACUTE NON-RECURRENT FRONTAL SINUSITIS: Primary | ICD-10-CM

## 2023-07-10 PROCEDURE — 99495 TRANSJ CARE MGMT MOD F2F 14D: CPT | Performed by: FAMILY MEDICINE

## 2023-07-10 RX ORDER — BUPROPION HYDROCHLORIDE 150 MG/1
150 TABLET ORAL
COMMUNITY
Start: 2022-08-29 | End: 2023-10-18

## 2023-07-10 RX ORDER — FLASH GLUCOSE SENSOR
KIT MISCELLANEOUS AS NEEDED
COMMUNITY
Start: 2023-06-28 | End: 2023-08-30 | Stop reason: ALTCHOICE

## 2023-07-10 RX ORDER — LANOLIN ALCOHOL/MO/W.PET/CERES
CREAM (GRAM) TOPICAL
Qty: 90 TABLET | Refills: 1 | Status: SHIPPED | OUTPATIENT
Start: 2023-07-10

## 2023-07-10 RX ORDER — DOXYCYCLINE 100 MG/1
100 CAPSULE ORAL
COMMUNITY
Start: 2023-06-07 | End: 2023-08-02 | Stop reason: ALTCHOICE

## 2023-07-10 RX ORDER — METHOCARBAMOL 500 MG/1
500 TABLET, FILM COATED ORAL 3 TIMES DAILY PRN
COMMUNITY
Start: 2023-04-20 | End: 2023-09-30 | Stop reason: ALTCHOICE

## 2023-07-10 RX ORDER — ALBUTEROL SULFATE 90 UG/1
1 AEROSOL, METERED RESPIRATORY (INHALATION) 3 TIMES DAILY PRN
Qty: 18 G | Refills: 1 | Status: SHIPPED | OUTPATIENT
Start: 2023-07-10

## 2023-07-10 ASSESSMENT — ENCOUNTER SYMPTOMS
PALPITATIONS: 0
SORE THROAT: 0
SPEECH DIFFICULTY: 0
COUGH: 0
APPETITE CHANGE: 0
SLEEP DISTURBANCE: 0
HEMATURIA: 0
ABDOMINAL PAIN: 0
ADENOPATHY: 0
SINUS PRESSURE: 0
HEADACHES: 0
ARTHRALGIAS: 0
EYE PAIN: 0
COLOR CHANGE: 0
DECREASED CONCENTRATION: 0
FEVER: 0
SEIZURES: 0
PHOTOPHOBIA: 0
POLYPHAGIA: 0
SHORTNESS OF BREATH: 0
DYSPHORIC MOOD: 0
NECK STIFFNESS: 0
CONSTIPATION: 0
ABDOMINAL DISTENTION: 0
SINUS PAIN: 0
NERVOUS/ANXIOUS: 0
BLOOD IN STOOL: 0
ACTIVITY CHANGE: 0
TROUBLE SWALLOWING: 0
FLANK PAIN: 0
RECTAL PAIN: 0
DYSURIA: 0
CONFUSION: 0
MYALGIAS: 0
RHINORRHEA: 0
CHEST TIGHTNESS: 0
DIARRHEA: 0
FATIGUE: 0
AGITATION: 0
POLYDIPSIA: 0
CONSTITUTIONAL NEGATIVE: 1
STRIDOR: 0
DIZZINESS: 0

## 2023-07-10 NOTE — PROGRESS NOTES
Subjective   Patient ID: Alysa Austin is a 53 y.o. female who presents for Follow-up.    Pt was seen EM on the 2nd with Sx of headache, SOB, body aches. Was Dx with COVID and pneumonia.   States she is feeling better         Review of Systems   Constitutional: Negative.  Negative for activity change, appetite change, fatigue and fever.   HENT:  Negative for congestion, dental problem, ear discharge, ear pain, mouth sores, rhinorrhea, sinus pressure, sinus pain, sore throat, tinnitus and trouble swallowing.    Eyes:  Negative for photophobia, pain and visual disturbance.   Respiratory:  Negative for cough, chest tightness, shortness of breath and stridor.    Cardiovascular:  Negative for chest pain and palpitations.   Gastrointestinal:  Negative for abdominal distention, abdominal pain, blood in stool, constipation, diarrhea and rectal pain.   Endocrine: Negative for cold intolerance, heat intolerance, polydipsia, polyphagia and polyuria.   Genitourinary:  Negative for dysuria, flank pain, hematuria and urgency.   Musculoskeletal:  Negative for arthralgias, gait problem, myalgias and neck stiffness.   Skin:  Negative for color change and rash.   Allergic/Immunologic: Negative for environmental allergies and food allergies.   Neurological:  Negative for dizziness, seizures, syncope, speech difficulty and headaches.   Hematological:  Negative for adenopathy.   Psychiatric/Behavioral:  Negative for agitation, confusion, decreased concentration, dysphoric mood and sleep disturbance. The patient is not nervous/anxious.        Objective   There were no vitals taken for this visit.    Physical Exam  Constitutional: Well developed, well nourished, alert and in no acute distress   eletal: Moving all extremities without restriction      Assessment/Plan   Problem List Items Addressed This Visit       Asthma    Relevant Medications    albuterol 90 mcg/actuation inhaler    Other Relevant Orders    Follow Up In [facility] - PCP - Established    Magnesium deficiency    Relevant Medications    magnesium oxide (Mag-Ox) 400 mg (241.3 mg magnesium) tablet    Other Relevant Orders    Follow Up In [facility] - PCP - Established    Acute non-recurrent frontal sinusitis - Primary    Relevant Medications    predniSONE (Deltasone) 10 mg tablet    Other Relevant Orders    Follow Up In [facility] - PCP - Established    COVID    Relevant Orders    Follow Up In [facility] - PCP - Established     Other Visit Diagnoses       Bronchitis        Relevant Orders    Follow Up In [facility] - PCP - Established          Virtual Visit - Audio and Visual Communication Real Time       Scribe Attestation  By signing my name below, I, Renzo Delcid MD   , Scribe   attest that this documentation has been prepared under the direction and in the presence of Renzo Delcid MD.        Provider Attestation - Scribe documentation    All medical record entries made by the Scribe were at my direction and personally dictated by me. I have reviewed the chart and agree that the record accurately reflects my personal performance of the history, physical exam, discussion and plan.

## 2023-07-11 PROBLEM — J01.10 ACUTE NON-RECURRENT FRONTAL SINUSITIS: Status: ACTIVE | Noted: 2023-07-11

## 2023-07-11 PROBLEM — U07.1 COVID: Status: ACTIVE | Noted: 2023-07-11

## 2023-07-11 PROBLEM — E61.2 MAGNESIUM DEFICIENCY: Status: ACTIVE | Noted: 2023-07-11

## 2023-07-11 RX ORDER — PREDNISONE 10 MG/1
TABLET ORAL
Qty: 30 TABLET | Refills: 0 | Status: SHIPPED | OUTPATIENT
Start: 2023-07-11 | End: 2023-08-08 | Stop reason: ALTCHOICE

## 2023-07-19 ENCOUNTER — PATIENT OUTREACH (OUTPATIENT)
Dept: PRIMARY CARE | Facility: CLINIC | Age: 53
End: 2023-07-19
Payer: MEDICAID

## 2023-07-19 DIAGNOSIS — E11.9 TYPE 2 DIABETES MELLITUS WITHOUT COMPLICATION, WITH LONG-TERM CURRENT USE OF INSULIN (MULTI): ICD-10-CM

## 2023-07-19 DIAGNOSIS — Z79.4 TYPE 2 DIABETES MELLITUS WITHOUT COMPLICATION, WITH LONG-TERM CURRENT USE OF INSULIN (MULTI): ICD-10-CM

## 2023-07-19 DIAGNOSIS — J45.909 ASTHMA, UNSPECIFIED ASTHMA SEVERITY, UNSPECIFIED WHETHER COMPLICATED, UNSPECIFIED WHETHER PERSISTENT (HHS-HCC): Primary | ICD-10-CM

## 2023-07-19 NOTE — PROGRESS NOTES
Call regarding appt. with PCP on 7/10/23 after hospitalization.  At time of outreach call the patient feels as if their condition has improved since last visit.  Reviewed the PCP appointment with the pt and addressed any questions or concerns. Patient declined further TCM outreach at this time.

## 2023-07-24 DIAGNOSIS — J45.41 MODERATE PERSISTENT ASTHMA WITH ACUTE EXACERBATION: ICD-10-CM

## 2023-07-24 DIAGNOSIS — Z79.4 TYPE 2 DIABETES MELLITUS WITHOUT COMPLICATION, WITH LONG-TERM CURRENT USE OF INSULIN (MULTI): ICD-10-CM

## 2023-07-24 DIAGNOSIS — E11.9 TYPE 2 DIABETES MELLITUS WITHOUT COMPLICATION, WITH LONG-TERM CURRENT USE OF INSULIN (MULTI): ICD-10-CM

## 2023-07-24 DIAGNOSIS — J30.1 CHRONIC SEASONAL ALLERGIC RHINITIS DUE TO POLLEN: ICD-10-CM

## 2023-07-24 RX ORDER — IBUPROFEN 200 MG
CAPSULE ORAL
Qty: 200 STRIP | Refills: 1 | Status: SHIPPED | OUTPATIENT
Start: 2023-07-24

## 2023-07-24 RX ORDER — MONTELUKAST SODIUM 10 MG/1
TABLET ORAL
Qty: 30 TABLET | Refills: 0 | OUTPATIENT
Start: 2023-07-24

## 2023-07-24 NOTE — TELEPHONE ENCOUNTER
Dr. Delcid Pt    Refill for blood sugar diagnostic (Blood Glucose Test) strip      Pharmacy- [facility] #[id number] [address]

## 2023-07-25 DIAGNOSIS — Z79.4 TYPE 2 DIABETES MELLITUS WITH HYPERGLYCEMIA, WITH LONG-TERM CURRENT USE OF INSULIN (HCC): ICD-10-CM

## 2023-07-25 DIAGNOSIS — E11.65 TYPE 2 DIABETES MELLITUS WITH HYPERGLYCEMIA, WITH LONG-TERM CURRENT USE OF INSULIN (HCC): ICD-10-CM

## 2023-07-25 DIAGNOSIS — T78.40XS ALLERGY, SEQUELA: ICD-10-CM

## 2023-07-25 DIAGNOSIS — E11.9 TYPE 2 DIABETES MELLITUS WITHOUT COMPLICATION, WITH LONG-TERM CURRENT USE OF INSULIN (MULTI): ICD-10-CM

## 2023-07-25 DIAGNOSIS — Z79.4 TYPE 2 DIABETES MELLITUS WITHOUT COMPLICATION, WITH LONG-TERM CURRENT USE OF INSULIN (MULTI): ICD-10-CM

## 2023-07-25 RX ORDER — INSULIN LISPRO 100 [IU]/ML
INJECTION, SOLUTION INTRAVENOUS; SUBCUTANEOUS
Qty: 15 ML | Refills: 3 | Status: SHIPPED | OUTPATIENT
Start: 2023-07-25

## 2023-07-25 RX ORDER — LANCETS
EACH MISCELLANEOUS
Qty: 200 EACH | Refills: 0 | Status: SHIPPED | OUTPATIENT
Start: 2023-07-25

## 2023-07-26 DIAGNOSIS — J30.1 ACUTE SEASONAL ALLERGIC RHINITIS DUE TO POLLEN: ICD-10-CM

## 2023-07-27 RX ORDER — CETIRIZINE HYDROCHLORIDE 10 MG/1
TABLET ORAL
Qty: 30 TABLET | Refills: 2 | OUTPATIENT
Start: 2023-07-27

## 2023-08-01 DIAGNOSIS — Z79.4 TYPE 2 DIABETES MELLITUS WITHOUT COMPLICATION, WITH LONG-TERM CURRENT USE OF INSULIN (MULTI): ICD-10-CM

## 2023-08-01 DIAGNOSIS — E11.9 TYPE 2 DIABETES MELLITUS WITHOUT COMPLICATION, WITH LONG-TERM CURRENT USE OF INSULIN (MULTI): ICD-10-CM

## 2023-08-01 RX ORDER — CETIRIZINE HYDROCHLORIDE 10 MG/1
10 TABLET ORAL DAILY
Qty: 90 TABLET | Refills: 1 | Status: SHIPPED | OUTPATIENT
Start: 2023-08-01 | End: 2023-08-24 | Stop reason: SDUPTHER

## 2023-08-01 NOTE — TELEPHONE ENCOUNTER
PT IS NO LONGER SEEING DR. MORRIS, HER SURGIN. SHE WAS WONDERING IF CB IS GOING TO TAKE OVER HER MEDICATION THE SURGIN WAS PRESCRIBING     PERCOCET 10MG  TAKE 1-2XS A DAY    PLEASE ADVISE + CALL PT

## 2023-08-02 ENCOUNTER — TELEMEDICINE (OUTPATIENT)
Dept: PHARMACY | Facility: HOSPITAL | Age: 53
End: 2023-08-02
Payer: MEDICAID

## 2023-08-02 DIAGNOSIS — E11.9 TYPE 2 DIABETES MELLITUS WITHOUT COMPLICATION, WITH LONG-TERM CURRENT USE OF INSULIN (MULTI): ICD-10-CM

## 2023-08-02 DIAGNOSIS — J45.909 ASTHMA, UNSPECIFIED ASTHMA SEVERITY, UNSPECIFIED WHETHER COMPLICATED, UNSPECIFIED WHETHER PERSISTENT (HHS-HCC): ICD-10-CM

## 2023-08-02 DIAGNOSIS — Z79.4 TYPE 2 DIABETES MELLITUS WITHOUT COMPLICATION, WITH LONG-TERM CURRENT USE OF INSULIN (MULTI): ICD-10-CM

## 2023-08-02 RX ORDER — NEBULIZER AND COMPRESSOR
EACH MISCELLANEOUS
Qty: 1 EACH | Refills: 0 | Status: SHIPPED | OUTPATIENT
Start: 2023-08-02 | End: 2023-08-10 | Stop reason: SDUPTHER

## 2023-08-02 ASSESSMENT — ENCOUNTER SYMPTOMS
WHEEZING: 1
SHORTNESS OF BREATH: 1
DIZZINESS: 1
HEADACHES: 1

## 2023-08-02 NOTE — ASSESSMENT & PLAN NOTE
ASSESSMENT:  -Patient's most recent A1c of 7% is near goal of <7%  -Patient's FBG has been running low lately, with many readings <70 mg/dL leading to hypoglycemia symptoms and most recent ED visit  -Patient was on Ozempic in the past but unable to tolerate due to GI symptoms    RECOMMENDATIONS/PLAN:  The following will be brought to Dr. Delcid's attention:    DECREASE Tresiba to 100 units SubQ at bedtime  CONTINUE Humalog 30 units SubQ once daily with the evening meal

## 2023-08-02 NOTE — PROGRESS NOTES
Pharmacy Post-Discharge Visit  Alysa Austin is a 53 y.o. female was referred to Clinical Pharmacy Team to complete a post-discharge medication optimization and monitoring visit.  The patient was referred for their Asthma and Diabetes.    Admission Date: 6/25/23  Discharge Date: 6/29/23    Referring Provider: Renzo Delcid MD    Subjective   Allergies   Allergen Reactions    Hydromorphone Hives and Unknown    Ibuprofen Unknown     Pt states not allergy -  she just can't take it because she only has one kidney    Ketorolac Other     Nausea/vomiting    Penicillins Unknown and Swelling     Within last 2 years    Propoxyphene N-Acetaminophen Hives     Patient tolerates acetaminophen at home    Shellfish Containing Products Unknown    Shellfish Derived Angioedema       [id number] [place], [place] - [address] [place] [address] & [address] [address]  Phone: [phone] Fax: [phone]    [facility] ([facility] ) - [place], [place] - [address]  [address]  [address]  [facility] [address]  Phone: [phone] Fax: [phone]      Social History     Social History Narrative    Not on file        Notable Medication changes following discharge:  Start:   Doxycycline 100 mg BID x 10 days  Levofloxacin 750 mg daily x 10 days  Stop: None  Change: None    Patient presents to Clinical Pharmacy team for medication management after discharge per referral by PCP. Patient was hospitalized in June for COVID/pneumonia and recently had an ED visit in July due to hypoglycemia.     Since hospitalization, patient notes that her breathing has worsened due to her COVID infection. She is now using her rescue inhaler 1-2 times/day. She was on Symbicort in the past but had to discontinue due to hyperglycemia. She has been unable to use her nebulizer as her machine is broken and patient has been unable to get a new one.     Patient also  reports having frequent low BG <70 mg/dL, which leads to hypoglycemia symptoms (dizziness, headaches, hunger). She is currently on high dose Tresiba as well as Humalog during mealtimes, of which she has only been using during dinner lately. Patient was on Ozempic in the past but was unable to tolerate due to severe cramping.     Asthma  She complains of shortness of breath (1-2 times per day) and wheezing (1-2 times per day). This is a chronic problem. The current episode started more than 1 year ago. The problem occurs daily. The problem has been gradually worsening. Associated symptoms include headaches (when BG <70 mg/dL). Her symptoms are aggravated by URI. Her symptoms are alleviated by beta-agonist.   Diabetes  She presents for her initial diabetic visit. She has type 2 diabetes mellitus. Her disease course has been worsening. Hypoglycemia symptoms include dizziness (when BG <70 mg/dL) and headaches (when BG <70 mg/dL). Current diabetic treatment includes insulin injections. Her breakfast blood glucose range is generally <70 mg/dl. Her overall blood glucose range is 70-90 mg/dl.       Review of Systems   Respiratory:  Positive for shortness of breath (1-2 times per day) and wheezing (1-2 times per day).    Neurological:  Positive for dizziness (when BG <70 mg/dL) and headaches (when BG <70 mg/dL).       Objective     There were no vitals taken for this visit.     LAB  Lab Results   Component Value Date    BILITOT 0.4 07/21/2023    CALCIUM 9.3 07/21/2023    CO2 23 07/21/2023     07/21/2023    CREATININE 1.39 (H) 07/21/2023    GLUCOSE 77 07/21/2023    ALKPHOS 160 (H) 07/21/2023    K 4.0 07/21/2023    PROT 7.9 07/21/2023     07/21/2023    AST 21 07/21/2023    ALT 16 07/21/2023    BUN 11 07/21/2023    ANIONGAP 14 07/21/2023    MG 1.81 07/21/2023    PHOS 3.1 02/22/2019    ALBUMIN 4.1 07/21/2023    LIPASE 10 06/25/2023     Lab Results   Component Value Date    TRIG 155 (H) 12/25/2022    CHOL 171  "12/25/2022    HDL 31.7 (A) 12/25/2022     Lab Results   Component Value Date    HGBA1C 7.0 (A) 06/25/2023         Current Outpatient Medications on File Prior to Visit   Medication Sig Dispense Refill    albuterol 90 mcg/actuation inhaler Inhale 1 puff 3 times a day as needed for shortness of breath. 18 g 1    insulin degludec (Tresiba FlexTouch) 100 unit/mL (3 mL) injection Inject 110 Units under the skin once daily at bedtime.      insulin lispro (HumaLOG) 100 unit/mL injection INJECT 30 TO 35 UNITS UNDER THE SKIN AT EACH MEAL. HOLD IF BLOOD GLUCOSE LESS THAN 150.      acetaminophen (Tylenol) 325 mg tablet TAKE 2 TABLETS BY MOUTH FOUR TIMES DAILY AS NEEDED FOR MILD AND MODERATE POSTOPERATIVE PAIN.      albuterol 2.5 mg /3 mL (0.083 %) nebulizer solution Inhale 3 mL every 6 hours if needed.      atorvastatin (Lipitor) 20 mg tablet Take 1 tablet (20 mg) by mouth once daily.      BD Ultra-Fine Short Pen Needle 31 gauge x 5/16\" needle USE AS DIRECTED UNDER THE SKIN THREE TIMES DAILY      benzocaine-menthol (Cepacol Sore Throat, brittni-men,) 15-3.6 mg lozenge every 2 hours.      blood sugar diagnostic (Blood Glucose Test) strip TEST 3 TIMES DAILY AS NEEDED 200 strip 1    buPROPion XL (Wellbutrin XL) 150 mg 24 hr tablet Take 1 tablet (150 mg) by mouth once daily in the morning. Take before meals.      busPIRone (Buspar) 15 mg tablet Take 1 tablet (15 mg) by mouth 2 times a day. 60 tablet 3    butalbital-acetaminophen-caff -40 mg tablet Take by mouth.      cetirizine (ZyrTEC) 10 mg tablet Take 1 tablet (10 mg) by mouth once daily. 90 tablet 1    cyclobenzaprine (Flexeril) 10 mg tablet Take by mouth.      dicyclomine (Bentyl) 20 mg tablet TAKE 1 TABLET BY MOUTH FOUR TIMES DAILY FOR UP TO 7 DAYS AS NEEDED      FreeStyle Nela 2 [place] misc use as directed      FreeStyle Nela 2 Sensor kit Inject under the skin if needed.      glucagon (GlucaGen Diagnostic Kit) 1 mg/mL injection Inject 1 mL into the shoulder, thigh, or " buttocks.      insulin lispro (HumaLOG) 100 unit/mL injection twice a day.      lancets (OneTouch UltraSoft Lancets) misc Use to screen glucose twice daily 200 each 0    levoFLOXacin (Levaquin) 750 mg tablet Take 1 tablet (750 mg) by mouth once daily.      levothyroxine (Synthroid, Levoxyl) 125 mcg tablet Take 1 tablet (125 mcg) by mouth once daily. 90 tablet 1    lidocaine (Lidoderm) 5 % patch Place 1 patch on the skin once daily.      losartan (Cozaar) 25 mg tablet Take 1 tablet (25 mg) by mouth once daily. 30 tablet 2    magnesium oxide (Mag-Ox) 400 mg (241.3 mg magnesium) tablet TAKE 2 TABLET BY MOUTH EVERY DAY 90 tablet 1    methocarbamol (Robaxin) 500 mg tablet Take 1 tablet (500 mg) by mouth 3 times a day.      montelukast (Singulair) 10 mg tablet Take 1 tablet (10 mg) by mouth once daily at bedtime.      nystatin (Mycostatin) 100,000 unit/mL suspension Take 5 mL (500,000 Units) by mouth every 6 hours.      ondansetron ODT (Zofran-ODT) 4 mg disintegrating tablet Take by mouth every 8 hours if needed.      oxyCODONE-acetaminophen (Percocet) 5-325 mg tablet Take 1 tablet by mouth every 6 hours if needed.      predniSONE (Deltasone) 10 mg tablet 4x 3days,3 x 3 days,2 x 3 days,1 x 3 days 30 tablet 0    pregabalin (Lyrica) 200 mg capsule Take 1 capsule (200 mg) by mouth 3 times a day.      pregabalin (Lyrica) 200 mg capsule Take 1 capsule (200 mg) by mouth 3 times a day. 45 capsule 0    spironolactone (Aldactone) 50 mg tablet Take 1 tablet (50 mg) by mouth once daily.      [DISCONTINUED] cetirizine (ZyrTEC) 10 mg tablet Take by mouth.      [DISCONTINUED] clindamycin (Cleocin) 300 mg capsule Take 1 capsule (300 mg) by mouth every 6 hours during the day.      [DISCONTINUED] doxycycline (Monodox) 100 mg capsule Take 1 capsule (100 mg) by mouth once daily.      [DISCONTINUED] doxycycline (Vibramycin) 100 mg capsule Take 1 capsule (100 mg) by mouth 2 times a day. X10 days      [DISCONTINUED] INSULIN ASPART U-100 SUBQ 2  times a day as needed.      [DISCONTINUED] insulin lispro (HumaLOG) 100 unit/mL injection 30 units twice daily      [DISCONTINUED] Tresiba FlexTouch U-200 200 unit/mL (3 mL) injection 200 unit(s) subcutaneous once a day (at bedtime)       No current facility-administered medications on file prior to visit.        HISTORICAL PHARMACOTHERAPY  -Ozempic (dc'd due to stomach cramps)  -Symbicort (dc'd due to hyperglycemia)    DRUG INTERACTIONS  - None    Assessment/Plan   Problem List Items Addressed This Visit       Asthma     ASSESSMENT:  -Ever since patient had COVID, has noticed that her breathing has gotten worse; using her rescue inhaler 1-2 times/day  -Patient has tried inhalers with a steroid component but had to discontinue due to hyperglycemia  -Patient has not been able to use her nebulizer solution in about a month due to her nebulizer machine being broken    RECOMMENDATIONS/PLAN:  The following will be brought to Dr. Delcid's attention:  RESTART Albuterol nebulizer solution Q6H PRN for SOB   Will reach out to PCP about ordering a new nebulizer machine  Can consider switching to a scheduled Ipratropium/Albuterol nebulizer solution in the future to better control symptoms as patient cannot use steroid inhalers  CONTINUE Albuterol HFA inhaler PRN for SOB         Type 2 diabetes mellitus without complication, with long-term current use of insulin (CMS/[facility])     ASSESSMENT:  -Patient's most recent A1c of 7% is near goal of <7%  -Patient's FBG has been running low lately, with many readings <70 mg/dL leading to hypoglycemia symptoms and most recent ED visit  -Patient was on Ozempic in the past but unable to tolerate due to GI symptoms    RECOMMENDATIONS/PLAN:  The following will be brought to Dr. Delcid's attention:    DECREASE Tresiba to 100 units SubQ at bedtime  CONTINUE Humalog 30 units SubQ once daily with the evening meal             Clinical Pharmacist follow up: 8/23/23 or sooner as needed based on clinical  intervention  Type of Encounter:  Telephone    Donna Payton PharmD  Clinical Ambulatory Care Pharmacist  Ph: [phone]    Verbal consent to manage patient's drug therapy was obtained from the patient. They were informed they may decline to participate or withdraw from participation in pharmacy services at any time.

## 2023-08-02 NOTE — ASSESSMENT & PLAN NOTE
ASSESSMENT:  -Ever since patient had COVID, has noticed that her breathing has gotten worse; using her rescue inhaler 1-2 times/day  -Patient has tried inhalers with a steroid component but had to discontinue due to hyperglycemia  -Patient has not been able to use her nebulizer solution in about a month due to her nebulizer machine being broken    RECOMMENDATIONS/PLAN:  The following will be brought to Dr. Delcid's attention:  RESTART Albuterol nebulizer solution Q6H PRN for SOB   Will reach out to PCP about ordering a new nebulizer machine  Can consider switching to a scheduled Ipratropium/Albuterol nebulizer solution in the future to better control symptoms as patient cannot use steroid inhalers  CONTINUE Albuterol HFA inhaler PRN for SOB

## 2023-08-08 ENCOUNTER — OFFICE VISIT (OUTPATIENT)
Dept: PRIMARY CARE | Facility: CLINIC | Age: 53
End: 2023-08-08
Payer: MEDICAID

## 2023-08-08 ENCOUNTER — LAB (OUTPATIENT)
Dept: LAB | Facility: LAB | Age: 53
End: 2023-08-08
Payer: MEDICAID

## 2023-08-08 VITALS
DIASTOLIC BLOOD PRESSURE: 84 MMHG | BODY MASS INDEX: 48.55 KG/M2 | HEIGHT: 63 IN | HEART RATE: 87 BPM | RESPIRATION RATE: 16 BRPM | OXYGEN SATURATION: 97 % | WEIGHT: 274 LBS | SYSTOLIC BLOOD PRESSURE: 134 MMHG

## 2023-08-08 DIAGNOSIS — N18.32 TYPE 2 DIABETES MELLITUS WITH STAGE 3B CHRONIC KIDNEY DISEASE, WITH LONG-TERM CURRENT USE OF INSULIN (MULTI): ICD-10-CM

## 2023-08-08 DIAGNOSIS — Z79.4 TYPE 2 DIABETES MELLITUS WITH STAGE 3B CHRONIC KIDNEY DISEASE, WITH LONG-TERM CURRENT USE OF INSULIN (MULTI): ICD-10-CM

## 2023-08-08 DIAGNOSIS — Z79.899 MEDICATION MANAGEMENT: ICD-10-CM

## 2023-08-08 DIAGNOSIS — E11.22 TYPE 2 DIABETES MELLITUS WITH STAGE 3B CHRONIC KIDNEY DISEASE, WITH LONG-TERM CURRENT USE OF INSULIN (MULTI): ICD-10-CM

## 2023-08-08 DIAGNOSIS — E03.9 HYPOTHYROIDISM, UNSPECIFIED TYPE: ICD-10-CM

## 2023-08-08 DIAGNOSIS — J45.909 ASTHMA, UNSPECIFIED ASTHMA SEVERITY, UNSPECIFIED WHETHER COMPLICATED, UNSPECIFIED WHETHER PERSISTENT (HHS-HCC): ICD-10-CM

## 2023-08-08 DIAGNOSIS — M54.41 CHRONIC BILATERAL LOW BACK PAIN WITH RIGHT-SIDED SCIATICA: ICD-10-CM

## 2023-08-08 DIAGNOSIS — U07.1 COVID: ICD-10-CM

## 2023-08-08 DIAGNOSIS — J01.10 ACUTE NON-RECURRENT FRONTAL SINUSITIS: ICD-10-CM

## 2023-08-08 DIAGNOSIS — E55.9 VITAMIN D DEFICIENCY: ICD-10-CM

## 2023-08-08 DIAGNOSIS — E61.2 MAGNESIUM DEFICIENCY: ICD-10-CM

## 2023-08-08 DIAGNOSIS — E78.2 MIXED HYPERLIPIDEMIA: ICD-10-CM

## 2023-08-08 DIAGNOSIS — J40 BRONCHITIS: ICD-10-CM

## 2023-08-08 DIAGNOSIS — G89.29 CHRONIC BILATERAL LOW BACK PAIN WITH RIGHT-SIDED SCIATICA: ICD-10-CM

## 2023-08-08 DIAGNOSIS — E66.09 OBESITY DUE TO EXCESS CALORIES, UNSPECIFIED CLASSIFICATION, UNSPECIFIED WHETHER SERIOUS COMORBIDITY PRESENT: Primary | ICD-10-CM

## 2023-08-08 LAB
ALANINE AMINOTRANSFERASE (SGPT) (U/L) IN SER/PLAS: 14 U/L (ref 7–45)
ALBUMIN (G/DL) IN SER/PLAS: 3.9 G/DL (ref 3.4–5)
ALKALINE PHOSPHATASE (U/L) IN SER/PLAS: 149 U/L (ref 33–110)
ANION GAP IN SER/PLAS: 11 MMOL/L (ref 10–20)
ASPARTATE AMINOTRANSFERASE (SGOT) (U/L) IN SER/PLAS: 15 U/L (ref 9–39)
BASOPHILS (10*3/UL) IN BLOOD BY AUTOMATED COUNT: 0.03 X10E9/L (ref 0–0.1)
BASOPHILS/100 LEUKOCYTES IN BLOOD BY AUTOMATED COUNT: 0.5 % (ref 0–2)
BILIRUBIN TOTAL (MG/DL) IN SER/PLAS: 0.4 MG/DL (ref 0–1.2)
CALCIDIOL (25 OH VITAMIN D3) (NG/ML) IN SER/PLAS: 8 NG/ML
CALCIUM (MG/DL) IN SER/PLAS: 9.1 MG/DL (ref 8.6–10.3)
CARBON DIOXIDE, TOTAL (MMOL/L) IN SER/PLAS: 23 MMOL/L (ref 21–32)
CHLORIDE (MMOL/L) IN SER/PLAS: 110 MMOL/L (ref 98–107)
CHOLESTEROL (MG/DL) IN SER/PLAS: 165 MG/DL (ref 0–199)
CHOLESTEROL IN HDL (MG/DL) IN SER/PLAS: 36.7 MG/DL
CHOLESTEROL/HDL RATIO: 4.5
CREATININE (MG/DL) IN SER/PLAS: 1.27 MG/DL (ref 0.5–1.05)
EOSINOPHILS (10*3/UL) IN BLOOD BY AUTOMATED COUNT: 0.17 X10E9/L (ref 0–0.7)
EOSINOPHILS/100 LEUKOCYTES IN BLOOD BY AUTOMATED COUNT: 2.7 % (ref 0–6)
ERYTHROCYTE DISTRIBUTION WIDTH (RATIO) BY AUTOMATED COUNT: 13 % (ref 11.5–14.5)
ERYTHROCYTE MEAN CORPUSCULAR HEMOGLOBIN CONCENTRATION (G/DL) BY AUTOMATED: 32.3 G/DL (ref 32–36)
ERYTHROCYTE MEAN CORPUSCULAR VOLUME (FL) BY AUTOMATED COUNT: 95 FL (ref 80–100)
ERYTHROCYTES (10*6/UL) IN BLOOD BY AUTOMATED COUNT: 4.7 X10E12/L (ref 4–5.2)
GFR FEMALE: 51 ML/MIN/1.73M2
GLUCOSE (MG/DL) IN SER/PLAS: 148 MG/DL (ref 74–99)
HEMATOCRIT (%) IN BLOOD BY AUTOMATED COUNT: 44.6 % (ref 36–46)
HEMOGLOBIN (G/DL) IN BLOOD: 14.4 G/DL (ref 12–16)
IMMATURE GRANULOCYTES/100 LEUKOCYTES IN BLOOD BY AUTOMATED COUNT: 0.3 % (ref 0–0.9)
LDL: 102 MG/DL (ref 0–99)
LEUKOCYTES (10*3/UL) IN BLOOD BY AUTOMATED COUNT: 6.3 X10E9/L (ref 4.4–11.3)
LYMPHOCYTES (10*3/UL) IN BLOOD BY AUTOMATED COUNT: 1.93 X10E9/L (ref 1.2–4.8)
LYMPHOCYTES/100 LEUKOCYTES IN BLOOD BY AUTOMATED COUNT: 30.7 % (ref 13–44)
MONOCYTES (10*3/UL) IN BLOOD BY AUTOMATED COUNT: 0.41 X10E9/L (ref 0.1–1)
MONOCYTES/100 LEUKOCYTES IN BLOOD BY AUTOMATED COUNT: 6.5 % (ref 2–10)
NEUTROPHILS (10*3/UL) IN BLOOD BY AUTOMATED COUNT: 3.72 X10E9/L (ref 1.2–7.7)
NEUTROPHILS/100 LEUKOCYTES IN BLOOD BY AUTOMATED COUNT: 59.3 % (ref 40–80)
PLATELETS (10*3/UL) IN BLOOD AUTOMATED COUNT: 260 X10E9/L (ref 150–450)
POTASSIUM (MMOL/L) IN SER/PLAS: 4.4 MMOL/L (ref 3.5–5.3)
PROTEIN TOTAL: 7.1 G/DL (ref 6.4–8.2)
SODIUM (MMOL/L) IN SER/PLAS: 140 MMOL/L (ref 136–145)
THYROTROPIN (MIU/L) IN SER/PLAS BY DETECTION LIMIT <= 0.05 MIU/L: 0.73 MIU/L (ref 0.44–3.98)
THYROXINE (T4) (UG/DL) IN SER/PLAS: 13.7 UG/DL (ref 4.5–11.1)
THYROXINE (T4) FREE INDEX IN SER/PLAS BY CALCULATION: 3.4 (ref 1.6–4.7)
TRIGLYCERIDE (MG/DL) IN SER/PLAS: 130 MG/DL (ref 0–149)
TRIIODOTHYRONINE RESIN UPTAKE (T3RU) % IN SER/PLAS: 25 % (ref 24–41)
UREA NITROGEN (MG/DL) IN SER/PLAS: 13 MG/DL (ref 6–23)
VLDL: 26 MG/DL (ref 0–40)

## 2023-08-08 PROCEDURE — 99214 OFFICE O/P EST MOD 30 MIN: CPT | Performed by: FAMILY MEDICINE

## 2023-08-08 PROCEDURE — 84479 ASSAY OF THYROID (T3 OR T4): CPT

## 2023-08-08 PROCEDURE — 3075F SYST BP GE 130 - 139MM HG: CPT | Performed by: FAMILY MEDICINE

## 2023-08-08 PROCEDURE — 84443 ASSAY THYROID STIM HORMONE: CPT

## 2023-08-08 PROCEDURE — 80349 CANNABINOIDS NATURAL: CPT

## 2023-08-08 PROCEDURE — 3008F BODY MASS INDEX DOCD: CPT | Performed by: FAMILY MEDICINE

## 2023-08-08 PROCEDURE — 80307 DRUG TEST PRSMV CHEM ANLYZR: CPT

## 2023-08-08 PROCEDURE — 80053 COMPREHEN METABOLIC PANEL: CPT

## 2023-08-08 PROCEDURE — 3051F HG A1C>EQUAL 7.0%<8.0%: CPT | Performed by: FAMILY MEDICINE

## 2023-08-08 PROCEDURE — 80061 LIPID PANEL: CPT

## 2023-08-08 PROCEDURE — 36415 COLL VENOUS BLD VENIPUNCTURE: CPT

## 2023-08-08 PROCEDURE — 85025 COMPLETE CBC W/AUTO DIFF WBC: CPT

## 2023-08-08 PROCEDURE — 1036F TOBACCO NON-USER: CPT | Performed by: FAMILY MEDICINE

## 2023-08-08 PROCEDURE — 3079F DIAST BP 80-89 MM HG: CPT | Performed by: FAMILY MEDICINE

## 2023-08-08 PROCEDURE — 84436 ASSAY OF TOTAL THYROXINE: CPT

## 2023-08-08 PROCEDURE — 82306 VITAMIN D 25 HYDROXY: CPT

## 2023-08-08 RX ORDER — OXYCODONE AND ACETAMINOPHEN 10; 325 MG/1; MG/1
1 TABLET ORAL 2 TIMES DAILY PRN
Qty: 60 TABLET | Refills: 0 | Status: SHIPPED | OUTPATIENT
Start: 2023-08-08 | End: 2023-09-08 | Stop reason: SDUPTHER

## 2023-08-08 RX ORDER — BLOOD-GLUCOSE,RECEIVER,CONT
EACH MISCELLANEOUS
Qty: 1 EACH | Refills: 0 | Status: SHIPPED | OUTPATIENT
Start: 2023-08-08 | End: 2023-09-30 | Stop reason: SDUPTHER

## 2023-08-08 RX ORDER — PREGABALIN 200 MG/1
200 CAPSULE ORAL 3 TIMES DAILY
Qty: 90 CAPSULE | Refills: 3 | Status: SHIPPED | OUTPATIENT
Start: 2023-08-08

## 2023-08-08 RX ORDER — BLOOD-GLUCOSE SENSOR
EACH MISCELLANEOUS
Qty: 5 EACH | Refills: 3 | Status: SHIPPED | OUTPATIENT
Start: 2023-08-08 | End: 2023-09-12 | Stop reason: SDUPTHER

## 2023-08-08 RX ORDER — NALOXONE HYDROCHLORIDE 4 MG/.1ML
4 SPRAY NASAL AS NEEDED
Qty: 2 EACH | Refills: 0 | Status: SHIPPED | OUTPATIENT
Start: 2023-08-08 | End: 2024-08-07

## 2023-08-08 RX ORDER — SEMAGLUTIDE 0.68 MG/ML
0.25 INJECTION, SOLUTION SUBCUTANEOUS
Qty: 3 ML | Refills: 1 | Status: SHIPPED | OUTPATIENT
Start: 2023-08-08 | End: 2023-08-23 | Stop reason: SINTOL

## 2023-08-08 NOTE — PROGRESS NOTES
Subjective   Patient ID: Alysa Austin is a 53 y.o. female who presents for Back Pain.    HPI     Back pain- Pt had 2 surgeries and is here to follow up for medication management, she is not seeing the surgeon anymore. Pt rates her pain an 8, sharp, tingling, burning, throbbing, and is constant.     Review of Systems  12 Systems have been reviewed as follows.  Constitutional: Fever, weight gain, weight loss, appetite change, night sweats, fatigue, chills.  Eyes : blurry, double vision, vision, loss, tearing, redness, pain, sensitivity to light, glaucoma.  Ears, nose, mouth, and throat: Hearing loss, ringing in the ears, ear pain, nasal congestion, nasal drainage, nosebleeds, mouth, throat, irritation tooth problem.  Cardiovascular :chest pain, pressure, heart racing, palpitations, sweating, leg swelling, high or low blood pressure  Pulmonary: Cough, yellow or green sputum, blood and sputum, shortness of breath, wheezing  Gastrointestinal: Nausea, vomiting, diarrhea, constipation, pain, blood in stool, or vomitus, heartburn, difficulty swallowing  Genitourinary: incontinence, abnormal bleeding, abnormal discharge, urinary frequency, urinary hesitancy, pain, impotence sexual problem, infection, urinary retention  Musculoskeletal: Pain, stiffness, joint, redness or warmth, arthritis, back pain, weakness, muscle wasting, sprain or fracture  Neuro: Weight weakness, dizziness, change in voice, change in taste change in vision, change in hearing, loss, or change of sensation, trouble walking, balance problems coordination problems, shaking, speech problem  Endocrine , cold or heat intolerance, blood sugar problem, weight gain or loss missed periods hot flashes, sweats, change in body hair, change in libido, increased thirst, increased urination  Heme/lymph: Swelling, bleeding, problem anemia, bruising, enlarged lymph nodes  Allergic/immunologic: H. plus nasal drip, watery itchy eyes, nasal drainage,  "immunosuppressed  The above were reviewed and noted negative except as noted in HPI and Problem List.    Objective   /84 (BP Location: Right arm, Patient Position: Sitting, BP Cuff Size: Adult)   Pulse 87   Resp 16   Ht 1.6 m (5' 3\")   Wt 124 kg (274 lb)   SpO2 97%   BMI 48.54 kg/m²     Physical Exam  Constitutional: Well developed, well nourished, alert and in no acute distress   Eyes: Normal external exam. Pupils equally round and reactive to light with normal accommodation and extraocular movements intact.  Neck: Supple, no lymphadenopathy or masses.   Cardiovascular: Regular rate and rhythm, normal S1 and S2, no murmurs, gallops, or rubs. Radial pulses normal. No peripheral edema.  Pulmonary: No respiratory distress, lungs clear to auscultation bilaterally. No wheezes, rhonchi, rales.  Abdomen: soft,non tender, non distended, without masses or HSM  Skin: Warm, well perfused, normal skin turgor and color.   Neurologic: Cranial nerves II-XII grossly intact.   Psychiatric: Mood calm and affect normal  Musculoskeletal: Moving all extremities without restriction    Assessment/Plan   Problem List Items Addressed This Visit       Asthma    Relevant Orders    Follow Up In [facility] - PCP - Established    Diabetes (CMS/[facility])    Relevant Medications    semaglutide (Ozempic) 0.25 mg or 0.5 mg (2 mg/3 mL) pen injector    Other Relevant Orders    CBC and Auto Differential    Comprehensive Metabolic Panel    Follow Up In [facility] - PCP - Established    Chronic bilateral low back pain with right-sided sciatica    Relevant Medications    pregabalin (Lyrica) 200 mg capsule    oxyCODONE-acetaminophen (Percocet)  mg tablet    naloxone (Narcan) 4 mg/0.1 mL nasal spray    Other Relevant Orders    Follow Up In [facility] - PCP - Established    Hyperlipidemia    Relevant Orders    Lipid Panel    Follow Up In [facility] - PCP - Established    Obesity - Primary    Relevant " Medications    semaglutide (Ozempic) 0.25 mg or 0.5 mg (2 mg/3 mL) pen injector    Vitamin D deficiency    Relevant Orders    Vitamin D, Total    Follow Up In [facility] - PCP - Established    Magnesium deficiency    Relevant Orders    Follow Up In [facility] - PCP - Established    RESOLVED: Acute non-recurrent frontal sinusitis    Relevant Orders    Follow Up In [facility] - PCP - Established    COVID    Relevant Orders    Follow Up In [facility] - PCP - Established     Other Visit Diagnoses       Bronchitis        Relevant Orders    Follow Up In [facility] - PCP - Established    Hypothyroidism, unspecified type        Relevant Orders    Thyroid Stimulating Hormone    Free T4 Index    Follow Up In [facility] - PCP - Established    Medication management        Relevant Orders    Drug Screen 9 Panel, Blood with Reflex to Confirmation    Opiate/Opioid/Benzo Extended Prescription Compliance    Follow Up In [facility] - PCP - Established                 Continue current medications and therapy for chronic medical conditions      Provider Attestation - Scribe documentation    All medical record entries made by the Scribe were at my direction and personally dictated by me. I have reviewed the chart and agree that the record accurately reflects my personal performance of the history, physical exam, discussion and plan.    Scribe Attestation  By signing my name below, I, Renzo Delcid MD   , Scribe   attest that this documentation has been prepared under the direction and in the presence of Renzo Delcid MD.      BW prior    UDS today    Consider referral to pain management next    sees nephrologist, pulmonologist, and endocrinologist      colonoscopy done 10/12/2022, repeat 10/2027     CT abd/pelvis done at [facility] 08/29/2022     US abd RUQ done at [facility] 08/29/2022     consider ARB next      seeing nutritionist      lipid & magnesium next       consider jardiance or farxiga in future

## 2023-08-09 ENCOUNTER — PATIENT MESSAGE (OUTPATIENT)
Dept: PRIMARY CARE | Facility: CLINIC | Age: 53
End: 2023-08-09
Payer: MEDICAID

## 2023-08-09 DIAGNOSIS — J30.1 SEASONAL ALLERGIC RHINITIS DUE TO POLLEN: ICD-10-CM

## 2023-08-09 RX ORDER — LORATADINE 10 MG/1
10 TABLET ORAL DAILY
Qty: 30 TABLET | Refills: 2 | Status: SHIPPED | OUTPATIENT
Start: 2023-08-09 | End: 2023-08-23 | Stop reason: ALTCHOICE

## 2023-08-09 NOTE — TELEPHONE ENCOUNTER
From: Alysa Austin  To: Renzo Delcid MD  Sent: 8/9/2023 7:20 AM EDT  Subject: Eyes    Tues Aug 8 th I came in to see you, but I forgot to mention I've been having issues with my eyes itching and burning, and draining..... Wondering if you can send me something to the pharmacy to soothe my eyes.

## 2023-08-10 DIAGNOSIS — J45.909 ASTHMA, UNSPECIFIED ASTHMA SEVERITY, UNSPECIFIED WHETHER COMPLICATED, UNSPECIFIED WHETHER PERSISTENT (HHS-HCC): ICD-10-CM

## 2023-08-10 RX ORDER — NEBULIZER AND COMPRESSOR
EACH MISCELLANEOUS
Qty: 1 EACH | Refills: 0 | Status: SHIPPED | OUTPATIENT
Start: 2023-08-10

## 2023-08-10 RX ORDER — ALBUTEROL SULFATE 0.83 MG/ML
SOLUTION RESPIRATORY (INHALATION)
Qty: 75 ML | Refills: 3 | Status: SHIPPED | OUTPATIENT
Start: 2023-08-10

## 2023-08-10 NOTE — TELEPHONE ENCOUNTER
Patient of Dr Delcid    Refill     Disp Refills Start End    albuterol 2.5 mg /3 mL (0.083 %) nebulizer solution   4/14/2018     Sig - Route: Inhale 3 mL every 6 hours if needed. - inhalation      Pharmacy [facility]     She also needs the Nebulizer  machine to be called into the pharmacy to.

## 2023-08-11 ENCOUNTER — TELEPHONE (OUTPATIENT)
Dept: PRIMARY CARE | Facility: CLINIC | Age: 53
End: 2023-08-11

## 2023-08-11 LAB
AMPHETAMINE SCREEN BLOOD: NEGATIVE NG/ML
BARBITURATE SCREEN BLOOD: NEGATIVE NG/ML
BENZODIAZEPINES SCREEN BLOOD: NEGATIVE NG/ML
BUPRENORPHINE SCREEN BLOOD: NEGATIVE NG/ML
CANNABINOIDS SCREEN BLOOD: POSITIVE NG/ML
COCAINE SCREEN BLOOD: NEGATIVE NG/ML
DRUG SCREEN COMMENT BLOOD: NORMAL
METHADONE SCREEN BLOOD: NEGATIVE NG/ML
METHAMPHETAMINE, BLOOD, SCREEN: NEGATIVE NG/ML
OPIATE SCREEN BLOOD: NEGATIVE NG/ML
OXYCODONE SCREEN BLOOD: NEGATIVE NG/ML
PHENCYCLIDINE SCREEN BLOOD: NEGATIVE NG/ML

## 2023-08-11 NOTE — TELEPHONE ENCOUNTER
Patient of Dr. Bhavin BANDA submitted for ozempic but it is denied because insurance ran a pharmacy claim. Patient needs to try 3 formulary medication and insurance does not see that she has tried the formulary medications    Shankar Jenkins.   She will like to try one of the formulary medications

## 2023-08-14 LAB — CANNABINOID CONFIRMATION,BLOOD: 11 NG/ML

## 2023-08-15 ENCOUNTER — PATIENT MESSAGE (OUTPATIENT)
Dept: PRIMARY CARE | Facility: CLINIC | Age: 53
End: 2023-08-15

## 2023-08-15 ENCOUNTER — APPOINTMENT (OUTPATIENT)
Dept: PRIMARY CARE | Facility: CLINIC | Age: 53
End: 2023-08-15
Payer: MEDICAID

## 2023-08-23 ENCOUNTER — TELEMEDICINE (OUTPATIENT)
Dept: PHARMACY | Facility: HOSPITAL | Age: 53
End: 2023-08-23
Payer: MEDICAID

## 2023-08-23 DIAGNOSIS — J45.909 ASTHMA, UNSPECIFIED ASTHMA SEVERITY, UNSPECIFIED WHETHER COMPLICATED, UNSPECIFIED WHETHER PERSISTENT (HHS-HCC): ICD-10-CM

## 2023-08-23 DIAGNOSIS — Z79.4 TYPE 2 DIABETES MELLITUS WITHOUT COMPLICATION, WITH LONG-TERM CURRENT USE OF INSULIN (MULTI): Primary | ICD-10-CM

## 2023-08-23 DIAGNOSIS — E11.9 TYPE 2 DIABETES MELLITUS WITHOUT COMPLICATION, WITH LONG-TERM CURRENT USE OF INSULIN (MULTI): Primary | ICD-10-CM

## 2023-08-23 ASSESSMENT — ENCOUNTER SYMPTOMS: DIABETIC ASSOCIATED SYMPTOMS: 0

## 2023-08-23 NOTE — PROGRESS NOTES
Pharmacy Post-Discharge Visit  Alysa Austin is a 53 y.o. female was referred to Clinical Pharmacy Team to complete a post-discharge medication optimization and monitoring visit.  The patient was referred for their Diabetes and Asthma.    Referring Provider: Renzo Delcid MD    Subjective   Allergies   Allergen Reactions    Hydromorphone Hives and Unknown    Ibuprofen Unknown     Pt states not allergy -  she just can't take it because she only has one kidney    Ketorolac Other     Nausea/vomiting    Penicillins Unknown and Swelling     Within last 2 years    Propoxyphene N-Acetaminophen Hives     Patient tolerates acetaminophen at home    Shellfish Containing Products Unknown    Shellfish Derived Angioedema       [id number] - [place], [place] - [address] [facility] AT [facility] & [address] [address]  Phone: [phone] Fax: [phone]    [facility] - [place], [place] - [address]  [address]  [facility] [address]  [facility] [address]  Phone: [phone] Fax: [phone]      Social History     Social History Narrative    Not on file        Diabetes  She presents for her follow-up diabetic visit. She has type 2 diabetes mellitus. Her disease course has been stable. (Patient states that sometimes her blood sugar is less than 70mg/dL. This usually happens in the morning, and states maybe once per week.) There are no diabetic associated symptoms. There are no hypoglycemic complications. Risk factors for coronary artery disease include diabetes mellitus, dyslipidemia, hypertension and obesity. Current diabetic treatments: Tresiba 100 units once daily and Humalog 30 units with meals. Diabetic meal planning: Patient states normally eats brunch around 11am, and then may eat some crackers for lunch, but typically falls short around lunch time. Patient states then she has dinner, and usually a snack between 8-9pm.  Patient loves pretzels. (Patient uses a FreeStyle Nela to test her blood sugar. The 7 day average was 165mg/dL, 14 day average was 159mg/dL, and 30 day average was 161mg/dL.) An ACE inhibitor/angiotensin II receptor blocker is being taken (Losartan 25mg daily).     Asthma Severity Assessment  Symptoms/Factors:  Primary Symptoms: Shortness of breath  Aggravating Factors: Pollen, Night Time, Weather Change  Alleviating Factors: Albuterol Inhaler  Allergies: Yes, describe: patient takes Singulair 10mg daily and Zyrtec 10mg daily  Asthma Severity  Symptoms/week: throughout the day  Nighttime awakenings: > 1 time/week but not nightly   DARLENE use: daily  Patient does not currently have a nebulizer machine, and is just using her albuterol inhaler. Typically uses the albuterol inhaler one puff during the day and 1-2 puffs at night.  Patient has been on inhaled corticosteroids in the past, but has not tolerated them.    Objective     LAB  Lab Results   Component Value Date    BILITOT 0.3 08/17/2023    CALCIUM 9.0 08/17/2023    CO2 24 08/17/2023     08/17/2023    CREATININE 1.30 (H) 08/17/2023    GLUCOSE 185 (H) 08/17/2023    ALKPHOS 144 (H) 08/17/2023    K 4.1 08/17/2023    PROT 7.2 08/17/2023     08/17/2023    AST 19 08/17/2023    ALT 16 08/17/2023    BUN 12 08/17/2023    ANIONGAP 12 08/17/2023    MG 1.81 07/21/2023    PHOS 3.1 02/22/2019    ALBUMIN 3.8 08/17/2023    LIPASE 19 08/17/2023    GFRF 49 (A) 08/17/2023    GFRMALE CANCELED 04/05/2023     Lab Results   Component Value Date    TRIG 130 08/08/2023    CHOL 165 08/08/2023    HDL 36.7 (A) 08/08/2023     Lab Results   Component Value Date    HGBA1C 7.0 (A) 06/25/2023     Current Outpatient Medications on File Prior to Visit   Medication Sig Dispense Refill    acetaminophen (Tylenol) 325 mg tablet TAKE 2 TABLETS BY MOUTH FOUR TIMES DAILY AS NEEDED FOR MILD AND MODERATE POSTOPERATIVE PAIN.      albuterol 90 mcg/actuation inhaler Inhale 1 puff 3 times a day as  "needed for shortness of breath. 18 g 1    atorvastatin (Lipitor) 20 mg tablet Take 1 tablet (20 mg) by mouth once daily.      BD Ultra-Fine Short Pen Needle 31 gauge x 5/16\" needle USE AS DIRECTED UNDER THE SKIN THREE TIMES DAILY      blood sugar diagnostic (Blood Glucose Test) strip TEST 3 TIMES DAILY AS NEEDED 200 strip 1    blood-glucose sensor (Dexcom G7 Sensor) device Use as directed 5 each 3    buPROPion XL (Wellbutrin XL) 150 mg 24 hr tablet Take 1 tablet (150 mg) by mouth once daily in the morning. Take before meals.      busPIRone (Buspar) 15 mg tablet Take 1 tablet (15 mg) by mouth 2 times a day. 60 tablet 3    cetirizine (ZyrTEC) 10 mg tablet Take 1 tablet (10 mg) by mouth once daily. 90 tablet 1    Dexcom G4 platinum  (Dexcom G7 ) misc Use as instructed 1 each 0    FreeStyle Nela 2 [place] misc use as directed      FreeStyle [place] 2 Sensor kit Inject under the skin if needed.      glucagon (GlucaGen Diagnostic Kit) 1 mg/mL injection Inject 1 mL into the shoulder, thigh, or buttocks.      insulin degludec (Tresiba FlexTouch) 100 unit/mL (3 mL) injection Inject 100 Units under the skin once daily at bedtime.      insulin lispro (HumaLOG) 100 unit/mL injection INJECT 30 TO 35 UNITS UNDER THE SKIN AT EACH MEAL. HOLD IF BLOOD GLUCOSE LESS THAN 150.      lancets (OneTouch UltraSoft Lancets) misc Use to screen glucose twice daily 200 each 0    levothyroxine (Synthroid, Levoxyl) 125 mcg tablet Take 1 tablet (125 mcg) by mouth once daily. 90 tablet 1    magnesium oxide (Mag-Ox) 400 mg (241.3 mg magnesium) tablet TAKE 2 TABLET BY MOUTH EVERY DAY 90 tablet 1    methocarbamol (Robaxin) 500 mg tablet Take 1 tablet (500 mg) by mouth 3 times a day as needed for muscle spasms.      montelukast (Singulair) 10 mg tablet Take 1 tablet (10 mg) by mouth once daily at bedtime.      naloxone (Narcan) 4 mg/0.1 mL nasal spray Administer 1 spray (4 mg) into affected nostril(s) if needed for opioid reversal. May " repeat every 2-3 minutes if needed, alternating nostrils, until medical assistance becomes available. 2 each 0    oxyCODONE-acetaminophen (Percocet)  mg tablet Take 1 tablet by mouth 2 times a day as needed for severe pain (7 - 10). 60 tablet 0    pregabalin (Lyrica) 200 mg capsule Take 1 capsule (200 mg) by mouth 3 times a day. 90 capsule 3    spironolactone (Aldactone) 50 mg tablet Take 1 tablet (50 mg) by mouth once daily.      albuterol 2.5 mg /3 mL (0.083 %) nebulizer solution Inhale 3 ml every 6 hours as needed (Patient not taking: Reported on 8/23/2023) 75 mL 3    losartan (Cozaar) 25 mg tablet Take 1 tablet (25 mg) by mouth once daily. 30 tablet 2    nebulizer and compressor device Use as directed with nebulizer solution for shortness of breath and wheezing (Patient not taking: Reported on 8/23/2023) 1 each 0    oxyCODONE-acetaminophen (Percocet) 5-325 mg tablet Take 1 tablet by mouth every 6 hours if needed.      [DISCONTINUED] butalbital-acetaminophen-caff -40 mg tablet Take by mouth.      [DISCONTINUED] cyclobenzaprine (Flexeril) 10 mg tablet Take by mouth.      [DISCONTINUED] insulin lispro (HumaLOG) 100 unit/mL injection twice a day.      [DISCONTINUED] lidocaine (Lidoderm) 5 % patch Place 1 patch on the skin once daily.      [DISCONTINUED] loratadine (Claritin) 10 mg tablet Take 1 tablet (10 mg) by mouth once daily. 30 tablet 2    [DISCONTINUED] nystatin (Mycostatin) 100,000 unit/mL suspension Take 5 mL (500,000 Units) by mouth every 6 hours.      [DISCONTINUED] ondansetron ODT (Zofran-ODT) 4 mg disintegrating tablet Take by mouth every 8 hours if needed.      [DISCONTINUED] semaglutide (Ozempic) 0.25 mg or 0.5 mg (2 mg/3 mL) pen injector Inject 0.25 mg under the skin every 7 days. 3 mL 1     No current facility-administered medications on file prior to visit.      DRUG INTERATIONS  No Drug Interactions exist that require pharmacologic intervention    Assessment/Plan   Problem List Items  Addressed This Visit       Asthma     Patient states she does not have a nebulizer machine right now. Will reach out to Dr. Delcid to discuss getting the patient a new nebulizer machine.  Patient states she uses her albuterol inhaler daily, and that her asthma is waking her up about 3 times per week. Suggest starting Spiriva Inhaler 2 puffs daily to help with asthma management. Patient would not like to use an ICS inhaler, as this has affected her blood sugars in the past.          Relevant Orders    Follow Up In Clinical Pharmacy    Type 2 diabetes mellitus without complication, with long-term current use of insulin (CMS/[facility]) - Primary     CONTINUE Tresiba 100 units once nightly  CONTINUE Humalog 30 units if blood sugar is above 160mg/dL at meals.  Discussed with the patient that the [facility] will create a better sliding scale for the patient to inject insulin based off her blood sugar.   Patient was testing her blood sugar before bed, and injecting 30 units of Humalog if it was above 160mg/dL. Discussed with the patient that she needs to wait at least 2 hours after a meal to see the effects that the meal and her Humalog dose are having on her body. If patient's blood sugar is above 150mg/dL and she is having a night time snack, discussed adding 5 units of Humalog with a snack at bed time.  Patient states she is changing from FreeStyle Nela to Dexcom G7. Discussed with the patient that it is similar to FreeStyle, and how to put it on. Provided [facility] phone number to the patient for any questions.  Continue working towards healthy diet and lifestyle.         Relevant Orders    Follow Up In Clinical Pharmacy     Follow up with the Clinical Pharmacy Team 8/30/2023 at 3pm    Continue all meds under the continuation of care with the referring provider and clinical pharmacy team.    Please reach out to the Clinical Pharmacy Team if there are any further questions.     Verbal consent to manage patient's drug therapy was obtained  from patient. They were informed they may decline to participate or withdraw from participation in pharmacy services at any time.    Florence Cruz, PharmD  [phone]

## 2023-08-23 NOTE — ASSESSMENT & PLAN NOTE
CONTINUE Tresiba 100 units once nightly  CONTINUE Humalog 30 units if blood sugar is above 160mg/dL at meals.  Discussed with the patient that the [facility] will create a better sliding scale for the patient to inject insulin based off her blood sugar.   Patient was testing her blood sugar before bed, and injecting 30 units of Humalog if it was above 160mg/dL. Discussed with the patient that she needs to wait at least 2 hours after a meal to see the effects that the meal and her Humalog dose are having on her body. If patient's blood sugar is above 150mg/dL and she is having a night time snack, discussed adding 5 units of Humalog with a snack at bed time.  Patient states she is changing from FreeStyle Nela to Dexcom G7. Discussed with the patient that it is similar to FreeStyle, and how to put it on. Provided [facility] phone number to the patient for any questions.  Continue working towards healthy diet and lifestyle.

## 2023-08-23 NOTE — ASSESSMENT & PLAN NOTE
Patient states she does not have a nebulizer machine right now. Will reach out to Dr. Delcid to discuss getting the patient a new nebulizer machine.  Patient states she uses her albuterol inhaler daily, and that her asthma is waking her up about 3 times per week. Suggest starting Spiriva Inhaler 2 puffs daily to help with asthma management. Patient would not like to use an ICS inhaler, as this has affected her blood sugars in the past.

## 2023-08-23 NOTE — Clinical Note
Hi Dr. Delcid,   This patient is in need of a new nebulizer machine, as he old one broke. In addition, she is only using an albuterol inhaler currently, but she is using it multiple times per day. She also says that he asthma wakes her up usually 3x/week. I was thinking about starting Spiriva for her, since she does not want an ICS combo inhaler. I am talking with her again next week, would you like me to prescribe Spiriva? In addition, would you be able to send her a script for a nebulizer machine.  Thank you!

## 2023-08-24 DIAGNOSIS — T78.40XS ALLERGY, SEQUELA: ICD-10-CM

## 2023-08-24 DIAGNOSIS — I10 PRIMARY HYPERTENSION: Primary | ICD-10-CM

## 2023-08-24 RX ORDER — CETIRIZINE HYDROCHLORIDE 10 MG/1
10 TABLET ORAL DAILY
Qty: 90 TABLET | Refills: 1 | Status: SHIPPED | OUTPATIENT
Start: 2023-08-24

## 2023-08-24 RX ORDER — LOSARTAN POTASSIUM 25 MG/1
25 TABLET ORAL DAILY
Qty: 30 TABLET | Refills: 2 | Status: SHIPPED | OUTPATIENT
Start: 2023-08-24 | End: 2023-11-22

## 2023-08-27 DIAGNOSIS — J45.41 MODERATE PERSISTENT ASTHMA WITH ACUTE EXACERBATION: ICD-10-CM

## 2023-08-27 DIAGNOSIS — J30.1 CHRONIC SEASONAL ALLERGIC RHINITIS DUE TO POLLEN: ICD-10-CM

## 2023-08-28 DIAGNOSIS — J30.9 ALLERGIC RHINITIS, UNSPECIFIED SEASONALITY, UNSPECIFIED TRIGGER: ICD-10-CM

## 2023-08-28 RX ORDER — MONTELUKAST SODIUM 10 MG/1
TABLET ORAL
Qty: 30 TABLET | Refills: 0 | OUTPATIENT
Start: 2023-08-28

## 2023-08-30 ENCOUNTER — TELEMEDICINE (OUTPATIENT)
Dept: PHARMACY | Facility: HOSPITAL | Age: 53
End: 2023-08-30
Payer: MEDICAID

## 2023-08-30 DIAGNOSIS — E11.9 TYPE 2 DIABETES MELLITUS WITHOUT COMPLICATION, WITH LONG-TERM CURRENT USE OF INSULIN (MULTI): ICD-10-CM

## 2023-08-30 DIAGNOSIS — J45.909 ASTHMA, UNSPECIFIED ASTHMA SEVERITY, UNSPECIFIED WHETHER COMPLICATED, UNSPECIFIED WHETHER PERSISTENT (HHS-HCC): ICD-10-CM

## 2023-08-30 DIAGNOSIS — Z79.4 TYPE 2 DIABETES MELLITUS WITHOUT COMPLICATION, WITH LONG-TERM CURRENT USE OF INSULIN (MULTI): ICD-10-CM

## 2023-08-30 ASSESSMENT — ENCOUNTER SYMPTOMS: DIABETIC ASSOCIATED SYMPTOMS: 0

## 2023-08-30 NOTE — ASSESSMENT & PLAN NOTE
Discuss patients shortness of breath and rescue inhaler use at next pharmacy appointment. Consider prescribing Spiriva 2 puffs daily to help with asthma exacerbations.

## 2023-08-30 NOTE — ASSESSMENT & PLAN NOTE
SWITCH how the patient is taking Humalog. Will start patient on sliding scale, to better control blood sugars and lower the risk of hypoglycemic events. Patient wrote down new sliding scale, and understands changes. New sliding scale:  Humalog 5 units with meals, plus sliding scale  -150mg/dL: 2 units  -200mg/dL: 4 units  -250mg/dL: 6 units  -300mg/dL: 8 units  -350mg/dL: 10 units  BG >350mg/dL 12 units    FUTURE CONSIDERATIONS  CHANGE Tresiba 100 units per night to Toujeo U300 80 units at bed time. This will help the patient inject less insulin volume per day. Per guidelines, should reduce insulin dose by 20% when switching from Tresiba to Toujeo.  Patient states she was previously taking Ozempic and Jardiance, and had bad stomach cramps. May consider switching GLP1 agent. Patients insurance will cover Trulicity. In addition, may consider retrialing Jardiance or Farxiga, as the Ozempic was the probable cause of the stomach cramping.  Patient uses a Dexcom monitor to test her blood sugars. Consider an in person visit with the patient to help best get a sense of what patients blood sugar trends are.

## 2023-08-30 NOTE — PROGRESS NOTES
Pharmacy Post-Discharge Visit  Alysa Austin is a 53 y.o. female was referred to Clinical Pharmacy Team to complete a post-discharge medication optimization and monitoring visit.  The patient was referred for their Diabetes.    Referring Provider: Renzo Delcid MD    Subjective   Allergies   Allergen Reactions    Hydromorphone Hives and Unknown    Ibuprofen Unknown     Pt states not allergy -  she just can't take it because she only has one kidney    Ketorolac Other     Nausea/vomiting    Penicillins Unknown and Swelling     Within last 2 years    Propoxyphene N-Acetaminophen Hives     Patient tolerates acetaminophen at home    Shellfish Containing Products Unknown    Shellfish Derived Angioedema       [id number] - [place], [place] - [address] [facility] AT [facility] & [facility]  [address] [facility] [address]  Phone: [phone] Fax: [phone]    [facility] - [place], [place] - [address]  [address]  [facility] [address]  [facility] [address]  Phone: [phone] Fax: [phone]    Social History     Social History Narrative    Not on file      Diabetes  She presents for her follow-up diabetic visit. She has type 2 diabetes mellitus. Her disease course has been stable. (Patient states that sometimes her blood sugar is less than 70mg/dL. This usually happens in the morning, and states maybe once per week.) There are no diabetic associated symptoms. There are no hypoglycemic complications. Risk factors for coronary artery disease include diabetes mellitus, dyslipidemia, hypertension and obesity. Current diabetic treatments: Tresiba 100 units once daily and Humalog 30 units with meals. Diabetic meal planning: Patient states normally eats brunch around 11am, and then may eat some crackers for lunch, but typically falls short around lunch time. Patient states then she has dinner, and usually a snack between 8-9pm. Patient loves  "pretzels. (Patient uses a FreeStyle Nela to test her blood sugar. The 7 day average was 165mg/dL, 14 day average was 159mg/dL, and 30 day average was 161mg/dL.) An ACE inhibitor/angiotensin II receptor blocker is being taken (Losartan 25mg daily).     Objective     LAB  Lab Results   Component Value Date    BILITOT 0.3 08/17/2023    CALCIUM 9.0 08/17/2023    CO2 24 08/17/2023     08/17/2023    CREATININE 1.30 (H) 08/17/2023    GLUCOSE 185 (H) 08/17/2023    ALKPHOS 144 (H) 08/17/2023    K 4.1 08/17/2023    PROT 7.2 08/17/2023     08/17/2023    AST 19 08/17/2023    ALT 16 08/17/2023    BUN 12 08/17/2023    ANIONGAP 12 08/17/2023    MG 1.81 07/21/2023    PHOS 3.1 02/22/2019    ALBUMIN 3.8 08/17/2023    LIPASE 19 08/17/2023    GFRF 49 (A) 08/17/2023    GFRMALE CANCELED 04/05/2023     Lab Results   Component Value Date    TRIG 130 08/08/2023    CHOL 165 08/08/2023    HDL 36.7 (A) 08/08/2023     Lab Results   Component Value Date    HGBA1C 7.0 (A) 06/25/2023     Current Outpatient Medications on File Prior to Visit   Medication Sig Dispense Refill    cetirizine (ZyrTEC) 10 mg tablet Take 1 tablet (10 mg) by mouth once daily. 90 tablet 1    insulin degludec (Tresiba FlexTouch) 100 unit/mL (3 mL) injection Inject 100 Units under the skin once daily at bedtime.      insulin lispro (HumaLOG) 100 unit/mL injection INJECT 30 TO 35 UNITS UNDER THE SKIN AT EACH MEAL. HOLD IF BLOOD GLUCOSE LESS THAN 150.      acetaminophen (Tylenol) 325 mg tablet TAKE 2 TABLETS BY MOUTH FOUR TIMES DAILY AS NEEDED FOR MILD AND MODERATE POSTOPERATIVE PAIN.      albuterol 2.5 mg /3 mL (0.083 %) nebulizer solution Inhale 3 ml every 6 hours as needed (Patient not taking: Reported on 8/23/2023) 75 mL 3    albuterol 90 mcg/actuation inhaler Inhale 1 puff 3 times a day as needed for shortness of breath. 18 g 1    atorvastatin (Lipitor) 20 mg tablet Take 1 tablet (20 mg) by mouth once daily.      BD Ultra-Fine Short Pen Needle 31 gauge x 5/16\" " needle USE AS DIRECTED UNDER THE SKIN THREE TIMES DAILY      blood sugar diagnostic (Blood Glucose Test) strip TEST 3 TIMES DAILY AS NEEDED 200 strip 1    blood-glucose sensor (Dexcom G7 Sensor) device Use as directed 5 each 3    buPROPion XL (Wellbutrin XL) 150 mg 24 hr tablet Take 1 tablet (150 mg) by mouth once daily in the morning. Take before meals.      busPIRone (Buspar) 15 mg tablet Take 1 tablet (15 mg) by mouth 2 times a day. 60 tablet 3    Dexcom G4 platinum  (Dexcom G7 ) misc Use as instructed 1 each 0    glucagon (GlucaGen Diagnostic Kit) 1 mg/mL injection Inject 1 mL into the shoulder, thigh, or buttocks.      lancets (OneTouch UltraSoft Lancets) misc Use to screen glucose twice daily 200 each 0    levothyroxine (Synthroid, Levoxyl) 125 mcg tablet Take 1 tablet (125 mcg) by mouth once daily. 90 tablet 1    losartan (Cozaar) 25 mg tablet Take 1 tablet (25 mg) by mouth once daily. 30 tablet 2    magnesium oxide (Mag-Ox) 400 mg (241.3 mg magnesium) tablet TAKE 2 TABLET BY MOUTH EVERY DAY 90 tablet 1    methocarbamol (Robaxin) 500 mg tablet Take 1 tablet (500 mg) by mouth 3 times a day as needed for muscle spasms.      montelukast (Singulair) 10 mg tablet Take 1 tablet (10 mg) by mouth once daily at bedtime.      naloxone (Narcan) 4 mg/0.1 mL nasal spray Administer 1 spray (4 mg) into affected nostril(s) if needed for opioid reversal. May repeat every 2-3 minutes if needed, alternating nostrils, until medical assistance becomes available. 2 each 0    nebulizer and compressor device Use as directed with nebulizer solution for shortness of breath and wheezing (Patient not taking: Reported on 8/23/2023) 1 each 0    oxyCODONE-acetaminophen (Percocet)  mg tablet Take 1 tablet by mouth 2 times a day as needed for severe pain (7 - 10). 60 tablet 0    oxyCODONE-acetaminophen (Percocet) 5-325 mg tablet Take 1 tablet by mouth every 6 hours if needed.      pregabalin (Lyrica) 200 mg capsule Take  1 capsule (200 mg) by mouth 3 times a day. 90 capsule 3    spironolactone (Aldactone) 50 mg tablet Take 1 tablet (50 mg) by mouth once daily.      [DISCONTINUED] cetirizine (ZyrTEC) 10 mg tablet Take 1 tablet (10 mg) by mouth once daily. 90 tablet 1    [DISCONTINUED] FreeStyle Nela 2 [place] misc use as directed      [DISCONTINUED] FreeStyle [place] 2 Sensor kit Inject under the skin if needed.      [DISCONTINUED] losartan (Cozaar) 25 mg tablet Take 1 tablet (25 mg) by mouth once daily. 30 tablet 2     No current facility-administered medications on file prior to visit.      DRUG INTERATIONS  No Drug Interactions exist that require pharmacologic intervention    Assessment/Plan   Problem List Items Addressed This Visit       Asthma     Discuss patients shortness of breath and rescue inhaler use at next pharmacy appointment. Consider prescribing Spiriva 2 puffs daily to help with asthma exacerbations.         Type 2 diabetes mellitus without complication, with long-term current use of insulin (CMS/[facility])     SWITCH how the patient is taking Humalog. Will start patient on sliding scale, to better control blood sugars and lower the risk of hypoglycemic events. Patient wrote down new sliding scale, and understands changes. New sliding scale:  Humalog 5 units with meals, plus sliding scale  -150mg/dL: 2 units  -200mg/dL: 4 units  -250mg/dL: 6 units  -300mg/dL: 8 units  -350mg/dL: 10 units  BG >350mg/dL 12 units    FUTURE CONSIDERATIONS  CHANGE Tresiba 100 units per night to Toujeo U300 80 units at bed time. This will help the patient inject less insulin volume per day. Per guidelines, should reduce insulin dose by 20% when switching from Tresiba to Toujeo.  Patient states she was previously taking Ozempic and Jardiance, and had bad stomach cramps. May consider switching GLP1 agent. Patients insurance will cover Trulicity. In addition, may consider retrialing Jardiance or Farxiga, as the Ozempic  was the probable cause of the stomach cramping.  Patient uses a Dexcom monitor to test her blood sugars. Consider an in person visit with the patient to help best get a sense of what patients blood sugar trends are.         Relevant Orders    Follow Up In [facility] - Pharmacy     Follow up with the Clinical Pharmacy Team 9/12/2023 at 3pm to discuss Humalog titration and switch to Toujeo insulin.    Continue all meds under the continuation of care with the referring provider and clinical pharmacy team.    Please reach out to the Clinical Pharmacy Team if there are any further questions.     Verbal consent to manage patient's drug therapy was obtained from patient. They were informed they may decline to participate or withdraw from participation in pharmacy services at any time.    Florence Cruz, PharmD  [phone]

## 2023-08-31 RX ORDER — MONTELUKAST SODIUM 10 MG/1
10 TABLET ORAL NIGHTLY
Qty: 90 TABLET | Refills: 1 | Status: SHIPPED | OUTPATIENT
Start: 2023-08-31 | End: 2024-02-26

## 2023-08-31 ASSESSMENT — ENCOUNTER SYMPTOMS
LEG PAIN: 1
FEVER: 0
HEADACHES: 0
PERIANAL NUMBNESS: 0
NUMBNESS: 1
BOWEL INCONTINENCE: 1
WEAKNESS: 1
PARESTHESIAS: 1
WEIGHT LOSS: 0
BACK PAIN: 1
TINGLING: 1
DYSURIA: 0
PARESIS: 0
ABDOMINAL PAIN: 0

## 2023-09-07 ENCOUNTER — OFFICE VISIT (OUTPATIENT)
Dept: PRIMARY CARE | Facility: CLINIC | Age: 53
End: 2023-09-07
Payer: MEDICAID

## 2023-09-07 VITALS
OXYGEN SATURATION: 97 % | HEART RATE: 83 BPM | BODY MASS INDEX: 48.54 KG/M2 | HEIGHT: 63 IN | RESPIRATION RATE: 16 BRPM | DIASTOLIC BLOOD PRESSURE: 74 MMHG | SYSTOLIC BLOOD PRESSURE: 120 MMHG

## 2023-09-07 DIAGNOSIS — Z79.899 MEDICATION MANAGEMENT: ICD-10-CM

## 2023-09-07 DIAGNOSIS — U07.1 COVID: ICD-10-CM

## 2023-09-07 DIAGNOSIS — G89.29 CHRONIC BILATERAL LOW BACK PAIN WITH RIGHT-SIDED SCIATICA: ICD-10-CM

## 2023-09-07 DIAGNOSIS — E61.2 MAGNESIUM DEFICIENCY: ICD-10-CM

## 2023-09-07 DIAGNOSIS — E11.22 TYPE 2 DIABETES MELLITUS WITH STAGE 3B CHRONIC KIDNEY DISEASE, WITH LONG-TERM CURRENT USE OF INSULIN (MULTI): ICD-10-CM

## 2023-09-07 DIAGNOSIS — J01.10 ACUTE NON-RECURRENT FRONTAL SINUSITIS: ICD-10-CM

## 2023-09-07 DIAGNOSIS — C85.99 NON-HODGKIN LYMPHOMA, UNSPECIFIED, EXTRANODAL AND SOLID ORGAN SITES (MULTI): Primary | ICD-10-CM

## 2023-09-07 DIAGNOSIS — J45.909 ASTHMA, UNSPECIFIED ASTHMA SEVERITY, UNSPECIFIED WHETHER COMPLICATED, UNSPECIFIED WHETHER PERSISTENT (HHS-HCC): ICD-10-CM

## 2023-09-07 DIAGNOSIS — J40 BRONCHITIS: ICD-10-CM

## 2023-09-07 DIAGNOSIS — N18.32 TYPE 2 DIABETES MELLITUS WITH STAGE 3B CHRONIC KIDNEY DISEASE, WITH LONG-TERM CURRENT USE OF INSULIN (MULTI): ICD-10-CM

## 2023-09-07 DIAGNOSIS — D86.0 PULMONARY SARCOIDOSIS (MULTI): ICD-10-CM

## 2023-09-07 DIAGNOSIS — Z79.4 TYPE 2 DIABETES MELLITUS WITH STAGE 3B CHRONIC KIDNEY DISEASE, WITH LONG-TERM CURRENT USE OF INSULIN (MULTI): ICD-10-CM

## 2023-09-07 DIAGNOSIS — E55.9 VITAMIN D DEFICIENCY: ICD-10-CM

## 2023-09-07 DIAGNOSIS — E78.2 MIXED HYPERLIPIDEMIA: ICD-10-CM

## 2023-09-07 DIAGNOSIS — E03.9 HYPOTHYROIDISM, UNSPECIFIED TYPE: ICD-10-CM

## 2023-09-07 DIAGNOSIS — M54.41 CHRONIC BILATERAL LOW BACK PAIN WITH RIGHT-SIDED SCIATICA: ICD-10-CM

## 2023-09-07 PROCEDURE — 80358 DRUG SCREENING METHADONE: CPT

## 2023-09-07 PROCEDURE — 80354 DRUG SCREENING FENTANYL: CPT

## 2023-09-07 PROCEDURE — 80365 DRUG SCREENING OXYCODONE: CPT

## 2023-09-07 PROCEDURE — 99214 OFFICE O/P EST MOD 30 MIN: CPT | Performed by: FAMILY MEDICINE

## 2023-09-07 PROCEDURE — 80368 SEDATIVE HYPNOTICS: CPT

## 2023-09-07 PROCEDURE — 80361 OPIATES 1 OR MORE: CPT

## 2023-09-07 PROCEDURE — 80346 BENZODIAZEPINES1-12: CPT

## 2023-09-07 PROCEDURE — 80307 DRUG TEST PRSMV CHEM ANLYZR: CPT

## 2023-09-07 PROCEDURE — 80373 DRUG SCREENING TRAMADOL: CPT

## 2023-09-07 RX ORDER — PREGABALIN 200 MG/1
200 CAPSULE ORAL 3 TIMES DAILY
Qty: 90 CAPSULE | Refills: 0 | Status: CANCELLED | OUTPATIENT
Start: 2023-09-07

## 2023-09-07 RX ORDER — OXYCODONE AND ACETAMINOPHEN 10; 325 MG/1; MG/1
1 TABLET ORAL 2 TIMES DAILY PRN
Qty: 60 TABLET | Refills: 0 | Status: CANCELLED | OUTPATIENT
Start: 2023-09-07 | End: 2023-10-07

## 2023-09-07 ASSESSMENT — ENCOUNTER SYMPTOMS
WEAKNESS: 1
TINGLING: 1
PERIANAL NUMBNESS: 0
FEVER: 0
HEADACHES: 0
PARESIS: 0
WEIGHT LOSS: 0
LEG PAIN: 1
ABDOMINAL PAIN: 0
DYSURIA: 0
BACK PAIN: 1
PARESTHESIAS: 1
BOWEL INCONTINENCE: 1
NUMBNESS: 1

## 2023-09-07 NOTE — PROGRESS NOTES
Subjective   Patient ID: Alysa Austin is a 53 y.o. female who presents for Hand Pain.    Back Pain  This is a chronic problem. The current episode started more than 1 year ago. The problem occurs constantly. The problem has been gradually worsening since onset. The pain is present in the gluteal, lumbar spine and sacro-iliac. The quality of the pain is described as aching, shooting and stabbing. The pain radiates to the left foot and left thigh. The pain is at a severity of 9/10. The pain is Worse during the day. The symptoms are aggravated by position, sitting and standing. Stiffness is present All day. Associated symptoms include bladder incontinence, bowel incontinence, leg pain, numbness, paresthesias, tingling and weakness. Pertinent negatives include no abdominal pain, chest pain, dysuria, fever, headaches, paresis, perianal numbness or weight loss. Risk factors include history of cancer, menopause and obesity. She has tried analgesics for the symptoms. The treatment provided mild relief.        Review of Systems   Constitutional:  Negative for fever and weight loss.   Cardiovascular:  Negative for chest pain.   Gastrointestinal:  Positive for bowel incontinence. Negative for abdominal pain.   Genitourinary:  Positive for bladder incontinence. Negative for dysuria.   Musculoskeletal:  Positive for back pain.   Neurological:  Positive for tingling, weakness, numbness and paresthesias. Negative for headaches.     12 Systems have been reviewed as follows.  Constitutional: Fever, weight gain, weight loss, appetite change, night sweats, fatigue, chills.  Eyes : blurry, double vision, vision, loss, tearing, redness, pain, sensitivity to light, glaucoma.  Ears, nose, mouth, and throat: Hearing loss, ringing in the ears, ear pain, nasal congestion, nasal drainage, nosebleeds, mouth, throat, irritation tooth problem.  Cardiovascular :chest pain, pressure, heart racing, palpitations, sweating, leg swelling, high or  "low blood pressure  Pulmonary: Cough, yellow or green sputum, blood and sputum, shortness of breath, wheezing  Gastrointestinal: Nausea, vomiting, diarrhea, constipation, pain, blood in stool, or vomitus, heartburn, difficulty swallowing  Genitourinary: incontinence, abnormal bleeding, abnormal discharge, urinary frequency, urinary hesitancy, pain, impotence sexual problem, infection, urinary retention  Musculoskeletal: Pain, stiffness, joint, redness or warmth, arthritis, back pain, weakness, muscle wasting, sprain or fracture  Neuro: Weight weakness, dizziness, change in voice, change in taste change in vision, change in hearing, loss, or change of sensation, trouble walking, balance problems coordination problems, shaking, speech problem  Endocrine , cold or heat intolerance, blood sugar problem, weight gain or loss missed periods hot flashes, sweats, change in body hair, change in libido, increased thirst, increased urination  Heme/lymph: Swelling, bleeding, problem anemia, bruising, enlarged lymph nodes  Allergic/immunologic: H. plus nasal drip, watery itchy eyes, nasal drainage, immunosuppressed  The above were reviewed and noted negative except as noted in HPI and Problem List.    Objective   /74 (BP Location: Right arm, Patient Position: Sitting, BP Cuff Size: Adult)   Pulse 83   Resp 16   Ht 1.6 m (5' 3\")   SpO2 97%   BMI 48.54 kg/m²     Physical Exam  Constitutional: Well developed, well nourished, alert and in no acute distress   Eyes: Normal external exam. Pupils equally round and reactive to light with normal accommodation and extraocular movements intact.  Neck: Supple, no lymphadenopathy or masses.   Cardiovascular: Regular rate and rhythm, normal S1 and S2, no murmurs, gallops, or rubs. Radial pulses normal. No peripheral edema.  Pulmonary: No respiratory distress, lungs clear to auscultation bilaterally. No wheezes, rhonchi, rales.  Abdomen: soft,non tender, non distended, without masses " or HSM  Skin: Warm, well perfused, normal skin turgor and color.   Neurologic: Cranial nerves II-XII grossly intact.   Psychiatric: Mood calm and affect normal  Musculoskeletal: Moving all extremities without restriction    Assessment/Plan   Problem List Items Addressed This Visit       Asthma    Relevant Orders    Follow Up In [facility] - PCP - Established    Diabetes (CMS/[facility])    Relevant Orders    Follow Up In [facility] - PCP - Established    Chronic bilateral low back pain with right-sided sciatica    Relevant Orders    Follow Up In [facility] - PCP - Established    Hyperlipidemia    Relevant Orders    Follow Up In [facility] - PCP - Established    Pulmonary sarcoidosis (CMS/[facility])     Continue current medications and therapy for chronic medical conditions           Vitamin D deficiency    Relevant Orders    Follow Up In [facility] - PCP - Established    Magnesium deficiency    Relevant Orders    Follow Up In [facility] - PCP - Established    COVID    Relevant Orders    Follow Up In [facility] - PCP - Established    Non-Hodgkin lymphoma, unspecified, extranodal and solid organ sites (CMS/[facility]) - Primary     Continue current medications and therapy for chronic medical conditions            Other Visit Diagnoses       Acute non-recurrent frontal sinusitis        Relevant Orders    Follow Up In [facility] - PCP - Established    Bronchitis        Relevant Orders    Follow Up In [facility] - PCP - Established    Hypothyroidism, unspecified type        Relevant Orders    Follow Up In [facility] - PCP - Established    Medication management        Relevant Orders    Follow Up In [facility] - PCP - Established    Opiate/Opioid/Benzo Extended Prescription Compliance (Completed)        Virtual Visit - Audio and Visual Communication Real Time     I have personally reviewed the OARRS report with the patient  and have considered the risk of abuse, addiction, dependence and diversion.    Patient's use of medication is allowing patient to be able to perform ADL's. Patient is always being evaluated for the possibility of lowering the medication dosage.        Continue current medications and therapy for chronic medical conditions    Provider Attestation - Scribe documentation    All medical record entries made by the Scribe were at my direction and personally dictated by me. I have reviewed the chart and agree that the record accurately reflects my personal performance of the history, physical exam, discussion and plan.      Scribe Attestation  By signing my name below, I, Renzo Delcid MD   , Scribe   attest that this documentation has been prepared under the direction and in the presence of Renzo Delcid MD.       BW prior     UDS today     Consider referral to pain management next     sees nephrologist, pulmonologist, and endocrinologist      colonoscopy done 10/12/2022, repeat 10/2027     CT abd/pelvis done at [facility] 08/29/2022     US abd RUQ done at [facility] 08/29/2022     consider ARB next      seeing nutritionist      lipid & magnesium next      consider jardiance or farxiga in future     Take vit d     See cmp & lipid panel

## 2023-09-08 ENCOUNTER — E-VISIT (OUTPATIENT)
Dept: PRIMARY CARE | Facility: CLINIC | Age: 53
End: 2023-09-08
Payer: MEDICAID

## 2023-09-08 DIAGNOSIS — G89.29 CHRONIC BILATERAL LOW BACK PAIN WITH RIGHT-SIDED SCIATICA: ICD-10-CM

## 2023-09-08 DIAGNOSIS — M54.41 CHRONIC BILATERAL LOW BACK PAIN WITH RIGHT-SIDED SCIATICA: ICD-10-CM

## 2023-09-08 RX ORDER — OXYCODONE AND ACETAMINOPHEN 10; 325 MG/1; MG/1
1 TABLET ORAL 2 TIMES DAILY PRN
Qty: 60 TABLET | Refills: 0 | Status: SHIPPED | OUTPATIENT
Start: 2023-09-08 | End: 2023-10-09 | Stop reason: SDUPTHER

## 2023-09-08 NOTE — TELEPHONE ENCOUNTER
PT NEEDS OXYCODONE 10MG SENT TO [facility] IN [place] ON [facility], THIS WAS SUPPOSED TO BE DONE YESTERDAY AFTER OV.

## 2023-09-12 ENCOUNTER — TELEMEDICINE (OUTPATIENT)
Dept: PHARMACY | Facility: HOSPITAL | Age: 53
End: 2023-09-12
Payer: MEDICAID

## 2023-09-12 DIAGNOSIS — E11.9 TYPE 2 DIABETES MELLITUS WITHOUT COMPLICATION, WITH LONG-TERM CURRENT USE OF INSULIN (MULTI): ICD-10-CM

## 2023-09-12 DIAGNOSIS — Z79.4 TYPE 2 DIABETES MELLITUS WITHOUT COMPLICATION, WITH LONG-TERM CURRENT USE OF INSULIN (MULTI): ICD-10-CM

## 2023-09-12 LAB
6-ACETYLMORPHINE: <25 NG/ML
7-AMINOCLONAZEPAM: <25 NG/ML
ALPHA-HYDROXYALPRAZOLAM: <25 NG/ML
ALPHA-HYDROXYMIDAZOLAM: <25 NG/ML
ALPRAZOLAM: <25 NG/ML
AMPHETAMINE (PRESENCE) IN URINE BY SCREEN METHOD: ABNORMAL
BARBITURATES PRESENCE IN URINE BY SCREEN METHOD: ABNORMAL
CANNABINOIDS IN URINE BY SCREEN METHOD: ABNORMAL
CHLORDIAZEPOXIDE: <25 NG/ML
CLONAZEPAM: <25 NG/ML
COCAINE (PRESENCE) IN URINE BY SCREEN METHOD: ABNORMAL
CODEINE: <50 NG/ML
CREATINE, URINE FOR DRUG: 198.2 MG/DL
DIAZEPAM: <25 NG/ML
DRUG SCREEN COMMENT URINE: ABNORMAL
EDDP: <25 NG/ML
FENTANYL CONFIRMATION, URINE: <2.5 NG/ML
HYDROCODONE: <25 NG/ML
HYDROMORPHONE: <25 NG/ML
LORAZEPAM: <25 NG/ML
METHADONE CONFIRMATION,URINE: <25 NG/ML
MIDAZOLAM: <25 NG/ML
MORPHINE URINE: <50 NG/ML
NORDIAZEPAM: <25 NG/ML
NORFENTANYL: <2.5 NG/ML
NORHYDROCODONE: <25 NG/ML
NOROXYCODONE: >1000 NG/ML
O-DESMETHYLTRAMADOL: <50 NG/ML
OXAZEPAM: <25 NG/ML
OXYCODONE: 931 NG/ML
OXYMORPHONE: 353 NG/ML
PHENCYCLIDINE (PRESENCE) IN URINE BY SCREEN METHOD: ABNORMAL
TEMAZEPAM: <25 NG/ML
TRAMADOL: <50 NG/ML
ZOLPIDEM METABOLITE (ZCA): <25 NG/ML
ZOLPIDEM: <25 NG/ML

## 2023-09-12 RX ORDER — INSULIN GLARGINE 300 U/ML
78 INJECTION, SOLUTION SUBCUTANEOUS NIGHTLY
Qty: 4.5 ML | Refills: 3 | Status: SHIPPED | OUTPATIENT
Start: 2023-09-12 | End: 2023-11-07 | Stop reason: SDUPTHER

## 2023-09-12 RX ORDER — DULAGLUTIDE 0.75 MG/.5ML
0.75 INJECTION, SOLUTION SUBCUTANEOUS
Qty: 2 ML | Refills: 1 | Status: SHIPPED | OUTPATIENT
Start: 2023-09-12 | End: 2023-10-10 | Stop reason: ALTCHOICE

## 2023-09-12 RX ORDER — PEN NEEDLE, DIABETIC 31 GX5/16"
NEEDLE, DISPOSABLE MISCELLANEOUS
Qty: 100 EACH | Refills: 3 | Status: SHIPPED | OUTPATIENT
Start: 2023-09-12

## 2023-09-12 RX ORDER — BLOOD-GLUCOSE SENSOR
EACH MISCELLANEOUS
Qty: 3 EACH | Refills: 3 | Status: SHIPPED | OUTPATIENT
Start: 2023-09-12 | End: 2023-11-07 | Stop reason: ALTCHOICE

## 2023-09-12 ASSESSMENT — ENCOUNTER SYMPTOMS: DIABETIC ASSOCIATED SYMPTOMS: 0

## 2023-09-12 NOTE — ASSESSMENT & PLAN NOTE
RX CHANGES  INITIATE Trulicity 0.75mg/0.5mL under the skin once weekly. Patient states she was previously on Ozempic and had GI side effects. Patient is willing to retry a different GLP1. Sent Trulicity to patients preferred pharmacy.  CHANGE long acting insulins. Finish the last 12 or so doses of Tresiba 100 units at night, and then DISCONTINUE Tresiba and START Toujeo 78 units at night. When switching from U100 to U300, a 20% reduction in insulin dose is recommended. Sent Toujeo to patients preferred pharmacy. Patient is aware to not start Tounjeo until finishing the Tresiba insulin pens.  CONTINUE Humalog 5 units with meal plus sliding scale.  Continue using Dexcom every 10 days to check blood sugars. Sent refills to patients preferred pharmacy.  Continue working towards healthy lifestyle.    FUTURE CONSIDERATIONS  Restart an SGLT2 for diabetes and CKD  Patient will be due for an updated A1c at next Clinical Pharmacy Visit.

## 2023-09-12 NOTE — PROGRESS NOTES
Pharmacy Post-Discharge Visit  Alysa Austin is a 53 y.o. female was referred to Clinical Pharmacy Team to complete a post-discharge medication optimization and monitoring visit.  The patient was referred for their Diabetes.    Referring Provider: Renzo Delcid MD    Subjective   Allergies   Allergen Reactions    Hydromorphone Hives and Unknown    Ibuprofen Unknown     Pt states not allergy -  she just can't take it because she only has one kidney    Ketorolac Other     Nausea/vomiting    Penicillins Unknown and Swelling     Within last 2 years    Propoxyphene N-Acetaminophen Hives     Patient tolerates acetaminophen at home    Shellfish Containing Products Unknown    Shellfish Derived Angioedema     [id number] - [place], [place] - [address] AT [facility]  [address] [address]  Phone: [phone] Fax: [phone]    [facility] - [place], [place] - [address]  [address]  [address]  [facility] [address]  Phone: [phone] Fax: [phone]    Social History     Social History Narrative    Not on file      Diabetes  She presents for her follow-up diabetic visit. She has type 2 diabetes mellitus. Her disease course has been stable. There are no hypoglycemic associated symptoms. There are no diabetic associated symptoms. There are no hypoglycemic complications. Risk factors for coronary artery disease include diabetes mellitus, dyslipidemia, hypertension and obesity. Current diabetic treatments: Tresiba 100 units once daily and Humalog 5 units plus sliding scale with meals. She is compliant with treatment all of the time. Diabetic meal planning: Patient states normally eats brunch around 11am, and then may eat some crackers for lunch, but typically falls short around lunch time. Patient states then she has dinner, and usually a snack between 8-9pm. Patient loves pretzels. (Patient uses a  Dexcom G7 Sensor to test he blood sugars. She states her 3 day average is 188mg/dL, 7 day average is 160mg/dL, and 14 day average is 166mg/dL. Patient states that her lowest blood sugar has been around 80mg/dL and her highest around 260mg/dL since the last Clinical Pharmacy Visit. When asked about her meal time blood sugars, she states they are typically between 118-140mg/dL.) An ACE inhibitor/angiotensin II receptor blocker is being taken (Losartan 25mg daily).     Objective     LAB  Lab Results   Component Value Date    BILITOT 0.3 08/17/2023    CALCIUM 9.0 08/17/2023    CO2 24 08/17/2023     08/17/2023    CREATININE 1.30 (H) 08/17/2023    GLUCOSE 185 (H) 08/17/2023    ALKPHOS 144 (H) 08/17/2023    K 4.1 08/17/2023    PROT 7.2 08/17/2023     08/17/2023    AST 19 08/17/2023    ALT 16 08/17/2023    BUN 12 08/17/2023    ANIONGAP 12 08/17/2023    MG 1.81 07/21/2023    PHOS 3.1 02/22/2019    ALBUMIN 3.8 08/17/2023    LIPASE 19 08/17/2023    GFRF 49 (A) 08/17/2023    GFRMALE CANCELED 04/05/2023     Lab Results   Component Value Date    TRIG 130 08/08/2023    CHOL 165 08/08/2023    HDL 36.7 (A) 08/08/2023     Lab Results   Component Value Date    HGBA1C 7.0 (A) 06/25/2023     Current Outpatient Medications on File Prior to Visit   Medication Sig Dispense Refill    Dexcom G4 platinum  (Dexcom G7 ) misc Use as instructed 1 each 0    insulin lispro (HumaLOG) 100 unit/mL injection INJECT 30 TO 35 UNITS UNDER THE SKIN AT EACH MEAL. HOLD IF BLOOD GLUCOSE LESS THAN 150.      [DISCONTINUED] insulin degludec (Tresiba FlexTouch) 100 unit/mL (3 mL) injection Inject 100 Units under the skin once daily at bedtime.      acetaminophen (Tylenol) 325 mg tablet TAKE 2 TABLETS BY MOUTH FOUR TIMES DAILY AS NEEDED FOR MILD AND MODERATE POSTOPERATIVE PAIN.      albuterol 2.5 mg /3 mL (0.083 %) nebulizer solution Inhale 3 ml every 6 hours as needed 75 mL 3    albuterol 90 mcg/actuation inhaler Inhale 1 puff 3 times  a day as needed for shortness of breath. 18 g 1    atorvastatin (Lipitor) 20 mg tablet Take 1 tablet (20 mg) by mouth once daily.      blood sugar diagnostic (Blood Glucose Test) strip TEST 3 TIMES DAILY AS NEEDED 200 strip 1    buPROPion XL (Wellbutrin XL) 150 mg 24 hr tablet Take 1 tablet (150 mg) by mouth once daily in the morning. Take before meals.      busPIRone (Buspar) 15 mg tablet Take 1 tablet (15 mg) by mouth 2 times a day. 60 tablet 3    cetirizine (ZyrTEC) 10 mg tablet Take 1 tablet (10 mg) by mouth once daily. 90 tablet 1    glucagon (GlucaGen Diagnostic Kit) 1 mg/mL injection Inject 1 mg into the shoulder, thigh, or buttocks.      lancets (OneTouch UltraSoft Lancets) misc Use to screen glucose twice daily 200 each 0    levothyroxine (Synthroid, Levoxyl) 125 mcg tablet Take 1 tablet (125 mcg) by mouth once daily. 90 tablet 1    losartan (Cozaar) 25 mg tablet Take 1 tablet (25 mg) by mouth once daily. 30 tablet 2    magnesium oxide (Mag-Ox) 400 mg (241.3 mg magnesium) tablet TAKE 2 TABLET BY MOUTH EVERY DAY 90 tablet 1    methocarbamol (Robaxin) 500 mg tablet Take 1 tablet (500 mg) by mouth 3 times a day as needed for muscle spasms.      montelukast (Singulair) 10 mg tablet Take 1 tablet (10 mg) by mouth once daily at bedtime. 90 tablet 1    naloxone (Narcan) 4 mg/0.1 mL nasal spray Administer 1 spray (4 mg) into affected nostril(s) if needed for opioid reversal. May repeat every 2-3 minutes if needed, alternating nostrils, until medical assistance becomes available. 2 each 0    nebulizer and compressor device Use as directed with nebulizer solution for shortness of breath and wheezing 1 each 0    oxyCODONE-acetaminophen (Percocet)  mg tablet Take 1 tablet by mouth 2 times a day as needed for severe pain (7 - 10). 60 tablet 0    pregabalin (Lyrica) 200 mg capsule Take 1 capsule (200 mg) by mouth 3 times a day. 90 capsule 3    spironolactone (Aldactone) 50 mg tablet Take 1 tablet (50 mg) by mouth  "once daily.      [DISCONTINUED] BD Ultra-Fine Short Pen Needle 31 gauge x 5/16\" needle USE AS DIRECTED UNDER THE SKIN THREE TIMES DAILY      [DISCONTINUED] blood-glucose sensor (Dexcom G7 Sensor) device Use as directed 5 each 3    [DISCONTINUED] oxyCODONE-acetaminophen (Percocet)  mg tablet Take 1 tablet by mouth 2 times a day as needed for severe pain (7 - 10). 60 tablet 0    [DISCONTINUED] oxyCODONE-acetaminophen (Percocet) 5-325 mg tablet Take 1 tablet by mouth every 6 hours if needed.       No current facility-administered medications on file prior to visit.      DRUG INTERATIONS  No Drug Interactions exist that require pharmacologic intervention    Assessment/Plan     Diabetes Education  Rule of 15: eating ~15 g of carbs when BG less than 80 (half cup juice, 3-4 glucose tabs).  Recognize symptoms of high and low blood sugar.   Eat a realistic healthy diet consisting of fruits, vegetables, fiber, protein food choices on a regular basis and be aware of portion/serving sizes. Reduce carbohydrate consumption and always consume with protein and fat. Avoid foods high in saturated/trans fat, high salt content, and sweets and beverages with added sugars.  Limit alcohol consumption; alcohol may affect your blood sugar and cause hypoglycemia.   Stay active and incorporate ~30 mins of exercise into your daily routine to manage your weight and increase the body's acceptance of insulin.    PATIENT GOALS   Fasting B - 130 mg/dL 2-HR Postprandial BG:   Less than 180 mg/dL A1c:   Less than 7.0 %     Trulicity Education:  Counseled patient on Trulicity MOA, expectations, side effects, duration of therapy, administration, and monitoring parameters.  Provided detailed dosing and administration counseling to ensure proper technique.   Reviewed Trulicity titration schedule, starting with 0.75 mg once weekly for 4 weeks, then increasing the dose as tolerated. Patient verbalized understanding.  Counseled patient on the " "benefits of GLP-1ra, such as cardiovascular risk reduction, glycemic control, and weight loss potential.  Reviewed storage requirements of Trulicity when not in use, and when to administer the medication if a dose is missed.  Advised patient that they may experience improved satiety after meals and portion sizes of meals may be reduced as doses of Trulicity increase.  Counseled patient to avoid foods that are fatty/oily as this may precipitate the nausea/GI upset that may occur with new start Trulicity.     Problem List Items Addressed This Visit       Type 2 diabetes mellitus without complication, with long-term current use of insulin (CMS/[facility])     RX CHANGES  INITIATE Trulicity 0.75mg/0.5mL under the skin once weekly. Patient states she was previously on Ozempic and had GI side effects. Patient is willing to retry a different GLP1. Sent Trulicity to patients preferred pharmacy.  CHANGE long acting insulins. Finish the last 12 or so doses of Tresiba 100 units at night, and then DISCONTINUE Tresiba and START Toujeo 78 units at night. When switching from U100 to U300, a 20% reduction in insulin dose is recommended. Sent Toujeo to patients preferred pharmacy. Patient is aware to not start Tounjeo until finishing the Tresiba insulin pens.  CONTINUE Humalog 5 units with meal plus sliding scale.  Continue using Dexcom every 10 days to check blood sugars. Sent refills to patients preferred pharmacy.  Continue working towards healthy lifestyle.    FUTURE CONSIDERATIONS  Restart an SGLT2 for diabetes and CKD  Patient will be due for an updated A1c at next Clinical Pharmacy Visit.          Relevant Medications    blood-glucose sensor (Dexcom G7 Sensor) device    insulin glargine (Toujeo SoloStar U-300 Insulin) 300 unit/mL (1.5 mL) injection    dulaglutide (Trulicity) 0.75 mg/0.5 mL pen injector    BD Ultra-Fine Short Pen Needle 31 gauge x 5/16\" needle    Other Relevant Orders    Follow Up In [facility] - Pharmacy "     Follow up with the Clinical Pharmacy Team 10/10/2023 at 3pm to discuss diabetes management.    Continue all meds under the continuation of care with the referring provider and clinical pharmacy team.    Please reach out to the Clinical Pharmacy Team if there are any further questions.     Verbal consent to manage patient's drug therapy was obtained from patient. They were informed they may decline to participate or withdraw from participation in pharmacy services at any time.    Florence Cruz, PharmD  [phone]

## 2023-09-26 LAB
ALANINE AMINOTRANSFERASE (SGPT) (U/L) IN SER/PLAS: 15 U/L (ref 7–45)
ALBUMIN (G/DL) IN SER/PLAS: 3.8 G/DL (ref 3.4–5)
ALKALINE PHOSPHATASE (U/L) IN SER/PLAS: 140 U/L (ref 33–110)
ANION GAP IN SER/PLAS: 8 MMOL/L (ref 10–20)
ASPARTATE AMINOTRANSFERASE (SGOT) (U/L) IN SER/PLAS: 15 U/L (ref 9–39)
BASOPHILS (10*3/UL) IN BLOOD BY AUTOMATED COUNT: 0.06 X10E9/L (ref 0–0.1)
BASOPHILS/100 LEUKOCYTES IN BLOOD BY AUTOMATED COUNT: 0.9 % (ref 0–2)
BILIRUBIN TOTAL (MG/DL) IN SER/PLAS: 0.4 MG/DL (ref 0–1.2)
CALCIUM (MG/DL) IN SER/PLAS: 8.9 MG/DL (ref 8.6–10.3)
CARBON DIOXIDE, TOTAL (MMOL/L) IN SER/PLAS: 27 MMOL/L (ref 21–32)
CHLORIDE (MMOL/L) IN SER/PLAS: 106 MMOL/L (ref 98–107)
CREATININE (MG/DL) IN SER/PLAS: 1.28 MG/DL (ref 0.5–1.05)
EOSINOPHILS (10*3/UL) IN BLOOD BY AUTOMATED COUNT: 0.2 X10E9/L (ref 0–0.7)
EOSINOPHILS/100 LEUKOCYTES IN BLOOD BY AUTOMATED COUNT: 2.9 % (ref 0–6)
ERYTHROCYTE DISTRIBUTION WIDTH (RATIO) BY AUTOMATED COUNT: 13.1 % (ref 11.5–14.5)
ERYTHROCYTE MEAN CORPUSCULAR HEMOGLOBIN CONCENTRATION (G/DL) BY AUTOMATED: 33.3 G/DL (ref 32–36)
ERYTHROCYTE MEAN CORPUSCULAR VOLUME (FL) BY AUTOMATED COUNT: 93 FL (ref 80–100)
ERYTHROCYTES (10*6/UL) IN BLOOD BY AUTOMATED COUNT: 5.07 X10E12/L (ref 4–5.2)
GFR FEMALE: 50 ML/MIN/1.73M2
GLUCOSE (MG/DL) IN SER/PLAS: 104 MG/DL (ref 74–99)
HEMATOCRIT (%) IN BLOOD BY AUTOMATED COUNT: 47.4 % (ref 36–46)
HEMOGLOBIN (G/DL) IN BLOOD: 15.8 G/DL (ref 12–16)
IMMATURE GRANULOCYTES/100 LEUKOCYTES IN BLOOD BY AUTOMATED COUNT: 0.4 % (ref 0–0.9)
INR IN PPP BY COAGULATION ASSAY: 1.1 (ref 0.9–1.1)
LEUKOCYTES (10*3/UL) IN BLOOD BY AUTOMATED COUNT: 6.9 X10E9/L (ref 4.4–11.3)
LYMPHOCYTES (10*3/UL) IN BLOOD BY AUTOMATED COUNT: 1.91 X10E9/L (ref 1.2–4.8)
LYMPHOCYTES/100 LEUKOCYTES IN BLOOD BY AUTOMATED COUNT: 27.6 % (ref 13–44)
MONOCYTES (10*3/UL) IN BLOOD BY AUTOMATED COUNT: 0.53 X10E9/L (ref 0.1–1)
MONOCYTES/100 LEUKOCYTES IN BLOOD BY AUTOMATED COUNT: 7.7 % (ref 2–10)
NATRIURETIC PEPTIDE B (PG/ML) IN SER/PLAS: 14 PG/ML (ref 0–99)
NEUTROPHILS (10*3/UL) IN BLOOD BY AUTOMATED COUNT: 4.19 X10E9/L (ref 1.2–7.7)
NEUTROPHILS/100 LEUKOCYTES IN BLOOD BY AUTOMATED COUNT: 60.5 % (ref 40–80)
PLATELETS (10*3/UL) IN BLOOD AUTOMATED COUNT: 268 X10E9/L (ref 150–450)
POTASSIUM (MMOL/L) IN SER/PLAS: 4 MMOL/L (ref 3.5–5.3)
PROTEIN TOTAL: 7.2 G/DL (ref 6.4–8.2)
PROTHROMBIN TIME (PT) IN PPP BY COAGULATION ASSAY: 12.8 SEC (ref 9.8–12.8)
SODIUM (MMOL/L) IN SER/PLAS: 137 MMOL/L (ref 136–145)
TROPONIN I, HIGH SENSITIVITY: 14 NG/L (ref 0–13)
UREA NITROGEN (MG/DL) IN SER/PLAS: 13 MG/DL (ref 6–23)

## 2023-09-27 ENCOUNTER — HOSPITAL ENCOUNTER (OUTPATIENT)
Dept: DATA CONVERSION | Facility: HOSPITAL | Age: 53
Setting detail: OBSERVATION
Discharge: HOME | End: 2023-09-28
Attending: INTERNAL MEDICINE | Admitting: INTERNAL MEDICINE
Payer: MEDICAID

## 2023-09-27 LAB
ALANINE AMINOTRANSFERASE (SGPT) (U/L) IN SER/PLAS: 15 U/L (ref 7–45)
ALBUMIN (G/DL) IN SER/PLAS: 3.9 G/DL (ref 3.4–5)
ALKALINE PHOSPHATASE (U/L) IN SER/PLAS: 154 U/L (ref 33–110)
ANION GAP IN SER/PLAS: 16 MMOL/L (ref 10–20)
ASPARTATE AMINOTRANSFERASE (SGOT) (U/L) IN SER/PLAS: 14 U/L (ref 9–39)
BILIRUBIN TOTAL (MG/DL) IN SER/PLAS: 0.5 MG/DL (ref 0–1.2)
C REACTIVE PROTEIN (MG/L) IN SER/PLAS: 1.02 MG/DL
CALCIUM (MG/DL) IN SER/PLAS: 9.4 MG/DL (ref 8.6–10.3)
CARBON DIOXIDE, TOTAL (MMOL/L) IN SER/PLAS: 22 MMOL/L (ref 21–32)
CHLORIDE (MMOL/L) IN SER/PLAS: 102 MMOL/L (ref 98–107)
CREATININE (MG/DL) IN SER/PLAS: 1.29 MG/DL (ref 0.5–1.05)
ERYTHROCYTE DISTRIBUTION WIDTH (RATIO) BY AUTOMATED COUNT: 12.9 % (ref 11.5–14.5)
ERYTHROCYTE MEAN CORPUSCULAR HEMOGLOBIN CONCENTRATION (G/DL) BY AUTOMATED: 33.4 G/DL (ref 32–36)
ERYTHROCYTE MEAN CORPUSCULAR VOLUME (FL) BY AUTOMATED COUNT: 92 FL (ref 80–100)
ERYTHROCYTES (10*6/UL) IN BLOOD BY AUTOMATED COUNT: 5.02 X10E12/L (ref 4–5.2)
GFR FEMALE: 50 ML/MIN/1.73M2
GLUCOSE (MG/DL) IN SER/PLAS: 247 MG/DL (ref 74–99)
HEMATOCRIT (%) IN BLOOD BY AUTOMATED COUNT: 46.4 % (ref 36–46)
HEMOGLOBIN (G/DL) IN BLOOD: 15.5 G/DL (ref 12–16)
LEUKOCYTES (10*3/UL) IN BLOOD BY AUTOMATED COUNT: 8.2 X10E9/L (ref 4.4–11.3)
MAGNESIUM (MG/DL) IN SER/PLAS: 1.65 MG/DL (ref 1.6–2.4)
PLATELETS (10*3/UL) IN BLOOD AUTOMATED COUNT: 281 X10E9/L (ref 150–450)
POCT GLUCOSE: 199 MG/DL (ref 74–99)
POCT GLUCOSE: 237 MG/DL (ref 74–99)
POCT GLUCOSE: 313 MG/DL (ref 74–99)
POTASSIUM (MMOL/L) IN SER/PLAS: 4.6 MMOL/L (ref 3.5–5.3)
PROTEIN TOTAL: 7.4 G/DL (ref 6.4–8.2)
SEDIMENTATION RATE, ERYTHROCYTE: 39 MM/H (ref 0–30)
SODIUM (MMOL/L) IN SER/PLAS: 135 MMOL/L (ref 136–145)
TROPONIN I, HIGH SENSITIVITY: 11 NG/L (ref 0–13)
UREA NITROGEN (MG/DL) IN SER/PLAS: 15 MG/DL (ref 6–23)

## 2023-09-28 LAB
ANION GAP IN SER/PLAS: 12 MMOL/L (ref 10–20)
CALCIUM (MG/DL) IN SER/PLAS: 8.9 MG/DL (ref 8.6–10.3)
CARBON DIOXIDE, TOTAL (MMOL/L) IN SER/PLAS: 24 MMOL/L (ref 21–32)
CHLORIDE (MMOL/L) IN SER/PLAS: 103 MMOL/L (ref 98–107)
CREATININE (MG/DL) IN SER/PLAS: 1.36 MG/DL (ref 0.5–1.05)
ERYTHROCYTE DISTRIBUTION WIDTH (RATIO) BY AUTOMATED COUNT: 13.2 % (ref 11.5–14.5)
ERYTHROCYTE MEAN CORPUSCULAR HEMOGLOBIN CONCENTRATION (G/DL) BY AUTOMATED: 33.5 G/DL (ref 32–36)
ERYTHROCYTE MEAN CORPUSCULAR VOLUME (FL) BY AUTOMATED COUNT: 93 FL (ref 80–100)
ERYTHROCYTES (10*6/UL) IN BLOOD BY AUTOMATED COUNT: 4.55 X10E12/L (ref 4–5.2)
ESTIMATED AVERAGE GLUCOSE FOR HBA1C: 134 MG/DL
GFR FEMALE: 47 ML/MIN/1.73M2
GLUCOSE (MG/DL) IN SER/PLAS: 242 MG/DL (ref 74–99)
HEMATOCRIT (%) IN BLOOD BY AUTOMATED COUNT: 42.4 % (ref 36–46)
HEMOGLOBIN (G/DL) IN BLOOD: 14.2 G/DL (ref 12–16)
HEMOGLOBIN A1C/HEMOGLOBIN TOTAL IN BLOOD: 6.3 %
LEUKOCYTES (10*3/UL) IN BLOOD BY AUTOMATED COUNT: 18 X10E9/L (ref 4.4–11.3)
PLATELETS (10*3/UL) IN BLOOD AUTOMATED COUNT: 278 X10E9/L (ref 150–450)
POCT GLUCOSE: 237 MG/DL (ref 74–99)
POCT GLUCOSE: 248 MG/DL (ref 74–99)
POTASSIUM (MMOL/L) IN SER/PLAS: 4.2 MMOL/L (ref 3.5–5.3)
SODIUM (MMOL/L) IN SER/PLAS: 135 MMOL/L (ref 136–145)
UREA NITROGEN (MG/DL) IN SER/PLAS: 20 MG/DL (ref 6–23)

## 2023-09-29 ENCOUNTER — PATIENT OUTREACH (OUTPATIENT)
Dept: PRIMARY CARE | Facility: CLINIC | Age: 53
End: 2023-09-29
Payer: MEDICAID

## 2023-09-29 VITALS
TEMPERATURE: 98.4 F | HEIGHT: 63 IN | DIASTOLIC BLOOD PRESSURE: 61 MMHG | WEIGHT: 260.14 LBS | BODY MASS INDEX: 46.09 KG/M2 | RESPIRATION RATE: 18 BRPM | HEART RATE: 85 BPM | SYSTOLIC BLOOD PRESSURE: 153 MMHG | OXYGEN SATURATION: 96 %

## 2023-09-29 DIAGNOSIS — J45.909 ASTHMA, UNSPECIFIED ASTHMA SEVERITY, UNSPECIFIED WHETHER COMPLICATED, UNSPECIFIED WHETHER PERSISTENT (HHS-HCC): ICD-10-CM

## 2023-09-29 DIAGNOSIS — Z79.4 TYPE 2 DIABETES MELLITUS WITHOUT COMPLICATION, WITH LONG-TERM CURRENT USE OF INSULIN (MULTI): ICD-10-CM

## 2023-09-29 DIAGNOSIS — Z79.4 DIABETES MELLITUS DUE TO UNDERLYING CONDITION WITH OTHER SPECIFIED COMPLICATION, WITH LONG-TERM CURRENT USE OF INSULIN (MULTI): ICD-10-CM

## 2023-09-29 DIAGNOSIS — N18.9 CHRONIC KIDNEY DISEASE, UNSPECIFIED CKD STAGE: ICD-10-CM

## 2023-09-29 DIAGNOSIS — E11.9 TYPE 2 DIABETES MELLITUS WITHOUT COMPLICATION, WITH LONG-TERM CURRENT USE OF INSULIN (MULTI): ICD-10-CM

## 2023-09-29 DIAGNOSIS — E08.69 DIABETES MELLITUS DUE TO UNDERLYING CONDITION WITH OTHER SPECIFIED COMPLICATION, WITH LONG-TERM CURRENT USE OF INSULIN (MULTI): ICD-10-CM

## 2023-09-29 DIAGNOSIS — G47.33 OSA (OBSTRUCTIVE SLEEP APNEA): ICD-10-CM

## 2023-09-29 DIAGNOSIS — D86.0 PULMONARY SARCOIDOSIS (MULTI): ICD-10-CM

## 2023-09-29 DIAGNOSIS — D86.3 CUTANEOUS SARCOIDOSIS (CMS-HCC): ICD-10-CM

## 2023-09-29 RX ORDER — PREDNISONE 10 MG/1
TABLET ORAL
COMMUNITY
Start: 2023-09-28 | End: 2023-10-18

## 2023-09-29 RX ORDER — AZITHROMYCIN 500 MG/1
TABLET, FILM COATED ORAL
COMMUNITY
Start: 2023-09-28 | End: 2023-10-18

## 2023-09-29 RX ORDER — PANTOPRAZOLE SODIUM 40 MG/1
40 TABLET, DELAYED RELEASE ORAL NIGHTLY
COMMUNITY
Start: 2023-09-28

## 2023-09-29 NOTE — PROGRESS NOTES
Discharge Facility: [facility]   Discharge Diagnosis:   pulmonary and cutaneous sarcoidosis, asthma, diabetes mellitus, hypertension, fibromyalgia renal cell carcinoma status post right nephrectomy, TUYET, CKD, TIA    Admission Date:9/26/23  Discharge Date: 9/28/23    PCP Appointment Date:10/9/2023 Dr Delcid     Specialist Appointment Date: TBD - encouraged patient to call for appt to follow up from hospital stay         Physician/Dept/Service:   Dr. Jason Hernandez MD          Reason for Referral:   Pulmonary follow-up          Call to Schedule in:   3 weeks          Location:   [address] [place] [address]          Phone Number:   [phone]  Hospital Encounter and Summary:not available at this time   See discharge assessment below for further details  I introduced myself and the TCM program to Alysa Austin. I gave my contact information for any further questions.  Engagement  Call Start Time: 1520 (9/29/2023  3:27 PM)    Medications  Medications reviewed with patient/caregiver?: Yes (9/29/2023  3:27 PM)  Is the patient having any side effects they believe may be caused by any medication additions or changes?: No (9/29/2023  3:27 PM)  Does the patient have all medications ordered at discharge?: Yes (picked up last night) (9/29/2023  3:27 PM)  Prescription Comments: New RX-predniSONE 10 mg oral tablet - 4 tab(s) orally once a day -Take 40 mg daily for 3 days, then take 30 mg daily for 3 days, then take 20 mg daily for 3 days, then take 10 mg daily for 3 days.   pantoprazole 40 mg oral delayed release tablet - 1 tab(s) orally once a day  azithromycin 500 mg oral tablet - 1 tab(s) orally once a day (9/29/2023  3:27 PM)  Is the patient taking all medications as directed (includes completed medication regime)?: Yes (9/29/2023  3:27 PM)    Appointments  Does the patient have a primary care provider?: Yes (Renzo Delcid MD) (9/29/2023  3:27 PM)  Care Management Interventions: Verified appointment  date/time/provider (I made her an appt on 10/9/23 3:30pm) (9/29/2023  3:27 PM)  Care Management Interventions: Advised to schedule with specialist (appt needed for folllow up with Pulmonary and suggest follow up with Endo) (9/29/2023  3:27 PM)    Self Management  Has home health visited the patient within 72 hours of discharge?: Not applicable (9/29/2023  3:27 PM)    Patient Teaching  Does the patient have access to their discharge instructions?: Yes (9/29/2023  3:27 PM)  Care Management Interventions: Reviewed instructions with patient; Educated on MyChart (9/29/2023  3:27 PM)  What is the patient's perception of their health status since discharge?: Same (9/29/2023  3:27 PM)  Is the patient/caregiver able to teach back the hierarchy of who to call/visit for symptoms/problems? PCP, Specialist, Home Health nurse, Urgent Care, ED, 911: Yes (9/29/2023  3:27 PM)    Wrap Up  Call End Time: 1532 (9/29/2023  3:27 PM)

## 2023-09-29 NOTE — TELEPHONE ENCOUNTER
Dr Delcid pt    Pt phoned office and is requesting refill on     Dexcom platinum     Cristiana helms

## 2023-09-30 PROBLEM — C85.99: Status: ACTIVE | Noted: 2023-09-30

## 2023-09-30 NOTE — PROGRESS NOTES
Service: Medicine     Subjective Data:   AL LOYA is a 53 year old Female who is Hospital Day # 2.    Additional Information:    Patient is a stable, in no acute distress, still complaining of chest pain with deep breathing  Pending pulm recommendation    Objective Data:     Objective Information:      T   P  R  BP   MAP  SpO2   Value  36.9  78  16  133/62      95%  Date/Time 9/26 18:51 9/27 14:13 9/27 14:13 9/27 14:13    9/27 14:13  Range  (36.9C - 36.9C )  (66 - 89 )  (16 - 18 )  (121 - 153 )/ (55 - 71 )    (95% - 98% )  Highest temp of 36.9 C was recorded at 9/26 18:51      Pain reported at 9/27 14:13: 10 = Severe         Weights   9/26 18:51: Weight in lbs ((lbs))  260.1  9/26 18:51: Weight in kg (Weight (kg))  118  9/26 18:51: BMI (kg/m2) (BMI (kg/m2))  46.093    Physical Exam Narrative:  ·  Physical Exam:    General: Well-developed obese female, in no acute distress, ill appearing   HEENT: AT, NC, no JVD, no lymphadenopathy, neck supple  Lungs: no wheezing, no crackles, diminished breath sounds   Cardiac: Normal S1-S2, no murmur, no gallop  Abdomen: Soft, nontender, obese, no distention, positive bowel sound  Extremities: No deformity, no edema, pulses intact, ROM intact  Neurological: Alert awake oriented x3, sensation intact, clear speech    Medication:    Medications:          Continuous Medications       --------------------------------  No continuous medications are active       Scheduled Medications       --------------------------------    1. Azithromycin 500 mg/ D5W 250 mL:  500  mg  IntraVenous Piggyback  Every 24 Hours    2. busPIRone (BUSPAR):  15  mg  Oral  Every 8 Hours    3. Insulin Regular Moderate Corrective Scale:  unit(s)  SubCutaneous  3 Times a Day Before Meals    4. Levothyroxine:  125  microgram(s)  Oral  Daily    5. Losartan:  25  mg  Oral  Daily    6. Magnesium Oxide:  400  mg  Oral  Daily    7. methylPREDNISolone Sodium Succinate Injectable:  80  mg  IntraVenous Push   Every 8 Hours    8. Pantoprazole:  40  mg  Oral  Daily    9. Pregabalin:  200  mg  Oral  2 Times a Day         PRN Medications       --------------------------------    1. Acetaminophen:  650  mg  Oral  Every 4 Hours    2. Dextrose 50% in Water Injectable:  25  gram(s)  IntraVenous Push  Every 15 Minutes    3. Glucagon Injectable:  1  mg  IntraMuscular  Every 15 Minutes    4. Melatonin:  3  mg  Oral  At Bedtime    5. Morphine Injectable:  2  mg  IntraVenous Push  Every 4 Hours    6. Ondansetron Injectable:  4  mg  IntraVenous Push  Every 4 Hours    7. oxyCODONE 5 mg - Acetaminophen 325 m  tablet(s)  Oral  Every 6 Hours    8. Sodium Chloride 0.9% Injectable Flush:  10  mL  IntraVenous Flush  Every 8 Hours and as Needed         Conditional Medication Orders       --------------------------------    1. Perflutren Protein A Microsphere Inj:  3  mL  IntraVenous Push  Once      Recent Lab Results:    Results:    CBC: 2023 10:04              \     Hgb     /                              \     15.5       /  WBC  ----------------  Plt               8.2       ----------------    281              /     Hct     \                              /     46.4 H    \            RBC: 5.02     MCV: 92     Neutrophil %: 60.5      CMP: 2023 10:04  NA+        Cl-     BUN  /                         135 L   102    15  /  --------------------------------  Glucose                ---------------------------  247 H    K+     HCO3-   Creat \                         4.6    22    1.29 H \           \  T Bili  /                    \  0.5  /  AST  x ---- x ALT        14 x ---- x 15         /  Alk P   \               /  154 H \  Calcium : 9.4     Anion Gap : 16     Albumin : 3.9     T Protein : 7.4           Coagulation: 2023 21:13  PT  /                    12.8  /  -------<    INR          ----------<      1.1  PTT\                              \                       Radiology Results:    Results:        Impression:    No  pulmonary embolism to the segmental level. No acute  cardiopulmonary process.        MACRO:  None     TH CT Angio Chest for PE [Sep 27 2023  1:50AM]      Assessment and Plan:   Comorbidities:  ·  Comorbidity Other     Code Status:  ·  Code Status Full Code     Assessment:    AL LOYA is a 53 year old Female with past medical history of pulmonary and cutaneous sarcoidosis, asthma, diabetes mellitus, hypertension, fibromyalgia  renal cell carcinoma status post right nephrectomy, TUYET, CKD, TIA, who was admitted for management of chest pain likely in the setting of inflamed airways and history of sarcoidosis    #Chest pain likely 2/2 inflammatory in the setting of sarcoidosis  #History of sarcoidosis  #History of asthma  #Diabetes type 2  #Anxiety/depression  #HTN, hypothyroidism, CKD  #Chronic back pain    Cardiac monitor  Fall/aspiration precaution  Continue with Solu-Medrol, DuoNebs, azithromycin  pain management   Follow-up pulm recommendation  ISS, hypoglycemia protocol, POCT glucose, DM diet  Continue with BuSpar for anxiety  Continue with losartan and levothyroxine  Monitor renal function, avoid nephrotoxic agent  DVT prophylaxis: Heparin subqu   Disposition: Home once hemodynamically stable        Electronic Signatures:  Alisia Perez)  (Signed 27-Sep-2023 17:05)   Authored: Service, Subjective Data, Objective Data, Assessment  and Plan, Note Completion      Last Updated: 27-Sep-2023 17:05 by Alisia Perez)

## 2023-09-30 NOTE — H&P
History of Present Illness:   Pregnant/Lactating:  ·  Are You Pregnant no (1)   ·  Are You Currently Breastfeeding no (1)     HPI:    AL LOYA is a 53 year old Female with past medical history of pulmonary and cutaneous sarcoidosis, asthma, diabetes mellitus, hypertension, fibromyalgia  renal cell carcinoma status post right nephrectomy, TUYET, CKD, TIA.    Patient presented to the ER for evaluation of chest pain and shortness of breath.  Her symptoms were worsening over 2 days.  Started to have cough overnight and in the morning noticed some blood after coughing.  It was 1 episode, very small amount of  blood has subsided since then.  She described her chest pain as soreness all over similar to her prior sarcoidosis flareup.  The pain is worse with cough and deep inspiration.  She denied nausea, vomiting, diarrhea, fever.  She described dyspnea with  exertion.  No recent travel, sick contact, and no chest trauma.  She did not follow-up with her pulmonologist for more than a year.    ER course: Patient was awake, alert, oriented, in no acute distress.  She was hemodynamically stable.  Labs shows no leukocytosis.  Patient does have creatinine level of 1.2 which is consistent with her chronic kidney disease secondary to single kidney.   Troponin was marginally high but was negative on repeat.  X-ray did report evidence of reactive airway disease.  CT scan reported negative for pulmonary embolism.  Patient is admitted to medicine for chest pain and possible sarcoidosis flareup.      Review of system: 10/10 points review of system were conducted, negative except as above.  Past medical history: As above  Past surgical history: Right total nephrectomy, thyroidectomy, spinal lumbar fusion surgery.  Social history: Denies smoking, abnormal alcohol use or illicit drug use.  Family history: Reviewed with the patient, noncontributory.    Comorbidities:   Comorbidites:  ·  Comorbid Conditions As per HPI     Family  History:   ·   Family history of malignant neoplasm:   ·   Family history of diabetes mellitus:   ·   Family history of cerebrovascular accident (CVA):   ·   Family history of hypertension:     Social History:   Social History:  Smoking Status former smoker (2)   Alcohol Use denies(2)   Drug Use denies (2)   Drug 2 Use denies (2)              Allergies:  ·  ibuprofen : Other  ·  penicillin : Facial Swelling  ·  Shellfish : Angioedema  ·  Seafood : Angioedema       Intolerances:  ·  Toradol : Nausea/Vomiting    Medications Prior to Admission:   No Home Meds have been entered for reconciliation yet.    Objective:     Objective Information:      T   P  R  BP   MAP  SpO2   Value  36.9  88  18  121/65      97%  Date/Time 9/26 18:51 9/27 4:27 9/27 4:27 9/27 4:27    9/27 4:27  Range  (36.9C - 36.9C )  (79 - 88 )  (18 - 18 )  (121 - 153 )/ (60 - 65 )    (96% - 97% )  Highest temp of 36.9 C was recorded at 9/26 18:51      Pain reported at 9/27 0:49: 5 = Moderate      T   P  R  BP   MAP  SpO2   Value  36.9  88  18  121/65      97%  Date/Time 9/26 18:51 9/27 4:27 9/27 4:27 9/27 4:27    9/27 4:27  Range  (36.9C - 36.9C )  (79 - 88 )  (18 - 18 )  (121 - 153 )/ (60 - 65 )    (96% - 97% )  Highest temp of 36.9 C was recorded at 9/26 18:51    Physical Exam by System:    Constitutional: Obese -American female, awake,  alert, oriented, in no acute distress   Eyes: no icterus, no conjunctival erythema, pupils  are equal 3 mm and reactive bilaterally.   ENMT: Moist mucous membrane, no pharyngeal erythema   Head/Neck: Non traumatic, supple neck no thyromegaly  or lymphadenopathy   Respiratory/Thorax: Poor air entry at the bases bilaterally,  minimal Rales/wheezing, mild tachypnea   Cardiovascular: Normal rate and rhythm, normal S1/S2,  no murmur   Gastrointestinal: No tenderness not distended no  organomegaly, positive bowel sounds   Genitourinary: no lesions or enlarged lymph nodes   Musculoskeletal: good muscle contour, grossly  normal  joints   Extremities: Trace bilateral leg edema   Neurological: AAO x4, grossly normal CN 2-12, grossly  normal motor and sensation.   Psychological: Looks anxious   Skin: Skin is dry and warm, no significant skin rash     Medications:    Medications:          Continuous Medications       --------------------------------  No continuous medications are active       Scheduled Medications       --------------------------------    1. Azithromycin 500 mg/ D5W 250 mL:  500  mg  IntraVenous Piggyback  Every 24 Hours    2. busPIRone (BUSPAR):  15  mg  Oral  Every 8 Hours    3. Insulin Regular Moderate Corrective Scale:  unit(s)  SubCutaneous  3 Times a Day Before Meals    4. Levothyroxine:  125  microgram(s)  Oral  Daily    5. Losartan:  25  mg  Oral  Daily    6. Magnesium Oxide:  400  mg  Oral  Daily    7. methylPREDNISolone Sodium Succinate Injectable:  80  mg  IntraVenous Push  Every 8 Hours    8. Pantoprazole:  40  mg  Oral  Daily    9. Pregabalin:  200  mg  Oral  2 Times a Day         PRN Medications       --------------------------------    1. Acetaminophen:  650  mg  Oral  Every 4 Hours    2. Dextrose 50% in Water Injectable:  25  gram(s)  IntraVenous Push  Every 15 Minutes    3. Glucagon Injectable:  1  mg  IntraMuscular  Every 15 Minutes    4. Melatonin:  3  mg  Oral  At Bedtime    5. Morphine Injectable:  2  mg  IntraVenous Push  Every 4 Hours    6. Ondansetron Injectable:  4  mg  IntraVenous Push  Every 4 Hours    7. oxyCODONE 5 mg - Acetaminophen 325 m  tablet(s)  Oral  Every 6 Hours    8. Sodium Chloride 0.9% Injectable Flush:  10  mL  IntraVenous Flush  Every 8 Hours and as Needed         Conditional Medication Orders       --------------------------------    1. Perflutren Protein A Microsphere Inj:  3  mL  IntraVenous Push  Once        Recent Lab Results:    Results:    CBC: 2023 21:13              \     Hgb     /                              \     15.8       /  WBC  ----------------  Plt                6.9       ----------------    268              /     Hct     \                              /     47.4 H    \            RBC: 5.07     MCV: 93     Neutrophil %: 60.5      CMP: 9/26/2023 21:13  NA+        Cl-     BUN  /                         137    106    13  /  --------------------------------  Glucose                ---------------------------  104 H    K+     HCO3-   Creat \                         4.0    27    1.28 H \           \  T Bili  /                    \  0.4  /  AST  x ---- x ALT        15 x ---- x 15         /  Alk P   \               /  140 H \  Calcium : 8.9     Anion Gap : 8 L    Albumin : 3.8     T Protein : 7.2           Coagulation: 9/26/2023 21:13  PT  /                    12.8  /  -------<    INR          ----------<      1.1  PTT\                              \                         I have reviewed these laboratory results:    Troponin I, High Sensitivity  Trending View      Result 27-Sep-2023 02:15:00  26-Sep-2023 21:13:00    Troponin I, High Sensitivity 11   14   H        Complete Blood Count + Differential  26-Sep-2023 21:13:00      Result Value    White Blood Cell Count  6.9    Red Blood Cell Count  5.07    HGB  15.8    HCT  47.4   H   MCV  93    MCHC  33.3    PLT  268    RDW-CV  13.1    Neutrophil %  60.5    Immature Granulocytes %  0.4    Lymphocyte %  27.6    Monocyte %  7.7    Eosinophil %  2.9    Basophil %  0.9    Neutrophil Count  4.19    Lymphocyte Count  1.91    Monocyte Count  0.53    Eosinophil Count  0.20    Basophil Count  0.06      Comprehensive Metabolic Panel  26-Sep-2023 21:13:00      Result Value    Glucose, Serum  104   H   NA  137    K  4.0    CL  106    Bicarbonate, Serum  27    Anion Gap, Serum  8   L   BUN  13    CREAT  1.28   H   GFR Female  50   A   Calcium, Serum  8.9    ALB  3.8    ALKP  140   H   T Pro  7.2    T Bili  0.4    Alanine Aminotransferase, Serum  15    Aspartate Transaminase, Serum  15      PT + INR, Plasma  26-Sep-2023 21:13:00       Result Value    Prothrombin Time, Plasma  12.8    International Normalized Ratio, Plasma  1.1      Brain Natriuretic Peptide  26-Sep-2023 21:13:00      Result Value    Brain Natriuretic Peptide  14        Radiology Results:    Results:        Impression:    No pulmonary embolism to the segmental level. No acute  cardiopulmonary process.        MACRO:  None     TH CT Angio Chest for PE [Sep 27 2023  1:50AM]      Impression:    1. Mild diffuse increase in central perihilar markings and bronchial  wall thickening suggestive of viral or reactive airways disease.  Recommend clinical correlation and follow-up to document resolution..        Xray Chest 2 View PA + Lateral [Sep 26 2023  7:51PM]      Impression:    1.  No acute fracture or dislocation.  2.  Degenerative changes, as described above.     Xray Knee 3 View [Sep 23 2023  1:14PM]      Impression:    No acute pulmonary embolus to the segmental level.     No CT evidence for an acute intrathoracic process.        TH CT Angio Chest for PE [Sep 13 2023 12:47AM]      Impression:    No acute intracranial hemorrhage, mass effect or midline shift.        CT Head without Contrast [Sep 13 2023 12:43AM]        Assessment and Plan:     Impression 1: Chest pain   Plan for Impression 1: Chest pain for 2 days worsening  with deep inspiration and cough likely related to inflamed airways associated with cough and 1 episode of hemoptysis.  CT scan ruled out PE.  Concerning for sarcoidosis flareup.  We will treat as acute bronchitis and pleuritic chest pain for now.  -Obtain ESR and C-reactive protein inflammatory markers.  -Start patient on IV steroid Solu-Medrol.  -Also start patient on azithromycin  -As needed DuoNeb  -Monitor respiratory status  -Pulmonary consultation   Impression 2: History of sarcoidosis   Plan for Impression 2: She reported was not following  up with her pulmonologist for 1 year because she had no symptoms.  We will ask pulmonary evaluation while in the  "hospital  -Rest of management as above   Impression 3: History of asthma   Plan for Impression 3: Continue with bronchodilator  DuoNeb as above   Impression 4: Diabetes mellitus   Plan for Impression 4: We will use insulin corrective  scale   Impression 5: Anxiety and depression   Plan for Impression 5: Continue BuSpar   Impression 6: Chronic back pain   Plan for Impression 6: History of surgery of the  lumbar spine.  Resume home medication Lyrica and Percocet   Impression 7: Hypertension   Plan for Impression 7: Continue losartan   Impression 8: Hypothyroidism   Plan for Impression 8: Status post thyroidectomy  Resume levothyroxine   Impression 9: Chronic kidney disease   Plan for Impression 9: History of right renal cell  carcinoma status post right nephrectomy.  We will try to avoid nephrotoxic medication       Electronic Signatures:  Caron Sheppard)  (Signed 27-Sep-2023 05:06)   Authored: History of Present Illness, Comorbidities,  Family History, Social History, Allergies, Medications Prior to Admission, Objective, Assessment and Plan, Note Completion      Last Updated: 27-Sep-2023 05:06 by Caron Sheppard (MD)    References:  1.  Data Referenced From \"Triage - ED\" 26-Sep-2023 18:51  2.  Data Referenced From \"Provider Note - ED v3\" 26-Sep-2023 22:08   "

## 2023-09-30 NOTE — DISCHARGE SUMMARY
Send Summary:   Discharge Summary Providers:  Provider Role Provider Name   · Primary Renzo Delcid       Note Recipients: Renzo Delcid MD       Discharge:    Summary:   Admission Date: .26-Sep-2023 18:32:00   Discharge Date: 28-Sep-2023   Attending Physician at Discharge: Alisia Perez   Admission Reason: chest pain 2/2 acute bronchitis   Final Discharge Diagnoses: pulmonary and cutaneous  sarcoidosis, asthma, diabetes mellitus, hypertension, fibromyalgia renal cell carcinoma status post right nephrectomy, TUYET, CKD, TIA   Procedures: none   Condition at Discharge: Satisfactory   Disposition at Discharge: .Home   Vital Signs:        T   P  R  BP   MAP  SpO2   Value  36.2  66  16  110/59   77  94%  Date/Time 9/28 7:38 9/28 7:38 9/28 7:38 9/28 7:38  9/28 7:38 9/28 7:38  Range  (36.2C - 36.8C )  (66 - 89 )  (16 - 18 )  (104 - 134 )/ (53 - 71 )  (76 - 84 )  (93% - 98% )    Date:            Weight/Scale Type:  Height:   27-Sep-2023 15:59  118  kg / bed  159.9  cm  Physical Exam:    General: Well-developed obese female, in no acute distress   HEENT: AT, NC, no JVD, no lymphadenopathy, neck supple  Lungs: no wheezing, no crackles, diminished breath sounds   Cardiac: Normal S1-S2, no murmur, no gallop  Abdomen: Soft, nontender, obese, no distention, positive bowel sound  Extremities: No deformity, no edema, pulses intact, ROM intact  Neurological: Alert awake oriented x3, sensation intact, clear speech  Hospital Course:    AL LOYA is a 53 year old Female with past medical history of pulmonary and cutaneous sarcoidosis, asthma, diabetes mellitus, hypertension, fibromyalgia  renal cell carcinoma status post right nephrectomy, TUYET, CKD, TIA, who was admitted for management of chest pain likely in the setting of inflamed airways and history of sarcoidosis    Hospital course:   #Chest pain likely 2/2 inflammatory in the setting of sarcoidosis  #History of sarcoidosis  #History of asthma  #Diabetes  type 2  #Anxiety/depression  #HTN, hypothyroidism, CKD  #Chronic back pain    Patient was placed on cardiac monitor, Fall/aspiration precaution, Solu-Medrol, DuoNebs, azithromycin, pain management, ISS, hypoglycemia protocol, POCT glucose, DM diet, BuSpar for anxiety, losartan and levothyroxine  She refused IV Solu-Medrol and requested prednisone.  She was placed on prednisoneDue to high blood sugars during the hospital stay and tolerated well.  For hyperglycemia insulin was adjusted.  She will follow-up as outpatient with her endocrinologist.  Pulmonology was consulted who recommended to place her on prednisone tapering dose and antibiotic on discharge.  Patient is supposed to follow-up as outpatient with pulmonology office.  Monitored renal function, avoided nephrotoxic agent  DVT prophylaxis: Was provided with Heparin subqu     Her symptoms including chest pain improved.     Within the past 24 hours patient has been hemodynamically and medically stable and ready for discharge to home.  She is supposed to follow-up as outpatient with pulmonology office and endocrinologist for further management and medication adjustment.   She will be discharged on tapering dose of prednisone and azithromycin for few more days.  She will be continued on the rest of her home medication.    Time spent 45 minutes  Alisia Allen MD           Discharge Information:    and Continuing Care:   Lab Results - Pending:    Hemoglobin A1C Drawn at 28-Sep-2023 05:47:00  Radiology Results - Pending: None   Discharge Instructions:    Activity:           activity as tolerated.          May shower..            May return to school/work.            May drive..      Follow Up Appointments:    Follow-Up Appointment 01:           Physician/Dept/Service:   PCP          Reason for Referral:   follow up          Call to Schedule in:   1 week    Follow-Up Appointment 02:           Physician/Dept/Service:   Dr. Jason Hernandez MD          Reason for  Referral:   Pulmonary follow-up          Call to Schedule in:   3 weeks          Location:   [address] [place], [place] [address]          Phone Number:   [phone]    Discharge Medications: Home Medication   montelukast 10 mg oral tablet - 1 tab(s) orally once a day (at bedtime)  pregabalin 200 mg oral capsule - 1 cap(s) orally 3 times a day  atorvastatin 20 mg oral tablet - 1 tab(s) orally once a day (at bedtime)  magnesium oxide 400 mg oral tablet - 2 tab(s) orally once a day (at bedtime)  busPIRone 15 mg oral tablet - 1 tab(s) orally 3 times a day  cetirizine 10 mg oral tablet - 1 tab(s) orally once a day (at bedtime)  levothyroxine 125 mcg (0.125 mg) oral tablet - 1 tab(s) orally once a day (in the morning)  losartan 25 mg oral tablet - 1 tab(s) orally once a day (at bedtime)  Tresiba FlexTouch 200 units/mL subcutaneous solution - 100 unit(s) subcutaneous once a day (at bedtime)  Trulicity Pen 0.75 mg/0.5 mL subcutaneous solution - 0.75 milligram(s) subcutaneous once a week, every Monday    tacrolimus 0.1% topical ointment - Apply topically to affected area once a day (at bedtime)  predniSONE 10 mg oral tablet - 4 tab(s) orally once a day -Take 40 mg daily for 3 days, then take 30 mg daily for 3 days, then take 20 mg daily for 3 days, then take 10 mg daily for 3 days.   pantoprazole 40 mg oral delayed release tablet - 1 tab(s) orally once a day  azithromycin 500 mg oral tablet - 1 tab(s) orally once a day      PRN Medication   oxycodone-acetaminophen 10 mg-325 mg oral tablet - 1 tab(s) orally 2 times a day, As Needed  naloxone 4 mg/0.1 mL nasal spray - 0.1 milliliter(s) nasal once, As Needed  lidocaine 5% topical film - Apply topically to affected area once a day, As Needed  acetaminophen 325 mg oral tablet - 2 tab(s) orally every 6 hours, As Needed  insulin lispro 100 units/mL injectable solution - unit(s), .per sliding scale, subcutaneously 4 times a day (before meals and at bedtime), As Needed  albuterol 90  mcg/inh inhalation aerosol - 2 puff(s) inhaled every 6 hours, As Needed  albuterol 2.5 mg/3 mL (0.083%) inhalation solution - 3 milliliter(s) by nebulizer every 6 hours, As Needed     DNR Status:   ·  Code Status Code Status order at time of discharge: Full Code       Electronic Signatures:  Alisia Perez)  (Signed 28-Sep-2023 14:04)   Authored: Send Summary, Summary Content, Ongoing Care,  DNR Status, Note Completion      Last Updated: 28-Sep-2023 14:04 by Alisia Perez)

## 2023-10-01 RX ORDER — BLOOD-GLUCOSE,RECEIVER,CONT
EACH MISCELLANEOUS
Qty: 1 EACH | Refills: 0 | Status: SHIPPED | OUTPATIENT
Start: 2023-10-01 | End: 2024-01-09

## 2023-10-03 DIAGNOSIS — E11.9 TYPE 2 DIABETES MELLITUS WITHOUT COMPLICATION, WITH LONG-TERM CURRENT USE OF INSULIN (MULTI): ICD-10-CM

## 2023-10-03 DIAGNOSIS — Z79.4 TYPE 2 DIABETES MELLITUS WITHOUT COMPLICATION, WITH LONG-TERM CURRENT USE OF INSULIN (MULTI): ICD-10-CM

## 2023-10-03 NOTE — TELEPHONE ENCOUNTER
Pt said pharmacy needs new script sent for     Dexcom G4 platinum  (D[place]com G7 ) [facility] [place]   [id number] - [address] & [address] [address]  Phone: [phone]  Fax: [phone]

## 2023-10-09 ENCOUNTER — TELEMEDICINE (OUTPATIENT)
Dept: PRIMARY CARE | Facility: CLINIC | Age: 53
End: 2023-10-09
Payer: MEDICAID

## 2023-10-09 DIAGNOSIS — Z98.890 STATUS POST LUMBAR SPINE SURGERY FOR DECOMPRESSION OF SPINAL CORD: ICD-10-CM

## 2023-10-09 DIAGNOSIS — D86.0 PULMONARY SARCOIDOSIS (MULTI): Primary | ICD-10-CM

## 2023-10-09 DIAGNOSIS — J43.1 PANLOBULAR EMPHYSEMA (MULTI): ICD-10-CM

## 2023-10-09 DIAGNOSIS — F41.9 ANXIETY: ICD-10-CM

## 2023-10-09 DIAGNOSIS — M54.41 CHRONIC BILATERAL LOW BACK PAIN WITH RIGHT-SIDED SCIATICA: ICD-10-CM

## 2023-10-09 DIAGNOSIS — G89.29 CHRONIC BILATERAL LOW BACK PAIN WITH RIGHT-SIDED SCIATICA: ICD-10-CM

## 2023-10-09 PROBLEM — J18.0 BRONCHOPNEUMONIA: Status: RESOLVED | Noted: 2022-03-14 | Resolved: 2023-10-09

## 2023-10-09 PROBLEM — U07.1 LAB TEST POSITIVE FOR DETECTION OF COVID-19 VIRUS: Status: RESOLVED | Noted: 2023-10-09 | Resolved: 2023-10-09

## 2023-10-09 PROBLEM — M94.0 COSTAL CHONDRITIS: Status: ACTIVE | Noted: 2018-11-21

## 2023-10-09 PROBLEM — R79.89 OTHER SPECIFIED ABNORMAL FINDINGS OF BLOOD CHEMISTRY: Status: ACTIVE | Noted: 2023-10-09

## 2023-10-09 PROBLEM — Z90.5 SINGLE KIDNEY: Status: ACTIVE | Noted: 2020-07-22

## 2023-10-09 PROBLEM — M17.11 ARTHRITIS OF RIGHT KNEE: Status: ACTIVE | Noted: 2018-02-28

## 2023-10-09 PROBLEM — N18.31 STAGE 3A CHRONIC KIDNEY DISEASE (MULTI): Status: ACTIVE | Noted: 2021-11-19

## 2023-10-09 PROBLEM — M54.17 LEFT LUMBOSACRAL RADICULOPATHY: Status: ACTIVE | Noted: 2022-04-26

## 2023-10-09 PROBLEM — N17.9 AKI (ACUTE KIDNEY INJURY) (CMS-HCC): Status: ACTIVE | Noted: 2022-08-04

## 2023-10-09 PROBLEM — G47.33 OSA (OBSTRUCTIVE SLEEP APNEA): Status: ACTIVE | Noted: 2021-11-19

## 2023-10-09 PROBLEM — H40.013 OAG (OPEN ANGLE GLAUCOMA) SUSPECT, LOW RISK, BILATERAL: Status: ACTIVE | Noted: 2021-10-13

## 2023-10-09 PROBLEM — E03.9 HYPOTHYROIDISM: Status: ACTIVE | Noted: 2020-04-01

## 2023-10-09 PROBLEM — N18.9 CHRONIC KIDNEY DISEASE: Status: ACTIVE | Noted: 2018-10-26

## 2023-10-09 PROBLEM — E66.01 MORBID OBESITY (MULTI): Status: ACTIVE | Noted: 2017-05-19

## 2023-10-09 PROBLEM — J44.9 CHRONIC OBSTRUCTIVE PULMONARY DISEASE (MULTI): Status: ACTIVE | Noted: 2018-12-24

## 2023-10-09 PROBLEM — H25.13 CATARACT, NUCLEAR SCLEROTIC SENILE, BILATERAL: Status: ACTIVE | Noted: 2021-10-13

## 2023-10-09 PROBLEM — G45.9 TIA (TRANSIENT ISCHEMIC ATTACK): Status: RESOLVED | Noted: 2022-09-27 | Resolved: 2023-10-09

## 2023-10-09 PROBLEM — J18.9 PNEUMONIA: Status: ACTIVE | Noted: 2023-10-09

## 2023-10-09 PROBLEM — G89.4 CHRONIC PAIN SYNDROME: Status: ACTIVE | Noted: 2020-07-22

## 2023-10-09 PROBLEM — R93.89 ABNORMAL CHEST X-RAY: Status: ACTIVE | Noted: 2021-10-26

## 2023-10-09 PROBLEM — F12.90 MARIJUANA USE: Status: ACTIVE | Noted: 2017-07-11

## 2023-10-09 PROBLEM — Z85.528 HISTORY OF PRIMARY MALIGNANT NEOPLASM OF KIDNEY: Status: ACTIVE | Noted: 2017-01-10

## 2023-10-09 PROCEDURE — 99495 TRANSJ CARE MGMT MOD F2F 14D: CPT | Performed by: FAMILY MEDICINE

## 2023-10-09 RX ORDER — OXYCODONE AND ACETAMINOPHEN 10; 325 MG/1; MG/1
1 TABLET ORAL 2 TIMES DAILY PRN
Qty: 60 TABLET | Refills: 0 | Status: SHIPPED | OUTPATIENT
Start: 2023-10-09 | End: 2023-11-09 | Stop reason: SDUPTHER

## 2023-10-09 NOTE — PROGRESS NOTES
Subjective   Patient ID: Alysa Austin is a 53 y.o. female who presents for Diabetes.    Patient presents in office for a follow up of DM 2. Patient is currently being prescribed Humalog, Toujeo and Trulicity. Patient denies any adverse side effects from the current prescribed medications.          Review of Systems  12 Systems have been reviewed as follows.  Constitutional: Fever, weight gain, weight loss, appetite change, night sweats, fatigue, chills.  Eyes : blurry, double vision, vision, loss, tearing, redness, pain, sensitivity to light, glaucoma.  Ears, nose, mouth, and throat: Hearing loss, ringing in the ears, ear pain, nasal congestion, nasal drainage, nosebleeds, mouth, throat, irritation tooth problem.  Cardiovascular :chest pain, pressure, heart racing, palpitations, sweating, leg swelling, high or low blood pressure  Pulmonary: Cough, yellow or green sputum, blood and sputum, shortness of breath, wheezing  Gastrointestinal: Nausea, vomiting, diarrhea, constipation, pain, blood in stool, or vomitus, heartburn, difficulty swallowing  Genitourinary: incontinence, abnormal bleeding, abnormal discharge, urinary frequency, urinary hesitancy, pain, impotence sexual problem, infection, urinary retention  Musculoskeletal: Pain, stiffness, joint, redness or warmth, arthritis, back pain, weakness, muscle wasting, sprain or fracture  Neuro: Weight weakness, dizziness, change in voice, change in taste change in vision, change in hearing, loss, or change of sensation, trouble walking, balance problems coordination problems, shaking, speech problem  Endocrine , cold or heat intolerance, blood sugar problem, weight gain or loss missed periods hot flashes, sweats, change in body hair, change in libido, increased thirst, increased urination  Heme/lymph: Swelling, bleeding, problem anemia, bruising, enlarged lymph nodes  Allergic/immunologic: H. plus nasal drip, watery itchy eyes, nasal drainage,  immunosuppressed  The above were reviewed and noted negative except as noted in HPI and Problem List.    Objective   There were no vitals taken for this visit.    Physical Exam  Constitutional: Well developed, well nourished, alert and in no acute distress   Eyes: Normal external exam. Pupils equally round and reactive to light with normal accommodation and extraocular movements intact.  Neck: Supple, no lymphadenopathy or masses.   Cardiovascular: Regular rate and rhythm, normal S1 and S2, no murmurs, gallops, or rubs. Radial pulses normal. No peripheral edema.  Pulmonary: No respiratory distress, lungs clear to auscultation bilaterally. No wheezes, rhonchi, rales.  Abdomen: soft,non tender, non distended, without masses or HSM  Skin: Warm, well perfused, normal skin turgor and color.   Neurologic: Cranial nerves II-XII grossly intact.   Psychiatric: Mood calm and affect normal  Musculoskeletal: Moving all extremities without restriction    Assessment/Plan   Problem List Items Addressed This Visit             ICD-10-CM    Anxiety F41.9    Relevant Orders    Follow Up In [facility] - PCP - Established    Chronic bilateral low back pain with right-sided sciatica M54.41, G89.29    Relevant Medications    oxyCODONE-acetaminophen (Percocet)  mg tablet    Other Relevant Orders    Follow Up In [facility] - PCP - Established    Pulmonary sarcoidosis (CMS/[facility]) - Primary D86.0    Relevant Orders    Follow Up In [facility] - PCP - Established    Status post lumbar spine surgery for decompression of spinal cord Z98.890    Relevant Orders    Follow Up In [facility] - PCP - Established    Chronic obstructive pulmonary disease (CMS/[facility]) J44.9    Relevant Orders    Follow Up In [facility] - PCP - Established     Refer for sarcoid next    PFT next    UDS & BW next    Consider referral to pain management next     sees nephrologist, pulmonologist, and endocrinologist       colonoscopy done 10/12/2022, repeat 10/2027     CT abd/pelvis done at [facility] 08/29/2022     US abd RUQ done at [facility] 08/29/2022     consider ARB next      seeing nutritionist      lipid & magnesium next      consider jardiance or farxiga in future      Take vit d     See cmp & lipid panel

## 2023-10-10 ENCOUNTER — TELEMEDICINE (OUTPATIENT)
Dept: PHARMACY | Facility: HOSPITAL | Age: 53
End: 2023-10-10
Payer: MEDICAID

## 2023-10-10 ENCOUNTER — PATIENT OUTREACH (OUTPATIENT)
Dept: PRIMARY CARE | Facility: CLINIC | Age: 53
End: 2023-10-10

## 2023-10-10 DIAGNOSIS — Z79.4 TYPE 2 DIABETES MELLITUS WITHOUT COMPLICATION, WITH LONG-TERM CURRENT USE OF INSULIN (MULTI): ICD-10-CM

## 2023-10-10 DIAGNOSIS — E11.9 TYPE 2 DIABETES MELLITUS WITHOUT COMPLICATION, WITH LONG-TERM CURRENT USE OF INSULIN (MULTI): ICD-10-CM

## 2023-10-10 RX ORDER — DULAGLUTIDE 1.5 MG/.5ML
1.5 INJECTION, SOLUTION SUBCUTANEOUS
Qty: 2 ML | Refills: 1 | Status: SHIPPED | OUTPATIENT
Start: 2023-10-10

## 2023-10-10 ASSESSMENT — ENCOUNTER SYMPTOMS: DIABETIC ASSOCIATED SYMPTOMS: 0

## 2023-10-10 NOTE — PROGRESS NOTES
Pharmacy Post-Discharge Visit  Alysa Austin is a 53 y.o. female was referred to Clinical Pharmacy Team to complete a post-discharge medication optimization and monitoring visit.  The patient was referred for their Diabetes.    Referring Provider: Renzo Delcid MD    Subjective   Allergies   Allergen Reactions    Hydromorphone Hives and Unknown    Ibuprofen Unknown     Pt states not allergy -  she just can't take it because she only has one kidney    Ketorolac Other     Nausea/vomiting    Penicillins Unknown and Swelling     Within last 2 years    Propoxyphene N-Acetaminophen Hives     Patient tolerates acetaminophen at home    Shellfish Containing Products Unknown    Shellfish Derived Angioedema     [id number] - [place], [place] - [address] AT [facility] [address] [facility] [address] [facility]  [address] [address]  Phone: [phone] Fax: [phone]    [facility] - [place], [place] - [address]  [address]  [address]  [facility] [address]  Phone: [phone] Fax: [phone]    Social History     Social History Narrative    Not on file      Diabetes  She presents for her follow-up diabetic visit. She has type 2 diabetes mellitus. Her disease course has been stable. There are no hypoglycemic associated symptoms. There are no diabetic associated symptoms. There are no hypoglycemic complications. Risk factors for coronary artery disease include diabetes mellitus, dyslipidemia, hypertension and obesity. Current diabetic treatments: Toujeo 78 units once daily, Humalog 5 units plus sliding scale with meals, and Trulicity 0.75mg once weekly. She is compliant with treatment all of the time. Diabetic meal planning: Patient states normally eats brunch around 11am, and then may eat some crackers for lunch, but typically falls short around lunch time. Patient states then she has dinner, and usually a snack between 8-9pm. Patient loves  sumit. (Patient uses a Dexcom G7 Sensor to test he blood sugars. She states her 3 day average is 127mg/dL and 7 day average is 136mg/dL. She does not have as much data because she states after the hospital she had to get a new . Patient states highest blood sugar in the past month is around 170mg/dL, and reports no low blood sugar (<70mg/dL).) An ACE inhibitor/angiotensin II receptor blocker is being taken (Losartan 25mg daily).     Objective     LAB  Lab Results   Component Value Date    BILITOT 0.5 09/27/2023    CALCIUM 8.9 09/28/2023    CO2 24 09/28/2023     09/28/2023    CREATININE 1.36 (H) 09/28/2023    GLUCOSE 242 (H) 09/28/2023    ALKPHOS 154 (H) 09/27/2023    K 4.2 09/28/2023    PROT 7.4 09/27/2023     (L) 09/28/2023    AST 14 09/27/2023    ALT 15 09/27/2023    BUN 20 09/28/2023    ANIONGAP 12 09/28/2023    MG 1.65 09/27/2023    PHOS 3.1 02/22/2019    ALBUMIN 3.9 09/27/2023    LIPASE 15 09/12/2023    GFRF 47 (A) 09/28/2023    GFRMALE CANCELED 04/05/2023     Lab Results   Component Value Date    TRIG 130 08/08/2023    CHOL 165 08/08/2023    HDL 36.7 (A) 08/08/2023     Lab Results   Component Value Date    HGBA1C 6.3 (A) 09/28/2023     Current Outpatient Medications on File Prior to Visit   Medication Sig Dispense Refill    insulin glargine (Toujeo SoloStar U-300 Insulin) 300 unit/mL (1.5 mL) injection Inject 78 Units under the skin once daily at bedtime. Take as directed per insulin instructions. 4.5 mL 3    insulin lispro (HumaLOG) 100 unit/mL injection INJECT 30 TO 35 UNITS UNDER THE SKIN AT EACH MEAL. HOLD IF BLOOD GLUCOSE LESS THAN 150.      [DISCONTINUED] dulaglutide (Trulicity) 0.75 mg/0.5 mL pen injector Inject 0.75 mg under the skin 1 (one) time per week. 2 mL 1    acetaminophen (Tylenol) 325 mg tablet TAKE 2 TABLETS BY MOUTH FOUR TIMES DAILY AS NEEDED FOR MILD AND MODERATE POSTOPERATIVE PAIN.      albuterol 2.5 mg /3 mL (0.083 %) nebulizer solution Inhale 3 ml every 6 hours as  "needed 75 mL 3    albuterol 90 mcg/actuation inhaler Inhale 1 puff 3 times a day as needed for shortness of breath. 18 g 1    atorvastatin (Lipitor) 20 mg tablet Take 1 tablet (20 mg) by mouth once daily.      azithromycin (Zithromax) 500 mg tablet       BD Ultra-Fine Short Pen Needle 31 gauge x 5/16\" needle Use as directed to administer insulin injections up to 4 times a day. Use a new needle with each injection. 100 each 3    blood sugar diagnostic (Blood Glucose Test) strip TEST 3 TIMES DAILY AS NEEDED 200 strip 1    blood-glucose sensor (Dexcom G7 Sensor) device Use to test blood sugars. Change sensor every 10 days. 3 each 3    buPROPion XL (Wellbutrin XL) 150 mg 24 hr tablet Take 1 tablet (150 mg) by mouth once daily in the morning. Take before meals.      busPIRone (Buspar) 15 mg tablet Take 1 tablet (15 mg) by mouth 2 times a day. 60 tablet 3    cetirizine (ZyrTEC) 10 mg tablet Take 1 tablet (10 mg) by mouth once daily. 90 tablet 1    Dexcom G4 platinum  (Dexcom G7 ) misc TEST 4-5 TIMES DAILY 1 each 0    Dexcom G4 platinum  misc Use as instructed 1 each 3    glucagon (GlucaGen Diagnostic Kit) 1 mg/mL injection Inject 1 mg into the shoulder, thigh, or buttocks.      lancets (OneTouch UltraSoft Lancets) misc Use to screen glucose twice daily 200 each 0    levothyroxine (Synthroid, Levoxyl) 125 mcg tablet Take 1 tablet (125 mcg) by mouth once daily. 90 tablet 1    losartan (Cozaar) 25 mg tablet Take 1 tablet (25 mg) by mouth once daily. 30 tablet 2    magnesium oxide (Mag-Ox) 400 mg (241.3 mg magnesium) tablet TAKE 2 TABLET BY MOUTH EVERY DAY 90 tablet 1    montelukast (Singulair) 10 mg tablet Take 1 tablet (10 mg) by mouth once daily at bedtime. 90 tablet 1    naloxone (Narcan) 4 mg/0.1 mL nasal spray Administer 1 spray (4 mg) into affected nostril(s) if needed for opioid reversal. May repeat every 2-3 minutes if needed, alternating nostrils, until medical assistance becomes available. 2 " each 0    nebulizer and compressor device Use as directed with nebulizer solution for shortness of breath and wheezing 1 each 0    oxyCODONE-acetaminophen (Percocet)  mg tablet Take 1 tablet by mouth 2 times a day as needed for severe pain (7 - 10). 60 tablet 0    pantoprazole (ProtoNix) 40 mg EC tablet       predniSONE (Deltasone) 10 mg tablet       pregabalin (Lyrica) 200 mg capsule Take 1 capsule (200 mg) by mouth 3 times a day. 90 capsule 3    spironolactone (Aldactone) 50 mg tablet Take 1 tablet (50 mg) by mouth once daily.      [DISCONTINUED] oxyCODONE-acetaminophen (Percocet)  mg tablet Take 1 tablet by mouth 2 times a day as needed for severe pain (7 - 10). 60 tablet 0     No current facility-administered medications on file prior to visit.      DRUG INTERATIONS  No Drug Interactions exist that require pharmacologic intervention    Assessment/Plan     Diabetes Education  Rule of 15: eating ~15 g of carbs when BG less than 80 (half cup juice, 3-4 glucose tabs).  Recognize symptoms of high and low blood sugar.   Eat a realistic healthy diet consisting of fruits, vegetables, fiber, protein food choices on a regular basis and be aware of portion/serving sizes. Reduce carbohydrate consumption and always consume with protein and fat. Avoid foods high in saturated/trans fat, high salt content, and sweets and beverages with added sugars.  Limit alcohol consumption; alcohol may affect your blood sugar and cause hypoglycemia.   Stay active and incorporate ~30 mins of exercise into your daily routine to manage your weight and increase the body's acceptance of insulin.    PATIENT GOALS   Fasting B - 130 mg/dL 2-HR Postprandial BG:   Less than 180 mg/dL A1c:   Less than 7.0 %     Trulicity Education:  Counseled patient on Trulicity MOA, expectations, side effects, duration of therapy, administration, and monitoring parameters.  Provided detailed dosing and administration counseling to ensure proper  technique.   Reviewed Trulicity titration schedule, starting with 0.75 mg once weekly for 4 weeks, then increasing the dose as tolerated. Patient verbalized understanding.  Counseled patient on the benefits of GLP-1ra, such as cardiovascular risk reduction, glycemic control, and weight loss potential.  Reviewed storage requirements of Trulicity when not in use, and when to administer the medication if a dose is missed.  Advised patient that they may experience improved satiety after meals and portion sizes of meals may be reduced as doses of Trulicity increase.  Counseled patient to avoid foods that are fatty/oily as this may precipitate the nausea/GI upset that may occur with new start Trulicity.     Problem List Items Addressed This Visit       Type 2 diabetes mellitus without complication, with long-term current use of insulin (CMS/[facility])     Patients diabetes is controlled as evidenced by most recent A1c of 6.3% on 9/28/2023. Congratulated the patient on the decrease in A1c.  RX CHANGES  INCREASE Trulicity to 1.5mg under the skin once weekly. Patient took her last dose of 0.75mg on Monday 10/9/2023, and will  the new dose from the pharmacy. Patient reports having an upset stomach after the first dose of Trulicity, but after that did not experience any side effects. Tolerating the medication well and does not have any questions about injection.  CONTINUE Toujeo 78 units once daily and Humalog 5 units plus sliding scale with meals.  Continue working towards healthy diet and lifestyle         Relevant Medications    dulaglutide (Trulicity) 1.5 mg/0.5 mL pen injector injection    Other Relevant Orders    Follow Up In [facility] - Pharmacy     Follow up with the Clinical Pharmacy Team 11/7/2023 at 3pm to discuss diabetes management.    Continue all meds under the continuation of care with the referring provider and clinical pharmacy team.    Please reach out to the Clinical Pharmacy Team if there are  any further questions.     Verbal consent to manage patient's drug therapy was obtained from patient. They were informed they may decline to participate or withdraw from participation in pharmacy services at any time.    Florence Cruz, PharmD  [phone]

## 2023-10-10 NOTE — ASSESSMENT & PLAN NOTE
Patients diabetes is controlled as evidenced by most recent A1c of 6.3% on 9/28/2023. Congratulated the patient on the decrease in A1c.  RX CHANGES  INCREASE Trulicity to 1.5mg under the skin once weekly. Patient took her last dose of 0.75mg on Monday 10/9/2023, and will  the new dose from the pharmacy. Patient reports having an upset stomach after the first dose of Trulicity, but after that did not experience any side effects. Tolerating the medication well and does not have any questions about injection.  CONTINUE Toujeo 78 units once daily and Humalog 5 units plus sliding scale with meals.  Continue working towards healthy diet and lifestyle

## 2023-10-10 NOTE — PROGRESS NOTES
Call regarding appt. with PCP on 10/09/23 after hospitalization.  At time of outreach call the patient feels as if their condition has improved since hospital stay.  Reviewed the PCP appointment with the pt and addressed any questions or concerns.

## 2023-10-11 ENCOUNTER — OFFICE VISIT (OUTPATIENT)
Dept: ENDOCRINOLOGY | Age: 53
End: 2023-10-11

## 2023-10-11 VITALS
OXYGEN SATURATION: 92 % | DIASTOLIC BLOOD PRESSURE: 64 MMHG | BODY MASS INDEX: 49.08 KG/M2 | HEART RATE: 82 BPM | SYSTOLIC BLOOD PRESSURE: 100 MMHG | HEIGHT: 63 IN | WEIGHT: 277 LBS

## 2023-10-11 DIAGNOSIS — E11.65 TYPE 2 DIABETES MELLITUS WITH HYPERGLYCEMIA, WITH LONG-TERM CURRENT USE OF INSULIN (HCC): Primary | ICD-10-CM

## 2023-10-11 DIAGNOSIS — Z79.4 TYPE 2 DIABETES MELLITUS WITH HYPERGLYCEMIA, WITH LONG-TERM CURRENT USE OF INSULIN (HCC): Primary | ICD-10-CM

## 2023-10-11 LAB
CHP ED QC CHECK: NORMAL
GLUCOSE BLD-MCNC: 164 MG/DL

## 2023-10-11 RX ORDER — DULAGLUTIDE 1.5 MG/.5ML
INJECTION, SOLUTION SUBCUTANEOUS
COMMUNITY
Start: 2023-10-10

## 2023-10-11 RX ORDER — INSULIN GLARGINE 300 U/ML
INJECTION, SOLUTION SUBCUTANEOUS
COMMUNITY
Start: 2023-09-12

## 2023-10-11 NOTE — PROGRESS NOTES
10/11/2023    Assessment:       Diagnosis Orders   1. Type 2 diabetes mellitus with hyperglycemia, with long-term current use of insulin ([facility])  POCT Glucose            PLAN:     Orders Placed This Encounter   Procedures    Basic Metabolic Panel     Standing Status:   Future     Standing Expiration Date:   10/11/2024    Hemoglobin A1C     Standing Status:   Future     Standing Expiration Date:   10/11/2024    POCT Glucose     Continue current regimen patient to follow-up in 3 to 6 months time    Orders Placed This Encounter   Procedures    POCT Glucose     No orders of the defined types were placed in this encounter. No follow-ups on file. Subjective:     Chief Complaint   Patient presents with    Diabetes     Vitals:    10/11/23 1637   BP: 100/64   Pulse: 82   SpO2: 92%   Weight: 277 lb (125.6 kg)   Height: 5' 3\" (1.6 m)     Wt Readings from Last 3 Encounters:   10/11/23 277 lb (125.6 kg)   05/25/23 273 lb (123.8 kg)   01/26/23 284 lb (128.8 kg)     BP Readings from Last 3 Encounters:   10/11/23 100/64   05/25/23 117/71   01/26/23 116/72     Follow-up with your diabetes obesity patient's hemoglobin A1c was 6.3 average blood sugars close to 130 review Dexcom continue glucose monitoring average blood sugar was 138   88% in range    Patient on Tresiba plus Humalog  Tresiba 110 units at bedtime plus Humalog 35 units with each meals    Diabetes  She presents for her follow-up diabetic visit. She has type 2 diabetes mellitus. Pertinent negatives for diabetes include no polydipsia and no polyuria. There are no hypoglycemic complications. Diabetic complications include a CVA and nephropathy. Risk factors for coronary artery disease include obesity. Current diabetic treatment includes insulin injections. She is currently taking insulin pre-breakfast, pre-lunch, pre-dinner and at bedtime. She is following a generally healthy diet. Her overall blood glucose range is 130-140 mg/dl.      Past Medical History:   Diagnosis Date

## 2023-10-18 ENCOUNTER — APPOINTMENT (OUTPATIENT)
Dept: CARDIOLOGY | Facility: HOSPITAL | Age: 53
End: 2023-10-18
Payer: MEDICAID

## 2023-10-18 ENCOUNTER — APPOINTMENT (OUTPATIENT)
Dept: RADIOLOGY | Facility: HOSPITAL | Age: 53
End: 2023-10-18
Payer: MEDICAID

## 2023-10-18 ENCOUNTER — HOSPITAL ENCOUNTER (OUTPATIENT)
Facility: HOSPITAL | Age: 53
Setting detail: OBSERVATION
Discharge: HOME | End: 2023-10-18
Attending: EMERGENCY MEDICINE | Admitting: INTERNAL MEDICINE
Payer: MEDICAID

## 2023-10-18 VITALS
BODY MASS INDEX: 46.95 KG/M2 | DIASTOLIC BLOOD PRESSURE: 67 MMHG | RESPIRATION RATE: 18 BRPM | SYSTOLIC BLOOD PRESSURE: 131 MMHG | WEIGHT: 265 LBS | OXYGEN SATURATION: 98 % | TEMPERATURE: 96.4 F | HEIGHT: 63 IN | HEART RATE: 68 BPM

## 2023-10-18 DIAGNOSIS — R42 DIZZINESS: Primary | ICD-10-CM

## 2023-10-18 LAB
ALBUMIN SERPL BCP-MCNC: 3.6 G/DL (ref 3.4–5)
ALP SERPL-CCNC: 127 U/L (ref 33–110)
ALT SERPL W P-5'-P-CCNC: 18 U/L (ref 7–45)
ANION GAP SERPL CALC-SCNC: 13 MMOL/L (ref 10–20)
APTT PPP: 33 SECONDS (ref 27–38)
AST SERPL W P-5'-P-CCNC: 21 U/L (ref 9–39)
BASOPHILS # BLD AUTO: 0.03 X10*3/UL (ref 0–0.1)
BASOPHILS NFR BLD AUTO: 0.5 %
BILIRUB SERPL-MCNC: 0.4 MG/DL (ref 0–1.2)
BUN SERPL-MCNC: 9 MG/DL (ref 6–23)
CALCIUM SERPL-MCNC: 9 MG/DL (ref 8.6–10.3)
CARDIAC TROPONIN I PNL SERPL HS: 4 NG/L (ref 0–13)
CHLORIDE SERPL-SCNC: 107 MMOL/L (ref 98–107)
CO2 SERPL-SCNC: 22 MMOL/L (ref 21–32)
CREAT SERPL-MCNC: 1.48 MG/DL (ref 0.5–1.05)
CRP SERPL-MCNC: 0.52 MG/DL
EOSINOPHIL # BLD AUTO: 0.27 X10*3/UL (ref 0–0.7)
EOSINOPHIL NFR BLD AUTO: 4.4 %
ERYTHROCYTE [DISTWIDTH] IN BLOOD BY AUTOMATED COUNT: 13 % (ref 11.5–14.5)
GFR SERPL CREATININE-BSD FRML MDRD: 42 ML/MIN/1.73M*2
GLUCOSE SERPL-MCNC: 125 MG/DL (ref 74–99)
HCT VFR BLD AUTO: 43.3 % (ref 36–46)
HGB BLD-MCNC: 14.4 G/DL (ref 12–16)
IMM GRANULOCYTES # BLD AUTO: 0.01 X10*3/UL (ref 0–0.7)
IMM GRANULOCYTES NFR BLD AUTO: 0.2 % (ref 0–0.9)
INR PPP: 1 (ref 0.9–1.1)
LYMPHOCYTES # BLD AUTO: 2.06 X10*3/UL (ref 1.2–4.8)
LYMPHOCYTES NFR BLD AUTO: 33.6 %
MAGNESIUM SERPL-MCNC: 1.82 MG/DL (ref 1.6–2.4)
MCH RBC QN AUTO: 30.9 PG (ref 26–34)
MCHC RBC AUTO-ENTMCNC: 33.3 G/DL (ref 32–36)
MCV RBC AUTO: 93 FL (ref 80–100)
MONOCYTES # BLD AUTO: 0.43 X10*3/UL (ref 0.1–1)
MONOCYTES NFR BLD AUTO: 7 %
NEUTROPHILS # BLD AUTO: 3.34 X10*3/UL (ref 1.2–7.7)
NEUTROPHILS NFR BLD AUTO: 54.3 %
NRBC BLD-RTO: 0 /100 WBCS (ref 0–0)
PLATELET # BLD AUTO: 245 X10*3/UL (ref 150–450)
PMV BLD AUTO: 9.8 FL (ref 7.5–11.5)
POTASSIUM SERPL-SCNC: 3.9 MMOL/L (ref 3.5–5.3)
PROT SERPL-MCNC: 6.8 G/DL (ref 6.4–8.2)
PROTHROMBIN TIME: 11.8 SECONDS (ref 9.8–12.8)
RBC # BLD AUTO: 4.66 X10*6/UL (ref 4–5.2)
SARS-COV-2 RNA RESP QL NAA+PROBE: NOT DETECTED
SODIUM SERPL-SCNC: 138 MMOL/L (ref 136–145)
WBC # BLD AUTO: 6.1 X10*3/UL (ref 4.4–11.3)

## 2023-10-18 PROCEDURE — 85610 PROTHROMBIN TIME: CPT | Performed by: EMERGENCY MEDICINE

## 2023-10-18 PROCEDURE — 85730 THROMBOPLASTIN TIME PARTIAL: CPT | Performed by: EMERGENCY MEDICINE

## 2023-10-18 PROCEDURE — 94640 AIRWAY INHALATION TREATMENT: CPT

## 2023-10-18 PROCEDURE — 93005 ELECTROCARDIOGRAM TRACING: CPT

## 2023-10-18 PROCEDURE — 70551 MRI BRAIN STEM W/O DYE: CPT

## 2023-10-18 PROCEDURE — 70496 CT ANGIOGRAPHY HEAD: CPT | Performed by: RADIOLOGY

## 2023-10-18 PROCEDURE — 71045 X-RAY EXAM CHEST 1 VIEW: CPT | Mod: FY

## 2023-10-18 PROCEDURE — 84484 ASSAY OF TROPONIN QUANT: CPT | Performed by: EMERGENCY MEDICINE

## 2023-10-18 PROCEDURE — 80053 COMPREHEN METABOLIC PANEL: CPT | Performed by: EMERGENCY MEDICINE

## 2023-10-18 PROCEDURE — 70498 CT ANGIOGRAPHY NECK: CPT | Performed by: RADIOLOGY

## 2023-10-18 PROCEDURE — 2500000004 HC RX 250 GENERAL PHARMACY W/ HCPCS (ALT 636 FOR OP/ED): Performed by: EMERGENCY MEDICINE

## 2023-10-18 PROCEDURE — 71045 X-RAY EXAM CHEST 1 VIEW: CPT | Performed by: STUDENT IN AN ORGANIZED HEALTH CARE EDUCATION/TRAINING PROGRAM

## 2023-10-18 PROCEDURE — 36415 COLL VENOUS BLD VENIPUNCTURE: CPT | Performed by: EMERGENCY MEDICINE

## 2023-10-18 PROCEDURE — 2500000002 HC RX 250 W HCPCS SELF ADMINISTERED DRUGS (ALT 637 FOR MEDICARE OP, ALT 636 FOR OP/ED): Performed by: INTERNAL MEDICINE

## 2023-10-18 PROCEDURE — 86140 C-REACTIVE PROTEIN: CPT | Performed by: EMERGENCY MEDICINE

## 2023-10-18 PROCEDURE — G0378 HOSPITAL OBSERVATION PER HR: HCPCS

## 2023-10-18 PROCEDURE — 96374 THER/PROPH/DIAG INJ IV PUSH: CPT | Mod: 59 | Performed by: EMERGENCY MEDICINE

## 2023-10-18 PROCEDURE — 87635 SARS-COV-2 COVID-19 AMP PRB: CPT | Performed by: EMERGENCY MEDICINE

## 2023-10-18 PROCEDURE — 83735 ASSAY OF MAGNESIUM: CPT | Performed by: EMERGENCY MEDICINE

## 2023-10-18 PROCEDURE — 2550000001 HC RX 255 CONTRASTS: Performed by: EMERGENCY MEDICINE

## 2023-10-18 PROCEDURE — 2500000001 HC RX 250 WO HCPCS SELF ADMINISTERED DRUGS (ALT 637 FOR MEDICARE OP): Performed by: INTERNAL MEDICINE

## 2023-10-18 PROCEDURE — 99235 HOSP IP/OBS SAME DATE MOD 70: CPT | Performed by: HOSPITALIST

## 2023-10-18 PROCEDURE — 70496 CT ANGIOGRAPHY HEAD: CPT

## 2023-10-18 PROCEDURE — 70551 MRI BRAIN STEM W/O DYE: CPT | Performed by: RADIOLOGY

## 2023-10-18 PROCEDURE — 85025 COMPLETE CBC W/AUTO DIFF WBC: CPT | Performed by: EMERGENCY MEDICINE

## 2023-10-18 PROCEDURE — 76937 US GUIDE VASCULAR ACCESS: CPT

## 2023-10-18 PROCEDURE — 96361 HYDRATE IV INFUSION ADD-ON: CPT | Mod: 59 | Performed by: EMERGENCY MEDICINE

## 2023-10-18 PROCEDURE — 2500000004 HC RX 250 GENERAL PHARMACY W/ HCPCS (ALT 636 FOR OP/ED): Performed by: HOSPITALIST

## 2023-10-18 PROCEDURE — 70498 CT ANGIOGRAPHY NECK: CPT

## 2023-10-18 PROCEDURE — 99285 EMERGENCY DEPT VISIT HI MDM: CPT | Mod: 25

## 2023-10-18 RX ORDER — BUSPIRONE HYDROCHLORIDE 5 MG/1
15 TABLET ORAL 2 TIMES DAILY
Status: DISCONTINUED | OUTPATIENT
Start: 2023-10-18 | End: 2023-10-18 | Stop reason: HOSPADM

## 2023-10-18 RX ORDER — LORAZEPAM 2 MG/ML
0.5 INJECTION INTRAMUSCULAR ONCE
Status: COMPLETED | OUTPATIENT
Start: 2023-10-18 | End: 2023-10-18

## 2023-10-18 RX ORDER — PANTOPRAZOLE SODIUM 40 MG/1
40 TABLET, DELAYED RELEASE ORAL
Status: DISCONTINUED | OUTPATIENT
Start: 2023-10-19 | End: 2023-10-18 | Stop reason: HOSPADM

## 2023-10-18 RX ORDER — ALBUTEROL SULFATE 0.83 MG/ML
2.5 SOLUTION RESPIRATORY (INHALATION) EVERY 6 HOURS PRN
Status: DISCONTINUED | OUTPATIENT
Start: 2023-10-18 | End: 2023-10-18 | Stop reason: HOSPADM

## 2023-10-18 RX ORDER — POLYETHYLENE GLYCOL 3350 17 G/17G
17 POWDER, FOR SOLUTION ORAL DAILY PRN
Status: DISCONTINUED | OUTPATIENT
Start: 2023-10-18 | End: 2023-10-18 | Stop reason: HOSPADM

## 2023-10-18 RX ORDER — ATORVASTATIN CALCIUM 20 MG/1
20 TABLET, FILM COATED ORAL NIGHTLY
Status: DISCONTINUED | OUTPATIENT
Start: 2023-10-18 | End: 2023-10-18 | Stop reason: HOSPADM

## 2023-10-18 RX ORDER — LEVOTHYROXINE SODIUM 125 UG/1
125 TABLET ORAL DAILY
Status: DISCONTINUED | OUTPATIENT
Start: 2023-10-18 | End: 2023-10-18 | Stop reason: HOSPADM

## 2023-10-18 RX ORDER — ENOXAPARIN SODIUM 100 MG/ML
40 INJECTION SUBCUTANEOUS EVERY 12 HOURS SCHEDULED
Status: DISCONTINUED | OUTPATIENT
Start: 2023-10-18 | End: 2023-10-18 | Stop reason: HOSPADM

## 2023-10-18 RX ORDER — ASPIRIN 81 MG/1
81 TABLET ORAL DAILY
Status: DISCONTINUED | OUTPATIENT
Start: 2023-10-18 | End: 2023-10-18 | Stop reason: HOSPADM

## 2023-10-18 RX ADMIN — ALBUTEROL SULFATE 2.5 MG: 2.5 SOLUTION RESPIRATORY (INHALATION) at 10:05

## 2023-10-18 RX ADMIN — IOHEXOL 75 ML: 350 INJECTION, SOLUTION INTRAVENOUS at 04:47

## 2023-10-18 RX ADMIN — SODIUM CHLORIDE 1000 ML: 9 INJECTION, SOLUTION INTRAVENOUS at 05:12

## 2023-10-18 RX ADMIN — LEVOTHYROXINE SODIUM 125 MCG: 125 TABLET ORAL at 13:11

## 2023-10-18 RX ADMIN — BUSPIRONE HYDROCHLORIDE 15 MG: 5 TABLET ORAL at 13:11

## 2023-10-18 RX ADMIN — LORAZEPAM 0.5 MG: 2 INJECTION INTRAMUSCULAR; INTRAVENOUS at 11:25

## 2023-10-18 ASSESSMENT — LIFESTYLE VARIABLES
EVER FELT BAD OR GUILTY ABOUT YOUR DRINKING: NO
HAVE YOU EVER FELT YOU SHOULD CUT DOWN ON YOUR DRINKING: NO
HAVE PEOPLE ANNOYED YOU BY CRITICIZING YOUR DRINKING: NO
REASON UNABLE TO ASSESS: NO
EVER HAD A DRINK FIRST THING IN THE MORNING TO STEADY YOUR NERVES TO GET RID OF A HANGOVER: NO

## 2023-10-18 ASSESSMENT — COLUMBIA-SUICIDE SEVERITY RATING SCALE - C-SSRS
2. HAVE YOU ACTUALLY HAD ANY THOUGHTS OF KILLING YOURSELF?: NO
1. IN THE PAST MONTH, HAVE YOU WISHED YOU WERE DEAD OR WISHED YOU COULD GO TO SLEEP AND NOT WAKE UP?: NO
6. HAVE YOU EVER DONE ANYTHING, STARTED TO DO ANYTHING, OR PREPARED TO DO ANYTHING TO END YOUR LIFE?: NO

## 2023-10-18 ASSESSMENT — PAIN DESCRIPTION - LOCATION: LOCATION: NECK

## 2023-10-18 ASSESSMENT — PAIN DESCRIPTION - ORIENTATION: ORIENTATION: RIGHT

## 2023-10-18 ASSESSMENT — PAIN - FUNCTIONAL ASSESSMENT
PAIN_FUNCTIONAL_ASSESSMENT: 0-10
PAIN_FUNCTIONAL_ASSESSMENT: 0-10

## 2023-10-18 ASSESSMENT — PAIN SCALES - GENERAL
PAINLEVEL_OUTOF10: 0 - NO PAIN
PAINLEVEL_OUTOF10: 8
PAINLEVEL_OUTOF10: 0 - NO PAIN

## 2023-10-18 ASSESSMENT — PAIN DESCRIPTION - PAIN TYPE: TYPE: ACUTE PAIN

## 2023-10-18 NOTE — H&P
History Of Present Illness  Alysa Austin is a 53 y.o. female presenting with  with dizziness for the past day. Symptoms are worse with walking and standing. She normally ambulates without difficulty. No sob cp or feves. She recently started on trulicty but her BS have been fine even when she is dizzy. She does not check her BP at home. She has had a TIA but never had a stroke. In the ED. She was given fluids with improvement. She was subsequently admitted for observation.      Past Medical History  She has a past medical history of Bronchopneumonia (03/14/2022), Lab test positive for detection of COVID-19 virus (10/09/2023), Personal history of other diseases of the musculoskeletal system and connective tissue, and Personal history of transient ischemic attack (TIA), and cerebral infarction without residual deficits.    Surgical History  She has a past surgical history that includes Other surgical history (10/17/2018); Other surgical history (10/17/2018); Other surgical history (10/31/2018); CT angio head w and wo IV contrast (10/18/2023); and CT angio neck w and wo IV contrast (10/18/2023).     Social History  She reports that she has never smoked. She has never used smokeless tobacco. She reports that she does not drink alcohol and does not use drugs.    Family History  Family History   Problem Relation Name Age of Onset    Hypertension Mother      Stroke Other family     Diabetes Other family     Cancer Other family         10 point review of systems negative except per HPI      Last Recorded Vitals  /84 (BP Location: Right arm, Patient Position: Sitting)   Pulse 78   Temp 35.8 °C (96.4 °F) (Tympanic)   Resp 18   Wt 120 kg (265 lb)   SpO2 97%     Physical Exam   Gen: NAD  HEENT: EOM, MMM  CV: RRR, no murmurs rubs or gallops  Resp: Clear to auscultation bilaterally  Abdomen: soft, NT,+BS  LE: No edema  Neuro: CN 2-12 intact      Relevant Results  Labs Reviewed   COMPREHENSIVE METABOLIC PANEL -  Abnormal       Result Value    Glucose 125 (*)     Sodium 138      Potassium 3.9      Chloride 107      Bicarbonate 22      Anion Gap 13      Urea Nitrogen 9      Creatinine 1.48 (*)     eGFR 42 (*)     Calcium 9.0      Albumin 3.6      Alkaline Phosphatase 127 (*)     Total Protein 6.8      AST 21      Bilirubin, Total 0.4      ALT 18     TROPONIN I, HIGH SENSITIVITY - Normal    Troponin I, High Sensitivity 4      Narrative:     Less than 99th percentile of normal range cutoff-  Female and children under 18 years old <14 ng/L; Male <21 ng/L: Negative  Repeat testing should be performed if clinically indicated.     Female and children under 18 years old 14-50 ng/L; Male 21-50 ng/L:  Consistent with possible cardiac damage and possible increased clinical   risk. Serial measurements may help to assess extent of myocardial damage.     >50 ng/L: Consistent with cardiac damage, increased clinical risk and  myocardial infarction. Serial measurements may help assess extent of   myocardial damage.      NOTE: Children less than 1 year old may have higher baseline troponin   levels and results should be interpreted in conjunction with the overall   clinical context.     NOTE: Troponin I testing is performed using a different   testing methodology at [facility] than at other   [facility]. Direct result comparisons should only   be made within the same method.   C-REACTIVE PROTEIN - Normal    C-Reactive Protein 0.52     APTT - Normal    aPTT 33      Narrative:     The APTT is no longer used for monitoring Unfractionated Heparin Therapy. For monitoring Heparin Therapy, use the Heparin Assay.   PROTIME-INR - Normal    Protime 11.8      INR 1.0     MAGNESIUM - Normal    Magnesium 1.82     SARS-COV-2 PCR, SYMPTOMATIC - Normal    Coronavirus 2019, PCR Not Detected      Narrative:     This assay has received FDA Emergency Use Authorization (EUA) and is only authorized for the duration of time that circumstances  exist to justify the authorization of the emergency use of in vitro diagnostic tests for the detection of SARS-CoV-2 virus and/or diagnosis of COVID-19 infection under section 564(b)(1) of the Act, 21 U.S.C. 360bbb-3(b)(1). This assay is an in vitro diagnostic nucleic acid amplification test for the qualitative detection of SARS-CoV-2 from nasopharyngeal specimens and has been validated for use at [facility]. Negative results do not preclude COVID-19 infections and should not be used as the sole basis for diagnosis, treatment, or other management decisions.     CBC WITH AUTO DIFFERENTIAL    WBC 6.1      nRBC 0.0      RBC 4.66      Hemoglobin 14.4      Hematocrit 43.3      MCV 93      MCH 30.9      MCHC 33.3      RDW 13.0      Platelets 245      MPV 9.8      Neutrophils % 54.3      Immature Granulocytes %, Automated 0.2      Lymphocytes % 33.6      Monocytes % 7.0      Eosinophils % 4.4      Basophils % 0.5      Neutrophils Absolute 3.34      Immature Granulocytes Absolute, Automated 0.01      Lymphocytes Absolute 2.06      Monocytes Absolute 0.43      Eosinophils Absolute 0.27      Basophils Absolute 0.03       CT angio head w and wo IV contrast   Final Result   1. No intracranial major arterial branch occlusion or hemodynamically   significant stenosis.        2. No significant stenosis of the cervical carotid or vertebral   arteries.        3. No acute intracranial abnormality.                  MACRO:   None        Signed by: Jennifer Grullon 10/18/2023 6:08 AM   Dictation workstation:   [id number]      CT angio neck w and wo IV contrast   Final Result   1. No intracranial major arterial branch occlusion or hemodynamically   significant stenosis.        2. No significant stenosis of the cervical carotid or vertebral   arteries.        3. No acute intracranial abnormality.                  MACRO:   None        Signed by: Jennifer Grullon 10/18/2023 6:08 AM   Dictation workstation:   [id number]       XR chest 1 view   Final Result   No acute cardiopulmonary process.             MACRO:   None.        Signed by: Blair Darnell 10/18/2023 3:10 AM   Dictation workstation:   [id number]             Assessment/Plan  53 year old male with history of CKD stage 3, DMII presented to the ED with dizziness.     -CVA workup negative this far, possible orthostatis vs vertigo  -will continue aspirin and statin for now  -check orthostatics, PT/OT and MRI  -consult neuro    CKD stage 3: stable    DMII: continue current treatment plan, check A1c    DVT ppx: lovenox    Jose Luis Roa MD

## 2023-10-18 NOTE — DISCHARGE SUMMARY
"Discharge Diagnosis  Dizziness    Issues Requiring Follow-Up: Follow up with PCP     Discharge Meds     Your medication list        CHANGE how you take these medications        Instructions Last Dose Given Next Dose Due   albuterol 90 mcg/actuation inhaler  What changed: Another medication with the same name was changed. Make sure you understand how and when to take each.      Inhale 1 puff 3 times a day as needed for shortness of breath.       albuterol 2.5 mg /3 mL (0.083 %) nebulizer solution  What changed:   how much to take  how to take this  when to take this  reasons to take this      Inhale 3 ml every 6 hours as needed       cetirizine 10 mg tablet  Commonly known as: ZyrTEC  What changed: when to take this      Take 1 tablet (10 mg) by mouth once daily.       losartan 25 mg tablet  Commonly known as: Cozaar  What changed: when to take this      Take 1 tablet (25 mg) by mouth once daily.       magnesium oxide 400 mg (241.3 mg magnesium) tablet  Commonly known as: Mag-Ox  What changed:   how much to take  how to take this  when to take this      TAKE 2 TABLET BY MOUTH EVERY DAY       Trulicity 1.5 mg/0.5 mL pen injector injection  Generic drug: dulaglutide  What changed: additional instructions      Inject 1.5 mg under the skin 1 (one) time per week.              CONTINUE taking these medications        Instructions Last Dose Given Next Dose Due   acetaminophen 325 mg tablet  Commonly known as: Tylenol           atorvastatin 20 mg tablet  Commonly known as: Lipitor           BD Ultra-Fine Short Pen Needle 31 gauge x 5/16\" needle  Generic drug: pen needle, diabetic      Use as directed to administer insulin injections up to 4 times a day. Use a new needle with each injection.       Blood Glucose Test strip  Generic drug: blood sugar diagnostic      TEST 3 TIMES DAILY AS NEEDED       busPIRone 15 mg tablet  Commonly known as: Buspar      Take 1 tablet (15 mg) by mouth 2 times a day.       Dexcom G7  " misc  Generic drug: Dexcom G4 platinum       TEST 4-5 TIMES DAILY       Dexcom G4 platinum  misc      Use as instructed       Dexcom G7 Sensor device  Generic drug: blood-glucose sensor      Use to test blood sugars. Change sensor every 10 days.       GlucaGen Diagnostic Kit 1 mg injection  Generic drug: glucagon           insulin lispro 100 unit/mL injection  Commonly known as: HumaLOG           lancets misc  Commonly known as: OneTouch UltraSoft Lancets      Use to screen glucose twice daily       levothyroxine 125 mcg tablet  Commonly known as: Synthroid, Levoxyl      Take 1 tablet (125 mcg) by mouth once daily.       montelukast 10 mg tablet  Commonly known as: Singulair      Take 1 tablet (10 mg) by mouth once daily at bedtime.       naloxone 4 mg/0.1 mL nasal spray  Commonly known as: Narcan      Administer 1 spray (4 mg) into affected nostril(s) if needed for opioid reversal. May repeat every 2-3 minutes if needed, alternating nostrils, until medical assistance becomes available.       nebulizer and compressor device      Use as directed with nebulizer solution for shortness of breath and wheezing       oxyCODONE-acetaminophen  mg tablet  Commonly known as: Percocet      Take 1 tablet by mouth 2 times a day as needed for severe pain (7 - 10).       pantoprazole 40 mg EC tablet  Commonly known as: ProtoNix           pregabalin 200 mg capsule  Commonly known as: Lyrica      Take 1 capsule (200 mg) by mouth 3 times a day.       Toujeo SoloStar U-300 Insulin 300 unit/mL (1.5 mL) injection  Generic drug: insulin glargine      Inject 78 Units under the skin once daily at bedtime. Take as directed per insulin instructions.              STOP taking these medications      azithromycin 500 mg tablet  Commonly known as: Zithromax        buPROPion  mg 24 hr tablet  Commonly known as: Wellbutrin XL        predniSONE 10 mg tablet  Commonly known as: Deltasone        spironolactone 50 mg  tablet  Commonly known as: Aldactone                 Test Results Pending At Discharge  Pending Labs       No current pending labs.            Hospital Course:    53 year old male with history of CKD stage 3, DMII presented to the ED with dizziness.      -CVA workup negative this far, possible orthostatis vs vertigo, MRI negative and symptoms improved  -ambulating without difficulty in ED, orthostatics were checked and patient was not symptomatic      CKD stage 3: stable     DMII: continue current treatment plan,      DVT ppx: lovenox     Discharged home in stable condition.     Jose Luis Roa MD    Pertinent Physical Exam At Time of Discharge  Gen: NAD  HEENT: EOM, MMM  CV: RRR, no murmurs rubs or gallops  Resp: Clear to auscultation bilaterally  Abdomen: soft, NT,+BS  LE: No edema  Neuro: CN 2-12 intact    Outpatient Follow-Up  Future Appointments   Date Time Provider Department Center   11/7/2023  3:00 PM [id number]         Jose Luis Roa MD

## 2023-10-18 NOTE — Clinical Note
Is the patient on isolation?: No   Do you expect the patient to require telemetry (informational-only for bed management): Yes   Do you expect the patient to require observation or inpt? (informational-only for bed management): Observation

## 2023-10-18 NOTE — ED PROVIDER NOTES
"HPI   Chief Complaint   Patient presents with    Dizziness      think I had a stroke around midnight ,  I feel like I am walking to right,  my face and neck on the right side is tight ,  I am dizzy \"           Chief complaint dizziness  History of present illness 33-year-old  female who is here with unsteady gait started around midnight.  She does walk with a cane but she is also feeling dizzy in triage patient was quickly seen there was no evidence of any deficits score was 0 history of a TIA in the past, she is also complaining of tingling of the right side of the body.  She had a similar episode in January 2022 at that time had tingling of the left side of the body had a CTA of the head and neck which were negative there was no large vessel occlusion.  She is not here with any visual changes or aphasia or neglect bang score 0 her NIH score is 0                          No data recorded                Patient History   Past Medical History:   Diagnosis Date    Bronchopneumonia 03/14/2022    Lab test positive for detection of COVID-19 virus 10/09/2023    Personal history of other diseases of the musculoskeletal system and connective tissue     History of fibromyalgia    Personal history of transient ischemic attack (TIA), and cerebral infarction without residual deficits     History of cerebrovascular accident     Past Surgical History:   Procedure Laterality Date    CT HEAD ANGIO W AND WO IV CONTRAST  10/18/2023    CT HEAD ANGIO W AND WO IV CONTRAST 10/18/2023 [facility]    CT NECK ANGIO W AND WO IV CONTRAST  10/18/2023    CT NECK ANGIO W AND WO IV CONTRAST 10/18/2023 [facility]    OTHER SURGICAL HISTORY  10/17/2018    Thyroid Surgery Sub-Total Thyroidectomy    OTHER SURGICAL HISTORY  10/17/2018    Nephrectomy Right    OTHER SURGICAL HISTORY  10/31/2018    Thoracoscopy (Therapeutic) With Mediastinal And Regional Lymphadenectomy     Family History   Problem Relation Name Age of Onset    Hypertension Mother      " Stroke Other family     Diabetes Other family     Cancer Other family      Social History     Tobacco Use    Smoking status: Never    Smokeless tobacco: Never   Substance Use Topics    Alcohol use: Never    Drug use: Never       Physical Exam   ED Triage Vitals   Temp Pulse Resp BP   -- -- -- --      SpO2 Temp src Heart Rate Source Patient Position   -- -- -- --      BP Location FiO2 (%)     -- --       Physical Exam  Vitals and nursing note reviewed. Exam conducted with a chaperone present.   Constitutional:       Appearance: Normal appearance.   HENT:      Head: Normocephalic and atraumatic.      Nose: Nose normal.      Mouth/Throat:      Mouth: Mucous membranes are moist.   Eyes:      Pupils: Pupils are equal, round, and reactive to light.   Cardiovascular:      Rate and Rhythm: Normal rate and regular rhythm.   Pulmonary:      Effort: Pulmonary effort is normal.   Abdominal:      Palpations: Abdomen is soft.   Musculoskeletal:         General: Normal range of motion.      Cervical back: Normal range of motion.   Skin:     General: Skin is warm and dry.      Capillary Refill: Capillary refill takes less than 2 seconds.   Neurological:      General: No focal deficit present.      Mental Status: She is alert and oriented to person, place, and time. Mental status is at baseline.      Cranial Nerves: No cranial nerve deficit.      Sensory: No sensory deficit.      Motor: No weakness.      Coordination: Coordination normal.      Gait: Gait normal.      Deep Tendon Reflexes: Reflexes normal.   Psychiatric:         Mood and Affect: Mood normal.         ED Course & MDM   Diagnoses as of 10/18/23 0627   Dizziness       Medical Decision Making  Differential diagnosis  #1 uncontrolled hypertension  #2 arrhythmia  #3 TIA  #4 vertebral dissection  #5 intracranial hemorrhage  #6 neuropathy  #7 electrolyte disturbance  Considering the above differential diagnosis following tests and treatments were considered in order with  shared decision making  CTA of the head and neck, EKG chest x-ray  Cardiac monitor IV  CBC electrolytes troponin routine blood work COVID    Amount and/or Complexity of Data Reviewed  External Data Reviewed: notes.     Details: presentation to [facility] was similar earlier in the year at that time had a left-sided body symptoms.  She had a CTA done of the head and neck and was discharged from the ED  Radiology: ordered and independent interpretation performed.     Details: CTA of the head and neck revealed no stenosis of the cervical vessels no large vessel occlusion  Discussion of management or test interpretation with external provider(s): Consulted Dr. Smith    Risk  Decision regarding hospitalization.    There is too many sick people hairless  Labs Reviewed   COMPREHENSIVE METABOLIC PANEL - Abnormal       Result Value    Glucose 125 (*)     Sodium 138      Potassium 3.9      Chloride 107      Bicarbonate 22      Anion Gap 13      Urea Nitrogen 9      Creatinine 1.48 (*)     eGFR 42 (*)     Calcium 9.0      Albumin 3.6      Alkaline Phosphatase 127 (*)     Total Protein 6.8      AST 21      Bilirubin, Total 0.4      ALT 18     TROPONIN I, HIGH SENSITIVITY - Normal    Troponin I, High Sensitivity 4      Narrative:     Less than 99th percentile of normal range cutoff-  Female and children under 18 years old <14 ng/L; Male <21 ng/L: Negative  Repeat testing should be performed if clinically indicated.     Female and children under 18 years old 14-50 ng/L; Male 21-50 ng/L:  Consistent with possible cardiac damage and possible increased clinical   risk. Serial measurements may help to assess extent of myocardial damage.     >50 ng/L: Consistent with cardiac damage, increased clinical risk and  myocardial infarction. Serial measurements may help assess extent of   myocardial damage.      NOTE: Children less than 1 year old may have higher baseline troponin   levels and results should be interpreted in conjunction  with the overall   clinical context.     NOTE: Troponin I testing is performed using a different   testing methodology at [facility] than at other   [facility] hospitals. Direct result comparisons should only   be made within the same method.   C-REACTIVE PROTEIN - Normal    C-Reactive Protein 0.52     APTT - Normal    aPTT 33      Narrative:     The APTT is no longer used for monitoring Unfractionated Heparin Therapy. For monitoring Heparin Therapy, use the Heparin Assay.   PROTIME-INR - Normal    Protime 11.8      INR 1.0     MAGNESIUM - Normal    Magnesium 1.82     SARS-COV-2 PCR, SYMPTOMATIC - Normal    Coronavirus 2019, PCR Not Detected      Narrative:     This assay has received FDA Emergency Use Authorization (EUA) and is only authorized for the duration of time that circumstances exist to justify the authorization of the emergency use of in vitro diagnostic tests for the detection of SARS-CoV-2 virus and/or diagnosis of COVID-19 infection under section 564(b)(1) of the Act, 21 U.S.C. 360bbb-3(b)(1). This assay is an in vitro diagnostic nucleic acid amplification test for the qualitative detection of SARS-CoV-2 from nasopharyngeal specimens and has been validated for use at [facility]. Negative results do not preclude COVID-19 infections and should not be used as the sole basis for diagnosis, treatment, or other management decisions.     CBC WITH AUTO DIFFERENTIAL    WBC 6.1      nRBC 0.0      RBC 4.66      Hemoglobin 14.4      Hematocrit 43.3      MCV 93      MCH 30.9      MCHC 33.3      RDW 13.0      Platelets 245      MPV 9.8      Neutrophils % 54.3      Immature Granulocytes %, Automated 0.2      Lymphocytes % 33.6      Monocytes % 7.0      Eosinophils % 4.4      Basophils % 0.5      Neutrophils Absolute 3.34      Immature Granulocytes Absolute, Automated 0.01      Lymphocytes Absolute 2.06      Monocytes Absolute 0.43      Eosinophils Absolute 0.27      Basophils  Absolute 0.03          CT angio head w and wo IV contrast   Final Result   1. No intracranial major arterial branch occlusion or hemodynamically   significant stenosis.        2. No significant stenosis of the cervical carotid or vertebral   arteries.        3. No acute intracranial abnormality.                  MACRO:   None        Signed by: Jennifer Grullon 10/18/2023 6:08 AM   Dictation workstation:   [id number]      CT angio neck w and wo IV contrast   Final Result   1. No intracranial major arterial branch occlusion or hemodynamically   significant stenosis.        2. No significant stenosis of the cervical carotid or vertebral   arteries.        3. No acute intracranial abnormality.                  MACRO:   None        Signed by: Jennifer Grullon 10/18/2023 6:08 AM   Dictation workstation:   [id number]      XR chest 1 view   Final Result   No acute cardiopulmonary process.             MACRO:   None.        Signed by: Blair Darnell 10/18/2023 3:10 AM   Dictation workstation:   [id number]             Procedure  ECG 12 Lead    Performed by: Magdalena Wilson MD  Authorized by: Magdalena Wilson MD    ECG reviewed by ED Physician in the absence of a cardiologist: yes    Previous ECG:     Previous ECG:  Compared to current  Interpretation:     Interpretation: normal    Rate:     ECG rate:  67    ECG rate assessment: normal    Rhythm:     Rhythm: sinus rhythm    QRS:     QRS axis:  Normal  ST segments:     ST segments:  Normal  T waves:     T waves: normal         Magdalena Wilson MD  10/18/23 0627

## 2023-10-20 ENCOUNTER — PATIENT OUTREACH (OUTPATIENT)
Dept: PRIMARY CARE | Facility: CLINIC | Age: 53
End: 2023-10-20
Payer: MEDICAID

## 2023-10-20 DIAGNOSIS — R42 DIZZINESS: ICD-10-CM

## 2023-10-20 NOTE — PROGRESS NOTES
Discharge Facility:  [facility] -observation stay     Discharge Diagnosis: Dizziness R/O stroke     Admission Date:9/18/23  Discharge Date: 9/18/23    PCP Appointment Date:.TBD-Message sent to office staff requesting assistance to help patient schedule. As well as encourged patient on voicemail to call today to schedule.     Hospital Encounter and Summary: Linked     I left voicemail for Alysa Austin. I encouraged patient to contact PCP for follow up appt and to call me back with any questions or help she may need.

## 2023-10-25 DIAGNOSIS — T78.40XS ALLERGY, SEQUELA: ICD-10-CM

## 2023-10-25 NOTE — TELEPHONE ENCOUNTER
PT OF NATALIYA    PT CALLED IN FOR MED REFILL    FLONASE SPRAY     [facility]

## 2023-10-26 RX ORDER — FLUTICASONE PROPIONATE 50 MCG
1 SPRAY, SUSPENSION (ML) NASAL DAILY
Qty: 16 G | Refills: 1 | Status: SHIPPED | OUTPATIENT
Start: 2023-10-26 | End: 2024-10-25

## 2023-11-05 DIAGNOSIS — E11.9 TYPE 2 DIABETES MELLITUS WITHOUT COMPLICATION, WITH LONG-TERM CURRENT USE OF INSULIN (MULTI): ICD-10-CM

## 2023-11-05 DIAGNOSIS — Z79.4 TYPE 2 DIABETES MELLITUS WITHOUT COMPLICATION, WITH LONG-TERM CURRENT USE OF INSULIN (MULTI): ICD-10-CM

## 2023-11-07 ENCOUNTER — TELEMEDICINE (OUTPATIENT)
Dept: PHARMACY | Facility: HOSPITAL | Age: 53
End: 2023-11-07
Payer: MEDICAID

## 2023-11-07 ENCOUNTER — OFFICE VISIT (OUTPATIENT)
Dept: PULMONOLOGY | Age: 53
End: 2023-11-07
Payer: MEDICAID

## 2023-11-07 VITALS
BODY MASS INDEX: 49.75 KG/M2 | SYSTOLIC BLOOD PRESSURE: 130 MMHG | DIASTOLIC BLOOD PRESSURE: 68 MMHG | OXYGEN SATURATION: 96 % | WEIGHT: 280.8 LBS | HEART RATE: 78 BPM | HEIGHT: 63 IN | TEMPERATURE: 97.4 F | RESPIRATION RATE: 16 BRPM

## 2023-11-07 DIAGNOSIS — J45.41 MODERATE PERSISTENT ASTHMA WITH ACUTE EXACERBATION: ICD-10-CM

## 2023-11-07 DIAGNOSIS — E11.9 TYPE 2 DIABETES MELLITUS WITHOUT COMPLICATION, WITH LONG-TERM CURRENT USE OF INSULIN (MULTI): ICD-10-CM

## 2023-11-07 DIAGNOSIS — D86.9 SARCOIDOSIS: ICD-10-CM

## 2023-11-07 DIAGNOSIS — J30.1 CHRONIC SEASONAL ALLERGIC RHINITIS DUE TO POLLEN: ICD-10-CM

## 2023-11-07 DIAGNOSIS — Z79.4 TYPE 2 DIABETES MELLITUS WITHOUT COMPLICATION, WITH LONG-TERM CURRENT USE OF INSULIN (MULTI): ICD-10-CM

## 2023-11-07 DIAGNOSIS — D86.9 SARCOIDOSIS: Primary | ICD-10-CM

## 2023-11-07 DIAGNOSIS — G47.30 SLEEP APNEA, UNSPECIFIED TYPE: ICD-10-CM

## 2023-11-07 LAB
ALBUMIN SERPL-MCNC: 3.8 G/DL (ref 3.5–4.6)
ALP SERPL-CCNC: 159 U/L (ref 40–130)
ALT SERPL-CCNC: 15 U/L (ref 0–33)
ANION GAP SERPL CALCULATED.3IONS-SCNC: 8 MEQ/L (ref 9–15)
AST SERPL-CCNC: 20 U/L (ref 0–35)
BASOPHILS # BLD: 0 K/UL (ref 0–0.2)
BASOPHILS NFR BLD: 0.7 %
BILIRUB SERPL-MCNC: 0.4 MG/DL (ref 0.2–0.7)
BUN SERPL-MCNC: 11 MG/DL (ref 6–20)
CALCIUM SERPL-MCNC: 9.2 MG/DL (ref 8.5–9.9)
CHLORIDE SERPL-SCNC: 104 MEQ/L (ref 95–107)
CO2 SERPL-SCNC: 28 MEQ/L (ref 20–31)
CREAT SERPL-MCNC: 1.18 MG/DL (ref 0.5–0.9)
EOSINOPHIL # BLD: 0.2 K/UL (ref 0–0.7)
EOSINOPHIL NFR BLD: 3.6 %
ERYTHROCYTE [DISTWIDTH] IN BLOOD BY AUTOMATED COUNT: 13.1 % (ref 11.5–14.5)
GLOBULIN SER CALC-MCNC: 3.4 G/DL (ref 2.3–3.5)
GLUCOSE SERPL-MCNC: 114 MG/DL (ref 70–99)
HCT VFR BLD AUTO: 46.4 % (ref 37–47)
HGB BLD-MCNC: 15 G/DL (ref 12–16)
LYMPHOCYTES # BLD: 1.8 K/UL (ref 1–4.8)
LYMPHOCYTES NFR BLD: 32.4 %
MCH RBC QN AUTO: 30.5 PG (ref 27–31.3)
MCHC RBC AUTO-ENTMCNC: 32.3 % (ref 33–37)
MCV RBC AUTO: 94.3 FL (ref 79.4–94.8)
MONOCYTES # BLD: 0.4 K/UL (ref 0.2–0.8)
MONOCYTES NFR BLD: 7.3 %
NEUTROPHILS # BLD: 3.1 K/UL (ref 1.4–6.5)
NEUTS SEG NFR BLD: 55.8 %
PLATELET # BLD AUTO: 286 K/UL (ref 130–400)
POTASSIUM SERPL-SCNC: 4.4 MEQ/L (ref 3.4–4.9)
PROT SERPL-MCNC: 7.2 G/DL (ref 6.3–8)
RBC # BLD AUTO: 4.92 M/UL (ref 4.2–5.4)
SODIUM SERPL-SCNC: 140 MEQ/L (ref 135–144)
WBC # BLD AUTO: 5.6 K/UL (ref 4.8–10.8)

## 2023-11-07 PROCEDURE — G8484 FLU IMMUNIZE NO ADMIN: HCPCS | Performed by: INTERNAL MEDICINE

## 2023-11-07 PROCEDURE — 3017F COLORECTAL CA SCREEN DOC REV: CPT | Performed by: INTERNAL MEDICINE

## 2023-11-07 PROCEDURE — 1036F TOBACCO NON-USER: CPT | Performed by: INTERNAL MEDICINE

## 2023-11-07 PROCEDURE — G8417 CALC BMI ABV UP PARAM F/U: HCPCS | Performed by: INTERNAL MEDICINE

## 2023-11-07 PROCEDURE — 99215 OFFICE O/P EST HI 40 MIN: CPT | Performed by: INTERNAL MEDICINE

## 2023-11-07 PROCEDURE — 3078F DIAST BP <80 MM HG: CPT | Performed by: INTERNAL MEDICINE

## 2023-11-07 PROCEDURE — G8427 DOCREV CUR MEDS BY ELIG CLIN: HCPCS | Performed by: INTERNAL MEDICINE

## 2023-11-07 PROCEDURE — 3075F SYST BP GE 130 - 139MM HG: CPT | Performed by: INTERNAL MEDICINE

## 2023-11-07 RX ORDER — INSULIN GLARGINE 300 U/ML
78 INJECTION, SOLUTION SUBCUTANEOUS NIGHTLY
Qty: 9 ML | Refills: 3 | Status: SHIPPED | OUTPATIENT
Start: 2023-11-07

## 2023-11-07 RX ORDER — MONTELUKAST SODIUM 10 MG/1
10 TABLET ORAL NIGHTLY
Qty: 30 TABLET | Refills: 0 | Status: SHIPPED | OUTPATIENT
Start: 2023-11-07

## 2023-11-07 RX ORDER — DULAGLUTIDE 3 MG/.5ML
3 INJECTION, SOLUTION SUBCUTANEOUS
Qty: 2 ML | Refills: 1 | Status: SHIPPED | OUTPATIENT
Start: 2023-11-07

## 2023-11-07 RX ORDER — BLOOD-GLUCOSE SENSOR
EACH MISCELLANEOUS
Qty: 3 EACH | Refills: 3 | Status: SHIPPED | OUTPATIENT
Start: 2023-11-07

## 2023-11-07 RX ORDER — INSULIN GLARGINE 300 U/ML
INJECTION, SOLUTION SUBCUTANEOUS
Qty: 24 ML | OUTPATIENT
Start: 2023-11-07

## 2023-11-07 RX ORDER — HYDROXYCHLOROQUINE SULFATE 200 MG/1
200 TABLET, FILM COATED ORAL DAILY
Qty: 30 TABLET | Refills: 3 | Status: SHIPPED | OUTPATIENT
Start: 2023-11-07 | End: 2023-11-10

## 2023-11-07 RX ORDER — DULAGLUTIDE 1.5 MG/.5ML
1.5 INJECTION, SOLUTION SUBCUTANEOUS
Qty: 6 ML | OUTPATIENT
Start: 2023-11-07

## 2023-11-07 RX ORDER — INSULIN LISPRO 100 [IU]/ML
INJECTION, SOLUTION INTRAVENOUS; SUBCUTANEOUS
Qty: 15 ML | Refills: 3 | Status: SHIPPED | OUTPATIENT
Start: 2023-11-07

## 2023-11-07 RX ORDER — FLUTICASONE FUROATE, UMECLIDINIUM BROMIDE AND VILANTEROL TRIFENATATE 200; 62.5; 25 UG/1; UG/1; UG/1
1 POWDER RESPIRATORY (INHALATION) DAILY
Qty: 1 EACH | Refills: 3 | Status: SHIPPED | OUTPATIENT
Start: 2023-11-07

## 2023-11-07 ASSESSMENT — ENCOUNTER SYMPTOMS: DIABETIC ASSOCIATED SYMPTOMS: 0

## 2023-11-07 NOTE — PROGRESS NOTES
Pharmacy Post-Discharge Visit    Alysa Austin is a 53 y.o. female was referred to Clinical Pharmacy Team to complete a post-discharge medication optimization and monitoring visit.  The patient was referred for their Diabetes.    Referring Provider: Renzo Delcid MD    Subjective   Allergies   Allergen Reactions    Shellfish Derived Shortness of breath, Angioedema and Nausea/vomiting    Hydromorphone Hives and Unknown    Ibuprofen Other     Pt states not allergy -  she just can't take it because she only has one kidney    Ketorolac Nausea/vomiting     Only has 1 kidney    Penicillins Swelling and Nausea/vomiting     Within last 2 years    Propoxyphene N-Acetaminophen Hives     Patient tolerates acetaminophen at home    Shellfish Containing Products Unknown     [facility] [id number] [place] [address] AT [facility] & [address] [address]  Phone: [phone] Fax: [phone]    [facility] - [place], [place] - [address]  [address]  [address]  [facility] [address]  Phone: [phone] Fax: [phone]    Social History     Social History Narrative    Not on file      Diabetes  She presents for her follow-up diabetic visit. She has type 2 diabetes mellitus. Her disease course has been stable. There are no hypoglycemic associated symptoms. There are no diabetic associated symptoms. There are no hypoglycemic complications. Risk factors for coronary artery disease include diabetes mellitus, dyslipidemia, hypertension and obesity. Current diabetic treatments: Toujeo 78 units once daily, Humalog 5 units plus sliding scale with meals, and Trulicity 1.5mg once weekly. She is compliant with treatment all of the time. Diabetic meal planning: Patient states normally eats brunch around 11am, and then may eat some crackers for lunch, but typically falls short around lunch time. Patient states then she has dinner, and usually a  "snack between 8-9pm. Patient loves pretzels. (Patient uses a Dexcom G7 Sensor to test he blood sugars and uses the  to see what her blood sugars are. She states her 3 day average is 152mg/dL and 7 day average is 150mg/dL. Patient states highest blood sugar in the past month is around 205mg/dL, and reports no low blood sugar (<70mg/dL).) An ACE inhibitor/angiotensin II receptor blocker is being taken (Losartan 25mg daily).     Objective     LAB  Lab Results   Component Value Date    BILITOT 0.4 10/18/2023    CALCIUM 9.0 10/18/2023    CO2 22 10/18/2023     10/18/2023    CREATININE 1.48 (H) 10/18/2023    GLUCOSE 125 (H) 10/18/2023    ALKPHOS 127 (H) 10/18/2023    K 3.9 10/18/2023    PROT 6.8 10/18/2023     10/18/2023    AST 21 10/18/2023    ALT 18 10/18/2023    BUN 9 10/18/2023    ANIONGAP 13 10/18/2023    MG 1.82 10/18/2023    PHOS 3.1 02/22/2019    ALBUMIN 3.6 10/18/2023    LIPASE 15 09/12/2023    GFRF 47 (A) 09/28/2023    GFRMALE CANCELED 04/05/2023     Lab Results   Component Value Date    TRIG 130 08/08/2023    CHOL 165 08/08/2023    HDL 36.7 (A) 08/08/2023     Lab Results   Component Value Date    HGBA1C 6.3 (A) 09/28/2023     Current Outpatient Medications on File Prior to Visit   Medication Sig Dispense Refill    acetaminophen (Tylenol) 325 mg tablet TAKE 2 TABLETS BY MOUTH FOUR TIMES DAILY AS NEEDED FOR MILD AND MODERATE POSTOPERATIVE PAIN.      albuterol 2.5 mg /3 mL (0.083 %) nebulizer solution Inhale 3 ml every 6 hours as needed (Patient taking differently: Take 3 mL (2.5 mg) by nebulization every 6 hours if needed for shortness of breath. Inhale 3 ml every 6 hours as needed) 75 mL 3    albuterol 90 mcg/actuation inhaler Inhale 1 puff 3 times a day as needed for shortness of breath. 18 g 1    atorvastatin (Lipitor) 20 mg tablet Take 1 tablet (20 mg) by mouth once daily at bedtime.      BD Ultra-Fine Short Pen Needle 31 gauge x 5/16\" needle Use as directed to administer insulin injections " up to 4 times a day. Use a new needle with each injection. 100 each 3    blood sugar diagnostic (Blood Glucose Test) strip TEST 3 TIMES DAILY AS NEEDED 200 strip 1    busPIRone (Buspar) 15 mg tablet Take 1 tablet (15 mg) by mouth 2 times a day. 60 tablet 3    cetirizine (ZyrTEC) 10 mg tablet Take 1 tablet (10 mg) by mouth once daily. (Patient taking differently: Take 1 tablet (10 mg) by mouth once daily at bedtime.) 90 tablet 1    Dexcom G4 platinum  (Dexcom G7 ) misc TEST 4-5 TIMES DAILY 1 each 0    Dexcom G4 platinum  misc Use as instructed 1 each 3    dulaglutide (Trulicity) 1.5 mg/0.5 mL pen injector injection Inject 1.5 mg under the skin 1 (one) time per week. 2 mL 1    fluticasone (Flonase) 50 mcg/actuation nasal spray Administer 1 spray into each nostril once daily. Shake gently. Before first use, prime pump. After use, clean tip and replace cap. 16 g 1    glucagon (GlucaGen Diagnostic Kit) 1 mg/mL injection Inject 1 mg into the muscle.      lancets (OneTouch UltraSoft Lancets) misc Use to screen glucose twice daily 200 each 0    levothyroxine (Synthroid, Levoxyl) 125 mcg tablet Take 1 tablet (125 mcg) by mouth once daily. 90 tablet 1    losartan (Cozaar) 25 mg tablet Take 1 tablet (25 mg) by mouth once daily. (Patient taking differently: Take 1 tablet (25 mg) by mouth once daily at bedtime.) 30 tablet 2    magnesium oxide (Mag-Ox) 400 mg (241.3 mg magnesium) tablet TAKE 2 TABLET BY MOUTH EVERY DAY (Patient taking differently: Take 2 tablets (800 mg) by mouth once daily at bedtime. TAKE 2 TABLET BY MOUTH EVERY DAY) 90 tablet 1    montelukast (Singulair) 10 mg tablet Take 1 tablet (10 mg) by mouth once daily at bedtime. 90 tablet 1    naloxone (Narcan) 4 mg/0.1 mL nasal spray Administer 1 spray (4 mg) into affected nostril(s) if needed for opioid reversal. May repeat every 2-3 minutes if needed, alternating nostrils, until medical assistance becomes available. 2 each 0    nebulizer and  compressor device Use as directed with nebulizer solution for shortness of breath and wheezing 1 each 0    oxyCODONE-acetaminophen (Percocet)  mg tablet Take 1 tablet by mouth 2 times a day as needed for severe pain (7 - 10). 60 tablet 0    pantoprazole (ProtoNix) 40 mg EC tablet Take 1 tablet (40 mg) by mouth once daily at bedtime.      pregabalin (Lyrica) 200 mg capsule Take 1 capsule (200 mg) by mouth 3 times a day. 90 capsule 3    [DISCONTINUED] blood-glucose sensor (Dexcom G7 Sensor) device Use to test blood sugars. Change sensor every 10 days. 3 each 3    [DISCONTINUED] insulin glargine (Toujeo SoloStar U-300 Insulin) 300 unit/mL (1.5 mL) injection Inject 78 Units under the skin once daily at bedtime. Take as directed per insulin instructions. 4.5 mL 3    [DISCONTINUED] insulin lispro (HumaLOG) 100 unit/mL injection INJECT 30 TO 35 UNITS UNDER THE SKIN AT EACH MEAL. HOLD IF BLOOD GLUCOSE LESS THAN 150.       No current facility-administered medications on file prior to visit.      DRUG INTERATIONS  No Drug Interactions exist that require pharmacologic intervention    Assessment/Plan     Diabetes Education  Rule of 15: eating ~15 g of carbs when BG less than 80 (half cup juice, 3-4 glucose tabs).  Recognize symptoms of high and low blood sugar.   Eat a realistic healthy diet consisting of fruits, vegetables, fiber, protein food choices on a regular basis and be aware of portion/serving sizes. Reduce carbohydrate consumption and always consume with protein and fat. Avoid foods high in saturated/trans fat, high salt content, and sweets and beverages with added sugars.  Limit alcohol consumption; alcohol may affect your blood sugar and cause hypoglycemia.   Stay active and incorporate ~30 mins of exercise into your daily routine to manage your weight and increase the body's acceptance of insulin.    PATIENT GOALS   Fasting B - 130 mg/dL 2-HR Postprandial BG:   Less than 180 mg/dL A1c:   Less than 7.0  %     Trulicity Education:  Counseled patient on Trulicity MOA, expectations, side effects, duration of therapy, administration, and monitoring parameters.  Provided detailed dosing and administration counseling to ensure proper technique.   Reviewed Trulicity titration schedule, starting with 0.75 mg once weekly for 4 weeks, then increasing the dose as tolerated. Patient verbalized understanding.  Counseled patient on the benefits of GLP-1ra, such as cardiovascular risk reduction, glycemic control, and weight loss potential.  Reviewed storage requirements of Trulicity when not in use, and when to administer the medication if a dose is missed.  Advised patient that they may experience improved satiety after meals and portion sizes of meals may be reduced as doses of Trulicity increase.  Counseled patient to avoid foods that are fatty/oily as this may precipitate the nausea/GI upset that may occur with new start Trulicity.     Problem List Items Addressed This Visit       Type 2 diabetes mellitus without complication, with long-term current use of insulin (CMS/[facility])     Patients diabetes is controlled as evidenced by most recent A1c of 6.3% (Goal <7%) on 9/28/2023. Congratulated the patient on the decrease in A1c.  RX CHANGES  INCREASE Trulicity to 3mg under the skin once weekly. Tolerating the medication well and does not have any questions about injection. Will send the medication to patients preferred pharmacy.  CONTINUE Toujeo 78 units once daily and Humalog 5 units plus sliding scale with meals. Patient states she needs refills on her insulin. Will send refill to patients preferred pharmacy.  Continue using Dexcom to test blood sugars. Patient was driving and was not able to fully go over recent blood sugars. Will send prescription for Dexcom sensors to patients preferred pharmacy.  Continue working towards healthy diet and lifestyle         Relevant Medications    dulaglutide (Trulicity) 3 mg/0.5 mL pen injector     insulin lispro (HumaLOG) 100 unit/mL injection    insulin glargine (Toujeo SoloStar U-300 Insulin) 300 unit/mL (1.5 mL) injection    blood-glucose sensor (Dexcom G7 Sensor) device    Other Relevant Orders    Follow Up In [facility] - Pharmacy     Follow up with the Clinical Pharmacy Team 12/5/2023 at 3pm to discuss diabetes management.    Continue all meds under the continuation of care with the referring provider and clinical pharmacy team.    Please reach out to the Clinical Pharmacy Team if there are any further questions.     Verbal consent to manage patient's drug therapy was obtained from patient. They were informed they may decline to participate or withdraw from participation in pharmacy services at any time.    Florence Cruz, PharmD  [phone]

## 2023-11-07 NOTE — PROGRESS NOTES
Subjective:     Mayo Garcia is a 47 y.o. female who complains today of:     Chief Complaint   Patient presents with    Follow-up     Sarcoidosis and Moderate persistent asthma        HPI  Patient presents for sarcoidosis      11/7/2023  Presents for follow-up, she has not been seen at pulmonary office since 2021, she has been doing good, except for skin rash, she is stopped taking her maintenance inhalers for asthma, she complains of chest tightness, shortness of breath, dry cough, no chest pain, no lower extremity edema, no fever no chills, weight is stable, no nasal congestion or postnasal drip. She has history of sarcoidosis. Allergies:  Shellfish-derived products, Hydromorphone, Ibuprofen, Ketorolac, Morphine, Other, Ozempic (0.25 or 0.5 mg-dose) [semaglutide(0.25 or 0.5mg-dos)], Penicillin g, Penicillins, Propoxyphene n-acetaminophen, Shellfish allergy, and Toradol [ketorolac tromethamine]  Past Medical History:   Diagnosis Date    Anxiety     Arthritis     Asthma     Bronchopneumonia     Cancer ([address])     renal    Cerebral artery occlusion with cerebral infarction [facility])     Chronic bilateral low back pain with sciatica     Chronic kidney disease     Chronic obstructive lung disease ([address]) 7/26/2019    Depression     Fibromyalgia     Gout     rt knee    Hypertension     Insulin dependent type 2 diabetes mellitus, uncontrolled 8/3/2018    Localized enlarged lymph nodes 10/26/2018    Mixed headache     Pure hyperglyceridemia 5/19/2017    Sarcoidosis     Sleep apnea     does not wear cpap    Thyroid goiter      Past Surgical History:   Procedure Laterality Date    BRONCHOSCOPY  10/26/2018    DR. STEARNS    KIDNEY REMOVAL Right 08/2016    KIDNEY REMOVAL Right 2016    LUNG BIOPSY Right 10/2018    SPINAL FUSION      THYROID LOBECTOMY Right 06/13/2014    THYROIDECTOMY  02/21/2019    DR. MAGDALENO    THYROIDECTOMY  2018    URETER STENT PLACEMENT Left 08/2016     Family History   Problem Relation Age of Onset

## 2023-11-08 ENCOUNTER — PATIENT MESSAGE (OUTPATIENT)
Dept: PRIMARY CARE | Facility: CLINIC | Age: 53
End: 2023-11-08
Payer: MEDICAID

## 2023-11-08 DIAGNOSIS — M54.41 CHRONIC BILATERAL LOW BACK PAIN WITH RIGHT-SIDED SCIATICA: ICD-10-CM

## 2023-11-08 DIAGNOSIS — G89.29 CHRONIC BILATERAL LOW BACK PAIN WITH RIGHT-SIDED SCIATICA: ICD-10-CM

## 2023-11-08 NOTE — ASSESSMENT & PLAN NOTE
Patients diabetes is controlled as evidenced by most recent A1c of 6.3% (Goal <7%) on 9/28/2023. Congratulated the patient on the decrease in A1c.  RX CHANGES  INCREASE Trulicity to 3mg under the skin once weekly. Tolerating the medication well and does not have any questions about injection. Will send the medication to patients preferred pharmacy.  CONTINUE Toujeo 78 units once daily and Humalog 5 units plus sliding scale with meals. Patient states she needs refills on her insulin. Will send refill to patients preferred pharmacy.  Continue using Dexcom to test blood sugars. Patient was driving and was not able to fully go over recent blood sugars. Will send prescription for Dexcom sensors to patients preferred pharmacy.  Continue working towards healthy diet and lifestyle

## 2023-11-09 ENCOUNTER — PATIENT OUTREACH (OUTPATIENT)
Dept: PRIMARY CARE | Facility: CLINIC | Age: 53
End: 2023-11-09
Payer: MEDICAID

## 2023-11-09 LAB — G6PD RBC-CCNC: 7.6 U/G HB (ref 9.9–16.6)

## 2023-11-09 NOTE — PROGRESS NOTES
Unable to reach patient for call back after patient's follow up appointment with PCP. - to date no PCP appt since last hospital stay    LVM with call back number for patient to call if needed  to assist with any questions or concerns patient may have.

## 2023-11-09 NOTE — TELEPHONE ENCOUNTER
From: Alysa Austin  To: Renzo Delcid MD  Sent: 11/8/2023 12:56 PM EST  Subject: Refill     I need the Percocet medicine refilled. Thanks

## 2023-11-10 NOTE — PROGRESS NOTES
G6PD levels are low, spoke with the patient, she will stop Plaquenil.   We will discuss alternatives on follow-up

## 2023-11-11 RX ORDER — OXYCODONE AND ACETAMINOPHEN 10; 325 MG/1; MG/1
1 TABLET ORAL 2 TIMES DAILY PRN
Qty: 60 TABLET | Refills: 0 | Status: SHIPPED | OUTPATIENT
Start: 2023-11-11 | End: 2023-12-07 | Stop reason: SDUPTHER

## 2023-11-14 ENCOUNTER — OFFICE VISIT (OUTPATIENT)
Dept: PRIMARY CARE CLINIC | Age: 53
End: 2023-11-14
Payer: MEDICAID

## 2023-11-14 VITALS
DIASTOLIC BLOOD PRESSURE: 72 MMHG | RESPIRATION RATE: 18 BRPM | SYSTOLIC BLOOD PRESSURE: 102 MMHG | TEMPERATURE: 97.5 F | WEIGHT: 280.8 LBS | HEART RATE: 88 BPM | HEIGHT: 63 IN | OXYGEN SATURATION: 95 % | BODY MASS INDEX: 49.75 KG/M2

## 2023-11-14 DIAGNOSIS — F43.23 ADJUSTMENT DISORDER WITH MIXED ANXIETY AND DEPRESSED MOOD: ICD-10-CM

## 2023-11-14 DIAGNOSIS — G56.03 BILATERAL CARPAL TUNNEL SYNDROME: Primary | ICD-10-CM

## 2023-11-14 DIAGNOSIS — Z23 NEED FOR INFLUENZA VACCINATION: ICD-10-CM

## 2023-11-14 DIAGNOSIS — Z12.31 ENCOUNTER FOR SCREENING MAMMOGRAM FOR MALIGNANT NEOPLASM OF BREAST: ICD-10-CM

## 2023-11-14 PROCEDURE — 90471 IMMUNIZATION ADMIN: CPT | Performed by: INTERNAL MEDICINE

## 2023-11-14 PROCEDURE — 1036F TOBACCO NON-USER: CPT | Performed by: INTERNAL MEDICINE

## 2023-11-14 PROCEDURE — G8427 DOCREV CUR MEDS BY ELIG CLIN: HCPCS | Performed by: INTERNAL MEDICINE

## 2023-11-14 PROCEDURE — 99204 OFFICE O/P NEW MOD 45 MIN: CPT | Performed by: INTERNAL MEDICINE

## 2023-11-14 PROCEDURE — G8417 CALC BMI ABV UP PARAM F/U: HCPCS | Performed by: INTERNAL MEDICINE

## 2023-11-14 PROCEDURE — 90674 CCIIV4 VAC NO PRSV 0.5 ML IM: CPT | Performed by: INTERNAL MEDICINE

## 2023-11-14 PROCEDURE — 3078F DIAST BP <80 MM HG: CPT | Performed by: INTERNAL MEDICINE

## 2023-11-14 PROCEDURE — 3074F SYST BP LT 130 MM HG: CPT | Performed by: INTERNAL MEDICINE

## 2023-11-14 PROCEDURE — 3017F COLORECTAL CA SCREEN DOC REV: CPT | Performed by: INTERNAL MEDICINE

## 2023-11-14 PROCEDURE — G8482 FLU IMMUNIZE ORDER/ADMIN: HCPCS | Performed by: INTERNAL MEDICINE

## 2023-11-14 RX ORDER — BUPROPION HYDROCHLORIDE 150 MG/1
150 TABLET ORAL EVERY MORNING
Qty: 30 TABLET | Refills: 3 | Status: SHIPPED | OUTPATIENT
Start: 2023-11-14

## 2023-11-14 SDOH — ECONOMIC STABILITY: FOOD INSECURITY: WITHIN THE PAST 12 MONTHS, THE FOOD YOU BOUGHT JUST DIDN'T LAST AND YOU DIDN'T HAVE MONEY TO GET MORE.: NEVER TRUE

## 2023-11-14 SDOH — ECONOMIC STABILITY: HOUSING INSECURITY
IN THE LAST 12 MONTHS, WAS THERE A TIME WHEN YOU DID NOT HAVE A STEADY PLACE TO SLEEP OR SLEPT IN A SHELTER (INCLUDING NOW)?: NO

## 2023-11-14 SDOH — ECONOMIC STABILITY: FOOD INSECURITY: WITHIN THE PAST 12 MONTHS, YOU WORRIED THAT YOUR FOOD WOULD RUN OUT BEFORE YOU GOT MONEY TO BUY MORE.: NEVER TRUE

## 2023-11-14 SDOH — ECONOMIC STABILITY: INCOME INSECURITY: HOW HARD IS IT FOR YOU TO PAY FOR THE VERY BASICS LIKE FOOD, HOUSING, MEDICAL CARE, AND HEATING?: NOT HARD AT ALL

## 2023-11-14 ASSESSMENT — COLUMBIA-SUICIDE SEVERITY RATING SCALE - C-SSRS
7. DID THIS OCCUR IN THE LAST THREE MONTHS: NO
4. HAVE YOU HAD THESE THOUGHTS AND HAD SOME INTENTION OF ACTING ON THEM?: NO
3. HAVE YOU BEEN THINKING ABOUT HOW YOU MIGHT KILL YOURSELF?: NO
5. HAVE YOU STARTED TO WORK OUT OR WORKED OUT THE DETAILS OF HOW TO KILL YOURSELF? DO YOU INTEND TO CARRY OUT THIS PLAN?: NO

## 2023-11-14 ASSESSMENT — PATIENT HEALTH QUESTIONNAIRE - PHQ9
6. FEELING BAD ABOUT YOURSELF - OR THAT YOU ARE A FAILURE OR HAVE LET YOURSELF OR YOUR FAMILY DOWN: 0
7. TROUBLE CONCENTRATING ON THINGS, SUCH AS READING THE NEWSPAPER OR WATCHING TELEVISION: 2
4. FEELING TIRED OR HAVING LITTLE ENERGY: 3
2. FEELING DOWN, DEPRESSED OR HOPELESS: 0
1. LITTLE INTEREST OR PLEASURE IN DOING THINGS: 2
SUM OF ALL RESPONSES TO PHQ QUESTIONS 1-9: 15
SUM OF ALL RESPONSES TO PHQ9 QUESTIONS 1 & 2: 2
3. TROUBLE FALLING OR STAYING ASLEEP: 3
5. POOR APPETITE OR OVEREATING: 3
10. IF YOU CHECKED OFF ANY PROBLEMS, HOW DIFFICULT HAVE THESE PROBLEMS MADE IT FOR YOU TO DO YOUR WORK, TAKE CARE OF THINGS AT HOME, OR GET ALONG WITH OTHER PEOPLE: 1
SUM OF ALL RESPONSES TO PHQ QUESTIONS 1-9: 15
8. MOVING OR SPEAKING SO SLOWLY THAT OTHER PEOPLE COULD HAVE NOTICED. OR THE OPPOSITE, BEING SO FIGETY OR RESTLESS THAT YOU HAVE BEEN MOVING AROUND A LOT MORE THAN USUAL: 2
9. THOUGHTS THAT YOU WOULD BE BETTER OFF DEAD, OR OF HURTING YOURSELF: 0

## 2023-11-14 NOTE — PROGRESS NOTES
Subjective:      Patient ID: Nixon Bashir is a 47 y.o. female    New pt   HPI  Pt is new to provider. PMHx T2DM, sarcoidosis, fibromyalgia, ELEN, renal cancer s/p right nephrectomy  Last colonoscopy: negative in 2022  Last pap: negative in 2021  Last mammogram: 2022    Hand pain both sided x 1 week. Burning pain, tingling and numbness. Assoc hand weakness, assoc swelling. Worked a s a . Depression and anxiety uncontrolled on buspar. No SI. Unsure when she ever used Wellbutrin(seen on med list). Past Medical History:   Diagnosis Date    Anxiety     Arthritis     Asthma     Bronchopneumonia     Cancer ([address] )     renal    Cerebral artery occlusion with cerebral infarction [facility])     Chronic bilateral low back pain with sciatica     Chronic kidney disease     Chronic obstructive lung disease ([address]) 7/26/2019    Depression     Fibromyalgia     Gout     rt knee    Hypertension     Insulin dependent type 2 diabetes mellitus, uncontrolled 8/3/2018    Localized enlarged lymph nodes 10/26/2018    Mixed headache     Pure hyperglyceridemia 5/19/2017    Sarcoidosis     Sleep apnea     does not wear cpap    Thyroid goiter      Past Surgical History:   Procedure Laterality Date    BRONCHOSCOPY  10/26/2018    DR. STEARNS    KIDNEY REMOVAL Right 08/2016    KIDNEY REMOVAL Right 2016    LUNG BIOPSY Right 10/2018    SPINAL FUSION      THYROID LOBECTOMY Right 06/13/2014    THYROIDECTOMY  02/21/2019    DR. MAGDALENO    THYROIDECTOMY  2018    URETER STENT PLACEMENT Left 08/2016     Social History     Socioeconomic History    Marital status: Legally      Spouse name: Not on file    Number of children: Not on file    Years of education: 12    Highest education level: High school graduate   Occupational History    Not on file   Tobacco Use    Smoking status: Former     Packs/day: 0.50     Years: 15.00     Additional pack years: 0.00     Total pack years: 7.50     Types: Cigarettes     Start date: 8/20/2014

## 2023-11-15 ENCOUNTER — TELEPHONE (OUTPATIENT)
Dept: PRIMARY CARE CLINIC | Age: 53
End: 2023-11-15

## 2023-11-15 ASSESSMENT — ENCOUNTER SYMPTOMS
VOMITING: 0
COUGH: 0
DIARRHEA: 0
SHORTNESS OF BREATH: 0
NAUSEA: 0
WHEEZING: 0
ABDOMINAL PAIN: 0

## 2023-11-15 NOTE — TELEPHONE ENCOUNTER
She said she needs a prescription for hand braces. This needs sent to [address] [place] [address].

## 2023-11-16 ENCOUNTER — TELEPHONE (OUTPATIENT)
Dept: PRIMARY CARE CLINIC | Age: 53
End: 2023-11-16

## 2023-11-16 DIAGNOSIS — G56.03 BILATERAL CARPAL TUNNEL SYNDROME: Primary | ICD-10-CM

## 2023-11-20 ENCOUNTER — TELEPHONE (OUTPATIENT)
Dept: PRIMARY CARE CLINIC | Age: 53
End: 2023-11-20

## 2023-11-24 ENCOUNTER — TELEPHONE (OUTPATIENT)
Dept: PRIMARY CARE CLINIC | Age: 53
End: 2023-11-24

## 2023-11-24 NOTE — TELEPHONE ENCOUNTER
Levothyroxine 125MCG  Losartan 25mg  Cetirizine 10mg   Magnesium oxide 400 (240mg)  Pregabalin 200mg   Albuterol Sulfate 108 90base MCG/ACT inhaler  Lipitor 40mg   Toujeo Solostar 300unit/ml concentrated injecton pen     [facility].

## 2023-11-26 DIAGNOSIS — G89.29 OTHER CHRONIC PAIN: ICD-10-CM

## 2023-11-26 DIAGNOSIS — E11.65 TYPE 2 DIABETES MELLITUS WITH HYPERGLYCEMIA, WITH LONG-TERM CURRENT USE OF INSULIN (HCC): ICD-10-CM

## 2023-11-26 DIAGNOSIS — J30.1 ACUTE SEASONAL ALLERGIC RHINITIS DUE TO POLLEN: ICD-10-CM

## 2023-11-26 DIAGNOSIS — Z79.4 TYPE 2 DIABETES MELLITUS WITH HYPERGLYCEMIA, WITH LONG-TERM CURRENT USE OF INSULIN (HCC): ICD-10-CM

## 2023-11-26 DIAGNOSIS — M47.816 SPONDYLOSIS OF LUMBAR REGION WITHOUT MYELOPATHY OR RADICULOPATHY: ICD-10-CM

## 2023-11-26 RX ORDER — ALBUTEROL SULFATE 90 UG/1
2 AEROSOL, METERED RESPIRATORY (INHALATION) EVERY 6 HOURS PRN
Qty: 6.7 G | Refills: 1 | Status: SHIPPED | OUTPATIENT
Start: 2023-11-26 | End: 2023-12-29

## 2023-11-26 RX ORDER — LEVOTHYROXINE SODIUM 0.12 MG/1
125 TABLET ORAL DAILY
Qty: 90 TABLET | Refills: 3 | Status: SHIPPED | OUTPATIENT
Start: 2023-11-26

## 2023-11-26 RX ORDER — ATORVASTATIN CALCIUM 40 MG/1
40 TABLET, FILM COATED ORAL NIGHTLY
Qty: 90 TABLET | Refills: 3 | Status: SHIPPED | OUTPATIENT
Start: 2023-11-26

## 2023-11-26 RX ORDER — INSULIN GLARGINE 300 U/ML
INJECTION, SOLUTION SUBCUTANEOUS
Qty: 12 ADJUSTABLE DOSE PRE-FILLED PEN SYRINGE | Refills: 10 | Status: SHIPPED | OUTPATIENT
Start: 2023-11-26

## 2023-11-26 RX ORDER — PREGABALIN 200 MG/1
200 CAPSULE ORAL
Qty: 270 CAPSULE | Refills: 3 | Status: SHIPPED | OUTPATIENT
Start: 2023-11-26 | End: 2024-02-24

## 2023-11-26 RX ORDER — LANOLIN ALCOHOL/MO/W.PET/CERES
400 CREAM (GRAM) TOPICAL DAILY
Qty: 90 TABLET | Refills: 3 | Status: SHIPPED | OUTPATIENT
Start: 2023-11-26

## 2023-11-26 RX ORDER — CETIRIZINE HYDROCHLORIDE 10 MG/1
10 TABLET ORAL DAILY
Qty: 90 TABLET | Refills: 3 | Status: SHIPPED | OUTPATIENT
Start: 2023-11-26

## 2023-11-28 ENCOUNTER — OFFICE VISIT (OUTPATIENT)
Dept: PRIMARY CARE CLINIC | Age: 53
End: 2023-11-28
Payer: MEDICAID

## 2023-11-28 VITALS
RESPIRATION RATE: 16 BRPM | TEMPERATURE: 97.5 F | OXYGEN SATURATION: 96 % | HEIGHT: 62 IN | BODY MASS INDEX: 51.34 KG/M2 | SYSTOLIC BLOOD PRESSURE: 118 MMHG | HEART RATE: 80 BPM | WEIGHT: 279 LBS | DIASTOLIC BLOOD PRESSURE: 60 MMHG

## 2023-11-28 DIAGNOSIS — F43.23 ADJUSTMENT DISORDER WITH MIXED ANXIETY AND DEPRESSED MOOD: Primary | ICD-10-CM

## 2023-11-28 PROCEDURE — 3017F COLORECTAL CA SCREEN DOC REV: CPT | Performed by: INTERNAL MEDICINE

## 2023-11-28 PROCEDURE — G8482 FLU IMMUNIZE ORDER/ADMIN: HCPCS | Performed by: INTERNAL MEDICINE

## 2023-11-28 PROCEDURE — G8427 DOCREV CUR MEDS BY ELIG CLIN: HCPCS | Performed by: INTERNAL MEDICINE

## 2023-11-28 PROCEDURE — G8417 CALC BMI ABV UP PARAM F/U: HCPCS | Performed by: INTERNAL MEDICINE

## 2023-11-28 PROCEDURE — 3078F DIAST BP <80 MM HG: CPT | Performed by: INTERNAL MEDICINE

## 2023-11-28 PROCEDURE — 3074F SYST BP LT 130 MM HG: CPT | Performed by: INTERNAL MEDICINE

## 2023-11-28 PROCEDURE — 1036F TOBACCO NON-USER: CPT | Performed by: INTERNAL MEDICINE

## 2023-11-28 PROCEDURE — 99213 OFFICE O/P EST LOW 20 MIN: CPT | Performed by: INTERNAL MEDICINE

## 2023-11-28 RX ORDER — NALOXONE HYDROCHLORIDE 4 MG/.1ML
4 SPRAY NASAL PRN
COMMUNITY
Start: 2023-08-08 | End: 2024-08-07

## 2023-11-28 RX ORDER — FLUTICASONE PROPIONATE 50 MCG
SPRAY, SUSPENSION (ML) NASAL
COMMUNITY
Start: 2023-10-27

## 2023-11-28 RX ORDER — PANTOPRAZOLE SODIUM 40 MG/1
40 TABLET, DELAYED RELEASE ORAL DAILY
COMMUNITY
Start: 2023-09-28

## 2023-11-28 RX ORDER — TIZANIDINE 4 MG/1
TABLET ORAL EVERY 8 HOURS
COMMUNITY
Start: 2023-04-27

## 2023-11-28 ASSESSMENT — ENCOUNTER SYMPTOMS
DIARRHEA: 0
ABDOMINAL PAIN: 0
COUGH: 0
SHORTNESS OF BREATH: 0
WHEEZING: 0
NAUSEA: 0
VOMITING: 0

## 2023-11-28 NOTE — PROGRESS NOTES
Stability: Unknown (11/14/2023)    Housing Stability Vital Sign     Unable to Pay for Housing in the Last Year: Not on file     Number of Places Lived in the Last Year: Not on file     Unstable Housing in the Last Year: No     Family History   Problem Relation Age of Onset    Cancer Father     Diabetes Father     Allergy (Severe) Father     Heart Attack Father     Prostate Cancer Father     High Blood Pressure Mother     Diabetes Mother     Arthritis Mother     High Cholesterol Mother     Vision Loss Mother     Alcohol Abuse Neg Hx     Anemia Neg Hx     Arrhythmia Neg Hx     Asthma Neg Hx     Atrial Fibrillation Neg Hx     Birth Defects Neg Hx     Breast Cancer Neg Hx     Coronary Art Dis Neg Hx     Colon Cancer Neg Hx     Depression Neg Hx     Early Death Neg Hx     Hearing Loss Neg Hx     Heart Disease Neg Hx     Learning Disabilities Neg Hx     Kidney Disease Neg Hx     Mental Illness Neg Hx     Mental Retardation Neg Hx     Miscarriages / Stillbirths Neg Hx     Obesity Neg Hx     Osteoporosis Neg Hx     Stroke Neg Hx     Substance Abuse Neg Hx      Allergies:  Shellfish-derived products, Hydromorphone, Ibuprofen, Ketorolac, Other, Ozempic (0.25 or 0.5 mg-dose) [semaglutide(0.25 or 0.5mg-dos)], Penicillin g, Penicillins, Propoxyphene n-acetaminophen, Shellfish allergy, and Toradol [ketorolac tromethamine]  Patient Active Problem List   Diagnosis    Anxiety    Asthma    Hypertensive disorder    Nontoxic goiter, unspecified    Cervical spondylosis without myelopathy    Spondylosis of lumbar region without myelopathy or radiculopathy--OARRS PM&R 5/24/17    Unspecified abdominal pain    Renal cancer ([address])    Pure hyperglyceridemia    Morbid obesity ([address])    Transient cerebral ischemia    Localized swelling, mass and lump, neck    Marijuana use    Chest pain    Meningitis    Shortness of breath    Exposure to communicable disease    Bronchopneumonia    Headache    Type 2 diabetes mellitus treated with insulin ([address])

## 2023-11-30 ENCOUNTER — APPOINTMENT (OUTPATIENT)
Dept: GENERAL RADIOLOGY | Age: 53
End: 2023-11-30
Payer: MEDICAID

## 2023-11-30 ENCOUNTER — HOSPITAL ENCOUNTER (EMERGENCY)
Age: 53
Discharge: HOME | End: 2023-11-30
Payer: MEDICAID

## 2023-11-30 VITALS
OXYGEN SATURATION: 95 % | SYSTOLIC BLOOD PRESSURE: 115 MMHG | HEART RATE: 82 BPM | TEMPERATURE: 97.9 F | RESPIRATION RATE: 16 BRPM | DIASTOLIC BLOOD PRESSURE: 75 MMHG

## 2023-11-30 DIAGNOSIS — R07.89 ATYPICAL CHEST PAIN: Primary | ICD-10-CM

## 2023-11-30 LAB
ALBUMIN SERPL-MCNC: 3.8 G/DL (ref 3.5–4.6)
ALP SERPL-CCNC: 160 U/L (ref 40–130)
ALT SERPL-CCNC: 15 U/L (ref 0–33)
ANION GAP SERPL CALCULATED.3IONS-SCNC: 11 MEQ/L (ref 9–15)
AST SERPL-CCNC: 19 U/L (ref 0–35)
BASOPHILS # BLD: 0 K/UL (ref 0–0.2)
BASOPHILS NFR BLD: 0.5 %
BILIRUB SERPL-MCNC: 0.3 MG/DL (ref 0.2–0.7)
BUN SERPL-MCNC: 13 MG/DL (ref 6–20)
CALCIUM SERPL-MCNC: 9.3 MG/DL (ref 8.5–9.9)
CHLORIDE SERPL-SCNC: 106 MEQ/L (ref 95–107)
CO2 SERPL-SCNC: 23 MEQ/L (ref 20–31)
CREAT SERPL-MCNC: 1.26 MG/DL (ref 0.5–0.9)
EOSINOPHIL # BLD: 0.2 K/UL (ref 0–0.7)
EOSINOPHIL NFR BLD: 3.2 %
ERYTHROCYTE [DISTWIDTH] IN BLOOD BY AUTOMATED COUNT: 12.8 % (ref 11.5–14.5)
GLOBULIN SER CALC-MCNC: 3.5 G/DL (ref 2.3–3.5)
GLUCOSE SERPL-MCNC: 119 MG/DL (ref 70–99)
HCT VFR BLD AUTO: 48.5 % (ref 37–47)
HGB BLD-MCNC: 15.7 G/DL (ref 12–16)
INR PPP: 1
LYMPHOCYTES # BLD: 2.1 K/UL (ref 1–4.8)
LYMPHOCYTES NFR BLD: 31.6 %
MAGNESIUM SERPL-MCNC: 2 MG/DL (ref 1.7–2.4)
MCH RBC QN AUTO: 30.7 PG (ref 27–31.3)
MCHC RBC AUTO-ENTMCNC: 32.4 % (ref 33–37)
MCV RBC AUTO: 94.7 FL (ref 79.4–94.8)
MONOCYTES # BLD: 0.4 K/UL (ref 0.2–0.8)
MONOCYTES NFR BLD: 6.8 %
NEUTROPHILS # BLD: 3.7 K/UL (ref 1.4–6.5)
NEUTS SEG NFR BLD: 57.7 %
PLATELET # BLD AUTO: 282 K/UL (ref 130–400)
POTASSIUM SERPL-SCNC: 4.3 MEQ/L (ref 3.4–4.9)
PROT SERPL-MCNC: 7.3 G/DL (ref 6.3–8)
PROTHROMBIN TIME: 14.2 SEC (ref 12.3–14.9)
RBC # BLD AUTO: 5.12 M/UL (ref 4.2–5.4)
SODIUM SERPL-SCNC: 140 MEQ/L (ref 135–144)
TROPONIN, HIGH SENSITIVITY: 15 NG/L (ref 0–19)
TROPONIN, HIGH SENSITIVITY: 16 NG/L (ref 0–19)
TROPONIN, HIGH SENSITIVITY: 17 NG/L (ref 0–19)
WBC # BLD AUTO: 6.5 K/UL (ref 4.8–10.8)

## 2023-11-30 PROCEDURE — 96376 TX/PRO/DX INJ SAME DRUG ADON: CPT

## 2023-11-30 PROCEDURE — 2580000003 HC RX 258: Performed by: PHYSICIAN ASSISTANT

## 2023-11-30 PROCEDURE — 36415 COLL VENOUS BLD VENIPUNCTURE: CPT

## 2023-11-30 PROCEDURE — 96375 TX/PRO/DX INJ NEW DRUG ADDON: CPT

## 2023-11-30 PROCEDURE — 93005 ELECTROCARDIOGRAM TRACING: CPT | Performed by: PHYSICIAN ASSISTANT

## 2023-11-30 PROCEDURE — 71046 X-RAY EXAM CHEST 2 VIEWS: CPT

## 2023-11-30 PROCEDURE — 84484 ASSAY OF TROPONIN QUANT: CPT

## 2023-11-30 PROCEDURE — 99285 EMERGENCY DEPT VISIT HI MDM: CPT

## 2023-11-30 PROCEDURE — 80053 COMPREHEN METABOLIC PANEL: CPT

## 2023-11-30 PROCEDURE — 83735 ASSAY OF MAGNESIUM: CPT

## 2023-11-30 PROCEDURE — 85025 COMPLETE CBC W/AUTO DIFF WBC: CPT

## 2023-11-30 PROCEDURE — 85610 PROTHROMBIN TIME: CPT

## 2023-11-30 PROCEDURE — 96374 THER/PROPH/DIAG INJ IV PUSH: CPT

## 2023-11-30 PROCEDURE — 6360000002 HC RX W HCPCS: Performed by: PHYSICIAN ASSISTANT

## 2023-11-30 RX ORDER — MORPHINE SULFATE 4 MG/ML
4 INJECTION, SOLUTION INTRAMUSCULAR; INTRAVENOUS ONCE
Status: DISCONTINUED | OUTPATIENT
Start: 2023-11-30 | End: 2023-12-01 | Stop reason: HOSPADM

## 2023-11-30 RX ORDER — ONDANSETRON 2 MG/ML
4 INJECTION INTRAMUSCULAR; INTRAVENOUS ONCE
Status: COMPLETED | OUTPATIENT
Start: 2023-11-30 | End: 2023-11-30

## 2023-11-30 RX ORDER — MORPHINE SULFATE 4 MG/ML
4 INJECTION, SOLUTION INTRAMUSCULAR; INTRAVENOUS ONCE
Status: COMPLETED | OUTPATIENT
Start: 2023-11-30 | End: 2023-11-30

## 2023-11-30 RX ORDER — 0.9 % SODIUM CHLORIDE 0.9 %
500 INTRAVENOUS SOLUTION INTRAVENOUS ONCE
Status: COMPLETED | OUTPATIENT
Start: 2023-11-30 | End: 2023-11-30

## 2023-11-30 RX ORDER — ONDANSETRON 4 MG/1
4 TABLET, ORALLY DISINTEGRATING ORAL ONCE
Status: DISCONTINUED | OUTPATIENT
Start: 2023-11-30 | End: 2023-12-01 | Stop reason: HOSPADM

## 2023-11-30 RX ADMIN — ONDANSETRON 4 MG: 2 INJECTION INTRAMUSCULAR; INTRAVENOUS at 21:14

## 2023-11-30 RX ADMIN — MORPHINE SULFATE 4 MG: 4 INJECTION, SOLUTION INTRAMUSCULAR; INTRAVENOUS at 21:14

## 2023-11-30 RX ADMIN — MORPHINE SULFATE 4 MG: 4 INJECTION, SOLUTION INTRAMUSCULAR; INTRAVENOUS at 22:56

## 2023-11-30 RX ADMIN — SODIUM CHLORIDE 500 ML: 9 INJECTION, SOLUTION INTRAVENOUS at 21:13

## 2023-11-30 ASSESSMENT — PAIN DESCRIPTION - DESCRIPTORS: DESCRIPTORS: SHARP

## 2023-11-30 ASSESSMENT — PAIN DESCRIPTION - LOCATION
LOCATION: CHEST

## 2023-11-30 ASSESSMENT — PAIN SCALES - GENERAL
PAINLEVEL_OUTOF10: 5
PAINLEVEL_OUTOF10: 7
PAINLEVEL_OUTOF10: 8

## 2023-11-30 ASSESSMENT — PAIN DESCRIPTION - FREQUENCY: FREQUENCY: INTERMITTENT

## 2023-11-30 ASSESSMENT — ENCOUNTER SYMPTOMS
COUGH: 1
DIARRHEA: 0
VOMITING: 0
SHORTNESS OF BREATH: 1
CHEST TIGHTNESS: 1

## 2023-12-01 LAB
EKG ATRIAL RATE: 78 BPM
EKG P AXIS: 45 DEGREES
EKG P-R INTERVAL: 180 MS
EKG Q-T INTERVAL: 382 MS
EKG QRS DURATION: 82 MS
EKG QTC CALCULATION (BAZETT): 435 MS
EKG R AXIS: -1 DEGREES
EKG T AXIS: 20 DEGREES
EKG VENTRICULAR RATE: 78 BPM

## 2023-12-01 PROCEDURE — 93010 ELECTROCARDIOGRAM REPORT: CPT | Performed by: INTERNAL MEDICINE

## 2023-12-01 NOTE — ED NOTES
Telephone Encounter by Sarahy Mark RN at 01/10/18 03:11 PM     Author:  Sarahy Mark RN Service:  (none) Author Type:  Registered Nurse     Filed:  01/10/18 03:12 PM Encounter Date:  12/1/2017 Status:  Signed     :  Sarahy Mark RN (Registered Nurse)            Patient is currently in patient at [facility].[NH1.1M]       Revision History        User Key Date/Time User Provider Type Action    > NH1.1 01/10/18 03:12 PM Sarahy Mark RN Registered Nurse Sign    M - Manual             Unsuccessful PIV attempt with US by Marni Jack, Marj Gale, LISHA  11/30/23 2013

## 2023-12-01 NOTE — ED PROVIDER NOTES
program.  Efforts were made to edit the dictations but occasionally words are mis-transcribed.)    Clare Adames PA-C (electronically signed)  Attending Emergency Physician  Supervising Physician masood                   Basic Information   Time Seen: 11:48 PM   Primary Care Provider: Gudelia Ayoub MD     Chief Complaint   Patient presents with    Chest Pain     Pt stated she has has on and off chest pain since yesterday. Pt stated that she has a history of sarcoidosis. Pt stated that the pain is usually related to that        HPI   Cee Sutton is a 47 yrs female who presents with 2 days of intermittent chest pain and shortness of breath. History of sarcoidosis with similar presentation     Physical Exam     BP (!) 109/59 (11/30/23 2301)    Temp      Pulse 81 (11/30/23 2301)   Resp 20 (11/30/23 2301)    SpO2 96 % (11/30/23 2301)       General: Awake and Alert, no acute distress   CV: RRR, S1, S2   Resp: LCTAB, even and non labored   Other:   Impression and Plan     Labs Reviewed   CBC WITH AUTO DIFFERENTIAL - Abnormal; Notable for the following components:       Result Value    Hematocrit 48.5 (*)     MCHC 32.4 (*)     All other components within normal limits   COMPREHENSIVE METABOLIC PANEL - Abnormal; Notable for the following components:    Glucose 119 (*)     Creatinine 1.26 (*)     Est, Glom Filt Rate 51.1 (*)     Alkaline Phosphatase 160 (*)     All other components within normal limits   TROPONIN   PROTIME-INR   MAGNESIUM   TROPONIN   TROPONIN        XR CHEST (2 VW)   Final Result   No acute process. Final Impression   I have performed a medical screening exam on Cee Sutton. Based on this patient's chief complaint/symptoms of   Chief Complaint   Patient presents with    Chest Pain     Pt stated she has has on and off chest pain since yesterday. Pt stated that she has a history of sarcoidosis.  Pt stated that the pain is usually related to that      and my focused exam, their care will be

## 2023-12-01 NOTE — ED NOTES
Pt reporting improvement in symptoms after second dose of morphine.       Veronique Luna RN  11/30/23 2180

## 2023-12-01 NOTE — ED NOTES
Pt returned from BR, gait steady. Provided additional warm blanket. Updated on POC. No questions at this time. Visitor remains at bedside. Call light within reach.       Nikunj Munguia RN  11/30/23 6094

## 2023-12-04 ENCOUNTER — TELEPHONE (OUTPATIENT)
Dept: PULMONOLOGY | Age: 53
End: 2023-12-04

## 2023-12-04 ENCOUNTER — OFFICE VISIT (OUTPATIENT)
Dept: PULMONOLOGY | Age: 53
End: 2023-12-04
Payer: MEDICAID

## 2023-12-04 ENCOUNTER — TELEPHONE (OUTPATIENT)
Dept: PRIMARY CARE CLINIC | Age: 53
End: 2023-12-04

## 2023-12-04 VITALS
SYSTOLIC BLOOD PRESSURE: 132 MMHG | HEART RATE: 77 BPM | WEIGHT: 278.8 LBS | HEIGHT: 63 IN | OXYGEN SATURATION: 96 % | RESPIRATION RATE: 16 BRPM | BODY MASS INDEX: 49.4 KG/M2 | DIASTOLIC BLOOD PRESSURE: 74 MMHG | TEMPERATURE: 97.5 F

## 2023-12-04 DIAGNOSIS — J45.41 MODERATE PERSISTENT ASTHMA WITH ACUTE EXACERBATION: ICD-10-CM

## 2023-12-04 DIAGNOSIS — D75.A G6PD DEFICIENCY: ICD-10-CM

## 2023-12-04 DIAGNOSIS — D86.9 SARCOIDOSIS: ICD-10-CM

## 2023-12-04 DIAGNOSIS — G47.30 SLEEP APNEA, UNSPECIFIED TYPE: ICD-10-CM

## 2023-12-04 DIAGNOSIS — R10.13 EPIGASTRIC PAIN: Primary | ICD-10-CM

## 2023-12-04 PROCEDURE — 3017F COLORECTAL CA SCREEN DOC REV: CPT | Performed by: INTERNAL MEDICINE

## 2023-12-04 PROCEDURE — 3078F DIAST BP <80 MM HG: CPT | Performed by: INTERNAL MEDICINE

## 2023-12-04 PROCEDURE — G8427 DOCREV CUR MEDS BY ELIG CLIN: HCPCS | Performed by: INTERNAL MEDICINE

## 2023-12-04 PROCEDURE — 1036F TOBACCO NON-USER: CPT | Performed by: INTERNAL MEDICINE

## 2023-12-04 PROCEDURE — G8482 FLU IMMUNIZE ORDER/ADMIN: HCPCS | Performed by: INTERNAL MEDICINE

## 2023-12-04 PROCEDURE — 99214 OFFICE O/P EST MOD 30 MIN: CPT | Performed by: INTERNAL MEDICINE

## 2023-12-04 PROCEDURE — G8417 CALC BMI ABV UP PARAM F/U: HCPCS | Performed by: INTERNAL MEDICINE

## 2023-12-04 PROCEDURE — 3075F SYST BP GE 130 - 139MM HG: CPT | Performed by: INTERNAL MEDICINE

## 2023-12-04 NOTE — PROGRESS NOTES
Subjective:     Guilherme Castillo is a 47 y.o. female who complains today of:     Chief Complaint   Patient presents with    Follow-up     Sarcoidosis/chest pain [place]       HPI  Patient presents for sarcoidosis      12/4/2023  Presents for follow-up, complains of substernal pain, radiates to her epigastric area, it seems she had this pain when she saw me last time it has been going on since September,No coughing, no shortness of breath, no skin rash, no chest tightness, no trauma. She has no lower extremity edema, and no heartburn. She was seen in the emergency roomAnd chest x-ray is unremarkable  Similar pain is September, she was seen at [facility]  ED, had CT angio done and shows no PE and no acute process. 11/7/2023  Presents for follow-up, she has not been seen at pulmonary office since 2021, she has been doing good, except for skin rash, she is stopped taking her maintenance inhalers for asthma, she complains of chest tightness, shortness of breath, dry cough, no chest pain, no lower extremity edema, no fever no chills, weight is stable, no nasal congestion or postnasal drip. She has history of sarcoidosis.        Allergies:  Shellfish-derived products, Hydromorphone, Ibuprofen, Ketorolac, Other, Ozempic (0.25 or 0.5 mg-dose) [semaglutide(0.25 or 0.5mg-dos)], Penicillin g, Penicillins, Propoxyphene n-acetaminophen, Shellfish allergy, and Toradol [ketorolac tromethamine]  Past Medical History:   Diagnosis Date    Anxiety     Arthritis     Asthma     Bronchopneumonia     Cancer ([address] [facility])     renal    Cerebral artery occlusion with cerebral infarction [facility])     Chronic bilateral low back pain with sciatica     Chronic kidney disease     Chronic obstructive lung disease ([address]) 7/26/2019    Depression     Fibromyalgia     Gout     rt knee    Hypertension     Insulin dependent type 2 diabetes mellitus, uncontrolled 8/3/2018    Localized enlarged lymph nodes 10/26/2018    Mixed headache     Pure hyperglyceridemia

## 2023-12-04 NOTE — TELEPHONE ENCOUNTER
PT IS HAVING PAIN FROM HER SACOIDOSIS SHE WAS SEEN IN THE ER ON 11/30/23 FOR CHEST PAIN AND THEY ADVISED PT TO CALL PULMONARY DR IF IT FLARED UP AGAIN.       PLEASE ADVISE

## 2023-12-04 NOTE — TELEPHONE ENCOUNTER
Pulmonary  Is wanting to see if you can send her in a pain medication. She is currently on PERCOCET  PT would like to know if she should double the medication? ?

## 2023-12-05 ENCOUNTER — APPOINTMENT (OUTPATIENT)
Dept: PHARMACY | Facility: HOSPITAL | Age: 53
End: 2023-12-05
Payer: MEDICAID

## 2023-12-06 NOTE — TELEPHONE ENCOUNTER
Pt states that she is trying to leave Dr Yon Alvarez to have you has her PCP. Pt explained that you do not write for controled medication. Can you recommend another med besides Percocet?

## 2023-12-07 ENCOUNTER — TELEPHONE (OUTPATIENT)
Dept: PRIMARY CARE CLINIC | Age: 53
End: 2023-12-07

## 2023-12-07 ENCOUNTER — PATIENT MESSAGE (OUTPATIENT)
Dept: PRIMARY CARE | Facility: CLINIC | Age: 53
End: 2023-12-07
Payer: MEDICAID

## 2023-12-07 DIAGNOSIS — M47.816 SPONDYLOSIS OF LUMBAR REGION WITHOUT MYELOPATHY OR RADICULOPATHY: Primary | ICD-10-CM

## 2023-12-07 DIAGNOSIS — M54.41 CHRONIC BILATERAL LOW BACK PAIN WITH RIGHT-SIDED SCIATICA: ICD-10-CM

## 2023-12-07 DIAGNOSIS — G89.29 CHRONIC BILATERAL LOW BACK PAIN WITH RIGHT-SIDED SCIATICA: ICD-10-CM

## 2023-12-07 RX ORDER — OXYCODONE HYDROCHLORIDE AND ACETAMINOPHEN 5; 325 MG/1; MG/1
1 TABLET ORAL EVERY 6 HOURS PRN
Qty: 14 TABLET | Refills: 0 | Status: SHIPPED | OUTPATIENT
Start: 2023-12-07 | End: 2023-12-29 | Stop reason: ALTCHOICE

## 2023-12-07 NOTE — TELEPHONE ENCOUNTER
Pt called back dr. Akilah Chavez filled medication for her.     I called the pharmacy and cancelled the rx dr. Antolin Pete sent in

## 2023-12-07 NOTE — TELEPHONE ENCOUNTER
PT CALLED BACK AND STATED THAT SHE THOUGHT THAT THE MED WAS FILLED BY DR TORRES BUT IT WAS SENT BY DR. Antolin Pete    I TOLD HER YOU CANCELED THE SCRIPT AND SHE SAID SORRY ABOUT THAT. SHE WAS CONFUSED.

## 2023-12-07 NOTE — TELEPHONE ENCOUNTER
Pt states that Percocet is used for back ( spine) pain. Unable to take suggested medication do to only having 1 kidney.  Pt informed that she has been referred to Dr Gina Navarro

## 2023-12-07 NOTE — TELEPHONE ENCOUNTER
From: Alysa Austin  To: Renzo Delcid MD  Sent: 12/7/2023 10:27 AM EST  Subject: Refill     I need the Percocet refilled

## 2023-12-08 RX ORDER — OXYCODONE AND ACETAMINOPHEN 10; 325 MG/1; MG/1
1 TABLET ORAL 2 TIMES DAILY PRN
Qty: 60 TABLET | Refills: 0 | Status: SHIPPED | OUTPATIENT
Start: 2023-12-08 | End: 2024-01-07

## 2023-12-08 NOTE — TELEPHONE ENCOUNTER
Ok    I only gave the percocet because I was trying to help until she got in to see Dr. Isaac Rodriguez.

## 2023-12-08 NOTE — TELEPHONE ENCOUNTER
Ok    I only gave the percocet because I was trying to help until she got in to see Dr. Carole Giraldo. Didn't know she asked Dr. Richie King again.

## 2023-12-08 NOTE — TELEPHONE ENCOUNTER
I cancelled medication at the pharmacy yesterday when the patient said that Dr. Isaac Lynch sent in the medication and to cancel the one from dr. Isaac Lynch. A new prescription would need to be sent in.

## 2023-12-08 NOTE — TELEPHONE ENCOUNTER
Pt states that she didn't know the medication came from Dr. Agusto Vargas and that she thought it came from Dr. Isaac Lynch. Pt. States that she needs her medication sent into the pharmacy because she doesn't have any.

## 2023-12-08 NOTE — TELEPHONE ENCOUNTER
Yeah I am not quite sure what she needs at this point. Thank you for everything. If she needs anything else I told her just to call us.

## 2023-12-08 NOTE — TELEPHONE ENCOUNTER
Called pt let her know butery has rx waiting to be filled Sunday, can only be filled a day early she can get medication filled tomorrow. Any future rx for pain medication need to come from pain management. The rx dr. Allison Cabral sent to pharmacy has been cancelled.       Patient aware

## 2023-12-10 ENCOUNTER — APPOINTMENT (OUTPATIENT)
Dept: GENERAL RADIOLOGY | Age: 53
End: 2023-12-10
Payer: MEDICAID

## 2023-12-10 ENCOUNTER — HOSPITAL ENCOUNTER (EMERGENCY)
Age: 53
Discharge: HOME | End: 2023-12-10
Attending: FAMILY MEDICINE
Payer: MEDICAID

## 2023-12-10 VITALS
HEART RATE: 76 BPM | RESPIRATION RATE: 13 BRPM | DIASTOLIC BLOOD PRESSURE: 51 MMHG | SYSTOLIC BLOOD PRESSURE: 109 MMHG | TEMPERATURE: 97.8 F | OXYGEN SATURATION: 99 %

## 2023-12-10 DIAGNOSIS — R07.89 CHEST WALL PAIN: Primary | ICD-10-CM

## 2023-12-10 LAB
ALBUMIN SERPL-MCNC: 3.5 G/DL (ref 3.5–4.6)
ALP SERPL-CCNC: 160 U/L (ref 40–130)
ALT SERPL-CCNC: 10 U/L (ref 0–33)
ANION GAP SERPL CALCULATED.3IONS-SCNC: 10 MEQ/L (ref 9–15)
AST SERPL-CCNC: 17 U/L (ref 0–35)
BASOPHILS # BLD: 0 K/UL (ref 0–0.2)
BASOPHILS NFR BLD: 0.5 %
BILIRUB SERPL-MCNC: 0.3 MG/DL (ref 0.2–0.7)
BNP BLD-MCNC: <5 PG/ML
BUN SERPL-MCNC: 9 MG/DL (ref 6–20)
CALCIUM SERPL-MCNC: 8.7 MG/DL (ref 8.5–9.9)
CHLORIDE SERPL-SCNC: 107 MEQ/L (ref 95–107)
CO2 SERPL-SCNC: 23 MEQ/L (ref 20–31)
CREAT SERPL-MCNC: 1.3 MG/DL (ref 0.5–0.9)
D DIMER PPP FEU-MCNC: 0.41 MG/L FEU (ref 0–0.5)
EOSINOPHIL # BLD: 0.3 K/UL (ref 0–0.7)
EOSINOPHIL NFR BLD: 5.1 %
ERYTHROCYTE [DISTWIDTH] IN BLOOD BY AUTOMATED COUNT: 12.9 % (ref 11.5–14.5)
GLOBULIN SER CALC-MCNC: 3 G/DL (ref 2.3–3.5)
GLUCOSE SERPL-MCNC: 118 MG/DL (ref 70–99)
HCT VFR BLD AUTO: 44.9 % (ref 37–47)
HGB BLD-MCNC: 14.4 G/DL (ref 12–16)
LYMPHOCYTES # BLD: 1.9 K/UL (ref 1–4.8)
LYMPHOCYTES NFR BLD: 30.7 %
MCH RBC QN AUTO: 30.5 PG (ref 27–31.3)
MCHC RBC AUTO-ENTMCNC: 32.1 % (ref 33–37)
MCV RBC AUTO: 95.1 FL (ref 79.4–94.8)
MONOCYTES # BLD: 0.4 K/UL (ref 0.2–0.8)
MONOCYTES NFR BLD: 6.1 %
NEUTROPHILS # BLD: 3.6 K/UL (ref 1.4–6.5)
NEUTS SEG NFR BLD: 57.4 %
PLATELET # BLD AUTO: 278 K/UL (ref 130–400)
POTASSIUM SERPL-SCNC: 4.1 MEQ/L (ref 3.4–4.9)
PROT SERPL-MCNC: 6.5 G/DL (ref 6.3–8)
RBC # BLD AUTO: 4.72 M/UL (ref 4.2–5.4)
SODIUM SERPL-SCNC: 140 MEQ/L (ref 135–144)
TROPONIN, HIGH SENSITIVITY: 16 NG/L (ref 0–19)
TROPONIN, HIGH SENSITIVITY: 17 NG/L (ref 0–19)
WBC # BLD AUTO: 6.3 K/UL (ref 4.8–10.8)

## 2023-12-10 PROCEDURE — 96372 THER/PROPH/DIAG INJ SC/IM: CPT

## 2023-12-10 PROCEDURE — 80053 COMPREHEN METABOLIC PANEL: CPT

## 2023-12-10 PROCEDURE — 36415 COLL VENOUS BLD VENIPUNCTURE: CPT

## 2023-12-10 PROCEDURE — 71045 X-RAY EXAM CHEST 1 VIEW: CPT

## 2023-12-10 PROCEDURE — 93005 ELECTROCARDIOGRAM TRACING: CPT

## 2023-12-10 PROCEDURE — 96375 TX/PRO/DX INJ NEW DRUG ADDON: CPT

## 2023-12-10 PROCEDURE — 6370000000 HC RX 637 (ALT 250 FOR IP): Performed by: FAMILY MEDICINE

## 2023-12-10 PROCEDURE — 85025 COMPLETE CBC W/AUTO DIFF WBC: CPT

## 2023-12-10 PROCEDURE — 85379 FIBRIN DEGRADATION QUANT: CPT

## 2023-12-10 PROCEDURE — 83880 ASSAY OF NATRIURETIC PEPTIDE: CPT

## 2023-12-10 PROCEDURE — 84484 ASSAY OF TROPONIN QUANT: CPT

## 2023-12-10 PROCEDURE — 96374 THER/PROPH/DIAG INJ IV PUSH: CPT

## 2023-12-10 PROCEDURE — 6360000002 HC RX W HCPCS: Performed by: FAMILY MEDICINE

## 2023-12-10 PROCEDURE — 99284 EMERGENCY DEPT VISIT MOD MDM: CPT

## 2023-12-10 RX ORDER — CYCLOBENZAPRINE HCL 10 MG
10 TABLET ORAL NIGHTLY PRN
Qty: 10 TABLET | Refills: 0 | Status: SHIPPED | OUTPATIENT
Start: 2023-12-10 | End: 2023-12-20

## 2023-12-10 RX ORDER — MORPHINE SULFATE 4 MG/ML
4 INJECTION, SOLUTION INTRAMUSCULAR; INTRAVENOUS ONCE
Status: DISCONTINUED | OUTPATIENT
Start: 2023-12-10 | End: 2023-12-10 | Stop reason: HOSPADM

## 2023-12-10 RX ORDER — MORPHINE SULFATE 4 MG/ML
4 INJECTION, SOLUTION INTRAMUSCULAR; INTRAVENOUS ONCE
Status: COMPLETED | OUTPATIENT
Start: 2023-12-10 | End: 2023-12-10

## 2023-12-10 RX ORDER — HYDROCODONE BITARTRATE AND ACETAMINOPHEN 5; 325 MG/1; MG/1
1 TABLET ORAL ONCE
Status: COMPLETED | OUTPATIENT
Start: 2023-12-10 | End: 2023-12-10

## 2023-12-10 RX ORDER — ORPHENADRINE CITRATE 30 MG/ML
60 INJECTION INTRAMUSCULAR; INTRAVENOUS ONCE
Status: COMPLETED | OUTPATIENT
Start: 2023-12-10 | End: 2023-12-10

## 2023-12-10 RX ORDER — ONDANSETRON 2 MG/ML
4 INJECTION INTRAMUSCULAR; INTRAVENOUS ONCE
Status: COMPLETED | OUTPATIENT
Start: 2023-12-10 | End: 2023-12-10

## 2023-12-10 RX ADMIN — ORPHENADRINE CITRATE 60 MG: 60 INJECTION INTRAMUSCULAR; INTRAVENOUS at 12:58

## 2023-12-10 RX ADMIN — MORPHINE SULFATE 4 MG: 4 INJECTION, SOLUTION INTRAMUSCULAR; INTRAVENOUS at 10:49

## 2023-12-10 RX ADMIN — HYDROCODONE BITARTRATE AND ACETAMINOPHEN 1 TABLET: 5; 325 TABLET ORAL at 12:58

## 2023-12-10 RX ADMIN — ONDANSETRON 4 MG: 2 INJECTION INTRAMUSCULAR; INTRAVENOUS at 11:10

## 2023-12-10 ASSESSMENT — ENCOUNTER SYMPTOMS
GASTROINTESTINAL NEGATIVE: 1
RESPIRATORY NEGATIVE: 1
ABDOMINAL DISTENTION: 0
ABDOMINAL PAIN: 0
EYES NEGATIVE: 1
ALLERGIC/IMMUNOLOGIC NEGATIVE: 1

## 2023-12-10 ASSESSMENT — PAIN - FUNCTIONAL ASSESSMENT: PAIN_FUNCTIONAL_ASSESSMENT: 0-10

## 2023-12-10 ASSESSMENT — PAIN DESCRIPTION - LOCATION
LOCATION: CHEST
LOCATION: BACK;CHEST
LOCATION: CHEST
LOCATION: CHEST

## 2023-12-10 ASSESSMENT — PAIN DESCRIPTION - DESCRIPTORS
DESCRIPTORS: SHARP;THROBBING
DESCRIPTORS: ACHING;SHARP;STABBING
DESCRIPTORS: ACHING

## 2023-12-10 ASSESSMENT — PAIN SCALES - GENERAL
PAINLEVEL_OUTOF10: 10
PAINLEVEL_OUTOF10: 9
PAINLEVEL_OUTOF10: 9
PAINLEVEL_OUTOF10: 8

## 2023-12-10 NOTE — ED TRIAGE NOTES
Pt to ER c/o chest pain and back pain that started this morning  Pt reported she has 10/10 chest pain, denied n/v cough and sore throat  Pt described it as sharp stabbing pain  Pt denied any injury or cardiac history

## 2023-12-10 NOTE — ED PROVIDER NOTES
10 MG TABLET    Take 1 tablet by mouth nightly as needed for Muscle spasms          (Please note thatportions of this note were completed with a voice recognition program.  Efforts were made to edit the dictations but occasionally words are mis-transcribed.)    Phylicia Becker MD (electronically signed)  Attending Emergency Physician        Roland Roman MD  12/10/23 [id number]

## 2023-12-11 ENCOUNTER — TELEPHONE (OUTPATIENT)
Dept: PRIMARY CARE CLINIC | Age: 53
End: 2023-12-11

## 2023-12-11 DIAGNOSIS — I10 PRIMARY HYPERTENSION: Primary | ICD-10-CM

## 2023-12-11 RX ORDER — LOSARTAN POTASSIUM 25 MG/1
25 TABLET ORAL DAILY
Qty: 90 TABLET | Refills: 3 | Status: SHIPPED | OUTPATIENT
Start: 2023-12-11

## 2023-12-11 RX ORDER — DULAGLUTIDE 1.5 MG/.5ML
1.5 INJECTION, SOLUTION SUBCUTANEOUS WEEKLY
Qty: 5 ADJUSTABLE DOSE PRE-FILLED PEN SYRINGE | Refills: 10 | Status: SHIPPED | OUTPATIENT
Start: 2023-12-11

## 2023-12-12 ENCOUNTER — OFFICE VISIT (OUTPATIENT)
Dept: ORTHOPEDIC SURGERY | Age: 53
End: 2023-12-12
Payer: MEDICAID

## 2023-12-12 DIAGNOSIS — G56.02 LEFT CARPAL TUNNEL SYNDROME: ICD-10-CM

## 2023-12-12 DIAGNOSIS — G56.01 RIGHT CARPAL TUNNEL SYNDROME: Primary | ICD-10-CM

## 2023-12-12 LAB
EKG ATRIAL RATE: 75 BPM
EKG P AXIS: 46 DEGREES
EKG P-R INTERVAL: 184 MS
EKG Q-T INTERVAL: 376 MS
EKG QRS DURATION: 92 MS
EKG QTC CALCULATION (BAZETT): 419 MS
EKG R AXIS: -8 DEGREES
EKG T AXIS: 17 DEGREES
EKG VENTRICULAR RATE: 75 BPM

## 2023-12-12 PROCEDURE — 99204 OFFICE O/P NEW MOD 45 MIN: CPT | Performed by: STUDENT IN AN ORGANIZED HEALTH CARE EDUCATION/TRAINING PROGRAM

## 2023-12-12 PROCEDURE — G8427 DOCREV CUR MEDS BY ELIG CLIN: HCPCS | Performed by: STUDENT IN AN ORGANIZED HEALTH CARE EDUCATION/TRAINING PROGRAM

## 2023-12-12 PROCEDURE — 1036F TOBACCO NON-USER: CPT | Performed by: STUDENT IN AN ORGANIZED HEALTH CARE EDUCATION/TRAINING PROGRAM

## 2023-12-12 PROCEDURE — 3017F COLORECTAL CA SCREEN DOC REV: CPT | Performed by: STUDENT IN AN ORGANIZED HEALTH CARE EDUCATION/TRAINING PROGRAM

## 2023-12-12 PROCEDURE — G8417 CALC BMI ABV UP PARAM F/U: HCPCS | Performed by: STUDENT IN AN ORGANIZED HEALTH CARE EDUCATION/TRAINING PROGRAM

## 2023-12-12 PROCEDURE — G8482 FLU IMMUNIZE ORDER/ADMIN: HCPCS | Performed by: STUDENT IN AN ORGANIZED HEALTH CARE EDUCATION/TRAINING PROGRAM

## 2023-12-12 NOTE — PROGRESS NOTES
12/10/2023    MCV 95.1 (H) 12/10/2023     12/10/2023     Lab Results   Component Value Date    SEDRATE 39 (H) 09/27/2023     Lab Results   Component Value Date    CRP 1.02 (A) 09/27/2023       Assessment:      Diagnosis Orders   1. Right carpal tunnel syndrome        2. Left carpal tunnel syndrome          Renetta Toussaint is a 47 y.o. female with a history and exam consistent with bilateral carpal tunnel syndrome. This is an acute exacerbation of a chronic condition . Plan:     She has been suffering with bilateral hand pain and numbness for quite a a while now. It has gotten significantly worse over the last 5 to 6 months. Her history and exam are consistent with carpal tunnel syndrome. She has an EMG ordered but has not been done yet. I would like for her to get this. In the meantime she was given a wrist brace to wear at nighttime. I will see her back after the EMG is done for review and treatment plan moving forward. Return for EMG review .     Tony Story MD

## 2023-12-13 ASSESSMENT — ENCOUNTER SYMPTOMS
RESPIRATORY NEGATIVE: 1
EYES NEGATIVE: 1
ALLERGIC/IMMUNOLOGIC NEGATIVE: 1
GASTROINTESTINAL NEGATIVE: 1

## 2023-12-14 ENCOUNTER — TELEPHONE (OUTPATIENT)
Dept: PRIMARY CARE CLINIC | Age: 53
End: 2023-12-14

## 2023-12-14 DIAGNOSIS — E11.9 TYPE 2 DIABETES MELLITUS TREATED WITH INSULIN (HCC): ICD-10-CM

## 2023-12-14 DIAGNOSIS — E11.65 TYPE 2 DIABETES MELLITUS WITH HYPERGLYCEMIA, WITH LONG-TERM CURRENT USE OF INSULIN (HCC): ICD-10-CM

## 2023-12-14 DIAGNOSIS — Z79.4 TYPE 2 DIABETES MELLITUS TREATED WITH INSULIN (HCC): ICD-10-CM

## 2023-12-14 DIAGNOSIS — Z79.4 TYPE 2 DIABETES MELLITUS WITH HYPERGLYCEMIA, WITH LONG-TERM CURRENT USE OF INSULIN (HCC): ICD-10-CM

## 2023-12-14 RX ORDER — BLOOD SUGAR DIAGNOSTIC
STRIP MISCELLANEOUS
Qty: 100 EACH | Refills: 3 | Status: SHIPPED | OUTPATIENT
Start: 2023-12-14

## 2023-12-14 RX ORDER — INSULIN LISPRO 100 [IU]/ML
INJECTION, SOLUTION INTRAVENOUS; SUBCUTANEOUS
Qty: 15 ML | Refills: 3 | Status: SHIPPED | OUTPATIENT
Start: 2023-12-14 | End: 2024-01-18

## 2023-12-14 NOTE — TELEPHONE ENCOUNTER
insulin lispro, 1 Unit Dial, (HUMALOG/ADMELOG) 100 UNIT/ML SOPN     blood glucose test strips (Brayan Ahmadi) strip     [address] [phone] Kenna Spurling [phone] [address] [facility] [address]  Phone: [phone]  Fax: [phone]

## 2023-12-19 ENCOUNTER — PATIENT OUTREACH (OUTPATIENT)
Dept: PRIMARY CARE | Facility: CLINIC | Age: 53
End: 2023-12-19
Payer: MEDICAID

## 2023-12-19 NOTE — PROGRESS NOTES
Unable to reach patient for one month post discharge follow up call.   LVM with call back number for patient to call if needed to assist with any questions or concerns patient may have.

## 2023-12-26 ENCOUNTER — OFFICE VISIT (OUTPATIENT)
Dept: ORTHOPEDIC SURGERY | Age: 53
End: 2023-12-26

## 2023-12-26 DIAGNOSIS — G56.01 RIGHT CARPAL TUNNEL SYNDROME: Primary | ICD-10-CM

## 2023-12-26 RX ORDER — METHYLPREDNISOLONE ACETATE 80 MG/ML
80 INJECTION, SUSPENSION INTRA-ARTICULAR; INTRALESIONAL; INTRAMUSCULAR; SOFT TISSUE ONCE
Status: SHIPPED | OUTPATIENT
Start: 2023-12-26

## 2023-12-26 RX ORDER — LIDOCAINE HYDROCHLORIDE 10 MG/ML
2 INJECTION, SOLUTION INFILTRATION; PERINEURAL ONCE
Status: SHIPPED | OUTPATIENT
Start: 2023-12-26

## 2023-12-26 RX ORDER — OXYCODONE AND ACETAMINOPHEN 10; 325 MG/1; MG/1
TABLET ORAL
COMMUNITY
Start: 2023-12-11

## 2023-12-26 NOTE — PROGRESS NOTES
Orthopedic Surgery and Sports Medicine    Subjective:      Patient ID: Edith Olson is a 47 y.o. female who presents today for:  Chief Complaint   Patient presents with    Follow-up     Follow up visit for bilateral CTS, and EMG results       HPI  Vanessa Nye is a 81-year-old female presents today for follow up evaluation of her bilateral hand pain and numbness. She states that this has been present for many years, but has gotten worse over the last 5 to 6 months. The right hand is worse than the left, but it is present bilaterally. She reports significant pain throughout both of her hands. She reports difficulty with closing the hands and weakness. She does state that she drops things. The hands get very numb. She has seen Dr. Ritchie Win who ordered an EMG. She has a history of a spinal surgery done at [facility] approximately 8 months ago. She walks with a cane because she has some weakness in the legs since then. She has gotten her EMG done and is here for review. She was also given a wrist brace at her last clinic visit. She reports no major change with wearing the wrist brace. She reports no change in her symptoms since I last saw her. Previous treatment: wrist brace. NSAIDs: No  Physical therapy: No     Hand Dominance: right  Occupation: n/a  Workers Compensation:   Have you missed work for this issue? No  Is this issue being addressed under a worker's compensation claim?  No    Past Medical History:   Diagnosis Date    Anxiety     Arthritis     Asthma     Bronchopneumonia     Cancer ([address])     renal    Cerebral artery occlusion with cerebral infarction [facility])     Chronic bilateral low back pain with sciatica     Chronic kidney disease     Chronic obstructive lung disease ([address]) 7/26/2019    Depression     Fibromyalgia     Gout     rt knee    Hypertension     Insulin dependent type 2 diabetes mellitus, uncontrolled 8/3/2018    Localized enlarged lymph nodes 10/26/2018    Mixed headache

## 2023-12-28 NOTE — TELEPHONE ENCOUNTER
Cetirizine 10mg     [address] [facility] [id number] - [place] - [address] [phone] [address] [facility] [address]  Phone: [phone]  Fax: [phone]

## 2023-12-29 ENCOUNTER — OFFICE VISIT (OUTPATIENT)
Dept: PAIN MANAGEMENT | Age: 53
End: 2023-12-29
Payer: MEDICAID

## 2023-12-29 VITALS
HEIGHT: 63 IN | WEIGHT: 270 LBS | TEMPERATURE: 97 F | BODY MASS INDEX: 47.84 KG/M2 | SYSTOLIC BLOOD PRESSURE: 130 MMHG | DIASTOLIC BLOOD PRESSURE: 72 MMHG

## 2023-12-29 DIAGNOSIS — M96.1 LUMBAR POST-LAMINECTOMY SYNDROME: ICD-10-CM

## 2023-12-29 DIAGNOSIS — M54.16 LUMBAR RADICULOPATHY: Primary | ICD-10-CM

## 2023-12-29 PROCEDURE — G8417 CALC BMI ABV UP PARAM F/U: HCPCS | Performed by: PAIN MEDICINE

## 2023-12-29 PROCEDURE — 99203 OFFICE O/P NEW LOW 30 MIN: CPT | Performed by: PAIN MEDICINE

## 2023-12-29 PROCEDURE — G8482 FLU IMMUNIZE ORDER/ADMIN: HCPCS | Performed by: PAIN MEDICINE

## 2023-12-29 PROCEDURE — G8427 DOCREV CUR MEDS BY ELIG CLIN: HCPCS | Performed by: PAIN MEDICINE

## 2023-12-29 PROCEDURE — 3075F SYST BP GE 130 - 139MM HG: CPT | Performed by: PAIN MEDICINE

## 2023-12-29 PROCEDURE — 3078F DIAST BP <80 MM HG: CPT | Performed by: PAIN MEDICINE

## 2023-12-29 PROCEDURE — 1036F TOBACCO NON-USER: CPT | Performed by: PAIN MEDICINE

## 2023-12-29 PROCEDURE — 3017F COLORECTAL CA SCREEN DOC REV: CPT | Performed by: PAIN MEDICINE

## 2023-12-29 RX ORDER — OXYCODONE HYDROCHLORIDE AND ACETAMINOPHEN 5; 325 MG/1; MG/1
1 TABLET ORAL EVERY 6 HOURS PRN
Qty: 56 TABLET | Refills: 0 | Status: SHIPPED | OUTPATIENT
Start: 2023-12-29 | End: 2024-01-12

## 2023-12-29 RX ORDER — ALBUTEROL SULFATE 90 UG/1
2 AEROSOL, METERED RESPIRATORY (INHALATION) EVERY 6 HOURS PRN
Qty: 20.1 G | Refills: 4 | Status: SHIPPED | OUTPATIENT
Start: 2023-12-29

## 2023-12-29 NOTE — PROGRESS NOTES
History of Present Illness     Patient Identification  Ed Salcido is a 47 y.o. female. Patient information was obtained from patient. Chief Complaint   Chief Complaint   Patient presents with    Back Pain     Lower   Pt had spinal fusion earlier this year. Leg Pain     Left     Foot Pain     Numbness and tingling left foot (sometimes)       Patient presents with complaint of back pain. This is a result of no known injury. Onset of pain was 2 years ago and has been gradually worsening since. Pt had L4-5 fusion February  and a drain removal in march. The pain is located in left lower back, described as like an electric shock and rated as severe and 9 / 10, with radiation to left leg. Symptoms include new weakness, incontinence. The patient also Denied complains of fever, dysuria, weight loss, history of osteoporosis, history of steroid use, obesity, pregnancy. The patient denies numbness. The patient denies other injuries. Care prior to arrival consisted of PT and Surgery with no relief.     Past Medical History:   Diagnosis Date    Anxiety     Arthritis     Asthma     Bronchopneumonia     Cancer ([address] )     renal    Cerebral artery occlusion with cerebral infarction [facility])     Chronic bilateral low back pain with sciatica     Chronic kidney disease     Chronic obstructive lung disease ([address]) 7/26/2019    Depression     Fibromyalgia     Gout     rt knee    Hypertension     Insulin dependent type 2 diabetes mellitus, uncontrolled 8/3/2018    Localized enlarged lymph nodes 10/26/2018    Mixed headache     Pure hyperglyceridemia 5/19/2017    Sarcoidosis     Sleep apnea     does not wear cpap    Thyroid goiter      Family History   Problem Relation Age of Onset    Cancer Father     Diabetes Father     Allergy (Severe) Father     Heart Attack Father     Prostate Cancer Father     High Blood Pressure Mother     Diabetes Mother     Arthritis Mother     High Cholesterol Mother     Vision Loss Mother     Alcohol

## 2024-01-02 ENCOUNTER — TELEPHONE (OUTPATIENT)
Dept: PAIN MANAGEMENT | Age: 54
End: 2024-01-02

## 2024-01-02 NOTE — TELEPHONE ENCOUNTER
LUMBAR TFESI    NO AUTH REQUIRED    OK to schedule procedure approved as above.   Please note sides/levels approved and date range.   (If applicable, sides/levels approved may differ from those ordered)    TO BE SCHEDULED WITH DR QUEZADA

## 2024-01-03 ENCOUNTER — HOSPITAL ENCOUNTER (OUTPATIENT)
Dept: WOMENS IMAGING | Age: 54
Discharge: HOME | End: 2024-01-05
Payer: MEDICAID

## 2024-01-03 DIAGNOSIS — Z12.31 ENCOUNTER FOR SCREENING MAMMOGRAM FOR MALIGNANT NEOPLASM OF BREAST: ICD-10-CM

## 2024-01-03 PROCEDURE — 77063 BREAST TOMOSYNTHESIS BI: CPT

## 2024-01-04 ENCOUNTER — HOSPITAL ENCOUNTER (OUTPATIENT)
Dept: SLEEP CENTER | Age: 54
Discharge: HOME | End: 2024-01-06
Payer: MEDICAID

## 2024-01-04 DIAGNOSIS — G47.30 SLEEP APNEA, UNSPECIFIED TYPE: ICD-10-CM

## 2024-01-04 PROCEDURE — 95806 SLEEP STUDY UNATT&RESP EFFT: CPT

## 2024-01-09 DIAGNOSIS — E11.9 TYPE 2 DIABETES MELLITUS WITHOUT COMPLICATION, WITH LONG-TERM CURRENT USE OF INSULIN (MULTI): ICD-10-CM

## 2024-01-09 DIAGNOSIS — Z79.4 TYPE 2 DIABETES MELLITUS WITHOUT COMPLICATION, WITH LONG-TERM CURRENT USE OF INSULIN (MULTI): ICD-10-CM

## 2024-01-09 RX ORDER — BLOOD-GLUCOSE,RECEIVER,CONT
EACH MISCELLANEOUS
Qty: 1 EACH | Refills: 0 | Status: SHIPPED | OUTPATIENT
Start: 2024-01-09

## 2024-01-15 ENCOUNTER — PATIENT OUTREACH (OUTPATIENT)
Dept: PRIMARY CARE | Facility: CLINIC | Age: 54
End: 2024-01-15
Payer: MEDICAID

## 2024-01-15 NOTE — PROGRESS NOTES
CM left voicemail for pt on follow up call to assess any final questions or concerns regarding hospitalization. Contact info provided.

## 2024-01-17 DIAGNOSIS — Z79.4 TYPE 2 DIABETES MELLITUS WITH HYPERGLYCEMIA, WITH LONG-TERM CURRENT USE OF INSULIN (HCC): ICD-10-CM

## 2024-01-17 DIAGNOSIS — E11.65 TYPE 2 DIABETES MELLITUS WITH HYPERGLYCEMIA, WITH LONG-TERM CURRENT USE OF INSULIN (HCC): ICD-10-CM

## 2024-01-18 ENCOUNTER — HOSPITAL ENCOUNTER (EMERGENCY)
Facility: HOSPITAL | Age: 54
Discharge: HOME | End: 2024-01-18
Attending: STUDENT IN AN ORGANIZED HEALTH CARE EDUCATION/TRAINING PROGRAM
Payer: MEDICAID

## 2024-01-18 VITALS
OXYGEN SATURATION: 96 % | WEIGHT: 260 LBS | HEIGHT: 66 IN | BODY MASS INDEX: 41.78 KG/M2 | TEMPERATURE: 97.3 F | SYSTOLIC BLOOD PRESSURE: 126 MMHG | HEART RATE: 84 BPM | RESPIRATION RATE: 16 BRPM | DIASTOLIC BLOOD PRESSURE: 62 MMHG

## 2024-01-18 DIAGNOSIS — M79.605 PAIN OF LEFT LOWER EXTREMITY: Primary | ICD-10-CM

## 2024-01-18 PROCEDURE — 99283 EMERGENCY DEPT VISIT LOW MDM: CPT | Performed by: STUDENT IN AN ORGANIZED HEALTH CARE EDUCATION/TRAINING PROGRAM

## 2024-01-18 RX ORDER — INSULIN LISPRO 100 [IU]/ML
INJECTION, SOLUTION INTRAVENOUS; SUBCUTANEOUS
Qty: 102 ML | Refills: 3 | Status: SHIPPED | OUTPATIENT
Start: 2024-01-18

## 2024-01-18 RX ORDER — METHYLPREDNISOLONE 4 MG/1
TABLET ORAL
Qty: 21 TABLET | Refills: 0 | Status: SHIPPED | OUTPATIENT
Start: 2024-01-18 | End: 2024-01-25

## 2024-01-18 ASSESSMENT — LIFESTYLE VARIABLES
EVER FELT BAD OR GUILTY ABOUT YOUR DRINKING: NO
REASON UNABLE TO ASSESS: NO
HAVE YOU EVER FELT YOU SHOULD CUT DOWN ON YOUR DRINKING: NO
EVER HAD A DRINK FIRST THING IN THE MORNING TO STEADY YOUR NERVES TO GET RID OF A HANGOVER: NO
HAVE PEOPLE ANNOYED YOU BY CRITICIZING YOUR DRINKING: NO

## 2024-01-18 ASSESSMENT — PAIN DESCRIPTION - LOCATION: LOCATION: LEG

## 2024-01-18 ASSESSMENT — PAIN DESCRIPTION - DESCRIPTORS: DESCRIPTORS: BURNING;TINGLING

## 2024-01-18 ASSESSMENT — PAIN - FUNCTIONAL ASSESSMENT: PAIN_FUNCTIONAL_ASSESSMENT: 0-10

## 2024-01-18 ASSESSMENT — PAIN DESCRIPTION - ORIENTATION: ORIENTATION: LEFT

## 2024-01-18 ASSESSMENT — PAIN DESCRIPTION - PAIN TYPE: TYPE: ACUTE PAIN

## 2024-01-18 ASSESSMENT — PAIN DESCRIPTION - PROGRESSION: CLINICAL_PROGRESSION: NOT CHANGED

## 2024-01-18 ASSESSMENT — PAIN SCALES - GENERAL: PAINLEVEL_OUTOF10: 7

## 2024-01-19 DIAGNOSIS — M54.16 LUMBAR RADICULOPATHY: ICD-10-CM

## 2024-01-19 DIAGNOSIS — M96.1 LUMBAR POST-LAMINECTOMY SYNDROME: ICD-10-CM

## 2024-01-19 NOTE — ED PROVIDER NOTES
HPI   Chief Complaint   Patient presents with    Leg Pain     LEG PAIN FOR 2 DAYS THAT RESOLVED AND IS BACK       Patient is a 53-year-old female with a history of hypertension, diabetes, CKD, lumbar spinal fusion, fibromyalgia, renal cancer in remission, COPD, sarcoidosis who presents for leg pain.  Patient states she has had episodes of leg pain similar to this after her spinal surgery, however they resolved, as not had it for months until 2 days ago when she had an episode which resolved and then returned today.  No trauma.  No saddle anesthesia, urinary tension, bowel control problems, IV drug use, fevers, chills, leg edema, history of DVT or PE.  States the pain goes from her left hip down to her foot.  Tender to palpation.  Endorses mild tingling in her foot.                          No data recorded                Patient History   Past Medical History:   Diagnosis Date    Asthma     Bronchopneumonia 03/14/2022    COPD (chronic obstructive pulmonary disease) (CMS/[facility])     Diabetes mellitus (CMS/[facility])     Fibromyalgia     Hypertension     Kidney disease     Lab test positive for detection of COVID-19 virus 10/09/2023    Personal history of other diseases of the musculoskeletal system and connective tissue     History of fibromyalgia    Personal history of transient ischemic attack (TIA), and cerebral infarction without residual deficits     History of cerebrovascular accident     Past Surgical History:   Procedure Laterality Date    CT ANGIO NECK  10/18/2023    CT NECK ANGIO W AND WO IV CONTRAST 10/18/2023 [facility]    CT HEAD ANGIO W AND WO IV CONTRAST  10/18/2023    CT HEAD ANGIO W AND WO IV CONTRAST 10/18/2023 [facility]    OTHER SURGICAL HISTORY  10/17/2018    Thyroid Surgery Sub-Total Thyroidectomy    OTHER SURGICAL HISTORY  10/17/2018    Nephrectomy Right    OTHER SURGICAL HISTORY  10/31/2018    Thoracoscopy (Therapeutic) With Mediastinal And Regional Lymphadenectomy     Family History   Problem Relation Name  Age of Onset    Hypertension Mother      Stroke Other family     Diabetes Other family     Cancer Other family      Social History     Tobacco Use    Smoking status: Never    Smokeless tobacco: Never   Substance Use Topics    Alcohol use: Never    Drug use: Never       Physical Exam   ED Triage Vitals [01/18/24 2128]   Temp Heart Rate Resp BP   36.3 °C (97.3 °F) 84 16 126/62      SpO2 Temp Source Heart Rate Source Patient Position   96 % Temporal Monitor Sitting      BP Location FiO2 (%)     Left arm --       Physical Exam  Constitutional:       General: She is not in acute distress.  HENT:      Head: Normocephalic.   Eyes:      Extraocular Movements: Extraocular movements intact.      Conjunctiva/sclera: Conjunctivae normal.      Pupils: Pupils are equal, round, and reactive to light.   Cardiovascular:      Rate and Rhythm: Normal rate and regular rhythm.      Pulses: Normal pulses.      Heart sounds: Normal heart sounds.   Pulmonary:      Effort: Pulmonary effort is normal.      Breath sounds: Normal breath sounds.   Abdominal:      General: There is no distension.      Palpations: Abdomen is soft. There is no mass.      Tenderness: There is no abdominal tenderness. There is no guarding.   Musculoskeletal:         General: No deformity.      Cervical back: Normal range of motion and neck supple.      Right lower leg: No edema.      Left lower leg: No edema.      Comments: Tender palpation over left lateral leg, 2+ DP pulse.     Skin:     General: Skin is warm and dry.      Findings: No lesion or rash.   Neurological:      General: No focal deficit present.      Mental Status: She is alert and oriented to person, place, and time. Mental status is at baseline.      Cranial Nerves: No cranial nerve deficit.      Sensory: No sensory deficit.      Motor: No weakness.      Comments: Sensation to light touch intact over entire foot.   Psychiatric:         Mood and Affect: Mood normal.         ED Course & MDM   Diagnoses  as of 01/19/24 1130   Pain of left lower extremity       Medical Decision Making  Patient is a 53-year-old female with the above-stated past medical tree who presents for leg pain.  Patient has no trauma, low suspicion for fracture dislocation.  She has no red flag symptoms on her history or physical exam, low suspicion for spinal epidural abscess, cauda equina, cord compression syndrome.  Low suspicion for discitis.  Patient has no focal deficits on my exam.  She states she is able to walk without issue.  She has no edema in her legs, history of DVT or PE, I do not believe she requires further workup for DVT at this time.  She is clinically well-appearing nontoxic.  Believe her symptoms may be due to her fibromyalgia versus sciatica.  We discussed treatment options and she was amenable to a short course of steroids.  She states she knows to watch her blood sugars.  I discussed strict return precautions with her.  She stated understanding agreement.  Patient was discharged home in stable condition.    Disclaimer: This note was dictated using speech recognition software. Minor errors in transcription may be present. Please call if questions.     Geovani Montero MD  [facility] Emergency Medicine  Contact on [employer]        Problems Addressed:  Pain of left lower extremity: acute illness or injury        Procedure  Procedures     Geovani Montero MD  01/19/24 113

## 2024-01-21 DIAGNOSIS — E03.9 HYPOTHYROIDISM, UNSPECIFIED TYPE: ICD-10-CM

## 2024-01-22 RX ORDER — OXYCODONE HYDROCHLORIDE AND ACETAMINOPHEN 5; 325 MG/1; MG/1
1 TABLET ORAL EVERY 6 HOURS PRN
Qty: 56 TABLET | Refills: 0 | OUTPATIENT
Start: 2024-01-22 | End: 2024-02-05

## 2024-01-22 RX ORDER — LEVOTHYROXINE SODIUM 125 UG/1
125 TABLET ORAL DAILY
Qty: 90 TABLET | Refills: 1 | Status: SHIPPED | OUTPATIENT
Start: 2024-01-22

## 2024-01-24 DIAGNOSIS — M54.16 LUMBAR RADICULOPATHY: ICD-10-CM

## 2024-01-24 DIAGNOSIS — M96.1 LUMBAR POST-LAMINECTOMY SYNDROME: ICD-10-CM

## 2024-01-24 PROBLEM — G47.30 SLEEP APNEA: Status: ACTIVE | Noted: 2018-10-26

## 2024-01-24 RX ORDER — OXYCODONE HYDROCHLORIDE AND ACETAMINOPHEN 5; 325 MG/1; MG/1
1 TABLET ORAL EVERY 6 HOURS PRN
Qty: 56 TABLET | Refills: 0 | OUTPATIENT
Start: 2024-01-24 | End: 2024-02-07

## 2024-01-26 ENCOUNTER — OFFICE VISIT (OUTPATIENT)
Dept: PAIN MANAGEMENT | Age: 54
End: 2024-01-26
Payer: MEDICAID

## 2024-01-26 VITALS
DIASTOLIC BLOOD PRESSURE: 58 MMHG | HEIGHT: 63 IN | BODY MASS INDEX: 47.84 KG/M2 | WEIGHT: 270 LBS | SYSTOLIC BLOOD PRESSURE: 120 MMHG | HEART RATE: 106 BPM

## 2024-01-26 DIAGNOSIS — M96.1 LUMBAR POST-LAMINECTOMY SYNDROME: ICD-10-CM

## 2024-01-26 DIAGNOSIS — M54.16 LUMBAR RADICULOPATHY: ICD-10-CM

## 2024-01-26 DIAGNOSIS — R07.9 CHEST PAIN, UNSPECIFIED TYPE: ICD-10-CM

## 2024-01-26 PROCEDURE — 3017F COLORECTAL CA SCREEN DOC REV: CPT | Performed by: PAIN MEDICINE

## 2024-01-26 PROCEDURE — 3078F DIAST BP <80 MM HG: CPT | Performed by: PAIN MEDICINE

## 2024-01-26 PROCEDURE — G8427 DOCREV CUR MEDS BY ELIG CLIN: HCPCS | Performed by: PAIN MEDICINE

## 2024-01-26 PROCEDURE — G8482 FLU IMMUNIZE ORDER/ADMIN: HCPCS | Performed by: PAIN MEDICINE

## 2024-01-26 PROCEDURE — 1036F TOBACCO NON-USER: CPT | Performed by: PAIN MEDICINE

## 2024-01-26 PROCEDURE — 99213 OFFICE O/P EST LOW 20 MIN: CPT | Performed by: PAIN MEDICINE

## 2024-01-26 PROCEDURE — G8417 CALC BMI ABV UP PARAM F/U: HCPCS | Performed by: PAIN MEDICINE

## 2024-01-26 PROCEDURE — 3074F SYST BP LT 130 MM HG: CPT | Performed by: PAIN MEDICINE

## 2024-01-26 ASSESSMENT — ENCOUNTER SYMPTOMS
GASTROINTESTINAL NEGATIVE: 1
ALLERGIC/IMMUNOLOGIC NEGATIVE: 1
RESPIRATORY NEGATIVE: 1

## 2024-01-26 NOTE — PROGRESS NOTES
History of Present Illness     Patient Identification  Lucy Tariq is a 53 y.o. female.    Patient information was obtained from patient.      Chief Complaint   Chief Complaint   Patient presents with    Back Pain     Lower        Patient presents with complaint of back pain. This is a result of no known injury. Onset of pain was 2 years ago and has been gradually worsening since. Pt had L4-5 fusion February  and a drain removal in march. The pain is located in left lower back, described as like an electric shock and rated as severe and 9 / 10, with radiation to left leg. Symptoms include new weakness, incontinence. The patient also Denied complains of fever, dysuria, weight loss, history of osteoporosis, history of steroid use, obesity, pregnancy. The patient denies numbness. The patient denies other injuries. Care prior to arrival consisted of PT and Surgery with no relief.    Past Medical History:   Diagnosis Date    Anxiety     Arthritis     Asthma     Bronchopneumonia     Cancer (HCC)     renal    Cerebral artery occlusion with cerebral infarction (HCC)     Chronic bilateral low back pain with sciatica     Chronic kidney disease     Chronic obstructive lung disease (HCC) 7/26/2019    Depression     Fibromyalgia     Gout     rt knee    Hypertension     Insulin dependent type 2 diabetes mellitus, uncontrolled 8/3/2018    Localized enlarged lymph nodes 10/26/2018    Mixed headache     Pure hyperglyceridemia 5/19/2017    Sarcoidosis     Sleep apnea     does not wear cpap    Thyroid goiter      Family History   Problem Relation Age of Onset    Cancer Father     Diabetes Father     Allergy (Severe) Father     Heart Attack Father     Prostate Cancer Father     High Blood Pressure Mother     Diabetes Mother     Arthritis Mother     High Cholesterol Mother     Vision Loss Mother     Alcohol Abuse Neg Hx     Anemia Neg Hx     Arrhythmia Neg Hx     Asthma Neg Hx     Atrial Fibrillation Neg Hx     Birth Defects Neg

## 2024-01-29 ENCOUNTER — TELEPHONE (OUTPATIENT)
Dept: PRIMARY CARE CLINIC | Age: 54
End: 2024-01-29

## 2024-01-29 ENCOUNTER — TELEPHONE (OUTPATIENT)
Dept: PAIN MANAGEMENT | Age: 54
End: 2024-01-29

## 2024-01-29 ENCOUNTER — OFFICE VISIT (OUTPATIENT)
Dept: PRIMARY CARE CLINIC | Age: 54
End: 2024-01-29
Payer: MEDICAID

## 2024-01-29 VITALS
SYSTOLIC BLOOD PRESSURE: 108 MMHG | OXYGEN SATURATION: 97 % | HEIGHT: 63 IN | TEMPERATURE: 98.7 F | BODY MASS INDEX: 49.43 KG/M2 | DIASTOLIC BLOOD PRESSURE: 62 MMHG | RESPIRATION RATE: 18 BRPM | WEIGHT: 279 LBS | HEART RATE: 87 BPM

## 2024-01-29 DIAGNOSIS — M17.10 ARTHRITIS OF KNEE: ICD-10-CM

## 2024-01-29 DIAGNOSIS — E11.65 TYPE 2 DIABETES MELLITUS WITH HYPERGLYCEMIA, WITH LONG-TERM CURRENT USE OF INSULIN (HCC): ICD-10-CM

## 2024-01-29 DIAGNOSIS — M70.62 TROCHANTERIC BURSITIS OF LEFT HIP: Primary | ICD-10-CM

## 2024-01-29 DIAGNOSIS — Z12.31 ENCOUNTER FOR SCREENING MAMMOGRAM FOR MALIGNANT NEOPLASM OF BREAST: ICD-10-CM

## 2024-01-29 DIAGNOSIS — Z79.4 TYPE 2 DIABETES MELLITUS WITH HYPERGLYCEMIA, WITH LONG-TERM CURRENT USE OF INSULIN (HCC): ICD-10-CM

## 2024-01-29 DIAGNOSIS — R10.9 ABDOMINAL CRAMPING: ICD-10-CM

## 2024-01-29 DIAGNOSIS — J30.1 ACUTE SEASONAL ALLERGIC RHINITIS DUE TO POLLEN: ICD-10-CM

## 2024-01-29 PROCEDURE — 99214 OFFICE O/P EST MOD 30 MIN: CPT | Performed by: INTERNAL MEDICINE

## 2024-01-29 PROCEDURE — G8427 DOCREV CUR MEDS BY ELIG CLIN: HCPCS | Performed by: INTERNAL MEDICINE

## 2024-01-29 PROCEDURE — 3017F COLORECTAL CA SCREEN DOC REV: CPT | Performed by: INTERNAL MEDICINE

## 2024-01-29 PROCEDURE — 1036F TOBACCO NON-USER: CPT | Performed by: INTERNAL MEDICINE

## 2024-01-29 PROCEDURE — 2022F DILAT RTA XM EVC RTNOPTHY: CPT | Performed by: INTERNAL MEDICINE

## 2024-01-29 PROCEDURE — G8417 CALC BMI ABV UP PARAM F/U: HCPCS | Performed by: INTERNAL MEDICINE

## 2024-01-29 PROCEDURE — 3046F HEMOGLOBIN A1C LEVEL >9.0%: CPT | Performed by: INTERNAL MEDICINE

## 2024-01-29 PROCEDURE — G8482 FLU IMMUNIZE ORDER/ADMIN: HCPCS | Performed by: INTERNAL MEDICINE

## 2024-01-29 PROCEDURE — 3078F DIAST BP <80 MM HG: CPT | Performed by: INTERNAL MEDICINE

## 2024-01-29 PROCEDURE — 3074F SYST BP LT 130 MM HG: CPT | Performed by: INTERNAL MEDICINE

## 2024-01-29 RX ORDER — LANOLIN ALCOHOL/MO/W.PET/CERES
400 CREAM (GRAM) TOPICAL DAILY
Qty: 90 TABLET | Refills: 3 | Status: SHIPPED | OUTPATIENT
Start: 2024-01-29

## 2024-01-29 RX ORDER — CETIRIZINE HYDROCHLORIDE 10 MG/1
10 TABLET ORAL DAILY
Qty: 90 TABLET | Refills: 3 | Status: SHIPPED | OUTPATIENT
Start: 2024-01-29

## 2024-01-29 ASSESSMENT — PATIENT HEALTH QUESTIONNAIRE - PHQ9
1. LITTLE INTEREST OR PLEASURE IN DOING THINGS: 0
9. THOUGHTS THAT YOU WOULD BE BETTER OFF DEAD, OR OF HURTING YOURSELF: 0
SUM OF ALL RESPONSES TO PHQ QUESTIONS 1-9: 0
3. TROUBLE FALLING OR STAYING ASLEEP: 0
5. POOR APPETITE OR OVEREATING: 0
2. FEELING DOWN, DEPRESSED OR HOPELESS: 0
SUM OF ALL RESPONSES TO PHQ QUESTIONS 1-9: 0
SUM OF ALL RESPONSES TO PHQ QUESTIONS 1-9: 0
7. TROUBLE CONCENTRATING ON THINGS, SUCH AS READING THE NEWSPAPER OR WATCHING TELEVISION: 0
SUM OF ALL RESPONSES TO PHQ9 QUESTIONS 1 & 2: 0
10. IF YOU CHECKED OFF ANY PROBLEMS, HOW DIFFICULT HAVE THESE PROBLEMS MADE IT FOR YOU TO DO YOUR WORK, TAKE CARE OF THINGS AT HOME, OR GET ALONG WITH OTHER PEOPLE: 0
6. FEELING BAD ABOUT YOURSELF - OR THAT YOU ARE A FAILURE OR HAVE LET YOURSELF OR YOUR FAMILY DOWN: 0
8. MOVING OR SPEAKING SO SLOWLY THAT OTHER PEOPLE COULD HAVE NOTICED. OR THE OPPOSITE, BEING SO FIGETY OR RESTLESS THAT YOU HAVE BEEN MOVING AROUND A LOT MORE THAN USUAL: 0
SUM OF ALL RESPONSES TO PHQ QUESTIONS 1-9: 0
4. FEELING TIRED OR HAVING LITTLE ENERGY: 0

## 2024-01-29 ASSESSMENT — ENCOUNTER SYMPTOMS
COUGH: 0
DIARRHEA: 0
NAUSEA: 0
VOMITING: 0
WHEEZING: 0
ABDOMINAL PAIN: 0
SHORTNESS OF BREATH: 0

## 2024-01-29 NOTE — PROGRESS NOTES
Subjective:      Patient ID: Lucy Tariq is a 53 y.o. female    Hip pain x 2 weeks   HPI  Pt presents with 2 weeks of outer left hip pain: burning, rated 10/10, shooting down the side of the thigh. Hx lumbar spine surgery 9 months ago, self-DCed Percocet. Partial improvement on APAP.     Pain in front of the left knee is sharp, worse with weight-bearing. Hx   Hemoglobin A1C (%)   Date Value   09/28/2023 6.3 (A)   06/25/2023 7.0 (A)   05/25/2023 7.6 (H)   12/08/2022 7.8   07/27/2021 8.2 (H)     Past Medical History:   Diagnosis Date    Anxiety     Arthritis     Asthma     Bronchopneumonia     Cancer (HCC)     renal    Cerebral artery occlusion with cerebral infarction (HCC)     Chronic bilateral low back pain with sciatica     Chronic kidney disease     Chronic obstructive lung disease (HCC) 7/26/2019    Depression     Fibromyalgia     Gout     rt knee    Hypertension     Insulin dependent type 2 diabetes mellitus, uncontrolled 8/3/2018    Localized enlarged lymph nodes 10/26/2018    Mixed headache     Pure hyperglyceridemia 5/19/2017    Sarcoidosis     Sleep apnea     does not wear cpap    Thyroid goiter      Past Surgical History:   Procedure Laterality Date    BRONCHOSCOPY  10/26/2018    DR. STEARNS    KIDNEY REMOVAL Right 08/2016    KIDNEY REMOVAL Right 2016    LUNG BIOPSY Right 10/2018    SPINAL FUSION      THYROID LOBECTOMY Right 06/13/2014    THYROIDECTOMY  02/21/2019    DR. MAGDALENO    THYROIDECTOMY  2018    URETER STENT PLACEMENT Left 08/2016     Social History     Socioeconomic History    Marital status: Legally      Spouse name: Not on file    Number of children: Not on file    Years of education: 12    Highest education level: High school graduate   Occupational History    Not on file   Tobacco Use    Smoking status: Former     Current packs/day: 0.00     Average packs/day: 0.5 packs/day for 15.0 years (7.5 ttl pk-yrs)     Types: Cigarettes     Start date: 8/20/2014     Quit date: 3/1/2015

## 2024-01-29 NOTE — TELEPHONE ENCOUNTER
Tried to call [phone] and the phone call could not go through. Received a message to please check the phone dialed and try my call again

## 2024-01-31 DIAGNOSIS — Z79.4 TYPE 2 DIABETES MELLITUS WITH HYPERGLYCEMIA, WITH LONG-TERM CURRENT USE OF INSULIN (HCC): ICD-10-CM

## 2024-01-31 DIAGNOSIS — E11.65 TYPE 2 DIABETES MELLITUS WITH HYPERGLYCEMIA, WITH LONG-TERM CURRENT USE OF INSULIN (HCC): ICD-10-CM

## 2024-01-31 RX ORDER — PEN NEEDLE, DIABETIC 31 GX5/16"
1 NEEDLE, DISPOSABLE MISCELLANEOUS 3 TIMES DAILY
Qty: 100 EACH | Refills: 5 | Status: SHIPPED | OUTPATIENT
Start: 2024-01-31

## 2024-02-16 RX ORDER — INSULIN GLARGINE 300 U/ML
INJECTION, SOLUTION SUBCUTANEOUS
Qty: 12 ADJUSTABLE DOSE PRE-FILLED PEN SYRINGE | Refills: 10 | Status: CANCELLED | OUTPATIENT
Start: 2024-02-16

## 2024-02-22 NOTE — PROGRESS NOTES
REMOVAL Right 2016    KIDNEY REMOVAL Right 2016    LUNG BIOPSY Right 10/2018    SPINAL FUSION      THYROID LOBECTOMY Right 2014    THYROIDECTOMY  2019    DR. MAGDALENO    THYROIDECTOMY  2018    URETER STENT PLACEMENT Left 2016       Social History:    Social History     Socioeconomic History    Marital status: Legally      Spouse name: Not on file    Number of children: Not on file    Years of education: 12    Highest education level: High school graduate   Occupational History    Not on file   Tobacco Use    Smoking status: Former     Current packs/day: 0.00     Average packs/day: 0.5 packs/day for 15.0 years (7.5 ttl pk-yrs)     Types: Cigarettes     Start date: 2014     Quit date: 3/1/2015     Years since quittin.9    Smokeless tobacco: Never   Vaping Use    Vaping Use: Never used   Substance and Sexual Activity    Alcohol use: Not Currently     Alcohol/week: 0.0 standard drinks of alcohol     Comment: occasionally    Drug use: Not Currently     Frequency: 5.0 times per week     Types: Marijuana (Weed)    Sexual activity: Yes     Partners: Male   Other Topics Concern    Not on file   Social History Narrative    Not on file     Social Determinants of Health     Financial Resource Strain: Low Risk  (2023)    Overall Financial Resource Strain (CARDIA)     Difficulty of Paying Living Expenses: Not hard at all   Food Insecurity: Not on file (2023)   Transportation Needs: Unknown (2023)    PRAPARE - Transportation     Lack of Transportation (Medical): Not on file     Lack of Transportation (Non-Medical): No   Physical Activity: Inactive (2021)    Exercise Vital Sign     Days of Exercise per Week: 0 days     Minutes of Exercise per Session: 0 min   Stress: Stress Concern Present (2021)    [place] [employer] - Occupational Stress Questionnaire     Feeling of Stress : Very much   Social Connections: Socially Isolated (2021)    Social

## 2024-02-23 ENCOUNTER — OFFICE VISIT (OUTPATIENT)
Dept: ORTHOPEDIC SURGERY | Age: 54
End: 2024-02-23
Payer: MEDICAID

## 2024-02-23 VITALS
HEIGHT: 63 IN | TEMPERATURE: 98.7 F | OXYGEN SATURATION: 98 % | WEIGHT: 279 LBS | BODY MASS INDEX: 49.43 KG/M2 | HEART RATE: 87 BPM

## 2024-02-23 DIAGNOSIS — J30.9 ALLERGIC RHINITIS, UNSPECIFIED SEASONALITY, UNSPECIFIED TRIGGER: ICD-10-CM

## 2024-02-23 DIAGNOSIS — M54.16 LUMBAR RADICULOPATHY: Primary | ICD-10-CM

## 2024-02-23 PROCEDURE — G8427 DOCREV CUR MEDS BY ELIG CLIN: HCPCS | Performed by: ORTHOPAEDIC SURGERY

## 2024-02-23 PROCEDURE — G8482 FLU IMMUNIZE ORDER/ADMIN: HCPCS | Performed by: ORTHOPAEDIC SURGERY

## 2024-02-23 PROCEDURE — 99244 OFF/OP CNSLTJ NEW/EST MOD 40: CPT | Performed by: ORTHOPAEDIC SURGERY

## 2024-02-23 PROCEDURE — G8417 CALC BMI ABV UP PARAM F/U: HCPCS | Performed by: ORTHOPAEDIC SURGERY

## 2024-02-23 RX ORDER — PREDNISONE 10 MG/1
10 TABLET ORAL DAILY
Qty: 20 TABLET | Refills: 0 | Status: SHIPPED | OUTPATIENT
Start: 2024-02-23 | End: 2024-03-06

## 2024-02-23 ASSESSMENT — ENCOUNTER SYMPTOMS
ABDOMINAL PAIN: 0
SHORTNESS OF BREATH: 0
EYE PAIN: 0
EYE ITCHING: 0
EYE DISCHARGE: 0
CONSTIPATION: 0
DIARRHEA: 0
COUGH: 0

## 2024-02-26 ENCOUNTER — TELEPHONE (OUTPATIENT)
Dept: PRIMARY CARE CLINIC | Age: 54
End: 2024-02-26

## 2024-02-26 DIAGNOSIS — F43.23 ADJUSTMENT DISORDER WITH MIXED ANXIETY AND DEPRESSED MOOD: ICD-10-CM

## 2024-02-26 RX ORDER — BUPROPION HYDROCHLORIDE 150 MG/1
150 TABLET ORAL EVERY MORNING
Qty: 60 TABLET | Refills: 3 | Status: SHIPPED | OUTPATIENT
Start: 2024-02-26

## 2024-02-26 RX ORDER — MONTELUKAST SODIUM 10 MG/1
10 TABLET ORAL NIGHTLY
Qty: 90 TABLET | Refills: 1 | Status: SHIPPED | OUTPATIENT
Start: 2024-02-26

## 2024-02-26 NOTE — TELEPHONE ENCOUNTER
buPROPion (WELLBUTRIN XL) 150 MG extended release tablet       [id number] - [place], [place] - [address] [facility] [phone] - F [phone]  [address] [facility] [address]  Phone: [phone]  Fax: [phone]

## 2024-03-06 NOTE — CONSULTS
Service:   Service: Pulmonology     Consult:  Consult requested by (Attending Name): Caron Sheppard   Reason: Pulmonary sarcoidosis     History of Present Illness:   HPI:    HPI:    AL LOYA is a 53 year old Female with past medical history of pulmonary and cutaneous sarcoidosis, asthma, diabetes mellitus,  hypertension, fibromyalgia renal cell carcinoma status post right nephrectomy, TUYET, CKD, TIA.      Patient presented to the ER for evaluation of chest pain and shortness of breath.  Her symptoms were worsening over 2 days.  Started to have cough overnight and in the morning noticed some blood after coughing.  It was 1 episode, very small amount of  blood has subsided since then.  She described her chest pain as soreness all over similar to her prior sarcoidosis flareup.  The pain is worse with cough and deep inspiration.  She denied nausea, vomiting, diarrhea, fever.  She described dyspnea with  exertion.  No recent travel, sick contact, and no chest trauma.  She did not follow-up with her pulmonologist for more than a year.    ER course: Patient was awake, alert, oriented, in no acute distress.  She was hemodynamically stable.  Labs shows no leukocytosis.  Patient does have creatinine level of 1.2 which is consistent with her chronic kidney disease secondary to single kidney.   Troponin was marginally high but was negative on repeat.  X-ray did report evidence of reactive airway disease.  CT scan reported negative for pulmonary embolism.  Patient is admitted to medicine for chest pain and possible sarcoidosis flareup.      Review of system: 10/10 points review of system were conducted, negative except as above.    Past medical history: As above  Past surgical history: Right total nephrectomy, thyroidectomy, spinal lumbar fusion surgery.    Social history: Denies smoking, abnormal alcohol use or illicit drug use.    Family history: Reviewed with the patient, noncontributory.    Comorbidities:    Comorbidites:  ·  Comorbid Conditions As per HPI     Family History:   ·   Family history of malignant neoplasm:   ·   Family history of diabetes mellitus:   ·   Family history of cerebrovascular accident (CVA):   ·   Family history of hypertension:     Social History:   Social History:  Smoking Status former smoker (2)   Alcohol Use denies(2)   Drug Use denies (2)   Drug 2 Use denies (2)             Result 27-Sep-2023 02:15:00  26-Sep-2023 21:13:00    Troponin I, High Sensitivity 11   14    H        Complete Blood Count + Differential  26-Sep-2023 21:13:00      Result Value    White Blood Cell Count  6.9    Red Blood Cell Count  5.07    HGB  15.8    HCT  47.4   H   MCV  93    MCHC  33.3    PLT  268    RDW-CV  13.1    Neutrophil %  60.5    Immature Granulocytes %  0.4    Lymphocyte %  27.6    Monocyte %  7.7    Eosinophil %  2.9    Basophil %  0.9    Neutrophil Count  4.19    Lymphocyte Count  1.91    Monocyte Count  0.53    Eosinophil Count  0.20    Basophil Count  0.06      Comprehensive Metabolic Panel  26-Sep-2023 21:13:00      Result Value    Glucose, Serum  104   H   NA  137    K  4.0    CL  106    Bicarbonate, Serum  27    Anion Gap, Serum  8   L   BUN  13    CREAT  1.28   H   GFR Female  50   A   Calcium, Serum  8.9    ALB  3.8    ALKP  140   H   T Pro  7.2    T Bili  0.4    Alanine Aminotransferase, Serum  15    Aspartate Transaminase, Serum  15      PT + INR, Plasma  26-Sep-2023 21:13:00      Result Value    Prothrombin Time, Plasma  12.8    International Normalized Ratio, Plasma  1.1      Brain Natriuretic Peptide  26-Sep-2023 21:13:00      Result Value    Brain Natriuretic Peptide  14        Radiology Results:    Results:        Impression:    No pulmonary embolism to the segmental level. No acute  cardiopulmonary process.        MACRO:  None     TH CT Angio Chest for PE [Sep 27 2023  1:50AM]      Impression:    1. Mild diffuse increase in central perihilar markings and bronchial  wall thickening  suggestive of viral or reactive airways disease.  Recommend clinical correlation and follow-up to document resolution..        Xray Chest 2 View PA + Lateral [Sep 26 2023  7:51PM]      Impression:    1.  No acute fracture or dislocation.  2.  Degenerative changes, as described above.     Xray Knee 3 View [Sep 23 2023  1:14PM]      Impression:    No acute pulmonary embolus to the segmental level.     No CT evidence for an acute intrathoracic process.        TH CT Angio Chest for PE [Sep 13 2023 12:47AM]      Impression:    No acute intracranial hemorrhage, mass effect or midline shift.        CT Head without Contrast [Sep 13 2023 12:43AM]      Review Family/Social History and ROS:   Family History:  ·   Family history of malignant neoplasm:   ·   Family history of diabetes mellitus:   ·   Family history of cerebrovascular accident (CVA):   ·   Family history of hypertension:     Social History:    Smoking Status: former smoker  (1)   Alcohol Use: denies (1)   Drug Use: denies  (1)   Drug 2 Use: denies  (1)            Allergies:  ·  ibuprofen : Other  ·  penicillin : Facial Swelling  ·  Shellfish : Angioedema  ·  Seafood : Angioedema       Intolerances:  ·  Toradol : Nausea/Vomiting      Assessment:    Impression 1: Chest pain   Plan for Impression 1:  Chest pain for 2 days worsening with deep inspiration and cough likely related to inflamed airways associated with cough and 1 episode of hemoptysis.  CT scan ruled out PE.  Concerning for sarcoidosis flareup.  We will treat as acute bronchitis and pleuritic chest pain for now.  -Obtain ESR and C-reactive protein inflammatory markers.  -Start patient on IV steroid Solu-Medrol.  -Also start patient on azithromycin  -As needed DuoNeb  -Monitor respiratory status     Impression 2:  History of sarcoidosis   Plan for Impression 2:  She reported was not following up with her pulmonologist for 1 year because she had no symptoms.  We will ask pulmonary evaluation while in the  hospital  -Rest of management as above   Impression 3:  History of asthma   Plan for Impression 3:  Continue with bronchodilator DuoNeb as above   Impression 4:  Diabetes mellitus   Plan for Impression 4:  We will use insulin corrective scale   Impression 5:  Anxiety and depression   Plan for Impression 5:  Continue BuSpar   Impression 6:  Chronic back pain   Plan for Impression 6:  History of surgery of the lumbar spine.  Resume home medication Lyrica and Percocet   Impression 7:  Hypertension   Plan for Impression 7:  Continue losartan   Impression 8:  Hypothyroidism   Plan for Impression 8:  Status post thyroidectomy  Resume levothyroxine   Impression 9:  Chronic kidney disease   Plan for Impression 9:  History of right renal cell carcinoma status post right nephrectomy.  We will try to avoid nephrotoxic medication       Comments:-Patient known to me from remote evaluation at this facility.  Known to have sarcoidosis I believe diagnosed by lung biopsy bronchoscopically a few  years ago at this facility.  Patient went on to receive day steroid course over few months initially.  She thinks sarcoidosis only is affecting her lungs and skin.  She had a right nephrectomy from May kidney cancer in the past.  She has a 30-pack-year  remote smoking history known to have COPD as well as asthma.  She previously saw Dr. HANSON a pulmonologist in [facility].  Currently here with pleuritic chest pain and 1 brief episode of hemoptysis CT scan is not concerning for PE.  Clinically this could  be sarcoid flareup.  Agree with steroids and bronchodilators and antibiotics as ordered.  Okay to send patient home on steroid taper and oral antibiotics.  Will follow-up in the office in couple of weeks.  Thank you for this referral plan of care discussed  with Dr. Allen      Consult Status:  Consult Status    (select all that apply): initial  consult complete, will not follow, call as needed   Consult Order ID: [id number]       Electronic  "Signatures:  Jason Hernandez)  (Signed 28-Sep-2023 12:06)   Authored: Service, History of Present Illness, Review  Family/Social History and ROS, Allergies, Assessment/Recommendations, Note Completion      Last Updated: 28-Sep-2023 12:06 by Jason Hernandez)    References:  1.  Data Referenced From \"Patient Profile - Adult v2\" 27-Sep-2023 15:59   "

## 2024-03-13 DIAGNOSIS — Z79.4 TYPE 2 DIABETES MELLITUS WITH HYPERGLYCEMIA, WITH LONG-TERM CURRENT USE OF INSULIN (HCC): ICD-10-CM

## 2024-03-13 DIAGNOSIS — E11.65 TYPE 2 DIABETES MELLITUS WITH HYPERGLYCEMIA, WITH LONG-TERM CURRENT USE OF INSULIN (HCC): ICD-10-CM

## 2024-03-14 ENCOUNTER — APPOINTMENT (OUTPATIENT)
Dept: GENERAL RADIOLOGY | Age: 54
End: 2024-03-14
Payer: MEDICAID

## 2024-03-14 ENCOUNTER — HOSPITAL ENCOUNTER (EMERGENCY)
Age: 54
Discharge: HOME | End: 2024-03-14
Attending: EMERGENCY MEDICINE
Payer: MEDICAID

## 2024-03-14 VITALS
WEIGHT: 270 LBS | OXYGEN SATURATION: 99 % | TEMPERATURE: 97.9 F | HEART RATE: 69 BPM | BODY MASS INDEX: 47.84 KG/M2 | SYSTOLIC BLOOD PRESSURE: 97 MMHG | HEIGHT: 63 IN | DIASTOLIC BLOOD PRESSURE: 61 MMHG | RESPIRATION RATE: 18 BRPM

## 2024-03-14 DIAGNOSIS — R07.2 PRECORDIAL PAIN: ICD-10-CM

## 2024-03-14 DIAGNOSIS — R07.89 ATYPICAL CHEST PAIN: Primary | ICD-10-CM

## 2024-03-14 LAB
ALBUMIN SERPL-MCNC: 3.9 G/DL (ref 3.5–4.6)
ALP SERPL-CCNC: 208 U/L (ref 40–130)
ALT SERPL-CCNC: 19 U/L (ref 0–33)
ANION GAP SERPL CALCULATED.3IONS-SCNC: 11 MEQ/L (ref 9–15)
APTT PPP: 31.4 SEC (ref 24.4–36.8)
AST SERPL-CCNC: 20 U/L (ref 0–35)
BASOPHILS # BLD: 0 K/UL (ref 0–0.2)
BASOPHILS NFR BLD: 0.5 %
BILIRUB SERPL-MCNC: 0.3 MG/DL (ref 0.2–0.7)
BUN SERPL-MCNC: 12 MG/DL (ref 6–20)
CALCIUM SERPL-MCNC: 9.1 MG/DL (ref 8.5–9.9)
CHLORIDE SERPL-SCNC: 104 MEQ/L (ref 95–107)
CO2 SERPL-SCNC: 26 MEQ/L (ref 20–31)
CREAT SERPL-MCNC: 1.41 MG/DL (ref 0.5–0.9)
D DIMER PPP FEU-MCNC: 0.39 MG/L FEU (ref 0–0.5)
EOSINOPHIL # BLD: 0.2 K/UL (ref 0–0.7)
EOSINOPHIL NFR BLD: 2.3 %
ERYTHROCYTE [DISTWIDTH] IN BLOOD BY AUTOMATED COUNT: 13.3 % (ref 11.5–14.5)
GLOBULIN SER CALC-MCNC: 3.5 G/DL (ref 2.3–3.5)
GLUCOSE SERPL-MCNC: 161 MG/DL (ref 70–99)
HCT VFR BLD AUTO: 48.6 % (ref 37–47)
HGB BLD-MCNC: 15.5 G/DL (ref 12–16)
INR PPP: 1.1
LIPASE SERPL-CCNC: 41 U/L (ref 12–95)
LYMPHOCYTES # BLD: 2.2 K/UL (ref 1–4.8)
LYMPHOCYTES NFR BLD: 26 %
MCH RBC QN AUTO: 30.6 PG (ref 27–31.3)
MCHC RBC AUTO-ENTMCNC: 31.9 % (ref 33–37)
MCV RBC AUTO: 96 FL (ref 79.4–94.8)
MONOCYTES # BLD: 0.5 K/UL (ref 0.2–0.8)
MONOCYTES NFR BLD: 6.5 %
NEUTROPHILS # BLD: 5.3 K/UL (ref 1.4–6.5)
NEUTS SEG NFR BLD: 64.3 %
PLATELET # BLD AUTO: 277 K/UL (ref 130–400)
POTASSIUM SERPL-SCNC: 4.3 MEQ/L (ref 3.4–4.9)
PROT SERPL-MCNC: 7.4 G/DL (ref 6.3–8)
PROTHROMBIN TIME: 14.1 SEC (ref 12.3–14.9)
RBC # BLD AUTO: 5.06 M/UL (ref 4.2–5.4)
SODIUM SERPL-SCNC: 141 MEQ/L (ref 135–144)
TROPONIN, HIGH SENSITIVITY: 18 NG/L (ref 0–19)
TROPONIN, HIGH SENSITIVITY: 23 NG/L (ref 0–19)
WBC # BLD AUTO: 8 K/UL (ref 4.8–10.8)

## 2024-03-14 PROCEDURE — 83690 ASSAY OF LIPASE: CPT

## 2024-03-14 PROCEDURE — 99285 EMERGENCY DEPT VISIT HI MDM: CPT

## 2024-03-14 PROCEDURE — 85610 PROTHROMBIN TIME: CPT

## 2024-03-14 PROCEDURE — 85025 COMPLETE CBC W/AUTO DIFF WBC: CPT

## 2024-03-14 PROCEDURE — 36415 COLL VENOUS BLD VENIPUNCTURE: CPT

## 2024-03-14 PROCEDURE — 85730 THROMBOPLASTIN TIME PARTIAL: CPT

## 2024-03-14 PROCEDURE — 84484 ASSAY OF TROPONIN QUANT: CPT

## 2024-03-14 PROCEDURE — 96374 THER/PROPH/DIAG INJ IV PUSH: CPT

## 2024-03-14 PROCEDURE — 96375 TX/PRO/DX INJ NEW DRUG ADDON: CPT

## 2024-03-14 PROCEDURE — 6360000002 HC RX W HCPCS: Performed by: EMERGENCY MEDICINE

## 2024-03-14 PROCEDURE — 85379 FIBRIN DEGRADATION QUANT: CPT

## 2024-03-14 PROCEDURE — 71045 X-RAY EXAM CHEST 1 VIEW: CPT

## 2024-03-14 PROCEDURE — 80053 COMPREHEN METABOLIC PANEL: CPT

## 2024-03-14 RX ORDER — MORPHINE SULFATE 4 MG/ML
4 INJECTION, SOLUTION INTRAMUSCULAR; INTRAVENOUS
Status: COMPLETED | OUTPATIENT
Start: 2024-03-14 | End: 2024-03-14

## 2024-03-14 RX ORDER — PEN NEEDLE, DIABETIC 31 GX5/16"
1 NEEDLE, DISPOSABLE MISCELLANEOUS 3 TIMES DAILY
Qty: 100 EACH | Refills: 5 | Status: SHIPPED | OUTPATIENT
Start: 2024-03-14

## 2024-03-14 RX ORDER — FENTANYL CITRATE 50 UG/ML
100 INJECTION, SOLUTION INTRAMUSCULAR; INTRAVENOUS ONCE
Status: COMPLETED | OUTPATIENT
Start: 2024-03-14 | End: 2024-03-14

## 2024-03-14 RX ADMIN — MORPHINE SULFATE 4 MG: 4 INJECTION, SOLUTION INTRAMUSCULAR; INTRAVENOUS at 19:50

## 2024-03-14 RX ADMIN — FENTANYL CITRATE 100 MCG: 50 INJECTION INTRAMUSCULAR; INTRAVENOUS at 21:23

## 2024-03-14 ASSESSMENT — ENCOUNTER SYMPTOMS
VOMITING: 0
SORE THROAT: 0
ABDOMINAL DISTENTION: 0
SHORTNESS OF BREATH: 1
DIARRHEA: 0
BACK PAIN: 1
RHINORRHEA: 0
NAUSEA: 0
COUGH: 1
ABDOMINAL PAIN: 1

## 2024-03-14 ASSESSMENT — PAIN DESCRIPTION - DESCRIPTORS
DESCRIPTORS: PRESSURE
DESCRIPTORS: HEAVINESS;PRESSURE

## 2024-03-14 ASSESSMENT — LIFESTYLE VARIABLES
HOW MANY STANDARD DRINKS CONTAINING ALCOHOL DO YOU HAVE ON A TYPICAL DAY: PATIENT DOES NOT DRINK
HOW OFTEN DO YOU HAVE A DRINK CONTAINING ALCOHOL: NEVER

## 2024-03-14 ASSESSMENT — PAIN SCALES - GENERAL
PAINLEVEL_OUTOF10: 9
PAINLEVEL_OUTOF10: 9
PAINLEVEL_OUTOF10: 10

## 2024-03-14 ASSESSMENT — PAIN DESCRIPTION - LOCATION
LOCATION: CHEST

## 2024-03-14 ASSESSMENT — PAIN - FUNCTIONAL ASSESSMENT: PAIN_FUNCTIONAL_ASSESSMENT: 0-10

## 2024-03-14 NOTE — ED PROVIDER NOTES
[facility] ED  EMERGENCY DEPARTMENT ENCOUNTER      Pt Name: Lucy Tariq  MRN: [id number]  Birthdate 1970  Date of evaluation: 3/14/2024  Provider: Luis Bates MD  7:22 PM EDT    CHIEF COMPLAINT       Chief Complaint   Patient presents with    Chest Pain     Pt presents to ED due to chest pain since yesterday although today has worsened         HISTORY OF PRESENT ILLNESS   (Location/Symptom, Timing/Onset, Context/Setting, Quality, Duration, Modifying Factors, Severity)  Note limiting factors.   Lucy Tariq is a 53 y.o. female who presents to the emergency department     The patient is a 53-year-old female who presents with sharp stabbing low anterior chest pain radiating through to her back which began yesterday resolved and has returned again today.  The patient presented for a similar episode 3 months ago without cardiac etiology being found and was diagnosed with chest wall pain.          Nursing Notes were reviewed.    REVIEW OF SYSTEMS    (2-9 systems for level 4, 10 or more for level 5)     Review of Systems   Constitutional:  Negative for chills, fatigue and fever.   HENT:  Negative for congestion, postnasal drip, rhinorrhea and sore throat.    Respiratory:  Positive for cough (minimal dry) and shortness of breath (mild).    Cardiovascular:  Positive for chest pain. Negative for palpitations and leg swelling.   Gastrointestinal:  Positive for abdominal pain (epigastric). Negative for abdominal distention, diarrhea, nausea and vomiting.   Genitourinary:  Negative for dysuria and frequency.   Musculoskeletal:  Positive for back pain. Negative for arthralgias, myalgias, neck pain and neck stiffness.   Skin:  Negative for rash.   Neurological:  Negative for light-headedness and headaches.       Except as noted above the remainder of the review of systems was reviewed and negative.       PAST MEDICAL HISTORY     Past Medical History:   Diagnosis Date    Anxiety     Arthritis

## 2024-03-14 NOTE — ED NOTES
Two attempts for IV access, unsuccessful. Another RN at bedside with ultrasound to attempt line placement

## 2024-03-15 LAB
EKG ATRIAL RATE: 70 BPM
EKG ATRIAL RATE: 85 BPM
EKG P AXIS: 29 DEGREES
EKG P AXIS: 41 DEGREES
EKG P-R INTERVAL: 150 MS
EKG P-R INTERVAL: 164 MS
EKG Q-T INTERVAL: 356 MS
EKG Q-T INTERVAL: 382 MS
EKG QRS DURATION: 80 MS
EKG QRS DURATION: 82 MS
EKG QTC CALCULATION (BAZETT): 412 MS
EKG QTC CALCULATION (BAZETT): 423 MS
EKG R AXIS: 2 DEGREES
EKG R AXIS: 3 DEGREES
EKG T AXIS: 35 DEGREES
EKG T AXIS: 5 DEGREES
EKG VENTRICULAR RATE: 70 BPM
EKG VENTRICULAR RATE: 85 BPM

## 2024-03-15 NOTE — DISCHARGE INSTRUCTIONS
Follow up with your pain management physician or at [facility] for more detailed evaluation to attempt to determine a diagnosis for your chronic chest pains.

## 2024-03-20 ENCOUNTER — OFFICE VISIT (OUTPATIENT)
Dept: PRIMARY CARE CLINIC | Age: 54
End: 2024-03-20
Payer: MEDICAID

## 2024-03-20 VITALS
WEIGHT: 286 LBS | TEMPERATURE: 98.5 F | HEART RATE: 89 BPM | OXYGEN SATURATION: 98 % | BODY MASS INDEX: 50.66 KG/M2 | SYSTOLIC BLOOD PRESSURE: 102 MMHG | DIASTOLIC BLOOD PRESSURE: 60 MMHG

## 2024-03-20 DIAGNOSIS — Z23 NEED FOR PNEUMOCOCCAL VACCINATION: ICD-10-CM

## 2024-03-20 DIAGNOSIS — R10.11 CHRONIC RUQ PAIN: ICD-10-CM

## 2024-03-20 DIAGNOSIS — R07.9 CHRONIC CHEST PAIN: Primary | ICD-10-CM

## 2024-03-20 DIAGNOSIS — G89.29 CHRONIC CHEST PAIN: Primary | ICD-10-CM

## 2024-03-20 DIAGNOSIS — Z98.890 HISTORY OF LUNG BIOPSY: ICD-10-CM

## 2024-03-20 DIAGNOSIS — G89.29 CHRONIC RUQ PAIN: ICD-10-CM

## 2024-03-20 PROCEDURE — G8482 FLU IMMUNIZE ORDER/ADMIN: HCPCS | Performed by: INTERNAL MEDICINE

## 2024-03-20 PROCEDURE — G8417 CALC BMI ABV UP PARAM F/U: HCPCS | Performed by: INTERNAL MEDICINE

## 2024-03-20 PROCEDURE — 90471 IMMUNIZATION ADMIN: CPT | Performed by: INTERNAL MEDICINE

## 2024-03-20 PROCEDURE — 99214 OFFICE O/P EST MOD 30 MIN: CPT | Performed by: INTERNAL MEDICINE

## 2024-03-20 PROCEDURE — 3017F COLORECTAL CA SCREEN DOC REV: CPT | Performed by: INTERNAL MEDICINE

## 2024-03-20 PROCEDURE — 3078F DIAST BP <80 MM HG: CPT | Performed by: INTERNAL MEDICINE

## 2024-03-20 PROCEDURE — G8427 DOCREV CUR MEDS BY ELIG CLIN: HCPCS | Performed by: INTERNAL MEDICINE

## 2024-03-20 PROCEDURE — 1036F TOBACCO NON-USER: CPT | Performed by: INTERNAL MEDICINE

## 2024-03-20 PROCEDURE — 90677 PCV20 VACCINE IM: CPT | Performed by: INTERNAL MEDICINE

## 2024-03-20 PROCEDURE — 3074F SYST BP LT 130 MM HG: CPT | Performed by: INTERNAL MEDICINE

## 2024-03-20 SDOH — ECONOMIC STABILITY: FOOD INSECURITY: WITHIN THE PAST 12 MONTHS, YOU WORRIED THAT YOUR FOOD WOULD RUN OUT BEFORE YOU GOT MONEY TO BUY MORE.: NEVER TRUE

## 2024-03-20 SDOH — ECONOMIC STABILITY: FOOD INSECURITY: WITHIN THE PAST 12 MONTHS, THE FOOD YOU BOUGHT JUST DIDN'T LAST AND YOU DIDN'T HAVE MONEY TO GET MORE.: NEVER TRUE

## 2024-03-20 SDOH — ECONOMIC STABILITY: INCOME INSECURITY: HOW HARD IS IT FOR YOU TO PAY FOR THE VERY BASICS LIKE FOOD, HOUSING, MEDICAL CARE, AND HEATING?: NOT HARD AT ALL

## 2024-03-20 ASSESSMENT — PATIENT HEALTH QUESTIONNAIRE - PHQ9
3. TROUBLE FALLING OR STAYING ASLEEP: NOT AT ALL
4. FEELING TIRED OR HAVING LITTLE ENERGY: NOT AT ALL
SUM OF ALL RESPONSES TO PHQ9 QUESTIONS 1 & 2: 0
6. FEELING BAD ABOUT YOURSELF - OR THAT YOU ARE A FAILURE OR HAVE LET YOURSELF OR YOUR FAMILY DOWN: NOT AT ALL
SUM OF ALL RESPONSES TO PHQ QUESTIONS 1-9: 0
9. THOUGHTS THAT YOU WOULD BE BETTER OFF DEAD, OR OF HURTING YOURSELF: NOT AT ALL
SUM OF ALL RESPONSES TO PHQ QUESTIONS 1-9: 0
2. FEELING DOWN, DEPRESSED OR HOPELESS: NOT AT ALL
SUM OF ALL RESPONSES TO PHQ QUESTIONS 1-9: 0
SUM OF ALL RESPONSES TO PHQ9 QUESTIONS 1 & 2: 0
1. LITTLE INTEREST OR PLEASURE IN DOING THINGS: NOT AT ALL
10. IF YOU CHECKED OFF ANY PROBLEMS, HOW DIFFICULT HAVE THESE PROBLEMS MADE IT FOR YOU TO DO YOUR WORK, TAKE CARE OF THINGS AT HOME, OR GET ALONG WITH OTHER PEOPLE: NOT DIFFICULT AT ALL
SUM OF ALL RESPONSES TO PHQ QUESTIONS 1-9: 0
7. TROUBLE CONCENTRATING ON THINGS, SUCH AS READING THE NEWSPAPER OR WATCHING TELEVISION: NOT AT ALL
SUM OF ALL RESPONSES TO PHQ QUESTIONS 1-9: 0
2. FEELING DOWN, DEPRESSED OR HOPELESS: NOT AT ALL
SUM OF ALL RESPONSES TO PHQ QUESTIONS 1-9: 0
5. POOR APPETITE OR OVEREATING: NOT AT ALL
1. LITTLE INTEREST OR PLEASURE IN DOING THINGS: NOT AT ALL
8. MOVING OR SPEAKING SO SLOWLY THAT OTHER PEOPLE COULD HAVE NOTICED. OR THE OPPOSITE, BEING SO FIGETY OR RESTLESS THAT YOU HAVE BEEN MOVING AROUND A LOT MORE THAN USUAL: NOT AT ALL

## 2024-03-20 NOTE — PROGRESS NOTES
Subjective:      Patient ID: Lucy Tariq is a 53 y.o. female      Chest pain x 6 yrs   HPI  Pt presents with 6 yrs of intermittent sharp right chest pain rated 10/10, radiating to the right upper/mid back. No known aggravating or alleviating factors. Pain started after she completed a right lung biopsy for sarcoidosis 6 yrs ago by Dr. Stearns.   Pain is unrelated to breathing. No cough, SOB or wheezing. Cardiac or PE work up negative.    Past Medical History:   Diagnosis Date    Anxiety     Arthritis     Asthma     Bronchopneumonia     Cancer (HCC)     renal    Cerebral artery occlusion with cerebral infarction (HCC)     Chronic bilateral low back pain with sciatica     Chronic kidney disease     Chronic obstructive lung disease (HCC) 2019    Depression     Fibromyalgia     Gout     rt knee    Hypertension     Insulin dependent type 2 diabetes mellitus, uncontrolled 8/3/2018    Localized enlarged lymph nodes 10/26/2018    Mixed headache     Pure hyperglyceridemia 2017    Sarcoidosis     Sleep apnea     does not wear cpap    Thyroid goiter      Past Surgical History:   Procedure Laterality Date    BRONCHOSCOPY  10/26/2018    DR. STEARNS    KIDNEY REMOVAL Right 2016    KIDNEY REMOVAL Right 2016    LUNG BIOPSY Right 10/2018    SPINAL FUSION      THYROID LOBECTOMY Right 2014    THYROIDECTOMY  2019    DR. MAGDALENO    THYROIDECTOMY  2018    URETER STENT PLACEMENT Left 2016     Social History     Socioeconomic History    Marital status: Legally      Spouse name: Not on file    Number of children: Not on file    Years of education: 12    Highest education level: High school graduate   Occupational History    Not on file   Tobacco Use    Smoking status: Former     Current packs/day: 0.00     Average packs/day: 0.5 packs/day for 15.0 years (7.5 ttl pk-yrs)     Types: Cigarettes     Start date: 2014     Quit date: 3/1/2015     Years since quittin.0    Smokeless tobacco: Never

## 2024-03-21 ASSESSMENT — ENCOUNTER SYMPTOMS
SINUS PAIN: 0
RHINORRHEA: 0
COUGH: 0
NAUSEA: 0
ABDOMINAL PAIN: 0
WHEEZING: 0
VOMITING: 0
DIARRHEA: 0
SHORTNESS OF BREATH: 0
SINUS PRESSURE: 0

## 2024-03-26 ENCOUNTER — APPOINTMENT (OUTPATIENT)
Dept: RADIOLOGY | Facility: HOSPITAL | Age: 54
End: 2024-03-26
Payer: MEDICAID

## 2024-03-26 ENCOUNTER — HOSPITAL ENCOUNTER (OUTPATIENT)
Facility: HOSPITAL | Age: 54
Setting detail: OBSERVATION
Discharge: HOME | End: 2024-03-27
Attending: EMERGENCY MEDICINE | Admitting: STUDENT IN AN ORGANIZED HEALTH CARE EDUCATION/TRAINING PROGRAM
Payer: MEDICAID

## 2024-03-26 ENCOUNTER — APPOINTMENT (OUTPATIENT)
Dept: CARDIOLOGY | Facility: HOSPITAL | Age: 54
End: 2024-03-26
Payer: MEDICAID

## 2024-03-26 DIAGNOSIS — R42 DIZZINESS: Primary | ICD-10-CM

## 2024-03-26 DIAGNOSIS — R42 VERTIGO: ICD-10-CM

## 2024-03-26 DIAGNOSIS — Z74.09 DECREASED AMBULATION STATUS: ICD-10-CM

## 2024-03-26 LAB
ALBUMIN SERPL BCP-MCNC: 3.6 G/DL (ref 3.4–5)
ALP SERPL-CCNC: 157 U/L (ref 33–110)
ALT SERPL W P-5'-P-CCNC: 14 U/L (ref 7–45)
AMPHETAMINES UR QL SCN: ABNORMAL
ANION GAP SERPL CALC-SCNC: 9 MMOL/L (ref 10–20)
AST SERPL W P-5'-P-CCNC: 15 U/L (ref 9–39)
ATRIAL RATE: 70 BPM
BARBITURATES UR QL SCN: ABNORMAL
BASOPHILS # BLD AUTO: 0.05 X10*3/UL (ref 0–0.1)
BASOPHILS NFR BLD AUTO: 0.6 %
BENZODIAZ UR QL SCN: ABNORMAL
BILIRUB SERPL-MCNC: 0.4 MG/DL (ref 0–1.2)
BUN SERPL-MCNC: 12 MG/DL (ref 6–23)
BZE UR QL SCN: ABNORMAL
CALCIUM SERPL-MCNC: 8.8 MG/DL (ref 8.6–10.3)
CANNABINOIDS UR QL SCN: ABNORMAL
CARDIAC TROPONIN I PNL SERPL HS: 11 NG/L (ref 0–13)
CHLORIDE SERPL-SCNC: 108 MMOL/L (ref 98–107)
CO2 SERPL-SCNC: 25 MMOL/L (ref 21–32)
CREAT SERPL-MCNC: 1.31 MG/DL (ref 0.5–1.05)
EGFRCR SERPLBLD CKD-EPI 2021: 49 ML/MIN/1.73M*2
EOSINOPHIL # BLD AUTO: 0.25 X10*3/UL (ref 0–0.7)
EOSINOPHIL NFR BLD AUTO: 2.9 %
ERYTHROCYTE [DISTWIDTH] IN BLOOD BY AUTOMATED COUNT: 13 % (ref 11.5–14.5)
FENTANYL+NORFENTANYL UR QL SCN: ABNORMAL
GLUCOSE BLD MANUAL STRIP-MCNC: 125 MG/DL (ref 74–99)
GLUCOSE BLD MANUAL STRIP-MCNC: 171 MG/DL (ref 74–99)
GLUCOSE BLD MANUAL STRIP-MCNC: 211 MG/DL (ref 74–99)
GLUCOSE SERPL-MCNC: 147 MG/DL (ref 74–99)
HCT VFR BLD AUTO: 44.1 % (ref 36–46)
HGB BLD-MCNC: 14.7 G/DL (ref 12–16)
HOLD SPECIMEN: NORMAL
HOLD SPECIMEN: NORMAL
IMM GRANULOCYTES # BLD AUTO: 0.02 X10*3/UL (ref 0–0.7)
IMM GRANULOCYTES NFR BLD AUTO: 0.2 % (ref 0–0.9)
INR PPP: 1.1 (ref 0.9–1.1)
LYMPHOCYTES # BLD AUTO: 2.56 X10*3/UL (ref 1.2–4.8)
LYMPHOCYTES NFR BLD AUTO: 30.1 %
MAGNESIUM SERPL-MCNC: 1.89 MG/DL (ref 1.6–2.4)
MCH RBC QN AUTO: 31.7 PG (ref 26–34)
MCHC RBC AUTO-ENTMCNC: 33.3 G/DL (ref 32–36)
MCV RBC AUTO: 95 FL (ref 80–100)
METHADONE UR QL SCN: ABNORMAL
MONOCYTES # BLD AUTO: 0.49 X10*3/UL (ref 0.1–1)
MONOCYTES NFR BLD AUTO: 5.8 %
NEUTROPHILS # BLD AUTO: 5.14 X10*3/UL (ref 1.2–7.7)
NEUTROPHILS NFR BLD AUTO: 60.4 %
NRBC BLD-RTO: 0 /100 WBCS (ref 0–0)
OPIATES UR QL SCN: ABNORMAL
OXYCODONE+OXYMORPHONE UR QL SCN: ABNORMAL
P AXIS: 30 DEGREES
P OFFSET: 190 MS
P ONSET: 144 MS
PCP UR QL SCN: ABNORMAL
PLATELET # BLD AUTO: 242 X10*3/UL (ref 150–450)
POTASSIUM SERPL-SCNC: 3.9 MMOL/L (ref 3.5–5.3)
PR INTERVAL: 154 MS
PROT SERPL-MCNC: 6.7 G/DL (ref 6.4–8.2)
PROTHROMBIN TIME: 12.4 SECONDS (ref 9.8–12.8)
Q ONSET: 221 MS
QRS COUNT: 12 BEATS
QRS DURATION: 82 MS
QT INTERVAL: 382 MS
QTC CALCULATION(BAZETT): 412 MS
QTC FREDERICIA: 402 MS
R AXIS: 59 DEGREES
RBC # BLD AUTO: 4.64 X10*6/UL (ref 4–5.2)
SODIUM SERPL-SCNC: 138 MMOL/L (ref 136–145)
T AXIS: -32 DEGREES
T OFFSET: 412 MS
VENTRICULAR RATE: 70 BPM
WBC # BLD AUTO: 8.5 X10*3/UL (ref 4.4–11.3)

## 2024-03-26 PROCEDURE — 2550000001 HC RX 255 CONTRASTS: Performed by: EMERGENCY MEDICINE

## 2024-03-26 PROCEDURE — 85610 PROTHROMBIN TIME: CPT | Performed by: PHYSICIAN ASSISTANT

## 2024-03-26 PROCEDURE — 2500000002 HC RX 250 W HCPCS SELF ADMINISTERED DRUGS (ALT 637 FOR MEDICARE OP, ALT 636 FOR OP/ED): Performed by: STUDENT IN AN ORGANIZED HEALTH CARE EDUCATION/TRAINING PROGRAM

## 2024-03-26 PROCEDURE — 99232 SBSQ HOSP IP/OBS MODERATE 35: CPT | Performed by: STUDENT IN AN ORGANIZED HEALTH CARE EDUCATION/TRAINING PROGRAM

## 2024-03-26 PROCEDURE — 2500000001 HC RX 250 WO HCPCS SELF ADMINISTERED DRUGS (ALT 637 FOR MEDICARE OP): Performed by: PHYSICIAN ASSISTANT

## 2024-03-26 PROCEDURE — 70496 CT ANGIOGRAPHY HEAD: CPT | Performed by: RADIOLOGY

## 2024-03-26 PROCEDURE — 93005 ELECTROCARDIOGRAM TRACING: CPT

## 2024-03-26 PROCEDURE — G0378 HOSPITAL OBSERVATION PER HR: HCPCS

## 2024-03-26 PROCEDURE — 2500000001 HC RX 250 WO HCPCS SELF ADMINISTERED DRUGS (ALT 637 FOR MEDICARE OP): Performed by: STUDENT IN AN ORGANIZED HEALTH CARE EDUCATION/TRAINING PROGRAM

## 2024-03-26 PROCEDURE — 36415 COLL VENOUS BLD VENIPUNCTURE: CPT | Performed by: PHYSICIAN ASSISTANT

## 2024-03-26 PROCEDURE — 80053 COMPREHEN METABOLIC PANEL: CPT | Performed by: PHYSICIAN ASSISTANT

## 2024-03-26 PROCEDURE — 82947 ASSAY GLUCOSE BLOOD QUANT: CPT | Mod: 59

## 2024-03-26 PROCEDURE — 84484 ASSAY OF TROPONIN QUANT: CPT | Performed by: PHYSICIAN ASSISTANT

## 2024-03-26 PROCEDURE — 2500000004 HC RX 250 GENERAL PHARMACY W/ HCPCS (ALT 636 FOR OP/ED): Performed by: PHYSICIAN ASSISTANT

## 2024-03-26 PROCEDURE — 96361 HYDRATE IV INFUSION ADD-ON: CPT

## 2024-03-26 PROCEDURE — 99222 1ST HOSP IP/OBS MODERATE 55: CPT | Performed by: STUDENT IN AN ORGANIZED HEALTH CARE EDUCATION/TRAINING PROGRAM

## 2024-03-26 PROCEDURE — 80307 DRUG TEST PRSMV CHEM ANLYZR: CPT | Performed by: PHYSICIAN ASSISTANT

## 2024-03-26 PROCEDURE — 99285 EMERGENCY DEPT VISIT HI MDM: CPT | Mod: 25

## 2024-03-26 PROCEDURE — 70498 CT ANGIOGRAPHY NECK: CPT | Performed by: RADIOLOGY

## 2024-03-26 PROCEDURE — 70498 CT ANGIOGRAPHY NECK: CPT

## 2024-03-26 PROCEDURE — 2500000002 HC RX 250 W HCPCS SELF ADMINISTERED DRUGS (ALT 637 FOR MEDICARE OP, ALT 636 FOR OP/ED): Performed by: PHYSICIAN ASSISTANT

## 2024-03-26 PROCEDURE — 83735 ASSAY OF MAGNESIUM: CPT | Performed by: PHYSICIAN ASSISTANT

## 2024-03-26 PROCEDURE — 96375 TX/PRO/DX INJ NEW DRUG ADDON: CPT

## 2024-03-26 PROCEDURE — 70551 MRI BRAIN STEM W/O DYE: CPT

## 2024-03-26 PROCEDURE — 2500000004 HC RX 250 GENERAL PHARMACY W/ HCPCS (ALT 636 FOR OP/ED): Performed by: STUDENT IN AN ORGANIZED HEALTH CARE EDUCATION/TRAINING PROGRAM

## 2024-03-26 PROCEDURE — 96374 THER/PROPH/DIAG INJ IV PUSH: CPT

## 2024-03-26 PROCEDURE — 85025 COMPLETE CBC W/AUTO DIFF WBC: CPT | Performed by: PHYSICIAN ASSISTANT

## 2024-03-26 PROCEDURE — 70551 MRI BRAIN STEM W/O DYE: CPT | Performed by: RADIOLOGY

## 2024-03-26 RX ORDER — DEXTROSE 50 % IN WATER (D50W) INTRAVENOUS SYRINGE
12.5
Status: DISCONTINUED | OUTPATIENT
Start: 2024-03-26 | End: 2024-03-27 | Stop reason: HOSPADM

## 2024-03-26 RX ORDER — ONDANSETRON HYDROCHLORIDE 2 MG/ML
4 INJECTION, SOLUTION INTRAVENOUS EVERY 8 HOURS PRN
Status: DISCONTINUED | OUTPATIENT
Start: 2024-03-26 | End: 2024-03-27 | Stop reason: HOSPADM

## 2024-03-26 RX ORDER — MORPHINE SULFATE 4 MG/ML
4 INJECTION, SOLUTION INTRAMUSCULAR; INTRAVENOUS ONCE
Status: COMPLETED | OUTPATIENT
Start: 2024-03-26 | End: 2024-03-26

## 2024-03-26 RX ORDER — DIAZEPAM 5 MG/1
5 TABLET ORAL ONCE
Status: COMPLETED | OUTPATIENT
Start: 2024-03-26 | End: 2024-03-26

## 2024-03-26 RX ORDER — TALC
3 POWDER (GRAM) TOPICAL NIGHTLY PRN
Status: DISCONTINUED | OUTPATIENT
Start: 2024-03-26 | End: 2024-03-27 | Stop reason: HOSPADM

## 2024-03-26 RX ORDER — INSULIN LISPRO 100 [IU]/ML
0-10 INJECTION, SOLUTION INTRAVENOUS; SUBCUTANEOUS
Status: DISCONTINUED | OUTPATIENT
Start: 2024-03-26 | End: 2024-03-27 | Stop reason: HOSPADM

## 2024-03-26 RX ORDER — MECLIZINE HCL 12.5 MG 12.5 MG/1
25 TABLET ORAL EVERY 6 HOURS PRN
Status: DISCONTINUED | OUTPATIENT
Start: 2024-03-26 | End: 2024-03-27 | Stop reason: HOSPADM

## 2024-03-26 RX ORDER — SODIUM CHLORIDE, SODIUM LACTATE, POTASSIUM CHLORIDE, CALCIUM CHLORIDE 600; 310; 30; 20 MG/100ML; MG/100ML; MG/100ML; MG/100ML
100 INJECTION, SOLUTION INTRAVENOUS CONTINUOUS
Status: ACTIVE | OUTPATIENT
Start: 2024-03-26 | End: 2024-03-26

## 2024-03-26 RX ORDER — DEXTROSE 50 % IN WATER (D50W) INTRAVENOUS SYRINGE
25
Status: DISCONTINUED | OUTPATIENT
Start: 2024-03-26 | End: 2024-03-27 | Stop reason: HOSPADM

## 2024-03-26 RX ORDER — ONDANSETRON HYDROCHLORIDE 2 MG/ML
4 INJECTION, SOLUTION INTRAVENOUS ONCE
Status: COMPLETED | OUTPATIENT
Start: 2024-03-26 | End: 2024-03-26

## 2024-03-26 RX ORDER — LORAZEPAM 1 MG/1
1 TABLET ORAL ONCE AS NEEDED
Status: COMPLETED | OUTPATIENT
Start: 2024-03-26 | End: 2024-03-26

## 2024-03-26 RX ORDER — ENOXAPARIN SODIUM 100 MG/ML
40 INJECTION SUBCUTANEOUS EVERY 12 HOURS SCHEDULED
Status: DISCONTINUED | OUTPATIENT
Start: 2024-03-26 | End: 2024-03-27 | Stop reason: HOSPADM

## 2024-03-26 RX ORDER — MECLIZINE HCL 12.5 MG 12.5 MG/1
25 TABLET ORAL ONCE
Status: COMPLETED | OUTPATIENT
Start: 2024-03-26 | End: 2024-03-26

## 2024-03-26 RX ORDER — MECLIZINE HCL 12.5 MG 12.5 MG/1
25 TABLET ORAL EVERY 6 HOURS SCHEDULED
Status: DISCONTINUED | OUTPATIENT
Start: 2024-03-26 | End: 2024-03-27 | Stop reason: HOSPADM

## 2024-03-26 RX ORDER — POLYETHYLENE GLYCOL 3350 17 G/17G
17 POWDER, FOR SOLUTION ORAL DAILY
Status: DISCONTINUED | OUTPATIENT
Start: 2024-03-26 | End: 2024-03-27 | Stop reason: HOSPADM

## 2024-03-26 RX ORDER — ONDANSETRON 4 MG/1
4 TABLET, FILM COATED ORAL EVERY 8 HOURS PRN
Status: DISCONTINUED | OUTPATIENT
Start: 2024-03-26 | End: 2024-03-27 | Stop reason: HOSPADM

## 2024-03-26 RX ADMIN — POLYETHYLENE GLYCOL 3350 17 G: 17 POWDER, FOR SOLUTION ORAL at 09:00

## 2024-03-26 RX ADMIN — IOHEXOL 75 ML: 350 INJECTION, SOLUTION INTRAVENOUS at 04:08

## 2024-03-26 RX ADMIN — MECLIZINE HCL 12.5 MG 25 MG: 12.5 TABLET ORAL at 03:40

## 2024-03-26 RX ADMIN — DIAZEPAM 5 MG: 5 TABLET ORAL at 05:14

## 2024-03-26 RX ADMIN — LORAZEPAM 1 MG: 1 TABLET ORAL at 21:13

## 2024-03-26 RX ADMIN — INSULIN LISPRO 2 UNITS: 100 INJECTION, SOLUTION INTRAVENOUS; SUBCUTANEOUS at 17:00

## 2024-03-26 RX ADMIN — MECLIZINE HYDROCHLORIDE 25 MG: 12.5 TABLET ORAL at 23:23

## 2024-03-26 RX ADMIN — ONDANSETRON 4 MG: 2 INJECTION INTRAMUSCULAR; INTRAVENOUS at 03:40

## 2024-03-26 RX ADMIN — INSULIN LISPRO 4 UNITS: 100 INJECTION, SOLUTION INTRAVENOUS; SUBCUTANEOUS at 12:00

## 2024-03-26 RX ADMIN — MECLIZINE HYDROCHLORIDE 25 MG: 12.5 TABLET ORAL at 08:30

## 2024-03-26 RX ADMIN — MORPHINE SULFATE 4 MG: 4 INJECTION, SOLUTION INTRAMUSCULAR; INTRAVENOUS at 03:40

## 2024-03-26 RX ADMIN — SODIUM CHLORIDE 1000 ML: 9 INJECTION, SOLUTION INTRAVENOUS at 03:40

## 2024-03-26 RX ADMIN — MECLIZINE HYDROCHLORIDE 25 MG: 12.5 TABLET ORAL at 14:30

## 2024-03-26 RX ADMIN — MECLIZINE HYDROCHLORIDE 25 MG: 12.5 TABLET ORAL at 18:16

## 2024-03-26 RX ADMIN — SODIUM CHLORIDE, SODIUM LACTATE, POTASSIUM CHLORIDE, AND CALCIUM CHLORIDE 100 ML/HR: 600; 310; 30; 20 INJECTION, SOLUTION INTRAVENOUS at 05:55

## 2024-03-26 SDOH — SOCIAL STABILITY: SOCIAL INSECURITY: DOES ANYONE TRY TO KEEP YOU FROM HAVING/CONTACTING OTHER FRIENDS OR DOING THINGS OUTSIDE YOUR HOME?: NO

## 2024-03-26 SDOH — SOCIAL STABILITY: SOCIAL INSECURITY: ARE THERE ANY APPARENT SIGNS OF INJURIES/BEHAVIORS THAT COULD BE RELATED TO ABUSE/NEGLECT?: NO

## 2024-03-26 SDOH — SOCIAL STABILITY: SOCIAL INSECURITY: HAVE YOU HAD THOUGHTS OF HARMING ANYONE ELSE?: NO

## 2024-03-26 SDOH — SOCIAL STABILITY: SOCIAL INSECURITY: ARE YOU OR HAVE YOU BEEN THREATENED OR ABUSED PHYSICALLY, EMOTIONALLY, OR SEXUALLY BY ANYONE?: NO

## 2024-03-26 SDOH — SOCIAL STABILITY: SOCIAL INSECURITY: DO YOU FEEL UNSAFE GOING BACK TO THE PLACE WHERE YOU ARE LIVING?: NO

## 2024-03-26 SDOH — SOCIAL STABILITY: SOCIAL INSECURITY: DO YOU FEEL ANYONE HAS EXPLOITED OR TAKEN ADVANTAGE OF YOU FINANCIALLY OR OF YOUR PERSONAL PROPERTY?: NO

## 2024-03-26 SDOH — SOCIAL STABILITY: SOCIAL INSECURITY: HAS ANYONE EVER THREATENED TO HURT YOUR FAMILY OR YOUR PETS?: NO

## 2024-03-26 SDOH — SOCIAL STABILITY: SOCIAL INSECURITY: ABUSE: ADULT

## 2024-03-26 ASSESSMENT — LIFESTYLE VARIABLES
HOW OFTEN DO YOU HAVE 6 OR MORE DRINKS ON ONE OCCASION: NEVER
SKIP TO QUESTIONS 9-10: 1
HAVE PEOPLE ANNOYED YOU BY CRITICIZING YOUR DRINKING: NO
HOW OFTEN DURING THE LAST YEAR HAVE YOU FOUND THAT YOU WERE NOT ABLE TO STOP DRINKING ONCE YOU HAD STARTED: NEVER
SUBSTANCE_ABUSE_PAST_12_MONTHS: NO
AUDIT-C TOTAL SCORE: 1
HAVE YOU OR SOMEONE ELSE BEEN INJURED AS A RESULT OF YOUR DRINKING: NO
AUDIT TOTAL SCORE: 0
HOW OFTEN DURING THE LAST YEAR HAVE YOU HAD A FEELING OF GUILT OR REMORSE AFTER DRINKING: NEVER
HOW MANY STANDARD DRINKS CONTAINING ALCOHOL DO YOU HAVE ON A TYPICAL DAY: 1 OR 2
PRESCIPTION_ABUSE_PAST_12_MONTHS: NO
AUDIT-C TOTAL SCORE: 1
EVER FELT BAD OR GUILTY ABOUT YOUR DRINKING: NO
HOW OFTEN DO YOU HAVE 6 OR MORE DRINKS ON ONE OCCASION: NEVER
HOW MANY STANDARD DRINKS CONTAINING ALCOHOL DO YOU HAVE ON A TYPICAL DAY: PATIENT DOES NOT DRINK
HAS A RELATIVE, FRIEND, DOCTOR, OR ANOTHER HEALTH PROFESSIONAL EXPRESSED CONCERN ABOUT YOUR DRINKING OR SUGGESTED YOU CUT DOWN: NO
AUDIT-C TOTAL SCORE: 1
SUBSTANCE_ABUSE_PAST_12_MONTHS: NO
AUDIT TOTAL SCORE: 1
EVER HAD A DRINK FIRST THING IN THE MORNING TO STEADY YOUR NERVES TO GET RID OF A HANGOVER: NO
AUDIT-C TOTAL SCORE: 1
SKIP TO QUESTIONS 9-10: 1
HOW OFTEN DURING THE LAST YEAR HAVE YOU BEEN UNABLE TO REMEMBER WHAT HAPPENED THE NIGHT BEFORE BECAUSE YOU HAD BEEN DRINKING: NEVER
HAVE YOU EVER FELT YOU SHOULD CUT DOWN ON YOUR DRINKING: NO
HOW OFTEN DO YOU HAVE A DRINK CONTAINING ALCOHOL: MONTHLY OR LESS
HOW OFTEN DO YOU HAVE A DRINK CONTAINING ALCOHOL: MONTHLY OR LESS
TOTAL SCORE: 0
HOW OFTEN DURING THE LAST YEAR HAVE YOU NEEDED AN ALCOHOLIC DRINK FIRST THING IN THE MORNING TO GET YOURSELF GOING AFTER A NIGHT OF HEAVY DRINKING: NEVER
HOW OFTEN DURING THE LAST YEAR HAVE YOU FAILED TO DO WHAT WAS NORMALLY EXPECTED FROM YOU BECAUSE OF DRINKING: NEVER
PRESCIPTION_ABUSE_PAST_12_MONTHS: NO

## 2024-03-26 ASSESSMENT — ACTIVITIES OF DAILY LIVING (ADL)
JUDGMENT_ADEQUATE_SAFELY_COMPLETE_DAILY_ACTIVITIES: YES
PATIENT'S MEMORY ADEQUATE TO SAFELY COMPLETE DAILY ACTIVITIES?: YES
DRESSING YOURSELF: INDEPENDENT
TOILETING: INDEPENDENT
HEARING - RIGHT EAR: FUNCTIONAL
BATHING: INDEPENDENT
WALKS IN HOME: INDEPENDENT
HEARING - LEFT EAR: FUNCTIONAL
BATHING: INDEPENDENT
JUDGMENT_ADEQUATE_SAFELY_COMPLETE_DAILY_ACTIVITIES: YES
FEEDING YOURSELF: INDEPENDENT
WALKS IN HOME: INDEPENDENT
TOILETING: INDEPENDENT
GROOMING: INDEPENDENT
ADEQUATE_TO_COMPLETE_ADL: YES
PATIENT'S MEMORY ADEQUATE TO SAFELY COMPLETE DAILY ACTIVITIES?: YES
DRESSING YOURSELF: INDEPENDENT
LACK_OF_TRANSPORTATION: NO
HEARING - LEFT EAR: FUNCTIONAL
HEARING - RIGHT EAR: FUNCTIONAL
GROOMING: INDEPENDENT
FEEDING YOURSELF: INDEPENDENT
ADEQUATE_TO_COMPLETE_ADL: YES

## 2024-03-26 ASSESSMENT — COGNITIVE AND FUNCTIONAL STATUS - GENERAL
CLIMB 3 TO 5 STEPS WITH RAILING: A LITTLE
MOBILITY SCORE: 24
MOBILITY SCORE: 23
DAILY ACTIVITIY SCORE: 24
PATIENT BASELINE BEDBOUND: NO
DAILY ACTIVITIY SCORE: 24

## 2024-03-26 ASSESSMENT — PAIN SCALES - GENERAL
PAINLEVEL_OUTOF10: 4
PAINLEVEL_OUTOF10: 3
PAINLEVEL_OUTOF10: 4
PAINLEVEL_OUTOF10: 0 - NO PAIN
PAINLEVEL_OUTOF10: 0 - NO PAIN
PAINLEVEL_OUTOF10: 7
PAINLEVEL_OUTOF10: 3
PAINLEVEL_OUTOF10: 0 - NO PAIN
PAINLEVEL_OUTOF10: 4
PAINLEVEL_OUTOF10: 8
PAINLEVEL_OUTOF10: 4

## 2024-03-26 ASSESSMENT — COLUMBIA-SUICIDE SEVERITY RATING SCALE - C-SSRS
2. HAVE YOU ACTUALLY HAD ANY THOUGHTS OF KILLING YOURSELF?: NO
6. HAVE YOU EVER DONE ANYTHING, STARTED TO DO ANYTHING, OR PREPARED TO DO ANYTHING TO END YOUR LIFE?: NO
1. IN THE PAST MONTH, HAVE YOU WISHED YOU WERE DEAD OR WISHED YOU COULD GO TO SLEEP AND NOT WAKE UP?: NO
1. IN THE PAST MONTH, HAVE YOU WISHED YOU WERE DEAD OR WISHED YOU COULD GO TO SLEEP AND NOT WAKE UP?: NO
6. HAVE YOU EVER DONE ANYTHING, STARTED TO DO ANYTHING, OR PREPARED TO DO ANYTHING TO END YOUR LIFE?: NO
2. HAVE YOU ACTUALLY HAD ANY THOUGHTS OF KILLING YOURSELF?: NO

## 2024-03-26 ASSESSMENT — PAIN DESCRIPTION - DESCRIPTORS
DESCRIPTORS: SPASM
DESCRIPTORS: PRESSURE;SHARP

## 2024-03-26 ASSESSMENT — PAIN DESCRIPTION - LOCATION
LOCATION: NECK
LOCATION: NECK

## 2024-03-26 ASSESSMENT — PATIENT HEALTH QUESTIONNAIRE - PHQ9
2. FEELING DOWN, DEPRESSED OR HOPELESS: NOT AT ALL
2. FEELING DOWN, DEPRESSED OR HOPELESS: NOT AT ALL
1. LITTLE INTEREST OR PLEASURE IN DOING THINGS: NOT AT ALL
SUM OF ALL RESPONSES TO PHQ9 QUESTIONS 1 & 2: 0
1. LITTLE INTEREST OR PLEASURE IN DOING THINGS: NOT AT ALL
SUM OF ALL RESPONSES TO PHQ9 QUESTIONS 1 & 2: 0

## 2024-03-26 ASSESSMENT — PAIN DESCRIPTION - PAIN TYPE
TYPE: ACUTE PAIN
TYPE: CHRONIC PAIN

## 2024-03-26 ASSESSMENT — PAIN - FUNCTIONAL ASSESSMENT
PAIN_FUNCTIONAL_ASSESSMENT: 0-10
PAIN_FUNCTIONAL_ASSESSMENT: 0-10

## 2024-03-26 ASSESSMENT — VISUAL ACUITY: OU: 1

## 2024-03-26 NOTE — H&P
Medical Group History and Physical      ASSESSMENT & PLAN:     #.  Vertigo  -Likely peripheral in nature given lack of focal neurologic deficits, differential includes BPPV versus vestibular neuritis versus Ménière's versus vestibular migraine  -Orthostasis also on differential  -CT angio head/neck negative for LVO or aneurysm  -Defer further brain imaging given normal MRI within the last 6 months  -Check orthostatic vitals  -IV fluids  -Meclizine as needed  -If no improvement with further fluid resuscitation, consider neurology consult    #.  Type 2 diabetes  -Insulin sliding scale  -Add home long-acting insulin when confirmed    #.  CKD3  -Stable, at baseline    #.  Asthma  #.  Hypothyroidism  -Continue home medications      VTE PPX: Lovenox and SCDs      Darci Pickard MD    --Of note, this documentation is completed using the Dragon Dictation system (voice recognition software). There may be spelling and/or grammatical errors that were not corrected prior to final submission.--    HISTORY OF PRESENT ILLNESS:   Chief Complaint: Dizziness    Alysa Austin is a 54 y.o. female with a history of TIA, CKD3, type 2 diabetes, pulmonary/cutaneous sarcoidosis, asthma, fibromyalgia, postsurgical hypothyroidism, and renal cell carcinoma status post right nephrectomy presenting with vertigo symptoms.  Patient states that she was having intercourse last night and then suddenly had vertigo as well as left-sided headache.  She believes she was feeling dizzy intermittently for a few days leading up to this as well.  Patient states that the vertigo is worse with positional changes as well as sitting up quickly.  She denies any muscle weakness or sensory loss.  She has occasional ringing in her left ear but denies any hearing loss.  She denies any recent URI symptoms.  She did have a similar episode in September 2023 at which time she was given fluids with improvement in symptoms, brain MRI was normal at this time as  well.  CT angio head/neck was obtained in the ED which showed no evidence of acute intracranial hemorrhage or mass effect and no evidence of arterial vessel occlusion or significant aneurysm.  She was given fluids, meclizine, Zofran and morphine.       ROS  10 point review of systems negative except per HPI     PAST HISTORIES:     Past Medical History  She has a past medical history of Asthma, Bronchopneumonia (03/14/2022), COPD (chronic obstructive pulmonary disease) (CMS/[facility]), Diabetes mellitus (CMS/[facility]), Fibromyalgia, Hypertension, Kidney disease, Lab test positive for detection of COVID-19 virus (10/09/2023), Personal history of other diseases of the musculoskeletal system and connective tissue, and Personal history of transient ischemic attack (TIA), and cerebral infarction without residual deficits.    Surgical History  Thyroidectomy  Nephrectomy  Spinal fusion  Renal stent  Lung biopsy     Social History  She reports that she has never smoked. She has never used smokeless tobacco. She reports that she does not drink alcohol and does not use drugs.    Family History  Family History   Problem Relation Name Age of Onset    Hypertension Mother      Stroke Other family     Diabetes Other family     Cancer Other family        Allergies:  Shellfish derived, Hydromorphone, Ibuprofen, Ketorolac, Penicillins, Propoxyphene n-acetaminophen, and Shellfish containing products      OBJECTIVE:      Last Recorded Vitals  /72 (BP Location: Left arm, Patient Position: Sitting)   Pulse 62   Temp 36.6 °C (97.9 °F) (Temporal)   Resp 18   Wt 120 kg (265 lb)   SpO2 95%     Last I/O:  No intake/output data recorded.    Physical Exam   Gen: NAD, appears stated age  HEENT: EOM, MMM, no nystagmus noted  CV: RRR, no murmurs rubs or gallops  Resp: Clear to auscultation bilaterally, normal effort  Abdomen: soft, NT,+BS  LE: No edema, no deformity  Neuro: A&Ox4, full strength and sensation intact in all extremities    LABS AND  IMAGING:       Relevant Results  Labs Reviewed   COMPREHENSIVE METABOLIC PANEL - Abnormal       Result Value    Glucose 147 (*)     Sodium 138      Potassium 3.9      Chloride 108 (*)     Bicarbonate 25      Anion Gap 9 (*)     Urea Nitrogen 12      Creatinine 1.31 (*)     eGFR 49 (*)     Calcium 8.8      Albumin 3.6      Alkaline Phosphatase 157 (*)     Total Protein 6.7      AST 15      Bilirubin, Total 0.4      ALT 14     MAGNESIUM - Normal    Magnesium 1.89     PROTIME-INR - Normal    Protime 12.4      INR 1.1     SERIAL TROPONIN-INITIAL - Normal    Troponin I, High Sensitivity 11      Narrative:     Less than 99th percentile of normal range cutoff-  Female and children under 18 years old <14 ng/L; Male <21 ng/L: Negative  Repeat testing should be performed if clinically indicated.     Female and children under 18 years old 14-50 ng/L; Male 21-50 ng/L:  Consistent with possible cardiac damage and possible increased clinical   risk. Serial measurements may help to assess extent of myocardial damage.     >50 ng/L: Consistent with cardiac damage, increased clinical risk and  myocardial infarction. Serial measurements may help assess extent of   myocardial damage.      NOTE: Children less than 1 year old may have higher baseline troponin   levels and results should be interpreted in conjunction with the overall   clinical context.     NOTE: Troponin I testing is performed using a different   testing methodology at [facility] than at other   [facility] hospitals. Direct result comparisons should only   be made within the same method.   CBC WITH AUTO DIFFERENTIAL    WBC 8.5      nRBC 0.0      RBC 4.64      Hemoglobin 14.7      Hematocrit 44.1      MCV 95      MCH 31.7      MCHC 33.3      RDW 13.0      Platelets 242      Neutrophils % 60.4      Immature Granulocytes %, Automated 0.2      Lymphocytes % 30.1      Monocytes % 5.8      Eosinophils % 2.9      Basophils % 0.6      Neutrophils Absolute 5.14       Immature Granulocytes Absolute, Automated 0.02      Lymphocytes Absolute 2.56      Monocytes Absolute 0.49      Eosinophils Absolute 0.25      Basophils Absolute 0.05     TROPONIN SERIES- (INITIAL, 1 HR)    Narrative:     The following orders were created for panel order Troponin Series, (0, 1 HR).  Procedure                               Abnormality         Status                     ---------                               -----------         ------                     Troponin I, High Sensiti...[[id number]]  Normal              Final result               Troponin, High Sensitivi...[[id number]]                                                   Please view results for these tests on the individual orders.   SERIAL TROPONIN, 1 HOUR   DRUG SCREEN,URINE     CT angio head and neck w and wo IV contrast   Final Result   No evidence of acute intracranial hemorrhage or mass effect.        No evidence of major proximal intracranial arterial vessel occlusion   or significant vascular aneurysm.        Patent bilateral common carotid, internal carotid, and vertebral   arteries.        MACRO:   None        Signed by: Jimmy Carballo 3/26/2024 4:26 AM   Dictation workstation:   [id number]

## 2024-03-26 NOTE — ED PROVIDER NOTES
HPI   Chief Complaint   Patient presents with    Neck Pain     Pt started having neck pain and dizziness about 0130 today.       54-year-old female presents from home with complaint of a sudden onset of dizziness with left-sided headache and left-sided neck pain after having intercourse.  Symptoms began around 0130 hrs. this morning.  Patient reports everything is spinning most notably when she is walking and standing.  She denies any chest pain, palpitations, shortness of breath, nausea or vomiting, numbness, tingling or weakness to her extremities.  Patient states she had a previous TIA but has never had any residual deficits.  Patient has been admitted previously for similar symptoms.  Her last admission was last October and while in the hospital she underwent an MRI which was unremarkable.  She was discharged home and follow-up with her PCP.  She has a history of stage III chronic kidney disease, type 2 diabetes, previous TIA, vertigo.  She denies any excessive alcohol intake this evening denies recent falls or head injuries.  She also has a history of bronchopneumonia.                        God[place] Coma Scale Score: 15                     Patient History   Past Medical History:   Diagnosis Date    Asthma     Bronchopneumonia 03/14/2022    COPD (chronic obstructive pulmonary disease) (CMS/[facility])     Diabetes mellitus (CMS/[facility])     Fibromyalgia     Hypertension     Kidney disease     Lab test positive for detection of COVID-19 virus 10/09/2023    Personal history of other diseases of the musculoskeletal system and connective tissue     History of fibromyalgia    Personal history of transient ischemic attack (TIA), and cerebral infarction without residual deficits     History of cerebrovascular accident     Past Surgical History:   Procedure Laterality Date    CT ANGIO NECK  10/18/2023    CT NECK ANGIO W AND WO IV CONTRAST 10/18/2023 [facility]    CT HEAD ANGIO W AND WO IV CONTRAST  10/18/2023    CT HEAD ANGIO W AND WO  IV CONTRAST 10/18/2023 [facility]    OTHER SURGICAL HISTORY  10/17/2018    Thyroid Surgery Sub-Total Thyroidectomy    OTHER SURGICAL HISTORY  10/17/2018    Nephrectomy Right    OTHER SURGICAL HISTORY  10/31/2018    Thoracoscopy (Therapeutic) With Mediastinal And Regional Lymphadenectomy     Family History   Problem Relation Name Age of Onset    Hypertension Mother      Stroke Other family     Diabetes Other family     Cancer Other family      Social History     Tobacco Use    Smoking status: Never    Smokeless tobacco: Never   Substance Use Topics    Alcohol use: Never    Drug use: Never       Physical Exam   ED Triage Vitals [03/26/24 0157]   Temperature Heart Rate Respirations BP   36.6 °C (97.9 °F) 81 18 (!) 141/97      Pulse Ox Temp Source Heart Rate Source Patient Position   97 % Temporal Monitor Sitting      BP Location FiO2 (%)     Left arm --       Physical Exam  Vitals and nursing note reviewed. Exam conducted with a chaperone present.   Constitutional:       General: She is awake. She is not in acute distress.     Appearance: Normal appearance. She is well-developed and well-groomed. She is obese. She is not ill-appearing, toxic-appearing or diaphoretic.   HENT:      Head: Normocephalic and atraumatic.      Jaw: There is normal jaw occlusion.      Right Ear: Hearing, tympanic membrane, ear canal and external ear normal.      Left Ear: Hearing, tympanic membrane, ear canal and external ear normal.      Nose: Nose normal.      Mouth/Throat:      Lips: Pink.      Mouth: Mucous membranes are moist.      Pharynx: Oropharynx is clear.   Eyes:      General: Lids are normal. Vision grossly intact. Gaze aligned appropriately.      Extraocular Movements: Extraocular movements intact.      Right eye: No nystagmus.      Left eye: No nystagmus.      Conjunctiva/sclera: Conjunctivae normal.      Pupils: Pupils are equal, round, and reactive to light.      Visual Fields: Right eye visual fields normal and left eye visual  fields normal.   Neck:      Thyroid: No thyroid mass, thyromegaly or thyroid tenderness.      Trachea: Trachea and phonation normal.        Comments: Left-sided paraspinal muscle spasm extending to the trapezius.  Cardiovascular:      Rate and Rhythm: Normal rate and regular rhythm.      Pulses: Normal pulses.      Heart sounds: Normal heart sounds.   Pulmonary:      Effort: Pulmonary effort is normal.      Breath sounds: Normal breath sounds. No wheezing, rhonchi or rales.   Abdominal:      General: Abdomen is flat. Bowel sounds are normal.      Palpations: Abdomen is soft. There is no mass.      Tenderness: There is no abdominal tenderness. There is no guarding.   Musculoskeletal:         General: No swelling. Normal range of motion.      Cervical back: Normal range of motion and neck supple. Tenderness present. Muscular tenderness present. No spinous process tenderness. Normal range of motion.   Lymphadenopathy:      Cervical: No cervical adenopathy.   Skin:     General: Skin is warm and dry.      Capillary Refill: Capillary refill takes less than 2 seconds.      Findings: No rash.   Neurological:      General: No focal deficit present.      Mental Status: She is alert and oriented to person, place, and time. Mental status is at baseline.      GCS: GCS eye subscore is 4. GCS verbal subscore is 5. GCS motor subscore is 6.      Cranial Nerves: Cranial nerves 2-12 are intact.      Sensory: Sensation is intact.      Motor: Motor function is intact.      Coordination: Coordination is intact. Coordination normal. Finger-Nose-Finger Test and Heel to Shin Test normal.   Psychiatric:         Mood and Affect: Mood normal.         Behavior: Behavior normal. Behavior is cooperative.         Thought Content: Thought content normal.         Judgment: Judgment normal.       ED Course & MDM   Diagnoses as of 03/26/24 0533   Dizziness   Decreased ambulation status       Medical Decision Making  ED course: Temperature is 36.6  heart rate 81 respirations 18 /97, pulse ox was 97% on room air  Patient's NIH is 0.  I have ordered EKG lab work and CT of the head as well as CTA of the head and neck given her complaint of a sudden onset posterior headache and neck pain after intercourse.    Initial EKG interpreted by me at 0302 hrs. normal sinus rhythm with nonspecific ST abnormalities V1, ventricular rate 70  QRS was 82  QTc was 412 no ischemic changes.  Unchanged from previous EKG performed back on 18 October 2023.  CBC shows a white count of 8.5 hemoglobin 14.7 hematocrit 44.1 platelet count was 242 magnesium 1.9 PT/INR within normal limits.  Comprehensive glucose 147 chloride 108 anion gap of 9 creatinine 1.31 with a GFR 49 consistent with chronic renal disease.  CT scan angio head and neck was performed results.  No evidence of acute intracranial hemorrhage or mass effect, no evidence of any major proximal intracranial arterial vessel occlusion or significant vascular aneurysm, patent bilateral common carotid, internal carotid vertebral arteries.  I rounded on the patient discussed results of workup and repeat exam her BP is 165/72 heart rate 60 respirations 18 pulse ox is 95% on room air.  On repeat exam the patient is still feeling very dizzy and lightheaded.  We attempted to stand her but she became very unstable and had to be sat back down.  She cannot tolerate orthostatic vital signs.  She also refused to have the second troponin drawn that she is a very hard stick.  Patient was given 5 mg of Valium p.o. with no improvement of her symptoms.  I have therefore discussed the recommendation of admitting the patient to the hospital and the patient agrees.  Spoke with Dr. Pickard he will admit the patient to his service for vertigo.          Procedure  Procedures     Darci Cardenas PA-C  03/26/24 0544

## 2024-03-26 NOTE — PROGRESS NOTES
Medical Group Progress Note  ASSESSMENT & PLAN:     Vertigo  - Likely peripheral in nature given lack of focal neurologic deficits, differential includes BPPV versus vestibular neuritis versus Ménière's versus vestibular migraine  - CT angio head/neck negative for LVO or aneurysm  - IV fluids  - Meclizine ordered as scheduled for today 3/26  - Persistent dizziness in the ED today while attempting to discharge therefore MRI brain ordered otherwise no focal neurological deficits present on exam     Type 2 diabetes  - Insulin sliding scale  - Add home long-acting insulin when confirmed     CKD stage 3  - Stable, at baseline     Asthma - not in exacerbation  Hypothyroidism  - Continue home medications    VTE Prophylaxis: Enoxaparin subcutaneous    Disposition/Daily update: Remains dizzy while in the ED today, attempted to stand and walk patient however had persistent dizziness therefore will obtain MRI brain.  Awaiting placement into a bed.  Increase meclizine to scheduled 4 times a day this morning as well.      ---Of note, this documentation is completed using the Dragon Dictation system (voice recognition software). There may be spelling and/or grammatical errors that were not corrected prior to final submission.---    Tello Castillo MD    SUBJECTIVE     Patient was seen and examined at bedside in the ED this morning.  Still having dizziness with ambulation, was doing okay with rest however.    OBJECTIVE:     Last Recorded Vitals:  Vitals:    03/26/24 0733 03/26/24 1009 03/26/24 1216 03/26/24 1446   BP: 136/59 108/66 122/60 149/77   BP Location: Left arm Left arm Left arm Left arm   Patient Position: Lying Lying Lying Lying   Pulse: 75 64 73 74   Resp: 18 18 16 20   Temp:       TempSrc:       SpO2: 95% 96% 97% 98%   Weight:       Height:         Last I/O:  No intake/output data recorded.    Physical Exam  Vitals reviewed.   Constitutional:       General: She is not in acute distress.     Appearance: Normal  appearance. She is not toxic-appearing.   HENT:      Head: Normocephalic and atraumatic.   Eyes:      Extraocular Movements: Extraocular movements intact.      Conjunctiva/sclera: Conjunctivae normal.   Cardiovascular:      Rate and Rhythm: Normal rate and regular rhythm.      Pulses: Normal pulses.      Heart sounds: Normal heart sounds.   Pulmonary:      Effort: Pulmonary effort is normal. No respiratory distress.      Breath sounds: Normal breath sounds. No wheezing.   Abdominal:      General: Bowel sounds are normal.      Palpations: Abdomen is soft.      Tenderness: There is no abdominal tenderness. There is no guarding.   Musculoskeletal:         General: Normal range of motion.      Cervical back: Normal range of motion and neck supple.   Neurological:      General: No focal deficit present.      Mental Status: She is alert and oriented to person, place, and time. Mental status is at baseline.     Inpatient Medications:  enoxaparin, 40 mg, subcutaneous, q12h NIKOLAY  insulin lispro, 0-10 Units, subcutaneous, TID with meals  meclizine, 25 mg, oral, q6h NIKOLAY  polyethylene glycol, 17 g, oral, Daily    PRN Medications  PRN medications: dextrose, dextrose, glucagon, glucagon, meclizine, melatonin, ondansetron **OR** ondansetron  Continuous Medications:     LABS AND IMAGING:     Labs:  Results from last 7 days   Lab Units 03/26/24  0331   WBC AUTO x10*3/uL 8.5   RBC AUTO x10*6/uL 4.64   HEMOGLOBIN g/dL 14.7   HEMATOCRIT % 44.1   MCV fL 95   MCH pg 31.7   MCHC g/dL 33.3   RDW % 13.0   PLATELETS AUTO x10*3/uL 242     Results from last 7 days   Lab Units 03/26/24  0331   SODIUM mmol/L 138   POTASSIUM mmol/L 3.9   CHLORIDE mmol/L 108*   CO2 mmol/L 25   BUN mg/dL 12   CREATININE mg/dL 1.31*   GLUCOSE mg/dL 147*   PROTEIN TOTAL g/dL 6.7   CALCIUM mg/dL 8.8   BILIRUBIN TOTAL mg/dL 0.4   ALK PHOS U/L 157*   AST U/L 15   ALT U/L 14     Results from last 7 days   Lab Units 03/26/24  0331   MAGNESIUM mg/dL 1.89     Results from  last 7 days   Lab Units 03/26/24  0331   TROPHS ng/L 11     Imaging:  ECG 12 lead  Normal sinus rhythm  ST & T wave abnormality, consider inferior ischemia  Abnormal ECG  When compared with ECG of 18-OCT-2023 02:41,  Previous ECG has undetermined rhythm, needs review  See ED provider note for full interpretation and clinical correlation  Confirmed by Georgie Chance ([id number]) on 3/26/2024 1:02:25 PM  CT angio head and neck w and wo IV contrast  Narrative: Interpreted By:  Jimmy Carballo,   STUDY:  CT ANGIO HEAD AND NECK W AND WO IV CONTRAST;  3/26/2024 4:04 am      INDICATION:  Signs/Symptoms:dizziness, posterior headache and left sided neck pain  after intercourse.      COMPARISON:  None.      ACCESSION NUMBER(S):  [id number]      ORDERING CLINICIAN:  HENRY LEY      TECHNIQUE:  Contiguous axial images of the head were obtained without intravenous  contrast followed by contrast enhanced images of the head and neck.  Coronal and sagittal reformatted images were obtained from the axial  images. MIPS were also performed and reviewed.      FINDINGS:  No evidence of acute intracranial hemorrhage, mass effect or midline  shift. Stable dense calcification along the falx cerebri. No  hydrocephalus. No midline shift. The basal cisterns are preserved. No  evidence of depressed calvarial fracture. The paranasal sinuses are  clear and well pneumatized.      The bilateral anterior cerebral arteries, middle cerebral arteries,  posterior cerebral arteries are grossly patent. No evidence of  proximal major intracranial arterial vessel occlusion. No evidence of  an vascular aneurysm.      The bilateral common carotid, internal carotid, and vertebral  arteries are grossly patent.      Impression: No evidence of acute intracranial hemorrhage or mass effect.      No evidence of major proximal intracranial arterial vessel occlusion  or significant vascular aneurysm.      Patent bilateral common carotid, internal carotid, and  vertebral  arteries.      MACRO:  None      Signed by: Jimmy Carballo 3/26/2024 4:26 AM  Dictation workstation:   [id number]

## 2024-03-27 VITALS
OXYGEN SATURATION: 94 % | TEMPERATURE: 97.5 F | RESPIRATION RATE: 20 BRPM | BODY MASS INDEX: 46.95 KG/M2 | WEIGHT: 265 LBS | HEIGHT: 63 IN | DIASTOLIC BLOOD PRESSURE: 97 MMHG | HEART RATE: 68 BPM | SYSTOLIC BLOOD PRESSURE: 160 MMHG

## 2024-03-27 LAB
ANION GAP SERPL CALC-SCNC: 10 MMOL/L (ref 10–20)
BASOPHILS # BLD AUTO: 0.03 X10*3/UL (ref 0–0.1)
BASOPHILS NFR BLD AUTO: 0.4 %
BUN SERPL-MCNC: 12 MG/DL (ref 6–23)
CALCIUM SERPL-MCNC: 8.7 MG/DL (ref 8.6–10.3)
CHLORIDE SERPL-SCNC: 106 MMOL/L (ref 98–107)
CO2 SERPL-SCNC: 26 MMOL/L (ref 21–32)
CREAT SERPL-MCNC: 1.25 MG/DL (ref 0.5–1.05)
EGFRCR SERPLBLD CKD-EPI 2021: 52 ML/MIN/1.73M*2
EOSINOPHIL # BLD AUTO: 0.23 X10*3/UL (ref 0–0.7)
EOSINOPHIL NFR BLD AUTO: 3.1 %
ERYTHROCYTE [DISTWIDTH] IN BLOOD BY AUTOMATED COUNT: 13 % (ref 11.5–14.5)
GLUCOSE BLD MANUAL STRIP-MCNC: 150 MG/DL (ref 74–99)
GLUCOSE BLD MANUAL STRIP-MCNC: 161 MG/DL (ref 74–99)
GLUCOSE SERPL-MCNC: 158 MG/DL (ref 74–99)
HCT VFR BLD AUTO: 43.6 % (ref 36–46)
HGB BLD-MCNC: 14.4 G/DL (ref 12–16)
HOLD SPECIMEN: NORMAL
IMM GRANULOCYTES # BLD AUTO: 0.02 X10*3/UL (ref 0–0.7)
IMM GRANULOCYTES NFR BLD AUTO: 0.3 % (ref 0–0.9)
LYMPHOCYTES # BLD AUTO: 2.06 X10*3/UL (ref 1.2–4.8)
LYMPHOCYTES NFR BLD AUTO: 28.1 %
MCH RBC QN AUTO: 30.9 PG (ref 26–34)
MCHC RBC AUTO-ENTMCNC: 33 G/DL (ref 32–36)
MCV RBC AUTO: 94 FL (ref 80–100)
MONOCYTES # BLD AUTO: 0.47 X10*3/UL (ref 0.1–1)
MONOCYTES NFR BLD AUTO: 6.4 %
NEUTROPHILS # BLD AUTO: 4.52 X10*3/UL (ref 1.2–7.7)
NEUTROPHILS NFR BLD AUTO: 61.7 %
NRBC BLD-RTO: 0 /100 WBCS (ref 0–0)
PLATELET # BLD AUTO: 248 X10*3/UL (ref 150–450)
POTASSIUM SERPL-SCNC: 4 MMOL/L (ref 3.5–5.3)
RBC # BLD AUTO: 4.66 X10*6/UL (ref 4–5.2)
SODIUM SERPL-SCNC: 138 MMOL/L (ref 136–145)
WBC # BLD AUTO: 7.3 X10*3/UL (ref 4.4–11.3)

## 2024-03-27 PROCEDURE — 36415 COLL VENOUS BLD VENIPUNCTURE: CPT | Performed by: STUDENT IN AN ORGANIZED HEALTH CARE EDUCATION/TRAINING PROGRAM

## 2024-03-27 PROCEDURE — 97112 NEUROMUSCULAR REEDUCATION: CPT | Mod: GP

## 2024-03-27 PROCEDURE — G0378 HOSPITAL OBSERVATION PER HR: HCPCS

## 2024-03-27 PROCEDURE — 80048 BASIC METABOLIC PNL TOTAL CA: CPT | Performed by: STUDENT IN AN ORGANIZED HEALTH CARE EDUCATION/TRAINING PROGRAM

## 2024-03-27 PROCEDURE — 2500000001 HC RX 250 WO HCPCS SELF ADMINISTERED DRUGS (ALT 637 FOR MEDICARE OP): Performed by: STUDENT IN AN ORGANIZED HEALTH CARE EDUCATION/TRAINING PROGRAM

## 2024-03-27 PROCEDURE — 99239 HOSP IP/OBS DSCHRG MGMT >30: CPT | Performed by: STUDENT IN AN ORGANIZED HEALTH CARE EDUCATION/TRAINING PROGRAM

## 2024-03-27 PROCEDURE — 97161 PT EVAL LOW COMPLEX 20 MIN: CPT | Mod: GP

## 2024-03-27 PROCEDURE — 82947 ASSAY GLUCOSE BLOOD QUANT: CPT | Mod: 91

## 2024-03-27 PROCEDURE — 85025 COMPLETE CBC W/AUTO DIFF WBC: CPT | Performed by: STUDENT IN AN ORGANIZED HEALTH CARE EDUCATION/TRAINING PROGRAM

## 2024-03-27 RX ORDER — MECLIZINE HYDROCHLORIDE 25 MG/1
25 TABLET ORAL 3 TIMES DAILY PRN
Qty: 21 TABLET | Refills: 1 | Status: SHIPPED | OUTPATIENT
Start: 2024-03-27 | End: 2024-05-06

## 2024-03-27 RX ADMIN — MECLIZINE HYDROCHLORIDE 25 MG: 12.5 TABLET ORAL at 11:36

## 2024-03-27 RX ADMIN — MECLIZINE HYDROCHLORIDE 25 MG: 12.5 TABLET ORAL at 06:22

## 2024-03-27 RX ADMIN — INSULIN LISPRO 2 UNITS: 100 INJECTION, SOLUTION INTRAVENOUS; SUBCUTANEOUS at 11:36

## 2024-03-27 ASSESSMENT — COGNITIVE AND FUNCTIONAL STATUS - GENERAL
CLIMB 3 TO 5 STEPS WITH RAILING: A LITTLE
MOBILITY SCORE: 24
MOBILITY SCORE: 23
DAILY ACTIVITIY SCORE: 24

## 2024-03-27 ASSESSMENT — PAIN SCALES - GENERAL
PAINLEVEL_OUTOF10: 0 - NO PAIN

## 2024-03-27 ASSESSMENT — PAIN - FUNCTIONAL ASSESSMENT
PAIN_FUNCTIONAL_ASSESSMENT: 0-10
PAIN_FUNCTIONAL_ASSESSMENT: 0-10

## 2024-03-27 ASSESSMENT — ACTIVITIES OF DAILY LIVING (ADL): ADL_ASSISTANCE: INDEPENDENT

## 2024-03-27 NOTE — PROGRESS NOTES
Physical Therapy    Physical Therapy Evaluation    Patient Name: Alysa Austin  MRN: [id number]  Today's Date: 3/27/2024   Time Calculation  Start Time: 1358  Stop Time: 1419  Time Calculation (min): 21 min    Assessment/Plan   PT Assessment  End of Session Communication: Bedside nurse  Assessment Comment: Pt presents with signs/symptoms consistent with L BPPV as displayed by positive L corey hallpike. With retesting following epley manuever, L corey hallpike is negative. Recommend pt to follow up with outpatient vestibular treatment if symptoms persist.  End of Session Patient Position: Bed, 2 rail up, Alarm off, not on at start of session  IP OR SWING BED PT PLAN  Inpatient or Swing Bed: Inpatient  PT Plan  PT Plan: PT Eval only  PT Eval Only Reason: No acute PT needs identified  PT Frequency: PT eval only  PT Discharge Recommendations: No further acute PT  PT Recommended Transfer Status: Independent  PT - OK to Discharge: Yes (PT eval complete, ok to d/c once deemed medically appropriate. Recommend outpatient vestibular treatment if symptoms persist.)    Subjective     Current Problem:  Patient Active Problem List   Diagnosis    Abdominal cramps    Abnormal MRI    Allergies    Anxiety    Asthma    Cancer of kidney (CMS/HCC)    Diabetes (CMS/HCC)    Dysphagia    Chronic bilateral low back pain with right-sided sciatica    Fibromyalgia    H/O kidney removal    H/O thyroidectomy    Hyperlipidemia    Left flank pain    Leg cramps    Lumbar radiculopathy    Mediastinal adenopathy    Nausea in adult    Obesity    Postoperative pain    Renal cell carcinoma (CMS/HCC)    Renal mass    Pulmonary sarcoidosis (CMS/HCC)    Status post lumbar spine surgery for decompression of spinal cord    Goiter    Substernal goiter    Tension headache, chronic    Thyroid nodule    Vitamin D deficiency    Type 2 diabetes mellitus without complication, with long-term current use of insulin (CMS/HCC)    Magnesium deficiency    COVID    ROLAND  (acute kidney injury) (CMS/HCC)    Arthritis of right knee    Pneumonia    Cataract, nuclear sclerotic senile, bilateral    Cervical spondylosis without myelopathy    Chronic kidney disease    Chronic obstructive pulmonary disease (CMS/HCC)    History of primary malignant neoplasm of kidney    Marijuana use    OAG (open angle glaucoma) suspect, low risk, bilateral    TUYET (obstructive sleep apnea)    Other specified abnormal findings of blood chemistry    Stage 3a chronic kidney disease (CMS/HCC)    Chronic pain syndrome    Abnormal chest x-ray    Costal chondritis    Left lumbosacral radiculopathy    Morbid obesity (CMS/HCC)    Single kidney    Hypothyroidism    Dizziness    Vertigo       General Visit Information:  General  Reason for Referral: impaired mobility  Referred By: Jonathan PT  Past Medical History Relevant to Rehab: hypothyroidism, type II DM, asthma, CKD stage III, sarcoidosis  Family/Caregiver Present: No  Prior to Session Communication: Bedside nurse  Patient Position Received: Bed, 3 rail up, Alarm off, not on at start of session  General Comment: Pt to ED 3/26 with complaints of dizziness and L sided neck pain. CTH (-), MRI brain (-).    Home Living:  Home Living  Type of Home: House  Lives With: Spouse  Home Adaptive Equipment: Cane  Home Layout: Multi-level  Home Access: Stairs to enter with rails  Entrance Stairs-Number of Steps: 1  Bathroom Shower/Tub: Tub/shower unit  Bathroom Equipment: Grab bars in shower, Shower chair with back  Home Living Comments: Bed/bath on 3rd level with HRs on stairs    Prior Level of Function:  Prior Function Per Pt/Caregiver Report  Level of Bertie: Independent with ADLs and functional transfers, Independent with homemaking with ambulation  ADL Assistance: Independent  Homemaking Assistance: Independent  Ambulatory Assistance: Independent (uses cane PRN)  Prior Function Comments: Denies falls in last 3 months. Does not drive.    Precautions:       Vital Signs:      Objective     Pain:  Pain Assessment  Pain Assessment: 0-10  Pain Score: 0 - No pain    Cognition:  Cognition  Overall Cognitive Status: Within Functional Limits    General Assessments:  General Observation  General Observation: R corey hallpike (-), L corey hallpike (+); following L epley maneuver L corey hallpike (-). Smooth pursuits (-), saccades (-), head impulse (-)         Strength  Strength Comments: WFL     Coordination  Movements are Fluid and Coordinated: Yes  Postural Control  Postural Control: Within Functional Limits  Static Sitting Balance  Static Sitting-Comment/Number of Minutes: Good  Dynamic Sitting Balance  Dynamic Sitting-Comments: Good  Static Standing Balance  Static Standing-Comment/Number of Minutes: Good  Dynamic Standing Balance  Dynamic Standing-Comments: FAir +    Functional Assessments:     Bed Mobility  Bed Mobility: Yes  Bed Mobility 1  Bed Mobility 1: Supine to sitting, Sitting to supine  Level of Assistance 1: Independent  Transfers  Transfer: Yes  Transfer 1  Technique 1: Sit to stand, Stand to sit  Transfer Level of Assistance 1: Independent  Ambulation/Gait Training  Ambulation/Gait Training Performed: No          Extremity/Trunk Assessments:        RLE   RLE : Within Functional Limits  LLE   LLE : Within Functional Limits    Outcome Measures:  [facility] Basic Mobility  Turning from your back to your side while in a flat bed without using bedrails: None  Moving from lying on your back to sitting on the side of a flat bed without using bedrails: None  Moving to and from bed to chair (including a wheelchair): None  Standing up from a chair using your arms (e.g. wheelchair or bedside chair): None  To walk in hospital room: None  Climbing 3-5 steps with railing: None  Basic Mobility - Total Score: 24                            Goals:    Education Documentation  Mobility Training, taught by Gena Monroe PT at 3/27/2024  2:32 PM.  Learner: Patient  Readiness: Acceptance  Method:  Explanation  Response: Verbalizes Understanding  Comment: Educated pt on BPPV signs/symptoms    Education Comments  No comments found.

## 2024-03-27 NOTE — CARE PLAN
The patient's goals for the shift include find out whats going on    The clinical goals for the shift include Patient will understand how to stay safe if dizzy

## 2024-03-27 NOTE — DISCHARGE SUMMARY
Medical Group Discharge Summary  DISCHARGE DIAGNOSIS     Vertigo - likely BPPV  Hypothyroidism  CKD stage III    HOSPITAL COURSE AND DETAILS     This is a 54-year-old female with a significant past medical history of hypothyroidism, type II DM, asthma, CKD stage III, sarcoidosis that presented from home with episodes of dizziness.  Throughout the day on 3/26 she had multiple episodes of dizziness especially with position changes however did improve slowly with meclizine.  Due to persistent dizziness, MRI brain was ordered and negative.  Today she states that she was able to ambulate all morning without any episodes or symptoms.  She is being discharged home with meclizine and resolution of all symptoms.  Will continue outpatient PT for vestibular therapy on discharge.  Advised to follow-up with her PCP to discuss hospitalization.    Total time spent on discharge: >30min      ---Of note, this documentation is completed using the Dragon Dictation system (voice recognition software). There may be spelling and/or grammatical errors that were not corrected prior to final submission.---    Tello Castillo MD    DISCHARGE PHYSICAL EXAM     Last Recorded Vitals:  Vitals:    03/26/24 1942 03/27/24 0002 03/27/24 0849 03/27/24 0855   BP: 144/81 152/85 144/83 (!) 160/97   BP Location:       Patient Position:    Standing   Pulse: 66 68 66 68   Resp: 14 15 20 20   Temp: 37.4 °C (99.3 °F) 36.9 °C (98.4 °F) 36.4 °C (97.5 °F) 36.4 °C (97.5 °F)   TempSrc:       SpO2: 92% 95% 93% 94%   Weight:       Height:           Physical Exam  Vitals reviewed.   Constitutional:       General: She is not in acute distress.     Appearance: Normal appearance. She is not toxic-appearing.   HENT:      Head: Normocephalic and atraumatic.   Eyes:      Extraocular Movements: Extraocular movements intact.      Conjunctiva/sclera: Conjunctivae normal.   Cardiovascular:      Rate and Rhythm: Normal rate and regular rhythm.      Pulses: Normal pulses.       Heart sounds: Normal heart sounds.   Pulmonary:      Effort: Pulmonary effort is normal. No respiratory distress.      Breath sounds: Normal breath sounds. No wheezing.   Abdominal:      General: Bowel sounds are normal.      Palpations: Abdomen is soft.      Tenderness: There is no abdominal tenderness. There is no guarding.   Musculoskeletal:         General: Normal range of motion.      Cervical back: Normal range of motion and neck supple.   Neurological:      General: No focal deficit present.      Mental Status: She is alert and oriented to person, place, and time. Mental status is at baseline.           DISCHARGE MEDICATIONS        Your medication list        START taking these medications        Instructions Last Dose Given Next Dose Due   meclizine 25 mg tablet  Commonly known as: Antivert      Take 1 tablet (25 mg) by mouth 3 times a day as needed for dizziness.              CHANGE how you take these medications        Instructions Last Dose Given Next Dose Due   albuterol 90 mcg/actuation inhaler  What changed: Another medication with the same name was changed. Make sure you understand how and when to take each.      Inhale 1 puff 3 times a day as needed for shortness of breath.       albuterol 2.5 mg /3 mL (0.083 %) nebulizer solution  What changed:   how much to take  how to take this  when to take this  reasons to take this      Inhale 3 ml every 6 hours as needed       cetirizine 10 mg tablet  Commonly known as: ZyrTEC  What changed: when to take this      Take 1 tablet (10 mg) by mouth once daily.       losartan 25 mg tablet  Commonly known as: Cozaar  What changed: when to take this      Take 1 tablet (25 mg) by mouth once daily.       magnesium oxide 400 mg (241.3 mg magnesium) tablet  Commonly known as: Mag-Ox  What changed:   how much to take  how to take this  when to take this      TAKE 2 TABLET BY MOUTH EVERY DAY              CONTINUE taking these medications        Instructions Last Dose  "Given Next Dose Due   acetaminophen 325 mg tablet  Commonly known as: Tylenol           atorvastatin 20 mg tablet  Commonly known as: Lipitor           BD Ultra-Fine Short Pen Needle 31 gauge x 5/16\" needle  Generic drug: pen needle, diabetic      Use as directed to administer insulin injections up to 4 times a day. Use a new needle with each injection.       Blood Glucose Test strip  Generic drug: blood sugar diagnostic      TEST 3 TIMES DAILY AS NEEDED       blood-glucose meter,continuous misc      Use as instructed       Dexcom G7  misc  Generic drug: blood-glucose meter,continuous      TEST 4-5 TIMES DAILY       busPIRone 15 mg tablet  Commonly known as: Buspar      Take 1 tablet (15 mg) by mouth 2 times a day.       Dexcom G7 Sensor device  Generic drug: blood-glucose sensor      Use to test blood sugars. Change sensor every 10 days.       fluticasone 50 mcg/actuation nasal spray  Commonly known as: Flonase      Administer 1 spray into each nostril once daily. Shake gently. Before first use, prime pump. After use, clean tip and replace cap.       GlucaGen Diagnostic Kit 1 mg injection  Generic drug: glucagon           insulin lispro 100 unit/mL injection  Commonly known as: HumaLOG      Give 5 units plus sliding scale under the skin 3 times a day with meal. Max dose per day 45 units. Take as directed per insulin instructions.       lancets misc  Commonly known as: OneTouch UltraSoft Lancets      Use to screen glucose twice daily       levothyroxine 125 mcg tablet  Commonly known as: Synthroid, Levoxyl      TAKE 1 TABLET BY MOUTH ONCE DAILY.       montelukast 10 mg tablet  Commonly known as: Singulair      TAKE 1 TABLET(10 MG) BY MOUTH EVERY DAY AT BEDTIME       naloxone 4 mg/0.1 mL nasal spray  Commonly known as: Narcan      Administer 1 spray (4 mg) into affected nostril(s) if needed for opioid reversal. May repeat every 2-3 minutes if needed, alternating nostrils, until medical assistance becomes " available.       nebulizer and compressor device      Use as directed with nebulizer solution for shortness of breath and wheezing       pantoprazole 40 mg EC tablet  Commonly known as: ProtoNix           pregabalin 200 mg capsule  Commonly known as: Lyrica      Take 1 capsule (200 mg) by mouth 3 times a day.       Toujeo SoloStar U-300 Insulin 300 unit/mL (1.5 mL) injection  Generic drug: insulin glargine      Inject 78 Units under the skin once daily at bedtime. Take as directed per insulin instructions.       Trulicity 1.5 mg/0.5 mL pen injector injection  Generic drug: dulaglutide      Inject 1.5 mg under the skin 1 (one) time per week.       Trulicity 3 mg/0.5 mL pen injector  Generic drug: dulaglutide      Inject 3 mg under the skin 1 (one) time per week.                 Where to Get Your Medications        These medications were sent to [id number] - [address] AT [facility] & [address] [address]      Hours: 24-hours Phone: [phone]   meclizine 25 mg tablet           OUTPATIENT FOLLOW-UP     Future Appointments   Date Time Provider Department Center   5/10/2024  2:40 PM Julien Mcneil MD [id number] [place]

## 2024-03-29 ENCOUNTER — HOSPITAL ENCOUNTER (OUTPATIENT)
Dept: ULTRASOUND IMAGING | Age: 54
End: 2024-03-29
Payer: MEDICAID

## 2024-03-29 ENCOUNTER — PATIENT OUTREACH (OUTPATIENT)
Dept: CARE COORDINATION | Facility: CLINIC | Age: 54
End: 2024-03-29
Payer: MEDICAID

## 2024-03-29 DIAGNOSIS — G89.29 CHRONIC RUQ PAIN: ICD-10-CM

## 2024-03-29 DIAGNOSIS — R10.11 CHRONIC RUQ PAIN: ICD-10-CM

## 2024-03-29 PROCEDURE — 76705 ECHO EXAM OF ABDOMEN: CPT

## 2024-03-29 NOTE — PROGRESS NOTES
Outreach call to patient to support a smooth transition of care from recent admission.  Left voicemail message for patient with my contact information.    MARINA StarrN, RN

## 2024-04-09 ENCOUNTER — TELEMEDICINE (OUTPATIENT)
Dept: PRIMARY CARE CLINIC | Age: 54
End: 2024-04-09
Payer: MEDICAID

## 2024-04-09 DIAGNOSIS — H81.10 BENIGN PAROXYSMAL POSITIONAL VERTIGO, UNSPECIFIED LATERALITY: Primary | ICD-10-CM

## 2024-04-09 PROCEDURE — 99213 OFFICE O/P EST LOW 20 MIN: CPT | Performed by: INTERNAL MEDICINE

## 2024-04-09 PROCEDURE — G8427 DOCREV CUR MEDS BY ELIG CLIN: HCPCS | Performed by: INTERNAL MEDICINE

## 2024-04-09 PROCEDURE — 3017F COLORECTAL CA SCREEN DOC REV: CPT | Performed by: INTERNAL MEDICINE

## 2024-04-09 ASSESSMENT — ENCOUNTER SYMPTOMS
ABDOMINAL PAIN: 0
COUGH: 0
SHORTNESS OF BREATH: 0
NAUSEA: 0
DIARRHEA: 0
WHEEZING: 0

## 2024-04-09 NOTE — PROGRESS NOTES
2024    TELEHEALTH EVALUATION -- Audio/Visual    HPI:    Lucy Tariq (:  1970) has requested an audio/video evaluation for the following concern(s):      Pt presents for follow up post hospital admission for dizziness---> brain MRI negative for acute  admission for dizzines attributed to BPPV. Resolved after Epley maneuver. Brain MRI negative. Dizziness improved on meclizine but intermittent. Await PT.    Review of Systems   Respiratory:  Negative for cough, shortness of breath and wheezing.    Cardiovascular:  Negative for chest pain.   Gastrointestinal:  Negative for abdominal pain, diarrhea, nausea and vomiting.   Endocrine: Negative for cold intolerance and heat intolerance.   Genitourinary:  Negative for dysuria and frequency.   Neurological:  Positive for dizziness. Negative for light-headedness.     Prior to Visit Medications    Medication Sig Taking? Authorizing Provider   Insulin Pen Needle (B-D ULTRAFINE III SHORT PEN) 31G X 8 MM MISC Inject 1 each into the skin 3 times daily Yes Christopher Khan MD   buPROPion (WELLBUTRIN XL) 150 MG extended release tablet Take 1 tablet by mouth every morning Yes Amna Crystal MD   magnesium oxide (MAG-OX) 400 (240 Mg) MG tablet Take 1 tablet by mouth daily Yes Amna Crystal MD   cetirizine (ZYRTEC) 10 MG tablet Take 1 tablet by mouth daily Yes Amna Crystal MD   Handicap Placard MISC by Does not apply route For 5 yrs Yes Amna Crystal MD   insulin lispro, 1 Unit Dial, (HUMALOG/ADMELOG) 100 UNIT/ML SOPN INJECT 30-35 UNITS SUBCUTANEOUSLY EVERY MEAL; HOLD IF BLOOD GLUCOSE IS LESS THAN 150 Yes Amna Crystal MD   albuterol sulfate HFA (PROVENTIL;VENTOLIN;PROAIR) 108 (90 Base) MCG/ACT inhaler INHALE 2 PUFFS INTO THE LUNGS EVERY 6 HOURS AS NEEDED FOR WHEEZING Yes Amna Crystal MD   oxyCODONE-acetaminophen (PERCOCET)  MG per tablet  Yes Magaly Phillips MD   blood glucose test strips (ONETOUCH VERIO) strip Test 3x daily e11.65 Yes Amna Crystal MD   TRULICITY 1.5

## 2024-04-10 ENCOUNTER — OFFICE VISIT (OUTPATIENT)
Dept: ENDOCRINOLOGY | Age: 54
End: 2024-04-10
Payer: MEDICAID

## 2024-04-10 VITALS
DIASTOLIC BLOOD PRESSURE: 70 MMHG | WEIGHT: 285 LBS | HEIGHT: 63 IN | HEART RATE: 75 BPM | SYSTOLIC BLOOD PRESSURE: 110 MMHG | OXYGEN SATURATION: 94 % | BODY MASS INDEX: 50.5 KG/M2

## 2024-04-10 DIAGNOSIS — M17.10 ARTHRITIS OF KNEE: ICD-10-CM

## 2024-04-10 DIAGNOSIS — Z79.4 TYPE 2 DIABETES MELLITUS WITH HYPERGLYCEMIA, WITH LONG-TERM CURRENT USE OF INSULIN (HCC): Primary | ICD-10-CM

## 2024-04-10 DIAGNOSIS — H81.10 BENIGN PAROXYSMAL POSITIONAL VERTIGO, UNSPECIFIED LATERALITY: Primary | ICD-10-CM

## 2024-04-10 DIAGNOSIS — E11.65 TYPE 2 DIABETES MELLITUS WITH HYPERGLYCEMIA, WITH LONG-TERM CURRENT USE OF INSULIN (HCC): Primary | ICD-10-CM

## 2024-04-10 DIAGNOSIS — E66.01 MORBID OBESITY (HCC): ICD-10-CM

## 2024-04-10 DIAGNOSIS — M70.62 TROCHANTERIC BURSITIS OF LEFT HIP: ICD-10-CM

## 2024-04-10 LAB
CHP ED QC CHECK: NORMAL
GLUCOSE BLD-MCNC: 93 MG/DL
HBA1C MFR BLD: 6.6 %

## 2024-04-10 PROCEDURE — 3044F HG A1C LEVEL LT 7.0%: CPT | Performed by: INTERNAL MEDICINE

## 2024-04-10 PROCEDURE — 2022F DILAT RTA XM EVC RTNOPTHY: CPT | Performed by: INTERNAL MEDICINE

## 2024-04-10 PROCEDURE — 3074F SYST BP LT 130 MM HG: CPT | Performed by: INTERNAL MEDICINE

## 2024-04-10 PROCEDURE — 3078F DIAST BP <80 MM HG: CPT | Performed by: INTERNAL MEDICINE

## 2024-04-10 PROCEDURE — 83036 HEMOGLOBIN GLYCOSYLATED A1C: CPT | Performed by: INTERNAL MEDICINE

## 2024-04-10 PROCEDURE — G8427 DOCREV CUR MEDS BY ELIG CLIN: HCPCS | Performed by: INTERNAL MEDICINE

## 2024-04-10 PROCEDURE — 1036F TOBACCO NON-USER: CPT | Performed by: INTERNAL MEDICINE

## 2024-04-10 PROCEDURE — 82962 GLUCOSE BLOOD TEST: CPT | Performed by: INTERNAL MEDICINE

## 2024-04-10 PROCEDURE — G8417 CALC BMI ABV UP PARAM F/U: HCPCS | Performed by: INTERNAL MEDICINE

## 2024-04-10 PROCEDURE — 95251 CONT GLUC MNTR ANALYSIS I&R: CPT | Performed by: INTERNAL MEDICINE

## 2024-04-10 PROCEDURE — 99213 OFFICE O/P EST LOW 20 MIN: CPT | Performed by: INTERNAL MEDICINE

## 2024-04-10 PROCEDURE — 3017F COLORECTAL CA SCREEN DOC REV: CPT | Performed by: INTERNAL MEDICINE

## 2024-04-10 RX ORDER — PEN NEEDLE, DIABETIC 31 GX5/16"
NEEDLE, DISPOSABLE MISCELLANEOUS
Qty: 300 EACH | Refills: 5 | Status: SHIPPED | OUTPATIENT
Start: 2024-04-10

## 2024-04-10 NOTE — PROGRESS NOTES
Supports (FUTURO LEFT HAND WRIST BRACE) MISC, Wear at night and daily as needed, Disp: 2 each, Rfl: 0    fluticasone-umeclidin-vilant (TRELEGY ELLIPTA) 200-62.5-25 MCG/ACT AEPB inhaler, Inhale 1 puff into the lungs daily, Disp: 1 each, Rfl: 3    montelukast (SINGULAIR) 10 MG tablet, Take 1 tablet by mouth nightly, Disp: 30 tablet, Rfl: 0    Continuous Blood Gluc Sensor (FREESTYLE JESSICA 2 SENSOR) [facility], USE EVERY 2 WEEKS, Disp: 2 each, Rfl: 3    lidocaine (LIDODERM) 5 %, Apply topically, Disp: , Rfl:     Continuous Blood Gluc  (FREESTYLE JESSICA 2 READER) LENNY, As directed, Disp: 1 each, Rfl: 0    spironolactone (ALDACTONE) 50 MG tablet, Take 1 tablet by mouth daily, Disp: 30 tablet, Rfl: 0    Blood Glucose Monitoring Suppl (ONETOUCH VERIO) w/Device KIT, As  Directed, Disp: 1 kit, Rfl: 00    meclizine (ANTIVERT) 12.5 MG tablet, Take 1 tablet by mouth 2 times daily as needed for Dizziness, Disp: , Rfl:     butalbital-acetaminophen-caffeine (FIORICET, ESGIC) -40 MG per tablet, Take 1 tablet by mouth every 6 hours as needed for Headaches, Disp: 30 tablet, Rfl: 1    acetaminophen (TYLENOL) 500 MG tablet, Take 1 tablet by mouth 4 times daily as needed for Pain, Disp: 360 tablet, Rfl: 1    albuterol (PROVENTIL) (2.5 MG/3ML) 0.083% nebulizer solution, Take 3 mLs by nebulization every 4 hours as needed for Wheezing, Disp: 120 each, Rfl: 3    oxyCODONE-acetaminophen (PERCOCET) 5-325 MG per tablet, Take 1 tablet by mouth every 6 hours as needed for Pain for up to 3 days. Intended supply: 3 days. Take lowest dose possible to manage pain, Disp: 12 tablet, Rfl: 0    oxyCODONE-acetaminophen (PERCOCET) 5-325 MG per tablet, Take 1 tablet by mouth every 6 hours as needed for Pain for up to 14 days. Take lowest dose possible to manage pain Max Daily Amount: 4 tablets (Patient not taking: Reported on 1/29/2024), Disp: 56 tablet, Rfl: 0    pregabalin (LYRICA) 200 MG capsule, Take 1 capsule by mouth three times daily for 90

## 2024-04-11 ASSESSMENT — ENCOUNTER SYMPTOMS: EYES NEGATIVE: 1

## 2024-04-18 ENCOUNTER — HOSPITAL ENCOUNTER (OUTPATIENT)
Dept: PHYSICAL THERAPY | Age: 54
Setting detail: THERAPIES SERIES
Discharge: HOME | End: 2024-04-18
Attending: INTERNAL MEDICINE
Payer: MEDICAID

## 2024-04-18 PROCEDURE — 97162 PT EVAL MOD COMPLEX 30 MIN: CPT

## 2024-04-18 ASSESSMENT — PAIN DESCRIPTION - PAIN TYPE: TYPE: SURGICAL PAIN

## 2024-04-18 ASSESSMENT — PAIN DESCRIPTION - ORIENTATION: ORIENTATION: LOWER

## 2024-04-18 ASSESSMENT — PAIN SCALES - GENERAL: PAINLEVEL_OUTOF10: 8

## 2024-04-18 ASSESSMENT — PAIN DESCRIPTION - LOCATION: LOCATION: BACK

## 2024-04-18 ASSESSMENT — PAIN DESCRIPTION - DESCRIPTORS: DESCRIPTORS: SORE

## 2024-04-18 NOTE — PLAN OF CARE
PHYSICAL THERAPY PLAN OF CARE   [address] [place]  [place] [address]      Phone:[phone]    [] Certification  [] Recertification [x]  Plan of Care  [] Progress Note [] Discharge      Referring Provider: Amna Crystal MD     From:  Shayy Goodwin, PT     Patient: Lucy Tariq (54 y.o. female) : 1970 Date: 2024   Medical Diagnosis: Benign paroxysmal positional vertigo, unspecified laterality [H81.10]    Treatment Diagnosis: BPPV, impaired oculomotor      Progress Report Period from:  2024  to 2024    Visits to Date: 1 No Show: 0 Cancelled Appts: 0    OBJECTIVE:   Short Term Goals =Long term goals    Long Term Goals - Time Frame for Long Term Goals : 4 weeks  Goals Current/ Discharge status Status   Long Term Goal 1: Patient will report no dizziness with change in position. Spinning reported with change in position New   Long Term Goal 2: Patient will have negative Hallpike for improved function. Positive with left side lying to sitting, Hallpike NT due to high back pain New   Long Term Goal 3: DHI </= 10 22 New   Long Term Goal 4: Patient will demonstrate normal smooth pursuits, saccades and VOR for improved tracking tolerance. Patient reports dizziness with oculomotor testing New     Body Structures, Functions, Activity Limitations Requiring Skilled Therapeutic Intervention: Vestibular Impairment  Assessment: Patient reports vertigo with change in position starting in March.  Upon PT evaluation, patient demonstrates nystagmus with left side-laying to sitting on edge of mat.  Patient also have difficulty with tracking with smooth pursuits, saccades and VOR.  Unable to test left Hallpike this date due to high back pain this date.  Patient would benefit from PT to improve vertigo symptoms with Epley maneuver and oculomotor function to decrease symptoms.  Therapy Prognosis: Good      PT Education: PT Role;Goals;Plan of Care;Evaluative findings;Home Exercise

## 2024-04-18 NOTE — THERAPY EVALUATION
[facility]   PHYSICAL THERAPY   OUTPATIENT EVALUATION    [x] [facility]    [] [facility]                  Physical Therapy: Initial Evaluation    Patient: Lucy Tariq (54 y.o.     female)   Examination Date: 2024  Plan of Care Certification Period: 2024 to    Progress Note Counter: Eval only   :  1970 ;    Confirmed: Yes MRN: [id number]  CSN: [id number]   Insurance: Payor: [employer] [facility] / Plan: [employer] [place] / Product Type: *No Product type* /   Insurance ID: [id number] - (Medicaid Managed) Secondary Insurance (if applicable):    Referring Physician: Amna Crystal MD     PCP: Amna Crystal MD Visits to Date/Visits Approved: 1 /      No Show/Cancelled Appts: 0 / 0     Medical Diagnosis: Benign paroxysmal positional vertigo, unspecified laterality [H81.10]    Treatment Diagnosis: BPPV, impaired oculomotor     PERTINENT MEDICAL HISTORY   Patient Assessed for Rehabilitation Services: Yes       Medical History: Chart Reviewed: Yes   Past Medical History:   Diagnosis Date    Anxiety     Arthritis     Asthma     Bronchopneumonia     Cancer (HCC)     renal    Cerebral artery occlusion with cerebral infarction (HCC)     Chronic bilateral low back pain with sciatica     Chronic kidney disease     Chronic obstructive lung disease (HCC) 2019    Depression     Fibromyalgia     Gout     rt knee    Hypertension     Insulin dependent type 2 diabetes mellitus, uncontrolled 8/3/2018    Localized enlarged lymph nodes 10/26/2018    Mixed headache     Pure hyperglyceridemia 2017    Sarcoidosis     Sleep apnea     does not wear cpap    Thyroid goiter      Surgical History:   Past Surgical History:   Procedure Laterality Date    BRONCHOSCOPY  10/26/2018    DR. STEARNS    KIDNEY REMOVAL Right 2016    KIDNEY REMOVAL Right 2016    LUNG BIOPSY Right 10/2018    SPINAL FUSION      THYROID LOBECTOMY Right 2014

## 2024-04-24 ENCOUNTER — PREP FOR PROCEDURE (OUTPATIENT)
Dept: SURGERY | Age: 54
End: 2024-04-24

## 2024-04-24 ENCOUNTER — OFFICE VISIT (OUTPATIENT)
Dept: CARDIOTHORACIC SURGERY | Age: 54
End: 2024-04-24
Payer: MEDICAID

## 2024-04-24 VITALS
WEIGHT: 260 LBS | BODY MASS INDEX: 46.07 KG/M2 | OXYGEN SATURATION: 94 % | TEMPERATURE: 98.3 F | HEART RATE: 96 BPM | HEIGHT: 63 IN

## 2024-04-24 DIAGNOSIS — R07.89 XIPHOID PAIN: Primary | ICD-10-CM

## 2024-04-24 PROCEDURE — G8417 CALC BMI ABV UP PARAM F/U: HCPCS | Performed by: THORACIC SURGERY (CARDIOTHORACIC VASCULAR SURGERY)

## 2024-04-24 PROCEDURE — 3017F COLORECTAL CA SCREEN DOC REV: CPT | Performed by: THORACIC SURGERY (CARDIOTHORACIC VASCULAR SURGERY)

## 2024-04-24 PROCEDURE — 1036F TOBACCO NON-USER: CPT | Performed by: THORACIC SURGERY (CARDIOTHORACIC VASCULAR SURGERY)

## 2024-04-24 PROCEDURE — G8427 DOCREV CUR MEDS BY ELIG CLIN: HCPCS | Performed by: THORACIC SURGERY (CARDIOTHORACIC VASCULAR SURGERY)

## 2024-04-24 PROCEDURE — 99204 OFFICE O/P NEW MOD 45 MIN: CPT | Performed by: THORACIC SURGERY (CARDIOTHORACIC VASCULAR SURGERY)

## 2024-04-24 ASSESSMENT — ENCOUNTER SYMPTOMS
ABDOMINAL PAIN: 0
SORE THROAT: 0
STRIDOR: 0
CHOKING: 0
CHEST TIGHTNESS: 0
VOICE CHANGE: 0
TROUBLE SWALLOWING: 0
WHEEZING: 0
SHORTNESS OF BREATH: 0
NAUSEA: 0
VOMITING: 0
COUGH: 0
ABDOMINAL DISTENTION: 0
DIARRHEA: 0

## 2024-04-24 NOTE — PROGRESS NOTES
Subjective:      Patient ID: Lucy Tariq is a 54 y.o. female who presents today for:  Chief Complaint   Patient presents with    New Patient       HPI  For several years now the patient has been suffering with intermittent episodes of very severe substernal pain.  This would last anywhere from several seconds to several minutes.  On the extreme episodes the pain will then start wrapping around her right side and going to her back.  She denies any other symptoms with this.  She has had multiple trips to various emergency departments over the last few years because of this chest pain.  She has had multiple cardiac workups which has always come up negative.  Chest x-rays and CAT scans of failed to show any other structural abnormality that could explain her pain.  She has tried various pain medications and these did not help either.    Past Medical History:   Diagnosis Date    Anxiety     Arthritis     Asthma     Bronchopneumonia     Cancer (HCC)     renal    Cerebral artery occlusion with cerebral infarction (HCC)     Chronic bilateral low back pain with sciatica     Chronic kidney disease     Chronic obstructive lung disease (HCC) 7/26/2019    Depression     Fibromyalgia     Gout     rt knee    Hypertension     Insulin dependent type 2 diabetes mellitus, uncontrolled 8/3/2018    Localized enlarged lymph nodes 10/26/2018    Mixed headache     Pure hyperglyceridemia 5/19/2017    Sarcoidosis     Sleep apnea     does not wear cpap    Thyroid goiter       Past Surgical History:   Procedure Laterality Date    BRONCHOSCOPY  10/26/2018    DR. STEARNS    KIDNEY REMOVAL Right 08/2016    KIDNEY REMOVAL Right 2016    LUNG BIOPSY Right 10/2018    SPINAL FUSION      THYROID LOBECTOMY Right 06/13/2014    THYROIDECTOMY  02/21/2019    DR. MAGDALENO    THYROIDECTOMY  2018    URETER STENT PLACEMENT Left 08/2016     Social History     Socioeconomic History    Marital status: Legally      Spouse name: Not on file    Number of

## 2024-04-25 ENCOUNTER — ANESTHESIA EVENT (OUTPATIENT)
Dept: OPERATING ROOM | Age: 54
End: 2024-04-25
Payer: MEDICAID

## 2024-04-25 RX ORDER — SODIUM CHLORIDE 0.9 % (FLUSH) 0.9 %
5-40 SYRINGE (ML) INJECTION EVERY 12 HOURS SCHEDULED
Status: CANCELLED | OUTPATIENT
Start: 2024-04-30

## 2024-04-25 RX ORDER — SODIUM CHLORIDE 9 MG/ML
INJECTION, SOLUTION INTRAVENOUS PRN
Status: CANCELLED | OUTPATIENT
Start: 2024-04-30

## 2024-04-25 RX ORDER — SODIUM CHLORIDE 0.9 % (FLUSH) 0.9 %
5-40 SYRINGE (ML) INJECTION PRN
Status: CANCELLED | OUTPATIENT
Start: 2024-04-30

## 2024-04-26 ENCOUNTER — HOSPITAL ENCOUNTER (OUTPATIENT)
Dept: PREADMISSION TESTING | Age: 54
Discharge: HOME | End: 2024-04-30
Payer: MEDICAID

## 2024-04-26 VITALS
BODY MASS INDEX: 47.98 KG/M2 | RESPIRATION RATE: 18 BRPM | TEMPERATURE: 99.1 F | HEART RATE: 71 BPM | OXYGEN SATURATION: 98 % | HEIGHT: 65 IN | DIASTOLIC BLOOD PRESSURE: 75 MMHG | SYSTOLIC BLOOD PRESSURE: 121 MMHG | WEIGHT: 288 LBS

## 2024-04-26 LAB
ABO + RH BLD: NORMAL
BLD GP AB SCN SERPL QL: NORMAL

## 2024-04-26 PROCEDURE — 86850 RBC ANTIBODY SCREEN: CPT

## 2024-04-26 PROCEDURE — 86900 BLOOD TYPING SEROLOGIC ABO: CPT

## 2024-04-26 PROCEDURE — 86901 BLOOD TYPING SEROLOGIC RH(D): CPT

## 2024-04-30 ENCOUNTER — HOSPITAL ENCOUNTER (INPATIENT)
Age: 54
LOS: 1 days | Discharge: HOME | End: 2024-05-01
Attending: THORACIC SURGERY (CARDIOTHORACIC VASCULAR SURGERY) | Admitting: THORACIC SURGERY (CARDIOTHORACIC VASCULAR SURGERY)
Payer: MEDICAID

## 2024-04-30 ENCOUNTER — ANESTHESIA (OUTPATIENT)
Dept: OPERATING ROOM | Age: 54
End: 2024-04-30
Payer: MEDICAID

## 2024-04-30 DIAGNOSIS — R07.89 XIPHOID PAIN: ICD-10-CM

## 2024-04-30 PROBLEM — G89.18 POSTOPERATIVE PAIN: Status: ACTIVE | Noted: 2024-04-30

## 2024-04-30 LAB
GLUCOSE BLD-MCNC: 108 MG/DL (ref 70–99)
GLUCOSE BLD-MCNC: 110 MG/DL (ref 70–99)
GLUCOSE BLD-MCNC: 189 MG/DL (ref 70–99)
PERFORMED ON: ABNORMAL

## 2024-04-30 PROCEDURE — 7100000000 HC PACU RECOVERY - FIRST 15 MIN: Performed by: THORACIC SURGERY (CARDIOTHORACIC VASCULAR SURGERY)

## 2024-04-30 PROCEDURE — 6360000002 HC RX W HCPCS: Performed by: THORACIC SURGERY (CARDIOTHORACIC VASCULAR SURGERY)

## 2024-04-30 PROCEDURE — 6360000002 HC RX W HCPCS: Performed by: NURSE ANESTHETIST, CERTIFIED REGISTERED

## 2024-04-30 PROCEDURE — 2709999900 HC NON-CHARGEABLE SUPPLY: Performed by: THORACIC SURGERY (CARDIOTHORACIC VASCULAR SURGERY)

## 2024-04-30 PROCEDURE — 3700000001 HC ADD 15 MINUTES (ANESTHESIA): Performed by: THORACIC SURGERY (CARDIOTHORACIC VASCULAR SURGERY)

## 2024-04-30 PROCEDURE — 2500000003 HC RX 250 WO HCPCS: Performed by: STUDENT IN AN ORGANIZED HEALTH CARE EDUCATION/TRAINING PROGRAM

## 2024-04-30 PROCEDURE — 3600000014 HC SURGERY LEVEL 4 ADDTL 15MIN: Performed by: THORACIC SURGERY (CARDIOTHORACIC VASCULAR SURGERY)

## 2024-04-30 PROCEDURE — A4217 STERILE WATER/SALINE, 500 ML: HCPCS | Performed by: THORACIC SURGERY (CARDIOTHORACIC VASCULAR SURGERY)

## 2024-04-30 PROCEDURE — 2580000003 HC RX 258: Performed by: THORACIC SURGERY (CARDIOTHORACIC VASCULAR SURGERY)

## 2024-04-30 PROCEDURE — 1210000000 HC MED SURG R&B

## 2024-04-30 PROCEDURE — 2500000003 HC RX 250 WO HCPCS: Performed by: NURSE ANESTHETIST, CERTIFIED REGISTERED

## 2024-04-30 PROCEDURE — 3600000004 HC SURGERY LEVEL 4 BASE: Performed by: THORACIC SURGERY (CARDIOTHORACIC VASCULAR SURGERY)

## 2024-04-30 PROCEDURE — 2580000003 HC RX 258: Performed by: STUDENT IN AN ORGANIZED HEALTH CARE EDUCATION/TRAINING PROGRAM

## 2024-04-30 PROCEDURE — 7100000001 HC PACU RECOVERY - ADDTL 15 MIN: Performed by: THORACIC SURGERY (CARDIOTHORACIC VASCULAR SURGERY)

## 2024-04-30 PROCEDURE — 6360000002 HC RX W HCPCS: Performed by: STUDENT IN AN ORGANIZED HEALTH CARE EDUCATION/TRAINING PROGRAM

## 2024-04-30 PROCEDURE — 88311 DECALCIFY TISSUE: CPT

## 2024-04-30 PROCEDURE — 6370000000 HC RX 637 (ALT 250 FOR IP): Performed by: THORACIC SURGERY (CARDIOTHORACIC VASCULAR SURGERY)

## 2024-04-30 PROCEDURE — 3700000000 HC ANESTHESIA ATTENDED CARE: Performed by: THORACIC SURGERY (CARDIOTHORACIC VASCULAR SURGERY)

## 2024-04-30 PROCEDURE — 0PB00ZZ EXCISION OF STERNUM, OPEN APPROACH: ICD-10-PCS | Performed by: THORACIC SURGERY (CARDIOTHORACIC VASCULAR SURGERY)

## 2024-04-30 PROCEDURE — 88305 TISSUE EXAM BY PATHOLOGIST: CPT

## 2024-04-30 PROCEDURE — 21620 OSTECTOMY STERNUM PARTIAL: CPT | Performed by: THORACIC SURGERY (CARDIOTHORACIC VASCULAR SURGERY)

## 2024-04-30 RX ORDER — SODIUM CHLORIDE 0.9 % (FLUSH) 0.9 %
5-40 SYRINGE (ML) INJECTION PRN
Status: DISCONTINUED | OUTPATIENT
Start: 2024-04-30 | End: 2024-04-30 | Stop reason: HOSPADM

## 2024-04-30 RX ORDER — ONDANSETRON 4 MG/1
4 TABLET, ORALLY DISINTEGRATING ORAL EVERY 8 HOURS PRN
Status: DISCONTINUED | OUTPATIENT
Start: 2024-04-30 | End: 2024-05-01 | Stop reason: HOSPADM

## 2024-04-30 RX ORDER — CETIRIZINE HYDROCHLORIDE 10 MG/1
10 TABLET ORAL DAILY
Status: DISCONTINUED | OUTPATIENT
Start: 2024-04-30 | End: 2024-05-01 | Stop reason: HOSPADM

## 2024-04-30 RX ORDER — MONTELUKAST SODIUM 10 MG/1
10 TABLET ORAL NIGHTLY
Status: DISCONTINUED | OUTPATIENT
Start: 2024-04-30 | End: 2024-05-01 | Stop reason: HOSPADM

## 2024-04-30 RX ORDER — FENTANYL CITRATE 50 UG/ML
INJECTION, SOLUTION INTRAMUSCULAR; INTRAVENOUS PRN
Status: DISCONTINUED | OUTPATIENT
Start: 2024-04-30 | End: 2024-04-30 | Stop reason: SDUPTHER

## 2024-04-30 RX ORDER — LIDOCAINE HYDROCHLORIDE 10 MG/ML
1 INJECTION, SOLUTION EPIDURAL; INFILTRATION; INTRACAUDAL; PERINEURAL
Status: DISCONTINUED | OUTPATIENT
Start: 2024-04-30 | End: 2024-04-30 | Stop reason: HOSPADM

## 2024-04-30 RX ORDER — NALOXONE HYDROCHLORIDE 0.4 MG/ML
INJECTION, SOLUTION INTRAMUSCULAR; INTRAVENOUS; SUBCUTANEOUS PRN
Status: DISCONTINUED | OUTPATIENT
Start: 2024-04-30 | End: 2024-04-30 | Stop reason: HOSPADM

## 2024-04-30 RX ORDER — SODIUM CHLORIDE 0.9 % (FLUSH) 0.9 %
5-40 SYRINGE (ML) INJECTION EVERY 12 HOURS SCHEDULED
Status: DISCONTINUED | OUTPATIENT
Start: 2024-04-30 | End: 2024-04-30 | Stop reason: HOSPADM

## 2024-04-30 RX ORDER — OXYCODONE HYDROCHLORIDE 5 MG/1
5 TABLET ORAL EVERY 6 HOURS PRN
Qty: 25 TABLET | Refills: 0 | Status: SHIPPED | OUTPATIENT
Start: 2024-04-30 | End: 2024-05-07

## 2024-04-30 RX ORDER — SODIUM CHLORIDE 9 MG/ML
INJECTION, SOLUTION INTRAVENOUS CONTINUOUS
Status: DISCONTINUED | OUTPATIENT
Start: 2024-04-30 | End: 2024-04-30 | Stop reason: HOSPADM

## 2024-04-30 RX ORDER — BUPIVACAINE HYDROCHLORIDE 5 MG/ML
INJECTION, SOLUTION EPIDURAL; INTRACAUDAL PRN
Status: DISCONTINUED | OUTPATIENT
Start: 2024-04-30 | End: 2024-04-30 | Stop reason: HOSPADM

## 2024-04-30 RX ORDER — ROCURONIUM BROMIDE 10 MG/ML
INJECTION, SOLUTION INTRAVENOUS PRN
Status: DISCONTINUED | OUTPATIENT
Start: 2024-04-30 | End: 2024-04-30 | Stop reason: SDUPTHER

## 2024-04-30 RX ORDER — DIPHENHYDRAMINE HYDROCHLORIDE 50 MG/ML
12.5 INJECTION INTRAMUSCULAR; INTRAVENOUS
Status: DISCONTINUED | OUTPATIENT
Start: 2024-04-30 | End: 2024-04-30 | Stop reason: HOSPADM

## 2024-04-30 RX ORDER — OXYCODONE HYDROCHLORIDE 5 MG/1
5 TABLET ORAL
Status: DISCONTINUED | OUTPATIENT
Start: 2024-04-30 | End: 2024-04-30 | Stop reason: HOSPADM

## 2024-04-30 RX ORDER — SODIUM CHLORIDE 0.9 % (FLUSH) 0.9 %
5-40 SYRINGE (ML) INJECTION EVERY 12 HOURS SCHEDULED
Status: DISCONTINUED | OUTPATIENT
Start: 2024-04-30 | End: 2024-05-01 | Stop reason: HOSPADM

## 2024-04-30 RX ORDER — INSULIN LISPRO 100 [IU]/ML
0-4 INJECTION, SOLUTION INTRAVENOUS; SUBCUTANEOUS NIGHTLY
Status: DISCONTINUED | OUTPATIENT
Start: 2024-04-30 | End: 2024-05-01 | Stop reason: HOSPADM

## 2024-04-30 RX ORDER — DEXAMETHASONE SODIUM PHOSPHATE 4 MG/ML
INJECTION, SOLUTION INTRA-ARTICULAR; INTRALESIONAL; INTRAMUSCULAR; INTRAVENOUS; SOFT TISSUE PRN
Status: DISCONTINUED | OUTPATIENT
Start: 2024-04-30 | End: 2024-04-30 | Stop reason: SDUPTHER

## 2024-04-30 RX ORDER — PROPOFOL 10 MG/ML
INJECTION, EMULSION INTRAVENOUS PRN
Status: DISCONTINUED | OUTPATIENT
Start: 2024-04-30 | End: 2024-04-30 | Stop reason: SDUPTHER

## 2024-04-30 RX ORDER — SODIUM CHLORIDE 0.9 % (FLUSH) 0.9 %
5-40 SYRINGE (ML) INJECTION PRN
Status: DISCONTINUED | OUTPATIENT
Start: 2024-04-30 | End: 2024-05-01 | Stop reason: HOSPADM

## 2024-04-30 RX ORDER — ONDANSETRON 2 MG/ML
4 INJECTION INTRAMUSCULAR; INTRAVENOUS
Status: DISCONTINUED | OUTPATIENT
Start: 2024-04-30 | End: 2024-04-30 | Stop reason: HOSPADM

## 2024-04-30 RX ORDER — BUPROPION HYDROCHLORIDE 150 MG/1
150 TABLET ORAL EVERY MORNING
Status: DISCONTINUED | OUTPATIENT
Start: 2024-05-01 | End: 2024-05-01 | Stop reason: HOSPADM

## 2024-04-30 RX ORDER — MAGNESIUM HYDROXIDE 1200 MG/15ML
LIQUID ORAL CONTINUOUS PRN
Status: DISCONTINUED | OUTPATIENT
Start: 2024-04-30 | End: 2024-04-30 | Stop reason: HOSPADM

## 2024-04-30 RX ORDER — DEXTROSE MONOHYDRATE 100 MG/ML
INJECTION, SOLUTION INTRAVENOUS CONTINUOUS PRN
Status: DISCONTINUED | OUTPATIENT
Start: 2024-04-30 | End: 2024-05-01 | Stop reason: HOSPADM

## 2024-04-30 RX ORDER — OXYCODONE HYDROCHLORIDE 5 MG/1
5 TABLET ORAL EVERY 4 HOURS PRN
Status: DISCONTINUED | OUTPATIENT
Start: 2024-04-30 | End: 2024-05-01 | Stop reason: HOSPADM

## 2024-04-30 RX ORDER — INSULIN LISPRO 100 [IU]/ML
0-8 INJECTION, SOLUTION INTRAVENOUS; SUBCUTANEOUS
Status: DISCONTINUED | OUTPATIENT
Start: 2024-05-01 | End: 2024-05-01 | Stop reason: HOSPADM

## 2024-04-30 RX ORDER — ACETAMINOPHEN 500 MG
500 TABLET ORAL 4 TIMES DAILY PRN
Status: DISCONTINUED | OUTPATIENT
Start: 2024-04-30 | End: 2024-05-01 | Stop reason: HOSPADM

## 2024-04-30 RX ORDER — MECLIZINE HYDROCHLORIDE 25 MG/1
12.5 TABLET ORAL 2 TIMES DAILY PRN
Status: DISCONTINUED | OUTPATIENT
Start: 2024-04-30 | End: 2024-05-01 | Stop reason: HOSPADM

## 2024-04-30 RX ORDER — ALBUTEROL SULFATE 90 UG/1
2 AEROSOL, METERED RESPIRATORY (INHALATION) EVERY 6 HOURS PRN
Status: DISCONTINUED | OUTPATIENT
Start: 2024-04-30 | End: 2024-05-01 | Stop reason: HOSPADM

## 2024-04-30 RX ORDER — GLUCAGON 1 MG/ML
1 KIT INJECTION PRN
Status: DISCONTINUED | OUTPATIENT
Start: 2024-04-30 | End: 2024-05-01 | Stop reason: HOSPADM

## 2024-04-30 RX ORDER — MEPERIDINE HYDROCHLORIDE 25 MG/ML
12.5 INJECTION INTRAMUSCULAR; INTRAVENOUS; SUBCUTANEOUS
Status: DISCONTINUED | OUTPATIENT
Start: 2024-04-30 | End: 2024-04-30 | Stop reason: HOSPADM

## 2024-04-30 RX ORDER — LOSARTAN POTASSIUM 25 MG/1
25 TABLET ORAL DAILY
Status: DISCONTINUED | OUTPATIENT
Start: 2024-04-30 | End: 2024-05-01 | Stop reason: HOSPADM

## 2024-04-30 RX ORDER — LEVOTHYROXINE SODIUM 0.12 MG/1
125 TABLET ORAL DAILY
Status: DISCONTINUED | OUTPATIENT
Start: 2024-04-30 | End: 2024-05-01 | Stop reason: HOSPADM

## 2024-04-30 RX ORDER — SODIUM CHLORIDE 9 MG/ML
INJECTION, SOLUTION INTRAVENOUS PRN
Status: DISCONTINUED | OUTPATIENT
Start: 2024-04-30 | End: 2024-04-30 | Stop reason: HOSPADM

## 2024-04-30 RX ORDER — METOCLOPRAMIDE HYDROCHLORIDE 5 MG/ML
10 INJECTION INTRAMUSCULAR; INTRAVENOUS
Status: DISCONTINUED | OUTPATIENT
Start: 2024-04-30 | End: 2024-04-30 | Stop reason: HOSPADM

## 2024-04-30 RX ORDER — ONDANSETRON 2 MG/ML
INJECTION INTRAMUSCULAR; INTRAVENOUS PRN
Status: DISCONTINUED | OUTPATIENT
Start: 2024-04-30 | End: 2024-04-30 | Stop reason: SDUPTHER

## 2024-04-30 RX ORDER — SODIUM CHLORIDE 9 MG/ML
INJECTION, SOLUTION INTRAVENOUS PRN
Status: DISCONTINUED | OUTPATIENT
Start: 2024-04-30 | End: 2024-05-01 | Stop reason: HOSPADM

## 2024-04-30 RX ORDER — FENTANYL CITRATE 0.05 MG/ML
50 INJECTION, SOLUTION INTRAMUSCULAR; INTRAVENOUS EVERY 10 MIN PRN
Status: DISCONTINUED | OUTPATIENT
Start: 2024-04-30 | End: 2024-04-30 | Stop reason: HOSPADM

## 2024-04-30 RX ORDER — SPIRONOLACTONE 25 MG/1
50 TABLET ORAL DAILY
Status: DISCONTINUED | OUTPATIENT
Start: 2024-04-30 | End: 2024-05-01 | Stop reason: HOSPADM

## 2024-04-30 RX ORDER — BUTALBITAL, ACETAMINOPHEN AND CAFFEINE 300; 40; 50 MG/1; MG/1; MG/1
1 CAPSULE ORAL EVERY 6 HOURS PRN
Status: DISCONTINUED | OUTPATIENT
Start: 2024-04-30 | End: 2024-05-01 | Stop reason: HOSPADM

## 2024-04-30 RX ORDER — ATORVASTATIN CALCIUM 40 MG/1
40 TABLET, FILM COATED ORAL NIGHTLY
Status: DISCONTINUED | OUTPATIENT
Start: 2024-04-30 | End: 2024-05-01 | Stop reason: HOSPADM

## 2024-04-30 RX ORDER — ENOXAPARIN SODIUM 100 MG/ML
30 INJECTION SUBCUTANEOUS 2 TIMES DAILY
Status: DISCONTINUED | OUTPATIENT
Start: 2024-04-30 | End: 2024-05-01 | Stop reason: HOSPADM

## 2024-04-30 RX ORDER — ONDANSETRON 2 MG/ML
4 INJECTION INTRAMUSCULAR; INTRAVENOUS EVERY 6 HOURS PRN
Status: DISCONTINUED | OUTPATIENT
Start: 2024-04-30 | End: 2024-05-01 | Stop reason: HOSPADM

## 2024-04-30 RX ORDER — ALBUTEROL SULFATE 2.5 MG/3ML
2.5 SOLUTION RESPIRATORY (INHALATION) EVERY 4 HOURS PRN
Status: DISCONTINUED | OUTPATIENT
Start: 2024-04-30 | End: 2024-05-01 | Stop reason: HOSPADM

## 2024-04-30 RX ORDER — LIDOCAINE HYDROCHLORIDE 10 MG/ML
INJECTION, SOLUTION EPIDURAL; INFILTRATION; INTRACAUDAL; PERINEURAL PRN
Status: DISCONTINUED | OUTPATIENT
Start: 2024-04-30 | End: 2024-04-30 | Stop reason: SDUPTHER

## 2024-04-30 RX ADMIN — ALBUTEROL SULFATE 2 PUFF: 90 AEROSOL, METERED RESPIRATORY (INHALATION) at 15:52

## 2024-04-30 RX ADMIN — FENTANYL CITRATE 50 MCG: 0.05 INJECTION, SOLUTION INTRAMUSCULAR; INTRAVENOUS at 11:55

## 2024-04-30 RX ADMIN — CETIRIZINE HYDROCHLORIDE 10 MG: 10 TABLET, FILM COATED ORAL at 18:06

## 2024-04-30 RX ADMIN — HYDROMORPHONE HYDROCHLORIDE 0.5 MG: 1 INJECTION, SOLUTION INTRAMUSCULAR; INTRAVENOUS; SUBCUTANEOUS at 15:19

## 2024-04-30 RX ADMIN — FENTANYL CITRATE 100 MCG: 50 INJECTION, SOLUTION INTRAMUSCULAR; INTRAVENOUS at 10:31

## 2024-04-30 RX ADMIN — SODIUM CHLORIDE: 9 INJECTION, SOLUTION INTRAVENOUS at 09:41

## 2024-04-30 RX ADMIN — ROCURONIUM BROMIDE 50 MG: 50 INJECTION INTRAVENOUS at 10:34

## 2024-04-30 RX ADMIN — HYDROMORPHONE HYDROCHLORIDE 0.5 MG: 1 INJECTION, SOLUTION INTRAMUSCULAR; INTRAVENOUS; SUBCUTANEOUS at 21:52

## 2024-04-30 RX ADMIN — PREGABALIN 200 MG: 50 CAPSULE ORAL at 21:44

## 2024-04-30 RX ADMIN — ATORVASTATIN CALCIUM 40 MG: 40 TABLET, FILM COATED ORAL at 21:44

## 2024-04-30 RX ADMIN — ONDANSETRON 4 MG: 2 INJECTION INTRAMUSCULAR; INTRAVENOUS at 10:46

## 2024-04-30 RX ADMIN — LIDOCAINE HYDROCHLORIDE 50 MG: 10 INJECTION, SOLUTION EPIDURAL; INFILTRATION; INTRACAUDAL; PERINEURAL at 10:34

## 2024-04-30 RX ADMIN — LOSARTAN POTASSIUM 25 MG: 25 TABLET, FILM COATED ORAL at 18:06

## 2024-04-30 RX ADMIN — SODIUM CHLORIDE 1500 MG: 9 INJECTION, SOLUTION INTRAVENOUS at 09:54

## 2024-04-30 RX ADMIN — SODIUM CHLORIDE, PRESERVATIVE FREE 10 ML: 5 INJECTION INTRAVENOUS at 21:45

## 2024-04-30 RX ADMIN — OXYCODONE 5 MG: 5 TABLET ORAL at 13:56

## 2024-04-30 RX ADMIN — FENTANYL CITRATE 50 MCG: 0.05 INJECTION, SOLUTION INTRAMUSCULAR; INTRAVENOUS at 12:17

## 2024-04-30 RX ADMIN — SUGAMMADEX 200 MG: 100 INJECTION, SOLUTION INTRAVENOUS at 11:21

## 2024-04-30 RX ADMIN — LEVOTHYROXINE SODIUM 125 MCG: 0.12 TABLET ORAL at 18:06

## 2024-04-30 RX ADMIN — HYDROMORPHONE HYDROCHLORIDE 0.5 MG: 1 INJECTION, SOLUTION INTRAMUSCULAR; INTRAVENOUS; SUBCUTANEOUS at 18:11

## 2024-04-30 RX ADMIN — ENOXAPARIN SODIUM 30 MG: 100 INJECTION SUBCUTANEOUS at 15:29

## 2024-04-30 RX ADMIN — SPIRONOLACTONE 50 MG: 25 TABLET ORAL at 18:06

## 2024-04-30 RX ADMIN — DEXAMETHASONE SODIUM PHOSPHATE 4 MG: 4 INJECTION, SOLUTION INTRAMUSCULAR; INTRAVENOUS at 10:46

## 2024-04-30 RX ADMIN — PROPOFOL 300 MG: 10 INJECTION, EMULSION INTRAVENOUS at 10:34

## 2024-04-30 RX ADMIN — MONTELUKAST 10 MG: 10 TABLET, FILM COATED ORAL at 21:44

## 2024-04-30 RX ADMIN — PREGABALIN 200 MG: 50 CAPSULE ORAL at 18:06

## 2024-04-30 ASSESSMENT — PAIN SCALES - GENERAL
PAINLEVEL_OUTOF10: 9
PAINLEVEL_OUTOF10: 6
PAINLEVEL_OUTOF10: 10
PAINLEVEL_OUTOF10: 7
PAINLEVEL_OUTOF10: 1
PAINLEVEL_OUTOF10: 10
PAINLEVEL_OUTOF10: 10
PAINLEVEL_OUTOF10: 9

## 2024-04-30 ASSESSMENT — PAIN DESCRIPTION - LOCATION
LOCATION: INCISION
LOCATION: CHEST
LOCATION: INCISION
LOCATION: ABDOMEN;CHEST

## 2024-04-30 ASSESSMENT — PAIN DESCRIPTION - ORIENTATION: ORIENTATION: MID

## 2024-04-30 ASSESSMENT — ENCOUNTER SYMPTOMS: SHORTNESS OF BREATH: 1

## 2024-04-30 NOTE — CARE COORDINATION
N/A            Potential DME:  N/A  Patient expects to discharge to: House  Plan for transportation at discharge: Family    Financial    Payor: [employer] / Plan: [employer] [place] / Product Type: *No Product type* /     Does insurance require precert for SNF: Yes    Potential assistance Purchasing Medications: No  Meds-to-Beds request: Yes      [id number] - [place], [place] - [address] [facility] [phone] - F [phone]  [address] [facility] [place] [address]  Phone: [phone] Fax: [phone]    [id number]  [place], [place] - [address] [facility] P [phone] - F [phone]  [address] [place] [address]  Phone: [phone] Fax: [phone]      Notes:    Factors facilitating achievement of predicted outcomes: Family support, Motivated, Cooperative, Pleasant, Sense of humor, Good insight into deficits, and Has needed Durable Medical Equipment at home    Barriers to discharge: Pain, Medical complications, and Medication managment    Additional Case Management Notes: MET W/PT AND SPOUSE TO ASSESS NEEDS AND DISCUSS DISCHARGE PLAN WHICH IS HOME W/SPOUSE AND FAMILY. PT IS THE CG FOR HER MOTHER. HAS CANE. NO HOME O2 OR HD. PT IS NOT A . DECLINES NEEDS.     The Plan for Transition of Care is related to the following treatment goals of Xiphoid pain [R07.89]  Postoperative pain [G89.18]    IF APPLICABLE: The Patient and/or patient representative Lucy and her family were provided with a choice of provider and agrees with the discharge plan. Freedom of choice list with basic dialogue that supports the patient's individualized plan of care/goals and shares the quality data associated with the providers was provided to: Patient   Patient Representative Name:       The Patient and/or Patient Representative Agree with the Discharge Plan? Yes    Fidelia Russo RN  Case Management Department  Ph: [phone] Fax: [phone]

## 2024-04-30 NOTE — DISCHARGE INSTRUCTIONS
No driving while taking oxycodones.  May remove surgical dressing day after procedure and shower.  Clean incision with soap and water twice daily for 7 days, then once daily.  No soaking underwater for 1 week.  Recover incision with fresh gauze after cleaning until healed.

## 2024-04-30 NOTE — H&P
Update History & Physical    I examined the patient and reviewed the History and Physical and there were no significant changes.    Vitals:    04/30/24 0956   BP:    Pulse: 70   Resp:    Temp:    SpO2:      Principal Problem:    Xiphoid pain  Resolved Problems:    * No resolved hospital problems. *        Plan: The risk, benefits, expected outcome, and alternative to the recommended procedure have been discussed with the patient.  Patient understands and wants to proceed with the procedure.    Electronically signed by FREDA STEARNS MD on 4/30/24 at 10:04 AM EDT

## 2024-04-30 NOTE — ANESTHESIA POSTPROCEDURE EVALUATION
Department of Anesthesiology  Postprocedure Note    Patient: Lucy Tariq  MRN: [id number]  YOB: 1970  Date of evaluation: 4/30/2024    Procedure Summary       Date: 04/30/24 Room / Location: [address]    Anesthesia Start: 1031 Anesthesia Stop: 1137    Procedure: xiphoidectomy, (Chest) Diagnosis:       Xiphoid pain      (Xiphoid pain [R07.89])    Surgeons: Freddie Dickson MD Responsible Provider: Seun Mendoza DO    Anesthesia Type: general ASA Status: 3            Anesthesia Type: No value filed.    Courtney Phase I: Courtney Score: 8    Courtney Phase II:      Anesthesia Post Evaluation    Patient location during evaluation: bedside  Patient participation: waiting for patient participation  Level of consciousness: awake and responsive to light touch  Airway patency: patent  Nausea & Vomiting: no nausea and no vomiting  Cardiovascular status: blood pressure returned to baseline and hemodynamically stable  Respiratory status: acceptable, oral airway and face mask  Hydration status: euvolemic  Pain management: adequate        No notable events documented.

## 2024-04-30 NOTE — PROGRESS NOTES
1550 Pt was having a hard time breathing. Stated she was starting to have an asthma attack. Respiratory was unable to respond due to rapid response being called else where. This RN pulled an albuterol inhaler and administered it according to the mar. Pt in bed. Call light within reach. Will continue to monitor.     Electronically signed by Marianne Rubio RN on 4/30/2024 at 3:56 PM

## 2024-04-30 NOTE — FLOWSHEET NOTE
1351 - Confirmed with pt that she does not have allergy to oxycodone.     1358 - PT medicated with PRN oxycodone for 10/10 pain. Pt wants to evaluate how the pain medication does before discharging.     1453 - Pt still in a lot of pain, Dr. Dickson notified have orders for Toradol 30 mg IV q 6 hr prn. Dilaudid 0.5 mg IV q 3 hr prn breakthrough.    Notified Dr. Dickson that those are both listed as allergies, she states that she can not take toradol due to having one kidney. Dilaudid is not an allergy of her's per pt.

## 2024-04-30 NOTE — OP NOTE
Operative Note      Patient: Lucy Tariq  YOB: 1970  MRN: [id number]    Date of Procedure: 4/30/2024    Pre-Op Diagnosis Codes:     * Xiphoid pain [R07.89]    Post-Op Diagnosis: Same       Procedure(s):  xiphoidectomy,    Surgeon(s):  Freddie Dickson MD    Assistant:   First Assistant: Roopa Hardy    Anesthesia: General    Estimated Blood Loss (mL): Minimal    Complications: None    Specimens:   ID Type Source Tests Collected by Time Destination   A : xiphoid Bone Chest SURGICAL PATHOLOGY Freddie Dickson MD 4/30/2024 1104        Implants:  * No implants in log *      Drains: * No LDAs found *    Findings:  Infection Present At Time Of Surgery (PATOS) (choose all levels that have infection present):  No infection present  Other Findings: none    Detailed Description of Procedure:   Patient was having an odd but chronic and suddenly severe pain in her epigastric region wrapping around to the right of unknown etiology.  Treatments by other doctors as well as CAT scans and other examinations failed to show any source of the pain.  Physical exam by myself showed a exquisite point tenderness right at her xiphoid process.  Patient agreed with xiphoidectomy in the hopes that this is solving her problem.    Once patient was intubated and approximately 5 cm midline incision was made directly over top of the xiphoid process.  Using cautery we dissected through the fat layer down onto the fascia overlying the xiphoid.  The cautery was used to strip all of the fascial and muscular attachments from around the xiphoid all the way up flush with the manubrium.  Using a rongeur were able to piecemeal remove the entire xiphoid to make sure that the stump was smooth with no sharp edges flush with the manubrium.  Area was irrigated with sterile water.  The fascial defect was closed with a number 1 ethibond the bond.  The remaining subtenons tissue and skin were closed in layers.    Electronically signed by

## 2024-04-30 NOTE — ANESTHESIA PRE PROCEDURE
Department of Anesthesiology  Preprocedure Note       Name:  Lucy Tariq   Age:  54 y.o.  :  1970                                          MRN:  [id number]         Date:  2024      Surgeon: Surgeon(s):  Freddie Dickson MD    Procedure: Procedure(s):  xiphoidectomy,    Medications prior to admission:   Prior to Admission medications    Medication Sig Start Date End Date Taking? Authorizing Provider   Insulin Pen Needle (B-D ULTRAFINE III SHORT PEN) 31G X 8 MM MISC Use qid 4/10/24   Christopher Khan MD   buPROPion (WELLBUTRIN XL) 150 MG extended release tablet Take 1 tablet by mouth every morning 24   Amna Crystal MD   magnesium oxide (MAG-OX) 400 (240 Mg) MG tablet Take 1 tablet by mouth daily 24   Amna Crystal MD   cetirizine (ZYRTEC) 10 MG tablet Take 1 tablet by mouth daily 24   Amna Crystal MD   Handicap Placard MISC by Does not apply route For 5 yrs 24   Amna Crystal MD   insulin lispro, 1 Unit Dial, (HUMALOG/ADMELOG) 100 UNIT/ML SOPN INJECT 30-35 UNITS SUBCUTANEOUSLY EVERY MEAL; HOLD IF BLOOD GLUCOSE IS LESS THAN 150 24   Amna Crystal MD   albuterol sulfate HFA (PROVENTIL;VENTOLIN;PROAIR) 108 (90 Base) MCG/ACT inhaler INHALE 2 PUFFS INTO THE LUNGS EVERY 6 HOURS AS NEEDED FOR WHEEZING 23   Amna Crystal MD   oxyCODONE-acetaminophen (PERCOCET) 5-325 MG per tablet Take 1 tablet by mouth every 6 hours as needed for Pain for up to 14 days. Take lowest dose possible to manage pain Max Daily Amount: 4 tablets  Patient not taking: Reported on 23  Jama Harrington MD   blood glucose test strips (ONETOUCH VERIO) strip Test 3x daily e11.65 23   Amna Crystal MD   TRULICITY 1.5 MG/0.5ML SC injection Inject 0.5 mLs into the skin once a week  Patient not taking: Reported on 23   Amna Crystal MD   losartan (COZAAR) 25 MG tablet Take 1 tablet by mouth daily 23   Amna Crystal MD   fluticasone (FLONASE) 50 MCG/ACT nasal spray INSTILL 1 SPRAY IN

## 2024-04-30 NOTE — ACP (ADVANCE CARE PLANNING)
Advance Care Planning   Healthcare Decision Maker:    Primary Decision Maker: Luis Tariq - [facility] - [phone]

## 2024-05-01 VITALS
SYSTOLIC BLOOD PRESSURE: 127 MMHG | TEMPERATURE: 97.7 F | HEART RATE: 90 BPM | BODY MASS INDEX: 47.98 KG/M2 | HEIGHT: 65 IN | WEIGHT: 288 LBS | RESPIRATION RATE: 20 BRPM | OXYGEN SATURATION: 96 % | DIASTOLIC BLOOD PRESSURE: 55 MMHG

## 2024-05-01 LAB
GLUCOSE BLD-MCNC: 214 MG/DL (ref 70–99)
PERFORMED ON: ABNORMAL

## 2024-05-01 PROCEDURE — 2700000000 HC OXYGEN THERAPY PER DAY

## 2024-05-01 PROCEDURE — 6370000000 HC RX 637 (ALT 250 FOR IP): Performed by: THORACIC SURGERY (CARDIOTHORACIC VASCULAR SURGERY)

## 2024-05-01 PROCEDURE — 94640 AIRWAY INHALATION TREATMENT: CPT

## 2024-05-01 PROCEDURE — 94760 N-INVAS EAR/PLS OXIMETRY 1: CPT

## 2024-05-01 PROCEDURE — 6360000002 HC RX W HCPCS: Performed by: THORACIC SURGERY (CARDIOTHORACIC VASCULAR SURGERY)

## 2024-05-01 RX ADMIN — HYDROMORPHONE HYDROCHLORIDE 0.5 MG: 1 INJECTION, SOLUTION INTRAMUSCULAR; INTRAVENOUS; SUBCUTANEOUS at 00:54

## 2024-05-01 RX ADMIN — LOSARTAN POTASSIUM 25 MG: 25 TABLET, FILM COATED ORAL at 08:12

## 2024-05-01 RX ADMIN — HYDROMORPHONE HYDROCHLORIDE 0.5 MG: 1 INJECTION, SOLUTION INTRAMUSCULAR; INTRAVENOUS; SUBCUTANEOUS at 04:28

## 2024-05-01 RX ADMIN — INSULIN LISPRO 2 UNITS: 100 INJECTION, SOLUTION INTRAVENOUS; SUBCUTANEOUS at 08:13

## 2024-05-01 RX ADMIN — HYDROMORPHONE HYDROCHLORIDE 0.5 MG: 1 INJECTION, SOLUTION INTRAMUSCULAR; INTRAVENOUS; SUBCUTANEOUS at 07:32

## 2024-05-01 RX ADMIN — CETIRIZINE HYDROCHLORIDE 10 MG: 10 TABLET, FILM COATED ORAL at 08:13

## 2024-05-01 RX ADMIN — BUPROPION HYDROCHLORIDE 150 MG: 150 TABLET, EXTENDED RELEASE ORAL at 08:13

## 2024-05-01 RX ADMIN — ALBUTEROL SULFATE 2 PUFF: 90 AEROSOL, METERED RESPIRATORY (INHALATION) at 01:08

## 2024-05-01 RX ADMIN — LEVOTHYROXINE SODIUM 125 MCG: 0.12 TABLET ORAL at 06:30

## 2024-05-01 RX ADMIN — PREGABALIN 200 MG: 50 CAPSULE ORAL at 08:13

## 2024-05-01 ASSESSMENT — PAIN DESCRIPTION - LOCATION
LOCATION: CHEST

## 2024-05-01 ASSESSMENT — PAIN SCALES - GENERAL
PAINLEVEL_OUTOF10: 10
PAINLEVEL_OUTOF10: 9
PAINLEVEL_OUTOF10: 10

## 2024-05-01 NOTE — PROGRESS NOTES
Shift assessments completed. A&OX. Medications given per MAR. Pt c/o chest pain (surgical pain). Call light within reach. No other needs stated by pt at this time.

## 2024-05-01 NOTE — PLAN OF CARE
Problem: Chronic Conditions and Co-morbidities  Goal: Patient's chronic conditions and co-morbidity symptoms are monitored and maintained or improved  5/1/2024 0754 by Eulalia Kincaid RN  Outcome: Completed  5/1/2024 0130 by Stewart Bran RN  Outcome: Progressing     Problem: Discharge Planning  Goal: Discharge to home or other facility with appropriate resources  5/1/2024 0754 by Eulalia Kincaid RN  Outcome: Completed  5/1/2024 0130 by Stewart Bran RN  Outcome: Progressing     Problem: Pain  Goal: Verbalizes/displays adequate comfort level or baseline comfort level  5/1/2024 0754 by Eulalia Kincaid RN  Outcome: Completed  5/1/2024 0130 by Stewart Bran RN  Outcome: Progressing     Problem: Safety - Adult  Goal: Free from fall injury  5/1/2024 0754 by Eulalia Kincaid RN  Outcome: Completed  5/1/2024 0130 by Stewart Bran RN  Outcome: Progressing     Problem: ABCDS Injury Assessment  Goal: Absence of physical injury  5/1/2024 0754 by Eulalia Kincaid RN  Outcome: Completed  5/1/2024 0130 by Stewart Bran RN  Outcome: Progressing

## 2024-05-01 NOTE — DISCHARGE SUMMARY
Physician Discharge Summary     Patient ID:  Lucy Tariq  [id number]  54 y.o.  1970    Admit date: 4/30/2024    Discharge date and time: No discharge date for patient encounter. 5/1/2024    Admitting Physician: Freddie Dickson MD     Discharge Physician: same    Admission Diagnoses: Xiphoid pain [R07.89]  Postoperative pain [G89.18]    Discharge Diagnoses: same    Admission Condition: good    Discharged Condition: good    Indication for Admission: surgery    Hospital Course: Pt admitted day of surgery after resection of painful xiphoid.  Surgery went well, but post op pain necessitated overnight admission for pain control. By POD1 her pain was better and she was ready for discharge to home.    Consults: none    Significant Diagnostic Studies:  none    Treatments: surgery: xiphoidectomy    Discharge Exam:  BP (!) 127/55   Pulse 90   Temp 97.7 °F (36.5 °C) (Oral)   Resp 20   Ht 1.651 m (5' 5\")   Wt 130.6 kg (288 lb)   LMP 04/01/2021   SpO2 96%   BMI 47.93 kg/m²     General Appearance:    Alert, cooperative, no distress, appears stated age   Head:    Normocephalic, without obvious abnormality, atraumatic   Eyes:    PERRL, conjunctiva/corneas clear, EOM's intact, fundi     benign, both eyes   Ears:    Normal TM's and external ear canals, both ears   Nose:   Nares normal, septum midline, mucosa normal, no drainage    or sinus tenderness   Throat:   Lips, mucosa, and tongue normal; teeth and gums normal   Neck:   Supple, symmetrical, trachea midline, no adenopathy;     thyroid:  no enlargement/tenderness/nodules; no carotid    bruit or JVD   Back:     Symmetric, no curvature, ROM normal, no CVA tenderness   Lungs:     Clear to auscultation bilaterally, respirations unlabored   Chest Wall:    No tenderness or deformity    Heart:    Regular rate and rhythm, S1 and S2 normal, no murmur, rub   or gallop   Breast Exam:    No tenderness, masses, or nipple abnormality   Abdomen:     Soft, non-tender, bowel sounds  tablet Take 1 tablet by mouth nightly  Qty: 90 tablet, Refills: 3      Elastic Bandages & Supports (FUTURO LEFT HAND WRIST BRACE) MISC Wear at night and daily as needed  Qty: 2 each, Refills: 0    Associated Diagnoses: Bilateral carpal tunnel syndrome      montelukast (SINGULAIR) 10 MG tablet Take 1 tablet by mouth nightly  Qty: 30 tablet, Refills: 0    Comments: ZERO refills remain on this prescription. Your patient is requesting advance approval of refills for this medication to PREVENT ANY MISSED DOSES  Associated Diagnoses: Moderate persistent asthma with acute exacerbation; Sarcoidosis      Continuous Blood Gluc Sensor (FREESTYLE JESSICA 2 SENSOR) MISC USE EVERY 2 WEEKS  Qty: 2 each, Refills: 3      lidocaine (LIDODERM) 5 % Apply topically      Continuous Blood Gluc  (FREESTYLE JESSICA 2 READER) LENNY As directed  Qty: 1 each, Refills: 0      spironolactone (ALDACTONE) 50 MG tablet Take 1 tablet by mouth daily  Qty: 30 tablet, Refills: 0    Comments: Patient needs an appointment for any future refills. Last visit 10/2021.      Blood Glucose Monitoring Suppl (ONETOUCH VERIO) w/Device KIT As  Directed  Qty: 1 kit, Refills: 00    Associated Diagnoses: Type 2 diabetes mellitus treated with insulin (HCC)      meclizine (ANTIVERT) 12.5 MG tablet Take 1 tablet by mouth 2 times daily as needed for Dizziness      butalbital-acetaminophen-caffeine (FIORICET, ESGIC) -40 MG per tablet Take 1 tablet by mouth every 6 hours as needed for Headaches  Qty: 30 tablet, Refills: 1      acetaminophen (TYLENOL) 500 MG tablet Take 1 tablet by mouth 4 times daily as needed for Pain  Qty: 360 tablet, Refills: 1      albuterol (PROVENTIL) (2.5 MG/3ML) 0.083% nebulizer solution Take 3 mLs by nebulization every 4 hours as needed for Wheezing  Qty: 120 each, Refills: 3           STOP taking these medications       oxyCODONE-acetaminophen (PERCOCET) 5-325 MG per tablet Comments:   Reason for Stopping:         TRULICITY 1.5 MG/0.5ML

## 2024-05-01 NOTE — PROGRESS NOTES
Discharge instructions given to patient. Patient and spouse verbalized understanding. IV removed.     Electronically signed by Eulalia Kincaid RN on 5/1/2024 at 9:04 AM

## 2024-05-01 NOTE — PLAN OF CARE
Problem: Chronic Conditions and Co-morbidities  Goal: Patient's chronic conditions and co-morbidity symptoms are monitored and maintained or improved  5/1/2024 0130 by Stewart Bran RN  Outcome: Progressing  4/30/2024 1737 by Marianne Rubio RN  Outcome: Progressing     Problem: Discharge Planning  Goal: Discharge to home or other facility with appropriate resources  5/1/2024 0130 by Stewart Bran RN  Outcome: Progressing  4/30/2024 1737 by Marianne Rubio RN  Outcome: Progressing     Problem: Pain  Goal: Verbalizes/displays adequate comfort level or baseline comfort level  5/1/2024 0130 by Stewart Bran RN  Outcome: Progressing  4/30/2024 1737 by Marianne Rubio RN  Outcome: Progressing     Problem: Safety - Adult  Goal: Free from fall injury  5/1/2024 0130 by Stewart Bran RN  Outcome: Progressing  4/30/2024 1737 by Marianne Rubio RN  Outcome: Progressing     Problem: ABCDS Injury Assessment  Goal: Absence of physical injury  5/1/2024 0130 by Stewart Bran RN  Outcome: Progressing  4/30/2024 1737 by Marianne Rubio RN  Outcome: Progressing

## 2024-05-02 ENCOUNTER — TELEPHONE (OUTPATIENT)
Dept: PRIMARY CARE CLINIC | Age: 54
End: 2024-05-02

## 2024-05-02 NOTE — TELEPHONE ENCOUNTER
Care Transitions Initial Follow Up Call    Outreach made within 2 business days of discharge: Yes    Patient: Rob Tariq Patient : 1970   MRN: [id number]  Reason for Admission: There are no discharge diagnoses documented for the most recent discharge.  Discharge Date: 24       Spoke with: ROB    Discharge department/facility: [facility]    TCM Interactive Patient Contact:  Was patient able to fill all prescriptions: Yes  Was patient instructed to bring all medications to the follow-up visit: Yes  Is patient taking all medications as directed in the discharge summary? Yes  Does patient understand their discharge instructions: Yes  Does patient have questions or concerns that need addressed prior to 7-14 day follow up office visit: no    Scheduled appointment with PCP within 7-14 days    Follow Up  Future Appointments   Date Time Provider Department Center   2024 11:30 AM Amna Crystal MD [facility]   2024  9:00 AM Robke, Freddie M, MD [facility]   10/10/2024  4:15 PM Christopher Khan MD [facility] Kathryn Dean, MA

## 2024-05-10 ENCOUNTER — OFFICE VISIT (OUTPATIENT)
Dept: NEUROSURGERY | Facility: CLINIC | Age: 54
End: 2024-05-10
Payer: MEDICAID

## 2024-05-10 DIAGNOSIS — M54.16 LUMBAR BACK PAIN WITH RADICULOPATHY AFFECTING RIGHT LOWER EXTREMITY: ICD-10-CM

## 2024-05-10 DIAGNOSIS — Z98.1 S/P LUMBAR FUSION: Primary | ICD-10-CM

## 2024-05-10 PROCEDURE — 99213 OFFICE O/P EST LOW 20 MIN: CPT | Performed by: STUDENT IN AN ORGANIZED HEALTH CARE EDUCATION/TRAINING PROGRAM

## 2024-05-10 PROCEDURE — 3008F BODY MASS INDEX DOCD: CPT | Performed by: STUDENT IN AN ORGANIZED HEALTH CARE EDUCATION/TRAINING PROGRAM

## 2024-05-10 RX ORDER — CYCLOBENZAPRINE HCL 10 MG
TABLET ORAL
COMMUNITY
Start: 2023-12-10

## 2024-05-10 RX ORDER — ATORVASTATIN CALCIUM 40 MG/1
40 TABLET, FILM COATED ORAL NIGHTLY
COMMUNITY
Start: 2024-02-24

## 2024-05-10 RX ORDER — OXYCODONE HYDROCHLORIDE 5 MG/1
5 TABLET ORAL EVERY 6 HOURS PRN
COMMUNITY
Start: 2024-04-30

## 2024-05-10 RX ORDER — BUPROPION HYDROCHLORIDE 150 MG/1
150 TABLET ORAL
COMMUNITY

## 2024-05-10 ASSESSMENT — LIFESTYLE VARIABLES
SKIP TO QUESTIONS 9-10: 1
HOW OFTEN DO YOU HAVE SIX OR MORE DRINKS ON ONE OCCASION: NEVER
HOW MANY STANDARD DRINKS CONTAINING ALCOHOL DO YOU HAVE ON A TYPICAL DAY: 1 OR 2
HOW OFTEN DO YOU HAVE A DRINK CONTAINING ALCOHOL: MONTHLY OR LESS
AUDIT-C TOTAL SCORE: 1

## 2024-05-10 ASSESSMENT — PATIENT HEALTH QUESTIONNAIRE - PHQ9
1. LITTLE INTEREST OR PLEASURE IN DOING THINGS: SEVERAL DAYS
2. FEELING DOWN, DEPRESSED OR HOPELESS: SEVERAL DAYS
10. IF YOU CHECKED OFF ANY PROBLEMS, HOW DIFFICULT HAVE THESE PROBLEMS MADE IT FOR YOU TO DO YOUR WORK, TAKE CARE OF THINGS AT HOME, OR GET ALONG WITH OTHER PEOPLE: SOMEWHAT DIFFICULT
SUM OF ALL RESPONSES TO PHQ9 QUESTIONS 1 & 2: 2

## 2024-05-10 NOTE — PROGRESS NOTES
[facility] [place]  Department of Neurological Surgery  Established Patient Visit    History of Present Illness:  Alysa Austin is a 54 y.o. year old female who presents to the spine clinic in follow up with back pain. She had a prior L5-S1 fusion, followed by L4-S1 laminectomy. Her left leg pain has improved but she has some mechanical pain directly below her spinal fusion. She has intermittent pain past her buttock and into her leg. No recent imaging but she is back to establish care today.    Patient's BMI is There is no height or weight on file to calculate BMI.    Review of Systems:  14/14 systems reviewed and negative other than what is listed in the history of present illness    Patient Active Problem List   Diagnosis    Abdominal cramps    Abnormal MRI    Allergies    Anxiety    Asthma ([facility])    Cancer of kidney (Multi)    Diabetes (Multi)    Dysphagia    Chronic bilateral low back pain with right-sided sciatica    Fibromyalgia    H/O kidney removal    H/O thyroidectomy    Hyperlipidemia    Left flank pain    Leg cramps    Lumbar radiculopathy    Mediastinal adenopathy    Nausea in adult    Obesity    Postoperative pain    Renal cell carcinoma (Multi)    Renal mass    Pulmonary sarcoidosis (Multi)    Status post lumbar spine surgery for decompression of spinal cord    Goiter    Substernal goiter    Tension headache, chronic    Thyroid nodule    Vitamin D deficiency    Type 2 diabetes mellitus without complication, with long-term current use of insulin (Multi)    Magnesium deficiency    COVID    ROLAND (acute kidney injury) (CMS-HCC)    Arthritis of right knee    Pneumonia    Cataract, nuclear sclerotic senile, bilateral    Cervical spondylosis without myelopathy    Chronic kidney disease    Chronic obstructive pulmonary disease (Multi)    History of primary malignant neoplasm of kidney    Marijuana use    OAG (open angle glaucoma) suspect, low risk, bilateral    TUYET (obstructive sleep  apnea)    Other specified abnormal findings of blood chemistry    Stage 3a chronic kidney disease (Multi)    Chronic pain syndrome    Abnormal chest x-ray    Costal chondritis    Left lumbosacral radiculopathy    Morbid obesity (Multi)    Single kidney    Hypothyroidism    Dizziness    Vertigo     Past Medical History:   Diagnosis Date    Asthma ([facility])     Bronchopneumonia 03/14/2022    COPD (chronic obstructive pulmonary disease) (Multi)     Diabetes mellitus (Multi)     Fibromyalgia     Hypertension     Kidney disease     Lab test positive for detection of COVID-19 virus 10/09/2023    Personal history of other diseases of the musculoskeletal system and connective tissue     History of fibromyalgia    Personal history of transient ischemic attack (TIA), and cerebral infarction without residual deficits     History of cerebrovascular accident     Past Surgical History:   Procedure Laterality Date    CT ANGIO NECK  10/18/2023    CT NECK ANGIO W AND WO IV CONTRAST 10/18/2023 [facility]    CT HEAD ANGIO W AND WO IV CONTRAST  10/18/2023    CT HEAD ANGIO W AND WO IV CONTRAST 10/18/2023 [facility]    OTHER SURGICAL HISTORY  10/17/2018    Thyroid Surgery Sub-Total Thyroidectomy    OTHER SURGICAL HISTORY  10/17/2018    Nephrectomy Right    OTHER SURGICAL HISTORY  10/31/2018    Thoracoscopy (Therapeutic) With Mediastinal And Regional Lymphadenectomy     Social History     Tobacco Use    Smoking status: Never    Smokeless tobacco: Never   Substance Use Topics    Alcohol use: Never     family history includes Cancer in an other family member; Diabetes in an other family member; Hypertension in her mother; Stroke in an other family member.    Current Outpatient Medications:     acetaminophen (Tylenol) 325 mg tablet, TAKE 2 TABLETS BY MOUTH FOUR TIMES DAILY AS NEEDED FOR MILD AND MODERATE POSTOPERATIVE PAIN., Disp: , Rfl:     albuterol 2.5 mg /3 mL (0.083 %) nebulizer solution, Inhale 3 ml every 6 hours as needed (Patient taking  "differently: Take 3 mL (2.5 mg) by nebulization every 6 hours if needed for shortness of breath. Inhale 3 ml every 6 hours as needed), Disp: 75 mL, Rfl: 3    albuterol 90 mcg/actuation inhaler, Inhale 1 puff 3 times a day as needed for shortness of breath., Disp: 18 g, Rfl: 1    atorvastatin (Lipitor) 20 mg tablet, Take 1 tablet (20 mg) by mouth once daily at bedtime., Disp: , Rfl:     BD Ultra-Fine Short Pen Needle 31 gauge x 5/16\" needle, Use as directed to administer insulin injections up to 4 times a day. Use a new needle with each injection., Disp: 100 each, Rfl: 3    blood sugar diagnostic (Blood Glucose Test) strip, TEST 3 TIMES DAILY AS NEEDED, Disp: 200 strip, Rfl: 1    blood-glucose sensor (Dexcom G7 Sensor) device, Use to test blood sugars. Change sensor every 10 days., Disp: 3 each, Rfl: 3    busPIRone (Buspar) 15 mg tablet, Take 1 tablet (15 mg) by mouth 2 times a day., Disp: 60 tablet, Rfl: 3    cetirizine (ZyrTEC) 10 mg tablet, Take 1 tablet (10 mg) by mouth once daily. (Patient taking differently: Take 1 tablet (10 mg) by mouth once daily at bedtime.), Disp: 90 tablet, Rfl: 1    Dexcom G4 platinum  misc, Use as instructed, Disp: 1 each, Rfl: 3    Dexcom G7  misc, TEST 4-5 TIMES DAILY, Disp: 1 each, Rfl: 0    dulaglutide (Trulicity) 1.5 mg/0.5 mL pen injector injection, Inject 1.5 mg under the skin 1 (one) time per week., Disp: 2 mL, Rfl: 1    dulaglutide (Trulicity) 3 mg/0.5 mL pen injector, Inject 3 mg under the skin 1 (one) time per week., Disp: 2 mL, Rfl: 1    fluticasone (Flonase) 50 mcg/actuation nasal spray, Administer 1 spray into each nostril once daily. Shake gently. Before first use, prime pump. After use, clean tip and replace cap., Disp: 16 g, Rfl: 1    glucagon (GlucaGen Diagnostic Kit) 1 mg/mL injection, Inject 1 mg into the muscle., Disp: , Rfl:     insulin glargine (Toujeo SoloStar U-300 Insulin) 300 unit/mL (1.5 mL) injection, Inject 78 Units under the skin once daily " at bedtime. Take as directed per insulin instructions., Disp: 9 mL, Rfl: 3    insulin lispro (HumaLOG) 100 unit/mL injection, Give 5 units plus sliding scale under the skin 3 times a day with meal. Max dose per day 45 units. Take as directed per insulin instructions., Disp: 15 mL, Rfl: 3    lancets (OneTouch UltraSoft Lancets) misc, Use to screen glucose twice daily, Disp: 200 each, Rfl: 0    levothyroxine (Synthroid, Levoxyl) 125 mcg tablet, TAKE 1 TABLET BY MOUTH ONCE DAILY., Disp: 90 tablet, Rfl: 1    losartan (Cozaar) 25 mg tablet, Take 1 tablet (25 mg) by mouth once daily. (Patient taking differently: Take 1 tablet (25 mg) by mouth once daily at bedtime.), Disp: 30 tablet, Rfl: 2    magnesium oxide (Mag-Ox) 400 mg (241.3 mg magnesium) tablet, TAKE 2 TABLET BY MOUTH EVERY DAY (Patient taking differently: Take 2 tablets (800 mg) by mouth once daily at bedtime. TAKE 2 TABLET BY MOUTH EVERY DAY), Disp: 90 tablet, Rfl: 1    meclizine (Antivert) 25 mg tablet, TAKE 1 TABLET BY MOUTH THREE TIMES DAILY AS NEEDED FOR DIZZINESS, Disp: 270 tablet, Rfl: 0    montelukast (Singulair) 10 mg tablet, TAKE 1 TABLET(10 MG) BY MOUTH EVERY DAY AT BEDTIME, Disp: 90 tablet, Rfl: 1    naloxone (Narcan) 4 mg/0.1 mL nasal spray, Administer 1 spray (4 mg) into affected nostril(s) if needed for opioid reversal. May repeat every 2-3 minutes if needed, alternating nostrils, until medical assistance becomes available., Disp: 2 each, Rfl: 0    nebulizer and compressor device, Use as directed with nebulizer solution for shortness of breath and wheezing, Disp: 1 each, Rfl: 0    pantoprazole (ProtoNix) 40 mg EC tablet, Take 1 tablet (40 mg) by mouth once daily at bedtime., Disp: , Rfl:     pregabalin (Lyrica) 200 mg capsule, Take 1 capsule (200 mg) by mouth 3 times a day., Disp: 90 capsule, Rfl: 3  Allergies   Allergen Reactions    Shellfish Derived Shortness of breath, Angioedema and Nausea/vomiting    Hydromorphone Hives and Unknown     Ibuprofen Other     Pt states not allergy -  she just can't take it because she only has one kidney    Ketorolac Nausea/vomiting     Only has 1 kidney    Penicillins Swelling and Nausea/vomiting     Within last 2 years    Propoxyphene N-Acetaminophen Hives     Patient tolerates acetaminophen at home    Shellfish Containing Products Unknown       Physical Examination:    General: Well developed, awake/alert/oriented x3, no distress, alert and cooperative  Skin: Warm and dry, no lesions, no rashes  ENMT: Mucous membranes moist, no apparent injury, no lesions seen  Head/Neck: Neck Supple, no apparent injury  Respiratory/Thorax: Normal breath sounds with good chest expansion, thorax symmetric  Cardiovascular: No pitting edema, no JVD    Motor Strength: 5/5 Throughout all extremities    Muscle Bulk: Normal and symmetric in all extremities    Posture:   -- Cervical: Normal  -- Thoracic: Normal  -- Lumbar : Normal  Paraspinal muscle spasm/tenderness absent.     Sensation: intact to light touch    Back pain, no radiculopathy    Results:  No recent imaging available.     Assessment and Plan:      Alysa Austin is a 54 y.o. year old female who presents to the spine clinic in follow up with mechanical back pain. Her symptoms are concerning for possible adjacent segment disease. I am going to get updated imaging and [place] back with her with the results.       I have reviewed all prior documentation and reviewed the electronic medical record since admission. I have personally have reviewed all advanced imaging not just the reports and used my interpretation as documented as the relevant findings. I have reviewed the risks and benefits of all treatment recommendations listed in this note with the patient and family. I spent a total of 30 minutes in service to this patient's care during this date of service.      The above clinical summary has been dictated with voice recognition software. It has not been proofread for  grammatical errors, typographical mistakes, or other semantic inconsistencies.    Thank you for visiting our office today. It was our pleasure to take part in your healthcare.     Do not hesitate to call with any questions regarding your plan of care after leaving at [phone] M-F 8am-4pm.     To clinicians, thank you very much for this kind referral. It is a privilege to partner with you in the care of your patients. My office would be delighted to assist you with any further consultations or with questions regarding the plan of care outlined. Do not hesitate to call the office or contact me directly.       Sincerely,      Julien Mcneil MD, [facility]  Spine , [facility]  Sidney Mendoza and Nohemy Mendoza Chair in Spinal Neurosurgery  Neurosurgery , [facility] and [facility]  Complex Spine Surgery Fellowship Director   of Neurological Surgery  [facility] [employer]    [address]  [address] [place] [address]  [address] [place] [address]  [address] [place] [id number]    Phone: [phone]  Fax: [phone]        Scribe Attestation  By signing my name below, I, Rebecca Acosta , Scribe   attest that this documentation has been prepared under the direction and in the presence of Julien Mcneil MD.

## 2024-05-16 ENCOUNTER — OFFICE VISIT (OUTPATIENT)
Dept: PRIMARY CARE CLINIC | Age: 54
End: 2024-05-16
Payer: MEDICAID

## 2024-05-16 VITALS
OXYGEN SATURATION: 96 % | TEMPERATURE: 97.6 F | BODY MASS INDEX: 47.93 KG/M2 | DIASTOLIC BLOOD PRESSURE: 60 MMHG | SYSTOLIC BLOOD PRESSURE: 118 MMHG | WEIGHT: 288 LBS | HEART RATE: 89 BPM | RESPIRATION RATE: 18 BRPM

## 2024-05-16 DIAGNOSIS — M72.2 PLANTAR FASCIITIS, BILATERAL: ICD-10-CM

## 2024-05-16 DIAGNOSIS — R07.89 XIPHOID PAIN: Primary | ICD-10-CM

## 2024-05-16 PROCEDURE — 3074F SYST BP LT 130 MM HG: CPT | Performed by: INTERNAL MEDICINE

## 2024-05-16 PROCEDURE — 1111F DSCHRG MED/CURRENT MED MERGE: CPT | Performed by: INTERNAL MEDICINE

## 2024-05-16 PROCEDURE — G8417 CALC BMI ABV UP PARAM F/U: HCPCS | Performed by: INTERNAL MEDICINE

## 2024-05-16 PROCEDURE — 99214 OFFICE O/P EST MOD 30 MIN: CPT | Performed by: INTERNAL MEDICINE

## 2024-05-16 PROCEDURE — 3078F DIAST BP <80 MM HG: CPT | Performed by: INTERNAL MEDICINE

## 2024-05-16 PROCEDURE — 3017F COLORECTAL CA SCREEN DOC REV: CPT | Performed by: INTERNAL MEDICINE

## 2024-05-16 PROCEDURE — 1036F TOBACCO NON-USER: CPT | Performed by: INTERNAL MEDICINE

## 2024-05-16 PROCEDURE — G8427 DOCREV CUR MEDS BY ELIG CLIN: HCPCS | Performed by: INTERNAL MEDICINE

## 2024-05-16 SDOH — ECONOMIC STABILITY: FOOD INSECURITY: WITHIN THE PAST 12 MONTHS, THE FOOD YOU BOUGHT JUST DIDN'T LAST AND YOU DIDN'T HAVE MONEY TO GET MORE.: NEVER TRUE

## 2024-05-16 SDOH — ECONOMIC STABILITY: FOOD INSECURITY: WITHIN THE PAST 12 MONTHS, YOU WORRIED THAT YOUR FOOD WOULD RUN OUT BEFORE YOU GOT MONEY TO BUY MORE.: NEVER TRUE

## 2024-05-16 SDOH — ECONOMIC STABILITY: INCOME INSECURITY: HOW HARD IS IT FOR YOU TO PAY FOR THE VERY BASICS LIKE FOOD, HOUSING, MEDICAL CARE, AND HEATING?: NOT HARD AT ALL

## 2024-05-16 NOTE — PROGRESS NOTES
Subjective:      Patient ID: Lucy Tariq is a 54 y.o. female    Follow up   HPI  Pt presents for follow up regarding recent hospital admission for xiphoid process pain s/p resection. Chest pain is much improved save for post op pain.     Foot pain x 5 months. Pain is sharp, burning on the bottom of feet(worst in the heels and early morning) rated 10/10. No tingling or numbness. Failed trial of gel insoles and foot exercises, per previous podiatrist.   Past Medical History:   Diagnosis Date    Anxiety     Arthritis     Asthma     Bronchopneumonia     Cancer (HCC)     renal    Cerebral artery occlusion with cerebral infarction (HCC)     Chronic bilateral low back pain with sciatica     Chronic kidney disease     Chronic obstructive lung disease (HCC) 07/26/2019    Depression     Fibromyalgia     Gout     rt knee    Hypertension     Insulin dependent type 2 diabetes mellitus, uncontrolled 08/03/2018    Localized enlarged lymph nodes 10/26/2018    Mixed headache     PONV (postoperative nausea and vomiting)     Pure hyperglyceridemia 05/19/2017    Sarcoidosis     Sleep apnea     does not wear cpap    Thyroid goiter      Past Surgical History:   Procedure Laterality Date    BRONCHOSCOPY  10/26/2018    DR. STEARNS    CHEST SURGERY N/A 4/30/2024    [place] [facility] [place]    KIDNEY REMOVAL Right 08/2016    KIDNEY REMOVAL Right 2016    LUNG BIOPSY Right 10/2018    SPINAL FUSION      THYROID LOBECTOMY Right 06/13/2014    THYROIDECTOMY  02/21/2019    DR. MAGDALENO    THYROIDECTOMY  2018    URETER STENT PLACEMENT Left 08/2016     Social History     Socioeconomic History    Marital status:      Spouse name: Not on file    Number of children: Not on file    Years of education: 12    Highest education level: High school graduate   Occupational History    Not on file   Tobacco Use    Smoking status: Former     Current packs/day: 0.00     Average packs/day: 0.5 packs/day for 15.0 years (7.5

## 2024-05-17 ASSESSMENT — ENCOUNTER SYMPTOMS
VOMITING: 0
SHORTNESS OF BREATH: 0
ABDOMINAL PAIN: 0
WHEEZING: 0
NAUSEA: 0
DIARRHEA: 0
COUGH: 0

## 2024-05-22 ENCOUNTER — OFFICE VISIT (OUTPATIENT)
Dept: CARDIOTHORACIC SURGERY | Age: 54
End: 2024-05-22

## 2024-05-22 VITALS
TEMPERATURE: 98 F | WEIGHT: 288 LBS | HEART RATE: 74 BPM | OXYGEN SATURATION: 95 % | BODY MASS INDEX: 47.98 KG/M2 | HEIGHT: 65 IN

## 2024-05-22 DIAGNOSIS — R07.89 XIPHOID PAIN: Primary | ICD-10-CM

## 2024-05-22 PROCEDURE — 99024 POSTOP FOLLOW-UP VISIT: CPT | Performed by: THORACIC SURGERY (CARDIOTHORACIC VASCULAR SURGERY)

## 2024-05-22 RX ORDER — MECLIZINE HYDROCHLORIDE 25 MG/1
25 TABLET ORAL 3 TIMES DAILY PRN
COMMUNITY
Start: 2024-05-08

## 2024-05-22 NOTE — PROGRESS NOTES
Patient is about 3 weeks out from resection of her xiphoid as a potential cause of her frequently recurrent and atypical chest pain.  Since the procedure the patient said she has had no further episodes of her chest pains.  She says she usually gets these at least once a week and has been pain-free for 3 weeks.  She reports no problems with the incision and only mild postoperative pain.    Incision is well-healed.    Excellent healing after xiphoidectomy.  She still has heavy lifting restrictions for 3 more weeks but after that she has no restrictions from me and I will see her on an as-needed basis.

## 2024-05-24 ENCOUNTER — TELEPHONE (OUTPATIENT)
Dept: PRIMARY CARE CLINIC | Age: 54
End: 2024-05-24

## 2024-05-24 NOTE — TELEPHONE ENCOUNTER
Patient calling in, forgot to ask for a script for a new shower chair  Send to [facility]  Please addend  the office notes from last week for this  Thank you

## 2024-06-10 ENCOUNTER — APPOINTMENT (OUTPATIENT)
Dept: GENERAL RADIOLOGY | Age: 54
End: 2024-06-10
Payer: MEDICAID

## 2024-06-10 ENCOUNTER — HOSPITAL ENCOUNTER (EMERGENCY)
Age: 54
Discharge: HOME | End: 2024-06-10
Payer: MEDICAID

## 2024-06-10 VITALS
DIASTOLIC BLOOD PRESSURE: 74 MMHG | HEIGHT: 63 IN | OXYGEN SATURATION: 99 % | HEART RATE: 72 BPM | BODY MASS INDEX: 44.3 KG/M2 | SYSTOLIC BLOOD PRESSURE: 124 MMHG | TEMPERATURE: 98 F | RESPIRATION RATE: 20 BRPM | WEIGHT: 250 LBS

## 2024-06-10 DIAGNOSIS — R07.9 CHEST PAIN, UNSPECIFIED TYPE: Primary | ICD-10-CM

## 2024-06-10 LAB
ALBUMIN SERPL-MCNC: 3.6 G/DL (ref 3.5–4.6)
ALP SERPL-CCNC: 172 U/L (ref 40–130)
ALT SERPL-CCNC: 15 U/L (ref 0–33)
ANION GAP SERPL CALCULATED.3IONS-SCNC: 9 MEQ/L (ref 9–15)
AST SERPL-CCNC: 21 U/L (ref 0–35)
BASOPHILS # BLD: 0 K/UL (ref 0–0.2)
BASOPHILS NFR BLD: 0.6 %
BILIRUB SERPL-MCNC: 0.4 MG/DL (ref 0.2–0.7)
BNP BLD-MCNC: <5 PG/ML
BUN SERPL-MCNC: 12 MG/DL (ref 6–20)
CALCIUM SERPL-MCNC: 8.6 MG/DL (ref 8.5–9.9)
CHLORIDE SERPL-SCNC: 108 MEQ/L (ref 95–107)
CO2 SERPL-SCNC: 24 MEQ/L (ref 20–31)
CREAT SERPL-MCNC: 1.3 MG/DL (ref 0.5–0.9)
EOSINOPHIL # BLD: 0.2 K/UL (ref 0–0.7)
EOSINOPHIL NFR BLD: 3 %
ERYTHROCYTE [DISTWIDTH] IN BLOOD BY AUTOMATED COUNT: 13.3 % (ref 11.5–14.5)
GLOBULIN SER CALC-MCNC: 3.1 G/DL (ref 2.3–3.5)
GLUCOSE SERPL-MCNC: 101 MG/DL (ref 70–99)
HCT VFR BLD AUTO: 43.6 % (ref 37–47)
HGB BLD-MCNC: 14.4 G/DL (ref 12–16)
LYMPHOCYTES # BLD: 1.8 K/UL (ref 1–4.8)
LYMPHOCYTES NFR BLD: 27.3 %
MAGNESIUM SERPL-MCNC: 2 MG/DL (ref 1.7–2.4)
MCH RBC QN AUTO: 31.6 PG (ref 27–31.3)
MCHC RBC AUTO-ENTMCNC: 33 % (ref 33–37)
MCV RBC AUTO: 95.8 FL (ref 79.4–94.8)
MONOCYTES # BLD: 0.5 K/UL (ref 0.2–0.8)
MONOCYTES NFR BLD: 7.5 %
NEUTROPHILS # BLD: 3.9 K/UL (ref 1.4–6.5)
NEUTS SEG NFR BLD: 61.3 %
PLATELET # BLD AUTO: 261 K/UL (ref 130–400)
POTASSIUM SERPL-SCNC: 4.1 MEQ/L (ref 3.4–4.9)
PROT SERPL-MCNC: 6.7 G/DL (ref 6.3–8)
RBC # BLD AUTO: 4.55 M/UL (ref 4.2–5.4)
SODIUM SERPL-SCNC: 141 MEQ/L (ref 135–144)
TROPONIN, HIGH SENSITIVITY: 17 NG/L (ref 0–19)
TROPONIN, HIGH SENSITIVITY: 19 NG/L (ref 0–19)
WBC # BLD AUTO: 6.4 K/UL (ref 4.8–10.8)

## 2024-06-10 PROCEDURE — 71046 X-RAY EXAM CHEST 2 VIEWS: CPT

## 2024-06-10 PROCEDURE — 80053 COMPREHEN METABOLIC PANEL: CPT

## 2024-06-10 PROCEDURE — 6370000000 HC RX 637 (ALT 250 FOR IP): Performed by: PHYSICIAN ASSISTANT

## 2024-06-10 PROCEDURE — 96374 THER/PROPH/DIAG INJ IV PUSH: CPT

## 2024-06-10 PROCEDURE — 93005 ELECTROCARDIOGRAM TRACING: CPT | Performed by: PHYSICIAN ASSISTANT

## 2024-06-10 PROCEDURE — 84484 ASSAY OF TROPONIN QUANT: CPT

## 2024-06-10 PROCEDURE — 6360000002 HC RX W HCPCS: Performed by: PHYSICIAN ASSISTANT

## 2024-06-10 PROCEDURE — 83735 ASSAY OF MAGNESIUM: CPT

## 2024-06-10 PROCEDURE — 96375 TX/PRO/DX INJ NEW DRUG ADDON: CPT

## 2024-06-10 PROCEDURE — 99285 EMERGENCY DEPT VISIT HI MDM: CPT

## 2024-06-10 PROCEDURE — 83880 ASSAY OF NATRIURETIC PEPTIDE: CPT

## 2024-06-10 PROCEDURE — 85025 COMPLETE CBC W/AUTO DIFF WBC: CPT

## 2024-06-10 PROCEDURE — 36415 COLL VENOUS BLD VENIPUNCTURE: CPT

## 2024-06-10 RX ORDER — CYCLOBENZAPRINE HCL 5 MG
5 TABLET ORAL 3 TIMES DAILY PRN
Qty: 15 TABLET | Refills: 0 | Status: SHIPPED | OUTPATIENT
Start: 2024-06-10 | End: 2024-06-20

## 2024-06-10 RX ORDER — OXYCODONE HYDROCHLORIDE AND ACETAMINOPHEN 5; 325 MG/1; MG/1
1 TABLET ORAL ONCE
Status: COMPLETED | OUTPATIENT
Start: 2024-06-10 | End: 2024-06-10

## 2024-06-10 RX ORDER — ONDANSETRON 2 MG/ML
4 INJECTION INTRAMUSCULAR; INTRAVENOUS ONCE
Status: COMPLETED | OUTPATIENT
Start: 2024-06-10 | End: 2024-06-10

## 2024-06-10 RX ADMIN — ONDANSETRON 4 MG: 2 INJECTION INTRAMUSCULAR; INTRAVENOUS at 18:09

## 2024-06-10 RX ADMIN — HYDROMORPHONE HYDROCHLORIDE 0.5 MG: 1 INJECTION, SOLUTION INTRAMUSCULAR; INTRAVENOUS; SUBCUTANEOUS at 18:10

## 2024-06-10 RX ADMIN — OXYCODONE HYDROCHLORIDE AND ACETAMINOPHEN 1 TABLET: 5; 325 TABLET ORAL at 20:26

## 2024-06-10 ASSESSMENT — PAIN DESCRIPTION - ONSET: ONSET: ON-GOING

## 2024-06-10 ASSESSMENT — PAIN SCALES - GENERAL
PAINLEVEL_OUTOF10: 9
PAINLEVEL_OUTOF10: 9
PAINLEVEL_OUTOF10: 7

## 2024-06-10 ASSESSMENT — PAIN DESCRIPTION - PAIN TYPE: TYPE: ACUTE PAIN

## 2024-06-10 ASSESSMENT — PAIN DESCRIPTION - LOCATION
LOCATION: CHEST

## 2024-06-10 ASSESSMENT — PAIN DESCRIPTION - FREQUENCY: FREQUENCY: CONTINUOUS

## 2024-06-10 ASSESSMENT — PAIN DESCRIPTION - DESCRIPTORS
DESCRIPTORS: PRESSURE
DESCRIPTORS: PRESSURE

## 2024-06-10 ASSESSMENT — PAIN DESCRIPTION - ORIENTATION: ORIENTATION: LEFT

## 2024-06-10 ASSESSMENT — PAIN - FUNCTIONAL ASSESSMENT: PAIN_FUNCTIONAL_ASSESSMENT: 0-10

## 2024-06-10 NOTE — ED TRIAGE NOTES
The patient was brought to the ED via EMS for chest pain under left breast. Pt states she recently had xyphoidectomy on 4/30. Pt was given 324mg of aspirin and 1 nitro en route. Pt continues to rate pain 10/10.

## 2024-06-11 LAB
EKG ATRIAL RATE: 72 BPM
EKG P AXIS: 37 DEGREES
EKG P-R INTERVAL: 162 MS
EKG Q-T INTERVAL: 402 MS
EKG QRS DURATION: 80 MS
EKG QTC CALCULATION (BAZETT): 440 MS
EKG R AXIS: -1 DEGREES
EKG T AXIS: 9 DEGREES
EKG VENTRICULAR RATE: 72 BPM

## 2024-06-11 PROCEDURE — 93010 ELECTROCARDIOGRAM REPORT: CPT | Performed by: INTERNAL MEDICINE

## 2024-06-11 ASSESSMENT — ENCOUNTER SYMPTOMS
ABDOMINAL DISTENTION: 0
VOICE CHANGE: 0
EYE DISCHARGE: 0
COUGH: 0
ANAL BLEEDING: 0
APNEA: 0
SHORTNESS OF BREATH: 0

## 2024-06-11 NOTE — ED PROVIDER NOTES
[facility] ED  EMERGENCY DEPARTMENT ENCOUNTER      Pt Name: Lucy Tariq  MRN: [id number]  Birthdate 1970  Date of evaluation: 6/10/2024  Provider: Harpreet Brian PA-C  3:47 PM EDT    CHIEF COMPLAINT       Chief Complaint   Patient presents with    Chest Pain     X1 hour         HISTORY OF PRESENT ILLNESS   (Location/Symptom, Timing/Onset, Context/Setting, Quality, Duration, Modifying Factors, Severity)  Note limiting factors.   Lucy Tariq is a 54 y.o. female who presents to the emergency department patient developed chest pain with motion approximately 1 hour ago.  He has history of similar states this is not identical to prior denies fever chills nausea vomiting shortness of breath hemoptysis.  Symptoms mild to moderate severity.  Had nitro and route without any relief.    HPI    Nursing Notes were reviewed.    REVIEW OF SYSTEMS    (2-9 systems for level 4, 10 or more for level 5)     Review of Systems   Constitutional:  Negative for activity change, appetite change and unexpected weight change.   HENT:  Negative for ear discharge, nosebleeds and voice change.    Eyes:  Negative for discharge.   Respiratory:  Negative for apnea, cough and shortness of breath.    Cardiovascular:  Positive for chest pain.   Gastrointestinal:  Negative for abdominal distention and anal bleeding.   Genitourinary:  Negative for dysuria and hematuria.   Musculoskeletal:  Negative for joint swelling.   Skin:  Negative for pallor.   Neurological:  Negative for seizures and facial asymmetry.   Hematological:  Does not bruise/bleed easily.   Psychiatric/Behavioral:  Negative for behavioral problems, self-injury and sleep disturbance.    All other systems reviewed and are negative.      Except as noted above the remainder of the review of systems was reviewed and negative.       PAST MEDICAL HISTORY     Past Medical History:   Diagnosis Date    Anxiety     Arthritis     Asthma     Bronchopneumonia     Cancer

## 2024-06-14 ENCOUNTER — APPOINTMENT (OUTPATIENT)
Dept: NEUROSURGERY | Facility: CLINIC | Age: 54
End: 2024-06-14
Payer: MEDICAID

## 2024-07-05 ENCOUNTER — INITIAL CONSULT (OUTPATIENT)
Dept: PODIATRY | Age: 54
End: 2024-07-05

## 2024-07-05 VITALS — WEIGHT: 270 LBS | HEIGHT: 63 IN | BODY MASS INDEX: 47.84 KG/M2 | TEMPERATURE: 96.9 F

## 2024-07-05 DIAGNOSIS — M20.5X1 ADDUCTOVARUS ROTATION OF TOE, ACQUIRED, RIGHT: ICD-10-CM

## 2024-07-05 DIAGNOSIS — Q66.52 CONGENITAL PES PLANUS OF BOTH FEET: ICD-10-CM

## 2024-07-05 DIAGNOSIS — E11.42 DIABETIC POLYNEUROPATHY ASSOCIATED WITH TYPE 2 DIABETES MELLITUS (HCC): ICD-10-CM

## 2024-07-05 DIAGNOSIS — M79.671 PAIN IN BOTH FEET: Primary | ICD-10-CM

## 2024-07-05 DIAGNOSIS — M79.672 PAIN IN BOTH FEET: Primary | ICD-10-CM

## 2024-07-05 DIAGNOSIS — G89.29 CHRONIC PAIN OF RIGHT KNEE: ICD-10-CM

## 2024-07-05 DIAGNOSIS — M25.561 CHRONIC PAIN OF RIGHT KNEE: ICD-10-CM

## 2024-07-05 DIAGNOSIS — M20.41 HAMMER TOES OF BOTH FEET: ICD-10-CM

## 2024-07-05 DIAGNOSIS — M20.42 HAMMER TOES OF BOTH FEET: ICD-10-CM

## 2024-07-05 DIAGNOSIS — Q66.51 CONGENITAL PES PLANUS OF BOTH FEET: ICD-10-CM

## 2024-07-05 DIAGNOSIS — M20.5X2 ADDUCTOVARUS ROTATION OF TOE, ACQUIRED, LEFT: ICD-10-CM

## 2024-07-05 ASSESSMENT — ENCOUNTER SYMPTOMS
SHORTNESS OF BREATH: 0
VOMITING: 0
NAUSEA: 0
BACK PAIN: 1

## 2024-07-05 NOTE — PROGRESS NOTES
[facility], [facility]  [address]  [place] [address]  Dept: [phone]  Loc: [phone]       Lucy Tariq  (1970)    7/5/24    Subjective     Lucy Tariq is 54 y.o. female who complains today of:    Chief Complaint   Patient presents with    Diabetes    Foot Pain     Both feet     Lucy Tariq is seen in consultation at the request of Amna Crystal MD for evaluation of foot pain.     Diabetes  Pertinent negatives for diabetes include no chest pain.   Foot Pain   Associated symptoms include numbness. Pertinent negatives include no fever.     HPI: Patient presents with a complaint of pain to the bilateral foot.  Patient describes the pain as sharp, numb, and tingling.  Patient rates the pain a 9/10.  Patient states this is a chronic problem of many years that has worsened over time.  Patient does not recall injury but does report a history of diabetes.  Patient uses insulin.  Patient's most recent hemoglobin A1c was 6.6%.  Patient complains of paresthesia to the bilateral foot extending from the toes to the plantar heel.  Patient denies a history of diabetic foot ulceration.  Patient states she does not currently have and has never had diabetic shoes in the past.  Patient also reports a history of fibromyalgia, rheumatoid arthritis, gout, and chronic right knee pain.  Patient states the right knee has not been evaluated weighted by orthopedics.  Patient does not take gout medication patient states that she she was told to cut back on eating liver, she states she still eats that about every 6 months.  Patient states that she takes Lyrica for the fibromyalgia symptoms.  Patient states she has not attempted use of a topical compounded neuropathic pain cream in the past.    Review of Systems   Constitutional:  Negative for chills and fever.   HENT:  Negative for hearing loss.    Respiratory:  Negative for

## 2024-07-08 DIAGNOSIS — G89.29 OTHER CHRONIC PAIN: ICD-10-CM

## 2024-07-08 DIAGNOSIS — M47.816 SPONDYLOSIS OF LUMBAR REGION WITHOUT MYELOPATHY OR RADICULOPATHY: ICD-10-CM

## 2024-07-08 RX ORDER — PREGABALIN 200 MG/1
200 CAPSULE ORAL
Qty: 270 CAPSULE | Refills: 3 | Status: SHIPPED | OUTPATIENT
Start: 2024-07-08 | End: 2024-10-06

## 2024-07-17 ENCOUNTER — HOSPITAL ENCOUNTER (EMERGENCY)
Age: 54
Discharge: HOME | End: 2024-07-17
Attending: EMERGENCY MEDICINE
Payer: MEDICAID

## 2024-07-17 VITALS
OXYGEN SATURATION: 98 % | DIASTOLIC BLOOD PRESSURE: 82 MMHG | SYSTOLIC BLOOD PRESSURE: 136 MMHG | BODY MASS INDEX: 47.84 KG/M2 | TEMPERATURE: 98.3 F | WEIGHT: 270 LBS | HEIGHT: 63 IN | HEART RATE: 76 BPM | RESPIRATION RATE: 17 BRPM

## 2024-07-17 DIAGNOSIS — R42 VERTIGO: Primary | ICD-10-CM

## 2024-07-17 LAB
ALBUMIN SERPL-MCNC: 3.8 G/DL (ref 3.5–4.6)
ALP SERPL-CCNC: 179 U/L (ref 40–130)
ALT SERPL-CCNC: 21 U/L (ref 0–33)
ANION GAP SERPL CALCULATED.3IONS-SCNC: 12 MEQ/L (ref 9–15)
AST SERPL-CCNC: 21 U/L (ref 0–35)
BASOPHILS # BLD: 0 K/UL (ref 0–0.2)
BASOPHILS NFR BLD: 0.6 %
BILIRUB SERPL-MCNC: 0.3 MG/DL (ref 0.2–0.7)
BUN SERPL-MCNC: 10 MG/DL (ref 6–20)
CALCIUM SERPL-MCNC: 9.2 MG/DL (ref 8.5–9.9)
CHLORIDE SERPL-SCNC: 103 MEQ/L (ref 95–107)
CO2 SERPL-SCNC: 25 MEQ/L (ref 20–31)
CREAT SERPL-MCNC: 1.34 MG/DL (ref 0.5–0.9)
EOSINOPHIL # BLD: 0.2 K/UL (ref 0–0.7)
EOSINOPHIL NFR BLD: 3.1 %
ERYTHROCYTE [DISTWIDTH] IN BLOOD BY AUTOMATED COUNT: 12.9 % (ref 11.5–14.5)
GLOBULIN SER CALC-MCNC: 3.3 G/DL (ref 2.3–3.5)
GLUCOSE SERPL-MCNC: 164 MG/DL (ref 70–99)
HCT VFR BLD AUTO: 45.3 % (ref 37–47)
HGB BLD-MCNC: 15 G/DL (ref 12–16)
LACTATE BLDV-SCNC: 1.1 MMOL/L (ref 0.5–2.2)
LYMPHOCYTES # BLD: 2.2 K/UL (ref 1–4.8)
LYMPHOCYTES NFR BLD: 30.2 %
MCH RBC QN AUTO: 31.5 PG (ref 27–31.3)
MCHC RBC AUTO-ENTMCNC: 33.1 % (ref 33–37)
MCV RBC AUTO: 95.2 FL (ref 79.4–94.8)
MONOCYTES # BLD: 0.6 K/UL (ref 0.2–0.8)
MONOCYTES NFR BLD: 7.8 %
NEUTROPHILS # BLD: 4.2 K/UL (ref 1.4–6.5)
NEUTS SEG NFR BLD: 58 %
PLATELET # BLD AUTO: 291 K/UL (ref 130–400)
POTASSIUM SERPL-SCNC: 4.1 MEQ/L (ref 3.4–4.9)
PROT SERPL-MCNC: 7.1 G/DL (ref 6.3–8)
RBC # BLD AUTO: 4.76 M/UL (ref 4.2–5.4)
SODIUM SERPL-SCNC: 140 MEQ/L (ref 135–144)
WBC # BLD AUTO: 7.2 K/UL (ref 4.8–10.8)

## 2024-07-17 PROCEDURE — 96375 TX/PRO/DX INJ NEW DRUG ADDON: CPT

## 2024-07-17 PROCEDURE — 83605 ASSAY OF LACTIC ACID: CPT

## 2024-07-17 PROCEDURE — 85025 COMPLETE CBC W/AUTO DIFF WBC: CPT

## 2024-07-17 PROCEDURE — 93005 ELECTROCARDIOGRAM TRACING: CPT | Performed by: EMERGENCY MEDICINE

## 2024-07-17 PROCEDURE — 96361 HYDRATE IV INFUSION ADD-ON: CPT

## 2024-07-17 PROCEDURE — 96374 THER/PROPH/DIAG INJ IV PUSH: CPT

## 2024-07-17 PROCEDURE — 2580000003 HC RX 258: Performed by: EMERGENCY MEDICINE

## 2024-07-17 PROCEDURE — 6360000002 HC RX W HCPCS: Performed by: EMERGENCY MEDICINE

## 2024-07-17 PROCEDURE — 99284 EMERGENCY DEPT VISIT MOD MDM: CPT

## 2024-07-17 PROCEDURE — 36415 COLL VENOUS BLD VENIPUNCTURE: CPT

## 2024-07-17 PROCEDURE — 80053 COMPREHEN METABOLIC PANEL: CPT

## 2024-07-17 RX ORDER — ONDANSETRON 2 MG/ML
4 INJECTION INTRAMUSCULAR; INTRAVENOUS ONCE
Status: COMPLETED | OUTPATIENT
Start: 2024-07-17 | End: 2024-07-17

## 2024-07-17 RX ORDER — 0.9 % SODIUM CHLORIDE 0.9 %
1000 INTRAVENOUS SOLUTION INTRAVENOUS ONCE
Status: COMPLETED | OUTPATIENT
Start: 2024-07-17 | End: 2024-07-17

## 2024-07-17 RX ORDER — LORAZEPAM 2 MG/ML
2 INJECTION INTRAMUSCULAR ONCE
Status: COMPLETED | OUTPATIENT
Start: 2024-07-17 | End: 2024-07-17

## 2024-07-17 RX ADMIN — Medication 2 MG: at 01:08

## 2024-07-17 RX ADMIN — ONDANSETRON 4 MG: 2 INJECTION INTRAMUSCULAR; INTRAVENOUS at 01:08

## 2024-07-17 RX ADMIN — SODIUM CHLORIDE 1000 ML: 9 INJECTION, SOLUTION INTRAVENOUS at 01:08

## 2024-07-17 ASSESSMENT — ENCOUNTER SYMPTOMS
CHEST TIGHTNESS: 0
NAUSEA: 1
SHORTNESS OF BREATH: 0
EYE DISCHARGE: 0
WHEEZING: 0
PHOTOPHOBIA: 0
COUGH: 0
SORE THROAT: 0
ABDOMINAL DISTENTION: 0
VOMITING: 1
ABDOMINAL PAIN: 0

## 2024-07-17 ASSESSMENT — PAIN - FUNCTIONAL ASSESSMENT: PAIN_FUNCTIONAL_ASSESSMENT: 0-10

## 2024-07-17 ASSESSMENT — PAIN SCALES - GENERAL: PAINLEVEL_OUTOF10: 8

## 2024-07-17 NOTE — ED PROVIDER NOTES
the radiologist. Plain radiographic images are visualized and preliminarily interpreted by the emergency physician with the below findings:        Interpretation per the Radiologist below, if available at the time ofthis note:    No orders to display         ED BEDSIDE ULTRASOUND:   Performed by ED Physician - none    LABS:  Labs Reviewed   CBC WITH AUTO DIFFERENTIAL - Abnormal; Notable for the following components:       Result Value    MCV 95.2 (*)     MCH 31.5 (*)     All other components within normal limits   COMPREHENSIVE METABOLIC PANEL - Abnormal; Notable for the following components:    Glucose 164 (*)     Creatinine 1.34 (*)     Est, Glom Filt Rate 47.3 (*)     Alkaline Phosphatase 179 (*)     All other components within normal limits   LACTIC ACID       All other labs were within normal range or not returned as of this dictation.    EMERGENCY DEPARTMENT COURSE and DIFFERENTIAL DIAGNOSIS/MDM:   Vitals:    Vitals:    07/17/24 0041 07/17/24 0156   BP: (!) 147/81 136/82   Pulse: 80 76   Resp: 17 17   Temp: 98.3 °F (36.8 °C)    TempSrc: Oral    SpO2: 97% 98%   Weight: 122.5 kg (270 lb)    Height: 1.6 m (5' 3\")           MDM  Patient medicated and hydrated here.  Symptoms resolved.  She has ongoing and recurring problems with vertigo.  Full workup has been done in the past.  Laboratory studies normal.  Vitals been normal.  Discharged home improved      CONSULTS:  None    PROCEDURES:  Unless otherwise noted below, none     Procedures    FINAL IMPRESSION      1. Vertigo          DISPOSITION/PLAN   DISPOSITION Decision To Discharge 07/17/2024 02:44:25 AM      PATIENT REFERRED TO:  Amna Crystal MD  [address] [place] [address]  [phone]            DISCHARGE MEDICATIONS:  New Prescriptions    No medications on file          (Please note that portions of this note were completed with a voice recognition program.  Efforts were made to edit the dictations but occasionally words are

## 2024-07-18 ENCOUNTER — OFFICE VISIT (OUTPATIENT)
Dept: ORTHOPEDIC SURGERY | Age: 54
End: 2024-07-18
Payer: MEDICAID

## 2024-07-18 VITALS
HEIGHT: 63 IN | HEART RATE: 89 BPM | WEIGHT: 270 LBS | TEMPERATURE: 98.7 F | OXYGEN SATURATION: 96 % | BODY MASS INDEX: 47.84 KG/M2

## 2024-07-18 DIAGNOSIS — M17.12 PRIMARY OSTEOARTHRITIS OF LEFT KNEE: ICD-10-CM

## 2024-07-18 DIAGNOSIS — M25.561 RIGHT KNEE PAIN, UNSPECIFIED CHRONICITY: Primary | ICD-10-CM

## 2024-07-18 PROCEDURE — 1036F TOBACCO NON-USER: CPT | Performed by: ORTHOPAEDIC SURGERY

## 2024-07-18 PROCEDURE — 3017F COLORECTAL CA SCREEN DOC REV: CPT | Performed by: ORTHOPAEDIC SURGERY

## 2024-07-18 PROCEDURE — G8427 DOCREV CUR MEDS BY ELIG CLIN: HCPCS | Performed by: ORTHOPAEDIC SURGERY

## 2024-07-18 PROCEDURE — 99214 OFFICE O/P EST MOD 30 MIN: CPT | Performed by: ORTHOPAEDIC SURGERY

## 2024-07-18 PROCEDURE — G8417 CALC BMI ABV UP PARAM F/U: HCPCS | Performed by: ORTHOPAEDIC SURGERY

## 2024-07-18 RX ORDER — TRAMADOL HYDROCHLORIDE 50 MG/1
50 TABLET ORAL EVERY 8 HOURS PRN
Qty: 21 TABLET | Refills: 0 | Status: SHIPPED | OUTPATIENT
Start: 2024-07-18 | End: 2024-07-25

## 2024-07-18 NOTE — PROGRESS NOTES
Patient ID:  Lucy Tariq is a 54 y.o. female who presents today for follow up of right knee pain.  Right knee 8 years, left knee one year.    Injury: no    Location: bilateral knee pain, located on the global aspect of the knee  Pain: yes; 8 on a scale of 1 to 10  Onset: gradual  Duration:  as above    Frequency:  occurs daily  Quality: aching and boring   Swelling: patient notes intermittent swelling of the joint  Aggravating factors: weight bearing activity, standing, and walking  Alleviating factors: removing weight from leg and rest  Mechanical symptoms: catching  Radiation: no    Activities: walking independently  Restriction:  decreased ambulatory tolerance  Progression:  worsening    Previous treatment:   injections years ago  NSAIDs:  intolerant of NSAIDs  PT:  none    Medications:    Current Outpatient Medications on File Prior to Visit   Medication Sig Dispense Refill    pregabalin (LYRICA) 200 MG capsule Take 1 capsule by mouth three times daily for 90 days. Max Daily Amount: 600 mg 270 capsule 3    meclizine (ANTIVERT) 25 MG tablet Take 1 tablet by mouth 3 times daily as needed      Insulin Pen Needle (B-D ULTRAFINE III SHORT PEN) 31G X 8 MM MISC Use qid 300 each 5    buPROPion (WELLBUTRIN XL) 150 MG extended release tablet Take 1 tablet by mouth every morning 60 tablet 3    magnesium oxide (MAG-OX) 400 (240 Mg) MG tablet Take 1 tablet by mouth daily 90 tablet 3    cetirizine (ZYRTEC) 10 MG tablet Take 1 tablet by mouth daily 90 tablet 3    Handicap Placard MISC by Does not apply route For 5 yrs 1 each 0    insulin lispro, 1 Unit Dial, (HUMALOG/ADMELOG) 100 UNIT/ML SOPN INJECT 30-35 UNITS SUBCUTANEOUSLY EVERY MEAL; HOLD IF BLOOD GLUCOSE IS LESS THAN 150 102 mL 3    albuterol sulfate HFA (PROVENTIL;VENTOLIN;PROAIR) 108 (90 Base) MCG/ACT inhaler INHALE 2 PUFFS INTO THE LUNGS EVERY 6 HOURS AS NEEDED FOR WHEEZING 20.1 g 4    blood glucose test strips (ONETOUCH VERIO) strip Test 3x daily e11.65 100 each

## 2024-07-19 LAB
EKG ATRIAL RATE: 75 BPM
EKG P AXIS: 39 DEGREES
EKG P-R INTERVAL: 158 MS
EKG Q-T INTERVAL: 372 MS
EKG QRS DURATION: 78 MS
EKG QTC CALCULATION (BAZETT): 415 MS
EKG R AXIS: -14 DEGREES
EKG T AXIS: 25 DEGREES
EKG VENTRICULAR RATE: 75 BPM

## 2024-07-30 ENCOUNTER — TELEPHONE (OUTPATIENT)
Dept: PODIATRY | Age: 54
End: 2024-07-30

## 2024-08-03 DIAGNOSIS — Z79.4 TYPE 2 DIABETES MELLITUS WITHOUT COMPLICATION, WITH LONG-TERM CURRENT USE OF INSULIN (MULTI): ICD-10-CM

## 2024-08-03 DIAGNOSIS — E11.9 TYPE 2 DIABETES MELLITUS WITHOUT COMPLICATION, WITH LONG-TERM CURRENT USE OF INSULIN (MULTI): ICD-10-CM

## 2024-08-06 RX ORDER — BLOOD-GLUCOSE SENSOR
EACH MISCELLANEOUS
Qty: 5 EACH | Refills: 1 | Status: SHIPPED | OUTPATIENT
Start: 2024-08-06

## 2024-08-07 DIAGNOSIS — J30.9 ALLERGIC RHINITIS, UNSPECIFIED SEASONALITY, UNSPECIFIED TRIGGER: ICD-10-CM

## 2024-08-09 RX ORDER — MONTELUKAST SODIUM 10 MG/1
10 TABLET ORAL NIGHTLY
Qty: 90 TABLET | Refills: 1 | Status: SHIPPED | OUTPATIENT
Start: 2024-08-09

## 2024-08-13 ENCOUNTER — APPOINTMENT (OUTPATIENT)
Dept: GENERAL RADIOLOGY | Age: 54
End: 2024-08-13
Payer: MEDICAID

## 2024-08-13 ENCOUNTER — HOSPITAL ENCOUNTER (EMERGENCY)
Age: 54
Discharge: HOME | End: 2024-08-13
Payer: MEDICAID

## 2024-08-13 VITALS
HEIGHT: 63 IN | OXYGEN SATURATION: 96 % | TEMPERATURE: 98.4 F | DIASTOLIC BLOOD PRESSURE: 76 MMHG | WEIGHT: 270 LBS | SYSTOLIC BLOOD PRESSURE: 174 MMHG | HEART RATE: 87 BPM | RESPIRATION RATE: 20 BRPM | BODY MASS INDEX: 47.84 KG/M2

## 2024-08-13 DIAGNOSIS — M54.50 ACUTE LEFT-SIDED LOW BACK PAIN, UNSPECIFIED WHETHER SCIATICA PRESENT: Primary | ICD-10-CM

## 2024-08-13 PROCEDURE — 73502 X-RAY EXAM HIP UNI 2-3 VIEWS: CPT

## 2024-08-13 PROCEDURE — 6370000000 HC RX 637 (ALT 250 FOR IP)

## 2024-08-13 PROCEDURE — 72100 X-RAY EXAM L-S SPINE 2/3 VWS: CPT

## 2024-08-13 PROCEDURE — 99283 EMERGENCY DEPT VISIT LOW MDM: CPT

## 2024-08-13 RX ORDER — LIDOCAINE 50 MG/G
1 PATCH TOPICAL DAILY
Qty: 10 PATCH | Refills: 0 | Status: SHIPPED | OUTPATIENT
Start: 2024-08-13 | End: 2024-08-23

## 2024-08-13 RX ORDER — METHOCARBAMOL 500 MG/1
750 TABLET, FILM COATED ORAL ONCE
Status: COMPLETED | OUTPATIENT
Start: 2024-08-13 | End: 2024-08-13

## 2024-08-13 RX ORDER — METHOCARBAMOL 750 MG/1
750 TABLET, FILM COATED ORAL 4 TIMES DAILY
Qty: 40 TABLET | Refills: 0 | Status: SHIPPED | OUTPATIENT
Start: 2024-08-13 | End: 2024-08-23

## 2024-08-13 RX ORDER — ACETAMINOPHEN 500 MG
1000 TABLET ORAL ONCE
Status: COMPLETED | OUTPATIENT
Start: 2024-08-13 | End: 2024-08-13

## 2024-08-13 RX ORDER — PREDNISONE 20 MG/1
20 TABLET ORAL 2 TIMES DAILY
Qty: 10 TABLET | Refills: 0 | Status: SHIPPED | OUTPATIENT
Start: 2024-08-13 | End: 2024-08-18

## 2024-08-13 RX ADMIN — ACETAMINOPHEN 1000 MG: 500 TABLET ORAL at 18:50

## 2024-08-13 RX ADMIN — METHOCARBAMOL TABLETS 750 MG: 500 TABLET, COATED ORAL at 18:50

## 2024-08-13 ASSESSMENT — PAIN SCALES - GENERAL
PAINLEVEL_OUTOF10: 8
PAINLEVEL_OUTOF10: 8

## 2024-08-13 ASSESSMENT — ENCOUNTER SYMPTOMS
EYE PAIN: 0
TROUBLE SWALLOWING: 0
BACK PAIN: 1
ALLERGIC/IMMUNOLOGIC NEGATIVE: 1
ABDOMINAL PAIN: 0
COLOR CHANGE: 0
SHORTNESS OF BREATH: 0
COUGH: 0
NAUSEA: 0
DIARRHEA: 0
VOMITING: 0

## 2024-08-13 ASSESSMENT — PAIN - FUNCTIONAL ASSESSMENT: PAIN_FUNCTIONAL_ASSESSMENT: 0-10

## 2024-08-13 ASSESSMENT — LIFESTYLE VARIABLES
HOW OFTEN DO YOU HAVE A DRINK CONTAINING ALCOHOL: NEVER
HOW MANY STANDARD DRINKS CONTAINING ALCOHOL DO YOU HAVE ON A TYPICAL DAY: PATIENT DOES NOT DRINK

## 2024-08-13 ASSESSMENT — PAIN DESCRIPTION - LOCATION: LOCATION: BACK

## 2024-08-13 NOTE — ED TRIAGE NOTES
Pt comes to er with c/o lower right sided back pain. Pt arrives to triage  while using a cane  but states she normally uses a cane.  Denies dysuria. Pain has been increasing over the last few months

## 2024-08-13 NOTE — ED PROVIDER NOTES
[facility] ED  eMERGENCYdEPARTMENT eNCOUnter        Pt Name: Lucy Tariq  MRN: [id number]  Birthdate 1970of evaluation: 8/13/2024  Provider:Livier Mcfadden PA-C  6:26 PM EDT    CHIEF COMPLAINT       Chief Complaint   Patient presents with    Back Pain     Lower right sided back pain that is getting worse         HISTORY OF PRESENT ILLNESS  (Location/Symptom, Timing/Onset, Context/Setting, Quality, Duration, Modifying Factors, Severity.)   Lucy Tariq is a 54 y.o. female who presents to the emergency department for evaluation of left lower back pain times several months.  Patient reports that she has had a lot of issues with her back in the past and she had surgery on her lower back last year.  Patient reports that for the past several months she has had increasing pain in her left lower back.  Denies any recent injuries or carrying anything heavy that may have aggravated her pain.  Patient states that she has had sciatica in the past and this does not feel like that.  Patient reports that she has been taking Tylenol at home without much relief.  She is unable to take Motrin at home because she only has 1 kidney.  Patient did call her spine surgeon and has an appointment scheduled on September 6, however her pain is getting to the point where she cannot take it anymore so she came to the emergency department for further evaluation.  Denies any numbness feeling.  Denies any saddle anesthesia or bowel or bladder incontinence.  Denies any fever, cough, congestion, shortness of breath, chest pain, abdominal pain, nausea, vomiting, diarrhea.    HPI    Nursing Notes were reviewed and I agree.    REVIEW OF SYSTEMS    (2-9 systems for level 4, 10 or more for level 5)     Review of Systems   Constitutional:  Negative for diaphoresis and fever.   HENT:  Negative for congestion, hearing loss and trouble swallowing.    Eyes:  Negative for pain.   Respiratory:  Negative for apnea, cough and shortness

## 2024-08-14 ENCOUNTER — TELEPHONE (OUTPATIENT)
Dept: PODIATRY | Age: 54
End: 2024-08-14

## 2024-08-14 NOTE — DISCHARGE INSTRUCTIONS
You can use Tylenol, Robaxin, ice/heat, and lidocaine patches as needed for pain.  I recommend you stay off of your feet as much as possible to help control the pain. Start taking short course of steroid to decrease inflammation.  Make sure you keep an eye on your sugar levels during the steroid course as they will increase because of this medication.  Go to your follow-up appointment scheduled with your neurosurgeon as discussed.  Return to the emergency department for any new or worsening symptoms.

## 2024-09-06 ENCOUNTER — APPOINTMENT (OUTPATIENT)
Dept: NEUROSURGERY | Facility: CLINIC | Age: 54
End: 2024-09-06
Payer: MEDICAID

## 2024-09-10 ENCOUNTER — TELEPHONE (OUTPATIENT)
Dept: NEUROLOGY | Facility: CLINIC | Age: 54
End: 2024-09-10
Payer: MEDICAID

## 2024-09-10 DIAGNOSIS — R93.89 ABNORMAL MRI: Primary | ICD-10-CM

## 2024-09-10 RX ORDER — DIAZEPAM 5 MG/1
5 TABLET ORAL
Qty: 1 TABLET | Refills: 0 | Status: SHIPPED | OUTPATIENT
Start: 2024-09-10 | End: 2024-09-11 | Stop reason: SDUPTHER

## 2024-09-11 ENCOUNTER — HOSPITAL ENCOUNTER (OUTPATIENT)
Dept: MRI IMAGING | Age: 54
Discharge: HOME | End: 2024-09-13
Payer: MEDICAID

## 2024-09-11 DIAGNOSIS — Z98.1 S/P LUMBAR FUSION: ICD-10-CM

## 2024-09-11 DIAGNOSIS — M54.16 RADICULOPATHY, LUMBAR REGION: ICD-10-CM

## 2024-09-11 DIAGNOSIS — R93.89 ABNORMAL MRI: ICD-10-CM

## 2024-09-11 PROCEDURE — 72148 MRI LUMBAR SPINE W/O DYE: CPT

## 2024-09-11 RX ORDER — DIAZEPAM 5 MG/1
5 TABLET ORAL
Qty: 1 TABLET | Refills: 0 | Status: SHIPPED | OUTPATIENT
Start: 2024-09-11 | End: 2024-09-11

## 2024-09-18 ENCOUNTER — APPOINTMENT (OUTPATIENT)
Dept: RADIOLOGY | Facility: HOSPITAL | Age: 54
End: 2024-09-18
Payer: MEDICAID

## 2024-10-04 ENCOUNTER — TELEMEDICINE (OUTPATIENT)
Dept: PRIMARY CARE CLINIC | Age: 54
End: 2024-10-04
Payer: MEDICAID

## 2024-10-04 DIAGNOSIS — F43.23 ADJUSTMENT DISORDER WITH MIXED ANXIETY AND DEPRESSED MOOD: ICD-10-CM

## 2024-10-04 DIAGNOSIS — F41.0 PANIC DISORDER: Primary | ICD-10-CM

## 2024-10-04 PROCEDURE — 3017F COLORECTAL CA SCREEN DOC REV: CPT | Performed by: INTERNAL MEDICINE

## 2024-10-04 PROCEDURE — 1036F TOBACCO NON-USER: CPT | Performed by: INTERNAL MEDICINE

## 2024-10-04 PROCEDURE — G8427 DOCREV CUR MEDS BY ELIG CLIN: HCPCS | Performed by: INTERNAL MEDICINE

## 2024-10-04 PROCEDURE — G8484 FLU IMMUNIZE NO ADMIN: HCPCS | Performed by: INTERNAL MEDICINE

## 2024-10-04 PROCEDURE — 99214 OFFICE O/P EST MOD 30 MIN: CPT | Performed by: INTERNAL MEDICINE

## 2024-10-04 PROCEDURE — G8417 CALC BMI ABV UP PARAM F/U: HCPCS | Performed by: INTERNAL MEDICINE

## 2024-10-04 RX ORDER — BUSPIRONE HYDROCHLORIDE 5 MG/1
5 TABLET ORAL 3 TIMES DAILY PRN
Qty: 30 TABLET | Refills: 2 | Status: SHIPPED | OUTPATIENT
Start: 2024-10-04 | End: 2024-11-03

## 2024-10-04 RX ORDER — BUPROPION HYDROCHLORIDE 300 MG/1
300 TABLET ORAL EVERY MORNING
Qty: 90 TABLET | Refills: 1 | Status: SHIPPED | OUTPATIENT
Start: 2024-10-04

## 2024-10-04 SDOH — ECONOMIC STABILITY: FOOD INSECURITY: WITHIN THE PAST 12 MONTHS, YOU WORRIED THAT YOUR FOOD WOULD RUN OUT BEFORE YOU GOT MONEY TO BUY MORE.: NEVER TRUE

## 2024-10-04 SDOH — ECONOMIC STABILITY: INCOME INSECURITY: HOW HARD IS IT FOR YOU TO PAY FOR THE VERY BASICS LIKE FOOD, HOUSING, MEDICAL CARE, AND HEATING?: NOT HARD AT ALL

## 2024-10-04 SDOH — ECONOMIC STABILITY: FOOD INSECURITY: WITHIN THE PAST 12 MONTHS, THE FOOD YOU BOUGHT JUST DIDN'T LAST AND YOU DIDN'T HAVE MONEY TO GET MORE.: NEVER TRUE

## 2024-10-04 ASSESSMENT — PATIENT HEALTH QUESTIONNAIRE - PHQ9
9. THOUGHTS THAT YOU WOULD BE BETTER OFF DEAD, OR OF HURTING YOURSELF: NOT AT ALL
1. LITTLE INTEREST OR PLEASURE IN DOING THINGS: SEVERAL DAYS
SUM OF ALL RESPONSES TO PHQ QUESTIONS 1-9: 8
2. FEELING DOWN, DEPRESSED OR HOPELESS: SEVERAL DAYS
5. POOR APPETITE OR OVEREATING: SEVERAL DAYS
10. IF YOU CHECKED OFF ANY PROBLEMS, HOW DIFFICULT HAVE THESE PROBLEMS MADE IT FOR YOU TO DO YOUR WORK, TAKE CARE OF THINGS AT HOME, OR GET ALONG WITH OTHER PEOPLE: NOT DIFFICULT AT ALL
6. FEELING BAD ABOUT YOURSELF - OR THAT YOU ARE A FAILURE OR HAVE LET YOURSELF OR YOUR FAMILY DOWN: SEVERAL DAYS
SUM OF ALL RESPONSES TO PHQ9 QUESTIONS 1 & 2: 2
SUM OF ALL RESPONSES TO PHQ QUESTIONS 1-9: 8
8. MOVING OR SPEAKING SO SLOWLY THAT OTHER PEOPLE COULD HAVE NOTICED. OR THE OPPOSITE, BEING SO FIGETY OR RESTLESS THAT YOU HAVE BEEN MOVING AROUND A LOT MORE THAN USUAL: NOT AT ALL
7. TROUBLE CONCENTRATING ON THINGS, SUCH AS READING THE NEWSPAPER OR WATCHING TELEVISION: SEVERAL DAYS
SUM OF ALL RESPONSES TO PHQ QUESTIONS 1-9: 8
4. FEELING TIRED OR HAVING LITTLE ENERGY: MORE THAN HALF THE DAYS
3. TROUBLE FALLING OR STAYING ASLEEP: SEVERAL DAYS
SUM OF ALL RESPONSES TO PHQ QUESTIONS 1-9: 8

## 2024-10-04 ASSESSMENT — ENCOUNTER SYMPTOMS
DIARRHEA: 0
WHEEZING: 0
NAUSEA: 0
COUGH: 0
ABDOMINAL PAIN: 0
SHORTNESS OF BREATH: 0
VOMITING: 0

## 2024-10-04 NOTE — PROGRESS NOTES
Abnormal-            Psychiatric: anxious affect, depressed mood       [] Normal Affect [] No Hallucinations        [] Abnormal-     Other pertinent observable physical exam findings-     ASSESSMENT/PLAN:  1. Panic disorder  -inadequately controlled  - busPIRone (BUSPAR) 5 MG tablet; Take 1 tablet by mouth 3 times daily as needed (anxiety)  Dispense: 30 tablet; Refill: 2    2. Adjustment disorder with mixed anxiety and depressed mood  -inadequately controlled  - buPROPion (WELLBUTRIN XL) 300 MG extended release tablet; Take 1 tablet by mouth every morning  Dispense: 90 tablet; Refill: 1  - busPIRone (BUSPAR) 5 MG tablet; Take 1 tablet by mouth 3 times daily as needed (anxiety)  Dispense: 30 tablet; Refill: 2  -will write letter recommending use of an emotional support animal     No follow-ups on file.    Lucy Tariq, was evaluated through a synchronous (real-time) audio-video encounter. The patient (or guardian if applicable) is aware that this is a billable service, which includes applicable co-pays. This Virtual Visit was conducted with patient's (and/or legal guardian's) consent. Patient identification was verified, and a caregiver was present when appropriate.   The patient was located at Home: [address] [place] [id number]  Provider was located at Facility (Appt Dept): [facility] [place] [address]  Confirm you are appropriately licensed, registered, or certified to deliver care in the state where the patient is located as indicated above. If you are not or unsure, please re-schedule the visit: Yes, I confirm.       Total time spent on this encounter: Not billed by time    --ANUP LEARY MD on 10/4/2024 at 9:22 PM    Will write letter for emotuonal supoort animal   An electronic signature was used to authenticate this note.

## 2024-10-07 DIAGNOSIS — E11.65 TYPE 2 DIABETES MELLITUS WITH HYPERGLYCEMIA, WITH LONG-TERM CURRENT USE OF INSULIN (HCC): ICD-10-CM

## 2024-10-07 DIAGNOSIS — Z79.4 TYPE 2 DIABETES MELLITUS WITH HYPERGLYCEMIA, WITH LONG-TERM CURRENT USE OF INSULIN (HCC): ICD-10-CM

## 2024-10-08 RX ORDER — LEVOTHYROXINE SODIUM 125 UG/1
125 TABLET ORAL DAILY
Qty: 90 TABLET | Refills: 3 | Status: SHIPPED | OUTPATIENT
Start: 2024-10-08

## 2024-10-09 NOTE — CARE COORDINATION
Ambulatory Care Coordination Note  6/25/2021  CM Risk Score: 11  Charlson 10 Year Mortality Risk Score: 100%     ACC: Hasmukh Pierce, RN    Summary Note: Nikolay Lanier called me and states she does not have a ride to her appointment today with Danny Walker, I scheduled taxi transport, she was made aware and know to be ready by 215pm.        Care Coordination Interventions    Program Enrollment: Complex Care  Referral from Primary Care Provider: No  Suggested Interventions and Community Resources  Medi Set or Pill Pack: Completed (Comment: 2/17 using pill box.)  Pharmacist: Completed (Comment: 1/5/2021 [facility] Clinical Rx)  Registered Dietician: Completed (Comment: 1/5/2021 [facility])  Social Work: Completed (Comment: 1/5/2021 [facility] Social Work for [employer] and long term planning.)  Other Services: Declined (Comment: 1/5/2021 Declined ACP Referral)  Zone Management Tools: In Process (Comment: 1/5/2021 Diabetes and COPD Zones)  Other Services or Interventions: 1/5/2021 instructed on same day, next day, and Walk-In Care. Goals Addressed    None         Prior to Admission medications    Medication Sig Start Date End Date Taking? Authorizing Provider   ondansetron (ZOFRAN) 4 MG tablet Take 1 tablet by mouth every 8 hours as needed for Nausea 6/23/21   ANTONIO Ta-KATHLEEN   dicyclomine (BENTYL) 10 MG capsule Take 1 capsule by mouth every 6 hours as needed (cramps) 6/23/21   KaitlynnANTONIO Miller-KATHLEEN   HYDROcodone-acetaminophen (NORCO) 5-325 MG per tablet Take 1 tablet by mouth every 6 hours as needed for Pain for up to 3 days.  6/23/21 6/26/21  Kaitlynnisabella Mckeon PA-C   buPROPion (WELLBUTRIN XL) 150 MG extended release tablet TAKE 1 TABLET BY MOUTH EVERY MORNING 6/8/21   SUDEEP Mcgarry CNP   fluticasone (FLONASE) 50 MCG/ACT nasal spray SHAKE LIQUID AND USE 1 SPRAY IN EACH NOSTRIL DAILY 5/18/21   SUDEEP Mcgarry CNP   DULoxetine (CYMBALTA) 30 MG extended release capsule Take 1 capsule by Lm for pt    mouth daily 5/17/21   SUDEEP Reyes CNP   pregabalin (LYRICA) 150 MG capsule Take 1 capsule by mouth 3 times daily for 60 days. 5/17/21 7/16/21  SUDEEP Phillips CNP   levothyroxine (SYNTHROID) 150 MCG tablet Take 1 tablet by mouth Daily 4/21/21   Jackson Poole MD   budesonide-formoterol (SYMBICORT) 160-4.5 MCG/ACT AERO Inhale 2 puffs into the lungs 2 times daily 4/14/21   Alex Luz MD   tiotropium (SPIRIVA RESPIMAT) 2.5 MCG/ACT AERS inhaler Inhale 2 puffs into the lungs daily 4/14/21   Alex Luz MD   busPIRone (BUSPAR) 15 MG tablet TAKE 1 TABLET BY MOUTH THREE TIMES DAILY 3/29/21   SUDEEP Haque CNP   metoclopramide (REGLAN) 10 MG tablet Take 1 tablet by mouth 2 times daily as needed (Headache) 3/21/21   Sarai Ly MD   acetaminophen (TYLENOL) 500 MG tablet Take 1 tablet by mouth 4 times daily as needed for Pain 3/21/21   Sarai Ly MD   B-D ULTRAFINE III SHORT PEN 31G X 8 MM MISC USE THREE TIMES DAILY 3/15/21   SUDEEP Reyes CNP   cetirizine (ZYRTEC) 10 MG tablet TAKE 1 TABLET BY MOUTH EVERY NIGHT AT BEDTIME AS NEEDED FOR ALLERGIES 2/25/21   SUDEEP Phillips CNP   Insulin Degludec (TRESIBA FLEXTOUCH) 100 UNIT/ML SOPN INJECT 40 UNITS UNDER THE SKIN NIGHTLY 2/23/21   Jackson Poole MD   insulin lispro, 1 Unit Dial, (HUMALOG KWIKPEN) 100 UNIT/ML SOPN 4 units at each meals hold if glucose less than 150 2/23/21   Jackson Poole MD   spironolactone (ALDACTONE) 50 MG tablet Take 1 tablet by mouth daily 2/23/21   MD Maia Peralta Delica Lancets 08H MISC qid 2/23/21   Jackson Poole MD   blood glucose test strips (ONETOUCH VERIO) strip 1 each by In Vitro route daily As needed.  2/23/21   Jackson Poole MD   Blood Glucose Monitoring Suppl (Cortes Dutton) w/Device KIT As  Directed 2/23/21   Jackson Poole MD   Insulin Pen Needle (NOVOFINE) 32G X 6 MM MISC qid 2/23/21   Jackson Poole MD   Continuous Blood Gluc Sensor (FREESTYLE JESSICA 14 DAY SENSOR) MISC As directed 2/23/21   Maurizio Christy MD   Continuous Blood Gluc  (FREESTYLE JESSICA 14 DAY READER) LENNY As directed 2/23/21   Maurizio Christy MD   baclofen (LIORESAL) 10 MG tablet Take 1 tablet by mouth 3 times daily 2/19/21   SUDEEP Rice CNP   tiZANidine (ZANAFLEX) 2 MG tablet Take 1 tablet by mouth 3 times daily as needed (back pain/ spasm) 2/6/21   Neel Marshall PA-C   butalbital-acetaminophen-caffeine (FIORICET, ESGIC) -42 MG per tablet Take 1 tablet by mouth every 6 hours as needed for Headaches 1/4/21   Elsi Haynes MD   magnesium oxide (MAG-OX) 400 (241.3 Mg) MG TABS tablet TAKE 1/2 TABLET BY MOUTH DAILY 11/2/20   SUDEEP Gonsales CNP   albuterol sulfate HFA (PROVENTIL HFA) 108 (90 Base) MCG/ACT inhaler Inhale 2 puffs into the lungs every 6 hours as needed for Wheezing 9/9/20   SUDEEP Gonsales CNP   insulin lispro (HUMALOG) 100 UNIT/ML injection vial INJECT 4 UNITS UNDER THE SKIN THREE TIMES DAILY AS NEEDED FOR HIGH BLOOD SUGAR 7/10/20   SUDEEP Gonsales CNP   SUMAtriptan (IMITREX) 25 MG tablet TAKE 1 TABLET BY MOUTH 1 TIME AS NEEDED FOR MIGRAINE 5/1/20   SUDEEP Gonsales CNP   blood glucose test strips (EXACTECH TEST) strip 1 each by In Vitro route 3 times daily (Accu-check test strips) As needed.  DX:E11.65 12/2/19   SUDEEP Gonsales CNP   ONE TOUCH ULTRASOFT LANCETS MISC TEST 3 TIMES DAILY AS NEEDED 12/2/19   SUDEEP Gonsales CNP   albuterol (PROVENTIL) (2.5 MG/3ML) 0.083% nebulizer solution Take 3 mLs by nebulization every 4 hours as needed for Wheezing 11/5/19   Maria M ServetaDO isreal   atorvastatin (LIPITOR) 40 MG tablet Take 1 tablet by mouth nightly 9/11/19   Valeen Lunger, DO   Insulin Syringe-Needle U-100 30G X 1/2\" 1 ML MISC 1 each by Does not apply route daily 11/16/18   Alexey Burleson, DO   blood glucose test strips (ONETOUCH VERIO) strip TEST 3 TIMES DAILY AS NEEDED 8/16/18   Alexey Burleson,    Blood Glucose Monitoring [facility]

## 2024-10-10 ENCOUNTER — OFFICE VISIT (OUTPATIENT)
Dept: ENDOCRINOLOGY | Age: 54
End: 2024-10-10

## 2024-10-10 VITALS
HEIGHT: 63 IN | BODY MASS INDEX: 51.38 KG/M2 | SYSTOLIC BLOOD PRESSURE: 113 MMHG | HEART RATE: 84 BPM | DIASTOLIC BLOOD PRESSURE: 73 MMHG | WEIGHT: 290 LBS | OXYGEN SATURATION: 94 %

## 2024-10-10 DIAGNOSIS — Z79.4 TYPE 2 DIABETES MELLITUS WITH HYPERGLYCEMIA, WITH LONG-TERM CURRENT USE OF INSULIN (HCC): Primary | ICD-10-CM

## 2024-10-10 DIAGNOSIS — E89.0 POSTPROCEDURAL HYPOTHYROIDISM: ICD-10-CM

## 2024-10-10 DIAGNOSIS — E11.65 TYPE 2 DIABETES MELLITUS WITH HYPERGLYCEMIA, WITH LONG-TERM CURRENT USE OF INSULIN (HCC): ICD-10-CM

## 2024-10-10 DIAGNOSIS — E66.01 MORBID OBESITY: ICD-10-CM

## 2024-10-10 DIAGNOSIS — Z79.4 TYPE 2 DIABETES MELLITUS WITH HYPERGLYCEMIA, WITH LONG-TERM CURRENT USE OF INSULIN (HCC): ICD-10-CM

## 2024-10-10 DIAGNOSIS — E11.65 TYPE 2 DIABETES MELLITUS WITH HYPERGLYCEMIA, WITH LONG-TERM CURRENT USE OF INSULIN (HCC): Primary | ICD-10-CM

## 2024-10-10 LAB
ANION GAP SERPL CALCULATED.3IONS-SCNC: 8 MEQ/L (ref 9–15)
BUN SERPL-MCNC: 11 MG/DL (ref 6–20)
CALCIUM SERPL-MCNC: 9 MG/DL (ref 8.5–9.9)
CHLORIDE SERPL-SCNC: 106 MEQ/L (ref 95–107)
CHP ED QC CHECK: NORMAL
CO2 SERPL-SCNC: 23 MEQ/L (ref 20–31)
CREAT SERPL-MCNC: 1.43 MG/DL (ref 0.5–0.9)
GLUCOSE BLD-MCNC: 116 MG/DL
GLUCOSE SERPL-MCNC: 100 MG/DL (ref 70–99)
HBA1C MFR BLD: 7.8 %
POTASSIUM SERPL-SCNC: 4.1 MEQ/L (ref 3.4–4.9)
SODIUM SERPL-SCNC: 137 MEQ/L (ref 135–144)
T4 FREE SERPL-MCNC: 1.13 NG/DL (ref 0.84–1.68)
TSH SERPL-MCNC: 3.1 UIU/ML (ref 0.44–3.86)

## 2024-10-10 NOTE — PROGRESS NOTES
10/10/2024    Assessment:       Diagnosis Orders   1. Type 2 diabetes mellitus with hyperglycemia, with long-term current use of insulin (HCC)  POCT Glucose    POCT glycosylated hemoglobin (Hb A1C)    Basic Metabolic Panel    GLUCOSE MONITOR, 72 HOUR, PHYS INTERP      2. Postprocedural hypothyroidism  Basic Metabolic Panel    T4, Free    TSH      3. Morbid obesity              PLAN:     Orders Placed This Encounter   Procedures    GLUCOSE MONITOR, 72 HOUR, PHYS INTERP    Basic Metabolic Panel     Standing Status:   Future     Number of Occurrences:   1     Standing Expiration Date:   10/10/2025    T4, Free     Standing Status:   Future     Number of Occurrences:   1     Standing Expiration Date:   10/10/2025    TSH     Standing Status:   Future     Number of Occurrences:   1     Standing Expiration Date:   10/10/2025    POCT Glucose    POCT glycosylated hemoglobin (Hb A1C)         continue current dose of Toujeo Humalog continue current dose of Synthroid continue using freestyle carlos more than 50% of 30 minutes spent in patient education counseling    Orders Placed This Encounter   Procedures    POCT Glucose    POCT glycosylated hemoglobin (Hb A1C)     No orders of the defined types were placed in this encounter.    No follow-ups on file.  Subjective:     Chief Complaint   Patient presents with    Diabetes    Follow-up    Hypothyroidism     Vitals:    10/10/24 1619   BP: 113/73   Site: Left Upper Arm   Pulse: 84   SpO2: 94%   Weight: 131.5 kg (290 lb)   Height: 1.6 m (5' 3\")     Wt Readings from Last 3 Encounters:   10/10/24 131.5 kg (290 lb)   08/13/24 122.5 kg (270 lb)   07/18/24 122.5 kg (270 lb)     BP Readings from Last 3 Encounters:   10/10/24 113/73   08/13/24 (!) 174/76   07/17/24 136/82       Follow-up on type 2 diabetes obesity hypothyroidism lab review patient on Synthroid 125 mcg daily thyroid function test done today were normal history of type 2 diabetes on Toujeo 78 units at bedtime plus Humalog 30

## 2024-10-15 ENCOUNTER — APPOINTMENT (OUTPATIENT)
Dept: GENERAL RADIOLOGY | Age: 54
End: 2024-10-15
Payer: MEDICAID

## 2024-10-15 ENCOUNTER — APPOINTMENT (OUTPATIENT)
Dept: ULTRASOUND IMAGING | Age: 54
End: 2024-10-15
Payer: MEDICAID

## 2024-10-15 ENCOUNTER — HOSPITAL ENCOUNTER (EMERGENCY)
Age: 54
Discharge: HOME | End: 2024-10-15
Payer: MEDICAID

## 2024-10-15 VITALS
BODY MASS INDEX: 46.95 KG/M2 | RESPIRATION RATE: 26 BRPM | DIASTOLIC BLOOD PRESSURE: 56 MMHG | HEART RATE: 76 BPM | TEMPERATURE: 98.7 F | OXYGEN SATURATION: 98 % | WEIGHT: 265 LBS | SYSTOLIC BLOOD PRESSURE: 129 MMHG | HEIGHT: 63 IN

## 2024-10-15 DIAGNOSIS — R07.89 ATYPICAL CHEST PAIN: Primary | ICD-10-CM

## 2024-10-15 DIAGNOSIS — M79.89 SWELLING OF ARM: ICD-10-CM

## 2024-10-15 LAB
ALBUMIN SERPL-MCNC: 4.3 G/DL (ref 3.5–4.6)
ALP SERPL-CCNC: 190 U/L (ref 40–130)
ALT SERPL-CCNC: 22 U/L (ref 0–33)
ANION GAP SERPL CALCULATED.3IONS-SCNC: 9 MEQ/L (ref 9–15)
AST SERPL-CCNC: 20 U/L (ref 0–35)
BASOPHILS # BLD: 0 K/UL (ref 0–0.2)
BASOPHILS NFR BLD: 0.6 %
BILIRUB SERPL-MCNC: 0.4 MG/DL (ref 0.2–0.7)
BILIRUB UR QL STRIP: NEGATIVE
BNP BLD-MCNC: <36 PG/ML
BUN SERPL-MCNC: 12 MG/DL (ref 6–20)
CALCIUM SERPL-MCNC: 9.3 MG/DL (ref 8.5–9.9)
CHLORIDE SERPL-SCNC: 102 MEQ/L (ref 95–107)
CLARITY UR: CLEAR
CO2 SERPL-SCNC: 27 MEQ/L (ref 20–31)
COLOR UR: YELLOW
CREAT SERPL-MCNC: 1.51 MG/DL (ref 0.5–0.9)
EOSINOPHIL # BLD: 0.2 K/UL (ref 0–0.7)
EOSINOPHIL NFR BLD: 3.7 %
ERYTHROCYTE [DISTWIDTH] IN BLOOD BY AUTOMATED COUNT: 13.2 % (ref 11.5–14.5)
GLOBULIN SER CALC-MCNC: 3.6 G/DL (ref 2.3–3.5)
GLUCOSE SERPL-MCNC: 117 MG/DL (ref 70–99)
GLUCOSE UR STRIP-MCNC: NEGATIVE MG/DL
HCT VFR BLD AUTO: 50 % (ref 37–47)
HGB BLD-MCNC: 16.3 G/DL (ref 12–16)
HGB UR QL STRIP: NEGATIVE
INR PPP: 1
KETONES UR STRIP-MCNC: NEGATIVE MG/DL
LEUKOCYTE ESTERASE UR QL STRIP: NEGATIVE
LYMPHOCYTES # BLD: 1.9 K/UL (ref 1–4.8)
LYMPHOCYTES NFR BLD: 29.3 %
MCH RBC QN AUTO: 31.5 PG (ref 27–31.3)
MCHC RBC AUTO-ENTMCNC: 32.6 % (ref 33–37)
MCV RBC AUTO: 96.5 FL (ref 79.4–94.8)
MONOCYTES # BLD: 0.4 K/UL (ref 0.2–0.8)
MONOCYTES NFR BLD: 6.7 %
NEUTROPHILS # BLD: 3.8 K/UL (ref 1.4–6.5)
NEUTS SEG NFR BLD: 59.5 %
NITRITE UR QL STRIP: NEGATIVE
PH UR STRIP: 5.5 [PH] (ref 5–9)
PLATELET # BLD AUTO: 292 K/UL (ref 130–400)
POTASSIUM SERPL-SCNC: 4.4 MEQ/L (ref 3.4–4.9)
PROCALCITONIN SERPL IA-MCNC: 0.03 NG/ML (ref 0–0.15)
PROT SERPL-MCNC: 7.9 G/DL (ref 6.3–8)
PROT UR STRIP-MCNC: NEGATIVE MG/DL
PROTHROMBIN TIME: 13 SEC (ref 12.3–14.9)
RBC # BLD AUTO: 5.18 M/UL (ref 4.2–5.4)
SODIUM SERPL-SCNC: 138 MEQ/L (ref 135–144)
SP GR UR STRIP: 1.02 (ref 1–1.03)
TROPONIN, HIGH SENSITIVITY: 12 NG/L (ref 0–19)
TROPONIN, HIGH SENSITIVITY: 17 NG/L (ref 0–19)
URINE REFLEX TO CULTURE: NORMAL
UROBILINOGEN UR STRIP-ACNC: 0.2 E.U./DL
WBC # BLD AUTO: 6.4 K/UL (ref 4.8–10.8)

## 2024-10-15 PROCEDURE — 96375 TX/PRO/DX INJ NEW DRUG ADDON: CPT

## 2024-10-15 PROCEDURE — 81003 URINALYSIS AUTO W/O SCOPE: CPT

## 2024-10-15 PROCEDURE — 83880 ASSAY OF NATRIURETIC PEPTIDE: CPT

## 2024-10-15 PROCEDURE — 85610 PROTHROMBIN TIME: CPT

## 2024-10-15 PROCEDURE — 96374 THER/PROPH/DIAG INJ IV PUSH: CPT

## 2024-10-15 PROCEDURE — 93005 ELECTROCARDIOGRAM TRACING: CPT | Performed by: PHYSICIAN ASSISTANT

## 2024-10-15 PROCEDURE — 84484 ASSAY OF TROPONIN QUANT: CPT

## 2024-10-15 PROCEDURE — 99285 EMERGENCY DEPT VISIT HI MDM: CPT

## 2024-10-15 PROCEDURE — 85025 COMPLETE CBC W/AUTO DIFF WBC: CPT

## 2024-10-15 PROCEDURE — 80053 COMPREHEN METABOLIC PANEL: CPT

## 2024-10-15 PROCEDURE — 93971 EXTREMITY STUDY: CPT

## 2024-10-15 PROCEDURE — 71045 X-RAY EXAM CHEST 1 VIEW: CPT

## 2024-10-15 PROCEDURE — 84145 PROCALCITONIN (PCT): CPT

## 2024-10-15 PROCEDURE — 6360000002 HC RX W HCPCS: Performed by: PHYSICIAN ASSISTANT

## 2024-10-15 RX ORDER — TRAMADOL HYDROCHLORIDE 50 MG/1
50 TABLET ORAL EVERY 6 HOURS PRN
Qty: 12 TABLET | Refills: 0 | Status: SHIPPED | OUTPATIENT
Start: 2024-10-15 | End: 2024-10-18

## 2024-10-15 RX ORDER — MORPHINE SULFATE 4 MG/ML
4 INJECTION, SOLUTION INTRAMUSCULAR; INTRAVENOUS ONCE
Status: COMPLETED | OUTPATIENT
Start: 2024-10-15 | End: 2024-10-15

## 2024-10-15 RX ORDER — ONDANSETRON 2 MG/ML
4 INJECTION INTRAMUSCULAR; INTRAVENOUS ONCE
Status: COMPLETED | OUTPATIENT
Start: 2024-10-15 | End: 2024-10-15

## 2024-10-15 RX ADMIN — ONDANSETRON 4 MG: 2 INJECTION, SOLUTION INTRAMUSCULAR; INTRAVENOUS at 15:47

## 2024-10-15 RX ADMIN — MORPHINE SULFATE 4 MG: 4 INJECTION, SOLUTION INTRAMUSCULAR; INTRAVENOUS at 15:47

## 2024-10-15 ASSESSMENT — ENCOUNTER SYMPTOMS
CHEST TIGHTNESS: 1
DIARRHEA: 0
VOMITING: 0
COUGH: 0

## 2024-10-15 ASSESSMENT — PAIN DESCRIPTION - PAIN TYPE: TYPE: ACUTE PAIN

## 2024-10-15 ASSESSMENT — PAIN SCALES - GENERAL: PAINLEVEL_OUTOF10: 8

## 2024-10-15 ASSESSMENT — PAIN DESCRIPTION - DESCRIPTORS: DESCRIPTORS: ACHING;PRESSURE

## 2024-10-15 ASSESSMENT — PAIN DESCRIPTION - LOCATION: LOCATION: CHEST

## 2024-10-15 ASSESSMENT — PAIN - FUNCTIONAL ASSESSMENT: PAIN_FUNCTIONAL_ASSESSMENT: 0-10

## 2024-10-15 NOTE — ED PROVIDER NOTES
[facility] ED  EMERGENCY DEPARTMENT ENCOUNTER      Pt Name: Lucy Tariq  MRN: [id number]  Birthdate 1970  Date of evaluation: 10/15/2024  Provider: Efrain Neal PA-C  2:15 PM EDT      CHIEF COMPLAINT       Chief Complaint   Patient presents with    Chest Pain         HISTORY OF PRESENT ILLNESS   (Location/Symptom, Timing/Onset, Context/Setting, Quality, Duration, Modifying Factors, Severity)  Note limiting factors.   Lucy Tariq is a 54 y.o. female who presents to the emergency department with PMHx sarcoidosis, asthma, diabetes and hypertension who presents to the emergency department with complaints of midsternal chest pain with associated right arm tightness and swelling she initially states started today however  states symptoms been ongoing for the past 2 days.  Patient does states similar episodes of chest pain in the past but states this time the pain seems to radiate into her back more than typical.  Patient denies any cough or chest congestion.  Patient denies any vomiting or diarrhea.    HPI    Nursing Notes were reviewed.    REVIEW OF SYSTEMS    (2-9 systems for level 4, 10 or more for level 5)     Review of Systems   Constitutional:  Negative for fever.   Respiratory:  Positive for chest tightness. Negative for cough.    Cardiovascular:  Positive for chest pain. Negative for leg swelling.   Gastrointestinal:  Negative for diarrhea and vomiting.   All other systems reviewed and are negative.      Except as noted above the remainder of the review of systems was reviewed and negative.       PAST MEDICAL HISTORY     Past Medical History:   Diagnosis Date    Anxiety     Arthritis     Asthma     Bronchopneumonia     Cancer (HCC)     renal    Cerebral artery occlusion with cerebral infarction (HCC)     Chronic bilateral low back pain with sciatica     Chronic kidney disease     Chronic obstructive lung disease (HCC) 07/26/2019    Depression     Fibromyalgia     Gout     rt knee

## 2024-10-17 LAB
EKG ATRIAL RATE: 71 BPM
EKG P AXIS: 34 DEGREES
EKG P-R INTERVAL: 164 MS
EKG Q-T INTERVAL: 374 MS
EKG QRS DURATION: 82 MS
EKG QTC CALCULATION (BAZETT): 406 MS
EKG R AXIS: 2 DEGREES
EKG T AXIS: 4 DEGREES
EKG VENTRICULAR RATE: 71 BPM

## 2024-10-17 PROCEDURE — 93010 ELECTROCARDIOGRAM REPORT: CPT | Performed by: INTERNAL MEDICINE

## 2024-10-22 ENCOUNTER — TELEMEDICINE (OUTPATIENT)
Dept: PRIMARY CARE CLINIC | Age: 54
End: 2024-10-22
Payer: MEDICAID

## 2024-10-22 DIAGNOSIS — L23.9 ALLERGIC CONTACT DERMATITIS, UNSPECIFIED TRIGGER: Primary | ICD-10-CM

## 2024-10-22 PROCEDURE — G8427 DOCREV CUR MEDS BY ELIG CLIN: HCPCS | Performed by: INTERNAL MEDICINE

## 2024-10-22 PROCEDURE — 99213 OFFICE O/P EST LOW 20 MIN: CPT | Performed by: INTERNAL MEDICINE

## 2024-10-22 PROCEDURE — 3017F COLORECTAL CA SCREEN DOC REV: CPT | Performed by: INTERNAL MEDICINE

## 2024-10-22 RX ORDER — CLOBETASOL PROPIONATE 0.5 MG/G
OINTMENT TOPICAL
Qty: 60 G | Refills: 3 | Status: SHIPPED | OUTPATIENT
Start: 2024-10-22

## 2024-10-22 NOTE — PROGRESS NOTES
10/22/2024    TELEHEALTH EVALUATION -- Audio/Visual    HPI:    Lucy Tariq (:  1970) has requested an audio/video evaluation for the following concern(s):    Pt presents with 2 weeks of hand and forearm rash. Assoc itching, no new skin care products or  or clothing. Attests to exposure to new puppy. Hydrocortisone use to no avail.    Review of Systems   Musculoskeletal:  Negative for arthralgias.   Skin:  Positive for rash.     Prior to Visit Medications    Medication Sig Taking? Authorizing Provider   clobetasol (TEMOVATE) 0.05 % ointment Apply topically 2 times daily. Yes Amna Crystal MD   levothyroxine (SYNTHROID) 125 MCG tablet Take 1 tablet by mouth Daily  Amna Crystal MD   buPROPion (WELLBUTRIN XL) 300 MG extended release tablet Take 1 tablet by mouth every morning  Amna Crystal MD   busPIRone (BUSPAR) 5 MG tablet Take 1 tablet by mouth 3 times daily as needed (anxiety)  Amna Crystal MD   pregabalin (LYRICA) 200 MG capsule Take 1 capsule by mouth three times daily for 90 days. Max Daily Amount: 600 mg  Amna Crystal MD   meclizine (ANTIVERT) 25 MG tablet Take 1 tablet by mouth 3 times daily as needed  ProviderMagaly MD   Insulin Pen Needle (B-D ULTRAFINE III SHORT PEN) 31G X 8 MM MISC Use qid  Christopher Khan MD   magnesium oxide (MAG-OX) 400 (240 Mg) MG tablet Take 1 tablet by mouth daily  Amna Crytsal MD   cetirizine (ZYRTEC) 10 MG tablet Take 1 tablet by mouth daily  Amna Crystal MD   Handicap Placard MISC by Does not apply route For 5 yrs  Amna Crystal MD   insulin lispro, 1 Unit Dial, (HUMALOG/ADMELOG) 100 UNIT/ML SOPN INJECT 30-35 UNITS SUBCUTANEOUSLY EVERY MEAL; HOLD IF BLOOD GLUCOSE IS LESS THAN 150  Amna Crystal MD   albuterol sulfate HFA (PROVENTIL;VENTOLIN;PROAIR) 108 (90 Base) MCG/ACT inhaler INHALE 2 PUFFS INTO THE LUNGS EVERY 6 HOURS AS NEEDED FOR WHEEZING  Amna Crystal MD   blood glucose test strips (ONETOUCH VERIO) strip Test 3x daily e11.65  Amna Crystal MD   losartan (COZAAR) 25 MG

## 2024-10-28 DIAGNOSIS — Z79.4 TYPE 2 DIABETES MELLITUS TREATED WITH INSULIN (HCC): ICD-10-CM

## 2024-10-28 DIAGNOSIS — E11.9 TYPE 2 DIABETES MELLITUS TREATED WITH INSULIN (HCC): ICD-10-CM

## 2024-10-28 RX ORDER — BLOOD-GLUCOSE METER
EACH MISCELLANEOUS
Qty: 1 KIT | Refills: 0 | Status: SHIPPED | OUTPATIENT
Start: 2024-10-28

## 2024-10-30 RX ORDER — LANCETS 33 GAUGE
EACH MISCELLANEOUS
Qty: 100 EACH | Refills: 3 | Status: SHIPPED | OUTPATIENT
Start: 2024-10-30

## 2024-11-02 ENCOUNTER — HOSPITAL ENCOUNTER (EMERGENCY)
Age: 54
Discharge: HOME | End: 2024-11-02
Attending: EMERGENCY MEDICINE
Payer: MEDICAID

## 2024-11-02 VITALS
HEIGHT: 63 IN | RESPIRATION RATE: 19 BRPM | DIASTOLIC BLOOD PRESSURE: 75 MMHG | SYSTOLIC BLOOD PRESSURE: 134 MMHG | TEMPERATURE: 98.2 F | HEART RATE: 81 BPM | OXYGEN SATURATION: 97 % | WEIGHT: 260 LBS | BODY MASS INDEX: 46.07 KG/M2

## 2024-11-02 DIAGNOSIS — J01.00 ACUTE MAXILLARY SINUSITIS, RECURRENCE NOT SPECIFIED: Primary | ICD-10-CM

## 2024-11-02 DIAGNOSIS — T78.40XS ALLERGY, SEQUELA: ICD-10-CM

## 2024-11-02 PROCEDURE — 6370000000 HC RX 637 (ALT 250 FOR IP): Performed by: EMERGENCY MEDICINE

## 2024-11-02 PROCEDURE — 99284 EMERGENCY DEPT VISIT MOD MDM: CPT

## 2024-11-02 RX ORDER — AZITHROMYCIN 500 MG/1
500 TABLET, FILM COATED ORAL ONCE
Status: COMPLETED | OUTPATIENT
Start: 2024-11-02 | End: 2024-11-02

## 2024-11-02 RX ORDER — AZITHROMYCIN 250 MG/1
TABLET, FILM COATED ORAL
Qty: 6 TABLET | Refills: 0 | Status: SHIPPED | OUTPATIENT
Start: 2024-11-02 | End: 2024-11-12

## 2024-11-02 RX ORDER — HYDROCODONE BITARTRATE AND ACETAMINOPHEN 5; 325 MG/1; MG/1
1 TABLET ORAL ONCE
Status: COMPLETED | OUTPATIENT
Start: 2024-11-02 | End: 2024-11-02

## 2024-11-02 RX ADMIN — AZITHROMYCIN 500 MG: 500 TABLET, FILM COATED ORAL at 04:08

## 2024-11-02 RX ADMIN — HYDROCODONE BITARTRATE AND ACETAMINOPHEN 1 TABLET: 5; 325 TABLET ORAL at 04:08

## 2024-11-02 ASSESSMENT — ENCOUNTER SYMPTOMS
SINUS PAIN: 1
SHORTNESS OF BREATH: 0
SINUS PRESSURE: 1
EYE DISCHARGE: 0
ABDOMINAL DISTENTION: 0
CHEST TIGHTNESS: 0
COUGH: 0
ABDOMINAL PAIN: 0
TROUBLE SWALLOWING: 0
VOMITING: 0
SORE THROAT: 1
WHEEZING: 0
PHOTOPHOBIA: 0

## 2024-11-02 NOTE — ED TRIAGE NOTES
Pt arrived from home. Pt stated symptoms started yesterday, sore throat, chills, jorge ear pain, nasal drainage and SOB. Pt stated she took a at home Covid test that was negative. Last took Tylenol yesterday.  Pt A/Ox4, no sxs of distress.   
Orthopedic

## 2024-11-02 NOTE — ED PROVIDER NOTES
[facility] ED  eMERGENCY dEPARTMENT eNCOUnter      Pt Name: Lucy Tariq  MRN: [id number]  Birthdate 1970  Date of evaluation: 11/2/2024  Provider: Jefferson Corley MD    CHIEF COMPLAINT       Chief Complaint   Patient presents with    Illness     Illness           HISTORY OF PRESENT ILLNESS   (Location/Symptom, Timing/Onset,Context/Setting, Quality, Duration, Modifying Factors, Severity)  Note limiting factors.   Lucy Tariq is a 54 y.o. female who presents to the emergency department with extensive past medical history including asthma, bronchopneumonia, stroke, sciatica, chronic kidney disease, fibromyalgia, insulin-dependent diabetes, hypertension.  Presents with a 24-hour history of sinus congestion ear pain and upper respiratory symptoms.  No significant cough or shortness of breath.  Patient took Tylenol with minimal relief.  She is a non-smoker    HPI    NursingNotes were reviewed.    REVIEW OF SYSTEMS    (2-9 systems for level 4, 10 or more for level 5)     Review of Systems   Constitutional:  Negative for chills and diaphoresis.   HENT:  Positive for ear pain, sinus pressure, sinus pain and sore throat. Negative for congestion, dental problem, drooling, mouth sores and trouble swallowing.    Eyes:  Negative for photophobia and discharge.   Respiratory:  Negative for cough, chest tightness, shortness of breath and wheezing.    Cardiovascular:  Negative for chest pain and palpitations.   Gastrointestinal:  Negative for abdominal distention, abdominal pain and vomiting.   Endocrine: Negative for cold intolerance.   Genitourinary:  Negative for difficulty urinating.   Musculoskeletal:  Negative for arthralgias.   Skin:  Negative for pallor and rash.   Allergic/Immunologic: Negative for immunocompromised state.   Neurological:  Negative for dizziness and syncope.   Hematological:  Negative for adenopathy.   Psychiatric/Behavioral:  Negative for agitation and hallucinations.    All

## 2024-11-04 DIAGNOSIS — I10 PRIMARY HYPERTENSION: ICD-10-CM

## 2024-11-04 RX ORDER — ATORVASTATIN CALCIUM 40 MG/1
40 TABLET, FILM COATED ORAL NIGHTLY
Qty: 90 TABLET | Refills: 3 | Status: SHIPPED | OUTPATIENT
Start: 2024-11-04

## 2024-11-04 RX ORDER — LOSARTAN POTASSIUM 25 MG/1
25 TABLET ORAL DAILY
Qty: 90 TABLET | Refills: 3 | Status: SHIPPED | OUTPATIENT
Start: 2024-11-04

## 2024-11-05 RX ORDER — FLUTICASONE PROPIONATE 50 MCG
1 SPRAY, SUSPENSION (ML) NASAL DAILY
Qty: 16 G | Refills: 1 | Status: SHIPPED | OUTPATIENT
Start: 2024-11-05

## 2024-11-26 DIAGNOSIS — Z79.4 TYPE 2 DIABETES MELLITUS TREATED WITH INSULIN (HCC): Primary | ICD-10-CM

## 2024-11-26 DIAGNOSIS — E11.9 TYPE 2 DIABETES MELLITUS TREATED WITH INSULIN (HCC): Primary | ICD-10-CM

## 2024-11-26 RX ORDER — ACYCLOVIR 400 MG/1
TABLET ORAL
Qty: 4 EACH | Refills: 10 | Status: ON HOLD | OUTPATIENT
Start: 2024-11-26

## 2024-11-26 RX ORDER — INSULIN GLARGINE 300 U/ML
INJECTION, SOLUTION SUBCUTANEOUS
Qty: 12 ADJUSTABLE DOSE PRE-FILLED PEN SYRINGE | Refills: 10 | Status: CANCELLED | OUTPATIENT
Start: 2024-11-26

## 2024-11-26 RX ORDER — ACYCLOVIR 400 MG/1
TABLET ORAL
Qty: 2 EACH | Refills: 5 | Status: ON HOLD | OUTPATIENT
Start: 2024-11-26

## 2024-11-26 RX ORDER — INSULIN GLARGINE 300 U/ML
INJECTION, SOLUTION SUBCUTANEOUS
Qty: 12 ADJUSTABLE DOSE PRE-FILLED PEN SYRINGE | Refills: 10 | Status: ON HOLD | OUTPATIENT
Start: 2024-11-26

## 2024-12-08 ENCOUNTER — APPOINTMENT (OUTPATIENT)
Dept: CT IMAGING | Age: 54
DRG: 045 | End: 2024-12-08
Payer: MEDICAID

## 2024-12-08 ENCOUNTER — APPOINTMENT (OUTPATIENT)
Dept: GENERAL RADIOLOGY | Age: 54
DRG: 045 | End: 2024-12-08
Payer: MEDICAID

## 2024-12-08 ENCOUNTER — HOSPITAL ENCOUNTER (INPATIENT)
Age: 54
LOS: 2 days | Discharge: HOME | DRG: 045 | End: 2024-12-10
Attending: INTERNAL MEDICINE | Admitting: INTERNAL MEDICINE
Payer: MEDICAID

## 2024-12-08 DIAGNOSIS — G47.33 OBSTRUCTIVE SLEEP APNEA SYNDROME: ICD-10-CM

## 2024-12-08 DIAGNOSIS — I63.9 CEREBROVASCULAR ACCIDENT (CVA), UNSPECIFIED MECHANISM (HCC): Primary | ICD-10-CM

## 2024-12-08 PROBLEM — R29.90 STROKE-LIKE SYMPTOMS: Status: ACTIVE | Noted: 2024-12-08

## 2024-12-08 LAB
ALBUMIN SERPL-MCNC: 4.1 G/DL (ref 3.5–4.6)
ALP SERPL-CCNC: 177 U/L (ref 40–130)
ALT SERPL-CCNC: 20 U/L (ref 0–33)
ANION GAP SERPL CALCULATED.3IONS-SCNC: 7 MEQ/L (ref 9–15)
APTT PPP: 28.1 SEC (ref 24.4–36.8)
AST SERPL-CCNC: 23 U/L (ref 0–35)
BACTERIA URNS QL MICRO: NEGATIVE /HPF
BASOPHILS # BLD: 0 K/UL (ref 0–0.2)
BASOPHILS NFR BLD: 0.3 %
BILIRUB SERPL-MCNC: 0.4 MG/DL (ref 0.2–0.7)
BILIRUB UR QL STRIP: NEGATIVE
BUN SERPL-MCNC: 13 MG/DL (ref 6–20)
CALCIUM SERPL-MCNC: 9.5 MG/DL (ref 8.5–9.9)
CHLORIDE SERPL-SCNC: 104 MEQ/L (ref 95–107)
CHP ED QC CHECK: NORMAL
CLARITY UR: CLEAR
CO2 SERPL-SCNC: 28 MEQ/L (ref 20–31)
COLOR UR: YELLOW
CREAT SERPL-MCNC: 1.67 MG/DL (ref 0.5–0.9)
EOSINOPHIL # BLD: 0.2 K/UL (ref 0–0.7)
EOSINOPHIL NFR BLD: 2.7 %
EPI CELLS #/AREA URNS AUTO: NORMAL /HPF (ref 0–5)
ERYTHROCYTE [DISTWIDTH] IN BLOOD BY AUTOMATED COUNT: 13 % (ref 11.5–14.5)
GLOBULIN SER CALC-MCNC: 3.5 G/DL (ref 2.3–3.5)
GLUCOSE BLD-MCNC: 208 MG/DL
GLUCOSE BLD-MCNC: 208 MG/DL (ref 70–99)
GLUCOSE SERPL-MCNC: 157 MG/DL (ref 70–99)
GLUCOSE UR STRIP-MCNC: 100 MG/DL
HCT VFR BLD AUTO: 48.6 % (ref 37–47)
HGB BLD-MCNC: 16.3 G/DL (ref 12–16)
HGB UR QL STRIP: ABNORMAL
HYALINE CASTS #/AREA URNS AUTO: NORMAL /HPF (ref 0–5)
INR PPP: 1.1
KETONES UR STRIP-MCNC: NEGATIVE MG/DL
LEUKOCYTE ESTERASE UR QL STRIP: NEGATIVE
LYMPHOCYTES # BLD: 1.7 K/UL (ref 1–4.8)
LYMPHOCYTES NFR BLD: 27 %
MAGNESIUM SERPL-MCNC: 2.1 MG/DL (ref 1.7–2.4)
MCH RBC QN AUTO: 32.4 PG (ref 27–31.3)
MCHC RBC AUTO-ENTMCNC: 33.5 % (ref 33–37)
MCV RBC AUTO: 96.6 FL (ref 79.4–94.8)
MONOCYTES # BLD: 0.3 K/UL (ref 0.2–0.8)
MONOCYTES NFR BLD: 5.3 %
NEUTROPHILS # BLD: 4.1 K/UL (ref 1.4–6.5)
NEUTS SEG NFR BLD: 64.5 %
NITRITE UR QL STRIP: NEGATIVE
PERFORMED ON: ABNORMAL
PH UR STRIP: 7.5 [PH] (ref 5–9)
PLATELET # BLD AUTO: 270 K/UL (ref 130–400)
POTASSIUM SERPL-SCNC: 4.1 MEQ/L (ref 3.4–4.9)
PROT SERPL-MCNC: 7.6 G/DL (ref 6.3–8)
PROT UR STRIP-MCNC: NEGATIVE MG/DL
PROTHROMBIN TIME: 13.9 SEC (ref 12.3–14.9)
RBC # BLD AUTO: 5.03 M/UL (ref 4.2–5.4)
RBC #/AREA URNS HPF: NORMAL /HPF (ref 0–2)
SODIUM SERPL-SCNC: 139 MEQ/L (ref 135–144)
SP GR UR STRIP: 1.03 (ref 1–1.03)
TROPONIN, HIGH SENSITIVITY: 16 NG/L (ref 0–19)
TROPONIN, HIGH SENSITIVITY: 18 NG/L (ref 0–19)
URINE REFLEX TO CULTURE: ABNORMAL
UROBILINOGEN UR STRIP-ACNC: 0.2 E.U./DL
WBC # BLD AUTO: 6.4 K/UL (ref 4.8–10.8)
WBC #/AREA URNS AUTO: NORMAL /HPF (ref 0–5)

## 2024-12-08 PROCEDURE — 37195 THROMBOLYTIC THERAPY STROKE: CPT

## 2024-12-08 PROCEDURE — 84484 ASSAY OF TROPONIN QUANT: CPT

## 2024-12-08 PROCEDURE — 85025 COMPLETE CBC W/AUTO DIFF WBC: CPT

## 2024-12-08 PROCEDURE — 81001 URINALYSIS AUTO W/SCOPE: CPT

## 2024-12-08 PROCEDURE — 99291 CRITICAL CARE FIRST HOUR: CPT

## 2024-12-08 PROCEDURE — 2580000003 HC RX 258

## 2024-12-08 PROCEDURE — 70450 CT HEAD/BRAIN W/O DYE: CPT

## 2024-12-08 PROCEDURE — 83735 ASSAY OF MAGNESIUM: CPT

## 2024-12-08 PROCEDURE — 6360000002 HC RX W HCPCS

## 2024-12-08 PROCEDURE — 6370000000 HC RX 637 (ALT 250 FOR IP)

## 2024-12-08 PROCEDURE — 70496 CT ANGIOGRAPHY HEAD: CPT

## 2024-12-08 PROCEDURE — 85610 PROTHROMBIN TIME: CPT

## 2024-12-08 PROCEDURE — 6360000004 HC RX CONTRAST MEDICATION

## 2024-12-08 PROCEDURE — 80053 COMPREHEN METABOLIC PANEL: CPT

## 2024-12-08 PROCEDURE — 71045 X-RAY EXAM CHEST 1 VIEW: CPT

## 2024-12-08 PROCEDURE — 3E03317 INTRODUCTION OF OTHER THROMBOLYTIC INTO PERIPHERAL VEIN, PERCUTANEOUS APPROACH: ICD-10-PCS | Performed by: INTERNAL MEDICINE

## 2024-12-08 PROCEDURE — 70498 CT ANGIOGRAPHY NECK: CPT

## 2024-12-08 PROCEDURE — 2000000000 HC ICU R&B

## 2024-12-08 PROCEDURE — 93005 ELECTROCARDIOGRAM TRACING: CPT

## 2024-12-08 PROCEDURE — 85730 THROMBOPLASTIN TIME PARTIAL: CPT

## 2024-12-08 PROCEDURE — 36415 COLL VENOUS BLD VENIPUNCTURE: CPT

## 2024-12-08 RX ORDER — ACETAMINOPHEN 500 MG
1000 TABLET ORAL ONCE
Status: COMPLETED | OUTPATIENT
Start: 2024-12-08 | End: 2024-12-08

## 2024-12-08 RX ORDER — ENOXAPARIN SODIUM 100 MG/ML
30 INJECTION SUBCUTANEOUS 2 TIMES DAILY
Status: DISCONTINUED | OUTPATIENT
Start: 2024-12-09 | End: 2024-12-10 | Stop reason: HOSPADM

## 2024-12-08 RX ORDER — ATORVASTATIN CALCIUM 40 MG/1
40 TABLET, FILM COATED ORAL NIGHTLY
Status: DISCONTINUED | OUTPATIENT
Start: 2024-12-08 | End: 2024-12-10 | Stop reason: HOSPADM

## 2024-12-08 RX ORDER — ONDANSETRON 4 MG/1
4 TABLET, ORALLY DISINTEGRATING ORAL EVERY 8 HOURS PRN
Status: DISCONTINUED | OUTPATIENT
Start: 2024-12-08 | End: 2024-12-10 | Stop reason: HOSPADM

## 2024-12-08 RX ORDER — MECLIZINE HYDROCHLORIDE 25 MG/1
25 TABLET ORAL ONCE
Status: COMPLETED | OUTPATIENT
Start: 2024-12-08 | End: 2024-12-08

## 2024-12-08 RX ORDER — SODIUM CHLORIDE 9 MG/ML
INJECTION, SOLUTION INTRAVENOUS PRN
Status: DISCONTINUED | OUTPATIENT
Start: 2024-12-08 | End: 2024-12-10 | Stop reason: HOSPADM

## 2024-12-08 RX ORDER — GLUCAGON 1 MG/ML
1 KIT INJECTION PRN
Status: DISCONTINUED | OUTPATIENT
Start: 2024-12-08 | End: 2024-12-10 | Stop reason: HOSPADM

## 2024-12-08 RX ORDER — IOPAMIDOL 755 MG/ML
75 INJECTION, SOLUTION INTRAVASCULAR
Status: COMPLETED | OUTPATIENT
Start: 2024-12-08 | End: 2024-12-08

## 2024-12-08 RX ORDER — ONDANSETRON 2 MG/ML
4 INJECTION INTRAMUSCULAR; INTRAVENOUS EVERY 6 HOURS PRN
Status: DISCONTINUED | OUTPATIENT
Start: 2024-12-08 | End: 2024-12-10 | Stop reason: HOSPADM

## 2024-12-08 RX ORDER — SODIUM CHLORIDE 0.9 % (FLUSH) 0.9 %
5-40 SYRINGE (ML) INJECTION EVERY 12 HOURS SCHEDULED
Status: DISCONTINUED | OUTPATIENT
Start: 2024-12-08 | End: 2024-12-10 | Stop reason: HOSPADM

## 2024-12-08 RX ORDER — ASPIRIN 81 MG/1
81 TABLET, CHEWABLE ORAL DAILY
Status: DISCONTINUED | OUTPATIENT
Start: 2024-12-09 | End: 2024-12-10 | Stop reason: HOSPADM

## 2024-12-08 RX ORDER — SODIUM CHLORIDE 0.9 % (FLUSH) 0.9 %
5-40 SYRINGE (ML) INJECTION PRN
Status: DISCONTINUED | OUTPATIENT
Start: 2024-12-08 | End: 2024-12-10 | Stop reason: HOSPADM

## 2024-12-08 RX ORDER — SODIUM CHLORIDE 0.9 % (FLUSH) 0.9 %
10 SYRINGE (ML) INJECTION ONCE
Status: COMPLETED | OUTPATIENT
Start: 2024-12-08 | End: 2024-12-08

## 2024-12-08 RX ORDER — ASPIRIN 300 MG/1
300 SUPPOSITORY RECTAL DAILY
Status: DISCONTINUED | OUTPATIENT
Start: 2024-12-09 | End: 2024-12-10 | Stop reason: HOSPADM

## 2024-12-08 RX ORDER — POLYETHYLENE GLYCOL 3350 17 G/17G
17 POWDER, FOR SOLUTION ORAL DAILY PRN
Status: DISCONTINUED | OUTPATIENT
Start: 2024-12-08 | End: 2024-12-10 | Stop reason: HOSPADM

## 2024-12-08 RX ORDER — HYDRALAZINE HYDROCHLORIDE 20 MG/ML
10 INJECTION INTRAMUSCULAR; INTRAVENOUS EVERY 30 MIN PRN
Status: DISCONTINUED | OUTPATIENT
Start: 2024-12-08 | End: 2024-12-10 | Stop reason: HOSPADM

## 2024-12-08 RX ORDER — INSULIN LISPRO 100 [IU]/ML
0-8 INJECTION, SOLUTION INTRAVENOUS; SUBCUTANEOUS
Status: DISCONTINUED | OUTPATIENT
Start: 2024-12-08 | End: 2024-12-10 | Stop reason: HOSPADM

## 2024-12-08 RX ORDER — DEXTROSE MONOHYDRATE 100 MG/ML
INJECTION, SOLUTION INTRAVENOUS CONTINUOUS PRN
Status: DISCONTINUED | OUTPATIENT
Start: 2024-12-08 | End: 2024-12-10 | Stop reason: HOSPADM

## 2024-12-08 RX ADMIN — SODIUM CHLORIDE, PRESERVATIVE FREE 10 ML: 5 INJECTION INTRAVENOUS at 19:22

## 2024-12-08 RX ADMIN — Medication 10 ML: at 19:23

## 2024-12-08 RX ADMIN — TENECTEPLASE 25 MG: KIT at 19:22

## 2024-12-08 RX ADMIN — IOPAMIDOL 75 ML: 755 INJECTION, SOLUTION INTRAVENOUS at 19:49

## 2024-12-08 RX ADMIN — ACETAMINOPHEN 1000 MG: 500 TABLET ORAL at 21:10

## 2024-12-08 RX ADMIN — MECLIZINE HYDROCHLORIDE 25 MG: 25 TABLET ORAL at 21:10

## 2024-12-08 ASSESSMENT — PAIN - FUNCTIONAL ASSESSMENT
PAIN_FUNCTIONAL_ASSESSMENT: 0-10
PAIN_FUNCTIONAL_ASSESSMENT: 0-10

## 2024-12-08 ASSESSMENT — PAIN SCALES - GENERAL
PAINLEVEL_OUTOF10: 8
PAINLEVEL_OUTOF10: 8

## 2024-12-08 NOTE — ED PROVIDER NOTES
[facility] ED  EMERGENCY DEPARTMENT ENCOUNTER      Pt Name: Lucy Tariq  MRN: [id number]  Birthdate 1970  Date of evaluation: 12/8/2024  Provider: ANTONIO Lauren  6:55 PM EST      CHIEF COMPLAINT       Chief Complaint   Patient presents with    code BAT     Patient states left side head pain that shoots don left arm with left sided blurry vision and left sided hand tingling. States LKW 20 minis ago.          HISTORY OF PRESENT ILLNESS   (Location/Symptom, Timing/Onset, Context/Setting, Quality, Duration, Modifying Factors, Severity)  Note limiting factors.   Lucy Tariq is a 54 y.o. female who presents to the emergency department with PMHx COPD, diabetes, cannabis use, fibromyalgia, GERD, headaches, major depressive disorder, TIA, tobacco use, hypothyroidism, anemia, CKD, vertigo, sarcoidosis.  Patient presents to the ED for evaluation of acute headache, left-sided blurry vision and left-sided arm paresthesias.  Symptoms started 20 minutes prior to arrival.  Patient states headache is on her left side, frontal temporal.  States is quite localized.  Does have history of headaches but not like this.  Patient denies confusion, she is not lethargic, denies any focal weakness.  She is not on blood thinners.  Denies facial asymmetry or speech disturbance.  No chest pain or shortness of breath.    HPI    Nursing Notes were reviewed.    REVIEW OF SYSTEMS    (2-9 systems for level 4, 10 or more for level 5)     Review of Systems   Constitutional:  Negative for chills and fever.   HENT:  Negative for congestion.    Eyes:  Positive for visual disturbance (L sided blurred vision). Negative for photophobia.   Respiratory:  Negative for cough and shortness of breath.    Cardiovascular:  Negative for chest pain.   Gastrointestinal:  Negative for abdominal pain, diarrhea, nausea and vomiting.   Genitourinary:  Negative for difficulty urinating.   Musculoskeletal:  Negative for myalgias.

## 2024-12-09 ENCOUNTER — APPOINTMENT (OUTPATIENT)
Dept: CT IMAGING | Age: 54
DRG: 045 | End: 2024-12-09
Payer: MEDICAID

## 2024-12-09 ENCOUNTER — APPOINTMENT (OUTPATIENT)
Age: 54
DRG: 045 | End: 2024-12-09
Attending: INTERNAL MEDICINE
Payer: MEDICAID

## 2024-12-09 PROBLEM — I63.9 CEREBROVASCULAR ACCIDENT (CVA) (HCC): Status: ACTIVE | Noted: 2024-12-09

## 2024-12-09 LAB
ANION GAP SERPL CALCULATED.3IONS-SCNC: 10 MEQ/L (ref 9–15)
BASOPHILS # BLD: 0 K/UL (ref 0–0.2)
BASOPHILS NFR BLD: 0.4 %
BUN SERPL-MCNC: 14 MG/DL (ref 6–20)
CALCIUM SERPL-MCNC: 8.9 MG/DL (ref 8.5–9.9)
CHLORIDE SERPL-SCNC: 104 MEQ/L (ref 95–107)
CHOLEST SERPL-MCNC: 128 MG/DL (ref 0–199)
CO2 SERPL-SCNC: 23 MEQ/L (ref 20–31)
CREAT SERPL-MCNC: 1.53 MG/DL (ref 0.5–0.9)
ECHO AV CUSP MM: 2 CM
ECHO AV MEAN GRADIENT: 5 MMHG
ECHO AV MEAN VELOCITY: 1.1 M/S
ECHO AV PEAK GRADIENT: 10 MMHG
ECHO AV PEAK VELOCITY: 1.8 M/S
ECHO AV VTI: 35.4 CM
ECHO BSA: 2.44 M2
ECHO EST RA PRESSURE: 3 MMHG
ECHO LA DIAMETER INDEX: 1.54 CM/M2
ECHO LA DIAMETER: 3.5 CM
ECHO LA VOL A-L A4C: 36 ML (ref 22–52)
ECHO LA VOL MOD A4C: 32 ML (ref 22–52)
ECHO LA VOLUME INDEX A-L A4C: 16 ML/M2 (ref 16–34)
ECHO LA VOLUME INDEX MOD A4C: 14 ML/M2 (ref 16–34)
ECHO LV E' LATERAL VELOCITY: 12.29 CM/S
ECHO LV E' SEPTAL VELOCITY: 11.02 CM/S
ECHO LV EF PHYSICIAN: 65 %
ECHO LV FRACTIONAL SHORTENING: 45 % (ref 28–44)
ECHO LV INTERNAL DIMENSION DIASTOLE INDEX: 1.84 CM/M2
ECHO LV INTERNAL DIMENSION DIASTOLIC: 4.2 CM (ref 3.9–5.3)
ECHO LV INTERNAL DIMENSION SYSTOLIC INDEX: 1.01 CM/M2
ECHO LV INTERNAL DIMENSION SYSTOLIC: 2.3 CM
ECHO LV IVSD: 1.3 CM (ref 0.6–0.9)
ECHO LV IVSS: 1.5 CM
ECHO LV MASS 2D: 167.4 G (ref 67–162)
ECHO LV MASS INDEX 2D: 73.4 G/M2 (ref 43–95)
ECHO LV POSTERIOR WALL DIASTOLIC: 1 CM (ref 0.6–0.9)
ECHO LV POSTERIOR WALL SYSTOLIC: 1.6 CM
ECHO LV RELATIVE WALL THICKNESS RATIO: 0.48
ECHO LVOT AREA: 3.5 CM2
ECHO LVOT DIAM: 2.1 CM
ECHO MV A VELOCITY: 0.83 M/S
ECHO MV E DECELERATION TIME (DT): 181.4 MS
ECHO MV E VELOCITY: 0.93 M/S
ECHO MV E/A RATIO: 1.12
ECHO MV E/E' LATERAL: 7.57
ECHO MV E/E' RATIO (AVERAGED): 8
ECHO MV E/E' SEPTAL: 8.44
ECHO PV MAX VELOCITY: 1.4 M/S
ECHO PV PEAK GRADIENT: 7 MMHG
ECHO RIGHT VENTRICULAR SYSTOLIC PRESSURE (RVSP): 27 MMHG
ECHO RV INTERNAL DIMENSION: 2.5 CM
ECHO RVOT PEAK GRADIENT: 5 MMHG
ECHO RVOT PEAK VELOCITY: 1.1 M/S
ECHO TV REGURGITANT MAX VELOCITY: 2.44 M/S
ECHO TV REGURGITANT PEAK GRADIENT: 24 MMHG
EKG ATRIAL RATE: 76 BPM
EKG P AXIS: 48 DEGREES
EKG P-R INTERVAL: 184 MS
EKG Q-T INTERVAL: 368 MS
EKG QRS DURATION: 86 MS
EKG QTC CALCULATION (BAZETT): 414 MS
EKG R AXIS: 1 DEGREES
EKG T AXIS: 21 DEGREES
EKG VENTRICULAR RATE: 76 BPM
EOSINOPHIL # BLD: 0.2 K/UL (ref 0–0.7)
EOSINOPHIL NFR BLD: 3 %
ERYTHROCYTE [DISTWIDTH] IN BLOOD BY AUTOMATED COUNT: 12.9 % (ref 11.5–14.5)
ESTIMATED AVERAGE GLUCOSE: 140 MG/DL
FOLATE: 6.7 NG/ML (ref 4.8–24.2)
GLUCOSE BLD-MCNC: 176 MG/DL (ref 70–99)
GLUCOSE BLD-MCNC: 207 MG/DL (ref 70–99)
GLUCOSE BLD-MCNC: 249 MG/DL (ref 70–99)
GLUCOSE SERPL-MCNC: 163 MG/DL (ref 70–99)
HBA1C MFR BLD: 6.5 % (ref 4–6)
HCT VFR BLD AUTO: 45.8 % (ref 37–47)
HDLC SERPL-MCNC: 36 MG/DL (ref 40–59)
HGB BLD-MCNC: 15.8 G/DL (ref 12–16)
HOMOCYSTEINE: 13.7 UMOL/L (ref 0–15)
LDLC SERPL CALC-MCNC: 65 MG/DL (ref 0–129)
LYMPHOCYTES # BLD: 2.4 K/UL (ref 1–4.8)
LYMPHOCYTES NFR BLD: 34.5 %
MCH RBC QN AUTO: 32.6 PG (ref 27–31.3)
MCHC RBC AUTO-ENTMCNC: 34.5 % (ref 33–37)
MCV RBC AUTO: 94.4 FL (ref 79.4–94.8)
MONOCYTES # BLD: 0.4 K/UL (ref 0.2–0.8)
MONOCYTES NFR BLD: 6.2 %
NEUTROPHILS # BLD: 4 K/UL (ref 1.4–6.5)
NEUTS SEG NFR BLD: 55.8 %
PERFORMED ON: ABNORMAL
PLATELET # BLD AUTO: 251 K/UL (ref 130–400)
POC CREATININE: 1.8 MG/DL (ref 0.6–1.2)
POC SAMPLE TYPE: ABNORMAL
POTASSIUM SERPL-SCNC: 4.3 MEQ/L (ref 3.4–4.9)
RBC # BLD AUTO: 4.85 M/UL (ref 4.2–5.4)
SODIUM SERPL-SCNC: 137 MEQ/L (ref 135–144)
TRIGL SERPL-MCNC: 135 MG/DL (ref 0–150)
VITAMIN B-12: 381 PG/ML (ref 232–1245)
WBC # BLD AUTO: 7.1 K/UL (ref 4.8–10.8)

## 2024-12-09 PROCEDURE — 85300 ANTITHROMBIN III ACTIVITY: CPT

## 2024-12-09 PROCEDURE — 82746 ASSAY OF FOLIC ACID SERUM: CPT

## 2024-12-09 PROCEDURE — 82607 VITAMIN B-12: CPT

## 2024-12-09 PROCEDURE — 36415 COLL VENOUS BLD VENIPUNCTURE: CPT

## 2024-12-09 PROCEDURE — 70450 CT HEAD/BRAIN W/O DYE: CPT

## 2024-12-09 PROCEDURE — 6370000000 HC RX 637 (ALT 250 FOR IP): Performed by: INTERNAL MEDICINE

## 2024-12-09 PROCEDURE — 85613 RUSSELL VIPER VENOM DILUTED: CPT

## 2024-12-09 PROCEDURE — 85610 PROTHROMBIN TIME: CPT

## 2024-12-09 PROCEDURE — 2000000000 HC ICU R&B

## 2024-12-09 PROCEDURE — 83036 HEMOGLOBIN GLYCOSYLATED A1C: CPT

## 2024-12-09 PROCEDURE — 92523 SPEECH SOUND LANG COMPREHEN: CPT

## 2024-12-09 PROCEDURE — C8929 TTE W OR WO FOL WCON,DOPPLER: HCPCS

## 2024-12-09 PROCEDURE — 93010 ELECTROCARDIOGRAM REPORT: CPT | Performed by: INTERNAL MEDICINE

## 2024-12-09 PROCEDURE — 99291 CRITICAL CARE FIRST HOUR: CPT | Performed by: INTERNAL MEDICINE

## 2024-12-09 PROCEDURE — 80061 LIPID PANEL: CPT

## 2024-12-09 PROCEDURE — 92610 EVALUATE SWALLOWING FUNCTION: CPT

## 2024-12-09 PROCEDURE — 2580000003 HC RX 258: Performed by: INTERNAL MEDICINE

## 2024-12-09 PROCEDURE — 93306 TTE W/DOPPLER COMPLETE: CPT | Performed by: INTERNAL MEDICINE

## 2024-12-09 PROCEDURE — 85730 THROMBOPLASTIN TIME PARTIAL: CPT

## 2024-12-09 PROCEDURE — 86147 CARDIOLIPIN ANTIBODY EA IG: CPT

## 2024-12-09 PROCEDURE — 80048 BASIC METABOLIC PNL TOTAL CA: CPT

## 2024-12-09 PROCEDURE — 6360000004 HC RX CONTRAST MEDICATION: Performed by: NURSE PRACTITIONER

## 2024-12-09 PROCEDURE — 85025 COMPLETE CBC W/AUTO DIFF WBC: CPT

## 2024-12-09 PROCEDURE — 85302 CLOT INHIBIT PROT C ANTIGEN: CPT

## 2024-12-09 PROCEDURE — 83090 ASSAY OF HOMOCYSTEINE: CPT

## 2024-12-09 PROCEDURE — 99255 IP/OBS CONSLTJ NEW/EST HI 80: CPT | Performed by: PSYCHIATRY & NEUROLOGY

## 2024-12-09 PROCEDURE — 85305 CLOT INHIBIT PROT S TOTAL: CPT

## 2024-12-09 PROCEDURE — 81291 MTHFR GENE: CPT

## 2024-12-09 RX ORDER — BUPROPION HYDROCHLORIDE 300 MG/1
300 TABLET ORAL EVERY MORNING
COMMUNITY

## 2024-12-09 RX ORDER — LOSARTAN POTASSIUM 50 MG/1
25 TABLET ORAL DAILY
Status: DISCONTINUED | OUTPATIENT
Start: 2024-12-10 | End: 2024-12-10 | Stop reason: HOSPADM

## 2024-12-09 RX ORDER — LANOLIN ALCOHOL/MO/W.PET/CERES
400 CREAM (GRAM) TOPICAL DAILY
Status: DISCONTINUED | OUTPATIENT
Start: 2024-12-09 | End: 2024-12-10 | Stop reason: HOSPADM

## 2024-12-09 RX ORDER — MONTELUKAST SODIUM 10 MG/1
10 TABLET ORAL NIGHTLY
Status: DISCONTINUED | OUTPATIENT
Start: 2024-12-09 | End: 2024-12-10 | Stop reason: HOSPADM

## 2024-12-09 RX ORDER — CETIRIZINE HYDROCHLORIDE 10 MG/1
10 TABLET ORAL DAILY
Status: DISCONTINUED | OUTPATIENT
Start: 2024-12-09 | End: 2024-12-10 | Stop reason: HOSPADM

## 2024-12-09 RX ORDER — BUTALBITAL, ACETAMINOPHEN AND CAFFEINE 50; 325; 40 MG/1; MG/1; MG/1
1 TABLET ORAL EVERY 6 HOURS PRN
Status: DISCONTINUED | OUTPATIENT
Start: 2024-12-09 | End: 2024-12-10 | Stop reason: HOSPADM

## 2024-12-09 RX ORDER — ACETAMINOPHEN 500 MG
1000 TABLET ORAL EVERY 8 HOURS PRN
Status: DISCONTINUED | OUTPATIENT
Start: 2024-12-09 | End: 2024-12-10 | Stop reason: HOSPADM

## 2024-12-09 RX ORDER — PREGABALIN 100 MG/1
200 CAPSULE ORAL
Status: DISCONTINUED | OUTPATIENT
Start: 2024-12-09 | End: 2024-12-09

## 2024-12-09 RX ORDER — BUPROPION HYDROCHLORIDE 150 MG/1
300 TABLET ORAL EVERY MORNING
Status: DISCONTINUED | OUTPATIENT
Start: 2024-12-09 | End: 2024-12-10 | Stop reason: HOSPADM

## 2024-12-09 RX ORDER — SPIRONOLACTONE 25 MG/1
50 TABLET ORAL DAILY
Status: DISCONTINUED | OUTPATIENT
Start: 2024-12-10 | End: 2024-12-10 | Stop reason: HOSPADM

## 2024-12-09 RX ORDER — FLUTICASONE PROPIONATE 50 MCG
1 SPRAY, SUSPENSION (ML) NASAL
Status: DISCONTINUED | OUTPATIENT
Start: 2024-12-09 | End: 2024-12-10 | Stop reason: HOSPADM

## 2024-12-09 RX ORDER — ALBUTEROL SULFATE 90 UG/1
1 INHALANT RESPIRATORY (INHALATION) EVERY 4 HOURS PRN
Status: DISCONTINUED | OUTPATIENT
Start: 2024-12-09 | End: 2024-12-10 | Stop reason: HOSPADM

## 2024-12-09 RX ORDER — PREGABALIN 100 MG/1
200 CAPSULE ORAL 3 TIMES DAILY
Status: DISCONTINUED | OUTPATIENT
Start: 2024-12-09 | End: 2024-12-10 | Stop reason: HOSPADM

## 2024-12-09 RX ORDER — MECLIZINE HYDROCHLORIDE 25 MG/1
25 TABLET ORAL 3 TIMES DAILY PRN
Status: DISCONTINUED | OUTPATIENT
Start: 2024-12-09 | End: 2024-12-10 | Stop reason: HOSPADM

## 2024-12-09 RX ADMIN — Medication 400 MG: at 09:06

## 2024-12-09 RX ADMIN — ACETAMINOPHEN 1000 MG: 500 TABLET ORAL at 01:23

## 2024-12-09 RX ADMIN — SODIUM CHLORIDE, PRESERVATIVE FREE 10 ML: 5 INJECTION INTRAVENOUS at 20:42

## 2024-12-09 RX ADMIN — ASPIRIN 81 MG: 81 TABLET, CHEWABLE ORAL at 23:55

## 2024-12-09 RX ADMIN — BUPROPION HYDROCHLORIDE 300 MG: 150 TABLET, EXTENDED RELEASE ORAL at 09:06

## 2024-12-09 RX ADMIN — SODIUM CHLORIDE, PRESERVATIVE FREE 10 ML: 5 INJECTION INTRAVENOUS at 07:39

## 2024-12-09 RX ADMIN — PERFLUTREN 1.5 ML: 6.52 INJECTION, SUSPENSION INTRAVENOUS at 13:09

## 2024-12-09 RX ADMIN — INSULIN LISPRO 2 UNITS: 100 INJECTION, SOLUTION INTRAVENOUS; SUBCUTANEOUS at 12:16

## 2024-12-09 RX ADMIN — SODIUM CHLORIDE, PRESERVATIVE FREE 10 ML: 5 INJECTION INTRAVENOUS at 01:25

## 2024-12-09 RX ADMIN — MONTELUKAST 10 MG: 10 TABLET, FILM COATED ORAL at 20:41

## 2024-12-09 RX ADMIN — LEVOTHYROXINE SODIUM 125 MCG: 0.03 TABLET ORAL at 09:12

## 2024-12-09 RX ADMIN — ATORVASTATIN CALCIUM 40 MG: 40 TABLET, FILM COATED ORAL at 20:41

## 2024-12-09 RX ADMIN — PREGABALIN 200 MG: 100 CAPSULE ORAL at 20:41

## 2024-12-09 RX ADMIN — PREGABALIN 200 MG: 100 CAPSULE ORAL at 10:02

## 2024-12-09 RX ADMIN — CETIRIZINE HYDROCHLORIDE 10 MG: 10 TABLET, FILM COATED ORAL at 09:06

## 2024-12-09 RX ADMIN — ATORVASTATIN CALCIUM 40 MG: 40 TABLET, FILM COATED ORAL at 01:23

## 2024-12-09 RX ADMIN — INSULIN LISPRO 2 UNITS: 100 INJECTION, SOLUTION INTRAVENOUS; SUBCUTANEOUS at 20:39

## 2024-12-09 RX ADMIN — PREGABALIN 200 MG: 100 CAPSULE ORAL at 15:03

## 2024-12-09 ASSESSMENT — ENCOUNTER SYMPTOMS
CHOKING: 0
TROUBLE SWALLOWING: 0
VOMITING: 0
SHORTNESS OF BREATH: 0
COLOR CHANGE: 0
NAUSEA: 0
BACK PAIN: 0
PHOTOPHOBIA: 0

## 2024-12-09 ASSESSMENT — PAIN SCALES - GENERAL
PAINLEVEL_OUTOF10: 8
PAINLEVEL_OUTOF10: 0
PAINLEVEL_OUTOF10: 0

## 2024-12-09 ASSESSMENT — PAIN DESCRIPTION - DESCRIPTORS: DESCRIPTORS: ACHING;NAGGING

## 2024-12-09 ASSESSMENT — PAIN DESCRIPTION - LOCATION: LOCATION: HEAD

## 2024-12-09 ASSESSMENT — PAIN - FUNCTIONAL ASSESSMENT: PAIN_FUNCTIONAL_ASSESSMENT: ACTIVITIES ARE NOT PREVENTED

## 2024-12-09 NOTE — INTERDISCIPLINARY ROUNDS
Spiritual Care Services     Summary of Visit:     participated in ICU rounds.   attempted follow-up visit afterwards but patient was on the phone.  No family is present.    Encounter Summary  Encounter Overview/Reason: Interdisciplinary rounds, Attempted Encounter  Service Provided For: Patient  Complexity of Encounter: Low  Begin Time: 1030  End Time : 1045  Total Time Calculated: 15 min                               Spiritual Assessment/Intervention/Outcomes:      is unable to assess at this time.    Care Plan:    Plan and Referrals  Plan/Referrals: Continue Support (comment)     will remain available for spiritual support as requested or needed.      Spiritual Care Services   Electronically signed by Chaplain Micheal on 12/9/2024 at 12:38 PM.    To reach a  for emotional and spiritual support, place an EPIC consult request.   If a  is needed immediately, dial “0” and ask to page the on-call .

## 2024-12-09 NOTE — CONSULTS
Bronchopneumonia     Cancer (HCC)     renal    Cerebral artery occlusion with cerebral infarction (HCC)     Chronic bilateral low back pain with sciatica     Chronic kidney disease     Chronic obstructive lung disease (HCC) 2019    Depression     Fibromyalgia     Gout     rt knee    Hypertension     Insulin dependent type 2 diabetes mellitus, uncontrolled 2018    Localized enlarged lymph nodes 10/26/2018    Mixed headache     PONV (postoperative nausea and vomiting)     Pure hyperglyceridemia 2017    Sarcoidosis     Sleep apnea     does not wear cpap    Thyroid goiter      Past Surgical History:   Procedure Laterality Date    BRONCHOSCOPY  10/26/2018    DR. STEARNS    CHEST SURGERY N/A 2024    xiphoidectomy, performed by Freddie Stearns MD at [facility]    KIDNEY REMOVAL Right 2016    KIDNEY REMOVAL Right 2016    LUNG BIOPSY Right 10/2018    SPINAL FUSION      THYROID LOBECTOMY Right 2014    THYROIDECTOMY  2019    DR. MAGDALENO    THYROIDECTOMY  2018    URETER STENT PLACEMENT Left 2016     Social History     Socioeconomic History    Marital status:      Spouse name: Not on file    Number of children: Not on file    Years of education: 12    Highest education level: High school graduate   Occupational History    Not on file   Tobacco Use    Smoking status: Former     Current packs/day: 0.00     Average packs/day: 0.5 packs/day for 15.0 years (7.5 ttl pk-yrs)     Types: Cigarettes     Start date: 2014     Quit date: 3/1/2015     Years since quittin.7    Smokeless tobacco: Never   Vaping Use    Vaping status: Never Used   Substance and Sexual Activity    Alcohol use: Not Currently    Drug use: Yes     Frequency: 5.0 times per week     Types: Marijuana (Weed)    Sexual activity: Yes     Partners: Male   Other Topics Concern    Not on file   Social History Narrative    Not on file     Social Determinants of Health     Financial Resource Strain: Low Risk  (10/4/2024)    
patient personally and independently. I reviewed the history, the documented findings, and performed a physical exam of the patient. I reviewed all the labs and imaging. I reviewed previous consultations. I discussed the case with nursing staff and RT.   I agree with the NP's assessment, and we discussed the management of the patient.  See orders.    This is a 54-year-old female who presented with blurred vision and left arm weakness.  She was evaluated in the ER and after consultation with neurology, she was given thrombolytics.  She is admitted to ICU for further care.    She has been doing well so far and most of her symptoms have resolved.  She still feels her left arm is a bit heavy.         General appearance - alert, well appearing, and in no distress  Mental status - alert, oriented to person, place, and time  Eyes - pupils equal and reactive, extraocular eye movements intact  Nose - normal and patent, no erythema, discharge or polyps  Neck - supple, no significant adenopathy  Chest - clear to auscultation, no wheezes, rales or rhonchi, symmetric air entry  Heart - normal rate, regular rhythm, normal S1, S2, no murmurs, rubs, clicks or gallops  Abdomen - soft, nontender, nondistended, no masses or organomegaly  Rectal - deferred, not clinically indicated  Neurological - alert, oriented, normal speech, no focal findings or movement disorder noted, motor and sensory grossly normal bilaterally  Musculoskeletal - no joint tenderness, deformity or swelling  Extremities - peripheral pulses normal, no pedal edema, no clubbing or cyanosis  Skin - normal coloration and turgor, no rashes, no suspicious skin lesions noted      Impression;    - Acute CVA status post tenecteplase  - Accelerated hypertension  - DM  - Sarcoidosis   - Asthma     Recommendations:    -Admit to ICU for hemodynamic and neuromonitoring per  -Neurochecks per protocol.  -CVA pathway for  -Watch for any change in neuroexam post

## 2024-12-09 NOTE — ACP (ADVANCE CARE PLANNING)
Advance Care Planning   Healthcare Decision Maker:    Primary Decision Maker: Luis Tariq - [facility] - [phone]    Click here to complete Healthcare Decision Makers including selection of the Healthcare Decision Maker Relationship (ie \"Primary\").

## 2024-12-09 NOTE — H&P
maintained without evidence of an acute infarct.  There is no evidence of hydrocephalus. Incidental note is again made of dense dural calcification along the falx, of no clinical concern.  This produces no mass effect upon the adjacent brain. ORBITS: The visualized portion of the orbits demonstrate no acute abnormality. SINUSES: The visualized paranasal sinuses and mastoid air cells demonstrate no acute abnormality. SOFT TISSUES/SKULL:  No acute abnormality of the visualized skull or soft tissues.     1. No acute intracranial abnormality. 2. No change from the prior exam.       VTE Prophylaxis: SCDs for initial 24hrs s/p TNK in ED    ASSESSMENT AND PLAN    Acute Problems:  Stroke like symptoms (blurred left vision, acute headache, left arm paresthesia), max NIH 4 -> TNK in ED    Chronic Problems:   Hx Headaches  Hx TIA  COPD  Diabetes  Cannabis use  Fibromyalgia  GERD  Major depressive disorder  Tobacco use  Hypothyroidism  Anemia  CKD  Vertigo  Sarcoidosis     Plan:  Admit inpatient status in the ICU on telemetry  Post TNK neurochecks per stroke protocol  Follow-up cranial imaging at 24 hours per stroke protocol  Neurology consulted in ED, will follow case in consultation  Critical care service consultation in setting of ICU admission  Swallow screen before p.o. food/meds  PT/OT evaluate and treat  Continue/hold other home medications as ordered after medication history completed    Plan of care discussed with: patient    In the setting of immediately life threatening condition, risk of further deterioration, and/or current clinical instability, >39 minutes of Critical Care time was provided by me to this patient on this date of service.  This may include any or all of: review of records and diagnostic results, direct evaluation and clinical management of the patient, orders management, and case discussion with collaborating care team members and/or family.  This does not include any applicable procedural time,

## 2024-12-10 ENCOUNTER — TELEPHONE (OUTPATIENT)
Dept: PULMONOLOGY | Age: 54
End: 2024-12-10

## 2024-12-10 VITALS
WEIGHT: 293 LBS | HEART RATE: 71 BPM | HEIGHT: 63 IN | TEMPERATURE: 98.1 F | SYSTOLIC BLOOD PRESSURE: 139 MMHG | OXYGEN SATURATION: 94 % | RESPIRATION RATE: 19 BRPM | DIASTOLIC BLOOD PRESSURE: 77 MMHG | BODY MASS INDEX: 51.91 KG/M2

## 2024-12-10 DIAGNOSIS — G47.30 SLEEP-DISORDERED BREATHING: Primary | ICD-10-CM

## 2024-12-10 LAB
ALBUMIN SERPL-MCNC: 3.7 G/DL (ref 3.5–4.6)
ANION GAP SERPL CALCULATED.3IONS-SCNC: 6 MEQ/L (ref 9–15)
BASOPHILS # BLD: 0 K/UL (ref 0–0.2)
BASOPHILS NFR BLD: 0.3 %
BUN SERPL-MCNC: 12 MG/DL (ref 6–20)
CALCIUM SERPL-MCNC: 8.5 MG/DL (ref 8.5–9.9)
CHLORIDE SERPL-SCNC: 106 MEQ/L (ref 95–107)
CO2 SERPL-SCNC: 24 MEQ/L (ref 20–31)
CREAT SERPL-MCNC: 1.33 MG/DL (ref 0.5–0.9)
EOSINOPHIL # BLD: 0.2 K/UL (ref 0–0.7)
EOSINOPHIL NFR BLD: 3.6 %
ERYTHROCYTE [DISTWIDTH] IN BLOOD BY AUTOMATED COUNT: 12.8 % (ref 11.5–14.5)
GLUCOSE BLD-MCNC: 179 MG/DL (ref 70–99)
GLUCOSE BLD-MCNC: 209 MG/DL (ref 70–99)
GLUCOSE BLD-MCNC: 234 MG/DL (ref 70–99)
GLUCOSE SERPL-MCNC: 157 MG/DL (ref 70–99)
HCT VFR BLD AUTO: 46.3 % (ref 37–47)
HGB BLD-MCNC: 15.5 G/DL (ref 12–16)
LYMPHOCYTES # BLD: 2.1 K/UL (ref 1–4.8)
LYMPHOCYTES NFR BLD: 31.8 %
MCH RBC QN AUTO: 31.8 PG (ref 27–31.3)
MCHC RBC AUTO-ENTMCNC: 33.5 % (ref 33–37)
MCV RBC AUTO: 94.9 FL (ref 79.4–94.8)
MONOCYTES # BLD: 0.5 K/UL (ref 0.2–0.8)
MONOCYTES NFR BLD: 7.5 %
NEUTROPHILS # BLD: 3.6 K/UL (ref 1.4–6.5)
NEUTS SEG NFR BLD: 56.5 %
PERFORMED ON: ABNORMAL
PHOSPHATE SERPL-MCNC: 3.6 MG/DL (ref 2.3–4.8)
PLATELET # BLD AUTO: 254 K/UL (ref 130–400)
POTASSIUM SERPL-SCNC: 4.4 MEQ/L (ref 3.4–4.9)
PROT C AG ACT/NOR PPP IA: >95 % (ref 63–153)
PROT S AG ACT/NOR PPP IA: 139 % (ref 63–126)
RBC # BLD AUTO: 4.88 M/UL (ref 4.2–5.4)
SODIUM SERPL-SCNC: 136 MEQ/L (ref 135–144)
WBC # BLD AUTO: 6.4 K/UL (ref 4.8–10.8)

## 2024-12-10 PROCEDURE — 99232 SBSQ HOSP IP/OBS MODERATE 35: CPT | Performed by: PSYCHIATRY & NEUROLOGY

## 2024-12-10 PROCEDURE — 6370000000 HC RX 637 (ALT 250 FOR IP): Performed by: INTERNAL MEDICINE

## 2024-12-10 PROCEDURE — 85025 COMPLETE CBC W/AUTO DIFF WBC: CPT

## 2024-12-10 PROCEDURE — 97165 OT EVAL LOW COMPLEX 30 MIN: CPT

## 2024-12-10 PROCEDURE — 99232 SBSQ HOSP IP/OBS MODERATE 35: CPT | Performed by: INTERNAL MEDICINE

## 2024-12-10 PROCEDURE — 2580000003 HC RX 258: Performed by: INTERNAL MEDICINE

## 2024-12-10 PROCEDURE — 97161 PT EVAL LOW COMPLEX 20 MIN: CPT

## 2024-12-10 PROCEDURE — 36415 COLL VENOUS BLD VENIPUNCTURE: CPT

## 2024-12-10 PROCEDURE — 80069 RENAL FUNCTION PANEL: CPT

## 2024-12-10 RX ORDER — INSULIN GLARGINE 100 [IU]/ML
0.25 INJECTION, SOLUTION SUBCUTANEOUS NIGHTLY
Status: DISCONTINUED | OUTPATIENT
Start: 2024-12-10 | End: 2024-12-10 | Stop reason: HOSPADM

## 2024-12-10 RX ORDER — ASPIRIN 81 MG/1
81 TABLET ORAL DAILY
Qty: 90 TABLET | Refills: 3 | Status: SHIPPED | OUTPATIENT
Start: 2024-12-10

## 2024-12-10 RX ORDER — INSULIN LISPRO 100 [IU]/ML
8 INJECTION, SOLUTION INTRAVENOUS; SUBCUTANEOUS
Status: DISCONTINUED | OUTPATIENT
Start: 2024-12-10 | End: 2024-12-10 | Stop reason: HOSPADM

## 2024-12-10 RX ADMIN — INSULIN LISPRO 8 UNITS: 100 INJECTION, SOLUTION INTRAVENOUS; SUBCUTANEOUS at 16:26

## 2024-12-10 RX ADMIN — BUPROPION HYDROCHLORIDE 300 MG: 150 TABLET, EXTENDED RELEASE ORAL at 08:09

## 2024-12-10 RX ADMIN — LEVOTHYROXINE SODIUM 125 MCG: 0.03 TABLET ORAL at 06:32

## 2024-12-10 RX ADMIN — CETIRIZINE HYDROCHLORIDE 10 MG: 10 TABLET, FILM COATED ORAL at 08:22

## 2024-12-10 RX ADMIN — INSULIN LISPRO 8 UNITS: 100 INJECTION, SOLUTION INTRAVENOUS; SUBCUTANEOUS at 11:24

## 2024-12-10 RX ADMIN — Medication 400 MG: at 08:09

## 2024-12-10 RX ADMIN — INSULIN LISPRO 2 UNITS: 100 INJECTION, SOLUTION INTRAVENOUS; SUBCUTANEOUS at 08:24

## 2024-12-10 RX ADMIN — SODIUM CHLORIDE, PRESERVATIVE FREE 10 ML: 5 INJECTION INTRAVENOUS at 08:10

## 2024-12-10 RX ADMIN — PREGABALIN 200 MG: 100 CAPSULE ORAL at 08:09

## 2024-12-10 RX ADMIN — ASPIRIN 81 MG: 81 TABLET, CHEWABLE ORAL at 08:09

## 2024-12-10 RX ADMIN — INSULIN LISPRO 2 UNITS: 100 INJECTION, SOLUTION INTRAVENOUS; SUBCUTANEOUS at 11:23

## 2024-12-10 RX ADMIN — PREGABALIN 200 MG: 100 CAPSULE ORAL at 15:40

## 2024-12-10 RX ADMIN — LOSARTAN POTASSIUM 25 MG: 50 TABLET, FILM COATED ORAL at 08:22

## 2024-12-10 RX ADMIN — INSULIN LISPRO 8 UNITS: 100 INJECTION, SOLUTION INTRAVENOUS; SUBCUTANEOUS at 08:25

## 2024-12-10 RX ADMIN — SPIRONOLACTONE 50 MG: 25 TABLET ORAL at 08:22

## 2024-12-10 ASSESSMENT — PAIN SCALES - GENERAL
PAINLEVEL_OUTOF10: 0
PAINLEVEL_OUTOF10: 0

## 2024-12-10 NOTE — INTERDISCIPLINARY ROUNDS
Spiritual Care Services     Summary of Visit:    Attended ICU Rounds. Patient awake, alert, sitting in chair, attentive. Patient will be discharged today.     Physician inquires if patient has sleep apnea and makes recommendation for home sleep to be conducted. Nurse has concerns of about patients oxygen levels when resting or sleeping. Patients has no liyah tradition / no family present.     Encounter Summary  Encounter Overview/Reason: Interdisciplinary rounds  Service Provided For: Patient  Support System: Spouse  Complexity of Encounter: Low  Begin Time: 1030  End Time : 1045  Total Time Calculated: 15 min                               Spiritual Assessment/Intervention/Outcomes:                     Care Plan:    Plan and Referrals  Plan/Referrals: Continue Support (comment)          Spiritual Care Services   Electronically signed by Chaplain Dickson on 12/10/2024 at 11:05 AM.    To reach a  for emotional and spiritual support, place an EPIC consult request.   If a  is needed immediately, dial “0” and ask to page the on-call .

## 2024-12-10 NOTE — PLAN OF CARE
Problem: Chronic Conditions and Co-morbidities  Goal: Patient's chronic conditions and co-morbidity symptoms are monitored and maintained or improved  12/10/2024 1617 by Genevieve Latif RN  Outcome: Adequate for Discharge  12/10/2024 1015 by Genevieve Latif RN  Outcome: Progressing  Flowsheets (Taken 12/10/2024 0800)  Care Plan - Patient's Chronic Conditions and Co-Morbidity Symptoms are Monitored and Maintained or Improved: Monitor and assess patient's chronic conditions and comorbid symptoms for stability, deterioration, or improvement     Problem: Discharge Planning  Goal: Discharge to home or other facility with appropriate resources  12/10/2024 1617 by Genevieve Latif RN  Outcome: Adequate for Discharge  12/10/2024 1015 by Genevieve Latif RN  Outcome: Progressing  Flowsheets (Taken 12/10/2024 0800)  Discharge to home or other facility with appropriate resources: Identify barriers to discharge with patient and caregiver     Problem: Pain  Goal: Verbalizes/displays adequate comfort level or baseline comfort level  12/10/2024 1617 by Genevieve Latif RN  Outcome: Adequate for Discharge  12/10/2024 1015 by Genevieve Latif RN  Outcome: Progressing     Problem: ABCDS Injury Assessment  Goal: Absence of physical injury  12/10/2024 1617 by Genevieve Latif RN  Outcome: Adequate for Discharge  12/10/2024 1015 by Genevieve Latif RN  Outcome: Progressing     Problem: Safety - Adult  Goal: Free from fall injury  12/10/2024 1617 by Genevieve Latif RN  Outcome: Adequate for Discharge  12/10/2024 1015 by Genevieve Latif RN  Outcome: Progressing     Problem: Skin/Tissue Integrity  Goal: Absence of new skin breakdown  Description: 1.  Monitor for areas of redness and/or skin breakdown  2.  Assess vascular access sites hourly  3.  Every 4-6 hours minimum:  Change oxygen saturation probe site  4.  Every 4-6 hours:  If on nasal continuous positive airway pressure, respiratory therapy assess nares and determine need for 
  Problem: Chronic Conditions and Co-morbidities  Goal: Patient's chronic conditions and co-morbidity symptoms are monitored and maintained or improved  12/9/2024 1322 by Genevieve Latif RN  Outcome: Progressing  Flowsheets (Taken 12/9/2024 0730)  Care Plan - Patient's Chronic Conditions and Co-Morbidity Symptoms are Monitored and Maintained or Improved: Monitor and assess patient's chronic conditions and comorbid symptoms for stability, deterioration, or improvement  12/9/2024 0622 by Claudette Thomson RN  Outcome: Progressing  Flowsheets  Taken 12/9/2024 0400  Care Plan - Patient's Chronic Conditions and Co-Morbidity Symptoms are Monitored and Maintained or Improved: Monitor and assess patient's chronic conditions and comorbid symptoms for stability, deterioration, or improvement  Taken 12/9/2024 0000  Care Plan - Patient's Chronic Conditions and Co-Morbidity Symptoms are Monitored and Maintained or Improved: Monitor and assess patient's chronic conditions and comorbid symptoms for stability, deterioration, or improvement  Taken 12/8/2024 2230  Care Plan - Patient's Chronic Conditions and Co-Morbidity Symptoms are Monitored and Maintained or Improved: Monitor and assess patient's chronic conditions and comorbid symptoms for stability, deterioration, or improvement     Problem: Discharge Planning  Goal: Discharge to home or other facility with appropriate resources  12/9/2024 1322 by Genevieve Latif RN  Outcome: Progressing  Flowsheets (Taken 12/9/2024 0730)  Discharge to home or other facility with appropriate resources: Identify barriers to discharge with patient and caregiver  12/9/2024 0622 by Claudette Thomson RN  Outcome: Progressing  Flowsheets  Taken 12/9/2024 0400  Discharge to home or other facility with appropriate resources: Identify barriers to discharge with patient and caregiver  Taken 12/9/2024 0000  Discharge to home or other facility with appropriate resources: Identify barriers to discharge with 
  Problem: Chronic Conditions and Co-morbidities  Goal: Patient's chronic conditions and co-morbidity symptoms are monitored and maintained or improved  Outcome: Progressing  Flowsheets  Taken 12/9/2024 0400  Care Plan - Patient's Chronic Conditions and Co-Morbidity Symptoms are Monitored and Maintained or Improved: Monitor and assess patient's chronic conditions and comorbid symptoms for stability, deterioration, or improvement  Taken 12/9/2024 0000  Care Plan - Patient's Chronic Conditions and Co-Morbidity Symptoms are Monitored and Maintained or Improved: Monitor and assess patient's chronic conditions and comorbid symptoms for stability, deterioration, or improvement  Taken 12/8/2024 2230  Care Plan - Patient's Chronic Conditions and Co-Morbidity Symptoms are Monitored and Maintained or Improved: Monitor and assess patient's chronic conditions and comorbid symptoms for stability, deterioration, or improvement     Problem: Discharge Planning  Goal: Discharge to home or other facility with appropriate resources  Outcome: Progressing  Flowsheets  Taken 12/9/2024 0400  Discharge to home or other facility with appropriate resources: Identify barriers to discharge with patient and caregiver  Taken 12/9/2024 0000  Discharge to home or other facility with appropriate resources: Identify barriers to discharge with patient and caregiver  Taken 12/8/2024 2249  Discharge to home or other facility with appropriate resources: Identify barriers to discharge with patient and caregiver  Taken 12/8/2024 2230  Discharge to home or other facility with appropriate resources: Identify barriers to discharge with patient and caregiver     Problem: Pain  Goal: Verbalizes/displays adequate comfort level or baseline comfort level  Outcome: Progressing  Flowsheets  Taken 12/9/2024 0600  Verbalizes/displays adequate comfort level or baseline comfort level: Encourage patient to monitor pain and request assistance  Taken 12/9/2024 
BSE/SLE completed  
0800)  Skin Integrity Remains Intact: Monitor for areas of redness and/or skin breakdown  12/10/2024 0111 by Nikki Matthew RN  Outcome: Progressing     Problem: Musculoskeletal - Adult  Goal: Return mobility to safest level of function  12/10/2024 1015 by Genevieve Latif RN  Outcome: Progressing  Flowsheets (Taken 12/10/2024 0800)  Return Mobility to Safest Level of Function: Assess patient stability and activity tolerance for standing, transferring and ambulating with or without assistive devices  12/10/2024 0111 by Nikki Matthew RN  Outcome: Progressing     Problem: Gastrointestinal - Adult  Goal: Minimal or absence of nausea and vomiting  12/10/2024 1015 by Genevieve Latif RN  Outcome: Progressing  Flowsheets (Taken 12/10/2024 0800)  Minimal or absence of nausea and vomiting: Provide nonpharmacologic comfort measures as appropriate  12/10/2024 0111 by Nikki Matthew RN  Outcome: Progressing  Goal: Maintains or returns to baseline bowel function  12/10/2024 1015 by Genevieve Latif RN  Outcome: Progressing  Flowsheets (Taken 12/10/2024 0800)  Maintains or returns to baseline bowel function: Assess bowel function  12/10/2024 0111 by Nikki Matthew RN  Outcome: Progressing  Goal: Maintains adequate nutritional intake  12/10/2024 1015 by Genevieve Latif RN  Outcome: Progressing  Flowsheets (Taken 12/10/2024 0800)  Maintains adequate nutritional intake: Monitor intake and output, weight and lab values  12/10/2024 0111 by Nikki Matthew RN  Outcome: Progressing     Problem: Genitourinary - Adult  Goal: Absence of urinary retention  12/10/2024 1015 by Genevieve Latif RN  Outcome: Progressing  Flowsheets (Taken 12/10/2024 0800)  Absence of urinary retention: Assess patient’s ability to void and empty bladder  12/10/2024 0111 by Nikki Matthew RN  Outcome: Progressing     Problem: Infection - Adult  Goal: Absence of infection at discharge  12/10/2024 1015 by Genevieve Latif RN  Outcome: 
of nausea and vomiting  12/10/2024 0111 by Nikki Matthew RN  Outcome: Progressing  12/9/2024 1322 by Genevieve Latif RN  Outcome: Progressing  Goal: Maintains or returns to baseline bowel function  12/10/2024 0111 by Nikki Matthew RN  Outcome: Progressing  12/9/2024 1322 by Genevieve Latif RN  Outcome: Progressing  Goal: Maintains adequate nutritional intake  12/10/2024 0111 by Nikki Matthew RN  Outcome: Progressing  12/9/2024 1322 by Genevieve Latif RN  Outcome: Progressing     Problem: Genitourinary - Adult  Goal: Absence of urinary retention  12/10/2024 0111 by Nikki Matthew RN  Outcome: Progressing  12/9/2024 1322 by Genevieve Latif RN  Outcome: Progressing     Problem: Infection - Adult  Goal: Absence of infection at discharge  12/10/2024 0111 by Nikki Matthew RN  Outcome: Progressing  12/9/2024 1322 by Genevieve Latif RN  Outcome: Progressing     Problem: Metabolic/Fluid and Electrolytes - Adult  Goal: Electrolytes maintained within normal limits  12/10/2024 0111 by Nikki Matthew RN  Outcome: Progressing  12/9/2024 1322 by Genevieve Latif RN  Outcome: Progressing  Goal: Hemodynamic stability and optimal renal function maintained  12/10/2024 0111 by Nikki Matthew RN  Outcome: Progressing  12/9/2024 1322 by Genevieve Latif RN  Outcome: Progressing  Goal: Glucose maintained within prescribed range  12/10/2024 0111 by Nikki Matthew RN  Outcome: Progressing  12/9/2024 1322 by Genevieve Latif RN  Outcome: Progressing     Problem: Hematologic - Adult  Goal: Maintains hematologic stability  12/10/2024 0111 by Nikki Matthew RN  Outcome: Progressing  12/9/2024 1322 by Genevieve Latif RN  Outcome: Progressing

## 2024-12-10 NOTE — FLOWSHEET NOTE
Ischemic Stroke with Activase    Activase Date and Time of Administration: 12/08/2024 at 1922    VTE Prophylaxis: Yes: Lovenox    Antiplatelet: Yes: Aspirin and Plavix    Antiplatelet Started 24 Hours After Activase Completion: Yes    Statin if LDL Greater Than or Equal to100: Yes: Lipitor    BP Parameters: Less Than 180/105    Controlled With: Scheduled PO and PRN - IV    Dysphagia Screen Completed: Yes: Pass    Patient has PEG, NG Tube, Feeding Tube: No    Medication orders per above route: Yes    Nutrition Status: PO    NIH Stroke Scale Complete: Yes:      Frequency of Vital Signs: Vital signs (minus temperature) every 15 minutes for 2 hours after start of thrombolytic agent, then every 30 mins for 6 hours, then every 1 hour for 16 hours, then per unit routine.  Temperature measurement every 4 hours for 24 hours and then per unit routine.     Frequency of Neuro Checks: NIHSS and/or Neuro checks per hospital policy: Every 15 minutes for 2 hours after start of thrombolytic agent, then every 30 mins for 6 hours, then every 1 hour for 16 hours, then every 4 hours for 24 hours, then once per shift.     Daily Education/Care Plan Updated: Yes    Suni Kauffman RN

## 2024-12-10 NOTE — DISCHARGE INSTRUCTIONS
Follow up with primary care physician.  Take medications as prescribed.   Monitor for any signs or symptoms of stroke.

## 2024-12-10 NOTE — DISCHARGE SUMMARY
[facility] Medicine Discharge Summary    Lucy Tariq  :  1970  MRN:  [id number]    Admit date:  2024  Discharge date:  12/10/2024    Admitting Physician:  Guadalupe Walls DO  Primary Care Physician:  Amna Crystal MD    Discharge Diagnoses:    Principal Problem:    Stroke-like symptoms  Active Problems:    Cerebrovascular accident (CVA) (HCC)  Resolved Problems:    * No resolved hospital problems. *    Chief Complaint   Patient presents with    code BAT     Patient states left side head pain that shoots don left arm with left sided blurry vision and left sided hand tingling. States LKW 20 minis ago.        Condition: improved   Activity: no restrictions   Diet: regular  Disposition: home  Functional Status: ambulatory    Significant Findings:     CTA:  1.  CTA of the neck shows normal appearance of the cervical carotid and   vertebral arteries.   2.  CTA of the head shows no stenosis or occlusion of the proximal   intracranial arteries.     A1c 6.5  HDL 36 (L), LDL 65      Hospital Course:   54-year-old female with class III obesity, hypertension, diabetes was hospitalized after presenting with strokelike symptoms: Blurred vision, left arm paresthesia.  She received TNK in the ED after which symptoms resolved.  She remained asymptomatic for the remainder of the hospitalization and was cleared by neurology for discharge home on 12/10.  She will continue on baby aspirin daily.    She did qualify for nocturnal oxygen and is recommended to have outpatient polysomnogram for suspected sleep apnea.      Exam on Discharge:   BP (!) 115/50   Pulse 92   Temp 97.8 °F (36.6 °C) (Oral)   Resp 21   Ht 1.6 m (5' 2.99\")   Wt 133.8 kg (294 lb 15.6 oz)   LMP 2021   SpO2 94%   BMI 52.27 kg/m²   General appearance: alert, cooperative and no distress.  Obese  Mental Status: oriented to person, place and time and normal affect  Lungs: clear to auscultation bilaterally, normal

## 2024-12-10 NOTE — CARE COORDINATION
I spoke to Racquel from resp therapy and pt only dripped to 92 during testing and did not qualify for home O2.   
ICU team rounds done at bedside. I met with patient after rounds and she feels at her baseline and stated she does not need or want HHC or any post dc services. Pt plans to return home with spouse and have outpatient sleep study testing done.   
Pt to have resp test for home O2 and I contacted Elisa from Medical services and sent her prior home sleep study as well as sat results from last night while sleeping. Pt needs actual overnight oximetry done or home O2 eval to qualify.   
others, and if so, who? Yes (Spouse Luis)  Plans to Return to Present Housing: Yes  Other Identified Issues/Barriers to RETURNING to current housing: Denies barriers   Potential Assistance needed at discharge: N/A            Potential DME:    Patient expects to discharge to: Apartment  Plan for transportation at discharge:      Financial    Payor: [employer] PL / Plan: [employer] PLAN [place] / Product Type: *No Product type* /     Does insurance require precert for SNF: Yes    Potential assistance Purchasing Medications: No  Meds-to-Beds request: Yes      [id number] - [place], [place] - [address] - P [phone] - F [phone]  [address]  [place] [address]  Phone: [phone] Fax: [phone]      Notes:    Factors facilitating achievement of predicted outcomes: Family support, Cooperative, and Pleasant    Barriers to discharge: Medical complications    Additional Case Management Notes: ICU team quality rounds done this am earlier. I met with her after rounds to discuss her baseline status and dc plan. Pt lives home with spouse and no services. She has a cane and shower chair. She does not drive but spouse does. She states she saw her PCP last month and she has no resp equip. Pt states she had sleep study prior and did not have sleep apnea when Dr. Talbert asked her. I did bring up SNF or HHC with patient and she does not feel she will need either. She plans to return home with spouse. She does have therapy evals ordered and we will check how she does and their recommendations to see if she has dc needs. I tried to get her HHC preference and she stated she did not think she will need that.     The Plan for Transition of Care is related to the following treatment goals of Stroke-like symptoms [R29.90]  Cerebrovascular accident (CVA), unspecified mechanism (HCC) [I63.9]    IF APPLICABLE: The Patient and/or patient representative Lucy and her family

## 2024-12-10 NOTE — PROGRESS NOTES
[facility] Occupational Therapy      Date: 2024  Patient Name: Lucy Tariq        MRN: [id number]  Account: [id number]   : 1970  (54 y.o.)  Room: [place] [address]    Chart reviewed, attempted OT at 1245 for evaluation. Patient not seen 2° to:    Other: Hold patient given TNK 1900 on 2024.     Will attempt again when able.    Electronically signed by NABIL Benito/L on 2024 at 12:45 PM    
   12/10/24 1403   Resting (Room Air)   SpO2 97   HR 79   During Walk (Room Air)   SpO2 92      After Walk   Does the Patient Qualify for Home O2 No   Does the Patient Need Portable Oxygen Tanks No       
  Critical Care Progress Note    12/10/2024 8:59 AM    Subjective:   Admit Date: 2024  PCP: Amna Crystal MD    Chief Complaint   Patient presents with    code BAT     Patient states left side head pain that shoots don left arm with left sided blurry vision and left sided hand tingling. States LKW 20 minis ago.      Interval History: Doing well.  No major issues overnight.  Blood pressure is well-controlled.  Had nocturnal desaturations.  Has been placed on 2 L of oxygen.  Loud snoring at night and witnessed apneas.      14 points review of systems has been obtained and negative except to was mentioned in HPI.     Medications:   Scheduled Meds:   insulin glargine  0.25 Units/kg SubCUTAneous Nightly    insulin lispro  8 Units SubCUTAneous TID WC    buPROPion  300 mg Oral QAM    cetirizine  10 mg Oral Daily    levothyroxine  125 mcg Oral Daily    losartan  25 mg Oral Daily    magnesium oxide  400 mg Oral Daily    montelukast  10 mg Oral Nightly    spironolactone  50 mg Oral Daily    pregabalin  200 mg Oral TID    sodium chloride flush  5-40 mL IntraVENous 2 times per day    insulin lispro  0-8 Units SubCUTAneous 4x Daily AC & HS    sodium chloride flush  5-40 mL IntraVENous 2 times per day    enoxaparin  30 mg SubCUTAneous BID    aspirin  81 mg Oral Daily    Or    aspirin  300 mg Rectal Daily    atorvastatin  40 mg Oral Nightly     Continuous Infusions:   sodium chloride      dextrose      sodium chloride           Objective:   Vitals:   Temp (24hrs), Av.9 °F (36.6 °C), Min:97.8 °F (36.6 °C), Max:98.1 °F (36.7 °C)    BP (!) 147/86   Pulse 64   Temp 97.9 °F (36.6 °C) (Oral)   Resp 16   Ht 1.6 m (5' 2.99\")   Wt 133.8 kg (294 lb 15.6 oz)   LMP 2021   SpO2 93%   BMI 52.27 kg/m²   I/O:24HR INTAKE/OUTPUT:    Intake/Output Summary (Last 24 hours) at 12/10/2024 0859  Last data filed at 2024 2300  Gross per 24 hour   Intake --   Output 450 ml   Net -450 ml       Physical Exam:  General appearance - 
19:00-07:30 Shift Summary   Assessments completed (see flowsheets). Pt A&Ox, able to make needs and wants known, pleasant and cooperative with staff. Pt took PO pills whole with thin liquids without difficulty. Neuro checks completed and remain unchanged this shift. Pt ambulated to bedside commode with standby assist without difficulty. Labs drawn by . No s/s of distress noted throughout shift. Bedside handoff report given to oncoming RN. Safety measures in place.  
Attempted MRI questions for ordered MRI.  Patient is adamant that she will not do the MRI because she is too claustrophobic.  Patient also refused Mepilex application to sacrum and bilateral heels.  Patient voices that she is very uncomfortable in her bed and wanted to stand at bedside.  Patient was offered to sit up at bedside in chair.  Patient agreeable and transferred to chair per self with this nurse as SBA.  Patient related that she feels much better in chair.  Symptoms remain resolved post TNK.  VSS, NIHSS remains at 0. Patient denies any further headache and has had no c/o dizziness throughout night.  
DVT / VTE PROPHYLAXIS EVALUATION    Recent Labs     12/08/24  1907 12/09/24  0510   BUN 13 14   CREATININE 1.67* 1.53*    251   HGB 16.3* 15.8   HCT 48.6* 45.8   INR 1.1  --      ADMITTING DX OR CHIEF COMPLAINT? Code BAT  WARFARIN? DOAC'S? no  ANY APPARENT BLEEDING? no  SCHEDULED SURGERY? no     If yes to following, excluded from auto adjustment in Table 1 of policy - please contact provider with recommendations as appropriate.  Include condition/exception in scratch notes. Yes No   Trauma Service or Ortho Surgery []  [x]    Pregnancy []  [x]        Current order:  Enoxaparin 40 mg SUBQ once daily      Height: 160 cm (5' 2.99\"), Weight - Scale: 133.8 kg (294 lb 15.6 oz)  Estimated Creatinine Clearance: 57 mL/min (A) (based on SCr of 1.53 mg/dL (H)).    Plan:  Pharmacologic VTE prophylaxis modified based on patient weight and renal function per [facility]/P&T approved protocol     Patient Weight (kg)      50.9 and below .9 101-150.9 151-174.9 175 or greater   Estimated   CrCl  (ml/min) 30 or greater []   30 mg   SUBQ daily   []   40 mg   SUBQ daily (or 30 mg BID for orthopedic cases) [x]  30 mg SUBQ   BID*  []  40 mg   SUBQ   BID []  60mg SUBQ BID    15-29.9 []  UFH 5000   units SUBQ BID []  30 mg   SUBQ daily [] 30 mg SUBQ   daily []  40 mg SUBQ   daily [] 60 mg SUBQ   Daily*    Less than 15 or dialysis []  UFH 5000   units SUBQ BID [] UFH 5000 units SUBQ TID []  UFH 7500   units   SUBQ TID*   *Do not exceed enoxaparin 40mg daily or UFH 5000 units SUBQ TID in patients with epidurals,   lumbar drains, or external ventricular drains      
[facility]   Facility/Department: [facility] ICU  Speech Language Pathology  Initial Speech/Language/Cognitive Assessment    NAME:Lucy Tariq  : 1970 (54 y.o.)   [x]   confirmed    MRN: [id number]  ROOM: [place] [address]  ADMISSION DATE: 2024  PATIENT DIAGNOSIS(ES): Stroke-like symptoms [R29.90]  Cerebrovascular accident (CVA), unspecified mechanism (HCC) [I63.9]  Chief Complaint   Patient presents with    code BAT     Patient states left side head pain that shoots don left arm with left sided blurry vision and left sided hand tingling. States LKW 20 minis ago.      Patient Active Problem List    Diagnosis Date Noted    Spondylosis of lumbar region without myelopathy or radiculopathy--OARRS PM&R 2015    Cervical spondylosis without myelopathy 2015    Stroke-like symptoms 2024    Postoperative pain 2024    Xiphoid pain 2024    Severe episode of recurrent major depressive disorder, without psychotic features ([facility]) 2021    HINES (dyspnea on exertion) 2020    Chronic pain of right knee 2020    Arthritis of right knee 2020    Cannabinoid hyperemesis syndrome 2020    Tobacco user 2019    Deficient knowledge 2019    Intercostal neuralgia 2019    Chest pain, atypical 2019    Acquired absence of kidney 03/15/2019    Family history of ischemic heart disease and other diseases of the circulatory system 03/15/2019    Long term (current) use of insulin (HCC) 03/15/2019    Other long term (current) drug therapy 03/15/2019    Primary fibromyalgia syndrome 03/15/2019    Other malaise 2019    Nontoxic goiter, unspecified 2019    Shortness of breath 2019    Other specified symptoms and signs involving the circulatory and respiratory systems 2019    Sarcoidosis 2019    Chronic obstructive pulmonary disease, unspecified (HCC) 2018    Disorder of kidney and ureter, unspecified 2018    
NIHSS and V/S in progress as per policy and procedure for post TNK administration.  See flowsheets. NIHSS score is 0 at this time.  All earlier symptoms are resolved.  Note patients bilateral hand grasps are equal and strong despite patient c/o slight weakness related to bilateral Carpel Tunnel Syndrome diagnosis.    
Neurology Follow up    SUBJECTIVE: Patient remains asymptomatic and no headache reported.  Repeat CT did not show anything of concern and aspirin start  Current Facility-Administered Medications   Medication Dose Route Frequency Provider Last Rate Last Admin    insulin glargine (LANTUS) injection vial 33 Units  0.25 Units/kg SubCUTAneous Nightly Cody Ahn R, DO        insulin lispro (HUMALOG,ADMELOG) injection vial 8 Units  8 Units SubCUTAneous TID  Cody Ahn DO   8 Units at 12/10/24 0825    acetaminophen (TYLENOL) tablet 1,000 mg  1,000 mg Oral Q8H PRN Guadalupe Walls DO   1,000 mg at 12/09/24 0123    meclizine (ANTIVERT) tablet 25 mg  25 mg Oral TID PRN Cody Ahn DO        buPROPion (WELLBUTRIN XL) extended release tablet 300 mg  300 mg Oral QAM Cody Ahn, DO   300 mg at 12/10/24 0809    butalbital-acetaminophen-caffeine (FIORICET, ESGIC) per tablet 1 tablet  1 tablet Oral Q6H PRN Cody Ahn, DO        cetirizine (ZYRTEC) tablet 10 mg  10 mg Oral Daily AnabelleCody richardson, DO   10 mg at 12/10/24 0822    fluticasone (FLONASE) 50 MCG/ACT nasal spray 1 spray  1 spray Nasal QHS PRN Cody Ahn R, DO        levothyroxine (SYNTHROID) tablet 125 mcg  125 mcg Oral Daily Cody Ahn, DO   125 mcg at 12/10/24 0632    losartan (COZAAR) tablet 25 mg  25 mg Oral Daily Anabelle, Neal R, DO   25 mg at 12/10/24 0822    magnesium oxide (MAG-OX) tablet 400 mg  400 mg Oral Daily Cody Ahn, DO   400 mg at 12/10/24 0809    montelukast (SINGULAIR) tablet 10 mg  10 mg Oral Nightly Anabelle, Neal R, DO   10 mg at 12/09/24 2041    spironolactone (ALDACTONE) tablet 50 mg  50 mg Oral Daily Cody Ahn, DO   50 mg at 12/10/24 0822    pregabalin (LYRICA) capsule 200 mg  200 mg Oral TID Cody Ahn,    200 mg at 12/10/24 0809    albuterol sulfate HFA (PROVENTIL;VENTOLIN;PROAIR) 108 (90 Base) MCG/ACT inhaler 1 puff  1 puff Inhalation Q4H PRN Liv Perry, SUDEEP - CNP        sodium 
Patient c/o H/A and requesting Tylenol.  Tylenol given at this time for H/A.  Denies any vision changes or visual disturbance.    
Patient in ICU post-TNK administration last night. Pt. Started shift up in chair, later returned to bed per request. Up to bedside commode several times during shift. Pt. A+Ox4. Neuro status intact, NIHSS done this shift with a score of 0. Pt. Reports feeling \"90% better.\" Able to eat and drink without difficulty. Pt. c/o pain in back when returning to bed, but this is baseline per pt. Otherwise denies pain. Denies any numbness, tingling, headache, and \"heavy\" feeling she experienced prior to TNK. Only complaint is \"feeling tired.\"   Vitals stable throughout shift. SR on monitor. On room air. Afebrile.  Pt. Seen by SLP.  Echo done at bedside.   Pt. Refuses MRI. Dr. Kuhn at bedside and discussed with patient, he is OK for pt. To have CT scan as he does not want to sedate pt. For MRI. Pt. Aware that CT scan will not be able to show any small ischemic strokes like MRI would. Dr. Kuhn requests CT scan be done close to 24 hour post-TNK rather than early.    Pt. Educated on stroke risk factors and risk of future strokes. Pt. Educated on signs/symptoms of stroke. Pt. States her  recently had a stroke, as well as other family members. Pt. Educated on diet and meds likely to be prescribed on discharge. Pt. Educated on SLP and PT/OT consults post-stroke. Pt. Educated on stroke imaging and tests. Pt. Given stroke booklet. Pt. States she has been given this information recently due to her 's stroke.    Called CT x2 approx. 1800pm to request transport for scan. Approx. 1845 pt. Taken by wheelchair for CT head. Pt. Returned to ICU without issue.     Handoff report given at bedside. Pt. Denies needs.   
Patient received to ICU 15 from ED.  Patient oriented to room and call system.  Dual skin assessment completed.  Patient is alert and oriented.  Skin is warm, dry, and of normal color for ethnicity.  Respirations are even and non-labored.  Placed on Bedside telemetry monitor.  NSR.   is at bedside.    
Physician Progress Note    12/9/2024   2:49 PM    Name:  Lucy Tariq  MRN:    [id number]     IP Day: 1     Admit Date: 12/8/2024  6:35 PM  PCP: Amna Crystal MD    Code Status:  Full Code    Assessment and Plan:        1.  Acute CVA s/p TNK 12/8: Deficits resolved after TNK  -Start antiplatelet medication if CT shows no bleed  -High intensity statin  -Treat modifiable risk factors: Obesity, diabetes, hypertension    Type 2 diabetes  Hypertension  Class III obesity: Recommend outpatient polysomnogram     Diet: ADULT DIET; Regular; 4 carb choices (60 gm/meal)  Ppx: start chemical ppx if CT head negative for bleed  Full Code    Electronically signed by Cody Ahn DO on 12/9/2024 at 2:49 PM    Subjective:     Currently asymptomatic    Current Facility-Administered Medications   Medication Dose Route Frequency Provider Last Rate Last Admin    acetaminophen (TYLENOL) tablet 1,000 mg  1,000 mg Oral Q8H PRN Guadalupe Walls DO   1,000 mg at 12/09/24 0123    meclizine (ANTIVERT) tablet 25 mg  25 mg Oral TID PRN Cody Ahn DO        buPROPion (WELLBUTRIN XL) extended release tablet 300 mg  300 mg Oral QAM Cody Ahn DO   300 mg at 12/09/24 0906    butalbital-acetaminophen-caffeine (FIORICET, ESGIC) per tablet 1 tablet  1 tablet Oral Q6H PRN Cody Ahn DO        cetirizine (ZYRTEC) tablet 10 mg  10 mg Oral Daily Cody Ahn DO   10 mg at 12/09/24 0906    fluticasone (FLONASE) 50 MCG/ACT nasal spray 1 spray  1 spray Nasal QHS PRN Cody Ahn DO        levothyroxine (SYNTHROID) tablet 125 mcg  125 mcg Oral Daily Cody Ahn DO   125 mcg at 12/09/24 0912    [START ON 12/10/2024] losartan (COZAAR) tablet 25 mg  25 mg Oral Daily Cody Ahn DO        magnesium oxide (MAG-OX) tablet 400 mg  400 mg Oral Daily Cody Ahn DO   400 mg at 12/09/24 0906    montelukast (SINGULAIR) tablet 10 mg  10 mg Oral Nightly Cody Ahn,         [START ON 12/10/2024] spironolactone 
Pt. Stable throughout day, neuro status intact. NIHSS 0. Pt. States she is at baseline. Vitals stable throughout shift. PT/OT saw pt. And stated she is OK for discharge. Dr. Bam Casarez for pt. To discharge. Discharge order placed.   Pt. To have outpatient sleep study done (pt. Noted to briefly desat when asleep here). Pt. Denies history of sleep apnea.   AVS reviewed with patient, pt. Denies questions. S/S stroke reviewed with patient, pt. Verbalizes understanding. Risk factors reviewed with pt., pt. Verbalizes understanding. Pt. Eager to go home. Pt. went home around 1645 with , all belongings taken with her.   
Spiritual Health History and Assessment/Progress Note  [facility]    (P) Initial Encounter,  ,  ,      Name: Lucy Tariq MRN: [id number]    Age: 54 y.o.     Sex: female   Language: English   Catholic: None   Stroke-like symptoms     Date: 12/10/2024            Total Time Calculated: (P) 15 min              Spiritual Assessment began in [facility] ICU            Encounter Overview/Reason: (P) Initial Encounter  Service Provided For: (P) Patient    Patient was sitting in chair and on cell phone at time of spiritual health visit. Patient was waiting to be discharged, seemed occupied and unwilling to engage with . Nurse and Physician have concerns of patients of oxygen levels and breathing [Inquired if patient had sleep apnea?]      offered words of support, exploration of patients well being and encouragement to patient.     Ashwini, Belief, Meaning:   Patient Other: none   Family/Friends No family/friends present      Importance and Influence:  Patient unable to assess at this time  Family/Friends no family present    Community:  Patient feels well-supported. Support system includes: Spouse/Partner  Family/Friends No family/friends present    Assessment and Plan of Care:     Patient Interventions include: Facilitated expression of thoughts and feelings  Family/Friends Interventions include: No family/friends present    Patient Plan of Care: No spiritual needs identified for follow-up  Family/Friends Plan of Care: No spiritual needs identified for follow-up    Electronically signed by Chaplain Dickson on 12/10/2024 at 3:48 PM   
Tylenol was effective for H/A.  Denies pain at this time.  NIHSS continues to score at 0.  VS WNL, see flow sheets.  
Cup/sip;Straw;Self-fed/presented  How much presented:  (4 oz)  Bolus Acceptance: No impairment  Bolus Formation/Control: No impairment  Propulsion: No impairment  Oral Residue: None  Initiation of Swallow: No impairment  Laryngeal Elevation:  (present)  Aspiration Signs/Symptoms: None  Pharyngeal Phase Characteristics: No impairment, issues, or problems  Effective Modifications: None  Additional PO Trials: 2      PO Trials 2  Consistency Presented: Pureed  How Presented: Self-fed/presented  How much presented: 2  Bolus Acceptance: No impairment  Bolus Formation/Control: No impairment  Propulsion: No impairment  Oral Residue: None  Initiation of Swallow: No impairment  Aspiration Signs/Symptoms: None  Pharyngeal Phase Characteristics: No impairment, issues, or problems  Effective Modifications: None      PO Trials 3  Consistency Presented: Soft & Bite Sized;Regular  How Presented: Self-fed/presented  How much presented:  (regular solids x1 whole cookie, soft solids x2)  Bolus Acceptance: No impairment  Bolus Formation/Control: No impairment  Propulsion: No impairment  Oral Residue: None  Initiation of Swallow: No impairment  Aspiration Signs/Symptoms: None  Pharyngeal Phase Characteristics: No impairment, issues, or problems  Effective Modifications: None                    Dysphagia Diagnosis  Dysphagia Diagnosis: Swallow function appears WFL  Oral Phase - Comment: Oral phase appears WFL  Pharyngeal Phase Comment: Pharyngeal phase appears WFL at this time via observation and palpation  Dysphagia Outcome Severity Scale: Level 7: Normal in all situations     Recommendations  Requires SLP Intervention: No  Duration of Treatment: No f/u  Frequency of Treatment: No f/u    Prognosis  Speech Therapy Prognosis  Prognosis: Good  Prognosis Considerations: Age;Previous Level of Function    Education  Patient Education: pt educated on results of testing  Patient Education Response: Verbalizes understanding  Individuals 
Presentation: low    Therapy Prognosis: Good    DISCHARGE RECOMMENDATIONS:  No Skilled PT: Independent with functional mobility     Assessment: Pt is 54 y.o. female to hospital due to stroke like symptoms.  Pt exhibits mobility at indep level.  No continued PT indicated.  Requires PT Follow-Up: No       PLAN OF CARE:  Physical Therapy Plan  Additional Comments: pt indep; no continued PT indicated    Safety Devices  Type of Devices: All fall risk precautions in place, Call light within reach, Chair alarm in place, Left in chair    Goals:  Long Term Goals  Long Term Goal 1: none indicated    AMPA (6 CLICK) BASIC MOBILITY  AM-PAC Inpatient Mobility Raw Score : 24     Therapy Time:   Individual   Time In 0851   Time Out 0902   Minutes 11       Eval X 11 min    Leigh Madison PT, 12/10/24 at 9:21 AM         Definitions for assistance levels  Independent = pt does not require any physical supervision or assistance from another person for activity completion. Device may be needed.  Stand by assistance = pt requires verbal cues or instructions from another person, close to but not touching, to perform the activity  Minimal assistance= pt performs 75% or more of the activity; assistance is required to complete the activity  Moderate assistance= pt performs 50% of the activity; assistance is required to complete the activity  Maximal assistance = pt performs 25% of the activity; assistance is required to complete the activity  Dependent = pt requires total physical assistance to accomplish the task  
complete  Toileting: Independent  Toilet Transfers  Toilet - Technique: Ambulating  Equipment Used: Grab bars  Toilet Transfer: Supervision    Functional Mobility:    Transfers  Sit to stand: Supervision  Stand to sit: Supervision    Patient ambulated household distance in hallway with No device at Independent level.     Bed Mobility  Bed mobility  Supine to Sit: Independent    Seated and Standing Balance:  Balance  Sitting: Intact  Standing: Intact    Functional Endurance:  Activity Tolerance  Activity Tolerance: Patient Tolerated treatment well    D/C Recommendations:  OT D/C RECOMMENDATIONS  REQUIRES OT FOLLOW-UP: No    Equipment Recommendations:       OT Education:        OT Follow Up:    OT D/C RECOMMENDATIONS  REQUIRES OT FOLLOW-UP: No       Assessment/Discharge Disposition:     Performance deficits / Impairments: Decreased ADL status  Prognosis: Good  Discharge Recommendations: Continue to assess pending progress  No Skilled OT: At baseline function, No OT goals identified  Decision Making: Low Complexity     History: multiple  Exam: 1 deficits  Assistance / Modification: Min A    AMPAC (Six Click) Self care Score   How much help is needed for putting on and taking off regular lower body clothing?: A Little  How much help is needed for bathing (which includes washing, rinsing, drying)?: None  How much help is needed for toileting (which includes using toilet, bedpan, or urinal)?: None  How much help is needed for putting on and taking off regular upper body clothing?: None  How much help is needed for taking care of personal grooming?: None  How much help for eating meals?: None  AM-[facility] Inpatient Daily Activity Raw Score: 23  AM-PAC Inpatient ADL T-Scale Score : 51.12  ADL Inpatient CMS 0-100% Score: 15.86    Therapy key for assistance levels -   Independent/Mod I = Pt. is able to perform task with no assistance but may require a device   Stand by assistance = Pt. does not perform task at an independent level

## 2024-12-11 ENCOUNTER — TELEPHONE (OUTPATIENT)
Dept: PRIMARY CARE CLINIC | Age: 54
End: 2024-12-11

## 2024-12-11 LAB
APTT SCREEN TO CONFIRM RATIO: 0.95 {RATIO}
AT III ACT/NOR PPP CHRO: 113 % (ref 76–128)
CARDIOLIPIN IGG SER IA-ACNC: <10 GPL
CARDIOLIPIN IGM SER IA-ACNC: <10 MPL
CONFIRM DRVVT STA-STACLOT: NORMAL S
DRVVT SCREEN TO CONFIRM RATIO: 0.99 {RATIO}
DRVVT SCREEN TO CONFIRM RATIO: NORMAL {RATIO}
DRVVT/DRVVT CFM P DOAC NEUT NORM PPP-RTO: NORMAL {RATIO}
HEPARIN NT PPP QL: NORMAL
LA 3 SCREEN W REFLEX-IMP: NORMAL
LMW HEPARIN IND PLT AB SER QL: NORMAL
MIXING DRVVT: NORMAL S
PROTHROMBIN TIME: 13.5 S (ref 12–15.5)
SCREEN APTT: NORMAL S
THROMBIN TIME: NORMAL S

## 2024-12-11 NOTE — TELEPHONE ENCOUNTER
Care Transitions Initial Follow Up Call    Outreach made within 2 business days of discharge: Yes    Patient: Lucy Tariq Patient : 1970   MRN: [id number]  Reason for Admission: Stroke-like symptoms   Discharge Date: 12/10/24       Spoke with: PT    Discharge department/facility: [place]    TCM Interactive Patient Contact:  Was patient able to fill all prescriptions: Yes  Was patient instructed to bring all medications to the follow-up visit: Yes  Is patient taking all medications as directed in the discharge summary? Yes  Does patient understand their discharge instructions: Yes  Does patient have questions or concerns that need addressed prior to 7-14 day follow up office visit: no    Additional needs identified to be addressed with provider  No needs identified             Scheduled appointment with PCP within 7-14 days    Follow Up  Future Appointments   Date Time Provider Department Center   2024  4:30 PM Amna Crystal MD [place] [facility] DEP   2025  3:15 PM Gray Garvin MD [place] [facility]i [facility]   2025  3:00 PM Amna Crystal MD [place] [facility]   2025  3:15 PM Henrry Urbina DPM [facility] OP POD [facility]   2025  4:30 PM Christopher Khan MD [place] [facility]   3/13/2025  2:00 PM Johana Burk APRN - CNP [place] [facility] Neurology -       Fidelia Herrera MA

## 2024-12-12 ENCOUNTER — APPOINTMENT (OUTPATIENT)
Facility: CLINIC | Age: 54
End: 2024-12-12
Payer: MEDICAID

## 2024-12-12 LAB
MTHFR C.1298A>C GENO BLD/T: NEGATIVE
MTHFR C.677C>T GENO BLD/T: NEGATIVE
MTHFR GENE MUT ANL BLD/T: NORMAL
SPECIMEN SOURCE: NORMAL

## 2024-12-13 ENCOUNTER — OFFICE VISIT (OUTPATIENT)
Dept: PRIMARY CARE CLINIC | Age: 54
End: 2024-12-13

## 2024-12-13 VITALS
WEIGHT: 293 LBS | RESPIRATION RATE: 18 BRPM | HEART RATE: 100 BPM | OXYGEN SATURATION: 96 % | TEMPERATURE: 98.6 F | BODY MASS INDEX: 52.62 KG/M2 | SYSTOLIC BLOOD PRESSURE: 120 MMHG | DIASTOLIC BLOOD PRESSURE: 64 MMHG

## 2024-12-13 DIAGNOSIS — I63.9 CEREBROVASCULAR ACCIDENT (CVA), UNSPECIFIED MECHANISM (HCC): ICD-10-CM

## 2024-12-13 DIAGNOSIS — E11.9 TYPE 2 DIABETES MELLITUS TREATED WITH INSULIN (HCC): ICD-10-CM

## 2024-12-13 DIAGNOSIS — Z79.4 TYPE 2 DIABETES MELLITUS TREATED WITH INSULIN (HCC): ICD-10-CM

## 2024-12-13 DIAGNOSIS — E66.01 MORBID OBESITY: ICD-10-CM

## 2024-12-13 DIAGNOSIS — F41.0 PANIC DISORDER: ICD-10-CM

## 2024-12-13 DIAGNOSIS — Z09 HOSPITAL DISCHARGE FOLLOW-UP: Primary | ICD-10-CM

## 2024-12-13 RX ORDER — SEMAGLUTIDE 1.34 MG/ML
INJECTION, SOLUTION SUBCUTANEOUS
Qty: 4 ADJUSTABLE DOSE PRE-FILLED PEN SYRINGE | Refills: 5 | Status: SHIPPED | OUTPATIENT
Start: 2024-12-13

## 2024-12-13 RX ORDER — SEMAGLUTIDE 1.34 MG/ML
INJECTION, SOLUTION SUBCUTANEOUS
Qty: 4 ADJUSTABLE DOSE PRE-FILLED PEN SYRINGE | Refills: 0 | Status: SHIPPED | OUTPATIENT
Start: 2024-12-13

## 2024-12-13 RX ORDER — SEMAGLUTIDE 0.68 MG/ML
INJECTION, SOLUTION SUBCUTANEOUS
Qty: 3 ML | Refills: 0 | Status: SHIPPED | OUTPATIENT
Start: 2024-12-13

## 2024-12-13 RX ORDER — PROPRANOLOL HYDROCHLORIDE 60 MG/1
60 CAPSULE, EXTENDED RELEASE ORAL DAILY
Qty: 30 CAPSULE | Refills: 3 | Status: SHIPPED | OUTPATIENT
Start: 2024-12-13

## 2024-12-13 ASSESSMENT — PATIENT HEALTH QUESTIONNAIRE - PHQ9
SUM OF ALL RESPONSES TO PHQ QUESTIONS 1-9: 0
SUM OF ALL RESPONSES TO PHQ9 QUESTIONS 1 & 2: 0
7. TROUBLE CONCENTRATING ON THINGS, SUCH AS READING THE NEWSPAPER OR WATCHING TELEVISION: NOT AT ALL
10. IF YOU CHECKED OFF ANY PROBLEMS, HOW DIFFICULT HAVE THESE PROBLEMS MADE IT FOR YOU TO DO YOUR WORK, TAKE CARE OF THINGS AT HOME, OR GET ALONG WITH OTHER PEOPLE: NOT DIFFICULT AT ALL
2. FEELING DOWN, DEPRESSED OR HOPELESS: NOT AT ALL
6. FEELING BAD ABOUT YOURSELF - OR THAT YOU ARE A FAILURE OR HAVE LET YOURSELF OR YOUR FAMILY DOWN: NOT AT ALL
8. MOVING OR SPEAKING SO SLOWLY THAT OTHER PEOPLE COULD HAVE NOTICED. OR THE OPPOSITE, BEING SO FIGETY OR RESTLESS THAT YOU HAVE BEEN MOVING AROUND A LOT MORE THAN USUAL: NOT AT ALL
1. LITTLE INTEREST OR PLEASURE IN DOING THINGS: NOT AT ALL
SUM OF ALL RESPONSES TO PHQ QUESTIONS 1-9: 0
5. POOR APPETITE OR OVEREATING: NOT AT ALL
9. THOUGHTS THAT YOU WOULD BE BETTER OFF DEAD, OR OF HURTING YOURSELF: NOT AT ALL
SUM OF ALL RESPONSES TO PHQ QUESTIONS 1-9: 0
4. FEELING TIRED OR HAVING LITTLE ENERGY: NOT AT ALL
SUM OF ALL RESPONSES TO PHQ QUESTIONS 1-9: 0
3. TROUBLE FALLING OR STAYING ASLEEP: NOT AT ALL

## 2024-12-13 NOTE — PROGRESS NOTES
Lancets (ONETOUCH DELICA PLUS [id number]) MISC Test 3x daily 100 each 3    Blood Glucose Monitoring Suppl (ONETOUCH VERIO) w/Device KIT As  Directed 1 kit 00    levothyroxine (SYNTHROID) 125 MCG tablet Take 1 tablet by mouth Daily 90 tablet 3    meclizine (ANTIVERT) 25 MG tablet Take 1 tablet by mouth 3 times daily as needed      Insulin Pen Needle (B-D ULTRAFINE III SHORT PEN) 31G X 8 MM MISC Use qid 300 each 5    magnesium oxide (MAG-OX) 400 (240 Mg) MG tablet Take 1 tablet by mouth daily 90 tablet 3    cetirizine (ZYRTEC) 10 MG tablet Take 1 tablet by mouth daily 90 tablet 3    Handicap Placard MISC by Does not apply route For 5 yrs 1 each 0    insulin lispro, 1 Unit Dial, (HUMALOG/ADMELOG) 100 UNIT/ML SOPN INJECT 30-35 UNITS SUBCUTANEOUSLY EVERY MEAL; HOLD IF BLOOD GLUCOSE IS LESS THAN 150 102 mL 3    albuterol sulfate HFA (PROVENTIL;VENTOLIN;PROAIR) 108 (90 Base) MCG/ACT inhaler INHALE 2 PUFFS INTO THE LUNGS EVERY 6 HOURS AS NEEDED FOR WHEEZING 20.1 g 4    blood glucose test strips (ONETOUCH VERIO) strip Test 3x daily e11.65 100 each 3    fluticasone (FLONASE) 50 MCG/ACT nasal spray INSTILL 1 SPRAY IN EACH NOSTRIL ONCE DAILY      montelukast (SINGULAIR) 10 MG tablet Take 1 tablet by mouth nightly 30 tablet 0    Continuous Blood Gluc Sensor (FREESTYLE JESSICA 2 SENSOR) MISC USE EVERY 2 WEEKS 2 each 3    Continuous Blood Gluc  (FREESTYLE JESSICA 2 READER) LENNY As directed 1 each 0    spironolactone (ALDACTONE) 50 MG tablet Take 1 tablet by mouth daily 30 tablet 0    butalbital-acetaminophen-caffeine (FIORICET, ESGIC) -40 MG per tablet Take 1 tablet by mouth every 6 hours as needed for Headaches 30 tablet 1    acetaminophen (TYLENOL) 500 MG tablet Take 1 tablet by mouth 4 times daily as needed for Pain 360 tablet 1    albuterol (PROVENTIL) (2.5 MG/3ML) 0.083% nebulizer solution Take 3 mLs by nebulization every 4 hours as needed for Wheezing 120 each 3        Medications patient taking as of now

## 2024-12-17 ENCOUNTER — TELEPHONE (OUTPATIENT)
Dept: NEUROSURGERY | Facility: CLINIC | Age: 54
End: 2024-12-17
Payer: MEDICAID

## 2024-12-25 ENCOUNTER — HOSPITAL ENCOUNTER (EMERGENCY)
Age: 54
Discharge: HOME | End: 2024-12-25
Payer: MEDICAID

## 2024-12-25 ENCOUNTER — APPOINTMENT (OUTPATIENT)
Dept: GENERAL RADIOLOGY | Age: 54
End: 2024-12-25
Payer: MEDICAID

## 2024-12-25 VITALS
SYSTOLIC BLOOD PRESSURE: 100 MMHG | BODY MASS INDEX: 51.91 KG/M2 | HEART RATE: 61 BPM | DIASTOLIC BLOOD PRESSURE: 48 MMHG | HEIGHT: 63 IN | OXYGEN SATURATION: 98 % | TEMPERATURE: 98.2 F | WEIGHT: 293 LBS | RESPIRATION RATE: 16 BRPM

## 2024-12-25 DIAGNOSIS — R07.9 CHEST PAIN, UNSPECIFIED TYPE: Primary | ICD-10-CM

## 2024-12-25 DIAGNOSIS — Z86.2 HX OF SARCOIDOSIS: ICD-10-CM

## 2024-12-25 LAB
ALBUMIN SERPL-MCNC: 3.7 G/DL (ref 3.5–4.6)
ALP SERPL-CCNC: 161 U/L (ref 40–130)
ALT SERPL-CCNC: 16 U/L (ref 0–33)
ANION GAP SERPL CALCULATED.3IONS-SCNC: 11 MEQ/L (ref 9–15)
APTT PPP: 26.5 SEC (ref 24.4–36.8)
AST SERPL-CCNC: 18 U/L (ref 0–35)
BASOPHILS # BLD: 0 K/UL (ref 0–0.2)
BASOPHILS NFR BLD: 0.5 %
BILIRUB SERPL-MCNC: 0.3 MG/DL (ref 0.2–0.7)
BUN SERPL-MCNC: 15 MG/DL (ref 6–20)
CALCIUM SERPL-MCNC: 8.9 MG/DL (ref 8.5–9.9)
CHLORIDE SERPL-SCNC: 105 MEQ/L (ref 95–107)
CO2 SERPL-SCNC: 21 MEQ/L (ref 20–31)
CREAT SERPL-MCNC: 1.46 MG/DL (ref 0.5–0.9)
EOSINOPHIL # BLD: 0.2 K/UL (ref 0–0.7)
EOSINOPHIL NFR BLD: 3.1 %
ERYTHROCYTE [DISTWIDTH] IN BLOOD BY AUTOMATED COUNT: 13.1 % (ref 11.5–14.5)
GLOBULIN SER CALC-MCNC: 3.3 G/DL (ref 2.3–3.5)
GLUCOSE SERPL-MCNC: 103 MG/DL (ref 70–99)
HCT VFR BLD AUTO: 48.5 % (ref 37–47)
HGB BLD-MCNC: 16 G/DL (ref 12–16)
INR PPP: 1
LYMPHOCYTES # BLD: 1.9 K/UL (ref 1–4.8)
LYMPHOCYTES NFR BLD: 29.1 %
MAGNESIUM SERPL-MCNC: 2.1 MG/DL (ref 1.7–2.4)
MCH RBC QN AUTO: 31.7 PG (ref 27–31.3)
MCHC RBC AUTO-ENTMCNC: 33 % (ref 33–37)
MCV RBC AUTO: 96.2 FL (ref 79.4–94.8)
MONOCYTES # BLD: 0.5 K/UL (ref 0.2–0.8)
MONOCYTES NFR BLD: 8.2 %
NEUTROPHILS # BLD: 3.8 K/UL (ref 1.4–6.5)
NEUTS SEG NFR BLD: 58.9 %
PLATELET # BLD AUTO: 273 K/UL (ref 130–400)
POTASSIUM SERPL-SCNC: 4.2 MEQ/L (ref 3.4–4.9)
PROT SERPL-MCNC: 7 G/DL (ref 6.3–8)
PROTHROMBIN TIME: 13.6 SEC (ref 12.3–14.9)
RBC # BLD AUTO: 5.04 M/UL (ref 4.2–5.4)
SODIUM SERPL-SCNC: 137 MEQ/L (ref 135–144)
TROPONIN, HIGH SENSITIVITY: 16 NG/L (ref 0–19)
TROPONIN, HIGH SENSITIVITY: 17 NG/L (ref 0–19)
WBC # BLD AUTO: 6.5 K/UL (ref 4.8–10.8)

## 2024-12-25 PROCEDURE — 85610 PROTHROMBIN TIME: CPT

## 2024-12-25 PROCEDURE — 83735 ASSAY OF MAGNESIUM: CPT

## 2024-12-25 PROCEDURE — 84484 ASSAY OF TROPONIN QUANT: CPT

## 2024-12-25 PROCEDURE — 85730 THROMBOPLASTIN TIME PARTIAL: CPT

## 2024-12-25 PROCEDURE — 93005 ELECTROCARDIOGRAM TRACING: CPT | Performed by: EMERGENCY MEDICINE

## 2024-12-25 PROCEDURE — 85025 COMPLETE CBC W/AUTO DIFF WBC: CPT

## 2024-12-25 PROCEDURE — 6370000000 HC RX 637 (ALT 250 FOR IP): Performed by: PHYSICIAN ASSISTANT

## 2024-12-25 PROCEDURE — 99285 EMERGENCY DEPT VISIT HI MDM: CPT

## 2024-12-25 PROCEDURE — 71045 X-RAY EXAM CHEST 1 VIEW: CPT

## 2024-12-25 PROCEDURE — 36415 COLL VENOUS BLD VENIPUNCTURE: CPT

## 2024-12-25 PROCEDURE — 80053 COMPREHEN METABOLIC PANEL: CPT

## 2024-12-25 RX ORDER — ASPIRIN 81 MG/1
324 TABLET, CHEWABLE ORAL ONCE
Status: COMPLETED | OUTPATIENT
Start: 2024-12-25 | End: 2024-12-25

## 2024-12-25 RX ORDER — ONDANSETRON 2 MG/ML
4 INJECTION INTRAMUSCULAR; INTRAVENOUS ONCE
Status: DISCONTINUED | OUTPATIENT
Start: 2024-12-25 | End: 2024-12-25 | Stop reason: HOSPADM

## 2024-12-25 RX ORDER — TRAMADOL HYDROCHLORIDE 50 MG/1
50 TABLET ORAL ONCE
Status: COMPLETED | OUTPATIENT
Start: 2024-12-25 | End: 2024-12-25

## 2024-12-25 RX ORDER — MORPHINE SULFATE 4 MG/ML
4 INJECTION, SOLUTION INTRAMUSCULAR; INTRAVENOUS ONCE
Status: DISCONTINUED | OUTPATIENT
Start: 2024-12-25 | End: 2024-12-25

## 2024-12-25 RX ORDER — TRAMADOL HYDROCHLORIDE 50 MG/1
50 TABLET ORAL EVERY 8 HOURS PRN
Qty: 6 TABLET | Refills: 0 | Status: SHIPPED | OUTPATIENT
Start: 2024-12-25 | End: 2024-12-27

## 2024-12-25 RX ADMIN — ASPIRIN 324 MG: 81 TABLET, CHEWABLE ORAL at 15:21

## 2024-12-25 RX ADMIN — TRAMADOL HYDROCHLORIDE 50 MG: 50 TABLET, FILM COATED ORAL at 16:22

## 2024-12-25 ASSESSMENT — ENCOUNTER SYMPTOMS
COLOR CHANGE: 0
ABDOMINAL DISTENTION: 0
SORE THROAT: 0
EYE DISCHARGE: 0
SHORTNESS OF BREATH: 0
ABDOMINAL PAIN: 0
CONSTIPATION: 0
RHINORRHEA: 0

## 2024-12-25 ASSESSMENT — PAIN DESCRIPTION - LOCATION
LOCATION: CHEST
LOCATION: CHEST

## 2024-12-25 ASSESSMENT — PAIN DESCRIPTION - DESCRIPTORS
DESCRIPTORS: PRESSURE
DESCRIPTORS: ACHING

## 2024-12-25 ASSESSMENT — PAIN SCALES - GENERAL
PAINLEVEL_OUTOF10: 9
PAINLEVEL_OUTOF10: 9

## 2024-12-25 ASSESSMENT — PAIN DESCRIPTION - ORIENTATION: ORIENTATION: MID

## 2024-12-25 ASSESSMENT — PAIN - FUNCTIONAL ASSESSMENT: PAIN_FUNCTIONAL_ASSESSMENT: 0-10

## 2024-12-25 NOTE — ED PROVIDER NOTES
[facility] ED  EMERGENCY DEPARTMENT ENCOUNTER      Pt Name: Lucy Tariq  MRN: [id number]  Birthdate 1970  Date of evaluation: 12/25/2024  Provider: Jefferson Singer PA-C  3:01 PM EST    CHIEF COMPLAINT       Chief Complaint   Patient presents with    Chest Pain     Started 1 hour ago watching TV         HISTORY OF PRESENT ILLNESS   (Location/Symptom, Timing/Onset, Context/Setting, Quality, Duration, Modifying Factors, Severity)  Note limiting factors.   Lucy Tariq is a 54 y.o. female who presents to the emergency department with complaint of midsternal chest pain which patient states started approximately 1 hour ago, she denies any shortness of breath, no nausea vomiting, no diaphoresis, states her pain is heavy take pressure, deep inspiration or movement does not cause any worsening of pain, patient states sudden onset while she was at rest.  Rates her pain as a 9 out of 10 at this time.  States she has had similar pain in the past secondary to her sarcoidosis.  Past medical history per patient chart review, asthma, depression, sarcoidosis, hypertension, chronic kidney disease, CVA, COPD, gout, anxiety, sleep apnea, fibromyalgia.    HPI    Nursing Notes were reviewed.    REVIEW OF SYSTEMS    (2-9 systems for level 4, 10 or more for level 5)     Review of Systems   Constitutional:  Negative for activity change and appetite change.   HENT:  Negative for congestion, ear discharge, ear pain, nosebleeds, rhinorrhea and sore throat.    Eyes:  Negative for discharge.   Respiratory:  Negative for shortness of breath.    Cardiovascular:  Positive for chest pain. Negative for palpitations and leg swelling.   Gastrointestinal:  Negative for abdominal distention, abdominal pain and constipation.   Genitourinary:  Negative for difficulty urinating and dysuria.   Musculoskeletal:  Negative for arthralgias.   Skin:  Negative for color change.   Neurological:  Negative for dizziness, tremors, syncope,

## 2024-12-26 LAB
EKG ATRIAL RATE: 63 BPM
EKG P AXIS: 38 DEGREES
EKG P-R INTERVAL: 168 MS
EKG Q-T INTERVAL: 390 MS
EKG QRS DURATION: 88 MS
EKG QTC CALCULATION (BAZETT): 399 MS
EKG R AXIS: 5 DEGREES
EKG T AXIS: 4 DEGREES
EKG VENTRICULAR RATE: 63 BPM

## 2024-12-26 PROCEDURE — 93010 ELECTROCARDIOGRAM REPORT: CPT | Performed by: INTERNAL MEDICINE

## 2025-01-02 ENCOUNTER — OFFICE VISIT (OUTPATIENT)
Dept: PULMONOLOGY | Age: 55
End: 2025-01-02

## 2025-01-02 VITALS
DIASTOLIC BLOOD PRESSURE: 78 MMHG | HEIGHT: 63 IN | SYSTOLIC BLOOD PRESSURE: 132 MMHG | OXYGEN SATURATION: 96 % | TEMPERATURE: 97.1 F | HEART RATE: 61 BPM | WEIGHT: 292.6 LBS | RESPIRATION RATE: 18 BRPM | BODY MASS INDEX: 51.84 KG/M2

## 2025-01-02 DIAGNOSIS — G47.30 SLEEP-DISORDERED BREATHING: ICD-10-CM

## 2025-01-02 DIAGNOSIS — G47.33 OSA (OBSTRUCTIVE SLEEP APNEA): ICD-10-CM

## 2025-01-02 DIAGNOSIS — G47.30 SLEEP APNEA, UNSPECIFIED TYPE: ICD-10-CM

## 2025-01-02 DIAGNOSIS — G47.34 NOCTURNAL HYPOXIA: ICD-10-CM

## 2025-01-02 DIAGNOSIS — D75.A G6PD DEFICIENCY: ICD-10-CM

## 2025-01-02 DIAGNOSIS — J45.41 MODERATE PERSISTENT ASTHMA WITH ACUTE EXACERBATION: Primary | ICD-10-CM

## 2025-01-02 DIAGNOSIS — D86.9 SARCOIDOSIS: ICD-10-CM

## 2025-01-02 RX ORDER — FLUTICASONE FUROATE, UMECLIDINIUM BROMIDE AND VILANTEROL TRIFENATATE 100; 62.5; 25 UG/1; UG/1; UG/1
1 POWDER RESPIRATORY (INHALATION) DAILY
Qty: 1 EACH | Refills: 3 | Status: SHIPPED | OUTPATIENT
Start: 2025-01-02

## 2025-01-02 NOTE — PROGRESS NOTES
Max Daily Amount: 600 mg 270 capsule 3    Handicap Placard MISC by Does not apply route For 5 yrs (Patient not taking: Reported on 1/2/2025) 1 each 0    butalbital-acetaminophen-caffeine (FIORICET, ESGIC) -40 MG per tablet Take 1 tablet by mouth every 6 hours as needed for Headaches (Patient not taking: Reported on 1/2/2025) 30 tablet 1     No current facility-administered medications for this visit.       Objective:     Vitals:    01/02/25 1518   BP: 132/78   Site: Right Lower Arm   Position: Sitting   Cuff Size: Medium Adult   Pulse: 61   Resp: 18   Temp: 97.1 °F (36.2 °C)   TempSrc: Infrared   SpO2: 96%   Weight: 132.7 kg (292 lb 9.6 oz)   Height: 1.6 m (5' 3\")         Physical Exam  Constitutional:       General: She is not in acute distress.     Appearance: She is well-developed. She is not diaphoretic.   HENT:      Head: Normocephalic and atraumatic.   Eyes:      Conjunctiva/sclera: Conjunctivae normal.      Pupils: Pupils are equal, round, and reactive to light.   Cardiovascular:      Rate and Rhythm: Normal rate and regular rhythm.      Heart sounds: No murmur heard.     No friction rub. No gallop.   Pulmonary:      Effort: Pulmonary effort is normal. No respiratory distress.      Breath sounds: Normal breath sounds. No wheezing or rales.   Chest:      Chest wall: No tenderness.   Abdominal:      General: There is no distension.      Palpations: Abdomen is soft.      Tenderness: There is no abdominal tenderness. There is no rebound.   Musculoskeletal:         General: No tenderness.      Cervical back: Normal range of motion and neck supple.      Right lower leg: No edema.      Left lower leg: No edema.   Lymphadenopathy:      Cervical: No cervical adenopathy.   Skin:     General: Skin is warm and dry.      Findings: No erythema.   Neurological:      Mental Status: She is alert and oriented to person, place, and time.   Psychiatric:         Judgment: Judgment normal.       Imaging studies films

## 2025-01-03 DIAGNOSIS — E11.65 TYPE 2 DIABETES MELLITUS WITH HYPERGLYCEMIA, WITH LONG-TERM CURRENT USE OF INSULIN (HCC): ICD-10-CM

## 2025-01-03 DIAGNOSIS — Z79.4 TYPE 2 DIABETES MELLITUS WITH HYPERGLYCEMIA, WITH LONG-TERM CURRENT USE OF INSULIN (HCC): ICD-10-CM

## 2025-01-03 RX ORDER — INSULIN LISPRO 100 [IU]/ML
INJECTION, SOLUTION INTRAVENOUS; SUBCUTANEOUS
Qty: 102 ML | Refills: 3 | Status: SHIPPED | OUTPATIENT
Start: 2025-01-03

## 2025-01-15 DIAGNOSIS — G89.29 OTHER CHRONIC PAIN: ICD-10-CM

## 2025-01-15 DIAGNOSIS — M47.816 SPONDYLOSIS OF LUMBAR REGION WITHOUT MYELOPATHY OR RADICULOPATHY: ICD-10-CM

## 2025-01-15 RX ORDER — PREGABALIN 200 MG/1
CAPSULE ORAL
Qty: 270 CAPSULE | OUTPATIENT
Start: 2025-01-15

## 2025-01-21 ENCOUNTER — HOSPITAL ENCOUNTER (EMERGENCY)
Age: 55
Discharge: HOME | End: 2025-01-22
Payer: MEDICAID

## 2025-01-21 ENCOUNTER — APPOINTMENT (OUTPATIENT)
Dept: GENERAL RADIOLOGY | Age: 55
End: 2025-01-21
Payer: MEDICAID

## 2025-01-21 DIAGNOSIS — E87.5 HYPERKALEMIA: ICD-10-CM

## 2025-01-21 DIAGNOSIS — N39.0 URINARY TRACT INFECTION WITHOUT HEMATURIA, SITE UNSPECIFIED: Primary | ICD-10-CM

## 2025-01-21 DIAGNOSIS — K29.00 ACUTE GASTRITIS WITHOUT HEMORRHAGE, UNSPECIFIED GASTRITIS TYPE: ICD-10-CM

## 2025-01-21 LAB
BACTERIA URNS QL MICRO: ABNORMAL /HPF
BILIRUB UR QL STRIP: NEGATIVE
CLARITY UR: CLEAR
COLOR UR: YELLOW
EPI CELLS #/AREA URNS AUTO: ABNORMAL /HPF (ref 0–5)
GLUCOSE UR STRIP-MCNC: NEGATIVE MG/DL
HGB UR QL STRIP: NEGATIVE
HYALINE CASTS #/AREA URNS AUTO: ABNORMAL /HPF (ref 0–5)
KETONES UR STRIP-MCNC: NEGATIVE MG/DL
LEUKOCYTE ESTERASE UR QL STRIP: ABNORMAL
NITRITE UR QL STRIP: NEGATIVE
PH UR STRIP: 6 [PH] (ref 5–9)
PROT UR STRIP-MCNC: NEGATIVE MG/DL
RBC #/AREA URNS AUTO: ABNORMAL /HPF (ref 0–5)
SP GR UR STRIP: 1.02 (ref 1–1.03)
URINE REFLEX TO CULTURE: YES
UROBILINOGEN UR STRIP-ACNC: 0.2 E.U./DL
WBC #/AREA URNS AUTO: ABNORMAL /HPF (ref 0–5)

## 2025-01-21 PROCEDURE — 74018 RADEX ABDOMEN 1 VIEW: CPT

## 2025-01-21 PROCEDURE — 87086 URINE CULTURE/COLONY COUNT: CPT

## 2025-01-21 PROCEDURE — 81001 URINALYSIS AUTO W/SCOPE: CPT

## 2025-01-21 PROCEDURE — 99285 EMERGENCY DEPT VISIT HI MDM: CPT

## 2025-01-22 ENCOUNTER — APPOINTMENT (OUTPATIENT)
Dept: CT IMAGING | Age: 55
End: 2025-01-22
Payer: MEDICAID

## 2025-01-22 VITALS
BODY MASS INDEX: 47.83 KG/M2 | SYSTOLIC BLOOD PRESSURE: 124 MMHG | OXYGEN SATURATION: 100 % | DIASTOLIC BLOOD PRESSURE: 80 MMHG | HEART RATE: 75 BPM | WEIGHT: 270 LBS | RESPIRATION RATE: 18 BRPM | TEMPERATURE: 98.2 F

## 2025-01-22 LAB
ALBUMIN SERPL-MCNC: 3.7 G/DL (ref 3.5–4.6)
ALP SERPL-CCNC: 163 U/L (ref 40–130)
ALT SERPL-CCNC: 18 U/L (ref 0–33)
ANION GAP SERPL CALCULATED.3IONS-SCNC: 10 MEQ/L (ref 9–15)
AST SERPL-CCNC: 30 U/L (ref 0–35)
BASOPHILS # BLD: 0 K/UL (ref 0–0.2)
BASOPHILS NFR BLD: 0.6 %
BILIRUB SERPL-MCNC: 0.4 MG/DL (ref 0.2–0.7)
BUN SERPL-MCNC: 15 MG/DL (ref 6–20)
CALCIUM SERPL-MCNC: 9 MG/DL (ref 8.5–9.9)
CHLORIDE SERPL-SCNC: 103 MEQ/L (ref 95–107)
CO2 SERPL-SCNC: 23 MEQ/L (ref 20–31)
CREAT SERPL-MCNC: 1.53 MG/DL (ref 0.5–0.9)
EKG ATRIAL RATE: 53 BPM
EKG P AXIS: 45 DEGREES
EKG P-R INTERVAL: 160 MS
EKG Q-T INTERVAL: 422 MS
EKG QRS DURATION: 82 MS
EKG QTC CALCULATION (BAZETT): 395 MS
EKG R AXIS: 7 DEGREES
EKG T AXIS: 16 DEGREES
EKG VENTRICULAR RATE: 53 BPM
EOSINOPHIL # BLD: 0.3 K/UL (ref 0–0.7)
EOSINOPHIL NFR BLD: 3.7 %
ERYTHROCYTE [DISTWIDTH] IN BLOOD BY AUTOMATED COUNT: 12.9 % (ref 11.5–14.5)
GLOBULIN SER CALC-MCNC: 3.4 G/DL (ref 2.3–3.5)
GLUCOSE SERPL-MCNC: 154 MG/DL (ref 70–99)
HCT VFR BLD AUTO: 49.4 % (ref 37–47)
HGB BLD-MCNC: 16.4 G/DL (ref 12–16)
LACTATE BLDV-SCNC: 1.4 MMOL/L (ref 0.5–2.2)
LIPASE SERPL-CCNC: 41 U/L (ref 12–95)
LYMPHOCYTES # BLD: 2.1 K/UL (ref 1–4.8)
LYMPHOCYTES NFR BLD: 29.1 %
MCH RBC QN AUTO: 32 PG (ref 27–31.3)
MCHC RBC AUTO-ENTMCNC: 33.2 % (ref 33–37)
MCV RBC AUTO: 96.5 FL (ref 79.4–94.8)
MONOCYTES # BLD: 0.5 K/UL (ref 0.2–0.8)
MONOCYTES NFR BLD: 6.5 %
NEUTROPHILS # BLD: 4.2 K/UL (ref 1.4–6.5)
NEUTS SEG NFR BLD: 59.8 %
PLATELET # BLD AUTO: 235 K/UL (ref 130–400)
POTASSIUM SERPL-SCNC: 5.6 MEQ/L (ref 3.4–4.9)
PROT SERPL-MCNC: 7.1 G/DL (ref 6.3–8)
RBC # BLD AUTO: 5.12 M/UL (ref 4.2–5.4)
REASON FOR REJECTION: NORMAL
REJECTED TEST: NORMAL
SODIUM SERPL-SCNC: 136 MEQ/L (ref 135–144)
WBC # BLD AUTO: 7 K/UL (ref 4.8–10.8)

## 2025-01-22 PROCEDURE — 36415 COLL VENOUS BLD VENIPUNCTURE: CPT

## 2025-01-22 PROCEDURE — 93010 ELECTROCARDIOGRAM REPORT: CPT | Performed by: INTERNAL MEDICINE

## 2025-01-22 PROCEDURE — 80053 COMPREHEN METABOLIC PANEL: CPT

## 2025-01-22 PROCEDURE — 6370000000 HC RX 637 (ALT 250 FOR IP): Performed by: PHYSICIAN ASSISTANT

## 2025-01-22 PROCEDURE — 93005 ELECTROCARDIOGRAM TRACING: CPT | Performed by: PHYSICIAN ASSISTANT

## 2025-01-22 PROCEDURE — 74176 CT ABD & PELVIS W/O CONTRAST: CPT

## 2025-01-22 PROCEDURE — 85025 COMPLETE CBC W/AUTO DIFF WBC: CPT

## 2025-01-22 PROCEDURE — 83690 ASSAY OF LIPASE: CPT

## 2025-01-22 PROCEDURE — 83605 ASSAY OF LACTIC ACID: CPT

## 2025-01-22 RX ORDER — CEFDINIR 300 MG/1
300 CAPSULE ORAL ONCE
Status: COMPLETED | OUTPATIENT
Start: 2025-01-22 | End: 2025-01-22

## 2025-01-22 RX ORDER — OXYCODONE AND ACETAMINOPHEN 5; 325 MG/1; MG/1
1 TABLET ORAL ONCE
Status: COMPLETED | OUTPATIENT
Start: 2025-01-22 | End: 2025-01-22

## 2025-01-22 RX ORDER — CEFPODOXIME PROXETIL 200 MG/1
200 TABLET, FILM COATED ORAL 2 TIMES DAILY
Qty: 10 TABLET | Refills: 0 | Status: SHIPPED | OUTPATIENT
Start: 2025-01-22 | End: 2025-01-27

## 2025-01-22 RX ORDER — FAMOTIDINE 20 MG/1
20 TABLET, FILM COATED ORAL 2 TIMES DAILY
Qty: 60 TABLET | Refills: 0 | Status: SHIPPED | OUTPATIENT
Start: 2025-01-22

## 2025-01-22 RX ORDER — ONDANSETRON 4 MG/1
4 TABLET, FILM COATED ORAL 3 TIMES DAILY PRN
Qty: 15 TABLET | Refills: 0 | Status: SHIPPED | OUTPATIENT
Start: 2025-01-22

## 2025-01-22 RX ADMIN — CEFDINIR 300 MG: 300 CAPSULE ORAL at 02:11

## 2025-01-22 RX ADMIN — OXYCODONE AND ACETAMINOPHEN 1 TABLET: 5; 325 TABLET ORAL at 01:40

## 2025-01-22 ASSESSMENT — PAIN SCALES - GENERAL
PAINLEVEL_OUTOF10: 3
PAINLEVEL_OUTOF10: 9

## 2025-01-22 ASSESSMENT — PAIN - FUNCTIONAL ASSESSMENT: PAIN_FUNCTIONAL_ASSESSMENT: 0-10

## 2025-01-22 NOTE — DISCHARGE INSTRUCTIONS
Start taking the antibiotic cefpodoxime as prescribed.  Also placing on a stomach medication Pepcid.  Eat a bland diet over the next several days.  Avoidance of fatty foods and spicy and acidic foods. Follow very close with your family physician for reevaluation.  Avoid alcohol and NSAIDs.    Please hold your spironolactone as well as your losartan for the next 3-4 days and call your Kidney doctor and family physician for close recheck of your potassium level.

## 2025-01-22 NOTE — ED TRIAGE NOTES
1 week diffuse lower abdominal pain denies any CP, SOB, endorses nausea denies any emesis, hard stool for 1 week

## 2025-01-22 NOTE — ED PROVIDER NOTES
Basic Information   Time Seen: 9:56 PM   Primary Care Provider: Amna Crystal MD   No chief complaint on file.     HPI   Lucy Tariq is a 54 yrs female who presents with abdominal pain. 1 week having middle abdominal pain. Nausea but no vomiting. Constipated with last BM today but hard.  Denies fever, chest pain, sob, urinary sx.      Physical Exam     BP      Temp      Pulse     Resp      SpO2         General: Awake and Alert, no acute distress   CV: RRR, S1, S2   Resp: LCTAB, even and non labored   Other:   Impression and Plan   Labs Reviewed - No data to display     No orders to display      Final Impression   I have performed a medical screening exam on Lucy Tariq. Based on this patient's chief complaint/symptoms of No chief complaint on file.   and my focused exam, their care will be started and transitioned to provider when room is available       Evelyn Rasheed PA  01/21/25 [id number]    
INSULIN PEN NEEDLE (B-D ULTRAFINE III SHORT PEN) 31G X 8 MM MISC    Use qid    LANCETS (ONETOUCH DELICA PLUS [id number]) MISC    Test 3x daily    LEVOTHYROXINE (SYNTHROID) 125 MCG TABLET    Take 1 tablet by mouth Daily    LOSARTAN (COZAAR) 25 MG TABLET    TAKE 1 TABLET BY MOUTH DAILY    MAGNESIUM OXIDE (MAG-OX) 400 (240 MG) MG TABLET    Take 1 tablet by mouth daily    MECLIZINE (ANTIVERT) 25 MG TABLET    Take 1 tablet by mouth 3 times daily as needed    MONTELUKAST (SINGULAIR) 10 MG TABLET    Take 1 tablet by mouth nightly    PREGABALIN (LYRICA) 200 MG CAPSULE    Take 1 capsule by mouth three times daily for 90 days. Max Daily Amount: 600 mg    PROPRANOLOL (INDERAL LA) 60 MG EXTENDED RELEASE CAPSULE    Take 1 capsule by mouth daily    SEMAGLUTIDE, 1 MG/DOSE, (OZEMPIC, 1 MG/DOSE,) 4 MG/3ML SOPN SC INJECTION    Step 3: Week 9 and onwards:  inject under the skin 1mg once a week    SEMAGLUTIDE,0.25 OR 0.5MG/DOS, (OZEMPIC, 0.25 OR 0.5 MG/DOSE,) 2 MG/1.5ML SOPN    Step 2: Week 5-8:  inject under the skin 0.5mg once a week    SEMAGLUTIDE,0.25 OR 0.5MG/DOS, (OZEMPIC, 0.25 OR 0.5 MG/DOSE,) 2 MG/3ML SOPN    Step 1: Week 1-4:  inject under the skin 0.25mg once a week    SPIRONOLACTONE (ALDACTONE) 50 MG TABLET    Take 1 tablet by mouth daily    TOUJEO SOLOSTAR 300 UNIT/ML CONCENTRATED INJECTION PEN    INJECT 78 UNITS UNDER THE SKIN EVERY NIGHT AT BEDTIME       ALLERGIES     Shellfish-derived products, Hydromorphone, Ibuprofen, Ketorolac, Other, Penicillin g, Penicillins, Propoxyphene n-acetaminophen, Shellfish allergy, and Toradol [ketorolac tromethamine]    HISTORY       Family History   Problem Relation Age of Onset    Cancer Father     Diabetes Father     Allergy (Severe) Father     Heart Attack Father     Prostate Cancer Father     High Blood Pressure Mother     Diabetes Mother     Arthritis Mother     High Cholesterol Mother     Vision Loss Mother     Alcohol Abuse Neg Hx     Anemia Neg Hx     Arrhythmia Neg Hx     Asthma

## 2025-01-23 LAB — BACTERIA UR CULT: NORMAL

## 2025-01-27 DIAGNOSIS — G89.29 OTHER CHRONIC PAIN: ICD-10-CM

## 2025-01-27 DIAGNOSIS — M47.816 SPONDYLOSIS OF LUMBAR REGION WITHOUT MYELOPATHY OR RADICULOPATHY: ICD-10-CM

## 2025-01-27 RX ORDER — PREGABALIN 200 MG/1
200 CAPSULE ORAL
Qty: 270 CAPSULE | Refills: 1 | Status: SHIPPED | OUTPATIENT
Start: 2025-01-27 | End: 2025-04-27

## 2025-01-29 DIAGNOSIS — J30.9 ALLERGIC RHINITIS, UNSPECIFIED SEASONALITY, UNSPECIFIED TRIGGER: ICD-10-CM

## 2025-01-29 DIAGNOSIS — J30.1 ACUTE SEASONAL ALLERGIC RHINITIS DUE TO POLLEN: ICD-10-CM

## 2025-01-29 RX ORDER — MONTELUKAST SODIUM 10 MG/1
10 TABLET ORAL NIGHTLY
Qty: 90 TABLET | Refills: 1 | Status: SHIPPED | OUTPATIENT
Start: 2025-01-29

## 2025-01-30 RX ORDER — MECLIZINE HYDROCHLORIDE 25 MG/1
25 TABLET ORAL 3 TIMES DAILY PRN
Qty: 30 TABLET | Refills: 2 | Status: SHIPPED | OUTPATIENT
Start: 2025-01-30

## 2025-01-30 RX ORDER — CETIRIZINE HYDROCHLORIDE 10 MG/1
10 TABLET ORAL DAILY
Qty: 90 TABLET | Refills: 3 | Status: SHIPPED | OUTPATIENT
Start: 2025-01-30

## 2025-02-11 DIAGNOSIS — R10.9 ABDOMINAL CRAMPING: ICD-10-CM

## 2025-02-11 RX ORDER — LANOLIN ALCOHOL/MO/W.PET/CERES
400 CREAM (GRAM) TOPICAL DAILY
Qty: 90 TABLET | Refills: 3 | Status: SHIPPED | OUTPATIENT
Start: 2025-02-11

## 2025-02-13 ENCOUNTER — OFFICE VISIT (OUTPATIENT)
Dept: ENDOCRINOLOGY | Age: 55
End: 2025-02-13

## 2025-02-13 VITALS
OXYGEN SATURATION: 94 % | HEIGHT: 63 IN | WEIGHT: 293 LBS | DIASTOLIC BLOOD PRESSURE: 67 MMHG | BODY MASS INDEX: 51.91 KG/M2 | SYSTOLIC BLOOD PRESSURE: 135 MMHG

## 2025-02-13 DIAGNOSIS — E89.0 POSTPROCEDURAL HYPOTHYROIDISM: ICD-10-CM

## 2025-02-13 DIAGNOSIS — E11.65 TYPE 2 DIABETES MELLITUS WITH HYPERGLYCEMIA, WITH LONG-TERM CURRENT USE OF INSULIN (HCC): Primary | ICD-10-CM

## 2025-02-13 DIAGNOSIS — Z79.4 TYPE 2 DIABETES MELLITUS WITH HYPERGLYCEMIA, WITH LONG-TERM CURRENT USE OF INSULIN (HCC): ICD-10-CM

## 2025-02-13 DIAGNOSIS — Z79.4 TYPE 2 DIABETES MELLITUS WITH HYPERGLYCEMIA, WITH LONG-TERM CURRENT USE OF INSULIN (HCC): Primary | ICD-10-CM

## 2025-02-13 DIAGNOSIS — E66.01 MORBID OBESITY: ICD-10-CM

## 2025-02-13 DIAGNOSIS — E11.65 TYPE 2 DIABETES MELLITUS WITH HYPERGLYCEMIA, WITH LONG-TERM CURRENT USE OF INSULIN (HCC): ICD-10-CM

## 2025-02-13 LAB
ANION GAP SERPL CALCULATED.3IONS-SCNC: 11 MEQ/L (ref 9–15)
BUN SERPL-MCNC: 11 MG/DL (ref 6–20)
CALCIUM SERPL-MCNC: 9 MG/DL (ref 8.5–9.9)
CHLORIDE SERPL-SCNC: 107 MEQ/L (ref 95–107)
CHP ED QC CHECK: NORMAL
CO2 SERPL-SCNC: 22 MEQ/L (ref 20–31)
CREAT SERPL-MCNC: 1.43 MG/DL (ref 0.5–0.9)
GLUCOSE BLD-MCNC: 123 MG/DL
GLUCOSE SERPL-MCNC: 83 MG/DL (ref 70–99)
POTASSIUM SERPL-SCNC: 4 MEQ/L (ref 3.4–4.9)
SODIUM SERPL-SCNC: 140 MEQ/L (ref 135–144)
T4 FREE SERPL-MCNC: 1.14 NG/DL (ref 0.84–1.68)
TSH REFLEX: 3.82 UIU/ML (ref 0.44–3.86)

## 2025-02-13 NOTE — PROGRESS NOTES
Glucose Monitoring Suppl (ONETOUCH VERIO) w/Device KIT, As  Directed, Disp: 1 kit, Rfl: 00    levothyroxine (SYNTHROID) 125 MCG tablet, Take 1 tablet by mouth Daily, Disp: 90 tablet, Rfl: 3    Insulin Pen Needle (B-D ULTRAFINE III SHORT PEN) 31G X 8 MM MISC, Use qid, Disp: 300 each, Rfl: 5    Handicap Placard [facility], by Does not apply route For 5 yrs, Disp: 1 each, Rfl: 0    albuterol sulfate HFA (PROVENTIL;VENTOLIN;PROAIR) 108 (90 Base) MCG/ACT inhaler, INHALE 2 PUFFS INTO THE LUNGS EVERY 6 HOURS AS NEEDED FOR WHEEZING, Disp: 20.1 g, Rfl: 4    blood glucose test strips (ONETOUCH VERIO) strip, Test 3x daily e11.65, Disp: 100 each, Rfl: 3    fluticasone (FLONASE) 50 MCG/ACT nasal spray, INSTILL 1 SPRAY IN EACH NOSTRIL ONCE DAILY, Disp: , Rfl:     montelukast (SINGULAIR) 10 MG tablet, Take 1 tablet by mouth nightly, Disp: 30 tablet, Rfl: 0    Continuous Blood Gluc Sensor (FREESTYLE JESSICA 2 SENSOR) [facility] [place], USE EVERY 2 WEEKS, Disp: 2 each, Rfl: 3    Continuous Blood Gluc  (FREESTYLE JESSICA 2 READER) LENNY, As directed, Disp: 1 each, Rfl: 0    spironolactone (ALDACTONE) 50 MG tablet, Take 1 tablet by mouth daily, Disp: 30 tablet, Rfl: 0    butalbital-acetaminophen-caffeine (FIORICET, ESGIC) -40 MG per tablet, Take 1 tablet by mouth every 6 hours as needed for Headaches, Disp: 30 tablet, Rfl: 1    acetaminophen (TYLENOL) 500 MG tablet, Take 1 tablet by mouth 4 times daily as needed for Pain, Disp: 360 tablet, Rfl: 1    albuterol (PROVENTIL) (2.5 MG/3ML) 0.083% nebulizer solution, Take 3 mLs by nebulization every 4 hours as needed for Wheezing, Disp: 120 each, Rfl: 3  Lab Results   Component Value Date     01/22/2025    K 5.6 (H) 01/22/2025     01/22/2025    CO2 23 01/22/2025    BUN 15 01/22/2025    CREATININE 1.53 (H) 01/22/2025    GLUCOSE 154 (H) 01/22/2025    CALCIUM 9.0 01/22/2025    BILITOT 0.4 01/22/2025    ALKPHOS 163 (H) 01/22/2025    AST 30 01/22/2025    ALT 18 01/22/2025    LABGLOM 40.2 (L)

## 2025-02-14 ENCOUNTER — TELEPHONE (OUTPATIENT)
Dept: ENDOCRINOLOGY | Age: 55
End: 2025-02-14

## 2025-02-14 LAB
ESTIMATED AVERAGE GLUCOSE: 151 MG/DL
HBA1C MFR BLD: 6.9 % (ref 4–6)

## 2025-02-17 NOTE — TELEPHONE ENCOUNTER
Prior authorization Aquiles 2.5 mg/0.5 ml was started on Cover My Meds, clinical uploaded authorization pending (Key: [id number])  PA Case ID #: [id number]  Rx #: [id number]  Need Help? Call us at [phone]  Outcome  Denied today by [place] [employer] 2017  Coverage is provided when the member meets all the followin. Member has a history of at least 120 days of therapy with THREE preferred medications in this UPDL category. ONE of the 120 day trials must be with a drug in the same drug class, Byetta (5 mcg and 10 mcg), Victoza (18 MG/3 ML PEN) (Brand name is preferred by the plan) or Trulicity (0.75 mg, 1.5 mg, 3 mg, and 4.5 mg)]. Other trials may include but are not limited to: Farxiga 5 and 10 mg (Brand name is preferred by the plan), Glimepiride, Glipizide, Januvia, Jardiance 10 and 25 mg and Metformin  mg, 850 mg, 1000 mg and ER (generics of Glucophage); 2. Member has had an inadequate clinical response (the inability to reach A1C goal (less than 7%) after at least 120 days of current regimen, with use of two or more drugs concomitantly per ADA (American Diabetes Association) guidelines and; 3. Member has documented adherence and appropriate dose escalation (must achieve maximum recommended dose or document that maximum recommended dose is not tolerated or is clinically inappropriate) and; 4. Documentation includes a patient specific A1C goal if less than 7% and must include current A1C (within the last 6 months). The [facility] Policy for Medical Necessity as posted on the [facility] website and [place] Unified Preferred Drug List criteria were reviewed and per [place] Administrative Code Rule 5160-1-01 (C) and  (B), a medically necessary service must include: generally accepted standards of medical practice, be clinically appropriate in administration, treatment and outcome and be the lowest cost alternative to effectively treat the condition. Please contact your provider to assist you with other

## 2025-02-18 DIAGNOSIS — F32.1 MODERATE SINGLE CURRENT EPISODE OF MAJOR DEPRESSIVE DISORDER (HCC): Primary | ICD-10-CM

## 2025-02-18 RX ORDER — BUPROPION HYDROCHLORIDE 300 MG/1
300 TABLET ORAL EVERY MORNING
Qty: 60 TABLET | Refills: 2 | Status: SHIPPED | OUTPATIENT
Start: 2025-02-18

## 2025-02-21 ENCOUNTER — TELEPHONE (OUTPATIENT)
Dept: PRIMARY CARE CLINIC | Age: 55
End: 2025-02-21

## 2025-02-21 DIAGNOSIS — Z12.31 ENCOUNTER FOR SCREENING MAMMOGRAM FOR MALIGNANT NEOPLASM OF BREAST: Primary | ICD-10-CM

## 2025-03-04 ENCOUNTER — OFFICE VISIT (OUTPATIENT)
Dept: PULMONOLOGY | Age: 55
End: 2025-03-04
Payer: MEDICAID

## 2025-03-04 VITALS
OXYGEN SATURATION: 95 % | SYSTOLIC BLOOD PRESSURE: 114 MMHG | DIASTOLIC BLOOD PRESSURE: 61 MMHG | BODY MASS INDEX: 51.31 KG/M2 | WEIGHT: 289.6 LBS | HEART RATE: 77 BPM | TEMPERATURE: 97.3 F | RESPIRATION RATE: 18 BRPM | HEIGHT: 63 IN

## 2025-03-04 DIAGNOSIS — E66.01 CLASS 3 SEVERE OBESITY DUE TO EXCESS CALORIES WITHOUT SERIOUS COMORBIDITY WITH BODY MASS INDEX (BMI) OF 50.0 TO 59.9 IN ADULT: ICD-10-CM

## 2025-03-04 DIAGNOSIS — D86.9 SARCOIDOSIS: ICD-10-CM

## 2025-03-04 DIAGNOSIS — J45.41 MODERATE PERSISTENT ASTHMA WITH ACUTE EXACERBATION: ICD-10-CM

## 2025-03-04 DIAGNOSIS — G47.34 NOCTURNAL HYPOXIA: ICD-10-CM

## 2025-03-04 DIAGNOSIS — G47.33 OSA (OBSTRUCTIVE SLEEP APNEA): Primary | ICD-10-CM

## 2025-03-04 DIAGNOSIS — E66.813 CLASS 3 SEVERE OBESITY DUE TO EXCESS CALORIES WITHOUT SERIOUS COMORBIDITY WITH BODY MASS INDEX (BMI) OF 50.0 TO 59.9 IN ADULT: ICD-10-CM

## 2025-03-04 DIAGNOSIS — D75.A G6PD DEFICIENCY: ICD-10-CM

## 2025-03-04 PROCEDURE — 3078F DIAST BP <80 MM HG: CPT | Performed by: INTERNAL MEDICINE

## 2025-03-04 PROCEDURE — G8417 CALC BMI ABV UP PARAM F/U: HCPCS | Performed by: INTERNAL MEDICINE

## 2025-03-04 PROCEDURE — 3074F SYST BP LT 130 MM HG: CPT | Performed by: INTERNAL MEDICINE

## 2025-03-04 PROCEDURE — 99214 OFFICE O/P EST MOD 30 MIN: CPT | Performed by: INTERNAL MEDICINE

## 2025-03-04 PROCEDURE — G8427 DOCREV CUR MEDS BY ELIG CLIN: HCPCS | Performed by: INTERNAL MEDICINE

## 2025-03-04 PROCEDURE — 1036F TOBACCO NON-USER: CPT | Performed by: INTERNAL MEDICINE

## 2025-03-04 PROCEDURE — 3017F COLORECTAL CA SCREEN DOC REV: CPT | Performed by: INTERNAL MEDICINE

## 2025-03-04 NOTE — PROGRESS NOTES
Communication with Friends and Family: Twice a week     Frequency of Social Gatherings with Friends and Family: Once a week     Attends Scientologist Services: Never     Active Member of Clubs or Organizations: No     Attends Club or Organization Meetings: Never     Marital Status:    Intimate Partner Violence: Not At Risk (1/5/2021)    Humiliation, Afraid, Rape, and Kick questionnaire     Fear of Current or Ex-Partner: No     Emotionally Abused: No     Physically Abused: No     Sexually Abused: No   Housing Stability: Low Risk  (12/8/2024)    Housing Stability Vital Sign     Unable to Pay for Housing in the Last Year: No     Number of Times Moved in the Last Year: 0     Homeless in the Last Year: No         Review of Systems      ROS: 10 organs review of system is done including general, psychological, ENT, hematological, endocrine, respiratory, cardiovascular, gastrointestinal,musculoskeletal, neurological,  allergy and Immunology is done and is otherwise negative.    Current Outpatient Medications   Medication Sig Dispense Refill    buPROPion (WELLBUTRIN XL) 300 MG extended release tablet Take 1 tablet by mouth every morning 60 tablet 2    Semaglutide,0.25 or 0.5MG/DOS, 2 MG/3ML SOPN Inject 0.25 mg into the skin every 7 days Lot [id number]  Ex 2-28-26 3 mL 0    Tirzepatide 2.5 MG/0.5ML SOAJ Inject 2.5 mg into the skin every 7 days 2 mL 3    magnesium oxide (MAG-OX) 400 (240 Mg) MG tablet TAKE 1 TABLET BY MOUTH DAILY 90 tablet 3    cetirizine (ZYRTEC) 10 MG tablet TAKE 1 TABLET BY MOUTH DAILY 90 tablet 3    meclizine (ANTIVERT) 25 MG tablet Take 1 tablet by mouth 3 times daily as needed for Dizziness or Nausea 30 tablet 2    pregabalin (LYRICA) 200 MG capsule Take 1 capsule by mouth three times daily for 90 days. Max Daily Amount: 600 mg 270 capsule 1    famotidine (PEPCID) 20 MG tablet Take 1 tablet by mouth 2 times daily 60 tablet 0    ondansetron (ZOFRAN) 4 MG tablet Take 1 tablet by mouth 3 times daily as

## 2025-03-10 ENCOUNTER — APPOINTMENT (OUTPATIENT)
Facility: CLINIC | Age: 55
End: 2025-03-10
Payer: MEDICAID

## 2025-03-18 ENCOUNTER — OFFICE VISIT (OUTPATIENT)
Dept: PRIMARY CARE CLINIC | Age: 55
End: 2025-03-18
Payer: MEDICAID

## 2025-03-18 VITALS
DIASTOLIC BLOOD PRESSURE: 64 MMHG | RESPIRATION RATE: 18 BRPM | OXYGEN SATURATION: 97 % | BODY MASS INDEX: 50.84 KG/M2 | TEMPERATURE: 98.4 F | HEART RATE: 82 BPM | SYSTOLIC BLOOD PRESSURE: 118 MMHG | WEIGHT: 287 LBS

## 2025-03-18 DIAGNOSIS — C79.89 SECONDARY MALIGNANT NEOPLASM OF OTHER SPECIFIED SITES: ICD-10-CM

## 2025-03-18 DIAGNOSIS — D86.2 SARCOIDOSIS OF LUNG WITH SARCOIDOSIS OF LYMPH NODES: ICD-10-CM

## 2025-03-18 DIAGNOSIS — F19.21 HISTORY OF DRUG DEPENDENCE (HCC): ICD-10-CM

## 2025-03-18 DIAGNOSIS — J42 CHRONIC BRONCHITIS, UNSPECIFIED CHRONIC BRONCHITIS TYPE (HCC): ICD-10-CM

## 2025-03-18 DIAGNOSIS — F33.2 SEVERE EPISODE OF RECURRENT MAJOR DEPRESSIVE DISORDER, WITHOUT PSYCHOTIC FEATURES (HCC): ICD-10-CM

## 2025-03-18 DIAGNOSIS — J02.9 ACUTE PHARYNGITIS, UNSPECIFIED ETIOLOGY: Primary | ICD-10-CM

## 2025-03-18 LAB — S PYO AG THROAT QL: NORMAL

## 2025-03-18 PROCEDURE — 3074F SYST BP LT 130 MM HG: CPT | Performed by: INTERNAL MEDICINE

## 2025-03-18 PROCEDURE — 3078F DIAST BP <80 MM HG: CPT | Performed by: INTERNAL MEDICINE

## 2025-03-18 PROCEDURE — 87880 STREP A ASSAY W/OPTIC: CPT | Performed by: INTERNAL MEDICINE

## 2025-03-18 PROCEDURE — G8427 DOCREV CUR MEDS BY ELIG CLIN: HCPCS | Performed by: INTERNAL MEDICINE

## 2025-03-18 PROCEDURE — 99214 OFFICE O/P EST MOD 30 MIN: CPT | Performed by: INTERNAL MEDICINE

## 2025-03-18 PROCEDURE — 1036F TOBACCO NON-USER: CPT | Performed by: INTERNAL MEDICINE

## 2025-03-18 PROCEDURE — 3023F SPIROM DOC REV: CPT | Performed by: INTERNAL MEDICINE

## 2025-03-18 PROCEDURE — G8417 CALC BMI ABV UP PARAM F/U: HCPCS | Performed by: INTERNAL MEDICINE

## 2025-03-18 PROCEDURE — 3017F COLORECTAL CA SCREEN DOC REV: CPT | Performed by: INTERNAL MEDICINE

## 2025-03-18 RX ORDER — AZITHROMYCIN 250 MG/1
TABLET, FILM COATED ORAL
Qty: 1 PACKET | Refills: 0 | Status: SHIPPED | OUTPATIENT
Start: 2025-03-18

## 2025-03-18 SDOH — ECONOMIC STABILITY: FOOD INSECURITY: WITHIN THE PAST 12 MONTHS, YOU WORRIED THAT YOUR FOOD WOULD RUN OUT BEFORE YOU GOT MONEY TO BUY MORE.: NEVER TRUE

## 2025-03-18 SDOH — ECONOMIC STABILITY: FOOD INSECURITY: WITHIN THE PAST 12 MONTHS, THE FOOD YOU BOUGHT JUST DIDN'T LAST AND YOU DIDN'T HAVE MONEY TO GET MORE.: NEVER TRUE

## 2025-03-18 ASSESSMENT — PATIENT HEALTH QUESTIONNAIRE - PHQ9
2. FEELING DOWN, DEPRESSED OR HOPELESS: NOT AT ALL
SUM OF ALL RESPONSES TO PHQ QUESTIONS 1-9: 0
3. TROUBLE FALLING OR STAYING ASLEEP: NOT AT ALL
SUM OF ALL RESPONSES TO PHQ QUESTIONS 1-9: 0
SUM OF ALL RESPONSES TO PHQ QUESTIONS 1-9: 0
1. LITTLE INTEREST OR PLEASURE IN DOING THINGS: NOT AT ALL
4. FEELING TIRED OR HAVING LITTLE ENERGY: NOT AT ALL
7. TROUBLE CONCENTRATING ON THINGS, SUCH AS READING THE NEWSPAPER OR WATCHING TELEVISION: NOT AT ALL
10. IF YOU CHECKED OFF ANY PROBLEMS, HOW DIFFICULT HAVE THESE PROBLEMS MADE IT FOR YOU TO DO YOUR WORK, TAKE CARE OF THINGS AT HOME, OR GET ALONG WITH OTHER PEOPLE: NOT DIFFICULT AT ALL
9. THOUGHTS THAT YOU WOULD BE BETTER OFF DEAD, OR OF HURTING YOURSELF: NOT AT ALL
5. POOR APPETITE OR OVEREATING: NOT AT ALL
8. MOVING OR SPEAKING SO SLOWLY THAT OTHER PEOPLE COULD HAVE NOTICED. OR THE OPPOSITE, BEING SO FIGETY OR RESTLESS THAT YOU HAVE BEEN MOVING AROUND A LOT MORE THAN USUAL: NOT AT ALL
SUM OF ALL RESPONSES TO PHQ QUESTIONS 1-9: 0

## 2025-03-18 ASSESSMENT — ENCOUNTER SYMPTOMS
RHINORRHEA: 0
NAUSEA: 0
SHORTNESS OF BREATH: 0
WHEEZING: 0
SINUS PAIN: 0
COUGH: 0
DIARRHEA: 0
ABDOMINAL PAIN: 0
VOMITING: 0
SINUS PRESSURE: 0
SORE THROAT: 1

## 2025-03-18 NOTE — PROGRESS NOTES
on file   Tobacco Use    Smoking status: Former     Current packs/day: 0.00     Average packs/day: 0.5 packs/day for 15.0 years (7.5 ttl pk-yrs)     Types: Cigarettes     Start date: 8/20/2014     Quit date: 3/1/2015     Years since quitting: 10.0     Passive exposure: Past    Smokeless tobacco: Never   Vaping Use    Vaping status: Never Used   Substance and Sexual Activity    Alcohol use: Not Currently    Drug use: Yes     Frequency: 5.0 times per week     Types: Marijuana (Weed)    Sexual activity: Yes     Partners: Male   Other Topics Concern    Not on file   Social History Narrative    Not on file     Social Drivers of Health     Financial Resource Strain: Low Risk  (10/4/2024)    Overall Financial Resource Strain (CARDIA)     Difficulty of Paying Living Expenses: Not hard at all   Food Insecurity: No Food Insecurity (3/18/2025)    Hunger Vital Sign     Worried About Running Out of Food in the Last Year: Never true     Ran Out of Food in the Last Year: Never true   Transportation Needs: No Transportation Needs (3/18/2025)    PRAPARE - Transportation     Lack of Transportation (Medical): No     Lack of Transportation (Non-Medical): No   Physical Activity: Inactive (8/4/2023)    Received from [facility]    Exercise Vital Sign     Days of Exercise per Week: 0 days     Minutes of Exercise per Session: 0 min   Stress: No Stress Concern Present (8/4/2023)    Received from [facility] [place] of Occupational Health - Occupational Stress Questionnaire     Feeling of Stress : Only a little   Social Connections: Moderately Isolated (8/4/2023)    Received from [facility]    Social Connection and Isolation Panel [NHANES]     Frequency of Communication with Friends and Family: Twice a week     Frequency of Social Gatherings with Friends and Family: Once a week     Attends Religion Services: Never     Active Member of Clubs or Organizations:

## 2025-03-21 LAB
ARTIFACT EVENTS (PULSE): NORMAL
ARTIFACT EVENTS: NORMAL
AVERAGE PULSE: NORMAL
AWAKE SPO2: NORMAL
BASAL SPO2: NORMAL
BRADYCARDIA TIME: NORMAL
DELTA SPO2: NORMAL
HIGH PULSE: NORMAL
HIGH SPO2: NORMAL
LOW PULSE: NORMAL
LOW SPO2: NORMAL
OXYGEN DESATURATION EVENTS (3%): NORMAL
OXYGEN DESATURATION INDEX (ODI): NORMAL
PERCENT TIME IN BRADYCARDIA: NORMAL
PERCENT TIME IN TACHYCARDIA: NORMAL
TACHYCARDIA TIME: NORMAL
TIME <= 88%: NORMAL
TIME <= 89%: NORMAL
TIME CONSECUTIVE <= 88%: NORMAL

## 2025-03-24 ENCOUNTER — APPOINTMENT (OUTPATIENT)
Dept: CT IMAGING | Age: 55
End: 2025-03-24
Payer: MEDICAID

## 2025-03-24 ENCOUNTER — HOSPITAL ENCOUNTER (EMERGENCY)
Age: 55
Discharge: HOME | End: 2025-03-24
Payer: MEDICAID

## 2025-03-24 VITALS — RESPIRATION RATE: 20 BRPM | OXYGEN SATURATION: 96 % | HEART RATE: 78 BPM | TEMPERATURE: 97.9 F

## 2025-03-24 DIAGNOSIS — S39.012A LUMBAR STRAIN, INITIAL ENCOUNTER: Primary | ICD-10-CM

## 2025-03-24 PROCEDURE — 6360000002 HC RX W HCPCS: Performed by: PHYSICIAN ASSISTANT

## 2025-03-24 PROCEDURE — 96372 THER/PROPH/DIAG INJ SC/IM: CPT

## 2025-03-24 PROCEDURE — 72131 CT LUMBAR SPINE W/O DYE: CPT

## 2025-03-24 PROCEDURE — 99284 EMERGENCY DEPT VISIT MOD MDM: CPT

## 2025-03-24 RX ORDER — TRAMADOL HYDROCHLORIDE 50 MG/1
50 TABLET ORAL EVERY 8 HOURS PRN
Qty: 6 TABLET | Refills: 0 | Status: SHIPPED | OUTPATIENT
Start: 2025-03-24 | End: 2025-03-26

## 2025-03-24 RX ORDER — METHYLPREDNISOLONE 4 MG/1
TABLET ORAL
Qty: 1 KIT | Refills: 0 | Status: SHIPPED | OUTPATIENT
Start: 2025-03-24 | End: 2025-03-30

## 2025-03-24 RX ORDER — ORPHENADRINE CITRATE 30 MG/ML
60 INJECTION INTRAMUSCULAR; INTRAVENOUS ONCE
Status: COMPLETED | OUTPATIENT
Start: 2025-03-24 | End: 2025-03-24

## 2025-03-24 RX ORDER — CYCLOBENZAPRINE HCL 10 MG
10 TABLET ORAL 3 TIMES DAILY PRN
Qty: 15 TABLET | Refills: 0 | Status: SHIPPED | OUTPATIENT
Start: 2025-03-24 | End: 2025-03-29

## 2025-03-24 RX ADMIN — ORPHENADRINE CITRATE 60 MG: 60 INJECTION INTRAMUSCULAR; INTRAVENOUS at 08:34

## 2025-03-24 ASSESSMENT — PAIN SCALES - GENERAL
PAINLEVEL_OUTOF10: 9
PAINLEVEL_OUTOF10: 9

## 2025-03-24 ASSESSMENT — ENCOUNTER SYMPTOMS
RHINORRHEA: 0
ABDOMINAL DISTENTION: 0
EYE DISCHARGE: 0
ABDOMINAL PAIN: 0
BACK PAIN: 1
SHORTNESS OF BREATH: 0
SORE THROAT: 0
COLOR CHANGE: 0
CONSTIPATION: 0

## 2025-03-24 ASSESSMENT — PAIN - FUNCTIONAL ASSESSMENT: PAIN_FUNCTIONAL_ASSESSMENT: 0-10

## 2025-03-24 ASSESSMENT — PAIN DESCRIPTION - ORIENTATION: ORIENTATION: LOWER;MID

## 2025-03-24 ASSESSMENT — PAIN DESCRIPTION - PAIN TYPE: TYPE: ACUTE PAIN

## 2025-03-24 ASSESSMENT — PAIN DESCRIPTION - LOCATION: LOCATION: BACK

## 2025-03-24 ASSESSMENT — PAIN DESCRIPTION - DESCRIPTORS: DESCRIPTORS: SHARP

## 2025-03-24 NOTE — ED TRIAGE NOTES
Pt presents to ED with lower back pain that started this morning. Pt denies injury. Pt has a history of spinal surgery. Pt ambulates with the assistance of a cane.

## 2025-03-24 NOTE — ED PROVIDER NOTES
[facility] EMERGENCY DEPARTMENT  EMERGENCY DEPARTMENT ENCOUNTER      Pt Name: Lucy Tariq  MRN: [id number]  Birthdate 1970  Date of evaluation: 3/24/2025  Provider: Jefferson Singer PA-C  8:12 AM EDT    CHIEF COMPLAINT       Chief Complaint   Patient presents with    Back Pain     Woke up, hx of spinal surgery         HISTORY OF PRESENT ILLNESS   (Location/Symptom, Timing/Onset, Context/Setting, Quality, Duration, Modifying Factors, Severity)  Note limiting factors.   Lucy Tariq is a 55 y.o. female who presents to the emergency department with complaint of low lumbar back pain, which patient states occurred this morning when she woke up.  Patient denies any acute injury, she states previous history of lumbar laminectomies, she states this was 2 years ago she has not had any complications or problems since that time, she states surgeries were done at [facility].  She states she is not on any medications for her back at this time, there is no numbness or tingling, no bowel or bladder dysfunction, no paresthesias, she just woke up with the pain.  There is no radiation into her legs.  There is no weakness, patient states she generally does walk with a cane.  Past medical history significant for asthma, depression, diabetes, hypertension, chronic kidney disease, CVA, chronic low back pain, COPD, gout, anxiety, sleep apnea, fibromyalgia.    HPI    Nursing Notes were reviewed.    REVIEW OF SYSTEMS    (2-9 systems for level 4, 10 or more for level 5)     Review of Systems   Constitutional:  Negative for activity change and appetite change.   HENT:  Negative for congestion, ear discharge, ear pain, nosebleeds, rhinorrhea and sore throat.    Eyes:  Negative for discharge.   Respiratory:  Negative for shortness of breath.    Cardiovascular:  Negative for chest pain, palpitations and leg swelling.   Gastrointestinal:  Negative for abdominal distention, abdominal pain and constipation.

## 2025-03-26 ENCOUNTER — TELEPHONE (OUTPATIENT)
Dept: PRIMARY CARE CLINIC | Age: 55
End: 2025-03-26

## 2025-03-31 ENCOUNTER — TELEMEDICINE (OUTPATIENT)
Dept: PRIMARY CARE CLINIC | Age: 55
End: 2025-03-31
Payer: MEDICAID

## 2025-03-31 DIAGNOSIS — M54.50 ACUTE MIDLINE LOW BACK PAIN WITHOUT SCIATICA: Primary | ICD-10-CM

## 2025-03-31 DIAGNOSIS — M47.816 SPONDYLOSIS OF LUMBAR REGION WITHOUT MYELOPATHY OR RADICULOPATHY: ICD-10-CM

## 2025-03-31 PROCEDURE — 3017F COLORECTAL CA SCREEN DOC REV: CPT | Performed by: INTERNAL MEDICINE

## 2025-03-31 PROCEDURE — 99214 OFFICE O/P EST MOD 30 MIN: CPT | Performed by: INTERNAL MEDICINE

## 2025-03-31 PROCEDURE — G8427 DOCREV CUR MEDS BY ELIG CLIN: HCPCS | Performed by: INTERNAL MEDICINE

## 2025-03-31 RX ORDER — LORAZEPAM 0.5 MG/1
TABLET ORAL
Qty: 1 TABLET | Refills: 0 | Status: SHIPPED | OUTPATIENT
Start: 2025-03-31 | End: 2025-04-30

## 2025-03-31 SDOH — ECONOMIC STABILITY: FOOD INSECURITY: WITHIN THE PAST 12 MONTHS, YOU WORRIED THAT YOUR FOOD WOULD RUN OUT BEFORE YOU GOT MONEY TO BUY MORE.: NEVER TRUE

## 2025-03-31 SDOH — ECONOMIC STABILITY: FOOD INSECURITY: WITHIN THE PAST 12 MONTHS, THE FOOD YOU BOUGHT JUST DIDN'T LAST AND YOU DIDN'T HAVE MONEY TO GET MORE.: NEVER TRUE

## 2025-03-31 ASSESSMENT — PATIENT HEALTH QUESTIONNAIRE - PHQ9
SUM OF ALL RESPONSES TO PHQ QUESTIONS 1-9: 0
8. MOVING OR SPEAKING SO SLOWLY THAT OTHER PEOPLE COULD HAVE NOTICED. OR THE OPPOSITE, BEING SO FIGETY OR RESTLESS THAT YOU HAVE BEEN MOVING AROUND A LOT MORE THAN USUAL: NOT AT ALL
10. IF YOU CHECKED OFF ANY PROBLEMS, HOW DIFFICULT HAVE THESE PROBLEMS MADE IT FOR YOU TO DO YOUR WORK, TAKE CARE OF THINGS AT HOME, OR GET ALONG WITH OTHER PEOPLE: NOT DIFFICULT AT ALL
2. FEELING DOWN, DEPRESSED OR HOPELESS: NOT AT ALL
6. FEELING BAD ABOUT YOURSELF - OR THAT YOU ARE A FAILURE OR HAVE LET YOURSELF OR YOUR FAMILY DOWN: NOT AT ALL
7. TROUBLE CONCENTRATING ON THINGS, SUCH AS READING THE NEWSPAPER OR WATCHING TELEVISION: NOT AT ALL
4. FEELING TIRED OR HAVING LITTLE ENERGY: NOT AT ALL
1. LITTLE INTEREST OR PLEASURE IN DOING THINGS: NOT AT ALL
SUM OF ALL RESPONSES TO PHQ QUESTIONS 1-9: 0
SUM OF ALL RESPONSES TO PHQ QUESTIONS 1-9: 0
3. TROUBLE FALLING OR STAYING ASLEEP: NOT AT ALL
SUM OF ALL RESPONSES TO PHQ QUESTIONS 1-9: 0
5. POOR APPETITE OR OVEREATING: NOT AT ALL
9. THOUGHTS THAT YOU WOULD BE BETTER OFF DEAD, OR OF HURTING YOURSELF: NOT AT ALL

## 2025-03-31 ASSESSMENT — ENCOUNTER SYMPTOMS
WHEEZING: 0
DIARRHEA: 0
ABDOMINAL PAIN: 0
VOMITING: 0
BACK PAIN: 1
SHORTNESS OF BREATH: 0
NAUSEA: 0
COUGH: 0

## 2025-03-31 NOTE — PROGRESS NOTES
3/31/2025    TELEHEALTH EVALUATION -- Audio/Visual    HPI:    Lucy Tariq (:  1970) has requested an audio/video evaluation for the following concern(s):      Pt presents for follow up regarding low back pain(by the gluteal cleft), since 1 week ago, occurring while getting out of bed. Hx lumbar laminectomy and fusion 2 yrs ago.     Seen 1 week ago at the [facility] ER. Placed on flexeril, tramadol and prednisone to no avail.   Ct lumbar spine showed L5-S1 fusion and narrowing of neural foramina.  Last seen by spine surgeon Dr. Puentes last year. Declined another surgery. No leg weakness, no tingling or numbness. No bowel or bladder incontinence. Pain is worsened with straining in the bathroom.      Review of Systems   Respiratory:  Negative for cough, shortness of breath and wheezing.    Cardiovascular:  Negative for chest pain.   Gastrointestinal:  Negative for abdominal pain, diarrhea, nausea and vomiting.   Endocrine: Negative for cold intolerance and heat intolerance.   Genitourinary:  Negative for dysuria and frequency.   Musculoskeletal:  Positive for back pain.   Neurological:  Negative for dizziness and light-headedness.     Prior to Visit Medications    Medication Sig Taking? Authorizing Provider   tiZANidine (ZANAFLEX) 4 MG tablet Take 1 tablet by mouth every 8 hours as needed (back pain) Yes Amna Crystal MD   LORazepam (ATIVAN) 0.5 MG tablet Take 1 or 2 tablets 1 hour before MRI Yes Amna Crystal MD   dulaglutide (TRULICITY) 0.75 MG/0.5ML SOAJ SC injection Inject 0.5 mLs into the skin every 7 days Yes Christopher Khan MD   buPROPion (WELLBUTRIN XL) 300 MG extended release tablet Take 1 tablet by mouth every morning Yes Amna Crystal MD   magnesium oxide (MAG-OX) 400 (240 Mg) MG tablet TAKE 1 TABLET BY MOUTH DAILY Yes Amna Crystal MD   cetirizine (ZYRTEC) 10 MG tablet TAKE 1 TABLET BY MOUTH DAILY Yes Amna Crystal MD   meclizine (ANTIVERT) 25 MG tablet Take 1 tablet by mouth 3 times daily as needed for Dizziness

## 2025-04-10 ENCOUNTER — HOSPITAL ENCOUNTER (OUTPATIENT)
Dept: WOMENS IMAGING | Age: 55
Discharge: HOME | End: 2025-04-12
Attending: INTERNAL MEDICINE
Payer: MEDICAID

## 2025-04-10 DIAGNOSIS — Z12.31 ENCOUNTER FOR SCREENING MAMMOGRAM FOR MALIGNANT NEOPLASM OF BREAST: ICD-10-CM

## 2025-04-10 PROCEDURE — 77063 BREAST TOMOSYNTHESIS BI: CPT

## 2025-04-11 DIAGNOSIS — D86.9 SARCOIDOSIS: ICD-10-CM

## 2025-04-13 ENCOUNTER — RESULTS FOLLOW-UP (OUTPATIENT)
Dept: PRIMARY CARE CLINIC | Age: 55
End: 2025-04-13

## 2025-04-19 ENCOUNTER — APPOINTMENT (OUTPATIENT)
Dept: CT IMAGING | Age: 55
End: 2025-04-19
Payer: MEDICAID

## 2025-04-19 ENCOUNTER — HOSPITAL ENCOUNTER (EMERGENCY)
Age: 55
Discharge: HOME | End: 2025-04-19
Payer: MEDICAID

## 2025-04-19 VITALS
SYSTOLIC BLOOD PRESSURE: 112 MMHG | DIASTOLIC BLOOD PRESSURE: 67 MMHG | HEIGHT: 63 IN | TEMPERATURE: 98.2 F | OXYGEN SATURATION: 96 % | WEIGHT: 270 LBS | BODY MASS INDEX: 47.84 KG/M2 | RESPIRATION RATE: 16 BRPM | HEART RATE: 82 BPM

## 2025-04-19 DIAGNOSIS — R10.33 PERIUMBILICAL ABDOMINAL PAIN: Primary | ICD-10-CM

## 2025-04-19 DIAGNOSIS — N28.1 RENAL CYST: ICD-10-CM

## 2025-04-19 LAB
ALBUMIN SERPL-MCNC: 3.6 G/DL (ref 3.5–4.6)
ALP SERPL-CCNC: 151 U/L (ref 40–130)
ALT SERPL-CCNC: 16 U/L (ref 0–33)
ANION GAP SERPL CALCULATED.3IONS-SCNC: 10 MEQ/L (ref 9–15)
AST SERPL-CCNC: 20 U/L (ref 0–35)
BACTERIA URNS QL MICRO: NEGATIVE /HPF
BASOPHILS # BLD: 0 K/UL (ref 0–0.2)
BASOPHILS NFR BLD: 0.6 %
BILIRUB SERPL-MCNC: 0.3 MG/DL (ref 0.2–0.7)
BILIRUB UR QL STRIP: NEGATIVE
BUN SERPL-MCNC: 12 MG/DL (ref 6–20)
CALCIUM SERPL-MCNC: 8.6 MG/DL (ref 8.5–9.9)
CHLORIDE SERPL-SCNC: 107 MEQ/L (ref 95–107)
CLARITY UR: CLEAR
CO2 SERPL-SCNC: 21 MEQ/L (ref 20–31)
COLOR UR: YELLOW
CREAT SERPL-MCNC: 1.33 MG/DL (ref 0.5–0.9)
EOSINOPHIL # BLD: 0.3 K/UL (ref 0–0.7)
EOSINOPHIL NFR BLD: 4 %
EPI CELLS #/AREA URNS AUTO: NORMAL /HPF (ref 0–5)
ERYTHROCYTE [DISTWIDTH] IN BLOOD BY AUTOMATED COUNT: 13 % (ref 11.5–14.5)
GLOBULIN SER CALC-MCNC: 3.2 G/DL (ref 2.3–3.5)
GLUCOSE SERPL-MCNC: 51 MG/DL (ref 70–99)
GLUCOSE UR STRIP-MCNC: NEGATIVE MG/DL
HCT VFR BLD AUTO: 46 % (ref 37–47)
HGB BLD-MCNC: 15.6 G/DL (ref 12–16)
HGB UR QL STRIP: NEGATIVE
HYALINE CASTS #/AREA URNS AUTO: NORMAL /HPF (ref 0–5)
INFLUENZA A BY PCR: NEGATIVE
INFLUENZA B BY PCR: NEGATIVE
KETONES UR STRIP-MCNC: NEGATIVE MG/DL
LACTATE BLDV-SCNC: 0.9 MMOL/L (ref 0.5–2.2)
LEUKOCYTE ESTERASE UR QL STRIP: ABNORMAL
LIPASE SERPL-CCNC: 51 U/L (ref 12–95)
LYMPHOCYTES # BLD: 2 K/UL (ref 1–4.8)
LYMPHOCYTES NFR BLD: 30.2 %
MCH RBC QN AUTO: 31.8 PG (ref 27–31.3)
MCHC RBC AUTO-ENTMCNC: 33.9 % (ref 33–37)
MCV RBC AUTO: 93.9 FL (ref 79.4–94.8)
MONOCYTES # BLD: 0.5 K/UL (ref 0.2–0.8)
MONOCYTES NFR BLD: 7.6 %
NEUTROPHILS # BLD: 3.9 K/UL (ref 1.4–6.5)
NEUTS SEG NFR BLD: 57.5 %
NITRITE UR QL STRIP: NEGATIVE
PH UR STRIP: 6.5 [PH] (ref 5–9)
PLATELET # BLD AUTO: 292 K/UL (ref 130–400)
POC CREATININE WHOLE BLOOD: 1.6
POTASSIUM SERPL-SCNC: 4.2 MEQ/L (ref 3.4–4.9)
PROT SERPL-MCNC: 6.8 G/DL (ref 6.3–8)
PROT UR STRIP-MCNC: NEGATIVE MG/DL
RBC # BLD AUTO: 4.9 M/UL (ref 4.2–5.4)
RBC #/AREA URNS AUTO: NORMAL /HPF (ref 0–5)
REASON FOR REJECTION: NORMAL
REJECTED TEST: NORMAL
SARS-COV-2 RDRP RESP QL NAA+PROBE: NOT DETECTED
SODIUM SERPL-SCNC: 138 MEQ/L (ref 135–144)
SP GR UR STRIP: 1.02 (ref 1–1.03)
URINE REFLEX TO CULTURE: ABNORMAL
UROBILINOGEN UR STRIP-ACNC: 0.2 E.U./DL
WBC # BLD AUTO: 6.7 K/UL (ref 4.8–10.8)
WBC #/AREA URNS AUTO: NORMAL /HPF (ref 0–5)

## 2025-04-19 PROCEDURE — 83605 ASSAY OF LACTIC ACID: CPT

## 2025-04-19 PROCEDURE — 80053 COMPREHEN METABOLIC PANEL: CPT

## 2025-04-19 PROCEDURE — 87635 SARS-COV-2 COVID-19 AMP PRB: CPT

## 2025-04-19 PROCEDURE — 96375 TX/PRO/DX INJ NEW DRUG ADDON: CPT

## 2025-04-19 PROCEDURE — 2580000003 HC RX 258: Performed by: PHYSICIAN ASSISTANT

## 2025-04-19 PROCEDURE — 6370000000 HC RX 637 (ALT 250 FOR IP): Performed by: PHYSICIAN ASSISTANT

## 2025-04-19 PROCEDURE — 87502 INFLUENZA DNA AMP PROBE: CPT

## 2025-04-19 PROCEDURE — 96374 THER/PROPH/DIAG INJ IV PUSH: CPT

## 2025-04-19 PROCEDURE — 99285 EMERGENCY DEPT VISIT HI MDM: CPT

## 2025-04-19 PROCEDURE — 85025 COMPLETE CBC W/AUTO DIFF WBC: CPT

## 2025-04-19 PROCEDURE — 74177 CT ABD & PELVIS W/CONTRAST: CPT

## 2025-04-19 PROCEDURE — 81001 URINALYSIS AUTO W/SCOPE: CPT

## 2025-04-19 PROCEDURE — 6360000002 HC RX W HCPCS: Performed by: PHYSICIAN ASSISTANT

## 2025-04-19 PROCEDURE — 83690 ASSAY OF LIPASE: CPT

## 2025-04-19 PROCEDURE — 6360000004 HC RX CONTRAST MEDICATION: Performed by: PHYSICIAN ASSISTANT

## 2025-04-19 PROCEDURE — 36415 COLL VENOUS BLD VENIPUNCTURE: CPT

## 2025-04-19 RX ORDER — ONDANSETRON 2 MG/ML
4 INJECTION INTRAMUSCULAR; INTRAVENOUS ONCE
Status: COMPLETED | OUTPATIENT
Start: 2025-04-19 | End: 2025-04-19

## 2025-04-19 RX ORDER — OXYCODONE AND ACETAMINOPHEN 5; 325 MG/1; MG/1
1 TABLET ORAL ONCE
Refills: 0 | Status: COMPLETED | OUTPATIENT
Start: 2025-04-19 | End: 2025-04-19

## 2025-04-19 RX ORDER — IOPAMIDOL 755 MG/ML
75 INJECTION, SOLUTION INTRAVASCULAR
Status: COMPLETED | OUTPATIENT
Start: 2025-04-19 | End: 2025-04-19

## 2025-04-19 RX ORDER — MORPHINE SULFATE 4 MG/ML
4 INJECTION, SOLUTION INTRAMUSCULAR; INTRAVENOUS ONCE
Refills: 0 | Status: COMPLETED | OUTPATIENT
Start: 2025-04-19 | End: 2025-04-19

## 2025-04-19 RX ORDER — ONDANSETRON 4 MG/1
4 TABLET, FILM COATED ORAL 3 TIMES DAILY PRN
Qty: 15 TABLET | Refills: 0 | Status: SHIPPED | OUTPATIENT
Start: 2025-04-19

## 2025-04-19 RX ORDER — SODIUM CHLORIDE, SODIUM LACTATE, POTASSIUM CHLORIDE, AND CALCIUM CHLORIDE .6; .31; .03; .02 G/100ML; G/100ML; G/100ML; G/100ML
500 INJECTION, SOLUTION INTRAVENOUS ONCE
Status: COMPLETED | OUTPATIENT
Start: 2025-04-19 | End: 2025-04-19

## 2025-04-19 RX ADMIN — IOPAMIDOL 75 ML: 755 INJECTION, SOLUTION INTRAVENOUS at 19:18

## 2025-04-19 RX ADMIN — MORPHINE SULFATE 4 MG: 4 INJECTION, SOLUTION INTRAMUSCULAR; INTRAVENOUS at 18:47

## 2025-04-19 RX ADMIN — ONDANSETRON 4 MG: 2 INJECTION, SOLUTION INTRAMUSCULAR; INTRAVENOUS at 18:47

## 2025-04-19 RX ADMIN — OXYCODONE HYDROCHLORIDE AND ACETAMINOPHEN 1 TABLET: 5; 325 TABLET ORAL at 21:20

## 2025-04-19 RX ADMIN — SODIUM CHLORIDE, SODIUM LACTATE, POTASSIUM CHLORIDE, AND CALCIUM CHLORIDE 500 ML: .6; .31; .03; .02 INJECTION, SOLUTION INTRAVENOUS at 18:36

## 2025-04-19 ASSESSMENT — PAIN DESCRIPTION - FREQUENCY: FREQUENCY: CONTINUOUS

## 2025-04-19 ASSESSMENT — PAIN DESCRIPTION - DESCRIPTORS
DESCRIPTORS: SHARP
DESCRIPTORS: DISCOMFORT
DESCRIPTORS: DISCOMFORT

## 2025-04-19 ASSESSMENT — PAIN DESCRIPTION - ORIENTATION: ORIENTATION: MID;RIGHT;UPPER

## 2025-04-19 ASSESSMENT — PAIN SCALES - GENERAL
PAINLEVEL_OUTOF10: 6
PAINLEVEL_OUTOF10: 10
PAINLEVEL_OUTOF10: 5

## 2025-04-19 ASSESSMENT — PAIN DESCRIPTION - ONSET: ONSET: ON-GOING

## 2025-04-19 ASSESSMENT — PAIN DESCRIPTION - LOCATION
LOCATION: ABDOMEN

## 2025-04-19 ASSESSMENT — PAIN DESCRIPTION - PAIN TYPE: TYPE: ACUTE PAIN

## 2025-04-19 ASSESSMENT — PAIN - FUNCTIONAL ASSESSMENT: PAIN_FUNCTIONAL_ASSESSMENT: 0-10

## 2025-04-19 NOTE — ED TRIAGE NOTES
Patient came to the ED for RUQ abdominal pain that began today. Pt describes the pain as sharp. Last food intake was yesterday. Pt states she has had multiple loose stools today. Respirations even and unlabored. A&Ox4.

## 2025-04-20 LAB
PERFORMED ON: ABNORMAL
POC CREATININE: 1.6 MG/DL (ref 0.6–1.2)
POC SAMPLE TYPE: ABNORMAL

## 2025-04-20 NOTE — ED PROVIDER NOTES
[facility] EMERGENCY DEPARTMENT  eMERGENCYdEPARTMENT eNCOUnter        Pt Name: Lucy Tariq  MRN: [id number]  Birthdate 1970of evaluation: 4/19/2025  Provider:Vishnu Ball PA-C  9:09 PM EDT    CHIEF COMPLAINT       Chief Complaint   Patient presents with    Abdominal Pain     Along with diarrhea; began today         HISTORY OF PRESENT ILLNESS  (Location/Symptom, Timing/Onset, Context/Setting, Quality, Duration, Modifying Factors, Severity.)   Lucy Tariq is a 55 y.o. female who presents to the emergency department      With chief complaint of right upper quadrant abdominal pain that began earlier today.  Reports associated diarrhea and some nausea.  Ongoing for at least 12 hours.  States she last ate yesterday.  She denies any similar episodes in the past.  She does have a history of solitary kidney.  She is a type II diabetic.  She is currently on Trulicity but has been on this for over a month did not have any similar symptoms prior to initiation of Trulicity.  She denies any vomiting.          Nursing Notes were reviewed and I agree.    REVIEW OF SYSTEMS    (2-9 systems for level 4, 10 or more for level 5)     Review of Systems   All other systems reviewed and are negative.       as noted above the remainder of the review of systems was reviewed and negative.       PAST MEDICAL HISTORY     Past Medical History:   Diagnosis Date    Anxiety     Arthritis     Asthma     Bronchopneumonia     Cancer (HCC)     renal    Cerebral artery occlusion with cerebral infarction (HCC)     Chronic bilateral low back pain with sciatica     Chronic kidney disease     Chronic obstructive lung disease (HCC) 07/26/2019    Depression     Fibromyalgia     Gout     rt knee    Hypertension     Insulin dependent type 2 diabetes mellitus, uncontrolled 08/03/2018    Localized enlarged lymph nodes 10/26/2018    Mixed headache     PONV (postoperative nausea and vomiting)     Pure hyperglyceridemia 05/19/2017    Sarcoidosis

## 2025-04-20 NOTE — DISCHARGE INSTRUCTIONS
Please do while hydrated.  Eat small comments throughout the day.  Follow close with the family physician for recheck.  Return immediately for new or worsening symptoms.  Please mention your small kidney cyst to your specialist as it may need an outpatient ultrasound per their judgment

## 2025-04-21 ENCOUNTER — APPOINTMENT (OUTPATIENT)
Dept: GENERAL RADIOLOGY | Age: 55
End: 2025-04-21
Payer: MEDICAID

## 2025-04-21 ENCOUNTER — HOSPITAL ENCOUNTER (EMERGENCY)
Age: 55
Discharge: HOME | End: 2025-04-21
Attending: EMERGENCY MEDICINE
Payer: MEDICAID

## 2025-04-21 ENCOUNTER — APPOINTMENT (OUTPATIENT)
Dept: CT IMAGING | Age: 55
End: 2025-04-21
Payer: MEDICAID

## 2025-04-21 ENCOUNTER — TELEPHONE (OUTPATIENT)
Dept: PRIMARY CARE CLINIC | Age: 55
End: 2025-04-21

## 2025-04-21 ENCOUNTER — APPOINTMENT (OUTPATIENT)
Dept: ULTRASOUND IMAGING | Age: 55
End: 2025-04-21
Payer: MEDICAID

## 2025-04-21 VITALS
OXYGEN SATURATION: 90 % | WEIGHT: 283.4 LBS | RESPIRATION RATE: 13 BRPM | HEART RATE: 78 BPM | SYSTOLIC BLOOD PRESSURE: 121 MMHG | DIASTOLIC BLOOD PRESSURE: 78 MMHG | HEIGHT: 63 IN | BODY MASS INDEX: 50.21 KG/M2 | TEMPERATURE: 98.1 F

## 2025-04-21 DIAGNOSIS — N30.00 ACUTE CYSTITIS WITHOUT HEMATURIA: ICD-10-CM

## 2025-04-21 DIAGNOSIS — R10.9 ABDOMINAL PAIN, UNSPECIFIED ABDOMINAL LOCATION: Primary | ICD-10-CM

## 2025-04-21 LAB
ALBUMIN SERPL-MCNC: 3.8 G/DL (ref 3.5–4.6)
ALP SERPL-CCNC: 161 U/L (ref 40–130)
ALT SERPL-CCNC: 17 U/L (ref 0–33)
ANION GAP SERPL CALCULATED.3IONS-SCNC: 13 MEQ/L (ref 9–15)
AST SERPL-CCNC: 21 U/L (ref 0–35)
BACTERIA URNS QL MICRO: ABNORMAL /HPF
BASOPHILS # BLD: 0 K/UL (ref 0–0.2)
BASOPHILS NFR BLD: 0.6 %
BILIRUB SERPL-MCNC: 0.5 MG/DL (ref 0.2–0.7)
BILIRUB UR QL STRIP: NEGATIVE
BUN SERPL-MCNC: 11 MG/DL (ref 6–20)
CALCIUM SERPL-MCNC: 8.9 MG/DL (ref 8.5–9.9)
CHLORIDE SERPL-SCNC: 101 MEQ/L (ref 95–107)
CLARITY UR: CLEAR
CO2 SERPL-SCNC: 22 MEQ/L (ref 20–31)
COLOR UR: YELLOW
CREAT SERPL-MCNC: 1.41 MG/DL (ref 0.5–0.9)
EKG ATRIAL RATE: 81 BPM
EKG P AXIS: 34 DEGREES
EKG P-R INTERVAL: 170 MS
EKG Q-T INTERVAL: 374 MS
EKG QRS DURATION: 76 MS
EKG QTC CALCULATION (BAZETT): 434 MS
EKG R AXIS: 4 DEGREES
EKG T AXIS: 18 DEGREES
EKG VENTRICULAR RATE: 81 BPM
EOSINOPHIL # BLD: 0.2 K/UL (ref 0–0.7)
EOSINOPHIL NFR BLD: 3.2 %
EPI CELLS #/AREA URNS AUTO: ABNORMAL /HPF (ref 0–5)
ERYTHROCYTE [DISTWIDTH] IN BLOOD BY AUTOMATED COUNT: 12.7 % (ref 11.5–14.5)
GLOBULIN SER CALC-MCNC: 3.9 G/DL (ref 2.3–3.5)
GLUCOSE SERPL-MCNC: 143 MG/DL (ref 70–99)
GLUCOSE UR STRIP-MCNC: NEGATIVE MG/DL
HCT VFR BLD AUTO: 51.2 % (ref 37–47)
HGB BLD-MCNC: 16.7 G/DL (ref 12–16)
HGB UR QL STRIP: NEGATIVE
HYALINE CASTS #/AREA URNS AUTO: ABNORMAL /HPF (ref 0–5)
KETONES UR STRIP-MCNC: 15 MG/DL
LEUKOCYTE ESTERASE UR QL STRIP: ABNORMAL
LIPASE SERPL-CCNC: 34 U/L (ref 12–95)
LYMPHOCYTES # BLD: 1.6 K/UL (ref 1–4.8)
LYMPHOCYTES NFR BLD: 24.7 %
MCH RBC QN AUTO: 32.1 PG (ref 27–31.3)
MCHC RBC AUTO-ENTMCNC: 32.6 % (ref 33–37)
MCV RBC AUTO: 98.3 FL (ref 79.4–94.8)
MONOCYTES # BLD: 0.4 K/UL (ref 0.2–0.8)
MONOCYTES NFR BLD: 6.3 %
NEUTROPHILS # BLD: 4.2 K/UL (ref 1.4–6.5)
NEUTS SEG NFR BLD: 64.7 %
NITRITE UR QL STRIP: NEGATIVE
PH UR STRIP: 5.5 [PH] (ref 5–9)
PLATELET # BLD AUTO: 291 K/UL (ref 130–400)
POTASSIUM SERPL-SCNC: 4.7 MEQ/L (ref 3.4–4.9)
PROT SERPL-MCNC: 7.7 G/DL (ref 6.3–8)
PROT UR STRIP-MCNC: ABNORMAL MG/DL
RBC # BLD AUTO: 5.21 M/UL (ref 4.2–5.4)
RBC #/AREA URNS AUTO: ABNORMAL /HPF (ref 0–5)
SODIUM SERPL-SCNC: 136 MEQ/L (ref 135–144)
SP GR UR STRIP: 1.02 (ref 1–1.03)
URINE REFLEX TO CULTURE: ABNORMAL
UROBILINOGEN UR STRIP-ACNC: 0.2 E.U./DL
WBC # BLD AUTO: 6.6 K/UL (ref 4.8–10.8)
WBC #/AREA URNS AUTO: ABNORMAL /HPF (ref 0–5)

## 2025-04-21 PROCEDURE — 71045 X-RAY EXAM CHEST 1 VIEW: CPT

## 2025-04-21 PROCEDURE — 74176 CT ABD & PELVIS W/O CONTRAST: CPT

## 2025-04-21 PROCEDURE — 96375 TX/PRO/DX INJ NEW DRUG ADDON: CPT

## 2025-04-21 PROCEDURE — 93010 ELECTROCARDIOGRAM REPORT: CPT | Performed by: INTERNAL MEDICINE

## 2025-04-21 PROCEDURE — 6370000000 HC RX 637 (ALT 250 FOR IP): Performed by: EMERGENCY MEDICINE

## 2025-04-21 PROCEDURE — 2580000003 HC RX 258: Performed by: EMERGENCY MEDICINE

## 2025-04-21 PROCEDURE — 99285 EMERGENCY DEPT VISIT HI MDM: CPT

## 2025-04-21 PROCEDURE — 76705 ECHO EXAM OF ABDOMEN: CPT

## 2025-04-21 PROCEDURE — 83690 ASSAY OF LIPASE: CPT

## 2025-04-21 PROCEDURE — 93005 ELECTROCARDIOGRAM TRACING: CPT | Performed by: EMERGENCY MEDICINE

## 2025-04-21 PROCEDURE — 6360000002 HC RX W HCPCS: Performed by: EMERGENCY MEDICINE

## 2025-04-21 PROCEDURE — 85025 COMPLETE CBC W/AUTO DIFF WBC: CPT

## 2025-04-21 PROCEDURE — 96374 THER/PROPH/DIAG INJ IV PUSH: CPT

## 2025-04-21 PROCEDURE — 81001 URINALYSIS AUTO W/SCOPE: CPT

## 2025-04-21 PROCEDURE — 80053 COMPREHEN METABOLIC PANEL: CPT

## 2025-04-21 RX ORDER — 0.9 % SODIUM CHLORIDE 0.9 %
1000 INTRAVENOUS SOLUTION INTRAVENOUS ONCE
Status: DISCONTINUED | OUTPATIENT
Start: 2025-04-21 | End: 2025-04-21

## 2025-04-21 RX ORDER — FAMOTIDINE 20 MG/1
20 TABLET, FILM COATED ORAL 2 TIMES DAILY
Qty: 60 TABLET | Refills: 0 | Status: SHIPPED | OUTPATIENT
Start: 2025-04-21

## 2025-04-21 RX ORDER — SUCRALFATE ORAL 1 G/10ML
1 SUSPENSION ORAL 4 TIMES DAILY
Qty: 1200 ML | Refills: 0 | Status: SHIPPED | OUTPATIENT
Start: 2025-04-21

## 2025-04-21 RX ORDER — TRAMADOL HYDROCHLORIDE 50 MG/1
50 TABLET ORAL EVERY 8 HOURS PRN
Qty: 9 TABLET | Refills: 0 | Status: SHIPPED | OUTPATIENT
Start: 2025-04-21 | End: 2025-04-24

## 2025-04-21 RX ORDER — ONDANSETRON 2 MG/ML
4 INJECTION INTRAMUSCULAR; INTRAVENOUS ONCE
Status: COMPLETED | OUTPATIENT
Start: 2025-04-21 | End: 2025-04-21

## 2025-04-21 RX ORDER — DICYCLOMINE HCL 20 MG
20 TABLET ORAL 4 TIMES DAILY
Qty: 40 TABLET | Refills: 0 | Status: SHIPPED | OUTPATIENT
Start: 2025-04-21 | End: 2025-05-01

## 2025-04-21 RX ORDER — OXYCODONE AND ACETAMINOPHEN 5; 325 MG/1; MG/1
1 TABLET ORAL ONCE
Refills: 0 | Status: COMPLETED | OUTPATIENT
Start: 2025-04-21 | End: 2025-04-21

## 2025-04-21 RX ORDER — FENTANYL CITRATE 50 UG/ML
100 INJECTION, SOLUTION INTRAMUSCULAR; INTRAVENOUS ONCE
Status: COMPLETED | OUTPATIENT
Start: 2025-04-21 | End: 2025-04-21

## 2025-04-21 RX ORDER — LEVOFLOXACIN 750 MG/1
750 TABLET, FILM COATED ORAL DAILY
Qty: 7 TABLET | Refills: 0 | Status: SHIPPED | OUTPATIENT
Start: 2025-04-21 | End: 2025-04-28

## 2025-04-21 RX ADMIN — ONDANSETRON 4 MG: 2 INJECTION, SOLUTION INTRAMUSCULAR; INTRAVENOUS at 08:41

## 2025-04-21 RX ADMIN — FAMOTIDINE 20 MG: 10 INJECTION, SOLUTION INTRAVENOUS at 08:58

## 2025-04-21 RX ADMIN — LIDOCAINE HYDROCHLORIDE: 20 SOLUTION ORAL at 11:19

## 2025-04-21 RX ADMIN — OXYCODONE AND ACETAMINOPHEN 1 TABLET: 5; 325 TABLET ORAL at 11:54

## 2025-04-21 RX ADMIN — SODIUM CHLORIDE 40 MG: 9 INJECTION INTRAMUSCULAR; INTRAVENOUS; SUBCUTANEOUS at 11:22

## 2025-04-21 RX ADMIN — FENTANYL CITRATE 100 MCG: 50 INJECTION INTRAMUSCULAR; INTRAVENOUS at 08:46

## 2025-04-21 ASSESSMENT — PAIN SCALES - GENERAL
PAINLEVEL_OUTOF10: 10
PAINLEVEL_OUTOF10: 10
PAINLEVEL_OUTOF10: 6
PAINLEVEL_OUTOF10: 8
PAINLEVEL_OUTOF10: 8

## 2025-04-21 ASSESSMENT — PAIN DESCRIPTION - LOCATION
LOCATION: ABDOMEN

## 2025-04-21 ASSESSMENT — ENCOUNTER SYMPTOMS
SHORTNESS OF BREATH: 0
ABDOMINAL PAIN: 1
NAUSEA: 1
CONSTIPATION: 1
VOMITING: 1

## 2025-04-21 ASSESSMENT — PAIN DESCRIPTION - PAIN TYPE: TYPE: ACUTE PAIN

## 2025-04-21 ASSESSMENT — PAIN DESCRIPTION - DESCRIPTORS: DESCRIPTORS: ACHING

## 2025-04-21 ASSESSMENT — PAIN DESCRIPTION - ORIENTATION
ORIENTATION: RIGHT;MID

## 2025-04-21 ASSESSMENT — PAIN - FUNCTIONAL ASSESSMENT: PAIN_FUNCTIONAL_ASSESSMENT: 0-10

## 2025-04-21 NOTE — ED TRIAGE NOTES
Pt arrived to ER by private vehicle with .   Pt c/o abdominal pain, seen in this ER for same symptoms on 4/19,  Pt states pain is not getting any better.

## 2025-04-21 NOTE — ED NOTES
Pt states she has mid-abdominal pain that has not improved since her visit on 4/19. Pt has rebound tenderness in her mid abdomen. 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

## 2025-04-21 NOTE — ED NOTES
Patient discharged at this time. Educated on discharge instructions and follow-up with recommended provider and/or specialist. Patient verbalizes understanding and denies questions/needs. Patient ambulates with steady gait at time of discharge in no noted distress. Patient's skin pink, warm and dry, respirations easy and non-labored. Prescribed medications discussed with patient and denies questions/needs.  Patient has  for sober ride

## 2025-04-21 NOTE — ED PROVIDER NOTES
URINALYSIS WITH REFLEX TO CULTURE - Abnormal; Notable for the following components:    Ketones, Urine 15 (*)     Protein, UA TRACE (*)     Leukocyte Esterase, Urine TRACE (*)     All other components within normal limits   MICROSCOPIC URINALYSIS - Abnormal; Notable for the following components:    Bacteria, UA FEW (*)     WBC, UA 6-9 (*)     All other components within normal limits   LIPASE       All other labs were within normal range or not returned as of this dictation.    EMERGENCY DEPARTMENT COURSE and DIFFERENTIAL DIAGNOSIS/MDM:   Vitals:    Vitals:    04/21/25 0851 04/21/25 0900 04/21/25 1024 04/21/25 1059   BP:  130/74 132/77 126/74   Pulse: 79 77 70 72   Resp: 18 17 17 21   Temp:       TempSrc:       SpO2: 98% 95% 95% 96%   Weight:       Height:           No leukocytosis.  Baseline creatinine  Nonischemic EKG, doubt ACS  Medical Decision Making  Amount and/or Complexity of Data Reviewed  Labs: ordered.  Radiology: ordered.  ECG/medicine tests: ordered.    Risk  Prescription drug management.    Patient presents emergency department for evaluation of abdominal pain.  She had a CT 2 days ago that did not show anything acute.  Differential includes esophagitis, gastritis, ulcer.  Initially given fentanyl, Zofran, Pepcid, will reevaluate.  No leukocytosis.  No transaminitis.  CT not consistent with obstruction.  Ultrasound not consistent with cholelithiasis.  Symptom improvement in department which is reassuring.  Patient would benefit from initiation of proton pump inhibitors or acid reducers, Carafate, GI referral for outpatient endoscopy  She has had some symptom improvement which is reassuring  Given her renal function will avoid Macrobid, Bactrim.  She has allergies to cephalosporins.      CONSULTS:  None    PROCEDURES:  Unless otherwise noted below, none     Procedures      FINAL IMPRESSION      1. Abdominal pain, unspecified abdominal location    2. Acute cystitis without hematuria

## 2025-04-25 ENCOUNTER — TELEPHONE (OUTPATIENT)
Dept: PRIMARY CARE CLINIC | Age: 55
End: 2025-04-25

## 2025-04-25 NOTE — TELEPHONE ENCOUNTER
Pt called and stated that she went to the hospital and they told her that she has a cyst on her kidney.     Pt stated that she was seeing Dr Quiroga but she is unable to locate him.    Pt would like Dr Crystal to put a referral in for urology

## 2025-04-27 DIAGNOSIS — N28.1 RENAL CYST: Primary | ICD-10-CM

## 2025-04-27 NOTE — TELEPHONE ENCOUNTER
I placed a referral to urology but find out an approved one  by insurance and take the referral there

## 2025-04-30 DIAGNOSIS — E11.9 TYPE 2 DIABETES MELLITUS TREATED WITH INSULIN (HCC): ICD-10-CM

## 2025-04-30 DIAGNOSIS — Z79.4 TYPE 2 DIABETES MELLITUS TREATED WITH INSULIN (HCC): ICD-10-CM

## 2025-04-30 RX ORDER — BLOOD SUGAR DIAGNOSTIC
STRIP MISCELLANEOUS
Qty: 100 EACH | Refills: 3 | Status: SHIPPED | OUTPATIENT
Start: 2025-04-30

## 2025-05-05 ENCOUNTER — HOSPITAL ENCOUNTER (EMERGENCY)
Age: 55
Discharge: HOME | End: 2025-05-05
Attending: STUDENT IN AN ORGANIZED HEALTH CARE EDUCATION/TRAINING PROGRAM
Payer: MEDICAID

## 2025-05-05 ENCOUNTER — APPOINTMENT (OUTPATIENT)
Dept: CT IMAGING | Age: 55
End: 2025-05-05
Payer: MEDICAID

## 2025-05-05 VITALS
HEART RATE: 70 BPM | BODY MASS INDEX: 50.1 KG/M2 | OXYGEN SATURATION: 96 % | DIASTOLIC BLOOD PRESSURE: 61 MMHG | WEIGHT: 282.8 LBS | RESPIRATION RATE: 23 BRPM | TEMPERATURE: 98.2 F | SYSTOLIC BLOOD PRESSURE: 120 MMHG

## 2025-05-05 DIAGNOSIS — M54.50 ACUTE EXACERBATION OF CHRONIC LOW BACK PAIN: ICD-10-CM

## 2025-05-05 DIAGNOSIS — G89.29 ACUTE EXACERBATION OF CHRONIC LOW BACK PAIN: ICD-10-CM

## 2025-05-05 DIAGNOSIS — R10.13 ABDOMINAL PAIN, EPIGASTRIC: Primary | ICD-10-CM

## 2025-05-05 LAB
ALBUMIN SERPL-MCNC: 3.9 G/DL (ref 3.5–4.6)
ALP SERPL-CCNC: 143 U/L (ref 40–130)
ALT SERPL-CCNC: 18 U/L (ref 0–33)
AMPHET UR QL SCN: ABNORMAL
ANION GAP SERPL CALCULATED.3IONS-SCNC: 11 MEQ/L (ref 9–15)
AST SERPL-CCNC: 19 U/L (ref 0–35)
BACTERIA URNS QL MICRO: ABNORMAL /HPF
BARBITURATES UR QL SCN: ABNORMAL
BASOPHILS # BLD: 0 K/UL (ref 0–0.2)
BASOPHILS NFR BLD: 0.4 %
BENZODIAZ UR QL SCN: ABNORMAL
BILIRUB SERPL-MCNC: 0.4 MG/DL (ref 0.2–0.7)
BILIRUB UR QL STRIP: NEGATIVE
BUN SERPL-MCNC: 11 MG/DL (ref 6–20)
CALCIUM SERPL-MCNC: 8.9 MG/DL (ref 8.5–9.9)
CANNABINOIDS UR QL SCN: POSITIVE
CHLORIDE SERPL-SCNC: 104 MEQ/L (ref 95–107)
CLARITY UR: CLEAR
CO2 SERPL-SCNC: 21 MEQ/L (ref 20–31)
COCAINE UR QL SCN: ABNORMAL
COLOR UR: YELLOW
CREAT SERPL-MCNC: 1.36 MG/DL (ref 0.5–0.9)
DRUG SCREEN COMMENT UR-IMP: ABNORMAL
EKG ATRIAL RATE: 70 BPM
EKG DIAGNOSIS: NORMAL
EKG P AXIS: 35 DEGREES
EKG P-R INTERVAL: 166 MS
EKG Q-T INTERVAL: 384 MS
EKG QRS DURATION: 78 MS
EKG QTC CALCULATION (BAZETT): 414 MS
EKG R AXIS: -3 DEGREES
EKG T AXIS: 1 DEGREES
EKG VENTRICULAR RATE: 70 BPM
EOSINOPHIL # BLD: 0.3 K/UL (ref 0–0.7)
EOSINOPHIL NFR BLD: 3.5 %
EPI CELLS #/AREA URNS HPF: ABNORMAL /HPF
ERYTHROCYTE [DISTWIDTH] IN BLOOD BY AUTOMATED COUNT: 12.9 % (ref 11.5–14.5)
FENTANYL SCREEN, URINE: ABNORMAL
GLOBULIN SER CALC-MCNC: 3.5 G/DL (ref 2.3–3.5)
GLUCOSE SERPL-MCNC: 83 MG/DL (ref 70–99)
GLUCOSE UR STRIP-MCNC: NEGATIVE MG/DL
HCT VFR BLD AUTO: 47.4 % (ref 37–47)
HGB BLD-MCNC: 15.9 G/DL (ref 12–16)
HGB UR QL STRIP: NEGATIVE
KETONES UR STRIP-MCNC: NEGATIVE MG/DL
LACTATE BLDV-SCNC: 0.9 MMOL/L (ref 0.5–2.2)
LEUKOCYTE ESTERASE UR QL STRIP: ABNORMAL
LIPASE SERPL-CCNC: 40 U/L (ref 12–95)
LYMPHOCYTES # BLD: 2.2 K/UL (ref 1–4.8)
LYMPHOCYTES NFR BLD: 29.6 %
MAGNESIUM SERPL-MCNC: 2 MG/DL (ref 1.7–2.4)
MCH RBC QN AUTO: 32.1 PG (ref 27–31.3)
MCHC RBC AUTO-ENTMCNC: 33.5 % (ref 33–37)
MCV RBC AUTO: 95.6 FL (ref 79.4–94.8)
METHADONE UR QL SCN: ABNORMAL
MONOCYTES # BLD: 0.5 K/UL (ref 0.2–0.8)
MONOCYTES NFR BLD: 6.2 %
NEUTROPHILS # BLD: 4.5 K/UL (ref 1.4–6.5)
NEUTS SEG NFR BLD: 60 %
NITRITE UR QL STRIP: NEGATIVE
OPIATES UR QL SCN: ABNORMAL
OXYCODONE UR QL SCN: ABNORMAL
PCP UR QL SCN: ABNORMAL
PH UR STRIP: 5.5 [PH] (ref 5–9)
PLATELET # BLD AUTO: 297 K/UL (ref 130–400)
POTASSIUM SERPL-SCNC: 3.9 MEQ/L (ref 3.4–4.9)
PROPOXYPH UR QL SCN: ABNORMAL
PROT SERPL-MCNC: 7.4 G/DL (ref 6.3–8)
PROT UR STRIP-MCNC: NEGATIVE MG/DL
RBC # BLD AUTO: 4.96 M/UL (ref 4.2–5.4)
RBC #/AREA URNS HPF: ABNORMAL /HPF (ref 0–2)
SODIUM SERPL-SCNC: 136 MEQ/L (ref 135–144)
SP GR UR STRIP: 1.02 (ref 1–1.03)
TROPONIN, HIGH SENSITIVITY: 14 NG/L (ref 0–19)
TROPONIN, HIGH SENSITIVITY: 14 NG/L (ref 0–19)
URINE REFLEX TO CULTURE: ABNORMAL
UROBILINOGEN UR STRIP-ACNC: 0.2 E.U./DL
WBC # BLD AUTO: 7.5 K/UL (ref 4.8–10.8)
WBC #/AREA URNS HPF: ABNORMAL /HPF (ref 0–5)

## 2025-05-05 PROCEDURE — 80053 COMPREHEN METABOLIC PANEL: CPT

## 2025-05-05 PROCEDURE — 99284 EMERGENCY DEPT VISIT MOD MDM: CPT

## 2025-05-05 PROCEDURE — 96375 TX/PRO/DX INJ NEW DRUG ADDON: CPT

## 2025-05-05 PROCEDURE — 85025 COMPLETE CBC W/AUTO DIFF WBC: CPT

## 2025-05-05 PROCEDURE — 96361 HYDRATE IV INFUSION ADD-ON: CPT

## 2025-05-05 PROCEDURE — 81001 URINALYSIS AUTO W/SCOPE: CPT

## 2025-05-05 PROCEDURE — 74176 CT ABD & PELVIS W/O CONTRAST: CPT

## 2025-05-05 PROCEDURE — 96374 THER/PROPH/DIAG INJ IV PUSH: CPT

## 2025-05-05 PROCEDURE — 83735 ASSAY OF MAGNESIUM: CPT

## 2025-05-05 PROCEDURE — 84484 ASSAY OF TROPONIN QUANT: CPT

## 2025-05-05 PROCEDURE — 83605 ASSAY OF LACTIC ACID: CPT

## 2025-05-05 PROCEDURE — 83690 ASSAY OF LIPASE: CPT

## 2025-05-05 PROCEDURE — 80307 DRUG TEST PRSMV CHEM ANLYZR: CPT

## 2025-05-05 PROCEDURE — 6360000002 HC RX W HCPCS: Performed by: STUDENT IN AN ORGANIZED HEALTH CARE EDUCATION/TRAINING PROGRAM

## 2025-05-05 PROCEDURE — 2580000003 HC RX 258: Performed by: STUDENT IN AN ORGANIZED HEALTH CARE EDUCATION/TRAINING PROGRAM

## 2025-05-05 RX ORDER — MORPHINE SULFATE 4 MG/ML
4 INJECTION, SOLUTION INTRAMUSCULAR; INTRAVENOUS ONCE
Refills: 0 | Status: COMPLETED | OUTPATIENT
Start: 2025-05-05 | End: 2025-05-05

## 2025-05-05 RX ORDER — 0.9 % SODIUM CHLORIDE 0.9 %
1000 INTRAVENOUS SOLUTION INTRAVENOUS ONCE
Status: COMPLETED | OUTPATIENT
Start: 2025-05-05 | End: 2025-05-05

## 2025-05-05 RX ORDER — ONDANSETRON 2 MG/ML
4 INJECTION INTRAMUSCULAR; INTRAVENOUS ONCE
Status: COMPLETED | OUTPATIENT
Start: 2025-05-05 | End: 2025-05-05

## 2025-05-05 RX ORDER — DROPERIDOL 2.5 MG/ML
1.25 INJECTION, SOLUTION INTRAMUSCULAR; INTRAVENOUS ONCE
Status: COMPLETED | OUTPATIENT
Start: 2025-05-05 | End: 2025-05-05

## 2025-05-05 RX ORDER — DIPHENHYDRAMINE HYDROCHLORIDE 50 MG/ML
25 INJECTION, SOLUTION INTRAMUSCULAR; INTRAVENOUS ONCE
Status: COMPLETED | OUTPATIENT
Start: 2025-05-05 | End: 2025-05-05

## 2025-05-05 RX ADMIN — DROPERIDOL 1.25 MG: 2.5 INJECTION, SOLUTION INTRAMUSCULAR; INTRAVENOUS at 04:32

## 2025-05-05 RX ADMIN — ONDANSETRON 4 MG: 2 INJECTION, SOLUTION INTRAMUSCULAR; INTRAVENOUS at 02:07

## 2025-05-05 RX ADMIN — SODIUM CHLORIDE 1000 ML: 0.9 INJECTION, SOLUTION INTRAVENOUS at 02:06

## 2025-05-05 RX ADMIN — DIPHENHYDRAMINE HYDROCHLORIDE 25 MG: 50 INJECTION INTRAMUSCULAR; INTRAVENOUS at 04:33

## 2025-05-05 RX ADMIN — FAMOTIDINE 20 MG: 10 INJECTION, SOLUTION INTRAVENOUS at 02:06

## 2025-05-05 RX ADMIN — MORPHINE SULFATE 4 MG: 4 INJECTION, SOLUTION INTRAMUSCULAR; INTRAVENOUS at 02:07

## 2025-05-05 ASSESSMENT — PAIN SCALES - GENERAL
PAINLEVEL_OUTOF10: 10
PAINLEVEL_OUTOF10: 10

## 2025-05-05 ASSESSMENT — PAIN DESCRIPTION - LOCATION: LOCATION: ABDOMEN

## 2025-05-05 ASSESSMENT — PAIN - FUNCTIONAL ASSESSMENT: PAIN_FUNCTIONAL_ASSESSMENT: 0-10

## 2025-05-05 NOTE — ED TRIAGE NOTES
Pt here for epigastric pain and left flank pain that started yesterday AM. Patient reports pain has increased. Pain is sharp, aching, constant. Rates pain 10/10. Medications were not taken for pain prior to arrival. Reports nausea with frequent BM. Denies vomiting and urinary changes. Denies chest pain/ SOB. VSS.

## 2025-05-05 NOTE — ED PROVIDER NOTES
Cervical back: No tenderness.      Thoracic back: Tenderness present. No bony tenderness.      Lumbar back: Tenderness present. No bony tenderness.        Back:       Right lower leg: No edema.      Left lower leg: No edema.   Skin:     General: Skin is warm and dry.      Capillary Refill: Capillary refill takes less than 2 seconds.   Neurological:      General: No focal deficit present.      Mental Status: She is alert and oriented to person, place, and time.   Psychiatric:         Mood and Affect: Mood is anxious.         Behavior: Behavior is cooperative.         MDM:   Chart Reviewed: PMH and additional information as noted in HPI obtained from outside chart review.          Vitals:    Vitals:    05/05/25 0230 05/05/25 0300 05/05/25 0330 05/05/25 0500   BP: 129/66 127/68 120/61    Pulse:    70   Resp:    23   Temp:       TempSrc:       SpO2: 95% 96% 94% 96%   Weight:           PROCEDURES:  Unless otherwise noted below, none  Procedures    LABS:  Labs Reviewed   URINALYSIS WITH REFLEX TO CULTURE - Abnormal; Notable for the following components:       Result Value    Leukocyte Esterase, Urine TRACE (*)     All other components within normal limits   URINE DRUG SCREEN - Abnormal; Notable for the following components:    Cannabinoid Scrn, Ur POSITIVE (*)     All other components within normal limits   COMPREHENSIVE METABOLIC PANEL - Abnormal; Notable for the following components:    Creatinine 1.36 (*)     Est, Glom Filt Rate 46.3 (*)     Alkaline Phosphatase 143 (*)     All other components within normal limits   CBC WITH AUTO DIFFERENTIAL - Abnormal; Notable for the following components:    Hematocrit 47.4 (*)     MCV 95.6 (*)     MCH 32.1 (*)     All other components within normal limits   MICROSCOPIC URINALYSIS - Abnormal; Notable for the following components:    Bacteria, UA FEW (*)     All other components within normal limits   LACTIC ACID   MAGNESIUM   LIPASE   TROPONIN   TROPONIN       CT ABDOMEN PELVIS WO

## 2025-05-06 ENCOUNTER — TELEMEDICINE (OUTPATIENT)
Age: 55
End: 2025-05-06
Payer: MEDICAID

## 2025-05-06 DIAGNOSIS — G47.34 NOCTURNAL HYPOXIA: ICD-10-CM

## 2025-05-06 DIAGNOSIS — J45.41 MODERATE PERSISTENT ASTHMA WITH ACUTE EXACERBATION: ICD-10-CM

## 2025-05-06 DIAGNOSIS — D86.9 SARCOIDOSIS: Primary | ICD-10-CM

## 2025-05-06 DIAGNOSIS — G47.33 OSA (OBSTRUCTIVE SLEEP APNEA): ICD-10-CM

## 2025-05-06 DIAGNOSIS — D75.A G6PD DEFICIENCY: ICD-10-CM

## 2025-05-06 DIAGNOSIS — E66.813 CLASS 3 SEVERE OBESITY DUE TO EXCESS CALORIES WITHOUT SERIOUS COMORBIDITY WITH BODY MASS INDEX (BMI) OF 50.0 TO 59.9 IN ADULT (HCC): ICD-10-CM

## 2025-05-06 PROCEDURE — 99214 OFFICE O/P EST MOD 30 MIN: CPT | Performed by: INTERNAL MEDICINE

## 2025-05-06 ASSESSMENT — ENCOUNTER SYMPTOMS
GASTROINTESTINAL NEGATIVE: 1
RESPIRATORY NEGATIVE: 1
ALLERGIC/IMMUNOLOGIC NEGATIVE: 1
EYES NEGATIVE: 1

## 2025-05-06 NOTE — PROGRESS NOTES
Lucy CONSTANZA Mengd, was evaluated through a synchronous (real-time) telephone encounter. The patient (or guardian if applicable) is aware that this is a billable service, which includes applicable co-pays. This Virtual Visit was conducted with patient's (and/or legal guardian's) consent. Patient identification was verified, and a caregiver was present when appropriate.   The patient was located at Home: [address] [place] [id number]  [facility] [address]  Provider was located at Facility (Appt Dept): [address] [facility]  [address]  [place] [address]  Yes, I confirm.         Asthma       History of Present Illness    5/6/2025  Patient has asthma and sarcoidosis, symptoms controlled, no coughing, no chest pain, no shortness of breath, no lower extremity edema, no heartburn, weight is stable, no nasal congestion or postnasal drip.      Has not been using her CPAP.    Current Outpatient Medications on File Prior to Visit   Medication Sig Dispense Refill    blood glucose test strips (ONETOUCH VERIO) strip Test 3x daily e11.65 100 each 3    famotidine (PEPCID) 20 MG tablet Take 1 tablet by mouth 2 times daily 60 tablet 0    sucralfate (CARAFATE) 1 GM/10ML suspension Take 10 mLs by mouth 4 times daily 1200 mL 0    ondansetron (ZOFRAN) 4 MG tablet Take 1 tablet by mouth 3 times daily as needed for Nausea or Vomiting 15 tablet 0    tiZANidine (ZANAFLEX) 4 MG tablet Take 1 tablet by mouth every 8 hours as needed (back pain) 30 tablet 2    dulaglutide (TRULICITY) 0.75 MG/0.5ML SOAJ SC injection Inject 0.5 mLs into the skin every 7 days 2 mL 4    buPROPion (WELLBUTRIN XL) 300 MG extended release tablet Take 1 tablet by mouth every morning 60 tablet 2    magnesium oxide (MAG-OX) 400 (240 Mg) MG tablet TAKE 1 TABLET BY MOUTH DAILY 90 tablet 3    cetirizine (ZYRTEC) 10 MG tablet TAKE 1 TABLET BY MOUTH DAILY 90 tablet 3    meclizine (ANTIVERT) 25 MG tablet Take 1 tablet by mouth 3 times daily as needed for Dizziness or Nausea 30 tablet 2

## 2025-05-08 DIAGNOSIS — Z79.4 TYPE 2 DIABETES MELLITUS WITH HYPERGLYCEMIA, WITH LONG-TERM CURRENT USE OF INSULIN (HCC): ICD-10-CM

## 2025-05-08 DIAGNOSIS — E11.65 TYPE 2 DIABETES MELLITUS WITH HYPERGLYCEMIA, WITH LONG-TERM CURRENT USE OF INSULIN (HCC): ICD-10-CM

## 2025-05-08 NOTE — TELEPHONE ENCOUNTER
Comments:     Last Office Visit (last PCP visit):   2/13/2025    Next Visit Date:  Future Appointments   Date Time Provider Department Center   5/12/2025  4:30 PM Christopher Khan MD [facility]   5/21/2025  3:00 PM Sharonda Lane MD [facility]   7/9/2025  3:15 PM [facility]   11/3/2025  3:30 PM Gray Garvin MD [facility]       **If hasn't been seen in over a year OR hasn't followed up according to last diabetes/ADHD visit, make appointment for patient before sending refill to provider.    Rx requested:  Requested Prescriptions     Pending Prescriptions Disp Refills    Insulin Pen Needle (B-D ULTRAFINE III SHORT PEN) 31G X 8 MM MISC 300 each 5     Sig: Use qid

## 2025-05-09 RX ORDER — PEN NEEDLE, DIABETIC 31 GX5/16"
NEEDLE, DISPOSABLE MISCELLANEOUS
Qty: 300 EACH | Refills: 5 | Status: SHIPPED | OUTPATIENT
Start: 2025-05-09

## 2025-06-03 LAB
EKG ATRIAL RATE: 70 BPM
EKG DIAGNOSIS: NORMAL
EKG P AXIS: 35 DEGREES
EKG P-R INTERVAL: 166 MS
EKG Q-T INTERVAL: 384 MS
EKG QRS DURATION: 78 MS
EKG QTC CALCULATION (BAZETT): 414 MS
EKG R AXIS: -3 DEGREES
EKG T AXIS: 1 DEGREES
EKG VENTRICULAR RATE: 70 BPM

## 2025-06-10 ENCOUNTER — HOSPITAL ENCOUNTER (EMERGENCY)
Age: 55
Discharge: HOME | End: 2025-06-10
Attending: STUDENT IN AN ORGANIZED HEALTH CARE EDUCATION/TRAINING PROGRAM
Payer: MEDICAID

## 2025-06-10 ENCOUNTER — APPOINTMENT (OUTPATIENT)
Dept: CT IMAGING | Age: 55
End: 2025-06-10
Payer: MEDICAID

## 2025-06-10 VITALS
HEART RATE: 84 BPM | WEIGHT: 270 LBS | DIASTOLIC BLOOD PRESSURE: 77 MMHG | RESPIRATION RATE: 20 BRPM | OXYGEN SATURATION: 95 % | SYSTOLIC BLOOD PRESSURE: 132 MMHG | TEMPERATURE: 98 F | BODY MASS INDEX: 47.83 KG/M2

## 2025-06-10 DIAGNOSIS — M54.50 ACUTE LEFT-SIDED LOW BACK PAIN WITHOUT SCIATICA: Primary | ICD-10-CM

## 2025-06-10 DIAGNOSIS — R10.9 FLANK PAIN: ICD-10-CM

## 2025-06-10 LAB
ALBUMIN SERPL-MCNC: 3.8 G/DL (ref 3.5–4.6)
ALP SERPL-CCNC: 134 U/L (ref 40–130)
ALT SERPL-CCNC: 16 U/L (ref 0–33)
ANION GAP SERPL CALCULATED.3IONS-SCNC: 10 MEQ/L (ref 9–15)
AST SERPL-CCNC: 19 U/L (ref 0–35)
BACTERIA URNS QL MICRO: ABNORMAL /HPF
BASOPHILS # BLD: 0 K/UL (ref 0–0.2)
BASOPHILS NFR BLD: 0.6 %
BILIRUB SERPL-MCNC: <0.2 MG/DL (ref 0.2–0.7)
BILIRUB UR QL STRIP: NEGATIVE
BUN SERPL-MCNC: 14 MG/DL (ref 6–20)
CALCIUM SERPL-MCNC: 8.9 MG/DL (ref 8.5–9.9)
CHLORIDE SERPL-SCNC: 102 MEQ/L (ref 95–107)
CLARITY UR: CLEAR
CO2 SERPL-SCNC: 22 MEQ/L (ref 20–31)
COLOR UR: YELLOW
CREAT SERPL-MCNC: 1.42 MG/DL (ref 0.5–0.9)
EOSINOPHIL # BLD: 0.2 K/UL (ref 0–0.7)
EOSINOPHIL NFR BLD: 3.4 %
EPI CELLS #/AREA URNS AUTO: ABNORMAL /HPF (ref 0–5)
ERYTHROCYTE [DISTWIDTH] IN BLOOD BY AUTOMATED COUNT: 13.2 % (ref 11.5–14.5)
GLOBULIN SER CALC-MCNC: 3.4 G/DL (ref 2.3–3.5)
GLUCOSE SERPL-MCNC: 188 MG/DL (ref 70–99)
GLUCOSE UR STRIP-MCNC: NEGATIVE MG/DL
HCT VFR BLD AUTO: 46.9 % (ref 37–47)
HGB BLD-MCNC: 15.7 G/DL (ref 12–16)
HGB UR QL STRIP: NEGATIVE
HYALINE CASTS #/AREA URNS AUTO: ABNORMAL /HPF (ref 0–5)
KETONES UR STRIP-MCNC: NEGATIVE MG/DL
LEUKOCYTE ESTERASE UR QL STRIP: ABNORMAL
LIPASE SERPL-CCNC: 39 U/L (ref 12–95)
LYMPHOCYTES # BLD: 1.8 K/UL (ref 1–4.8)
LYMPHOCYTES NFR BLD: 28.5 %
MAGNESIUM SERPL-MCNC: 2.2 MG/DL (ref 1.7–2.4)
MCH RBC QN AUTO: 32 PG (ref 27–31.3)
MCHC RBC AUTO-ENTMCNC: 33.5 % (ref 33–37)
MCV RBC AUTO: 95.5 FL (ref 79.4–94.8)
MONOCYTES # BLD: 0.4 K/UL (ref 0.2–0.8)
MONOCYTES NFR BLD: 6.5 %
NEUTROPHILS # BLD: 3.8 K/UL (ref 1.4–6.5)
NEUTS SEG NFR BLD: 60.8 %
NITRITE UR QL STRIP: NEGATIVE
PH UR STRIP: 6.5 [PH] (ref 5–9)
PLATELET # BLD AUTO: 262 K/UL (ref 130–400)
POTASSIUM SERPL-SCNC: 4.9 MEQ/L (ref 3.4–4.9)
PROT SERPL-MCNC: 7.2 G/DL (ref 6.3–8)
PROT UR STRIP-MCNC: NEGATIVE MG/DL
RBC # BLD AUTO: 4.91 M/UL (ref 4.2–5.4)
RBC #/AREA URNS AUTO: ABNORMAL /HPF (ref 0–5)
SODIUM SERPL-SCNC: 134 MEQ/L (ref 135–144)
SP GR UR STRIP: 1.02 (ref 1–1.03)
URINE REFLEX TO CULTURE: ABNORMAL
UROBILINOGEN UR STRIP-ACNC: 0.2 E.U./DL
WBC # BLD AUTO: 6.2 K/UL (ref 4.8–10.8)
WBC #/AREA URNS AUTO: ABNORMAL /HPF (ref 0–5)

## 2025-06-10 PROCEDURE — 74176 CT ABD & PELVIS W/O CONTRAST: CPT

## 2025-06-10 PROCEDURE — 83735 ASSAY OF MAGNESIUM: CPT

## 2025-06-10 PROCEDURE — 36415 COLL VENOUS BLD VENIPUNCTURE: CPT

## 2025-06-10 PROCEDURE — 85025 COMPLETE CBC W/AUTO DIFF WBC: CPT

## 2025-06-10 PROCEDURE — 81001 URINALYSIS AUTO W/SCOPE: CPT

## 2025-06-10 PROCEDURE — 6370000000 HC RX 637 (ALT 250 FOR IP): Performed by: STUDENT IN AN ORGANIZED HEALTH CARE EDUCATION/TRAINING PROGRAM

## 2025-06-10 PROCEDURE — 83690 ASSAY OF LIPASE: CPT

## 2025-06-10 PROCEDURE — 99284 EMERGENCY DEPT VISIT MOD MDM: CPT

## 2025-06-10 PROCEDURE — 80053 COMPREHEN METABOLIC PANEL: CPT

## 2025-06-10 RX ORDER — CYCLOBENZAPRINE HCL 5 MG
5 TABLET ORAL 3 TIMES DAILY PRN
Qty: 21 TABLET | Refills: 0 | Status: SHIPPED | OUTPATIENT
Start: 2025-06-10 | End: 2025-06-20

## 2025-06-10 RX ORDER — PREDNISONE 50 MG/1
50 TABLET ORAL DAILY
Qty: 5 TABLET | Refills: 0 | Status: SHIPPED | OUTPATIENT
Start: 2025-06-10 | End: 2025-06-15

## 2025-06-10 RX ORDER — OXYCODONE HYDROCHLORIDE 5 MG/1
5 TABLET ORAL ONCE
Refills: 0 | Status: COMPLETED | OUTPATIENT
Start: 2025-06-10 | End: 2025-06-10

## 2025-06-10 RX ADMIN — OXYCODONE 5 MG: 5 TABLET ORAL at 19:56

## 2025-06-10 ASSESSMENT — PAIN - FUNCTIONAL ASSESSMENT: PAIN_FUNCTIONAL_ASSESSMENT: 0-10

## 2025-06-10 ASSESSMENT — PAIN SCALES - GENERAL
PAINLEVEL_OUTOF10: 8
PAINLEVEL_OUTOF10: 8

## 2025-06-10 NOTE — ED TRIAGE NOTES
Pt alert and clam. Resp even no distress noted. Pt c/o pain 9/10 left flank. Pt able to follow all commands

## 2025-06-10 NOTE — ED PROVIDER NOTES
MEDICAL SCREENING EXAM     Basic Information   Time Seen: 5:12 PM   Primary Care Provider: Amna Crystal MD     Chief Complaint   Patient presents with    Flank Pain     Pt have hx of stones.      HPI   Lucy Tariq is a 55 yrs female who presents with left sided flank pain since yesterday. Patient states that she only has one kidney and its on the left side. She denies abdominal pain. No CVA tenderness on exam. Denies urinary issues. Denies fever, chills, nausea, vomiting, diarrhea, chest pain, or SOB.      Physical Exam     BP (!) 144/63 (06/10/25 1706)    Temp 98 °F (36.7 °C) (06/10/25 1706)    Pulse 84 (06/10/25 1706)   Resp 20 (06/10/25 1706)    SpO2 96 % (06/10/25 1706)       General: Awake and Alert, no acute distress   CV: RRR, S1, S2   Resp: LCTAB, even and non labored   Other:   Impression and Plan     Labs Reviewed   URINALYSIS WITH REFLEX TO CULTURE   CBC WITH AUTO DIFFERENTIAL   COMPREHENSIVE METABOLIC PANEL   MAGNESIUM   LIPASE        No orders to display      Final Impression   I have performed a medical screening exam on Lucy Tariq. Based on this patient's chief complaint/symptoms of   Chief Complaint   Patient presents with    Flank Pain     Pt have hx of stones.    and my focused exam, their care will be started and transitioned to provider when room is available    
activity);Obtaining appropriate analgesic effect of treatment.       (Please note that portions of this note were completed with a voice recognition program.  Efforts were made to edit the dictations but occasionally words are mis-transcribed.)    Chito Trotter MD (electronically signed)  Attending Emergency Physician            Chito Trotter MD  06/14/25 1020

## 2025-06-11 NOTE — DISCHARGE INSTRUCTIONS
You were seen in the ER today due to left-sided back/flank pain.  Thankfully, CT imaging does not show any obvious kidney stone or kidney injury.  Your kidney function test is normal and at its baseline.  Your white blood cell count is not elevated and you are not anemic.  Urine test showed no infection.  At this time your back pain could be either related to muscular strain versus nerve impingement.  I will treat you with muscle relaxers, you may use your home lidocaine patches as needed.  Please avoid taking any NSAIDs as these can damage your kidneys, this would include ibuprofen or Toradol and naproxen.  Do not take Flexeril when driving or operating heavy machinery as it can make you sleepy.  I will also prescribe you 5 days of steroids, some folks are helped in 50% of cases if it is nerve impingement.

## 2025-06-14 ASSESSMENT — ENCOUNTER SYMPTOMS
CHEST TIGHTNESS: 0
RHINORRHEA: 0
ABDOMINAL PAIN: 0
EYE REDNESS: 0
COUGH: 0
WHEEZING: 0
EYE ITCHING: 0
PHOTOPHOBIA: 0
VOMITING: 0
EYE PAIN: 0
NAUSEA: 0
SHORTNESS OF BREATH: 0
BACK PAIN: 1
COLOR CHANGE: 0
EYE DISCHARGE: 0

## 2025-07-09 ENCOUNTER — OFFICE VISIT (OUTPATIENT)
Age: 55
End: 2025-07-09
Payer: MEDICAID

## 2025-07-09 VITALS
HEART RATE: 103 BPM | SYSTOLIC BLOOD PRESSURE: 129 MMHG | HEIGHT: 63 IN | DIASTOLIC BLOOD PRESSURE: 81 MMHG | WEIGHT: 292 LBS | BODY MASS INDEX: 51.74 KG/M2

## 2025-07-09 DIAGNOSIS — Z79.4 TYPE 2 DIABETES MELLITUS WITH HYPERGLYCEMIA, WITH LONG-TERM CURRENT USE OF INSULIN (HCC): Primary | ICD-10-CM

## 2025-07-09 DIAGNOSIS — E89.0 POSTPROCEDURAL HYPOTHYROIDISM: ICD-10-CM

## 2025-07-09 DIAGNOSIS — E66.01 MORBID OBESITY (HCC): ICD-10-CM

## 2025-07-09 DIAGNOSIS — E11.9 TYPE 2 DIABETES MELLITUS TREATED WITH INSULIN (HCC): ICD-10-CM

## 2025-07-09 DIAGNOSIS — E11.65 TYPE 2 DIABETES MELLITUS WITH HYPERGLYCEMIA, WITH LONG-TERM CURRENT USE OF INSULIN (HCC): Primary | ICD-10-CM

## 2025-07-09 DIAGNOSIS — Z79.4 TYPE 2 DIABETES MELLITUS TREATED WITH INSULIN (HCC): ICD-10-CM

## 2025-07-09 LAB
CHP ED QC CHECK: NORMAL
GLUCOSE BLD-MCNC: 153 MG/DL
HBA1C MFR BLD: 6 %

## 2025-07-09 PROCEDURE — G8417 CALC BMI ABV UP PARAM F/U: HCPCS | Performed by: INTERNAL MEDICINE

## 2025-07-09 PROCEDURE — 3079F DIAST BP 80-89 MM HG: CPT | Performed by: INTERNAL MEDICINE

## 2025-07-09 PROCEDURE — 82962 GLUCOSE BLOOD TEST: CPT | Performed by: INTERNAL MEDICINE

## 2025-07-09 PROCEDURE — G8427 DOCREV CUR MEDS BY ELIG CLIN: HCPCS | Performed by: INTERNAL MEDICINE

## 2025-07-09 PROCEDURE — 1036F TOBACCO NON-USER: CPT | Performed by: INTERNAL MEDICINE

## 2025-07-09 PROCEDURE — 3044F HG A1C LEVEL LT 7.0%: CPT | Performed by: INTERNAL MEDICINE

## 2025-07-09 PROCEDURE — 99213 OFFICE O/P EST LOW 20 MIN: CPT | Performed by: INTERNAL MEDICINE

## 2025-07-09 PROCEDURE — 3074F SYST BP LT 130 MM HG: CPT | Performed by: INTERNAL MEDICINE

## 2025-07-09 PROCEDURE — 3017F COLORECTAL CA SCREEN DOC REV: CPT | Performed by: INTERNAL MEDICINE

## 2025-07-09 PROCEDURE — 2022F DILAT RTA XM EVC RTNOPTHY: CPT | Performed by: INTERNAL MEDICINE

## 2025-07-09 PROCEDURE — 95251 CONT GLUC MNTR ANALYSIS I&R: CPT | Performed by: INTERNAL MEDICINE

## 2025-07-09 PROCEDURE — PBSHW POCT GLYCOSYLATED HEMOGLOBIN (HGB A1C): Performed by: INTERNAL MEDICINE

## 2025-07-09 PROCEDURE — PBSHW POCT GLUCOSE: Performed by: INTERNAL MEDICINE

## 2025-07-09 PROCEDURE — 99212 OFFICE O/P EST SF 10 MIN: CPT | Performed by: INTERNAL MEDICINE

## 2025-07-09 PROCEDURE — 83036 HEMOGLOBIN GLYCOSYLATED A1C: CPT | Performed by: INTERNAL MEDICINE

## 2025-07-09 RX ORDER — INSULIN GLARGINE 300 U/ML
INJECTION, SOLUTION SUBCUTANEOUS
Qty: 30 ML | Refills: 5 | Status: SHIPPED | OUTPATIENT
Start: 2025-07-09

## 2025-07-09 NOTE — PROGRESS NOTES
Extraocular Movements: Extraocular movements intact.      Conjunctiva/sclera: Conjunctivae normal.   Cardiovascular:      Rate and Rhythm: Tachycardia present.   Pulmonary:      Effort: Pulmonary effort is normal.   Abdominal:      Palpations: Abdomen is soft.   Musculoskeletal:         General: Normal range of motion.      Cervical back: Normal range of motion and neck supple.   Neurological:      General: No focal deficit present.      Mental Status: She is alert and oriented to person, place, and time.   Psychiatric:         Mood and Affect: Mood normal.         Behavior: Behavior normal.

## 2025-07-12 RX ORDER — ALBUTEROL SULFATE 90 UG/1
2 INHALANT RESPIRATORY (INHALATION) EVERY 6 HOURS PRN
Qty: 20.1 G | Refills: 4 | Status: SHIPPED | OUTPATIENT
Start: 2025-07-12

## 2025-08-03 ENCOUNTER — HOSPITAL ENCOUNTER (EMERGENCY)
Age: 55
Discharge: HOME | End: 2025-08-04
Payer: MEDICAID

## 2025-08-03 DIAGNOSIS — R73.9 HYPERGLYCEMIA: Primary | ICD-10-CM

## 2025-08-03 PROCEDURE — 99284 EMERGENCY DEPT VISIT MOD MDM: CPT

## 2025-08-04 VITALS
OXYGEN SATURATION: 97 % | HEIGHT: 63 IN | SYSTOLIC BLOOD PRESSURE: 160 MMHG | BODY MASS INDEX: 51.91 KG/M2 | TEMPERATURE: 98.6 F | RESPIRATION RATE: 20 BRPM | HEART RATE: 74 BPM | DIASTOLIC BLOOD PRESSURE: 88 MMHG | WEIGHT: 293 LBS

## 2025-08-04 LAB
ALBUMIN SERPL-MCNC: 3.8 G/DL (ref 3.5–4.6)
ALP SERPL-CCNC: 137 U/L (ref 40–130)
ALT SERPL-CCNC: 17 U/L (ref 0–33)
ANION GAP SERPL CALCULATED.3IONS-SCNC: 12 MEQ/L (ref 9–15)
AST SERPL-CCNC: 19 U/L (ref 0–35)
BASOPHILS # BLD: 0 K/UL (ref 0–0.2)
BASOPHILS NFR BLD: 0.5 %
BILIRUB SERPL-MCNC: 0.3 MG/DL (ref 0.2–0.7)
BILIRUB UR QL STRIP: NEGATIVE
BUN SERPL-MCNC: 20 MG/DL (ref 6–20)
CALCIUM SERPL-MCNC: 8.8 MG/DL (ref 8.5–9.9)
CHLORIDE SERPL-SCNC: 105 MEQ/L (ref 95–107)
CLARITY UR: CLEAR
CO2 SERPL-SCNC: 22 MEQ/L (ref 20–31)
COLOR UR: YELLOW
CREAT SERPL-MCNC: 1.41 MG/DL (ref 0.5–0.9)
EKG ATRIAL RATE: 74 BPM
EKG DIAGNOSIS: NORMAL
EKG P AXIS: 52 DEGREES
EKG P-R INTERVAL: 172 MS
EKG Q-T INTERVAL: 376 MS
EKG QRS DURATION: 82 MS
EKG QTC CALCULATION (BAZETT): 417 MS
EKG R AXIS: -4 DEGREES
EKG T AXIS: 28 DEGREES
EKG VENTRICULAR RATE: 74 BPM
EOSINOPHIL # BLD: 0.2 K/UL (ref 0–0.7)
EOSINOPHIL NFR BLD: 3 %
ERYTHROCYTE [DISTWIDTH] IN BLOOD BY AUTOMATED COUNT: 12.9 % (ref 11.5–14.5)
GLOBULIN SER CALC-MCNC: 3.3 G/DL (ref 2.3–3.5)
GLUCOSE BLD-MCNC: 189 MG/DL (ref 70–99)
GLUCOSE BLD-MCNC: 209 MG/DL
GLUCOSE BLD-MCNC: 209 MG/DL (ref 70–99)
GLUCOSE BLD-MCNC: 324 MG/DL
GLUCOSE BLD-MCNC: 324 MG/DL (ref 70–99)
GLUCOSE SERPL-MCNC: 297 MG/DL (ref 70–99)
GLUCOSE UR STRIP-MCNC: >=1000 MG/DL
HCT VFR BLD AUTO: 48.1 % (ref 37–47)
HGB BLD-MCNC: 15.6 G/DL (ref 12–16)
HGB UR QL STRIP: NEGATIVE
INR PPP: 1.1
KETONES UR STRIP-MCNC: NEGATIVE MG/DL
LACTIC ACID, SEPSIS: 1.4 MMOL/L (ref 0.5–1.9)
LACTIC ACID, SEPSIS: 1.7 MMOL/L (ref 0.5–1.9)
LEUKOCYTE ESTERASE UR QL STRIP: NEGATIVE
LIPASE SERPL-CCNC: 45 U/L (ref 12–95)
LYMPHOCYTES # BLD: 2.1 K/UL (ref 1–4.8)
LYMPHOCYTES NFR BLD: 28.3 %
MAGNESIUM SERPL-MCNC: 2.1 MG/DL (ref 1.7–2.4)
MCH RBC QN AUTO: 31.6 PG (ref 27–31.3)
MCHC RBC AUTO-ENTMCNC: 32.4 % (ref 33–37)
MCV RBC AUTO: 97.6 FL (ref 79.4–94.8)
MONOCYTES # BLD: 0.5 K/UL (ref 0.2–0.8)
MONOCYTES NFR BLD: 6.3 %
NEUTROPHILS # BLD: 4.6 K/UL (ref 1.4–6.5)
NEUTS SEG NFR BLD: 61.5 %
NITRITE UR QL STRIP: NEGATIVE
PERFORMED ON: ABNORMAL
PH UR STRIP: 6.5 [PH] (ref 5–9)
PLATELET # BLD AUTO: 249 K/UL (ref 130–400)
POTASSIUM SERPL-SCNC: 4.6 MEQ/L (ref 3.4–4.9)
PROT SERPL-MCNC: 7.1 G/DL (ref 6.3–8)
PROT UR STRIP-MCNC: NEGATIVE MG/DL
PROTHROMBIN TIME: 14.2 SEC (ref 12.3–14.9)
RBC # BLD AUTO: 4.93 M/UL (ref 4.2–5.4)
REASON FOR REJECTION: NORMAL
REJECTED TEST: NORMAL
SODIUM SERPL-SCNC: 139 MEQ/L (ref 135–144)
SP GR UR STRIP: 1.02 (ref 1–1.03)
T4 FREE SERPL-MCNC: 0.65 NG/DL (ref 0.84–1.68)
TROPONIN, HIGH SENSITIVITY: 14 NG/L (ref 0–19)
TSH REFLEX: 9.93 UIU/ML (ref 0.44–3.86)
URINE REFLEX TO CULTURE: ABNORMAL
UROBILINOGEN UR STRIP-ACNC: 0.2 E.U./DL
WBC # BLD AUTO: 7.5 K/UL (ref 4.8–10.8)

## 2025-08-04 PROCEDURE — 80053 COMPREHEN METABOLIC PANEL: CPT

## 2025-08-04 PROCEDURE — 82330 ASSAY OF CALCIUM: CPT

## 2025-08-04 PROCEDURE — 93010 ELECTROCARDIOGRAM REPORT: CPT | Performed by: INTERNAL MEDICINE

## 2025-08-04 PROCEDURE — 83605 ASSAY OF LACTIC ACID: CPT

## 2025-08-04 PROCEDURE — 84443 ASSAY THYROID STIM HORMONE: CPT

## 2025-08-04 PROCEDURE — 84439 ASSAY OF FREE THYROXINE: CPT

## 2025-08-04 PROCEDURE — 96361 HYDRATE IV INFUSION ADD-ON: CPT

## 2025-08-04 PROCEDURE — 2580000003 HC RX 258: Performed by: PHYSICIAN ASSISTANT

## 2025-08-04 PROCEDURE — 82803 BLOOD GASES ANY COMBINATION: CPT

## 2025-08-04 PROCEDURE — 96360 HYDRATION IV INFUSION INIT: CPT

## 2025-08-04 PROCEDURE — 85610 PROTHROMBIN TIME: CPT

## 2025-08-04 PROCEDURE — 82565 ASSAY OF CREATININE: CPT

## 2025-08-04 PROCEDURE — 84295 ASSAY OF SERUM SODIUM: CPT

## 2025-08-04 PROCEDURE — 81003 URINALYSIS AUTO W/O SCOPE: CPT

## 2025-08-04 PROCEDURE — 82435 ASSAY OF BLOOD CHLORIDE: CPT

## 2025-08-04 PROCEDURE — 84484 ASSAY OF TROPONIN QUANT: CPT

## 2025-08-04 PROCEDURE — 37799 UNLISTED PX VASCULAR SURGERY: CPT

## 2025-08-04 PROCEDURE — 93005 ELECTROCARDIOGRAM TRACING: CPT | Performed by: PHYSICIAN ASSISTANT

## 2025-08-04 PROCEDURE — 82948 REAGENT STRIP/BLOOD GLUCOSE: CPT

## 2025-08-04 PROCEDURE — 84132 ASSAY OF SERUM POTASSIUM: CPT

## 2025-08-04 PROCEDURE — 83735 ASSAY OF MAGNESIUM: CPT

## 2025-08-04 PROCEDURE — 85014 HEMATOCRIT: CPT

## 2025-08-04 PROCEDURE — 85025 COMPLETE CBC W/AUTO DIFF WBC: CPT

## 2025-08-04 PROCEDURE — 83690 ASSAY OF LIPASE: CPT

## 2025-08-04 PROCEDURE — 6370000000 HC RX 637 (ALT 250 FOR IP): Performed by: PHYSICIAN ASSISTANT

## 2025-08-04 PROCEDURE — 36415 COLL VENOUS BLD VENIPUNCTURE: CPT

## 2025-08-04 RX ORDER — SODIUM CHLORIDE, SODIUM LACTATE, POTASSIUM CHLORIDE, AND CALCIUM CHLORIDE .6; .31; .03; .02 G/100ML; G/100ML; G/100ML; G/100ML
1000 INJECTION, SOLUTION INTRAVENOUS ONCE
Status: COMPLETED | OUTPATIENT
Start: 2025-08-04 | End: 2025-08-04

## 2025-08-04 RX ADMIN — SODIUM CHLORIDE, SODIUM LACTATE, POTASSIUM CHLORIDE, AND CALCIUM CHLORIDE 1000 ML: .6; .31; .03; .02 INJECTION, SOLUTION INTRAVENOUS at 01:04

## 2025-08-04 RX ADMIN — INSULIN HUMAN 4 UNITS: 100 INJECTION, SOLUTION PARENTERAL at 03:00

## 2025-08-05 LAB
BASE EXCESS VENOUS: 3 (ref -3–3)
CALCIUM IONIZED: 1.23 MMOL/L (ref 1.12–1.32)
HCO3 VENOUS: 28 MMOL/L (ref 23–29)
HCT VFR BLD AUTO: 50 % (ref 36–48)
HGB BLD CALC-MCNC: 17.1 GM/DL (ref 12–16)
LACTATE: 1.59 MMOL/L (ref 0.4–2)
O2 SAT, VEN: 92 %
PCO2 VENOUS: 46.5 MM HG (ref 40–50)
PERFORMED ON: ABNORMAL
PH VENOUS: 7.39 (ref 7.32–7.42)
PO2 VENOUS: 64 MM HG
POC CHLORIDE: 107 MEQ/L (ref 99–110)
POC CREATININE: 1.3 MG/DL (ref 0.6–1.2)
POC SAMPLE TYPE: ABNORMAL
POTASSIUM SERPL-SCNC: 4.3 MEQ/L (ref 3.5–5.1)
SODIUM BLD-SCNC: 142 MEQ/L (ref 136–145)
TCO2 CALC VENOUS: 29 MMOL/L

## 2025-08-22 DIAGNOSIS — M47.816 SPONDYLOSIS OF LUMBAR REGION WITHOUT MYELOPATHY OR RADICULOPATHY: ICD-10-CM

## 2025-08-22 DIAGNOSIS — G89.29 OTHER CHRONIC PAIN: ICD-10-CM

## 2025-08-25 RX ORDER — PREGABALIN 200 MG/1
CAPSULE ORAL
Qty: 270 CAPSULE | Refills: 1 | Status: SHIPPED | OUTPATIENT
Start: 2025-08-25 | End: 2025-11-23

## (undated) DEVICE — GLOVE ORANGE PI 8   MSG9080

## (undated) DEVICE — TUBING, SUCTION, 9/32" X 12', STRAIGHT: Brand: MEDLINE INDUSTRIES, INC.

## (undated) DEVICE — SPONGE,LAP,18"X18",DLX,XR,ST,5/PK,40/PK: Brand: MEDLINE

## (undated) DEVICE — SUTURE VICRYL SZ 2-0 L27IN ABSRB VLT L26MM SH 1/2 CIR J317H

## (undated) DEVICE — APPLICATOR MEDICATED 26 CC SOLUTION HI LT ORNG CHLORAPREP

## (undated) DEVICE — LABEL MED MINI W/ MARKER

## (undated) DEVICE — COUNTER NDL 40 COUNT HLD 70 FOAM BLK ADH W/ MAG

## (undated) DEVICE — MAGNETIC INSTR DRAPE 20X16: Brand: MEDLINE INDUSTRIES, INC.

## (undated) DEVICE — PAD,NON-ADHERENT,3X8,STERILE,LF,1/PK: Brand: MEDLINE

## (undated) DEVICE — PACK,BASIC: Brand: MEDLINE

## (undated) DEVICE — BLADE,CARBON-STEEL,15,STRL,DISPOSABLE,TB: Brand: MEDLINE

## (undated) DEVICE — E-Z CLEAN, NON-STICK, PTFE COATED, ELECTROSURGICAL BLADE ELECTRODE, MODIFIED EXTENDED INSULATION, 4 INCH (10.2 CM): Brand: MEGADYNE

## (undated) DEVICE — E-Z CLEAN, NON-STICK, PTFE COATED, ELECTROSURGICAL BLADE ELECTRODE, 6.5 INCH (16.5 CM): Brand: MEGADYNE

## (undated) DEVICE — SINGLE PORT MANIFOLD: Brand: NEPTUNE 2

## (undated) DEVICE — GOWN,SIRUS,NONRNF,SETINSLV,2XL,18/CS: Brand: MEDLINE

## (undated) DEVICE — TOWEL,OR,DSP,ST,BLUE,STD,4/PK,20PK/CS: Brand: MEDLINE

## (undated) DEVICE — SUTURE VICRYL SZ 0 L36IN ABSRB UD L36MM CT-1 1/2 CIR J946H

## (undated) DEVICE — Device

## (undated) DEVICE — ELECTRODE PT RET AD L9FT HI MOIST COND ADH HYDRGEL CORDED

## (undated) DEVICE — COVER LT HNDL BLU PLAS

## (undated) DEVICE — SUTURE VICRYL SZ 4-0 L18IN ABSRB UD L19MM PS-2 3/8 CIR PRIM J496H

## (undated) DEVICE — NEPTUNE E-SEP SMOKE EVACUATION PENCIL, COATED, 70MM BLADE, PUSH BUTTON SWITCH: Brand: NEPTUNE E-SEP

## (undated) DEVICE — SYRINGE MED 10ML LUERLOCK TIP W/O SFTY DISP

## (undated) DEVICE — HYPODERMIC SAFETY NEEDLE: Brand: MAGELLAN

## (undated) DEVICE — SUTURE SZ 7 L18IN NONABSORBABLE SIL CCS L48MM 1/2 CIR STRNM M655G

## (undated) DEVICE — DRAPE,LAP,CHOLE,W/TROUGHS,STERILE: Brand: MEDLINE

## (undated) DEVICE — GOWN,AURORA,NONREINFORCED,LARGE: Brand: MEDLINE